# Patient Record
Sex: MALE | Race: WHITE | NOT HISPANIC OR LATINO | Employment: FULL TIME | URBAN - METROPOLITAN AREA
[De-identification: names, ages, dates, MRNs, and addresses within clinical notes are randomized per-mention and may not be internally consistent; named-entity substitution may affect disease eponyms.]

---

## 2017-04-07 ENCOUNTER — OFFICE VISIT (OUTPATIENT)
Dept: INTERNAL MEDICINE | Facility: CLINIC | Age: 37
End: 2017-04-07
Payer: COMMERCIAL

## 2017-04-07 VITALS
HEART RATE: 61 BPM | HEIGHT: 75 IN | TEMPERATURE: 98 F | OXYGEN SATURATION: 98 % | WEIGHT: 263.69 LBS | DIASTOLIC BLOOD PRESSURE: 80 MMHG | BODY MASS INDEX: 32.79 KG/M2 | RESPIRATION RATE: 16 BRPM | SYSTOLIC BLOOD PRESSURE: 112 MMHG

## 2017-04-07 DIAGNOSIS — Z98.84 BARIATRIC SURGERY STATUS: ICD-10-CM

## 2017-04-07 DIAGNOSIS — N28.9 ACUTE RENAL INSUFFICIENCY: ICD-10-CM

## 2017-04-07 DIAGNOSIS — M10.9 GOUT OF FOOT, UNSPECIFIED CAUSE, UNSPECIFIED CHRONICITY, UNSPECIFIED LATERALITY: Primary | ICD-10-CM

## 2017-04-07 DIAGNOSIS — K21.9 GASTRO-ESOPHAGEAL REFLUX DISEASE WITHOUT ESOPHAGITIS: ICD-10-CM

## 2017-04-07 DIAGNOSIS — Z79.899 ENCOUNTER FOR LONG-TERM (CURRENT) USE OF OTHER MEDICATIONS: ICD-10-CM

## 2017-04-07 DIAGNOSIS — Z86.79 HISTORY OF HYPERTENSION: ICD-10-CM

## 2017-04-07 DIAGNOSIS — E66.09 NON MORBID OBESITY DUE TO EXCESS CALORIES: ICD-10-CM

## 2017-04-07 DIAGNOSIS — Z86.39 HISTORY OF DIABETES MELLITUS, TYPE II: ICD-10-CM

## 2017-04-07 PROCEDURE — 99204 OFFICE O/P NEW MOD 45 MIN: CPT | Mod: S$GLB,,, | Performed by: FAMILY MEDICINE

## 2017-04-07 PROCEDURE — 1160F RVW MEDS BY RX/DR IN RCRD: CPT | Mod: S$GLB,,, | Performed by: FAMILY MEDICINE

## 2017-04-07 PROCEDURE — 99999 PR PBB SHADOW E&M-NEW PATIENT-LVL III: CPT | Mod: PBBFAC,,, | Performed by: FAMILY MEDICINE

## 2017-04-07 RX ORDER — ESOMEPRAZOLE MAGNESIUM 40 MG/1
40 CAPSULE, DELAYED RELEASE ORAL EVERY MORNING
Qty: 90 CAPSULE | Refills: 1 | Status: SHIPPED | OUTPATIENT
Start: 2017-04-07 | End: 2017-06-07

## 2017-04-07 RX ORDER — AMLODIPINE BESYLATE 10 MG/1
10 TABLET ORAL DAILY
COMMUNITY
End: 2017-04-07 | Stop reason: ALTCHOICE

## 2017-04-07 RX ORDER — FEBUXOSTAT 40 MG/1
40 TABLET, FILM COATED ORAL DAILY
COMMUNITY
End: 2017-04-07 | Stop reason: SDUPTHER

## 2017-04-07 RX ORDER — ESOMEPRAZOLE MAGNESIUM 40 MG/1
40 CAPSULE, DELAYED RELEASE ORAL EVERY MORNING
COMMUNITY
End: 2017-04-07 | Stop reason: SDUPTHER

## 2017-04-07 RX ORDER — CETIRIZINE HYDROCHLORIDE 10 MG/1
10 TABLET ORAL
COMMUNITY
End: 2017-04-26 | Stop reason: HOSPADM

## 2017-04-07 RX ORDER — DOXAZOSIN 1 MG/1
1 TABLET ORAL NIGHTLY
COMMUNITY
End: 2017-04-26 | Stop reason: SDUPTHER

## 2017-04-07 RX ORDER — FEBUXOSTAT 40 MG/1
40 TABLET, FILM COATED ORAL DAILY
Qty: 90 TABLET | Refills: 1 | Status: SHIPPED | OUTPATIENT
Start: 2017-04-07 | End: 2017-04-26 | Stop reason: SDUPTHER

## 2017-04-07 RX ORDER — ASPIRIN 81 MG/1
81 TABLET ORAL NIGHTLY
COMMUNITY
End: 2017-04-07 | Stop reason: ALTCHOICE

## 2017-04-07 RX ORDER — AMLODIPINE AND OLMESARTAN MEDOXOMIL 10; 40 MG/1; MG/1
1 TABLET ORAL DAILY
COMMUNITY
End: 2017-04-07 | Stop reason: ALTCHOICE

## 2017-04-07 RX ORDER — ATENOLOL 50 MG/1
50 TABLET ORAL DAILY
COMMUNITY
End: 2017-04-07 | Stop reason: ALTCHOICE

## 2017-04-07 RX ORDER — ATENOLOL AND CHLORTHALIDONE TABLET 50; 25 MG/1; MG/1
1 TABLET ORAL DAILY
COMMUNITY
End: 2017-04-07 | Stop reason: ALTCHOICE

## 2017-04-07 RX ORDER — FENOFIBRATE 145 MG/1
145 TABLET, FILM COATED ORAL DAILY
COMMUNITY
End: 2017-04-07

## 2017-04-07 RX ORDER — INSULIN LISPRO 100 [IU]/ML
INJECTION, SOLUTION INTRAVENOUS; SUBCUTANEOUS
COMMUNITY
End: 2017-04-07 | Stop reason: ALTCHOICE

## 2017-04-07 NOTE — MR AVS SNAPSHOT
[facility]  [address] [facility] [place] [address]  Phone: [phone]  Fax: [phone]                  Robb Iglesias   2017 2:00 PM   Office Visit    Description:  Male : 1980   Provider:  SABIHA Roberson MD   Department:  [facility] - Internal Medicine           Reason for Visit     Gout     Follow-up     Medication Refill           Diagnoses this Visit        Comments    Gout of foot, unspecified cause, unspecified chronicity, unspecified laterality    -  Primary     Gastro-esophageal reflux disease without esophagitis         Bariatric surgery status         History of diabetes mellitus, type II         History of hypertension         Non morbid obesity due to excess calories         Acute renal insufficiency         Encounter for long-term (current) use of other medications                To Do List           Goals (5 Years of Data)     None      Follow-Up and Disposition     Return in about 3 months (around 2017) for re-evaluate chronic conditions.       These Medications        Disp Refills Start End    febuxostat (ULORIC) 40 mg Tab 90 tablet 1 2017     Take 1 tablet (40 mg total) by mouth once daily. - Oral    Pharmacy: [facility] - [address] Ph #: [phone]       esomeprazole (NEXIUM) 40 MG capsule 90 capsule 1 2017     Take 1 capsule (40 mg total) by mouth every morning. - Oral    Pharmacy: [facility] - [address] Ph #: [phone]         [facility] On Call     [facility] On Call Nurse Care Line -  Assistance  Unless otherwise directed by your provider, please contact [facility], our nurse care line that is available for  assistance.     Registered nurses in the [facility] Center provide: appointment scheduling, clinical advisement, health education, and other advisory services.  Call: [phone] (toll free)               Medications            Message regarding Medications     Verify the changes and/or additions to your medication regime listed below are the same as discussed with your clinician today.  If any of these changes or additions are incorrect, please notify your healthcare provider.        CHANGE how you are taking these medications     Start Taking Instead of    febuxostat (ULORIC) 40 mg Tab febuxostat (ULORIC) 40 mg Tab    Dosage:  Take 1 tablet (40 mg total) by mouth once daily. Dosage:  Take 40 mg by mouth once daily.    Reason for Change:  Reorder     esomeprazole (NEXIUM) 40 MG capsule esomeprazole (NEXIUM) 40 MG capsule    Dosage:  Take 1 capsule (40 mg total) by mouth every morning. Dosage:  Take 40 mg by mouth every morning.    Reason for Change:  Reorder       STOP taking these medications     atenolol-chlorthalidone (TENORETIC) 50-25 mg Tab Take 1 tablet by mouth once daily.    amlodipine-olmesartan (STACI) 10-40 mg per tablet Take 1 tablet by mouth once daily.    fenofibrate (TRICOR) 145 MG tablet Take 145 mg by mouth once daily.    aspirin (ECOTRIN) 81 MG EC tablet Take 81 mg by mouth every evening.    amlodipine (NORVASC) 10 MG tablet Take 10 mg by mouth once daily.    atenolol (TENORMIN) 50 MG tablet Take 50 mg by mouth once daily.    exenatide (BYETTA) 10 mcg/dose(250 mcg/mL) 2.4 mL PnIj Inject 10 mcg into the skin 2 (two) times daily before meals.    insulin detemir (LEVEMIR FLEXPEN) 100 unit/mL (3 mL) SubQ InPn pen Inject 100 Units into the skin 2 (two) times daily.    insulin lispro (HUMALOG KWIKPEN) 100 unit/mL InPn pen Inject into the skin three times daily with food. AS DIRECTED (max 120 unites/day)    SITagliptan (JANUVIA) 100 MG Tab Take 100 mg by mouth once daily.           Verify that the below list of medications is an accurate representation of the medications you are currently taking.  If none reported, the list may be blank. If incorrect, please contact your healthcare provider. Carry this list with you in  "case of emergency.           Current Medications     esomeprazole (NEXIUM) 40 MG capsule Take 1 capsule (40 mg total) by mouth every morning.    febuxostat (ULORIC) 40 mg Tab Take 1 tablet (40 mg total) by mouth once daily.    cetirizine (ZYRTEC) 10 MG tablet Take 10 mg by mouth once daily.    doxazosin (CARDURA) 1 MG tablet Take 1 mg by mouth every evening.           Clinical Reference Information           Your Vitals Were     BP Pulse Temp Resp    112/80 (BP Location: Left arm, Patient Position: Sitting, BP Method: Manual) 61 98.3 °F (36.8 °C) (Tympanic) 16    Height Weight SpO2 BMI    6' 3" (1.905 m) 119.6 kg (263 lb 10.7 oz) 98% 32.96 kg/m2      Blood Pressure          Most Recent Value    BP  112/80      Allergies as of 4/7/2017     Lactose      Immunizations Administered on Date of Encounter - 4/7/2017     None      Orders Placed During Today's Visit     Future Labs/Procedures Expected by Expires    CBC auto differential  7/7/2017 6/6/2018    Comprehensive metabolic panel  7/7/2017 6/6/2018    Ferritin  7/7/2017 6/6/2018    Lipid panel  7/7/2017 6/6/2018    Vitamin B12  7/7/2017 6/6/2018    Vitamin D  7/7/2017 6/6/2018      [facility] Sign-Up     Activating your [facility] account is as easy as 1-2-3!     1) Visit [email or web address], select Sign Up Now, enter this activation code and your date of birth, then select Next.  [email or web address]  Expires: 5/22/2017  3:20 PM      2) Create a username and password to use when you visit [facility] in the future and select a security question in case you lose your password and select Next.    3) Enter your e-mail address and click Sign Up!    Additional Information  If you have questions, please e-mail [email or web address] or call [phone] to talk to our [facility] staff. Remember, [facility] is NOT to be used for urgent needs. For medical emergencies, dial 911.         Instructions    Check blood pressure once daily at bedtime. If systolic blood pressure is greater than " 150, then take one tablet of doxazosin 1mg (max 1 tablet daily).        Language Assistance Services     ATTENTION: Language assistance services are available, free of charge. Please call [phone].      ATENCIÓN: Si bao armas, tiene a parekh disposición servicios gratuitos de asistencia lingüística. Llame al [phone].     [facility] Ý: N?u b?n nói Ti?ng Vi?t, có các d?ch v? h? tr? ngôn ng? mi?n phí dành cho b?n. G?i s? [phone].         [facility] - Internal Medicine complies with applicable Federal civil rights laws and does not discriminate on the basis of race, color, national origin, age, disability, or sex.

## 2017-04-07 NOTE — PATIENT INSTRUCTIONS
Check blood pressure once daily at bedtime. If systolic blood pressure is greater than 150, then take one tablet of doxazosin 1mg (max 1 tablet daily).

## 2017-04-09 NOTE — PROGRESS NOTES
NOTE: Documentation entered by me for this encounter was done in part using speech-recognition technology. Although I have made an effort to ensure accuracy, malapropisms may exist and should be interpreted in context. -KEVIN Roberson MD    Patient ID: Robb Iglesias is a 36 y.o. male.    CHIEF COMPLAINT   Gout; Follow-up; and Medication Refill        HISTORY OF PRESENT ILLNESS:   Gout of foot  This appears FAIRLY CONTROLLED. This appears STABLE.     Gastro-esophageal reflux disease without esophagitis  This appears WELL CONTROLLED.     Bariatric surgery status  He appears to be doing very well postoperatively.    Non morbid obesity due to excess calories  This condition is much IMPROVED. He is losing significant weight appropriately after his bariatric surgery.    ADDITIONAL NARRATIVE HISTORY:  He is doing very well after his bariatric surgical procedure. His hypertension and type 2 diabetes mellitus have effectively been cured. It was decided to leave him off of his diabetic medications and antihypertensive medications and fenofibrate and reevaluate after checking labs in 2-3 month. He will continue to follow with his bariatric surgeon as directed.      REVIEW OF SYSTEMS:   CONSTITUTIONAL: No fever reported.  CARDIOVASCULAR: No chest pain reported.  PULMONARY: No trouble breathing reported.  GENITOURINARY: No painful urination reported.  ENT: No sore throat reported.    PHYSICAL EXAM:   CONSTITUTIONAL: Vital signs noted. No apparent distress. Does not appear acutely ill or septic. Appears adequately hydrated.  HEENT: Normocephalic. Atraumatic. External ears unremarkable. Oropharynx moist.  PULM: Lungs clear. Breathing unlabored.  CV: Heart regular.  GI: Abdomen is soft and nontender.  DERM: Warm and moist.  PSYCHIATRIC: Alert and oriented x 3. Mood is grossly neutral. Affect appropriate. Judgment and insight not grossly compromised.    ASSESSMENT:       1. Gout of foot, unspecified cause,  unspecified chronicity, unspecified laterality    2. Gastro-esophageal reflux disease without esophagitis    3. Bariatric surgery status    4. History of diabetes mellitus, type II    5. History of hypertension    6. Non morbid obesity due to excess calories    7. Acute renal insufficiency    8. Encounter for long-term (current) use of other medications             PLAN:   Gout of foot, unspecified cause, unspecified chronicity, unspecified laterality  -     febuxostat (ULORIC) 40 mg Tab; Take 1 tablet (40 mg total) by mouth once daily.  Dispense: 90 tablet; Refill: 1    Gastro-esophageal reflux disease without esophagitis  -     esomeprazole (NEXIUM) 40 MG capsule; Take 1 capsule (40 mg total) by mouth every morning.  Dispense: 90 capsule; Refill: 1    Bariatric surgery status  -     Lipid panel; Future; Expected date: 7/7/17  -     Comprehensive metabolic panel; Future; Expected date: 7/7/17  -     CBC auto differential; Future; Expected date: 7/7/17  -     Vitamin B12; Future; Expected date: 7/7/17  -     Vitamin D; Future; Expected date: 7/7/17  -     Ferritin; Future; Expected date: 7/7/17    History of diabetes mellitus, type II    History of hypertension  -     Comprehensive metabolic panel; Future; Expected date: 7/7/17  -     Vitamin B12; Future; Expected date: 7/7/17    Non morbid obesity due to excess calories  -     Vitamin B12; Future; Expected date: 7/7/17    Acute renal insufficiency  -     Comprehensive metabolic panel; Future; Expected date: 7/7/17  -     CBC auto differential; Future; Expected date: 7/7/17  -     Vitamin D; Future; Expected date: 7/7/17  -     Ferritin; Future; Expected date: 7/7/17    Encounter for long-term (current) use of other medications  -     Comprehensive metabolic panel; Future; Expected date: 7/7/17  -     CBC auto differential; Future; Expected date: 7/7/17  -     Ferritin; Future; Expected date: 7/7/17        Medications Discontinued During This Encounter   Medication  Reason    atenolol-chlorthalidone (TENORETIC) 50-25 mg Tab Discontinued by another clinician    amlodipine-olmesartan (STACI) 10-40 mg per tablet Discontinued by another clinician    fenofibrate (TRICOR) 145 MG tablet Surgery    aspirin (ECOTRIN) 81 MG EC tablet Therapy completed    amlodipine (NORVASC) 10 MG tablet Therapy completed    atenolol (TENORMIN) 50 MG tablet Therapy completed    exenatide (BYETTA) 10 mcg/dose(250 mcg/mL) 2.4 mL PnIj Therapy completed    insulin detemir (LEVEMIR FLEXPEN) 100 unit/mL (3 mL) SubQ InPn pen Therapy completed    insulin lispro (HUMALOG KWIKPEN) 100 unit/mL InPn pen Therapy completed    SITagliptan (JANUVIA) 100 MG Tab Therapy completed    febuxostat (ULORIC) 40 mg Tab Reorder    esomeprazole (NEXIUM) 40 MG capsule Reorder       He has a current medication list which includes the following prescription(s): esomeprazole, febuxostat, cetirizine, and doxazosin.

## 2017-04-09 NOTE — ASSESSMENT & PLAN NOTE
This condition is much IMPROVED. He is losing significant weight appropriately after his bariatric surgery.

## 2017-04-24 ENCOUNTER — PATIENT MESSAGE (OUTPATIENT)
Dept: INTERNAL MEDICINE | Facility: CLINIC | Age: 37
End: 2017-04-24

## 2017-04-26 ENCOUNTER — OFFICE VISIT (OUTPATIENT)
Dept: INTERNAL MEDICINE | Facility: CLINIC | Age: 37
End: 2017-04-26
Payer: COMMERCIAL

## 2017-04-26 VITALS
TEMPERATURE: 97 F | OXYGEN SATURATION: 97 % | HEIGHT: 75 IN | BODY MASS INDEX: 32.7 KG/M2 | DIASTOLIC BLOOD PRESSURE: 88 MMHG | SYSTOLIC BLOOD PRESSURE: 138 MMHG | WEIGHT: 263 LBS | RESPIRATION RATE: 16 BRPM | HEART RATE: 84 BPM

## 2017-04-26 DIAGNOSIS — M10.9 GOUT OF LEFT KNEE, UNSPECIFIED CAUSE, UNSPECIFIED CHRONICITY: ICD-10-CM

## 2017-04-26 DIAGNOSIS — I10 BENIGN ESSENTIAL HYPERTENSION: Primary | Chronic | ICD-10-CM

## 2017-04-26 DIAGNOSIS — M10.9 GOUT OF LEFT FOOT, UNSPECIFIED CAUSE, UNSPECIFIED CHRONICITY: ICD-10-CM

## 2017-04-26 DIAGNOSIS — Z98.84 BARIATRIC SURGERY STATUS: Chronic | ICD-10-CM

## 2017-04-26 PROCEDURE — 3079F DIAST BP 80-89 MM HG: CPT | Mod: S$GLB,,, | Performed by: FAMILY MEDICINE

## 2017-04-26 PROCEDURE — 1160F RVW MEDS BY RX/DR IN RCRD: CPT | Mod: S$GLB,,, | Performed by: FAMILY MEDICINE

## 2017-04-26 PROCEDURE — 99999 PR PBB SHADOW E&M-EST. PATIENT-LVL IV: CPT | Mod: PBBFAC,,, | Performed by: FAMILY MEDICINE

## 2017-04-26 PROCEDURE — 3075F SYST BP GE 130 - 139MM HG: CPT | Mod: S$GLB,,, | Performed by: FAMILY MEDICINE

## 2017-04-26 PROCEDURE — 96372 THER/PROPH/DIAG INJ SC/IM: CPT | Mod: S$GLB,,, | Performed by: FAMILY MEDICINE

## 2017-04-26 PROCEDURE — 99214 OFFICE O/P EST MOD 30 MIN: CPT | Mod: 25,S$GLB,, | Performed by: FAMILY MEDICINE

## 2017-04-26 RX ORDER — DOXAZOSIN 1 MG/1
1 TABLET ORAL NIGHTLY
Qty: 90 TABLET | Refills: 3 | Status: SHIPPED | OUTPATIENT
Start: 2017-04-26 | End: 2017-06-21

## 2017-04-26 RX ORDER — HYDROCODONE BITARTRATE AND ACETAMINOPHEN 10; 325 MG/1; MG/1
1 TABLET ORAL
COMMUNITY
End: 2017-06-07

## 2017-04-26 RX ORDER — METHYLPREDNISOLONE ACETATE 40 MG/ML
40 INJECTION, SUSPENSION INTRA-ARTICULAR; INTRALESIONAL; INTRAMUSCULAR; SOFT TISSUE
Status: COMPLETED | OUTPATIENT
Start: 2017-04-26 | End: 2017-04-26

## 2017-04-26 RX ORDER — FEBUXOSTAT 40 MG/1
40 TABLET, FILM COATED ORAL DAILY
Qty: 90 TABLET | Refills: 3 | Status: SHIPPED | OUTPATIENT
Start: 2017-04-26 | End: 2017-06-07 | Stop reason: DRUGHIGH

## 2017-04-26 RX ORDER — IBUPROFEN 200 MG
200 TABLET ORAL
COMMUNITY
End: 2017-06-21

## 2017-04-26 RX ADMIN — METHYLPREDNISOLONE ACETATE 40 MG: 40 INJECTION, SUSPENSION INTRA-ARTICULAR; INTRALESIONAL; INTRAMUSCULAR; SOFT TISSUE at 03:04

## 2017-04-26 NOTE — MR AVS SNAPSHOT
[facility]  [address] [facility] [place] [address]  Phone: [phone]  Fax: [phone]                  Robb Iglesias   2017 3:00 PM   Office Visit    Description:  Male : 1980   Provider:  Kwan Roberson MD   Department:  [facility] Internal Medicine           Reason for Visit     Gout           Diagnoses this Visit        Comments    Gout of left knee, unspecified cause, unspecified chronicity    -  Primary     Gout of left foot, unspecified cause, unspecified chronicity         Benign essential hypertension                To Do List           Future Appointments        Provider Department Dept Phone    2017 8:25 AM LABORATORY, [facility] [phone]    2017 8:00 AM Kwan Roberson MD [facility] Internal [facility] [phone]      Goals (5 Years of Data)     None      Follow-Up and Disposition     Return if symptoms worsen or fail to improve.       These Medications        Disp Refills Start End    doxazosin (CARDURA) 1 MG tablet 90 tablet 3 2017     Take 1 tablet (1 mg total) by mouth every evening. - Oral    Pharmacy: [facility] - [address] Ph #: [phone]       febuxostat (ULORIC) 40 mg Tab 90 tablet 3 2017     Take 1 tablet (40 mg total) by mouth once daily. - Oral    Pharmacy: [facility] - [address] Ph #: [phone]         [facility] On Call     [facility] On Call Nurse Care Line -  Assistance  Unless otherwise directed by your provider, please contact [facility], our nurse care line that is available for 24/ assistance.     Registered nurses in the [facility] On Call Center provide: appointment scheduling, clinical advisement, health education, and other advisory services.  Call: [phone] (toll free)               Medications           Message regarding Medications     Verify the changes and/or additions to  "your medication regime listed below are the same as discussed with your clinician today.  If any of these changes or additions are incorrect, please notify your healthcare provider.        These medications were administered today        Dose Freq    methylPREDNISolone acetate injection 40 mg 40 mg Clinic/HOD 1 time    Sig: Inject 1 mL (40 mg total) into the muscle one time.    Class: Normal    Route: Intramuscular      CHANGE how you are taking these medications     Start Taking Instead of    doxazosin (CARDURA) 1 MG tablet doxazosin (CARDURA) 1 MG tablet    Dosage:  Take 1 tablet (1 mg total) by mouth every evening. Dosage:  Take 1 mg by mouth every evening.    Reason for Change:  Reorder       STOP taking these medications     cetirizine (ZYRTEC) 10 MG tablet Take 10 mg by mouth as needed.            Verify that the below list of medications is an accurate representation of the medications you are currently taking.  If none reported, the list may be blank. If incorrect, please contact your healthcare provider. Carry this list with you in case of emergency.           Current Medications     doxazosin (CARDURA) 1 MG tablet Take 1 tablet (1 mg total) by mouth every evening.    esomeprazole (NEXIUM) 40 MG capsule Take 1 capsule (40 mg total) by mouth every morning.    febuxostat (ULORIC) 40 mg Tab Take 1 tablet (40 mg total) by mouth once daily.    hydrocodone-acetaminophen 10-325mg (NORCO)  mg Tab Take 1 tablet by mouth as needed for Pain.    ibuprofen (ADVIL,MOTRIN) 200 MG tablet Take 200 mg by mouth as needed for Pain.    MULTIVIT-MINERALS/FOLIC ACID (DAILY MULTIPLE FOR MEN ORAL) Take 1 tablet by mouth once daily.           Clinical Reference Information           Your Vitals Were     BP Pulse Temp Resp Height Weight    138/88 84 97.1 °F (36.2 °C) (Tympanic) 16 6' 3" (1.905 m) 119.3 kg (263 lb 0.1 oz)    SpO2 BMI             97% 32.87 kg/m2         Blood Pressure          Most Recent Value    BP  138/88    "   Allergies as of 4/26/2017     Lactose      Immunizations Administered on Date of Encounter - 4/26/2017     None      Language Assistance Services     ATTENTION: Language assistance services are available, free of charge. Please call [phone].      ATENCIÓN: Si bao armas, tiene a parekh disposición servicios gratuitos de asistencia lingüística. Llame al [phone].     CHÚ Ý: N?u b?n nói Ti?ng Vi?t, có các d?ch v? h? tr? ngôn ng? mi?n phí dành cho b?n. G?i s? [phone].         [facility] - [facility] complies with applicable Federal civil rights laws and does not discriminate on the basis of race, color, national origin, age, disability, or sex.

## 2017-05-08 PROBLEM — K21.9 GASTRO-ESOPHAGEAL REFLUX DISEASE WITHOUT ESOPHAGITIS: Chronic | Status: ACTIVE | Noted: 2017-04-07

## 2017-05-08 PROBLEM — M10.9 GOUT OF FOOT: Chronic | Status: ACTIVE | Noted: 2017-04-07

## 2017-05-08 PROBLEM — E66.09 NON MORBID OBESITY DUE TO EXCESS CALORIES: Chronic | Status: ACTIVE | Noted: 2017-04-07

## 2017-05-08 PROBLEM — M10.9 GOUT OF LEFT KNEE: Chronic | Status: ACTIVE | Noted: 2017-04-26

## 2017-05-08 PROBLEM — Z98.84 BARIATRIC SURGERY STATUS: Chronic | Status: ACTIVE | Noted: 2017-04-07

## 2017-05-08 NOTE — ASSESSMENT & PLAN NOTE
The weight loss resulting from his bariatric surgical procedure has resulted in dramatic improvement of his blood pressure, and the only antihypertensive medication he is needing/using now is the doxazosin 1 mg daily at bedtime. He is going to continue to work on therapeutic lifestyle improvements and monitor his blood pressure at home, and we will consider weaning/discontinuing the doxazosin as he continues to lose more weight. He reports no angina or angina equivalent symptoms.

## 2017-05-08 NOTE — ASSESSMENT & PLAN NOTE
Based on the report of the patient and information available to me at present, this condition appears to be SUB-OPTIMALLY (OR EQUIVOCALLY) CONTROLLED, and this condition appears to be WORSE. When he was on the febuoxstat , he was NOT having gout symptoms. He discontinued the febuoxstat after his bariatric surgery, but it he reports recurrence of his gout symptoms to a MODERATE degree. He is working on appropriate dietary modifications.

## 2017-05-08 NOTE — PROGRESS NOTES
NOTE: Documentation entered by me for this encounter was done in part using speech-recognition technology. Although I have made an effort to ensure accuracy, malapropisms may exist and should be interpreted in context. -KEVIN Roberson MD    Patient ID: Robb Iglesias is a 36 y.o. male.    CHIEF COMPLAINT   Gout (Left knee worst.)   also, reevaluate hypertension      HISTORY OF PRESENT ILLNESS:   Benign essential hypertension  The weight loss resulting from his bariatric surgical procedure has resulted in dramatic improvement of his blood pressure, and the only antihypertensive medication he is needing/using now is the doxazosin 1 mg daily at bedtime. He is going to continue to work on therapeutic lifestyle improvements and monitor his blood pressure at home, and we will consider weaning/discontinuing the doxazosin as he continues to lose more weight. He reports no angina or angina equivalent symptoms.    Gout of left knee  Based on the report of the patient and information available to me at present, this condition appears to be SUB-OPTIMALLY (OR EQUIVOCALLY) CONTROLLED, and this condition appears to be WORSE. When he was on the febuoxstat , he was NOT having gout symptoms. He discontinued the febuoxstat after his bariatric surgery, but it he reports recurrence of his gout symptoms to a MODERATE degree. He is working on appropriate dietary modifications.    Gout of foot  Based on the report of the patient and information available to me at present, this condition appears to be SUB-OPTIMALLY (OR EQUIVOCALLY) CONTROLLED, and this condition appears to be WORSE. When he was on the febuoxstat , he was NOT having gout symptoms. He discontinued the febuoxstat after his bariatric surgery, but it he reports recurrence of his gout symptoms to a MODERATE degree. He is working on appropriate dietary modifications.        REVIEW OF SYSTEMS:   CONSTITUTIONAL: No fever reported.  CARDIOVASCULAR: No chest pain  reported.  PULMONARY: No trouble breathing reported.    PHYSICAL EXAM:   CONSTITUTIONAL: Vital signs noted. No apparent distress. Does not appear acutely ill or septic. Appears adequately hydrated.  HEENT: Normocephalic. Atraumatic. External ears unremarkable. Oropharynx moist.  PULM: Lungs clear. Breathing unlabored.  CV: Heart regular.  DERM: Warm and moist.  MUSCULOSKELETAL: MILD swelling of his left great toe and left knee, compared to the contralateral side. Pain is reproduced on range of motion. In spite of this problem, gait and stance are essentially normal.  PSYCHIATRIC: Alert and oriented x 3. Mood is grossly neutral. Affect appropriate. Judgment and insight not grossly compromised.    ASSESSMENT:       1. Benign essential hypertension    2. Gout of left foot, unspecified cause, unspecified chronicity    3. Gout of left knee, unspecified cause, unspecified chronicity    4. Bariatric surgery status             PLAN:   Benign essential hypertension  -     doxazosin (CARDURA) 1 MG tablet; Take 1 tablet (1 mg total) by mouth every evening.  Dispense: 90 tablet; Refill: 3    Gout of left foot, unspecified cause, unspecified chronicity  -     febuxostat (ULORIC) 40 mg Tab; Take 1 tablet (40 mg total) by mouth once daily.  Dispense: 90 tablet; Refill: 3  -     methylPREDNISolone acetate injection 40 mg; Inject 1 mL (40 mg total) into the muscle one time.    Gout of left knee, unspecified cause, unspecified chronicity  -     methylPREDNISolone acetate injection 40 mg; Inject 1 mL (40 mg total) into the muscle one time.    Bariatric surgery status        Medications Discontinued During This Encounter   Medication Reason    cetirizine (ZYRTEC) 10 MG tablet Patient Discharge    doxazosin (CARDURA) 1 MG tablet Reorder    febuxostat (ULORIC) 40 mg Tab Reorder

## 2017-06-07 ENCOUNTER — OFFICE VISIT (OUTPATIENT)
Dept: INTERNAL MEDICINE | Facility: CLINIC | Age: 37
End: 2017-06-07
Payer: COMMERCIAL

## 2017-06-07 VITALS
WEIGHT: 244.94 LBS | SYSTOLIC BLOOD PRESSURE: 116 MMHG | HEIGHT: 75 IN | TEMPERATURE: 98 F | BODY MASS INDEX: 30.45 KG/M2 | DIASTOLIC BLOOD PRESSURE: 84 MMHG

## 2017-06-07 DIAGNOSIS — N18.30 CHRONIC KIDNEY DISEASE, STAGE 3: Chronic | ICD-10-CM

## 2017-06-07 DIAGNOSIS — I10 BENIGN ESSENTIAL HYPERTENSION: Chronic | ICD-10-CM

## 2017-06-07 DIAGNOSIS — M1A.0420 IDIOPATHIC CHRONIC GOUT OF LEFT HAND WITHOUT TOPHUS: Primary | ICD-10-CM

## 2017-06-07 DIAGNOSIS — M1A.0710 IDIOPATHIC CHRONIC GOUT, RIGHT ANKLE AND FOOT, WITHOUT TOPHUS (TOPHI): ICD-10-CM

## 2017-06-07 DIAGNOSIS — K91.2 POSTOPERATIVE MALABSORPTION: ICD-10-CM

## 2017-06-07 DIAGNOSIS — E66.09 NON MORBID OBESITY DUE TO EXCESS CALORIES: ICD-10-CM

## 2017-06-07 DIAGNOSIS — Z98.84 BARIATRIC SURGERY STATUS: Chronic | ICD-10-CM

## 2017-06-07 DIAGNOSIS — Z79.899 ENCOUNTER FOR LONG-TERM CURRENT USE OF HIGH RISK MEDICATION: ICD-10-CM

## 2017-06-07 DIAGNOSIS — M1A.0620 IDIOPATHIC CHRONIC GOUT OF LEFT KNEE WITHOUT TOPHUS: Chronic | ICD-10-CM

## 2017-06-07 DIAGNOSIS — M1A.0720 IDIOPATHIC CHRONIC GOUT, LEFT ANKLE AND FOOT, WITHOUT TOPHUS (TOPHI): Chronic | ICD-10-CM

## 2017-06-07 DIAGNOSIS — K21.9 GASTRO-ESOPHAGEAL REFLUX DISEASE WITHOUT ESOPHAGITIS: Chronic | ICD-10-CM

## 2017-06-07 PROBLEM — M10.042 IDIOPATHIC GOUT, LEFT HAND: Chronic | Status: ACTIVE | Noted: 2017-06-07

## 2017-06-07 PROCEDURE — 99214 OFFICE O/P EST MOD 30 MIN: CPT | Mod: S$GLB,,, | Performed by: FAMILY MEDICINE

## 2017-06-07 PROCEDURE — 99999 PR PBB SHADOW E&M-EST. PATIENT-LVL III: CPT | Mod: PBBFAC,,, | Performed by: FAMILY MEDICINE

## 2017-06-07 RX ORDER — FEBUXOSTAT 80 MG/1
80 TABLET, FILM COATED ORAL DAILY
Qty: 30 TABLET | Refills: 11 | Status: SHIPPED | OUTPATIENT
Start: 2017-06-07 | End: 2017-07-19

## 2017-06-07 RX ORDER — COLCHICINE 0.6 MG/1
TABLET ORAL
Qty: 15 TABLET | Refills: 1 | Status: SHIPPED | OUTPATIENT
Start: 2017-06-07 | End: 2017-06-21

## 2017-06-07 RX ORDER — DICLOFENAC SODIUM 10 MG/G
2 GEL TOPICAL 3 TIMES DAILY PRN
Qty: 100 G | Refills: 1 | Status: SHIPPED | OUTPATIENT
Start: 2017-06-07 | End: 2017-07-19

## 2017-06-07 NOTE — PROGRESS NOTES
"NOTE: Documentation entered by me for this encounter was done in part using speech-recognition technology. Although I have made an effort to ensure accuracy, malapropisms may exist and should be interpreted in context.  -KEVIN Roberson MD    Patient ID: Robb Iglesias is a 36 y.o. male.    CHIEF COMPLAINT   Gout      HISTORY OF PRESENT ILLNESS:   NOTE: Additional description of problem(s) or condition(s) may be included in a separate paragraph labeled "NARRATIVE HISTORY OF PRESENT ILLNESS".  -KEVIN Roberson MD    Benign essential hypertension   Based on the report of the patient and information available to me at present, this condition appears to be WELL CONTROLLED, and this condition appears to be IMPROVED.     Gout of left knee   Based on the report of the patient and information available to me at present, this condition appears to be SUB-OPTIMALLY CONTROLLED, and this condition appears to be IMPROVED.     Non morbid obesity due to excess calories   Based on the report of the patient and information available to me at present, this condition continues to IMPROVE.     Bariatric surgery status   Based on the report of the patient and information available to me at present, this condition appears to be STABLE/COMPENSATED.     Chronic kidney disease, stage 3   Based on the report of the patient and information available to me at present, this condition appears to be STABLE/COMPENSATED.     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     Idiopathic chronic gout, left ankle and foot, without tophus (tophi)   Based on the report of the patient and information available to me at present, this condition appears to be SUB-OPTIMALLY CONTROLLED, and this condition appears to be IMPROVED.     Idiopathic chronic gout, right ankle " and foot, without tophus (tophi)   Based on the report of the patient and information available to me at present, this condition appears to be SUB-OPTIMALLY CONTROLLED, and this condition appears to be IMPROVED.     LABS REVIEWED: From [facility] (05/22/2017)  Comprehensive metabolic panel   Sodium Level 142 136 - 145 mmol/L  Potassium Level 3.8 3.5 - 5.1 mmol/L  Chloride Level 107 100 - 109 mmol/L  CO2 Level 23 22 - 33 mmol/L  Glucose Level 171 (H) 70 - 100 mg/dL  Blood Urea Nitrogen Level 18 5 - 25 mg/dL  Creatinine Level 1.58 (H) 0.57 - 1.25 mg/dL  GFR-AA 61 mL/min/1.73mSquared   GFR-GABBY 50 mL/min/1.73mSquared   Calcium Level 8.9 8.8 - 10.6 mg/dL  Protein Total 6.7 6.0 - 8.3 g/dL  Albumin Level 3.6 3.5 - 5.0 g/dL  Bilirubin Total 0.8 0.2 - 1.2 mg/dL  Alkaline Phosphatase Level 95 40 - 150 U/L  SGOT (AST) 21 10 - 58 U/L  SGPT (ALT) 24 5 - 50 U/L  Anion Gap 12 8 - 16 mmol/L    CBC auto differential  WHITE BLOOD CELL COUNT 8.2 4.0 - 11.0 1000/ul  RED BLOOD CELL COUNT 4.90 4.50 - 5.60 mill/uL  HEMOGLOBIN 13.1 (L) 14.0 - 18.0 gm/dl  HEMATOCRIT 41.4 (L) 42.0 - 52.0 %  MEAN CORPUSCULAR VOLUME 85 80 - 100 fl  MEAN CORPUSCULAR HEMOGLOBIN CONC 31.6 31.0 - 37.0 gm/dl  RED CELL DISTRIBUTION WIDTH 15.2 (H) 12.1 - 14.9 %  PLATELET COUNT 194 150 - 375 1000/ul  MEAN PLATELET VOLUME 8.3 6.5 - 12.0 fl  Neutrophils Abs 5.6 1.5 - 10.0 1000/ul  Lymphocytes Abs 1.9 1.3 - 2.9 1000/ul  Monocytes Abs 0.5 0.1 - 1.0 1000/ul  Eosinophils Abs 0.2 0.0 - 0.7 1000/ul  Basophils Abs 0.0 0.0 - 0.2 1000/ul  Neutrophils % 68 44 - 81 %  Lymphocytes % 23 21 - 47 %  Monocytes % 6 2 - 11 %  Eosinophils % 2 0 - 7 %  Basophils % 1 0 - 2 %    REVIEW OF SYSTEMS:   CONSTITUTIONAL: No fever reported.  CARDIOVASCULAR: No chest pain reported.  PULMONARY: No trouble breathing reported.  PSYCHIATRIC: No mood instability reported.     PHYSICAL EXAM:   CONSTITUTIONAL: Vital signs (BP, P, T, RR, et al) noted. No apparent  "distress. Does not appear acutely ill or septic. Appears adequately hydrated.  HEENT: External ENT grossly unremarkable. Hearing grossly intact. Oropharynx moist.  PULM: Lungs clear. Breathing unlabored.  CV: Heart regular. No jugular venous distention. No carotid bruit.  DERM: Skin warm and moist with normal turgor.  NEURO: Strength is grossly normal and reasonably symmetric in major muscle groups bilaterally. There are no gross focal motor deficits or gross deficits of cranial nerves III-XII.  PSYCHIATRIC: Alert and oriented x 3. Mood is grossly neutral. Affect appropriate. Judgment and insight not grossly compromised.     ASSESSMENT:       1. 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 Benign essential hypertension    3. Gastro-esophageal reflux disease without esophagitis    4. Idiopathic chronic gout of left knee without tophus    5. Idiopathic chronic gout, right ankle and foot, without tophus (tophi)    6. Idiopathic chronic gout, left ankle and foot, without tophus (tophi)    7. Chronic kidney disease, stage 3    8. Encounter for long-term current use of high risk medication    9. Postoperative malabsorption    10. Bariatric surgery status    11. Non morbid obesity due to excess calories           PLAN:   NOTE: Unless specified otherwise, the PLAN for a listed ASSESSMENT is to continue present treatment as prescribed. The planned follow-up and additional "Patient Instructions" may also be found in the After Visit Summary printed and provided to the patient.    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  Dispense: 30 tablet; Refill: 11  -     Uric acid; Future; Expected date: 06/07/2017  -     colchicine 0.6 mg tablet; Take TWO tablets for acute gout attack; may take ONE more tablet 1 hour later if needed. DO NOT TAKE MORE THAN 3 TABLETS PER WEEK.  Dispense: 15 tablet; Refill: 1  -     diclofenac sodium " 1 % Gel; Apply 2 g topically 3 (three) times daily as needed.  Dispense: 100 g; Refill: 1    Benign essential hypertension    Gastro-esophageal reflux disease without esophagitis    Idiopathic chronic gout of left knee without tophus  -     febuxostat 80 mg Tab; Take 1 tablet (80 mg total) by mouth once daily.  Dispense: 30 tablet; Refill: 11  -     Uric acid; Future; Expected date: 06/07/2017  -     colchicine 0.6 mg tablet; Take TWO tablets for acute gout attack; may take ONE more tablet 1 hour later if needed. DO NOT TAKE MORE THAN 3 TABLETS PER WEEK.  Dispense: 15 tablet; Refill: 1  -     diclofenac sodium 1 % Gel; Apply 2 g topically 3 (three) times daily as needed.  Dispense: 100 g; Refill: 1    Idiopathic chronic gout, right ankle and foot, without tophus (tophi)  -     febuxostat 80 mg Tab; Take 1 tablet (80 mg total) by mouth once daily.  Dispense: 30 tablet; Refill: 11  -     Uric acid; Future; Expected date: 06/07/2017  -     colchicine 0.6 mg tablet; Take TWO tablets for acute gout attack; may take ONE more tablet 1 hour later if needed. DO NOT TAKE MORE THAN 3 TABLETS PER WEEK.  Dispense: 15 tablet; Refill: 1  -     diclofenac sodium 1 % Gel; Apply 2 g topically 3 (three) times daily as needed.  Dispense: 100 g; Refill: 1    Idiopathic chronic gout, left ankle and foot, without tophus (tophi)  -     febuxostat 80 mg Tab; Take 1 tablet (80 mg total) by mouth once daily.  Dispense: 30 tablet; Refill: 11  -     Uric acid; Future; Expected date: 06/07/2017  -     colchicine 0.6 mg tablet; Take TWO tablets for acute gout attack; may take ONE more tablet 1 hour later if needed. DO NOT TAKE MORE THAN 3 TABLETS PER WEEK.  Dispense: 15 tablet; Refill: 1  -     diclofenac sodium 1 % Gel; Apply 2 g topically 3 (three) times daily as needed.  Dispense: 100 g; Refill: 1    Chronic kidney disease, stage 3  -     Cancel: Ferritin; Future; Expected date: 06/07/2017  -     Cancel: Vitamin B12; Future; Expected date:  06/07/2017  -     CALCITRIOL; Future; Expected date: 06/07/2017    Encounter for long-term current use of high risk medication  -     Uric acid; Future; Expected date: 06/07/2017    Postoperative malabsorption  -     Cancel: Ferritin; Future; Expected date: 06/07/2017  -     Cancel: Vitamin B12; Future; Expected date: 06/07/2017  -     CALCITRIOL; Future; Expected date: 06/07/2017    Bariatric surgery status  -     Cancel: Ferritin; Future; Expected date: 06/07/2017  -     Cancel: Vitamin B12; Future; Expected date: 06/07/2017  -     CALCITRIOL; Future; Expected date: 06/07/2017    Non morbid obesity due to excess calories        Medications Discontinued During This Encounter   Medication Reason    hydrocodone-acetaminophen 10-325mg (NORCO)  mg Tab Patient no longer taking    esomeprazole (NEXIUM) 40 MG capsule Patient no longer taking    febuxostat (ULORIC) 40 mg Tab Dose adjustment

## 2017-06-09 NOTE — ASSESSMENT & PLAN NOTE
Based on the report of the patient and information available to me at present, this condition appears to be WELL CONTROLLED, and this condition appears to be IMPROVED.

## 2017-06-09 NOTE — ASSESSMENT & PLAN NOTE
Based on the report of the patient and information available to me at present, this condition appears to be STABLE/COMPENSATED.

## 2017-06-09 NOTE — ASSESSMENT & PLAN NOTE
Based on the report of the patient and information available to me at present, this condition continues to IMPROVE.

## 2017-06-09 NOTE — ASSESSMENT & PLAN NOTE
Based on the report of the patient and information available to me at present, this condition appears to be SUB-OPTIMALLY CONTROLLED, and this condition appears to be IMPROVED.

## 2017-06-12 ENCOUNTER — TELEPHONE (OUTPATIENT)
Dept: INTERNAL MEDICINE | Facility: CLINIC | Age: 37
End: 2017-06-12

## 2017-06-12 NOTE — TELEPHONE ENCOUNTER
Per fax request received from pt pharmacy, initiated prior auth request for Diclofenac Gel 1% with pt insurance, via online [employer], Request [id number].

## 2017-06-21 ENCOUNTER — OFFICE VISIT (OUTPATIENT)
Dept: NEPHROLOGY | Facility: CLINIC | Age: 37
End: 2017-06-21
Payer: COMMERCIAL

## 2017-06-21 ENCOUNTER — LAB VISIT (OUTPATIENT)
Dept: LAB | Facility: HOSPITAL | Age: 37
End: 2017-06-21
Attending: INTERNAL MEDICINE
Payer: COMMERCIAL

## 2017-06-21 VITALS
HEART RATE: 74 BPM | SYSTOLIC BLOOD PRESSURE: 140 MMHG | DIASTOLIC BLOOD PRESSURE: 90 MMHG | HEIGHT: 75 IN | BODY MASS INDEX: 30.59 KG/M2 | WEIGHT: 246.06 LBS

## 2017-06-21 DIAGNOSIS — N18.30 CHRONIC KIDNEY DISEASE, STAGE 3: Chronic | ICD-10-CM

## 2017-06-21 DIAGNOSIS — R80.1 PERSISTENT PROTEINURIA: ICD-10-CM

## 2017-06-21 DIAGNOSIS — I10 HTN (HYPERTENSION), BENIGN: Primary | ICD-10-CM

## 2017-06-21 DIAGNOSIS — N17.9 AKI (ACUTE KIDNEY INJURY): Primary | ICD-10-CM

## 2017-06-21 DIAGNOSIS — I10 HTN (HYPERTENSION), BENIGN: ICD-10-CM

## 2017-06-21 DIAGNOSIS — M1A.3620 CHRONIC GOUT OF LEFT KNEE DUE TO RENAL IMPAIRMENT WITHOUT TOPHUS: Chronic | ICD-10-CM

## 2017-06-21 DIAGNOSIS — E11.21 TYPE 2 DIABETES MELLITUS WITH DIABETIC NEPHROPATHY, WITHOUT LONG-TERM CURRENT USE OF INSULIN: ICD-10-CM

## 2017-06-21 DIAGNOSIS — I10 BENIGN ESSENTIAL HYPERTENSION: Chronic | ICD-10-CM

## 2017-06-21 PROBLEM — R80.9 PROTEINURIA: Status: ACTIVE | Noted: 2017-06-21

## 2017-06-21 LAB
ALBUMIN SERPL BCP-MCNC: 3.6 G/DL
ANION GAP SERPL CALC-SCNC: 13 MMOL/L
BASOPHILS # BLD AUTO: 0.02 K/UL
BASOPHILS NFR BLD: 0.2 %
BUN SERPL-MCNC: 23 MG/DL
CALCIUM SERPL-MCNC: 9.2 MG/DL
CHLORIDE SERPL-SCNC: 107 MMOL/L
CO2 SERPL-SCNC: 21 MMOL/L
CREAT SERPL-MCNC: 1.5 MG/DL
DIFFERENTIAL METHOD: ABNORMAL
EOSINOPHIL # BLD AUTO: 0 K/UL
EOSINOPHIL NFR BLD: 0.5 %
ERYTHROCYTE [DISTWIDTH] IN BLOOD BY AUTOMATED COUNT: 15.7 %
EST. GFR  (AFRICAN AMERICAN): >60 ML/MIN/1.73 M^2
EST. GFR  (NON AFRICAN AMERICAN): 59 ML/MIN/1.73 M^2
GLUCOSE SERPL-MCNC: 225 MG/DL
HCT VFR BLD AUTO: 41 %
HGB BLD-MCNC: 13.9 G/DL
LYMPHOCYTES # BLD AUTO: 1.3 K/UL
LYMPHOCYTES NFR BLD: 14.9 %
MCH RBC QN AUTO: 27.7 PG
MCHC RBC AUTO-ENTMCNC: 33.9 %
MCV RBC AUTO: 82 FL
MONOCYTES # BLD AUTO: 0.4 K/UL
MONOCYTES NFR BLD: 5.3 %
NEUTROPHILS # BLD AUTO: 6.6 K/UL
NEUTROPHILS NFR BLD: 79.1 %
PHOSPHATE SERPL-MCNC: 2.2 MG/DL
PLATELET # BLD AUTO: 191 K/UL
PMV BLD AUTO: 9.8 FL
POTASSIUM SERPL-SCNC: 3.8 MMOL/L
PTH-INTACT SERPL-MCNC: 48.4 PG/ML
RBC # BLD AUTO: 5.01 M/UL
SODIUM SERPL-SCNC: 141 MMOL/L
URATE SERPL-MCNC: 5.8 MG/DL
WBC # BLD AUTO: 8.38 K/UL

## 2017-06-21 PROCEDURE — 99999 PR PBB SHADOW E&M-EST. PATIENT-LVL III: CPT | Mod: PBBFAC,,, | Performed by: INTERNAL MEDICINE

## 2017-06-21 PROCEDURE — 84550 ASSAY OF BLOOD/URIC ACID: CPT

## 2017-06-21 PROCEDURE — 36415 COLL VENOUS BLD VENIPUNCTURE: CPT

## 2017-06-21 PROCEDURE — 3066F NEPHROPATHY DOC TX: CPT | Mod: S$GLB,,, | Performed by: INTERNAL MEDICINE

## 2017-06-21 PROCEDURE — 99204 OFFICE O/P NEW MOD 45 MIN: CPT | Mod: S$GLB,,, | Performed by: INTERNAL MEDICINE

## 2017-06-21 PROCEDURE — 85025 COMPLETE CBC W/AUTO DIFF WBC: CPT

## 2017-06-21 PROCEDURE — 83970 ASSAY OF PARATHORMONE: CPT

## 2017-06-21 PROCEDURE — 80069 RENAL FUNCTION PANEL: CPT

## 2017-06-21 RX ORDER — PREDNISONE 20 MG/1
20 TABLET ORAL DAILY
COMMUNITY
End: 2017-06-26 | Stop reason: SDUPTHER

## 2017-06-21 RX ORDER — LISINOPRIL 20 MG/1
20 TABLET ORAL DAILY
Qty: 90 TABLET | Refills: 3 | Status: SHIPPED | OUTPATIENT
Start: 2017-06-21 | End: 2018-01-17 | Stop reason: SDUPTHER

## 2017-06-21 NOTE — PATIENT INSTRUCTIONS
tylenol 500 mg 2-3 tablets every 6 hours for pain       1.  Acute kidney injury much improved after hydration.  Avoid nonsteroidals.  Take Tylenol 2 every 6 hours for pain.    2.  Chronic kidney disease stage III much improved after bariatric surgery.  Check Cystatin C.  Avoid nonsteroidals.  Check  vitamin D levels on follow-up. Add baking soda and follow up on acid level     3.  Hematuria and proteinuria:renal ultrasound was done in 3/2017.Most likely these findings are from diabetic nephropathy. Add lisinopril 20mg and stop cardura     4.  Hypertension well-controlled. Watch on ACEI off cardura     5.  Status post history of gout recheck uric acid on follow-up---keep on Uloric : target uric acid is 5.5     6. Turmeric 1000 mg BID

## 2017-06-21 NOTE — PROGRESS NOTES
Subjective:       Patient ID: Robb Iglesias is a 36 y.o. White male who presents for new evaluation of Acute Renal Failure; Chronic Kidney Disease; Proteinuria; Hematuria; and Gout    HPI     Patient is a 36-year-old male with history of type 2 diabetes with morbid obesity.  Patient has multiple family members with type 2 diabetes with multiple complications.  Patient has history of chronic kidney disease stage III from diabetic nephropathy.  In February 2016 his creatinine was 2.5 with approximate GFR 35%.  His hemoglobin A1c was 10.9 at that time.    2015 has history of hepatomegaly status post liver biopsy    March 2017 patient underwent vertical sleeve gastrectomy for bariatric surgery.  His postoperative course was complicated by acute kidney injury from dehydration.  His maximum creatinine was 7.7 with a BUN of 126 no RRT required     May 2017 creatinine down to 1.5    June 2017 patient presents for consultation for chronic kidney disease with proteinuria and hematuria.  His BMI down to 29 and has lost greater than 80 pounds in last 3 months. ( 320 ib pre-op) . 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  Negative for activity change, appetite change, chills, diaphoresis, fatigue and fever.   HENT: Negative.  Negative for congestion and trouble swallowing.    Eyes: Negative.    Respiratory: Negative.  Negative for cough, chest tightness, shortness of breath and wheezing.    Cardiovascular: Negative.  Negative for chest pain, palpitations and leg swelling.   Gastrointestinal: Negative.  Negative for abdominal distention, abdominal pain, nausea and vomiting.   Genitourinary: Negative.  Negative for decreased urine volume, difficulty urinating,  "dysuria, enuresis, flank pain, frequency, hematuria, penile swelling, scrotal swelling and urgency.   Musculoskeletal: Negative.  Negative for arthralgias, back pain, joint swelling and neck pain.   Skin: Negative for rash.   Neurological: Negative.  Negative for tremors, seizures and headaches.   Psychiatric/Behavioral: Negative.  Negative for confusion and sleep disturbance. The patient is not nervous/anxious.        Objective:   BP (!) 140/90   Pulse 74   Ht 6' 3" (1.905 m)   Wt 111.6 kg (246 lb 0.5 oz)   BMI 30.75 kg/m²      Physical Exam   Constitutional: He is oriented to person, place, and time. He appears well-developed and well-nourished. No distress.   HENT:   Head: Normocephalic.   Eyes: EOM are normal. Pupils are equal, round, and reactive to light.   Neck: Normal range of motion. Neck supple. No JVD present. No thyromegaly present.   Cardiovascular: Normal rate, regular rhythm, S1 normal, S2 normal, normal heart sounds and intact distal pulses.  PMI is not displaced.  Exam reveals no gallop and no friction rub.    No murmur heard.  Pulmonary/Chest: Effort normal and breath sounds normal. No respiratory distress. He has no wheezes. He has no rales. He exhibits no tenderness.   Abdominal: He exhibits no distension and no mass. There is no hepatosplenomegaly. There is no tenderness. There is no rebound and no CVA tenderness. No hernia.   Musculoskeletal: Normal range of motion. He exhibits no edema or tenderness.   Lymphadenopathy:     He has no cervical adenopathy.   Neurological: He is alert and oriented to person, place, and time. He has normal reflexes. He is not disoriented. He displays normal reflexes. No cranial nerve deficit. He exhibits normal muscle tone. Coordination normal.   Skin: Skin is warm and dry. No rash noted. No erythema.   Psychiatric: He has a normal mood and affect. His behavior is normal. Judgment and thought content normal.         Lab Results   Component Value Date    " CREATININE 1.5 (H) 06/21/2017    BUN 23 (H) 06/21/2017     06/21/2017    K 3.8 06/21/2017     06/21/2017    CO2 21 (L) 06/21/2017     Lab Results   Component Value Date    WBC 8.38 06/21/2017    HGB 13.9 (L) 06/21/2017    HCT 41.0 06/21/2017    MCV 82 06/21/2017     06/21/2017     Lab Results   Component Value Date    PTH 48.4 06/21/2017    CALCIUM 9.2 06/21/2017    PHOS 2.2 (L) 06/21/2017         No results found for: URICACID      Assessment:    )    1. EMMIE (acute kidney injury)    2. Chronic kidney disease, stage 3    3. Persistent proteinuria    4. Type 2 diabetes mellitus with diabetic nephropathy, without long-term current use of insulin    5. Chronic gout of left knee due to renal impairment without tophus    6. Benign essential hypertension        Plan:         1.  Acute kidney injury much improved after hydration.  Avoid nonsteroidals.  Take Tylenol 2 every 6 hours for pain.    2.  Chronic kidney disease stage III much improved after bariatric surgery.  Check Cystatin C.  Avoid nonsteroidals.  Check  vitamin D levels on follow-up. Add baking soda and follow up on acid level     3.  Hematuria and proteinuria:renal ultrasound was done in 3/2017.Most likely these findings are from diabetic nephropathy. Add lisinopril 20mg and stop cardura     4.  Hypertension well-controlled. Watch on ACEI off cardura     5.  Status post history of gout recheck uric acid on follow-up---keep on Uloric : target uric acid is 5.5     6. Turmeric 1000 mg BID

## 2017-06-26 ENCOUNTER — PATIENT MESSAGE (OUTPATIENT)
Dept: INTERNAL MEDICINE | Facility: CLINIC | Age: 37
End: 2017-06-26

## 2017-06-26 ENCOUNTER — PATIENT MESSAGE (OUTPATIENT)
Dept: NEPHROLOGY | Facility: CLINIC | Age: 37
End: 2017-06-26

## 2017-06-26 DIAGNOSIS — M10.00 ACUTE IDIOPATHIC GOUT, UNSPECIFIED SITE: Primary | ICD-10-CM

## 2017-06-26 RX ORDER — COLCHICINE 0.6 MG/1
0.6 TABLET ORAL 2 TIMES DAILY
Qty: 60 TABLET | Refills: 11 | Status: SHIPPED | OUTPATIENT
Start: 2017-06-26 | End: 2017-09-12 | Stop reason: SDUPTHER

## 2017-06-26 RX ORDER — COLCHICINE 0.6 MG/1
0.6 TABLET ORAL 2 TIMES DAILY
Qty: 60 TABLET | Refills: 11 | Status: SHIPPED | OUTPATIENT
Start: 2017-06-26 | End: 2017-06-26 | Stop reason: SDUPTHER

## 2017-06-26 RX ORDER — PREDNISONE 20 MG/1
20 TABLET ORAL DAILY
Qty: 20 TABLET | Refills: 0 | Status: SHIPPED | OUTPATIENT
Start: 2017-06-26 | End: 2017-07-03

## 2017-06-26 RX ORDER — PREDNISONE 20 MG/1
20 TABLET ORAL DAILY
Qty: 20 TABLET | Refills: 0 | Status: SHIPPED | OUTPATIENT
Start: 2017-06-26 | End: 2017-06-26 | Stop reason: SDUPTHER

## 2017-07-05 ENCOUNTER — LAB VISIT (OUTPATIENT)
Dept: LAB | Facility: HOSPITAL | Age: 37
End: 2017-07-05
Attending: FAMILY MEDICINE
Payer: COMMERCIAL

## 2017-07-05 DIAGNOSIS — Z79.899 ENCOUNTER FOR LONG-TERM (CURRENT) USE OF OTHER MEDICATIONS: ICD-10-CM

## 2017-07-05 DIAGNOSIS — K91.2 POSTOPERATIVE MALABSORPTION: ICD-10-CM

## 2017-07-05 DIAGNOSIS — M1A.0620 IDIOPATHIC CHRONIC GOUT OF LEFT KNEE WITHOUT TOPHUS: Chronic | ICD-10-CM

## 2017-07-05 DIAGNOSIS — E66.09 NON MORBID OBESITY DUE TO EXCESS CALORIES: ICD-10-CM

## 2017-07-05 DIAGNOSIS — N28.9 ACUTE RENAL INSUFFICIENCY: ICD-10-CM

## 2017-07-05 DIAGNOSIS — M1A.0420 IDIOPATHIC CHRONIC GOUT OF LEFT HAND WITHOUT TOPHUS: ICD-10-CM

## 2017-07-05 DIAGNOSIS — Z98.84 BARIATRIC SURGERY STATUS: ICD-10-CM

## 2017-07-05 DIAGNOSIS — M1A.0710 IDIOPATHIC CHRONIC GOUT, RIGHT ANKLE AND FOOT, WITHOUT TOPHUS (TOPHI): ICD-10-CM

## 2017-07-05 DIAGNOSIS — M1A.0720 IDIOPATHIC CHRONIC GOUT, LEFT ANKLE AND FOOT, WITHOUT TOPHUS (TOPHI): Chronic | ICD-10-CM

## 2017-07-05 DIAGNOSIS — N18.30 CHRONIC KIDNEY DISEASE, STAGE 3: Chronic | ICD-10-CM

## 2017-07-05 DIAGNOSIS — Z86.79 HISTORY OF HYPERTENSION: ICD-10-CM

## 2017-07-05 DIAGNOSIS — Z79.899 ENCOUNTER FOR LONG-TERM CURRENT USE OF HIGH RISK MEDICATION: ICD-10-CM

## 2017-07-05 LAB
25(OH)D3+25(OH)D2 SERPL-MCNC: 45 NG/ML
ALBUMIN SERPL BCP-MCNC: 3.3 G/DL
ALP SERPL-CCNC: 71 U/L
ALT SERPL W/O P-5'-P-CCNC: 33 U/L
ANION GAP SERPL CALC-SCNC: 7 MMOL/L
AST SERPL-CCNC: 20 U/L
BASOPHILS # BLD AUTO: 0.03 K/UL
BASOPHILS NFR BLD: 0.3 %
BILIRUB SERPL-MCNC: 0.9 MG/DL
BUN SERPL-MCNC: 16 MG/DL
CALCIUM SERPL-MCNC: 9.1 MG/DL
CHLORIDE SERPL-SCNC: 106 MMOL/L
CHOLEST/HDLC SERPL: 8.4 {RATIO}
CO2 SERPL-SCNC: 30 MMOL/L
CREAT SERPL-MCNC: 1.4 MG/DL
DIFFERENTIAL METHOD: ABNORMAL
EOSINOPHIL # BLD AUTO: 0.2 K/UL
EOSINOPHIL NFR BLD: 1.7 %
ERYTHROCYTE [DISTWIDTH] IN BLOOD BY AUTOMATED COUNT: 14.9 %
EST. GFR  (AFRICAN AMERICAN): >60 ML/MIN/1.73 M^2
EST. GFR  (NON AFRICAN AMERICAN): >60 ML/MIN/1.73 M^2
FERRITIN SERPL-MCNC: 114 NG/ML
GLUCOSE SERPL-MCNC: 110 MG/DL
HCT VFR BLD AUTO: 43.7 %
HDL/CHOLESTEROL RATIO: 11.8 %
HDLC SERPL-MCNC: 245 MG/DL
HDLC SERPL-MCNC: 29 MG/DL
HGB BLD-MCNC: 14.5 G/DL
LDLC SERPL CALC-MCNC: 167 MG/DL
LYMPHOCYTES # BLD AUTO: 3.4 K/UL
LYMPHOCYTES NFR BLD: 36.1 %
MCH RBC QN AUTO: 27.6 PG
MCHC RBC AUTO-ENTMCNC: 33.2 %
MCV RBC AUTO: 83 FL
MONOCYTES # BLD AUTO: 0.8 K/UL
MONOCYTES NFR BLD: 8.9 %
NEUTROPHILS # BLD AUTO: 5 K/UL
NEUTROPHILS NFR BLD: 52.7 %
NONHDLC SERPL-MCNC: 216 MG/DL
PLATELET # BLD AUTO: 194 K/UL
PMV BLD AUTO: 9.8 FL
POTASSIUM SERPL-SCNC: 3.7 MMOL/L
PROT SERPL-MCNC: 6.3 G/DL
RBC # BLD AUTO: 5.25 M/UL
SODIUM SERPL-SCNC: 143 MMOL/L
TRIGL SERPL-MCNC: 245 MG/DL
URATE SERPL-MCNC: 6.4 MG/DL
VIT B12 SERPL-MCNC: 363 PG/ML
WBC # BLD AUTO: 9.47 K/UL

## 2017-07-05 PROCEDURE — 82607 VITAMIN B-12: CPT

## 2017-07-05 PROCEDURE — 80053 COMPREHEN METABOLIC PANEL: CPT

## 2017-07-05 PROCEDURE — 84550 ASSAY OF BLOOD/URIC ACID: CPT

## 2017-07-05 PROCEDURE — 36415 COLL VENOUS BLD VENIPUNCTURE: CPT | Mod: PO

## 2017-07-05 PROCEDURE — 80061 LIPID PANEL: CPT

## 2017-07-05 PROCEDURE — 82306 VITAMIN D 25 HYDROXY: CPT

## 2017-07-05 PROCEDURE — 82728 ASSAY OF FERRITIN: CPT

## 2017-07-05 PROCEDURE — 82652 VIT D 1 25-DIHYDROXY: CPT

## 2017-07-05 PROCEDURE — 85025 COMPLETE CBC W/AUTO DIFF WBC: CPT

## 2017-07-12 ENCOUNTER — TELEPHONE (OUTPATIENT)
Dept: RHEUMATOLOGY | Facility: CLINIC | Age: 37
End: 2017-07-12

## 2017-07-12 NOTE — TELEPHONE ENCOUNTER
Called patient regarding referral from Dr. Hardy, no answer, left message for pt to call clinic back.

## 2017-07-13 ENCOUNTER — TELEPHONE (OUTPATIENT)
Dept: RHEUMATOLOGY | Facility: CLINIC | Age: 37
End: 2017-07-13

## 2017-07-13 ENCOUNTER — PATIENT MESSAGE (OUTPATIENT)
Dept: INTERNAL MEDICINE | Facility: CLINIC | Age: 37
End: 2017-07-13

## 2017-07-14 LAB — 1,25(OH)2D3 SERPL-MCNC: 44 PG/ML

## 2017-07-17 ENCOUNTER — TELEPHONE (OUTPATIENT)
Dept: RHEUMATOLOGY | Facility: CLINIC | Age: 37
End: 2017-07-17

## 2017-07-17 ENCOUNTER — TELEPHONE (OUTPATIENT)
Dept: NEPHROLOGY | Facility: CLINIC | Age: 37
End: 2017-07-17

## 2017-07-17 NOTE — TELEPHONE ENCOUNTER
Called patient regarding referral from Dr. Hardy, made apt with Dr. SHI for 9.12.17 at 11 am. Verbalized understanding. Will mail apt slip as well.

## 2017-07-17 NOTE — TELEPHONE ENCOUNTER
----- Message from Stacy Thapa sent at 7/17/2017  9:24 AM CDT -----  Returning nurse call. Please call back at [phone]. thanks

## 2017-07-19 ENCOUNTER — PATIENT MESSAGE (OUTPATIENT)
Dept: ADMINISTRATIVE | Facility: OTHER | Age: 37
End: 2017-07-19

## 2017-07-19 ENCOUNTER — OFFICE VISIT (OUTPATIENT)
Dept: INTERNAL MEDICINE | Facility: CLINIC | Age: 37
End: 2017-07-19
Payer: COMMERCIAL

## 2017-07-19 VITALS
TEMPERATURE: 97 F | DIASTOLIC BLOOD PRESSURE: 100 MMHG | WEIGHT: 242.5 LBS | HEART RATE: 75 BPM | BODY MASS INDEX: 30.15 KG/M2 | OXYGEN SATURATION: 98 % | SYSTOLIC BLOOD PRESSURE: 162 MMHG | HEIGHT: 75 IN

## 2017-07-19 DIAGNOSIS — M1A.0420 IDIOPATHIC CHRONIC GOUT OF LEFT HAND WITHOUT TOPHUS: ICD-10-CM

## 2017-07-19 DIAGNOSIS — M1A.3620 CHRONIC GOUT OF LEFT KNEE DUE TO RENAL IMPAIRMENT WITHOUT TOPHUS: Chronic | ICD-10-CM

## 2017-07-19 DIAGNOSIS — M1A.0710 IDIOPATHIC CHRONIC GOUT, RIGHT ANKLE AND FOOT, WITHOUT TOPHUS (TOPHI): ICD-10-CM

## 2017-07-19 DIAGNOSIS — J30.89 CHRONIC NONSEASONAL ALLERGIC RHINITIS DUE TO POLLEN: Chronic | ICD-10-CM

## 2017-07-19 DIAGNOSIS — E11.21 TYPE 2 DIABETES MELLITUS WITH DIABETIC NEPHROPATHY, WITHOUT LONG-TERM CURRENT USE OF INSULIN: ICD-10-CM

## 2017-07-19 DIAGNOSIS — Z98.84 BARIATRIC SURGERY STATUS: Chronic | ICD-10-CM

## 2017-07-19 DIAGNOSIS — M1A.0210 IDIOPATHIC CHRONIC GOUT OF RIGHT ELBOW WITHOUT TOPHUS: ICD-10-CM

## 2017-07-19 DIAGNOSIS — I10 BENIGN ESSENTIAL HYPERTENSION: Primary | Chronic | ICD-10-CM

## 2017-07-19 DIAGNOSIS — N18.30 CHRONIC KIDNEY DISEASE, STAGE 3: Chronic | ICD-10-CM

## 2017-07-19 DIAGNOSIS — M1A.0720 IDIOPATHIC CHRONIC GOUT, LEFT ANKLE AND FOOT, WITHOUT TOPHUS (TOPHI): Chronic | ICD-10-CM

## 2017-07-19 PROCEDURE — 3066F NEPHROPATHY DOC TX: CPT | Mod: S$GLB,,, | Performed by: FAMILY MEDICINE

## 2017-07-19 PROCEDURE — 3077F SYST BP >= 140 MM HG: CPT | Mod: S$GLB,,, | Performed by: FAMILY MEDICINE

## 2017-07-19 PROCEDURE — 99214 OFFICE O/P EST MOD 30 MIN: CPT | Mod: S$GLB,,, | Performed by: FAMILY MEDICINE

## 2017-07-19 PROCEDURE — 3080F DIAST BP >= 90 MM HG: CPT | Mod: S$GLB,,, | Performed by: FAMILY MEDICINE

## 2017-07-19 PROCEDURE — 99999 PR PBB SHADOW E&M-EST. PATIENT-LVL III: CPT | Mod: PBBFAC,,, | Performed by: FAMILY MEDICINE

## 2017-07-19 PROCEDURE — 3008F BODY MASS INDEX DOCD: CPT | Mod: S$GLB,,, | Performed by: FAMILY MEDICINE

## 2017-07-19 RX ORDER — GUAIFENESIN AND PHENYLEPHRINE HCL 400; 10 MG/1; MG/1
TABLET ORAL DAILY
COMMUNITY
End: 2018-03-23

## 2017-07-19 RX ORDER — FEBUXOSTAT 80 MG/1
80 TABLET, FILM COATED ORAL DAILY
COMMUNITY
End: 2017-09-13 | Stop reason: DRUGHIGH

## 2017-07-19 RX ORDER — CETIRIZINE HYDROCHLORIDE 10 MG/1
10 TABLET ORAL DAILY
COMMUNITY
End: 2021-06-07 | Stop reason: SDUPTHER

## 2017-07-19 NOTE — PROGRESS NOTES
CHIEF COMPLAINT  Hypertension (elevated today) and Follow-up (lab results)      HISTORY OF PRESENT ILLNESS     PROBLEM/CONDITION: Based on home blood pressure readings, his hypertension appears well controlled. Blood pressure inexplicably elevated today. He reports no angina or angina equivalent symptoms. He continues to work on therapeutic lifestyle changes.     PROBLEM/CONDITION: Type 2 diabetes mellitus appears very well-controlled.    PROBLEM/CONDITION: Gout appears significantly improved, fairly well controlled.\    PROBLEM/CONDITION: ALLERGIC rhinitis symptoms are fairly controlled, EXACERBATED by changing weather.    No other complaint or concerns reported except as noted herein.     Problem List Items Addressed This Visit     Idiopathic chronic gout, right ankle and foot, without tophus (tophi)    Idiopathic chronic gout of left hand without tophus    Type 2 diabetes mellitus with diabetic nephropathy    Relevant Orders    Hemoglobin A1c    Idiopathic chronic gout of right elbow without tophus    Benign essential hypertension - Primary (Chronic)    Relevant Orders    Hypertension Digital Medicine (HDMP) Enrollment Order (Completed)    Hypertension Digital Medicine (HDMP): Assign Onboarding Questionnaires (Completed)    Gout of left knee (Chronic)    Bariatric surgery status (Chronic)    Idiopathic chronic gout, left ankle and foot, without tophus (tophi) (Chronic)    Chronic kidney disease, stage 2 (mild) (Chronic)    Chronic nonseasonal allergic rhinitis due to pollen (Chronic)      Other Visit Diagnoses    None.       DATA REVIEWED    Lab Visit on 07/05/2017   Component Date Value Ref Range Status    Cholesterol 07/05/2017 245* 120 - 199 mg/dL Final    Comment: The National Cholesterol Education Program (NCEP) has set the  following guidelines (reference ranges) for Cholesterol:  Optimal.....................<200 mg/dL  Borderline High.............200-239 mg/dL  High........................> or = 240  mg/dL      Triglycerides 07/05/2017 245* 30 - 150 mg/dL Final    Comment: The National Cholesterol Education Program (NCEP) has set the  following guidelines (reference values) for triglycerides:  Normal......................<150 mg/dL  Borderline High.............150-199 mg/dL  High........................200-499 mg/dL      HDL 07/05/2017 29* 40 - 75 mg/dL Final    Comment: The National Cholesterol Education Program (NCEP) has set the  following guidelines (reference values) for HDL Cholesterol:  Low...............<40 mg/dL  Optimal...........>60 mg/dL      LDL Cholesterol 07/05/2017 167.0* 63.0 - 159.0 mg/dL Final    Comment: The National Cholesterol Education Program (NCEP) has set the  following guidelines (reference values) for LDL Cholesterol:  Optimal.......................<130 mg/dL  Borderline High...............130-159 mg/dL  High..........................160-189 mg/dL  Very High.....................>190 mg/dL      HDL/Chol Ratio 07/05/2017 11.8* 20.0 - 50.0 % Final    Total Cholesterol/HDL Ratio 07/05/2017 8.4* 2.0 - 5.0 Final    Non-HDL Cholesterol 07/05/2017 216  mg/dL Final    Comment: Risk category and Non-HDL cholesterol goals:  Coronary heart disease (CHD)or equivalent (10-year risk of CHD >20%):  Non-HDL cholesterol goal     <130 mg/dL  Two or more CHD risk factors and 10-year risk of CHD <= 20%:  Non-HDL cholesterol goal     <160 mg/dL  0 to 1 CHD risk factor:  Non-HDL cholesterol goal     <190 mg/dL      Sodium 07/05/2017 143  136 - 145 mmol/L Final    Potassium 07/05/2017 3.7  3.5 - 5.1 mmol/L Final    Chloride 07/05/2017 106  95 - 110 mmol/L Final    CO2 07/05/2017 30* 23 - 29 mmol/L Final    Glucose 07/05/2017 110  70 - 110 mg/dL Final    BUN, Bld 07/05/2017 16  6 - 20 mg/dL Final    Creatinine 07/05/2017 1.4  0.5 - 1.4 mg/dL Final    Calcium 07/05/2017 9.1  8.7 - 10.5 mg/dL Final    Total Protein 07/05/2017 6.3  6.0 - 8.4 g/dL Final    Albumin 07/05/2017 3.3* 3.5 - 5.2 g/dL  Final    Total Bilirubin 07/05/2017 0.9  0.1 - 1.0 mg/dL Final    Comment: For infants and newborns, interpretation of results should be based  on gestational age, weight and in agreement with clinical  observations.  Premature Infant recommended reference ranges:  Up to 24 hours.............<8.0 mg/dL  Up to 48 hours............<12.0 mg/dL  3-5 days..................<15.0 mg/dL  6-29 days.................<15.0 mg/dL      Alkaline Phosphatase 07/05/2017 71  55 - 135 U/L Final    AST 07/05/2017 20  10 - 40 U/L Final    ALT 07/05/2017 33  10 - 44 U/L Final    Anion Gap 07/05/2017 7* 8 - 16 mmol/L Final    eGFR if African American 07/05/2017 >60.0  >60 mL/min/1.73 m^2 Final    eGFR if non African American 07/05/2017 >60.0  >60 mL/min/1.73 m^2 Final    Comment: Calculation used to obtain the estimated glomerular filtration  rate (eGFR) is the CKD-EPI equation. Since race is unknown   in our information system, the eGFR values for   -American and Non--American patients are given   for each creatinine result.      WBC 07/05/2017 9.47  3.90 - 12.70 K/uL Final    RBC 07/05/2017 5.25  4.60 - 6.20 M/uL Final    Hemoglobin 07/05/2017 14.5  14.0 - 18.0 g/dL Final    Hematocrit 07/05/2017 43.7  40.0 - 54.0 % Final    MCV 07/05/2017 83  82 - 98 fL Final    MCH 07/05/2017 27.6  27.0 - 31.0 pg Final    MCHC 07/05/2017 33.2  32.0 - 36.0 % Final    RDW 07/05/2017 14.9* 11.5 - 14.5 % Final    Platelets 07/05/2017 194  150 - 350 K/uL Final    MPV 07/05/2017 9.8  9.2 - 12.9 fL Final    Gran # 07/05/2017 5.0  1.8 - 7.7 K/uL Final    Lymph # 07/05/2017 3.4  1.0 - 4.8 K/uL Final    Mono # 07/05/2017 0.8  0.3 - 1.0 K/uL Final    Eos # 07/05/2017 0.2  0.0 - 0.5 K/uL Final    Baso # 07/05/2017 0.03  0.00 - 0.20 K/uL Final    Gran% 07/05/2017 52.7  38.0 - 73.0 % Final    Lymph% 07/05/2017 36.1  18.0 - 48.0 % Final    Mono% 07/05/2017 8.9  4.0 - 15.0 % Final    Eosinophil% 07/05/2017 1.7  0.0 - 8.0 %  Final    Basophil% 07/05/2017 0.3  0.0 - 1.9 % Final    Differential Method 07/05/2017 Automated   Final    Vitamin B-12 07/05/2017 363  210 - 950 pg/mL Final    Vit D, 25-Hydroxy 07/05/2017 45  30 - 96 ng/mL Final    Comment: Vitamin D deficiency.........<10 ng/mL                              Vitamin D insufficiency......10-29 ng/mL       Vitamin D sufficiency........> or equal to 30 ng/mL  Vitamin D toxicity............>100 ng/mL      Ferritin 07/05/2017 114  20.0 - 300.0 ng/mL Final    Uric Acid 07/05/2017 6.4  3.4 - 7.0 mg/dL Final    Vit D, 1,25-Dihydroxy 07/14/2017 44  20 - 79 pg/mL Final    Comment: Vitamin D 1, 25 dihydroxy levels should be primarily used to  assess Vitamin D status in patients with renal disease and   hypercalcemia. Vitamin D 1,25-dihydroxy levels are generally  less than 5 pg/mL in end stage renal disease patients. The  preferred initial test for assessing Vitamin D status in the  general population is Vitamin D 25-hydroxy (VITD).  Test performed at [place],  [address], [place] [id number]     [phone]  Siva Kahn MD  - Medical Director     Lab Visit on 06/21/2017   Component Date Value Ref Range Status    Specimen UA 06/21/2017 Urine, Clean Catch   Final    Color, UA 06/21/2017 Yellow  Yellow, Straw, Yelena Final    Appearance, UA 06/21/2017 Clear  Clear Final    pH, UA 06/21/2017 6.0  5.0 - 8.0 Final    Specific Gravity, UA 06/21/2017 1.025  1.005 - 1.030 Final    Protein, UA 06/21/2017 3+* Negative Final    Comment: Recommend a 24 hour urine protein or a urine   protein/creatinine ratio if globulin induced proteinuria is  clinically suspected.      Glucose, UA 06/21/2017 Negative  Negative Final    Ketones, UA 06/21/2017 Negative  Negative Final    Bilirubin (UA) 06/21/2017 Negative  Negative Final    Occult Blood UA 06/21/2017 2+* Negative Final    Nitrite, UA 06/21/2017 Negative  Negative Final    Leukocytes, UA 06/21/2017 Negative   Negative Final    Protein, Urine Random 06/21/2017 594* 0 - 15 mg/dL Final    Comment: The random urine reference ranges provided were established   for 24 hour urine collections.  No reference ranges exist for  random urine specimens.  Correlate clinically.      Creatinine, Random Ur 06/21/2017 231.4  23.0 - 375.0 mg/dL Final    Comment: The random urine reference ranges provided were established   for 24 hour urine collections.  No reference ranges exist for  random urine specimens.  Correlate clinically.      Prot/Creat Ratio, Ur 06/21/2017 2.57* 0.00 - 0.20 Final    RBC, UA 06/21/2017 10* 0 - 4 /hpf Final    WBC, UA 06/21/2017 3  0 - 5 /hpf Final    Bacteria, UA 06/21/2017 Occasional  None-Occ /hpf Final    Squam Epithel, UA 06/21/2017 Rare  /hpf Final    Non-Squam Epith 06/21/2017 <1  <1/hpf /hpf Final    Hyaline Casts, UA 06/21/2017 3* 0-1/lpf /lpf Final    Microscopic Comment 06/21/2017 SEE COMMENT   Final    Comment: Other formed elements not mentioned in the report are not   present in the microscopic examination.      Lab Visit on 06/21/2017   Component Date Value Ref Range Status    Glucose 06/21/2017 225* 70 - 110 mg/dL Final    Sodium 06/21/2017 141  136 - 145 mmol/L Final    Potassium 06/21/2017 3.8  3.5 - 5.1 mmol/L Final    Chloride 06/21/2017 107  95 - 110 mmol/L Final    CO2 06/21/2017 21* 23 - 29 mmol/L Final    BUN, Bld 06/21/2017 23* 6 - 20 mg/dL Final    Calcium 06/21/2017 9.2  8.7 - 10.5 mg/dL Final    Creatinine 06/21/2017 1.5* 0.5 - 1.4 mg/dL Final    Albumin 06/21/2017 3.6  3.5 - 5.2 g/dL Final    Phosphorus 06/21/2017 2.2* 2.7 - 4.5 mg/dL Final    eGFR if African American 06/21/2017 >60  >60 mL/min/1.73 m^2 Final    eGFR if non African American 06/21/2017 59* >60 mL/min/1.73 m^2 Final    Comment: Calculation used to obtain the estimated glomerular filtration  rate (eGFR) is the CKD-EPI equation. Since race is unknown   in our information system, the eGFR values for  "  -American and Non--American patients are given   for each creatinine result.      Anion Gap 06/21/2017 13  8 - 16 mmol/L Final    WBC 06/21/2017 8.38  3.90 - 12.70 K/uL Final    RBC 06/21/2017 5.01  4.60 - 6.20 M/uL Final    Hemoglobin 06/21/2017 13.9* 14.0 - 18.0 g/dL Final    Hematocrit 06/21/2017 41.0  40.0 - 54.0 % Final    MCV 06/21/2017 82  82 - 98 fL Final    MCH 06/21/2017 27.7  27.0 - 31.0 pg Final    MCHC 06/21/2017 33.9  32.0 - 36.0 % Final    RDW 06/21/2017 15.7* 11.5 - 14.5 % Final    Platelets 06/21/2017 191  150 - 350 K/uL Final    MPV 06/21/2017 9.8  9.2 - 12.9 fL Final    Gran # 06/21/2017 6.6  1.8 - 7.7 K/uL Final    Lymph # 06/21/2017 1.3  1.0 - 4.8 K/uL Final    Mono # 06/21/2017 0.4  0.3 - 1.0 K/uL Final    Eos # 06/21/2017 0.0  0.0 - 0.5 K/uL Final    Baso # 06/21/2017 0.02  0.00 - 0.20 K/uL Final    Gran% 06/21/2017 79.1* 38.0 - 73.0 % Final    Lymph% 06/21/2017 14.9* 18.0 - 48.0 % Final    Mono% 06/21/2017 5.3  4.0 - 15.0 % Final    Eosinophil% 06/21/2017 0.5  0.0 - 8.0 % Final    Basophil% 06/21/2017 0.2  0.0 - 1.9 % Final    Differential Method 06/21/2017 Automated   Final    PTH, Intact 06/21/2017 48.4  9.0 - 77.0 pg/mL Final    Uric Acid 06/21/2017 5.8  3.4 - 7.0 mg/dL Final        REVIEW OF SYSTEMS  CONSTITUTIONAL: No fever reported.  CARDIOVASCULAR: No chest pain reported.  PULMONARY: No trouble breathing reported.     PHYSICAL EXAM  Vitals:    07/19/17 1057   BP: (!) 162/100   BP Location: Right arm   Patient Position: Sitting   Pulse: 75   Temp: 97 °F (36.1 °C)   TempSrc: Tympanic   SpO2: 98%   Weight: 110 kg (242 lb 8.1 oz)   Height: 6' 3" (1.905 m)     CONSTITUTIONAL: Vital signs (BP, P, T, RR, et al) noted. No apparent distress. Does not appear acutely ill or septic. Appears adequately hydrated.  HEENT: External ENT grossly unremarkable. Hearing grossly intact. Oropharynx moist.  PULM: Lungs clear. Breathing unlabored.  HEART: " Auscultation reveals regular rate and rhythm without murmur, gallop or rub.  DERM: Skin warm and moist with normal turgor.  NEURO: There are no gross focal motor deficits or gross deficits of cranial nerves III-XII.  PSYCHIATRIC: Alert and oriented x 3. Mood is grossly neutral. Affect appropriate. Judgment and insight not grossly compromised.  MUSCULOSKELETAL: Grossly normal stance and gait.     PAST MEDICAL HISTORY, FAMILY HISTORY, SOCIAL HISTORY, CURRENT MEDICATION LIST, and ALLERGY LIST reviewed by me (KEVIN Roberson MD) and are updated consistent with the patient's report.    ASSESSMENT and PLAN  Benign essential hypertension  -     Hypertension Digital Medicine (HDMP) Enrollment Order  -     Hypertension Digital Medicine ([facility]): Assign Onboarding Questionnaires    Chronic gout of left knee due to renal impairment without tophus    Bariatric surgery status    Type 2 diabetes mellitus with diabetic nephropathy, without long-term current use of insulin  -     Hemoglobin A1c; Future; Expected date: 07/19/2017    Idiopathic chronic gout, right ankle and foot, without tophus (tophi)    Idiopathic chronic gout, left ankle and foot, without tophus (tophi)    Idiopathic chronic gout of left hand without tophus    Idiopathic chronic gout of right elbow without tophus    Chronic kidney disease, stage 3    Chronic nonseasonal allergic rhinitis due to pollen        Medication List with Changes/Refills   New Medications    ATORVASTATIN (LIPITOR) 40 MG TABLET    Take 1 tablet (40 mg total) by mouth once daily.    METOPROLOL TARTRATE (LOPRESSOR) 25 MG TABLET    Take 1 tablet (25 mg total) by mouth 2 (two) times daily.    TICAGRELOR (BRILINTA) 90 MG TABLET    Take 1 tablet (90 mg total) by mouth 2 (two) times daily.   Current Medications    ASPIRIN (ECOTRIN) 81 MG EC TABLET    Take 81 mg by mouth once daily.    CETIRIZINE (ZYRTEC) 10 MG TABLET    Take 10 mg by mouth once daily.    COLCHICINE 0.6 MG TABLET    Take 1  tablet (0.6 mg total) by mouth 2 (two) times daily.    DOXAZOSIN (CARDURA) 1 MG TABLET    Take 1 mg by mouth every evening.    FEBUXOSTAT (ULORIC) 80 MG TAB    Take 80 mg by mouth once daily at 6am.    GLUCOSAM/CSA/COLLAGEN/HYALUR A (JOINT SUPPORT ORAL)    Take by mouth.    LISINOPRIL (PRINIVIL,ZESTRIL) 20 MG TABLET    Take 1 tablet (20 mg total) by mouth once daily.    MULTIVITAMIN/IRON/FOLIC ACID (CENTRUM COMPLETE ORAL)    Take by mouth once daily at 6am.    TURMERIC ROOT EXTRACT 500 MG CAP    Take by mouth once daily at 6am.   Discontinued Medications    DICLOFENAC SODIUM 1 % GEL    Apply 2 g topically 3 (three) times daily as needed.    FEBUXOSTAT 80 MG TAB    Take 1 tablet (80 mg total) by mouth once daily.    MULTIVIT-MINERALS/FOLIC ACID (DAILY MULTIPLE FOR MEN ORAL)    Take 1 tablet by mouth once daily.       Return in about 6 months (around 1/19/2018) for periodic management of chronic medical conditions.    ABOUT THIS DOCUMENTATION:  · The order of the conditions listed in the HPI is one of convenience and does not necessarily reflect the chronology of the appointment, nor the relative importance of a condition. It is possible that additional description or status details about condition(s) may be found elsewhere in the documentation for today's encounter.  · Documentation entered by me for this encounter was done in part using speech-recognition technology. Although I have made an effort to ensure accuracy, malapropisms may exist and should be interpreted in context.                        -KEVIN Roberson MD    There are no Patient Instructions on file for this visit.

## 2017-07-31 ENCOUNTER — HOSPITAL ENCOUNTER (INPATIENT)
Facility: HOSPITAL | Age: 37
LOS: 2 days | Discharge: HOME | DRG: 247 | End: 2017-08-02
Attending: EMERGENCY MEDICINE | Admitting: EMERGENCY MEDICINE
Payer: COMMERCIAL

## 2017-07-31 ENCOUNTER — PATIENT MESSAGE (OUTPATIENT)
Dept: INTERNAL MEDICINE | Facility: CLINIC | Age: 37
End: 2017-07-31

## 2017-07-31 DIAGNOSIS — Z86.79 HISTORY OF HYPERTENSION: ICD-10-CM

## 2017-07-31 DIAGNOSIS — E11.8 TYPE 2 DIABETES MELLITUS WITH COMPLICATION, WITHOUT LONG-TERM CURRENT USE OF INSULIN: ICD-10-CM

## 2017-07-31 DIAGNOSIS — I21.4 NSTEMI (NON-ST ELEVATED MYOCARDIAL INFARCTION): Primary | ICD-10-CM

## 2017-07-31 DIAGNOSIS — Z98.84 BARIATRIC SURGERY STATUS: ICD-10-CM

## 2017-07-31 DIAGNOSIS — R07.9 CHEST PAIN: ICD-10-CM

## 2017-07-31 PROBLEM — G47.33 OSA ON CPAP: Status: ACTIVE | Noted: 2017-07-31

## 2017-07-31 PROBLEM — E78.5 HYPERLIPIDEMIA: Status: ACTIVE | Noted: 2017-07-31

## 2017-07-31 LAB
ALBUMIN SERPL BCP-MCNC: 3.3 G/DL
ALP SERPL-CCNC: 75 U/L
ALT SERPL W/O P-5'-P-CCNC: 21 U/L
AMPHET+METHAMPHET UR QL: NEGATIVE
ANION GAP SERPL CALC-SCNC: 11 MMOL/L
APTT BLDCRRT: 29.2 SEC
AST SERPL-CCNC: 27 U/L
BARBITURATES UR QL SCN>200 NG/ML: NEGATIVE
BENZODIAZ UR QL SCN>200 NG/ML: NEGATIVE
BILIRUB SERPL-MCNC: 0.9 MG/DL
BNP SERPL-MCNC: 108 PG/ML
BUN SERPL-MCNC: 20 MG/DL
BZE UR QL SCN: NEGATIVE
CALCIUM SERPL-MCNC: 9.4 MG/DL
CANNABINOIDS UR QL SCN: NEGATIVE
CHLORIDE SERPL-SCNC: 104 MMOL/L
CK MB SERPL-MCNC: 3 NG/ML
CK MB SERPL-RTO: 4.9 %
CK SERPL-CCNC: 61 U/L
CO2 SERPL-SCNC: 25 MMOL/L
CREAT SERPL-MCNC: 1.4 MG/DL
CREAT UR-MCNC: 179.7 MG/DL
EST. GFR  (AFRICAN AMERICAN): >60 ML/MIN/1.73 M^2
EST. GFR  (NON AFRICAN AMERICAN): >60 ML/MIN/1.73 M^2
GLUCOSE SERPL-MCNC: 226 MG/DL
INR PPP: 1.1
METHADONE UR QL SCN>300 NG/ML: NEGATIVE
OPIATES UR QL SCN: NEGATIVE
PCP UR QL SCN>25 NG/ML: NEGATIVE
POCT GLUCOSE: 200 MG/DL (ref 70–110)
POTASSIUM SERPL-SCNC: 4 MMOL/L
PROT SERPL-MCNC: 6.9 G/DL
PROTHROMBIN TIME: 10.9 SEC
SODIUM SERPL-SCNC: 140 MMOL/L
TOXICOLOGY INFORMATION: NORMAL
TROPONIN I SERPL DL<=0.01 NG/ML-MCNC: 0.13 NG/ML
TROPONIN I SERPL DL<=0.01 NG/ML-MCNC: 0.15 NG/ML
TSH SERPL DL<=0.005 MIU/L-ACNC: 0.73 UIU/ML

## 2017-07-31 PROCEDURE — 93005 ELECTROCARDIOGRAM TRACING: CPT

## 2017-07-31 PROCEDURE — 25000003 PHARM REV CODE 250: Performed by: NURSE PRACTITIONER

## 2017-07-31 PROCEDURE — 83880 ASSAY OF NATRIURETIC PEPTIDE: CPT

## 2017-07-31 PROCEDURE — 25000003 PHARM REV CODE 250: Performed by: EMERGENCY MEDICINE

## 2017-07-31 PROCEDURE — 82553 CREATINE MB FRACTION: CPT

## 2017-07-31 PROCEDURE — 93306 TTE W/DOPPLER COMPLETE: CPT | Mod: 26,,, | Performed by: INTERNAL MEDICINE

## 2017-07-31 PROCEDURE — 96365 THER/PROPH/DIAG IV INF INIT: CPT

## 2017-07-31 PROCEDURE — 80053 COMPREHEN METABOLIC PANEL: CPT

## 2017-07-31 PROCEDURE — 36415 COLL VENOUS BLD VENIPUNCTURE: CPT

## 2017-07-31 PROCEDURE — 63600175 PHARM REV CODE 636 W HCPCS: Performed by: EMERGENCY MEDICINE

## 2017-07-31 PROCEDURE — 85610 PROTHROMBIN TIME: CPT

## 2017-07-31 PROCEDURE — 99285 EMERGENCY DEPT VISIT HI MDM: CPT | Mod: 25

## 2017-07-31 PROCEDURE — 99255 IP/OBS CONSLTJ NEW/EST HI 80: CPT | Mod: ,,, | Performed by: INTERNAL MEDICINE

## 2017-07-31 PROCEDURE — 84484 ASSAY OF TROPONIN QUANT: CPT

## 2017-07-31 PROCEDURE — 84443 ASSAY THYROID STIM HORMONE: CPT

## 2017-07-31 PROCEDURE — 96360 HYDRATION IV INFUSION INIT: CPT | Mod: XS

## 2017-07-31 PROCEDURE — 85730 THROMBOPLASTIN TIME PARTIAL: CPT

## 2017-07-31 PROCEDURE — 80307 DRUG TEST PRSMV CHEM ANLYZR: CPT

## 2017-07-31 PROCEDURE — 93010 ELECTROCARDIOGRAM REPORT: CPT | Mod: ,,, | Performed by: INTERNAL MEDICINE

## 2017-07-31 PROCEDURE — 21400001 HC TELEMETRY ROOM

## 2017-07-31 PROCEDURE — C8929 TTE W OR WO FOL WCON,DOPPLER: HCPCS

## 2017-07-31 PROCEDURE — 83036 HEMOGLOBIN GLYCOSYLATED A1C: CPT

## 2017-07-31 PROCEDURE — 82962 GLUCOSE BLOOD TEST: CPT

## 2017-07-31 RX ORDER — PANTOPRAZOLE SODIUM 40 MG/1
40 TABLET, DELAYED RELEASE ORAL DAILY
Status: DISCONTINUED | OUTPATIENT
Start: 2017-07-31 | End: 2017-08-02 | Stop reason: HOSPADM

## 2017-07-31 RX ORDER — DIPHENHYDRAMINE HCL 25 MG
25 CAPSULE ORAL EVERY 6 HOURS PRN
Status: DISCONTINUED | OUTPATIENT
Start: 2017-07-31 | End: 2017-08-02 | Stop reason: HOSPADM

## 2017-07-31 RX ORDER — FEBUXOSTAT 40 MG/1
80 TABLET, FILM COATED ORAL DAILY
Status: DISCONTINUED | OUTPATIENT
Start: 2017-08-01 | End: 2017-08-02 | Stop reason: HOSPADM

## 2017-07-31 RX ORDER — SODIUM CHLORIDE 9 MG/ML
INJECTION, SOLUTION INTRAVENOUS CONTINUOUS
Status: DISCONTINUED | OUTPATIENT
Start: 2017-07-31 | End: 2017-08-01

## 2017-07-31 RX ORDER — NAPROXEN SODIUM 220 MG/1
81 TABLET, FILM COATED ORAL DAILY
Status: DISCONTINUED | OUTPATIENT
Start: 2017-08-01 | End: 2017-08-02 | Stop reason: HOSPADM

## 2017-07-31 RX ORDER — NITROGLYCERIN 0.4 MG/1
0.4 TABLET SUBLINGUAL EVERY 5 MIN PRN
Status: DISCONTINUED | OUTPATIENT
Start: 2017-07-31 | End: 2017-08-02 | Stop reason: HOSPADM

## 2017-07-31 RX ORDER — ATORVASTATIN CALCIUM 40 MG/1
40 TABLET, FILM COATED ORAL DAILY
Status: DISCONTINUED | OUTPATIENT
Start: 2017-07-31 | End: 2017-08-02 | Stop reason: HOSPADM

## 2017-07-31 RX ORDER — METOPROLOL TARTRATE 25 MG/1
25 TABLET, FILM COATED ORAL 2 TIMES DAILY
Status: DISCONTINUED | OUTPATIENT
Start: 2017-07-31 | End: 2017-08-02 | Stop reason: HOSPADM

## 2017-07-31 RX ORDER — ASPIRIN 325 MG
325 TABLET ORAL
Status: COMPLETED | OUTPATIENT
Start: 2017-07-31 | End: 2017-07-31

## 2017-07-31 RX ORDER — SODIUM CHLORIDE 9 MG/ML
1000 INJECTION, SOLUTION INTRAVENOUS
Status: ACTIVE | OUTPATIENT
Start: 2017-07-31 | End: 2017-08-01

## 2017-07-31 RX ORDER — ACETAMINOPHEN 325 MG/1
650 TABLET ORAL EVERY 6 HOURS PRN
Status: DISCONTINUED | OUTPATIENT
Start: 2017-07-31 | End: 2017-08-02 | Stop reason: HOSPADM

## 2017-07-31 RX ORDER — ONDANSETRON 2 MG/ML
4 INJECTION INTRAMUSCULAR; INTRAVENOUS EVERY 8 HOURS PRN
Status: DISCONTINUED | OUTPATIENT
Start: 2017-07-31 | End: 2017-08-02 | Stop reason: HOSPADM

## 2017-07-31 RX ORDER — MAG HYDROX/ALUMINUM HYD/SIMETH 200-200-20
30 SUSPENSION, ORAL (FINAL DOSE FORM) ORAL EVERY 6 HOURS PRN
Status: DISCONTINUED | OUTPATIENT
Start: 2017-07-31 | End: 2017-08-02 | Stop reason: HOSPADM

## 2017-07-31 RX ORDER — HEPARIN SODIUM,PORCINE/D5W 25000/250
17 INTRAVENOUS SOLUTION INTRAVENOUS CONTINUOUS
Status: DISCONTINUED | OUTPATIENT
Start: 2017-07-31 | End: 2017-08-02

## 2017-07-31 RX ORDER — LISINOPRIL 20 MG/1
20 TABLET ORAL DAILY
Status: DISCONTINUED | OUTPATIENT
Start: 2017-08-01 | End: 2017-08-02 | Stop reason: HOSPADM

## 2017-07-31 RX ADMIN — SODIUM CHLORIDE: 0.9 INJECTION, SOLUTION INTRAVENOUS at 04:07

## 2017-07-31 RX ADMIN — ATORVASTATIN CALCIUM 40 MG: 40 TABLET, FILM COATED ORAL at 03:07

## 2017-07-31 RX ADMIN — ASPIRIN 325 MG ORAL TABLET 325 MG: 325 PILL ORAL at 01:07

## 2017-07-31 RX ADMIN — METOPROLOL TARTRATE 25 MG: 25 TABLET ORAL at 03:07

## 2017-07-31 RX ADMIN — HEPARIN SODIUM 17 UNITS/KG/HR: 10000 INJECTION, SOLUTION INTRAVENOUS at 04:07

## 2017-07-31 RX ADMIN — NITROGLYCERIN 0.5 INCH: 20 OINTMENT TOPICAL at 10:07

## 2017-07-31 NOTE — PROGRESS NOTES
Pt arrived to floor via stretcher. BS report. Heparin handoff. IV fluid infusing.  VS taken and assessment completed ,see flow sheets for detailed assessment.No acute distress noted. NP at bedside.Pt oriented to room service, call bell within reach, hourly rounding and fall prevention measures in placed.

## 2017-07-31 NOTE — H&P
[facility] Hospital Medicine  History & Physical    Patient Name: Robb Iglesias  MRN: [id number]  Admission Date: 7/31/2017  Attending Physician: Janelle Caceres MD   Primary Care Provider: SABIHA Roberson MD         Patient information was obtained from past medical records and ER records.     Subjective:     Principal Problem:NSTEMI (non-ST elevated myocardial infarction)    Chief Complaint:   Chief Complaint   Patient presents with    Chest Pain     pt states for the past 5-6 days he will randomly having SOB and pressure in his chest for about 5 mins        HPI: Robb Iglesias is a 37 yo male who is S/P Gastric Sleeve x 5 months with weight loss approx 60 #, DM II (off meds), HENRY on CPAP, HPL, HTN and Gout who presents with intermittent episodes of chest pressure with associated SOB. His B/P was elevated when he experienced the chest pain. Over the W/E the chest pain intensity was strong, however today, chest pressure was less intense. He denies nausea, palpitations, leg swelling, radiation of pain and diaphoresis. EKG was normal sinus rhythm with normal rate. On arrival, B/P 150/107 and initial troponin elevated at 0.128. Cardiology advised heparin infusion and pt admitted to evaluate for Acute Coronary Syndrome.     Past Medical History:   Diagnosis Date    Allergic rhinitis     GERD (gastroesophageal reflux disease)     Gout     Hyperlipidemia     Hypertension associated with chronic kidney disease due to type 2 diabetes mellitus     Long term (current) use of insulin     Obesity     HENRY on CPAP     Proteinuria     Steatohepatitis     Fatty Liver    Type 2 diabetes mellitus with diabetic nephropathy     Type 2 diabetes mellitus with hyperglycemia     Type 2 diabetes mellitus with renal manifestations        Past Surgical History:   Procedure Laterality Date    LASIK Bilateral     LIVER BIOPSY      NASAL ENDOSCOPY      NASAL SEPTUM SURGERY      SLEEVE GASTROPLASTY   03/06/2017       Review of patient's allergies indicates:  No Known Allergies    No current facility-administered medications on file prior to encounter.      Current Outpatient Prescriptions on File Prior to Encounter   Medication Sig    cetirizine (ZYRTEC) 10 MG tablet Take 10 mg by mouth once daily.    febuxostat (ULORIC) 80 mg Tab Take 80 mg by mouth once daily at 6am.    GLUCOSAM/CSA/COLLAGEN/HYALUR A (JOINT SUPPORT ORAL) Take by mouth.    lisinopril (PRINIVIL,ZESTRIL) 20 MG tablet Take 1 tablet (20 mg total) by mouth once daily.    MULTIVIT-MINERALS/FOLIC ACID (DAILY MULTIPLE FOR MEN ORAL) Take 1 tablet by mouth once daily.    MULTIVITAMIN/IRON/FOLIC ACID (CENTRUM COMPLETE ORAL) Take by mouth once daily at 6am.    turmeric root extract 500 mg Cap Take by mouth once daily at 6am.    colchicine 0.6 mg tablet Take 1 tablet (0.6 mg total) by mouth 2 (two) times daily.     Family History     Problem Relation (Age of Onset)    Diabetes type II Mother, Brother, Brother    Hyperlipidemia Mother    Hypertension Mother        Social History Main Topics    Smoking status: Never Smoker    Smokeless tobacco: Never Used    Alcohol use Yes      Comment: 1-2 times per month    Drug use: No    Sexual activity: Yes     Partners: Female     Birth control/ protection: OCP     Review of Systems   Constitutional: Negative for activity change, chills, fatigue and fever.   HENT: Negative.    Eyes: Negative for pain and redness.   Respiratory: Positive for shortness of breath. Negative for cough and wheezing.    Cardiovascular: Positive for chest pain. Negative for palpitations and leg swelling.   Gastrointestinal: Positive for constipation. Negative for abdominal pain, diarrhea, nausea and vomiting.   Genitourinary: Negative for decreased urine volume, dysuria, frequency and hematuria.   Musculoskeletal: Positive for arthralgias and joint swelling.   Skin: Negative for pallor, rash and wound.   Neurological: Positive for  numbness.   Psychiatric/Behavioral: Negative for confusion. The patient is not nervous/anxious.      Objective:     Vital Signs (Most Recent):  Temp: 98.4 °F (36.9 °C) (07/31/17 1321)  Pulse: 65 (07/31/17 1500)  Resp: 14 (07/31/17 1335)  BP: 119/85 (07/31/17 1500)  SpO2: 98 % (07/31/17 1500) Vital Signs (24h Range):  Temp:  [98.4 °F (36.9 °C)] 98.4 °F (36.9 °C)  Pulse:  [65-87] 65  Resp:  [14-16] 14  SpO2:  [98 %] 98 %  BP: (119-150)/() 119/85     Weight: 104.3 kg (230 lb)  Body mass index is 28.75 kg/m².    Physical Exam   Constitutional: He is oriented to person, place, and time. He appears well-developed and well-nourished. No distress.   HENT:   Head: Normocephalic and atraumatic.   Eyes: Conjunctivae are normal. No scleral icterus.   Neck: Normal range of motion. Neck supple. No JVD present.   Cardiovascular: Normal rate, regular rhythm and normal heart sounds.  Exam reveals no gallop and no friction rub.    No murmur heard.  Pulmonary/Chest: Effort normal and breath sounds normal. No respiratory distress. He has no wheezes.   Abdominal: Soft. Bowel sounds are normal. He exhibits no distension. There is no tenderness.   Musculoskeletal: Normal range of motion. He exhibits no edema or tenderness.   Neurological: He is alert and oriented to person, place, and time. He exhibits normal muscle tone.   Skin: Skin is warm and dry.   Psychiatric: He has a normal mood and affect. His behavior is normal.   Nursing note and vitals reviewed.       Significant Labs:   CBC: No results for input(s): WBC, HGB, HCT, PLT in the last 48 hours.  CMP:   Recent Labs  Lab 07/31/17  1355      K 4.0      CO2 25   *   BUN 20   CREATININE 1.4   CALCIUM 9.4   PROT 6.9   ALBUMIN 3.3*   BILITOT 0.9   ALKPHOS 75   AST 27   ALT 21   ANIONGAP 11   EGFRNONAA >60     Troponin:   Recent Labs  Lab 07/31/17  1355   TROPONINI 0.128*       Significant Imaging: I have reviewed all pertinent imaging results/findings within the  past 24 hours.    Assessment/Plan:     * NSTEMI (non-ST elevated myocardial infarction)    Cardiology following  Pt admitted with need for Telemetry monitoring  Serial cardiac enzymes, ASA, metoprolol  Heparin infusion, continue lisinopril  Nitroglycerin to chest wall, Oxygen therapy to maintain sats > 92 %  Recent lipid panel - 7/5/2017 (review results), resume atorvastatin,   2 D ECHO pending            Benign essential hypertension    Continue lisinopril          Type 2 diabetes mellitus with diabetic nephropathy    Pt states he is no longer taking insulin or oral hypoglycemics  Daily CMP            Hyperlipidemia    Last lipid panel 7/5/2017   Ref Range & Units 3wk ago   Cholesterol 120 - 199 mg/dL 245    Comments: The National Cholesterol Education Program (NCEP) has set the   following guidelines (reference ranges) for Cholesterol:   Optimal.....................<200 mg/dL   Borderline High.............200-239 mg/dL   High........................> or = 240 mg/dL    Triglycerides 30 - 150 mg/dL 245    Comments: The National Cholesterol Education Program (NCEP) has set the   following guidelines (reference values) for triglycerides:   Normal......................<150 mg/dL   Borderline High.............150-199 mg/dL   High........................200-499 mg/dL    HDL 40 - 75 mg/dL 29    Comments: The National Cholesterol Education Program (NCEP) has set the   following guidelines (reference values) for HDL Cholesterol:   Low...............<40 mg/dL   Optimal...........>60 mg/dL    LDL Cholesterol 63.0 - 159.0 mg/dL 167.0    Comments: The National Cholesterol Education Program (NCEP) has set the   following guidelines (reference values) for LDL Cholesterol:   Optimal.......................<130 mg/dL   Borderline High...............130-159 mg/dL   High..........................160-189 mg/dL   Very High.....................>190 mg/dL      Continue atorvastatin          HENRY on CPAP    Respiratory consult to resume  CPAP          Status following surgery for weight loss    S/P gastric sleeve in March  Takes daily MVI  Cardiac diet with small portions            VTE Risk Mitigation         Ordered     Low Risk of VTE  Once      07/31/17 1601        Jeny Ambriz NP  Department of Hospital Medicine   [facility]

## 2017-07-31 NOTE — PLAN OF CARE
SW met with patient in ED.  Patient lives with spouse (Clara [phone]) and their two children.  Patient presented to ED with SOB/Pressure/Tightness in chest.  No anticipated needs at present. Case mgt to follow up with d/c needs.     07/31/17 1845   Discharge Assessment   Assessment Type Discharge Planning Assessment   Confirmed/corrected address and phone number on facesheet? Yes   Assessment information obtained from? Patient   Current cognitive status: Alert/Oriented;No Deficits   Current Functional Status: Independent   Arrived From home or self-care   Lives With spouse;child(pankaj), dependent   Able to Return to Prior Arrangements yes   Is patient able to care for self after discharge? Yes   How many people do you have in your home that can help with your care after discharge? 1   Who are your caregiver(s) and their phone number(s)? clara colin (spouse) [phone]   Patient's perception of discharge disposition home or selfcare   Readmission Within The Last 30 Days no previous admission in last 30 days   Patient currently being followed by outpatient case management? No   Patient currently receives home health services? No   Does the patient currently use HME? No   Patient currently receives private duty nursing? No   Patient currently receives any other outside agency services? No   Equipment Currently Used at Home none   Do you have any problems affording any of your prescribed medications? No   Is the patient taking medications as prescribed? yes   Do you have any financial concerns preventing you from receiving the healthcare you need? No   Does the patient have transportation to healthcare appointments? Yes   Transportation Available car;family or friend will provide   On Dialysis? No   Does the patient receive services at the [facility]? No   Are there any open cases? No   Discharge Plan A Home with family        07/31/17 8125   Discharge Assessment   Assessment Type Discharge Planning  Assessment   Confirmed/corrected address and phone number on facesheet? Yes   Assessment information obtained from? Patient   Current cognitive status: Alert/Oriented;No Deficits   Current Functional Status: Independent   Arrived From home or self-care   Lives With spouse;child(pankaj), dependent   Able to Return to Prior Arrangements yes   Is patient able to care for self after discharge? Yes   How many people do you have in your home that can help with your care after discharge? 1   Who are your caregiver(s) and their phone number(s)? clara colin (spouse) [phone]   Patient's perception of discharge disposition home or selfcare   Readmission Within The Last 30 Days no previous admission in last 30 days   Patient currently being followed by outpatient case management? No   Patient currently receives home health services? No   Does the patient currently use HME? No   Patient currently receives private duty nursing? No   Patient currently receives any other outside agency services? No   Equipment Currently Used at Home none   Do you have any problems affording any of your prescribed medications? No   Is the patient taking medications as prescribed? yes   Do you have any financial concerns preventing you from receiving the healthcare you need? No   Does the patient have transportation to healthcare appointments? Yes   Transportation Available car;family or friend will provide   On Dialysis? No   Does the patient receive services at the Coumadin Clinic? No   Are there any open cases? No   Discharge Plan A Home with family

## 2017-07-31 NOTE — CONSULTS
[facility] -   Cardiology  Consult Note    Patient Name: Robb Iglesias  MRN: [id number]  Admission Date: 7/31/2017  Hospital Length of Stay: 0 days  Code Status: No Order   Attending Provider: Janelle Caceres MD   Consulting Provider: Channing Wynn MD  Primary Care Physician: SABIHA Roberson MD  Principal Problem:NSTEMI (non-ST elevated myocardial infarction)    Patient information was obtained from patient and ER records.     Consults NSTEMI/ACS  Subjective:     Chief Complaint:  Chest pain     HPI: 35 yo male, came in for chest pain and elevated troponin,  PMH Obesity, s/p gastric sleeve surgery in , DM off Insulin after gastric surgery, HTN, HLD and HENRY on CPAP.  He states that his PCP took off Doxozin one week ago and started him on Lisinopril 20 mg. He did no started lisinopril right after Doxozin off. He started chest tightness 5 days ago, with dyspnea and palpitation. No radiation pain. The episode lasted for few minutes. The pain became rogressively more frequent. His BP was 200/120 mmHg. And then he started Lisinopril. Of note, swimming in the pool did not trigger the chest pain. No sweating and N/V.  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 In ER, troponin was 0.128 and EKG NSR and no acute stt change.   Now pain free    Past Medical History:   Diagnosis Date    Allergic rhinitis     GERD (gastroesophageal reflux disease)     Gout     Hyperlipidemia     Hypertension associated with chronic kidney disease due to type 2 diabetes mellitus     Long term (current) use of insulin     Obesity     HENRY on CPAP     Proteinuria     Steatohepatitis     Fatty Liver    Type 2 diabetes mellitus with diabetic nephropathy     Type 2 diabetes mellitus with hyperglycemia     Type 2 diabetes mellitus with renal manifestations        Past Surgical History:   Procedure Laterality Date    LASIK Bilateral     LIVER BIOPSY      NASAL ENDOSCOPY      NASAL  SEPTUM SURGERY      SLEEVE GASTROPLASTY  03/06/2017       Review of patient's allergies indicates:  No Known Allergies    No current facility-administered medications on file prior to encounter.      Current Outpatient Prescriptions on File Prior to Encounter   Medication Sig    cetirizine (ZYRTEC) 10 MG tablet Take 10 mg by mouth once daily.    febuxostat (ULORIC) 80 mg Tab Take 80 mg by mouth once daily at 6am.    GLUCOSAM/CSA/COLLAGEN/HYALUR A (JOINT SUPPORT ORAL) Take by mouth.    lisinopril (PRINIVIL,ZESTRIL) 20 MG tablet Take 1 tablet (20 mg total) by mouth once daily.    MULTIVIT-MINERALS/FOLIC ACID (DAILY MULTIPLE FOR MEN ORAL) Take 1 tablet by mouth once daily.    MULTIVITAMIN/IRON/FOLIC ACID (CENTRUM COMPLETE ORAL) Take by mouth once daily at 6am.    turmeric root extract 500 mg Cap Take by mouth once daily at 6am.    colchicine 0.6 mg tablet Take 1 tablet (0.6 mg total) by mouth 2 (two) times daily.     Family History     Problem Relation (Age of Onset)    Diabetes type II Mother, Brother, Brother    Hyperlipidemia Mother    Hypertension Mother        Social History Main Topics    Smoking status: Never Smoker    Smokeless tobacco: Never Used    Alcohol use Yes      Comment: 1-2 times per month    Drug use: No    Sexual activity: Yes     Partners: Female     Birth control/ protection: OCP     Review of Systems   Constitution: Negative for decreased appetite, diaphoresis, fever, weakness, malaise/fatigue and night sweats.   HENT: Negative for headaches and nosebleeds.    Eyes: Negative for blurred vision and double vision.   Cardiovascular: Positive for chest pain, dyspnea on exertion and palpitations. Negative for claudication, irregular heartbeat, leg swelling, near-syncope, orthopnea, paroxysmal nocturnal dyspnea and syncope.   Respiratory: Negative for cough, shortness of breath, sleep disturbances due to breathing, snoring, sputum production and wheezing.    Endocrine: Negative for cold  intolerance and polyuria.   Hematologic/Lymphatic: Does not bruise/bleed easily.   Skin: Negative for rash.   Musculoskeletal: Negative for back pain, falls, joint pain, joint swelling and neck pain.   Gastrointestinal: Negative for abdominal pain, heartburn, nausea and vomiting.   Genitourinary: Negative for dysuria, frequency and hematuria.   Neurological: Negative for difficulty with concentration, dizziness, focal weakness, light-headedness, numbness and seizures.   Psychiatric/Behavioral: Negative for depression, memory loss and substance abuse. The patient does not have insomnia.    Allergic/Immunologic: Negative for HIV exposure and hives.     Objective:     Vital Signs (Most Recent):  Temp: 98.4 °F (36.9 °C) (07/31/17 1321)  Pulse: 65 (07/31/17 1500)  Resp: 14 (07/31/17 1335)  BP: 119/85 (07/31/17 1500)  SpO2: 98 % (07/31/17 1500) Vital Signs (24h Range):  Temp:  [98.4 °F (36.9 °C)] 98.4 °F (36.9 °C)  Pulse:  [65-87] 65  Resp:  [14-16] 14  SpO2:  [98 %] 98 %  BP: (119-150)/() 119/85     Weight: 104.3 kg (230 lb)  Body mass index is 28.75 kg/m².    SpO2: 98 %  O2 Device (Oxygen Therapy): room air    No intake or output data in the 24 hours ending 07/31/17 1546    Lines/Drains/Airways     Peripheral Intravenous Line                 Peripheral IV - Single Lumen 07/31/17 1355 Left Antecubital less than 1 day                Physical Exam   Constitutional: He is oriented to person, place, and time. He appears well-nourished.   HENT:   Head: Normocephalic.   Eyes: Pupils are equal, round, and reactive to light.   Neck: Normal carotid pulses and no JVD present. Carotid bruit is not present. No thyromegaly present.   Cardiovascular: Normal rate, regular rhythm, normal heart sounds and normal pulses.   No extrasystoles are present. PMI is not displaced.  Exam reveals no gallop and no S3.    No murmur heard.  Pulmonary/Chest: Breath sounds normal. No stridor. No respiratory distress.   Abdominal: Soft. Bowel  sounds are normal. There is no tenderness. There is no rebound.   Musculoskeletal: Normal range of motion.   Neurological: He is alert and oriented to person, place, and time.   Skin: Skin is intact. No rash noted.   Psychiatric: His behavior is normal.       Significant Labs:   ABG: No results for input(s): PH, PCO2, HCO3, POCSATURATED, BE in the last 48 hours., Blood Culture: No results for input(s): LABBLOO in the last 48 hours., BMP:   Recent Labs  Lab 07/31/17  1355   *      K 4.0      CO2 25   BUN 20   CREATININE 1.4   CALCIUM 9.4   , CMP   Recent Labs  Lab 07/31/17  1355      K 4.0      CO2 25   *   BUN 20   CREATININE 1.4   CALCIUM 9.4   PROT 6.9   ALBUMIN 3.3*   BILITOT 0.9   ALKPHOS 75   AST 27   ALT 21   ANIONGAP 11   ESTGFRAFRICA >60   EGFRNONAA >60   , CBC No results for input(s): WBC, HGB, HCT, PLT in the last 48 hours., INR   Recent Labs  Lab 07/31/17  1355   INR 1.1   , Lipid Panel No results for input(s): CHOL, HDL, LDLCALC, TRIG, CHOLHDL in the last 48 hours. and Troponin   Recent Labs  Lab 07/31/17  1355   TROPONINI 0.128*       Significant Imaging: X-Ray: CXR: X-Ray Chest 1 View (CXR): No results found for this visit on 07/31/17.  Assessment and Plan:         ACS/NSTEMI vs. Uncontrolled HTN related demand.  DM off insulin after gastric surgery  Obesity s/p gastric sleeve in   HTN  HLD  HENRY on CPAP    Plan:  Keep NPO after mn  Repeat troponin  ECHO   Started heparin gtt  ASA and lipitor, BB  NTG paste if recurrent chest pain.            Active Diagnoses:    Diagnosis Date Noted POA    PRINCIPAL PROBLEM:  NSTEMI (non-ST elevated myocardial infarction) [I21.4] 07/31/2017 Yes    HENRY on CPAP [G47.33, Z99.89] 07/31/2017 Not Applicable    Hyperlipidemia [E78.5] 07/31/2017 Yes    Status following surgery for weight loss [Z98.84] 07/31/2017 Not Applicable    Type 2 diabetes mellitus with diabetic nephropathy [E11.21] 06/21/2017 Yes    Benign essential  hypertension [I10] 04/26/2017 Yes     Chronic      Problems Resolved During this Admission:    Diagnosis Date Noted Date Resolved POA       VTE Risk Mitigation     None          Thank you for your consult. I will follow-up with patient. Please contact us if you have any additional questions.    Channing Wynn MD  Cardiology   [facility] [place]

## 2017-07-31 NOTE — ED PROVIDER NOTES
SCRIBE #1 NOTE: I, Dorita Lao, am scribing for, and in the presence of, Starla Clark MD. I have scribed the entire note.      History      Chief Complaint   Patient presents with    Chest Pain     pt states for the past 5-6 days he will randomly having SOB and pressure in his chest for about 5 mins       Review of patient's allergies indicates:  No Known Allergies     HPI   HPI    7/31/2017, 1:24 PM   History obtained from the patient      History of Present Illness: Robb Iglesias is a 36 y.o. male patient with DM, HTN, who presents to the Emergency Department for sudden onset of non-radiating, intermittent, R sided chest tightness for the past 5 days which mostly onsets while he is sitting around and onset once while swimming. He also c/o feeling SOB when the tightness onsets. Symptoms are moderate in severity, lasting 5-10 minutes. Pt states that he was taken off his HTN and DM medications for gastric sleeve surgery 1 month ago. No mitigating or exacerbating factors reported. He had a nl stress test approximately 6 months ago. Patient denies fever, chills, cough, abdominal pain, N/V, diaphoresis, extremity weakness/numbness, dizziness, and all other sxs at this time. His Type II DM is currently controlled. No further complaints or concerns at this time.     Arrival mode: Personal vehicle    PCP: SABIHA Roberson MD       Past Medical History:  Past Medical History:   Diagnosis Date    Allergic rhinitis     GERD (gastroesophageal reflux disease)     Gout     Hyperlipidemia     Hypertension associated with chronic kidney disease due to type 2 diabetes mellitus     Long term (current) use of insulin     Obesity     HENRY on CPAP     Proteinuria     Steatohepatitis     Fatty Liver    Type 2 diabetes mellitus with diabetic nephropathy     Type 2 diabetes mellitus with hyperglycemia     Type 2 diabetes mellitus with renal manifestations        Past Surgical History:  Past Surgical  History:   Procedure Laterality Date    LASIK Bilateral     LIVER BIOPSY      NASAL ENDOSCOPY      NASAL SEPTUM SURGERY      SLEEVE GASTROPLASTY  03/06/2017         Family History:  Family History   Problem Relation Age of Onset    Diabetes type II Mother     Hypertension Mother     Hyperlipidemia Mother     Diabetes type II Brother     Diabetes type II Brother        Social History:  Social History     Social History Main Topics    Smoking status: Never Smoker    Smokeless tobacco: Never Used    Alcohol use Yes      Comment: 1-2 times per month    Drug use: No    Sexual activity: Yes     Partners: Female     Birth control/ protection: OCP       ROS   Review of Systems   Constitutional: Negative for chills, diaphoresis and fever.   HENT: Negative for sore throat.    Respiratory: Positive for shortness of breath. Negative for cough.    Cardiovascular: Positive for chest pain (tightness). Negative for palpitations and leg swelling.   Gastrointestinal: Negative for abdominal pain, nausea and vomiting.   Genitourinary: Negative for dysuria.   Musculoskeletal: Negative for back pain.   Skin: Negative for rash.   Neurological: Negative for dizziness, weakness, numbness and headaches.   Hematological: Does not bruise/bleed easily.   All other systems reviewed and are negative.      Physical Exam      Initial Vitals [07/31/17 1321]   BP Pulse Resp Temp SpO2   (!) 150/107 87 16 98.4 °F (36.9 °C) 98 %      MAP       121.33          Physical Exam  Nursing Notes and Vital Signs Reviewed.  Constitutional: Patient is in no acute distress. Awake and alert. Well-developed and well-nourished.  Head: Atraumatic. Normocephalic.  Eyes: PERRL. EOM intact. Conjunctivae are not pale. No scleral icterus.  ENT: Mucous membranes are moist. Oropharynx is clear and symmetric.    Neck: Supple. Full ROM. No lymphadenopathy.  Cardiovascular: Regular rate. Regular rhythm. No murmurs, rubs, or gallops. Distal pulses are 2+ and  symmetric.  Pulmonary/Chest: No respiratory distress. Clear to auscultation bilaterally. No wheezing, rales, or rhonchi.  Abdominal: Soft and non-distended.  There is no tenderness.  No rebound, guarding, or rigidity.  Good bowel sounds.    Musculoskeletal: Moves all extremities. No obvious deformities. No edema. No calf tenderness.  Skin: Warm and dry.  Neurological:  Alert, awake, and appropriate.  Normal speech.  No acute focal neurological deficits are appreciated.  Psychiatric: Normal affect. Good eye contact. Appropriate in content.    ED Course    Critical Care  Date/Time: 7/31/2017 3:11 PM  Performed by: SAM CARPIO  Authorized by: SAM CARPIO   Direct patient critical care time: 12 minutes  Additional history critical care time: 10 minutes  Ordering / reviewing critical care time: 8 minutes  Documentation critical care time: 7 minutes  Consulting other physicians critical care time: 8 minutes  Total critical care time (exclusive of procedural time) : 45 minutes  Critical care time was exclusive of separately billable procedures and treating other patients and teaching time.  Critical care was necessary to treat or prevent imminent or life-threatening deterioration of the following conditions: NSTEMI.  Critical care was time spent personally by me on the following activities: blood draw for specimens, development of treatment plan with patient or surrogate, discussions with consultants, interpretation of cardiac output measurements, evaluation of patient's response to treatment, examination of patient, obtaining history from patient or surrogate, ordering and performing treatments and interventions, ordering and review of laboratory studies, ordering and review of radiographic studies, pulse oximetry, re-evaluation of patient's condition, review of old charts and vascular access procedures.        ED Vital Signs:  Vitals:    07/31/17 1321 07/31/17 1335 07/31/17 1406 07/31/17 1446   BP:  "(!) 150/107  129/83 120/83   Pulse: 87 87 76 67   Resp: 16 14     Temp: 98.4 °F (36.9 °C)      TempSrc: Oral      SpO2: 98% 98% 98% 98%   Weight: 104.3 kg (230 lb)      Height: 6' 3" (1.905 m)       07/31/17 1500 07/31/17 1701   BP: 119/85 (!) 144/87   Pulse: 65 (!) 58   Resp:  15   Temp:     TempSrc:     SpO2: 98% 98%   Weight:     Height:         Abnormal Lab Results:  Labs Reviewed   COMPREHENSIVE METABOLIC PANEL - Abnormal; Notable for the following:        Result Value    Glucose 226 (*)     Albumin 3.3 (*)     All other components within normal limits   TROPONIN I - Abnormal; Notable for the following:     Troponin I 0.128 (*)     All other components within normal limits   B-TYPE NATRIURETIC PEPTIDE - Abnormal; Notable for the following:      (*)     All other components within normal limits   TROPONIN I - Abnormal; Notable for the following:     Troponin I 0.153 (*)     All other components within normal limits   POCT GLUCOSE - Abnormal; Notable for the following:     POCT Glucose 200 (*)     All other components within normal limits   CK-MB   PROTIME-INR   APTT   DRUG SCREEN PANEL, URINE EMERGENCY   TSH   TSH   HEMOGLOBIN A1C        All Lab Results:  Results for orders placed or performed during the hospital encounter of 07/31/17   Comprehensive metabolic panel   Result Value Ref Range    Sodium 140 136 - 145 mmol/L    Potassium 4.0 3.5 - 5.1 mmol/L    Chloride 104 95 - 110 mmol/L    CO2 25 23 - 29 mmol/L    Glucose 226 (H) 70 - 110 mg/dL    BUN, Bld 20 6 - 20 mg/dL    Creatinine 1.4 0.5 - 1.4 mg/dL    Calcium 9.4 8.7 - 10.5 mg/dL    Total Protein 6.9 6.0 - 8.4 g/dL    Albumin 3.3 (L) 3.5 - 5.2 g/dL    Total Bilirubin 0.9 0.1 - 1.0 mg/dL    Alkaline Phosphatase 75 55 - 135 U/L    AST 27 10 - 40 U/L    ALT 21 10 - 44 U/L    Anion Gap 11 8 - 16 mmol/L    eGFR if African American >60 >60 mL/min/1.73 m^2    eGFR if non African American >60 >60 mL/min/1.73 m^2   Troponin I #1   Result Value Ref Range    " Troponin I 0.128 (H) 0.000 - 0.026 ng/mL   B-Type natriuretic peptide (BNP)   Result Value Ref Range     (H) 0 - 99 pg/mL   CK-MB   Result Value Ref Range    CPK 61 20 - 200 U/L    CPK MB 3.0 0.1 - 6.5 ng/mL    MB% 4.9 0.0 - 5.0 %   Protime-INR   Result Value Ref Range    Prothrombin Time 10.9 9.0 - 12.5 sec    INR 1.1 0.8 - 1.2   APTT   Result Value Ref Range    aPTT 29.2 21.0 - 32.0 sec   Drug screen panel, emergency   Result Value Ref Range    Benzodiazepines Negative     Methadone metabolites Negative     Cocaine (Metab.) Negative     Opiate Scrn, Ur Negative     Barbiturate Screen, Ur Negative     Amphetamine Screen, Ur Negative     THC Negative     Phencyclidine Negative     Creatinine, Random Ur 179.7 23.0 - 375.0 mg/dL    Toxicology Information SEE COMMENT    TSH   Result Value Ref Range    TSH 0.727 0.400 - 4.000 uIU/mL   Troponin I   Result Value Ref Range    Troponin I 0.153 (H) 0.000 - 0.026 ng/mL   POCT glucose   Result Value Ref Range    POCT Glucose 200 (H) 70 - 110 mg/dL       Imaging Results:  Imaging Results          X-Ray Chest PA And Lateral (Final result)  Result time 07/31/17 14:05:32    Final result by REGAN Flores Sr., MD (07/31/17 14:05:32)                 Impression:        1. The lungs are clear.   2. There are minimal degenerative changes in the thoracic spine.      Electronically signed by: REGAN FLORES MD  Date:     07/31/17  Time:    14:05              Narrative:    Two-view chest x-ray    Clinical History:  Chest pain    Finding: The size and contour of the heart are normal. The lungs are clear. There is no pneumothorax or pleural effusion. There are minimal degenerative changes in the thoracic spine.                             The EKG was ordered, reviewed, and independently interpreted by the ED provider.  Interpretation time: 13:31  Rate: 77 BPM  Rhythm: normal sinus rhythm  Interpretation: No acute ST changes. No STEMI.    The Emergency Provider reviewed the vital signs  and test results, which are outlined above.    ED Discussion     3:09 PM: Dr. Clark discussed the pt's case with Dr. Wynn (Cardiology) who recommends heparin drip and Nitro PRN.    3:14 PM: Discussed case with Jazmyne ([facility]). Dr. Caceres agrees with current care and management of pt and accepts admission.   Admitting Service: [facility] medicine   Admitting Physician: Dr. Caceres  Admit to: Tele    Re-evaluated pt. I have discussed test results, shared treatment plan, and the need for admission with patient and family at bedside. Pt and family express understanding at this time and agree with all information. All questions answered. Pt and family have no further questions or concerns at this time. Pt is ready for admit.  ED Medication(s):  Medications   heparin 25,000 units in dextrose 5% 250 mL (100 units/mL) bolus from bag; PRN BOLUS (not administered)   heparin 25,000 units in dextrose 5% 250 mL (100 units/mL) bolus from bag; PRN BOLUS (not administered)   heparin 25,000 units in dextrose 5% 250 mL (100 units/mL) infusion; MALE (17 Units/kg/hr × 104.3 kg Intravenous New Bag 7/31/17 1615)   atorvastatin tablet 40 mg (40 mg Oral Given 7/31/17 1553)   nitroGLYCERIN SL tablet 0.4 mg (not administered)   aspirin chewable tablet 81 mg (not administered)   metoprolol tartrate (LOPRESSOR) tablet 25 mg (25 mg Oral Given 7/31/17 1554)   0.9%  NaCl infusion (1,000 mLs Intravenous Not Given 7/31/17 1530)   lisinopril tablet 20 mg (not administered)   multivitamin tablet 1 tablet (not administered)   0.9%  NaCl infusion ( Intravenous New Bag 7/31/17 1616)   acetaminophen tablet 650 mg (not administered)   aluminum-magnesium hydroxide-simethicone 200-200-20 mg/5 mL suspension 30 mL (not administered)   diphenhydrAMINE capsule 25 mg (not administered)   ondansetron injection 4 mg (not administered)   pantoprazole EC tablet 40 mg (not administered)   febuxostat tablet 80 mg (not administered)   aspirin tablet 325 mg (325 mg  Oral Given 7/31/17 1347)   heparin 25,000 units in dextrose 5% 250 mL (100 units/mL) bolus from bag; INITIAL BOLUS DOSE (7,301 Units Intravenous Bolus from Bag 7/31/17 1614)       New Prescriptions    No medications on file            Medical Decision Making    Medical Decision Making:   Clinical Tests:   Lab Tests: Reviewed and Ordered  Radiological Study: Reviewed and Ordered  Medical Tests: Reviewed and Ordered           Scribe Attestation:   Scribe #1: I performed the above scribed service and the documentation accurately describes the services I performed. I attest to the accuracy of the note.    Attending:   Physician Attestation Statement for Scribe #1: I, Starla Clark MD, personally performed the services described in this documentation, as scribed by Doirta Lao, in my presence, and it is both accurate and complete.          Clinical Impression       ICD-10-CM ICD-9-CM   1. NSTEMI (non-ST elevated myocardial infarction) I21.4 410.70   2. Chest pain R07.9 786.50   3. History of hypertension Z86.79 V12.59   4. Type 2 diabetes mellitus with complication, without long-term current use of insulin E11.8 250.90       Disposition:   Disposition: Admitted (Tele)  Condition: Serious         Starla Clark MD  07/31/17 2255

## 2017-07-31 NOTE — ASSESSMENT & PLAN NOTE
Last lipid panel 7/5/2017   Ref Range & Units 3wk ago   Cholesterol 120 - 199 mg/dL 245    Comments: The National Cholesterol Education Program (NCEP) has set the   following guidelines (reference ranges) for Cholesterol:   Optimal.....................<200 mg/dL   Borderline High.............200-239 mg/dL   High........................> or = 240 mg/dL    Triglycerides 30 - 150 mg/dL 245    Comments: The National Cholesterol Education Program (NCEP) has set the   following guidelines (reference values) for triglycerides:   Normal......................<150 mg/dL   Borderline High.............150-199 mg/dL   High........................200-499 mg/dL    HDL 40 - 75 mg/dL 29    Comments: The National Cholesterol Education Program (NCEP) has set the   following guidelines (reference values) for HDL Cholesterol:   Low...............<40 mg/dL   Optimal...........>60 mg/dL    LDL Cholesterol 63.0 - 159.0 mg/dL 167.0    Comments: The National Cholesterol Education Program (NCEP) has set the   following guidelines (reference values) for LDL Cholesterol:   Optimal.......................<130 mg/dL   Borderline High...............130-159 mg/dL   High..........................160-189 mg/dL   Very High.....................>190 mg/dL      Continue atorvastatin

## 2017-07-31 NOTE — HPI
Robb Iglesias is a 35 yo male who is S/P Gastric Sleeve x 5 months with weight loss approx 60 #, DM II (off meds), HENRY on CPAP, HPL, HTN and Gout who presents with intermittent episodes of chest pressure with associated SOB. His B/P was elevated when he experienced the chest pain. Over the W/E the chest pain intensity was strong, however today, chest pressure was less intense. He denies nausea, palpitations, leg swelling, radiation of pain and diaphoresis. EKG was normal sinus rhythm with normal rate. On arrival, B/P 150/107 and initial troponin elevated at 0.128. Cardiology advised heparin infusion and pt admitted to evaluate for Acute Coronary Syndrome.

## 2017-07-31 NOTE — SUBJECTIVE & OBJECTIVE
Past Medical History:   Diagnosis Date    Allergic rhinitis     GERD (gastroesophageal reflux disease)     Gout     Hyperlipidemia     Hypertension associated with chronic kidney disease due to type 2 diabetes mellitus     Long term (current) use of insulin     Obesity     HENRY on CPAP     Proteinuria     Steatohepatitis     Fatty Liver    Type 2 diabetes mellitus with diabetic nephropathy     Type 2 diabetes mellitus with hyperglycemia     Type 2 diabetes mellitus with renal manifestations        Past Surgical History:   Procedure Laterality Date    LASIK Bilateral     LIVER BIOPSY      NASAL ENDOSCOPY      NASAL SEPTUM SURGERY      SLEEVE GASTROPLASTY  03/06/2017       Review of patient's allergies indicates:  No Known Allergies    No current facility-administered medications on file prior to encounter.      Current Outpatient Prescriptions on File Prior to Encounter   Medication Sig    cetirizine (ZYRTEC) 10 MG tablet Take 10 mg by mouth once daily.    febuxostat (ULORIC) 80 mg Tab Take 80 mg by mouth once daily at 6am.    GLUCOSAM/CSA/COLLAGEN/HYALUR A (JOINT SUPPORT ORAL) Take by mouth.    lisinopril (PRINIVIL,ZESTRIL) 20 MG tablet Take 1 tablet (20 mg total) by mouth once daily.    MULTIVIT-MINERALS/FOLIC ACID (DAILY MULTIPLE FOR MEN ORAL) Take 1 tablet by mouth once daily.    MULTIVITAMIN/IRON/FOLIC ACID (CENTRUM COMPLETE ORAL) Take by mouth once daily at 6am.    turmeric root extract 500 mg Cap Take by mouth once daily at 6am.    colchicine 0.6 mg tablet Take 1 tablet (0.6 mg total) by mouth 2 (two) times daily.     Family History     Problem Relation (Age of Onset)    Diabetes type II Mother, Brother, Brother    Hyperlipidemia Mother    Hypertension Mother        Social History Main Topics    Smoking status: Never Smoker    Smokeless tobacco: Never Used    Alcohol use Yes      Comment: 1-2 times per month    Drug use: No    Sexual activity: Yes     Partners: Female     Birth  control/ protection: OCP     Review of Systems   Constitutional: Negative for activity change, chills, fatigue and fever.   HENT: Negative.    Eyes: Negative for pain and redness.   Respiratory: Positive for shortness of breath. Negative for cough and wheezing.    Cardiovascular: Positive for chest pain. Negative for palpitations and leg swelling.   Gastrointestinal: Positive for constipation. Negative for abdominal pain, diarrhea, nausea and vomiting.   Genitourinary: Negative for decreased urine volume, dysuria, frequency and hematuria.   Musculoskeletal: Positive for arthralgias and joint swelling.   Skin: Negative for pallor, rash and wound.   Neurological: Positive for numbness.   Psychiatric/Behavioral: Negative for confusion. The patient is not nervous/anxious.      Objective:     Vital Signs (Most Recent):  Temp: 98.4 °F (36.9 °C) (07/31/17 1321)  Pulse: 65 (07/31/17 1500)  Resp: 14 (07/31/17 1335)  BP: 119/85 (07/31/17 1500)  SpO2: 98 % (07/31/17 1500) Vital Signs (24h Range):  Temp:  [98.4 °F (36.9 °C)] 98.4 °F (36.9 °C)  Pulse:  [65-87] 65  Resp:  [14-16] 14  SpO2:  [98 %] 98 %  BP: (119-150)/() 119/85     Weight: 104.3 kg (230 lb)  Body mass index is 28.75 kg/m².    Physical Exam   Constitutional: He is oriented to person, place, and time. He appears well-developed and well-nourished. No distress.   HENT:   Head: Normocephalic and atraumatic.   Eyes: Conjunctivae are normal. No scleral icterus.   Neck: Normal range of motion. Neck supple. No JVD present.   Cardiovascular: Normal rate, regular rhythm and normal heart sounds.  Exam reveals no gallop and no friction rub.    No murmur heard.  Pulmonary/Chest: Effort normal and breath sounds normal. No respiratory distress. He has no wheezes.   Abdominal: Soft. Bowel sounds are normal. He exhibits no distension. There is no tenderness.   Musculoskeletal: Normal range of motion. He exhibits no edema or tenderness.   Neurological: He is alert and oriented  to person, place, and time. He exhibits normal muscle tone.   Skin: Skin is warm and dry.   Psychiatric: He has a normal mood and affect. His behavior is normal.   Nursing note and vitals reviewed.       Significant Labs:   CBC: No results for input(s): WBC, HGB, HCT, PLT in the last 48 hours.  CMP:   Recent Labs  Lab 07/31/17  1355      K 4.0      CO2 25   *   BUN 20   CREATININE 1.4   CALCIUM 9.4   PROT 6.9   ALBUMIN 3.3*   BILITOT 0.9   ALKPHOS 75   AST 27   ALT 21   ANIONGAP 11   EGFRNONAA >60     Troponin:   Recent Labs  Lab 07/31/17  1355   TROPONINI 0.128*       Significant Imaging: I have reviewed all pertinent imaging results/findings within the past 24 hours.

## 2017-07-31 NOTE — ASSESSMENT & PLAN NOTE
Cardiology following  Pt admitted with need for Telemetry monitoring  Serial cardiac enzymes, ASA, metoprolol  Heparin infusion, continue lisinopril  Nitroglycerin to chest wall, Oxygen therapy to maintain sats > 92 %  Recent lipid panel - 7/5/2017 (review results), resume atorvastatin,   2 D ECHO pending

## 2017-08-01 LAB
ALBUMIN SERPL BCP-MCNC: 3 G/DL
ALP SERPL-CCNC: 69 U/L
ALT SERPL W/O P-5'-P-CCNC: 18 U/L
ANION GAP SERPL CALC-SCNC: 9 MMOL/L
APTT BLDCRRT: 64.8 SEC
APTT BLDCRRT: 66.7 SEC
AST SERPL-CCNC: 15 U/L
BASOPHILS # BLD AUTO: 0.03 K/UL
BASOPHILS NFR BLD: 0.4 %
BILIRUB SERPL-MCNC: 0.9 MG/DL
BUN SERPL-MCNC: 18 MG/DL
CALCIUM SERPL-MCNC: 9.1 MG/DL
CHLORIDE SERPL-SCNC: 105 MMOL/L
CO2 SERPL-SCNC: 29 MMOL/L
CORONARY STENOSIS: ABNORMAL
CREAT SERPL-MCNC: 1.2 MG/DL
DIASTOLIC DYSFUNCTION: NO
DIFFERENTIAL METHOD: ABNORMAL
EOSINOPHIL # BLD AUTO: 0.2 K/UL
EOSINOPHIL NFR BLD: 2.7 %
ERYTHROCYTE [DISTWIDTH] IN BLOOD BY AUTOMATED COUNT: 14.6 %
EST. GFR  (AFRICAN AMERICAN): >60 ML/MIN/1.73 M^2
EST. GFR  (NON AFRICAN AMERICAN): >60 ML/MIN/1.73 M^2
ESTIMATED AVG GLUCOSE: 126 MG/DL
ESTIMATED PA SYSTOLIC PRESSURE: 25.47
GLUCOSE SERPL-MCNC: 126 MG/DL
HBA1C MFR BLD HPLC: 6 %
HCT VFR BLD AUTO: 44.1 %
HGB BLD-MCNC: 14.9 G/DL
LYMPHOCYTES # BLD AUTO: 2.9 K/UL
LYMPHOCYTES NFR BLD: 37.1 %
MCH RBC QN AUTO: 27.9 PG
MCHC RBC AUTO-ENTMCNC: 33.8 G/DL
MCV RBC AUTO: 82 FL
MONOCYTES # BLD AUTO: 0.6 K/UL
MONOCYTES NFR BLD: 7.7 %
NEUTROPHILS # BLD AUTO: 4 K/UL
NEUTROPHILS NFR BLD: 52.1 %
PLATELET # BLD AUTO: 165 K/UL
PMV BLD AUTO: 9.8 FL
POC ACTIVATED CLOTTING TIME K: 252 SEC (ref 74–137)
POTASSIUM SERPL-SCNC: 3.5 MMOL/L
PROT SERPL-MCNC: 6.1 G/DL
RBC # BLD AUTO: 5.35 M/UL
RETIRED EF AND QEF - SEE NOTES: 55 (ref 55–65)
SAMPLE: ABNORMAL
SODIUM SERPL-SCNC: 143 MMOL/L
TROPONIN I SERPL DL<=0.01 NG/ML-MCNC: 0.19 NG/ML
WBC # BLD AUTO: 7.68 K/UL

## 2017-08-01 PROCEDURE — 93458 L HRT ARTERY/VENTRICLE ANGIO: CPT | Mod: 26,59,, | Performed by: INTERNAL MEDICINE

## 2017-08-01 PROCEDURE — B2111ZZ FLUOROSCOPY OF MULTIPLE CORONARY ARTERIES USING LOW OSMOLAR CONTRAST: ICD-10-PCS | Performed by: INTERNAL MEDICINE

## 2017-08-01 PROCEDURE — 84484 ASSAY OF TROPONIN QUANT: CPT

## 2017-08-01 PROCEDURE — 63600175 PHARM REV CODE 636 W HCPCS

## 2017-08-01 PROCEDURE — 85730 THROMBOPLASTIN TIME PARTIAL: CPT

## 2017-08-01 PROCEDURE — 85730 THROMBOPLASTIN TIME PARTIAL: CPT | Mod: 91

## 2017-08-01 PROCEDURE — 85025 COMPLETE CBC W/AUTO DIFF WBC: CPT

## 2017-08-01 PROCEDURE — 80053 COMPREHEN METABOLIC PANEL: CPT

## 2017-08-01 PROCEDURE — 25000003 PHARM REV CODE 250: Performed by: EMERGENCY MEDICINE

## 2017-08-01 PROCEDURE — 99152 MOD SED SAME PHYS/QHP 5/>YRS: CPT

## 2017-08-01 PROCEDURE — 36415 COLL VENOUS BLD VENIPUNCTURE: CPT

## 2017-08-01 PROCEDURE — 25000003 PHARM REV CODE 250: Performed by: NURSE PRACTITIONER

## 2017-08-01 PROCEDURE — 21400001 HC TELEMETRY ROOM

## 2017-08-01 PROCEDURE — 25000003 PHARM REV CODE 250

## 2017-08-01 PROCEDURE — 027035Z DILATION OF CORONARY ARTERY, ONE ARTERY WITH TWO DRUG-ELUTING INTRALUMINAL DEVICES, PERCUTANEOUS APPROACH: ICD-10-PCS | Performed by: INTERNAL MEDICINE

## 2017-08-01 PROCEDURE — C9606 PERC D-E COR REVASC W AMI S: HCPCS | Mod: LD

## 2017-08-01 PROCEDURE — 4A023N7 MEASUREMENT OF CARDIAC SAMPLING AND PRESSURE, LEFT HEART, PERCUTANEOUS APPROACH: ICD-10-PCS | Performed by: INTERNAL MEDICINE

## 2017-08-01 PROCEDURE — 25000003 PHARM REV CODE 250: Performed by: INTERNAL MEDICINE

## 2017-08-01 PROCEDURE — 99152 MOD SED SAME PHYS/QHP 5/>YRS: CPT | Mod: ,,, | Performed by: INTERNAL MEDICINE

## 2017-08-01 PROCEDURE — C1769 GUIDE WIRE: HCPCS

## 2017-08-01 PROCEDURE — 63600175 PHARM REV CODE 636 W HCPCS: Performed by: EMERGENCY MEDICINE

## 2017-08-01 PROCEDURE — 3E033PZ INTRODUCTION OF PLATELET INHIBITOR INTO PERIPHERAL VEIN, PERCUTANEOUS APPROACH: ICD-10-PCS | Performed by: INTERNAL MEDICINE

## 2017-08-01 PROCEDURE — B2151ZZ FLUOROSCOPY OF LEFT HEART USING LOW OSMOLAR CONTRAST: ICD-10-PCS | Performed by: INTERNAL MEDICINE

## 2017-08-01 PROCEDURE — 92941 PRQ TRLML REVSC TOT OCCL AMI: CPT | Mod: LD,,, | Performed by: INTERNAL MEDICINE

## 2017-08-01 RX ORDER — ONDANSETRON 8 MG/1
8 TABLET, ORALLY DISINTEGRATING ORAL EVERY 8 HOURS PRN
Status: DISCONTINUED | OUTPATIENT
Start: 2017-08-01 | End: 2017-08-02 | Stop reason: HOSPADM

## 2017-08-01 RX ORDER — SODIUM CHLORIDE 9 MG/ML
INJECTION, SOLUTION INTRAVENOUS CONTINUOUS
Status: ACTIVE | OUTPATIENT
Start: 2017-08-01 | End: 2017-08-02

## 2017-08-01 RX ADMIN — SODIUM CHLORIDE: 0.9 INJECTION, SOLUTION INTRAVENOUS at 07:08

## 2017-08-01 RX ADMIN — NITROGLYCERIN 0.5 INCH: 20 OINTMENT TOPICAL at 05:08

## 2017-08-01 RX ADMIN — ATORVASTATIN CALCIUM 40 MG: 40 TABLET, FILM COATED ORAL at 09:08

## 2017-08-01 RX ADMIN — FEBUXOSTAT 80 MG: 40 TABLET ORAL at 09:08

## 2017-08-01 RX ADMIN — METOPROLOL TARTRATE 25 MG: 25 TABLET ORAL at 09:08

## 2017-08-01 RX ADMIN — HEPARIN SODIUM 17 UNITS/KG/HR: 10000 INJECTION, SOLUTION INTRAVENOUS at 02:08

## 2017-08-01 RX ADMIN — SODIUM CHLORIDE: 0.9 INJECTION, SOLUTION INTRAVENOUS at 09:08

## 2017-08-01 RX ADMIN — THERA TABS 1 TABLET: TAB at 09:08

## 2017-08-01 RX ADMIN — PANTOPRAZOLE SODIUM 40 MG: 40 TABLET, DELAYED RELEASE ORAL at 09:08

## 2017-08-01 RX ADMIN — LISINOPRIL 20 MG: 20 TABLET ORAL at 09:08

## 2017-08-01 RX ADMIN — ASPIRIN 81 MG 81 MG: 81 TABLET ORAL at 09:08

## 2017-08-01 NOTE — NURSING
Patient assessed for diabetes educational needs following chart review  He reports was diagnosed over 6 years ago with a history of pre-diabetes  He had weight loss surgery and has lost 90 pounds  He is now off of all his diabetes meds and is diet control  He has a home glucose monitor and checks 2-3 times weekly with averages of 110-130  He does not identify any diabetes educational needs at this time--does not need literature

## 2017-08-01 NOTE — OP NOTE
INPATIENT Operative Note         SUMMARY     Surgery Date: 8/1/2017     Surgeon(s) and Role:     * Monica Rios MD - Primary    ASSISTANT:ALEHTEA GARCIA    Pre-op Diagnosis:  NSTEMI      Post-op Diagnosis: MSTEMI / CAD    Procedure(s) (LRB):  HEART CATH-LEFT (Left)  PTCA STENT LAD  COMPLICATION:NONE    Anesthesia: RN IV Sedation    Findings/Key Components:  PROX AND MID LAD SEQUENTIAL 99% STENOSIS TREATED WITH FAMILIA ALPINE 2.75 X 38.  NON OBS RCA AND LCX  OM1 SMALL SUBTOTAL 90%   PDA 40% PROX.  EF 55 % ANT HK.    Estimated Blood Loss: < 50 ML.         SPECIMEN: NONE    Devices/Prostetics: aLPINE STENT 2.75X38    PLAN:  ROUTINE POST STENT CARE.

## 2017-08-01 NOTE — PLAN OF CARE
Problem: Patient Care Overview  Goal: Plan of Care Review  Outcome: Ongoing (interventions implemented as appropriate)  Patient remains free from falls, fall precaution in place. Up ad simón.  VS stable. Denies chest pain. Currently in CVRU for heart cath.  No other C/O at this time.Call bell and belongings within reach, reminded to call for assistance.

## 2017-08-01 NOTE — ASSESSMENT & PLAN NOTE
-patient admits to premature death in family due to heart disease  -plans for LHC per Cardiology today  -continue BB, ASA, STATIN and heparin infusion

## 2017-08-01 NOTE — SUBJECTIVE & OBJECTIVE
Interval History: plans for [facility] today. No complaints at this time.     Review of Systems   Constitutional: Negative for chills, diaphoresis, fatigue and fever.   HENT: Negative for drooling, ear pain, rhinorrhea and sore throat.    Eyes: Negative.    Respiratory: Negative for cough, shortness of breath and wheezing.    Cardiovascular: Negative for palpitations and leg swelling.   Gastrointestinal: Negative for abdominal pain, constipation, diarrhea and nausea.   Endocrine: Negative.    Genitourinary: Negative for dysuria, hematuria and urgency.   Musculoskeletal: Negative.    Skin: Negative for color change and wound.   Allergic/Immunologic: Negative.    Neurological: Negative for dizziness, syncope and speech difficulty.   Hematological: Negative.    Psychiatric/Behavioral: Negative.       Objective:     Vital Signs (Most Recent):  Temp: 97.7 °F (36.5 °C) (08/01/17 1600)  Pulse: 75 (08/01/17 1600)  Resp: 20 (08/01/17 1600)  BP: (!) 142/96 (08/01/17 1600)  SpO2: 97 % (08/01/17 1600) Vital Signs (24h Range):  Temp:  [97.7 °F (36.5 °C)-98.7 °F (37.1 °C)] 97.7 °F (36.5 °C)  Pulse:  [57-75] 75  Resp:  [18-20] 20  SpO2:  [97 %-99 %] 97 %  BP: (133-142)/() 142/96     Weight: 106.1 kg (234 lb)  Body mass index is 29.25 kg/m².    Intake/Output Summary (Last 24 hours) at 08/01/17 1807  Last data filed at 08/01/17 0600   Gross per 24 hour   Intake           1616.7 ml   Output                0 ml   Net           1616.7 ml      Physical Exam   Constitutional: He is oriented to person, place, and time. He appears well-developed and well-nourished. No distress.   HENT:   Head: Normocephalic and atraumatic.   Eyes: Conjunctivae are normal. No scleral icterus.   Neck: Normal range of motion. Neck supple. No JVD present.   Cardiovascular: Normal rate, regular rhythm and normal heart sounds.  Exam reveals no gallop and no friction rub.    No murmur heard.  Pulmonary/Chest: Effort normal and breath sounds normal. No respiratory  distress. He has no wheezes.   Abdominal: Soft. Bowel sounds are normal. He exhibits no distension. There is no tenderness.   Musculoskeletal: Normal range of motion. He exhibits no edema or tenderness.   Neurological: He is alert and oriented to person, place, and time. He exhibits normal muscle tone.   Skin: Skin is warm and dry.   Psychiatric: He has a normal mood and affect. His behavior is normal.   Nursing note and vitals reviewed.    Significant Labs:   CBC:   Recent Labs  Lab 08/01/17  0437   WBC 7.68   HGB 14.9   HCT 44.1        CMP:   Recent Labs  Lab 07/31/17  1355 08/01/17  0437    143   K 4.0 3.5    105   CO2 25 29   * 126*   BUN 20 18   CREATININE 1.4 1.2   CALCIUM 9.4 9.1   PROT 6.9 6.1   ALBUMIN 3.3* 3.0*   BILITOT 0.9 0.9   ALKPHOS 75 69   AST 27 15   ALT 21 18   ANIONGAP 11 9   EGFRNONAA >60 >60       Significant Imaging:   Imaging Results          IR Heart Cath Images (In process)                X-Ray Chest PA And Lateral (Final result)  Result time 07/31/17 14:05:32    Final result by REGAN Flores Sr., MD (07/31/17 14:05:32)                 Impression:        1. The lungs are clear.   2. There are minimal degenerative changes in the thoracic spine.      Electronically signed by: REGAN FLORES MD  Date:     07/31/17  Time:    14:05              Narrative:    Two-view chest x-ray    Clinical History:  Chest pain    Finding: The size and contour of the heart are normal. The lungs are clear. There is no pneumothorax or pleural effusion. There are minimal degenerative changes in the thoracic spine.

## 2017-08-01 NOTE — INTERVAL H&P NOTE
The patient has been examined and the H&P has been reviewed:    I concur with the findings and no changes have occurred since H&P was written.    Anesthesia/Surgery risks, benefits and alternative options discussed and understood by patient/family.I have explained the risks, benefits , and alternatives of the procedure in detail.the patient voices understanding and all questions have been answered.the patient agrees to proceed as planned.          Active Hospital Problems    Diagnosis  POA    *NSTEMI (non-ST elevated myocardial infarction) [I21.4]  Yes    HENRY on CPAP [G47.33, Z99.89]  Not Applicable    Hyperlipidemia [E78.5]  Yes    Status following surgery for weight loss [Z98.84]  Not Applicable    Type 2 diabetes mellitus with diabetic nephropathy [E11.21]  Yes    Benign essential hypertension [I10]  Yes     Chronic      Resolved Hospital Problems    Diagnosis Date Resolved POA   No resolved problems to display.

## 2017-08-01 NOTE — PLAN OF CARE
Problem: Patient Care Overview  Goal: Plan of Care Review  Outcome: Ongoing (interventions implemented as appropriate)  The patient is sinus rhythm, sinus syl on the monitor. Pt has heparin infusing at 17 units/kg/hr and normal saline infusing at 100 mL/hr. Pt has no c/o chest pain at this time. Pt has had an uneventful night and is resting quietly, will continue to monitor.

## 2017-08-01 NOTE — H&P (VIEW-ONLY)
[facility] -   Cardiology  Consult Note    Patient Name: Robb Iglesias  MRN: [id number]  Admission Date: 7/31/2017  Hospital Length of Stay: 0 days  Code Status: No Order   Attending Provider: Janelle Caceres MD   Consulting Provider: Channing Wynn MD  Primary Care Physician: SABIHA Roberson MD  Principal Problem:NSTEMI (non-ST elevated myocardial infarction)    Patient information was obtained from patient and ER records.     Consults NSTEMI/ACS  Subjective:     Chief Complaint:  Chest pain     HPI: 35 yo male, came in for chest pain and elevated troponin,  PMH Obesity, s/p gastric sleeve surgery in , DM off Insulin after gastric surgery, HTN, HLD and HENRY on CPAP.  He states that his PCP took off Doxozin one week ago and started him on Lisinopril 20 mg. He did no started lisinopril right after Doxozin off. He started chest tightness 5 days ago, with dyspnea and palpitation. No radiation pain. The episode lasted for few minutes. The pain became rogressively more frequent. His BP was 200/120 mmHg. And then he started Lisinopril. Of note, swimming in the pool did not trigger the chest pain. No sweating and N/V.  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 In ER, troponin was 0.128 and EKG NSR and no acute stt change.   Now pain free    Past Medical History:   Diagnosis Date    Allergic rhinitis     GERD (gastroesophageal reflux disease)     Gout     Hyperlipidemia     Hypertension associated with chronic kidney disease due to type 2 diabetes mellitus     Long term (current) use of insulin     Obesity     HENRY on CPAP     Proteinuria     Steatohepatitis     Fatty Liver    Type 2 diabetes mellitus with diabetic nephropathy     Type 2 diabetes mellitus with hyperglycemia     Type 2 diabetes mellitus with renal manifestations        Past Surgical History:   Procedure Laterality Date    LASIK Bilateral     LIVER BIOPSY      NASAL ENDOSCOPY      NASAL  SEPTUM SURGERY      SLEEVE GASTROPLASTY  03/06/2017       Review of patient's allergies indicates:  No Known Allergies    No current facility-administered medications on file prior to encounter.      Current Outpatient Prescriptions on File Prior to Encounter   Medication Sig    cetirizine (ZYRTEC) 10 MG tablet Take 10 mg by mouth once daily.    febuxostat (ULORIC) 80 mg Tab Take 80 mg by mouth once daily at 6am.    GLUCOSAM/CSA/COLLAGEN/HYALUR A (JOINT SUPPORT ORAL) Take by mouth.    lisinopril (PRINIVIL,ZESTRIL) 20 MG tablet Take 1 tablet (20 mg total) by mouth once daily.    MULTIVIT-MINERALS/FOLIC ACID (DAILY MULTIPLE FOR MEN ORAL) Take 1 tablet by mouth once daily.    MULTIVITAMIN/IRON/FOLIC ACID (CENTRUM COMPLETE ORAL) Take by mouth once daily at 6am.    turmeric root extract 500 mg Cap Take by mouth once daily at 6am.    colchicine 0.6 mg tablet Take 1 tablet (0.6 mg total) by mouth 2 (two) times daily.     Family History     Problem Relation (Age of Onset)    Diabetes type II Mother, Brother, Brother    Hyperlipidemia Mother    Hypertension Mother        Social History Main Topics    Smoking status: Never Smoker    Smokeless tobacco: Never Used    Alcohol use Yes      Comment: 1-2 times per month    Drug use: No    Sexual activity: Yes     Partners: Female     Birth control/ protection: OCP     Review of Systems   Constitution: Negative for decreased appetite, diaphoresis, fever, weakness, malaise/fatigue and night sweats.   HENT: Negative for headaches and nosebleeds.    Eyes: Negative for blurred vision and double vision.   Cardiovascular: Positive for chest pain, dyspnea on exertion and palpitations. Negative for claudication, irregular heartbeat, leg swelling, near-syncope, orthopnea, paroxysmal nocturnal dyspnea and syncope.   Respiratory: Negative for cough, shortness of breath, sleep disturbances due to breathing, snoring, sputum production and wheezing.    Endocrine: Negative for cold  intolerance and polyuria.   Hematologic/Lymphatic: Does not bruise/bleed easily.   Skin: Negative for rash.   Musculoskeletal: Negative for back pain, falls, joint pain, joint swelling and neck pain.   Gastrointestinal: Negative for abdominal pain, heartburn, nausea and vomiting.   Genitourinary: Negative for dysuria, frequency and hematuria.   Neurological: Negative for difficulty with concentration, dizziness, focal weakness, light-headedness, numbness and seizures.   Psychiatric/Behavioral: Negative for depression, memory loss and substance abuse. The patient does not have insomnia.    Allergic/Immunologic: Negative for HIV exposure and hives.     Objective:     Vital Signs (Most Recent):  Temp: 98.4 °F (36.9 °C) (07/31/17 1321)  Pulse: 65 (07/31/17 1500)  Resp: 14 (07/31/17 1335)  BP: 119/85 (07/31/17 1500)  SpO2: 98 % (07/31/17 1500) Vital Signs (24h Range):  Temp:  [98.4 °F (36.9 °C)] 98.4 °F (36.9 °C)  Pulse:  [65-87] 65  Resp:  [14-16] 14  SpO2:  [98 %] 98 %  BP: (119-150)/() 119/85     Weight: 104.3 kg (230 lb)  Body mass index is 28.75 kg/m².    SpO2: 98 %  O2 Device (Oxygen Therapy): room air    No intake or output data in the 24 hours ending 07/31/17 1546    Lines/Drains/Airways     Peripheral Intravenous Line                 Peripheral IV - Single Lumen 07/31/17 1355 Left Antecubital less than 1 day                Physical Exam   Constitutional: He is oriented to person, place, and time. He appears well-nourished.   HENT:   Head: Normocephalic.   Eyes: Pupils are equal, round, and reactive to light.   Neck: Normal carotid pulses and no JVD present. Carotid bruit is not present. No thyromegaly present.   Cardiovascular: Normal rate, regular rhythm, normal heart sounds and normal pulses.   No extrasystoles are present. PMI is not displaced.  Exam reveals no gallop and no S3.    No murmur heard.  Pulmonary/Chest: Breath sounds normal. No stridor. No respiratory distress.   Abdominal: Soft. Bowel  sounds are normal. There is no tenderness. There is no rebound.   Musculoskeletal: Normal range of motion.   Neurological: He is alert and oriented to person, place, and time.   Skin: Skin is intact. No rash noted.   Psychiatric: His behavior is normal.       Significant Labs:   ABG: No results for input(s): PH, PCO2, HCO3, POCSATURATED, BE in the last 48 hours., Blood Culture: No results for input(s): LABBLOO in the last 48 hours., BMP:   Recent Labs  Lab 07/31/17  1355   *      K 4.0      CO2 25   BUN 20   CREATININE 1.4   CALCIUM 9.4   , CMP   Recent Labs  Lab 07/31/17  1355      K 4.0      CO2 25   *   BUN 20   CREATININE 1.4   CALCIUM 9.4   PROT 6.9   ALBUMIN 3.3*   BILITOT 0.9   ALKPHOS 75   AST 27   ALT 21   ANIONGAP 11   ESTGFRAFRICA >60   EGFRNONAA >60   , CBC No results for input(s): WBC, HGB, HCT, PLT in the last 48 hours., INR   Recent Labs  Lab 07/31/17  1355   INR 1.1   , Lipid Panel No results for input(s): CHOL, HDL, LDLCALC, TRIG, CHOLHDL in the last 48 hours. and Troponin   Recent Labs  Lab 07/31/17  1355   TROPONINI 0.128*       Significant Imaging: X-Ray: CXR: X-Ray Chest 1 View (CXR): No results found for this visit on 07/31/17.  Assessment and Plan:         ACS/NSTEMI vs. Uncontrolled HTN related demand.  DM off insulin after gastric surgery  Obesity s/p gastric sleeve in   HTN  HLD  HENRY on CPAP    Plan:  Keep NPO after mn  Repeat troponin  ECHO   Started heparin gtt  ASA and lipitor, BB  NTG paste if recurrent chest pain.            Active Diagnoses:    Diagnosis Date Noted POA    PRINCIPAL PROBLEM:  NSTEMI (non-ST elevated myocardial infarction) [I21.4] 07/31/2017 Yes    HENRY on CPAP [G47.33, Z99.89] 07/31/2017 Not Applicable    Hyperlipidemia [E78.5] 07/31/2017 Yes    Status following surgery for weight loss [Z98.84] 07/31/2017 Not Applicable    Type 2 diabetes mellitus with diabetic nephropathy [E11.21] 06/21/2017 Yes    Benign essential  hypertension [I10] 04/26/2017 Yes     Chronic      Problems Resolved During this Admission:    Diagnosis Date Noted Date Resolved POA       VTE Risk Mitigation     None          Thank you for your consult. I will follow-up with patient. Please contact us if you have any additional questions.    Channing Wynn MD  Cardiology   [facility]

## 2017-08-01 NOTE — PROGRESS NOTES
[facility] Hospital Medicine  Progress Note    Patient Name: Robb Iglesias  MRN: [id number]  Patient Class: IP- Inpatient   Admission Date: 7/31/2017  Length of Stay: 1 days  Attending Physician: Janelle Caceres MD  Primary Care Provider: SABIHA Roberson MD    Subjective:     Principal Problem:NSTEMI (non-ST elevated myocardial infarction)    HPI:  Robb Iglesias is a 35 yo male who is S/P Gastric Sleeve x 5 months with weight loss approx 60 #, DM II (off meds), HENRY on CPAP, HPL, HTN and Gout who presents with intermittent episodes of chest pressure with associated SOB. His B/P was elevated when he experienced the chest pain. Over the W/E the chest pain intensity was strong, however today, chest pressure was less intense. He denies nausea, palpitations, leg swelling, radiation of pain and diaphoresis. EKG was normal sinus rhythm with normal rate. On arrival, B/P 150/107 and initial troponin elevated at 0.128. Cardiology advised heparin infusion and pt admitted to evaluate for Acute Coronary Syndrome.     Hospital Course:  No notes on file    Interval History: plans for [facility] today. No complaints at this time.     Review of Systems   Constitutional: Negative for chills, diaphoresis, fatigue and fever.   HENT: Negative for drooling, ear pain, rhinorrhea and sore throat.    Eyes: Negative.    Respiratory: Negative for cough, shortness of breath and wheezing.    Cardiovascular: Negative for palpitations and leg swelling.   Gastrointestinal: Negative for abdominal pain, constipation, diarrhea and nausea.   Endocrine: Negative.    Genitourinary: Negative for dysuria, hematuria and urgency.   Musculoskeletal: Negative.    Skin: Negative for color change and wound.   Allergic/Immunologic: Negative.    Neurological: Negative for dizziness, syncope and speech difficulty.   Hematological: Negative.    Psychiatric/Behavioral: Negative.       Objective:     Vital Signs (Most Recent):  Temp: 97.7 °F (36.5  °C) (08/01/17 1600)  Pulse: 75 (08/01/17 1600)  Resp: 20 (08/01/17 1600)  BP: (!) 142/96 (08/01/17 1600)  SpO2: 97 % (08/01/17 1600) Vital Signs (24h Range):  Temp:  [97.7 °F (36.5 °C)-98.7 °F (37.1 °C)] 97.7 °F (36.5 °C)  Pulse:  [57-75] 75  Resp:  [18-20] 20  SpO2:  [97 %-99 %] 97 %  BP: (133-142)/() 142/96     Weight: 106.1 kg (234 lb)  Body mass index is 29.25 kg/m².    Intake/Output Summary (Last 24 hours) at 08/01/17 1807  Last data filed at 08/01/17 0600   Gross per 24 hour   Intake           1616.7 ml   Output                0 ml   Net           1616.7 ml      Physical Exam   Constitutional: He is oriented to person, place, and time. He appears well-developed and well-nourished. No distress.   HENT:   Head: Normocephalic and atraumatic.   Eyes: Conjunctivae are normal. No scleral icterus.   Neck: Normal range of motion. Neck supple. No JVD present.   Cardiovascular: Normal rate, regular rhythm and normal heart sounds.  Exam reveals no gallop and no friction rub.    No murmur heard.  Pulmonary/Chest: Effort normal and breath sounds normal. No respiratory distress. He has no wheezes.   Abdominal: Soft. Bowel sounds are normal. He exhibits no distension. There is no tenderness.   Musculoskeletal: Normal range of motion. He exhibits no edema or tenderness.   Neurological: He is alert and oriented to person, place, and time. He exhibits normal muscle tone.   Skin: Skin is warm and dry.   Psychiatric: He has a normal mood and affect. His behavior is normal.   Nursing note and vitals reviewed.    Significant Labs:   CBC:   Recent Labs  Lab 08/01/17  0437   WBC 7.68   HGB 14.9   HCT 44.1        CMP:   Recent Labs  Lab 07/31/17  1355 08/01/17  0437    143   K 4.0 3.5    105   CO2 25 29   * 126*   BUN 20 18   CREATININE 1.4 1.2   CALCIUM 9.4 9.1   PROT 6.9 6.1   ALBUMIN 3.3* 3.0*   BILITOT 0.9 0.9   ALKPHOS 75 69   AST 27 15   ALT 21 18   ANIONGAP 11 9   EGFRNONAA >60 >60        Significant Imaging:   Imaging Results          IR Heart Cath Images (In process)                X-Ray Chest PA And Lateral (Final result)  Result time 07/31/17 14:05:32    Final result by REGAN Flores Sr., MD (07/31/17 14:05:32)                 Impression:        1. The lungs are clear.   2. There are minimal degenerative changes in the thoracic spine.      Electronically signed by: REGAN FLORES MD  Date:     07/31/17  Time:    14:05              Narrative:    Two-view chest x-ray    Clinical History:  Chest pain    Finding: The size and contour of the heart are normal. The lungs are clear. There is no pneumothorax or pleural effusion. There are minimal degenerative changes in the thoracic spine.                                Assessment/Plan:      Status following surgery for weight loss    S/P gastric sleeve in March  Takes daily MVI  Cardiac diet with small portions          Hyperlipidemia    Last lipid panel 7/5/2017   Ref Range & Units 3wk ago   Cholesterol 120 - 199 mg/dL 245    Comments: The National Cholesterol Education Program (NCEP) has set the   following guidelines (reference ranges) for Cholesterol:   Optimal.....................<200 mg/dL   Borderline High.............200-239 mg/dL   High........................> or = 240 mg/dL    Triglycerides 30 - 150 mg/dL 245    Comments: The National Cholesterol Education Program (NCEP) has set the   following guidelines (reference values) for triglycerides:   Normal......................<150 mg/dL   Borderline High.............150-199 mg/dL   High........................200-499 mg/dL    HDL 40 - 75 mg/dL 29    Comments: The National Cholesterol Education Program (NCEP) has set the   following guidelines (reference values) for HDL Cholesterol:   Low...............<40 mg/dL   Optimal...........>60 mg/dL    LDL Cholesterol 63.0 - 159.0 mg/dL 167.0    Comments: The National Cholesterol Education Program (NCEP) has set the   following guidelines (reference  values) for LDL Cholesterol:   Optimal.......................<130 mg/dL   Borderline High...............130-159 mg/dL   High..........................160-189 mg/dL   Very High.....................>190 mg/dL      Continue atorvastatin          HENRY on CPAP    Respiratory consult to resume CPAP          Type 2 diabetes mellitus with diabetic nephropathy    Pt states he is no longer taking insulin or oral hypoglycemics  Daily CMP            Benign essential hypertension    Continue lisinopril and Metoprolol          * NSTEMI (non-ST elevated myocardial infarction)    -patient admits to premature death in family due to heart disease  -plans for [facility] per Cardiology today  -continue BB, ASA, STATIN and heparin infusion            VTE Risk Mitigation         Ordered     Low Risk of VTE  Once      07/31/17 1601          Nay Srinivasan NP  Department of Hospital Medicine   [facility]    Addendum: LHC today revealed 90% LAD blockage treated with PTCA. Brilinta has been added to regimen.

## 2017-08-02 ENCOUNTER — PATIENT MESSAGE (OUTPATIENT)
Dept: ADMINISTRATIVE | Facility: OTHER | Age: 37
End: 2017-08-02

## 2017-08-02 VITALS
DIASTOLIC BLOOD PRESSURE: 97 MMHG | WEIGHT: 234 LBS | HEART RATE: 60 BPM | BODY MASS INDEX: 29.09 KG/M2 | OXYGEN SATURATION: 98 % | HEIGHT: 75 IN | SYSTOLIC BLOOD PRESSURE: 152 MMHG | RESPIRATION RATE: 18 BRPM | TEMPERATURE: 98 F

## 2017-08-02 LAB
ALBUMIN SERPL BCP-MCNC: 3 G/DL
ALP SERPL-CCNC: 68 U/L
ALT SERPL W/O P-5'-P-CCNC: 16 U/L
ANION GAP SERPL CALC-SCNC: 9 MMOL/L
APTT BLDCRRT: 29.4 SEC
AST SERPL-CCNC: 17 U/L
BILIRUB SERPL-MCNC: 1.3 MG/DL
BUN SERPL-MCNC: 14 MG/DL
CALCIUM SERPL-MCNC: 9 MG/DL
CHLORIDE SERPL-SCNC: 104 MMOL/L
CO2 SERPL-SCNC: 27 MMOL/L
CREAT SERPL-MCNC: 1.2 MG/DL
EST. GFR  (AFRICAN AMERICAN): >60 ML/MIN/1.73 M^2
EST. GFR  (NON AFRICAN AMERICAN): >60 ML/MIN/1.73 M^2
GLUCOSE SERPL-MCNC: 97 MG/DL
POTASSIUM SERPL-SCNC: 3.5 MMOL/L
PROT SERPL-MCNC: 6.1 G/DL
SODIUM SERPL-SCNC: 140 MMOL/L

## 2017-08-02 PROCEDURE — 25000003 PHARM REV CODE 250: Performed by: NURSE PRACTITIONER

## 2017-08-02 PROCEDURE — 85730 THROMBOPLASTIN TIME PARTIAL: CPT

## 2017-08-02 PROCEDURE — 80053 COMPREHEN METABOLIC PANEL: CPT

## 2017-08-02 PROCEDURE — 25000003 PHARM REV CODE 250: Performed by: EMERGENCY MEDICINE

## 2017-08-02 PROCEDURE — 25000003 PHARM REV CODE 250: Performed by: INTERNAL MEDICINE

## 2017-08-02 PROCEDURE — 36415 COLL VENOUS BLD VENIPUNCTURE: CPT

## 2017-08-02 PROCEDURE — 99232 SBSQ HOSP IP/OBS MODERATE 35: CPT | Mod: ,,, | Performed by: INTERNAL MEDICINE

## 2017-08-02 RX ORDER — METOPROLOL TARTRATE 25 MG/1
25 TABLET, FILM COATED ORAL 2 TIMES DAILY
Qty: 60 TABLET | Refills: 1 | Status: SHIPPED | OUTPATIENT
Start: 2017-08-02 | End: 2017-11-07 | Stop reason: SDUPTHER

## 2017-08-02 RX ORDER — ATORVASTATIN CALCIUM 40 MG/1
40 TABLET, FILM COATED ORAL DAILY
Qty: 30 TABLET | Refills: 1 | Status: SHIPPED | OUTPATIENT
Start: 2017-08-02 | End: 2017-08-17 | Stop reason: SDUPTHER

## 2017-08-02 RX ORDER — NAPROXEN SODIUM 220 MG/1
81 TABLET, FILM COATED ORAL DAILY
Qty: 30 TABLET | Refills: 1 | Status: SHIPPED | OUTPATIENT
Start: 2017-08-02 | End: 2017-08-04 | Stop reason: CLARIF

## 2017-08-02 RX ADMIN — THERA TABS 1 TABLET: TAB at 08:08

## 2017-08-02 RX ADMIN — METOPROLOL TARTRATE 25 MG: 25 TABLET ORAL at 08:08

## 2017-08-02 RX ADMIN — ATORVASTATIN CALCIUM 40 MG: 40 TABLET, FILM COATED ORAL at 08:08

## 2017-08-02 RX ADMIN — FEBUXOSTAT 80 MG: 40 TABLET ORAL at 08:08

## 2017-08-02 RX ADMIN — TICAGRELOR 90 MG: 90 TABLET ORAL at 08:08

## 2017-08-02 RX ADMIN — PANTOPRAZOLE SODIUM 40 MG: 40 TABLET, DELAYED RELEASE ORAL at 08:08

## 2017-08-02 RX ADMIN — ASPIRIN 81 MG 81 MG: 81 TABLET ORAL at 08:08

## 2017-08-02 RX ADMIN — LISINOPRIL 20 MG: 20 TABLET ORAL at 08:08

## 2017-08-02 NOTE — PROGRESS NOTES
Progress Note  Cardiology    Admit Date: 7/31/2017   LOS: 2 days     Scheduled Meds:   aspirin  81 mg Oral Daily    atorvastatin  40 mg Oral Daily    febuxostat  80 mg Oral Daily    lisinopril  20 mg Oral Daily    metoprolol tartrate  25 mg Oral BID    multivitamin  1 tablet Oral Daily    nitroGLYCERIN 2% TD oint  0.5 inch Topical Q8H    pantoprazole  40 mg Oral Daily    ticagrelor  90 mg Oral BID     Continuous Infusions:   PRN Meds:acetaminophen, aluminum-magnesium hydroxide-simethicone, diphenhydrAMINE, nitroGLYCERIN, ondansetron, ondansetron    Review of patient's allergies indicates:  No Known Allergies    SUBJECTIVE:     Patient seen and examined. He is sitting up in bed, smiling. Denies chest pain or shortness of breath. Pt denies any pain, bleeding, tenderness, swelling or discharge from left wrist access site. He denied any numbness or tingling to the left wrist, hand, or arm. He denied any other complaints.    OBJECTIVE:     Vital Signs (Most Recent)  Temp: 98 °F (36.7 °C) (08/02/17 0732)  Pulse: 71 (08/02/17 0732)  Resp: 18 (08/02/17 0732)  BP: (!) 152/97 (08/02/17 0732)  SpO2: 98 % (08/02/17 0854)    Vital Signs Range (Last 24H):  Temp:  [97.7 °F (36.5 °C)-98.3 °F (36.8 °C)]   Pulse:  [60-78]   Resp:  [11-20]   BP: (116-154)/()   SpO2:  [97 %-100 %]     I & O (Last 24H):  Intake/Output Summary (Last 24 hours) at 08/02/17 0915  Last data filed at 08/02/17 0400   Gross per 24 hour   Intake              120 ml   Output                0 ml   Net              120 ml       Physical Exam:   General: Alert and oriented x3, calm and cooperative; NAD; skin w/d/pink  Neck: supple; No JVD  CV: S1S2, RRR, no MGR  Lungs: Respirations spontaneous, even, and nonlabored. Lungs CTA bilaterally  Abd: soft, NT/ND; bowel sounds present in all quadrants.  Ext: No c/c/e; radial and DP pulses 2+ bilaterally. Left wrist access site without redness, tenderness, swelling, or drainage. There is no active external  bleeding or hematoma. Skin warm/dry/pink.     LABS  CBC with Diff:     Recent Labs  Lab 08/01/17  0437   WBC 7.68   HGB 14.9   HCT 44.1      LYMPH 37.1  2.9   MONO 7.7  0.6   EOSINOPHIL 2.7       COAG:    Recent Labs  Lab 07/31/17  1355 08/01/17  0018 08/01/17  0437 08/02/17  0442   APTT 29.2 66.7* 64.8* 29.4   INR 1.1  --   --   --        CMP:   Recent Labs  Lab 07/31/17  1355 08/01/17  0437 08/02/17  0442   * 126* 97   CALCIUM 9.4 9.1 9.0   ALBUMIN 3.3* 3.0* 3.0*   PROT 6.9 6.1 6.1    143 140   K 4.0 3.5 3.5   CO2 25 29 27    105 104   BUN 20 18 14   CREATININE 1.4 1.2 1.2   ALKPHOS 75 69 68   ALT 21 18 16   AST 27 15 17   BILITOT 0.9 0.9 1.3*     Estimated Creatinine Clearance: 113.1 mL/min (based on Cr of 1.2).    .  Recent Labs  Lab 07/31/17  1355 07/31/17  1605 08/01/17  0018   CPK 61  --   --    TROPONINI 0.128* 0.153* 0.193*   MB 4.9  --   --    *  --   --      Diagnostic Results:  Labs: Reviewed  ECG: Reviewed  Telemetry: SR.  Patient Active Problem List   Diagnosis    Bariatric surgery status    Non morbid obesity due to excess calories    Benign essential hypertension    Gout of left knee    Idiopathic chronic gout, left ankle and foot, without tophus (tophi)    Idiopathic chronic gout, right ankle and foot, without tophus (tophi)    Idiopathic chronic gout of left hand without tophus    Chronic kidney disease, stage 3    Persistent proteinuria    Type 2 diabetes mellitus with diabetic nephropathy    Idiopathic chronic gout of right elbow without tophus    Chronic nonseasonal allergic rhinitis due to pollen    NSTEMI (non-ST elevated myocardial infarction)    HENRY on CPAP    Hyperlipidemia    Status following surgery for weight loss       PLAN:       Plan:   NSTEMI, CAD, S/P FAMILIA to OM1- tolerated angiogram without complications. Continue lisinopril, metoprolol, ASA, ticagrelor, atorvastatin. Will need Rx for Ntg sl for prn use. He is clinically stable for  discharge to home today. Bandage on left wrist can be removed after 24 hours. No heavy lifting with left arm for two days. Follow up with Dr. Wynn in 2-4 weeks at [facility]. Call for any bleeding, discharge, pain, erythema, or swelling to incision site.    HTN - continue lisinopril and metoprolol; adjust medications as needed.    HLP - on atorvastatin. Heart healthy diet discussed.    Heather Pinto NP  [facility]

## 2017-08-02 NOTE — NURSING TRANSFER
Nursing Transfer Note      8/1/2017     Transfer to  212 from [facility]     Transfer via bed    Transfer with chart and telemetry      Transported by RN     Medicines sent: no    Chart send with patient: yes     Notified: accepting nurse, Kathryn    Patient reassessed at: 1954    Upon arrival to floor: TRANSFERRED TO ROOM. TRANSFERRED TO BED.  TELEMETRY MONITER ON.  IV FLUIDS ON PUMP AT PRESCRIBED RATE.  PROCEDURAL ACCESS SITE WITHOUT BLEEDING OR HEMATOMA.  DRESSING DRY AND INTACT.  CARE HANDED OFF TO Kathryn

## 2017-08-02 NOTE — PROGRESS NOTES
copay for Brilinta is $35/monthly. Brilinta card has been provided to make cost down to $18/monthly x 3 months. Patient verbalized affordability.

## 2017-08-02 NOTE — PLAN OF CARE
Problem: Patient Care Overview  Goal: Plan of Care Review  Outcome: Ongoing (interventions implemented as appropriate)  Plan of care reviewed with patient. AAO x3. V/S stable. Patient complaining of 0/10 pain at this time. Patient on telemetry NS rhythm noted. NS infusing. Right radial site CD&I. Pt independent, fall precautions in place, patient free from fall/injury. Patient has no questions at this time. Will continue to monitor.

## 2017-08-02 NOTE — PROGRESS NOTES
Pt arrived to room via cath lab Kristina and izzy. Pt AAOx3. Radial site clean dry and intact. Immobilizer to wrist. Will continue to monitor.

## 2017-08-02 NOTE — HOSPITAL COURSE
oRbb Iglesais is a 36 year old male who was admitted with NSTEMI. He underwent LHC that showed 99% involving proximal and mid LAD which was treated with PCI. LVEF 55%. Cardiac medication include ASA, Brilinta, STATIN, Metoprolol, and Lisinopril. He was asked to follow up Cardiology in 2 weeks. Patient seen and examined on date of discharge and deemed suitable.

## 2017-08-02 NOTE — DISCHARGE SUMMARY
[facility] Hospital Medicine  Discharge Summary      Patient Name: Robb Iglesias  MRN: [id number]  Admission Date: 7/31/2017  Hospital Length of Stay: 2 days  Discharge Date and Time:  08/02/2017 3:53 PM  Attending Physician: No att. providers found   Discharging Provider: Nay Srinivasan NP  Primary Care Provider: SABIHA Roberson MD      HPI:   Robb Iglesias is a 35 yo male who is S/P Gastric Sleeve x 5 months with weight loss approx 60 #, DM II (off meds), HENRY on CPAP, HPL, HTN and Gout who presents with intermittent episodes of chest pressure with associated SOB. His B/P was elevated when he experienced the chest pain. Over the W/E the chest pain intensity was strong, however today, chest pressure was less intense. He denies nausea, palpitations, leg swelling, radiation of pain and diaphoresis. EKG was normal sinus rhythm with normal rate. On arrival, B/P 150/107 and initial troponin elevated at 0.128. Cardiology advised heparin infusion and pt admitted to evaluate for Acute Coronary Syndrome.     Procedure(s) (LRB):  HEART CATH-LEFT (Left)      Indwelling Lines/Drains at time of discharge:   Lines/Drains/Airways          No matching active lines, drains, or airways        Hospital Course:   Robb Iglesias is a 36 year old male who was admitted with NSTEMI. He underwent LHC that showed 99% involving proximal and mid LAD which was treated with PCI. LVEF 55%. Cardiac medication include ASA, Brilinta, STATIN, Metoprolol, and Lisinopril. He was asked to follow up Cardiology in 2 weeks. Patient seen and examined on date of discharge and deemed suitable.        Consults:   Consults         Status Ordering Provider     Inpatient consult to Cardiology  Once     Provider:  (Not yet assigned)    Completed SAM CARPIO          Significant Diagnostic Studies: Labs:   CMP   Recent Labs  Lab 08/01/17  0437 08/02/17  0442    140   K 3.5 3.5    104   CO2 29 27   * 97    BUN 18 14   CREATININE 1.2 1.2   CALCIUM 9.1 9.0   PROT 6.1 6.1   ALBUMIN 3.0* 3.0*   BILITOT 0.9 1.3*   ALKPHOS 69 68   AST 15 17   ALT 18 16   ANIONGAP 9 9   ESTGFRAFRICA >60 >60   EGFRNONAA >60 >60    and CBC   Recent Labs  Lab 08/01/17  0437   WBC 7.68   HGB 14.9   HCT 44.1          Pending Diagnostic Studies:     Procedure Component Value Units Date/Time    IR Heart Cath Images [[id number]] Resulted:  08/01/17 1600    Order Status:  Sent Lab Status:  In process Updated:  08/01/17 1751        Final Active Diagnoses:    Diagnosis Date Noted POA    PRINCIPAL PROBLEM:  NSTEMI (non-ST elevated myocardial infarction) [I21.4] 07/31/2017 Yes    HENRY on CPAP [G47.33, Z99.89] 07/31/2017 Not Applicable    Hyperlipidemia [E78.5] 07/31/2017 Yes    Status following surgery for weight loss [Z98.84] 07/31/2017 Not Applicable    Type 2 diabetes mellitus with diabetic nephropathy [E11.21] 06/21/2017 Yes    Benign essential hypertension [I10] 04/26/2017 Yes     Chronic      Problems Resolved During this Admission:    Diagnosis Date Noted Date Resolved POA      No new Assessment & Plan notes have been filed under this hospital service since the last note was generated.  Service: Hospital Medicine      Discharged Condition: stable    Disposition: Home or Self Care    Follow Up:  Follow-up Information     SABIHA Roberson MD In 3 days.    Specialty:  Family Medicine  Contact information:  [address] [facility] [address]  [phone]             Channing Wynn MD On 8/17/2017.    Specialties:  Cardiology, Cardiovascular Disease  Contact information:  [address] [facility] [address]  [phone]                 Patient Instructions:     Diet general     Activity as tolerated       Medications:  Reconciled Home Medications:   Discharge Medication List as of 8/2/2017 10:41 AM      START taking these medications    Details   aspirin 81 MG Chew Take 1 tablet (81 mg total) by mouth once daily., Starting Wed  8/2/2017, Until Thu 8/2/2018, Normal      atorvastatin (LIPITOR) 40 MG tablet Take 1 tablet (40 mg total) by mouth once daily., Starting Wed 8/2/2017, Until Thu 8/2/2018, Normal      metoprolol tartrate (LOPRESSOR) 25 MG tablet Take 1 tablet (25 mg total) by mouth 2 (two) times daily., Starting Wed 8/2/2017, Until Thu 8/2/2018, Normal      ticagrelor (BRILINTA) 90 mg tablet Take 1 tablet (90 mg total) by mouth 2 (two) times daily., Starting Wed 8/2/2017, Until Thu 8/2/2018, Normal         CONTINUE these medications which have NOT CHANGED    Details   cetirizine (ZYRTEC) 10 MG tablet Take 10 mg by mouth once daily., Historical Med      colchicine 0.6 mg tablet Take 1 tablet (0.6 mg total) by mouth 2 (two) times daily., Starting Mon 6/26/2017, Until Tue 6/26/2018, Normal      febuxostat (ULORIC) 80 mg Tab Take 80 mg by mouth once daily at 6am., Historical Med      GLUCOSAM/CSA/COLLAGEN/HYALUR A (JOINT SUPPORT ORAL) Take by mouth., Historical Med      lisinopril (PRINIVIL,ZESTRIL) 20 MG tablet Take 1 tablet (20 mg total) by mouth once daily., Starting Wed 6/21/2017, Until Thu 6/21/2018, Normal      MULTIVIT-MINERALS/FOLIC ACID (DAILY MULTIPLE FOR MEN ORAL) Take 1 tablet by mouth once daily., Until Discontinued, Historical Med      MULTIVITAMIN/IRON/FOLIC ACID (CENTRUM COMPLETE ORAL) Take by mouth once daily at 6am., Historical Med      turmeric root extract 500 mg Cap Take by mouth once daily at 6am., Historical Med           Time spent on the discharge of patient: >35 minutes    Nay Srinivasan NP  Department of Hospital Medicine  [facility]

## 2017-08-02 NOTE — NURSING
Pt discharged. Plan of care reviewed. Iv removed. Monitor removed. Educated patient on plan of care. Family at bedside.

## 2017-08-04 ENCOUNTER — OFFICE VISIT (OUTPATIENT)
Dept: INTERNAL MEDICINE | Facility: CLINIC | Age: 37
End: 2017-08-04
Payer: COMMERCIAL

## 2017-08-04 VITALS
TEMPERATURE: 96 F | SYSTOLIC BLOOD PRESSURE: 112 MMHG | HEIGHT: 75 IN | WEIGHT: 233.44 LBS | HEART RATE: 77 BPM | OXYGEN SATURATION: 97 % | BODY MASS INDEX: 29.03 KG/M2 | DIASTOLIC BLOOD PRESSURE: 84 MMHG

## 2017-08-04 DIAGNOSIS — Z95.820 STATUS POST ANGIOPLASTY WITH STENT: ICD-10-CM

## 2017-08-04 DIAGNOSIS — I21.4 NSTEMI (NON-ST ELEVATED MYOCARDIAL INFARCTION): Primary | ICD-10-CM

## 2017-08-04 DIAGNOSIS — E11.69 DYSLIPIDEMIA ASSOCIATED WITH TYPE 2 DIABETES MELLITUS: Chronic | ICD-10-CM

## 2017-08-04 DIAGNOSIS — M1A.0720 IDIOPATHIC CHRONIC GOUT, LEFT ANKLE AND FOOT, WITHOUT TOPHUS (TOPHI): Chronic | ICD-10-CM

## 2017-08-04 DIAGNOSIS — I10 BENIGN ESSENTIAL HYPERTENSION: Chronic | ICD-10-CM

## 2017-08-04 DIAGNOSIS — G47.33 OBSTRUCTIVE SLEEP APNEA: Chronic | ICD-10-CM

## 2017-08-04 DIAGNOSIS — E78.5 DYSLIPIDEMIA ASSOCIATED WITH TYPE 2 DIABETES MELLITUS: Chronic | ICD-10-CM

## 2017-08-04 PROCEDURE — 3066F NEPHROPATHY DOC TX: CPT | Mod: S$GLB,,, | Performed by: FAMILY MEDICINE

## 2017-08-04 PROCEDURE — 3044F HG A1C LEVEL LT 7.0%: CPT | Mod: S$GLB,,, | Performed by: FAMILY MEDICINE

## 2017-08-04 PROCEDURE — 3008F BODY MASS INDEX DOCD: CPT | Mod: S$GLB,,, | Performed by: FAMILY MEDICINE

## 2017-08-04 PROCEDURE — 99999 PR PBB SHADOW E&M-EST. PATIENT-LVL IV: CPT | Mod: PBBFAC,,, | Performed by: FAMILY MEDICINE

## 2017-08-04 PROCEDURE — 99215 OFFICE O/P EST HI 40 MIN: CPT | Mod: S$GLB,,, | Performed by: FAMILY MEDICINE

## 2017-08-04 RX ORDER — DOXAZOSIN 1 MG/1
1 TABLET ORAL NIGHTLY
COMMUNITY
End: 2018-08-29 | Stop reason: SDUPTHER

## 2017-08-04 RX ORDER — ASPIRIN 81 MG/1
81 TABLET ORAL DAILY
COMMUNITY
End: 2021-06-07 | Stop reason: SDUPTHER

## 2017-08-04 NOTE — PROGRESS NOTES
CHIEF COMPLAINT  Hospital Follow Up  after angioplasty for NSTEMI    HISTORY OF PRESENT ILLNESS     Problem List Items Addressed This Visit     NSTEMI (non-ST elevated myocardial infarction) - Primary    Current Assessment & Plan     This problem is NEW TO ME.  Hospital records reviewed. He is tolerating his cardiovascular medications without adverse effect. He reports no angina or angina equivalent symptoms. He reports no orthopnea or paroxysmal nocturnal dyspnea, or other congestive heart failure symptoms. It was agreed that his cardiologist would manage his cardiovascular medications for the short term. I volunteered to manage them after his cardiologist decided this would be appropriate.         Status post angioplasty with stent    Current Assessment & Plan     This problem is NEW TO ME.  Hospital records reviewed. He is tolerating his cardiovascular medications without adverse effect. He reports no angina or angina equivalent symptoms. He reports no orthopnea or paroxysmal nocturnal dyspnea, or other congestive heart failure symptoms. It was agreed that his cardiologist would manage his cardiovascular medications for the short term. I volunteered to manage them after his cardiologist decided this would be appropriate.         Benign essential hypertension (Chronic)    Current Assessment & Plan     BP Readings from Last 3 Encounters:   08/04/17 112/84   08/02/17 (!) 152/97   07/19/17 (!) 162/100     Lab Results   Component Value Date    ESTGFRAFRICA >60 08/02/2017    ESTGFRAFRICA >60 08/01/2017    ESTGFRAFRICA >60 07/31/2017    EGFRNONAA >60 08/02/2017    EGFRNONAA >60 08/01/2017    EGFRNONAA >60 07/31/2017    CREATININE 1.2 08/02/2017    CREATININE 1.2 08/01/2017    CREATININE 1.4 07/31/2017    BUN 14 08/02/2017    BUN 18 08/01/2017    BUN 20 07/31/2017     Based on the report of the patient and information available to me at present, this condition appears to be WELL CONTROLLED, and this condition appears to  be IMPROVED.          Idiopathic chronic gout, left ankle and foot, without tophus (tophi) (Chronic)    Current Assessment & Plan     Based on the report of the patient and information available to me at present, this condition appears to be WELL CONTROLLED.         Obstructive sleep apnea (Chronic)    Overview     SPLIT-NIGHT SLEEP STUDY 7/28/2015  · SLEEP ARCHITECTURE: Sleep onset was 2.9 minutes and sleep efficiency was 94.4%. Sleep Stage distribution showed 145 sleep stage changes, 6 awakenings and 119 arousals. Sleep distribution showed 53.2% stage NI, 41.8% stage N 11, 0.0% stage N Ill and REM sleep was at 5.0%. There were 2 REM periods.  · RESPIRATORY SUMMARY: 257 respiratory events were present including 201 obstructive apneas and  central apneas, 0 mixed apneas and 56 hypopneas for a total AHI of 109.4. The non-rem index was 108.8 /hr while the AHI during stage R was 120.0 /hr. Oxygen desaturations were associated with the respiratory events. There were 226 desaturations. The lowest oxygen saturation was 78.0% and the mean oxygen saturation was 92.4%. Oxygen saturations were below: 88% for 24.3 minutes of the time spent asleep.  · CARDIAC SUMMARY: Normal sinus rhythm with heart rate variation between 60.0 and 94.0 was noted.         Current Assessment & Plan     He has lost significant weight since his bariatric surgery, but he no longer has a CPAP device.         Relevant Orders    Polysomnogram (CPAP will be added if patient meets diagnostic criteria.)    Dyslipidemia associated with type 2 diabetes mellitus (Chronic)    Current Assessment & Plan     Lab Results   Component Value Date    CHOL 245 (H) 07/05/2017    TRIG 245 (H) 07/05/2017    HDL 29 (L) 07/05/2017    LDLCALC 167.0 (H) 07/05/2017    NONHDLCHOL 216 07/05/2017    AST 17 08/02/2017    ALT 16 08/02/2017   He appears to be tolerating statin for dyslipidemia without apparent symptoms of myositis or hepatic dysfunction.             Other Visit  Diagnoses    None.       NARRATIVE HISTORY OF PRESENT ILLNESS:   We discussed appropriate therapeutic lifestyle changes to reduce his risk for future cardiovascular or cerebrovascular events. He is very motivated to make these therapeutic lifestyle changes. His wife expressed significant anxiety over the degree of uncertainty regarding his risk for future adverse health events. I provided her educational resources on this topic. I recommended joint counseling, and I provided them with contact information necessary to schedule appointment with a qualified LCSW.    REVIEW OF SYSTEMS  CARDIOVASCULAR: No angina or orthopnea reported.  PULMONARY: No hemoptysis or trouble breathing reported.   HEMATOLOGIC: No recent blood clots or bleeding problems reported.     PHYSICAL EXAM  CONSTITUTIONAL: Vital signs (BP, P, T, RR, et al) noted. No apparent distress. Does not appear acutely ill or septic. Appears adequately hydrated.  HEENT: External ENT grossly unremarkable. Hearing grossly intact. Oropharynx moist.  PULM: Lungs clear. Breathing unlabored.  HEART: Auscultation reveals regular rate and rhythm without murmur, gallop or rub. No carotid bruit.  DERM: Skin warm and moist with normal turgor. Feet have no open wounds, ulcerations, significant calluses, or evidence of arterial insufficiency.  NEURO: There are no gross focal motor deficits or gross deficits of cranial nerves III-XII. , or evidence of arterial insufficiency. Feet have intact 10g monofilament sensation.  PSYCHIATRIC: Alert and oriented x 3. Mood is grossly neutral. Affect appropriate. Judgment and insight not grossly compromised.    PAST MEDICAL HISTORY, FAMILY HISTORY, SOCIAL HISTORY, CURRENT MEDICATION LIST, and ALLERGY LIST reviewed by me (KEVIN Roberson MD) and are updated consistent with the patient's report.    ASSESSMENT and PLAN  NSTEMI (non-ST elevated myocardial infarction)    Status post angioplasty with stent    Idiopathic chronic  gout, left ankle and foot, without tophus (tophi)    Benign essential hypertension    Obstructive sleep apnea  -     Polysomnogram (CPAP will be added if patient meets diagnostic criteria.); Future    Dyslipidemia associated with type 2 diabetes mellitus        Medication List with Changes/Refills   Current Medications    ASPIRIN (ECOTRIN) 81 MG EC TABLET    Take 81 mg by mouth once daily.    ATORVASTATIN (LIPITOR) 40 MG TABLET    Take 1 tablet (40 mg total) by mouth once daily.    CETIRIZINE (ZYRTEC) 10 MG TABLET    Take 10 mg by mouth once daily.    COLCHICINE 0.6 MG TABLET    Take 1 tablet (0.6 mg total) by mouth 2 (two) times daily.    DOXAZOSIN (CARDURA) 1 MG TABLET    Take 1 mg by mouth every evening.    FEBUXOSTAT (ULORIC) 80 MG TAB    Take 80 mg by mouth once daily at 6am.    GLUCOSAM/CSA/COLLAGEN/HYALUR A (JOINT SUPPORT ORAL)    Take by mouth.    LISINOPRIL (PRINIVIL,ZESTRIL) 20 MG TABLET    Take 1 tablet (20 mg total) by mouth once daily.    METOPROLOL TARTRATE (LOPRESSOR) 25 MG TABLET    Take 1 tablet (25 mg total) by mouth 2 (two) times daily.    MULTIVIT-MINERALS/FOLIC ACID (DAILY MULTIPLE FOR MEN ORAL)    Take 1 tablet by mouth once daily.    MULTIVITAMIN/IRON/FOLIC ACID (CENTRUM COMPLETE ORAL)    Take by mouth once daily at 6am.    TICAGRELOR (BRILINTA) 90 MG TABLET    Take 1 tablet (90 mg total) by mouth 2 (two) times daily.    TURMERIC ROOT EXTRACT 500 MG CAP    Take by mouth once daily at 6am.   Discontinued Medications    ASPIRIN 81 MG CHEW    Take 1 tablet (81 mg total) by mouth once daily.       Patient Instructions   Call to schedule appointment with Anisa Iglesias LCSW; Phone [phone].      Return in about 3 months (around 11/4/2017) for periodic management of chronic medical conditions.    TOTAL TIME evaluating and managing this patient for this encounter exceeded 40 minutes, the majority spent counseling and coordinating care for the listed diagnoses.     ABOUT THIS  DOCUMENTATION:  1. The order of the conditions listed in the HPI is one of convenience and does not necessarily reflect the chronology of the appointment, nor the relative importance of a condition. It is possible that additional description or status details about condition(s) may be found elsewhere in the documentation for today's encounter.  2. Documentation entered by me for this encounter was done in part using speech-recognition technology. Although I have made an effort to ensure accuracy, malapropisms may exist and should be interpreted in context.                        -KEVIN Roberson MD

## 2017-08-05 PROBLEM — N18.2 CHRONIC KIDNEY DISEASE, STAGE 2 (MILD): Chronic | Status: ACTIVE | Noted: 2017-06-07

## 2017-08-05 PROBLEM — E11.59 TYPE 2 DIABETES MELLITUS WITH VASCULAR DISEASE: Chronic | Status: ACTIVE | Noted: 2017-08-05

## 2017-08-05 PROBLEM — E11.69 DYSLIPIDEMIA ASSOCIATED WITH TYPE 2 DIABETES MELLITUS: Chronic | Status: ACTIVE | Noted: 2017-07-31

## 2017-08-05 PROBLEM — E78.5 DYSLIPIDEMIA ASSOCIATED WITH TYPE 2 DIABETES MELLITUS: Chronic | Status: ACTIVE | Noted: 2017-07-31

## 2017-08-05 NOTE — ASSESSMENT & PLAN NOTE
Lab Results   Component Value Date    CHOL 245 (H) 07/05/2017    TRIG 245 (H) 07/05/2017    HDL 29 (L) 07/05/2017    LDLCALC 167.0 (H) 07/05/2017    NONHDLCHOL 216 07/05/2017    AST 17 08/02/2017    ALT 16 08/02/2017   He appears to be tolerating statin for dyslipidemia without apparent symptoms of myositis or hepatic dysfunction.

## 2017-08-05 NOTE — ASSESSMENT & PLAN NOTE
This problem is NEW TO ME.  Hospital records reviewed. He is tolerating his cardiovascular medications without adverse effect. He reports no angina or angina equivalent symptoms. He reports no orthopnea or paroxysmal nocturnal dyspnea, or other congestive heart failure symptoms. It was agreed that his cardiologist would manage his cardiovascular medications for the short term. I volunteered to manage them after his cardiologist decided this would be appropriate.

## 2017-08-05 NOTE — ASSESSMENT & PLAN NOTE
Lab Results   Component Value Date    HGBA1C 6.0 (H) 07/31/2017    ESTGFRAFRICA >60 08/02/2017    EGFRNONAA >60 08/02/2017   Based on the report of the patient and information available to me at present, this condition appears to be WELL CONTROLLED on no medications.

## 2017-08-05 NOTE — ASSESSMENT & PLAN NOTE
Lab Results   Component Value Date    CHOL 245 (H) 07/05/2017    TRIG 245 (H) 07/05/2017    HDL 29 (L) 07/05/2017    LDLCALC 167.0 (H) 07/05/2017    NONHDLCHOL 216 07/05/2017    AST 17 08/02/2017    AST 15 08/01/2017    AST 27 07/31/2017    ALT 16 08/02/2017    ALT 18 08/01/2017    ALT 21 07/31/2017   He appears to be tolerating statin for dyslipidemia without apparent symptoms of myositis or hepatic dysfunction.

## 2017-08-05 NOTE — ASSESSMENT & PLAN NOTE
Lab Results   Component Value Date    URICACID 6.4 07/05/2017    URICACID 5.8 06/21/2017   Based on the report of the patient and information available to me at present, this condition appears to be WELL CONTROLLED.

## 2017-08-05 NOTE — ASSESSMENT & PLAN NOTE
BP Readings from Last 3 Encounters:   08/04/17 112/84   08/02/17 (!) 152/97   07/19/17 (!) 162/100     Lab Results   Component Value Date    ESTGFRAFRICA >60 08/02/2017    ESTGFRAFRICA >60 08/01/2017    ESTGFRAFRICA >60 07/31/2017    EGFRNONAA >60 08/02/2017    EGFRNONAA >60 08/01/2017    EGFRNONAA >60 07/31/2017    CREATININE 1.2 08/02/2017    CREATININE 1.2 08/01/2017    CREATININE 1.4 07/31/2017    BUN 14 08/02/2017    BUN 18 08/01/2017    BUN 20 07/31/2017     Based on the report of the patient and information available to me at present, this condition appears to be WELL CONTROLLED, and this condition appears to be IMPROVED.

## 2017-08-07 ENCOUNTER — PATIENT OUTREACH (OUTPATIENT)
Dept: OTHER | Facility: OTHER | Age: 37
End: 2017-08-07

## 2017-08-07 NOTE — TELEPHONE ENCOUNTER
[facility] Digital Medicine Program Medication Reconciliation Outreach    Called patient to introduce him/her into the HDMP. Reviewed program details including blood pressure goals, technique for taking readings (timing, cuff placement, body positioning, iHealth vinicio), and what to do in case of emergency. Introduced patient to members of care team (health , clinician, and responsible provider). Verified patient's understanding of [facility] MyChart vinicio use for contacting clinical team and to ensure that iHealth cuff readings continue to transmit by logging in once every 2 weeks. Confirmation text sent and patient received.  Pt has been using his own cuff with the iHealth machine because the cuff given at [place] was too small for his arm. Advised that he could return cuff for smaller size when he is able to do so to ensure accuracy of readings.  Pt takes all medications as prescribed. All BP meds in AM except Cardura 1mg in PM.    Screening Questionnaire Review:  1. Depression - not indicated  2. Sleep apnea - yes       HENRY screening results for this patient suggest a high likelihood of sleep apnea, which can contribute to hypertension. Patient has been previously diagnosed with sleep apnea and is not interested in referral at this time.  He has already been referred by his PCP for a sleep study    Patient reports the following symptoms of sleep apnea: awakening in the middle of the night. Pt reports this has gotten much better since weight loss surgery.      Verified the following information with the patient:  1. Medication list  Current Outpatient Prescriptions on File Prior to Visit   Medication Sig Dispense Refill    aspirin (ECOTRIN) 81 MG EC tablet Take 81 mg by mouth once daily.      atorvastatin (LIPITOR) 40 MG tablet Take 1 tablet (40 mg total) by mouth once daily. 30 tablet 1    cetirizine (ZYRTEC) 10 MG tablet Take 10 mg by mouth once daily.      colchicine 0.6 mg tablet Take 1 tablet (0.6 mg total) by  mouth 2 (two) times daily. 60 tablet 11    doxazosin (CARDURA) 1 MG tablet Take 1 mg by mouth every evening.      febuxostat (ULORIC) 80 mg Tab Take 80 mg by mouth once daily at 6am.      GLUCOSAM/CSA/COLLAGEN/HYALUR A (JOINT SUPPORT ORAL) Take by mouth.      lisinopril (PRINIVIL,ZESTRIL) 20 MG tablet Take 1 tablet (20 mg total) by mouth once daily. 90 tablet 3    metoprolol tartrate (LOPRESSOR) 25 MG tablet Take 1 tablet (25 mg total) by mouth 2 (two) times daily. 60 tablet 1    MULTIVITAMIN/IRON/FOLIC ACID (CENTRUM COMPLETE ORAL) Take by mouth once daily at 6am.      ticagrelor (BRILINTA) 90 mg tablet Take 1 tablet (90 mg total) by mouth 2 (two) times daily. 60 tablet 0    turmeric root extract 500 mg Cap Take by mouth once daily at 6am.      [DISCONTINUED] MULTIVIT-MINERALS/FOLIC ACID (DAILY MULTIPLE FOR MEN ORAL) Take 1 tablet by mouth once daily.       No current facility-administered medications on file prior to visit.        Previous ADRs  NKDA    2. Medication compliance: has been compliant with the medicaiton regimen    3. Medication Allergies: Review of patient's allergies indicates:  No Known Allergies    Explained that our goal is to get his BP consistently below 140/90mmHg.  Patient denies having further questions, concerns. BP is not at goal.       Last 5 Patient Entered Redings Current 30 Day Average: 133/93     Recent Readings 8/6/2017 8/5/2017 8/5/2017 8/4/2017 8/4/2017    Systolic BP (mmHg) 134 127 136 132 136    Diastolic BP (mmHg) 94 84 85 99 94    Pulse 80 77 78 80 77

## 2017-08-07 NOTE — LETTER
Odilia Fields, PharmD  [address] [place] [address]     Dear Robb Iglesias,    Welcome to the [facility]!         My name is Odilia Fields PharmD and I am your dedicated Digital Medicine clinician.  As an expert in medication management, I will help ensure that the medications you are taking continue to provide you with the intended benefits.      I am Thu Olsen and I will be your health  for the duration of the program.  My  job is to help you identify lifestyle changes to improve your blood pressure control.  We will talk about nutrition, exercise, and other ways that you may be able to adjust your current habits to better your health. Together, we will work to improve your overall health and encourage you to meet your goals for a healthier lifestyle.    What we expect from YOU:    You will need to take blood pressure readings multiple times a week and no less than one reading per week.   It is important that you take your measurements at different times during the day, when possible.     What you should expect from your Digital Medicine Care Team:   We will provide you with education about high blood pressure, including lifestyle changes that could help you to control your blood pressure.   We will review your weekly readings and provide you with monthly blood pressure progress reports after you have been in the program for more than 30 days.   We will send monthly progress reports on your blood pressure control to your physician so they can follow along with your progress as well.    You will be able to reach me by phone at [phone] or through your [facility] account by clicking my name under Care Team on the right side of the home screen.    I look forward to working with you to achieve your blood pressure goals!    Sincerely,    Odilia Fields PharmD  Your personal clinician    Please visit  [email or web address] to learn more about high blood pressure and what you can do lower your blood pressure.                                                                                           Robb Iglesias  [address] [place] [address]

## 2017-08-09 ENCOUNTER — PATIENT MESSAGE (OUTPATIENT)
Dept: CARDIOLOGY | Facility: CLINIC | Age: 37
End: 2017-08-09

## 2017-08-11 NOTE — TELEPHONE ENCOUNTER
Telephoned patient regarding message and stated he's noticed as of today bruising is less, no tingling, no change in temperature of arm or shooting pains, just noticed when he forgets and begin lifting things that it's still a little sore.  Advised could take Tylenol, use warm/cold compresses but soreness and bruising are normal.

## 2017-08-14 ENCOUNTER — PATIENT OUTREACH (OUTPATIENT)
Dept: OTHER | Facility: OTHER | Age: 37
End: 2017-08-14

## 2017-08-14 NOTE — LETTER
Thu Olsen  [address] [place] [address]     Dear Ace Robb,    Thank you for enrolling in the [facility]. To participate in our program, we ask that you submit a blood pressure reading at least once weekly through your [facility] Account and maintain regular contact with your Care Team. We have not been able to establish contact with you.     The Digital Medicine Care Team has attempted to reach you on multiple occasions to determine if you would like to participate in the program. While we encourage you to continue participating fully, we understand that circumstances may change.      To continue participating in the program, please contact me at [phone]. I am in the office Monday - Friday from 8am - 4:30pm. If we do not hear back, you will be un-enrolled and your physician will be notified of your decision.    We look forward to hearing from you soon.    Sincerely,     Thu Olsen, MPH, CHES, CPT  [facility]                                                                                                                         Robb Iglesias  [address] [place] [address]

## 2017-08-17 ENCOUNTER — OFFICE VISIT (OUTPATIENT)
Dept: CARDIOLOGY | Facility: CLINIC | Age: 37
End: 2017-08-17
Payer: COMMERCIAL

## 2017-08-17 VITALS
SYSTOLIC BLOOD PRESSURE: 116 MMHG | DIASTOLIC BLOOD PRESSURE: 80 MMHG | HEART RATE: 72 BPM | BODY MASS INDEX: 29.14 KG/M2 | HEIGHT: 75 IN | WEIGHT: 234.38 LBS

## 2017-08-17 DIAGNOSIS — Z98.84 BARIATRIC SURGERY STATUS: Chronic | ICD-10-CM

## 2017-08-17 DIAGNOSIS — I21.4 NSTEMI (NON-ST ELEVATED MYOCARDIAL INFARCTION): Primary | ICD-10-CM

## 2017-08-17 DIAGNOSIS — E78.5 DYSLIPIDEMIA ASSOCIATED WITH TYPE 2 DIABETES MELLITUS: Chronic | ICD-10-CM

## 2017-08-17 DIAGNOSIS — E11.69 DYSLIPIDEMIA ASSOCIATED WITH TYPE 2 DIABETES MELLITUS: Chronic | ICD-10-CM

## 2017-08-17 DIAGNOSIS — Z95.820 STATUS POST ANGIOPLASTY WITH STENT: ICD-10-CM

## 2017-08-17 DIAGNOSIS — I10 BENIGN ESSENTIAL HYPERTENSION: Chronic | ICD-10-CM

## 2017-08-17 DIAGNOSIS — N18.2 CHRONIC KIDNEY DISEASE, STAGE 2 (MILD): Chronic | ICD-10-CM

## 2017-08-17 PROCEDURE — 3008F BODY MASS INDEX DOCD: CPT | Mod: S$GLB,,, | Performed by: INTERNAL MEDICINE

## 2017-08-17 PROCEDURE — 3079F DIAST BP 80-89 MM HG: CPT | Mod: S$GLB,,, | Performed by: INTERNAL MEDICINE

## 2017-08-17 PROCEDURE — 99214 OFFICE O/P EST MOD 30 MIN: CPT | Mod: S$GLB,,, | Performed by: INTERNAL MEDICINE

## 2017-08-17 PROCEDURE — 3066F NEPHROPATHY DOC TX: CPT | Mod: S$GLB,,, | Performed by: INTERNAL MEDICINE

## 2017-08-17 PROCEDURE — 3044F HG A1C LEVEL LT 7.0%: CPT | Mod: S$GLB,,, | Performed by: INTERNAL MEDICINE

## 2017-08-17 PROCEDURE — 3074F SYST BP LT 130 MM HG: CPT | Mod: S$GLB,,, | Performed by: INTERNAL MEDICINE

## 2017-08-17 PROCEDURE — 99999 PR PBB SHADOW E&M-EST. PATIENT-LVL III: CPT | Mod: PBBFAC,,, | Performed by: INTERNAL MEDICINE

## 2017-08-17 RX ORDER — ATORVASTATIN CALCIUM 80 MG/1
80 TABLET, FILM COATED ORAL DAILY
Qty: 30 TABLET | Refills: 1 | Status: SHIPPED | OUTPATIENT
Start: 2017-08-17 | End: 2017-10-12 | Stop reason: SDUPTHER

## 2017-08-17 NOTE — PATIENT INSTRUCTIONS

## 2017-08-17 NOTE — PROGRESS NOTES
Subjective:   Patient ID:  Robb Iglesias is a 36 y.o. male who presents for follow up of Hospital Follow Up      37 yo male, came in for NSTEMIf/u.  PMH CAD, h/o NSTEMI in , s/p PCI, FAMILIA in prox and mid LAD, HLD, HTN and obesity, s/p gastric sleeve surgery in , DM off Insulin after gastric surgery, and HENRY on CPAP.  He was admitted to [facility] n 07/31 for NSTEMi/ACS, urgent LHC showed PROX AND MID LAD SEQUENTIAL 99% STENOSIS TREATED WITH FAMILIA ALPINE 2.75 X 38, NON OBS RCA AND LCX , OM1 SMALL SUBTOTAL 90% and PDA 40% PROX. EF 55 % and ANT HK. Post op was uncomplicated and d/c with Brilinta.  Today, no chest pain, fatigue, left radial pain.   Went back to work,          Past Medical History:   Diagnosis Date    Allergic rhinitis     GERD (gastroesophageal reflux disease)     Gout     Hyperlipidemia     Hypertension associated with chronic kidney disease due to type 2 diabetes mellitus     Long term (current) use of insulin     Obesity     HENRY on CPAP     Proteinuria     Steatohepatitis     Fatty Liver    Type 2 diabetes mellitus with diabetic nephropathy     Type 2 diabetes mellitus with hyperglycemia     Type 2 diabetes mellitus with renal manifestations        Past Surgical History:   Procedure Laterality Date    LASIK Bilateral     LIVER BIOPSY      NASAL ENDOSCOPY      NASAL SEPTUM SURGERY      SLEEVE GASTROPLASTY  03/06/2017       Social History   Substance Use Topics    Smoking status: Never Smoker    Smokeless tobacco: Never Used    Alcohol use Yes      Comment: 1-2 times per month       Family History   Problem Relation Age of Onset    Diabetes type II Mother     Hypertension Mother     Hyperlipidemia Mother     Diabetes type II Brother     Diabetes type II Brother          Review of Systems   Constitution: Negative for decreased appetite, diaphoresis, fever, weakness, malaise/fatigue and night sweats.   HENT: Negative for headaches and nosebleeds.    Eyes: Negative  for blurred vision and double vision.   Cardiovascular: Negative for chest pain, claudication, dyspnea on exertion, irregular heartbeat, leg swelling, near-syncope, orthopnea, palpitations, paroxysmal nocturnal dyspnea and syncope.   Respiratory: Negative for cough, shortness of breath, sleep disturbances due to breathing, snoring, sputum production and wheezing.    Endocrine: Negative for cold intolerance and polyuria.   Hematologic/Lymphatic: Does not bruise/bleed easily.   Skin: Negative for rash.   Musculoskeletal: Negative for back pain, falls, joint pain, joint swelling and neck pain.   Gastrointestinal: Negative for abdominal pain, heartburn, nausea and vomiting.   Genitourinary: Negative for dysuria, frequency and hematuria.   Neurological: Negative for difficulty with concentration, dizziness, focal weakness, light-headedness, numbness and seizures.   Psychiatric/Behavioral: Negative for depression, memory loss and substance abuse. The patient does not have insomnia.    Allergic/Immunologic: Negative for HIV exposure and hives.       Objective:   Physical Exam   Constitutional: He is oriented to person, place, and time. He appears well-nourished.   HENT:   Head: Normocephalic.   Eyes: Pupils are equal, round, and reactive to light.   Neck: Normal carotid pulses and no JVD present. Carotid bruit is not present. No thyromegaly present.   Cardiovascular: Normal rate, regular rhythm, normal heart sounds and normal pulses.   No extrasystoles are present. PMI is not displaced.  Exam reveals no gallop and no S3.    No murmur heard.  Pulses:       Radial pulses are 2+ on the right side, and 2+ on the left side.   Pulmonary/Chest: Breath sounds normal. No stridor. No respiratory distress.   Abdominal: Soft. Bowel sounds are normal. There is no tenderness. There is no rebound.   Musculoskeletal: Normal range of motion.   Neurological: He is alert and oriented to person, place, and time.   Skin: Skin is intact. No  rash noted.   Psychiatric: His behavior is normal.       Lab Results   Component Value Date    CHOL 245 (H) 07/05/2017     Lab Results   Component Value Date    HDL 29 (L) 07/05/2017     Lab Results   Component Value Date    LDLCALC 167.0 (H) 07/05/2017     Lab Results   Component Value Date    TRIG 245 (H) 07/05/2017     Lab Results   Component Value Date    CHOLHDL 11.8 (L) 07/05/2017       Chemistry        Component Value Date/Time     08/02/2017 0442    K 3.5 08/02/2017 0442     08/02/2017 0442    CO2 27 08/02/2017 0442    BUN 14 08/02/2017 0442    CREATININE 1.2 08/02/2017 0442    GLU 97 08/02/2017 0442        Component Value Date/Time    CALCIUM 9.0 08/02/2017 0442    ALKPHOS 68 08/02/2017 0442    AST 17 08/02/2017 0442    ALT 16 08/02/2017 0442    BILITOT 1.3 (H) 08/02/2017 0442    ESTGFRAFRICA >60 08/02/2017 0442    EGFRNONAA >60 08/02/2017 0442          Lab Results   Component Value Date    TSH 0.727 07/31/2017     Lab Results   Component Value Date    INR 1.1 07/31/2017     Lab Results   Component Value Date    WBC 7.68 08/01/2017    HGB 14.9 08/01/2017    HCT 44.1 08/01/2017    MCV 82 08/01/2017     08/01/2017     BMP  Sodium   Date Value Ref Range Status   08/02/2017 140 136 - 145 mmol/L Final     Potassium   Date Value Ref Range Status   08/02/2017 3.5 3.5 - 5.1 mmol/L Final     Chloride   Date Value Ref Range Status   08/02/2017 104 95 - 110 mmol/L Final     CO2   Date Value Ref Range Status   08/02/2017 27 23 - 29 mmol/L Final     BUN, Bld   Date Value Ref Range Status   08/02/2017 14 6 - 20 mg/dL Final     Creatinine   Date Value Ref Range Status   08/02/2017 1.2 0.5 - 1.4 mg/dL Final     Calcium   Date Value Ref Range Status   08/02/2017 9.0 8.7 - 10.5 mg/dL Final     Anion Gap   Date Value Ref Range Status   08/02/2017 9 8 - 16 mmol/L Final     eGFR if    Date Value Ref Range Status   08/02/2017 >60 >60 mL/min/1.73 m^2 Final     eGFR if non    Date  Value Ref Range Status   08/02/2017 >60 >60 mL/min/1.73 m^2 Final     Comment:     Calculation used to obtain the estimated glomerular filtration  rate (eGFR) is the CKD-EPI equation. Since race is unknown   in our information system, the eGFR values for   -American and Non--American patients are given   for each creatinine result.       CrCl cannot be calculated (Patient's most recent sCr result is older than the maximum 7 days allowed.).     Assessment:      1. NSTEMI (non-ST elevated myocardial infarction)    2. Benign essential hypertension    3. Dyslipidemia associated with type 2 diabetes mellitus    4. Status post angioplasty with stent    5. Chronic kidney disease, stage 2 (mild)    6. Bariatric surgery status        Plan:   Increase Lipitor to 80 mg daily,  Lipid profile and CMP in 3 months   contiue ASA, 81 mg, Brilinta for a year, BB, acei  Recommend heart-healthy diet, weight control and regular exercise.  Torrie. Risk modification.   RTC in 3months    I have reviewed all pertinent labs and cardiac studies. Plans and recommendations have been formulated under my direct supervision. All questions answered and patient voiced understanding. Patient to continue current medications.

## 2017-08-30 ENCOUNTER — PATIENT MESSAGE (OUTPATIENT)
Dept: CARDIOLOGY | Facility: CLINIC | Age: 37
End: 2017-08-30

## 2017-09-06 ENCOUNTER — PATIENT MESSAGE (OUTPATIENT)
Dept: INTERNAL MEDICINE | Facility: CLINIC | Age: 37
End: 2017-09-06

## 2017-09-11 ENCOUNTER — TELEPHONE (OUTPATIENT)
Dept: RHEUMATOLOGY | Facility: CLINIC | Age: 37
End: 2017-09-11

## 2017-09-12 ENCOUNTER — OFFICE VISIT (OUTPATIENT)
Dept: RHEUMATOLOGY | Facility: CLINIC | Age: 37
End: 2017-09-12
Payer: COMMERCIAL

## 2017-09-12 ENCOUNTER — HOSPITAL ENCOUNTER (OUTPATIENT)
Dept: RADIOLOGY | Facility: HOSPITAL | Age: 37
Discharge: HOME | End: 2017-09-12
Attending: INTERNAL MEDICINE
Payer: COMMERCIAL

## 2017-09-12 VITALS
BODY MASS INDEX: 30.26 KG/M2 | WEIGHT: 242.06 LBS | HEART RATE: 74 BPM | DIASTOLIC BLOOD PRESSURE: 93 MMHG | SYSTOLIC BLOOD PRESSURE: 141 MMHG

## 2017-09-12 DIAGNOSIS — M1A.0210 IDIOPATHIC CHRONIC GOUT OF RIGHT ELBOW WITHOUT TOPHUS: ICD-10-CM

## 2017-09-12 DIAGNOSIS — M1A.09X1 IDIOPATHIC CHRONIC GOUT, MULTIPLE SITES, WITH TOPHUS (TOPHI): ICD-10-CM

## 2017-09-12 DIAGNOSIS — M1A.09X1 IDIOPATHIC CHRONIC GOUT, MULTIPLE SITES, WITH TOPHUS (TOPHI): Primary | ICD-10-CM

## 2017-09-12 PROCEDURE — 3077F SYST BP >= 140 MM HG: CPT | Mod: S$GLB,,, | Performed by: INTERNAL MEDICINE

## 2017-09-12 PROCEDURE — 99999 PR PBB SHADOW E&M-EST. PATIENT-LVL IV: CPT | Mod: PBBFAC,,, | Performed by: INTERNAL MEDICINE

## 2017-09-12 PROCEDURE — 73070 X-RAY EXAM OF ELBOW: CPT | Mod: TC,50,PO

## 2017-09-12 PROCEDURE — 73630 X-RAY EXAM OF FOOT: CPT | Mod: 26,LT,, | Performed by: RADIOLOGY

## 2017-09-12 PROCEDURE — 99204 OFFICE O/P NEW MOD 45 MIN: CPT | Mod: S$GLB,,, | Performed by: INTERNAL MEDICINE

## 2017-09-12 PROCEDURE — 73630 X-RAY EXAM OF FOOT: CPT | Mod: 50,TC,PO

## 2017-09-12 PROCEDURE — 73630 X-RAY EXAM OF FOOT: CPT | Mod: 26,RT,, | Performed by: RADIOLOGY

## 2017-09-12 PROCEDURE — 3080F DIAST BP >= 90 MM HG: CPT | Mod: S$GLB,,, | Performed by: INTERNAL MEDICINE

## 2017-09-12 PROCEDURE — 3008F BODY MASS INDEX DOCD: CPT | Mod: S$GLB,,, | Performed by: INTERNAL MEDICINE

## 2017-09-12 PROCEDURE — 73070 X-RAY EXAM OF ELBOW: CPT | Mod: 26,RT,, | Performed by: RADIOLOGY

## 2017-09-12 PROCEDURE — 73070 X-RAY EXAM OF ELBOW: CPT | Mod: 26,LT,, | Performed by: RADIOLOGY

## 2017-09-12 RX ORDER — COLCHICINE 0.6 MG/1
0.6 TABLET ORAL 2 TIMES DAILY
Qty: 60 TABLET | Refills: 11 | Status: SHIPPED | OUTPATIENT
Start: 2017-09-12 | End: 2018-06-20 | Stop reason: SDUPTHER

## 2017-09-12 RX ORDER — METHYLPREDNISOLONE 4 MG/1
TABLET ORAL
Qty: 1 PACKAGE | Refills: 2 | Status: SHIPPED | OUTPATIENT
Start: 2017-09-12 | End: 2017-09-25

## 2017-09-12 NOTE — PROGRESS NOTES
Subjective:       Patient ID: Robb Iglesias is a 36 y.o. male.    Chief Complaint: Gout    HPI 36YM with PMH Gout dx @ 22, (1st attack at 17yrs but diagnosis was unclear till he was 22 and labwork showed elevated uric acid, no crystal proven gout), s/p NSTEMI  08/01 s/p PCI FAMILIA in prox and mid LAD, S/P Gastric Sleeve 03/17 with weight loss approx 90 #, DM II (off meds), HENRY on CPAP, HLD, HTN, CKD2 referred by Dr Hardy for management of Gout.    Pt reports that when he was diagnosed with gout, he was on allopurinol for several years and had 1-2 attacks per year which was eventually switched to Uloric 40mg due to poor response which he tolerated well. Pt usually gets attacks (elbow, ankle, thumb, 1st MTP b/l). Pt states that after he had his gastric sleeve in March after the weight loss, he developed worsening gout attacks and has required IM steroid injection and medrol dose packs  (x2) with last use about 3 months ago. Pt also reports that colchicine was added to his medication regimen (0.6mg daily) which he felt did not help with his symptoms.     Pt currently c/o pain in R elbow, and ankle as well as L thumb rated as 4/10 which was worse prior to visit with increased redness and swelling noted on these joints. Pt states that he has been taking tumeric supplements which provided some relief, however feels like he is constantly having gout flares. Pt reports     Pt denies fatigue, oral/nasal/genital ulcers, fever, chills, n/v, abd pain, CP, SOB,  hemoptysis, changes in bowel/bladder habits, vision changes, photosensitivity, skin rash, raynauds, alopecia.     FH: Grandmother with RA    Past Medical History:   Diagnosis Date    Allergic rhinitis     GERD (gastroesophageal reflux disease)     Gout     Hyperlipidemia     Hypertension associated with chronic kidney disease due to type 2 diabetes mellitus     Long term (current) use of insulin     Obesity     HENRY on CPAP     Proteinuria      Steatohepatitis     Fatty Liver    Type 2 diabetes mellitus with diabetic nephropathy     Type 2 diabetes mellitus with hyperglycemia     Type 2 diabetes mellitus with renal manifestations      Past Surgical History:   Procedure Laterality Date    LASIK Bilateral     LIVER BIOPSY      NASAL ENDOSCOPY      NASAL SEPTUM SURGERY      SLEEVE GASTROPLASTY  03/06/2017     Social History     Social History    Marital status:      Spouse name: N/A    Number of children: 2    Years of education: Some College     Occupational History    Not on file.     Social History Main Topics    Smoking status: Never Smoker    Smokeless tobacco: Never Used    Alcohol use Yes      Comment: 1-2 times per month    Drug use: No    Sexual activity: Yes     Partners: Female     Birth control/ protection: OCP     Other Topics Concern    Not on file     Social History Narrative    Full time employed,  with 2 children.       Family History   Problem Relation Age of Onset    Diabetes type II Mother     Hypertension Mother     Hyperlipidemia Mother     Diabetes type II Brother     Diabetes type II Brother      Current Outpatient Prescriptions on File Prior to Visit   Medication Sig Dispense Refill    aspirin (ECOTRIN) 81 MG EC tablet Take 81 mg by mouth once daily.      atorvastatin (LIPITOR) 80 MG tablet Take 1 tablet (80 mg total) by mouth once daily. 30 tablet 1    cetirizine (ZYRTEC) 10 MG tablet Take 10 mg by mouth once daily.      doxazosin (CARDURA) 1 MG tablet Take 1 mg by mouth every evening.      febuxostat (ULORIC) 80 mg Tab Take 80 mg by mouth once daily at 6am.      GLUCOSAM/CSA/COLLAGEN/HYALUR A (JOINT SUPPORT ORAL) Take by mouth.      lisinopril (PRINIVIL,ZESTRIL) 20 MG tablet Take 1 tablet (20 mg total) by mouth once daily. 90 tablet 3    metoprolol tartrate (LOPRESSOR) 25 MG tablet Take 1 tablet (25 mg total) by mouth 2 (two) times daily. 60 tablet 1    MULTIVITAMIN/IRON/FOLIC ACID  (CENTRUM COMPLETE ORAL) Take by mouth once daily at 6am.      ticagrelor (BRILINTA) 90 mg tablet Take 1 tablet (90 mg total) by mouth 2 (two) times daily. 60 tablet 11    turmeric root extract 500 mg Cap Take by mouth once daily at 6am.      [DISCONTINUED] colchicine 0.6 mg tablet Take 1 tablet (0.6 mg total) by mouth 2 (two) times daily. 60 tablet 11     No current facility-administered medications on file prior to visit.        Review of Systems   Constitutional: Negative for chills and fever.   HENT: Negative for dental problem, mouth sores and trouble swallowing.    Eyes: Negative for visual disturbance.   Respiratory: Negative for chest tightness and shortness of breath.    Cardiovascular: Negative for chest pain, palpitations and leg swelling.   Gastrointestinal: Negative for abdominal pain, blood in stool, constipation, diarrhea, nausea and vomiting.   Endocrine: Negative for polyuria.   Genitourinary: Negative for difficulty urinating, frequency, genital sores and urgency.   Musculoskeletal: Positive for arthralgias. Negative for back pain and gait problem.   Skin: Negative for color change and rash.   Neurological: Negative for dizziness, seizures, syncope, weakness and light-headedness.   Hematological: Bruises/bleeds easily.   Psychiatric/Behavioral: Negative for decreased concentration and sleep disturbance.         Objective:   BP (!) 141/93   Pulse 74   Wt 109.8 kg (242 lb 1 oz)   BMI 30.26 kg/m²      Physical Exam   Constitutional: He is oriented to person, place, and time and well-developed, well-nourished, and in no distress.   HENT:   Head: Normocephalic and atraumatic.   Eyes: EOM are normal. Pupils are equal, round, and reactive to light.   Neck: Normal range of motion. Neck supple.   Cardiovascular: Normal rate and regular rhythm.    Pulmonary/Chest: Effort normal and breath sounds normal.   Abdominal: Soft. Bowel sounds are normal. There is no guarding.       Right Side Rheumatological  Exam     Examination finds the shoulder, wrist, 1st PIP, 1st MCP, 2nd PIP, 2nd MCP, 3rd PIP, 3rd MCP, 4th PIP, 4th MCP, 5th PIP and 5th MCP normal.    The patient is tender to palpation of the elbow and knee    Knee Exam     Range of Motion   The patient has normal right knee ROM.  Patellofemoral Crepitus: negative    Elbow/Wrist Exam   Tenderness Location: lateral epicondyle    Range of Motion   Elbow   The patient has normal right elbow ROM.    Range of Motion   Wrist   The patient has normal right wrist ROM.    Elbow Warmth: negative  Elbow Swelling: negative  Elbow Crepitus: negative    Wrist Warmth: negative  Wrist Swelling: negative  Wrist Crepitus: negative    Foot Exam   Right foot exam exhibits signs of no tophus    Range of Motion   The patient has normal right ankle ROM.  First MTP Joint: normal    Muscle Strength (0-5 scale):  Neck Flexion:  5  Neck Extension: 5  Deltoid:  5  Biceps: 5/5   Triceps:  5  : 5/5   Iliopsoas: 5  Quadriceps:  5   Distal Lower Extremity: 5    Left Side Rheumatological Exam     Examination finds the shoulder, elbow, wrist, knee, 1st PIP, 1st MCP, 2nd PIP, 2nd MCP, 3rd PIP, 3rd MCP, 4th PIP, 4th MCP, 5th PIP and 5th MCP normal.    Knee Exam     Range of Motion   The patient has normal left knee ROM.    Patellofemoral Crepitus: negative    Elbow/Wrist Exam   Tenderness Location: no elbow tenderness    Range of Motion   Elbow   The patient has normal left elbow ROM.    Range of Motion   Wrist   The patient has normal left wrist ROM.    Elbow Warmth: negative  Elbow Swelling: negative  Elbow Crepitus: negative    Wrist Warmth: negative  Wrist Swelling: negative  Wrist Crepitus: negative    Foot Exam   Left foot exam exhibits signs of no tophus and no plantar fasciitis    Range of Motion   The patient has normal left ankle ROM.   First MTP Joint: normal    Ankle Warmth: negative    Muscle Strength (0-5 scale):  Neck Flexion:  5  Neck Extension: 5  Deltoid:  5  Biceps: 5/5    Triceps:  5  :  5/5   Iliopsoas: 5  Quadriceps:  5   Distal Lower Extremity: 5      Lymphadenopathy:     He has no cervical adenopathy.   Neurological: He is alert and oriented to person, place, and time.   Skin: Skin is warm and dry. No rash noted.     Psychiatric: Affect normal.   Musculoskeletal: Normal range of motion. He exhibits tenderness. He exhibits no edema.   Thickening of the R elbow  Prominence of b/l 1st MTP, non tender to palpation               Results for ROBIN REYNA (MRN [id number]) as of 9/12/2017 11:41   Ref. Range 8/1/2017 04:37   WBC Latest Ref Range: 3.90 - 12.70 K/uL 7.68   RBC Latest Ref Range: 4.60 - 6.20 M/uL 5.35   Hemoglobin Latest Ref Range: 14.0 - 18.0 g/dL 14.9   Hematocrit Latest Ref Range: 40.0 - 54.0 % 44.1   MCV Latest Ref Range: 82 - 98 fL 82   MCH Latest Ref Range: 27.0 - 31.0 pg 27.9   MCHC Latest Ref Range: 32.0 - 36.0 g/dL 33.8   RDW Latest Ref Range: 11.5 - 14.5 % 14.6 (H)   Platelets Latest Ref Range: 150 - 350 K/uL 165   MPV Latest Ref Range: 9.2 - 12.9 fL 9.8   Gran% Latest Ref Range: 38.0 - 73.0 % 52.1   Gran # Latest Ref Range: 1.8 - 7.7 K/uL 4.0   Lymph% Latest Ref Range: 18.0 - 48.0 % 37.1   Lymph # Latest Ref Range: 1.0 - 4.8 K/uL 2.9   Mono% Latest Ref Range: 4.0 - 15.0 % 7.7   Mono # Latest Ref Range: 0.3 - 1.0 K/uL 0.6   Eosinophil% Latest Ref Range: 0.0 - 8.0 % 2.7   Eos # Latest Ref Range: 0.0 - 0.5 K/uL 0.2   Basophil% Latest Ref Range: 0.0 - 1.9 % 0.4   Baso # Latest Ref Range: 0.00 - 0.20 K/uL 0.03       Results for ROBIN REYNA (MRN [id number]) as of 9/12/2017 11:41   Ref. Range 8/2/2017 04:42   Sodium Latest Ref Range: 136 - 145 mmol/L 140   Potassium Latest Ref Range: 3.5 - 5.1 mmol/L 3.5   Chloride Latest Ref Range: 95 - 110 mmol/L 104   CO2 Latest Ref Range: 23 - 29 mmol/L 27   Anion Gap Latest Ref Range: 8 - 16 mmol/L 9   BUN, Bld Latest Ref Range: 6 - 20 mg/dL 14   Creatinine Latest Ref Range: 0.5 - 1.4 mg/dL 1.2   eGFR if non   Latest Ref Range: >60 mL/min/1.73 m^2 >60   eGFR if  Latest Ref Range: >60 mL/min/1.73 m^2 >60   Glucose Latest Ref Range: 70 - 110 mg/dL 97   Calcium Latest Ref Range: 8.7 - 10.5 mg/dL 9.0   Alkaline Phosphatase Latest Ref Range: 55 - 135 U/L 68   Total Protein Latest Ref Range: 6.0 - 8.4 g/dL 6.1   Albumin Latest Ref Range: 3.5 - 5.2 g/dL 3.0 (L)   Total Bilirubin Latest Ref Range: 0.1 - 1.0 mg/dL 1.3 (H)   AST Latest Ref Range: 10 - 40 U/L 17   ALT Latest Ref Range: 10 - 44 U/L 16     Results for ROBB REYNA (MRN [id number]) as of 9/12/2017 11:41   Ref. Range 6/21/2017 10:23 7/5/2017 08:44   Uric Acid Latest Ref Range: 3.4 - 7.0 mg/dL 5.8 6.4       Assessment:       1. Idiopathic chronic gout, multiple sites, with tophus (tophi)    2. Idiopathic chronic gout of right elbow without tophus            Plan:         Robb was seen today for gout.    Diagnoses and all orders for this visit:    Idiopathic chronic gout, multiple sites, with tophus (tophi)  Pt with long history of gout (not crystal proven) which was previously controlled on urate lowering therapy Uloric. Due to rapid weight loss s/p gastric sleeve surgery increases catabolism of tissues which increases risk of development of gout. Physical exam and symptoms not suggestive of  RA or PsA, however due to family history of RA will obtain serologies to r/o.  Uric acid 6.4. With goal <5 in a pt with tophi. Will recheck Uric acid level and if not at goal, will increase Uloric from 80mg to 120mg daily.   Will restart colchine 0.6mg BID for prophylaxis and obtain imaging to assess for structural abnormalities  Should pt not improve, may consider use of pegloticase in future  -     Sedimentation rate, manual; Future  -     C-reactive protein; Future  -     Uric acid; Future  -     Rheumatoid factor; Future  -     CYCLIC CITRUL PEPTIDE ANTIBODY, IGG; Future  -     colchicine (COLCRYS) 0.6 mg tablet; Take 1 tablet (0.6 mg  total) by mouth 2 (two) times daily.  -     X-Ray Elbow 2 View Bilateral; Future  -     X-Ray Foot Complete Bilateral; Future  -     PAPO; Future  -     G6PD,Quantitative; Future  -     methylPREDNISolone (MEDROL DOSEPACK) 4 mg tablet; use as directed    Idiopathic chronic gout of right elbow without tophus  -     Sedimentation rate, manual; Future  -     C-reactive protein; Future  -     Uric acid; Future  -     Rheumatoid factor; Future  -     CYCLIC CITRUL PEPTIDE ANTIBODY, IGG; Future  -     colchicine (COLCRYS) 0.6 mg tablet; Take 1 tablet (0.6 mg total) by mouth 2 (two) times daily.    RTC in 3 months.     Increase Uloric based on uric acid level , take colchicine 0.6 mg bid and use medrol dose packs for flares.

## 2017-09-12 NOTE — LETTER
September 12, 2017      Bravo Hardy MD  [address] [place] [address]           [facility] - Rheumatology  [address]  [place] [address]  Phone: [phone]  Fax: [phone]          Patient: Robb Iglesias   MR Number: [id number]   YOB: 1980   Date of Visit: 9/12/2017       Dear Dr. Bravo Hardy:    Thank you for referring Robb Iglesias to me for evaluation. Attached you will find relevant portions of my assessment and plan of care.    If you have questions, please do not hesitate to call me. I look forward to following Robb Iglesias along with you.    Sincerely,    Juan M Womack MD    Enclosure  CC:  No Recipients    If you would like to receive this communication electronically, please contact [email or web address] or [phone] to request more information on [employer] access.    For providers and/or their staff who would like to refer a patient to [facility], please contact us through our one-stop-shop provider referral line, [facility], at [phone].    If you feel you have received this communication in error or would no longer like to receive these types of communications, please e-mail [email or web address]

## 2017-09-13 ENCOUNTER — LAB VISIT (OUTPATIENT)
Dept: LAB | Facility: HOSPITAL | Age: 37
End: 2017-09-13
Attending: INTERNAL MEDICINE
Payer: COMMERCIAL

## 2017-09-13 ENCOUNTER — PATIENT MESSAGE (OUTPATIENT)
Dept: RHEUMATOLOGY | Facility: CLINIC | Age: 37
End: 2017-09-13

## 2017-09-13 DIAGNOSIS — M1A.09X1 IDIOPATHIC CHRONIC GOUT OF MULTIPLE SITES WITH TOPHUS: Primary | ICD-10-CM

## 2017-09-13 DIAGNOSIS — M1A.3620 CHRONIC GOUT OF LEFT KNEE DUE TO RENAL IMPAIRMENT WITHOUT TOPHUS: Chronic | ICD-10-CM

## 2017-09-13 DIAGNOSIS — N18.30 CHRONIC KIDNEY DISEASE, STAGE 3: Chronic | ICD-10-CM

## 2017-09-13 PROBLEM — M10.09 IDIOPATHIC GOUT OF MULTIPLE SITES: Status: ACTIVE | Noted: 2017-07-19

## 2017-09-13 LAB
25(OH)D3+25(OH)D2 SERPL-MCNC: 42 NG/ML
ALBUMIN SERPL BCP-MCNC: 3.4 G/DL
ANION GAP SERPL CALC-SCNC: 10 MMOL/L
BASOPHILS # BLD AUTO: 0.02 K/UL
BASOPHILS NFR BLD: 0.4 %
BUN SERPL-MCNC: 19 MG/DL
CALCIUM SERPL-MCNC: 9.4 MG/DL
CHLORIDE SERPL-SCNC: 107 MMOL/L
CO2 SERPL-SCNC: 26 MMOL/L
CREAT SERPL-MCNC: 1.4 MG/DL
DIFFERENTIAL METHOD: ABNORMAL
EOSINOPHIL # BLD AUTO: 0.2 K/UL
EOSINOPHIL NFR BLD: 3.7 %
ERYTHROCYTE [DISTWIDTH] IN BLOOD BY AUTOMATED COUNT: 15 %
EST. GFR  (AFRICAN AMERICAN): >60 ML/MIN/1.73 M^2
EST. GFR  (NON AFRICAN AMERICAN): >60 ML/MIN/1.73 M^2
GLUCOSE SERPL-MCNC: 122 MG/DL
HCT VFR BLD AUTO: 40.5 %
HGB BLD-MCNC: 13.6 G/DL
LYMPHOCYTES # BLD AUTO: 1.3 K/UL
LYMPHOCYTES NFR BLD: 25.7 %
MCH RBC QN AUTO: 27.6 PG
MCHC RBC AUTO-ENTMCNC: 33.6 G/DL
MCV RBC AUTO: 82 FL
MONOCYTES # BLD AUTO: 0.5 K/UL
MONOCYTES NFR BLD: 10 %
NEUTROPHILS # BLD AUTO: 3.1 K/UL
NEUTROPHILS NFR BLD: 60 %
PHOSPHATE SERPL-MCNC: 2.9 MG/DL
PLATELET # BLD AUTO: 167 K/UL
PMV BLD AUTO: 9.5 FL
POTASSIUM SERPL-SCNC: 4.2 MMOL/L
PTH-INTACT SERPL-MCNC: 29 PG/ML
RBC # BLD AUTO: 4.92 M/UL
SODIUM SERPL-SCNC: 143 MMOL/L
URATE SERPL-MCNC: 7.1 MG/DL
WBC # BLD AUTO: 5.1 K/UL

## 2017-09-13 PROCEDURE — 82306 VITAMIN D 25 HYDROXY: CPT

## 2017-09-13 PROCEDURE — 85025 COMPLETE CBC W/AUTO DIFF WBC: CPT

## 2017-09-13 PROCEDURE — 80069 RENAL FUNCTION PANEL: CPT

## 2017-09-13 PROCEDURE — 36415 COLL VENOUS BLD VENIPUNCTURE: CPT

## 2017-09-13 PROCEDURE — 83970 ASSAY OF PARATHORMONE: CPT

## 2017-09-13 PROCEDURE — 84550 ASSAY OF BLOOD/URIC ACID: CPT

## 2017-09-13 PROCEDURE — 82610 CYSTATIN C: CPT

## 2017-09-13 RX ORDER — FEBUXOSTAT 40 MG/1
120 TABLET, FILM COATED ORAL DAILY
Qty: 90 TABLET | Refills: 3 | Status: SHIPPED | OUTPATIENT
Start: 2017-09-13 | End: 2017-11-08 | Stop reason: SDUPTHER

## 2017-09-14 LAB
CYSTATIN C SERPL-MCNC: 1.21 MG/L
GFR/BSA.PRED SERPLBLD CYS-BASED-ARV: 67 ML/MIN/BSA

## 2017-09-25 ENCOUNTER — HOSPITAL ENCOUNTER (OUTPATIENT)
Dept: SLEEP MEDICINE | Facility: HOSPITAL | Age: 37
Discharge: HOME | End: 2017-09-25
Attending: INTERNAL MEDICINE
Payer: COMMERCIAL

## 2017-09-25 ENCOUNTER — OFFICE VISIT (OUTPATIENT)
Dept: NEPHROLOGY | Facility: CLINIC | Age: 37
End: 2017-09-25
Payer: COMMERCIAL

## 2017-09-25 VITALS
SYSTOLIC BLOOD PRESSURE: 120 MMHG | BODY MASS INDEX: 29.06 KG/M2 | DIASTOLIC BLOOD PRESSURE: 70 MMHG | HEIGHT: 75 IN | HEART RATE: 68 BPM | WEIGHT: 233.69 LBS

## 2017-09-25 DIAGNOSIS — R80.1 PERSISTENT PROTEINURIA: ICD-10-CM

## 2017-09-25 DIAGNOSIS — M1A.3620 CHRONIC GOUT OF LEFT KNEE DUE TO RENAL IMPAIRMENT WITHOUT TOPHUS: ICD-10-CM

## 2017-09-25 DIAGNOSIS — G47.61 PLMD (PERIODIC LIMB MOVEMENT DISORDER): ICD-10-CM

## 2017-09-25 DIAGNOSIS — I10 BENIGN ESSENTIAL HYPERTENSION: ICD-10-CM

## 2017-09-25 DIAGNOSIS — N18.2 STAGE 2 CHRONIC KIDNEY DISEASE: Primary | ICD-10-CM

## 2017-09-25 DIAGNOSIS — G47.33 OBSTRUCTIVE SLEEP APNEA: Chronic | ICD-10-CM

## 2017-09-25 PROCEDURE — 99999 PR PBB SHADOW E&M-EST. PATIENT-LVL III: CPT | Mod: PBBFAC,,, | Performed by: INTERNAL MEDICINE

## 2017-09-25 PROCEDURE — 99214 OFFICE O/P EST MOD 30 MIN: CPT | Mod: S$GLB,,, | Performed by: INTERNAL MEDICINE

## 2017-09-25 PROCEDURE — 95811 POLYSOM 6/>YRS CPAP 4/> PARM: CPT | Mod: 26,,, | Performed by: INTERNAL MEDICINE

## 2017-09-25 PROCEDURE — 3078F DIAST BP <80 MM HG: CPT | Mod: S$GLB,,, | Performed by: INTERNAL MEDICINE

## 2017-09-25 PROCEDURE — 3074F SYST BP LT 130 MM HG: CPT | Mod: S$GLB,,, | Performed by: INTERNAL MEDICINE

## 2017-09-25 PROCEDURE — 95811 POLYSOM 6/>YRS CPAP 4/> PARM: CPT

## 2017-09-25 PROCEDURE — 3008F BODY MASS INDEX DOCD: CPT | Mod: S$GLB,,, | Performed by: INTERNAL MEDICINE

## 2017-09-25 NOTE — PROGRESS NOTES
Subjective:       Patient ID: Robb Iglesias is a 36 y.o. White male who presents for new evaluation of Chronic Kidney Disease; Hypertension; and Proteinuria    Hypertension   Pertinent negatives include no chest pain, headaches, neck pain, palpitations or shortness of breath.        Patient is a 36-year-old male with history of type 2 diabetes with morbid obesity.  Patient has multiple family members with type 2 diabetes with multiple complications.  Patient has history of chronic kidney disease stage III from diabetic nephropathy.  In February 2016 his creatinine was 2.5 with approximate GFR 35%.  His hemoglobin A1c was 10.9 at that time.    2015 has history of hepatomegaly status post liver biopsy    March 2017 patient underwent vertical sleeve gastrectomy for bariatric surgery.  His postoperative course was complicated by acute kidney injury from dehydration.  His maximum creatinine was 7.7 with a BUN of 126 no RRT required     May 2017 creatinine down to 1.5    June 2017 patient presents for consultation for chronic kidney disease with proteinuria and hematuria.  His BMI down to 29 and has lost greater than 80 pounds in last 3 months. ( 320 ib pre-op) . 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  He underwent LHC that showed 99% involving proximal and mid LAD which was treated with PCI. LVEF 55%. Cardiac medication include ASA, Brilinta, STATIN, Metoprolol, and Lisinopril.    September 2017 acute gout attack seen by rheumatology records reviewed. Cystatin C shows GFR 67%          Review of Systems   Constitutional: Negative.  Negative for activity change, appetite change, chills, diaphoresis, fatigue and fever.   HENT: Negative.  Negative for congestion  "and trouble swallowing.    Eyes: Negative.    Respiratory: Negative.  Negative for cough, chest tightness, shortness of breath and wheezing.    Cardiovascular: Negative.  Negative for chest pain, palpitations and leg swelling.   Gastrointestinal: Negative.  Negative for abdominal distention, abdominal pain, nausea and vomiting.   Genitourinary: Negative.  Negative for decreased urine volume, difficulty urinating, dysuria, enuresis, flank pain, frequency, hematuria, penile swelling, scrotal swelling and urgency.   Musculoskeletal: Negative.  Negative for arthralgias, back pain, joint swelling and neck pain.   Skin: Negative for rash.   Neurological: Negative.  Negative for tremors, seizures and headaches.   Psychiatric/Behavioral: Negative.  Negative for confusion and sleep disturbance. The patient is not nervous/anxious.        Objective:   /70   Pulse 68   Ht 6' 3" (1.905 m)   Wt 106 kg (233 lb 11 oz)   BMI 29.21 kg/m²         Weight loss of 13 kg in April 2017 down from 118 kg down to 106.8 mg  Physical Exam   Constitutional: He is oriented to person, place, and time. He appears well-developed and well-nourished. No distress.   HENT:   Head: Normocephalic.   Eyes: EOM are normal. Pupils are equal, round, and reactive to light.   Neck: Normal range of motion. Neck supple. No JVD present. No thyromegaly present.   Cardiovascular: Normal rate, regular rhythm, S1 normal, S2 normal, normal heart sounds and intact distal pulses.  PMI is not displaced.  Exam reveals no gallop and no friction rub.    No murmur heard.  Pulmonary/Chest: Effort normal and breath sounds normal. No respiratory distress. He has no wheezes. He has no rales. He exhibits no tenderness.   Abdominal: He exhibits no distension and no mass. There is no hepatosplenomegaly. There is no tenderness. There is no rebound and no CVA tenderness. No hernia.   Musculoskeletal: Normal range of motion. He exhibits no edema or tenderness. "   Lymphadenopathy:     He has no cervical adenopathy.   Neurological: He is alert and oriented to person, place, and time. He has normal reflexes. He is not disoriented. He displays normal reflexes. No cranial nerve deficit. He exhibits normal muscle tone. Coordination normal.   Skin: Skin is warm and dry. No rash noted. No erythema.   Psychiatric: He has a normal mood and affect. His behavior is normal. Judgment and thought content normal.         Lab Results   Component Value Date    CREATININE 1.4 09/13/2017    BUN 19 09/13/2017     09/13/2017    K 4.2 09/13/2017     09/13/2017    CO2 26 09/13/2017     Lab Results   Component Value Date    WBC 5.10 09/13/2017    HGB 13.6 (L) 09/13/2017    HCT 40.5 09/13/2017    MCV 82 09/13/2017     09/13/2017     Lab Results   Component Value Date    PTH 29.0 09/13/2017    CALCIUM 9.4 09/13/2017    PHOS 2.9 09/13/2017         Lab Results   Component Value Date    URICACID 7.1 (H) 09/13/2017     Urine protein is 1.92 g per 24 hours down from 2.57 g per 24 hours    Assessment:    )    1. Stage 2 chronic kidney disease    2. Persistent proteinuria    3. Benign essential hypertension    4. Chronic gout of left knee due to renal impairment without tophus        Plan:         1.  Status post acute gout: Uric acid is still high.  Continued on colchicine.  Uric acid has been fluctuating between 5.6 and 7.1.  Discussed with the patient about Uloric being now at 120 mg followed by Dr. Hills.     2.  Chronic kidney disease stage II: much improved after bariatric surgery.  Approximate GFR 67% based on Cystatin C with the corresponding creatinine ranging between 1.2 and 1.4..  Avoid nonsteroidals.  Normal vitamin D levels and PTH      3.  Hematuria and proteinuria:renal ultrasound was done in 3/2017.Most likely these findings are from diabetic nephropathy.  Since his blood pressures running 120 systolic I would continue with lisinopril at 20 mg and follow-up    4.   Hypertension well-controlled.   More recent BP check and re-check ( myself) SBP in range of 110-120. If it stays this low stop cardura/ doxazosin at some point. IN case of dehydration ( Nausea /diarrhea hold lisisnopril to avoid kidney shut down)      5.  Coronary artery disease status post left heart catheterization: Medications and cardiology report reviewed.  Continue current medications      fup 6 months

## 2017-09-25 NOTE — Clinical Note
"STUDY PARAMETERS: The study was performed with a sleep technologist in attendance for the entire test period.  Video monitoring was carried out throughout the study, and the following clinical parameters were recorded:  This study was performed in two stages.  The first part of the night involved analysis of the patient's sleep pattern while breathing unassisted.  SUMMARY STATEMENTS: 1. Findings related to sleep diagnoses: " Moderate snoring was recorded.  On diagnostic study AHI was 39.2 events per hour.  Severe obstructive sleep apnea.  Patient met split-night criteria. " 18 central apneas were seen during diagnostic study and 31 central apneas during treatment portion at lower pressures. " CPAP was titrated from 4 cm water pressure to 10 cm water pressure. CPAP was initiated at 4 cm with a medium Resmed Quattro full-face mask.  C-flex (set to 3) and heated humidification were used throughout. 8 cm water pressure was tested with REM lateral sleep.  AHI was reduced to 7.9 events per hour.  Saturation"

## 2017-09-25 NOTE — PATIENT INSTRUCTIONS
1.  Status post acute gout: Uric acid is still high.  Continued on colchicine.  Uric acid has been fluctuating between 5.6 and 7.1.  Discussed with the patient about Uloric being now at 120 mg followed by Dr. Hills.     2.  Chronic kidney disease stage II: much improved after bariatric surgery.  Approximate GFR 67% based on Cystatin C with the corresponding creatinine ranging between 1.2 and 1.4..  Avoid nonsteroidals.  Normal vitamin D levels and PTH      3.  Hematuria and proteinuria:renal ultrasound was done in 3/2017.Most likely these findings are from diabetic nephropathy.  Since his blood pressures running 120 systolic I would continue with lisinopril at 20 mg and follow-up    4.  Hypertension well-controlled.   More recent BP check and re-check ( myself) SBP in range of 110-120. If it stays this low stop cardura/ doxazosin at some point. IN case of dehydration ( Nausea /diarrhea hold lisisnopril to avoid kidney shut down)      5.  Coronary artery disease status post left heart catheterization: Medications and cardiology report reviewed.  Continue current medications      fup 6 months

## 2017-10-03 NOTE — PROCEDURES
STUDY PARAMETERS: The study was performed with a sleep technologist in attendance for the entire test period.  Video monitoring was carried out throughout the study, and the following clinical parameters were recorded:    This study was performed in two stages.  The first part of the night involved analysis of the patient's sleep pattern while breathing unassisted.    SUMMARY STATEMENTS:  1. Findings related to sleep diagnoses:   Moderate snoring was recorded.  On diagnostic study AHI was 39.2 events per hour.  Severe obstructive sleep apnea.  Patient met split-night criteria.   18 central apneas were seen during diagnostic study and 31 central apneas during treatment portion at lower pressures.   CPAP was titrated from 4 cm water pressure to 10 cm water pressure. CPAP was initiated at 4 cm with a medium Resmed Quattro full-face mask.  C-flex (set to 3) and heated humidification were used throughout. 8 cm water pressure was tested with REM lateral sleep.  AHI was reduced to 7.9 events per hour.  Saturation aram was 90%.   CPAP 10 cm water pressure was attempted without any REM sleep.  2. EEG abnormalities:   Sleep efficiency was high multivitamin diagnostic and therapeutic study.  Sleep latency was very short with a normal REM latency suggesting chronic sleep deprivation   Severe periodic limb movements seen during diagnostic study: 55.5 events per hour.  This caused minimal arousal.  This improved somewhat to 33.3 events per hour during titration.  At the optimal pressure periodic limb movements were eliminated overall.   REM rebound was seen during titration study.  Improved from 19.3% to 33.8%.     3. ECG abnormalities:   Heart rate variability noted with some average heart rate 53 bpm a maximal heart rate of 79 bpm    INTERPRETATION:     1. Severe obstructive sleep apnea-hypopnea syndrome.  2. Severe periodic limb movements during sleep however not resulting in significant arousal.  3. Short sleep  "latency onset and normal REM latency.  4. Few central apnea events were noted during diagnostic and treatment portion  5. CPAP 8 cm water pressure was tested with REM sleep    RECOMMENDATIONS:     1. Commenced treatment with CPAP 8 cm water pressure or Auto PAP 6-15 cm water pressure with suitable mask.          See imported Sleep Study result in "Chart Review" under the   "Media tab".      (This Sleep Study was interpreted by a Board Certified Sleep   Specialist who conducted an epoch-by-epoch review of the entire   raw data recording.)     (The indication for this sleep study was reviewed and deemed   appropriate by [facility] Practice Parameters or other reasons by a   Board Certified Sleep Specialist.)    Buck Ferreira MD      "

## 2017-10-04 ENCOUNTER — TELEPHONE (OUTPATIENT)
Dept: INTERNAL MEDICINE | Facility: CLINIC | Age: 37
End: 2017-10-04

## 2017-10-04 NOTE — TELEPHONE ENCOUNTER
----- Message from KEVIN Roberson MD sent at 9/30/2017  7:37 PM CDT -----  Regarding: FW: Hypertension Digital Medicine Program  Please call patient and follow-up with why he didn't complete enrollment this program, and also schedule him for follow-up due with me in 11/2017.    Thank you.  ----- Message -----  From: Thu Olsen  Sent: 9/25/2017   3:27 PM  To: KEVIN Roberson MD  Subject: Hypertension Digital Medicine Program            Dr. Roberson,     Mr. Robb Iglesias initially enrolled in the Hypertension Digital Medicine Program (HDMP) on _8_/_7_/_17_.     Unfortunately, he has failed to establish any contact with the care team despite our best efforts, and we wanted you to be aware. Given his unwillingness to participate, we do not feel he is a suitable candidate to continue in the HDMP.    Please contact me if you have any questions or concerns.     Thank you for your support.    Sincerely,  Thu Olsen, MPH, [facility]  Health   [phone]

## 2017-10-12 DIAGNOSIS — I21.4 NSTEMI (NON-ST ELEVATED MYOCARDIAL INFARCTION): ICD-10-CM

## 2017-10-12 RX ORDER — ATORVASTATIN CALCIUM 80 MG/1
TABLET, FILM COATED ORAL
Qty: 30 TABLET | Refills: 5 | Status: SHIPPED | OUTPATIENT
Start: 2017-10-12 | End: 2017-11-10 | Stop reason: SDUPTHER

## 2017-10-18 ENCOUNTER — OFFICE VISIT (OUTPATIENT)
Dept: SLEEP MEDICINE | Facility: CLINIC | Age: 37
End: 2017-10-18
Payer: COMMERCIAL

## 2017-10-18 VITALS
HEIGHT: 75 IN | DIASTOLIC BLOOD PRESSURE: 74 MMHG | HEART RATE: 69 BPM | BODY MASS INDEX: 29.47 KG/M2 | RESPIRATION RATE: 18 BRPM | WEIGHT: 237 LBS | OXYGEN SATURATION: 99 % | SYSTOLIC BLOOD PRESSURE: 122 MMHG

## 2017-10-18 DIAGNOSIS — I25.10 CORONARY ARTERY DISEASE, ANGINA PRESENCE UNSPECIFIED, UNSPECIFIED VESSEL OR LESION TYPE, UNSPECIFIED WHETHER NATIVE OR TRANSPLANTED HEART: ICD-10-CM

## 2017-10-18 DIAGNOSIS — Z98.84 HISTORY OF WEIGHT LOSS SURGERY: ICD-10-CM

## 2017-10-18 DIAGNOSIS — G47.33 OSA (OBSTRUCTIVE SLEEP APNEA): Primary | ICD-10-CM

## 2017-10-18 PROCEDURE — 99244 OFF/OP CNSLTJ NEW/EST MOD 40: CPT | Mod: S$GLB,,, | Performed by: NURSE PRACTITIONER

## 2017-10-18 PROCEDURE — 99999 PR PBB SHADOW E&M-EST. PATIENT-LVL III: CPT | Mod: PBBFAC,,, | Performed by: NURSE PRACTITIONER

## 2017-10-18 NOTE — PROGRESS NOTES
"Subjective:      Patient ID: Robb Iglesias is a 36 y.o. male.    Chief Complaint: Sleep Apnea    Patient presents to the office today for evaluation of sleep apnea.  Patient states he was diagnosed with very severe sleep apnea in 2015.  He did not tolerate CPAP at that time.  He had weight loss surgery and has lost 100 pounds.  His sleep has improved somewhat, but he continues to have snoring.  Recent MI at age 36. Sleep study reevaluation was performed.  Sleep apnea was still detected and severe.    Patient Active Problem List:     Bariatric surgery status     Non morbid obesity due to excess calories     Benign essential hypertension     Gout of left knee     Idiopathic chronic gout, left ankle and foot, without tophus (tophi)     Idiopathic chronic gout, right ankle and foot, without tophus (tophi)     Idiopathic chronic gout of left hand without tophus     Stage 2 chronic kidney disease     Persistent proteinuria     Type 2 diabetes mellitus with diabetic nephropathy     Idiopathic chronic gout of multiple sites with tophus     Chronic nonseasonal allergic rhinitis due to pollen     NSTEMI (non-ST elevated myocardial infarction)     Obstructive sleep apnea     Dyslipidemia associated with type 2 diabetes mellitus     Status following surgery for weight loss     Status post angioplasty with stent     Type 2 diabetes mellitus with vascular disease     PLMD (periodic limb movement disorder)            /74   Pulse 69   Resp 18   Ht 6' 3" (1.905 m)   Wt 107.5 kg (236 lb 15.9 oz)   SpO2 99%   BMI 29.62 kg/m²   Body mass index is 29.62 kg/m².    Review of Systems   Constitutional: Negative for fever, chills and activity change.   HENT: Negative for postnasal drip, rhinorrhea, trouble swallowing and congestion.    Respiratory: Positive for snoring. Negative for hemoptysis, shortness of breath and wheezing.    Cardiovascular: Negative for chest pain, palpitations and leg swelling.   Genitourinary: " Negative for difficulty urinating and hematuria.   Endocrine: Negative for cold intolerance and heat intolerance.    Musculoskeletal: Negative for gait problem, joint swelling and myalgias.   Skin: Negative for rash.   Gastrointestinal: Negative for nausea, vomiting, abdominal pain and abdominal distention.   Neurological: Negative for dizziness.   Hematological: Negative for adenopathy.   Psychiatric/Behavioral: Positive for sleep disturbance. Negative for confusion. The patient is not nervous/anxious.      Objective:      Physical Exam   Constitutional: He is oriented to person, place, and time. He appears well-developed and well-nourished.   HENT:   Head: Normocephalic and atraumatic.   Mouth/Throat: Oropharynx is clear and moist.   Eyes: EOM are normal. Pupils are equal, round, and reactive to light.   Neck: Normal range of motion. Neck supple.   Cardiovascular: Normal rate and regular rhythm.  Exam reveals no gallop and no friction rub.    No murmur heard.  Pulmonary/Chest: Effort normal and breath sounds normal.   Abdominal: Soft. Bowel sounds are normal. He exhibits no distension. There is no tenderness.   Musculoskeletal: Normal range of motion.   Neurological: He is alert and oriented to person, place, and time.   Skin: Skin is warm and dry.   Psychiatric: He has a normal mood and affect.     Personal Diagnostic Review    Polysomnogram show sleep apnea with AHI 39.2/hr. CPAP titrations reveals 8cm H2O pressure is  Adequate but not tested in REM.           Assessment:       1. HENRY (obstructive sleep apnea)    2. Coronary artery disease, angina presence unspecified, unspecified vessel or lesion type, unspecified whether native or transplanted heart    3. History of weight loss surgery        Outpatient Encounter Prescriptions as of 10/18/2017   Medication Sig Dispense Refill    aspirin (ECOTRIN) 81 MG EC tablet Take 81 mg by mouth once daily.      atorvastatin (LIPITOR) 80 MG tablet TAKE ONE TABLET BY MOUTH  ONCE DAILY 30 tablet 5    cetirizine (ZYRTEC) 10 MG tablet Take 10 mg by mouth once daily.      colchicine (COLCRYS) 0.6 mg tablet Take 1 tablet (0.6 mg total) by mouth 2 (two) times daily. 60 tablet 11    doxazosin (CARDURA) 1 MG tablet Take 1 mg by mouth every evening.      febuxostat (ULORIC) 40 mg Tab Take 3 tablets (120 mg total) by mouth once daily. 90 tablet 3    lisinopril (PRINIVIL,ZESTRIL) 20 MG tablet Take 1 tablet (20 mg total) by mouth once daily. 90 tablet 3    metoprolol tartrate (LOPRESSOR) 25 MG tablet Take 1 tablet (25 mg total) by mouth 2 (two) times daily. 60 tablet 1    MULTIVITAMIN/IRON/FOLIC ACID (CENTRUM COMPLETE ORAL) Take by mouth once daily at 6am.      ticagrelor (BRILINTA) 90 mg tablet Take 1 tablet (90 mg total) by mouth 2 (two) times daily. 60 tablet 11    turmeric root extract 500 mg Cap Take by mouth once daily at 6am.       No facility-administered encounter medications on file as of 10/18/2017.      Orders Placed This Encounter   Procedures    CPAP FOR HOME USE     Order Specific Question:   Type:     Answer:   Auto CPAP     Order Specific Question:   Auto CPAP pressure setting range (cmH20):     Answer:   6-15     Order Specific Question:   Length of need (1-99 months):     Answer:   99     Order Specific Question:   Humidification:     Answer:   Heated     Order Specific Question:   Type of mask:     Answer:   FFM     Order Specific Question:   Headgear?     Answer:   Yes     Order Specific Question:   Tubing?     Answer:   Yes     Order Specific Question:   Humidifier chamber?     Answer:   Yes     Order Specific Question:   Chin strap?     Answer:   Yes     Order Specific Question:   Filters?     Answer:   Yes     Plan:   Start on PAP therapy. AutoPAP 6-15 cm H2O and follow up in 8 weeks with download of data card and review of symptoms.    Thank you Dr. Roberson for this consultation.

## 2017-10-18 NOTE — LETTER
October 18, 2017      KEVIN Roberson MD  [address] [facility] [address]           [facility]  [address] [place] [id number]  Phone: [phone]          Patient: Robb Iglesias   MR Number: [id number]   YOB: 1980   Date of Visit: 10/18/2017       Dear Dr. KEVIN Roberson:    Thank you for referring Robb Iglesias to me for evaluation. Attached you will find relevant portions of my assessment and plan of care.    If you have questions, please do not hesitate to call me. I look forward to following Robb Iglesias along with you.    Sincerely,    Elizabeth Lejeune, NP    Enclosure  CC:  No Recipients    If you would like to receive this communication electronically, please contact [email or web address] or [phone] to request more information on [employer] access.    For providers and/or their staff who would like to refer a patient to [facility], please contact us through our one-stop-shop provider referral line, [facility], at [phone].    If you feel you have received this communication in error or would no longer like to receive these types of communications, please e-mail [email or web address]

## 2017-10-25 ENCOUNTER — PATIENT MESSAGE (OUTPATIENT)
Dept: SLEEP MEDICINE | Facility: CLINIC | Age: 37
End: 2017-10-25

## 2017-10-30 ENCOUNTER — PATIENT MESSAGE (OUTPATIENT)
Dept: INTERNAL MEDICINE | Facility: CLINIC | Age: 37
End: 2017-10-30

## 2017-10-31 NOTE — PROGRESS NOTES
"Hi, Robb.    I was going through my in-basket of test results, and I couldn't remember if I had shared these results with you. If not, I apologize for the delay.    I am glad you had the sleep study done. It showed that, even though you have lost a dramatic amount of weight, you still have severe sleep apnea, stopping breathing on average more than once every 2 minutes.    I assume Dr. Ferreira or someone else from the sleep clinic has contacted you about getting a positive airway pressure (CPAP/APAP) device. If not, please call [phone] and ask for the sleep clinic and tell them to schedule you an appointment to get set up with a CPAP.    To learn more about your test results and what they mean, click on the "About this test" link from your [facility] account ([email or web address]) or from your [facility] smartphone vinicio. Also, we can discuss your results further when you return for your next appointment.    Thank you for letting me care for you. I look forward to seeing you again. Let me know if you have any questions in the meantime.    Wishing you health and happiness,    KEVIN Roberson MD  ----------------------------------------------------------------  UPCOMING APPOINTMENTS  ----------------------------------------------------------------  Your future appointments include:  11/15/2017 8:30 AM    LIDIA YENUG    [facility] LAB       [place]  11/20/2017 8:00 AM    Channing Wynn MD              [facility] CARDIO    [facility]  12/15/2017 4:00 PM    Gatito Hills MD  [facility] RHEUM     [place]  12/19/2017 8:20 AM    Elizabeth Lejeune, NP      [facility] SLEEP     [place]    ----------------------------------------------------------------  TO SCHEDULE OR CHANGE AN APPOINTMENT  ----------------------------------------------------------------  *Login to your [facility] account at [email or web address]  *Use the [facility] vinicio on your mobile device   or  *Call our appointment desk at [phone].    ----------------------------------------------------------------  ADDITIONAL IMPORTANT INFORMATION  ----------------------------------------------------------------  *[employer] is NOT to be used for urgent needs.  *Although we try to respond to messages within 2 business days, a response can take longer, depending on circumstances.  *For medical emergencies, dial 911.    You can get help with [facility] and [facility] by email at  [email or web address] or by phone at [phone]. The [facility] Patient Support Team is available Monday through Friday from 9 a.m. until 5 p.m.  (After hours, you can leave a message requesting assistance.)

## 2017-11-01 ENCOUNTER — PATIENT MESSAGE (OUTPATIENT)
Dept: INTERNAL MEDICINE | Facility: CLINIC | Age: 37
End: 2017-11-01

## 2017-11-07 DIAGNOSIS — I10 BENIGN ESSENTIAL HYPERTENSION: Primary | ICD-10-CM

## 2017-11-07 RX ORDER — LISINOPRIL 20 MG/1
20 TABLET ORAL DAILY
Qty: 30 TABLET | Refills: 0 | Status: CANCELLED | OUTPATIENT
Start: 2017-11-07 | End: 2018-11-07

## 2017-11-07 RX ORDER — METOPROLOL TARTRATE 25 MG/1
25 TABLET, FILM COATED ORAL 2 TIMES DAILY
Qty: 60 TABLET | Refills: 0 | Status: CANCELLED | OUTPATIENT
Start: 2017-11-07 | End: 2018-11-07

## 2017-11-07 NOTE — TELEPHONE ENCOUNTER
----- Message from Jeimy Pierce sent at 11/6/2017  3:50 PM CST -----  Contact: Patient  Patient called and stated he has left numerous messages as well as messages through the patient portal and his pharmacy still hasn't received a prescription for the metoprolol. He would like a call back.    [id number] - [place] - [address]  [address] [place] [address]  Phone: [phone] Fax: [phone]    He can be contacted at [phone] cell.    Thanks,  Jeimy

## 2017-11-08 DIAGNOSIS — M1A.09X1 IDIOPATHIC CHRONIC GOUT OF MULTIPLE SITES WITH TOPHUS: ICD-10-CM

## 2017-11-08 RX ORDER — FEBUXOSTAT 40 MG/1
120 TABLET, FILM COATED ORAL DAILY
Qty: 270 TABLET | Refills: 1 | Status: SHIPPED | OUTPATIENT
Start: 2017-11-08 | End: 2018-03-20 | Stop reason: SDUPTHER

## 2017-11-10 DIAGNOSIS — I21.4 NSTEMI (NON-ST ELEVATED MYOCARDIAL INFARCTION): ICD-10-CM

## 2017-11-10 RX ORDER — ATORVASTATIN CALCIUM 80 MG/1
80 TABLET, FILM COATED ORAL DAILY
Qty: 90 TABLET | Refills: 3 | Status: SHIPPED | OUTPATIENT
Start: 2017-11-10 | End: 2017-11-14 | Stop reason: SDUPTHER

## 2017-11-10 RX ORDER — METOPROLOL TARTRATE 25 MG/1
25 TABLET, FILM COATED ORAL 2 TIMES DAILY
Qty: 180 TABLET | Refills: 3 | Status: SHIPPED | OUTPATIENT
Start: 2017-11-10 | End: 2018-01-17 | Stop reason: SDUPTHER

## 2017-11-10 NOTE — TELEPHONE ENCOUNTER
Robb Bender.    I have received and approved your refill request as follows:  Benign essential hypertension  -     metoprolol tartrate (LOPRESSOR) 25 MG tablet; Take 1 tablet (25 mg total) by mouth 2 (two) times daily.  Dispense: 180 tablet; Refill: 3        I sent the prescription electronically to the pharmacy you have listed as your preferred pharmacy:    [id number] - [place], [place] - [address]   [address]  [place] [address]  Phone: [phone] Fax: [phone]    NOTE: Let me know if you want it re-sent to your mail-order pharmacy.    Thanks for letting me care for you.    Kind regards,    KEVIN Roberson M.D.    TIP: It is a good idea to take your current prescription bottles with you to your doctor appointments so wecan check for any other refills you may need.    ----------------------------------------------------------------  UPCOMING APPOINTMENTS  ----------------------------------------------------------------  Future Appointments  Date Time Provider Department [place]   11/15/2017 8:30 AM KAELA YEUNG [facility] LAB [place]   11/20/2017 8:00 AM Channing Wynn MD ON CARDIO [facility]   12/15/2017 4:00 PM Gatito Hills MD [facility] RHEUM [place]   12/19/2017 8:20 AM Elizabeth Lejeune, NP [facility] SLEEP [place]        ----------------------------------------------------------------  TO SCHEDULE OR CHANGE AN APPOINTMENT  ----------------------------------------------------------------  *Login to your [facility] account at [email or web address]  *Use the [email or web address] vinicio on your mobile device   or  *Call our appointment desk at [phone].    ----------------------------------------------------------------  ADDITIONAL IMPORTANT INFORMATION  ----------------------------------------------------------------  *[email or web address] is NOT to be used for urgent needs.  *Although we try to respond to messages within 2 business days, a response can take longer, depending on  circumstances.  *For medical emergencies, dial 911.    You can get help with [facility] and [facility] by email at  [email or web address] or by phone at [phone]. The [facility] Patient Support Team is available Monday through Friday from 9 a.m. until 5 p.m.  (After hours, you can leave a message requesting assistance.)

## 2017-11-14 DIAGNOSIS — I21.4 NSTEMI (NON-ST ELEVATED MYOCARDIAL INFARCTION): ICD-10-CM

## 2017-11-14 RX ORDER — ATORVASTATIN CALCIUM 80 MG/1
80 TABLET, FILM COATED ORAL DAILY
Qty: 90 TABLET | Refills: 3 | Status: SHIPPED | OUTPATIENT
Start: 2017-11-14 | End: 2018-10-17 | Stop reason: SDUPTHER

## 2017-11-14 NOTE — TELEPHONE ENCOUNTER
----- Message from Laly Hatch sent at 11/14/2017 11:09 AM CST -----  Contact: [facility]  ..1. What is the name of the medication you are requesting? Brilenta  2. What is the dose? 90 mgs   3. How do you take the medication? Orally, topically, etc? Orally   4. How often do you take this medication?   5. Do you need a 30 day or 90 day supply? 90 day   6. How many refills are you requesting?   7. What is your preferred pharmacy and location of the pharmacy?     ..  [facility] - [place], [place] - [address] [place] [address]  Phone: [phone] Fax: [phone]      8. Who can we contact with further questions?     ..1. What is the name of the medication you are requesting? Atorvastatin   2. What is the dose? 80 mgs   3. How do you take the medication? Orally, topically, etc? Orally   4. How often do you take this medication?   5. Do you need a 30 day or 90 day supply? 90 day   6. How many refills are you requesting?   7. What is your preferred pharmacy and location of the pharmacy?     ..  [facility] - [place], [place] - [address] [place] [address]  Phone: [phone] Fax: [phone]      8. Who can we contact with further questions?

## 2017-11-15 ENCOUNTER — LAB VISIT (OUTPATIENT)
Dept: LAB | Facility: HOSPITAL | Age: 37
End: 2017-11-15
Attending: INTERNAL MEDICINE
Payer: COMMERCIAL

## 2017-11-15 DIAGNOSIS — I21.4 NSTEMI (NON-ST ELEVATED MYOCARDIAL INFARCTION): ICD-10-CM

## 2017-11-15 LAB
ALBUMIN SERPL BCP-MCNC: 3.8 G/DL
ALP SERPL-CCNC: 90 U/L
ALT SERPL W/O P-5'-P-CCNC: 39 U/L
ANION GAP SERPL CALC-SCNC: 10 MMOL/L
AST SERPL-CCNC: 29 U/L
BILIRUB SERPL-MCNC: 1.1 MG/DL
BUN SERPL-MCNC: 40 MG/DL
CALCIUM SERPL-MCNC: 9.6 MG/DL
CHLORIDE SERPL-SCNC: 109 MMOL/L
CHOLEST SERPL-MCNC: 119 MG/DL
CHOLEST/HDLC SERPL: 5 {RATIO}
CO2 SERPL-SCNC: 25 MMOL/L
CREAT SERPL-MCNC: 1.8 MG/DL
EST. GFR  (AFRICAN AMERICAN): 55.1 ML/MIN/1.73 M^2
EST. GFR  (NON AFRICAN AMERICAN): 47.7 ML/MIN/1.73 M^2
GLUCOSE SERPL-MCNC: 115 MG/DL
HDLC SERPL-MCNC: 24 MG/DL
HDLC SERPL: 20.2 %
LDLC SERPL CALC-MCNC: 54.2 MG/DL
NONHDLC SERPL-MCNC: 95 MG/DL
POTASSIUM SERPL-SCNC: 4 MMOL/L
PROT SERPL-MCNC: 7.1 G/DL
SODIUM SERPL-SCNC: 144 MMOL/L
TRIGL SERPL-MCNC: 204 MG/DL

## 2017-11-15 PROCEDURE — 80061 LIPID PANEL: CPT

## 2017-11-15 PROCEDURE — 36415 COLL VENOUS BLD VENIPUNCTURE: CPT

## 2017-11-15 PROCEDURE — 80053 COMPREHEN METABOLIC PANEL: CPT

## 2017-12-12 ENCOUNTER — PATIENT MESSAGE (OUTPATIENT)
Dept: RHEUMATOLOGY | Facility: CLINIC | Age: 37
End: 2017-12-12

## 2017-12-15 ENCOUNTER — PATIENT MESSAGE (OUTPATIENT)
Dept: CARDIOLOGY | Facility: CLINIC | Age: 37
End: 2017-12-15

## 2017-12-15 ENCOUNTER — OFFICE VISIT (OUTPATIENT)
Dept: RHEUMATOLOGY | Facility: CLINIC | Age: 37
End: 2017-12-15
Payer: COMMERCIAL

## 2017-12-15 ENCOUNTER — LAB VISIT (OUTPATIENT)
Dept: LAB | Facility: HOSPITAL | Age: 37
End: 2017-12-15
Attending: INTERNAL MEDICINE
Payer: COMMERCIAL

## 2017-12-15 VITALS
SYSTOLIC BLOOD PRESSURE: 139 MMHG | HEART RATE: 73 BPM | DIASTOLIC BLOOD PRESSURE: 88 MMHG | BODY MASS INDEX: 29.68 KG/M2 | WEIGHT: 237.44 LBS

## 2017-12-15 DIAGNOSIS — M1A.09X1 IDIOPATHIC CHRONIC GOUT OF MULTIPLE SITES WITH TOPHUS: Primary | ICD-10-CM

## 2017-12-15 DIAGNOSIS — M1A.09X1 IDIOPATHIC CHRONIC GOUT OF MULTIPLE SITES WITH TOPHUS: ICD-10-CM

## 2017-12-15 LAB
ALBUMIN SERPL BCP-MCNC: 3.8 G/DL
ALP SERPL-CCNC: 107 U/L
ALT SERPL W/O P-5'-P-CCNC: 37 U/L
ANION GAP SERPL CALC-SCNC: 7 MMOL/L
AST SERPL-CCNC: 27 U/L
BILIRUB SERPL-MCNC: 1.5 MG/DL
BUN SERPL-MCNC: 19 MG/DL
CALCIUM SERPL-MCNC: 9.3 MG/DL
CHLORIDE SERPL-SCNC: 107 MMOL/L
CO2 SERPL-SCNC: 28 MMOL/L
CREAT SERPL-MCNC: 1.3 MG/DL
EST. GFR  (AFRICAN AMERICAN): >60 ML/MIN/1.73 M^2
EST. GFR  (NON AFRICAN AMERICAN): >60 ML/MIN/1.73 M^2
GLUCOSE SERPL-MCNC: 111 MG/DL
POTASSIUM SERPL-SCNC: 4.2 MMOL/L
PROT SERPL-MCNC: 7 G/DL
SODIUM SERPL-SCNC: 142 MMOL/L
URATE SERPL-MCNC: 6.7 MG/DL

## 2017-12-15 PROCEDURE — 80053 COMPREHEN METABOLIC PANEL: CPT | Mod: PO

## 2017-12-15 PROCEDURE — 99214 OFFICE O/P EST MOD 30 MIN: CPT | Mod: S$GLB,,, | Performed by: INTERNAL MEDICINE

## 2017-12-15 PROCEDURE — 99999 PR PBB SHADOW E&M-EST. PATIENT-LVL III: CPT | Mod: PBBFAC,,, | Performed by: INTERNAL MEDICINE

## 2017-12-15 PROCEDURE — 36415 COLL VENOUS BLD VENIPUNCTURE: CPT | Mod: PO

## 2017-12-15 PROCEDURE — 84550 ASSAY OF BLOOD/URIC ACID: CPT | Mod: PO

## 2017-12-15 NOTE — PROGRESS NOTES
RHEUMATOLOGY CLINIC FOLLOW UP VISIT  Chief complaints:-  To follow up for gout.     HPI:-  Robbeloy Stark a 36 y.o. pleasant male comes in for a follow up visit with above chief complaints. 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 He reports doing well since last visit. No flare up of gout in the past 3 months. Compliant with diet restrictions. No adverse effects from medications.   Review of Systems   Constitutional: Negative for chills and fever.   HENT: Negative for congestion and sore throat.    Eyes: Negative for blurred vision and redness.   Respiratory: Negative for cough and shortness of breath.    Cardiovascular: Negative for chest pain and leg swelling.   Gastrointestinal: Negative for abdominal pain.   Genitourinary: Negative for dysuria.   Musculoskeletal: Negative for back pain, falls, joint pain, myalgias and neck pain.   Skin: Negative for rash.   Neurological: Negative for headaches.   Endo/Heme/Allergies: Does not bruise/bleed easily.   Psychiatric/Behavioral: Negative for memory loss. The patient does not have insomnia.        Past Medical History:   Diagnosis Date    Allergic rhinitis     GERD (gastroesophageal reflux disease)     Gout     Hyperlipidemia     Hypertension associated with chronic kidney disease due to type 2 diabetes mellitus     Long term (current) use of insulin     Obesity     HENRY on CPAP     Proteinuria     Steatohepatitis     Fatty Liver    Type 2 diabetes mellitus with diabetic nephropathy     Type 2 diabetes mellitus with hyperglycemia     Type 2 diabetes mellitus with renal manifestations        Past Surgical History:   Procedure Laterality Date    LASIK Bilateral     LIVER BIOPSY      NASAL ENDOSCOPY      NASAL SEPTUM SURGERY      SLEEVE GASTROPLASTY  03/06/2017        Social History   Substance Use Topics    Smoking status: Never Smoker    Smokeless tobacco: Never  Used    Alcohol use Yes      Comment: 1-2 times per month       Family History   Problem Relation Age of Onset    Diabetes type II Mother     Hypertension Mother     Hyperlipidemia Mother     Diabetes type II Brother     Diabetes type II Brother        Review of patient's allergies indicates:  No Known Allergies        Physical examination:-    Vitals:    12/15/17 0818   BP: 139/88   Pulse: 73   Weight: 107.7 kg (237 lb 7 oz)   PainSc: 0-No pain     Physical Exam   Constitutional: He is oriented to person, place, and time and well-developed, well-nourished, and in no distress. No distress.   HENT:   Head: Normocephalic and atraumatic.   Mouth/Throat: Oropharynx is clear and moist.   Eyes: Conjunctivae and EOM are normal. Pupils are equal, round, and reactive to light. Right eye exhibits no discharge. Left eye exhibits no discharge. No scleral icterus.   Neck: Normal range of motion. Neck supple.   Cardiovascular: Normal rate and intact distal pulses.    Pulmonary/Chest: Effort normal. No respiratory distress. He exhibits no tenderness.   Abdominal: Soft. There is no tenderness.   Musculoskeletal:   No synovitis in small joints of hands or feet.  Good range of motion in large joints. Non tender thick leathery olecranon bursae.    Lymphadenopathy:     He has no cervical adenopathy.   Neurological: He is alert and oriented to person, place, and time. Gait normal.   Skin: Skin is warm. No rash noted. He is not diaphoretic. No erythema. No pallor.   Psychiatric: Mood, memory, affect and judgment normal.       Labs:-  Results for ROBIN REYNA (MRN [id number]) as of 12/15/2017 08:08   Ref. Range 6/21/2017 10:23 7/5/2017 08:44 9/12/2017 12:03 9/13/2017 07:48   Uric Acid Latest Ref Range: 3.4 - 7.0 mg/dL 5.8 6.4 6.1 7.1 (H)       Radiographs:-  Independent visualization of images done. Moderate degenerative change present at both 1st MTP joints suggestive of gout .      Medication List with Changes/Refills    Current Medications    ASPIRIN (ECOTRIN) 81 MG EC TABLET    Take 81 mg by mouth once daily.    ATORVASTATIN (LIPITOR) 80 MG TABLET    Take 1 tablet (80 mg total) by mouth once daily.    CETIRIZINE (ZYRTEC) 10 MG TABLET    Take 10 mg by mouth once daily.    COLCHICINE (COLCRYS) 0.6 MG TABLET    Take 1 tablet (0.6 mg total) by mouth 2 (two) times daily.    DOXAZOSIN (CARDURA) 1 MG TABLET    Take 1 mg by mouth every evening.    FEBUXOSTAT (ULORIC) 40 MG TAB    Take 3 tablets (120 mg total) by mouth once daily.    LISINOPRIL (PRINIVIL,ZESTRIL) 20 MG TABLET    Take 1 tablet (20 mg total) by mouth once daily.    METOPROLOL TARTRATE (LOPRESSOR) 25 MG TABLET    Take 1 tablet (25 mg total) by mouth 2 (two) times daily.    MULTIVITAMIN/IRON/FOLIC ACID (CENTRUM COMPLETE ORAL)    Take by mouth once daily at 6am.    TICAGRELOR (BRILINTA) 90 MG TABLET    Take 1 tablet (90 mg total) by mouth 2 (two) times daily.    TURMERIC ROOT EXTRACT 500 MG CAP    Take by mouth once daily at 6am.       Assessment/Plans:-  # Idiopathic chronic gout of multiple sites with tophus  3 month follow up for tophaceous gout . His crystallizing concentration seems to be lower than average since he has flare up of gouty arthropathy  even at goal uric acid levels. RF/CCP/PAPO negative and xray images does not show any evidence of inflammatory arthritides like RA/Undifferentiated arthritis . Improving Uric acid levels on 80 mg bid uloric and no gout flare in the past 3 months on 0.6 mg bid colchicine. Reduce colchicine to once daily if any renal impairment on the labs from today and decide on Uloric dose based on the uric acid level from today.      # Return in about 6 months (around 6/15/2018).    Time spent:40 minutes in face to face discussion concerning diagnosis, prognosis, review of lab and test results, benefits of treatment as well as management of disease, counseling of patient and coordination of care between various health care providers .  Greater than 20 minutes used for coordination of care and counseling of patient.      Disclaimer: This note was prepared using voice recognition system and is likely to have sound alike errors and is not proof read.  Please call me with any questions.

## 2017-12-15 NOTE — ASSESSMENT & PLAN NOTE
3 month follow up for tophaceous gout . His crystallizing concentration seems to be lower than average since he has flare up of gouty arthropathy  even at goal uric acid levels. RF/CCP/PAPO negative and xray images does not show any evidence of inflammatory arthritides like RA/Undifferentiated arthritis . Improving Uric acid levels on 80 mg bid uloric and no gout flare in the past 3 months on 0.6 mg bid colchicine. Reduce colchicine to once daily if any renal impairment on the labs from today and decide on Uloric dose based on the uric acid level from today.

## 2017-12-18 ENCOUNTER — PATIENT MESSAGE (OUTPATIENT)
Dept: INTERNAL MEDICINE | Facility: CLINIC | Age: 37
End: 2017-12-18

## 2017-12-18 DIAGNOSIS — I10 BENIGN ESSENTIAL HYPERTENSION: ICD-10-CM

## 2017-12-18 RX ORDER — LISINOPRIL 20 MG/1
20 TABLET ORAL DAILY
Qty: 90 TABLET | Refills: 3 | Status: CANCELLED | OUTPATIENT
Start: 2017-12-18 | End: 2018-12-18

## 2017-12-18 RX ORDER — METOPROLOL TARTRATE 25 MG/1
25 TABLET, FILM COATED ORAL 2 TIMES DAILY
Qty: 180 TABLET | Refills: 3 | Status: CANCELLED | OUTPATIENT
Start: 2017-12-18 | End: 2018-12-18

## 2017-12-20 ENCOUNTER — OFFICE VISIT (OUTPATIENT)
Dept: SLEEP MEDICINE | Facility: CLINIC | Age: 37
End: 2017-12-20
Payer: COMMERCIAL

## 2017-12-20 VITALS
DIASTOLIC BLOOD PRESSURE: 92 MMHG | WEIGHT: 237.63 LBS | SYSTOLIC BLOOD PRESSURE: 140 MMHG | RESPIRATION RATE: 18 BRPM | HEIGHT: 75 IN | HEART RATE: 80 BPM | BODY MASS INDEX: 29.55 KG/M2 | OXYGEN SATURATION: 97 %

## 2017-12-20 DIAGNOSIS — G47.33 OSA (OBSTRUCTIVE SLEEP APNEA): Primary | ICD-10-CM

## 2017-12-20 PROCEDURE — 99999 PR PBB SHADOW E&M-EST. PATIENT-LVL III: CPT | Mod: PBBFAC,,, | Performed by: NURSE PRACTITIONER

## 2017-12-20 PROCEDURE — 99213 OFFICE O/P EST LOW 20 MIN: CPT | Mod: S$GLB,,, | Performed by: NURSE PRACTITIONER

## 2017-12-20 NOTE — PROGRESS NOTES
"Subjective:      Patient ID: Robb Iglesias is a 36 y.o. male.    Chief Complaint: Sleep Apnea (cpap download)    Presents to office for review of AutoPAP therapy.  He has been having difficulty habituating, but is motivated to continue.  He is taking his mask off unknowingly after a few hours.  He has trimmed his beard which has helped with this mask fit.          BP (!) 140/92 Comment: patient states normal b/p and denies any symptoms  Pulse 80   Resp 18   Ht 6' 3" (1.905 m)   Wt 107.8 kg (237 lb 10.5 oz)   SpO2 97%   BMI 29.70 kg/m²   Body mass index is 29.7 kg/m².    Review of Systems   HENT: Positive for postnasal drip.    Psychiatric/Behavioral: Positive for sleep disturbance.   All other systems reviewed and are negative.    Objective:      Physical Exam   Constitutional: He is oriented to person, place, and time. He appears well-developed and well-nourished.   HENT:   Head: Normocephalic and atraumatic.   Neck: Normal range of motion. Neck supple.   Neurological: He is alert and oriented to person, place, and time.   Skin: Skin is warm and dry.   Psychiatric: He has a normal mood and affect.     Personal Diagnostic Review  AutoPap download reviewed with 16% compliance.  AHI 7         Assessment:       1. HENRY (obstructive sleep apnea)        Outpatient Encounter Prescriptions as of 12/20/2017   Medication Sig Dispense Refill    aspirin (ECOTRIN) 81 MG EC tablet Take 81 mg by mouth once daily.      atorvastatin (LIPITOR) 80 MG tablet Take 1 tablet (80 mg total) by mouth once daily. 90 tablet 3    cetirizine (ZYRTEC) 10 MG tablet Take 10 mg by mouth once daily.      doxazosin (CARDURA) 1 MG tablet Take 1 mg by mouth every evening.      febuxostat (ULORIC) 40 mg Tab Take 3 tablets (120 mg total) by mouth once daily. 270 tablet 1    lisinopril (PRINIVIL,ZESTRIL) 20 MG tablet Take 1 tablet (20 mg total) by mouth once daily. 90 tablet 3    metoprolol tartrate (LOPRESSOR) 25 MG tablet Take 1 " tablet (25 mg total) by mouth 2 (two) times daily. 180 tablet 3    MULTIVITAMIN/IRON/FOLIC ACID (CENTRUM COMPLETE ORAL) Take by mouth once daily at 6am.      ticagrelor (BRILINTA) 90 mg tablet Take 1 tablet (90 mg total) by mouth 2 (two) times daily. 180 tablet 3    turmeric root extract 500 mg Cap Take by mouth once daily at 6am.      colchicine (COLCRYS) 0.6 mg tablet Take 1 tablet (0.6 mg total) by mouth 2 (two) times daily. 60 tablet 11     No facility-administered encounter medications on file as of 12/20/2017.      No orders of the defined types were placed in this encounter.    Plan:      Follow up in 1 months with data download or call earlier if any problems

## 2017-12-22 ENCOUNTER — TELEPHONE (OUTPATIENT)
Dept: INTERNAL MEDICINE | Facility: CLINIC | Age: 37
End: 2017-12-22

## 2017-12-22 NOTE — TELEPHONE ENCOUNTER
----- Message from Brittaney Cazares sent at 12/22/2017 10:57 AM CST -----  Contact: Vivi from [facility]  She is calling to request a 90 day supply for the pt medication with 3 refills.      [facility] - [address] [address]  Phone: [phone] Fax: [phone]

## 2018-01-17 ENCOUNTER — PATIENT MESSAGE (OUTPATIENT)
Dept: CARDIOLOGY | Facility: CLINIC | Age: 38
End: 2018-01-17

## 2018-01-17 DIAGNOSIS — I10 BENIGN ESSENTIAL HYPERTENSION: ICD-10-CM

## 2018-01-18 RX ORDER — LISINOPRIL 20 MG/1
20 TABLET ORAL DAILY
Qty: 90 TABLET | Refills: 3 | Status: SHIPPED | OUTPATIENT
Start: 2018-01-18 | End: 2018-04-16 | Stop reason: SDUPTHER

## 2018-01-18 RX ORDER — METOPROLOL TARTRATE 25 MG/1
25 TABLET, FILM COATED ORAL 2 TIMES DAILY
Qty: 180 TABLET | Refills: 3 | Status: SHIPPED | OUTPATIENT
Start: 2018-01-18 | End: 2018-12-06

## 2018-01-23 ENCOUNTER — OFFICE VISIT (OUTPATIENT)
Dept: SLEEP MEDICINE | Facility: CLINIC | Age: 38
End: 2018-01-23
Payer: COMMERCIAL

## 2018-01-23 VITALS
SYSTOLIC BLOOD PRESSURE: 124 MMHG | WEIGHT: 236.13 LBS | HEART RATE: 75 BPM | RESPIRATION RATE: 17 BRPM | BODY MASS INDEX: 29.36 KG/M2 | DIASTOLIC BLOOD PRESSURE: 82 MMHG | OXYGEN SATURATION: 98 % | HEIGHT: 75 IN

## 2018-01-23 DIAGNOSIS — G47.33 OSA (OBSTRUCTIVE SLEEP APNEA): Primary | ICD-10-CM

## 2018-01-23 DIAGNOSIS — Z98.84 HISTORY OF WEIGHT LOSS SURGERY: ICD-10-CM

## 2018-01-23 PROCEDURE — 99999 PR PBB SHADOW E&M-EST. PATIENT-LVL III: CPT | Mod: PBBFAC,,, | Performed by: NURSE PRACTITIONER

## 2018-01-23 PROCEDURE — 99213 OFFICE O/P EST LOW 20 MIN: CPT | Mod: S$GLB,,, | Performed by: NURSE PRACTITIONER

## 2018-01-23 NOTE — PROGRESS NOTES
"Subjective:      Patient ID: Robb Iglesias is a 37 y.o. male.    Chief Complaint: Sleep Apnea    Presents to office for review of AutoPAP therapy.  He has been having difficulty habituating, but is motivated to continue.  He continues to take his mask off unknowingly after a few hours.  He has now shaved his beard to help with mask leak.     Patient Active Problem List:     Bariatric surgery status     Non morbid obesity due to excess calories     Benign essential hypertension     Gout of left knee     Idiopathic chronic gout, left ankle and foot, without tophus (tophi)     Idiopathic chronic gout, right ankle and foot, without tophus (tophi)     Idiopathic chronic gout of left hand without tophus     Stage 2 chronic kidney disease     Persistent proteinuria     Type 2 diabetes mellitus with diabetic nephropathy     Idiopathic chronic gout of multiple sites with tophus     Chronic nonseasonal allergic rhinitis due to pollen     NSTEMI (non-ST elevated myocardial infarction)     Obstructive sleep apnea     Dyslipidemia associated with type 2 diabetes mellitus     Status following surgery for weight loss     Status post angioplasty with stent     Type 2 diabetes mellitus with vascular disease     PLMD (periodic limb movement disorder)              /82   Pulse 75   Resp 17   Ht 6' 3" (1.905 m)   Wt 107.1 kg (236 lb 1.8 oz)   SpO2 98%   BMI 29.51 kg/m²   Body mass index is 29.51 kg/m².    Review of Systems   Psychiatric/Behavioral: Positive for sleep disturbance.   All other systems reviewed and are negative.    Objective:      Physical Exam   Constitutional: He is oriented to person, place, and time. He appears well-developed and well-nourished.   HENT:   Head: Normocephalic and atraumatic.   Neck: Normal range of motion. Neck supple.   Neurological: He is alert and oriented to person, place, and time.   Skin: Skin is warm and dry.   Psychiatric: He has a normal mood and affect.     Personal " Diagnostic Review    10/30/2017 - 1/22/2018  YOB: 1980  Mask:  Compliance Summary  10/30/2017 - 1/22/2018 (85 days)  Days with Device Usage 72 days  Days without Device Usage 13 days  Percent Days with Device Usage 84.7%  Cumulative Usage 7 days 8 hrs. 23 mins. 30 secs.  Maximum Usage (1 Day) 7 hrs. 35 mins. 45 secs.  Average Usage (All Days) 2 hrs. 4 mins. 30 secs.  Average Usage (Days Used) 2 hrs. 26 mins. 59 secs.  Minimum Usage (1 Day) 13 mins. 13 secs.  Percent of Days with Usage >= 4 Hours 17.6%  Percent of Days with Usage < 4 Hours 82.4%  Date Range  Total Blower Time 14 days 21 hrs. 5 mins. 41 secs.  Average AHI 7.6  Auto CPAP Summary  Auto CPAP Mean Pressure 8.3 cmH2O  Auto CPAP Peak Average Pressure 11.0 cmH2O  Average Device Pressure <= 90% of Time 10.5 cmH2O  Average Time in Large Leak Per Day 12 mins. 58 sec    Assessment:       1. HENRY (obstructive sleep apnea)    2. History of weight loss surgery        Outpatient Encounter Prescriptions as of 1/23/2018   Medication Sig Dispense Refill    aspirin (ECOTRIN) 81 MG EC tablet Take 81 mg by mouth once daily.      atorvastatin (LIPITOR) 80 MG tablet Take 1 tablet (80 mg total) by mouth once daily. 90 tablet 3    cetirizine (ZYRTEC) 10 MG tablet Take 10 mg by mouth once daily.      colchicine (COLCRYS) 0.6 mg tablet Take 1 tablet (0.6 mg total) by mouth 2 (two) times daily. 60 tablet 11    doxazosin (CARDURA) 1 MG tablet Take 1 mg by mouth every evening.      febuxostat (ULORIC) 40 mg Tab Take 3 tablets (120 mg total) by mouth once daily. 270 tablet 1    lisinopril (PRINIVIL,ZESTRIL) 20 MG tablet Take 1 tablet (20 mg total) by mouth once daily. 90 tablet 3    metoprolol tartrate (LOPRESSOR) 25 MG tablet Take 1 tablet (25 mg total) by mouth 2 (two) times daily. 180 tablet 3    MULTIVITAMIN/IRON/FOLIC ACID (CENTRUM COMPLETE ORAL) Take by mouth once daily at 6am.      ticagrelor (BRILINTA) 90 mg tablet Take 1 tablet (90 mg total) by  mouth 2 (two) times daily. 180 tablet 3    turmeric root extract 500 mg Cap Take by mouth once daily at 6am.       No facility-administered encounter medications on file as of 1/23/2018.      No orders of the defined types were placed in this encounter.    Plan:      continue PAP and wear nightly. He is benefiting from use. Follow up in 6 months to review download

## 2018-02-06 ENCOUNTER — OFFICE VISIT (OUTPATIENT)
Dept: CARDIOLOGY | Facility: CLINIC | Age: 38
End: 2018-02-06
Payer: COMMERCIAL

## 2018-02-06 VITALS
DIASTOLIC BLOOD PRESSURE: 82 MMHG | HEART RATE: 60 BPM | HEIGHT: 75 IN | BODY MASS INDEX: 29.63 KG/M2 | SYSTOLIC BLOOD PRESSURE: 134 MMHG | WEIGHT: 238.31 LBS

## 2018-02-06 DIAGNOSIS — R07.89 CHEST PAIN, ATYPICAL: ICD-10-CM

## 2018-02-06 DIAGNOSIS — I10 BENIGN ESSENTIAL HYPERTENSION: Chronic | ICD-10-CM

## 2018-02-06 DIAGNOSIS — Z95.820 STATUS POST ANGIOPLASTY WITH STENT: ICD-10-CM

## 2018-02-06 DIAGNOSIS — E78.5 DYSLIPIDEMIA ASSOCIATED WITH TYPE 2 DIABETES MELLITUS: Chronic | ICD-10-CM

## 2018-02-06 DIAGNOSIS — E11.69 DYSLIPIDEMIA ASSOCIATED WITH TYPE 2 DIABETES MELLITUS: Chronic | ICD-10-CM

## 2018-02-06 DIAGNOSIS — E11.21 TYPE 2 DIABETES MELLITUS WITH DIABETIC NEPHROPATHY, WITHOUT LONG-TERM CURRENT USE OF INSULIN: ICD-10-CM

## 2018-02-06 DIAGNOSIS — I25.10 CORONARY ARTERY DISEASE INVOLVING NATIVE CORONARY ARTERY OF NATIVE HEART WITHOUT ANGINA PECTORIS: ICD-10-CM

## 2018-02-06 DIAGNOSIS — Z98.84 BARIATRIC SURGERY STATUS: Chronic | ICD-10-CM

## 2018-02-06 DIAGNOSIS — I21.4 NSTEMI (NON-ST ELEVATED MYOCARDIAL INFARCTION): Primary | ICD-10-CM

## 2018-02-06 PROCEDURE — 99214 OFFICE O/P EST MOD 30 MIN: CPT | Mod: S$GLB,,, | Performed by: INTERNAL MEDICINE

## 2018-02-06 PROCEDURE — 3008F BODY MASS INDEX DOCD: CPT | Mod: ,,, | Performed by: INTERNAL MEDICINE

## 2018-02-06 PROCEDURE — 99999 PR PBB SHADOW E&M-EST. PATIENT-LVL III: CPT | Mod: PBBFAC,,, | Performed by: INTERNAL MEDICINE

## 2018-02-06 PROCEDURE — 93000 ELECTROCARDIOGRAM COMPLETE: CPT | Mod: S$GLB,,, | Performed by: INTERNAL MEDICINE

## 2018-02-06 NOTE — PROGRESS NOTES
Subjective:   Patient ID:  Robb Iglesias is a 37 y.o. male who presents for follow up of Fatigue; Chest Pain (pressure in back and chest); Foot Pain; and Cough      37 yo male, came in for NSTEMIf/u.  PMH CAD, h/o NSTEMI in , s/p PCI, FAMILIA in prox and mid LAD, HLD, HTN and obesity, s/p gastric sleeve surgery in , DM off Insulin after gastric surgery, and HENRY on CPAP.  He was [facility] for NSTEMi/ACS, urgent LHC showed PROX AND MID LAD SEQUENTIAL 99% STENOSIS TREATED WITH FAMILIA ALPINE 2.75 X 38, NON OBS RCA AND LCX , OM1 SMALL SUBTOTAL 90% and PDA 40% PROX. EF 55 % and ANT HK. Post op was uncomplicated and d/c with Brilinta.  Today, no chest pain, fatigue, left radial pain.   Has cold and has been on Tamiflu for 10 days.. Diarrhea. Felt weakness and chest tightness.        Past Medical History:   Diagnosis Date    Allergic rhinitis     GERD (gastroesophageal reflux disease)     Gout     Hyperlipidemia     Hypertension associated with chronic kidney disease due to type 2 diabetes mellitus     Long term (current) use of insulin     MI (myocardial infarction) 07/2017    Obesity     HENRY on CPAP     Proteinuria     Steatohepatitis     Fatty Liver    Type 2 diabetes mellitus with diabetic nephropathy     Type 2 diabetes mellitus with hyperglycemia     Type 2 diabetes mellitus with renal manifestations        Past Surgical History:   Procedure Laterality Date    CARDIAC CATHETERIZATION      LASIK Bilateral     LIVER BIOPSY      NASAL ENDOSCOPY      NASAL SEPTUM SURGERY      SLEEVE GASTROPLASTY  03/06/2017       Social History   Substance Use Topics    Smoking status: Never Smoker    Smokeless tobacco: Never Used    Alcohol use Yes      Comment: 1-2 times per month       Family History   Problem Relation Age of Onset    Diabetes type II Mother     Hypertension Mother     Hyperlipidemia Mother     Diabetes type II Brother     Diabetes type II Brother           Review of Systems   Constitution: Negative for decreased appetite, diaphoresis, fever, weakness, malaise/fatigue and night sweats.   HENT: Negative for nosebleeds.    Eyes: Negative for blurred vision and double vision.   Cardiovascular: Negative for chest pain, claudication, dyspnea on exertion, irregular heartbeat, leg swelling, near-syncope, orthopnea, palpitations, paroxysmal nocturnal dyspnea and syncope.   Respiratory: Negative for cough, shortness of breath, sleep disturbances due to breathing, snoring, sputum production and wheezing.    Endocrine: Negative for cold intolerance and polyuria.   Hematologic/Lymphatic: Does not bruise/bleed easily.   Skin: Negative for rash.   Musculoskeletal: Negative for back pain, falls, joint pain, joint swelling and neck pain.   Gastrointestinal: Negative for abdominal pain, heartburn, nausea and vomiting.   Genitourinary: Negative for dysuria, frequency and hematuria.   Neurological: Negative for difficulty with concentration, dizziness, focal weakness, headaches, light-headedness, numbness and seizures.   Psychiatric/Behavioral: Negative for depression, memory loss and substance abuse. The patient does not have insomnia.    Allergic/Immunologic: Negative for HIV exposure and hives.       Objective:   Physical Exam   Constitutional: He is oriented to person, place, and time. He appears well-nourished.   HENT:   Head: Normocephalic.   Eyes: Pupils are equal, round, and reactive to light.   Neck: Normal carotid pulses and no JVD present. Carotid bruit is not present. No thyromegaly present.   Cardiovascular: Normal rate, regular rhythm, normal heart sounds and normal pulses.   No extrasystoles are present. PMI is not displaced.  Exam reveals no gallop and no S3.    No murmur heard.  Pulses:       Radial pulses are 2+ on the right side, and 2+ on the left side.   Pulmonary/Chest: Breath sounds normal. No stridor. No respiratory distress.   Abdominal: Soft. Bowel  sounds are normal. There is no tenderness. There is no rebound.   Musculoskeletal: Normal range of motion.   Neurological: He is alert and oriented to person, place, and time.   Skin: Skin is intact. No rash noted.   Psychiatric: His behavior is normal.       Lab Results   Component Value Date    CHOL 119 (L) 11/15/2017    CHOL 245 (H) 07/05/2017     Lab Results   Component Value Date    HDL 24 (L) 11/15/2017    HDL 29 (L) 07/05/2017     Lab Results   Component Value Date    LDLCALC 54.2 (L) 11/15/2017    LDLCALC 167.0 (H) 07/05/2017     Lab Results   Component Value Date    TRIG 204 (H) 11/15/2017    TRIG 245 (H) 07/05/2017     Lab Results   Component Value Date    CHOLHDL 20.2 11/15/2017    CHOLHDL 11.8 (L) 07/05/2017       Chemistry        Component Value Date/Time     12/15/2017 0910    K 4.2 12/15/2017 0910     12/15/2017 0910    CO2 28 12/15/2017 0910    BUN 19 12/15/2017 0910    CREATININE 1.3 12/15/2017 0910     (H) 12/15/2017 0910        Component Value Date/Time    CALCIUM 9.3 12/15/2017 0910    ALKPHOS 107 12/15/2017 0910    AST 27 12/15/2017 0910    ALT 37 12/15/2017 0910    BILITOT 1.5 (H) 12/15/2017 0910    ESTGFRAFRICA >60 12/15/2017 0910    EGFRNONAA >60 12/15/2017 0910          Lab Results   Component Value Date    TSH 0.727 07/31/2017     Lab Results   Component Value Date    INR 1.1 07/31/2017     Lab Results   Component Value Date    WBC 5.10 09/13/2017    HGB 13.6 (L) 09/13/2017    HCT 40.5 09/13/2017    MCV 82 09/13/2017     09/13/2017     BMP  Sodium   Date Value Ref Range Status   12/15/2017 142 136 - 145 mmol/L Final     Potassium   Date Value Ref Range Status   12/15/2017 4.2 3.5 - 5.1 mmol/L Final     Chloride   Date Value Ref Range Status   12/15/2017 107 95 - 110 mmol/L Final     CO2   Date Value Ref Range Status   12/15/2017 28 23 - 29 mmol/L Final     BUN, Bld   Date Value Ref Range Status   12/15/2017 19 6 - 20 mg/dL Final     Creatinine   Date Value Ref  Range Status   12/15/2017 1.3 0.5 - 1.4 mg/dL Final     Calcium   Date Value Ref Range Status   12/15/2017 9.3 8.7 - 10.5 mg/dL Final     Anion Gap   Date Value Ref Range Status   12/15/2017 7 (L) 8 - 16 mmol/L Final     eGFR if    Date Value Ref Range Status   12/15/2017 >60 >60 mL/min/1.73 m^2 Final     eGFR if non    Date Value Ref Range Status   12/15/2017 >60 >60 mL/min/1.73 m^2 Final     Comment:     Calculation used to obtain the estimated glomerular filtration  rate (eGFR) is the CKD-EPI equation.        CrCl cannot be calculated (Patient's most recent lab result is older than the maximum 7 days allowed.).     Assessment:      1. NSTEMI (non-ST elevated myocardial infarction)    2. Coronary artery disease involving native coronary artery of native heart without angina pectoris    3. Dyslipidemia associated with type 2 diabetes mellitus    4. Benign essential hypertension    5. Status post angioplasty with stent    6. Type 2 diabetes mellitus with diabetic nephropathy, without long-term current use of insulin    7. Bariatric surgery status    8. Chest pain, atypical      Repeat ekg today sinus syl.    Plan:   Continue ASA. Brilinta, BB, ACEI and Statin.

## 2018-03-06 ENCOUNTER — PATIENT MESSAGE (OUTPATIENT)
Dept: INTERNAL MEDICINE | Facility: CLINIC | Age: 38
End: 2018-03-06

## 2018-03-06 ENCOUNTER — TELEPHONE (OUTPATIENT)
Dept: INTERNAL MEDICINE | Facility: CLINIC | Age: 38
End: 2018-03-06

## 2018-03-06 NOTE — TELEPHONE ENCOUNTER
I am having labs done on March 20th and have an appt on March 23rd. Can you please make sure those labs are correct, that I am having everything drawn that I need?   Thank you.       CMP AND URIC ACID IS ORDERED NOW DO YOU WANT ADDITIONAL LABS OR IS THAT ALL YOU NEED

## 2018-03-07 NOTE — TELEPHONE ENCOUNTER
"Hi, Robb.    I hope you are doing well.    Your message was routed to me as:    "I am having labs done on March 20th and have an appt on March 23rd. Can you please make sure those labs are correct, that I am having everything drawn that I need? CMP AND URIC ACID IS ORDERED NOW DO YOU WANT ADDITIONAL LABS OR IS THAT ALL YOU NEED"    RESPONSE: That's all the labs that I see are needed from my standpoint.    I look forward to seeing you again.    Thanks for letting me care for you.    Kind regards,    KEVIN Roberson MD   "

## 2018-03-09 ENCOUNTER — PATIENT OUTREACH (OUTPATIENT)
Dept: ADMINISTRATIVE | Facility: HOSPITAL | Age: 38
End: 2018-03-09

## 2018-03-20 ENCOUNTER — LAB VISIT (OUTPATIENT)
Dept: LAB | Facility: HOSPITAL | Age: 38
End: 2018-03-20
Attending: FAMILY MEDICINE
Payer: COMMERCIAL

## 2018-03-20 ENCOUNTER — PATIENT MESSAGE (OUTPATIENT)
Dept: RHEUMATOLOGY | Facility: CLINIC | Age: 38
End: 2018-03-20

## 2018-03-20 DIAGNOSIS — E11.21 TYPE 2 DIABETES MELLITUS WITH DIABETIC NEPHROPATHY, WITHOUT LONG-TERM CURRENT USE OF INSULIN: ICD-10-CM

## 2018-03-20 DIAGNOSIS — M1A.09X1 IDIOPATHIC CHRONIC GOUT OF MULTIPLE SITES WITH TOPHUS: ICD-10-CM

## 2018-03-20 LAB
ALBUMIN SERPL BCP-MCNC: 3.6 G/DL
ALP SERPL-CCNC: 93 U/L
ALT SERPL W/O P-5'-P-CCNC: 37 U/L
ANION GAP SERPL CALC-SCNC: 7 MMOL/L
AST SERPL-CCNC: 26 U/L
BILIRUB SERPL-MCNC: 1.8 MG/DL
BUN SERPL-MCNC: 23 MG/DL
CALCIUM SERPL-MCNC: 9.4 MG/DL
CHLORIDE SERPL-SCNC: 104 MMOL/L
CO2 SERPL-SCNC: 30 MMOL/L
CREAT SERPL-MCNC: 1.4 MG/DL
EST. GFR  (AFRICAN AMERICAN): >60 ML/MIN/1.73 M^2
EST. GFR  (NON AFRICAN AMERICAN): >60 ML/MIN/1.73 M^2
ESTIMATED AVG GLUCOSE: 105 MG/DL
GLUCOSE SERPL-MCNC: 123 MG/DL
HBA1C MFR BLD HPLC: 5.3 %
POTASSIUM SERPL-SCNC: 4.1 MMOL/L
PROT SERPL-MCNC: 6.6 G/DL
SODIUM SERPL-SCNC: 141 MMOL/L
URATE SERPL-MCNC: 6.9 MG/DL

## 2018-03-20 PROCEDURE — 80053 COMPREHEN METABOLIC PANEL: CPT

## 2018-03-20 PROCEDURE — 83036 HEMOGLOBIN GLYCOSYLATED A1C: CPT

## 2018-03-20 PROCEDURE — 36415 COLL VENOUS BLD VENIPUNCTURE: CPT

## 2018-03-20 PROCEDURE — 84550 ASSAY OF BLOOD/URIC ACID: CPT

## 2018-03-20 RX ORDER — FEBUXOSTAT 40 MG/1
80 TABLET, FILM COATED ORAL DAILY
Qty: 180 TABLET | Refills: 1 | Status: SHIPPED | OUTPATIENT
Start: 2018-03-20 | End: 2018-06-20 | Stop reason: SDUPTHER

## 2018-03-21 PROBLEM — R17 ELEVATED BILIRUBIN: Status: ACTIVE | Noted: 2018-03-21

## 2018-03-21 NOTE — PROGRESS NOTES
"SUMMARY: This patient has a future appointment with me (listed at the bottom of this message. I will review these test results with him then.    TASKS:   (1) Set a "Remind Me" reminder in Epic for the day after the scheduled appointment to verify that he showed up for the appointment as instructed.  (2) If he did NOT show up, please contact him and make sure that he returns for an appointment to discuss these results within 2 months.  (3) If after 3 business days you have been unable to confirm with him that he will be returning within that time frame, then copy-and-past my patient portal message in the "FOLLOWUP NEEDED" letter, mail the letter to him, and set a Patient Recall for him in 1 month.    Be sure to document your efforts to reach him.    Thanks!       KEVIN Roberson MD   --------------------------------------------------------------------------------   PATIENT CONTACT INFORMATION  Home Phone      [phone]  Work Phone      Not on file.  Mobile          [phone]    --------------------------------------------------------------------------------   Robb's future appointments include:   3/23/2018  8:00 AM    KEVIN Roberson MD      [facility] IM        [place]  4/9/2018   9:30 AM    SPECIMEN, KAELA WEINBERG SPECLAB   [place]  4/9/2018   9:50 AM    LABORATORYKAELA    ON LAB       [place]  4/16/2018  9:30 AM    Tracee Munoz MD    [facility] NEPHRO    [place]  6/14/2018  8:00 AM    LABORATORYKAELA    ON LAB       [place]  6/20/2018  8:00 AM    G[place] MD  [facility] RHEUM     [place]  7/24/2018  8:00 AM    Elizabeth Lejeune, NP      [facility] SLEEP     [place]   --------------------------------------------------------------------------------    Robb Bender.    I'm happy to report that your hemoglobin A1c is 5.3%, indicating EXCELLENT control of your blood sugar. Keep up the great work!    Your other labs has some minor abnormalities, including elevated bilirubin (a component of " "bile, made by the liver). We will discuss all your test results further at your next appointment.    To learn more about your test results and what they mean, click on the "About this test" link from your [facility] account ([email or web address]) or from your [email or web address] smartphone vinicio. Also, we can discuss your results further when you return for your next appointment.    Thank you for letting me care for you. I look forward to seeing you again. Let me know if you have any questions in the meantime.    Wishing you health and happiness,    KEVIN Roberson MD  ----------------------------------------------------------------  UPCOMING APPOINTMENTS  ----------------------------------------------------------------  Your future appointments include:  3/23/2018  8:00 AM    KEVIN Roberson MD      [facility] IM        [place]  4/9/2018   9:30 AM    SPECIMEN, MARGARET'PATEL           ON SPECLAB   [place]  4/9/2018   9:50 AM    LABORATORY, KAELA CHRISTIAN    ON LAB       [place]  4/16/2018  9:30 AM    Tracee Munoz MD    [facility] NEPHRO    [place]  6/14/2018  8:00 AM    LABORATORY, KAELA CHRISTIAN    ON LAB       [place]  6/20/2018  8:00 AM    [place] MD  [facility] RHEUM     [place]  7/24/2018  8:00 AM    Elizabeth Lejeune, NP      [facility] SLEEP     [place]    ----------------------------------------------------------------  TO SCHEDULE OR CHANGE AN APPOINTMENT  ----------------------------------------------------------------  *Login to your [facility] account at [email or web address]  *Use the [email or web address] vinicio on your mobile device   or  *Call our appointment desk at [phone].    ----------------------------------------------------------------  ADDITIONAL IMPORTANT INFORMATION  ----------------------------------------------------------------  *[email or web address] is NOT to be used for urgent needs.  *Although we try to respond to messages within 2 business days, a response can take longer, depending on circumstances.  *For medical emergencies, dial " 414.    You can get help with MyChart and [facility] by email at  [email or web address] or by phone at [phone]. The [facility] Patient Support Team is available Monday through Friday from 9 a.m. until 5 p.m.  (After hours, you can leave a message requesting assistance.)

## 2018-03-23 ENCOUNTER — OFFICE VISIT (OUTPATIENT)
Dept: INTERNAL MEDICINE | Facility: CLINIC | Age: 38
End: 2018-03-23
Payer: COMMERCIAL

## 2018-03-23 ENCOUNTER — LAB VISIT (OUTPATIENT)
Dept: LAB | Facility: HOSPITAL | Age: 38
End: 2018-03-23
Attending: FAMILY MEDICINE
Payer: COMMERCIAL

## 2018-03-23 VITALS
SYSTOLIC BLOOD PRESSURE: 136 MMHG | HEIGHT: 75 IN | BODY MASS INDEX: 29.93 KG/M2 | OXYGEN SATURATION: 98 % | WEIGHT: 240.75 LBS | TEMPERATURE: 97 F | DIASTOLIC BLOOD PRESSURE: 86 MMHG | HEART RATE: 66 BPM

## 2018-03-23 DIAGNOSIS — R17 ELEVATED BILIRUBIN: ICD-10-CM

## 2018-03-23 DIAGNOSIS — Z00.00 PREVENTATIVE HEALTH CARE: Primary | ICD-10-CM

## 2018-03-23 DIAGNOSIS — E80.6 DIRECT HYPERBILIRUBINEMIA: Chronic | ICD-10-CM

## 2018-03-23 DIAGNOSIS — Z23 NEED FOR INFLUENZA VACCINATION: ICD-10-CM

## 2018-03-23 LAB
ALBUMIN SERPL BCP-MCNC: 3.4 G/DL
ALP SERPL-CCNC: 92 U/L
ALT SERPL W/O P-5'-P-CCNC: 37 U/L
AST SERPL-CCNC: 27 U/L
BILIRUB DIRECT SERPL-MCNC: 0.6 MG/DL
BILIRUB SERPL-MCNC: 1.6 MG/DL
PROT SERPL-MCNC: 6.4 G/DL

## 2018-03-23 PROCEDURE — 80076 HEPATIC FUNCTION PANEL: CPT

## 2018-03-23 PROCEDURE — 99395 PREV VISIT EST AGE 18-39: CPT | Mod: S$GLB,,, | Performed by: FAMILY MEDICINE

## 2018-03-23 PROCEDURE — 99999 PR PBB SHADOW E&M-EST. PATIENT-LVL IV: CPT | Mod: PBBFAC,,, | Performed by: FAMILY MEDICINE

## 2018-03-23 PROCEDURE — 36415 COLL VENOUS BLD VENIPUNCTURE: CPT | Mod: PO

## 2018-03-23 NOTE — Clinical Note
Please request copy of eye exam from [facility] (on [facility]) done in November, 2017. Thanks!

## 2018-03-23 NOTE — PROGRESS NOTES
CHIEF COMPLAINT  Annual Exam      HISTORY OF PRESENT ILLNESS (PREVENTIVE SERVICES)    HEALTH MAINTENANCE INTERVENTIONS - UP TO DATE  Health Maintenance Topics with due status: Not Due       Topic Last Completion Date    TETANUS VACCINE 01/01/2009    Foot Exam 08/04/2017    Eye Exam 11/01/2017    Lipid Panel 11/15/2017    Hemoglobin A1c 03/20/2018    High Dose Statin 03/23/2018       HEALTH MAINTENANCE INTERVENTIONS - DUE OR DUE SOON  Health Maintenance Due   Topic Date Due    Pneumococcal PPSV23 (Medium Risk) (1) 12/29/1999    Influenza Vaccine  08/01/2017       · HYPERTENSION SCREENING: treated - controlled  BP Readings from Last 3 Encounters:   03/23/18 136/86   02/06/18 134/82   01/23/18 124/82        · OBESITY SCREENING: POSITIVE, but exercising and eating healthy and appears to be improving percent body fat  Wt Readings from Last 3 Encounters:   03/23/18 109.2 kg (240 lb 11.9 oz)   02/06/18 108.1 kg (238 lb 5.1 oz)   01/23/18 107.1 kg (236 lb 1.8 oz)     BMI Readings from Last 3 Encounters:   03/23/18 30.09 kg/m²   02/06/18 29.79 kg/m²   01/23/18 29.51 kg/m²        · DYSLIPIDEMIA SCREENING: current - not needed at this time  Lab Results   Component Value Date    CHOL 119 (L) 11/15/2017    TRIG 204 (H) 11/15/2017    HDL 24 (L) 11/15/2017    LDLCALC 54.2 (L) 11/15/2017    NONHDLCHOL 95 11/15/2017       · DIABETES SCREENING: cured after bariatric surgery  Lab Results   Component Value Date    HGBA1C 5.3 03/20/2018    HGBA1C 6.0 (H) 07/31/2017     (H) 03/20/2018       · HIV SCREENING: current - not needed at this time   No results found for: [id number]     · SEXUALLY TRANSMITTED INFECTION SCREENING: not indicated    · TOBACCO USE SCREENING: negative   reports that he has never smoked. He has never used smokeless tobacco.    · ALCOHOL MISUSE SCREENING: negative   reports that he drinks alcohol.    · DEPRESSION SCREENING: negative    · DIET: good    · PHYSICAL ACTIVITY: good    · IMMUNIZATIONS: reported as up  "to date according to current CDC guidelines except for those ordered today.    · VISION SCREENING: current - not needed at this time    · ASPIRIN: taking    · ABDOMINAL AORTIC ANEURYSM SCREENING: not indicated    · COLORECTAL CANCER SCREENING: not indicated    · PROSTATE CANCER SCREENING: not indicated    EVALUATION AND MANAGEMENT UNRELATED TO PREVENTIVE SERVICES  · hyperbilirubinemia, asymptomatic     Problem List Items Addressed This Visit     Elevated bilirubin    Relevant Orders    US Abdomen Limited (Completed)    Hepatic function panel (Completed)    Direct hyperbilirubinemia (Chronic)    Relevant Orders    Ambulatory referral to Gastroenterology      Other Visit Diagnoses     Preventative health care    -  Primary    Need for influenza vaccination        Relevant Orders    Influenza - Quadrivalent (3 years & older) (PF)          REVIEW OF SYSTEMS  CONSTITUTIONAL: No fever reported.  CARDIOVASCULAR: No angina reported.  PULMONARY: No hemoptysis reported.  PSYCHIATRIC: No mood instability reported.  NEUROLOGIC: No seizures reported.  ENDOCRINE: No polydipsia reported.  GASTROINTESTINAL: No blood in stool reported.  GENITOURINARY: No hematuria reported.  ENT: No acute hearing changes reported.  OPHTHALMOLOGIC: No acute vision changes reported.  HEMATOLOGIC: No bleeding problems reported.  MUSCULOSKELETAL: No recent injuries reported.  DERMATOLOGIC: No rash reported.  REMAINDER OF COMPLETE REVIEW OF SYSTEMS is negative or noncontributory to present illness except as noted herein.     PHYSICAL EXAM  Vitals:    03/23/18 0811   BP: 136/86   BP Location: Right arm   Patient Position: Sitting   BP Method: Large (Manual)   Pulse: 66   Temp: 97.1 °F (36.2 °C)   TempSrc: Tympanic   SpO2: 98%   Weight: 109.2 kg (240 lb 11.9 oz)   Height: 6' 3" (1.905 m)     CONSTITUTIONAL: Vital signs noted. No apparent distress. Does not appear acutely ill or septic. Appears adequately hydrated.  EYE: Sclerae anicteric. Lids and " conjunctiva unremarkable.  ENT: External ENT unremarkable. Oropharynx moist. Lips and tongue unremarkable. Ear canals and visualized tympanic membranes are unremarkable. Hearing grossly intact.  NECK: Trachea midline. Thyroid nontender.  LYMPH: No cervical or supraclavicular lymphadenopathy.  PULM: Lungs clear. Breathing unlabored.  CV: Auscultation reveals regular rate and rhythm without murmur, gallop or rub. No carotid bruit.   GI: Soft and nontender. Bowel sounds normal. No appreciable organomegaly or abdominal mass on palpation. Abdominal aorta nonpalpable. No abdominal bruit.  DERM: Skin warm and moist with normal turgor.  NEURO: There are no gross focal motor deficits or gross deficits of cranial nerves III-XII.  PSYCH: Alert and oriented x 3. Mood is grossly neutral. Affect appropriate. Judgment and insight not grossly compromised.  MSK: Grossly normal stance and gait.    PAST MEDICAL HISTORY, FAMILY HISTORY, SOCIAL HISTORY, CURRENT MEDICATION LIST, and ALLERGY LIST reviewed by me (KEVIN Roberson MD) and are updated consistent with the patient's report.    ASSESSMENT and PLAN  Preventative health care    Need for influenza vaccination  -     Influenza - Quadrivalent (3 years & older) (PF)    Elevated bilirubin  -     US Abdomen Limited; Future; Expected date: 03/23/2018  -     Hepatic function panel; Future; Expected date: 04/23/2018    Direct hyperbilirubinemia  -     Ambulatory referral to Gastroenterology        Medication List with Changes/Refills   Current Medications    ASPIRIN (ECOTRIN) 81 MG EC TABLET    Take 81 mg by mouth once daily.    ATORVASTATIN (LIPITOR) 80 MG TABLET    Take 1 tablet (80 mg total) by mouth once daily.    CETIRIZINE (ZYRTEC) 10 MG TABLET    Take 10 mg by mouth once daily.    COLCHICINE (COLCRYS) 0.6 MG TABLET    Take 1 tablet (0.6 mg total) by mouth 2 (two) times daily.    DOXAZOSIN (CARDURA) 1 MG TABLET    Take 1 mg by mouth every evening.    FEBUXOSTAT (ULORIC) 40 MG TAB     Take 2 tablets (80 mg total) by mouth once daily.    LISINOPRIL (PRINIVIL,ZESTRIL) 20 MG TABLET    Take 1 tablet (20 mg total) by mouth once daily.    METOPROLOL TARTRATE (LOPRESSOR) 25 MG TABLET    Take 1 tablet (25 mg total) by mouth 2 (two) times daily.    MULTIVITAMIN/IRON/FOLIC ACID (CENTRUM COMPLETE ORAL)    Take by mouth once daily at 6am.    TICAGRELOR (BRILINTA) 90 MG TABLET    Take 1 tablet (90 mg total) by mouth 2 (two) times daily.   Discontinued Medications    TURMERIC ROOT EXTRACT 500 MG CAP    Take by mouth once daily at 6am.       Follow-up in about 1 year (around 3/23/2019) for wellness and preventive services.    ABOUT THIS DOCUMENTATION:  · The order of the conditions listed in the HPI is one of convenience and does not necessarily reflect the chronology of the appointment, nor the relative importance of a condition. It is possible that additional description or status details about condition(s) may be found elsewhere in the documentation for today's encounter.  · Documentation entered by me for this encounter was done in part using speech-recognition technology. Although I have made an effort to ensure accuracy, malapropisms may exist and should be interpreted in context.                        -KEVIN Roberson MD    There are no Patient Instructions on file for this visit.

## 2018-03-24 PROBLEM — E80.6 DIRECT HYPERBILIRUBINEMIA: Chronic | Status: ACTIVE | Noted: 2018-03-24

## 2018-03-24 NOTE — PROGRESS NOTES
"Estella BenderAce Newman is a very nice young man who had bariatric surgical procedure about a year ago and has lost a substantial amount of weight through therapeutic lifestyle changes. He has persistent mild direct hyperbilirubinemia. I'm awaiting his liver ultrasound results.    REQUEST: Would you please have someone from your staff contact him to schedule an appointment for him? (His appointment is NOT urgent.)    Thanks!  KEVIN Roberson MD  --------------------------------------------------------------------------------   PATIENT CONTACT INFORMATION  Home Phone      [phone]  Work Phone      Not on file.  Mobile          [phone]    --------------------------------------------------------------------------------   Robb's future appointments include:  3/26/2018  10:00 AM   [address] ULSOUND   [place]  4/9/2018   9:30 AM    SPECIMEN, KAELA           ON SPECLAB   [place]  4/9/2018   9:50 AM    LABORATORY, KAELA CHRISTIAN    ON LAB       [place]  4/16/2018  9:30 AM    Tracee Munoz MD    [facility] NEPHRO    [place]  6/14/2018  8:00 AM    LABORATORY, KAELA CHRISTIAN    ON LAB       [place]  6/20/2018  8:00 AM    Gatito Hills MD  [facility] RHEUM     [place]  7/24/2018  8:00 AM    Elizabeth Lejeune, NP      [facility] SLEEP     [place]  --------------------------------------------------------------------------------    Robb Bender.    Your bilirubin is slightly improved, but it still remains mildly elevated. I cannot say for sure at this point that I know the cause of your elevated bilirubin. I will know more when I have your liver ultrasound results. I strongly suspect that your elevated bilirubin is due to a completely benign condition, but I recommend that we get a second opinion from a gastroenterologist. Someone from [facility] should be contacting you soon to schedule that appointment.    To learn more about your test results and what they mean, click on the "About this test" link from your " [facility] account ([email or web address]) or from your [facility] smartphone vinicio. Also, we can discuss your results further when you return for your next appointment.    Thank you for letting me care for you. I look forward to seeing you again. Let me know if you have any questions in the meantime.    Wishing you health and happiness,    KEVIN Roberson MD  ----------------------------------------------------------------  UPCOMING APPOINTMENTS  ----------------------------------------------------------------  Your future appointments include:  3/26/2018  10:00 AM   [address] ULSOUND   [facility]  4/9/2018   9:30 AM    SPECIMEN, KAELA           ON SPECLAB   [place]  4/9/2018   9:50 AM    LABORATORY, KAELA CHRISTIAN    ON LAB       [place]  4/16/2018  9:30 AM    Tracee Munoz MD    [facility] NEPHRO    [place]  6/14/2018  8:00 AM    LABORATORY, KAELA CHRISTIAN    ON LAB       [place]  6/20/2018  8:00 AM    Gatito Hills MD  [facility] RHEUM     [place]  7/24/2018  8:00 AM    Elizabeth Lejeune, NP      [facility] SLEEP     [place]    ----------------------------------------------------------------  TO SCHEDULE OR CHANGE AN APPOINTMENT  ----------------------------------------------------------------  *Login to your [facility] account at [email or web address]  *Use the [employer] vinicio on your mobile device   or  *Call our appointment desk at [phone].    ----------------------------------------------------------------  ADDITIONAL IMPORTANT INFORMATION  ----------------------------------------------------------------  *[facility] is NOT to be used for urgent needs.  *Although we try to respond to messages within 2 business days, a response can take longer, depending on circumstances.  *For medical emergencies, dial 911.    You can get help with [facility] and [facility] by email at  [email or web address] or by phone at [phone]. The [facility] Patient Support Team is available Monday through Friday from 9  a.m. until 5 p.m.  (After hours, you can leave a message requesting assistance.)

## 2018-03-26 ENCOUNTER — HOSPITAL ENCOUNTER (OUTPATIENT)
Dept: RADIOLOGY | Facility: HOSPITAL | Age: 38
Discharge: HOME | End: 2018-03-26
Attending: FAMILY MEDICINE
Payer: COMMERCIAL

## 2018-03-26 DIAGNOSIS — R17 ELEVATED BILIRUBIN: ICD-10-CM

## 2018-03-26 PROCEDURE — 76705 ECHO EXAM OF ABDOMEN: CPT | Mod: TC,PO

## 2018-03-26 PROCEDURE — 76705 ECHO EXAM OF ABDOMEN: CPT | Mod: 26,,, | Performed by: RADIOLOGY

## 2018-03-27 ENCOUNTER — TELEPHONE (OUTPATIENT)
Dept: GASTROENTEROLOGY | Facility: CLINIC | Age: 38
End: 2018-03-27

## 2018-03-27 NOTE — TELEPHONE ENCOUNTER
----- Message from KEVIN Roberson MD sent at 3/24/2018  1:57 PM CDT -----  Estella BenderAce Newman is a very nice young man who had bariatric surgical procedure about a year ago and has lost a substantial amount of weight through therapeutic lifestyle changes. He has persistent mild direct hyperbilirubinemia. I'm awaiting his liver ultrasound results.    REQUEST: Would you please have someone from your staff contact him to schedule an appointment for him? (His appointment is NOT urgent.)    Thanks!  KEVIN Roberson MD  --------------------------------------------------------------------------------   PATIENT CONTACT INFORMATION  Home Phone      [phone]  Work Phone      Not on file.  Mobile          [phone]     --------------------------------------------------------------------------------   Robb's future appointments include:  3/26/2018  10:00 AM   [address] ULSOUND   [place]  4/9/2018   9:30 AM    SPECIMEN, KAELA           ON SPECLAB   [place]  4/9/2018   9:50 AM    LABORATORY, KAELA CHRISTIAN    ON LAB       [place]  4/16/2018  9:30 AM    Tracee Munoz MD    [facility] NEPHRO    [place]  6/14/2018  8:00 AM    LABORATORYKAELA    ON LAB       [place]  6/20/2018  8:00 AM    Gatito Hills MD  [facility] RHEUM     [place]  7/24/2018  8:00 AM    Elizabeth Lejeune, NP      [facility] SLEEP     [place]  --------------------------------------------------------------------------------    Robb Bender.    Your bilirubin is slightly improved, but it still remains mildly elevated. I cannot say for sure at this point that I know the cause of your elevated bilirubin. I will know more when I have your liver ultrasound results. I strongly suspect that your elevated bilirubin is due to a completely benign condition, but I recommend that we get a second opinion from a gastroenterologist. Someone from [facility] should be contacting you soon to schedule that appointment.    To learn more about your test  "results and what they mean, click on the "About this test" link from your [facility] account ([email or web address]) or from your [facility] smartphone vinicio. Also, we can discuss your results further when you return for your next appointment.    Thank you for letting me care for you. I look forward to seeing you again. Let me know if you have any questions in the meantime.    Wishing you health and happiness,    KEVIN Roberson MD  ----------------------------------------------------------------  UPCOMING APPOINTMENTS  ----------------------------------------------------------------  Your future appointments include:  3/26/2018  10:00 AM   [address] ULSOUND   [facility]  4/9/2018   9:30 AM    SPECIMEN, KAELA           ON SPECLAB   [place]  4/9/2018   9:50 AM    LABORATORY, KAELA CHRISTIAN    ON LAB       [place]  4/16/2018  9:30 AM    Tracee Munoz MD    [facility] NEPHRO    [place]  6/14/2018  8:00 AM    LABORATORY, KAELA CHRISTIAN    ON LAB       [place]  6/20/2018  8:00 AM    Gatito Hills MD  [facility] RHEUM     [place]  7/24/2018  8:00 AM    Elizabeth Lejeune, NP      [facility] SLEEP     [place]    ----------------------------------------------------------------  TO SCHEDULE OR CHANGE AN APPOINTMENT  ----------------------------------------------------------------  #Login to your [facility] account at [email or web address]  #Use the [facility] vinicio on your mobile device   or  #Call our appointment desk at [phone].    ----------------------------------------------------------------  ADDITIONAL IMPORTANT INFORMATION  ----------------------------------------------------------------  #[facility] is NOT to be used for urgent needs.  #Although we try to respond to messages within 2 business days, a response can take longer, depending on circumstances.  #For medical emergencies, dial 911.    You can get help with [facility] and [facility] by email at  [email or web address] or by phone at [phone]. The " [facility] Patient Support Team is available Monday through Friday from 9 a.m. until 5 p.m.  (After hours, you can leave a message requesting assistance.)

## 2018-03-27 NOTE — TELEPHONE ENCOUNTER
Have attempted to contact pt twice today to make appt per request of Dr. Pihlippe Roberson. Have left 2 messages and offered pt the opportunity to make his own appt through the patient portal as well.

## 2018-03-27 NOTE — PROGRESS NOTES
"TASKS:  (1) Set a "Remind Me" reminder in Epic to verify in 1 week that he has read the patient portal message I sent with my interpretation of his test results.  (2) If he has not read the message by then, call him to share my message with him by phone.  (3) If you can't reach him by phone (personally, not voice-mail) after trying for 2 business days, then copy-and-past my message in a letter (correspondence) to him using the "[email or web address]" Smart Phrase, and print and mail the letter to him.    Thanks!      KEVIN Roberson MD  --------------------------------------------------------------------------------   PATIENT CONTACT INFORMATION  Home Phone      [phone]  Work Phone      Not on file.  [place]          [phone]    --------------------------------------------------------------------------------   Robb's future appointments include:  4/9/2018   9:30 AM    SPECIMEN, KAELA WEINBERG SPECLAB   [place]  4/9/2018   9:50 AM    LABORATORY, KAELA CHRISTIAN    ON LAB       [place]  4/16/2018  9:30 AM    Tracee Munoz MD    [facility] NEPHRO    [place]  6/14/2018  8:00 AM    LABORATORYKAELA    ON LAB       [place]  6/20/2018  8:00 AM    Gatito Hills MD  [facility] RHEUM     [place]  7/24/2018  8:00 AM    Elizabeth Lejeune, NP      [facility] SLEEP     [place]  --------------------------------------------------------------------------------    Robb Bender.    Your liver ultrasound showed that you have gallstones, but it did not show evidence of a gallbladder infection or other problem with your liver, pancreas, or kidneys. You may need additional testing to determine if the gallstones are the cause of your problem. I've already referred you to our gastroenterology department for additional evaluation. Someone from [facility] should be contacting you soon to schedule that appointment. If you have not heard from them within the next few days, please send me a message to let me know.    To learn more about your test " "results and what they mean, click on the "About this test" link from your [facility] account ([email or web address]) or from your [employer] smartphone vinicio. Also, we can discuss your results further when you return for your next appointment.    Thank you for letting me care for you. I look forward to seeing you again. Let me know if you have any questions in the meantime.    Wishing you health and happiness,    KEVIN Roberson MD  ----------------------------------------------------------------  UPCOMING APPOINTMENTS  ----------------------------------------------------------------  Your future appointments include:  4/9/2018   9:30 AM    SPECIMEN, KAELA           ON SPECLAB   [place]  4/9/2018   9:50 AM    LABORATORY, KAELA CHRISTIAN    ON LAB       [place]  4/16/2018  9:30 AM    Tracee Munoz MD    [facility] NEPHRO    [place]  6/14/2018  8:00 AM    LABORATORY, KAELA CHRISTIAN    ON LAB       [place]  6/20/2018  8:00 AM    Gatito Hills MD  [facility] RHEUM     [place]  7/24/2018  8:00 AM    Elizabeth Lejeune, NP      [facility] SLEEP     [place]    ----------------------------------------------------------------  TO SCHEDULE OR CHANGE AN APPOINTMENT  ----------------------------------------------------------------  *Login to your [facility] account at [email or web address]  *Use the [employer] vinicio on your mobile device   or  *Call our appointment desk at [phone].    ----------------------------------------------------------------  ADDITIONAL IMPORTANT INFORMATION  ----------------------------------------------------------------  *[employer] is NOT to be used for urgent needs.  *Although we try to respond to messages within 2 business days, a response can take longer, depending on circumstances.  *For medical emergencies, dial 911.    You can get help with [facility] and [facility] by email at  [email or web address] or by phone at [phone]. The [facility] Patient Support Team is available Monday through Friday " from 9 a.m. until 5 p.m.  (After hours, you can leave a message requesting assistance.)

## 2018-04-03 ENCOUNTER — OFFICE VISIT (OUTPATIENT)
Dept: GASTROENTEROLOGY | Facility: CLINIC | Age: 38
End: 2018-04-03
Payer: COMMERCIAL

## 2018-04-03 VITALS
WEIGHT: 242.5 LBS | HEIGHT: 75 IN | DIASTOLIC BLOOD PRESSURE: 98 MMHG | BODY MASS INDEX: 30.15 KG/M2 | SYSTOLIC BLOOD PRESSURE: 142 MMHG | HEART RATE: 60 BPM

## 2018-04-03 DIAGNOSIS — E80.6 HYPERBILIRUBINEMIA: Primary | ICD-10-CM

## 2018-04-03 DIAGNOSIS — K80.20 CALCULUS OF GALLBLADDER WITHOUT CHOLECYSTITIS WITHOUT OBSTRUCTION: ICD-10-CM

## 2018-04-03 PROCEDURE — 99999 PR PBB SHADOW E&M-EST. PATIENT-LVL III: CPT | Mod: PBBFAC,,, | Performed by: NURSE PRACTITIONER

## 2018-04-03 PROCEDURE — 3080F DIAST BP >= 90 MM HG: CPT | Mod: CPTII,S$GLB,, | Performed by: NURSE PRACTITIONER

## 2018-04-03 PROCEDURE — 3077F SYST BP >= 140 MM HG: CPT | Mod: CPTII,S$GLB,, | Performed by: NURSE PRACTITIONER

## 2018-04-03 PROCEDURE — 99204 OFFICE O/P NEW MOD 45 MIN: CPT | Mod: S$GLB,,, | Performed by: NURSE PRACTITIONER

## 2018-04-04 NOTE — PROGRESS NOTES
Clinic Consult:  [facility] Consultation Note    Reason for Consult:  The primary encounter diagnosis was Hyperbilirubinemia. A diagnosis of Calculus of gallbladder without cholecystitis without obstruction was also pertinent to this visit.    PCP: SABIHA Roberson       HPI:  This is a 37 y.o. male here for evaluation of the above  Pt states that he was recently seen by his PCP for wellness exam.  At that time, labs were completed and his bilirubin was found to be slightly elevated.  Further workup included an abdominal US which showed Cholelithiasis with at least 2 mobile calculi noted.  Largest measures 6.4 mm..  No sonographic Suero's sign.  Gallbladder wall thickness 2.5 mm.  Nonmobile focal nodularity noted most consistent with a polyp measuring 3.8 mm.  Pt states that he has overall been feeling well without any abdominal pain, nausea, vomiting or diarrhea.   He denies any previous knowledge of elevated bilirubin.  All other LFTs are WNL.   PMH includes gastric sleeve with multiple surgical complications post surgery.       Review of Systems   Constitutional: Negative for chills, fever, malaise/fatigue and weight loss.   Respiratory: Negative for cough.    Cardiovascular: Negative for chest pain.   Gastrointestinal:        Per HPI   Musculoskeletal: Negative for myalgias.   Skin: Negative for itching and rash.   Neurological: Negative for headaches.   Psychiatric/Behavioral: The patient is not nervous/anxious.        Medical History:   Past Medical History:   Diagnosis Date    Allergic rhinitis     Direct hyperbilirubinemia 3/24/2018    Elevated bilirubin 3/21/2018    GERD (gastroesophageal reflux disease)     Gout     Hyperlipidemia     Hypertension associated with chronic kidney disease due to type 2 diabetes mellitus     Long term (current) use of insulin     MI (myocardial infarction) 07/2017    Obesity     HENRY on CPAP     Proteinuria     Steatohepatitis     Fatty Liver     Type 2 diabetes mellitus with diabetic nephropathy     Type 2 diabetes mellitus with hyperglycemia     Type 2 diabetes mellitus with renal manifestations        Surgical History:  Past Surgical History:   Procedure Laterality Date    CARDIAC CATHETERIZATION      LASIK Bilateral     LIVER BIOPSY      NASAL ENDOSCOPY      NASAL SEPTUM SURGERY      SLEEVE GASTROPLASTY  03/06/2017       Family History:   Family History   Problem Relation Age of Onset    Diabetes type II Mother     Hypertension Mother     Hyperlipidemia Mother     Diabetes type II Brother     Diabetes type II Brother        Social History:   Social History   Substance Use Topics    Smoking status: Never Smoker    Smokeless tobacco: Never Used    Alcohol use Yes      Comment: 1-2 times per month       Allergies: Reviewed    Home Medications:   Current Outpatient Prescriptions on File Prior to Visit   Medication Sig Dispense Refill    aspirin (ECOTRIN) 81 MG EC tablet Take 81 mg by mouth once daily.      atorvastatin (LIPITOR) 80 MG tablet Take 1 tablet (80 mg total) by mouth once daily. 90 tablet 3    cetirizine (ZYRTEC) 10 MG tablet Take 10 mg by mouth once daily.      colchicine (COLCRYS) 0.6 mg tablet Take 1 tablet (0.6 mg total) by mouth 2 (two) times daily. (Patient taking differently: Take 0.6 mg by mouth as needed. ) 60 tablet 11    doxazosin (CARDURA) 1 MG tablet Take 1 mg by mouth every evening.      febuxostat (ULORIC) 40 mg Tab Take 2 tablets (80 mg total) by mouth once daily. 180 tablet 1    lisinopril (PRINIVIL,ZESTRIL) 20 MG tablet Take 1 tablet (20 mg total) by mouth once daily. 90 tablet 3    metoprolol tartrate (LOPRESSOR) 25 MG tablet Take 1 tablet (25 mg total) by mouth 2 (two) times daily. 180 tablet 3    MULTIVITAMIN/IRON/FOLIC ACID (CENTRUM COMPLETE ORAL) Take by mouth once daily at 6am.      ticagrelor (BRILINTA) 90 mg tablet Take 1 tablet (90 mg total) by mouth 2 (two) times daily. 180 tablet 3     No  "current facility-administered medications on file prior to visit.        Physical Exam:  Vital Signs:  BP (!) 142/98   Pulse 60   Ht 6' 3" (1.905 m)   Wt 110 kg (242 lb 8.1 oz)   BMI 30.31 kg/m²   Body mass index is 30.31 kg/m².  Physical Exam   Constitutional: He is oriented to person, place, and time. He appears well-developed and well-nourished.   HENT:   Head: Normocephalic.   Eyes: No scleral icterus.   Neck: Normal range of motion.   Cardiovascular: Normal rate and regular rhythm.    Pulmonary/Chest: Effort normal and breath sounds normal.   Abdominal: Soft. Bowel sounds are normal. He exhibits no distension. There is no tenderness.   Musculoskeletal: Normal range of motion.   Neurological: He is alert and oriented to person, place, and time.   Skin: Skin is warm and dry.   Psychiatric: He has a normal mood and affect.   Vitals reviewed.      Labs: Pertinent labs reviewed.      Assessment:  1. Hyperbilirubinemia    2. Calculus of gallbladder without cholecystitis without obstruction         Recommendations:  - Possible Gilbert's  - GIven that he is having no symptoms suggesting issues with his gallbladder, will hold off on surgical evaluation at this time  - Will repeat labs in 6-8 weeks for trending.   - If he starts with abdominal symptoms, he will call for referral to surgery.  - Will have Dr. Ortega review images.  Further recs to follow if needed.   Hyperbilirubinemia    Calculus of gallbladder without cholecystitis without obstruction          Follow up to be determined by results of labs.        Thank you so much for allowing me to participate in the care of ASHWINI Yousif  "

## 2018-04-09 ENCOUNTER — LAB VISIT (OUTPATIENT)
Dept: LAB | Facility: HOSPITAL | Age: 38
End: 2018-04-09
Attending: INTERNAL MEDICINE
Payer: COMMERCIAL

## 2018-04-09 DIAGNOSIS — M1A.3620 CHRONIC GOUT OF LEFT KNEE DUE TO RENAL IMPAIRMENT WITHOUT TOPHUS: ICD-10-CM

## 2018-04-09 DIAGNOSIS — N18.2 STAGE 2 CHRONIC KIDNEY DISEASE: ICD-10-CM

## 2018-04-09 DIAGNOSIS — R80.1 PERSISTENT PROTEINURIA: ICD-10-CM

## 2018-04-09 DIAGNOSIS — I10 BENIGN ESSENTIAL HYPERTENSION: ICD-10-CM

## 2018-04-09 LAB
ALBUMIN SERPL BCP-MCNC: 3.7 G/DL
ANION GAP SERPL CALC-SCNC: 10 MMOL/L
BUN SERPL-MCNC: 21 MG/DL
CALCIUM SERPL-MCNC: 9.1 MG/DL
CHLORIDE SERPL-SCNC: 103 MMOL/L
CO2 SERPL-SCNC: 28 MMOL/L
CREAT SERPL-MCNC: 1.5 MG/DL
EST. GFR  (AFRICAN AMERICAN): >60 ML/MIN/1.73 M^2
EST. GFR  (NON AFRICAN AMERICAN): 59 ML/MIN/1.73 M^2
GLUCOSE SERPL-MCNC: 137 MG/DL
PHOSPHATE SERPL-MCNC: 3.2 MG/DL
POTASSIUM SERPL-SCNC: 3.9 MMOL/L
SODIUM SERPL-SCNC: 141 MMOL/L

## 2018-04-09 PROCEDURE — 80069 RENAL FUNCTION PANEL: CPT

## 2018-04-09 PROCEDURE — 82610 CYSTATIN C: CPT

## 2018-04-09 PROCEDURE — 36415 COLL VENOUS BLD VENIPUNCTURE: CPT

## 2018-04-11 LAB
CYSTATIN C SERPL-MCNC: 1.31 MG/L
GFR/BSA.PRED SERPLBLD CYS-BASED-ARV: 60 ML/MIN/BSA

## 2018-04-13 DIAGNOSIS — M1A.09X1 IDIOPATHIC CHRONIC GOUT OF MULTIPLE SITES WITH TOPHUS: ICD-10-CM

## 2018-04-16 ENCOUNTER — OFFICE VISIT (OUTPATIENT)
Dept: NEPHROLOGY | Facility: CLINIC | Age: 38
End: 2018-04-16
Payer: COMMERCIAL

## 2018-04-16 VITALS
HEIGHT: 75 IN | HEART RATE: 66 BPM | DIASTOLIC BLOOD PRESSURE: 102 MMHG | WEIGHT: 245.56 LBS | BODY MASS INDEX: 30.53 KG/M2 | SYSTOLIC BLOOD PRESSURE: 150 MMHG

## 2018-04-16 DIAGNOSIS — E11.21 TYPE 2 DIABETES MELLITUS WITH DIABETIC NEPHROPATHY, WITHOUT LONG-TERM CURRENT USE OF INSULIN: ICD-10-CM

## 2018-04-16 DIAGNOSIS — M1A.3620 CHRONIC GOUT OF LEFT KNEE DUE TO RENAL IMPAIRMENT WITHOUT TOPHUS: ICD-10-CM

## 2018-04-16 DIAGNOSIS — I10 BENIGN ESSENTIAL HYPERTENSION: ICD-10-CM

## 2018-04-16 DIAGNOSIS — I10 HTN (HYPERTENSION), BENIGN: ICD-10-CM

## 2018-04-16 DIAGNOSIS — N18.2 STAGE 2 CHRONIC KIDNEY DISEASE: Primary | ICD-10-CM

## 2018-04-16 DIAGNOSIS — R80.1 PERSISTENT PROTEINURIA: ICD-10-CM

## 2018-04-16 PROCEDURE — 3044F HG A1C LEVEL LT 7.0%: CPT | Mod: CPTII,S$GLB,, | Performed by: INTERNAL MEDICINE

## 2018-04-16 PROCEDURE — 99999 PR PBB SHADOW E&M-EST. PATIENT-LVL II: CPT | Mod: PBBFAC,,, | Performed by: INTERNAL MEDICINE

## 2018-04-16 PROCEDURE — 3080F DIAST BP >= 90 MM HG: CPT | Mod: CPTII,S$GLB,, | Performed by: INTERNAL MEDICINE

## 2018-04-16 PROCEDURE — 3077F SYST BP >= 140 MM HG: CPT | Mod: CPTII,S$GLB,, | Performed by: INTERNAL MEDICINE

## 2018-04-16 PROCEDURE — 99214 OFFICE O/P EST MOD 30 MIN: CPT | Mod: S$GLB,,, | Performed by: INTERNAL MEDICINE

## 2018-04-16 RX ORDER — LISINOPRIL 40 MG/1
40 TABLET ORAL 2 TIMES DAILY
Qty: 180 TABLET | Refills: 3 | Status: SHIPPED | OUTPATIENT
Start: 2018-04-16 | End: 2018-12-22 | Stop reason: SDUPTHER

## 2018-04-16 RX ORDER — FEBUXOSTAT 40 MG/1
TABLET ORAL
Qty: 270 TABLET | Refills: 1 | OUTPATIENT
Start: 2018-04-16

## 2018-04-16 NOTE — PROGRESS NOTES
Subjective:       Patient ID: Robb Iglesias is a 37 y.o. White male who presents for new evaluation of Chronic Kidney Disease and Proteinuria    Hypertension   Pertinent negatives include no chest pain, headaches, neck pain, palpitations or shortness of breath.        Patient is a 36-year-old male with history of type 2 diabetes with morbid obesity.  Patient has multiple family members with type 2 diabetes with multiple complications.  Patient has history of chronic kidney disease stage III from diabetic nephropathy.  In February 2016 his creatinine was 2.5 with approximate GFR 35%.  His hemoglobin A1c was 10.9 at that time.    2015 has history of hepatomegaly status post liver biopsy    March 2017 patient underwent vertical sleeve gastrectomy for bariatric surgery.  His postoperative course was complicated by acute kidney injury from dehydration.  His maximum creatinine was 7.7 with a BUN of 126 no RRT required     May 2017 creatinine down to 1.5    June 2017 patient presents for consultation for chronic kidney disease with proteinuria and hematuria.  His BMI down to 29 and has lost greater than 80 pounds in last 3 months. ( 320 ib pre-op) . 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  He underwent LHC that showed 99% involving proximal and mid LAD which was treated with PCI. LVEF 55%. Cardiac medication include ASA, Brilinta, STATIN, Metoprolol, and Lisinopril.    September 2017 acute gout attack seen by rheumatology records reviewed. Cystatin C shows GFR 67%          Review of Systems   Constitutional: Negative.  Negative for activity change, appetite change, chills, diaphoresis, fatigue and fever.   HENT: Negative.  Negative for congestion and trouble  "swallowing.    Eyes: Negative.    Respiratory: Negative.  Negative for cough, chest tightness, shortness of breath and wheezing.    Cardiovascular: Negative.  Negative for chest pain, palpitations and leg swelling.   Gastrointestinal: Negative.  Negative for abdominal distention, abdominal pain, nausea and vomiting.   Genitourinary: Negative.  Negative for decreased urine volume, difficulty urinating, dysuria, enuresis, flank pain, frequency, hematuria, penile swelling, scrotal swelling and urgency.   Musculoskeletal: Negative.  Negative for arthralgias, back pain, joint swelling and neck pain.   Skin: Negative for rash.   Neurological: Negative.  Negative for tremors, seizures and headaches.   Psychiatric/Behavioral: Negative.  Negative for confusion and sleep disturbance. The patient is not nervous/anxious.        Objective:   BP (!) 150/102 (BP Location: Right arm, Patient Position: Sitting)   Pulse 66   Ht 6' 3" (1.905 m)   Wt 111.4 kg (245 lb 9.5 oz)   BMI 30.70 kg/m²         Weight loss of 13 kg in April 2017 down from 118 kg down to 106.8 mg  Physical Exam   Constitutional: He is oriented to person, place, and time. He appears well-developed and well-nourished. No distress.   HENT:   Head: Normocephalic.   Eyes: EOM are normal. Pupils are equal, round, and reactive to light.   Neck: Normal range of motion. Neck supple. No JVD present. No thyromegaly present.   Cardiovascular: Normal rate, regular rhythm, S1 normal, S2 normal, normal heart sounds and intact distal pulses.  PMI is not displaced.  Exam reveals no gallop and no friction rub.    No murmur heard.  Pulmonary/Chest: Effort normal and breath sounds normal. No respiratory distress. He has no wheezes. He has no rales. He exhibits no tenderness.   Abdominal: He exhibits no distension and no mass. There is no hepatosplenomegaly. There is no tenderness. There is no rebound and no CVA tenderness. No hernia.   Musculoskeletal: Normal range of motion. He " exhibits no edema or tenderness.   Lymphadenopathy:     He has no cervical adenopathy.   Neurological: He is alert and oriented to person, place, and time. He has normal reflexes. He is not disoriented. He displays normal reflexes. No cranial nerve deficit. He exhibits normal muscle tone. Coordination normal.   Skin: Skin is warm and dry. No rash noted. No erythema.   Psychiatric: He has a normal mood and affect. His behavior is normal. Judgment and thought content normal.         Lab Results   Component Value Date    CREATININE 1.5 (H) 04/09/2018    BUN 21 (H) 04/09/2018     04/09/2018    K 3.9 04/09/2018     04/09/2018    CO2 28 04/09/2018     Lab Results   Component Value Date    WBC 5.10 09/13/2017    HGB 13.6 (L) 09/13/2017    HCT 40.5 09/13/2017    MCV 82 09/13/2017     09/13/2017     Lab Results   Component Value Date    PTH 29.0 09/13/2017    CALCIUM 9.1 04/09/2018    PHOS 3.2 04/09/2018         Lab Results   Component Value Date    URICACID 6.9 03/20/2018     Urine protein is 1.92 g per 24 hours down from 2.57 g per 24 hours    Assessment:    )    1. Stage 2 chronic kidney disease    2. Persistent proteinuria    3. Benign essential hypertension    4. 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 HTN (hypertension), benign    6. Type 2 diabetes mellitus with diabetic nephropathy, without long-term current use of insulin        Plan:         1.  Status post acute gout: Uric acid is still high.  Continued on colchicine.  Uric acid has been fluctuating between 5.6 and 7.1.  Discussed with the patient about Uloric being now at 120 mg followed by Dr. Hills.     2.  Chronic kidney disease stage II: much improved after bariatric surgery.  Approximate GFR >60% based on Cystatin C with the corresponding creatinine ranging between 1.2 and 1.5..  Avoid nonsteroidals.  Normal vitamin D levels and PTH      3.   proteinuria:renal ultrasound was done in 3/2017.Most likely  these findings are from diabetic nephropathy.  Since his blood pressures running 120 systolic I would increase  lisinopril at 40 mg and follow-up raise to 40 mg BID     Proteinuria is following closely with weight loss/gain ? 2FSGS d/w patient    4.  Hypertension well-controlled.   More recent BP check and re-check ( myself) SBP in range of 110-120. If it stays this low stop cardura/ doxazosin at some point. IN case of dehydration ( Nausea /diarrhea hold lisisnopril to avoid kidney shut down)      5.  Coronary artery disease status post left heart catheterization: Medications and cardiology report reviewed.  Continue current medications      fup 3 months

## 2018-04-22 ENCOUNTER — HOSPITAL ENCOUNTER (INPATIENT)
Facility: HOSPITAL | Age: 38
LOS: 1 days | Discharge: HOME | DRG: 250 | End: 2018-04-24
Attending: EMERGENCY MEDICINE | Admitting: INTERNAL MEDICINE
Payer: COMMERCIAL

## 2018-04-22 DIAGNOSIS — Z98.84 BARIATRIC SURGERY STATUS: ICD-10-CM

## 2018-04-22 DIAGNOSIS — I21.4 NSTEMI (NON-ST ELEVATED MYOCARDIAL INFARCTION): ICD-10-CM

## 2018-04-22 DIAGNOSIS — R07.9 CHEST PAIN: Primary | ICD-10-CM

## 2018-04-22 PROBLEM — I25.10 CORONARY ARTERY DISEASE INVOLVING NATIVE CORONARY ARTERY OF NATIVE HEART WITHOUT ANGINA PECTORIS: Chronic | Status: ACTIVE | Noted: 2018-02-06

## 2018-04-22 LAB
ALBUMIN SERPL BCP-MCNC: 3.7 G/DL
ALP SERPL-CCNC: 83 U/L
ALT SERPL W/O P-5'-P-CCNC: 43 U/L
ANION GAP SERPL CALC-SCNC: 8 MMOL/L
AST SERPL-CCNC: 28 U/L
BASOPHILS # BLD AUTO: 0.02 K/UL
BASOPHILS NFR BLD: 0.2 %
BILIRUB SERPL-MCNC: 1.4 MG/DL
BNP SERPL-MCNC: 103 PG/ML
BUN SERPL-MCNC: 30 MG/DL
CALCIUM SERPL-MCNC: 9.3 MG/DL
CHLORIDE SERPL-SCNC: 106 MMOL/L
CK MB SERPL-MCNC: 5.7 NG/ML
CK MB SERPL-RTO: 5.9 %
CK SERPL-CCNC: 97 U/L
CO2 SERPL-SCNC: 26 MMOL/L
CREAT SERPL-MCNC: 1.5 MG/DL
DIFFERENTIAL METHOD: ABNORMAL
EOSINOPHIL # BLD AUTO: 0.1 K/UL
EOSINOPHIL NFR BLD: 1 %
ERYTHROCYTE [DISTWIDTH] IN BLOOD BY AUTOMATED COUNT: 14.3 %
EST. GFR  (AFRICAN AMERICAN): >60 ML/MIN/1.73 M^2
EST. GFR  (NON AFRICAN AMERICAN): 59 ML/MIN/1.73 M^2
GLUCOSE SERPL-MCNC: 142 MG/DL
HCT VFR BLD AUTO: 43.8 %
HGB BLD-MCNC: 15.3 G/DL
LYMPHOCYTES # BLD AUTO: 1.8 K/UL
LYMPHOCYTES NFR BLD: 17.6 %
MAGNESIUM SERPL-MCNC: 2.1 MG/DL
MCH RBC QN AUTO: 28.8 PG
MCHC RBC AUTO-ENTMCNC: 34.9 G/DL
MCV RBC AUTO: 82 FL
MONOCYTES # BLD AUTO: 0.9 K/UL
MONOCYTES NFR BLD: 8.6 %
NEUTROPHILS # BLD AUTO: 7.6 K/UL
NEUTROPHILS NFR BLD: 72.6 %
PLATELET # BLD AUTO: 163 K/UL
PMV BLD AUTO: 9.6 FL
POTASSIUM SERPL-SCNC: 4.1 MMOL/L
PROT SERPL-MCNC: 6.6 G/DL
RBC # BLD AUTO: 5.32 M/UL
SODIUM SERPL-SCNC: 140 MMOL/L
TROPONIN I SERPL DL<=0.01 NG/ML-MCNC: 0.08 NG/ML
TROPONIN I SERPL DL<=0.01 NG/ML-MCNC: 0.26 NG/ML
WBC # BLD AUTO: 10.42 K/UL

## 2018-04-22 PROCEDURE — 85025 COMPLETE CBC W/AUTO DIFF WBC: CPT

## 2018-04-22 PROCEDURE — 84484 ASSAY OF TROPONIN QUANT: CPT

## 2018-04-22 PROCEDURE — 25000003 PHARM REV CODE 250: Performed by: NURSE PRACTITIONER

## 2018-04-22 PROCEDURE — 83880 ASSAY OF NATRIURETIC PEPTIDE: CPT

## 2018-04-22 PROCEDURE — 93010 ELECTROCARDIOGRAM REPORT: CPT | Mod: ,,, | Performed by: INTERNAL MEDICINE

## 2018-04-22 PROCEDURE — G0378 HOSPITAL OBSERVATION PER HR: HCPCS

## 2018-04-22 PROCEDURE — 82550 ASSAY OF CK (CPK): CPT

## 2018-04-22 PROCEDURE — 25000003 PHARM REV CODE 250: Performed by: EMERGENCY MEDICINE

## 2018-04-22 PROCEDURE — 82553 CREATINE MB FRACTION: CPT

## 2018-04-22 PROCEDURE — 96374 THER/PROPH/DIAG INJ IV PUSH: CPT

## 2018-04-22 PROCEDURE — 84484 ASSAY OF TROPONIN QUANT: CPT | Mod: 91

## 2018-04-22 PROCEDURE — 96372 THER/PROPH/DIAG INJ SC/IM: CPT

## 2018-04-22 PROCEDURE — 63600175 PHARM REV CODE 636 W HCPCS: Performed by: NURSE PRACTITIONER

## 2018-04-22 PROCEDURE — 83735 ASSAY OF MAGNESIUM: CPT

## 2018-04-22 PROCEDURE — 80053 COMPREHEN METABOLIC PANEL: CPT

## 2018-04-22 PROCEDURE — 99285 EMERGENCY DEPT VISIT HI MDM: CPT | Mod: 25

## 2018-04-22 RX ORDER — MORPHINE SULFATE 4 MG/ML
2 INJECTION, SOLUTION INTRAMUSCULAR; INTRAVENOUS EVERY 4 HOURS PRN
Status: DISCONTINUED | OUTPATIENT
Start: 2018-04-22 | End: 2018-04-22

## 2018-04-22 RX ORDER — GLUCAGON 1 MG
1 KIT INJECTION
Status: CANCELLED | OUTPATIENT
Start: 2018-04-22

## 2018-04-22 RX ORDER — INSULIN ASPART 100 [IU]/ML
0-5 INJECTION, SOLUTION INTRAVENOUS; SUBCUTANEOUS EVERY 6 HOURS PRN
Status: CANCELLED | OUTPATIENT
Start: 2018-04-22

## 2018-04-22 RX ORDER — METOPROLOL TARTRATE 1 MG/ML
5 INJECTION, SOLUTION INTRAVENOUS
Status: COMPLETED | OUTPATIENT
Start: 2018-04-22 | End: 2018-04-22

## 2018-04-22 RX ORDER — ENOXAPARIN SODIUM 100 MG/ML
1 INJECTION SUBCUTANEOUS
Status: DISCONTINUED | OUTPATIENT
Start: 2018-04-22 | End: 2018-04-23

## 2018-04-22 RX ORDER — ACETAMINOPHEN 325 MG/1
650 TABLET ORAL EVERY 8 HOURS PRN
Status: DISCONTINUED | OUTPATIENT
Start: 2018-04-22 | End: 2018-04-24 | Stop reason: HOSPADM

## 2018-04-22 RX ORDER — NITROGLYCERIN 0.4 MG/1
0.4 TABLET SUBLINGUAL EVERY 5 MIN PRN
Status: DISCONTINUED | OUTPATIENT
Start: 2018-04-22 | End: 2018-04-24 | Stop reason: HOSPADM

## 2018-04-22 RX ORDER — ASPIRIN 81 MG/1
81 TABLET ORAL DAILY
Status: DISCONTINUED | OUTPATIENT
Start: 2018-04-23 | End: 2018-04-24 | Stop reason: HOSPADM

## 2018-04-22 RX ORDER — ACETAMINOPHEN 325 MG/1
650 TABLET ORAL EVERY 8 HOURS PRN
Status: DISCONTINUED | OUTPATIENT
Start: 2018-04-22 | End: 2018-04-22

## 2018-04-22 RX ORDER — ENOXAPARIN SODIUM 100 MG/ML
40 INJECTION SUBCUTANEOUS EVERY 24 HOURS
Status: DISCONTINUED | OUTPATIENT
Start: 2018-04-22 | End: 2018-04-22

## 2018-04-22 RX ORDER — DOXAZOSIN 1 MG/1
1 TABLET ORAL NIGHTLY
Status: DISCONTINUED | OUTPATIENT
Start: 2018-04-22 | End: 2018-04-24 | Stop reason: HOSPADM

## 2018-04-22 RX ORDER — RAMELTEON 8 MG/1
8 TABLET ORAL NIGHTLY PRN
Status: DISCONTINUED | OUTPATIENT
Start: 2018-04-22 | End: 2018-04-24 | Stop reason: HOSPADM

## 2018-04-22 RX ORDER — METOPROLOL TARTRATE 25 MG/1
25 TABLET, FILM COATED ORAL 2 TIMES DAILY
Status: DISCONTINUED | OUTPATIENT
Start: 2018-04-22 | End: 2018-04-24 | Stop reason: HOSPADM

## 2018-04-22 RX ORDER — COLCHICINE 0.6 MG/1
0.6 TABLET, FILM COATED ORAL 2 TIMES DAILY
Status: DISCONTINUED | OUTPATIENT
Start: 2018-04-22 | End: 2018-04-24 | Stop reason: HOSPADM

## 2018-04-22 RX ORDER — SODIUM CHLORIDE 9 MG/ML
INJECTION, SOLUTION INTRAVENOUS CONTINUOUS
Status: ACTIVE | OUTPATIENT
Start: 2018-04-22 | End: 2018-04-23

## 2018-04-22 RX ORDER — LISINOPRIL 20 MG/1
40 TABLET ORAL 2 TIMES DAILY
Status: DISCONTINUED | OUTPATIENT
Start: 2018-04-22 | End: 2018-04-24 | Stop reason: HOSPADM

## 2018-04-22 RX ORDER — ONDANSETRON 2 MG/ML
4 INJECTION INTRAMUSCULAR; INTRAVENOUS EVERY 6 HOURS PRN
Status: DISCONTINUED | OUTPATIENT
Start: 2018-04-22 | End: 2018-04-24 | Stop reason: HOSPADM

## 2018-04-22 RX ORDER — MORPHINE SULFATE 4 MG/ML
2 INJECTION, SOLUTION INTRAMUSCULAR; INTRAVENOUS EVERY 4 HOURS PRN
Status: DISCONTINUED | OUTPATIENT
Start: 2018-04-22 | End: 2018-04-24 | Stop reason: HOSPADM

## 2018-04-22 RX ORDER — ASPIRIN 325 MG
325 TABLET ORAL
Status: COMPLETED | OUTPATIENT
Start: 2018-04-22 | End: 2018-04-22

## 2018-04-22 RX ORDER — ATORVASTATIN CALCIUM 40 MG/1
80 TABLET, FILM COATED ORAL NIGHTLY
Status: DISCONTINUED | OUTPATIENT
Start: 2018-04-22 | End: 2018-04-24 | Stop reason: HOSPADM

## 2018-04-22 RX ORDER — FEBUXOSTAT 40 MG/1
80 TABLET, FILM COATED ORAL DAILY
Status: DISCONTINUED | OUTPATIENT
Start: 2018-04-23 | End: 2018-04-24 | Stop reason: HOSPADM

## 2018-04-22 RX ADMIN — SODIUM CHLORIDE: 0.9 INJECTION, SOLUTION INTRAVENOUS at 09:04

## 2018-04-22 RX ADMIN — NITROGLYCERIN 1 INCH: 20 OINTMENT TOPICAL at 04:04

## 2018-04-22 RX ADMIN — METOPROLOL TARTRATE 25 MG: 25 TABLET ORAL at 08:04

## 2018-04-22 RX ADMIN — ATORVASTATIN CALCIUM 80 MG: 40 TABLET, FILM COATED ORAL at 11:04

## 2018-04-22 RX ADMIN — DOXAZOSIN MESYLATE 1 MG: 1 TABLET ORAL at 08:04

## 2018-04-22 RX ADMIN — TICAGRELOR 90 MG: 90 TABLET ORAL at 08:04

## 2018-04-22 RX ADMIN — COLCHICINE 0.6 MG: 0.6 TABLET, FILM COATED ORAL at 11:04

## 2018-04-22 RX ADMIN — NITROGLYCERIN 1 INCH: 20 OINTMENT TOPICAL at 09:04

## 2018-04-22 RX ADMIN — METOPROLOL TARTRATE 5 MG: 5 INJECTION, SOLUTION INTRAVENOUS at 05:04

## 2018-04-22 RX ADMIN — ASPIRIN 325 MG ORAL TABLET 325 MG: 325 PILL ORAL at 04:04

## 2018-04-22 RX ADMIN — ENOXAPARIN SODIUM 110 MG: 100 INJECTION SUBCUTANEOUS at 11:04

## 2018-04-22 RX ADMIN — LISINOPRIL 40 MG: 20 TABLET ORAL at 08:04

## 2018-04-22 NOTE — ED PROVIDER NOTES
"SCRIBE #1 NOTE: I, Kathy Dinero, am scribing for, and in the presence of, Chance Callaway MD. I have scribed the entire note.      History      Chief Complaint   Patient presents with    Chest Pain     Pt states, "I started having chest pain and I came because I had a heart attack 8 months ago."       Review of patient's allergies indicates:  No Known Allergies     HPI   HPI    2018, 4:29 PM   History obtained from the patient      History of Present Illness: Robb Iglesias is a 37 y.o. male patient with PMHx of DM, HTN, and PSHx of 2 cardiac stents and gastric sleeve who presents to the Emergency Department for midsternal CP which onset suddenly while pt was sitting down watching TV 45 minutes PTA. Pt rated his pain a 4/10 in severity at it's worst and describes the pain as a pressure sensation. Symptoms have resolved and were moderate in severity. No mitigating or exacerbating factors reported. No other associated sxs. Patient denies any fever, chills, diaphoresis, SOB, cough, palpitations, BLE edema/pain, dizziness, n/v, extremity weakness/numbness, recent travel/long car rides, exertional pain, radiating pain, and all other sxs at this time. Pt's grandfather  from an MI at the age of 32. Pt is currently taking a steroid pack for a gout flare up in his L knee. No further complaints or concerns at this time.       Arrival mode: Personal vehicle     PCP: SABIHA Roberson MD       Past Medical History:  Past Medical History:   Diagnosis Date    Allergic rhinitis     Direct hyperbilirubinemia 3/24/2018    Elevated bilirubin 3/21/2018    GERD (gastroesophageal reflux disease)     Gout     Hyperlipidemia     Hypertension associated with chronic kidney disease due to type 2 diabetes mellitus     Long term (current) use of insulin     MI (myocardial infarction) 2017    Obesity     HENRY on CPAP     Proteinuria     Steatohepatitis     Fatty Liver    Type 2 diabetes mellitus with " diabetic nephropathy     Type 2 diabetes mellitus with hyperglycemia     Type 2 diabetes mellitus with renal manifestations        Past Surgical History:  Past Surgical History:   Procedure Laterality Date    CARDIAC CATHETERIZATION      LASIK Bilateral     LIVER BIOPSY      NASAL ENDOSCOPY      NASAL SEPTUM SURGERY      SLEEVE GASTROPLASTY  03/06/2017         Family History:  Family History   Problem Relation Age of Onset    Diabetes type II Mother     Hypertension Mother     Hyperlipidemia Mother     Diabetes type II Brother     Diabetes type II Brother        Social History:  Social History     Social History Main Topics    Smoking status: Never Smoker    Smokeless tobacco: Never Used    Alcohol use Yes      Comment: 1-2 times per month    Drug use: No    Sexual activity: Yes     Partners: Female     Birth control/ protection: OCP       ROS   Review of Systems   Constitutional: Negative for chills, diaphoresis and fever.   HENT: Negative for congestion and sore throat.    Respiratory: Negative for cough and shortness of breath.    Cardiovascular: Positive for chest pain (midsternal). Negative for palpitations and leg swelling.   Gastrointestinal: Negative for nausea and vomiting.   Genitourinary: Negative for dysuria.   Musculoskeletal: Negative for back pain and myalgias.   Skin: Negative for rash.   Neurological: Negative for dizziness, weakness and numbness.   Hematological: Does not bruise/bleed easily.   All other systems reviewed and are negative.    Physical Exam      Initial Vitals [04/22/18 1621]   BP Pulse Resp Temp SpO2   (!) 183/107 77 20 98 °F (36.7 °C) 97 %      MAP       132.33          Physical Exam  Nursing Notes and Vital Signs Reviewed.  Constitutional: Patient is in no acute distress. Well-developed and well-nourished.  Head: Atraumatic. Normocephalic.  Eyes: PERRL. EOM intact. Conjunctivae are not pale. No scleral icterus.  ENT: Mucous membranes are moist. Oropharynx is  "clear and symmetric.    Neck: Supple. Full ROM. No lymphadenopathy.  Cardiovascular: Regular rate. Regular rhythm. No murmurs, rubs, or gallops. Distal pulses are 2+ and symmetric.  Pulmonary/Chest: No respiratory distress. Clear to auscultation bilaterally. No wheezing or rales.  Abdominal: Soft and non-distended.  There is no tenderness.  No rebound, guarding, or rigidity.   Musculoskeletal: Moves all extremities. No obvious deformities. No edema. No calf tenderness.  Skin: Warm and dry.  Neurological:  Alert, awake, and appropriate.  Normal speech.  No acute focal neurological deficits are appreciated.  Psychiatric: Normal affect. Good eye contact. Appropriate in content.    ED Course    Procedures  ED Vital Signs:  Vitals:    04/22/18 1621 04/22/18 1645 04/22/18 1700 04/22/18 1800   BP: (!) 183/107  (!) 170/94 (!) 149/89   Pulse: 77 72 63 (!) 58   Resp: 20  19 17   Temp: 98 °F (36.7 °C)   98 °F (36.7 °C)   TempSrc: Oral      SpO2: 97%  98% 97%   Weight: 108.3 kg (238 lb 13.9 oz)      Height: 6' 3" (1.905 m)       04/22/18 1902   BP: (!) 165/93   Pulse: 64   Resp: 16   Temp:    TempSrc:    SpO2: 98%   Weight:    Height:        Abnormal Lab Results:  Labs Reviewed   CBC W/ AUTO DIFFERENTIAL - Abnormal; Notable for the following:        Result Value    Lymph% 17.6 (*)     All other components within normal limits   COMPREHENSIVE METABOLIC PANEL - Abnormal; Notable for the following:     Glucose 142 (*)     BUN, Bld 30 (*)     Creatinine 1.5 (*)     Total Bilirubin 1.4 (*)     eGFR if non  59 (*)     All other components within normal limits   TROPONIN I - Abnormal; Notable for the following:     Troponin I 0.077 (*)     All other components within normal limits   B-TYPE NATRIURETIC PEPTIDE - Abnormal; Notable for the following:      (*)     All other components within normal limits   TROPONIN I        All Lab Results:  Results for orders placed or performed during the hospital encounter of " 04/22/18   CBC auto differential   Result Value Ref Range    WBC 10.42 3.90 - 12.70 K/uL    RBC 5.32 4.60 - 6.20 M/uL    Hemoglobin 15.3 14.0 - 18.0 g/dL    Hematocrit 43.8 40.0 - 54.0 %    MCV 82 82 - 98 fL    MCH 28.8 27.0 - 31.0 pg    MCHC 34.9 32.0 - 36.0 g/dL    RDW 14.3 11.5 - 14.5 %    Platelets 163 150 - 350 K/uL    MPV 9.6 9.2 - 12.9 fL    Gran # (ANC) 7.6 1.8 - 7.7 K/uL    Lymph # 1.8 1.0 - 4.8 K/uL    Mono # 0.9 0.3 - 1.0 K/uL    Eos # 0.1 0.0 - 0.5 K/uL    Baso # 0.02 0.00 - 0.20 K/uL    Gran% 72.6 38.0 - 73.0 %    Lymph% 17.6 (L) 18.0 - 48.0 %    Mono% 8.6 4.0 - 15.0 %    Eosinophil% 1.0 0.0 - 8.0 %    Basophil% 0.2 0.0 - 1.9 %    Differential Method Automated    Comprehensive metabolic panel   Result Value Ref Range    Sodium 140 136 - 145 mmol/L    Potassium 4.1 3.5 - 5.1 mmol/L    Chloride 106 95 - 110 mmol/L    CO2 26 23 - 29 mmol/L    Glucose 142 (H) 70 - 110 mg/dL    BUN, Bld 30 (H) 6 - 20 mg/dL    Creatinine 1.5 (H) 0.5 - 1.4 mg/dL    Calcium 9.3 8.7 - 10.5 mg/dL    Total Protein 6.6 6.0 - 8.4 g/dL    Albumin 3.7 3.5 - 5.2 g/dL    Total Bilirubin 1.4 (H) 0.1 - 1.0 mg/dL    Alkaline Phosphatase 83 55 - 135 U/L    AST 28 10 - 40 U/L    ALT 43 10 - 44 U/L    Anion Gap 8 8 - 16 mmol/L    eGFR if African American >60 >60 mL/min/1.73 m^2    eGFR if non African American 59 (A) >60 mL/min/1.73 m^2   Troponin I #1   Result Value Ref Range    Troponin I 0.077 (H) 0.000 - 0.026 ng/mL   B-Type natriuretic peptide (BNP)   Result Value Ref Range     (H) 0 - 99 pg/mL       Imaging Results:  Imaging Results          X-Ray Chest AP Portable (Final result)  Result time 04/22/18 16:53:07    Final result by Reji Richardson III, MD (04/22/18 16:53:07)                 Impression:     No acute cardiopulmonary abnormalities.      Electronically signed by: REJI RICHARDSON MD  Date:     04/22/18  Time:    16:53              Narrative:    Chest pain One view chest x-ray.    Clinical indication: Chest  pain    Heart size is normal. The lung fields are clear. No acute pulmonary infiltrate. Mild scoliosis, convexity to the right.                             The EKG was ordered, reviewed, and independently interpreted by the ED provider.  Interpretation time: 16:31  Rate: 59 BPM  Rhythm: sinus bradycardia  Interpretation: Inferior infarct. No STEMI.         The Emergency Provider reviewed the vital signs and test results, which are outlined above.    ED Discussion     7:30 PM: Discussed case with Gay Lugo NP ([facility]). Dr. Renteria agrees with current care and management of pt and accepts admission.   Admitting Service: Hospital medicine   Admitting Physician: Dexter  Admit to: Tele obs     7:33 PM: Re-evaluated pt. I have discussed test results, shared treatment plan, and the need for admission with patient and family at bedside. Pt and family express understanding at this time and agree with all information. All questions answered. Pt and family have no further questions or concerns at this time. Pt is ready for admit.      ED Medication(s):  Medications   nitroGLYCERIN 2% TD oint ointment 1 inch (1 inch Topical (Top) Given 4/22/18 1645)   aspirin tablet 325 mg (325 mg Oral Given 4/22/18 1645)   metoprolol injection 5 mg (5 mg Intravenous Given 4/22/18 1719)       New Prescriptions    No medications on file             Medical Decision Making    Medical Decision Making:   Clinical Tests:   Lab Tests: Ordered and Reviewed  Radiological Study: Reviewed and Ordered  Medical Tests: Reviewed and Ordered           Scribe Attestation:   Scribe #1: I performed the above scribed service and the documentation accurately describes the services I performed. I attest to the accuracy of the note.    Attending:   Physician Attestation Statement for Scribe #1: I, Chanec Callaway MD, personally performed the services described in this documentation, as scribed by Kathy Dinero, in my presence, and it is both  accurate and complete.          Clinical Impression       ICD-10-CM ICD-9-CM   1. Chest pain R07.9 786.50       Disposition:   Disposition: Placed in Observation  Condition: Fair         Chance Callaway MD  04/25/18 [id number]

## 2018-04-23 PROBLEM — R07.9 CHEST PAIN: Status: ACTIVE | Noted: 2018-04-23

## 2018-04-23 LAB
ANION GAP SERPL CALC-SCNC: 6 MMOL/L
AORTIC VALVE REGURGITATION: NORMAL
BASOPHILS # BLD AUTO: 0.02 K/UL
BASOPHILS NFR BLD: 0.2 %
BUN SERPL-MCNC: 23 MG/DL
CALCIUM SERPL-MCNC: 9 MG/DL
CHLORIDE SERPL-SCNC: 107 MMOL/L
CHOLEST SERPL-MCNC: 139 MG/DL
CHOLEST/HDLC SERPL: 4.5 {RATIO}
CO2 SERPL-SCNC: 28 MMOL/L
CORONARY STENOSIS: ABNORMAL
CREAT SERPL-MCNC: 1.3 MG/DL
DIFFERENTIAL METHOD: ABNORMAL
EOSINOPHIL # BLD AUTO: 0.2 K/UL
EOSINOPHIL NFR BLD: 2 %
ERYTHROCYTE [DISTWIDTH] IN BLOOD BY AUTOMATED COUNT: 14.3 %
EST. GFR  (AFRICAN AMERICAN): >60 ML/MIN/1.73 M^2
EST. GFR  (NON AFRICAN AMERICAN): >60 ML/MIN/1.73 M^2
ESTIMATED AVG GLUCOSE: 114 MG/DL
ESTIMATED PA SYSTOLIC PRESSURE: 19.48
GLUCOSE SERPL-MCNC: 98 MG/DL
HBA1C MFR BLD HPLC: 5.6 %
HCT VFR BLD AUTO: 43.1 %
HDLC SERPL-MCNC: 31 MG/DL
HDLC SERPL: 22.3 %
HGB BLD-MCNC: 14.6 G/DL
LDLC SERPL CALC-MCNC: 73 MG/DL
LYMPHOCYTES # BLD AUTO: 2.7 K/UL
LYMPHOCYTES NFR BLD: 33.6 %
MCH RBC QN AUTO: 28.2 PG
MCHC RBC AUTO-ENTMCNC: 33.9 G/DL
MCV RBC AUTO: 83 FL
MITRAL VALVE MOBILITY: NORMAL
MITRAL VALVE REGURGITATION: NORMAL
MONOCYTES # BLD AUTO: 0.6 K/UL
MONOCYTES NFR BLD: 7.4 %
NEUTROPHILS # BLD AUTO: 4.6 K/UL
NEUTROPHILS NFR BLD: 56.8 %
NONHDLC SERPL-MCNC: 108 MG/DL
PLATELET # BLD AUTO: 130 K/UL
PMV BLD AUTO: 9.2 FL
POC ACTIVATED CLOTTING TIME K: 274 SEC (ref 74–137)
POTASSIUM SERPL-SCNC: 3.7 MMOL/L
RBC # BLD AUTO: 5.17 M/UL
RETIRED EF AND QEF - SEE NOTES: 55 (ref 55–65)
SAMPLE: ABNORMAL
SODIUM SERPL-SCNC: 141 MMOL/L
TRIGL SERPL-MCNC: 175 MG/DL
TROPONIN I SERPL DL<=0.01 NG/ML-MCNC: 0.55 NG/ML
TROPONIN I SERPL DL<=0.01 NG/ML-MCNC: 0.64 NG/ML
TROPONIN I SERPL DL<=0.01 NG/ML-MCNC: 0.82 NG/ML
TROPONIN I SERPL DL<=0.01 NG/ML-MCNC: 0.93 NG/ML
WBC # BLD AUTO: 8.07 K/UL

## 2018-04-23 PROCEDURE — 36415 COLL VENOUS BLD VENIPUNCTURE: CPT

## 2018-04-23 PROCEDURE — 92978 ENDOLUMINL IVUS OCT C 1ST: CPT | Mod: 26,LD,, | Performed by: INTERNAL MEDICINE

## 2018-04-23 PROCEDURE — 99152 MOD SED SAME PHYS/QHP 5/>YRS: CPT | Mod: ,,, | Performed by: INTERNAL MEDICINE

## 2018-04-23 PROCEDURE — 25000003 PHARM REV CODE 250: Performed by: INTERNAL MEDICINE

## 2018-04-23 PROCEDURE — 92920 PRQ TRLUML C ANGIOP 1ART&/BR: CPT | Mod: LD,,, | Performed by: INTERNAL MEDICINE

## 2018-04-23 PROCEDURE — 84484 ASSAY OF TROPONIN QUANT: CPT | Mod: 91

## 2018-04-23 PROCEDURE — 21400001 HC TELEMETRY ROOM

## 2018-04-23 PROCEDURE — 84484 ASSAY OF TROPONIN QUANT: CPT

## 2018-04-23 PROCEDURE — 02703ZZ DILATION OF CORONARY ARTERY, ONE ARTERY, PERCUTANEOUS APPROACH: ICD-10-PCS | Performed by: INTERNAL MEDICINE

## 2018-04-23 PROCEDURE — 25000003 PHARM REV CODE 250: Performed by: NURSE PRACTITIONER

## 2018-04-23 PROCEDURE — 93306 TTE W/DOPPLER COMPLETE: CPT | Mod: 26,,, | Performed by: INTERNAL MEDICINE

## 2018-04-23 PROCEDURE — C8929 TTE W OR WO FOL WCON,DOPPLER: HCPCS

## 2018-04-23 PROCEDURE — 99255 IP/OBS CONSLTJ NEW/EST HI 80: CPT | Mod: ,,, | Performed by: INTERNAL MEDICINE

## 2018-04-23 PROCEDURE — B241ZZ3 ULTRASONOGRAPHY OF MULTIPLE CORONARY ARTERIES, INTRAVASCULAR: ICD-10-PCS | Performed by: INTERNAL MEDICINE

## 2018-04-23 PROCEDURE — C1887 CATHETER, GUIDING: HCPCS

## 2018-04-23 PROCEDURE — B2151ZZ FLUOROSCOPY OF LEFT HEART USING LOW OSMOLAR CONTRAST: ICD-10-PCS | Performed by: INTERNAL MEDICINE

## 2018-04-23 PROCEDURE — 27000190 HC CPAP FULL FACE MASK W/VALVE

## 2018-04-23 PROCEDURE — 80048 BASIC METABOLIC PNL TOTAL CA: CPT

## 2018-04-23 PROCEDURE — 85025 COMPLETE CBC W/AUTO DIFF WBC: CPT

## 2018-04-23 PROCEDURE — 93010 ELECTROCARDIOGRAM REPORT: CPT | Mod: 76,,, | Performed by: INTERNAL MEDICINE

## 2018-04-23 PROCEDURE — B2111ZZ FLUOROSCOPY OF MULTIPLE CORONARY ARTERIES USING LOW OSMOLAR CONTRAST: ICD-10-PCS | Performed by: INTERNAL MEDICINE

## 2018-04-23 PROCEDURE — 83036 HEMOGLOBIN GLYCOSYLATED A1C: CPT

## 2018-04-23 PROCEDURE — 93005 ELECTROCARDIOGRAM TRACING: CPT

## 2018-04-23 PROCEDURE — 94660 CPAP INITIATION&MGMT: CPT

## 2018-04-23 PROCEDURE — 93458 L HRT ARTERY/VENTRICLE ANGIO: CPT | Mod: 26,59,, | Performed by: INTERNAL MEDICINE

## 2018-04-23 PROCEDURE — 25000003 PHARM REV CODE 250

## 2018-04-23 PROCEDURE — 4A023N7 MEASUREMENT OF CARDIAC SAMPLING AND PRESSURE, LEFT HEART, PERCUTANEOUS APPROACH: ICD-10-PCS | Performed by: INTERNAL MEDICINE

## 2018-04-23 PROCEDURE — 92979 ENDOLUMINL IVUS OCT C EA: CPT | Mod: 26,LM,, | Performed by: INTERNAL MEDICINE

## 2018-04-23 PROCEDURE — 63600175 PHARM REV CODE 636 W HCPCS

## 2018-04-23 PROCEDURE — 25000003 PHARM REV CODE 250: Performed by: EMERGENCY MEDICINE

## 2018-04-23 PROCEDURE — 80061 LIPID PANEL: CPT

## 2018-04-23 PROCEDURE — 93010 ELECTROCARDIOGRAM REPORT: CPT | Mod: ,,, | Performed by: INTERNAL MEDICINE

## 2018-04-23 PROCEDURE — 63600175 PHARM REV CODE 636 W HCPCS: Performed by: NURSE PRACTITIONER

## 2018-04-23 RX ORDER — RANOLAZINE 500 MG/1
500 TABLET, EXTENDED RELEASE ORAL 2 TIMES DAILY
Status: DISCONTINUED | OUTPATIENT
Start: 2018-04-23 | End: 2018-04-24 | Stop reason: HOSPADM

## 2018-04-23 RX ORDER — HYDROMORPHONE HCL 2 MG/ML
1 VIAL (ML) INJECTION ONCE
Status: COMPLETED | OUTPATIENT
Start: 2018-04-23 | End: 2018-04-23

## 2018-04-23 RX ORDER — TIROFIBAN HYDROCHLORIDE 50 UG/ML
0.15 INJECTION INTRAVENOUS CONTINUOUS
Status: ACTIVE | OUTPATIENT
Start: 2018-04-23 | End: 2018-04-23

## 2018-04-23 RX ORDER — SODIUM CHLORIDE 9 MG/ML
INJECTION, SOLUTION INTRAVENOUS CONTINUOUS
Status: ACTIVE | OUTPATIENT
Start: 2018-04-23 | End: 2018-04-24

## 2018-04-23 RX ADMIN — TICAGRELOR 90 MG: 90 TABLET ORAL at 08:04

## 2018-04-23 RX ADMIN — METOPROLOL TARTRATE 25 MG: 25 TABLET ORAL at 08:04

## 2018-04-23 RX ADMIN — FEBUXOSTAT 80 MG: 40 TABLET ORAL at 09:04

## 2018-04-23 RX ADMIN — TICAGRELOR 90 MG: 90 TABLET ORAL at 09:04

## 2018-04-23 RX ADMIN — Medication 1 MG: at 02:04

## 2018-04-23 RX ADMIN — ASPIRIN 81 MG: 81 TABLET, COATED ORAL at 09:04

## 2018-04-23 RX ADMIN — RANOLAZINE 500 MG: 500 TABLET, FILM COATED, EXTENDED RELEASE ORAL at 08:04

## 2018-04-23 RX ADMIN — COLCHICINE 0.6 MG: 0.6 TABLET, FILM COATED ORAL at 09:04

## 2018-04-23 RX ADMIN — ATORVASTATIN CALCIUM 80 MG: 40 TABLET, FILM COATED ORAL at 08:04

## 2018-04-23 RX ADMIN — ENOXAPARIN SODIUM 110 MG: 100 INJECTION SUBCUTANEOUS at 09:04

## 2018-04-23 RX ADMIN — LISINOPRIL 40 MG: 20 TABLET ORAL at 09:04

## 2018-04-23 RX ADMIN — DOXAZOSIN MESYLATE 1 MG: 1 TABLET ORAL at 08:04

## 2018-04-23 RX ADMIN — NITROGLYCERIN 1 INCH: 20 OINTMENT TOPICAL at 06:04

## 2018-04-23 RX ADMIN — METOPROLOL TARTRATE 25 MG: 25 TABLET ORAL at 09:04

## 2018-04-23 RX ADMIN — NITROGLYCERIN 1 INCH: 20 OINTMENT TOPICAL at 08:04

## 2018-04-23 RX ADMIN — LISINOPRIL 40 MG: 20 TABLET ORAL at 08:04

## 2018-04-23 RX ADMIN — ACETAMINOPHEN 650 MG: 325 TABLET ORAL at 08:04

## 2018-04-23 RX ADMIN — SODIUM CHLORIDE: 0.9 INJECTION, SOLUTION INTRAVENOUS at 08:04

## 2018-04-23 RX ADMIN — COLCHICINE 0.6 MG: 0.6 TABLET, FILM COATED ORAL at 08:04

## 2018-04-23 NOTE — ASSESSMENT & PLAN NOTE
- Patient off insulin since bariatric surgery.  - Previous HbA1c 5.3 on 3/20/18.  - Monitor glucose.

## 2018-04-23 NOTE — ASSESSMENT & PLAN NOTE
- Cr. Stable at 1.5 (baseline 1.2-1.5).  - Will start gentle IVF's to prehydrate for possible LHC.  - Avoid nephrotoxic agents.  - Follow daily BMP.

## 2018-04-23 NOTE — H&P
"[facility] Hospital Medicine  History & Physical    Patient Name: Robb Iglesias  MRN: [id number]  Admission Date: 4/22/2018  Attending Physician: Corina Renteria MD  Primary Care Provider: SABIHA Roberson MD         Patient information was obtained from patient, spouse/SO, past medical records and ER records.     Subjective:     Principal Problem:NSTEMI (non-ST elevated myocardial infarction)    Chief Complaint:   Chief Complaint   Patient presents with    Chest Pain     Pt states, "I started having chest pain and I came because I had a heart attack 8 months ago."        HPI: Mr. Iglesias is a 36yo male with  a PMHx of CAD s/p PCI of LAD x 2 in 8/2017, HTN, HLD, HENRY, h/o gastric sleeve, and DM II, who presented to the ED with c/o substernal CP that started suddenly this evening while watching TV.  Pain was "pounding" in nature, rated 4/10, nonradiating, and nonexertional.  Denies any palpitations, SOB/BORREGO, orthopnea, edema, N/V, diaphoresis, lightheadedness/dizziness, syncope, or fever.  No aggravating or alleviating factors.  He reports having similar episode Friday night.  Both episodes lasted ~45 minutes before spontaneously resolving.  He states this CP is different from the angina he experienced with previous MI in 8/2017.  Reports being compliant with medications, including Brilinta and low dose ASA.  Work-up in ED resulted troponin 0.077.  EKG showed new T wave inversions in lead III.  Hospital Medicine was consulted for admission.  Currently, patient appears comfortable in NAD.  Denies any further CP episodes since arrival to ED.        Past Medical History:   Diagnosis Date    Allergic rhinitis     Direct hyperbilirubinemia 3/24/2018    Elevated bilirubin 3/21/2018    GERD (gastroesophageal reflux disease)     Gout     Hyperlipidemia     Hypertension associated with chronic kidney disease due to type 2 diabetes mellitus     Long term (current) use of insulin     MI " (myocardial infarction) 07/2017    Obesity     HENRY on CPAP     Proteinuria     Steatohepatitis     Fatty Liver    Type 2 diabetes mellitus with diabetic nephropathy     Type 2 diabetes mellitus with hyperglycemia     Type 2 diabetes mellitus with renal manifestations        Past Surgical History:   Procedure Laterality Date    CARDIAC CATHETERIZATION      LASIK Bilateral     LIVER BIOPSY      NASAL ENDOSCOPY      NASAL SEPTUM SURGERY      SLEEVE GASTROPLASTY  03/06/2017       Review of patient's allergies indicates:  No Known Allergies    No current facility-administered medications on file prior to encounter.      Current Outpatient Prescriptions on File Prior to Encounter   Medication Sig    aspirin (ECOTRIN) 81 MG EC tablet Take 81 mg by mouth once daily.    atorvastatin (LIPITOR) 80 MG tablet Take 1 tablet (80 mg total) by mouth once daily.    cetirizine (ZYRTEC) 10 MG tablet Take 10 mg by mouth once daily.    colchicine (COLCRYS) 0.6 mg tablet Take 1 tablet (0.6 mg total) by mouth 2 (two) times daily. (Patient taking differently: Take 0.6 mg by mouth as needed. )    doxazosin (CARDURA) 1 MG tablet Take 1 mg by mouth every evening.    febuxostat (ULORIC) 40 mg Tab Take 2 tablets (80 mg total) by mouth once daily.    lisinopril (PRINIVIL,ZESTRIL) 40 MG tablet Take 1 tablet (40 mg total) by mouth 2 (two) times daily.    metoprolol tartrate (LOPRESSOR) 25 MG tablet Take 1 tablet (25 mg total) by mouth 2 (two) times daily.    MULTIVITAMIN/IRON/FOLIC ACID (CENTRUM COMPLETE ORAL) Take by mouth once daily at 6am.    ticagrelor (BRILINTA) 90 mg tablet Take 1 tablet (90 mg total) by mouth 2 (two) times daily.     Family History     Problem Relation (Age of Onset)    Diabetes type II Mother, Brother, Brother    Hyperlipidemia Mother    Hypertension Mother        Social History Main Topics    Smoking status: Never Smoker    Smokeless tobacco: Never Used    Alcohol use Yes      Comment: 1-2 times  per month    Drug use: No    Sexual activity: Yes     Partners: Female     Birth control/ protection: OCP     Review of Systems   Constitutional: Negative for activity change, chills, diaphoresis, fatigue, fever and unexpected weight change.   HENT: Negative for congestion, postnasal drip, rhinorrhea, sore throat and trouble swallowing.    Eyes: Negative for photophobia and visual disturbance.   Respiratory: Negative for apnea, cough, chest tightness, shortness of breath and wheezing.    Cardiovascular: Positive for chest pain. Negative for palpitations and leg swelling.   Gastrointestinal: Negative for abdominal distention, abdominal pain, blood in stool, constipation, diarrhea, nausea and vomiting.   Endocrine: Negative for polydipsia, polyphagia and polyuria.   Genitourinary: Negative for decreased urine volume, difficulty urinating, dysuria, frequency, hematuria and urgency.   Musculoskeletal: Negative for arthralgias, back pain, gait problem, joint swelling and myalgias.   Skin: Negative for pallor, rash and wound.   Neurological: Negative for dizziness, seizures, syncope, facial asymmetry, speech difficulty, weakness, light-headedness, numbness and headaches.   Psychiatric/Behavioral: Negative for confusion. The patient is not nervous/anxious.    All other systems reviewed and are negative.    Objective:     Vital Signs (Most Recent):  Temp: 98 °F (36.7 °C) (04/22/18 1800)  Pulse: (!) 55 (04/22/18 2002)  Resp: 17 (04/22/18 2002)  BP: (!) 144/84 (04/22/18 2002)  SpO2: 96 % (04/22/18 2002) Vital Signs (24h Range):  Temp:  [98 °F (36.7 °C)] 98 °F (36.7 °C)  Pulse:  [55-77] 55  Resp:  [16-20] 17  SpO2:  [96 %-98 %] 96 %  BP: (144-183)/() 144/84     Weight: 108.3 kg (238 lb 13.9 oz)  Body mass index is 29.86 kg/m².    Physical Exam   Constitutional: He is oriented to person, place, and time. He appears well-developed and well-nourished. No distress.   HENT:   Head: Normocephalic and atraumatic.   Eyes:  Conjunctivae are normal.   PERRL; EOM intact.   Neck: Normal range of motion. Neck supple. No JVD present.   Cardiovascular: Normal rate, regular rhythm, S1 normal, S2 normal and intact distal pulses.   No extrasystoles are present. Exam reveals no gallop.    No murmur heard.  Pulses:       Radial pulses are 2+ on the right side, and 2+ on the left side.        Dorsalis pedis pulses are 2+ on the right side, and 2+ on the left side.        Posterior tibial pulses are 2+ on the right side, and 2+ on the left side.   Pulmonary/Chest: Effort normal and breath sounds normal. No accessory muscle usage. No tachypnea. No respiratory distress. He has no wheezes. He has no rhonchi. He has no rales.   Abdominal: Soft. Bowel sounds are normal. He exhibits no distension. There is no tenderness. There is no rebound, no guarding and no CVA tenderness.   Musculoskeletal: Normal range of motion. He exhibits no edema, tenderness or deformity.   Neurological: He is alert and oriented to person, place, and time. No cranial nerve deficit or sensory deficit.   Skin: Skin is warm, dry and intact. No rash noted. He is not diaphoretic. No cyanosis or erythema.   Psychiatric: He has a normal mood and affect. His speech is normal and behavior is normal. Cognition and memory are normal.   Nursing note and vitals reviewed.          Significant Labs:   Results for orders placed or performed during the hospital encounter of 04/22/18   CBC auto differential   Result Value Ref Range    WBC 10.42 3.90 - 12.70 K/uL    RBC 5.32 4.60 - 6.20 M/uL    Hemoglobin 15.3 14.0 - 18.0 g/dL    Hematocrit 43.8 40.0 - 54.0 %    MCV 82 82 - 98 fL    MCH 28.8 27.0 - 31.0 pg    MCHC 34.9 32.0 - 36.0 g/dL    RDW 14.3 11.5 - 14.5 %    Platelets 163 150 - 350 K/uL    MPV 9.6 9.2 - 12.9 fL    Gran # (ANC) 7.6 1.8 - 7.7 K/uL    Lymph # 1.8 1.0 - 4.8 K/uL    Mono # 0.9 0.3 - 1.0 K/uL    Eos # 0.1 0.0 - 0.5 K/uL    Baso # 0.02 0.00 - 0.20 K/uL    Gran% 72.6 38.0 - 73.0 %     Lymph% 17.6 (L) 18.0 - 48.0 %    Mono% 8.6 4.0 - 15.0 %    Eosinophil% 1.0 0.0 - 8.0 %    Basophil% 0.2 0.0 - 1.9 %    Differential Method Automated    Comprehensive metabolic panel   Result Value Ref Range    Sodium 140 136 - 145 mmol/L    Potassium 4.1 3.5 - 5.1 mmol/L    Chloride 106 95 - 110 mmol/L    CO2 26 23 - 29 mmol/L    Glucose 142 (H) 70 - 110 mg/dL    BUN, Bld 30 (H) 6 - 20 mg/dL    Creatinine 1.5 (H) 0.5 - 1.4 mg/dL    Calcium 9.3 8.7 - 10.5 mg/dL    Total Protein 6.6 6.0 - 8.4 g/dL    Albumin 3.7 3.5 - 5.2 g/dL    Total Bilirubin 1.4 (H) 0.1 - 1.0 mg/dL    Alkaline Phosphatase 83 55 - 135 U/L    AST 28 10 - 40 U/L    ALT 43 10 - 44 U/L    Anion Gap 8 8 - 16 mmol/L    eGFR if African American >60 >60 mL/min/1.73 m^2    eGFR if non African American 59 (A) >60 mL/min/1.73 m^2   Troponin I #1   Result Value Ref Range    Troponin I 0.077 (H) 0.000 - 0.026 ng/mL   B-Type natriuretic peptide (BNP)   Result Value Ref Range     (H) 0 - 99 pg/mL      All pertinent labs within the past 24 hours have been reviewed.    Significant Imaging:   Imaging Results          X-Ray Chest AP Portable (Final result)  Result time 04/22/18 16:53:07    Final result by Reji Richardson III, MD (04/22/18 16:53:07)                 Impression:     No acute cardiopulmonary abnormalities.      Electronically signed by: REJI RICHARDSON MD  Date:     04/22/18  Time:    16:53              Narrative:    Chest pain One view chest x-ray.    Clinical indication: Chest pain    Heart size is normal. The lung fields are clear. No acute pulmonary infiltrate. Mild scoliosis, convexity to the right.                             I have reviewed all pertinent imaging results/findings within the past 24 hours.     EKG: (personally reviewed)  Sinus bradycardia with new T wave inversion in lead III, no other acute changes from previous tracings.         Assessment/Plan:     * NSTEMI with CAD s/p PCI (FAMILIA) of LAD x 2 in 8/2017    -  Currently chest pain free.  BELLA score of 5.  - Initial troponin 0.077, EKG with new T wave inversions in lead III.  Follow serial trends.  - Will give Lovenox 1mg/kg.  - Continue low dose ASA, Brilinta, BB, and high-intensity statin.  - 2D echo in AM.  - NPO after MN.  - Cardiology consult in AM.        Type 2 diabetes mellitus     - Patient off insulin since bariatric surgery.  - Previous HbA1c 5.3 on 3/20/18.  - Monitor glucose.        Hyperlipidemia    - Continue statin therapy.  - FLP in AM.        Obstructive sleep apnea    - CPAP Q HS.        Stage 2 chronic kidney disease    - Cr. Stable at 1.5 (baseline 1.2-1.5).  - Will start gentle IVF's to prehydrate for possible LHC.  - Avoid nephrotoxic agents.  - Follow daily BMP.        Essential hypertension    - BP improved with nitro paste, and remains stable.  - Continue home Lopressor, lisinopril, and doxazosin.  - Continue nitro paste for now.           VTE Risk Mitigation         Ordered     enoxaparin injection 110 mg  Every 12 hours (non-standard times)      04/22/18 2114     IP VTE HIGH RISK PATIENT  Once      04/22/18 2132             MAGGIE Hallman  Department of Hospital Medicine   [facility]

## 2018-04-23 NOTE — ASSESSMENT & PLAN NOTE
- BP improved with nitro paste, and remains stable.  - Continue home Lopressor, lisinopril, and doxazosin.  - Continue nitro paste for now.

## 2018-04-23 NOTE — INTERVAL H&P NOTE
The patient has been examined and the H&P has been reviewed:    I concur with the findings and no changes have occurred since H&P was written.   Has nstemi will proceed with lhc /ptca via left radial approach.    Anesthesia/Surgery risks, benefits and alternative options discussed and understood by patient/family.  I have explained the risks, benefits , and alternatives of the procedure in detail.the patient voices understanding and all questions have been answered.the patient agrees to proceed as planned.          Active Hospital Problems    Diagnosis  POA    *NSTEMI with CAD s/p PCI (FAMILIA) of LAD x 2 in 8/2017 [I21.4]  Yes    Chest pain [R07.9]  Yes    Type 2 diabetes mellitus  [E11.59]  Yes     Chronic     coronary atherosclerosis      Hyperlipidemia [E78.5]  Yes     Chronic    Obstructive sleep apnea [G47.33]  Yes     Chronic     SPLIT-NIGHT SLEEP STUDY 7/28/2015  · SLEEP ARCHITECTURE: Sleep onset was 2.9 minutes and sleep efficiency was 94.4%. Sleep Stage distribution showed 145 sleep stage changes, 6 awakenings and 119 arousals. Sleep distribution showed 53.2% stage NI, 41.8% stage N 11, 0.0% stage N Ill and REM sleep was at 5.0%. There were 2 REM periods.  · RESPIRATORY SUMMARY: 257 respiratory events were present including 201 obstructive apneas and  central apneas, 0 mixed apneas and 56 hypopneas for a total AHI of 109.4. The non-rem index was 108.8 /hr while the AHI during stage R was 120.0 /hr. Oxygen desaturations were associated with the respiratory events. There were 226 desaturations. The lowest oxygen saturation was 78.0% and the mean oxygen saturation was 92.4%. Oxygen saturations were below: 88% for 24.3 minutes of the time spent asleep.  · CARDIAC SUMMARY: Normal sinus rhythm with heart rate variation between 60.0 and 94.0 was noted.      Stage 2 chronic kidney disease [N18.2]  Yes     Chronic    Essential hypertension [I10]  Yes     Chronic      Resolved Hospital Problems    Diagnosis Date  Resolved POA   No resolved problems to display.

## 2018-04-23 NOTE — ASSESSMENT & PLAN NOTE
- Currently chest pain free.  BELLA score of 5.  - Initial troponin 0.077, EKG with new T wave inversions in lead III.  Follow serial trends.  - Will give Lovenox 1mg/kg.  - Continue low dose ASA, Brilinta, BB, and high-intensity statin.  - 2D echo in AM.  - NPO after MN.  - Cardiology consult in AM.

## 2018-04-23 NOTE — CONSULTS
Consult Note  Cardiology    Consult Requested By: Kyleigh Lugo NP  Reason for Consult: NSTEMI     SUBJECTIVE:     History of Present Illness:  Patient is a 37 y.o. male with PMH of DM, HTN, LAD stenting x2 in Aug 2017 and previous gastric sleeve surgery . Patient presented to the ED with complaints of chest pressure, similar to what he experience when he required coronary stenting. Patient noted to have + troponin  ( 0.077, 0.262, 0.550, 0.643). ECG showed no acute changes. Patient made NPO for cath today. States that he has been compliant with meds. Noted to have normal LVF on echo in Aug 2017. Noted to have elevated BP in the ED.       Review of patient's allergies indicates:  No Known Allergies    Past Medical History:   Diagnosis Date    Allergic rhinitis     Direct hyperbilirubinemia 3/24/2018    Elevated bilirubin 3/21/2018    GERD (gastroesophageal reflux disease)     Gout     Hyperlipidemia     Hypertension associated with chronic kidney disease due to type 2 diabetes mellitus     Long term (current) use of insulin     MI (myocardial infarction) 07/2017    Obesity     HENRY on CPAP     Proteinuria     Steatohepatitis     Fatty Liver    Type 2 diabetes mellitus with diabetic nephropathy     Type 2 diabetes mellitus with hyperglycemia     Type 2 diabetes mellitus with renal manifestations      Past Surgical History:   Procedure Laterality Date    CARDIAC CATHETERIZATION      LASIK Bilateral     LIVER BIOPSY      NASAL ENDOSCOPY      NASAL SEPTUM SURGERY      SLEEVE GASTROPLASTY  03/06/2017     Family History   Problem Relation Age of Onset    Diabetes type II Mother     Hypertension Mother     Hyperlipidemia Mother     Diabetes type II Brother     Diabetes type II Brother      Social History   Substance Use Topics    Smoking status: Never Smoker    Smokeless tobacco: Never Used    Alcohol use Yes      Comment: 1-2 times per month        Review of Systems:  Constitutional:  negative  Eyes: negative  Ears, nose, mouth, throat, and face: negative  Respiratory: negative  Cardiovascular: +chest pain, SOB  Gastrointestinal: negative  Genitourinary:negative  Hematologic/lymphatic: negative  Musculoskeletal:negative,   Neurological: negative  Behavioral/Psych: negative  Endocrine: negative  Allergic/Immunologic: negative    OBJECTIVE:     Vital Signs (Most Recent)  Temp: 97.8 °F (36.6 °C) (04/23/18 1300)  Pulse: (!) 53 (04/23/18 1300)  Resp: 18 (04/23/18 1300)  BP: (!) 140/83 (04/23/18 1300)  SpO2: 99 % (04/23/18 1300)    Vital Signs Range (Last 24H):  Temp:  [97.6 °F (36.4 °C)-98.4 °F (36.9 °C)]   Pulse:  [48-77]   Resp:  [12-20]   BP: (127-183)/()   SpO2:  [96 %-99 %]     Current Facility-Administered Medications   Medication    acetaminophen tablet 650 mg    aspirin EC tablet 81 mg    atorvastatin tablet 80 mg    colchicine capsule/tablet 0.6 mg    doxazosin tablet 1 mg    enoxaparin injection 110 mg    febuxostat tablet 80 mg    lisinopril tablet 40 mg    metoprolol tartrate (LOPRESSOR) tablet 25 mg    morphine injection 2 mg    nitroGLYCERIN 2% TD oint ointment 1 inch    nitroGLYCERIN SL tablet 0.4 mg    ondansetron injection 4 mg    ramelteon tablet 8 mg    ticagrelor tablet 90 mg     No current facility-administered medications on file prior to encounter.      Current Outpatient Prescriptions on File Prior to Encounter   Medication Sig    aspirin (ECOTRIN) 81 MG EC tablet Take 81 mg by mouth once daily.    atorvastatin (LIPITOR) 80 MG tablet Take 1 tablet (80 mg total) by mouth once daily.    cetirizine (ZYRTEC) 10 MG tablet Take 10 mg by mouth once daily.    colchicine (COLCRYS) 0.6 mg tablet Take 1 tablet (0.6 mg total) by mouth 2 (two) times daily. (Patient taking differently: Take 0.6 mg by mouth as needed. )    doxazosin (CARDURA) 1 MG tablet Take 1 mg by mouth every evening.    febuxostat (ULORIC) 40 mg Tab Take 2 tablets (80 mg total) by mouth once  daily.    lisinopril (PRINIVIL,ZESTRIL) 40 MG tablet Take 1 tablet (40 mg total) by mouth 2 (two) times daily.    metoprolol tartrate (LOPRESSOR) 25 MG tablet Take 1 tablet (25 mg total) by mouth 2 (two) times daily.    MULTIVITAMIN/IRON/FOLIC ACID (CENTRUM COMPLETE ORAL) Take by mouth once daily at 6am.    ticagrelor (BRILINTA) 90 mg tablet Take 1 tablet (90 mg total) by mouth 2 (two) times daily.       Physical Exam:  General appearance: alert, appears stated age and cooperative  Head: Normocephalic, without obvious abnormality, atraumatic  Neck: no adenopathy, no carotid bruit, no JVD, supple  Lungs:  clear to auscultation bilaterally  Chest wall: no tenderness  Heart: regular rate and rhythm, S1, S2 normal, no murmur, click, rub or gallop  Abdomen: soft, non-tender; bowel sounds normal; no masses,  no organomegaly  Extremities: extremities normal, atraumatic, no cyanosis or edema  Pulses: 2+ and symmetric  Skin: Skin color, texture, turgor normal. No rashes or lesions  Neurologic: Grossly normal    Laboratory:  Chemistry:   Lab Results   Component Value Date     04/23/2018    K 3.7 04/23/2018     04/23/2018    CO2 28 04/23/2018    BUN 23 (H) 04/23/2018    CREATININE 1.3 04/23/2018    CALCIUM 9.0 04/23/2018     Cardiac Markers:   Lab Results   Component Value Date    TROPONINI 0.643 (H) 04/23/2018     Cardiac Markers (Last 3):   Lab Results   Component Value Date    TROPONINI 0.643 (H) 04/23/2018    TROPONINI 0.550 (H) 04/23/2018    TROPONINI 0.262 (H) 04/22/2018     CBC:   Lab Results   Component Value Date    WBC 8.07 04/23/2018    HGB 14.6 04/23/2018    HCT 43.1 04/23/2018    MCV 83 04/23/2018     (L) 04/23/2018     Lipids:   Lab Results   Component Value Date    CHOL 139 04/23/2018    TRIG 175 (H) 04/23/2018    HDL 31 (L) 04/23/2018     Coagulation:   Lab Results   Component Value Date    INR 1.1 07/31/2017    APTT 29.4 08/02/2017       Diagnostic Results:  ECG: Reviewed  X-Ray:  Reviewed  Echo: Reviewed      ASSESSMENT/PLAN:     Patient Active Problem List   Diagnosis    Bariatric surgery status    Non morbid obesity due to excess calories    Essential hypertension    Gout of left knee    Idiopathic chronic gout, left ankle and foot, without tophus (tophi)    Idiopathic chronic gout, right ankle and foot, without tophus (tophi)    Idiopathic chronic gout of left hand without tophus    Stage 2 chronic kidney disease    Persistent proteinuria    Type 2 diabetes mellitus with diabetic nephropathy    Idiopathic chronic gout of multiple sites with tophus    Chronic nonseasonal allergic rhinitis due to pollen    NSTEMI with CAD s/p PCI (FAMILIA) of LAD x 2 in 8/2017    Obstructive sleep apnea    Hyperlipidemia    Status following surgery for weight loss    Status post angioplasty with stent    Type 2 diabetes mellitus     PLMD (periodic limb movement disorder)    Coronary artery disease involving native coronary artery of native heart without angina pectoris    Chest pain, atypical    Elevated bilirubin    Direct hyperbilirubinemia    Chest pain        Plan:   Patient presents with chest pain and +troponin consistent with NSTEMI. Recommend angiogram to further evaluate patency of LAD stent. Dr. Reyes and Dr. Rios has both explained the risks, benefits and alternatives of the procedure in detail. The patient voices understanding and all questions have been answered. Patient agrees to proceed as planned. Keep NPO and proceed with cath this morning.     Chart reviewed. Patient examined by Dr. Reyes and agrees with plan that has been outlined.

## 2018-04-23 NOTE — PLAN OF CARE
CM went to patient's room to complete discharge planning assessment. Patient is not in his room. Assessment completed by medical record review.        04/23/18 1050   Discharge Assessment   Assessment Type Discharge Planning Assessment   Assessment information obtained from? Medical Record   Prior to hospitilization cognitive status: Alert/Oriented   Current cognitive status: Alert/Oriented   Lives With spouse   Able to Return to Prior Arrangements unable to determine at this time (comments)   Is patient able to care for self after discharge? Unable to determine at this time (comments)   Who are your caregiver(s) and their phone number(s)? Shannon Iglesias (spouse) [phone]   Patient currently being followed by outpatient case management? Unable to determine (comments)   Discharge Plan A (To be determined. )   Patient/Family In Agreement With Plan unable to assess

## 2018-04-23 NOTE — NURSING TRANSFER
Nursing Transfer Note      4/23/2018     Transfer 220    Transfer via bed    Transfer with cardiac monitoring    Transported by V lubna rn    Medicines sent: [email or web address]    Chart send with patient: yes    Notified: spouse    Patient reassessed at:4/23/2018 1455 )    Upon arrival to floor: cardiac monitor applied, patient oriented to room, call bell in reach and bed in lowest position    Report given to BETTINA Fuentes in 220.

## 2018-04-23 NOTE — SUBJECTIVE & OBJECTIVE
Past Medical History:   Diagnosis Date    Allergic rhinitis     Direct hyperbilirubinemia 3/24/2018    Elevated bilirubin 3/21/2018    GERD (gastroesophageal reflux disease)     Gout     Hyperlipidemia     Hypertension associated with chronic kidney disease due to type 2 diabetes mellitus     Long term (current) use of insulin     MI (myocardial infarction) 07/2017    Obesity     HENRY on CPAP     Proteinuria     Steatohepatitis     Fatty Liver    Type 2 diabetes mellitus with diabetic nephropathy     Type 2 diabetes mellitus with hyperglycemia     Type 2 diabetes mellitus with renal manifestations        Past Surgical History:   Procedure Laterality Date    CARDIAC CATHETERIZATION      LASIK Bilateral     LIVER BIOPSY      NASAL ENDOSCOPY      NASAL SEPTUM SURGERY      SLEEVE GASTROPLASTY  03/06/2017       Review of patient's allergies indicates:  No Known Allergies    No current facility-administered medications on file prior to encounter.      Current Outpatient Prescriptions on File Prior to Encounter   Medication Sig    aspirin (ECOTRIN) 81 MG EC tablet Take 81 mg by mouth once daily.    atorvastatin (LIPITOR) 80 MG tablet Take 1 tablet (80 mg total) by mouth once daily.    cetirizine (ZYRTEC) 10 MG tablet Take 10 mg by mouth once daily.    colchicine (COLCRYS) 0.6 mg tablet Take 1 tablet (0.6 mg total) by mouth 2 (two) times daily. (Patient taking differently: Take 0.6 mg by mouth as needed. )    doxazosin (CARDURA) 1 MG tablet Take 1 mg by mouth every evening.    febuxostat (ULORIC) 40 mg Tab Take 2 tablets (80 mg total) by mouth once daily.    lisinopril (PRINIVIL,ZESTRIL) 40 MG tablet Take 1 tablet (40 mg total) by mouth 2 (two) times daily.    metoprolol tartrate (LOPRESSOR) 25 MG tablet Take 1 tablet (25 mg total) by mouth 2 (two) times daily.    MULTIVITAMIN/IRON/FOLIC ACID (CENTRUM COMPLETE ORAL) Take by mouth once daily at 6am.    ticagrelor (BRILINTA) 90 mg tablet Take 1  tablet (90 mg total) by mouth 2 (two) times daily.     Family History     Problem Relation (Age of Onset)    Diabetes type II Mother, Brother, Brother    Hyperlipidemia Mother    Hypertension Mother        Social History Main Topics    Smoking status: Never Smoker    Smokeless tobacco: Never Used    Alcohol use Yes      Comment: 1-2 times per month    Drug use: No    Sexual activity: Yes     Partners: Female     Birth control/ protection: OCP     Review of Systems   Constitutional: Negative for activity change, chills, diaphoresis, fatigue, fever and unexpected weight change.   HENT: Negative for congestion, postnasal drip, rhinorrhea, sore throat and trouble swallowing.    Eyes: Negative for photophobia and visual disturbance.   Respiratory: Negative for apnea, cough, chest tightness, shortness of breath and wheezing.    Cardiovascular: Positive for chest pain. Negative for palpitations and leg swelling.   Gastrointestinal: Negative for abdominal distention, abdominal pain, blood in stool, constipation, diarrhea, nausea and vomiting.   Endocrine: Negative for polydipsia, polyphagia and polyuria.   Genitourinary: Negative for decreased urine volume, difficulty urinating, dysuria, frequency, hematuria and urgency.   Musculoskeletal: Negative for arthralgias, back pain, gait problem, joint swelling and myalgias.   Skin: Negative for pallor, rash and wound.   Neurological: Negative for dizziness, seizures, syncope, facial asymmetry, speech difficulty, weakness, light-headedness, numbness and headaches.   Psychiatric/Behavioral: Negative for confusion. The patient is not nervous/anxious.    All other systems reviewed and are negative.    Objective:     Vital Signs (Most Recent):  Temp: 98 °F (36.7 °C) (04/22/18 1800)  Pulse: (!) 55 (04/22/18 2002)  Resp: 17 (04/22/18 2002)  BP: (!) 144/84 (04/22/18 2002)  SpO2: 96 % (04/22/18 2002) Vital Signs (24h Range):  Temp:  [98 °F (36.7 °C)] 98 °F (36.7 °C)  Pulse:  [55-77]  55  Resp:  [16-20] 17  SpO2:  [96 %-98 %] 96 %  BP: (144-183)/() 144/84     Weight: 108.3 kg (238 lb 13.9 oz)  Body mass index is 29.86 kg/m².    Physical Exam   Constitutional: He is oriented to person, place, and time. He appears well-developed and well-nourished. No distress.   HENT:   Head: Normocephalic and atraumatic.   Eyes: Conjunctivae are normal.   PERRL; EOM intact.   Neck: Normal range of motion. Neck supple. No JVD present.   Cardiovascular: Normal rate, regular rhythm, S1 normal, S2 normal and intact distal pulses.   No extrasystoles are present. Exam reveals no gallop.    No murmur heard.  Pulses:       Radial pulses are 2+ on the right side, and 2+ on the left side.        Dorsalis pedis pulses are 2+ on the right side, and 2+ on the left side.        Posterior tibial pulses are 2+ on the right side, and 2+ on the left side.   Pulmonary/Chest: Effort normal and breath sounds normal. No accessory muscle usage. No tachypnea. No respiratory distress. He has no wheezes. He has no rhonchi. He has no rales.   Abdominal: Soft. Bowel sounds are normal. He exhibits no distension. There is no tenderness. There is no rebound, no guarding and no CVA tenderness.   Musculoskeletal: Normal range of motion. He exhibits no edema, tenderness or deformity.   Neurological: He is alert and oriented to person, place, and time. No cranial nerve deficit or sensory deficit.   Skin: Skin is warm, dry and intact. No rash noted. He is not diaphoretic. No cyanosis or erythema.   Psychiatric: He has a normal mood and affect. His speech is normal and behavior is normal. Cognition and memory are normal.   Nursing note and vitals reviewed.          Significant Labs:   Results for orders placed or performed during the hospital encounter of 04/22/18   CBC auto differential   Result Value Ref Range    WBC 10.42 3.90 - 12.70 K/uL    RBC 5.32 4.60 - 6.20 M/uL    Hemoglobin 15.3 14.0 - 18.0 g/dL    Hematocrit 43.8 40.0 - 54.0 %     MCV 82 82 - 98 fL    MCH 28.8 27.0 - 31.0 pg    MCHC 34.9 32.0 - 36.0 g/dL    RDW 14.3 11.5 - 14.5 %    Platelets 163 150 - 350 K/uL    MPV 9.6 9.2 - 12.9 fL    Gran # (ANC) 7.6 1.8 - 7.7 K/uL    Lymph # 1.8 1.0 - 4.8 K/uL    Mono # 0.9 0.3 - 1.0 K/uL    Eos # 0.1 0.0 - 0.5 K/uL    Baso # 0.02 0.00 - 0.20 K/uL    Gran% 72.6 38.0 - 73.0 %    Lymph% 17.6 (L) 18.0 - 48.0 %    Mono% 8.6 4.0 - 15.0 %    Eosinophil% 1.0 0.0 - 8.0 %    Basophil% 0.2 0.0 - 1.9 %    Differential Method Automated    Comprehensive metabolic panel   Result Value Ref Range    Sodium 140 136 - 145 mmol/L    Potassium 4.1 3.5 - 5.1 mmol/L    Chloride 106 95 - 110 mmol/L    CO2 26 23 - 29 mmol/L    Glucose 142 (H) 70 - 110 mg/dL    BUN, Bld 30 (H) 6 - 20 mg/dL    Creatinine 1.5 (H) 0.5 - 1.4 mg/dL    Calcium 9.3 8.7 - 10.5 mg/dL    Total Protein 6.6 6.0 - 8.4 g/dL    Albumin 3.7 3.5 - 5.2 g/dL    Total Bilirubin 1.4 (H) 0.1 - 1.0 mg/dL    Alkaline Phosphatase 83 55 - 135 U/L    AST 28 10 - 40 U/L    ALT 43 10 - 44 U/L    Anion Gap 8 8 - 16 mmol/L    eGFR if African American >60 >60 mL/min/1.73 m^2    eGFR if non African American 59 (A) >60 mL/min/1.73 m^2   Troponin I #1   Result Value Ref Range    Troponin I 0.077 (H) 0.000 - 0.026 ng/mL   B-Type natriuretic peptide (BNP)   Result Value Ref Range     (H) 0 - 99 pg/mL      All pertinent labs within the past 24 hours have been reviewed.    Significant Imaging:   Imaging Results          X-Ray Chest AP Portable (Final result)  Result time 04/22/18 16:53:07    Final result by Reji Richardson III, MD (04/22/18 16:53:07)                 Impression:     No acute cardiopulmonary abnormalities.      Electronically signed by: REJI RICHARDSON MD  Date:     04/22/18  Time:    16:53              Narrative:    Chest pain One view chest x-ray.    Clinical indication: Chest pain    Heart size is normal. The lung fields are clear. No acute pulmonary infiltrate. Mild scoliosis, convexity to the  right.                             I have reviewed all pertinent imaging results/findings within the past 24 hours.     EKG: (personally reviewed)  Sinus bradycardia with new T wave inversion in lead III, no other acute changes from previous tracings.

## 2018-04-23 NOTE — OP NOTE
INPATIENT Operative Note         SUMMARY     Surgery Date: 4/23/2018     Surgeon(s) and Role:     * Monica Rios MD - Primary    ASSISTANT:Sai tracy     Pre-op Diagnosis:  NSTEMI (non-ST elevated myocardial infarction) [I21.4]      Post-op Diagnosis: cad nstemi instent restenosis.    Procedure(s) (LRB):  HEART CATH-LEFT (Left)  ivus lad ptca stent   ivus lmca  COMPLICATION:none    Anesthesia: RN IV Sedation    Findings/Key Components:  Non obs lcx om1 small diffusely diseased.  rca normal pda luminal irregularities.  Lad distal segmnent of stent 90% stenosis tretaed with ivus guided ptca stent expanded with balloon 3.5 mm to 18  jayme.  Estimated Blood Loss: < 50 ML.         SPECIMEN: NONE    Devices/Prostetics: None    PLAN:  Routine post ptca care

## 2018-04-23 NOTE — ASSESSMENT & PLAN NOTE
- Currently chest pain free.  BELLA score of 5.  - Initial troponin 0.077, EKG with new T wave inversions in lead III.  Follow serial trends.  - Will give Lovenox 1mg/kg.  - Continue ASA, Brilinta, BB, and high-intensity statin.  - 2D echo in AM.  - NPO after MN.  - Cardiology consult in AM.

## 2018-04-23 NOTE — HPI
"Mr. Iglesias is a 38yo male with a PMHx of CAD s/p PCI of LAD x 2 in 8/2017, HTN, HLD, HENRY, h/o gastric sleeve, and DM II, who presented to the ED with c/o substernal CP that started suddenly this evening while watching TV.  Pain was "pounding" in nature, rated 4/10, nonradiating, and nonexertional.  Denies any palpitations, SOB/BORREGO, orthopnea, edema, N/V, diaphoresis, lightheadedness/dizziness, syncope, or fever.  No aggravating or alleviating factors.  He reports having similar episode Friday night.  Both episodes lasted ~45 minutes before spontaneously resolving.  He states this CP is different from the angina he experienced with previous MI in 8/2017.  Reports being compliant with medications, including Brilinta and low dose ASA.  Work-up in ED resulted troponin 0.077.  EKG showed new T wave inversions in lead III.  Hospital Medicine was consulted for admission.  Currently, patient appears comfortable in NAD.  Denies any further CP episodes since arrival to ED.    "

## 2018-04-24 ENCOUNTER — PATIENT MESSAGE (OUTPATIENT)
Dept: CARDIOLOGY | Facility: CLINIC | Age: 38
End: 2018-04-24

## 2018-04-24 VITALS
HEART RATE: 56 BPM | DIASTOLIC BLOOD PRESSURE: 80 MMHG | RESPIRATION RATE: 18 BRPM | OXYGEN SATURATION: 98 % | WEIGHT: 242.5 LBS | BODY MASS INDEX: 30.15 KG/M2 | TEMPERATURE: 98 F | SYSTOLIC BLOOD PRESSURE: 145 MMHG | HEIGHT: 75 IN

## 2018-04-24 LAB
ANION GAP SERPL CALC-SCNC: 5 MMOL/L
BASOPHILS # BLD AUTO: 0.02 K/UL
BASOPHILS NFR BLD: 0.3 %
BUN SERPL-MCNC: 16 MG/DL
CALCIUM SERPL-MCNC: 8.5 MG/DL
CHLORIDE SERPL-SCNC: 108 MMOL/L
CO2 SERPL-SCNC: 26 MMOL/L
CREAT SERPL-MCNC: 1.2 MG/DL
DIFFERENTIAL METHOD: ABNORMAL
EOSINOPHIL # BLD AUTO: 0.1 K/UL
EOSINOPHIL NFR BLD: 2.1 %
ERYTHROCYTE [DISTWIDTH] IN BLOOD BY AUTOMATED COUNT: 14.3 %
EST. GFR  (AFRICAN AMERICAN): >60 ML/MIN/1.73 M^2
EST. GFR  (NON AFRICAN AMERICAN): >60 ML/MIN/1.73 M^2
GLUCOSE SERPL-MCNC: 82 MG/DL
HCT VFR BLD AUTO: 43.8 %
HGB BLD-MCNC: 14.8 G/DL
LYMPHOCYTES # BLD AUTO: 1.5 K/UL
LYMPHOCYTES NFR BLD: 22 %
MCH RBC QN AUTO: 28.1 PG
MCHC RBC AUTO-ENTMCNC: 33.8 G/DL
MCV RBC AUTO: 83 FL
MONOCYTES # BLD AUTO: 0.7 K/UL
MONOCYTES NFR BLD: 10 %
NEUTROPHILS # BLD AUTO: 4.4 K/UL
NEUTROPHILS NFR BLD: 65.6 %
PLATELET # BLD AUTO: 130 K/UL
PMV BLD AUTO: 9.3 FL
POTASSIUM SERPL-SCNC: 3.7 MMOL/L
RBC # BLD AUTO: 5.26 M/UL
SODIUM SERPL-SCNC: 139 MMOL/L
TROPONIN I SERPL DL<=0.01 NG/ML-MCNC: 0.69 NG/ML
TROPONIN I SERPL DL<=0.01 NG/ML-MCNC: 0.81 NG/ML
TROPONIN I SERPL DL<=0.01 NG/ML-MCNC: 0.89 NG/ML
WBC # BLD AUTO: 6.63 K/UL

## 2018-04-24 PROCEDURE — 63600175 PHARM REV CODE 636 W HCPCS: Performed by: NURSE PRACTITIONER

## 2018-04-24 PROCEDURE — 25000003 PHARM REV CODE 250: Performed by: EMERGENCY MEDICINE

## 2018-04-24 PROCEDURE — 80048 BASIC METABOLIC PNL TOTAL CA: CPT

## 2018-04-24 PROCEDURE — 25000003 PHARM REV CODE 250: Performed by: NURSE PRACTITIONER

## 2018-04-24 PROCEDURE — 36415 COLL VENOUS BLD VENIPUNCTURE: CPT

## 2018-04-24 PROCEDURE — 84484 ASSAY OF TROPONIN QUANT: CPT

## 2018-04-24 PROCEDURE — 84484 ASSAY OF TROPONIN QUANT: CPT | Mod: 91

## 2018-04-24 PROCEDURE — 99233 SBSQ HOSP IP/OBS HIGH 50: CPT | Mod: ,,, | Performed by: INTERNAL MEDICINE

## 2018-04-24 PROCEDURE — 85025 COMPLETE CBC W/AUTO DIFF WBC: CPT

## 2018-04-24 PROCEDURE — 25000003 PHARM REV CODE 250: Performed by: INTERNAL MEDICINE

## 2018-04-24 PROCEDURE — 25000242 PHARM REV CODE 250 ALT 637 W/ HCPCS: Performed by: NURSE PRACTITIONER

## 2018-04-24 RX ORDER — RANOLAZINE 500 MG/1
500 TABLET, EXTENDED RELEASE ORAL 2 TIMES DAILY
Qty: 60 TABLET | Refills: 1 | Status: SHIPPED | OUTPATIENT
Start: 2018-04-24 | End: 2018-07-09 | Stop reason: SDUPTHER

## 2018-04-24 RX ORDER — ISOSORBIDE MONONITRATE 60 MG/1
60 TABLET, EXTENDED RELEASE ORAL DAILY
Qty: 30 TABLET | Refills: 1 | Status: SHIPPED | OUTPATIENT
Start: 2018-04-25 | End: 2018-07-09 | Stop reason: SDUPTHER

## 2018-04-24 RX ORDER — ISOSORBIDE MONONITRATE 60 MG/1
60 TABLET, EXTENDED RELEASE ORAL DAILY
Status: DISCONTINUED | OUTPATIENT
Start: 2018-04-24 | End: 2018-04-24 | Stop reason: HOSPADM

## 2018-04-24 RX ORDER — CETIRIZINE HYDROCHLORIDE 10 MG/1
10 TABLET ORAL DAILY
Status: DISCONTINUED | OUTPATIENT
Start: 2018-04-24 | End: 2018-04-24 | Stop reason: HOSPADM

## 2018-04-24 RX ORDER — FLUTICASONE PROPIONATE 50 MCG
2 SPRAY, SUSPENSION (ML) NASAL DAILY
Status: DISCONTINUED | OUTPATIENT
Start: 2018-04-24 | End: 2018-04-24 | Stop reason: HOSPADM

## 2018-04-24 RX ADMIN — LISINOPRIL 40 MG: 20 TABLET ORAL at 09:04

## 2018-04-24 RX ADMIN — FLUTICASONE PROPIONATE 100 MCG: 50 SPRAY, METERED NASAL at 11:04

## 2018-04-24 RX ADMIN — RANOLAZINE 500 MG: 500 TABLET, FILM COATED, EXTENDED RELEASE ORAL at 09:04

## 2018-04-24 RX ADMIN — TICAGRELOR 90 MG: 90 TABLET ORAL at 09:04

## 2018-04-24 RX ADMIN — CETIRIZINE HYDROCHLORIDE 10 MG: 10 TABLET, FILM COATED ORAL at 11:04

## 2018-04-24 RX ADMIN — ISOSORBIDE MONONITRATE 60 MG: 60 TABLET, EXTENDED RELEASE ORAL at 11:04

## 2018-04-24 RX ADMIN — ASPIRIN 81 MG: 81 TABLET, COATED ORAL at 09:04

## 2018-04-24 RX ADMIN — FEBUXOSTAT 80 MG: 40 TABLET ORAL at 09:04

## 2018-04-24 RX ADMIN — ONDANSETRON 4 MG: 2 INJECTION, SOLUTION INTRAMUSCULAR; INTRAVENOUS at 09:04

## 2018-04-24 RX ADMIN — METOPROLOL TARTRATE 25 MG: 25 TABLET ORAL at 09:04

## 2018-04-24 RX ADMIN — NITROGLYCERIN 1 INCH: 20 OINTMENT TOPICAL at 05:04

## 2018-04-24 NOTE — DISCHARGE SUMMARY
"[facility] Hospital Medicine  Discharge Summary      Patient Name: Robb Iglesias  MRN: [id number]  Admission Date: 4/22/2018  Hospital Length of Stay: 1 days  Discharge Date and Time:  04/24/2018 2:07 PM  Attending Physician: Janelle Caceres MD   Discharging Provider: Nay Srinivasan NP  Primary Care Provider: SABIHA Roberson MD      HPI:   Mr. Iglesias is a 38yo male with  a PMHx of CAD s/p PCI of LAD x 2 in 8/2017, HTN, HLD, HENRY, h/o gastric sleeve, and DM II, who presented to the ED with c/o substernal CP that started suddenly this evening while watching TV.  Pain was "pounding" in nature, rated 4/10, nonradiating, and nonexertional.  Denies any palpitations, SOB/BORREGO, orthopnea, edema, N/V, diaphoresis, lightheadedness/dizziness, syncope, or fever.  No aggravating or alleviating factors.  He reports having similar episode Friday night.  Both episodes lasted ~45 minutes before spontaneously resolving.  He states this CP is different from the angina he experienced with previous MI in 8/2017.  Reports being compliant with medications, including Brilinta and low dose ASA.  Work-up in ED resulted troponin 0.077.  EKG showed new T wave inversions in lead III.  Hospital Medicine was consulted for admission.  Currently, patient appears comfortable in NAD.  Denies any further CP episodes since arrival to ED.      Procedure(s) (LRB):  HEART CATH-LEFT (Left)      Hospital Course:   4/23/2018  Patient seen and examined. No complaint of pain or discomfort. Vital signs stable. Cardiology consulted.   Troponin mildly elevated, on Lovenox 1 mg/kg.     4/24/18 patient underwent LHC that noted 90% stenosis of LAD stent that was treated with PTCA stent expansion. Patient will continue Brilinta, ASA, STATIN, and STATIN. Imdur and Ranexa was added. Patient also be a candidate for brachytherapy, but this will be discussed at a later date with Cardiology. Patient was asked to follow up with PCP in 3 days, and " PCP in 3 weeks. Patient has been evaluated for cardiac rehab and is not appropriate at this time. She will be evaluated at a later time and referred if appropriate. Patient seen and examined on date of discharge and deemed suitable.      Consults:   Consults         Status Ordering Provider     Inpatient consult to Cardiology  Once     Provider:  Piyush Reyes MD    Completed MARCELLO ROMERO          No new Assessment & Plan notes have been filed under this hospital service since the last note was generated.  Service: Hospital Medicine    Final Active Diagnoses:    Diagnosis Date Noted POA    PRINCIPAL PROBLEM:  NSTEMI with CAD s/p PCI (FAMILIA) of LAD x 2 in 8/2017 [I21.4] 07/31/2017 Yes    Chest pain [R07.9] 04/23/2018 Yes    Type 2 diabetes mellitus  [E11.59] 08/05/2017 Yes     Chronic    Hyperlipidemia [E78.5] 07/31/2017 Yes     Chronic    Obstructive sleep apnea [G47.33] 07/31/2017 Yes     Chronic    Stage 2 chronic kidney disease [N18.2] 06/07/2017 Yes     Chronic    Essential hypertension [I10] 04/26/2017 Yes     Chronic      Problems Resolved During this Admission:    Diagnosis Date Noted Date Resolved POA       Discharged Condition: stable    Disposition: Home or Self Care    Follow Up:  Follow-up Information     L Philippe Roberson MD In 3 days.    Specialty:  Family Medicine  Contact information:  [facility] [facility] [address]  [phone]             Piyush Reyes Md, MD On 5/3/2018.    Specialties:  Cardiology, Internal Medicine  Contact information:  [address] [address]  [phone]                 Patient Instructions:     Activity as tolerated         Significant Diagnostic Studies: Labs:   CMP   Recent Labs  Lab 04/22/18  1701 04/23/18  0452 04/24/18  0548    141 139   K 4.1 3.7 3.7    107 108   CO2 26 28 26   * 98 82   BUN 30* 23* 16   CREATININE 1.5* 1.3 1.2   CALCIUM 9.3 9.0 8.5*   PROT 6.6  --   --    ALBUMIN 3.7  --   --    BILITOT 1.4*  --   --     ALKPHOS 83  --   --    AST 28  --   --    ALT 43  --   --    ANIONGAP 8 6* 5*   ESTGFRAFRICA >60 >60 >60   EGFRNONAA 59* >60 >60    and CBC   Recent Labs  Lab 04/22/18  1701 04/23/18  0452 04/24/18  0548   WBC 10.42 8.07 6.63   HGB 15.3 14.6 14.8   HCT 43.8 43.1 43.8    130* 130*       Pending Diagnostic Studies:     Procedure Component Value Units Date/Time    IR Heart Cath Images [[id number]] Resulted:  04/23/18 1021    Order Status:  Sent Lab Status:  In process Updated:  04/23/18 1229    Troponin I #2 [[id number]] Collected:  04/22/18 1701    Order Status:  Sent Lab Status:  In process Updated:  04/22/18 1702    Specimen:  Blood from Blood          Medications:  Reconciled Home Medications:      Medication List      START taking these medications    isosorbide mononitrate 60 MG 24 hr tablet  Commonly known as:  IMDUR  Take 1 tablet (60 mg total) by mouth once daily.  Start taking on:  4/25/2018     ranolazine 500 MG Tb12  Commonly known as:  RANEXA  Take 1 tablet (500 mg total) by mouth 2 (two) times daily.        CHANGE how you take these medications    colchicine 0.6 mg tablet  Commonly known as:  COLCRYS  Take 1 tablet (0.6 mg total) by mouth 2 (two) times daily.  What changed:  · when to take this  · reasons to take this        CONTINUE taking these medications    aspirin 81 MG EC tablet  Commonly known as:  ECOTRIN  Take 81 mg by mouth once daily.     atorvastatin 80 MG tablet  Commonly known as:  LIPITOR  Take 1 tablet (80 mg total) by mouth once daily.     CENTRUM COMPLETE ORAL  Take by mouth once daily at 6am.     cetirizine 10 MG tablet  Commonly known as:  ZYRTEC  Take 10 mg by mouth once daily.     doxazosin 1 MG tablet  Commonly known as:  CARDURA  Take 1 mg by mouth every evening.     febuxostat 40 mg Tab  Commonly known as:  ULORIC  Take 2 tablets (80 mg total) by mouth once daily.     lisinopril 40 MG tablet  Commonly known as:  PRINIVIL,ZESTRIL  Take 1 tablet (40 mg total) by mouth 2 (two)  times daily.     metoprolol tartrate 25 MG tablet  Commonly known as:  LOPRESSOR  Take 1 tablet (25 mg total) by mouth 2 (two) times daily.     ticagrelor 90 mg tablet  Commonly known as:  BRILINTA  Take 1 tablet (90 mg total) by mouth 2 (two) times daily.            Indwelling Lines/Drains at time of discharge:   Lines/Drains/Airways          No matching active lines, drains, or airways          Time spent on the discharge of patient: >35 minutes  Patient was seen and examined on the date of discharge and determined to be suitable for discharge.      Nay Srinivasan NP  Department of Hospital Medicine  [facility]

## 2018-04-24 NOTE — HOSPITAL COURSE
4/23/2018  Patient seen and examined. No complaint of pain or discomfort. Vital signs stable. Cardiology consulted.   Troponin mildly elevated, on Lovenox 1 mg/kg.     4/24/18 patient underwent LHC that noted 90% stenosis of LAD stent that was treated with PTCA stent expansion. Patient will continue Brilinta, ASA, STATIN, and STATIN. Imdur and Ranexa was added. Patient also be a candidate for brachytherapy, but this will be discussed at a later date with Cardiology. Patient was asked to follow up with PCP in 3 days, and PCP in 3 weeks. Patient has been evaluated for cardiac rehab and is not appropriate at this time. She will be evaluated at a later time and referred if appropriate. Patient seen and examined on date of discharge and deemed suitable.

## 2018-04-24 NOTE — ASSESSMENT & PLAN NOTE
-Patient is post successful PCI of LAD in stent stenosis  -Dr. Reyes explained to patient that if restenosis should reoccur, that it would be recommended that he be evaluated in [place] for brachytherapy. Spouse and patient verbalized understanding  -Has been counseled on post angiogram restrictions  -Instructions also provided to limit physical activity for at least 4 weeks as he recovers from NSTEMI  -Will need to follow up in clinic in 1-2 weeks with Dr. Rios. Clinic will call to arrange follow up  -Will need cardiac rehab, but not able to start for at least 4 weeks post NSTEMI  -Will be dc'd home with ASA, Brilinta, statin, BB, ACE and add Imdur 60mg daily

## 2018-04-24 NOTE — PROGRESS NOTES
[facility] -   Cardiology  Progress Note    Patient Name: Robb Iglesias  MRN: [id number]  Admission Date: 4/22/2018  Hospital Length of Stay: 1 days  Code Status: Full Code   Attending Physician: No att. providers found   Primary Care Physician: SABIHA Roberson MD  Expected Discharge Date: 4/24/2018  Principal Problem:NSTEMI (non-ST elevated myocardial infarction)    Subjective:   Brief HPI:  Patient is a 37 y.o. male with PMH of DM, HTN, LAD stenting x2 in Aug 2017 and previous gastric sleeve surgery . Patient presented to the ED with complaints of chest pressure, similar to what he experience when he required coronary stenting. Patient noted to have + troponin  ( 0.077, 0.262, 0.550, 0.643). ECG showed no acute changes. Patient made NPO for cath today. States that he has been compliant with meds. Noted to have normal LVF on echo in Aug 2017. Noted to have elevated BP in the ED.     Hospital Course:   4/24/18- Patient is post successful PCI of LAD in stent stenosis. Patient tolerated procedure well. Denies any chest pain or angina equivalent. Labs and vitals stable          Review of Systems   Constitution: Negative for diaphoresis, weakness, malaise/fatigue, weight gain and weight loss.   HENT: Negative for congestion and nosebleeds.    Cardiovascular: Negative for chest pain, claudication, cyanosis, dyspnea on exertion, irregular heartbeat, leg swelling, near-syncope, orthopnea, palpitations, paroxysmal nocturnal dyspnea and syncope.   Respiratory: Negative for cough, hemoptysis, shortness of breath, sleep disturbances due to breathing, snoring, sputum production and wheezing.    Hematologic/Lymphatic: Negative for bleeding problem. Does not bruise/bleed easily.   Skin: Negative for rash.   Musculoskeletal: Negative for arthritis, back pain, falls, joint pain, muscle cramps and muscle weakness.   Gastrointestinal: Negative for abdominal pain, constipation, diarrhea, heartburn, hematemesis,  hematochezia, melena and nausea.   Genitourinary: Negative for dysuria, hematuria and nocturia.   Neurological: Negative for excessive daytime sleepiness, dizziness, headaches, light-headedness, loss of balance, numbness and vertigo.     Objective:     Vital Signs (Most Recent):  Temp: 98.3 °F (36.8 °C) (04/24/18 1250)  Pulse: (!) 56 (04/24/18 1250)  Resp: 18 (04/24/18 1250)  BP: (!) 145/80 (04/24/18 1250)  SpO2: 98 % (04/24/18 1250) Vital Signs (24h Range):  Temp:  [97.5 °F (36.4 °C)-98.3 °F (36.8 °C)] 98.3 °F (36.8 °C)  Pulse:  [55-59] 56  Resp:  [18-20] 18  SpO2:  [96 %-98 %] 98 %  BP: (142-163)/(80-99) 145/80     Weight: 110 kg (242 lb 8.1 oz)  Body mass index is 30.31 kg/m².     SpO2: 98 %  O2 Device (Oxygen Therapy): room air      Intake/Output Summary (Last 24 hours) at 04/24/18 1617  Last data filed at 04/24/18 1230   Gross per 24 hour   Intake              480 ml   Output                0 ml   Net              480 ml       Lines/Drains/Airways     Peripheral Intravenous Line                 Peripheral IV - Single Lumen 04/22/18 1702 Right Antecubital 1 day                Physical Exam   Constitutional: He is oriented to person, place, and time. He appears well-developed and well-nourished.   Neck: Neck supple. No JVD present.   Cardiovascular: Normal rate, regular rhythm, normal heart sounds and normal pulses.  Exam reveals no friction rub.    No murmur heard.  Pulmonary/Chest: Effort normal and breath sounds normal. No respiratory distress. He has no wheezes. He has no rales.   Abdominal: Soft. Bowel sounds are normal. He exhibits no distension.   Musculoskeletal: He exhibits no edema or tenderness.   Neurological: He is alert and oriented to person, place, and time.   Skin: Skin is warm and dry. No rash noted.   Left radial access site without redness, tenderness, swelling, or drainage. There is no active external bleeding or hematoma.    Psychiatric: He has a normal mood and affect. His behavior is  normal.   Nursing note and vitals reviewed.      Significant Labs:   All pertinent lab results from the last 24 hours have been reviewed. and   Recent Lab Results       04/24/18  1135 04/24/18  0548 04/24/18  0059 04/23/18  2142      Anion Gap  5(L)       Baso #  0.02       Basophil%  0.3       BUN, Bld  16       Calcium  8.5(L)       Chloride  108       CO2  26       Creatinine  1.2       Differential Method  Automated       eGFR if   >60       eGFR if non   >60  Comment:  Calculation used to obtain the estimated glomerular filtration  rate (eGFR) is the CKD-EPI equation.          Eos #  0.1       Eosinophil%  2.1       Glucose  82       Gran # (ANC)  4.4       Gran%  65.6       Hematocrit  43.8       Hemoglobin  14.8       Lymph #  1.5       Lymph%  22.0       MCH  28.1       MCHC  33.8       MCV  83       Mono #  0.7       Mono%  10.0       MPV  9.3       Platelets  130(L)       Potassium  3.7       RBC  5.26       RDW  14.3       Sodium  139       Troponin I 0.691  Comment:  The reference interval for Troponin I represents the 99th percentile   cutoff   for our facility and is consistent with 3rd generation assay   performance.  (H) 0.808  Comment:  The reference interval for Troponin I represents the 99th percentile   cutoff   for our facility and is consistent with 3rd generation assay   performance.  (H) 0.891  Comment:  The reference interval for Troponin I represents the 99th percentile   cutoff   for our facility and is consistent with 3rd generation assay   performance.  (H) 0.822  Comment:  The reference interval for Troponin I represents the 99th percentile   cutoff   for our facility and is consistent with 3rd generation assay   performance.  (H)     WBC  6.63             Significant Imaging: Echocardiogram:   2D echo with color flow doppler:   Results for orders placed or performed during the hospital encounter of 04/22/18   2D echo with color flow doppler   Result  Value Ref Range    EF 55 55 - 65    Mitral Valve Regurgitation TRIVIAL     Aortic Valve Regurgitation TRIVIAL     Est. PA Systolic Pressure 19.48     Mitral Valve Mobility NORMAL     and X-Ray: CXR: X-Ray Chest 1 View (CXR): No results found for this visit on 04/22/18. and X-Ray Chest PA and Lateral (CXR): No results found for this visit on 04/22/18.    Assessment and Plan:       * NSTEMI with CAD s/p PCI (FAMILIA) of LAD x 2 in 8/2017    -Patient is post successful PCI of LAD in stent stenosis  -Dr. Reyes explained to patient that if restenosis should reoccur, that it would be recommended that he be evaluated in [place] for brachytherapy. Spouse and patient verbalized understanding  -Has been counseled on post angiogram restrictions  -Instructions also provided to limit physical activity for at least 4 weeks as he recovers from NSTEMI  -Will need to follow up in clinic in 1-2 weeks with Dr. Rios. Clinic will call to arrange follow up  -Will need cardiac rehab, but not able to start for at least 4 weeks post NSTEMI  -Will be dc'd home with ASA, Brilinta, statin, BB, ACE and add Imdur 60mg daily           Hyperlipidemia    Continue Statin             VTE Risk Mitigation         Ordered     IP VTE HIGH RISK PATIENT  Once      04/22/18 2132      Chart reviewed. Patient examined by Dr. Reyes and agrees with plan that has been outlined.       MAGGIE Ambriz  Cardiology  [facility]

## 2018-04-24 NOTE — SUBJECTIVE & OBJECTIVE
Review of Systems   Constitution: Negative for diaphoresis, weakness, malaise/fatigue, weight gain and weight loss.   HENT: Negative for congestion and nosebleeds.    Cardiovascular: Negative for chest pain, claudication, cyanosis, dyspnea on exertion, irregular heartbeat, leg swelling, near-syncope, orthopnea, palpitations, paroxysmal nocturnal dyspnea and syncope.   Respiratory: Negative for cough, hemoptysis, shortness of breath, sleep disturbances due to breathing, snoring, sputum production and wheezing.    Hematologic/Lymphatic: Negative for bleeding problem. Does not bruise/bleed easily.   Skin: Negative for rash.   Musculoskeletal: Negative for arthritis, back pain, falls, joint pain, muscle cramps and muscle weakness.   Gastrointestinal: Negative for abdominal pain, constipation, diarrhea, heartburn, hematemesis, hematochezia, melena and nausea.   Genitourinary: Negative for dysuria, hematuria and nocturia.   Neurological: Negative for excessive daytime sleepiness, dizziness, headaches, light-headedness, loss of balance, numbness and vertigo.     Objective:     Vital Signs (Most Recent):  Temp: 98.3 °F (36.8 °C) (04/24/18 1250)  Pulse: (!) 56 (04/24/18 1250)  Resp: 18 (04/24/18 1250)  BP: (!) 145/80 (04/24/18 1250)  SpO2: 98 % (04/24/18 1250) Vital Signs (24h Range):  Temp:  [97.5 °F (36.4 °C)-98.3 °F (36.8 °C)] 98.3 °F (36.8 °C)  Pulse:  [55-59] 56  Resp:  [18-20] 18  SpO2:  [96 %-98 %] 98 %  BP: (142-163)/(80-99) 145/80     Weight: 110 kg (242 lb 8.1 oz)  Body mass index is 30.31 kg/m².     SpO2: 98 %  O2 Device (Oxygen Therapy): room air      Intake/Output Summary (Last 24 hours) at 04/24/18 1617  Last data filed at 04/24/18 1230   Gross per 24 hour   Intake              480 ml   Output                0 ml   Net              480 ml       Lines/Drains/Airways     Peripheral Intravenous Line                 Peripheral IV - Single Lumen 04/22/18 1702 Right Antecubital 1 day                Physical Exam    Constitutional: He is oriented to person, place, and time. He appears well-developed and well-nourished.   Neck: Neck supple. No JVD present.   Cardiovascular: Normal rate, regular rhythm, normal heart sounds and normal pulses.  Exam reveals no friction rub.    No murmur heard.  Pulmonary/Chest: Effort normal and breath sounds normal. No respiratory distress. He has no wheezes. He has no rales.   Abdominal: Soft. Bowel sounds are normal. He exhibits no distension.   Musculoskeletal: He exhibits no edema or tenderness.   Neurological: He is alert and oriented to person, place, and time.   Skin: Skin is warm and dry. No rash noted.   Left radial access site without redness, tenderness, swelling, or drainage. There is no active external bleeding or hematoma.    Psychiatric: He has a normal mood and affect. His behavior is normal.   Nursing note and vitals reviewed.      Significant Labs:   All pertinent lab results from the last 24 hours have been reviewed. and   Recent Lab Results       04/24/18  1135 04/24/18  0548 04/24/18  0059 04/23/18  2142      Anion Gap  5(L)       Baso #  0.02       Basophil%  0.3       BUN, Bld  16       Calcium  8.5(L)       Chloride  108       CO2  26       Creatinine  1.2       Differential Method  Automated       eGFR if   >60       eGFR if non   >60  Comment:  Calculation used to obtain the estimated glomerular filtration  rate (eGFR) is the CKD-EPI equation.          Eos #  0.1       Eosinophil%  2.1       Glucose  82       Gran # (ANC)  4.4       Gran%  65.6       Hematocrit  43.8       Hemoglobin  14.8       Lymph #  1.5       Lymph%  22.0       MCH  28.1       MCHC  33.8       MCV  83       Mono #  0.7       Mono%  10.0       MPV  9.3       Platelets  130(L)       Potassium  3.7       RBC  5.26       RDW  14.3       Sodium  139       Troponin I 0.691  Comment:  The reference interval for Troponin I represents the 99th percentile   cutoff   for our  facility and is consistent with 3rd generation assay   performance.  (H) 0.808  Comment:  The reference interval for Troponin I represents the 99th percentile   cutoff   for our facility and is consistent with 3rd generation assay   performance.  (H) 0.891  Comment:  The reference interval for Troponin I represents the 99th percentile   cutoff   for our facility and is consistent with 3rd generation assay   performance.  (H) 0.822  Comment:  The reference interval for Troponin I represents the 99th percentile   cutoff   for our facility and is consistent with 3rd generation assay   performance.  (H)     WBC  6.63             Significant Imaging: Echocardiogram:   2D echo with color flow doppler:   Results for orders placed or performed during the hospital encounter of 04/22/18   2D echo with color flow doppler   Result Value Ref Range    EF 55 55 - 65    Mitral Valve Regurgitation TRIVIAL     Aortic Valve Regurgitation TRIVIAL     Est. PA Systolic Pressure 19.48     Mitral Valve Mobility NORMAL     and X-Ray: CXR: X-Ray Chest 1 View (CXR): No results found for this visit on 04/22/18. and X-Ray Chest PA and Lateral (CXR): No results found for this visit on 04/22/18.

## 2018-04-24 NOTE — PROGRESS NOTES
"[facility] Hospital Medicine  Progress Note    Patient Name: Robb Iglesias  MRN: [id number]  Patient Class: IP- Inpatient   Admission Date: 4/22/2018  Length of Stay: 1 days  Attending Physician: Janelle Caceres MD  Primary Care Provider: SABIHA Roberson MD        Subjective:     Principal Problem:NSTEMI (non-ST elevated myocardial infarction)    HPI:  Mr. Iglesias is a 38yo male with  a PMHx of CAD s/p PCI of LAD x 2 in 8/2017, HTN, HLD, HENRY, h/o gastric sleeve, and DM II, who presented to the ED with c/o substernal CP that started suddenly this evening while watching TV.  Pain was "pounding" in nature, rated 4/10, nonradiating, and nonexertional.  Denies any palpitations, SOB/BORREGO, orthopnea, edema, N/V, diaphoresis, lightheadedness/dizziness, syncope, or fever.  No aggravating or alleviating factors.  He reports having similar episode Friday night.  Both episodes lasted ~45 minutes before spontaneously resolving.  He states this CP is different from the angina he experienced with previous MI in 8/2017.  Reports being compliant with medications, including Brilinta and low dose ASA.  Work-up in ED resulted troponin 0.077.  EKG showed new T wave inversions in lead III.  Hospital Medicine was consulted for admission.  Currently, patient appears comfortable in NAD.  Denies any further CP episodes since arrival to ED.      Hospital Course:  4/23/2018  Patient seen and examined. No complaint of pain or discomfort. Vital signs stable. Cardiology consulted.   Troponin mildly elevated, on Lovenox 1 mg/kg.     Interval History: Patient seen and examined. No distress or complaints of pain.     Review of Systems   Constitutional: Negative for chills, diaphoresis, fatigue and fever.   HENT: Negative for congestion, ear discharge, rhinorrhea, sinus pressure, sore throat and trouble swallowing.    Eyes: Negative for discharge and visual disturbance.   Respiratory: Negative for apnea, cough, choking, chest " tightness, shortness of breath, wheezing and stridor.    Cardiovascular: Negative for chest pain, palpitations and leg swelling.   Gastrointestinal: Negative for abdominal distention, abdominal pain, diarrhea, nausea and vomiting.   Endocrine: Negative for cold intolerance and heat intolerance.   Genitourinary: Negative for dysuria, frequency and hematuria.   Musculoskeletal: Negative for arthralgias, back pain, myalgias and neck pain.   Skin: Negative for pallor and rash.   Neurological: Negative for dizziness, seizures, syncope, weakness and headaches.   Psychiatric/Behavioral: Negative for agitation, confusion and sleep disturbance.     Objective:     Vital Signs (Most Recent):  Temp: 98 °F (36.7 °C) (04/24/18 0341)  Pulse: (!) 55 (04/24/18 0341)  Resp: 20 (04/24/18 0341)  BP: (!) 142/89 (04/24/18 0341)  SpO2: 97 % (04/24/18 0341) Vital Signs (24h Range):  Temp:  [97.5 °F (36.4 °C)-98.5 °F (36.9 °C)] 98 °F (36.7 °C)  Pulse:  [53-59] 55  Resp:  [11-20] 20  SpO2:  [96 %-99 %] 97 %  BP: (121-159)/(83-99) 142/89     Weight: 110 kg (242 lb 8.1 oz)  Body mass index is 30.31 kg/m².    Intake/Output Summary (Last 24 hours) at 04/24/18 0802  Last data filed at 04/23/18 1045   Gross per 24 hour   Intake           656.25 ml   Output             1100 ml   Net          -443.75 ml      Physical Exam   Constitutional: He is oriented to person, place, and time. He appears well-developed and well-nourished.   HENT:   Head: Normocephalic and atraumatic.   Pulmonary/Chest: No respiratory distress. He has no wheezes. He has no rales. He exhibits no tenderness.   Abdominal: Soft. Bowel sounds are normal. He exhibits no distension and no mass. There is no tenderness. There is no rebound and no guarding. No hernia.   Musculoskeletal: He exhibits no edema or deformity.   Neurological: He is alert and oriented to person, place, and time.   Skin: Skin is warm and dry. Capillary refill takes less than 2 seconds.   Psychiatric: He has a  normal mood and affect. His behavior is normal. Judgment and thought content normal.   Nursing note and vitals reviewed.          Assessment/Plan:      * NSTEMI with CAD s/p PCI (FAMILIA) of LAD x 2 in 8/2017    - Currently chest pain free.  BELLA score of 5.  - Initial troponin 0.077, EKG with new T wave inversions in lead III.  Follow serial trends.  - Will give Lovenox 1mg/kg.  - Continue low dose ASA, Brilinta, BB, and high-intensity statin.  - 2D echo in AM.  - NPO after MN.  - Cardiology consult in AM.        Type 2 diabetes mellitus     - Patient off insulin since bariatric surgery.  - Previous HbA1c 5.3 on 3/20/18.  - Monitor glucose.        Hyperlipidemia    - Continue statin therapy.  - FLP in AM.        Obstructive sleep apnea    - CPAP Q HS.        Stage 2 chronic kidney disease    - Cr. Stable at 1.5 (baseline 1.2-1.5).  - Will start gentle IVF's to prehydrate for possible LHC.  - Avoid nephrotoxic agents.  - Follow daily BMP.        Essential hypertension    - BP improved with nitro paste, and remains stable.  - Continue home Lopressor, lisinopril, and doxazosin.  - Continue nitro paste for now.           VTE Risk Mitigation         Ordered     IP VTE HIGH RISK PATIENT  Once      04/22/18 2132              Jesus Benito NP  Department of Hospital Medicine   [facility]

## 2018-04-24 NOTE — SUBJECTIVE & OBJECTIVE
Interval History: Patient seen and examined. No distress or complaints of pain.     Review of Systems   Constitutional: Negative for chills, diaphoresis, fatigue and fever.   HENT: Negative for congestion, ear discharge, rhinorrhea, sinus pressure, sore throat and trouble swallowing.    Eyes: Negative for discharge and visual disturbance.   Respiratory: Negative for apnea, cough, choking, chest tightness, shortness of breath, wheezing and stridor.    Cardiovascular: Negative for chest pain, palpitations and leg swelling.   Gastrointestinal: Negative for abdominal distention, abdominal pain, diarrhea, nausea and vomiting.   Endocrine: Negative for cold intolerance and heat intolerance.   Genitourinary: Negative for dysuria, frequency and hematuria.   Musculoskeletal: Negative for arthralgias, back pain, myalgias and neck pain.   Skin: Negative for pallor and rash.   Neurological: Negative for dizziness, seizures, syncope, weakness and headaches.   Psychiatric/Behavioral: Negative for agitation, confusion and sleep disturbance.     Objective:     Vital Signs (Most Recent):  Temp: 98 °F (36.7 °C) (04/24/18 0341)  Pulse: (!) 55 (04/24/18 0341)  Resp: 20 (04/24/18 0341)  BP: (!) 142/89 (04/24/18 0341)  SpO2: 97 % (04/24/18 0341) Vital Signs (24h Range):  Temp:  [97.5 °F (36.4 °C)-98.5 °F (36.9 °C)] 98 °F (36.7 °C)  Pulse:  [53-59] 55  Resp:  [11-20] 20  SpO2:  [96 %-99 %] 97 %  BP: (121-159)/(83-99) 142/89     Weight: 110 kg (242 lb 8.1 oz)  Body mass index is 30.31 kg/m².    Intake/Output Summary (Last 24 hours) at 04/24/18 0802  Last data filed at 04/23/18 1045   Gross per 24 hour   Intake           656.25 ml   Output             1100 ml   Net          -443.75 ml      Physical Exam   Constitutional: He is oriented to person, place, and time. He appears well-developed and well-nourished.   HENT:   Head: Normocephalic and atraumatic.   Pulmonary/Chest: No respiratory distress. He has no wheezes. He has no rales. He  exhibits no tenderness.   Abdominal: Soft. Bowel sounds are normal. He exhibits no distension and no mass. There is no tenderness. There is no rebound and no guarding. No hernia.   Musculoskeletal: He exhibits no edema or deformity.   Neurological: He is alert and oriented to person, place, and time.   Skin: Skin is warm and dry. Capillary refill takes less than 2 seconds.   Psychiatric: He has a normal mood and affect. His behavior is normal. Judgment and thought content normal.   Nursing note and vitals reviewed.

## 2018-04-24 NOTE — HOSPITAL COURSE
4/24/18- Patient is post successful PCI of LAD in stent stenosis. Patient tolerated procedure well. Denies any chest pain or angina equivalent. Labs and vitals stable

## 2018-04-24 NOTE — PLAN OF CARE
Problem: Patient Care Overview  Goal: Plan of Care Review  Patient is resting comfortably in bed. Patient experienced minimal pain at cath site. Resolved with tylenol 650 mg. Plan of care reviewed with patient. All questions answered. piv intact infusing NS at 100 ml/hour. Will continue to monitor.

## 2018-04-25 ENCOUNTER — PATIENT OUTREACH (OUTPATIENT)
Dept: ADMINISTRATIVE | Facility: CLINIC | Age: 38
End: 2018-04-25

## 2018-04-25 NOTE — PLAN OF CARE
04/25/18 0826   Final Note   Assessment Type Final Discharge Note   Discharge Disposition Home

## 2018-04-26 ENCOUNTER — PATIENT MESSAGE (OUTPATIENT)
Dept: ADMINISTRATIVE | Facility: OTHER | Age: 38
End: 2018-04-26

## 2018-04-26 ENCOUNTER — OFFICE VISIT (OUTPATIENT)
Dept: INTERNAL MEDICINE | Facility: CLINIC | Age: 38
End: 2018-04-26
Payer: COMMERCIAL

## 2018-04-26 VITALS
SYSTOLIC BLOOD PRESSURE: 138 MMHG | HEIGHT: 75 IN | OXYGEN SATURATION: 99 % | WEIGHT: 236.56 LBS | TEMPERATURE: 96 F | HEART RATE: 64 BPM | BODY MASS INDEX: 29.41 KG/M2 | DIASTOLIC BLOOD PRESSURE: 88 MMHG

## 2018-04-26 DIAGNOSIS — Z95.820 STATUS POST ANGIOPLASTY WITH STENT: ICD-10-CM

## 2018-04-26 DIAGNOSIS — E11.21 TYPE 2 DIABETES MELLITUS WITH DIABETIC NEPHROPATHY, WITHOUT LONG-TERM CURRENT USE OF INSULIN: ICD-10-CM

## 2018-04-26 DIAGNOSIS — M1A.0620 IDIOPATHIC CHRONIC GOUT OF LEFT KNEE WITHOUT TOPHUS: Chronic | ICD-10-CM

## 2018-04-26 DIAGNOSIS — E78.2 MIXED HYPERLIPIDEMIA: Chronic | ICD-10-CM

## 2018-04-26 DIAGNOSIS — I25.10 CORONARY ARTERY DISEASE INVOLVING NATIVE CORONARY ARTERY OF NATIVE HEART WITHOUT ANGINA PECTORIS: Primary | Chronic | ICD-10-CM

## 2018-04-26 DIAGNOSIS — I10 ESSENTIAL HYPERTENSION: Chronic | ICD-10-CM

## 2018-04-26 DIAGNOSIS — R17 ELEVATED BILIRUBIN: ICD-10-CM

## 2018-04-26 PROBLEM — E66.09 NON MORBID OBESITY DUE TO EXCESS CALORIES: Status: RESOLVED | Noted: 2017-04-07 | Resolved: 2018-04-26

## 2018-04-26 PROCEDURE — 3044F HG A1C LEVEL LT 7.0%: CPT | Mod: CPTII,S$GLB,, | Performed by: FAMILY MEDICINE

## 2018-04-26 PROCEDURE — 3079F DIAST BP 80-89 MM HG: CPT | Mod: CPTII,S$GLB,, | Performed by: FAMILY MEDICINE

## 2018-04-26 PROCEDURE — 3075F SYST BP GE 130 - 139MM HG: CPT | Mod: CPTII,S$GLB,, | Performed by: FAMILY MEDICINE

## 2018-04-26 PROCEDURE — 99214 OFFICE O/P EST MOD 30 MIN: CPT | Mod: S$GLB,,, | Performed by: FAMILY MEDICINE

## 2018-04-26 PROCEDURE — 99999 PR PBB SHADOW E&M-EST. PATIENT-LVL IV: CPT | Mod: PBBFAC,,, | Performed by: FAMILY MEDICINE

## 2018-04-26 RX ORDER — NITROGLYCERIN 0.4 MG/1
TABLET SUBLINGUAL
Qty: 100 TABLET | Refills: 5 | Status: SHIPPED | OUTPATIENT
Start: 2018-04-26 | End: 2018-12-06 | Stop reason: SDUPTHER

## 2018-04-26 NOTE — ASSESSMENT & PLAN NOTE
--------------------------------------------------------------------------------  RELEVANT DATA REVIEWED:  BP Readings from Last 6 Encounters:   04/26/18 138/88   04/24/18 (!) 145/80   04/16/18 (!) 150/102   04/03/18 (!) 142/98   03/23/18 136/86   02/06/18 134/82     Wt Readings from Last 6 Encounters:   04/26/18 107.3 kg (236 lb 8.9 oz)   04/22/18 110 kg (242 lb 8.1 oz)   04/16/18 111.4 kg (245 lb 9.5 oz)   04/03/18 110 kg (242 lb 8.1 oz)   03/23/18 109.2 kg (240 lb 11.9 oz)   02/06/18 108.1 kg (238 lb 5.1 oz)     BMI Readings from Last 6 Encounters:   04/26/18 29.57 kg/m²   04/22/18 30.31 kg/m²   04/16/18 30.70 kg/m²   04/03/18 30.31 kg/m²   03/23/18 30.09 kg/m²   02/06/18 29.79 kg/m²     Lab Results   Component Value Date    ESTGFRAFRICA >60 04/24/2018    EGFRNONAA >60 04/24/2018    CREATININE 1.2 04/24/2018    BUN 16 04/24/2018       The ASCVD Risk score (Ayana HORN Jr., et al., 2013) failed to calculate for the following reasons:    The 2013 ASCVD risk score is only valid for ages 40 to 79    The patient has a prior MI or stroke diagnosis     This condition appears to be FAIRLY CONTROLLED. This condition appears to be IMPROVED.   --------------------------------------------------------------------------------

## 2018-04-26 NOTE — ASSESSMENT & PLAN NOTE
--------------------------------------------------------------------------------  RELEVANT DATA REVIEWED:  Lab Results   Component Value Date    AST 28 04/22/2018    AST 27 03/23/2018    AST 26 03/20/2018    ALT 43 04/22/2018    ALT 37 03/23/2018    ALT 37 03/20/2018    ALKPHOS 83 04/22/2018    ALKPHOS 92 03/23/2018    ALKPHOS 93 03/20/2018    BILITOT 1.4 (H) 04/22/2018    BILITOT 1.6 (H) 03/23/2018    BILITOT 1.8 (H) 03/20/2018    BILIDIR 0.6 (H) 03/23/2018    ALBUMIN 3.7 04/22/2018    ALBUMIN 3.7 04/09/2018    ALBUMIN 3.4 (L) 03/23/2018    LABPROT 10.9 07/31/2017    INR 1.1 07/31/2017    APTT 29.4 08/02/2017    APTT 64.8 (H) 08/01/2017    APTT 66.7 (H) 08/01/2017       This condition appears to be IMPROVED.   --------------------------------------------------------------------------------

## 2018-04-26 NOTE — ASSESSMENT & PLAN NOTE
--------------------------------------------------------------------------------  RELEVANT DATA REVIEWED:  Wt Readings from Last 6 Encounters:   04/26/18 107.3 kg (236 lb 8.9 oz)   04/22/18 110 kg (242 lb 8.1 oz)   04/16/18 111.4 kg (245 lb 9.5 oz)   04/03/18 110 kg (242 lb 8.1 oz)   03/23/18 109.2 kg (240 lb 11.9 oz)   02/06/18 108.1 kg (238 lb 5.1 oz)     BMI Readings from Last 6 Encounters:   04/26/18 29.57 kg/m²   04/22/18 30.31 kg/m²   04/16/18 30.70 kg/m²   04/03/18 30.31 kg/m²   03/23/18 30.09 kg/m²   02/06/18 29.79 kg/m²     Lab Results   Component Value Date    CHOL 139 04/23/2018    CHOL 119 (L) 11/15/2017    CHOL 245 (H) 07/05/2017    TRIG 175 (H) 04/23/2018    TRIG 204 (H) 11/15/2017    TRIG 245 (H) 07/05/2017    HDL 31 (L) 04/23/2018    HDL 24 (L) 11/15/2017    HDL 29 (L) 07/05/2017    LDLCALC 73.0 04/23/2018    LDLCALC 54.2 (L) 11/15/2017    LDLCALC 167.0 (H) 07/05/2017    NONHDLCHOL 108 04/23/2018    NONHDLCHOL 95 11/15/2017    NONHDLCHOL 216 07/05/2017    AST 28 04/22/2018    ALT 43 04/22/2018       The ASCVD Risk score (Ayana HORN Jr., et al., 2013) failed to calculate for the following reasons:    The 2013 ASCVD risk score is only valid for ages 40 to 79    The patient has a prior MI or stroke diagnosis     He appears to be tolerating statin for dyslipidemia without apparent symptoms of myositis or hepatic dysfunction.   --------------------------------------------------------------------------------

## 2018-04-26 NOTE — ASSESSMENT & PLAN NOTE
--------------------------------------------------------------------------------  RELEVANT DATA REVIEWED:  Lab Results   Component Value Date    HGBA1C 5.6 04/23/2018    HGBA1C 5.3 03/20/2018    HGBA1C 6.0 (H) 07/31/2017    ESTGFRAFRICA >60 04/24/2018    EGFRNONAA >60 04/24/2018     BP Readings from Last 6 Encounters:   04/26/18 (!) 136/94   04/24/18 (!) 145/80   04/16/18 (!) 150/102   04/03/18 (!) 142/98   03/23/18 136/86   02/06/18 134/82      Wt Readings from Last 6 Encounters:   04/26/18 107.3 kg (236 lb 8.9 oz)   04/22/18 110 kg (242 lb 8.1 oz)   04/16/18 111.4 kg (245 lb 9.5 oz)   04/03/18 110 kg (242 lb 8.1 oz)   03/23/18 109.2 kg (240 lb 11.9 oz)   02/06/18 108.1 kg (238 lb 5.1 oz)      BMI Readings from Last 6 Encounters:   04/26/18 29.57 kg/m²   04/22/18 30.31 kg/m²   04/16/18 30.70 kg/m²   04/03/18 30.31 kg/m²   03/23/18 30.09 kg/m²   02/06/18 29.79 kg/m²     HEALTH MAINTENANCE: Diabetic health maintenance interventions reviewed and are up to date except for:  There are no preventive care reminders to display for this patient.    The ASCVD Risk score (Ayana HORN Jr., et al., 2013) failed to calculate for the following reasons:    The 2013 ASCVD risk score is only valid for ages 40 to 79    The patient has a prior MI or stroke diagnosis     This condition appears to be WELL CONTROLLED. This condition appears to be STABLE.   --------------------------------------------------------------------------------

## 2018-04-26 NOTE — ASSESSMENT & PLAN NOTE
--------------------------------------------------------------------------------  RELEVANT DATA REVIEWED:  Wt Readings from Last 6 Encounters:   04/26/18 107.3 kg (236 lb 8.9 oz)   04/22/18 110 kg (242 lb 8.1 oz)   04/16/18 111.4 kg (245 lb 9.5 oz)   04/03/18 110 kg (242 lb 8.1 oz)   03/23/18 109.2 kg (240 lb 11.9 oz)   02/06/18 108.1 kg (238 lb 5.1 oz)     Lab Results   Component Value Date    URICACID 6.9 03/20/2018    URICACID 6.7 12/15/2017    URICACID 7.1 (H) 09/13/2017    ESTGFRAFRICA >60 04/24/2018    EGFRNONAA >60 04/24/2018    CREATININE 1.2 04/24/2018    BUN 16 04/24/2018     --------------------------------------------------------------------------------

## 2018-04-26 NOTE — ASSESSMENT & PLAN NOTE
INTERVAL UPDATE:  Cath lab procedure 04/23/2018 (Naveen Rios MD)  A. Indication/Pre-Operative Diagnosis: The patient is a 37 year old male that was referred for catheterization by Aaareferral Self for ACS (NSTEMI). The BELLA risk score is 5.    B. Summary/Post-Operative Diagnosis  1. Single vessel coronary artery disease.  2. Normal LVEF.  3. Diastolic dysfunction.  4. Successful PCI for acute myocardial infarction.  5. The patient did not undergo primary PCI.    NOTE:He reports no angina symptoms, but he says that he was told he would receive a prescription for nitroglycerin after hospital discharge to take as needed in case of angina. He says he did not receive this prescription. I provided him a prescription for nitroglycerin, and I told him to discuss this further with his cardiologist.

## 2018-04-30 ENCOUNTER — PATIENT MESSAGE (OUTPATIENT)
Dept: INTERNAL MEDICINE | Facility: CLINIC | Age: 38
End: 2018-04-30

## 2018-04-30 NOTE — LETTER
05/03/2018    Dear Robb,    Here is the excuse you asked for. Thanks for letting me care for you.    Kind regards,     KEVIN Roberson MD, [facility]  --------------------------------------------------------------------------------    PATIENT:   Robb Iglesias   YOB: 1980   DATE OF VISIT:  4/30/18    TO WHOM IT MAY CONCERN:     Based on the history and physical exam I performed at Robb's office visit April 30, 2018, he is able to return to work without restriction or delay.    Sincerely,     KEVIN Roberson MD  [facility]  [address] [place] [phone]

## 2018-05-01 ENCOUNTER — PATIENT MESSAGE (OUTPATIENT)
Dept: RHEUMATOLOGY | Facility: CLINIC | Age: 38
End: 2018-05-01

## 2018-05-01 DIAGNOSIS — M1A.0210 IDIOPATHIC CHRONIC GOUT OF RIGHT ELBOW WITHOUT TOPHUS: ICD-10-CM

## 2018-05-01 DIAGNOSIS — M1A.09X1 IDIOPATHIC CHRONIC GOUT, MULTIPLE SITES, WITH TOPHUS (TOPHI): ICD-10-CM

## 2018-05-03 NOTE — TELEPHONE ENCOUNTER
05/03/2018    Dear Robb,    Here is the letter you asked for. Thanks for letting me care for you.    Kind regards,     KEVIN Roberson MD, [facility]  --------------------------------------------------------------------------------    PATIENT:   Robb Iglesias   YOB: 1980   DATE OF VISIT:  4/30/18    TO WHOM IT MAY CONCERN:     Based on the history and physical exam I performed at Robb's office visit April 30, 2018, he is able to return to work without restriction or delay.    Sincerely,     KEVIN Roberson MD  [facility]  [address] [place] [id number] [phone]

## 2018-05-06 ENCOUNTER — PATIENT MESSAGE (OUTPATIENT)
Dept: CARDIOLOGY | Facility: CLINIC | Age: 38
End: 2018-05-06

## 2018-05-10 ENCOUNTER — OFFICE VISIT (OUTPATIENT)
Dept: CARDIOLOGY | Facility: CLINIC | Age: 38
End: 2018-05-10
Payer: COMMERCIAL

## 2018-05-10 VITALS
HEART RATE: 60 BPM | SYSTOLIC BLOOD PRESSURE: 132 MMHG | HEIGHT: 75 IN | DIASTOLIC BLOOD PRESSURE: 66 MMHG | WEIGHT: 235.31 LBS | BODY MASS INDEX: 29.26 KG/M2

## 2018-05-10 DIAGNOSIS — N18.2 STAGE 2 CHRONIC KIDNEY DISEASE: Chronic | ICD-10-CM

## 2018-05-10 DIAGNOSIS — G47.33 OBSTRUCTIVE SLEEP APNEA: Chronic | ICD-10-CM

## 2018-05-10 DIAGNOSIS — E11.59 TYPE 2 DIABETES MELLITUS WITH VASCULAR DISEASE: Chronic | ICD-10-CM

## 2018-05-10 DIAGNOSIS — E78.2 MIXED HYPERLIPIDEMIA: Chronic | ICD-10-CM

## 2018-05-10 DIAGNOSIS — I21.4 NSTEMI (NON-ST ELEVATED MYOCARDIAL INFARCTION): Primary | ICD-10-CM

## 2018-05-10 DIAGNOSIS — M1A.0620 IDIOPATHIC CHRONIC GOUT OF LEFT KNEE WITHOUT TOPHUS: Chronic | ICD-10-CM

## 2018-05-10 DIAGNOSIS — E11.21 TYPE 2 DIABETES MELLITUS WITH DIABETIC NEPHROPATHY, WITHOUT LONG-TERM CURRENT USE OF INSULIN: ICD-10-CM

## 2018-05-10 DIAGNOSIS — Z98.84 BARIATRIC SURGERY STATUS: Chronic | ICD-10-CM

## 2018-05-10 DIAGNOSIS — E80.6 DIRECT HYPERBILIRUBINEMIA: Chronic | ICD-10-CM

## 2018-05-10 DIAGNOSIS — M1A.0720 IDIOPATHIC CHRONIC GOUT, LEFT ANKLE AND FOOT, WITHOUT TOPHUS (TOPHI): Chronic | ICD-10-CM

## 2018-05-10 DIAGNOSIS — I25.10 CORONARY ARTERY DISEASE INVOLVING NATIVE CORONARY ARTERY OF NATIVE HEART WITHOUT ANGINA PECTORIS: Chronic | ICD-10-CM

## 2018-05-10 DIAGNOSIS — M1A.0710 IDIOPATHIC CHRONIC GOUT, RIGHT ANKLE AND FOOT, WITHOUT TOPHUS (TOPHI): ICD-10-CM

## 2018-05-10 DIAGNOSIS — I10 ESSENTIAL HYPERTENSION: Chronic | ICD-10-CM

## 2018-05-10 PROCEDURE — 3008F BODY MASS INDEX DOCD: CPT | Mod: CPTII,S$GLB,, | Performed by: INTERNAL MEDICINE

## 2018-05-10 PROCEDURE — 3078F DIAST BP <80 MM HG: CPT | Mod: CPTII,S$GLB,, | Performed by: INTERNAL MEDICINE

## 2018-05-10 PROCEDURE — 3075F SYST BP GE 130 - 139MM HG: CPT | Mod: CPTII,S$GLB,, | Performed by: INTERNAL MEDICINE

## 2018-05-10 PROCEDURE — 99214 OFFICE O/P EST MOD 30 MIN: CPT | Mod: S$GLB,,, | Performed by: INTERNAL MEDICINE

## 2018-05-10 PROCEDURE — 99999 PR PBB SHADOW E&M-EST. PATIENT-LVL III: CPT | Mod: PBBFAC,,, | Performed by: INTERNAL MEDICINE

## 2018-05-10 PROCEDURE — 3044F HG A1C LEVEL LT 7.0%: CPT | Mod: CPTII,S$GLB,, | Performed by: INTERNAL MEDICINE

## 2018-05-10 NOTE — PROGRESS NOTES
Subjective:   Patient ID:  Robb Iglesias is a 37 y.o. male who presents for follow up of Hospital Follow Up      HPI  A 36 yo male with skleep apnea diabetes gout htn hlp cad s/p lad stent and restenosis treated with ptca with upsizing balloon. He is doing well clinically. His lad is diffusely disease by u/s. His stent is better expanded now . Ha sno new issues clinically he started walking no angina. He is compliant with diet and cpap . He has been compliant. He changed out some eating habitys. He is fixing to do water exercises in his pool. He will benefit from cardiac rehab   Past Medical History:   Diagnosis Date    Allergic rhinitis     Direct hyperbilirubinemia 3/24/2018    Elevated bilirubin 3/21/2018    GERD (gastroesophageal reflux disease)     Gout     Hyperlipidemia     Hypertension associated with chronic kidney disease due to type 2 diabetes mellitus     Long term (current) use of insulin     MI (myocardial infarction) 07/2017    Obesity     HENRY on CPAP     Proteinuria     Steatohepatitis     Fatty Liver    Type 2 diabetes mellitus with diabetic nephropathy     Type 2 diabetes mellitus with hyperglycemia     Type 2 diabetes mellitus with renal manifestations        Past Surgical History:   Procedure Laterality Date    CARDIAC CATHETERIZATION      LASIK Bilateral     LIVER BIOPSY      NASAL ENDOSCOPY      NASAL SEPTUM SURGERY      SLEEVE GASTROPLASTY  03/06/2017       Social History   Substance Use Topics    Smoking status: Never Smoker    Smokeless tobacco: Never Used    Alcohol use Yes      Comment: 1-2 times per month       Family History   Problem Relation Age of Onset    Diabetes type II Mother     Hypertension Mother     Hyperlipidemia Mother     Diabetes type II Brother     Diabetes type II Brother        Current Outpatient Prescriptions   Medication Sig    aspirin (ECOTRIN) 81 MG EC tablet Take 81 mg by mouth once daily.    atorvastatin (LIPITOR) 80 MG  tablet Take 1 tablet (80 mg total) by mouth once daily.    cetirizine (ZYRTEC) 10 MG tablet Take 10 mg by mouth once daily.    colchicine (COLCRYS) 0.6 mg tablet Take 1 tablet (0.6 mg total) by mouth 2 (two) times daily. (Patient taking differently: Take 0.6 mg by mouth as needed. )    doxazosin (CARDURA) 1 MG tablet Take 1 mg by mouth every evening.    febuxostat (ULORIC) 40 mg Tab Take 2 tablets (80 mg total) by mouth once daily.    isosorbide mononitrate (IMDUR) 60 MG 24 hr tablet Take 1 tablet (60 mg total) by mouth once daily.    lisinopril (PRINIVIL,ZESTRIL) 40 MG tablet Take 1 tablet (40 mg total) by mouth 2 (two) times daily.    metoprolol tartrate (LOPRESSOR) 25 MG tablet Take 1 tablet (25 mg total) by mouth 2 (two) times daily.    MULTIVITAMIN/IRON/FOLIC ACID (CENTRUM COMPLETE ORAL) Take by mouth once daily at 6am.    nitroGLYCERIN (NITROSTAT) 0.4 MG SL tablet Dissolve one tablet underneath tongue at onset of angina; may repeat every 5 minutes if needed. Call 911 if angina persists after 2 doses.    ranolazine (RANEXA) 500 MG Tb12 Take 1 tablet (500 mg total) by mouth 2 (two) times daily.    ticagrelor (BRILINTA) 90 mg tablet Take 1 tablet (90 mg total) by mouth 2 (two) times daily.     No current facility-administered medications for this visit.      Current Outpatient Prescriptions on File Prior to Visit   Medication Sig    aspirin (ECOTRIN) 81 MG EC tablet Take 81 mg by mouth once daily.    atorvastatin (LIPITOR) 80 MG tablet Take 1 tablet (80 mg total) by mouth once daily.    cetirizine (ZYRTEC) 10 MG tablet Take 10 mg by mouth once daily.    colchicine (COLCRYS) 0.6 mg tablet Take 1 tablet (0.6 mg total) by mouth 2 (two) times daily. (Patient taking differently: Take 0.6 mg by mouth as needed. )    doxazosin (CARDURA) 1 MG tablet Take 1 mg by mouth every evening.    febuxostat (ULORIC) 40 mg Tab Take 2 tablets (80 mg total) by mouth once daily.    isosorbide mononitrate (IMDUR) 60 MG  24 hr tablet Take 1 tablet (60 mg total) by mouth once daily.    lisinopril (PRINIVIL,ZESTRIL) 40 MG tablet Take 1 tablet (40 mg total) by mouth 2 (two) times daily.    metoprolol tartrate (LOPRESSOR) 25 MG tablet Take 1 tablet (25 mg total) by mouth 2 (two) times daily.    MULTIVITAMIN/IRON/FOLIC ACID (CENTRUM COMPLETE ORAL) Take by mouth once daily at 6am.    nitroGLYCERIN (NITROSTAT) 0.4 MG SL tablet Dissolve one tablet underneath tongue at onset of angina; may repeat every 5 minutes if needed. Call 911 if angina persists after 2 doses.    ranolazine (RANEXA) 500 MG Tb12 Take 1 tablet (500 mg total) by mouth 2 (two) times daily.    ticagrelor (BRILINTA) 90 mg tablet Take 1 tablet (90 mg total) by mouth 2 (two) times daily.     No current facility-administered medications on file prior to visit.      Review of patient's allergies indicates:  No Known Allergies  Review of Systems   Constitution: Negative for weakness and malaise/fatigue.   Eyes: Negative for blurred vision.   Cardiovascular: Negative for chest pain, claudication, cyanosis, dyspnea on exertion, irregular heartbeat, leg swelling, near-syncope, orthopnea, palpitations and paroxysmal nocturnal dyspnea.   Respiratory: Negative for cough, hemoptysis and shortness of breath.    Hematologic/Lymphatic: Negative for bleeding problem. Does not bruise/bleed easily.   Skin: Negative for dry skin and itching.   Musculoskeletal: Positive for joint pain. Negative for falls, muscle weakness and myalgias.   Gastrointestinal: Negative for abdominal pain, diarrhea, heartburn, hematemesis, hematochezia and melena.   Genitourinary: Negative for flank pain and hematuria.   Neurological: Negative for dizziness, focal weakness, headaches, light-headedness, numbness, paresthesias and seizures.   Psychiatric/Behavioral: Negative for altered mental status and memory loss. The patient is not nervous/anxious.    Allergic/Immunologic: Negative for hives.       Objective:  "  Physical Exam   Constitutional: He is oriented to person, place, and time. He appears well-developed and well-nourished. No distress.   HENT:   Head: Normocephalic and atraumatic.   Eyes: EOM are normal. Pupils are equal, round, and reactive to light. Right eye exhibits no discharge. Left eye exhibits no discharge.   Neck: Neck supple. No JVD present. No thyromegaly present.   Cardiovascular: Normal rate, regular rhythm, normal heart sounds and intact distal pulses.  Exam reveals no gallop and no friction rub.    No murmur heard.  Pulmonary/Chest: Effort normal and breath sounds normal. No respiratory distress. He has no wheezes. He has no rales. He exhibits no tenderness.   Abdominal: Soft. Bowel sounds are normal. He exhibits no distension. There is no tenderness.   Musculoskeletal: Normal range of motion. He exhibits no edema.   Neurological: He is alert and oriented to person, place, and time. No cranial nerve deficit.   Skin: Skin is warm and dry. No rash noted. He is not diaphoretic. No erythema.   Psychiatric: He has a normal mood and affect. His behavior is normal.   Nursing note and vitals reviewed.    Vitals:    05/10/18 1319 05/10/18 1321   BP: 138/72 132/66   BP Location: Left arm Right arm   Pulse: 60    Weight: 106.8 kg (235 lb 5.5 oz)    Height: 6' 3" (1.905 m)      Lab Results   Component Value Date    CHOL 139 04/23/2018    CHOL 119 (L) 11/15/2017    CHOL 245 (H) 07/05/2017     Lab Results   Component Value Date    HDL 31 (L) 04/23/2018    HDL 24 (L) 11/15/2017    HDL 29 (L) 07/05/2017     Lab Results   Component Value Date    LDLCALC 73.0 04/23/2018    LDLCALC 54.2 (L) 11/15/2017    LDLCALC 167.0 (H) 07/05/2017     Lab Results   Component Value Date    TRIG 175 (H) 04/23/2018    TRIG 204 (H) 11/15/2017    TRIG 245 (H) 07/05/2017     Lab Results   Component Value Date    CHOLHDL 22.3 04/23/2018    CHOLHDL 20.2 11/15/2017    CHOLHDL 11.8 (L) 07/05/2017       Chemistry        Component Value " Date/Time     04/24/2018 0548    K 3.7 04/24/2018 0548     04/24/2018 0548    CO2 26 04/24/2018 0548    BUN 16 04/24/2018 0548    CREATININE 1.2 04/24/2018 0548    GLU 82 04/24/2018 0548        Component Value Date/Time    CALCIUM 8.5 (L) 04/24/2018 0548    ALKPHOS 83 04/22/2018 1701    AST 28 04/22/2018 1701    ALT 43 04/22/2018 1701    BILITOT 1.4 (H) 04/22/2018 1701    ESTGFRAFRICA >60 04/24/2018 0548    EGFRNONAA >60 04/24/2018 0548          Lab Results   Component Value Date    TSH 0.727 07/31/2017     Lab Results   Component Value Date    INR 1.1 07/31/2017     Lab Results   Component Value Date    WBC 6.63 04/24/2018    HGB 14.8 04/24/2018    HCT 43.8 04/24/2018    MCV 83 04/24/2018     (L) 04/24/2018     BMP  Sodium   Date Value Ref Range Status   04/24/2018 139 136 - 145 mmol/L Final     Potassium   Date Value Ref Range Status   04/24/2018 3.7 3.5 - 5.1 mmol/L Final     Chloride   Date Value Ref Range Status   04/24/2018 108 95 - 110 mmol/L Final     CO2   Date Value Ref Range Status   04/24/2018 26 23 - 29 mmol/L Final     BUN, Bld   Date Value Ref Range Status   04/24/2018 16 6 - 20 mg/dL Final     Creatinine   Date Value Ref Range Status   04/24/2018 1.2 0.5 - 1.4 mg/dL Final     Calcium   Date Value Ref Range Status   04/24/2018 8.5 (L) 8.7 - 10.5 mg/dL Final     Anion Gap   Date Value Ref Range Status   04/24/2018 5 (L) 8 - 16 mmol/L Final     eGFR if    Date Value Ref Range Status   04/24/2018 >60 >60 mL/min/1.73 m^2 Final     eGFR if non    Date Value Ref Range Status   04/24/2018 >60 >60 mL/min/1.73 m^2 Final     Comment:     Calculation used to obtain the estimated glomerular filtration  rate (eGFR) is the CKD-EPI equation.        CrCl cannot be calculated (Patient's most recent lab result is older than the maximum 7 days allowed.).    Assessment:     1. NSTEMI with CAD s/p PCI (FAMILIA) of LAD x 2 in 8/2017    2. Bariatric surgery status    3.  Essential hypertension    4. Idiopathic chronic gout of left knee without tophus    5. Idiopathic chronic gout, left ankle and foot, without tophus (tophi)    6. Stage 2 chronic kidney disease    7. Obstructive sleep apnea    8. Mixed hyperlipidemia    9. Type 2 diabetes mellitus     10. Coronary artery disease involving native coronary artery of native heart without angina pectoris    11. Direct hyperbilirubinemia    12. Idiopathic chronic gout, right ankle and foot, without tophus (tophi)    13. Type 2 diabetes mellitus with diabetic nephropathy, without long-term current use of insulin      Asymptomatic clinically needs aggressive risk factor modification weight loss and cardiac rehab.  Compliance with cpap emphasized/  Plan:   ett   Cardiac [facility] at the general   Continue current therapy  Cardiac low salt diet.  Risk factor modification and excercise program  Diabetes control  Weight loss.  F/u in 6 months with lipid cmp a1c

## 2018-05-31 ENCOUNTER — PATIENT MESSAGE (OUTPATIENT)
Dept: CARDIOLOGY | Facility: CLINIC | Age: 38
End: 2018-05-31

## 2018-05-31 ENCOUNTER — PATIENT MESSAGE (OUTPATIENT)
Dept: INTERNAL MEDICINE | Facility: CLINIC | Age: 38
End: 2018-05-31

## 2018-06-14 ENCOUNTER — LAB VISIT (OUTPATIENT)
Dept: LAB | Facility: HOSPITAL | Age: 38
End: 2018-06-14
Payer: COMMERCIAL

## 2018-06-14 DIAGNOSIS — M1A.09X1 IDIOPATHIC CHRONIC GOUT OF MULTIPLE SITES WITH TOPHUS: ICD-10-CM

## 2018-06-14 LAB
ALBUMIN SERPL BCP-MCNC: 3.7 G/DL
ALP SERPL-CCNC: 88 U/L
ALT SERPL W/O P-5'-P-CCNC: 51 U/L
ANION GAP SERPL CALC-SCNC: 8 MMOL/L
AST SERPL-CCNC: 40 U/L
BILIRUB SERPL-MCNC: 1.6 MG/DL
BUN SERPL-MCNC: 17 MG/DL
CALCIUM SERPL-MCNC: 9.4 MG/DL
CHLORIDE SERPL-SCNC: 104 MMOL/L
CO2 SERPL-SCNC: 28 MMOL/L
CREAT SERPL-MCNC: 1.7 MG/DL
EST. GFR  (AFRICAN AMERICAN): 59 ML/MIN/1.73 M^2
EST. GFR  (NON AFRICAN AMERICAN): 51 ML/MIN/1.73 M^2
GLUCOSE SERPL-MCNC: 134 MG/DL
POTASSIUM SERPL-SCNC: 3.9 MMOL/L
PROT SERPL-MCNC: 6.6 G/DL
SODIUM SERPL-SCNC: 140 MMOL/L
URATE SERPL-MCNC: 5.9 MG/DL

## 2018-06-14 PROCEDURE — 80053 COMPREHEN METABOLIC PANEL: CPT

## 2018-06-14 PROCEDURE — 36415 COLL VENOUS BLD VENIPUNCTURE: CPT

## 2018-06-14 PROCEDURE — 84550 ASSAY OF BLOOD/URIC ACID: CPT

## 2018-06-19 ENCOUNTER — CLINICAL SUPPORT (OUTPATIENT)
Dept: CARDIOLOGY | Facility: CLINIC | Age: 38
End: 2018-06-19
Attending: INTERNAL MEDICINE
Payer: COMMERCIAL

## 2018-06-19 DIAGNOSIS — I25.10 CORONARY ARTERY DISEASE INVOLVING NATIVE CORONARY ARTERY OF NATIVE HEART WITHOUT ANGINA PECTORIS: Chronic | ICD-10-CM

## 2018-06-19 DIAGNOSIS — I21.4 NSTEMI (NON-ST ELEVATED MYOCARDIAL INFARCTION): ICD-10-CM

## 2018-06-19 PROCEDURE — 93015 CV STRESS TEST SUPVJ I&R: CPT | Mod: S$GLB,,, | Performed by: INTERNAL MEDICINE

## 2018-06-20 ENCOUNTER — OFFICE VISIT (OUTPATIENT)
Dept: RHEUMATOLOGY | Facility: CLINIC | Age: 38
End: 2018-06-20
Payer: COMMERCIAL

## 2018-06-20 VITALS
HEART RATE: 60 BPM | SYSTOLIC BLOOD PRESSURE: 147 MMHG | BODY MASS INDEX: 29.74 KG/M2 | DIASTOLIC BLOOD PRESSURE: 99 MMHG | WEIGHT: 239.19 LBS | HEIGHT: 75 IN

## 2018-06-20 DIAGNOSIS — M1A.09X1 IDIOPATHIC CHRONIC GOUT, MULTIPLE SITES, WITH TOPHUS (TOPHI): Primary | ICD-10-CM

## 2018-06-20 PROCEDURE — 3077F SYST BP >= 140 MM HG: CPT | Mod: CPTII,S$GLB,, | Performed by: INTERNAL MEDICINE

## 2018-06-20 PROCEDURE — 3008F BODY MASS INDEX DOCD: CPT | Mod: CPTII,S$GLB,, | Performed by: INTERNAL MEDICINE

## 2018-06-20 PROCEDURE — 3080F DIAST BP >= 90 MM HG: CPT | Mod: CPTII,S$GLB,, | Performed by: INTERNAL MEDICINE

## 2018-06-20 PROCEDURE — 99999 PR PBB SHADOW E&M-EST. PATIENT-LVL III: CPT | Mod: PBBFAC,,, | Performed by: INTERNAL MEDICINE

## 2018-06-20 PROCEDURE — 99214 OFFICE O/P EST MOD 30 MIN: CPT | Mod: S$GLB,,, | Performed by: INTERNAL MEDICINE

## 2018-06-20 RX ORDER — FEBUXOSTAT 40 MG/1
80 TABLET, FILM COATED ORAL DAILY
Qty: 180 TABLET | Refills: 2 | Status: SHIPPED | OUTPATIENT
Start: 2018-06-20 | End: 2019-02-02 | Stop reason: ALTCHOICE

## 2018-06-20 RX ORDER — COLCHICINE 0.6 MG/1
0.6 TABLET ORAL EVERY OTHER DAY
Qty: 45 TABLET | Refills: 3 | Status: SHIPPED | OUTPATIENT
Start: 2018-06-20 | End: 2019-07-09 | Stop reason: SDUPTHER

## 2018-06-20 NOTE — PROGRESS NOTES
RHEUMATOLOGY CLINIC FOLLOW UP VISIT  Chief complaints:-  To follow up for gout.     HPI:-  Robb Stark a 37 y.o. pleasant male comes in for a follow up visit with above chief complaints. 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 He reports doing well since last visit. No flare up of gout in the past 6 months. Compliant with diet restrictions. No adverse effects from medications.     Review of Systems   Constitutional: Negative for chills and fever.   HENT: Negative for congestion and sore throat.    Eyes: Negative for blurred vision and redness.   Respiratory: Negative for cough and shortness of breath.    Cardiovascular: Negative for chest pain and leg swelling.   Gastrointestinal: Negative for abdominal pain.   Genitourinary: Negative for dysuria.   Musculoskeletal: Negative for back pain, falls, joint pain, myalgias and neck pain.   Skin: Negative for rash.   Neurological: Negative for headaches.   Endo/Heme/Allergies: Does not bruise/bleed easily.   Psychiatric/Behavioral: Negative for memory loss. The patient does not have insomnia.        Past Medical History:   Diagnosis Date    Allergic rhinitis     Direct hyperbilirubinemia 3/24/2018    Elevated bilirubin 3/21/2018    GERD (gastroesophageal reflux disease)     Gout     Hyperlipidemia     Hypertension associated with chronic kidney disease due to type 2 diabetes mellitus     Long term (current) use of insulin     MI (myocardial infarction) 07/2017    Obesity     HENRY on CPAP     Proteinuria     Steatohepatitis     Fatty Liver    Type 2 diabetes mellitus with diabetic nephropathy     Type 2 diabetes mellitus with hyperglycemia     Type 2 diabetes mellitus with renal manifestations        Past Surgical History:   Procedure Laterality Date    CARDIAC CATHETERIZATION      LASIK Bilateral     LIVER BIOPSY      NASAL ENDOSCOPY      NASAL SEPTUM SURGERY    "   SLEEVE GASTROPLASTY  03/06/2017        Social History   Substance Use Topics    Smoking status: Never Smoker    Smokeless tobacco: Never Used    Alcohol use Yes      Comment: 1-2 times per month       Family History   Problem Relation Age of Onset    Diabetes type II Mother     Hypertension Mother     Hyperlipidemia Mother     Diabetes type II Brother     Diabetes type II Brother        Review of patient's allergies indicates:  No Known Allergies        Physical examination:-    Vitals:    06/20/18 0806   BP: (!) 147/99   Pulse: 60   Weight: 108.5 kg (239 lb 3.2 oz)   Height: 6' 3" (1.905 m)   PainSc: 0-No pain     Physical Exam   Constitutional: He is oriented to person, place, and time and well-developed, well-nourished, and in no distress. No distress.   HENT:   Head: Normocephalic and atraumatic.   Mouth/Throat: Oropharynx is clear and moist.   Eyes: Conjunctivae and EOM are normal. Pupils are equal, round, and reactive to light. Right eye exhibits no discharge. Left eye exhibits no discharge. No scleral icterus.   Neck: Normal range of motion. Neck supple.   Cardiovascular: Normal rate and intact distal pulses.    Pulmonary/Chest: Effort normal. No respiratory distress. He exhibits no tenderness.   Abdominal: Soft. There is no tenderness.   Musculoskeletal:   No synovitis in small joints of hands or feet.  Good range of motion in large joints. Non tender thick leathery olecranon bursae.    Lymphadenopathy:     He has no cervical adenopathy.   Neurological: He is alert and oriented to person, place, and time. Gait normal.   Skin: Skin is warm. No rash noted. He is not diaphoretic. No erythema. No pallor.   Psychiatric: Mood, memory, affect and judgment normal.       Results for ROBIN REYNA (MRN [id number]) as of 6/20/2018 08:16   Ref. Range 6/21/2017 10:23 7/5/2017 08:44 9/12/2017 12:03 9/13/2017 07:48 12/15/2017 09:10 3/20/2018 08:12 6/14/2018 08:16   Uric Acid Latest Ref Range: 3.4 - 7.0 " mg/dL 5.8 6.4 6.1 7.1 (H) 6.7 6.9 5.9         Radiographs:-  Independent visualization of images done. Moderate degenerative change present at both 1st MTP joints suggestive of gout .      Medication List with Changes/Refills   Current Medications    ASPIRIN (ECOTRIN) 81 MG EC TABLET    Take 81 mg by mouth once daily.    ATORVASTATIN (LIPITOR) 80 MG TABLET    Take 1 tablet (80 mg total) by mouth once daily.    CETIRIZINE (ZYRTEC) 10 MG TABLET    Take 10 mg by mouth once daily.    DOXAZOSIN (CARDURA) 1 MG TABLET    Take 1 mg by mouth every evening.    ISOSORBIDE MONONITRATE (IMDUR) 60 MG 24 HR TABLET    Take 1 tablet (60 mg total) by mouth once daily.    LISINOPRIL (PRINIVIL,ZESTRIL) 40 MG TABLET    Take 1 tablet (40 mg total) by mouth 2 (two) times daily.    METOPROLOL TARTRATE (LOPRESSOR) 25 MG TABLET    Take 1 tablet (25 mg total) by mouth 2 (two) times daily.    MULTIVITAMIN/IRON/FOLIC ACID (CENTRUM COMPLETE ORAL)    Take by mouth once daily at 6am.    NITROGLYCERIN (NITROSTAT) 0.4 MG SL TABLET    Dissolve one tablet underneath tongue at onset of angina; may repeat every 5 minutes if needed. Call 911 if angina persists after 2 doses.    RANOLAZINE (RANEXA) 500 MG TB12    Take 1 tablet (500 mg total) by mouth 2 (two) times daily.    TICAGRELOR (BRILINTA) 90 MG TABLET    Take 1 tablet (90 mg total) by mouth 2 (two) times daily.   Changed and/or Refilled Medications    Modified Medication Previous Medication    COLCHICINE (COLCRYS) 0.6 MG TABLET colchicine (COLCRYS) 0.6 mg tablet       Take 1 tablet (0.6 mg total) by mouth every other day.    Take 1 tablet (0.6 mg total) by mouth 2 (two) times daily.    FEBUXOSTAT (ULORIC) 40 MG TAB febuxostat (ULORIC) 40 mg Tab       Take 2 tablets (80 mg total) by mouth once daily.    Take 2 tablets (80 mg total) by mouth once daily.       Assessment/Plans:-  # Idiopathic chronic gout of multiple sites with tophus  6 month follow up for tophaceous gout . His crystallizing  concentration seems to be lower than average since he had flare up of gouty arthropathy  even at goal uric acid levels. RF/CCP/PAPO negative and xray images does not show any evidence of inflammatory arthritides like RA/Undifferentiated arthritis . Improving Uric acid levels on 80 mg bid uloric and no gout flare in the past 3 months on 0.6 mg colchicine every other day . Continue same doses of medications , repeat labs every 3 months and follow up with me in a year.      # Follow-up in about 1 year (around 6/20/2019).    Disclaimer: This note was prepared using voice recognition system and is likely to have sound alike errors and is not proof read.  Please call me with any questions.

## 2018-06-25 ENCOUNTER — TELEPHONE (OUTPATIENT)
Dept: RHEUMATOLOGY | Facility: CLINIC | Age: 38
End: 2018-06-25

## 2018-07-08 ENCOUNTER — PATIENT MESSAGE (OUTPATIENT)
Dept: CARDIOLOGY | Facility: CLINIC | Age: 38
End: 2018-07-08

## 2018-07-09 RX ORDER — RANOLAZINE 500 MG/1
500 TABLET, EXTENDED RELEASE ORAL 2 TIMES DAILY
Qty: 180 TABLET | Refills: 3 | Status: SHIPPED | OUTPATIENT
Start: 2018-07-09 | End: 2018-12-06

## 2018-07-09 RX ORDER — ISOSORBIDE MONONITRATE 60 MG/1
60 TABLET, EXTENDED RELEASE ORAL DAILY
Qty: 90 TABLET | Refills: 3 | Status: SHIPPED | OUTPATIENT
Start: 2018-07-09 | End: 2018-12-06

## 2018-07-12 DIAGNOSIS — I10 BENIGN ESSENTIAL HYPERTENSION: Chronic | ICD-10-CM

## 2018-07-13 RX ORDER — DOXAZOSIN 1 MG/1
TABLET ORAL
Qty: 90 TABLET | Refills: 3 | OUTPATIENT
Start: 2018-07-13

## 2018-07-13 NOTE — TELEPHONE ENCOUNTER
Robb Bender.    I am not able to approve your refill request for the following reason:  It appears that Dr. Munoz previously ordered that this drug be discontinued. It doesn't appear that I have prescribed this drug for you before. Are you still taking this drug on a daily basis? If so, please send this refill request to the doctor who is prescribing it.    We can discuss this further at your next appointment.    Thanks for letting me care for you.    Kind regards,    KEVIN Roberson MD

## 2018-07-13 NOTE — TELEPHONE ENCOUNTER
MD Da Beasley Staff 3 hours ago (7:43 AM)      This refill request was NOT approved for the reason given.     Please notify him by copying-and-pasting my note note into a patient-portal message to the patient AND/OR by phone call.     ----------------------------------------   Home Phone      [phone]   Work Phone      Not on file.   Mobile          [phone]       ----------------------------------------   Robb's future appointments include:   7/24/2018  8:00 AM    Elizabeth Lejeune, NP      [facility] SLEEP     [facility]   9/20/2018  5:30 PM    LABORATORY, [facility]       [facility]   12/19/2018 5:30 PM    LABORATORY, [facility] LAB       [facility]   3/19/2019  5:30 PM    LABORATORY, [facility]          [employer] [facility]       [facility]   6/17/2019  5:30 PM    LABORATORY, [facility]          [employer] [facility]       [facility]   ----------------------------------------   (Routing comment)

## 2018-07-17 LAB — DIASTOLIC DYSFUNCTION: NO

## 2018-07-18 ENCOUNTER — TELEPHONE (OUTPATIENT)
Dept: CARDIOLOGY | Facility: CLINIC | Age: 38
End: 2018-07-18

## 2018-07-18 NOTE — TELEPHONE ENCOUNTER
Left voicemail message for patient  Contacted [facility] to determine if he'd started since 5/10 visit   ----- Message from Monica Rios MD sent at 7/18/2018  7:02 AM CDT -----  Stress test negative

## 2018-07-18 NOTE — TELEPHONE ENCOUNTER
Mr. Iglesias returned phone and results received; will fax copy to [facility]

## 2018-08-02 ENCOUNTER — OFFICE VISIT (OUTPATIENT)
Dept: OPHTHALMOLOGY | Facility: CLINIC | Age: 38
End: 2018-08-02
Payer: COMMERCIAL

## 2018-08-02 DIAGNOSIS — I10 ESSENTIAL HYPERTENSION: Chronic | ICD-10-CM

## 2018-08-02 DIAGNOSIS — E11.9 DIABETES MELLITUS TYPE 2 WITHOUT RETINOPATHY: Primary | ICD-10-CM

## 2018-08-02 DIAGNOSIS — H52.13 MYOPIA, BILATERAL: ICD-10-CM

## 2018-08-02 DIAGNOSIS — H35.411 LATTICE DEGENERATION OF RIGHT RETINA: ICD-10-CM

## 2018-08-02 DIAGNOSIS — Z98.890 STATUS POST BILATERAL LASIK SURGERY: ICD-10-CM

## 2018-08-02 PROCEDURE — 92004 COMPRE OPH EXAM NEW PT 1/>: CPT | Mod: S$GLB,,, | Performed by: OPTOMETRIST

## 2018-08-02 PROCEDURE — 99999 PR PBB SHADOW E&M-EST. PATIENT-LVL II: CPT | Mod: PBBFAC,,, | Performed by: OPTOMETRIST

## 2018-08-02 PROCEDURE — 92015 DETERMINE REFRACTIVE STATE: CPT | Mod: S$GLB,,, | Performed by: OPTOMETRIST

## 2018-08-02 NOTE — PROGRESS NOTES
HPI     NIDDM exam.  No visual complaints.  New patient last eye exam 1 1/2 year.  Update glasses RX.    Last edited by Shey Barney on 8/2/2018  3:52 PM. (History)            Assessment /Plan     For exam results, see Encounter Report.    Diabetes mellitus type 2 without retinopathy    Essential hypertension    Status post bilateral LASIK surgery    Lattice degeneration of right retina    Myopia, bilateral      No Background Diabetic Retinopathy    No HTN Retinopathy    Stable LASIK scars, minimal refractive error needed.    Lattice present, Discussed signs and symptoms of retinal detachment.  Informed patient to return to clinic if any worsening of symptoms or decreased vision.    Dispense Final Rx for glasses.  RTC 1 year  Discussed above and answered questions.

## 2018-08-02 NOTE — PATIENT INSTRUCTIONS
Diabetes mellitus type 2 without retinopathy     Essential hypertension     Status post bilateral LASIK surgery     Lattice degeneration of right retina     Myopia, bilateral        No Background Diabetic Retinopathy     No HTN Retinopathy     Stable LASIK scars, minimal refractive error needed.     Lattice present, Discussed signs and symptoms of retinal detachment.  Informed patient to return to clinic if any worsening of symptoms or decreased vision.     Dispense Final Rx for glasses.  RTC 1 year  Discussed above and answered questions.

## 2018-08-28 ENCOUNTER — PATIENT MESSAGE (OUTPATIENT)
Dept: INTERNAL MEDICINE | Facility: CLINIC | Age: 38
End: 2018-08-28

## 2018-08-29 RX ORDER — DOXAZOSIN 1 MG/1
1 TABLET ORAL NIGHTLY
Qty: 90 TABLET | Refills: 0 | Status: SHIPPED | OUTPATIENT
Start: 2018-08-29 | End: 2018-12-06

## 2018-08-30 ENCOUNTER — PATIENT MESSAGE (OUTPATIENT)
Dept: NEPHROLOGY | Facility: CLINIC | Age: 38
End: 2018-08-30

## 2018-09-19 ENCOUNTER — PATIENT MESSAGE (OUTPATIENT)
Dept: INTERNAL MEDICINE | Facility: CLINIC | Age: 38
End: 2018-09-19

## 2018-09-20 ENCOUNTER — LAB VISIT (OUTPATIENT)
Dept: LAB | Facility: HOSPITAL | Age: 38
End: 2018-09-20
Attending: INTERNAL MEDICINE
Payer: COMMERCIAL

## 2018-09-20 DIAGNOSIS — M1A.09X1 IDIOPATHIC CHRONIC GOUT OF MULTIPLE SITES WITH TOPHUS: ICD-10-CM

## 2018-09-20 LAB
ALBUMIN SERPL BCP-MCNC: 3.8 G/DL
ALP SERPL-CCNC: 84 U/L
ALT SERPL W/O P-5'-P-CCNC: 42 U/L
ANION GAP SERPL CALC-SCNC: 8 MMOL/L
AST SERPL-CCNC: 35 U/L
BILIRUB SERPL-MCNC: 1.6 MG/DL
BUN SERPL-MCNC: 21 MG/DL
CALCIUM SERPL-MCNC: 9.2 MG/DL
CHLORIDE SERPL-SCNC: 104 MMOL/L
CO2 SERPL-SCNC: 28 MMOL/L
CREAT SERPL-MCNC: 1.6 MG/DL
EST. GFR  (AFRICAN AMERICAN): >60 ML/MIN/1.73 M^2
EST. GFR  (NON AFRICAN AMERICAN): 55 ML/MIN/1.73 M^2
GLUCOSE SERPL-MCNC: 157 MG/DL
POTASSIUM SERPL-SCNC: 3.9 MMOL/L
PROT SERPL-MCNC: 6.8 G/DL
SODIUM SERPL-SCNC: 140 MMOL/L
URATE SERPL-MCNC: 5.6 MG/DL

## 2018-09-20 PROCEDURE — 84550 ASSAY OF BLOOD/URIC ACID: CPT | Mod: PO

## 2018-09-20 PROCEDURE — 36415 COLL VENOUS BLD VENIPUNCTURE: CPT | Mod: PO

## 2018-09-20 PROCEDURE — 80053 COMPREHEN METABOLIC PANEL: CPT | Mod: PO

## 2018-10-17 DIAGNOSIS — I21.4 NSTEMI (NON-ST ELEVATED MYOCARDIAL INFARCTION): ICD-10-CM

## 2018-10-17 RX ORDER — ATORVASTATIN CALCIUM 80 MG/1
TABLET, FILM COATED ORAL
Qty: 90 TABLET | Refills: 3 | Status: SHIPPED | OUTPATIENT
Start: 2018-10-17 | End: 2019-10-13 | Stop reason: SDUPTHER

## 2018-10-17 RX ORDER — TICAGRELOR 90 MG/1
TABLET ORAL
Qty: 180 TABLET | Refills: 3 | Status: SHIPPED | OUTPATIENT
Start: 2018-10-17 | End: 2019-10-13 | Stop reason: SDUPTHER

## 2018-11-29 ENCOUNTER — LAB VISIT (OUTPATIENT)
Dept: LAB | Facility: HOSPITAL | Age: 38
End: 2018-11-29
Attending: INTERNAL MEDICINE
Payer: COMMERCIAL

## 2018-11-29 DIAGNOSIS — E11.59 TYPE 2 DIABETES MELLITUS WITH VASCULAR DISEASE: Chronic | ICD-10-CM

## 2018-11-29 DIAGNOSIS — I21.4 NSTEMI (NON-ST ELEVATED MYOCARDIAL INFARCTION): ICD-10-CM

## 2018-11-29 LAB
ALBUMIN SERPL BCP-MCNC: 3.4 G/DL
ALP SERPL-CCNC: 97 U/L
ALT SERPL W/O P-5'-P-CCNC: 38 U/L
ANION GAP SERPL CALC-SCNC: 9 MMOL/L
AST SERPL-CCNC: 30 U/L
BILIRUB SERPL-MCNC: 1.6 MG/DL
BUN SERPL-MCNC: 15 MG/DL
CALCIUM SERPL-MCNC: 9.1 MG/DL
CHLORIDE SERPL-SCNC: 106 MMOL/L
CHOLEST SERPL-MCNC: 167 MG/DL
CHOLEST/HDLC SERPL: 5.2 {RATIO}
CO2 SERPL-SCNC: 26 MMOL/L
CREAT SERPL-MCNC: 1.6 MG/DL
EST. GFR  (AFRICAN AMERICAN): >60 ML/MIN/1.73 M^2
EST. GFR  (NON AFRICAN AMERICAN): 54.6 ML/MIN/1.73 M^2
ESTIMATED AVG GLUCOSE: 146 MG/DL
GLUCOSE SERPL-MCNC: 166 MG/DL
HBA1C MFR BLD HPLC: 6.7 %
HDLC SERPL-MCNC: 32 MG/DL
HDLC SERPL: 19.2 %
LDLC SERPL CALC-MCNC: 87.8 MG/DL
NONHDLC SERPL-MCNC: 135 MG/DL
POTASSIUM SERPL-SCNC: 3.6 MMOL/L
PROT SERPL-MCNC: 6.5 G/DL
SODIUM SERPL-SCNC: 141 MMOL/L
TRIGL SERPL-MCNC: 236 MG/DL

## 2018-11-29 PROCEDURE — 83036 HEMOGLOBIN GLYCOSYLATED A1C: CPT

## 2018-11-29 PROCEDURE — 36415 COLL VENOUS BLD VENIPUNCTURE: CPT | Mod: PO

## 2018-11-29 PROCEDURE — 80061 LIPID PANEL: CPT

## 2018-11-29 PROCEDURE — 80053 COMPREHEN METABOLIC PANEL: CPT

## 2018-12-06 ENCOUNTER — OFFICE VISIT (OUTPATIENT)
Dept: CARDIOLOGY | Facility: CLINIC | Age: 38
End: 2018-12-06
Payer: COMMERCIAL

## 2018-12-06 VITALS
RESPIRATION RATE: 20 BRPM | WEIGHT: 255.88 LBS | SYSTOLIC BLOOD PRESSURE: 160 MMHG | HEART RATE: 72 BPM | BODY MASS INDEX: 31.82 KG/M2 | DIASTOLIC BLOOD PRESSURE: 110 MMHG | HEIGHT: 75 IN

## 2018-12-06 DIAGNOSIS — E78.2 MIXED HYPERLIPIDEMIA: Chronic | ICD-10-CM

## 2018-12-06 DIAGNOSIS — E11.59 TYPE 2 DIABETES MELLITUS WITH VASCULAR DISEASE: Chronic | ICD-10-CM

## 2018-12-06 DIAGNOSIS — I10 ESSENTIAL HYPERTENSION: Chronic | ICD-10-CM

## 2018-12-06 DIAGNOSIS — I25.10 CORONARY ARTERY DISEASE INVOLVING NATIVE CORONARY ARTERY OF NATIVE HEART WITHOUT ANGINA PECTORIS: Primary | Chronic | ICD-10-CM

## 2018-12-06 DIAGNOSIS — G47.33 OBSTRUCTIVE SLEEP APNEA: Chronic | ICD-10-CM

## 2018-12-06 DIAGNOSIS — N18.2 STAGE 2 CHRONIC KIDNEY DISEASE: Chronic | ICD-10-CM

## 2018-12-06 DIAGNOSIS — I21.4 NSTEMI (NON-ST ELEVATED MYOCARDIAL INFARCTION): ICD-10-CM

## 2018-12-06 DIAGNOSIS — E11.21 TYPE 2 DIABETES MELLITUS WITH DIABETIC NEPHROPATHY, WITHOUT LONG-TERM CURRENT USE OF INSULIN: ICD-10-CM

## 2018-12-06 PROCEDURE — 3008F BODY MASS INDEX DOCD: CPT | Mod: CPTII,S$GLB,, | Performed by: INTERNAL MEDICINE

## 2018-12-06 PROCEDURE — 99214 OFFICE O/P EST MOD 30 MIN: CPT | Mod: S$GLB,,, | Performed by: INTERNAL MEDICINE

## 2018-12-06 PROCEDURE — 3077F SYST BP >= 140 MM HG: CPT | Mod: CPTII,S$GLB,, | Performed by: INTERNAL MEDICINE

## 2018-12-06 PROCEDURE — 3080F DIAST BP >= 90 MM HG: CPT | Mod: CPTII,S$GLB,, | Performed by: INTERNAL MEDICINE

## 2018-12-06 PROCEDURE — 3044F HG A1C LEVEL LT 7.0%: CPT | Mod: CPTII,S$GLB,, | Performed by: INTERNAL MEDICINE

## 2018-12-06 PROCEDURE — 99999 PR PBB SHADOW E&M-EST. PATIENT-LVL III: CPT | Mod: PBBFAC,,, | Performed by: INTERNAL MEDICINE

## 2018-12-06 RX ORDER — METOPROLOL TARTRATE 25 MG/1
25 TABLET, FILM COATED ORAL 2 TIMES DAILY
Qty: 60 TABLET | Refills: 6 | Status: SHIPPED | OUTPATIENT
Start: 2018-12-06 | End: 2019-02-02 | Stop reason: SDUPTHER

## 2018-12-06 RX ORDER — NITROGLYCERIN 0.4 MG/1
TABLET SUBLINGUAL
Qty: 30 TABLET | Refills: 5 | Status: SHIPPED | OUTPATIENT
Start: 2018-12-06 | End: 2019-08-02 | Stop reason: SDUPTHER

## 2018-12-06 RX ORDER — AMLODIPINE BESYLATE 5 MG/1
5 TABLET ORAL DAILY
Qty: 30 TABLET | Refills: 11 | Status: SHIPPED | OUTPATIENT
Start: 2018-12-06 | End: 2018-12-20

## 2018-12-06 NOTE — PROGRESS NOTES
Subjective:   Patient ID:  Robb Iglesias is a 37 y.o. male who presents for follow up of Follow-up      HPI  A 36 yo male with nstemi henry not compliant with cpap htn hlp who is not taking his meds accurately he is off b blockers imdur and ranexa. His blood pressure is elevated. He also eats out . Has no chest pain shortness of breath no swelling ion legs. Has no orthopnea pnd syncope near syncope  Ha seasy bruisability.  Past Medical History:   Diagnosis Date    Allergic rhinitis     Direct hyperbilirubinemia 3/24/2018    DM (diabetes mellitus) 2008    BS doesn't check any more 08/02/2018    Elevated bilirubin 3/21/2018    GERD (gastroesophageal reflux disease)     Gout     Hyperlipidemia     Hypertension associated with chronic kidney disease due to type 2 diabetes mellitus     Long term (current) use of insulin     MI (myocardial infarction) 07/2017    Obesity     HENRY on CPAP     Proteinuria     Steatohepatitis     Fatty Liver    Type 2 diabetes mellitus with diabetic nephropathy     Type 2 diabetes mellitus with hyperglycemia     Type 2 diabetes mellitus with renal manifestations        Past Surgical History:   Procedure Laterality Date    CARDIAC CATHETERIZATION      HEART CATH-LEFT Left 4/23/2018    [facility]    HEART CATH-LEFT Left 8/1/2017    [facility] CATH LAB    LASIK Bilateral     LIVER BIOPSY      NASAL ENDOSCOPY      NASAL SEPTUM SURGERY      SLEEVE GASTROPLASTY  03/06/2017       Social History     Tobacco Use    Smoking status: Never Smoker    Smokeless tobacco: Never Used   Substance Use Topics    Alcohol use: No     Comment: 1-2 times per month    Drug use: No       Family History   Problem Relation Age of Onset    Diabetes type II Mother     Hypertension Mother     Hyperlipidemia Mother     Diabetes Mother     Diabetes type II Brother     Diabetes type II Brother     Diabetes Maternal  Grandmother        Current Outpatient Medications   Medication Sig    aspirin (ECOTRIN) 81 MG EC tablet Take 81 mg by mouth once daily.    atorvastatin (LIPITOR) 80 MG tablet TAKE 1 TABLET DAILY    BRILINTA 90 mg tablet TAKE 1 TABLET TWICE A DAY    cetirizine (ZYRTEC) 10 MG tablet Take 10 mg by mouth once daily.    colchicine (COLCRYS) 0.6 mg tablet Take 1 tablet (0.6 mg total) by mouth every other day.    febuxostat (ULORIC) 40 mg Tab Take 2 tablets (80 mg total) by mouth once daily.    lisinopril (PRINIVIL,ZESTRIL) 40 MG tablet Take 1 tablet (40 mg total) by mouth 2 (two) times daily.    MULTIVITAMIN/IRON/FOLIC ACID (CENTRUM COMPLETE ORAL) Take by mouth once daily at 6am.    nitroGLYCERIN (NITROSTAT) 0.4 MG SL tablet Dissolve one tablet underneath tongue at onset of angina; may repeat every 5 minutes if needed. Call 911 if angina persists after 2 doses.     No current facility-administered medications for this visit.      Current Outpatient Medications on File Prior to Visit   Medication Sig    aspirin (ECOTRIN) 81 MG EC tablet Take 81 mg by mouth once daily.    atorvastatin (LIPITOR) 80 MG tablet TAKE 1 TABLET DAILY    BRILINTA 90 mg tablet TAKE 1 TABLET TWICE A DAY    cetirizine (ZYRTEC) 10 MG tablet Take 10 mg by mouth once daily.    colchicine (COLCRYS) 0.6 mg tablet Take 1 tablet (0.6 mg total) by mouth every other day.    febuxostat (ULORIC) 40 mg Tab Take 2 tablets (80 mg total) by mouth once daily.    lisinopril (PRINIVIL,ZESTRIL) 40 MG tablet Take 1 tablet (40 mg total) by mouth 2 (two) times daily.    MULTIVITAMIN/IRON/FOLIC ACID (CENTRUM COMPLETE ORAL) Take by mouth once daily at 6am.    nitroGLYCERIN (NITROSTAT) 0.4 MG SL tablet Dissolve one tablet underneath tongue at onset of angina; may repeat every 5 minutes if needed. Call 911 if angina persists after 2 doses.    [DISCONTINUED] doxazosin (CARDURA) 1 MG tablet Take 1 tablet (1 mg total) by mouth every evening.    [DISCONTINUED]  isosorbide mononitrate (IMDUR) 60 MG 24 hr tablet Take 1 tablet (60 mg total) by mouth once daily.    [DISCONTINUED] metoprolol tartrate (LOPRESSOR) 25 MG tablet Take 1 tablet (25 mg total) by mouth 2 (two) times daily.    [DISCONTINUED] ranolazine (RANEXA) 500 MG Tb12 Take 1 tablet (500 mg total) by mouth 2 (two) times daily.     No current facility-administered medications on file prior to visit.      Review of patient's allergies indicates:  No Known Allergies  Review of Systems   Constitution: Negative for weakness and malaise/fatigue.   Eyes: Negative for blurred vision.   Cardiovascular: Negative for chest pain, claudication, cyanosis, dyspnea on exertion, irregular heartbeat, leg swelling, near-syncope, orthopnea, palpitations and paroxysmal nocturnal dyspnea.   Respiratory: Negative for cough, hemoptysis and shortness of breath.    Hematologic/Lymphatic: Negative for bleeding problem. Does not bruise/bleed easily.   Skin: Negative for dry skin and itching.   Musculoskeletal: Negative for falls, muscle weakness and myalgias.   Gastrointestinal: Negative for abdominal pain, diarrhea, heartburn, hematemesis, hematochezia and melena.   Genitourinary: Negative for flank pain and hematuria.   Neurological: Negative for dizziness, focal weakness, headaches, light-headedness, numbness, paresthesias and seizures.   Psychiatric/Behavioral: Negative for altered mental status and memory loss. The patient is not nervous/anxious.    Allergic/Immunologic: Negative for hives.       Objective:   Physical Exam   Constitutional: He is oriented to person, place, and time. He appears well-developed and well-nourished. No distress.   HENT:   Head: Normocephalic and atraumatic.   Eyes: EOM are normal. Pupils are equal, round, and reactive to light. Right eye exhibits no discharge. Left eye exhibits no discharge.   Neck: Neck supple. No JVD present. No thyromegaly present.   Cardiovascular: Normal rate, regular rhythm, normal  "heart sounds and intact distal pulses. Exam reveals no gallop and no friction rub.   No murmur heard.  Pulmonary/Chest: Effort normal and breath sounds normal. No respiratory distress. He has no wheezes. He has no rales. He exhibits no tenderness.   Abdominal: Soft. Bowel sounds are normal. He exhibits no distension. There is no tenderness.   Musculoskeletal: Normal range of motion. He exhibits no edema.   Neurological: He is alert and oriented to person, place, and time. No cranial nerve deficit.   Skin: Skin is warm and dry. No rash noted. He is not diaphoretic. No erythema.   Psychiatric: He has a normal mood and affect. His behavior is normal.   Nursing note and vitals reviewed.    Vitals:    12/06/18 1635 12/06/18 1639   BP: (!) 170/116 (!) 160/110   BP Location: Right arm Left arm   Patient Position: Sitting Sitting   Pulse: 72    Resp: 20    Weight: 116 kg (255 lb 13.5 oz)    Height: 6' 3" (1.905 m)      Lab Results   Component Value Date    CHOL 167 11/29/2018    CHOL 139 04/23/2018    CHOL 119 (L) 11/15/2017     Lab Results   Component Value Date    HDL 32 (L) 11/29/2018    HDL 31 (L) 04/23/2018    HDL 24 (L) 11/15/2017     Lab Results   Component Value Date    LDLCALC 87.8 11/29/2018    LDLCALC 73.0 04/23/2018    LDLCALC 54.2 (L) 11/15/2017     Lab Results   Component Value Date    TRIG 236 (H) 11/29/2018    TRIG 175 (H) 04/23/2018    TRIG 204 (H) 11/15/2017     Lab Results   Component Value Date    CHOLHDL 19.2 (L) 11/29/2018    CHOLHDL 22.3 04/23/2018    CHOLHDL 20.2 11/15/2017       Chemistry        Component Value Date/Time     11/29/2018 0736    K 3.6 11/29/2018 0736     11/29/2018 0736    CO2 26 11/29/2018 0736    BUN 15 11/29/2018 0736    CREATININE 1.6 (H) 11/29/2018 0736     (H) 11/29/2018 0736        Component Value Date/Time    CALCIUM 9.1 11/29/2018 0736    ALKPHOS 97 11/29/2018 0736    AST 30 11/29/2018 0736    ALT 38 11/29/2018 0736    BILITOT 1.6 (H) 11/29/2018 0736    " ESTGFRAFRICA >60.0 11/29/2018 0736    EGFRNONAA 54.6 (A) 11/29/2018 0736        Lab Results   Component Value Date    HGBA1C 6.7 (H) 11/29/2018         Lab Results   Component Value Date    TSH 0.727 07/31/2017     Lab Results   Component Value Date    INR 1.1 07/31/2017     Lab Results   Component Value Date    WBC 6.63 04/24/2018    HGB 14.8 04/24/2018    HCT 43.8 04/24/2018    MCV 83 04/24/2018     (L) 04/24/2018     BMP  Sodium   Date Value Ref Range Status   11/29/2018 141 136 - 145 mmol/L Final     Potassium   Date Value Ref Range Status   11/29/2018 3.6 3.5 - 5.1 mmol/L Final     Chloride   Date Value Ref Range Status   11/29/2018 106 95 - 110 mmol/L Final     CO2   Date Value Ref Range Status   11/29/2018 26 23 - 29 mmol/L Final     BUN, Bld   Date Value Ref Range Status   11/29/2018 15 6 - 20 mg/dL Final     Creatinine   Date Value Ref Range Status   11/29/2018 1.6 (H) 0.5 - 1.4 mg/dL Final     Calcium   Date Value Ref Range Status   11/29/2018 9.1 8.7 - 10.5 mg/dL Final     Anion Gap   Date Value Ref Range Status   11/29/2018 9 8 - 16 mmol/L Final     eGFR if    Date Value Ref Range Status   11/29/2018 >60.0 >60 mL/min/1.73 m^2 Final     eGFR if non    Date Value Ref Range Status   11/29/2018 54.6 (A) >60 mL/min/1.73 m^2 Final     Comment:     Calculation used to obtain the estimated glomerular filtration  rate (eGFR) is the CKD-EPI equation.        CrCl cannot be calculated (Patient's most recent lab result is older than the maximum 7 days allowed.).    Assessment:     1. Coronary artery disease involving native coronary artery of native heart without angina pectoris    2. Essential hypertension    3. Stage 2 chronic kidney disease    4. Obstructive sleep apnea    5. Mixed hyperlipidemia    6. Type 2 diabetes mellitus     7. Type 2 diabetes mellitus with diabetic nephropathy, without long-term current use of insulin    8. NSTEMI with CAD s/p PCI (FAMILIA) of LAD x 2 in  8/2017      The issues is compliance with htn meds that were stopped for no reason. He also needs better eating habits more exercise and lose weight and be more compliant with sleep apnea.   He will benefit from resuming his b blokers and start amlodipine for htn control.   counseled about slat intake  Plan:   Metoprolol 25 bid   Amlodipine 5 mg   Nitro s/l   Low salt diet   Low fat diet   Diabetes control weight loss and exercise   F/u in 2 weeks with mid level.

## 2018-12-09 ENCOUNTER — PATIENT MESSAGE (OUTPATIENT)
Dept: INTERNAL MEDICINE | Facility: CLINIC | Age: 38
End: 2018-12-09

## 2018-12-11 NOTE — TELEPHONE ENCOUNTER
Robb Bender.    Blood in semen can be caused by a number of things, almost all of which are benign. If you are still having blood in your semen, I recommend that you schedule an appointment to in for an exam, which will include a prostate exam to check for infection.    If you have evidence of an infection, I will treat you with antibiotics, and this should resolve the problem. If for some reason, you persist in having problems after completing the antibiotics, I can schedule you a consultation with a urologist (a doctor that diagnoses and treats disorders of the male and female urinary tract and the male reproductive system).    Thanks for letting me care for you.    Sincerely,    KEVIN Roberson MD

## 2018-12-19 ENCOUNTER — LAB VISIT (OUTPATIENT)
Dept: LAB | Facility: HOSPITAL | Age: 38
End: 2018-12-19
Attending: FAMILY MEDICINE
Payer: COMMERCIAL

## 2018-12-19 DIAGNOSIS — M1A.09X1 IDIOPATHIC CHRONIC GOUT OF MULTIPLE SITES WITH TOPHUS: ICD-10-CM

## 2018-12-19 LAB
ALBUMIN SERPL BCP-MCNC: 3.8 G/DL
ALP SERPL-CCNC: 88 U/L
ALT SERPL W/O P-5'-P-CCNC: 38 U/L
ANION GAP SERPL CALC-SCNC: 8 MMOL/L
AST SERPL-CCNC: 26 U/L
BILIRUB SERPL-MCNC: 1.2 MG/DL
BUN SERPL-MCNC: 21 MG/DL
CALCIUM SERPL-MCNC: 9.5 MG/DL
CHLORIDE SERPL-SCNC: 102 MMOL/L
CO2 SERPL-SCNC: 27 MMOL/L
CREAT SERPL-MCNC: 1.9 MG/DL
EST. GFR  (AFRICAN AMERICAN): 51 ML/MIN/1.73 M^2
EST. GFR  (NON AFRICAN AMERICAN): 44 ML/MIN/1.73 M^2
GLUCOSE SERPL-MCNC: 195 MG/DL
POTASSIUM SERPL-SCNC: 3.9 MMOL/L
PROT SERPL-MCNC: 7.7 G/DL
SODIUM SERPL-SCNC: 137 MMOL/L
URATE SERPL-MCNC: 5.1 MG/DL

## 2018-12-19 PROCEDURE — 80053 COMPREHEN METABOLIC PANEL: CPT | Mod: PO

## 2018-12-19 PROCEDURE — 84550 ASSAY OF BLOOD/URIC ACID: CPT | Mod: PO

## 2018-12-19 PROCEDURE — 36415 COLL VENOUS BLD VENIPUNCTURE: CPT | Mod: PO

## 2018-12-20 ENCOUNTER — OFFICE VISIT (OUTPATIENT)
Dept: CARDIOLOGY | Facility: CLINIC | Age: 38
End: 2018-12-20
Payer: COMMERCIAL

## 2018-12-20 VITALS
WEIGHT: 252.19 LBS | SYSTOLIC BLOOD PRESSURE: 138 MMHG | BODY MASS INDEX: 31.36 KG/M2 | HEART RATE: 76 BPM | HEIGHT: 75 IN | DIASTOLIC BLOOD PRESSURE: 98 MMHG

## 2018-12-20 DIAGNOSIS — G47.33 OBSTRUCTIVE SLEEP APNEA: Chronic | ICD-10-CM

## 2018-12-20 DIAGNOSIS — I10 ESSENTIAL HYPERTENSION: Primary | Chronic | ICD-10-CM

## 2018-12-20 DIAGNOSIS — N18.2 STAGE 2 CHRONIC KIDNEY DISEASE: Chronic | ICD-10-CM

## 2018-12-20 DIAGNOSIS — I21.4 NSTEMI (NON-ST ELEVATED MYOCARDIAL INFARCTION): ICD-10-CM

## 2018-12-20 DIAGNOSIS — E11.59 TYPE 2 DIABETES MELLITUS WITH VASCULAR DISEASE: Chronic | ICD-10-CM

## 2018-12-20 DIAGNOSIS — I25.10 CORONARY ARTERY DISEASE INVOLVING NATIVE CORONARY ARTERY OF NATIVE HEART WITHOUT ANGINA PECTORIS: Chronic | ICD-10-CM

## 2018-12-20 DIAGNOSIS — E78.2 MIXED HYPERLIPIDEMIA: Chronic | ICD-10-CM

## 2018-12-20 PROCEDURE — 3008F BODY MASS INDEX DOCD: CPT | Mod: CPTII,S$GLB,, | Performed by: NURSE PRACTITIONER

## 2018-12-20 PROCEDURE — 3044F HG A1C LEVEL LT 7.0%: CPT | Mod: CPTII,S$GLB,, | Performed by: NURSE PRACTITIONER

## 2018-12-20 PROCEDURE — 3075F SYST BP GE 130 - 139MM HG: CPT | Mod: CPTII,S$GLB,, | Performed by: NURSE PRACTITIONER

## 2018-12-20 PROCEDURE — 99214 OFFICE O/P EST MOD 30 MIN: CPT | Mod: S$GLB,,, | Performed by: NURSE PRACTITIONER

## 2018-12-20 PROCEDURE — 99999 PR PBB SHADOW E&M-EST. PATIENT-LVL IV: CPT | Mod: PBBFAC,,, | Performed by: NURSE PRACTITIONER

## 2018-12-20 PROCEDURE — 3078F DIAST BP <80 MM HG: CPT | Mod: CPTII,S$GLB,, | Performed by: NURSE PRACTITIONER

## 2018-12-20 RX ORDER — AMLODIPINE BESYLATE 10 MG/1
10 TABLET ORAL DAILY
Qty: 30 TABLET | Refills: 11 | Status: SHIPPED | OUTPATIENT
Start: 2018-12-20 | End: 2018-12-22 | Stop reason: SDUPTHER

## 2018-12-20 NOTE — PROGRESS NOTES
Subjective:   Patient ID:  Robb Iglesias is a 37 y.o. male who presents for follow up of Hypertension and Hyperlipidemia      HPI   Patient presents to clinic for BP check. His current conditions include DM, HTN, HLP, HENRY,   History of MI in August 2017. Had PCI to proximal and mid LAD FAMILIA placement. Gastric sleeve done in March 2017   Seen by Dr. Rios approximately 2 weeks ago and reported that he had been off of his BB, Imdur and Ranexa. BP was elevated at visit, 160/110. 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 Metoprolol 25mg BID, amlodipine 5mg added.   Not compliant with CPAP use. Goes to sleep with mask and awakens with it off.  He presents today with no chest pain, SOB, BORREGO, orthopnea, PND, dizziness, near syncope, syncope, edema, angina or equivalent. Denies any CNS complaints to suggest TIA or CVA. Has been doing better with sodium intake.  Home 's-170's/'s. Home machine not correlating with manual reading.   .  Past Medical History:   Diagnosis Date    Allergic rhinitis     Direct hyperbilirubinemia 3/24/2018    DM (diabetes mellitus) 2008    BS doesn't check any more 08/02/2018    Elevated bilirubin 3/21/2018    GERD (gastroesophageal reflux disease)     Gout     Hyperlipidemia     Hypertension associated with chronic kidney disease due to type 2 diabetes mellitus     Long term (current) use of insulin     MI (myocardial infarction) 07/2017    Obesity     HENRY on CPAP     Proteinuria     Steatohepatitis     Fatty Liver    Type 2 diabetes mellitus with diabetic nephropathy     Type 2 diabetes mellitus with hyperglycemia     Type 2 diabetes mellitus with renal manifestations        Past Surgical History:   Procedure Laterality Date    CARDIAC CATHETERIZATION      HEART CATH-LEFT Left 4/23/2018    [facility]    HEART CATH-LEFT Left 8/1/2017     [facility] at [facility] CATH LAB    LASIK Bilateral     LIVER BIOPSY      NASAL ENDOSCOPY      NASAL SEPTUM SURGERY      SLEEVE GASTROPLASTY  03/06/2017       Social History     Tobacco Use    Smoking status: Never Smoker    Smokeless tobacco: Never Used   Substance Use Topics    Alcohol use: No     Comment: 1-2 times per month    Drug use: No       Family History   Problem Relation Age of Onset    Diabetes type II Mother     Hypertension Mother     Hyperlipidemia Mother     Diabetes Mother     Diabetes type II Brother     Diabetes type II Brother     Diabetes Maternal Grandmother        Current Outpatient Medications   Medication Sig    amLODIPine (NORVASC) 5 MG tablet Take 1 tablet (5 mg total) by mouth once daily.    aspirin (ECOTRIN) 81 MG EC tablet Take 81 mg by mouth once daily.    atorvastatin (LIPITOR) 80 MG tablet TAKE 1 TABLET DAILY    BRILINTA 90 mg tablet TAKE 1 TABLET TWICE A DAY    cetirizine (ZYRTEC) 10 MG tablet Take 10 mg by mouth once daily.    colchicine (COLCRYS) 0.6 mg tablet Take 1 tablet (0.6 mg total) by mouth every other day.    febuxostat (ULORIC) 40 mg Tab Take 2 tablets (80 mg total) by mouth once daily.    lisinopril (PRINIVIL,ZESTRIL) 40 MG tablet Take 1 tablet (40 mg total) by mouth 2 (two) times daily.    metoprolol tartrate (LOPRESSOR) 25 MG tablet Take 1 tablet (25 mg total) by mouth 2 (two) times daily.    MULTIVITAMIN/IRON/FOLIC ACID (CENTRUM COMPLETE ORAL) Take by mouth once daily at 6am.    nitroGLYCERIN (NITROSTAT) 0.4 MG SL tablet Dissolve one tablet underneath tongue at onset of angina; may repeat every 5 minutes if needed. Call 911 if angina persists after 2 doses.     No current facility-administered medications for this visit.      Current Outpatient Medications on File Prior to Visit   Medication Sig    amLODIPine (NORVASC) 5 MG tablet Take 1 tablet (5 mg total) by mouth once daily.    aspirin (ECOTRIN) 81 MG EC tablet Take 81  mg by mouth once daily.    atorvastatin (LIPITOR) 80 MG tablet TAKE 1 TABLET DAILY    BRILINTA 90 mg tablet TAKE 1 TABLET TWICE A DAY    cetirizine (ZYRTEC) 10 MG tablet Take 10 mg by mouth once daily.    colchicine (COLCRYS) 0.6 mg tablet Take 1 tablet (0.6 mg total) by mouth every other day.    febuxostat (ULORIC) 40 mg Tab Take 2 tablets (80 mg total) by mouth once daily.    lisinopril (PRINIVIL,ZESTRIL) 40 MG tablet Take 1 tablet (40 mg total) by mouth 2 (two) times daily.    metoprolol tartrate (LOPRESSOR) 25 MG tablet Take 1 tablet (25 mg total) by mouth 2 (two) times daily.    MULTIVITAMIN/IRON/FOLIC ACID (CENTRUM COMPLETE ORAL) Take by mouth once daily at 6am.    nitroGLYCERIN (NITROSTAT) 0.4 MG SL tablet Dissolve one tablet underneath tongue at onset of angina; may repeat every 5 minutes if needed. Call 911 if angina persists after 2 doses.     No current facility-administered medications on file prior to visit.        Review of Systems   Constitution: Negative for diaphoresis, weakness, malaise/fatigue, weight gain and weight loss.   HENT: Negative for congestion and nosebleeds.    Cardiovascular: Negative for chest pain, claudication, cyanosis, dyspnea on exertion, irregular heartbeat, leg swelling, near-syncope, orthopnea, palpitations, paroxysmal nocturnal dyspnea and syncope.   Respiratory: Negative for cough, hemoptysis, shortness of breath, sleep disturbances due to breathing, snoring, sputum production and wheezing.    Hematologic/Lymphatic: Negative for bleeding problem. Does not bruise/bleed easily.   Skin: Negative for rash.   Musculoskeletal: Negative for arthritis, back pain, falls, joint pain, muscle cramps and muscle weakness.   Gastrointestinal: Negative for abdominal pain, constipation, diarrhea, heartburn, hematemesis, hematochezia, melena and nausea.   Genitourinary: Negative for dysuria, hematuria and nocturia.   Neurological: Negative for excessive daytime sleepiness,  "dizziness, headaches, light-headedness, loss of balance, numbness and vertigo.       Objective:   Physical Exam   Constitutional: He is oriented to person, place, and time. He appears well-developed and well-nourished.   Neck: Neck supple. No JVD present.   Cardiovascular: Normal rate, regular rhythm, normal heart sounds and normal pulses. Exam reveals no friction rub.   No murmur heard.  Pulmonary/Chest: Effort normal and breath sounds normal. No respiratory distress. He has no wheezes. He has no rales.   Abdominal: Soft. Bowel sounds are normal. He exhibits no distension.   Musculoskeletal: He exhibits no edema or tenderness.   Neurological: He is alert and oriented to person, place, and time.   Skin: Skin is warm and dry. No rash noted.   Psychiatric: He has a normal mood and affect. His behavior is normal.   Nursing note and vitals reviewed.    Vitals:    12/20/18 1304 12/20/18 1328   BP: 132/84 (!) 138/98   BP Location: Left arm    Patient Position: Sitting    BP Method: Large (Manual)    Pulse: 76    Weight: 114.4 kg (252 lb 3.3 oz)    Height: 6' 3" (1.905 m)      Lab Results   Component Value Date    CHOL 167 11/29/2018    CHOL 139 04/23/2018    CHOL 119 (L) 11/15/2017     Lab Results   Component Value Date    HDL 32 (L) 11/29/2018    HDL 31 (L) 04/23/2018    HDL 24 (L) 11/15/2017     Lab Results   Component Value Date    LDLCALC 87.8 11/29/2018    LDLCALC 73.0 04/23/2018    LDLCALC 54.2 (L) 11/15/2017     Lab Results   Component Value Date    TRIG 236 (H) 11/29/2018    TRIG 175 (H) 04/23/2018    TRIG 204 (H) 11/15/2017     Lab Results   Component Value Date    CHOLHDL 19.2 (L) 11/29/2018    CHOLHDL 22.3 04/23/2018    CHOLHDL 20.2 11/15/2017       Chemistry        Component Value Date/Time     12/19/2018 1810    K 3.9 12/19/2018 1810     12/19/2018 1810    CO2 27 12/19/2018 1810    BUN 21 (H) 12/19/2018 1810    CREATININE 1.9 (H) 12/19/2018 1810     (H) 12/19/2018 1810        Component " Value Date/Time    CALCIUM 9.5 12/19/2018 1810    ALKPHOS 88 12/19/2018 1810    AST 26 12/19/2018 1810    ALT 38 12/19/2018 1810    BILITOT 1.2 (H) 12/19/2018 1810    ESTGFRAFRICA 51 (A) 12/19/2018 1810    EGFRNONAA 44 (A) 12/19/2018 1810          Lab Results   Component Value Date    TSH 0.727 07/31/2017     Lab Results   Component Value Date    INR 1.1 07/31/2017     Lab Results   Component Value Date    WBC 6.63 04/24/2018    HGB 14.8 04/24/2018    HCT 43.8 04/24/2018    MCV 83 04/24/2018     (L) 04/24/2018     BMP  Sodium   Date Value Ref Range Status   12/19/2018 137 136 - 145 mmol/L Final     Potassium   Date Value Ref Range Status   12/19/2018 3.9 3.5 - 5.1 mmol/L Final     Chloride   Date Value Ref Range Status   12/19/2018 102 95 - 110 mmol/L Final     CO2   Date Value Ref Range Status   12/19/2018 27 23 - 29 mmol/L Final     BUN, Bld   Date Value Ref Range Status   12/19/2018 21 (H) 6 - 20 mg/dL Final     Creatinine   Date Value Ref Range Status   12/19/2018 1.9 (H) 0.5 - 1.4 mg/dL Final     Calcium   Date Value Ref Range Status   12/19/2018 9.5 8.7 - 10.5 mg/dL Final     Anion Gap   Date Value Ref Range Status   12/19/2018 8 8 - 16 mmol/L Final     eGFR if    Date Value Ref Range Status   12/19/2018 51 (A) >60 mL/min/1.73 m^2 Final     eGFR if non    Date Value Ref Range Status   12/19/2018 44 (A) >60 mL/min/1.73 m^2 Final     Comment:     Calculation used to obtain the estimated glomerular filtration  rate (eGFR) is the CKD-EPI equation.        Estimated Creatinine Clearance: 73.4 mL/min (A) (based on SCr of 1.9 mg/dL (H)).    Assessment:     1. Essential hypertension    2. NSTEMI with CAD s/p PCI (FAMILIA) of LAD x 2 in 8/2017    3. Mixed hyperlipidemia    4. Coronary artery disease involving native coronary artery of native heart without angina pectoris    5. Stage 2 chronic kidney disease    6. Type 2 diabetes mellitus     7. Obstructive sleep apnea      Needs to see  Pulmonology for treatment of HENRY  BP not at goal, but has improved   Machine not correlating with manual reading   No CNS complaints to suggest TIA or CVA  No angina or equivalent. Has occasional atypical   No abnormal bleeding on ASA and Brilinta   Plan:   Will have him increase his amlodipine to 10mg daily   Continue other current medical management  CPAP compliance   Exercise routine   Weight loss  Diabetes control   RTC in 1 month or sooner if needed

## 2018-12-22 DIAGNOSIS — I10 ESSENTIAL HYPERTENSION: Chronic | ICD-10-CM

## 2018-12-22 DIAGNOSIS — E78.2 MIXED HYPERLIPIDEMIA: Chronic | ICD-10-CM

## 2018-12-22 DIAGNOSIS — E11.59 TYPE 2 DIABETES MELLITUS WITH VASCULAR DISEASE: Chronic | ICD-10-CM

## 2018-12-22 RX ORDER — LISINOPRIL 40 MG/1
40 TABLET ORAL 2 TIMES DAILY
Qty: 180 TABLET | Refills: 3 | Status: SHIPPED | OUTPATIENT
Start: 2018-12-22 | End: 2019-02-02 | Stop reason: ALTCHOICE

## 2018-12-22 RX ORDER — LISINOPRIL 40 MG/1
40 TABLET ORAL 2 TIMES DAILY
Qty: 180 TABLET | Refills: 3 | Status: SHIPPED | OUTPATIENT
Start: 2018-12-22 | End: 2018-12-22 | Stop reason: SDUPTHER

## 2018-12-22 RX ORDER — AMLODIPINE BESYLATE 10 MG/1
10 TABLET ORAL DAILY
Qty: 30 TABLET | Refills: 11 | Status: SHIPPED | OUTPATIENT
Start: 2018-12-22 | End: 2019-02-02 | Stop reason: SDUPTHER

## 2019-01-04 ENCOUNTER — OFFICE VISIT (OUTPATIENT)
Dept: PULMONOLOGY | Facility: CLINIC | Age: 39
End: 2019-01-04
Payer: COMMERCIAL

## 2019-01-04 VITALS
HEIGHT: 75 IN | DIASTOLIC BLOOD PRESSURE: 78 MMHG | RESPIRATION RATE: 16 BRPM | WEIGHT: 259.5 LBS | HEART RATE: 75 BPM | OXYGEN SATURATION: 99 % | BODY MASS INDEX: 32.27 KG/M2 | SYSTOLIC BLOOD PRESSURE: 126 MMHG

## 2019-01-04 DIAGNOSIS — E66.9 OBESITY (BMI 30.0-34.9): ICD-10-CM

## 2019-01-04 DIAGNOSIS — G47.61 PLMD (PERIODIC LIMB MOVEMENT DISORDER): ICD-10-CM

## 2019-01-04 DIAGNOSIS — Z98.84 BARIATRIC SURGERY STATUS: Chronic | ICD-10-CM

## 2019-01-04 DIAGNOSIS — G47.33 OSA ON CPAP: Primary | ICD-10-CM

## 2019-01-04 DIAGNOSIS — G47.33 OBSTRUCTIVE SLEEP APNEA: Chronic | ICD-10-CM

## 2019-01-04 PROBLEM — E66.811 OBESITY (BMI 30.0-34.9): Status: ACTIVE | Noted: 2019-01-04

## 2019-01-04 PROCEDURE — 99999 PR PBB SHADOW E&M-EST. PATIENT-LVL IV: ICD-10-PCS | Mod: PBBFAC,,, | Performed by: NURSE PRACTITIONER

## 2019-01-04 PROCEDURE — 99213 PR OFFICE/OUTPT VISIT, EST, LEVL III, 20-29 MIN: ICD-10-PCS | Mod: S$GLB,,, | Performed by: NURSE PRACTITIONER

## 2019-01-04 PROCEDURE — 3008F BODY MASS INDEX DOCD: CPT | Mod: CPTII,S$GLB,, | Performed by: NURSE PRACTITIONER

## 2019-01-04 PROCEDURE — 99999 PR PBB SHADOW E&M-EST. PATIENT-LVL IV: CPT | Mod: PBBFAC,,, | Performed by: NURSE PRACTITIONER

## 2019-01-04 PROCEDURE — 3008F PR BODY MASS INDEX (BMI) DOCUMENTED: ICD-10-PCS | Mod: CPTII,S$GLB,, | Performed by: NURSE PRACTITIONER

## 2019-01-04 PROCEDURE — 3074F PR MOST RECENT SYSTOLIC BLOOD PRESSURE < 130 MM HG: ICD-10-PCS | Mod: CPTII,S$GLB,, | Performed by: NURSE PRACTITIONER

## 2019-01-04 PROCEDURE — 3074F SYST BP LT 130 MM HG: CPT | Mod: CPTII,S$GLB,, | Performed by: NURSE PRACTITIONER

## 2019-01-04 PROCEDURE — 3078F DIAST BP <80 MM HG: CPT | Mod: CPTII,S$GLB,, | Performed by: NURSE PRACTITIONER

## 2019-01-04 PROCEDURE — 99213 OFFICE O/P EST LOW 20 MIN: CPT | Mod: S$GLB,,, | Performed by: NURSE PRACTITIONER

## 2019-01-04 PROCEDURE — 3078F PR MOST RECENT DIASTOLIC BLOOD PRESSURE < 80 MM HG: ICD-10-PCS | Mod: CPTII,S$GLB,, | Performed by: NURSE PRACTITIONER

## 2019-01-04 NOTE — ASSESSMENT & PLAN NOTE
Orginal diagnosis in 2008, not tolerant of CPAP mask.  Has sinus surgery, but still has sleep apnea.  2015 not compliant with CPAP and machine was taken back.   2018 resumed use of CPAP, still having issues with tolerating CPAP mask. Presently using Full face mask with complaints of dry mouth. (purchased a heated tube with some improvement)  Still has intolerance with gas in stomach.   On auto CPAP 6-14 cm, not compliant  Average AHI 6.6  Auto-CPAP Summary  Auto-CPAP Mean Pressure 9.1 cmH2O  Auto-CPAP Peak Average Pressure 12.3 cmH2O  Average Device Pressure <= 90% of Time 11.5 cmH2O    Change to CPAP 9 cm, new supply order, trial of nasal mask. Met with sushant today. Using heated hose.

## 2019-01-04 NOTE — ASSESSMENT & PLAN NOTE
March 6, 2017 gastric sleeve with Dr. Bryce Hanson with [facility].   Beginning weight 320 lbs.   Lost down to 230 lbs, had dehydration, gained back to present 259 lbs  Goal weight 240 lbs.  Presently focusing on Low salt diet and small portions with 2 ht attacks in past year.

## 2019-01-04 NOTE — PROGRESS NOTES
Subjective:      Patient ID: Robb Iglesias is a 38 y.o. male.    Chief Complaint: Sleep Apnea    HPI: Robb Iglesias is here for follow up for HENRY and yearly CPAP complaince assessment.  He is on Auto CPAP of 6-14 cmH2O pressure Apneic index (AHI) 6.6 events an hour.   He is not compliant with CPAP use. Complaince download today reveals 8.9% of days with greater than 4 hours of device use.   Patient reports benefit from CPAP use and denies snoring or excessive daytime sleepiness.  Patient reports complaint of intolerance to cpap with dryness of mouth and not liking to wear mask for sleep. Appointment today with Michelle, [facility], for mask fitting, possible change from full face mask to nasal mask. [place] [address]  Patient had 2 heart attacks in 2018, he is working hard to be compliant with his medical regimen, including treatment of obstructive sleep apnea.     Previous Report Reviewed: lab reports and office notes.     Past Medical History: The following portions of the patient's history were reviewed and updated as appropriate:   He  has a past surgical history that includes Nasal septum surgery; LASIK (Bilateral); Liver biopsy; Nasal endoscopy; Sleeve Gastroplasty (03/06/2017); and Cardiac catheterization.  His family history includes Diabetes in his maternal grandmother and mother; Diabetes type II in his brother, brother, and mother; Hyperlipidemia in his mother; Hypertension in his mother.  He  reports that  has never smoked. he has never used smokeless tobacco. He reports that he does not drink alcohol or use drugs.  He has a current medication list which includes the following prescription(s): amlodipine, aspirin, atorvastatin, brilinta, cetirizine, colchicine, febuxostat, lisinopril, metoprolol tartrate, multivitamin/iron/folic acid, and nitroglycerin.  He has No Known Allergies..    The following portions of the patient's history were reviewed and updated as appropriate: allergies, current  "medications, past family history, past medical history, past social history, past surgical history and problem list.    Review of Systems   Constitutional: Negative for fever, chills, weight loss, weight gain, activity change, appetite change, fatigue and night sweats.   HENT: Negative for postnasal drip, rhinorrhea, sinus pressure, voice change and congestion.    Eyes: Negative for redness and itching.   Respiratory: Negative for snoring, cough, sputum production, chest tightness, shortness of breath, wheezing, orthopnea, asthma nighttime symptoms, dyspnea on extertion, use of rescue inhaler and somnolence.    Cardiovascular: Negative.  Negative for chest pain, palpitations and leg swelling.   Genitourinary: Negative for difficulty urinating and hematuria.   Endocrine: Negative for cold intolerance and heat intolerance.    Musculoskeletal: Negative for arthralgias, gait problem, joint swelling and myalgias.   Skin: Negative.    Gastrointestinal: Negative for nausea, vomiting, abdominal pain and acid reflux.   Neurological: Negative for dizziness, weakness, light-headedness and headaches.   Hematological: Negative for adenopathy. No excessive bruising.   All other systems reviewed and are negative.     Objective:   /78   Pulse 75   Resp 16   Ht 6' 3" (1.905 m)   Wt 117.7 kg (259 lb 7.7 oz)   SpO2 99%   BMI 32.43 kg/m²   Physical Exam   Constitutional: He is oriented to person, place, and time. He appears well-developed and well-nourished. He is active and cooperative.  Non-toxic appearance. He does not appear ill. No distress.   HENT:   Head: Normocephalic and atraumatic.   Right Ear: External ear normal.   Left Ear: External ear normal.   Nose: Nose normal.   Mouth/Throat: Oropharynx is clear and moist. No oropharyngeal exudate.   Eyes: Conjunctivae are normal.   Neck: Normal range of motion. Neck supple.   Cardiovascular: Normal rate, regular rhythm, normal heart sounds and intact distal pulses. "   Pulmonary/Chest: Effort normal and breath sounds normal.   Abdominal: Soft.   Musculoskeletal: He exhibits no edema.   Neurological: He is alert and oriented to person, place, and time.   Skin: Skin is warm and dry.   Psychiatric: He has a normal mood and affect. His behavior is normal. Judgment and thought content normal.   Vitals reviewed.    Personal Diagnostic Review  CPAP download  APAP 6-15 cm   Compliance Summary  10/5/2018 - 1/2/2019 (90 days)  Days with Device Usage 66 days  Days without Device Usage 24 days  Percent Days with Device Usage 73.3%  Cumulative Usage 5 days 21 hrs. 47 mins. 46 secs.  Maximum Usage (1 Day) 5 hrs. 26 mins. 42 secs.  Average Usage (All Days) 1 hrs. 34 mins. 31 secs.  Average Usage (Days Used) 2 hrs. 8 mins. 54 secs.  Minimum Usage (1 Day) 9 mins. 49 secs.  Percent of Days with Usage >= 4 Hours 8.9%  Percent of Days with Usage < 4 Hours 91.1%  Date Range  Total Blower Time 14 days 15 hrs. 10 mins. 51 secs.  Average AHI 6.6  Auto-CPAP Summary  Auto-CPAP Mean Pressure 9.1 cmH2O  Auto-CPAP Peak Average Pressure 12.3 cmH2O  Average Device Pressure <= 90% of Time 11.5 cmH2O  Average Time in Large Leak Per Day 3 mins. 16 secs.    Assessment:     1. HENRY on CPAP    2. Bariatric surgery status    3. Obesity (BMI 30.0-34.9)    4. PLMD (periodic limb movement disorder)    5. Obstructive sleep apnea        Orders Placed This Encounter   Procedures    CPAP/BIPAP SUPPLIES     Yearly supply order.  Has Full face mask would like to nasal mask.   90 day supply. 4 refills.  H[facility]: [facility]     Order Specific Question:   Type of mask:     Answer:   Nasal     Order Specific Question:   Headgear?     Answer:   Yes     Order Specific Question:   Tubing?     Answer:   Yes     Order Specific Question:   Humidifier chamber?     Answer:   Yes     Order Specific Question:   Chin strap?     Answer:   Yes     Order Specific Question:   Filters?     Answer:   Yes     Order Specific Question:   Length of need  "(1-99 months):     Answer:   99    HME - OTHER     Change remotely to CPAP 9 cm   HME: [facility]   Resp: Vivi, patient met with Vivi today for new supplies and mask fitting.     Order Specific Question:   Type of Equipment:     Answer:   CPAP     Order Specific Question:   Height:     Answer:   6' 3" (1.905 m)     Order Specific Question:   Weight:     Answer:   117.7 kg (259 lb 7.7 oz)     Order Specific Question:   Does patient have medical equipment at home?     Answer:   CPAP     Plan:     Problem List Items Addressed This Visit     Bariatric surgery status (Chronic)     March 6, 2017 gastric sleeve with Dr. Bryce Hanson with [facility].   Beginning weight 320 lbs.   Lost down to 230 lbs, had dehydration, gained back to present 259 lbs  Goal weight 240 lbs.  Presently focusing on Low salt diet and small portions with 2 ht attacks in past year.          PLMD (periodic limb movement disorder)     Not bothersome, no treatment.          HENRY on CPAP - Primary (Chronic)     Orginal diagnosis in 2008, not tolerant of CPAP mask.  Has sinus surgery, but still has sleep apnea.  2015 not compliant with CPAP and machine was taken back.   2018 resumed use of CPAP, still having issues with tolerating CPAP mask. Presently using Full face mask with complaints of dry mouth. (purchased a heated tube with some improvement)  Still has intolerance with gas in stomach.   On auto CPAP 6-14 cm, not compliant  Average AHI 6.6  Auto-CPAP Summary  Auto-CPAP Mean Pressure 9.1 cmH2O  Auto-CPAP Peak Average Pressure 12.3 cmH2O  Average Device Pressure <= 90% of Time 11.5 cmH2O    Change to CPAP 9 cm, new supply order, trial of nasal mask. Met with vivi today. Using heated hose.           Relevant Orders    CPAP/BIPAP SUPPLIES    HME - OTHER    Obesity (BMI 30.0-34.9)     March 6, 2017 gastric sleeve with Dr. Bryce Hanson with [facility].   Beginning weight 320 lbs.   Lost down to 230 lbs, had dehydration, gained back to present 259 lbs  Goal " weight 240 lbs.  Presently focusing on Low salt diet and small portions with 2 ht attacks in past year.                 (DME) - [facility]  Reviewed therapeutic goals for positive airway pressure therapy CPAP  Ideal is usage 100% of nights for 6 - 8 hours per night. Minimum usage is 70% of night for at least 4 hours per night used.     Follow-up in about 8 weeks (around 3/1/2019) for CPAP compliance download not compliant at 1 yr fu, change of mask and settings to cpap 9 cm w/nasal .

## 2019-01-29 ENCOUNTER — PATIENT MESSAGE (OUTPATIENT)
Dept: INTERNAL MEDICINE | Facility: CLINIC | Age: 39
End: 2019-01-29

## 2019-01-29 NOTE — TELEPHONE ENCOUNTER
Spoke with patient on the phone and advised him its good to see pcp every 6 months-a year for annuals. Patient verbalized understanding, scheduled patient at time/date of patient convenience. Patient verbalized understanding of appointment date/time.

## 2019-01-30 ENCOUNTER — OFFICE VISIT (OUTPATIENT)
Dept: CARDIOLOGY | Facility: CLINIC | Age: 39
End: 2019-01-30
Payer: COMMERCIAL

## 2019-01-30 VITALS
WEIGHT: 257.25 LBS | BODY MASS INDEX: 31.99 KG/M2 | HEART RATE: 76 BPM | DIASTOLIC BLOOD PRESSURE: 85 MMHG | HEIGHT: 75 IN | SYSTOLIC BLOOD PRESSURE: 124 MMHG

## 2019-01-30 DIAGNOSIS — E11.59 TYPE 2 DIABETES MELLITUS WITH VASCULAR DISEASE: Chronic | ICD-10-CM

## 2019-01-30 DIAGNOSIS — I10 ESSENTIAL HYPERTENSION: Primary | Chronic | ICD-10-CM

## 2019-01-30 DIAGNOSIS — G47.33 OSA ON CPAP: Chronic | ICD-10-CM

## 2019-01-30 DIAGNOSIS — I21.4 NSTEMI (NON-ST ELEVATED MYOCARDIAL INFARCTION): ICD-10-CM

## 2019-01-30 DIAGNOSIS — N18.2 STAGE 2 CHRONIC KIDNEY DISEASE: Chronic | ICD-10-CM

## 2019-01-30 DIAGNOSIS — I25.10 CORONARY ARTERY DISEASE INVOLVING NATIVE CORONARY ARTERY OF NATIVE HEART WITHOUT ANGINA PECTORIS: Chronic | ICD-10-CM

## 2019-01-30 DIAGNOSIS — E78.2 MIXED HYPERLIPIDEMIA: Chronic | ICD-10-CM

## 2019-01-30 PROCEDURE — 99999 PR PBB SHADOW E&M-EST. PATIENT-LVL III: ICD-10-PCS | Mod: PBBFAC,,, | Performed by: NURSE PRACTITIONER

## 2019-01-30 PROCEDURE — 99214 OFFICE O/P EST MOD 30 MIN: CPT | Mod: S$GLB,,, | Performed by: NURSE PRACTITIONER

## 2019-01-30 PROCEDURE — 3008F BODY MASS INDEX DOCD: CPT | Mod: CPTII,S$GLB,, | Performed by: NURSE PRACTITIONER

## 2019-01-30 PROCEDURE — 99214 PR OFFICE/OUTPT VISIT, EST, LEVL IV, 30-39 MIN: ICD-10-PCS | Mod: S$GLB,,, | Performed by: NURSE PRACTITIONER

## 2019-01-30 PROCEDURE — 3074F PR MOST RECENT SYSTOLIC BLOOD PRESSURE < 130 MM HG: ICD-10-PCS | Mod: CPTII,S$GLB,, | Performed by: NURSE PRACTITIONER

## 2019-01-30 PROCEDURE — 3044F PR MOST RECENT HEMOGLOBIN A1C LEVEL <7.0%: ICD-10-PCS | Mod: CPTII,S$GLB,, | Performed by: NURSE PRACTITIONER

## 2019-01-30 PROCEDURE — 3008F PR BODY MASS INDEX (BMI) DOCUMENTED: ICD-10-PCS | Mod: CPTII,S$GLB,, | Performed by: NURSE PRACTITIONER

## 2019-01-30 PROCEDURE — 3074F SYST BP LT 130 MM HG: CPT | Mod: CPTII,S$GLB,, | Performed by: NURSE PRACTITIONER

## 2019-01-30 PROCEDURE — 99999 PR PBB SHADOW E&M-EST. PATIENT-LVL III: CPT | Mod: PBBFAC,,, | Performed by: NURSE PRACTITIONER

## 2019-01-30 PROCEDURE — 3044F HG A1C LEVEL LT 7.0%: CPT | Mod: CPTII,S$GLB,, | Performed by: NURSE PRACTITIONER

## 2019-01-30 PROCEDURE — 3079F DIAST BP 80-89 MM HG: CPT | Mod: CPTII,S$GLB,, | Performed by: NURSE PRACTITIONER

## 2019-01-30 PROCEDURE — 3079F PR MOST RECENT DIASTOLIC BLOOD PRESSURE 80-89 MM HG: ICD-10-PCS | Mod: CPTII,S$GLB,, | Performed by: NURSE PRACTITIONER

## 2019-01-30 NOTE — PROGRESS NOTES
Subjective:   Patient ID:  Robb Iglesias is a 38 y.o. male who presents for follow up of Hypertension      HPI   Mr. Iglesias presents to clinic today for BP check after increasing amlodipine to 10mg daily for HTN control. His current conditions include DM, HTN, HLP, HENRY,   History of MI in August 2017. Had PCI to proximal and mid LAD FAMILIA placement. Gastric sleeve done in March 2017.    Denies any chest pain, SOB, BORREGO, orthopnea, PND, dizziness, near syncope, syncope, edema, angina or equivalent. Denies any CNS complaints to suggest TIA or CVA.  Has been doing better with CPAP use  Today, BP is well controlled. 120's-130's with occasional 140's/ 80's. Feels great.       Past Medical History:   Diagnosis Date    Allergic rhinitis     Direct hyperbilirubinemia 3/24/2018    DM (diabetes mellitus) 2008    BS doesn't check any more 08/02/2018    Elevated bilirubin 3/21/2018    GERD (gastroesophageal reflux disease)     Gout     Hyperlipidemia     Hypertension associated with chronic kidney disease due to type 2 diabetes mellitus     Long term (current) use of insulin     MI (myocardial infarction) 07/2017    Obesity     HENRY on CPAP     Proteinuria     Steatohepatitis     Fatty Liver    Type 2 diabetes mellitus with diabetic nephropathy     Type 2 diabetes mellitus with hyperglycemia     Type 2 diabetes mellitus with renal manifestations        Past Surgical History:   Procedure Laterality Date    CARDIAC CATHETERIZATION      HEART CATH-LEFT Left 4/23/2018    Performed [facility]    HEART CATH-LEFT Left 8/1/2017    Performed by [facility] at [facility] CATH LAB    LASIK Bilateral     LIVER BIOPSY      NASAL ENDOSCOPY      NASAL SEPTUM SURGERY      SLEEVE GASTROPLASTY  03/06/2017       Social History     Tobacco Use    Smoking status: Never Smoker    Smokeless tobacco: Never Used   Substance Use Topics    Alcohol use: No     Comment: 1-2 times per month     Drug use: No       Family History   Problem Relation Age of Onset    Diabetes type II Mother     Hypertension Mother     Hyperlipidemia Mother     Diabetes Mother     Diabetes type II Brother     Diabetes type II Brother     Diabetes Maternal Grandmother        Current Outpatient Medications   Medication Sig    amLODIPine (NORVASC) 10 MG tablet Take 1 tablet (10 mg total) by mouth once daily.    aspirin (ECOTRIN) 81 MG EC tablet Take 81 mg by mouth once daily.    atorvastatin (LIPITOR) 80 MG tablet TAKE 1 TABLET DAILY    BRILINTA 90 mg tablet TAKE 1 TABLET TWICE A DAY    cetirizine (ZYRTEC) 10 MG tablet Take 10 mg by mouth once daily.    colchicine (COLCRYS) 0.6 mg tablet Take 1 tablet (0.6 mg total) by mouth every other day. (Patient taking differently: Take 0.6 mg by mouth every other day. )    febuxostat (ULORIC) 40 mg Tab Take 2 tablets (80 mg total) by mouth once daily.    lisinopril (PRINIVIL,ZESTRIL) 40 MG tablet Take 1 tablet (40 mg total) by mouth 2 (two) times daily.    metoprolol tartrate (LOPRESSOR) 25 MG tablet Take 1 tablet (25 mg total) by mouth 2 (two) times daily.    MULTIVITAMIN/IRON/FOLIC ACID (CENTRUM COMPLETE ORAL) Take by mouth once daily at 6am.    nitroGLYCERIN (NITROSTAT) 0.4 MG SL tablet Dissolve one tablet underneath tongue at onset of angina; may repeat every 5 minutes if needed. Call 911 if angina persists after 2 doses. (Patient taking differently: Dissolve one tablet underneath tongue at onset of angina; may repeat every 5 minutes if needed. Call 911 if angina persists after 2 doses.)     No current facility-administered medications for this visit.      Current Outpatient Medications on File Prior to Visit   Medication Sig    amLODIPine (NORVASC) 10 MG tablet Take 1 tablet (10 mg total) by mouth once daily.    aspirin (ECOTRIN) 81 MG EC tablet Take 81 mg by mouth once daily.    atorvastatin (LIPITOR) 80 MG tablet TAKE 1 TABLET DAILY    BRILINTA 90 mg tablet TAKE 1  TABLET TWICE A DAY    cetirizine (ZYRTEC) 10 MG tablet Take 10 mg by mouth once daily.    colchicine (COLCRYS) 0.6 mg tablet Take 1 tablet (0.6 mg total) by mouth every other day. (Patient taking differently: Take 0.6 mg by mouth every other day. )    febuxostat (ULORIC) 40 mg Tab Take 2 tablets (80 mg total) by mouth once daily.    lisinopril (PRINIVIL,ZESTRIL) 40 MG tablet Take 1 tablet (40 mg total) by mouth 2 (two) times daily.    metoprolol tartrate (LOPRESSOR) 25 MG tablet Take 1 tablet (25 mg total) by mouth 2 (two) times daily.    MULTIVITAMIN/IRON/FOLIC ACID (CENTRUM COMPLETE ORAL) Take by mouth once daily at 6am.    nitroGLYCERIN (NITROSTAT) 0.4 MG SL tablet Dissolve one tablet underneath tongue at onset of angina; may repeat every 5 minutes if needed. Call 911 if angina persists after 2 doses. (Patient taking differently: Dissolve one tablet underneath tongue at onset of angina; may repeat every 5 minutes if needed. Call 911 if angina persists after 2 doses.)     No current facility-administered medications on file prior to visit.        Review of Systems   Constitution: Negative for diaphoresis, weakness, malaise/fatigue, weight gain and weight loss.   HENT: Negative for congestion and nosebleeds.    Cardiovascular: Negative for chest pain, claudication, cyanosis, dyspnea on exertion, irregular heartbeat, leg swelling, near-syncope, orthopnea, palpitations, paroxysmal nocturnal dyspnea and syncope.   Respiratory: Negative for cough, hemoptysis, shortness of breath, sleep disturbances due to breathing, snoring, sputum production and wheezing.    Hematologic/Lymphatic: Negative for bleeding problem. Does not bruise/bleed easily.   Skin: Negative for rash.   Musculoskeletal: Negative for arthritis, back pain, falls, joint pain, muscle cramps and muscle weakness.   Gastrointestinal: Negative for abdominal pain, constipation, diarrhea, heartburn, hematemesis, hematochezia, melena and nausea.  "  Genitourinary: Negative for dysuria, hematuria and nocturia.   Neurological: Negative for excessive daytime sleepiness, dizziness, headaches, light-headedness, loss of balance, numbness and vertigo.       Objective:   Physical Exam   Constitutional: He is oriented to person, place, and time. He appears well-developed and well-nourished.   Neck: Neck supple. No JVD present.   Cardiovascular: Normal rate, regular rhythm, normal heart sounds and normal pulses. Exam reveals no friction rub.   No murmur heard.  Pulmonary/Chest: Effort normal and breath sounds normal. No respiratory distress. He has no wheezes. He has no rales.   Abdominal: Soft. Bowel sounds are normal. He exhibits no distension.   Musculoskeletal: He exhibits no edema or tenderness.   Neurological: He is alert and oriented to person, place, and time.   Skin: Skin is warm and dry. No rash noted.   Psychiatric: He has a normal mood and affect. His behavior is normal.   Nursing note and vitals reviewed.    Vitals:    01/30/19 1153   BP: 124/85   Pulse: 76   Weight: 116.7 kg (257 lb 4.4 oz)   Height: 6' 3" (1.905 m)     Lab Results   Component Value Date    CHOL 167 11/29/2018    CHOL 139 04/23/2018    CHOL 119 (L) 11/15/2017     Lab Results   Component Value Date    HDL 32 (L) 11/29/2018    HDL 31 (L) 04/23/2018    HDL 24 (L) 11/15/2017     Lab Results   Component Value Date    LDLCALC 87.8 11/29/2018    LDLCALC 73.0 04/23/2018    LDLCALC 54.2 (L) 11/15/2017     Lab Results   Component Value Date    TRIG 236 (H) 11/29/2018    TRIG 175 (H) 04/23/2018    TRIG 204 (H) 11/15/2017     Lab Results   Component Value Date    CHOLHDL 19.2 (L) 11/29/2018    CHOLHDL 22.3 04/23/2018    CHOLHDL 20.2 11/15/2017       Chemistry        Component Value Date/Time     12/19/2018 1810    K 3.9 12/19/2018 1810     12/19/2018 1810    CO2 27 12/19/2018 1810    BUN 21 (H) 12/19/2018 1810    CREATININE 1.9 (H) 12/19/2018 1810     (H) 12/19/2018 1810      "   Component Value Date/Time    CALCIUM 9.5 12/19/2018 1810    ALKPHOS 88 12/19/2018 1810    AST 26 12/19/2018 1810    ALT 38 12/19/2018 1810    BILITOT 1.2 (H) 12/19/2018 1810    ESTGFRAFRICA 51 (A) 12/19/2018 1810    EGFRNONAA 44 (A) 12/19/2018 1810          Lab Results   Component Value Date    TSH 0.727 07/31/2017     Lab Results   Component Value Date    INR 1.1 07/31/2017     Lab Results   Component Value Date    WBC 6.63 04/24/2018    HGB 14.8 04/24/2018    HCT 43.8 04/24/2018    MCV 83 04/24/2018     (L) 04/24/2018     BMP  Sodium   Date Value Ref Range Status   12/19/2018 137 136 - 145 mmol/L Final     Potassium   Date Value Ref Range Status   12/19/2018 3.9 3.5 - 5.1 mmol/L Final     Chloride   Date Value Ref Range Status   12/19/2018 102 95 - 110 mmol/L Final     CO2   Date Value Ref Range Status   12/19/2018 27 23 - 29 mmol/L Final     BUN, Bld   Date Value Ref Range Status   12/19/2018 21 (H) 6 - 20 mg/dL Final     Creatinine   Date Value Ref Range Status   12/19/2018 1.9 (H) 0.5 - 1.4 mg/dL Final     Calcium   Date Value Ref Range Status   12/19/2018 9.5 8.7 - 10.5 mg/dL Final     Anion Gap   Date Value Ref Range Status   12/19/2018 8 8 - 16 mmol/L Final     eGFR if    Date Value Ref Range Status   12/19/2018 51 (A) >60 mL/min/1.73 m^2 Final     eGFR if non    Date Value Ref Range Status   12/19/2018 44 (A) >60 mL/min/1.73 m^2 Final     Comment:     Calculation used to obtain the estimated glomerular filtration  rate (eGFR) is the CKD-EPI equation.        CrCl cannot be calculated (Patient's most recent lab result is older than the maximum 7 days allowed.).    Assessment:     1. Essential hypertension    2. Coronary artery disease involving native coronary artery of native heart without angina pectoris    3. Mixed hyperlipidemia    4. NSTEMI with CAD s/p PCI (FAMILIA) of LAD x 2 in 8/2017    5. Stage 2 chronic kidney disease    6. Type 2 diabetes mellitus     7. HENRY on  CPAP    BP has responded well to increase in amlodipine   No CNS complaints to suggest TIA or CVA  No angina or equivalent.  No abnormal bleeding on ASA and Brilinta   Doing better with CPAP use    Plan:     Continue current medical management   Heart healthy  Exercise program   RTC in 6 months or sooner if needed

## 2019-02-02 ENCOUNTER — LAB VISIT (OUTPATIENT)
Dept: LAB | Facility: HOSPITAL | Age: 39
End: 2019-02-02
Attending: FAMILY MEDICINE
Payer: COMMERCIAL

## 2019-02-02 ENCOUNTER — OFFICE VISIT (OUTPATIENT)
Dept: INTERNAL MEDICINE | Facility: CLINIC | Age: 39
End: 2019-02-02
Payer: COMMERCIAL

## 2019-02-02 VITALS
BODY MASS INDEX: 31.58 KG/M2 | SYSTOLIC BLOOD PRESSURE: 130 MMHG | HEIGHT: 75 IN | TEMPERATURE: 97 F | DIASTOLIC BLOOD PRESSURE: 88 MMHG | WEIGHT: 254 LBS | HEART RATE: 80 BPM

## 2019-02-02 DIAGNOSIS — I21.4 NSTEMI (NON-ST ELEVATED MYOCARDIAL INFARCTION): Primary | ICD-10-CM

## 2019-02-02 DIAGNOSIS — I25.10 CORONARY ARTERY DISEASE INVOLVING NATIVE CORONARY ARTERY OF NATIVE HEART WITHOUT ANGINA PECTORIS: Chronic | ICD-10-CM

## 2019-02-02 DIAGNOSIS — E11.22 TYPE 2 DIABETES MELLITUS WITH STAGE 3 CHRONIC KIDNEY DISEASE, WITHOUT LONG-TERM CURRENT USE OF INSULIN: Chronic | ICD-10-CM

## 2019-02-02 DIAGNOSIS — I10 ESSENTIAL HYPERTENSION: Chronic | ICD-10-CM

## 2019-02-02 DIAGNOSIS — N18.30 STAGE 3 CHRONIC KIDNEY DISEASE: ICD-10-CM

## 2019-02-02 DIAGNOSIS — M1A.0620 IDIOPATHIC CHRONIC GOUT OF LEFT KNEE WITHOUT TOPHUS: Chronic | ICD-10-CM

## 2019-02-02 DIAGNOSIS — R17 ELEVATED BILIRUBIN: ICD-10-CM

## 2019-02-02 DIAGNOSIS — E78.2 MIXED HYPERLIPIDEMIA: Chronic | ICD-10-CM

## 2019-02-02 DIAGNOSIS — E66.09 CLASS 1 OBESITY DUE TO EXCESS CALORIES WITH SERIOUS COMORBIDITY AND BODY MASS INDEX (BMI) OF 31.0 TO 31.9 IN ADULT: Chronic | ICD-10-CM

## 2019-02-02 DIAGNOSIS — N18.30 TYPE 2 DIABETES MELLITUS WITH STAGE 3 CHRONIC KIDNEY DISEASE, WITHOUT LONG-TERM CURRENT USE OF INSULIN: Chronic | ICD-10-CM

## 2019-02-02 PROBLEM — E66.811 CLASS 1 OBESITY DUE TO EXCESS CALORIES WITH SERIOUS COMORBIDITY AND BODY MASS INDEX (BMI) OF 31.0 TO 31.9 IN ADULT: Chronic | Status: ACTIVE | Noted: 2017-04-07

## 2019-02-02 LAB
ANION GAP SERPL CALC-SCNC: 6 MMOL/L
BUN SERPL-MCNC: 26 MG/DL
CALCIUM SERPL-MCNC: 10 MG/DL
CHLORIDE SERPL-SCNC: 104 MMOL/L
CO2 SERPL-SCNC: 28 MMOL/L
CREAT SERPL-MCNC: 1.9 MG/DL
EST. GFR  (AFRICAN AMERICAN): 50.9 ML/MIN/1.73 M^2
EST. GFR  (NON AFRICAN AMERICAN): 44.1 ML/MIN/1.73 M^2
ESTIMATED AVG GLUCOSE: 174 MG/DL
GLUCOSE SERPL-MCNC: 180 MG/DL
HBA1C MFR BLD HPLC: 7.7 %
POTASSIUM SERPL-SCNC: 4.8 MMOL/L
SODIUM SERPL-SCNC: 138 MMOL/L
URATE SERPL-MCNC: 7.3 MG/DL

## 2019-02-02 PROCEDURE — 3044F HG A1C LEVEL LT 7.0%: CPT | Mod: CPTII,S$GLB,, | Performed by: FAMILY MEDICINE

## 2019-02-02 PROCEDURE — 3079F DIAST BP 80-89 MM HG: CPT | Mod: CPTII,S$GLB,, | Performed by: FAMILY MEDICINE

## 2019-02-02 PROCEDURE — 3044F PR MOST RECENT HEMOGLOBIN A1C LEVEL <7.0%: ICD-10-PCS | Mod: CPTII,S$GLB,, | Performed by: FAMILY MEDICINE

## 2019-02-02 PROCEDURE — 84550 ASSAY OF BLOOD/URIC ACID: CPT

## 2019-02-02 PROCEDURE — 3008F PR BODY MASS INDEX (BMI) DOCUMENTED: ICD-10-PCS | Mod: CPTII,S$GLB,, | Performed by: FAMILY MEDICINE

## 2019-02-02 PROCEDURE — 80048 BASIC METABOLIC PNL TOTAL CA: CPT

## 2019-02-02 PROCEDURE — 3079F PR MOST RECENT DIASTOLIC BLOOD PRESSURE 80-89 MM HG: ICD-10-PCS | Mod: CPTII,S$GLB,, | Performed by: FAMILY MEDICINE

## 2019-02-02 PROCEDURE — 3008F BODY MASS INDEX DOCD: CPT | Mod: CPTII,S$GLB,, | Performed by: FAMILY MEDICINE

## 2019-02-02 PROCEDURE — 3075F PR MOST RECENT SYSTOLIC BLOOD PRESS GE 130-139MM HG: ICD-10-PCS | Mod: CPTII,S$GLB,, | Performed by: FAMILY MEDICINE

## 2019-02-02 PROCEDURE — 99999 PR PBB SHADOW E&M-EST. PATIENT-LVL III: CPT | Mod: PBBFAC,,, | Performed by: FAMILY MEDICINE

## 2019-02-02 PROCEDURE — 99214 PR OFFICE/OUTPT VISIT, EST, LEVL IV, 30-39 MIN: ICD-10-PCS | Mod: S$GLB,,, | Performed by: FAMILY MEDICINE

## 2019-02-02 PROCEDURE — 99999 PR PBB SHADOW E&M-EST. PATIENT-LVL III: ICD-10-PCS | Mod: PBBFAC,,, | Performed by: FAMILY MEDICINE

## 2019-02-02 PROCEDURE — 99214 OFFICE O/P EST MOD 30 MIN: CPT | Mod: S$GLB,,, | Performed by: FAMILY MEDICINE

## 2019-02-02 PROCEDURE — 83036 HEMOGLOBIN GLYCOSYLATED A1C: CPT

## 2019-02-02 PROCEDURE — 36415 COLL VENOUS BLD VENIPUNCTURE: CPT

## 2019-02-02 PROCEDURE — 3075F SYST BP GE 130 - 139MM HG: CPT | Mod: CPTII,S$GLB,, | Performed by: FAMILY MEDICINE

## 2019-02-02 RX ORDER — ALLOPURINOL 300 MG/1
300 TABLET ORAL DAILY
Qty: 90 TABLET | Refills: 3 | Status: SHIPPED | OUTPATIENT
Start: 2019-02-02 | End: 2019-02-03 | Stop reason: SDUPTHER

## 2019-02-02 RX ORDER — AMLODIPINE BESYLATE 10 MG/1
10 TABLET ORAL DAILY
Qty: 90 TABLET | Refills: 3 | Status: SHIPPED | OUTPATIENT
Start: 2019-02-02 | End: 2020-03-15 | Stop reason: SDUPTHER

## 2019-02-02 RX ORDER — METOPROLOL TARTRATE 25 MG/1
25 TABLET, FILM COATED ORAL 2 TIMES DAILY
Qty: 180 TABLET | Refills: 3 | Status: SHIPPED | OUTPATIENT
Start: 2019-02-02 | End: 2020-03-24 | Stop reason: SDUPTHER

## 2019-02-02 RX ORDER — LOSARTAN POTASSIUM 100 MG/1
100 TABLET ORAL DAILY
Qty: 90 TABLET | Refills: 3 | Status: SHIPPED | OUTPATIENT
Start: 2019-02-02 | End: 2020-03-15 | Stop reason: SDUPTHER

## 2019-02-02 NOTE — PROGRESS NOTES
CHIEF COMPLAINT  Follow-up      HISTORY OF PRESENT ILLNESS    Problem List Items Addressed This Visit        Cardiac/Vascular    NSTEMI with CAD s/p PCI (FAMILIA) of LAD x 2 in 8/2017 - Primary    Current Assessment & Plan     Compensated, stable, asymptomatic.  Follows with his cardiologist Dr. Rios.         Essential hypertension (Chronic)    Current Assessment & Plan     BP Readings from Last 5 Encounters:   02/02/19 130/88   01/30/19 124/85   01/04/19 126/78   12/20/18 (!) 138/98   12/06/18 (!) 160/110   Equivocally controlled.  He agreed to troubleshoot problem with his digital hypertension account.  We discussed treatment options and rationale for switching from ACE-inhibitor to angiotensin receptor blocker.         Relevant Medications    metoprolol tartrate (LOPRESSOR) 25 MG tablet    amLODIPine (NORVASC) 10 MG tablet    losartan (COZAAR) 100 MG tablet    Other Relevant Orders    Basic metabolic panel    Hyperlipidemia (Chronic)    Current Assessment & Plan     Lab Results   Component Value Date    CHOL 167 11/29/2018    CHOL 139 04/23/2018    TRIG 236 (H) 11/29/2018    TRIG 175 (H) 04/23/2018    HDL 32 (L) 11/29/2018    HDL 31 (L) 04/23/2018    LDLCALC 87.8 11/29/2018    LDLCALC 73.0 04/23/2018    NONHDLCHOL 135 11/29/2018    NONHDLCHOL 108 04/23/2018    AST 26 12/19/2018    ALT 38 12/19/2018   He appears to be tolerating statin for dyslipidemia without apparent symptoms of myositis or hepatic dysfunction.          Coronary artery disease involving native coronary artery of native heart without angina pectoris (Chronic)    Overview     Cath lab procedure 04/23/2018 (Naveen Rios MD)  A. Indication/Pre-Operative Diagnosis: The patient is a 37 year old male that was [facility] (NSTEMI). The BELLA risk score is 5.    B. Summary/Post-Operative Diagnosis  1. Single vessel coronary artery disease.  2. Normal LVEF.  3. Diastolic dysfunction.  4. Successful PCI for acute  myocardial infarction.  5. The patient did not undergo primary PCI.         Current Assessment & Plan     Stable/compensated.  He reports no angina or angina equivalent symptoms.            Renal/    Stage 3 chronic kidney disease    Current Assessment & Plan     Lab Results   Component Value Date    ESTGFRAFRICA 51 (A) 12/19/2018    ESTGFRAFRICA >60.0 11/29/2018    ESTGFRAFRICA >60 09/20/2018    EGFRNONAA 44 (A) 12/19/2018    EGFRNONAA 54.6 (A) 11/29/2018    EGFRNONAA 55 (A) 09/20/2018    CREATININE 1.9 (H) 12/19/2018    CREATININE 1.6 (H) 11/29/2018    BUN 21 (H) 12/19/2018    BUN 15 11/29/2018    ALBUMIN 3.8 12/19/2018    PROT 7.7 12/19/2018    CREATRANDUR 53.0 04/09/2018     12/19/2018    K 3.9 12/19/2018     12/19/2018    CO2 27 12/19/2018     (H) 12/19/2018    CALCIUM 9.5 12/19/2018    PHOS 3.2 04/09/2018    MG 2.1 04/22/2018    HGB 14.8 04/24/2018    HCT 43.8 04/24/2018   This condition is ASYMPTOMATIC. This condition appears to be MODESTLY WORSE.  He is working on increasing his water intake.         Relevant Orders    Basic metabolic panel       Endocrine    Class 1 obesity due to excess calories with serious comorbidity and body mass index (BMI) of 31.0 to 31.9 in adult (Chronic)    Current Assessment & Plan     Wt Readings from Last 10 Encounters:   02/02/19 115.2 kg (253 lb 15.5 oz)   01/30/19 116.7 kg (257 lb 4.4 oz)   01/04/19 117.7 kg (259 lb 7.7 oz)   12/20/18 114.4 kg (252 lb 3.3 oz)   12/06/18 116 kg (255 lb 13.5 oz)   06/20/18 108.5 kg (239 lb 3.2 oz)   05/10/18 106.8 kg (235 lb 5.5 oz)   04/26/18 107.3 kg (236 lb 8.9 oz)   04/22/18 110 kg (242 lb 8.1 oz)   04/16/18 111.4 kg (245 lb 9.5 oz)    Moderately improved.  He is making sense your efforts at therapeutic lifestyle.         Type 2 diabetes mellitus with stage 3 chronic kidney disease, without long-term current use of insulin (Chronic)    Current Assessment & Plan     Lab Results   Component Value Date    HGBA1C 6.7 (H)  11/29/2018    HGBA1C 5.6 04/23/2018    HGBA1C 5.3 03/20/2018    ESTGFRAFRICA 51 (A) 12/19/2018    EGFRNONAA 44 (A) 12/19/2018     BP Readings from Last 1 Encounters:   02/02/19 130/88     Wt Readings from Last 3 Encounters:   02/02/19 115.2 kg (253 lb 15.5 oz)   01/30/19 116.7 kg (257 lb 4.4 oz)   01/04/19 117.7 kg (259 lb 7.7 oz)     Body mass index is 31.74 kg/m².    Current treatment is diet alone.  We are rechecking A1c.  We discussed pharmacotherapy treatment options.         Relevant Orders    Hemoglobin A1c    Basic metabolic panel    Hemoglobin A1c       GI    Elevated bilirubin    Current Assessment & Plan     Lab Results   Component Value Date    AST 26 12/19/2018    AST 30 11/29/2018    AST 35 09/20/2018    ALT 38 12/19/2018    ALT 38 11/29/2018    ALT 42 09/20/2018    ALKPHOS 88 12/19/2018    ALKPHOS 97 11/29/2018    ALKPHOS 84 09/20/2018    BILITOT 1.2 (H) 12/19/2018    BILITOT 1.6 (H) 11/29/2018    BILITOT 1.6 (H) 09/20/2018    BILIDIR 0.6 (H) 03/23/2018    ALBUMIN 3.8 12/19/2018    LABPROT 10.9 07/31/2017    INR 1.1 07/31/2017    APTT 29.4 08/02/2017   This condition is ASYMPTOMATIC. This condition appears to be IMPROVED.            Orthopedic    Gout of left knee (Chronic)    Current Assessment & Plan     Lab Results   Component Value Date    URICACID 5.1 12/19/2018    URICACID 5.6 09/20/2018    URICACID 5.9 06/14/2018    ESTGFRAFRICA 51 (A) 12/19/2018    EGFRNONAA 44 (A) 12/19/2018    CREATININE 1.9 (H) 12/19/2018    BUN 21 (H) 12/19/2018   This condition appears to be WELL CONTROLLED.  He says that he has not had a gout attack in a long time.  We discussed treatment options and rationale for switching from febuxostat to allopurinol.         Relevant Medications    allopurinol (ZYLOPRIM) 300 MG tablet    Other Relevant Orders    Uric acid            REVIEW OF SYSTEMS  CONSTITUTIONAL: No fever or chills reported.   CARDIOVASCULAR: No angina or orthopnea reported.   ENDOCRINE: No polyuria or  "polydipsia reported.     PHYSICAL EXAM  Vitals:    02/02/19 0809   BP: 130/88   BP Location: Left arm   Patient Position: Sitting   BP Method: Medium (Manual)   Pulse: 80   Temp: 96.9 °F (36.1 °C)   Weight: 115.2 kg (253 lb 15.5 oz)   Height: 6' 3" (1.905 m)     CONSTITUTIONAL: Vital signs noted. No apparent distress. Does not appear acutely ill or septic. Appears adequately hydrated.  HEENT: External ENT grossly unremarkable. Hearing grossly intact.  PULM: Lungs clear. Breathing unlabored.  HEART: Auscultation reveals regular rate and rhythm without murmur, gallop or rub.  DERM: Skin warm and moist with normal turgor.  NEURO: There are no gross focal motor deficits or gross deficits of cranial nerves III-XII.  PSYCHIATRIC: Alert and conversant. Mood grossly neutral. Judgment and insight not grossly compromised.     Follow-up in about 3 months (around 5/2/2019) for review test results and discuss treatment plan, re-evaluate problem(s) discussed today.    Documentation entered by me for this encounter may have been done in part using speech-recognition technology. Although I have made an effort to ensure accuracy, "sound like" errors may exist and should be interpreted in context. -KEVIN Roberson MD      Patient Instructions   FIX YOUR DIGITAL MEDICINE HYPERTENSION ACCOUNT  · [email or web address]  · If you require additional assistance, please call [facility] Technical Support at [phone].       "

## 2019-02-02 NOTE — ASSESSMENT & PLAN NOTE
Lab Results   Component Value Date    HGBA1C 6.7 (H) 11/29/2018    HGBA1C 5.6 04/23/2018    HGBA1C 5.3 03/20/2018    ESTGFRAFRICA 51 (A) 12/19/2018    EGFRNONAA 44 (A) 12/19/2018     BP Readings from Last 1 Encounters:   02/02/19 130/88     Wt Readings from Last 3 Encounters:   02/02/19 115.2 kg (253 lb 15.5 oz)   01/30/19 116.7 kg (257 lb 4.4 oz)   01/04/19 117.7 kg (259 lb 7.7 oz)     Body mass index is 31.74 kg/m².    Current treatment is diet alone.  We are rechecking A1c.  We discussed pharmacotherapy treatment options.

## 2019-02-02 NOTE — PATIENT INSTRUCTIONS
FIX YOUR DIGITAL MEDICINE HYPERTENSION ACCOUNT  · [email or web address]  · If you require additional assistance, please call [facility] at [phone].

## 2019-02-02 NOTE — ASSESSMENT & PLAN NOTE
Wt Readings from Last 10 Encounters:   02/02/19 115.2 kg (253 lb 15.5 oz)   01/30/19 116.7 kg (257 lb 4.4 oz)   01/04/19 117.7 kg (259 lb 7.7 oz)   12/20/18 114.4 kg (252 lb 3.3 oz)   12/06/18 116 kg (255 lb 13.5 oz)   06/20/18 108.5 kg (239 lb 3.2 oz)   05/10/18 106.8 kg (235 lb 5.5 oz)   04/26/18 107.3 kg (236 lb 8.9 oz)   04/22/18 110 kg (242 lb 8.1 oz)   04/16/18 111.4 kg (245 lb 9.5 oz)    Moderately improved.  He is making sense your efforts at therapeutic lifestyle.

## 2019-02-02 NOTE — ASSESSMENT & PLAN NOTE
Wt Readings from Last 30 Encounters:   02/02/19 115.2 kg (253 lb 15.5 oz)   01/30/19 116.7 kg (257 lb 4.4 oz)   01/04/19 117.7 kg (259 lb 7.7 oz)   12/20/18 114.4 kg (252 lb 3.3 oz)   12/06/18 116 kg (255 lb 13.5 oz)   06/20/18 108.5 kg (239 lb 3.2 oz)   05/10/18 106.8 kg (235 lb 5.5 oz)   04/26/18 107.3 kg (236 lb 8.9 oz)   04/22/18 110 kg (242 lb 8.1 oz)   04/16/18 111.4 kg (245 lb 9.5 oz)   04/03/18 110 kg (242 lb 8.1 oz)   03/23/18 109.2 kg (240 lb 11.9 oz)   02/06/18 108.1 kg (238 lb 5.1 oz)   01/23/18 107.1 kg (236 lb 1.8 oz)   12/20/17 107.8 kg (237 lb 10.5 oz)   12/15/17 107.7 kg (237 lb 7 oz)   10/18/17 107.5 kg (236 lb 15.9 oz)   09/25/17 106 kg (233 lb 11 oz)   09/12/17 109.8 kg (242 lb 1 oz)   08/17/17 106.3 kg (234 lb 5.6 oz)   08/04/17 105.9 kg (233 lb 7.5 oz)   08/01/17 106.1 kg (234 lb)   07/19/17 110 kg (242 lb 8.1 oz)   06/21/17 111.6 kg (246 lb 0.5 oz)   06/07/17 111.1 kg (244 lb 14.9 oz)   04/26/17 119.3 kg (263 lb 0.1 oz)   04/07/17 119.6 kg (263 lb 10.7 oz)   This condition appears to be IMPROVED.

## 2019-02-02 NOTE — Clinical Note
Dr. Blanca Bender.I saw Robb today. He seemed to be doing well.I switched him from lisinopril to losartan RE: 10/24/2018 BMJ article. Robb agreed to re-enroll in the digital medicine hypertension program.Let me know if this does not meet your approval.Thanks!

## 2019-02-02 NOTE — ASSESSMENT & PLAN NOTE
BP Readings from Last 5 Encounters:   02/02/19 130/88   01/30/19 124/85   01/04/19 126/78   12/20/18 (!) 138/98   12/06/18 (!) 160/110   Equivocally controlled.  He agreed to troubleshoot problem with his digital hypertension account.  We discussed treatment options and rationale for switching from ACE-inhibitor to angiotensin receptor blocker.

## 2019-02-02 NOTE — ASSESSMENT & PLAN NOTE
Lab Results   Component Value Date    URICACID 5.1 12/19/2018    URICACID 5.6 09/20/2018    URICACID 5.9 06/14/2018    ESTGFRAFRICA 51 (A) 12/19/2018    EGFRNONAA 44 (A) 12/19/2018    CREATININE 1.9 (H) 12/19/2018    BUN 21 (H) 12/19/2018   This condition appears to be WELL CONTROLLED.  He says that he has not had a gout attack in a long time.  We discussed treatment options and rationale for switching from febuxostat to allopurinol.

## 2019-02-02 NOTE — ASSESSMENT & PLAN NOTE
Lab Results   Component Value Date    CHOL 167 11/29/2018    CHOL 139 04/23/2018    TRIG 236 (H) 11/29/2018    TRIG 175 (H) 04/23/2018    HDL 32 (L) 11/29/2018    HDL 31 (L) 04/23/2018    LDLCALC 87.8 11/29/2018    LDLCALC 73.0 04/23/2018    NONHDLCHOL 135 11/29/2018    NONHDLCHOL 108 04/23/2018    AST 26 12/19/2018    ALT 38 12/19/2018   He appears to be tolerating statin for dyslipidemia without apparent symptoms of myositis or hepatic dysfunction.

## 2019-02-02 NOTE — ASSESSMENT & PLAN NOTE
Lab Results   Component Value Date    ESTGFRAFRICA 51 (A) 12/19/2018    ESTGFRAFRICA >60.0 11/29/2018    ESTGFRAFRICA >60 09/20/2018    EGFRNONAA 44 (A) 12/19/2018    EGFRNONAA 54.6 (A) 11/29/2018    EGFRNONAA 55 (A) 09/20/2018    CREATININE 1.9 (H) 12/19/2018    CREATININE 1.6 (H) 11/29/2018    BUN 21 (H) 12/19/2018    BUN 15 11/29/2018    ALBUMIN 3.8 12/19/2018    PROT 7.7 12/19/2018    CREATRANDUR 53.0 04/09/2018     12/19/2018    K 3.9 12/19/2018     12/19/2018    CO2 27 12/19/2018     (H) 12/19/2018    CALCIUM 9.5 12/19/2018    PHOS 3.2 04/09/2018    MG 2.1 04/22/2018    HGB 14.8 04/24/2018    HCT 43.8 04/24/2018   This condition is ASYMPTOMATIC. This condition appears to be MODESTLY WORSE.  He is working on increasing his water intake.

## 2019-02-02 NOTE — ASSESSMENT & PLAN NOTE
Lab Results   Component Value Date    AST 26 12/19/2018    AST 30 11/29/2018    AST 35 09/20/2018    ALT 38 12/19/2018    ALT 38 11/29/2018    ALT 42 09/20/2018    ALKPHOS 88 12/19/2018    ALKPHOS 97 11/29/2018    ALKPHOS 84 09/20/2018    BILITOT 1.2 (H) 12/19/2018    BILITOT 1.6 (H) 11/29/2018    BILITOT 1.6 (H) 09/20/2018    BILIDIR 0.6 (H) 03/23/2018    ALBUMIN 3.8 12/19/2018    LABPROT 10.9 07/31/2017    INR 1.1 07/31/2017    APTT 29.4 08/02/2017   This condition is ASYMPTOMATIC. This condition appears to be IMPROVED.

## 2019-02-03 ENCOUNTER — PATIENT MESSAGE (OUTPATIENT)
Dept: INTERNAL MEDICINE | Facility: CLINIC | Age: 39
End: 2019-02-03

## 2019-02-03 DIAGNOSIS — M1A.0620 IDIOPATHIC CHRONIC GOUT OF LEFT KNEE WITHOUT TOPHUS: Chronic | ICD-10-CM

## 2019-02-03 RX ORDER — ALLOPURINOL 300 MG/1
300 TABLET ORAL 2 TIMES DAILY
Qty: 180 TABLET | Refills: 3 | Status: SHIPPED | OUTPATIENT
Start: 2019-02-03 | End: 2020-04-13

## 2019-02-03 NOTE — TELEPHONE ENCOUNTER
"Hi, Robb.    Your A1c is 7.7% (too high), which means we need to start you on a treatment to control your blood sugar.  The good news is that we have excellent medications to treat diabetes, some of which are associated with weight loss.  PLEASE SCHEDULE AN APPOINTMENT WITH ME SOON so we can discuss your treatment options.    Your uric acid level is up. Because of this, I'm recommending that we change your allopurinol dose from 300 mg once daily to 300 mg twice daily.  I sent you a new prescription to [facility].  Hopefully this will prevent gout attacks.  Please let me know if you experience a gout attack.  If so, we may need to switch you back to the [place].    Your kidney function is stable.  Your other lab results are normal or at least unremarkable.    If you have any questions about your test results, write them down and bring them with you to your next appointment. You can call or message me in the meantime if you have urgent questions.    Thank you for letting me care for you. I look forward to seeing you at your next appointment.    Sincerely,    KEVIN Roberson MD    P.S. - Want to learn more about your test results and what they mean? It's as simple as 1, 2, 3.     (1) Log in to your [facility] account at [email or web address]     (2) From the "View test results" tab, click on the test you want to know more about.     (3) Click on the "About This Test" link.  "

## 2019-02-18 ENCOUNTER — PATIENT MESSAGE (OUTPATIENT)
Dept: PULMONOLOGY | Facility: CLINIC | Age: 39
End: 2019-02-18

## 2019-02-18 DIAGNOSIS — G47.33 OSA ON CPAP: Primary | Chronic | ICD-10-CM

## 2019-02-19 NOTE — TELEPHONE ENCOUNTER
"Change to auto CPAP 6-9 cm  Orders Placed This Encounter   Procedures    HME - OTHER     Please change remotely to Auto CPAP 6 - 9 cm     Order Specific Question:   Type of Equipment:     Answer:   CPAP     Order Specific Question:   Height:     Answer:   6' 3"     Order Specific Question:   Weight:     Answer:   253 lbs     Order Specific Question:   Does patient have medical equipment at home?     Answer:   CPAP     1. HENRY on CPAP  HME - OTHER       Compliance Summary  1/18/2019 - 2/16/2019 (30 days)  Days with Device Usage 26 days  Days without Device Usage 4 days  Percent Days with Device Usage 86.7%  Cumulative Usage 1 day 20 hrs. 24 mins. 48 secs.  Maximum Usage (1 Day) 3 hrs. 47 mins. 50 secs.  Average Usage (All Days) 1 hrs. 28 mins. 49 secs.  Average Usage (Days Used) 1 hrs. 42 mins. 29 secs.  Minimum Usage (1 Day) 22 mins. 39 secs.  Percent of Days with Usage >= 4 Hours 0.0%  Percent of Days with Usage < 4 Hours 100.0%  Date Range  Total Blower Time 3 days 11 hrs. 14 mins. 53 secs.  CPAP Summary  Average Time in Large Leak Per Day 21 secs.  Average AHI 4.5  CPAP 9.0 cmH2O        "

## 2019-02-26 ENCOUNTER — OFFICE VISIT (OUTPATIENT)
Dept: INTERNAL MEDICINE | Facility: CLINIC | Age: 39
End: 2019-02-26
Payer: COMMERCIAL

## 2019-02-26 VITALS
DIASTOLIC BLOOD PRESSURE: 82 MMHG | BODY MASS INDEX: 32.32 KG/M2 | HEIGHT: 75 IN | TEMPERATURE: 98 F | WEIGHT: 259.94 LBS | HEART RATE: 62 BPM | OXYGEN SATURATION: 98 % | SYSTOLIC BLOOD PRESSURE: 118 MMHG

## 2019-02-26 DIAGNOSIS — N18.30 TYPE 2 DIABETES MELLITUS WITH STAGE 3 CHRONIC KIDNEY DISEASE, WITHOUT LONG-TERM CURRENT USE OF INSULIN: Primary | Chronic | ICD-10-CM

## 2019-02-26 DIAGNOSIS — I10 ESSENTIAL HYPERTENSION: Chronic | ICD-10-CM

## 2019-02-26 DIAGNOSIS — I25.10 CORONARY ARTERY DISEASE INVOLVING NATIVE CORONARY ARTERY OF NATIVE HEART WITHOUT ANGINA PECTORIS: Chronic | ICD-10-CM

## 2019-02-26 DIAGNOSIS — I15.2 HYPERTENSION COMPLICATING DIABETES: ICD-10-CM

## 2019-02-26 DIAGNOSIS — E11.59 HYPERTENSION COMPLICATING DIABETES: ICD-10-CM

## 2019-02-26 DIAGNOSIS — I21.4 NSTEMI (NON-ST ELEVATED MYOCARDIAL INFARCTION): ICD-10-CM

## 2019-02-26 DIAGNOSIS — Z91.89 AT RISK FOR CARDIOVASCULAR EVENT: Chronic | ICD-10-CM

## 2019-02-26 DIAGNOSIS — N18.30 STAGE 3 CHRONIC KIDNEY DISEASE: ICD-10-CM

## 2019-02-26 DIAGNOSIS — E11.22 TYPE 2 DIABETES MELLITUS WITH STAGE 3 CHRONIC KIDNEY DISEASE, WITHOUT LONG-TERM CURRENT USE OF INSULIN: Primary | Chronic | ICD-10-CM

## 2019-02-26 PROCEDURE — 3045F PR MOST RECENT HEMOGLOBIN A1C LEVEL 7.0-9.0%: ICD-10-PCS | Mod: CPTII,S$GLB,, | Performed by: FAMILY MEDICINE

## 2019-02-26 PROCEDURE — 3079F PR MOST RECENT DIASTOLIC BLOOD PRESSURE 80-89 MM HG: ICD-10-PCS | Mod: CPTII,S$GLB,, | Performed by: FAMILY MEDICINE

## 2019-02-26 PROCEDURE — 3008F BODY MASS INDEX DOCD: CPT | Mod: CPTII,S$GLB,, | Performed by: FAMILY MEDICINE

## 2019-02-26 PROCEDURE — 3074F PR MOST RECENT SYSTOLIC BLOOD PRESSURE < 130 MM HG: ICD-10-PCS | Mod: CPTII,S$GLB,, | Performed by: FAMILY MEDICINE

## 2019-02-26 PROCEDURE — 3074F SYST BP LT 130 MM HG: CPT | Mod: CPTII,S$GLB,, | Performed by: FAMILY MEDICINE

## 2019-02-26 PROCEDURE — 99214 PR OFFICE/OUTPT VISIT, EST, LEVL IV, 30-39 MIN: ICD-10-PCS | Mod: S$GLB,,, | Performed by: FAMILY MEDICINE

## 2019-02-26 PROCEDURE — 3079F DIAST BP 80-89 MM HG: CPT | Mod: CPTII,S$GLB,, | Performed by: FAMILY MEDICINE

## 2019-02-26 PROCEDURE — 99999 PR PBB SHADOW E&M-EST. PATIENT-LVL IV: ICD-10-PCS | Mod: PBBFAC,,, | Performed by: FAMILY MEDICINE

## 2019-02-26 PROCEDURE — 3045F PR MOST RECENT HEMOGLOBIN A1C LEVEL 7.0-9.0%: CPT | Mod: CPTII,S$GLB,, | Performed by: FAMILY MEDICINE

## 2019-02-26 PROCEDURE — 99214 OFFICE O/P EST MOD 30 MIN: CPT | Mod: S$GLB,,, | Performed by: FAMILY MEDICINE

## 2019-02-26 PROCEDURE — 3008F PR BODY MASS INDEX (BMI) DOCUMENTED: ICD-10-PCS | Mod: CPTII,S$GLB,, | Performed by: FAMILY MEDICINE

## 2019-02-26 PROCEDURE — 99999 PR PBB SHADOW E&M-EST. PATIENT-LVL IV: CPT | Mod: PBBFAC,,, | Performed by: FAMILY MEDICINE

## 2019-02-28 ENCOUNTER — TELEPHONE (OUTPATIENT)
Dept: PULMONOLOGY | Facility: CLINIC | Age: 39
End: 2019-02-28

## 2019-02-28 ENCOUNTER — PATIENT MESSAGE (OUTPATIENT)
Dept: PULMONOLOGY | Facility: CLINIC | Age: 39
End: 2019-02-28

## 2019-02-28 NOTE — TELEPHONE ENCOUNTER
Telephoned, no answer, left message.   Called to update with patient after review of CPAP compliance download. He was scheduled for follow up in clinic 2/29/2019. He was not compliant at yearly CPAP compliance visit in Jan 2019.   He remains not compliant.   Obstructive sleep apnea is optimally controlled on current Auto CPAP 6-9 cm with AHI 3.8.   Recommend continued efforts to improve adherence to CPAP use nightly >=4 hrs. He is presently 3.3% compliant.   Recommend follow up in 3 months to allow more time for adherence.     Compliance Summary  1/28/2019 - 2/26/2019 (30 days)  Days with Device Usage 26 days  Days without Device Usage 4 days  Percent Days with Device Usage 86.7%  Cumulative Usage 1 day 16 hrs. 3 mins. 26 secs.  Maximum Usage (1 Day) 4 hrs. 9 mins. 56 secs.  Average Usage (All Days) 1 hrs. 20 mins. 6 secs.  Average Usage (Days Used) 1 hrs. 32 mins. 26 secs.  Minimum Usage (1 Day) 22 mins. 39 secs.  Percent of Days with Usage >= 4 Hours 3.3%  Percent of Days with Usage < 4 Hours 96.7%  Date Range  Total Blower Time 3 days 11 hrs. 16 mins. 15 secs.  Average AHI 3.8  Auto-CPAP Summary  Auto-CPAP Mean Pressure 7.0 cmH2O  Auto-CPAP Peak Average Pressure 8.2 cmH2O  Average Device Pressure <= 90% of Time 7.8 cmH2O  Average Time in Large Leak Per Day 0 secs.  CPAP Summary  Average Time in Large Leak Per Day 23 secs.  Average AHI 6.0  CPAP 9.0 cmH2O

## 2019-03-06 ENCOUNTER — TELEPHONE (OUTPATIENT)
Dept: INTERNAL MEDICINE | Facility: CLINIC | Age: 39
End: 2019-03-06

## 2019-03-06 NOTE — TELEPHONE ENCOUNTER
SANTY NICHOLAS APPROVED  Approvedtoday   CaseId:[id number];Status:Approved;Review Type:Prior Auth;Coverage Start Date:02/04/2019;Coverage End Date:03/05/2022;    Called pharmacy to notify and they verbalized understanding.     Spoke with patient and notified of PA approval. Patient verbalized understanding.

## 2019-03-12 ENCOUNTER — PATIENT MESSAGE (OUTPATIENT)
Dept: INTERNAL MEDICINE | Facility: CLINIC | Age: 39
End: 2019-03-12

## 2019-03-12 ENCOUNTER — TELEPHONE (OUTPATIENT)
Dept: INTERNAL MEDICINE | Facility: CLINIC | Age: 39
End: 2019-03-12

## 2019-03-12 DIAGNOSIS — N18.30 TYPE 2 DIABETES MELLITUS WITH STAGE 3 CHRONIC KIDNEY DISEASE, WITHOUT LONG-TERM CURRENT USE OF INSULIN: Chronic | ICD-10-CM

## 2019-03-12 DIAGNOSIS — I25.10 CORONARY ARTERY DISEASE INVOLVING NATIVE CORONARY ARTERY OF NATIVE HEART WITHOUT ANGINA PECTORIS: Chronic | ICD-10-CM

## 2019-03-12 DIAGNOSIS — E11.22 TYPE 2 DIABETES MELLITUS WITH STAGE 3 CHRONIC KIDNEY DISEASE, WITHOUT LONG-TERM CURRENT USE OF INSULIN: Chronic | ICD-10-CM

## 2019-03-12 DIAGNOSIS — I21.4 NSTEMI (NON-ST ELEVATED MYOCARDIAL INFARCTION): ICD-10-CM

## 2019-03-12 DIAGNOSIS — Z91.89 AT RISK FOR CARDIOVASCULAR EVENT: Chronic | ICD-10-CM

## 2019-03-12 NOTE — TELEPHONE ENCOUNTER
Called [facility] and spoke with Sandy. She stated that they do not carry the Bydureon that Dr. Roberson prescribed and that they can order the non extended Bydureon if he wants to change the prescription.

## 2019-03-12 NOTE — TELEPHONE ENCOUNTER
----- Message from Anisa Elam sent at 3/12/2019  8:16 AM CDT -----  Contact: [facility] [place]  .Type:  Pharmacy Calling to Clarify an RX    Name of Caller: nubia  Pharmacy Name: [facility]  Prescription Name: bydureon   What do they need to clarify?:bydureon unavailable ...   Best Call Back Number:[phone]  Additional Information: change Rx

## 2019-03-14 ENCOUNTER — TELEPHONE (OUTPATIENT)
Dept: INTERNAL MEDICINE | Facility: CLINIC | Age: 39
End: 2019-03-14

## 2019-03-14 NOTE — TELEPHONE ENCOUNTER
----- Message from Sukhdeep West sent at 3/13/2019  8:25 AM CDT -----  Contact: pt pharmacy   Type:  Pharmacy Calling to Clarify an RX    Name of Caller: susie  Pharmacy Name:  [id number] - [place] - [address]  [address] [place] [address]  Phone: [phone] Fax: [phone]    Prescription Name:BYDUREON  What do they need to clarify?:  Best Call Back Number:  Additional Information: caller is requesting a call back from the nurse I regards to the pt med BYDUREON being unavailable and they want something else called in for the pt

## 2019-03-14 NOTE — TELEPHONE ENCOUNTER
Spoke with patient and advised that Bydureon has been sent to our pharmacy since his pharmacy does not have it. He verbalized understanding and advised he was just called to notify him that it is ready to be picked up.    Spoke with [facility] and canceled prescription that was sent to them. They verbalized understanding.

## 2019-03-14 NOTE — TELEPHONE ENCOUNTER
Robb Bender.    I sent your prescription to our pharmacy.  I have asked our pharmacist to verify that this is your insurance formulary preferred product.  I suggest you give them a call later today to check on the status of your prescription before you make the trip to come in to pick it up.    Thanks for letting me care for you.    Sincerely,    KEVIN Roberson MD     Medications Ordered This Encounter   Medications    exenatide microspheres (BYDUREON BCISE) 2 mg/0.85 mL AtIn     Sig: Inject 2 mg into the skin every 7 days.     Dispense:  4 Syringe     Refill:  5     The computer indicated that this is his formulary preferred product.  If not, please message me with recommendation for therapeutic substitution.  Thanks.

## 2019-03-16 PROBLEM — E11.69 DYSLIPIDEMIA ASSOCIATED WITH TYPE 2 DIABETES MELLITUS: Status: ACTIVE | Noted: 2017-07-31

## 2019-03-16 PROBLEM — I15.2 HYPERTENSION COMPLICATING DIABETES: Status: ACTIVE | Noted: 2019-03-16

## 2019-03-16 PROBLEM — I10 HYPERTENSION COMPLICATING DIABETES: Status: ACTIVE | Noted: 2019-03-16

## 2019-03-16 PROBLEM — E11.59 HYPERTENSION COMPLICATING DIABETES: Status: ACTIVE | Noted: 2019-03-16

## 2019-03-16 PROBLEM — E11.9 HYPERTENSION COMPLICATING DIABETES: Status: ACTIVE | Noted: 2019-03-16

## 2019-03-16 NOTE — PROGRESS NOTES
CHIEF COMPLAINT  Follow-up and Results      ENCOUNTER DIAGNOSES  1. Type 2 diabetes mellitus with stage 3 chronic kidney disease, without long-term current use of insulin    2. At risk for cardiovascular event    3. NSTEMI with CAD s/p PCI (FAMILIA) of LAD x 2 in 8/2017    4. Coronary artery disease involving native coronary artery of native heart without angina pectoris    5. Essential hypertension    6. Hypertension complicating diabetes    7. Stage 3 chronic kidney disease        HPI, ASSESSMENT, and ASSOCIATED ORDERS  Problem List Items Addressed This Visit        Cardiac/Vascular    Essential hypertension (Chronic)    Current Assessment & Plan     Well controlled.         NSTEMI with CAD s/p PCI (FAMILIA) of LAD x 2 in 8/2017    Current Assessment & Plan     Stable.  Asymptomatic.  No angina or angina equivalent symptoms.         Coronary artery disease involving native coronary artery of native heart without angina pectoris (Chronic)    Overview     Cath lab procedure 04/23/2018 (Naveen Rios MD)   A. Indication/Pre-Operative Diagnosis: The patient is a 37 year old male that was referred for catheterization by Aaareferral Self for ACS (NSTEMI). The BELLA risk score is 5.      B. Summary/Post-Operative Diagnosis   1. Single vessel coronary artery disease.   2. Normal LVEF.   3. Diastolic dysfunction.   4. Successful PCI for acute myocardial infarction.   5. The patient did not undergo primary PCI.         Current Assessment & Plan     Stable.  Asymptomatic.  No angina or angina equivalent symptoms.         At risk for cardiovascular event (Chronic)    Current Assessment & Plan     He appears to be tolerating statin for dyslipidemia without apparent symptoms of myositis or hepatic dysfunction.          Hypertension complicating diabetes    Current Assessment & Plan     Well controlled.            Renal/    Stage 3 chronic kidney disease    Current Assessment & Plan     Lab Results   Component Value Date     ESTGFRAFRICA 50.9 (A) 02/02/2019    ESTGFRAFRICA 51 (A) 12/19/2018    ESTGFRAFRICA >60.0 11/29/2018    EGFRNONAA 44.1 (A) 02/02/2019    EGFRNONAA 44 (A) 12/19/2018    EGFRNONAA 54.6 (A) 11/29/2018    CREATININE 1.9 (H) 02/02/2019    CREATININE 1.9 (H) 12/19/2018    BUN 26 (H) 02/02/2019    BUN 21 (H) 12/19/2018    ALBUMIN 3.8 12/19/2018    PROT 7.7 12/19/2018    CREATRANDUR 53.0 04/09/2018     02/02/2019    K 4.8 02/02/2019     02/02/2019    CO2 28 02/02/2019     (H) 02/02/2019    CALCIUM 10.0 02/02/2019    PHOS 3.2 04/09/2018    MG 2.1 04/22/2018    HGB 14.8 04/24/2018    HCT 43.8 04/24/2018    Stable            Endocrine    Type 2 diabetes mellitus with stage 3 chronic kidney disease, without long-term current use of insulin - Primary (Chronic)    Current Assessment & Plan     Diabetes Management Status    Statin: Taking  ACE/ARB: Taking    Screening or Prevention Patient's value Goal Complete/Controlled?   HgA1C Testing and Control   Lab Results   Component Value Date    HGBA1C 7.7 (H) 02/02/2019      Annually/Less than 8% Yes   Lipid profile : 11/29/2018 Annually Yes   LDL control Lab Results   Component Value Date    LDLCALC 87.8 11/29/2018    Annually/Less than 100 mg/dl  Yes   Nephropathy screening No results found for: LABMICR  Lab Results   Component Value Date    PROTEINUA 2+ (A) 04/09/2018    Annually Yes   Blood pressure BP Readings from Last 1 Encounters:   02/26/19 118/82    Less than 140/90 Yes   Dilated retinal exam : 08/02/2018 Annually Yes   Foot exam   : 02/26/2019 Annually Yes                  COMMENT: If no new orders identified, then plan is to continue current treatment, including any related prescription medications.    REVIEW OF SYSTEMS  CARDIOVASCULAR: No angina or orthopnea reported.   ENDOCRINE: No polyuria or polydipsia reported.     PHYSICAL EXAM  Vitals:    02/26/19 1628   BP: 118/82   BP Location: Right arm   Patient Position: Sitting   BP Method: Medium (Manual)  "  Pulse: 62   Temp: 97.9 °F (36.6 °C)   TempSrc: Tympanic   SpO2: 98%   Weight: 117.9 kg (259 lb 14.8 oz)   Height: 6' 3" (1.905 m)     CONSTITUTIONAL: Vital signs noted. No apparent distress. Does not appear acutely ill or septic. Appears adequately hydrated.  HEENT: External ENT grossly unremarkable. Hearing grossly intact. Oropharynx moist.  PULM: Lungs clear. Breathing unlabored.  HEART: Auscultation reveals regular rate and rhythm without murmur, gallop or rub.  DERM: Skin warm and moist with normal turgor.  NEURO: There are no gross focal motor deficits or gross deficits of cranial nerves III-XII.  PSYCHIATRIC: Alert and conversant. Mood grossly neutral. Judgment and insight not grossly compromised.   DIABETIC FOOT EXAM: Inspection of feet reveals no open wounds, ulcerations, significant calluses, or evidence of arterial insufficiency. 10g monofilament sensation is intact in all 5 locations tested.     Follow-up in about 3 months (around 5/26/2019).    Documentation entered by me for this encounter may have been done in part using speech-recognition technology. Although I have made an effort to ensure accuracy, "sound like" errors may exist and should be interpreted in context. -KEVIN Roberson MD      There are no Patient Instructions on file for this visit.   "

## 2019-03-17 NOTE — ASSESSMENT & PLAN NOTE
Lab Results   Component Value Date    ESTGFRAFRICA 50.9 (A) 02/02/2019    ESTGFRAFRICA 51 (A) 12/19/2018    ESTGFRAFRICA >60.0 11/29/2018    EGFRNONAA 44.1 (A) 02/02/2019    EGFRNONAA 44 (A) 12/19/2018    EGFRNONAA 54.6 (A) 11/29/2018    CREATININE 1.9 (H) 02/02/2019    CREATININE 1.9 (H) 12/19/2018    BUN 26 (H) 02/02/2019    BUN 21 (H) 12/19/2018    ALBUMIN 3.8 12/19/2018    PROT 7.7 12/19/2018    CREATRANDUR 53.0 04/09/2018     02/02/2019    K 4.8 02/02/2019     02/02/2019    CO2 28 02/02/2019     (H) 02/02/2019    CALCIUM 10.0 02/02/2019    PHOS 3.2 04/09/2018    MG 2.1 04/22/2018    HGB 14.8 04/24/2018    HCT 43.8 04/24/2018    Stable

## 2019-03-17 NOTE — ASSESSMENT & PLAN NOTE
Diabetes Management Status    Statin: Taking  ACE/ARB: Taking    Screening or Prevention Patient's value Goal Complete/Controlled?   HgA1C Testing and Control   Lab Results   Component Value Date    HGBA1C 7.7 (H) 02/02/2019      Annually/Less than 8% Yes   Lipid profile : 11/29/2018 Annually Yes   LDL control Lab Results   Component Value Date    LDLCALC 87.8 11/29/2018    Annually/Less than 100 mg/dl  Yes   Nephropathy screening No results found for: LABMICR  Lab Results   Component Value Date    PROTEINUA 2+ (A) 04/09/2018    Annually Yes   Blood pressure BP Readings from Last 1 Encounters:   02/26/19 118/82    Less than 140/90 Yes   Dilated retinal exam : 08/02/2018 Annually Yes   Foot exam   : 02/26/2019 Annually Yes

## 2019-03-17 NOTE — ASSESSMENT & PLAN NOTE
He appears to be tolerating statin for dyslipidemia without apparent symptoms of myositis or hepatic dysfunction.

## 2019-03-19 ENCOUNTER — LAB VISIT (OUTPATIENT)
Dept: LAB | Facility: HOSPITAL | Age: 39
End: 2019-03-19
Attending: INTERNAL MEDICINE
Payer: COMMERCIAL

## 2019-03-19 DIAGNOSIS — M1A.09X1 IDIOPATHIC CHRONIC GOUT OF MULTIPLE SITES WITH TOPHUS: ICD-10-CM

## 2019-03-19 LAB
ALBUMIN SERPL BCP-MCNC: 3.9 G/DL
ALP SERPL-CCNC: 76 U/L
ALT SERPL W/O P-5'-P-CCNC: 28 U/L
ANION GAP SERPL CALC-SCNC: 10 MMOL/L
AST SERPL-CCNC: 23 U/L
BILIRUB SERPL-MCNC: 1.2 MG/DL
BUN SERPL-MCNC: 27 MG/DL
CALCIUM SERPL-MCNC: 9.6 MG/DL
CHLORIDE SERPL-SCNC: 104 MMOL/L
CO2 SERPL-SCNC: 24 MMOL/L
CREAT SERPL-MCNC: 2 MG/DL
EST. GFR  (AFRICAN AMERICAN): 48 ML/MIN/1.73 M^2
EST. GFR  (NON AFRICAN AMERICAN): 41 ML/MIN/1.73 M^2
GLUCOSE SERPL-MCNC: 182 MG/DL
POTASSIUM SERPL-SCNC: 3.9 MMOL/L
PROT SERPL-MCNC: 7.3 G/DL
SODIUM SERPL-SCNC: 138 MMOL/L

## 2019-03-19 PROCEDURE — 36415 COLL VENOUS BLD VENIPUNCTURE: CPT

## 2019-03-19 PROCEDURE — 80053 COMPREHEN METABOLIC PANEL: CPT

## 2019-04-10 DIAGNOSIS — I21.4 NSTEMI (NON-ST ELEVATED MYOCARDIAL INFARCTION): ICD-10-CM

## 2019-04-10 DIAGNOSIS — E11.22 TYPE 2 DIABETES MELLITUS WITH STAGE 3 CHRONIC KIDNEY DISEASE, WITHOUT LONG-TERM CURRENT USE OF INSULIN: Chronic | ICD-10-CM

## 2019-04-10 DIAGNOSIS — Z91.89 AT RISK FOR CARDIOVASCULAR EVENT: Chronic | ICD-10-CM

## 2019-04-10 DIAGNOSIS — I25.10 CORONARY ARTERY DISEASE INVOLVING NATIVE CORONARY ARTERY OF NATIVE HEART WITHOUT ANGINA PECTORIS: Chronic | ICD-10-CM

## 2019-04-10 DIAGNOSIS — N18.30 TYPE 2 DIABETES MELLITUS WITH STAGE 3 CHRONIC KIDNEY DISEASE, WITHOUT LONG-TERM CURRENT USE OF INSULIN: Chronic | ICD-10-CM

## 2019-06-17 ENCOUNTER — LAB VISIT (OUTPATIENT)
Dept: LAB | Facility: HOSPITAL | Age: 39
End: 2019-06-17
Attending: INTERNAL MEDICINE
Payer: COMMERCIAL

## 2019-06-17 DIAGNOSIS — M1A.09X1 IDIOPATHIC CHRONIC GOUT OF MULTIPLE SITES WITH TOPHUS: ICD-10-CM

## 2019-06-17 LAB
ALBUMIN SERPL BCP-MCNC: 4 G/DL (ref 3.5–5.2)
ALP SERPL-CCNC: 84 U/L (ref 55–135)
ALT SERPL W/O P-5'-P-CCNC: 33 U/L (ref 10–44)
ANION GAP SERPL CALC-SCNC: 9 MMOL/L (ref 8–16)
AST SERPL-CCNC: 27 U/L (ref 10–40)
BILIRUB SERPL-MCNC: 1.5 MG/DL (ref 0.1–1)
BUN SERPL-MCNC: 33 MG/DL (ref 6–20)
CALCIUM SERPL-MCNC: 9.4 MG/DL (ref 8.7–10.5)
CHLORIDE SERPL-SCNC: 105 MMOL/L (ref 95–110)
CO2 SERPL-SCNC: 25 MMOL/L (ref 23–29)
CREAT SERPL-MCNC: 2.3 MG/DL (ref 0.5–1.4)
EST. GFR  (AFRICAN AMERICAN): 40 ML/MIN/1.73 M^2
EST. GFR  (NON AFRICAN AMERICAN): 35 ML/MIN/1.73 M^2
GLUCOSE SERPL-MCNC: 138 MG/DL (ref 70–110)
POTASSIUM SERPL-SCNC: 4.1 MMOL/L (ref 3.5–5.1)
PROT SERPL-MCNC: 7.5 G/DL (ref 6–8.4)
SODIUM SERPL-SCNC: 139 MMOL/L (ref 136–145)

## 2019-06-17 PROCEDURE — 80053 COMPREHEN METABOLIC PANEL: CPT

## 2019-06-17 PROCEDURE — 36415 COLL VENOUS BLD VENIPUNCTURE: CPT

## 2019-07-02 ENCOUNTER — PATIENT MESSAGE (OUTPATIENT)
Dept: INTERNAL MEDICINE | Facility: CLINIC | Age: 39
End: 2019-07-02

## 2019-07-02 DIAGNOSIS — R09.81 CHRONIC NASAL CONGESTION: Primary | ICD-10-CM

## 2019-07-03 NOTE — TELEPHONE ENCOUNTER
ACTION NEEDED:  Please read Patient Portal Message (below), and then contact him to verify his receipt and understanding and coordinate any orders. Thanks.    Orders Placed This Encounter    Ambulatory Referral to ENT          Robb Bender.    I am sorry to hear that your having such trouble with your CPAP.  Seeing an ENT for further evaluation is a good idea.  I have entered a referral order for you to see one of our excellent ENT specialists. Someone from [facility] will be contacting you soon to help you schedule this appointment.    Thanks for letting me care for you, thanks for trusting us with your healthcare needs, and thanks for using [facility].    Sincerely,    KEVIN Roberson MD

## 2019-07-04 NOTE — TELEPHONE ENCOUNTER
Called and left a voicemail for patient to check the message from Dr. Roberson on My Chart and call the appointment desk on Friday to schedule appointment.

## 2019-07-09 DIAGNOSIS — M1A.09X1 IDIOPATHIC CHRONIC GOUT, MULTIPLE SITES, WITH TOPHUS (TOPHI): ICD-10-CM

## 2019-07-09 RX ORDER — COLCHICINE 0.6 MG/1
0.6 TABLET ORAL EVERY OTHER DAY
Qty: 45 TABLET | Refills: 3 | Status: SHIPPED | OUTPATIENT
Start: 2019-07-09 | End: 2020-03-24

## 2019-07-11 ENCOUNTER — OFFICE VISIT (OUTPATIENT)
Dept: OTOLARYNGOLOGY | Facility: CLINIC | Age: 39
End: 2019-07-11
Payer: COMMERCIAL

## 2019-07-11 ENCOUNTER — TELEPHONE (OUTPATIENT)
Dept: INTERNAL MEDICINE | Facility: CLINIC | Age: 39
End: 2019-07-11

## 2019-07-11 VITALS
TEMPERATURE: 99 F | SYSTOLIC BLOOD PRESSURE: 138 MMHG | WEIGHT: 262.81 LBS | HEART RATE: 78 BPM | BODY MASS INDEX: 32.85 KG/M2 | DIASTOLIC BLOOD PRESSURE: 87 MMHG

## 2019-07-11 DIAGNOSIS — J34.3 HYPERTROPHY OF BOTH INFERIOR NASAL TURBINATES: ICD-10-CM

## 2019-07-11 DIAGNOSIS — J34.89 NASAL OBSTRUCTION: Primary | ICD-10-CM

## 2019-07-11 DIAGNOSIS — Z79.01 ANTICOAGULATED: ICD-10-CM

## 2019-07-11 DIAGNOSIS — N18.30 TYPE 2 DIABETES MELLITUS WITH STAGE 3 CHRONIC KIDNEY DISEASE, WITHOUT LONG-TERM CURRENT USE OF INSULIN: Primary | Chronic | ICD-10-CM

## 2019-07-11 DIAGNOSIS — E11.22 TYPE 2 DIABETES MELLITUS WITH STAGE 3 CHRONIC KIDNEY DISEASE, WITHOUT LONG-TERM CURRENT USE OF INSULIN: Primary | Chronic | ICD-10-CM

## 2019-07-11 DIAGNOSIS — G47.33 OSA ON CPAP: Chronic | ICD-10-CM

## 2019-07-11 PROCEDURE — 99999 PR PBB SHADOW E&M-EST. PATIENT-LVL III: CPT | Mod: PBBFAC,,, | Performed by: PHYSICIAN ASSISTANT

## 2019-07-11 PROCEDURE — 99243 OFF/OP CNSLTJ NEW/EST LOW 30: CPT | Mod: S$GLB,,, | Performed by: PHYSICIAN ASSISTANT

## 2019-07-11 PROCEDURE — 99999 PR PBB SHADOW E&M-EST. PATIENT-LVL III: ICD-10-PCS | Mod: PBBFAC,,, | Performed by: PHYSICIAN ASSISTANT

## 2019-07-11 PROCEDURE — 99243 PR OFFICE CONSULTATION,LEVEL III: ICD-10-PCS | Mod: S$GLB,,, | Performed by: PHYSICIAN ASSISTANT

## 2019-07-11 NOTE — PROGRESS NOTES
Medications Ordered This Encounter   Medications    semaglutide (OZEMPIC) 0.25 mg or 0.5 mg(2 mg/1.5 mL) PnIj     Sig: Inject 0.25 mg into the skin every 7 days.     Dispense:  6 mL     Refill:  3     IMPORTANT!  This REPLACES Bydureon. DISCONTINUE any existing prescription for Bydureon.      Medications Discontinued During This Encounter   Medication Reason    exenatide microspheres (BYDUREON BCISE) 2 mg/0.85 mL AtIn Alternate therapy

## 2019-07-11 NOTE — TELEPHONE ENCOUNTER
Spoke with patient and informed him per Dr. Roberson that, unfortunately, the class of medications that Bydureon is in are a very expensive class and that if he wants to talk about a different solution because of the price he can schedule an appointment either by virtual visit or in person. He was also informed to check out the web site [facility] [facility]. Patient verbalized understanding.

## 2019-07-11 NOTE — LETTER
July 12, 2019      KEVIN Roberson MD  [address]  [place] [address]           [facility]  [address] [place], [place] [address] [place] [address]  Phone: [phone]  Fax: [phone]          Patient: Robb Iglesias   MR Number: [id number]   YOB: 1980   Date of Visit: 7/11/2019       Dear Dr. KEVIN Roberson:    Thank you for referring Robb Iglesias to me for evaluation. Attached you will find relevant portions of my assessment and plan of care.    If you have questions, please do not hesitate to call me. I look forward to following Robb Iglesias along with you.    Sincerely,    Marilee Barney PA-C    Enclosure  CC:  No Recipients    If you would like to receive this communication electronically, please contact [email or web address] or [phone] to request more information on [employer] Link access.    For providers and/or their staff who would like to refer a patient to [facility], please contact us through our one-stop-shop provider referral line, [facility], at [phone].    If you feel you have received this communication in error or would no longer like to receive these types of communications, please e-mail [email or web address]

## 2019-07-12 NOTE — PROGRESS NOTES
Subjective:       Patient ID: Robb Iglesias is a 38 y.o. male.    Chief Complaint: Other (Can't breathe the nose, Sleep Apnea)    Patient is a very pleasant 38 year old gentleman here to see me today for the first time in consultation at the request of Dr. Roberson for evaluation of chronic nasal obstruction and inability to tolerate CPAP mask.  He reports having septoplasty (for septal deviation) by Dr. Anatoliy Verdin over 10 years ago for nasal obstruction but did not see improvement in his symptoms after surgery.  He says he CANNOT breathe thru his nose if his mouth is closed and he starts to panic within minutes.  He has had two heart attacks with stents placed.  He is on Brilinta.  He says his cardiologist really wants him to use his CPAP but he can't tolerate it most nights due to his nasal obstruction.     Sinonasal / Allergy: He has bilateral (right equals left) severe nasal obstruction. He reports the the following sinonasal symptoms: reduced sense of smell, nasal obstruction and significant history of prior nasal surgery, including septoplasty as above.  Denies nasal trauma.  He denies the the following sinonasal symptoms: significant allergies, facial pain, facial pressure, post-nasal drip, rhinorrhea (runny nose) and significant history of dental procedure just prior to the onset of sinus problems. He is not currently on medications for these symptoms but has tried NASAL SALINE, Flonase daily for > 3 months, and ORAL ANTIHISTAMINE(S) which has been ineffective.  He gets temporary relief with NASAL DECONGESTANT SPRAY(S) (ie. Afrin) but has not used more than 3 days due to warning.  He also says Breathe-Right nasal strips help as well but not after using for more than 4 days in a row.  He also had oral appliance made by dentist for snoring but says he could not tolerate it either.    He has had maybe 1 sinus infection in the past 12 months.  There have been no recent imaging study of the  sinuses.    He has mild allergic rhinitis with symptoms including occasional brief nasal congestion and sneezing.  Often has sneezing fits after eating.  He denies the following allergy symptoms: coughing, eye itchiness, eye watering, nasal rhinorrhea and wheezing.    Current smoker:  No        Review of Systems   Constitutional: Negative for activity change, appetite change and fever.   HENT: Positive for congestion, rhinorrhea (occasional) and sneezing. Negative for ear discharge, ear pain, hearing loss, nosebleeds, postnasal drip, sinus pressure, sinus pain, sore throat and trouble swallowing.    Eyes: Negative for discharge.   Respiratory: Negative for cough and shortness of breath.         HENRY on CPAP   Cardiovascular: Negative for chest pain and palpitations.   Gastrointestinal: Negative for diarrhea, nausea and vomiting.        Hx of gastric bypass; reflux   Musculoskeletal: Negative for gait problem.   Allergic/Immunologic: Negative for food allergies.   Neurological: Negative for dizziness, light-headedness and headaches.   Hematological: Negative for adenopathy.   Psychiatric/Behavioral: Negative for confusion.       Objective:      Physical Exam   Constitutional: He is oriented to person, place, and time. Vital signs are normal. He appears well-developed and well-nourished. He is cooperative. No distress.   HENT:   Head: Normocephalic and atraumatic.   Right Ear: Hearing, tympanic membrane, external ear and ear canal normal. No middle ear effusion.   Left Ear: Hearing, tympanic membrane, external ear and ear canal normal.  No middle ear effusion.   Nose: Mucosal edema present. No rhinorrhea, nasal deformity or septal deviation (bows right). Right sinus exhibits no maxillary sinus tenderness and no frontal sinus tenderness. Left sinus exhibits no maxillary sinus tenderness and no frontal sinus tenderness.   Mouth/Throat: Uvula is midline, oropharynx is clear and moist and mucous membranes are normal. No  trismus in the jaw. Normal dentition. No uvula swelling. No oropharyngeal exudate or posterior oropharyngeal edema.   Overall narrow nasal passages with hypertrophy of bilateral inferior turbinates with collapse of lateral wall bilaterally on inspiration with positive [place] maneuver; large base of tongue   Eyes: Pupils are equal, round, and reactive to light. Conjunctivae and EOM are normal. Right eye exhibits no chemosis. Left eye exhibits no chemosis. Right conjunctiva is not injected. Left conjunctiva is not injected. No scleral icterus.   Neck: Trachea normal and phonation normal. No tracheal tenderness present. No tracheal deviation present. No thyroid mass and no thyromegaly present.   Cardiovascular: Intact distal pulses.   Pulmonary/Chest: Effort normal. No accessory muscle usage or stridor. No respiratory distress.   Lymphadenopathy:        Head (right side): No submental, no submandibular, no preauricular and no posterior auricular adenopathy present.        Head (left side): No submental, no submandibular, no preauricular and no posterior auricular adenopathy present.     He has no cervical adenopathy.        Right cervical: No superficial cervical and no deep cervical adenopathy present.       Left cervical: No superficial cervical and no deep cervical adenopathy present.   Neurological: He is alert and oriented to person, place, and time. No cranial nerve deficit.   Skin: Skin is warm and dry. No rash noted. No erythema.   Psychiatric: He has a normal mood and affect. His behavior is normal. Thought content normal.       Assessment:       1. Nasal obstruction    2. Hypertrophy of both inferior nasal turbinates    3. HENRY on CPAP    4. Anticoagulated        Plan:         Patient is very frustrated with his chronic nasal obstruction and says he wants to be able to tolerate his CPAP mask.  He's tried consistent use of intranasal steroids without relief.  He does get relief with Afrin and Breathe Right  strips.  We discussed using anatomic models the different causes of nasal obstruction and he has more than one structural issue (septal bowing, turbinate hypertrophy and lateral wall collapse) which likely need surgery to improve as he's already failed conservative measures. We discussed that turbinate reduction can usually be done in office but septoplasty or lateral wall grafts likely in OR with anesthesia.  We discussed that he is on blood thinners which make surgery more risky.  He says April 2019 was the one year nae for his stents and he would need to check with cardiology prior to stopping Brilinta.  He wishes to consider this and I have provided him with Dr. Sanchez's contact information.  He will call when ready to discuss surgery further.     Thanks for the referral Dr. Roberson.  Report returned via Epic.

## 2019-07-22 ENCOUNTER — TELEPHONE (OUTPATIENT)
Dept: INTERNAL MEDICINE | Facility: CLINIC | Age: 39
End: 2019-07-22

## 2019-07-22 DIAGNOSIS — E11.22 TYPE 2 DIABETES MELLITUS WITH STAGE 3 CHRONIC KIDNEY DISEASE, WITHOUT LONG-TERM CURRENT USE OF INSULIN: Chronic | ICD-10-CM

## 2019-07-22 DIAGNOSIS — N18.30 TYPE 2 DIABETES MELLITUS WITH STAGE 3 CHRONIC KIDNEY DISEASE, WITHOUT LONG-TERM CURRENT USE OF INSULIN: Chronic | ICD-10-CM

## 2019-07-22 NOTE — TELEPHONE ENCOUNTER
After speaking with [employer] about the need for a prior authorization for Ozempic, I called patient and left a voicemail for him to see if he wants Dr. Roberson to send the medication through [facility] to start the process.

## 2019-07-22 NOTE — TELEPHONE ENCOUNTER
----- Message from Martha Cisneros sent at 7/19/2019 10:39 AM CDT -----  Contact: Camilo/[facility]  Type:  Pharmacy Calling to Clarify an RX    Name of Caller:Camilo  Pharmacy Name:[facility]  Prescription Name:ozempiz 0.25 or 0.5 (pen injectors)   What do they need to clarify?:authorization  Best Call Back Number:[phone]  Additional Information: ref# [id number]    Thank you

## 2019-07-23 NOTE — TELEPHONE ENCOUNTER
Medications Ordered This Encounter   Medications    semaglutide (OZEMPIC) 0.25 mg or 0.5 mg(2 mg/1.5 mL) PnIj     Sig: Inject 0.25 mg into the skin every 7 days.     Dispense:  6 mL     Refill:  3     IMPORTANT!  This REPLACES Bydureon. DISCONTINUE any existing prescription for Bydureon.

## 2019-08-02 ENCOUNTER — OFFICE VISIT (OUTPATIENT)
Dept: CARDIOLOGY | Facility: CLINIC | Age: 39
End: 2019-08-02
Payer: COMMERCIAL

## 2019-08-02 VITALS
WEIGHT: 260.56 LBS | HEART RATE: 76 BPM | SYSTOLIC BLOOD PRESSURE: 128 MMHG | BODY MASS INDEX: 32.4 KG/M2 | HEIGHT: 75 IN | DIASTOLIC BLOOD PRESSURE: 82 MMHG

## 2019-08-02 DIAGNOSIS — G47.33 OSA ON CPAP: Chronic | ICD-10-CM

## 2019-08-02 DIAGNOSIS — I25.10 CORONARY ARTERY DISEASE INVOLVING NATIVE CORONARY ARTERY OF NATIVE HEART WITHOUT ANGINA PECTORIS: Primary | Chronic | ICD-10-CM

## 2019-08-02 DIAGNOSIS — I10 ESSENTIAL HYPERTENSION: Chronic | ICD-10-CM

## 2019-08-02 PROCEDURE — 3074F SYST BP LT 130 MM HG: CPT | Mod: CPTII,S$GLB,, | Performed by: NURSE PRACTITIONER

## 2019-08-02 PROCEDURE — 3079F PR MOST RECENT DIASTOLIC BLOOD PRESSURE 80-89 MM HG: ICD-10-PCS | Mod: CPTII,S$GLB,, | Performed by: NURSE PRACTITIONER

## 2019-08-02 PROCEDURE — 3074F PR MOST RECENT SYSTOLIC BLOOD PRESSURE < 130 MM HG: ICD-10-PCS | Mod: CPTII,S$GLB,, | Performed by: NURSE PRACTITIONER

## 2019-08-02 PROCEDURE — 3079F DIAST BP 80-89 MM HG: CPT | Mod: CPTII,S$GLB,, | Performed by: NURSE PRACTITIONER

## 2019-08-02 PROCEDURE — 99214 PR OFFICE/OUTPT VISIT, EST, LEVL IV, 30-39 MIN: ICD-10-PCS | Mod: S$GLB,,, | Performed by: NURSE PRACTITIONER

## 2019-08-02 PROCEDURE — 3008F PR BODY MASS INDEX (BMI) DOCUMENTED: ICD-10-PCS | Mod: CPTII,S$GLB,, | Performed by: NURSE PRACTITIONER

## 2019-08-02 PROCEDURE — 99999 PR PBB SHADOW E&M-EST. PATIENT-LVL III: CPT | Mod: PBBFAC,,, | Performed by: NURSE PRACTITIONER

## 2019-08-02 PROCEDURE — 99999 PR PBB SHADOW E&M-EST. PATIENT-LVL III: ICD-10-PCS | Mod: PBBFAC,,, | Performed by: NURSE PRACTITIONER

## 2019-08-02 PROCEDURE — 99214 OFFICE O/P EST MOD 30 MIN: CPT | Mod: S$GLB,,, | Performed by: NURSE PRACTITIONER

## 2019-08-02 PROCEDURE — 3008F BODY MASS INDEX DOCD: CPT | Mod: CPTII,S$GLB,, | Performed by: NURSE PRACTITIONER

## 2019-08-02 RX ORDER — NITROGLYCERIN 0.4 MG/1
TABLET SUBLINGUAL
Qty: 30 TABLET | Refills: 5 | Status: SHIPPED | OUTPATIENT
Start: 2019-08-02 | End: 2020-06-26 | Stop reason: SDUPTHER

## 2019-08-02 NOTE — PROGRESS NOTES
Subjective:   Patient ID:  Robb Iglesias is a 38 y.o. male who presents for follow up of Hypertension and Coronary Artery Disease      HPI  Mr. Iglesias's current conditions include DM, HTN, HLP, HENRY,   History of NSTEMI in August 2017. Had PCI to proximal and mid LAD FAMILIA placement.  Had repeat NSTEMI in April 2018 and had successful PCI of mid LAD.   Gastric sleeve done in March 2017.  Negative treadmill stress test in June 2018.   Denies any chest pain, SOB, BORREGO,  orthopnea, PND, dizziness, palpitations,  near syncope, syncope or edema . Has no symptoms concerning for angina or equivalent. No CNS Complaints to suggest TIA or CVA. Does well with limiting sodium intake.  Creatine 2.3 on most recent labs. Baseline 1.6-2.0.   A1C in Feb 2019 was 7.7.   Goes to the gym a few times a week and has no angina symptoms.     Past Medical History:   Diagnosis Date    Allergic rhinitis     Class 1 obesity due to excess calories with serious comorbidity and body mass index (BMI) of 31.0 to 31.9 in adult 4/7/2017    Coronary artery disease     Direct hyperbilirubinemia 3/24/2018    DM (diabetes mellitus) 2008    BS doesn't check any more 08/02/2018    Elevated bilirubin 3/21/2018    GERD (gastroesophageal reflux disease)     Gout     Hyperlipidemia     Hypertension associated with chronic kidney disease due to type 2 diabetes mellitus     Long term (current) use of insulin     MI (myocardial infarction) 07/2017    Obesity     HENRY on CPAP     Proteinuria     Steatohepatitis     Fatty Liver    Type 2 diabetes mellitus with diabetic nephropathy     Type 2 diabetes mellitus with hyperglycemia     Type 2 diabetes mellitus with renal manifestations     Type 2 diabetes mellitus with stage 3 chronic kidney disease, without long-term current use of insulin 6/21/2017       Past Surgical History:   Procedure Laterality Date    CORONARY ANGIOPLASTY WITH STENT PLACEMENT      HEART CATH-LEFT Left 4/23/2018     [facility]    HEART CATH-LEFT Left 8/1/2017    Per[facility] at [facility] CATH LAB    LASIK Bilateral     LIVER BIOPSY      NASAL ENDOSCOPY      NASAL SEPTUM SURGERY      SLEEVE GASTROPLASTY  03/06/2017       Social History     Tobacco Use    Smoking status: Never Smoker    Smokeless tobacco: Never Used   Substance Use Topics    Alcohol use: No     Comment: 1-2 times per month    Drug use: No       Family History   Problem Relation Age of Onset    Diabetes type II Mother     Hypertension Mother     Hyperlipidemia Mother     Diabetes Mother     Diabetes type II Brother     Diabetes type II Brother     Diabetes Maternal Grandmother        Current Outpatient Medications   Medication Sig    allopurinol (ZYLOPRIM) 300 MG tablet Take 1 tablet (300 mg total) by mouth 2 (two) times daily.    amLODIPine (NORVASC) 10 MG tablet Take 1 tablet (10 mg total) by mouth once daily.    aspirin (ECOTRIN) 81 MG EC tablet Take 81 mg by mouth once daily.    atorvastatin (LIPITOR) 80 MG tablet TAKE 1 TABLET DAILY    BRILINTA 90 mg tablet TAKE 1 TABLET TWICE A DAY    cetirizine (ZYRTEC) 10 MG tablet Take 10 mg by mouth once daily.    colchicine (COLCRYS) 0.6 mg tablet Take 1 tablet (0.6 mg total) by mouth every other day.    losartan (COZAAR) 100 MG tablet Take 1 tablet (100 mg total) by mouth once daily.    metoprolol tartrate (LOPRESSOR) 25 MG tablet Take 1 tablet (25 mg total) by mouth 2 (two) times daily.    MULTIVITAMIN/IRON/FOLIC ACID (CENTRUM COMPLETE ORAL) Take by mouth once daily at 6am.    nitroGLYCERIN (NITROSTAT) 0.4 MG SL tablet Dissolve one tablet underneath tongue at onset of angina; may repeat every 5 minutes if needed. Call 911 if angina persists after 2 doses. (Patient taking differently: Dissolve one tablet underneath tongue at onset of angina; may repeat every 5 minutes if needed. Call 911 if angina persists after 2 doses.)    semaglutide  (OZEMPIC) 0.25 mg or 0.5 mg(2 mg/1.5 mL) PnIj Inject 0.25 mg into the skin every 7 days.     No current facility-administered medications for this visit.      Current Outpatient Medications on File Prior to Visit   Medication Sig    allopurinol (ZYLOPRIM) 300 MG tablet Take 1 tablet (300 mg total) by mouth 2 (two) times daily.    amLODIPine (NORVASC) 10 MG tablet Take 1 tablet (10 mg total) by mouth once daily.    aspirin (ECOTRIN) 81 MG EC tablet Take 81 mg by mouth once daily.    atorvastatin (LIPITOR) 80 MG tablet TAKE 1 TABLET DAILY    BRILINTA 90 mg tablet TAKE 1 TABLET TWICE A DAY    cetirizine (ZYRTEC) 10 MG tablet Take 10 mg by mouth once daily.    colchicine (COLCRYS) 0.6 mg tablet Take 1 tablet (0.6 mg total) by mouth every other day.    losartan (COZAAR) 100 MG tablet Take 1 tablet (100 mg total) by mouth once daily.    metoprolol tartrate (LOPRESSOR) 25 MG tablet Take 1 tablet (25 mg total) by mouth 2 (two) times daily.    MULTIVITAMIN/IRON/FOLIC ACID (CENTRUM COMPLETE ORAL) Take by mouth once daily at 6am.    nitroGLYCERIN (NITROSTAT) 0.4 MG SL tablet Dissolve one tablet underneath tongue at onset of angina; may repeat every 5 minutes if needed. Call 911 if angina persists after 2 doses. (Patient taking differently: Dissolve one tablet underneath tongue at onset of angina; may repeat every 5 minutes if needed. Call 911 if angina persists after 2 doses.)    semaglutide (OZEMPIC) 0.25 mg or 0.5 mg(2 mg/1.5 mL) PnIj Inject 0.25 mg into the skin every 7 days.     No current facility-administered medications on file prior to visit.        Review of Systems   Constitution: Negative for diaphoresis, malaise/fatigue, weight gain and weight loss.   HENT: Negative for congestion and nosebleeds.    Cardiovascular: Negative for chest pain, claudication, cyanosis, dyspnea on exertion, irregular heartbeat, leg swelling, near-syncope, orthopnea, palpitations, paroxysmal nocturnal dyspnea and syncope.  "  Respiratory: Negative for cough, hemoptysis, shortness of breath, sleep disturbances due to breathing, snoring, sputum production and wheezing.         Wears CPAP nightly    Hematologic/Lymphatic: Negative for bleeding problem. Does not bruise/bleed easily.   Skin: Negative for rash.   Musculoskeletal: Negative for arthritis, back pain, falls, joint pain, muscle cramps and muscle weakness.   Gastrointestinal: Negative for abdominal pain, constipation, diarrhea, heartburn, hematemesis, hematochezia, melena, nausea and vomiting.   Genitourinary: Negative for dysuria, hematuria and nocturia.   Neurological: Negative for excessive daytime sleepiness, dizziness, headaches, light-headedness, loss of balance, numbness, vertigo and weakness.       Objective:   Physical Exam   Constitutional: He is oriented to person, place, and time. He appears well-developed and well-nourished.   Neck: Neck supple. No JVD present.   Cardiovascular: Normal rate, regular rhythm, normal heart sounds and normal pulses. Exam reveals no friction rub.   No murmur heard.  Pulmonary/Chest: Effort normal and breath sounds normal. No respiratory distress. He has no wheezes. He has no rales.   Abdominal: Soft. Bowel sounds are normal. He exhibits no distension.   Musculoskeletal: He exhibits no edema or tenderness.   Neurological: He is alert and oriented to person, place, and time.   Skin: Skin is warm and dry. No rash noted.   Psychiatric: He has a normal mood and affect. His behavior is normal.   Nursing note and vitals reviewed.    Vitals:    08/02/19 1327 08/02/19 1330   BP: 132/84 128/82   BP Location: Left arm Right arm   Pulse: 76    Weight: 118.2 kg (260 lb 9.3 oz)    Height: 6' 3" (1.905 m)      Lab Results   Component Value Date    CHOL 167 11/29/2018    CHOL 139 04/23/2018    CHOL 119 (L) 11/15/2017     Lab Results   Component Value Date    HDL 32 (L) 11/29/2018    HDL 31 (L) 04/23/2018    HDL 24 (L) 11/15/2017     Lab Results "   Component Value Date    LDLCALC 87.8 11/29/2018    LDLCALC 73.0 04/23/2018    LDLCALC 54.2 (L) 11/15/2017     Lab Results   Component Value Date    TRIG 236 (H) 11/29/2018    TRIG 175 (H) 04/23/2018    TRIG 204 (H) 11/15/2017     Lab Results   Component Value Date    CHOLHDL 19.2 (L) 11/29/2018    CHOLHDL 22.3 04/23/2018    CHOLHDL 20.2 11/15/2017       Chemistry        Component Value Date/Time     06/17/2019 1744    K 4.1 06/17/2019 1744     06/17/2019 1744    CO2 25 06/17/2019 1744    BUN 33 (H) 06/17/2019 1744    CREATININE 2.3 (H) 06/17/2019 1744     (H) 06/17/2019 1744        Component Value Date/Time    CALCIUM 9.4 06/17/2019 1744    ALKPHOS 84 06/17/2019 1744    AST 27 06/17/2019 1744    ALT 33 06/17/2019 1744    BILITOT 1.5 (H) 06/17/2019 1744    ESTGFRAFRICA 40 (A) 06/17/2019 1744    EGFRNONAA 35 (A) 06/17/2019 1744          Lab Results   Component Value Date    TSH 0.727 07/31/2017     Lab Results   Component Value Date    INR 1.1 07/31/2017     Lab Results   Component Value Date    WBC 6.63 04/24/2018    HGB 14.8 04/24/2018    HCT 43.8 04/24/2018    MCV 83 04/24/2018     (L) 04/24/2018     BMP  Sodium   Date Value Ref Range Status   06/17/2019 139 136 - 145 mmol/L Final     Potassium   Date Value Ref Range Status   06/17/2019 4.1 3.5 - 5.1 mmol/L Final     Chloride   Date Value Ref Range Status   06/17/2019 105 95 - 110 mmol/L Final     CO2   Date Value Ref Range Status   06/17/2019 25 23 - 29 mmol/L Final     BUN, Bld   Date Value Ref Range Status   06/17/2019 33 (H) 6 - 20 mg/dL Final     Creatinine   Date Value Ref Range Status   06/17/2019 2.3 (H) 0.5 - 1.4 mg/dL Final     Calcium   Date Value Ref Range Status   06/17/2019 9.4 8.7 - 10.5 mg/dL Final     Anion Gap   Date Value Ref Range Status   06/17/2019 9 8 - 16 mmol/L Final     eGFR if    Date Value Ref Range Status   06/17/2019 40 (A) >60 mL/min/1.73 m^2 Final     eGFR if non    Date  Value Ref Range Status   06/17/2019 35 (A) >60 mL/min/1.73 m^2 Final     Comment:     Calculation used to obtain the estimated glomerular filtration  rate (eGFR) is the CKD-EPI equation.        CrCl cannot be calculated (Patient's most recent lab result is older than the maximum 7 days allowed.).    Assessment:     1. Coronary artery disease involving native coronary artery of native heart without angina pectoris    2. Essential hypertension    3. HENRY on CPAP        Plan:   Coronary artery disease involving native coronary artery of native heart without angina pectoris  Continue ASA, Brilinta, Statin, BB and amlodipine   Diabetes control  Heart healthy diet  Exercise routine     Essential hypertension  Continue current medical management    HENRY on CPAP  Continue nightly CPAP use     Follow up with Nephrology as scheduled   RTC in 6 mths with fasting labs as scheduled

## 2019-09-03 ENCOUNTER — TELEPHONE (OUTPATIENT)
Dept: PHARMACY | Facility: CLINIC | Age: 39
End: 2019-09-03

## 2019-09-03 NOTE — TELEPHONE ENCOUNTER
Spoke w/ PT notifying him appeal has been filed in attempt to get Ozempic approved.    Pt verbalized understanding and was thankful for the call.    Thank You!   Jeny Rain CPhT, B.A  Patient Care Advocate   [facility]  [email or web address]  Phone: [phone] Ext [phone]  Fax: [phone]

## 2019-09-10 ENCOUNTER — TELEPHONE (OUTPATIENT)
Dept: PHARMACY | Facility: CLINIC | Age: 39
End: 2019-09-10

## 2019-09-10 NOTE — TELEPHONE ENCOUNTER
Reason for call:  Notify patient Appeal for Ozempic approved resulting in copayment of $24.99.    Will continue to reach out to patient.    PA Information:  [facility]  [phone]  PA Case ID # [id number]  PA Approval Dates: 9/1/19-9/1/2020  PA Approved for Ozempic 2mg/1.5mL 1.5mL per 30 days.    Thank You!   Jeny Rain CPhT, B.A  Patient Care Advocate   [facility]  [email or web address]  Phone: [phone] Ext [phone]  Fax: [phone]

## 2019-10-13 DIAGNOSIS — I21.4 NSTEMI (NON-ST ELEVATED MYOCARDIAL INFARCTION): ICD-10-CM

## 2019-10-15 RX ORDER — ATORVASTATIN CALCIUM 80 MG/1
TABLET, FILM COATED ORAL
Qty: 90 TABLET | Refills: 3 | Status: SHIPPED | OUTPATIENT
Start: 2019-10-15 | End: 2020-06-26 | Stop reason: SDUPTHER

## 2019-10-15 RX ORDER — TICAGRELOR 90 MG/1
TABLET ORAL
Qty: 180 TABLET | Refills: 3 | Status: SHIPPED | OUTPATIENT
Start: 2019-10-15 | End: 2020-06-11 | Stop reason: ALTCHOICE

## 2019-12-20 ENCOUNTER — PATIENT OUTREACH (OUTPATIENT)
Dept: ADMINISTRATIVE | Facility: HOSPITAL | Age: 39
End: 2019-12-20

## 2019-12-20 DIAGNOSIS — E78.5 DYSLIPIDEMIA ASSOCIATED WITH TYPE 2 DIABETES MELLITUS: Primary | ICD-10-CM

## 2019-12-20 DIAGNOSIS — I10 ESSENTIAL HYPERTENSION: Chronic | ICD-10-CM

## 2019-12-20 DIAGNOSIS — E11.69 DYSLIPIDEMIA ASSOCIATED WITH TYPE 2 DIABETES MELLITUS: Primary | ICD-10-CM

## 2019-12-20 NOTE — PROGRESS NOTES
LIPID PANEL OUTREACH: I spoke to pt's wife that scheduled pt's labs for 12/21/19. Pt's wife verbalized understanding of needed labs. Pt's wife verbalized understanding of needed appt.

## 2019-12-21 ENCOUNTER — LAB VISIT (OUTPATIENT)
Dept: LAB | Facility: HOSPITAL | Age: 39
End: 2019-12-21
Attending: FAMILY MEDICINE
Payer: COMMERCIAL

## 2019-12-21 DIAGNOSIS — E78.5 DYSLIPIDEMIA ASSOCIATED WITH TYPE 2 DIABETES MELLITUS: ICD-10-CM

## 2019-12-21 DIAGNOSIS — N18.30 TYPE 2 DIABETES MELLITUS WITH STAGE 3 CHRONIC KIDNEY DISEASE, WITHOUT LONG-TERM CURRENT USE OF INSULIN: Chronic | ICD-10-CM

## 2019-12-21 DIAGNOSIS — I10 ESSENTIAL HYPERTENSION: Chronic | ICD-10-CM

## 2019-12-21 DIAGNOSIS — E11.69 DYSLIPIDEMIA ASSOCIATED WITH TYPE 2 DIABETES MELLITUS: ICD-10-CM

## 2019-12-21 DIAGNOSIS — E11.22 TYPE 2 DIABETES MELLITUS WITH STAGE 3 CHRONIC KIDNEY DISEASE, WITHOUT LONG-TERM CURRENT USE OF INSULIN: Chronic | ICD-10-CM

## 2019-12-21 LAB
CHOLEST SERPL-MCNC: 156 MG/DL (ref 120–199)
CHOLEST/HDLC SERPL: 5.6 {RATIO} (ref 2–5)
ESTIMATED AVG GLUCOSE: 263 MG/DL (ref 68–131)
HBA1C MFR BLD HPLC: 10.8 % (ref 4–5.6)
HDLC SERPL-MCNC: 28 MG/DL (ref 40–75)
HDLC SERPL: 17.9 % (ref 20–50)
LDLC SERPL CALC-MCNC: ABNORMAL MG/DL (ref 63–159)
NONHDLC SERPL-MCNC: 128 MG/DL
TRIGL SERPL-MCNC: 415 MG/DL (ref 30–150)

## 2019-12-21 PROCEDURE — 83036 HEMOGLOBIN GLYCOSYLATED A1C: CPT

## 2019-12-21 PROCEDURE — 80061 LIPID PANEL: CPT

## 2019-12-21 PROCEDURE — 36415 COLL VENOUS BLD VENIPUNCTURE: CPT

## 2019-12-26 ENCOUNTER — PATIENT MESSAGE (OUTPATIENT)
Dept: INTERNAL MEDICINE | Facility: CLINIC | Age: 39
End: 2019-12-26

## 2019-12-26 DIAGNOSIS — E11.65 TYPE 2 DIABETES MELLITUS WITH HYPERGLYCEMIA, WITHOUT LONG-TERM CURRENT USE OF INSULIN: Primary | ICD-10-CM

## 2019-12-26 RX ORDER — LANCETS
EACH MISCELLANEOUS
Qty: 200 EACH | Refills: 5 | Status: SHIPPED | OUTPATIENT
Start: 2019-12-26 | End: 2022-11-17 | Stop reason: SDUPTHER

## 2019-12-26 RX ORDER — DEXTROSE 4 G
TABLET,CHEWABLE ORAL
Qty: 1 EACH | Refills: 0 | Status: SHIPPED | OUTPATIENT
Start: 2019-12-26 | End: 2022-11-18 | Stop reason: SDUPTHER

## 2019-12-26 NOTE — TELEPHONE ENCOUNTER
Medications Ordered This Encounter   Medications    blood sugar diagnostic Strp     Sig: Check blood glucose 2 times daily as directed and as needed (dispense insurance preferred brand or patient choice)     Dispense:  200 each     Refill:  5    blood-glucose meter (ONETOUCH VERIO IQ METER) Misc     Sig: Use as directed     Dispense:  1 each     Refill:  0    lancets Misc     Sig: Check blood glucose 2 times daily as directed and as needed (dispense insurance preferred brand or patient choice)     Dispense:  200 each     Refill:  5

## 2020-01-01 ENCOUNTER — NURSE TRIAGE (OUTPATIENT)
Dept: ADMINISTRATIVE | Facility: CLINIC | Age: 40
End: 2020-01-01

## 2020-01-01 NOTE — TELEPHONE ENCOUNTER
Pt was moving refrigerator and hurt his arm and is on blood thinners and needs to know what he should do also has a big lump. Bruise is less than 2 inches and minimally raised. Occurred 2hrs ago, denies any other symptom at this time. Advised to continue ice packs, follow up with PCP on tmw. Call back if symptoms worsen.    Reason for Disposition   Minor injury or bruising from direct blow    Additional Information   Negative: Serious injury with multiple fractures   Negative: [1] Major bleeding (e.g., actively dripping or spurting) AND [2] can't be stopped   Negative: Sounds like a life-threatening emergency to the triager   Negative: Wound looks infected   Negative: Arm pain from overuse (e.g., sports, lifting, physical work)   Negative: Arm pain not from an injury   Negative: Shoulder injury is main concern   Negative: Elbow injury is main concern   Negative: Wrist or hand injury is main concern   Negative: Finger injury is main concern   Negative: Bullet wound, stabbed by knife, or other serious penetrating wound   Negative: Looks like a broken bone (crooked or deformed)   Negative: Looks like a dislocated joint   Negative: Can't move injured arm at all   Negative: [1] Bleeding AND [2] won't stop after 10 minutes of direct pressure (using correct technique)   Negative: Skin is split open or gaping  (or length > 1/2 inch or 12 mm)   Negative: [1] Dirt in the wound AND [2] not removed with 15 minutes of scrubbing   Negative: Sounds like a serious injury to the triager   Negative: [1] SEVERE pain AND [2] not improved 2 hours after pain medicine/ice packs   Negative: [1] Large swelling or bruise around joint (wrist, elbow, shoulder) AND [2] can't move injured arm normally (bend or straighten completely)   Negative: [1] After day 2 AND [2] pain at site of injury becomes worse AND [3] unexplained fever occurs   Negative: Suspicious history for the injury   Negative: Can't move injured arm  normally (bend or straighten completely)   Negative: Large swelling or bruise (> 2 inches or 5 cm)   Negative: [1] High-risk adult (e.g., age > 60, osteoporosis, chronic steroid use) AND [2] still hurts   Negative: Biceps muscle tear suspected (new bulge in upper arm area of biceps muscle, felt a pop)   Negative: [1] After 3 days AND [2] pain not improving   Negative: [1] After 2 weeks AND [2] still painful   Negative: [1] Last tetanus shot > 5 years ago AND [2] DIRTY cut or scrape   Negative: Shock suspected (e.g., cold/pale/clammy skin, too weak to stand, low BP, rapid pulse)   Negative: Sounds like a life-threatening emergency to the triager   Negative: Bruises with fever   Negative: Tiny bruises (spots or dots) of unknown cause   Negative: Bruise(s) of forehead or head   Negative: Bruise(s) of face or jaw   Negative: Followed an injury, and triager doesn't know which injury guideline to use first   Negative: Post-operative bruising   Negative: Dizziness or lightheadedness   Negative: [1] Bruise on head/face, chest, or abdomen AND [2]  taking Coumadin (warfarin) or other strong blood thinner, or known bleeding disorder (e.g., thrombocytopenia)   Negative: Unexplained bleeding from another site (e.g., gums, nose, urine) as well   Negative: Patient sounds very sick or weak to the triager   Negative: [1] Not caused by an injury AND [2] 5 or more bruises now   Negative: [1] Raised bruise AND [2] size > 2 inches (5 cm) AND [3] expanding   Negative: [1] SEVERE pain AND [2] not improved 2 hours after pain medicine/ice packs   Negative: Suspicious history for the injury    Protocols used: ARM INJURY-A-, ST BRUISES-A-

## 2020-01-02 NOTE — PROGRESS NOTES
TO MY STAFF: Please contact him and let him know that his tests have some significant abnormal results. The test results do NOT indicate an emergency. Reassure him that we will discuss his test results, what they mean, and what needs to be done at his upcoming appointment with me (listed below).    If he asks for details or an interpretation of his test results, don't speculate about his results, but reassure him that his test results do NOT indicate an emergency, and I do not anticipate any problems so long as he has returns for his next currently scheduled appointment with me (listed below).    If he has a computer at home, remind him that he can learn more about his test results and what they mean by logging in to his [facility] account.    Thanks!  --------------------------------------------------------------------------------    Future Appointments  1/6/2020   11:40 AM   KEVIN Roberson MD      [facility]

## 2020-01-06 ENCOUNTER — OFFICE VISIT (OUTPATIENT)
Dept: INTERNAL MEDICINE | Facility: CLINIC | Age: 40
End: 2020-01-06
Payer: COMMERCIAL

## 2020-01-06 VITALS
DIASTOLIC BLOOD PRESSURE: 92 MMHG | BODY MASS INDEX: 32.57 KG/M2 | OXYGEN SATURATION: 98 % | TEMPERATURE: 98 F | WEIGHT: 261.94 LBS | HEIGHT: 75 IN | HEART RATE: 68 BPM | SYSTOLIC BLOOD PRESSURE: 138 MMHG

## 2020-01-06 DIAGNOSIS — I10 ESSENTIAL HYPERTENSION: Chronic | ICD-10-CM

## 2020-01-06 DIAGNOSIS — N18.30 TYPE 2 DIABETES MELLITUS WITH STAGE 3 CHRONIC KIDNEY DISEASE, WITHOUT LONG-TERM CURRENT USE OF INSULIN: Chronic | ICD-10-CM

## 2020-01-06 DIAGNOSIS — I15.2 HYPERTENSION COMPLICATING DIABETES: ICD-10-CM

## 2020-01-06 DIAGNOSIS — E11.59 HYPERTENSION COMPLICATING DIABETES: ICD-10-CM

## 2020-01-06 DIAGNOSIS — I25.10 CORONARY ARTERY DISEASE INVOLVING NATIVE CORONARY ARTERY OF NATIVE HEART WITHOUT ANGINA PECTORIS: Chronic | ICD-10-CM

## 2020-01-06 DIAGNOSIS — M1A.09X0 IDIOPATHIC CHRONIC GOUT, MULTIPLE SITES, WITHOUT TOPHUS (TOPHI): Chronic | ICD-10-CM

## 2020-01-06 DIAGNOSIS — G47.33 OBSTRUCTIVE SLEEP APNEA: Chronic | ICD-10-CM

## 2020-01-06 DIAGNOSIS — E11.69 DYSLIPIDEMIA ASSOCIATED WITH TYPE 2 DIABETES MELLITUS: ICD-10-CM

## 2020-01-06 DIAGNOSIS — E11.65 TYPE 2 DIABETES MELLITUS WITH HYPERGLYCEMIA, WITHOUT LONG-TERM CURRENT USE OF INSULIN: Primary | Chronic | ICD-10-CM

## 2020-01-06 DIAGNOSIS — E78.5 DYSLIPIDEMIA ASSOCIATED WITH TYPE 2 DIABETES MELLITUS: ICD-10-CM

## 2020-01-06 DIAGNOSIS — E11.22 TYPE 2 DIABETES MELLITUS WITH STAGE 3 CHRONIC KIDNEY DISEASE, WITHOUT LONG-TERM CURRENT USE OF INSULIN: Chronic | ICD-10-CM

## 2020-01-06 DIAGNOSIS — N18.30 STAGE 3 CHRONIC KIDNEY DISEASE: ICD-10-CM

## 2020-01-06 DIAGNOSIS — R17 ELEVATED BILIRUBIN: ICD-10-CM

## 2020-01-06 PROCEDURE — 99999 PR PBB SHADOW E&M-EST. PATIENT-LVL III: ICD-10-PCS | Mod: PBBFAC,,, | Performed by: FAMILY MEDICINE

## 2020-01-06 PROCEDURE — 3075F SYST BP GE 130 - 139MM HG: CPT | Mod: CPTII,S$GLB,, | Performed by: FAMILY MEDICINE

## 2020-01-06 PROCEDURE — 99214 OFFICE O/P EST MOD 30 MIN: CPT | Mod: S$GLB,,, | Performed by: FAMILY MEDICINE

## 2020-01-06 PROCEDURE — 3080F PR MOST RECENT DIASTOLIC BLOOD PRESSURE >= 90 MM HG: ICD-10-PCS | Mod: CPTII,S$GLB,, | Performed by: FAMILY MEDICINE

## 2020-01-06 PROCEDURE — 3046F HEMOGLOBIN A1C LEVEL >9.0%: CPT | Mod: CPTII,S$GLB,, | Performed by: FAMILY MEDICINE

## 2020-01-06 PROCEDURE — 3075F PR MOST RECENT SYSTOLIC BLOOD PRESS GE 130-139MM HG: ICD-10-PCS | Mod: CPTII,S$GLB,, | Performed by: FAMILY MEDICINE

## 2020-01-06 PROCEDURE — 3008F BODY MASS INDEX DOCD: CPT | Mod: CPTII,S$GLB,, | Performed by: FAMILY MEDICINE

## 2020-01-06 PROCEDURE — 99999 PR PBB SHADOW E&M-EST. PATIENT-LVL III: CPT | Mod: PBBFAC,,, | Performed by: FAMILY MEDICINE

## 2020-01-06 PROCEDURE — 3080F DIAST BP >= 90 MM HG: CPT | Mod: CPTII,S$GLB,, | Performed by: FAMILY MEDICINE

## 2020-01-06 PROCEDURE — 3046F PR MOST RECENT HEMOGLOBIN A1C LEVEL > 9.0%: ICD-10-PCS | Mod: CPTII,S$GLB,, | Performed by: FAMILY MEDICINE

## 2020-01-06 PROCEDURE — 3008F PR BODY MASS INDEX (BMI) DOCUMENTED: ICD-10-PCS | Mod: CPTII,S$GLB,, | Performed by: FAMILY MEDICINE

## 2020-01-06 PROCEDURE — 99214 PR OFFICE/OUTPT VISIT, EST, LEVL IV, 30-39 MIN: ICD-10-PCS | Mod: S$GLB,,, | Performed by: FAMILY MEDICINE

## 2020-01-06 RX ORDER — METFORMIN HYDROCHLORIDE 500 MG/1
500 TABLET, EXTENDED RELEASE ORAL 2 TIMES DAILY WITH MEALS
Qty: 180 TABLET | Refills: 3 | Status: SHIPPED | OUTPATIENT
Start: 2020-01-06 | End: 2020-02-25 | Stop reason: SDUPTHER

## 2020-01-06 NOTE — ASSESSMENT & PLAN NOTE
Lab Results   Component Value Date    ESTGFRAFRICA 40 (A) 06/17/2019    ESTGFRAFRICA 48 (A) 03/19/2019    ESTGFRAFRICA 50.9 (A) 02/02/2019    EGFRNONAA 35 (A) 06/17/2019    EGFRNONAA 41 (A) 03/19/2019    EGFRNONAA 44.1 (A) 02/02/2019    CREATININE 2.3 (H) 06/17/2019    CREATININE 2.0 (H) 03/19/2019    BUN 33 (H) 06/17/2019    BUN 27 (H) 03/19/2019    ALBUMIN 4.0 06/17/2019    PROT 7.5 06/17/2019    CREATRANDUR 53.0 04/09/2018     06/17/2019    K 4.1 06/17/2019     06/17/2019    CO2 25 06/17/2019     (H) 06/17/2019    CALCIUM 9.4 06/17/2019    PHOS 3.2 04/09/2018    MG 2.1 04/22/2018    HGB 14.8 04/24/2018    HCT 43.8 04/24/2018

## 2020-01-06 NOTE — ASSESSMENT & PLAN NOTE
Diabetes Management Status    Statin: Taking  ACE/ARB: Taking    Screening or Prevention Patient's value Goal Complete/Controlled?   HgA1C Testing and Control   Lab Results   Component Value Date    HGBA1C 10.8 (H) 12/21/2019      Annually/Less than 8% No   Lipid profile : 12/21/2019 Annually Yes   LDL control Lab Results   Component Value Date    LDLCALC Invalid, Trig>400.0 12/21/2019    Annually/Less than 100 mg/dl  Yes   Nephropathy screening No results found for: LABMICR  Lab Results   Component Value Date    PROTEINUA 2+ (A) 04/09/2018    Annually No   Blood pressure BP Readings from Last 1 Encounters:   01/06/20 (!) 124/92    Less than 140/90 No   Dilated retinal exam : 08/02/2018 Annually No   Foot exam   : 02/26/2019 Annually Yes     Lab Results   Component Value Date    HGBA1C 10.8 (H) 12/21/2019    HGBA1C 7.7 (H) 02/02/2019    HGBA1C 6.7 (H) 11/29/2018    ESTGFRAFRICA 40 (A) 06/17/2019    EGFRNONAA 35 (A) 06/17/2019    LDLCALC Invalid, Trig>400.0 12/21/2019       HEALTH MAINTENANCE: Diabetic health maintenance interventions reviewed and are up to date except for:      Topic    Eye Exam

## 2020-01-06 NOTE — ASSESSMENT & PLAN NOTE
Lab Results   Component Value Date    CHOL 156 12/21/2019    CHOL 167 11/29/2018    TRIG 415 (H) 12/21/2019    TRIG 236 (H) 11/29/2018    HDL 28 (L) 12/21/2019    HDL 32 (L) 11/29/2018    LDLCALC Invalid, Trig>400.0 12/21/2019    LDLCALC 87.8 11/29/2018    NONHDLCHOL 128 12/21/2019    NONHDLCHOL 135 11/29/2018    AST 27 06/17/2019    ALT 33 06/17/2019

## 2020-01-06 NOTE — ASSESSMENT & PLAN NOTE
Lab Results   Component Value Date    URICACID 7.3 (H) 02/02/2019    URICACID 5.1 12/19/2018    URICACID 5.6 09/20/2018    ESTGFRAFRICA 40 (A) 06/17/2019    EGFRNONAA 35 (A) 06/17/2019    CREATININE 2.3 (H) 06/17/2019    BUN 33 (H) 06/17/2019

## 2020-01-06 NOTE — PATIENT INSTRUCTIONS
Stop by pharmacy to  Ozempic and take as directed.  PA Information:  · [employer]  · [phone]  · PA Case ID # [id number]  · [place] Approval Dates: 9/1/19-9/1/2020  · [place] Approved for Ozempic 2mg/1.5mL 1.5mL per 30 days.      You can call the [facility] Help Desk at [phone] if you need technical support for your digital medicine device. You can also get help at the [facility] in the 1st floor lobby of [facility].

## 2020-01-09 NOTE — PROGRESS NOTES
CHIEF COMPLAINT  Follow-up      HISTORY, ASSESSMENT, and PLAN    1. Type 2 diabetes mellitus with hyperglycemia, without long-term current use of insulin        ASSESSMENT: Uncontrolled. Much improved.     2. Type 2 diabetes mellitus with stage 3 chronic kidney disease, without long-term current use of insulin   3. Stage 3 chronic kidney disease       ASSESSMENT:  Renal function is worse.    4. Essential hypertension   5. Hypertension complicating diabetes       ASSESSMENT:  Asymptomatic.  Worse.  Mildly uncontrolled.    6. Dyslipidemia associated with type 2 diabetes mellitus        ASSESSMENT: He appears to be tolerating statin for dyslipidemia without apparent symptoms of myositis or hepatic dysfunction.     7. Coronary artery disease involving native coronary artery of native heart without angina pectoris        ASSESSMENT: Compensated/controlled and stable.     8. Idiopathic chronic gout, multiple sites, without tophus (tophi)        ASSESSMENT: Compensated/controlled and stable.     9. Obstructive sleep apnea        ASSESSMENT:  He is intolerant of CPAP after having apparently given it a sincere good subha effort.    10. Elevated bilirubin        ASSESSMENT:  Asymptomatic.         Orders Placed This Encounter    Renal function panel    Hepatic function panel    Uric acid    Hemoglobin A1c    Diabetes Digital Medicine (DDMP) Enrollment Order    Hypertension Digital Medicine (HDMP) Enrollment Order    metFORMIN (GLUCOPHAGE-XR) 500 MG 24 hr tablet    Diabetes Digital Medicine (DDMP): Assign Onboarding Questionnaires    Hypertension Digital Medicine (HDMP): Assign Onboarding Questionnaires        Medication List with Changes/Refills   New Medications    METFORMIN (GLUCOPHAGE-XR) 500 MG 24 HR TABLET    Take 1 tablet (500 mg total) by mouth 2 (two) times daily with meals.   Current Medications    ALLOPURINOL (ZYLOPRIM) 300 MG TABLET    Take 1 tablet (300 mg total) by mouth 2 (two) times daily.    AMLODIPINE  (NORVASC) 10 MG TABLET    Take 1 tablet (10 mg total) by mouth once daily.    ASPIRIN (ECOTRIN) 81 MG EC TABLET    Take 81 mg by mouth once daily.    ATORVASTATIN (LIPITOR) 80 MG TABLET    TAKE 1 TABLET DAILY    BLOOD SUGAR DIAGNOSTIC STRP    Check blood glucose 2 times daily as directed and as needed (dispense insurance preferred brand or patient choice)    BLOOD-GLUCOSE METER (ONETOUCH VERIO IQ METER) MISC    Use as directed    BRILINTA 90 MG TABLET    TAKE 1 TABLET TWICE A DAY    CETIRIZINE (ZYRTEC) 10 MG TABLET    Take 10 mg by mouth once daily.    COLCHICINE (COLCRYS) 0.6 MG TABLET    Take 1 tablet (0.6 mg total) by mouth every other day.    LANCETS MISC    Check blood glucose 2 times daily as directed and as needed (dispense insurance preferred brand or patient choice)    LOSARTAN (COZAAR) 100 MG TABLET    Take 1 tablet (100 mg total) by mouth once daily.    METOPROLOL TARTRATE (LOPRESSOR) 25 MG TABLET    Take 1 tablet (25 mg total) by mouth 2 (two) times daily.    MULTIVITAMIN/IRON/FOLIC ACID (CENTRUM COMPLETE ORAL)    Take by mouth once daily at 6am.    NITROGLYCERIN (NITROSTAT) 0.4 MG SL TABLET    Dissolve one tablet underneath tongue at onset of angina; may repeat every 5 minutes if needed. Call 911 if angina persists after 2 doses.    SEMAGLUTIDE (OZEMPIC) 0.25 MG OR 0.5 MG(2 MG/1.5 ML) PNIJ    Inject 0.25 mg into the skin every 7 days.       Follow up in about 9 weeks (around 3/9/2020) for review test results and discuss treatment plan, re-evaluate problem(s) discussed today.    Problem List Items Addressed This Visit        Cardiac/Vascular    Essential hypertension (Chronic)    Relevant Orders    [facility] Enroll[facility] Order (Completed)    Hypertension Digital Medicine ([facility]): Assign Onboarding Questionnaires (Completed)    Coronary artery disease involving native coronary artery of native heart without angina pectoris (Chronic)    Overview     Cath lab procedure 04/23/2018  (Naveen Rios MD)   A. Indication/Pre-Operative Diagnosis: The patient is a 37 year old male that was referred for catheterization by Aaareferral Self for ACS (NSTEMI). The BELLA risk score is 5.      B. Summary/Post-Operative Diagnosis   1. Single vessel coronary artery disease.   2. Normal LVEF.   3. Diastolic dysfunction.   4. Successful PCI for acute myocardial infarction.   5. The patient did not undergo primary PCI.         Dyslipidemia associated with type 2 diabetes mellitus    Current Assessment & Plan     Lab Results   Component Value Date    CHOL 156 12/21/2019    CHOL 167 11/29/2018    TRIG 415 (H) 12/21/2019    TRIG 236 (H) 11/29/2018    HDL 28 (L) 12/21/2019    HDL 32 (L) 11/29/2018    LDLCALC Invalid, Trig>400.0 12/21/2019    LDLCALC 87.8 11/29/2018    NONHDLCHOL 128 12/21/2019    NONHDLCHOL 135 11/29/2018    AST 27 06/17/2019    ALT 33 06/17/2019            Relevant Medications    metFORMIN (GLUCOPHAGE-XR) 500 MG 24 hr tablet    Hypertension complicating diabetes    Relevant Medications    metFORMIN (GLUCOPHAGE-XR) 500 MG 24 hr tablet       Renal/    Stage 3 chronic kidney disease    Current Assessment & Plan     Lab Results   Component Value Date    ESTGFRAFRICA 40 (A) 06/17/2019    ESTGFRAFRICA 48 (A) 03/19/2019    ESTGFRAFRICA 50.9 (A) 02/02/2019    EGFRNONAA 35 (A) 06/17/2019    EGFRNONAA 41 (A) 03/19/2019    EGFRNONAA 44.1 (A) 02/02/2019    CREATININE 2.3 (H) 06/17/2019    CREATININE 2.0 (H) 03/19/2019    BUN 33 (H) 06/17/2019    BUN 27 (H) 03/19/2019    ALBUMIN 4.0 06/17/2019    PROT 7.5 06/17/2019    CREATRANDUR 53.0 04/09/2018     06/17/2019    K 4.1 06/17/2019     06/17/2019    CO2 25 06/17/2019     (H) 06/17/2019    CALCIUM 9.4 06/17/2019    PHOS 3.2 04/09/2018    MG 2.1 04/22/2018    HGB 14.8 04/24/2018    HCT 43.8 04/24/2018                Endocrine    Type 2 diabetes mellitus with stage 3 chronic kidney disease, without long-term current use of insulin (Chronic)     Current Assessment & Plan     Diabetes Management Status    Statin: Taking  ACE/ARB: Taking    Screening or Prevention Patient's value Goal Complete/Controlled?   HgA1C Testing and Control   Lab Results   Component Value Date    HGBA1C 10.8 (H) 12/21/2019      Annually/Less than 8% No   Lipid profile : 12/21/2019 Annually Yes   LDL control Lab Results   Component Value Date    LDLCALC Invalid, Trig>400.0 12/21/2019    Annually/Less than 100 mg/dl  Yes   Nephropathy screening No results found for: LABMICR  Lab Results   Component Value Date    PROTEINUA 2+ (A) 04/09/2018    Annually No   Blood pressure BP Readings from Last 1 Encounters:   01/06/20 (!) 124/92    Less than 140/90 No   Dilated retinal exam : 08/02/2018 Annually No   Foot exam   : 02/26/2019 Annually Yes     Lab Results   Component Value Date    HGBA1C 10.8 (H) 12/21/2019    HGBA1C 7.7 (H) 02/02/2019    HGBA1C 6.7 (H) 11/29/2018    ESTGFRAFRICA 40 (A) 06/17/2019    EGFRNONAA 35 (A) 06/17/2019    LDLCALC Invalid, Trig>400.0 12/21/2019       HEALTH MAINTENANCE: Diabetic health maintenance interventions reviewed and are up to date except for:      Topic    Eye Exam              Relevant Medications    metFORMIN (GLUCOPHAGE-XR) 500 MG 24 hr tablet    Other Relevant Orders    Renal function panel    Diabetes Digital Medicine (DDMP) Enrollment Order (Completed)    Diabetes Digital Medicine (DDMP): Assign Onboarding Questionnaires (Completed)    Type 2 diabetes mellitus with hyperglycemia, without long-term current use of insulin - Primary (Chronic)    Relevant Medications    metFORMIN (GLUCOPHAGE-XR) 500 MG 24 hr tablet    Other Relevant Orders    Hemoglobin A1c       GI    Elevated bilirubin    Relevant Orders    Hepatic function panel       Orthopedic    Idiopathic chronic gout, multiple sites, without tophus (tophi) (Chronic)    Current Assessment & Plan     Lab Results   Component Value Date    URICACID 7.3 (H) 02/02/2019    URICACID 5.1 12/19/2018     "URICACID 5.6 09/20/2018    ESTGFRAFRICA 40 (A) 06/17/2019    EGFRNONAA 35 (A) 06/17/2019    CREATININE 2.3 (H) 06/17/2019    BUN 33 (H) 06/17/2019             Relevant Orders    Uric acid       Other    Severe obstructive sleep apnea - Intolerant of CPAP (Chronic)    Overview     Intolerant of CPAP after weeks of trying multiple interfaces.  SPLIT-NIGHT SLEEP STUDY 7/28/2015  · SLEEP ARCHITECTURE: Sleep onset was 2.9 minutes and sleep efficiency was 94.4%. Sleep Stage distribution showed 145 sleep stage changes, 6 awakenings and 119 arousals. Sleep distribution showed 53.2% stage NI, 41.8% stage N 11, 0.0% stage N Ill and REM sleep was at 5.0%. There were 2 REM periods.  · RESPIRATORY SUMMARY: 257 respiratory events were present including 201 obstructive apneas and  central apneas, 0 mixed apneas and 56 hypopneas for a total AHI of 109.4. The non-rem index was 108.8 /hr while the AHI during stage R was 120.0 /hr. Oxygen desaturations were associated with the respiratory events. There were 226 desaturations. The lowest oxygen saturation was 78.0% and the mean oxygen saturation was 92.4%. Oxygen saturations were below: 88% for 24.3 minutes of the time spent asleep.  · CARDIAC SUMMARY: Normal sinus rhythm with heart rate variation between 60.0 and 94.0 was noted.                REVIEW OF SYSTEMS  CARDIOVASCULAR: No angina or orthopnea reported.   ENDOCRINE: No polyuria or polydipsia reported.     PHYSICAL EXAM  Vitals:    01/06/20 1153 01/06/20 1233   BP: (!) 124/92 (!) 138/92   BP Location: Left arm Left arm   Patient Position: Sitting Sitting   BP Method: Large (Manual) Large (Manual)   Pulse: 68    Temp: 97.5 °F (36.4 °C)    TempSrc: Tympanic    SpO2: 98%    Weight: 118.8 kg (261 lb 14.5 oz)    Height: 6' 3" (1.905 m)      CONSTITUTIONAL: Vital signs noted. No apparent distress. Does not appear acutely ill or septic. Appears adequately hydrated.  HEENT: External ENT grossly unremarkable. Hearing grossly intact. " "Oropharynx moist.  PULM: Lungs clear. Breathing unlabored.  HEART: Auscultation reveals regular rate and rhythm without murmur, gallop or rub.  DERM: Skin warm and moist with normal turgor.  NEURO: There are no gross focal motor deficits or gross deficits of cranial nerves III-XII.  PSYCHIATRIC: Alert and conversant. Mood grossly neutral. Judgment and insight not grossly compromised.     Documentation entered by me for this encounter may have been done in part using speech-recognition technology. Although I have made an effort to ensure accuracy, "sound like" errors may exist and should be interpreted in context. -KEVIN Roberson MD.   "

## 2020-01-15 ENCOUNTER — PATIENT MESSAGE (OUTPATIENT)
Dept: ADMINISTRATIVE | Facility: OTHER | Age: 40
End: 2020-01-15

## 2020-01-15 DIAGNOSIS — E11.9 TYPE 2 DIABETES MELLITUS: ICD-10-CM

## 2020-01-20 ENCOUNTER — TELEPHONE (OUTPATIENT)
Dept: INTERNAL MEDICINE | Facility: CLINIC | Age: 40
End: 2020-01-20

## 2020-01-20 ENCOUNTER — CLINICAL SUPPORT (OUTPATIENT)
Dept: INTERNAL MEDICINE | Facility: CLINIC | Age: 40
End: 2020-01-20
Payer: COMMERCIAL

## 2020-01-20 VITALS — DIASTOLIC BLOOD PRESSURE: 88 MMHG | SYSTOLIC BLOOD PRESSURE: 118 MMHG

## 2020-01-20 PROCEDURE — 99999 PR PBB SHADOW E&M-EST. PATIENT-LVL I: ICD-10-PCS | Mod: PBBFAC,,,

## 2020-01-20 PROCEDURE — 99999 PR PBB SHADOW E&M-EST. PATIENT-LVL I: CPT | Mod: PBBFAC,,,

## 2020-01-20 NOTE — PROGRESS NOTES
Patient here today for a blood pressure check per Dr. Roberson. Blood pressure was 130 90 at 9:27 am, checked again at 9:40 am and it had come down to 118/88. Reported to Dr. Roberson.

## 2020-01-20 NOTE — TELEPHONE ENCOUNTER
Patient came in for a blood pressure check today per Dr. Roberson. Blood pressure at 9:27 am was 130/90, sitting, left arm. Blood pressure at 9:40 am was 118/88, sitting, left arm.

## 2020-02-04 ENCOUNTER — PATIENT OUTREACH (OUTPATIENT)
Dept: OTHER | Facility: OTHER | Age: 40
End: 2020-02-04

## 2020-02-04 NOTE — PROGRESS NOTES
Digital Medicine: Health  Introduction    Introduced Robb Iglesias to Digital Medicine. Discussed health  role and recommended lifestyle modifications.    Patient has weight loss surgery 3 years ago. Was enrolled in DM hypertension program at time. He was not able to keep up with doctors appointments and phone calls from DM so he was discharged. He stated he is interested in enrolling in the hypertension program again. Tests once a day, but would like to more often.    The history is provided by the patient.     DIABETES    Our goal is to decrease A1c within patient-specific target levels and make the process convenient so patient can avoid extra trips to the office. Reducing A1C by merely 1% results in a decreased risk of complications of at least 10%. For example, an A1C reduced from 8.5% to 7.5% results in almost 40% lower risk of kidney, eye, and nerve disease      Reviewed that the Digital Medicine care team - consisting of a clinician and a health  - will follow the most current evidence-based national guidelines for treating your condition.  The health  will focus on lifestyle modifications and motivation while the clinician will focus on medication therapy.  The care team will review all data on a regular basis and reach out as needed.      Explained that one of the key parts of the program is communication with the care team.  Asked patient to respond to outreach attempts and complete questionnaires.  Stressed importance of medication adherence.      Instructed patient not to allow anyone else to use phone and monitoring device.  Explained that we expect patient to test their blood sugar as prescribed by their physician or Digital Medicine Clinician.      Reviewed general Self-Monitoring of Blood Glucose (SMBG) goals:  · FPG: <  mg/dL  · 1h PPG: < 180 mg/dL  · 2h PPG: < 160 mg/dL  · Bedtime: < 150 mg/dL    Explained to patient that the digital medicine team is not available for  emergencies.  Patient will call [facility] on-call ([phone] or [phone]) or [phone] if needed.      Expected SMBG schedule: Daily  Reviewed signs and symptoms of hypoglycemia (weakness, dizziness, hunger, shakiness, nausea, headache, heart palpitations, sweating, fatigue, anxiety, etc.).  Reviewed treatment of hypoglycemia (15/15 rule).      Patient's A1C goal is less than or equal to 8.  Patient's most recent A1C result is above goal.  Lab Results     Component                Value               Date                     HGBA1C                   10.8 (H)            12/21/2019                      Last 6 Patient Entered Readings                                          Most Recent A1c:      Recent Readings 2/4/2020 1/30/2020 1/23/2020 1/22/2020    Blood Glucose (mg/dL) 125 185 119 155          INTERVENTION(S)  reviewed monitoring technique, encouragement/support and denied questions    PLAN  patient verbalizes understanding          Topic    Urine Protein Check     Eye Exam        Reviewed the importance of self-monitoring, medication adherence, and that the health  can be used as a resource for lifestyle modifications to help reduce or maintain a healthy lifestyle.    Sent link to [facility] webpages and my contact information via [employer] for future questions. Follow up scheduled.         Screenings    SDOH

## 2020-02-04 NOTE — LETTER
February 4, 2020     Robb Iglesias  [address] [place] [address]       Dear Robb,    Welcome to [facility] Our goal is to make care effective, proactive and convenient by using data you send us from home to better treat your chronic conditions.              My name is Zulay Brown, and I am your dedicated Digital Medicine clinician. As an expert in medication management, I will help ensure that the medications you are taking continue to provide the intended benefits and help you reach your goals. You can reach me directly at [phone] or by sending me a message directly through your [facility] account.      I am Evelyn Rueda and I will be your health . My job is to help you identify lifestyle changes to improve your disease control. We will talk about nutrition, exercise, and other ways you may be able to adjust your current habits to better your health. Additionally, we will help ensure you are completing the tests and screenings that are necessary to help manage your conditions. You can reach me directly at [phone] or by sending me a message directly through your [facility] account.    Most importantly, YOU are at the center of this team. Together, we will work to improve your overall health and encourage you to meet your goals for a healthier lifestyle.     What we expect from YOU:  · Please take frequent home blood sugar measurements according to the frequency your physician and Digital Medicine care team specify. It is important that your team see both fasting and after meal readings.      Be available to receive phone calls or [employer] messages, when appropriate, from your care team. Please let us know if there are any specific days or times that work best for us to reach you via phone.     Complete routine tests and screenings. Dont worry, we will help keep you on track!           What you should expect from your Digital Medicine Care Team:   We  will work with you to create a personalized plan of care and provide you with encouragement and education, including regarding lifestyle changes, that could help you manage your disease states.     We will adjust your current medications, if needed, and continue to monitor your long-term progress.     We will provide you and your physician with monthly progress reports after you have been in the program for more than 30 days.     We will send you reminders through [employer] and text messages to help ensure you do not miss any testing deadlines to help manage your disease states.    You will be able to reach us by phone or through your [facility] account by clicking our names under Care Team on the right side of the home screen.    I look forward to working with you to achieve your blood pressure goals!    We look forward to working with you to help manage your health,    Sincerely,    Your Digital Medicine Team    Please visit our websites to learn more:   · Diabetes: [email or web address]      Remember, we are not available for emergencies. If you have an emergency, please contact your doctors office directly or call [facility] on-call ([phone] or [phone]) or 911.    Diabetes: We want help you get important tests and screenings done regularly to assure that your health needs are met. We have put a new system in place, called CareTouch that will help us improve how we monitor and reach out to you about the following lab tests that you will need to help manage your diabetes.  · Hemoglobin A1c testing (Frequency: Every 3 to 6 months, dependent on A1c goal)  · Nephropathy Assessment, generally urine micro albumin testing (Frequency: Yearly)  · Eye exam through a quick 30-minute Eye Photo Exam (Frequency: 1-2 Years, depending on result)    When necessary you can come in to one of the labs on the attached page any weekday between 10:30 am and 4:00 pm to have your tests done prior to their due  date. Tell the  you received a CareTouch letter, or just look for the CareTouch sign.

## 2020-02-05 ENCOUNTER — PATIENT OUTREACH (OUTPATIENT)
Dept: OTHER | Facility: OTHER | Age: 40
End: 2020-02-05

## 2020-02-05 DIAGNOSIS — E11.9 TYPE 2 DIABETES MELLITUS: ICD-10-CM

## 2020-02-05 DIAGNOSIS — E11.22 TYPE 2 DIABETES MELLITUS WITH STAGE 3 CHRONIC KIDNEY DISEASE, WITHOUT LONG-TERM CURRENT USE OF INSULIN: Primary | Chronic | ICD-10-CM

## 2020-02-05 DIAGNOSIS — N18.30 TYPE 2 DIABETES MELLITUS WITH STAGE 3 CHRONIC KIDNEY DISEASE, WITHOUT LONG-TERM CURRENT USE OF INSULIN: Primary | Chronic | ICD-10-CM

## 2020-02-05 NOTE — PROGRESS NOTES
Called patient for digital medicine clinical pharmacist introduction. No answer. Left message for call back    Patient enrolled in diabetes digital medicine program. Health  placed a task and requested that I discuss DM program with patient as well. Will do so at successful outreach.

## 2020-02-07 NOTE — PROGRESS NOTES
Digital Medicine: Clinician Introduction    Robb Iglesias is a 39 y.o. male who is newly enrolled in the Digital Medicine Clinic.    The following information was reviewed and updated:  Preferred pharmacy   [id number] - [place] - [address]  [address] [place] [address]  Phone: [phone] Fax: [phone]    [facility] - [place], [place] - [address] [place] [address]  [address] [address]  Phone: [phone] Fax: [phone]    [facility]  [address] [place] [address]  [place] [address]  Phone: [phone] Fax: [phone]      Patient prefers a 90 days supply.     Review of patient's allergies indicates:  No Known Allergies    Patient returned my call for clinical pharmacist introduction. He is doing well today with no complaints. He reports starting metformin and ozempic about six weeks ago. He denies issues with either medication. Patient states that prior to these medications his BG was averaging around 250 mg/dL and now BG average is 140- 150 mg/dL. He tries to test BG once daily. He is interested in joining HTN digital medicine program        The history is provided by the patient.     DIABETES  Instructed patient not to allow anyone else to use phone and monitoring device.  Explained that we expect patient to test their blood sugar as prescribed by their physician or Digital Medicine Clinician.      Reviewed general Self-Monitoring of Blood Glucose (SMBG) goals:  · FPG: <  mg/dL  · 1h PPG: < 180 mg/dL  · 2h PPG: < 160 mg/dL  · Bedtime: < 150 mg/dL    Explained to patient that the digital medicine team is not available for emergencies.  Patient will call [facility] on-call ([phone] or [phone]) or [phone] if needed.      Expected SMBG schedule: Daily  Patient's A1C goals is less than or equal to 8. Patient's most recent A1C result is above goal. Lab Results     Component                Value                Date                     HGBA1C                   10.8 (H)            12/21/2019             Allergies reviewed.          Last 6 Patient Entered Readings                                          Most Recent A1c: 10.8% on 12/21/2019  (Goal: 8%)     Recent Readings 2/5/2020 2/4/2020 1/30/2020 1/23/2020 1/22/2020    Blood Glucose (mg/dL) 149 125 185 119 155          INTERVENTION(S)  reviewed appropriate dose schedule, reviewed monitoring technique and encouragement/support    PLAN  patient verbalizes understanding, patient amenable to changes and continue monitoring    DM  A1c was 10.7% 12/2019. Goal <7%. Continue metformin at max dose of 500 mg BID due to renal function. Continue Ozempic 0.25 mg once weekly    Requested daily BG readings. Will likely increase OZempic to 0.5 mg once weekly at next outreach    Reviewed s/sx/management of hypoglycemia    Placed order for HTN program enrollment. Will address at next encounter.           Topic    Urine Protein Check     Eye Exam        Current Medication Regimen:    Diabetes Medications     metFORMIN (GLUCOPHAGE-XR) 500 MG 24 hr tablet Take 1 tablet (500 mg total) by mouth 2 (two) times daily with meals.    semaglutide (OZEMPIC) 0.25 mg or 0.5 mg(2 mg/1.5 mL) PnIj Inject 0.25 mg into the skin every 7 days.          Reviewed the importance of self-monitoring, medication adherence, and that the health  can be used as a resource for lifestyle modifications to help reduce or maintain a healthy lifestyle.    Sent link to [facility] webpages and my contact information via [employer] for future questions. Follow up scheduled.         Screenings

## 2020-02-20 ENCOUNTER — PATIENT OUTREACH (OUTPATIENT)
Dept: ADMINISTRATIVE | Facility: HOSPITAL | Age: 40
End: 2020-02-20

## 2020-02-20 NOTE — PROGRESS NOTES
DIABETES HA1c OUTREACH: Attempting to contact pt to schedule over due HM:DM EYE. HA1c was scheduled I have added the micro-albumin order to labs. Unable to reach patient at this time. Left voicemail.

## 2020-02-25 DIAGNOSIS — E11.22 TYPE 2 DIABETES MELLITUS WITH STAGE 3 CHRONIC KIDNEY DISEASE, WITHOUT LONG-TERM CURRENT USE OF INSULIN: Chronic | ICD-10-CM

## 2020-02-25 DIAGNOSIS — E11.65 TYPE 2 DIABETES MELLITUS WITH HYPERGLYCEMIA, WITHOUT LONG-TERM CURRENT USE OF INSULIN: Chronic | ICD-10-CM

## 2020-02-25 DIAGNOSIS — N18.30 TYPE 2 DIABETES MELLITUS WITH STAGE 3 CHRONIC KIDNEY DISEASE, WITHOUT LONG-TERM CURRENT USE OF INSULIN: Chronic | ICD-10-CM

## 2020-02-25 NOTE — TELEPHONE ENCOUNTER
----- Message from Sukhdeep West sent at 2/25/2020 11:24 AM CST -----  Contact[facility]   Type:  RX Refill Request    Who Called: jr   Refill or New Rx: refill   RX Name and Strength: metFORMIN 500 MG   How is the patient currently taking it? (ex. 1XDay): new for the pharmacy   Is this a 30 day or 90 day RX: 90 day   Preferred Pharmacy with phone number     [facility] - [place] [address]  Phone: [phone] Fax: [phone]    Local or Mail Order: mail   Ordering Provider: elvira   Would the patient rather a call back or a response via [facility]? Call   Best Call Back Number:  Additional Information:                Type:  RX Refill Request    Who Called: jr   Refill or New Rx: refill   RX Name and Strength: OZEMPIC 0.25 mg or 0.5 mg(2 mg/1.5 mL) PnIj   How is the patient currently taking it? (ex. 1XDay): new for the pharmacy   Is this a 30 day or 90 day RX: 90 day   Preferred Pharmacy with phone number     [facility] - [place] [id number]  Phone: [phone] Fax: [phone]    Local or Mail Order: mail   Ordering Provider: elvira   Would the patient rather a call back or a response via [facility]? Call   Best Call Back Number:  Additional Information:

## 2020-02-26 ENCOUNTER — PATIENT OUTREACH (OUTPATIENT)
Dept: OTHER | Facility: OTHER | Age: 40
End: 2020-02-26

## 2020-02-26 RX ORDER — METFORMIN HYDROCHLORIDE 500 MG/1
500 TABLET, EXTENDED RELEASE ORAL 2 TIMES DAILY WITH MEALS
Qty: 180 TABLET | Refills: 0 | Status: SHIPPED | OUTPATIENT
Start: 2020-02-26 | End: 2020-03-24 | Stop reason: DRUGHIGH

## 2020-02-27 ENCOUNTER — PATIENT OUTREACH (OUTPATIENT)
Dept: OTHER | Facility: OTHER | Age: 40
End: 2020-02-27

## 2020-02-27 DIAGNOSIS — N18.30 TYPE 2 DIABETES MELLITUS WITH STAGE 3 CHRONIC KIDNEY DISEASE, WITHOUT LONG-TERM CURRENT USE OF INSULIN: Chronic | ICD-10-CM

## 2020-02-27 DIAGNOSIS — E11.22 TYPE 2 DIABETES MELLITUS WITH STAGE 3 CHRONIC KIDNEY DISEASE, WITHOUT LONG-TERM CURRENT USE OF INSULIN: Chronic | ICD-10-CM

## 2020-02-27 NOTE — TELEPHONE ENCOUNTER
Robb Bender.    I received a request for refill for your medicine listed below. I approved a limited refill of your medicine.    IMPORTANT: Before I can give you any additional refills, I'm going to need to re-evaluate you with an office visit.    Your next appointment with me is scheduled for Wednesday, March 11, 2020 at 8:00 AM. I look forward to seeing you then.    Take care, and be well.     -LM  Medications Ordered This Encounter   Medications    metFORMIN (GLUCOPHAGE-XR) 500 MG XR 24hr tablet     Sig: Take 1 tablet (500 mg total) by mouth 2 (two) times daily with meals.     Dispense:  180 tablet     Refill:  0     APPOINTMENT REQUIRED FOR MORE REFILLS    semaglutide (OZEMPIC) 0.25 mg or 0.5 mg(2 mg/1.5 mL) PnIj     Sig: Inject 0.25 mg into the skin every 7 days.     Dispense:  6 mL     Refill:  0     APPOINTMENT REQUIRED FOR MORE REFILLS

## 2020-02-28 NOTE — TELEPHONE ENCOUNTER
REFILL APPROVED      Requested Prescriptions     Signed Prescriptions Disp Refills    semaglutide (OZEMPIC) 0.25 mg or 0.5 mg(2 mg/1.5 mL) PnIj 6 mL 0     Sig: Inject 0.25 mg into the skin every 7 days.     Authorizing Provider: KEVIN HANDLEY

## 2020-02-28 NOTE — PROGRESS NOTES
Called patient for DM follow up. No answer. Left message for call back    Will likely increase Ozempic to 0.5 mg once weekly at next encounter.

## 2020-03-04 NOTE — PROGRESS NOTES
Called patient for DM follow up. Call could not be completed.     Patient had lab appointment today. Will assess results and call patient to discuss. Will likely increase Ozempic to 0.5 mg once weekly

## 2020-03-05 ENCOUNTER — PATIENT MESSAGE (OUTPATIENT)
Dept: INTERNAL MEDICINE | Facility: CLINIC | Age: 40
End: 2020-03-05

## 2020-03-05 NOTE — PROGRESS NOTES
Digital Medicine: Health  Follow-Up    Patient reports that BG reading of 56 on 2/29/20 was a false reading. Believes he did not have the strip fully inserted in the meter.    The history is provided by the patient.     Follow Up  Follow-up reason(s): reading review and routine education      Alert received.   Care Team received low BG alert.  Patient is not experiencing symptoms.  Reading was invalid because Strip not fully inserted into meter  Routine Education Topics: physical activity        INTERVENTION(S)  reviewed monitoring technique, encouragement/support and denied questions    PLAN  patient verbalizes understanding          Topic    Urine Protein Check     Eye Exam          Last 6 Patient Entered Readings                                          Most Recent A1c: 10.8% on 12/21/2019  (Goal: 8%)     Recent Readings 3/5/2020 3/4/2020 3/3/2020 3/2/2020 3/1/2020    Blood Glucose (mg/dL) 158 136 163 150 252                    Diet Screening   No change to diet.      Physical Activity Screening   When asked if exercising, patient responded: yes    He exercises for 20 minutes per day 7 day(s) a week.      Patient participates in the following activities: walking and body weight exercises    Started a new exercise routine about 3 weeks ago. Has been exercising with his 9 yr old son for 30 min every day. They switch back and forth helping the other one doing sit-ups, pull-ups, etc. Also walks 1 mile 2 x a week.    Medication Adherence Screening       Patient says he routinely has issues with [facility]. He says he has to use them because of his insurance, but says they may not ship on schedule, may not have meds in stock, may say meds not covered by insurance, or may try sending him an old medication that he no longer uses. He said in the past, he has gone without medication for a few days, but he has a 3 week supply currently. Wonders if there is a solution going forward as he feels he is constantly  frustrated with [facility].      SDOH

## 2020-03-09 NOTE — PROGRESS NOTES
Called patient for DM follow up. No answer. Left message for call back. Sent [employer] message    No show for DM labs on 3/4.     Will likely increase Ozempic to 0.5 mg once weekly at next encounter

## 2020-03-10 DIAGNOSIS — I10 ESSENTIAL HYPERTENSION: Chronic | ICD-10-CM

## 2020-03-10 RX ORDER — LOSARTAN POTASSIUM 100 MG/1
TABLET ORAL
Qty: 90 TABLET | Refills: 3 | OUTPATIENT
Start: 2020-03-10

## 2020-03-10 RX ORDER — AMLODIPINE BESYLATE 10 MG/1
TABLET ORAL
Qty: 90 TABLET | Refills: 3 | OUTPATIENT
Start: 2020-03-10

## 2020-03-10 NOTE — TELEPHONE ENCOUNTER
REFILL REFUSED. REASON: Will address at his next appointment with me, currently scheduled for Wednesday, March 11, 2020 at 8:00 AM.    Requested Prescriptions     Refused Prescriptions Disp Refills    amLODIPine (NORVASC) 10 MG tablet [Pharmacy Med Name: AMLODIPINE BESYLATE TABS 10MG] 90 tablet 3     Sig: TAKE 1 TABLET DAILY     Refused By: SABIHA HANDLEY     Reason for Refusal: Patient needs an appointment    losartan (COZAAR) 100 MG tablet [Pharmacy Med Name: LOSARTAN TABS 100MG] 90 tablet 3     Sig: TAKE 1 TABLET DAILY (THIS REPLACES LISINOPRIL)     Refused By: SABIHA HANDLEY     Reason for Refusal: Patient needs an appointment

## 2020-03-11 ENCOUNTER — APPOINTMENT (OUTPATIENT)
Dept: LAB | Facility: HOSPITAL | Age: 40
End: 2020-03-11
Payer: COMMERCIAL

## 2020-03-11 ENCOUNTER — LAB VISIT (OUTPATIENT)
Dept: LAB | Facility: HOSPITAL | Age: 40
End: 2020-03-11
Attending: FAMILY MEDICINE
Payer: COMMERCIAL

## 2020-03-11 DIAGNOSIS — N18.30 TYPE 2 DIABETES MELLITUS WITH STAGE 3 CHRONIC KIDNEY DISEASE, WITHOUT LONG-TERM CURRENT USE OF INSULIN: Chronic | ICD-10-CM

## 2020-03-11 DIAGNOSIS — M1A.09X0 IDIOPATHIC CHRONIC GOUT, MULTIPLE SITES, WITHOUT TOPHUS (TOPHI): Chronic | ICD-10-CM

## 2020-03-11 DIAGNOSIS — E11.22 TYPE 2 DIABETES MELLITUS WITH STAGE 3 CHRONIC KIDNEY DISEASE, WITHOUT LONG-TERM CURRENT USE OF INSULIN: Chronic | ICD-10-CM

## 2020-03-11 DIAGNOSIS — E11.65 TYPE 2 DIABETES MELLITUS WITH HYPERGLYCEMIA, WITHOUT LONG-TERM CURRENT USE OF INSULIN: Chronic | ICD-10-CM

## 2020-03-11 DIAGNOSIS — R17 ELEVATED BILIRUBIN: ICD-10-CM

## 2020-03-11 LAB
ALBUMIN SERPL BCP-MCNC: 3.9 G/DL (ref 3.5–5.2)
ALBUMIN SERPL BCP-MCNC: 3.9 G/DL (ref 3.5–5.2)
ALP SERPL-CCNC: 91 U/L (ref 55–135)
ALT SERPL W/O P-5'-P-CCNC: 24 U/L (ref 10–44)
ANION GAP SERPL CALC-SCNC: 9 MMOL/L (ref 8–16)
AST SERPL-CCNC: 22 U/L (ref 10–40)
BILIRUB DIRECT SERPL-MCNC: 0.5 MG/DL (ref 0.1–0.3)
BILIRUB SERPL-MCNC: 1.5 MG/DL (ref 0.1–1)
BUN SERPL-MCNC: 32 MG/DL (ref 6–20)
CALCIUM SERPL-MCNC: 9.6 MG/DL (ref 8.7–10.5)
CHLORIDE SERPL-SCNC: 106 MMOL/L (ref 95–110)
CO2 SERPL-SCNC: 27 MMOL/L (ref 23–29)
CREAT SERPL-MCNC: 2.5 MG/DL (ref 0.5–1.4)
EST. GFR  (AFRICAN AMERICAN): 36.3 ML/MIN/1.73 M^2
EST. GFR  (NON AFRICAN AMERICAN): 31.4 ML/MIN/1.73 M^2
ESTIMATED AVG GLUCOSE: 171 MG/DL (ref 68–131)
GLUCOSE SERPL-MCNC: 141 MG/DL (ref 70–110)
HBA1C MFR BLD HPLC: 7.6 % (ref 4–5.6)
PHOSPHATE SERPL-MCNC: 3.1 MG/DL (ref 2.7–4.5)
POTASSIUM SERPL-SCNC: 4.7 MMOL/L (ref 3.5–5.1)
PROT SERPL-MCNC: 7.2 G/DL (ref 6–8.4)
SODIUM SERPL-SCNC: 142 MMOL/L (ref 136–145)
URATE SERPL-MCNC: 5.8 MG/DL (ref 3.4–7)

## 2020-03-11 PROCEDURE — 36415 COLL VENOUS BLD VENIPUNCTURE: CPT

## 2020-03-11 PROCEDURE — 82247 BILIRUBIN TOTAL: CPT

## 2020-03-11 PROCEDURE — 84550 ASSAY OF BLOOD/URIC ACID: CPT

## 2020-03-11 PROCEDURE — 80069 RENAL FUNCTION PANEL: CPT

## 2020-03-11 PROCEDURE — 83036 HEMOGLOBIN GLYCOSYLATED A1C: CPT

## 2020-03-11 PROCEDURE — 84075 ASSAY ALKALINE PHOSPHATASE: CPT

## 2020-03-13 NOTE — PROGRESS NOTES
Patient returned my call for follow up and left voicemail. I called back. No answer. Left message for call back

## 2020-03-15 ENCOUNTER — TELEPHONE (OUTPATIENT)
Dept: INTERNAL MEDICINE | Facility: CLINIC | Age: 40
End: 2020-03-15

## 2020-03-15 DIAGNOSIS — I10 ESSENTIAL HYPERTENSION: Chronic | ICD-10-CM

## 2020-03-16 RX ORDER — LOSARTAN POTASSIUM 100 MG/1
100 TABLET ORAL DAILY
Qty: 90 TABLET | Refills: 0 | Status: SHIPPED | OUTPATIENT
Start: 2020-03-16 | End: 2020-03-24 | Stop reason: SDUPTHER

## 2020-03-16 RX ORDER — AMLODIPINE BESYLATE 10 MG/1
10 TABLET ORAL DAILY
Qty: 90 TABLET | Refills: 0 | Status: SHIPPED | OUTPATIENT
Start: 2020-03-16 | End: 2020-03-24 | Stop reason: SDUPTHER

## 2020-03-16 NOTE — TELEPHONE ENCOUNTER
ACTION NEEDED:  Please contact him to verify his receipt and understanding of my message (below) and convert his appointment to a virtual visit. Thanks.  Orders Placed This Encounter    amLODIPine (NORVASC) 10 MG tablet    losartan (COZAAR) 100 MG tablet   --------------------------------------------------------------------------------  --------------------------------------------------------------------------------   Robb Bender.    I received a request for refill for your medicine listed below. You are overdue to come in for re-evaluation. However, because of the COVID-19 pandemic, I approved a limited refill of your medicine.    IMPORTANT: Before I can give you any additional refills, I'm going to need to re-evaluate you. Your next appointment with me, currently scheduled for Tuesday, March 17, 2020 at 3:40 PM. Because of COVID-19, I recommend that this be a telemedicine virtual visit. I'm asking [facility] to contact you to set this up.    I look forward to seeing you at your next appointment.    Sincerely,     KEVIN Roberson MD    Medications Ordered This Encounter   Medications    amLODIPine (NORVASC) 10 MG tablet     Sig: Take 1 tablet (10 mg total) by mouth once daily.     Dispense:  90 tablet     Refill:  0     APPOINTMENT REQUIRED FOR MORE REFILLS    losartan (COZAAR) 100 MG tablet     Sig: Take 1 tablet (100 mg total) by mouth once daily.     Dispense:  90 tablet     Refill:  0     APPOINTMENT REQUIRED FOR MORE REFILLS

## 2020-03-24 ENCOUNTER — OFFICE VISIT (OUTPATIENT)
Dept: INTERNAL MEDICINE | Facility: CLINIC | Age: 40
End: 2020-03-24
Payer: COMMERCIAL

## 2020-03-24 DIAGNOSIS — E11.65 TYPE 2 DIABETES MELLITUS WITH HYPERGLYCEMIA, WITHOUT LONG-TERM CURRENT USE OF INSULIN: Primary | Chronic | ICD-10-CM

## 2020-03-24 DIAGNOSIS — E11.22 TYPE 2 DIABETES MELLITUS WITH STAGE 3 CHRONIC KIDNEY DISEASE, WITHOUT LONG-TERM CURRENT USE OF INSULIN: Chronic | ICD-10-CM

## 2020-03-24 DIAGNOSIS — I10 ESSENTIAL HYPERTENSION: Chronic | ICD-10-CM

## 2020-03-24 DIAGNOSIS — N18.30 TYPE 2 DIABETES MELLITUS WITH STAGE 3 CHRONIC KIDNEY DISEASE, WITHOUT LONG-TERM CURRENT USE OF INSULIN: Chronic | ICD-10-CM

## 2020-03-24 DIAGNOSIS — M1A.09X0 IDIOPATHIC CHRONIC GOUT, MULTIPLE SITES, WITHOUT TOPHUS (TOPHI): Chronic | ICD-10-CM

## 2020-03-24 DIAGNOSIS — E11.69 DYSLIPIDEMIA ASSOCIATED WITH TYPE 2 DIABETES MELLITUS: ICD-10-CM

## 2020-03-24 DIAGNOSIS — N18.30 STAGE 3 CHRONIC KIDNEY DISEASE: ICD-10-CM

## 2020-03-24 DIAGNOSIS — E78.5 DYSLIPIDEMIA ASSOCIATED WITH TYPE 2 DIABETES MELLITUS: ICD-10-CM

## 2020-03-24 PROCEDURE — 3051F PR MOST RECENT HEMOGLOBIN A1C LEVEL 7.0 - < 8.0%: ICD-10-PCS | Mod: CPTII,95,, | Performed by: FAMILY MEDICINE

## 2020-03-24 PROCEDURE — 3051F HG A1C>EQUAL 7.0%<8.0%: CPT | Mod: CPTII,95,, | Performed by: FAMILY MEDICINE

## 2020-03-24 PROCEDURE — 99214 OFFICE O/P EST MOD 30 MIN: CPT | Mod: 95,,, | Performed by: FAMILY MEDICINE

## 2020-03-24 PROCEDURE — 99214 PR OFFICE/OUTPT VISIT, EST, LEVL IV, 30-39 MIN: ICD-10-PCS | Mod: 95,,, | Performed by: FAMILY MEDICINE

## 2020-03-24 RX ORDER — AMLODIPINE BESYLATE 10 MG/1
10 TABLET ORAL DAILY
Qty: 90 TABLET | Refills: 4 | Status: SHIPPED | OUTPATIENT
Start: 2020-03-24 | End: 2021-05-21

## 2020-03-24 RX ORDER — LOSARTAN POTASSIUM 100 MG/1
100 TABLET ORAL DAILY
Qty: 90 TABLET | Refills: 4 | Status: SHIPPED | OUTPATIENT
Start: 2020-03-24 | End: 2021-05-21

## 2020-03-24 RX ORDER — METFORMIN HYDROCHLORIDE 500 MG/1
500 TABLET, EXTENDED RELEASE ORAL 2 TIMES DAILY WITH MEALS
Qty: 180 TABLET | Refills: 1 | Status: SHIPPED | OUTPATIENT
Start: 2020-03-24 | End: 2020-06-26

## 2020-03-24 RX ORDER — METOPROLOL TARTRATE 25 MG/1
25 TABLET, FILM COATED ORAL 2 TIMES DAILY
Qty: 180 TABLET | Refills: 4 | Status: SHIPPED | OUTPATIENT
Start: 2020-03-24 | End: 2021-06-03 | Stop reason: SDUPTHER

## 2020-03-24 RX ORDER — LOSARTAN POTASSIUM 100 MG/1
100 TABLET ORAL DAILY
Qty: 90 TABLET | Refills: 4 | Status: SHIPPED | OUTPATIENT
Start: 2020-03-24 | End: 2020-03-24 | Stop reason: SDUPTHER

## 2020-03-24 RX ORDER — COLCHICINE 0.6 MG/1
0.6 TABLET ORAL EVERY OTHER DAY
Qty: 45 TABLET | Refills: 3 | Status: SHIPPED | OUTPATIENT
Start: 2020-03-24 | End: 2021-06-07 | Stop reason: SDUPTHER

## 2020-03-24 RX ORDER — METOPROLOL TARTRATE 25 MG/1
25 TABLET, FILM COATED ORAL 2 TIMES DAILY
Qty: 180 TABLET | Refills: 4 | Status: SHIPPED | OUTPATIENT
Start: 2020-03-24 | End: 2020-03-24 | Stop reason: SDUPTHER

## 2020-03-24 RX ORDER — AMLODIPINE BESYLATE 10 MG/1
10 TABLET ORAL DAILY
Qty: 90 TABLET | Refills: 4 | Status: SHIPPED | OUTPATIENT
Start: 2020-03-24 | End: 2020-03-24 | Stop reason: SDUPTHER

## 2020-03-24 NOTE — ASSESSMENT & PLAN NOTE
BP Readings from Last 6 Encounters:   01/20/20 118/88   01/06/20 (!) 138/92   08/02/19 128/82   07/11/19 138/87   02/26/19 118/82   02/02/19 130/88     Well controlled, stable.

## 2020-03-24 NOTE — Clinical Note
Schedule his labs in early-June, 2020 with follow-up appointment with me, nephrology, and optometry a few days thereafter to review results.

## 2020-03-24 NOTE — ASSESSMENT & PLAN NOTE
Lab Results   Component Value Date    CHOL 156 12/21/2019    CHOL 167 11/29/2018    TRIG 415 (H) 12/21/2019    TRIG 236 (H) 11/29/2018    HDL 28 (L) 12/21/2019    HDL 32 (L) 11/29/2018    LDLCALC Invalid, Trig>400.0 12/21/2019    LDLCALC 87.8 11/29/2018    NONHDLCHOL 128 12/21/2019    NONHDLCHOL 135 11/29/2018    AST 22 03/11/2020    ALT 24 03/11/2020   He appears to be tolerating statin for dyslipidemia without apparent symptoms of myositis or hepatic dysfunction.

## 2020-03-24 NOTE — ASSESSMENT & PLAN NOTE
Lab Results   Component Value Date    ESTGFRAFRICA 36.3 (A) 03/11/2020    ESTGFRAFRICA 40 (A) 06/17/2019    ESTGFRAFRICA 48 (A) 03/19/2019    EGFRNONAA 31.4 (A) 03/11/2020    EGFRNONAA 35 (A) 06/17/2019    EGFRNONAA 41 (A) 03/19/2019    CREATININE 2.5 (H) 03/11/2020    CREATININE 2.3 (H) 06/17/2019    BUN 32 (H) 03/11/2020    BUN 33 (H) 06/17/2019    ALBUMIN 3.9 03/11/2020    ALBUMIN 3.9 03/11/2020    PROT 7.2 03/11/2020    MICALBCREAT Unable to calculate 03/11/2020    LABMICR >5000.0 03/11/2020    CREATRANDUR 219.0 03/11/2020     03/11/2020    K 4.7 03/11/2020     03/11/2020    CO2 27 03/11/2020     (H) 03/11/2020    CALCIUM 9.6 03/11/2020    PHOS 3.1 03/11/2020    MG 2.1 04/22/2018    HGB 14.8 04/24/2018    HCT 43.8 04/24/2018     Asymptomatic. Worse. We discussed risks of metformin. PLAN: Hydration, nephrology follow-up.

## 2020-03-24 NOTE — PROGRESS NOTES
TELEMEDICINE VIRTUAL VISIT  CHIEF COMPLAINT  Follow-up      DIAGNOSES, HISTORY, ASSESSMENT, AND PLAN    1. Dyslipidemia associated with type 2 diabetes mellitus    2. Idiopathic chronic gout, multiple sites, with tophus (tophi)    3. Type 2 diabetes mellitus with stage 3 chronic kidney disease, without long-term current use of insulin    4. Type 2 diabetes mellitus with hyperglycemia, without long-term current use of insulin    5. Essential hypertension    6. Stage 3 chronic kidney disease        Problem List Items Addressed This Visit        Cardiac/Vascular    Essential hypertension (Chronic)    Current Assessment & Plan     BP Readings from Last 6 Encounters:   01/20/20 118/88   01/06/20 (!) 138/92   08/02/19 128/82   07/11/19 138/87   02/26/19 118/82   02/02/19 130/88     Well controlled, stable.          Relevant Medications    losartan (COZAAR) 100 MG tablet    amLODIPine (NORVASC) 10 MG tablet    metoprolol tartrate (LOPRESSOR) 25 MG tablet    Dyslipidemia associated with type 2 diabetes mellitus - Primary    Current Assessment & Plan     Lab Results   Component Value Date    CHOL 156 12/21/2019    CHOL 167 11/29/2018    TRIG 415 (H) 12/21/2019    TRIG 236 (H) 11/29/2018    HDL 28 (L) 12/21/2019    HDL 32 (L) 11/29/2018    LDLCALC Invalid, Trig>400.0 12/21/2019    LDLCALC 87.8 11/29/2018    NONHDLCHOL 128 12/21/2019    NONHDLCHOL 135 11/29/2018    AST 22 03/11/2020    ALT 24 03/11/2020   He appears to be tolerating statin for dyslipidemia without apparent symptoms of myositis or hepatic dysfunction.           Relevant Medications    metFORMIN (GLUCOPHAGE-XR) 500 MG XR 24hr tablet    semaglutide (OZEMPIC) 0.25 mg or 0.5 mg(2 mg/1.5 mL) PnIj    semaglutide (OZEMPIC) 1 mg/dose (2 mg/1.5 mL) PnIj (Start on 4/21/2020)    Other Relevant Orders    Lipid panel       Renal/    Stage 3 chronic kidney disease    Current Assessment & Plan     Lab Results   Component Value Date    ESTGFRAFRICA 36.3 (A) 03/11/2020     ESTGFRAFRICA 40 (A) 06/17/2019    ESTGFRAFRICA 48 (A) 03/19/2019    EGFRNONAA 31.4 (A) 03/11/2020    EGFRNONAA 35 (A) 06/17/2019    EGFRNONAA 41 (A) 03/19/2019    CREATININE 2.5 (H) 03/11/2020    CREATININE 2.3 (H) 06/17/2019    BUN 32 (H) 03/11/2020    BUN 33 (H) 06/17/2019    ALBUMIN 3.9 03/11/2020    ALBUMIN 3.9 03/11/2020    PROT 7.2 03/11/2020    MICALBCREAT Unable to calculate 03/11/2020    LABMICR >5000.0 03/11/2020    CREATRANDUR 219.0 03/11/2020     03/11/2020    K 4.7 03/11/2020     03/11/2020    CO2 27 03/11/2020     (H) 03/11/2020    CALCIUM 9.6 03/11/2020    PHOS 3.1 03/11/2020    MG 2.1 04/22/2018    HGB 14.8 04/24/2018    HCT 43.8 04/24/2018     Asymptomatic. Worse. We discussed risks of metformin. PLAN: Hydration, nephrology follow-up.           Relevant Orders    Ambulatory referral/consult to Nephrology    Renal function panel       Endocrine    Type 2 diabetes mellitus with stage 3 chronic kidney disease, without long-term current use of insulin (Chronic)    Relevant Medications    metFORMIN (GLUCOPHAGE-XR) 500 MG XR 24hr tablet    semaglutide (OZEMPIC) 0.25 mg or 0.5 mg(2 mg/1.5 mL) PnIj    semaglutide (OZEMPIC) 1 mg/dose (2 mg/1.5 mL) PnIj (Start on 4/21/2020)    Other Relevant Orders    Ambulatory referral/consult to Nephrology    Hemoglobin A1c    Renal function panel    Type 2 diabetes mellitus with hyperglycemia, without long-term current use of insulin (Chronic)    Current Assessment & Plan     Diabetes Management Status    Statin: Taking  ACE/ARB: Taking    Screening or Prevention Patient's value Goal Complete/Controlled?   HgA1C Testing and Control   Lab Results   Component Value Date    HGBA1C 7.6 (H) 03/11/2020      Annually/Less than 8% Yes   Lipid profile : 12/21/2019 Annually Yes   LDL control Lab Results   Component Value Date    LDLCALC Invalid, Trig>400.0 12/21/2019    Annually/Less than 100 mg/dl  Yes   Nephropathy screening Lab Results   Component Value Date     LABMICR >5000.0 03/11/2020     Lab Results   Component Value Date    PROTEINUA 2+ (A) 04/09/2018    Annually Yes   Blood pressure BP Readings from Last 1 Encounters:   01/20/20 118/88    Less than 140/90 Yes   Dilated retinal exam : 08/02/2018 Annually Yes   Foot exam   : 02/26/2019 Annually No     Lab Results   Component Value Date    HGBA1C 7.6 (H) 03/11/2020    HGBA1C 10.8 (H) 12/21/2019    HGBA1C 7.7 (H) 02/02/2019    ESTGFRAFRICA 36.3 (A) 03/11/2020    EGFRNONAA 31.4 (A) 03/11/2020    MICALBCREAT Unable to calculate 03/11/2020    LDLCALC Invalid, Trig>400.0 12/21/2019       HEALTH MAINTENANCE: Diabetic health maintenance interventions reviewed and are up to date except for:      Topic    Eye Exam    Sub-optimally controlled. Much improved. We discussed risks and benefits of treatment options. Therapeutic lifestyle changes encouraged.            Relevant Medications    metFORMIN (GLUCOPHAGE-XR) 500 MG XR 24hr tablet    semaglutide (OZEMPIC) 0.25 mg or 0.5 mg(2 mg/1.5 mL) PnIj    semaglutide (OZEMPIC) 1 mg/dose (2 mg/1.5 mL) PnIj (Start on 4/21/2020)    Other Relevant Orders    Ambulatory referral/consult to Optometry    Hemoglobin A1c      Other Visit Diagnoses     Idiopathic chronic gout, multiple sites, with tophus (tophi)        Relevant Medications    colchicine (COLCRYS) 0.6 mg tablet           MEDICATION MANAGMENT    Outpatient Medications Prior to Visit   Medication Sig Dispense Refill    aspirin (ECOTRIN) 81 MG EC tablet Take 81 mg by mouth once daily.      atorvastatin (LIPITOR) 80 MG tablet TAKE 1 TABLET DAILY 90 tablet 3    blood sugar diagnostic Strp Check blood glucose 2 times daily as directed and as needed (dispense insurance preferred brand or patient choice) 200 each 5    blood-glucose meter (ONETOUCH VERIO IQ METER) Misc Use as directed 1 each 0    BRILINTA 90 mg tablet TAKE 1 TABLET TWICE A  tablet 3    cetirizine (ZYRTEC) 10 MG tablet Take 10 mg by mouth once daily.       lancets Misc Check blood glucose 2 times daily as directed and as needed (dispense insurance preferred brand or patient choice) 200 each 5    nitroGLYCERIN (NITROSTAT) 0.4 MG SL tablet Dissolve one tablet underneath tongue at onset of angina; may repeat every 5 minutes if needed. Call 911 if angina persists after 2 doses. 30 tablet 5    amLODIPine (NORVASC) 10 MG tablet Take 1 tablet (10 mg total) by mouth once daily. 90 tablet 0    colchicine (COLCRYS) 0.6 mg tablet Take 1 tablet (0.6 mg total) by mouth every other day. 45 tablet 3    losartan (COZAAR) 100 MG tablet Take 1 tablet (100 mg total) by mouth once daily. 90 tablet 0    metFORMIN (GLUCOPHAGE-XR) 500 MG XR 24hr tablet Take 1 tablet (500 mg total) by mouth 2 (two) times daily with meals. 180 tablet 0    metoprolol tartrate (LOPRESSOR) 25 MG tablet Take 1 tablet (25 mg total) by mouth 2 (two) times daily. 180 tablet 3    semaglutide (OZEMPIC) 0.25 mg or 0.5 mg(2 mg/1.5 mL) PnIj Inject 0.25 mg into the skin every 7 days. 6 mL 0    MULTIVITAMIN/IRON/FOLIC ACID (CENTRUM COMPLETE ORAL) Take by mouth once daily at 6am.       No facility-administered medications prior to visit.         Medications Discontinued During This Encounter   Medication Reason    metFORMIN (GLUCOPHAGE-XR) 500 MG XR 24hr tablet Dose adjustment    semaglutide (OZEMPIC) 0.25 mg or 0.5 mg(2 mg/1.5 mL) PnIj Dose adjustment    colchicine (COLCRYS) 0.6 mg tablet     metoprolol tartrate (LOPRESSOR) 25 MG tablet Reorder    amLODIPine (NORVASC) 10 MG tablet Reorder    losartan (COZAAR) 100 MG tablet Reorder    losartan (COZAAR) 100 MG tablet Reorder    amLODIPine (NORVASC) 10 MG tablet Reorder    metoprolol tartrate (LOPRESSOR) 25 MG tablet Reorder        Medications Ordered This Encounter   Medications    amLODIPine (NORVASC) 10 MG tablet     Sig: Take 1 tablet (10 mg total) by mouth once daily. (FOR BLOOD PRESSURE AND HEART)     Dispense:  90 tablet     Refill:  4    colchicine  "(COLCRYS) 0.6 mg tablet     Sig: Take 1 tablet (0.6 mg total) by mouth every other day.     Dispense:  45 tablet     Refill:  3    losartan (COZAAR) 100 MG tablet     Sig: Take 1 tablet (100 mg total) by mouth once daily. (FOR BLOOD PRESSURE AND HEART)     Dispense:  90 tablet     Refill:  4    metFORMIN (GLUCOPHAGE-XR) 500 MG XR 24hr tablet     Sig: Take 1 tablet (500 mg total) by mouth 2 (two) times daily with meals.     Dispense:  180 tablet     Refill:  1    metoprolol tartrate (LOPRESSOR) 25 MG tablet     Sig: Take 1 tablet (25 mg total) by mouth 2 (two) times daily. (FOR BLOOD PRESSURE AND HEART)     Dispense:  180 tablet     Refill:  4    semaglutide (OZEMPIC) 0.25 mg or 0.5 mg(2 mg/1.5 mL) PnIj     Sig: Inject 0.5 mg into the skin every 7 days. for 4 doses     Dispense:  1.5 mL     Refill:  0     NOTE DOSE CHANGE. START 1 MG WEEKLY DOSE AFTER COMPLETION OF THIS PRESCRIPTION.    semaglutide (OZEMPIC) 1 mg/dose (2 mg/1.5 mL) PnIj     Sig: Inject 0.75 mLs into the skin every 7 days.     Dispense:  9 mL     Refill:  3     Begin this dose after completion of the 0.5 mg per week dose.        FOLLOW-UP  Follow up in about 3 months (around 6/11/2020) for review test results, discuss treatment plan, re-evaluate problem(s) discussed today.     REVIEW OF SYSTEMS  CARDIOVASCULAR: No angina or orthopnea reported.   ENDOCRINE: No polyuria or polydipsia reported.    PHYSICAL EXAM  CONSTITUTIONAL: No apparent distress. Does not appear acutely ill or septic. Appears adequately hydrated.  PULM: Breathing unlabored.  PSYCHIATRIC: Alert and conversant and grossly oriented. Mood is grossly neutral. Affect appropriate. Judgment and insight grossly intact.    Documentation entered by me for this encounter may have been done in part using speech-recognition technology. Although I have made an effort to ensure accuracy, "sound like" errors may exist and should be interpreted in context. -KEVIN Roberson MD.     Visit " Details: This visit was a telemedicine virtual visit with synchronous audio and video. Robb reported that his location at the time of this visit was in the [facility]. Robb had the choice to come into office to receive these medical services. Robb chose and consented to receive these medical services by telemedicine.

## 2020-04-02 ENCOUNTER — PATIENT MESSAGE (OUTPATIENT)
Dept: NEPHROLOGY | Facility: CLINIC | Age: 40
End: 2020-04-02

## 2020-04-02 ENCOUNTER — PATIENT OUTREACH (OUTPATIENT)
Dept: OTHER | Facility: OTHER | Age: 40
End: 2020-04-02

## 2020-04-02 NOTE — PROGRESS NOTES
Digital Medicine: Health  Follow-Up    Brief follow-up call. Patient stated his Ozempic was increased and he is doing great. He is able to work remotely most days at this time and is practicing social distancing when he needs to go into the office. Since his medication increase he has had some readings in the 80's so I reminded him how to treat hypoglycemia.    The history is provided by the patient.     Follow Up  Follow-up reason(s): reading review and routine education      Readings are trending down due to medication adherence.    Routine Education Topics: eating patterns, macronutrient distribution and hypoglycemia        INTERVENTION(S)  encouragement/support and denied questions    PLAN  patient verbalizes understanding          Topic    Eye Exam          Last 6 Patient Entered Readings                                          Most Recent A1c: 7.6% on 3/11/2020  (Goal: 8%)     Recent Readings 4/2/2020 4/1/2020 3/31/2020 3/31/2020 3/30/2020    Blood Glucose (mg/dL) 112 120 85 98 114                Screenings    SDOH

## 2020-04-03 ENCOUNTER — OFFICE VISIT (OUTPATIENT)
Dept: NEPHROLOGY | Facility: CLINIC | Age: 40
End: 2020-04-03
Payer: COMMERCIAL

## 2020-04-03 ENCOUNTER — PATIENT MESSAGE (OUTPATIENT)
Dept: NEPHROLOGY | Facility: CLINIC | Age: 40
End: 2020-04-03

## 2020-04-03 ENCOUNTER — PATIENT OUTREACH (OUTPATIENT)
Dept: ADMINISTRATIVE | Facility: OTHER | Age: 40
End: 2020-04-03

## 2020-04-03 DIAGNOSIS — N18.30 CKD (CHRONIC KIDNEY DISEASE) STAGE 3, GFR 30-59 ML/MIN: Primary | ICD-10-CM

## 2020-04-03 DIAGNOSIS — M1A.3620 CHRONIC GOUT OF LEFT KNEE DUE TO RENAL IMPAIRMENT WITHOUT TOPHUS: ICD-10-CM

## 2020-04-03 DIAGNOSIS — I25.10 CORONARY ARTERY DISEASE INVOLVING NATIVE CORONARY ARTERY OF NATIVE HEART WITHOUT ANGINA PECTORIS: ICD-10-CM

## 2020-04-03 DIAGNOSIS — R80.9 NEPHROTIC RANGE PROTEINURIA: ICD-10-CM

## 2020-04-03 DIAGNOSIS — G47.33 OBSTRUCTIVE SLEEP APNEA: ICD-10-CM

## 2020-04-03 DIAGNOSIS — M1A.09X0 IDIOPATHIC CHRONIC GOUT, MULTIPLE SITES, WITHOUT TOPHUS (TOPHI): ICD-10-CM

## 2020-04-03 DIAGNOSIS — R80.1 PERSISTENT PROTEINURIA: ICD-10-CM

## 2020-04-03 DIAGNOSIS — I10 BENIGN ESSENTIAL HYPERTENSION: ICD-10-CM

## 2020-04-03 PROCEDURE — 99215 PR OFFICE/OUTPT VISIT, EST, LEVL V, 40-54 MIN: ICD-10-PCS | Mod: 95,,, | Performed by: INTERNAL MEDICINE

## 2020-04-03 PROCEDURE — 99215 OFFICE O/P EST HI 40 MIN: CPT | Mod: 95,,, | Performed by: INTERNAL MEDICINE

## 2020-04-03 RX ORDER — DILTIAZEM HYDROCHLORIDE 120 MG/1
120 TABLET, FILM COATED ORAL DAILY
Qty: 30 TABLET | Refills: 11 | Status: SHIPPED | OUTPATIENT
Start: 2020-04-03 | End: 2021-04-19

## 2020-04-03 NOTE — PROGRESS NOTES
Digital Medicine: Clinician Follow-Up    Called patient for follow up. He is doing well with no complaints. Patient had a visit with PCP on 3/24/2020 and PCP increased OZempic to 0.5 mg once weekly. Patient says he was directed to continue Ozempic 0.5 mg once weekly for one month and then increase to 1 mg once weekly. Patient says he has tolerated the dose increase. He says that initially BG was in the 80s a few times and he felt a little off. He says his body had to get used of the lower BG and now he feels fine. He confirms that he has been eating regular meals and not skipping meals    The history is provided by the patient.     Follow Up  Follow-up reason(s): reading review, medication change follow-up and routine education      Readings are trending down due to medication adherence.    Medication Change: dose increase    Is patient tolerating med change?:  YesA1c 7.6% on 3/11/2020, which is a decrease from 10.8% on 12/21/2019.    Worsening renal function on CMP on 3/11/2020.       INTERVENTION(S)  reviewed appropriate dose schedule    PLAN  patient verbalizes understanding and continue monitoring    Continue Ozempic 0.5 mg once weekly and metformin 500 mg BID    Contact me before increasing OZempic to 1 mg once weekly. If BG in the 80s, likely won't require higher dose.     Will follow future renal labs. If GFR < 30, will stop metformin          Topic    Eye Exam          Last 6 Patient Entered Readings                                          Most Recent A1c: 7.6% on 3/11/2020  (Goal: 8%)     Recent Readings 4/2/2020 4/1/2020 3/31/2020 3/31/2020 3/30/2020    Blood Glucose (mg/dL) 112 120 85 98 114        Diabetes Medications     metFORMIN (GLUCOPHAGE-XR) 500 MG XR 24hr tablet Take 1 tablet (500 mg total) by mouth 2 (two) times daily with meals.    semaglutide (OZEMPIC) 0.25 mg or 0.5 mg(2 mg/1.5 mL) PnIj Inject 0.5 mg into the skin every 7 days. for 4 doses    semaglutide (OZEMPIC) 1 mg/dose (2 mg/1.5 mL)  PnIj Starting on 4/21/2020. Inject 0.75 mLs into the skin every 7 days.               Screenings

## 2020-04-03 NOTE — PATIENT INSTRUCTIONS
1.Chronic kidney disease stage III:  Patient's creatinine is progressively getting worse with nephrotic range proteinuria.  May schedule an elective kidney biopsy in the coming months         2.  History of gout with hyperuricemia:  Allopurinol dose of 600 mg daily is very high dose but is controlling the uric acid.  Will get expert opinion by Dr. Hills.  Continue to follow up with Dr. Roberson--PCP         3.  Nephrotic range proteinuria:renal ultrasound was done in 3/2017.Most likely these findings are from diabetic nephropathy.  Continue with losartan 100 mg daily.  Add Cardizem  20 mg daily.  Patient may consider Invokana but patient is already on Semaglutide.  Will wait for now.Proteinuria is following closely with weight loss/gain ? 2FSGS     4.  Hypertension well-controlled.   Adding small dose of Cardizem for proteinuria control.  Will watch very closely the heart rate and blood pressure period and will send the patient detailed plans.  If the blood pressure drops too low we may have to cut back on amlodipine.    5.  Coronary artery disease status post left heart catheterization: Medications and cardiology report reviewed.  Continue current medications    6. Morbid obesity with multiple complications status post bariatric surgery.  Patient also have severe sleep apnea could not tolerate CPAP.  May consider Provigil on follow-up as a treatment option      fup 2 months

## 2020-04-03 NOTE — PROGRESS NOTES
Subjective:       Patient ID: Robb Iglesias is a 39 y.o. White male who presents for follow-up and progression of the disease evaluation of Nephrotic Syndrome; Gout; Hypertension; Proteinuria; and Chronic Kidney Disease    Hypertension   Pertinent negatives include no chest pain, headaches, neck pain, palpitations or shortness of breath.        Patient is a 39-year-old male with history of type 2 diabetes with morbid obesity.  Patient has multiple family members with type 2 diabetes with multiple complications.  Patient has history of chronic kidney disease stage III from diabetic nephropathy.  In February 2016 his creatinine was 2.5 with approximate GFR 35%.  His hemoglobin A1c was 10.9 at that time.    2015 has history of hepatomegaly status post liver biopsy    March 2017 patient underwent vertical sleeve gastrectomy for bariatric surgery.  His postoperative course was complicated by acute kidney injury from dehydration.  His maximum creatinine was 7.7 with a BUN of 126 no RRT required     May 2017 creatinine down to 1.5    June 2017 patient presents for consultation for chronic kidney disease with proteinuria and hematuria.  His BMI down to 29 and has lost greater than 80 pounds in last 3 months. ( 320 ib pre-op) . 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  He underwent LHC that showed 99% involving proximal and mid LAD which was treated with PCI. LVEF 55%. Cardiac medication include ASA, Brilinta, STATIN, Metoprolol, and Lisinopril.    September 2017 acute gout attack seen by rheumatology records reviewed. Cystatin C shows GFR 67%    04/03/2020 patient seen after a gap of 2 years.  Creatinine is up to 2.5 with nephrotic range proteinuria.  Uric acid is  5.8 but on allopurinol 600 mg daily.  Patient is on Ozempic ( semaglutide) and metformin for diabetic management.  Patient is on losartan 100 mg daily for proteinuria and hypertension    The patient location is: Home  The chief complaint leading to consultation is:  Chronic kidney disease with nephrotic range proteinuria  Visit type: Virtual visit with synchronous audio and video  Total time spent with patient:  30 min  Each patient to whom he or she provides medical services by telemedicine is:  (1) informed of the relationship between the physician and patient and the respective role of any other health care provider with respect to management of the patient; and (2) notified that he or she may decline to receive medical services by telemedicine and may withdraw from such care at any time.    Notes:  See below    Review of Systems   Constitutional: Negative.  Negative for activity change, appetite change, chills, diaphoresis, fatigue and fever.   HENT: Negative.  Negative for congestion and trouble swallowing.    Eyes: Negative.    Respiratory: Negative.  Negative for cough, chest tightness, shortness of breath and wheezing.    Cardiovascular: Negative.  Negative for chest pain, palpitations and leg swelling.   Gastrointestinal: Negative.  Negative for abdominal distention, abdominal pain, nausea and vomiting.   Genitourinary: Negative.  Negative for decreased urine volume, difficulty urinating, dysuria, enuresis, flank pain, frequency, hematuria, penile swelling, scrotal swelling and urgency.   Musculoskeletal: Negative.  Negative for arthralgias, back pain, joint swelling and neck pain.   Skin: Negative for rash.   Neurological: Negative.  Negative for tremors, seizures and headaches.   Psychiatric/Behavioral: Negative.  Negative for confusion and sleep disturbance. The patient is not nervous/anxious.        Objective:           Weight loss of 13 kg in April 2017 down from 118 kg down to 106.8 mg    4/3/2020 250  ib /90 weight stable in last one year       Physical Exam   Constitutional: He is oriented to person, place, and time. He appears well-developed and well-nourished. No distress.   HENT:   Head: Normocephalic.   Eyes: Pupils are equal, round, and reactive to light. EOM are normal.   Neck: Normal range of motion. Neck supple. No JVD present. No thyromegaly present.   Cardiovascular: Normal rate, regular rhythm, S1 normal, S2 normal, normal heart sounds and intact distal pulses. PMI is not displaced. Exam reveals no gallop and no friction rub.   No murmur heard.  Pulmonary/Chest: Effort normal and breath sounds normal. No respiratory distress. He has no wheezes. He has no rales. He exhibits no tenderness.   Abdominal: He exhibits no distension and no mass. There is no hepatosplenomegaly. There is no tenderness. There is no rebound and no CVA tenderness. No hernia.   Musculoskeletal: Normal range of motion. He exhibits no edema or tenderness.   Lymphadenopathy:     He has no cervical adenopathy.   Neurological: He is alert and oriented to person, place, and time. He has normal reflexes. He is not disoriented. He displays normal reflexes. No cranial nerve deficit. He exhibits normal muscle tone. Coordination normal.   Skin: Skin is warm and dry. No rash noted. No erythema.   Psychiatric: He has a normal mood and affect. His behavior is normal. Judgment and thought content normal.         Lab Results   Component Value Date    CREATININE 2.5 (H) 03/11/2020    BUN 32 (H) 03/11/2020     03/11/2020    K 4.7 03/11/2020     03/11/2020    CO2 27 03/11/2020     Lab Results   Component Value Date    WBC 6.63 04/24/2018    HGB 14.8 04/24/2018    HCT 43.8 04/24/2018    MCV 83 04/24/2018     (L) 04/24/2018     Lab Results   Component Value Date    PTH 29.0 09/13/2017    CALCIUM 9.6 03/11/2020    PHOS 3.1 03/11/2020         Lab Results   Component Value Date    URICACID 5.8 03/11/2020     Urine protein is >3 g  per 24 hours     Assessment:    )    1. CKD (chronic kidney disease) stage 3, GFR 30-59 ml/min    2. Nephrotic range proteinuria    3. Benign essential hypertension    4. Persistent proteinuria    5. Chronic gout of left knee due to renal impairment without tophus    6. Coronary artery disease involving native coronary artery of native heart without angina pectoris    7. Idiopathic chronic gout, multiple sites, without tophus (tophi)    8. Severe obstructive sleep apnea - Intolerant of CPAP        Plan:         1.Chronic kidney disease stage III:  Patient's creatinine is progressively getting worse with nephrotic range proteinuria.  May schedule an elective kidney biopsy in the coming months         2.  History of gout with hyperuricemia:  Allopurinol dose of 600 mg daily is very high dose but is controlling the uric acid.  Will get expert opinion by Dr. Hills.  Continue to follow up with Dr. Roberson--PCP         3.  Nephrotic range proteinuria:renal ultrasound was done in 3/2017.Most likely these findings are from diabetic nephropathy.  Continue with losartan 100 mg daily.  Add Cardizem  20 mg daily.  Patient may consider Invokana but patient is already on Semaglutide.  Will wait for now.Proteinuria is following closely with weight loss/gain ? 2FSGS     4.  Hypertension well-controlled.   Adding small dose of Cardizem for proteinuria control.  Will watch very closely the heart rate and blood pressure period and will send the patient detailed plans.  If the blood pressure drops too low we may have to cut back on amlodipine.    5.  Coronary artery disease status post left heart catheterization: Medications and cardiology report reviewed.  Continue current medications    6. Morbid obesity with multiple complications status post bariatric surgery.  Patient also have severe sleep apnea could not tolerate CPAP.  May consider Provigil on follow-up as a treatment option      fup 2 months

## 2020-04-10 DIAGNOSIS — M1A.0620 IDIOPATHIC CHRONIC GOUT OF LEFT KNEE WITHOUT TOPHUS: Chronic | ICD-10-CM

## 2020-04-10 NOTE — TELEPHONE ENCOUNTER
Please route this to a provider who is in the clinic. Let them know that they are welcome to call or send me a secure chat message if they have any questions.

## 2020-04-13 ENCOUNTER — PATIENT MESSAGE (OUTPATIENT)
Dept: INTERNAL MEDICINE | Facility: CLINIC | Age: 40
End: 2020-04-13

## 2020-04-13 DIAGNOSIS — M1A.09X0 IDIOPATHIC CHRONIC GOUT, MULTIPLE SITES, WITHOUT TOPHUS (TOPHI): Primary | ICD-10-CM

## 2020-04-13 RX ORDER — FEBUXOSTAT 80 MG/1
80 TABLET, FILM COATED ORAL DAILY
Qty: 90 TABLET | Refills: 3 | Status: SHIPPED | OUTPATIENT
Start: 2020-04-13 | End: 2021-04-13

## 2020-04-13 RX ORDER — ALLOPURINOL 300 MG/1
TABLET ORAL
Qty: 180 TABLET | Refills: 0 | Status: SHIPPED | OUTPATIENT
Start: 2020-04-13 | End: 2020-04-13

## 2020-05-02 ENCOUNTER — PATIENT MESSAGE (OUTPATIENT)
Dept: INTERNAL MEDICINE | Facility: CLINIC | Age: 40
End: 2020-05-02

## 2020-05-04 ENCOUNTER — PATIENT MESSAGE (OUTPATIENT)
Dept: INTERNAL MEDICINE | Facility: CLINIC | Age: 40
End: 2020-05-04

## 2020-05-08 ENCOUNTER — PATIENT OUTREACH (OUTPATIENT)
Dept: OTHER | Facility: OTHER | Age: 40
End: 2020-05-08

## 2020-05-08 DIAGNOSIS — E11.65 TYPE 2 DIABETES MELLITUS WITH HYPERGLYCEMIA, WITHOUT LONG-TERM CURRENT USE OF INSULIN: ICD-10-CM

## 2020-05-12 ENCOUNTER — TELEPHONE (OUTPATIENT)
Dept: INTERNAL MEDICINE | Facility: CLINIC | Age: 40
End: 2020-05-12

## 2020-05-12 NOTE — TELEPHONE ENCOUNTER
Spoke with patient and scheduled his labs and follow up with Dr. Roberson. He has already scheduled his eye appointment and nephrology appointment.

## 2020-05-12 NOTE — TELEPHONE ENCOUNTER
----- Message from KEVIN Roberson MD sent at 3/24/2020 10:59 AM CDT -----  Schedule his labs in early-June, 2020 with follow-up appointment with me, nephrology, and optometry a few days thereafter to review results.

## 2020-05-14 ENCOUNTER — PATIENT OUTREACH (OUTPATIENT)
Dept: OTHER | Facility: OTHER | Age: 40
End: 2020-05-14

## 2020-05-14 NOTE — PROGRESS NOTES
[facility]: [facility]  Follow-Up    Brief follow-up. Patient is back at work. Recently increased Ozempic. Patient stated he is really pleased with his BG readings and feels great. He is not sure, but he thinks he is also losing weight because his clothes are fitting more comfortably.    The history is provided by the patient.     Follow Up  Follow-up reason(s): reading review          INTERVENTION(S)  encouragement/support and denied questions    PLAN  patient verbalizes understanding          Topic    Eye Exam          Last 6 Patient Entered Readings                                          Most Recent A1c: 7.6% on 3/11/2020  (Goal: 8%)     Recent Readings 5/11/2020 5/10/2020 5/6/2020 5/4/2020 4/30/2020    Blood Glucose (mg/dL) 96 141 106 132 92                    Diet Screening   No change to diet.      Physical Activity Screening   No change to exercise routine.    When asked if exercising, patient responded: yes    Patient participates in the following activities: walking    Goes for walks 2 or 3 days a week. Has not been able to exercise as much as he was while he was working remotely.      SDOH

## 2020-05-25 NOTE — PROGRESS NOTES
Called patient for DM follow up and to assess tolerance of Ozempic.     Per chart review, patient messaged PCP's offce regarding BP program. Patient is not active in HTN program. I placed new enrollment order. Will discuss with patient at successful outreach

## 2020-06-09 ENCOUNTER — PATIENT OUTREACH (OUTPATIENT)
Dept: ADMINISTRATIVE | Facility: OTHER | Age: 40
End: 2020-06-09

## 2020-06-09 DIAGNOSIS — I25.10 CORONARY ARTERY DISEASE INVOLVING NATIVE CORONARY ARTERY OF NATIVE HEART WITHOUT ANGINA PECTORIS: Primary | Chronic | ICD-10-CM

## 2020-06-10 ENCOUNTER — TELEPHONE (OUTPATIENT)
Dept: INTERNAL MEDICINE | Facility: CLINIC | Age: 40
End: 2020-06-10

## 2020-06-10 NOTE — TELEPHONE ENCOUNTER
Spoke with pt regarding his lab needing to be reschedule for 6/12/2020.Pt appointment was schedule.//LB

## 2020-06-11 ENCOUNTER — OFFICE VISIT (OUTPATIENT)
Dept: CARDIOLOGY | Facility: CLINIC | Age: 40
End: 2020-06-11
Payer: COMMERCIAL

## 2020-06-11 ENCOUNTER — HOSPITAL ENCOUNTER (OUTPATIENT)
Dept: CARDIOLOGY | Facility: HOSPITAL | Age: 40
Discharge: HOME | End: 2020-06-11
Attending: NUCLEAR MEDICINE
Payer: COMMERCIAL

## 2020-06-11 VITALS
DIASTOLIC BLOOD PRESSURE: 90 MMHG | OXYGEN SATURATION: 98 % | WEIGHT: 244.94 LBS | HEIGHT: 75 IN | BODY MASS INDEX: 30.45 KG/M2 | HEART RATE: 73 BPM | SYSTOLIC BLOOD PRESSURE: 120 MMHG

## 2020-06-11 DIAGNOSIS — J30.89 CHRONIC NONSEASONAL ALLERGIC RHINITIS DUE TO POLLEN: Chronic | ICD-10-CM

## 2020-06-11 DIAGNOSIS — E66.09 CLASS 1 OBESITY DUE TO EXCESS CALORIES WITH SERIOUS COMORBIDITY AND BODY MASS INDEX (BMI) OF 31.0 TO 31.9 IN ADULT: Chronic | ICD-10-CM

## 2020-06-11 DIAGNOSIS — E80.6 DIRECT HYPERBILIRUBINEMIA: Chronic | ICD-10-CM

## 2020-06-11 DIAGNOSIS — E11.65 TYPE 2 DIABETES MELLITUS WITH HYPERGLYCEMIA, WITHOUT LONG-TERM CURRENT USE OF INSULIN: Chronic | ICD-10-CM

## 2020-06-11 DIAGNOSIS — G47.33 OBSTRUCTIVE SLEEP APNEA: Chronic | ICD-10-CM

## 2020-06-11 DIAGNOSIS — I25.10 CORONARY ARTERY DISEASE INVOLVING NATIVE CORONARY ARTERY OF NATIVE HEART WITHOUT ANGINA PECTORIS: Primary | Chronic | ICD-10-CM

## 2020-06-11 DIAGNOSIS — E78.5 DYSLIPIDEMIA ASSOCIATED WITH TYPE 2 DIABETES MELLITUS: ICD-10-CM

## 2020-06-11 DIAGNOSIS — Z98.84 BARIATRIC SURGERY STATUS: Chronic | ICD-10-CM

## 2020-06-11 DIAGNOSIS — N18.30 CKD (CHRONIC KIDNEY DISEASE) STAGE 3, GFR 30-59 ML/MIN: ICD-10-CM

## 2020-06-11 DIAGNOSIS — M1A.09X0 IDIOPATHIC CHRONIC GOUT, MULTIPLE SITES, WITHOUT TOPHUS (TOPHI): Chronic | ICD-10-CM

## 2020-06-11 DIAGNOSIS — I25.10 CORONARY ARTERY DISEASE INVOLVING NATIVE CORONARY ARTERY OF NATIVE HEART WITHOUT ANGINA PECTORIS: Chronic | ICD-10-CM

## 2020-06-11 DIAGNOSIS — E11.69 DYSLIPIDEMIA ASSOCIATED WITH TYPE 2 DIABETES MELLITUS: ICD-10-CM

## 2020-06-11 DIAGNOSIS — Z95.820 STATUS POST ANGIOPLASTY WITH STENT: ICD-10-CM

## 2020-06-11 DIAGNOSIS — I10 ESSENTIAL HYPERTENSION: Chronic | ICD-10-CM

## 2020-06-11 DIAGNOSIS — I21.4 NSTEMI (NON-ST ELEVATED MYOCARDIAL INFARCTION): ICD-10-CM

## 2020-06-11 DIAGNOSIS — R80.1 PERSISTENT PROTEINURIA: ICD-10-CM

## 2020-06-11 DIAGNOSIS — Z91.89 AT RISK FOR CARDIOVASCULAR EVENT: Chronic | ICD-10-CM

## 2020-06-11 DIAGNOSIS — M1A.0720 IDIOPATHIC CHRONIC GOUT, LEFT ANKLE AND FOOT, WITHOUT TOPHUS (TOPHI): Chronic | ICD-10-CM

## 2020-06-11 DIAGNOSIS — M1A.0710 IDIOPATHIC CHRONIC GOUT, RIGHT ANKLE AND FOOT, WITHOUT TOPHUS (TOPHI): ICD-10-CM

## 2020-06-11 DIAGNOSIS — E11.22 TYPE 2 DIABETES MELLITUS WITH STAGE 3 CHRONIC KIDNEY DISEASE, WITHOUT LONG-TERM CURRENT USE OF INSULIN: Chronic | ICD-10-CM

## 2020-06-11 DIAGNOSIS — N18.30 TYPE 2 DIABETES MELLITUS WITH STAGE 3 CHRONIC KIDNEY DISEASE, WITHOUT LONG-TERM CURRENT USE OF INSULIN: Chronic | ICD-10-CM

## 2020-06-11 DIAGNOSIS — Z98.84 STATUS FOLLOWING SURGERY FOR WEIGHT LOSS: ICD-10-CM

## 2020-06-11 PROCEDURE — 99214 OFFICE O/P EST MOD 30 MIN: CPT | Mod: S$GLB,,, | Performed by: INTERNAL MEDICINE

## 2020-06-11 PROCEDURE — 3008F PR BODY MASS INDEX (BMI) DOCUMENTED: ICD-10-PCS | Mod: CPTII,S$GLB,, | Performed by: INTERNAL MEDICINE

## 2020-06-11 PROCEDURE — 3080F DIAST BP >= 90 MM HG: CPT | Mod: CPTII,S$GLB,, | Performed by: INTERNAL MEDICINE

## 2020-06-11 PROCEDURE — 3074F SYST BP LT 130 MM HG: CPT | Mod: CPTII,S$GLB,, | Performed by: INTERNAL MEDICINE

## 2020-06-11 PROCEDURE — 99999 PR PBB SHADOW E&M-EST. PATIENT-LVL III: ICD-10-PCS | Mod: PBBFAC,,, | Performed by: INTERNAL MEDICINE

## 2020-06-11 PROCEDURE — 3051F HG A1C>EQUAL 7.0%<8.0%: CPT | Mod: CPTII,S$GLB,, | Performed by: INTERNAL MEDICINE

## 2020-06-11 PROCEDURE — 3051F PR MOST RECENT HEMOGLOBIN A1C LEVEL 7.0 - < 8.0%: ICD-10-PCS | Mod: CPTII,S$GLB,, | Performed by: INTERNAL MEDICINE

## 2020-06-11 PROCEDURE — 99999 PR PBB SHADOW E&M-EST. PATIENT-LVL III: CPT | Mod: PBBFAC,,, | Performed by: INTERNAL MEDICINE

## 2020-06-11 PROCEDURE — 93010 ELECTROCARDIOGRAM REPORT: CPT | Mod: ,,, | Performed by: INTERNAL MEDICINE

## 2020-06-11 PROCEDURE — 93010 EKG 12-LEAD: ICD-10-PCS | Mod: ,,, | Performed by: INTERNAL MEDICINE

## 2020-06-11 PROCEDURE — 3080F PR MOST RECENT DIASTOLIC BLOOD PRESSURE >= 90 MM HG: ICD-10-PCS | Mod: CPTII,S$GLB,, | Performed by: INTERNAL MEDICINE

## 2020-06-11 PROCEDURE — 3074F PR MOST RECENT SYSTOLIC BLOOD PRESSURE < 130 MM HG: ICD-10-PCS | Mod: CPTII,S$GLB,, | Performed by: INTERNAL MEDICINE

## 2020-06-11 PROCEDURE — 3008F BODY MASS INDEX DOCD: CPT | Mod: CPTII,S$GLB,, | Performed by: INTERNAL MEDICINE

## 2020-06-11 PROCEDURE — 99214 PR OFFICE/OUTPT VISIT, EST, LEVL IV, 30-39 MIN: ICD-10-PCS | Mod: S$GLB,,, | Performed by: INTERNAL MEDICINE

## 2020-06-11 PROCEDURE — 93005 ELECTROCARDIOGRAM TRACING: CPT

## 2020-06-11 NOTE — PROGRESS NOTES
Subjective:   Patient ID:  Robb Iglesias is a 39 y.o. male who presents for follow up of Follow-up (CAD HTN)      HPI  A 40 yo male with cad s/p lad stent old mi htn hlp diabetes is here for f /u he is still exercising regulaqrily he is compliant with diet exercise lost some 10 lbs he is on digital htn however having issues with logistics he is going to o bar. Has no new chest pain or shortness of breath. He is not using his cpap regularily has issues clinically with compliance.  He has isusses with cpap compliance . I think this is a an anaxiety related to mask or claustrophobia. He is able to exercise w/o any issues. Lipids a1c are not on target.he has labs pending for am.  Past Medical History:   Diagnosis Date    Allergic rhinitis     Class 1 obesity due to excess calories with serious comorbidity and body mass index (BMI) of 31.0 to 31.9 in adult 4/7/2017    Coronary artery disease     Direct hyperbilirubinemia 3/24/2018    DM (diabetes mellitus) 2008    BS doesn't check any more 08/02/2018    Elevated bilirubin 3/21/2018    GERD (gastroesophageal reflux disease)     Gout     Hyperlipidemia     Hypertension associated with chronic kidney disease due to type 2 diabetes mellitus     Idiopathic chronic gout, multiple sites, without tophus (tophi) 7/19/2017    Long term (current) use of insulin     MI (myocardial infarction) 07/2017    Obesity     HENRY on CPAP     Proteinuria     Steatohepatitis     Fatty Liver    Type 2 diabetes mellitus with diabetic nephropathy     Type 2 diabetes mellitus with hyperglycemia     Type 2 diabetes mellitus with renal manifestations     Type 2 diabetes mellitus with stage 3 chronic kidney disease, without long-term current use of insulin 6/21/2017       Past Surgical History:   Procedure Laterality Date    CORONARY ANGIOPLASTY WITH STENT PLACEMENT      LASIK Bilateral     LIVER BIOPSY      NASAL ENDOSCOPY      NASAL SEPTUM SURGERY      SLEEVE  GASTROPLASTY  03/06/2017       Social History     Tobacco Use    Smoking status: Never Smoker    Smokeless tobacco: Never Used   Substance Use Topics    Alcohol use: No     Frequency: Monthly or less     Drinks per session: 1 or 2     Binge frequency: Never     Comment: 1-2 times per month    Drug use: No       Family History   Problem Relation Age of Onset    Diabetes type II Mother     Hypertension Mother     Hyperlipidemia Mother     Diabetes Mother     Diabetes type II Brother     Diabetes type II Brother     Diabetes Maternal Grandmother        Current Outpatient Medications   Medication Sig    amLODIPine (NORVASC) 10 MG tablet Take 1 tablet (10 mg total) by mouth once daily. (FOR BLOOD PRESSURE AND HEART)    aspirin (ECOTRIN) 81 MG EC tablet Take 81 mg by mouth once daily.    atorvastatin (LIPITOR) 80 MG tablet TAKE 1 TABLET DAILY    blood sugar diagnostic Strp Check blood glucose 2 times daily as directed and as needed (dispense insurance preferred brand or patient choice)    blood-glucose meter (ONETOUCH VERIO IQ METER) Misc Use as directed    BRILINTA 90 mg tablet TAKE 1 TABLET TWICE A DAY    cetirizine (ZYRTEC) 10 MG tablet Take 10 mg by mouth once daily.    colchicine (COLCRYS) 0.6 mg tablet Take 1 tablet (0.6 mg total) by mouth every other day.    diltiaZEM (CARDIZEM) 120 MG tablet Take 1 tablet (120 mg total) by mouth once daily.    febuxostat (ULORIC) 80 mg Tab Take 1 tablet (80 mg total) by mouth once daily.    lancets Misc Check blood glucose 2 times daily as directed and as needed (dispense insurance preferred brand or patient choice)    losartan (COZAAR) 100 MG tablet Take 1 tablet (100 mg total) by mouth once daily. (FOR BLOOD PRESSURE AND HEART)    metFORMIN (GLUCOPHAGE-XR) 500 MG XR 24hr tablet Take 1 tablet (500 mg total) by mouth 2 (two) times daily with meals.    metoprolol tartrate (LOPRESSOR) 25 MG tablet Take 1 tablet (25 mg total) by mouth 2 (two) times daily.  (FOR BLOOD PRESSURE AND HEART)    MULTIVITAMIN/IRON/FOLIC ACID (CENTRUM COMPLETE ORAL) Take by mouth once daily at 6am.    nitroGLYCERIN (NITROSTAT) 0.4 MG SL tablet Dissolve one tablet underneath tongue at onset of angina; may repeat every 5 minutes if needed. Call 911 if angina persists after 2 doses.    semaglutide (OZEMPIC) 1 mg/dose (2 mg/1.5 mL) PnIj Inject 0.75 mLs into the skin every 7 days.     No current facility-administered medications for this visit.      Current Outpatient Medications on File Prior to Visit   Medication Sig    amLODIPine (NORVASC) 10 MG tablet Take 1 tablet (10 mg total) by mouth once daily. (FOR BLOOD PRESSURE AND HEART)    aspirin (ECOTRIN) 81 MG EC tablet Take 81 mg by mouth once daily.    atorvastatin (LIPITOR) 80 MG tablet TAKE 1 TABLET DAILY    blood sugar diagnostic Strp Check blood glucose 2 times daily as directed and as needed (dispense insurance preferred brand or patient choice)    blood-glucose meter (ONETOUCH VERIO IQ METER) Misc Use as directed    BRILINTA 90 mg tablet TAKE 1 TABLET TWICE A DAY    cetirizine (ZYRTEC) 10 MG tablet Take 10 mg by mouth once daily.    colchicine (COLCRYS) 0.6 mg tablet Take 1 tablet (0.6 mg total) by mouth every other day.    diltiaZEM (CARDIZEM) 120 MG tablet Take 1 tablet (120 mg total) by mouth once daily.    febuxostat (ULORIC) 80 mg Tab Take 1 tablet (80 mg total) by mouth once daily.    lancets Misc Check blood glucose 2 times daily as directed and as needed (dispense insurance preferred brand or patient choice)    losartan (COZAAR) 100 MG tablet Take 1 tablet (100 mg total) by mouth once daily. (FOR BLOOD PRESSURE AND HEART)    metFORMIN (GLUCOPHAGE-XR) 500 MG XR 24hr tablet Take 1 tablet (500 mg total) by mouth 2 (two) times daily with meals.    metoprolol tartrate (LOPRESSOR) 25 MG tablet Take 1 tablet (25 mg total) by mouth 2 (two) times daily. (FOR BLOOD PRESSURE AND HEART)    MULTIVITAMIN/IRON/FOLIC ACID  (CENTRUM COMPLETE ORAL) Take by mouth once daily at 6am.    nitroGLYCERIN (NITROSTAT) 0.4 MG SL tablet Dissolve one tablet underneath tongue at onset of angina; may repeat every 5 minutes if needed. Call 911 if angina persists after 2 doses.    semaglutide (OZEMPIC) 1 mg/dose (2 mg/1.5 mL) PnIj Inject 0.75 mLs into the skin every 7 days.     No current facility-administered medications on file prior to visit.      Review of patient's allergies indicates:  No Known Allergies  Review of Systems   Constitution: Negative for malaise/fatigue.   Eyes: Negative for blurred vision.   Cardiovascular: Negative for chest pain, claudication, cyanosis, dyspnea on exertion, irregular heartbeat, leg swelling, near-syncope, orthopnea, palpitations and paroxysmal nocturnal dyspnea.   Respiratory: Positive for snoring. Negative for cough, hemoptysis and shortness of breath.    Hematologic/Lymphatic: Negative for bleeding problem. Does not bruise/bleed easily.   Skin: Negative for dry skin and itching.   Musculoskeletal: Negative for falls, muscle weakness and myalgias.   Gastrointestinal: Negative for abdominal pain, diarrhea, heartburn, hematemesis, hematochezia and melena.   Genitourinary: Negative for flank pain and hematuria.   Neurological: Negative for dizziness, focal weakness, headaches, light-headedness, numbness, paresthesias, seizures and weakness.   Psychiatric/Behavioral: Negative for altered mental status and memory loss. The patient is not nervous/anxious.    Allergic/Immunologic: Negative for hives.       Objective:   Physical Exam   Constitutional: He is oriented to person, place, and time. He appears well-developed and well-nourished. No distress.   HENT:   Head: Normocephalic and atraumatic.   Eyes: Pupils are equal, round, and reactive to light. EOM are normal. Right eye exhibits no discharge. Left eye exhibits no discharge.   Neck: Neck supple. No JVD present. No thyromegaly present.   Cardiovascular: Normal  "rate, regular rhythm, normal heart sounds and intact distal pulses. Exam reveals no gallop and no friction rub.   No murmur heard.  Pulmonary/Chest: Effort normal and breath sounds normal. No respiratory distress. He has no wheezes. He has no rales. He exhibits no tenderness.   Abdominal: Soft. Bowel sounds are normal. He exhibits no distension. There is no tenderness.   Obese abdomen.    Musculoskeletal: Normal range of motion. He exhibits no edema.   Neurological: He is alert and oriented to person, place, and time. No cranial nerve deficit.   Skin: Skin is warm and dry. No rash noted. He is not diaphoretic. No erythema.   Psychiatric: He has a normal mood and affect. His behavior is normal.   Nursing note and vitals reviewed.    Vitals:    06/11/20 0823 06/11/20 0826   BP: (!) 118/90 (!) 120/90   BP Location: Right arm Left arm   Patient Position: Sitting Sitting   BP Method: Large (Manual) Large (Manual)   Pulse: 73    SpO2: 98%    Weight: 111.1 kg (244 lb 14.9 oz)    Height: 6' 3" (1.905 m)      Lab Results   Component Value Date    CHOL 156 12/21/2019    CHOL 167 11/29/2018    CHOL 139 04/23/2018     Lab Results   Component Value Date    HDL 28 (L) 12/21/2019    HDL 32 (L) 11/29/2018    HDL 31 (L) 04/23/2018     Lab Results   Component Value Date    LDLCALC Invalid, Trig>400.0 12/21/2019    LDLCALC 87.8 11/29/2018    LDLCALC 73.0 04/23/2018     Lab Results   Component Value Date    TRIG 415 (H) 12/21/2019    TRIG 236 (H) 11/29/2018    TRIG 175 (H) 04/23/2018     Lab Results   Component Value Date    CHOLHDL 17.9 (L) 12/21/2019    CHOLHDL 19.2 (L) 11/29/2018    CHOLHDL 22.3 04/23/2018       Chemistry        Component Value Date/Time     03/11/2020 0955    K 4.7 03/11/2020 0955     03/11/2020 0955    CO2 27 03/11/2020 0955    BUN 32 (H) 03/11/2020 0955    CREATININE 2.5 (H) 03/11/2020 0955     (H) 03/11/2020 0955        Component Value Date/Time    CALCIUM 9.6 03/11/2020 0955    ALKPHOS 91 " 03/11/2020 0955    AST 22 03/11/2020 0955    ALT 24 03/11/2020 0955    BILITOT 1.5 (H) 03/11/2020 0955    ESTGFRAFRICA 36.3 (A) 03/11/2020 0955    EGFRNONAA 31.4 (A) 03/11/2020 0955        Lab Results   Component Value Date    HGBA1C 7.6 (H) 03/11/2020       Lab Results   Component Value Date    TSH 0.727 07/31/2017     Lab Results   Component Value Date    INR 1.1 07/31/2017     Lab Results   Component Value Date    WBC 6.63 04/24/2018    HGB 14.8 04/24/2018    HCT 43.8 04/24/2018    MCV 83 04/24/2018     (L) 04/24/2018     BMP  Sodium   Date Value Ref Range Status   03/11/2020 142 136 - 145 mmol/L Final     Potassium   Date Value Ref Range Status   03/11/2020 4.7 3.5 - 5.1 mmol/L Final     Chloride   Date Value Ref Range Status   03/11/2020 106 95 - 110 mmol/L Final     CO2   Date Value Ref Range Status   03/11/2020 27 23 - 29 mmol/L Final     BUN, Bld   Date Value Ref Range Status   03/11/2020 32 (H) 6 - 20 mg/dL Final     Creatinine   Date Value Ref Range Status   03/11/2020 2.5 (H) 0.5 - 1.4 mg/dL Final     Calcium   Date Value Ref Range Status   03/11/2020 9.6 8.7 - 10.5 mg/dL Final     Anion Gap   Date Value Ref Range Status   03/11/2020 9 8 - 16 mmol/L Final     eGFR if    Date Value Ref Range Status   03/11/2020 36.3 (A) >60 mL/min/1.73 m^2 Final     eGFR if non    Date Value Ref Range Status   03/11/2020 31.4 (A) >60 mL/min/1.73 m^2 Final     Comment:     Calculation used to obtain the estimated glomerular filtration  rate (eGFR) is the CKD-EPI equation.        CrCl cannot be calculated (Patient's most recent lab result is older than the maximum 7 days allowed.).    Assessment:     1. Coronary artery disease involving native coronary artery of native heart without angina pectoris    2. Bariatric surgery status    3. Class 1 obesity due to excess calories with serious comorbidity and body mass index (BMI) of 31.0 to 31.9 in adult    4. Essential hypertension    5.  Idiopathic chronic gout, left ankle and foot, without tophus (tophi)    6. Type 2 diabetes mellitus with stage 3 chronic kidney disease, without long-term current use of insulin    7. Idiopathic chronic gout, multiple sites, without tophus (tophi)    8. Chronic nonseasonal allergic rhinitis due to pollen    9. Severe obstructive sleep apnea - Intolerant of CPAP    10. Direct hyperbilirubinemia    11. At risk for cardiovascular event    12. Type 2 diabetes mellitus with hyperglycemia, without long-term current use of insulin    13. Idiopathic chronic gout, right ankle and foot, without tophus (tophi)    14. CKD (chronic kidney disease) stage 3, GFR 30-59 ml/min    15. Persistent proteinuria    16. NSTEMI with CAD s/p PCI (FAMILIA) of LAD x 2 in 8/2017    17. Dyslipidemia associated with type 2 diabetes mellitus    18. Status following surgery for weight loss    19. Status post angioplasty with stent      Long discussion about risk factor modification diet htn control and different strategies for sleep apnea that may be affecting his htn. He  Also was counsled about appropraite diet weight loss and continued exercise.   His a1c is elevated as well as lipids will recheck in am and make further recommendation specially if he needs vascepa.  As far as dual antiplatelets he can come off brilinta. He will taper off.  Emphasized appropriate therapy fro sleep apnea with its cardiovascular implications including htn stroke arrythmias sudden death.  Plan:   As per above   Continue current therapy  Cardiac low salt diet.  Risk factor modification and excercise program./weight loss  F/u in 6 months with lipid cmp a1c

## 2020-06-12 ENCOUNTER — OFFICE VISIT (OUTPATIENT)
Dept: OPHTHALMOLOGY | Facility: CLINIC | Age: 40
End: 2020-06-12
Payer: COMMERCIAL

## 2020-06-12 ENCOUNTER — LAB VISIT (OUTPATIENT)
Dept: LAB | Facility: HOSPITAL | Age: 40
End: 2020-06-12
Attending: INTERNAL MEDICINE
Payer: COMMERCIAL

## 2020-06-12 ENCOUNTER — PATIENT OUTREACH (OUTPATIENT)
Dept: ADMINISTRATIVE | Facility: HOSPITAL | Age: 40
End: 2020-06-12

## 2020-06-12 DIAGNOSIS — I10 BENIGN ESSENTIAL HYPERTENSION: ICD-10-CM

## 2020-06-12 DIAGNOSIS — E11.69 DYSLIPIDEMIA ASSOCIATED WITH TYPE 2 DIABETES MELLITUS: ICD-10-CM

## 2020-06-12 DIAGNOSIS — M1A.3620 CHRONIC GOUT OF LEFT KNEE DUE TO RENAL IMPAIRMENT WITHOUT TOPHUS: ICD-10-CM

## 2020-06-12 DIAGNOSIS — E11.65 TYPE 2 DIABETES MELLITUS WITH HYPERGLYCEMIA, WITHOUT LONG-TERM CURRENT USE OF INSULIN: Chronic | ICD-10-CM

## 2020-06-12 DIAGNOSIS — I10 ESSENTIAL HYPERTENSION: ICD-10-CM

## 2020-06-12 DIAGNOSIS — E78.5 DYSLIPIDEMIA ASSOCIATED WITH TYPE 2 DIABETES MELLITUS: ICD-10-CM

## 2020-06-12 DIAGNOSIS — Z98.890 STATUS POST BILATERAL LASIK SURGERY: ICD-10-CM

## 2020-06-12 DIAGNOSIS — H52.13 MYOPIA, BILATERAL: ICD-10-CM

## 2020-06-12 DIAGNOSIS — M1A.09X0 IDIOPATHIC CHRONIC GOUT, MULTIPLE SITES, WITHOUT TOPHUS (TOPHI): ICD-10-CM

## 2020-06-12 DIAGNOSIS — N18.30 CKD (CHRONIC KIDNEY DISEASE) STAGE 3, GFR 30-59 ML/MIN: ICD-10-CM

## 2020-06-12 DIAGNOSIS — H35.411 LATTICE DEGENERATION OF RIGHT RETINA: ICD-10-CM

## 2020-06-12 DIAGNOSIS — N18.30 TYPE 2 DIABETES MELLITUS WITH STAGE 3 CHRONIC KIDNEY DISEASE, WITHOUT LONG-TERM CURRENT USE OF INSULIN: Chronic | ICD-10-CM

## 2020-06-12 DIAGNOSIS — R80.1 PERSISTENT PROTEINURIA: ICD-10-CM

## 2020-06-12 DIAGNOSIS — G47.33 OBSTRUCTIVE SLEEP APNEA: ICD-10-CM

## 2020-06-12 DIAGNOSIS — Z46.0 ENCOUNTER FOR FITTING OR ADJUSTMENT OF SPECTACLES OR CONTACT LENSES: ICD-10-CM

## 2020-06-12 DIAGNOSIS — E11.22 TYPE 2 DIABETES MELLITUS WITH STAGE 3 CHRONIC KIDNEY DISEASE, WITHOUT LONG-TERM CURRENT USE OF INSULIN: Chronic | ICD-10-CM

## 2020-06-12 DIAGNOSIS — N18.30 STAGE 3 CHRONIC KIDNEY DISEASE: ICD-10-CM

## 2020-06-12 DIAGNOSIS — R80.9 NEPHROTIC RANGE PROTEINURIA: ICD-10-CM

## 2020-06-12 DIAGNOSIS — I25.10 CORONARY ARTERY DISEASE INVOLVING NATIVE CORONARY ARTERY OF NATIVE HEART WITHOUT ANGINA PECTORIS: ICD-10-CM

## 2020-06-12 DIAGNOSIS — E11.9 DIABETES MELLITUS TYPE 2 WITHOUT RETINOPATHY: Primary | ICD-10-CM

## 2020-06-12 LAB
ALBUMIN SERPL BCP-MCNC: 4.2 G/DL (ref 3.5–5.2)
ALBUMIN SERPL BCP-MCNC: 4.2 G/DL (ref 3.5–5.2)
ANION GAP SERPL CALC-SCNC: 13 MMOL/L (ref 8–16)
ANION GAP SERPL CALC-SCNC: 13 MMOL/L (ref 8–16)
BASOPHILS # BLD AUTO: 0.05 K/UL (ref 0–0.2)
BASOPHILS NFR BLD: 0.7 % (ref 0–1.9)
BUN SERPL-MCNC: 48 MG/DL (ref 6–20)
BUN SERPL-MCNC: 48 MG/DL (ref 6–20)
CALCIUM SERPL-MCNC: 9.9 MG/DL (ref 8.7–10.5)
CALCIUM SERPL-MCNC: 9.9 MG/DL (ref 8.7–10.5)
CHLORIDE SERPL-SCNC: 104 MMOL/L (ref 95–110)
CHLORIDE SERPL-SCNC: 104 MMOL/L (ref 95–110)
CHOLEST SERPL-MCNC: 158 MG/DL (ref 120–199)
CHOLEST/HDLC SERPL: 5.6 {RATIO} (ref 2–5)
CO2 SERPL-SCNC: 24 MMOL/L (ref 23–29)
CO2 SERPL-SCNC: 24 MMOL/L (ref 23–29)
CREAT SERPL-MCNC: 2.8 MG/DL (ref 0.5–1.4)
CREAT SERPL-MCNC: 2.8 MG/DL (ref 0.5–1.4)
DIFFERENTIAL METHOD: ABNORMAL
EOSINOPHIL # BLD AUTO: 0.2 K/UL (ref 0–0.5)
EOSINOPHIL NFR BLD: 3.1 % (ref 0–8)
ERYTHROCYTE [DISTWIDTH] IN BLOOD BY AUTOMATED COUNT: 12.8 % (ref 11.5–14.5)
EST. GFR  (AFRICAN AMERICAN): 32 ML/MIN/1.73 M^2
EST. GFR  (AFRICAN AMERICAN): 32 ML/MIN/1.73 M^2
EST. GFR  (NON AFRICAN AMERICAN): 27 ML/MIN/1.73 M^2
EST. GFR  (NON AFRICAN AMERICAN): 27 ML/MIN/1.73 M^2
GLUCOSE SERPL-MCNC: 92 MG/DL (ref 70–110)
GLUCOSE SERPL-MCNC: 92 MG/DL (ref 70–110)
HCT VFR BLD AUTO: 43.9 % (ref 40–54)
HDLC SERPL-MCNC: 28 MG/DL (ref 40–75)
HDLC SERPL: 17.7 % (ref 20–50)
HGB BLD-MCNC: 14.8 G/DL (ref 14–18)
IMM GRANULOCYTES # BLD AUTO: 0.02 K/UL (ref 0–0.04)
IMM GRANULOCYTES NFR BLD AUTO: 0.3 % (ref 0–0.5)
LDLC SERPL CALC-MCNC: 77.4 MG/DL (ref 63–159)
LYMPHOCYTES # BLD AUTO: 1.7 K/UL (ref 1–4.8)
LYMPHOCYTES NFR BLD: 23.6 % (ref 18–48)
MCH RBC QN AUTO: 28.6 PG (ref 27–31)
MCHC RBC AUTO-ENTMCNC: 33.7 G/DL (ref 32–36)
MCV RBC AUTO: 85 FL (ref 82–98)
MONOCYTES # BLD AUTO: 0.7 K/UL (ref 0.3–1)
MONOCYTES NFR BLD: 9 % (ref 4–15)
NEUTROPHILS # BLD AUTO: 4.6 K/UL (ref 1.8–7.7)
NEUTROPHILS NFR BLD: 63.3 % (ref 38–73)
NONHDLC SERPL-MCNC: 130 MG/DL
NRBC BLD-RTO: 0 /100 WBC
PHOSPHATE SERPL-MCNC: 3.9 MG/DL (ref 2.7–4.5)
PHOSPHATE SERPL-MCNC: 3.9 MG/DL (ref 2.7–4.5)
PLATELET # BLD AUTO: 206 K/UL (ref 150–350)
PMV BLD AUTO: 9.1 FL (ref 9.2–12.9)
POTASSIUM SERPL-SCNC: 4.7 MMOL/L (ref 3.5–5.1)
POTASSIUM SERPL-SCNC: 4.7 MMOL/L (ref 3.5–5.1)
PTH-INTACT SERPL-MCNC: 67.8 PG/ML (ref 9–77)
RBC # BLD AUTO: 5.18 M/UL (ref 4.6–6.2)
SODIUM SERPL-SCNC: 141 MMOL/L (ref 136–145)
SODIUM SERPL-SCNC: 141 MMOL/L (ref 136–145)
TRIGL SERPL-MCNC: 263 MG/DL (ref 30–150)
URATE SERPL-MCNC: 8.6 MG/DL (ref 3.4–7)
WBC # BLD AUTO: 7.19 K/UL (ref 3.9–12.7)

## 2020-06-12 PROCEDURE — 92014 PR EYE EXAM, EST PATIENT,COMPREHESV: ICD-10-PCS | Mod: S$GLB,,, | Performed by: OPTOMETRIST

## 2020-06-12 PROCEDURE — 85025 COMPLETE CBC W/AUTO DIFF WBC: CPT

## 2020-06-12 PROCEDURE — 83970 ASSAY OF PARATHORMONE: CPT

## 2020-06-12 PROCEDURE — 36415 COLL VENOUS BLD VENIPUNCTURE: CPT

## 2020-06-12 PROCEDURE — 83036 HEMOGLOBIN GLYCOSYLATED A1C: CPT

## 2020-06-12 PROCEDURE — 80061 LIPID PANEL: CPT

## 2020-06-12 PROCEDURE — 99999 PR PBB SHADOW E&M-EST. PATIENT-LVL II: ICD-10-PCS | Mod: PBBFAC,,, | Performed by: OPTOMETRIST

## 2020-06-12 PROCEDURE — 92310 CONTACT LENS FITTING OU: CPT | Mod: CSM,S$GLB,, | Performed by: OPTOMETRIST

## 2020-06-12 PROCEDURE — 80069 RENAL FUNCTION PANEL: CPT

## 2020-06-12 PROCEDURE — 92015 DETERMINE REFRACTIVE STATE: CPT | Mod: S$GLB,,, | Performed by: OPTOMETRIST

## 2020-06-12 PROCEDURE — 84550 ASSAY OF BLOOD/URIC ACID: CPT

## 2020-06-12 PROCEDURE — 92014 COMPRE OPH EXAM EST PT 1/>: CPT | Mod: S$GLB,,, | Performed by: OPTOMETRIST

## 2020-06-12 PROCEDURE — 99999 PR PBB SHADOW E&M-EST. PATIENT-LVL II: CPT | Mod: PBBFAC,,, | Performed by: OPTOMETRIST

## 2020-06-12 PROCEDURE — 82610 CYSTATIN C: CPT

## 2020-06-12 PROCEDURE — 92015 PR REFRACTION: ICD-10-PCS | Mod: S$GLB,,, | Performed by: OPTOMETRIST

## 2020-06-12 PROCEDURE — 92310 PR CONTACT LENS FITTING (NO CHANGE): ICD-10-PCS | Mod: CSM,S$GLB,, | Performed by: OPTOMETRIST

## 2020-06-12 NOTE — PROGRESS NOTES
HPI     Diabetic Eye Exam      Additional comments: yearly              Comments     IDDM  Last Exam w/ TRF 8/2/18  Wants Contacts last worn 20 years ago  No Visual Complaint  No Pain  Fhx of glaucoma (mother)          Last edited by Maeve Poon on 6/12/2020  3:45 PM. (History)            Assessment /Plan     For exam results, see Encounter Report.    Diabetes mellitus type 2 without retinopathy    Type 2 diabetes mellitus with hyperglycemia, without long-term current use of insulin  -     Ambulatory referral/consult to Optometry    Essential hypertension    Status post bilateral LASIK surgery    Lattice degeneration of right retina    Myopia, bilateral    Encounter for fitting or adjustment of spectacles or contact lenses      No Background Diabetic Retinopathy    No HTN Retinopathy    Stable LASIK    Stable lattice    Discussed CL charges.  Dispense Final Rx for glasses  Note changes CL Rx  RTC 1 year  Discussed above and answered questions.

## 2020-06-12 NOTE — PROGRESS NOTES
Pre Visit Chart Audit Complete:     Health Maintenance: Updated  Immunizations: Abstracted  Care Everywhere: Abstracted   [facility] Search: No New results  Health Maintenance Due   Topic    Pneumococcal Vaccine (Highest Risk) (1 of 3 - PCV13)    TETANUS VACCINE     Foot Exam        Care Team Updated

## 2020-06-12 NOTE — LETTER
June 12, 2020      KEVIN Roberson MD  [address]  [place] [address]           [facility]  [address] [place] [id number]  Phone: [phone]  Fax: [phone]          Patient: Robb Iglesias   MR Number: [id number]   YOB: 1980   Date of Visit: 6/12/2020       Dear Dr. KEVIN Roberson:    Thank you for referring Robb Iglesias to me for evaluation. Attached you will find relevant portions of my assessment and plan of care.    If you have questions, please do not hesitate to call me. I look forward to following Robb Iglesias along with you.    Sincerely,    Shilo Acosta, OD    Enclosure  CC:  No Recipients    If you would like to receive this communication electronically, please contact [email or web address] or [phone] to request more information on [employer] Link access.    For providers and/or their staff who would like to refer a patient to [facility], please contact us through our one-stop-shop provider referral line, [facility], at [phone].    If you feel you have received this communication in error or would no longer like to receive these types of communications, please e-mail [email or web address]

## 2020-06-13 LAB
ESTIMATED AVG GLUCOSE: 103 MG/DL (ref 68–131)
HBA1C MFR BLD HPLC: 5.2 % (ref 4–5.6)

## 2020-06-15 LAB
CYSTATIN C SERPL-MCNC: 2.06 MG/L (ref 0.64–1.12)
GFR/BSA.PRED SERPLBLD CYS-BASED-ARV: 33 ML/MIN/BSA

## 2020-06-25 ENCOUNTER — PATIENT OUTREACH (OUTPATIENT)
Dept: ADMINISTRATIVE | Facility: OTHER | Age: 40
End: 2020-06-25

## 2020-06-26 ENCOUNTER — OFFICE VISIT (OUTPATIENT)
Dept: INTERNAL MEDICINE | Facility: CLINIC | Age: 40
End: 2020-06-26
Payer: COMMERCIAL

## 2020-06-26 ENCOUNTER — OFFICE VISIT (OUTPATIENT)
Dept: OPHTHALMOLOGY | Facility: CLINIC | Age: 40
End: 2020-06-26
Payer: COMMERCIAL

## 2020-06-26 VITALS
BODY MASS INDEX: 30.48 KG/M2 | DIASTOLIC BLOOD PRESSURE: 88 MMHG | TEMPERATURE: 97 F | SYSTOLIC BLOOD PRESSURE: 130 MMHG | WEIGHT: 245.13 LBS | HEIGHT: 75 IN | OXYGEN SATURATION: 98 % | HEART RATE: 69 BPM

## 2020-06-26 DIAGNOSIS — Z11.4 SCREENING FOR HIV WITHOUT PRESENCE OF RISK FACTORS: ICD-10-CM

## 2020-06-26 DIAGNOSIS — Z23 NEED FOR DIPHTHERIA-TETANUS-PERTUSSIS (TDAP) VACCINE: ICD-10-CM

## 2020-06-26 DIAGNOSIS — E66.09 CLASS 1 OBESITY DUE TO EXCESS CALORIES WITH SERIOUS COMORBIDITY AND BODY MASS INDEX (BMI) OF 30.0 TO 30.9 IN ADULT: Chronic | ICD-10-CM

## 2020-06-26 DIAGNOSIS — Z46.0 ENCOUNTER FOR FITTING OR ADJUSTMENT OF SPECTACLES OR CONTACT LENSES: Primary | ICD-10-CM

## 2020-06-26 DIAGNOSIS — I25.10 CORONARY ARTERY DISEASE INVOLVING NATIVE CORONARY ARTERY OF NATIVE HEART WITHOUT ANGINA PECTORIS: Chronic | ICD-10-CM

## 2020-06-26 DIAGNOSIS — I21.4 NSTEMI (NON-ST ELEVATED MYOCARDIAL INFARCTION): ICD-10-CM

## 2020-06-26 DIAGNOSIS — I15.2 HYPERTENSION COMPLICATING DIABETES: ICD-10-CM

## 2020-06-26 DIAGNOSIS — M1A.09X0 IDIOPATHIC CHRONIC GOUT, MULTIPLE SITES, WITHOUT TOPHUS (TOPHI): Chronic | ICD-10-CM

## 2020-06-26 DIAGNOSIS — E11.21 TYPE 2 DIABETES MELLITUS WITH DIABETIC NEPHROPATHY, WITHOUT LONG-TERM CURRENT USE OF INSULIN: Primary | Chronic | ICD-10-CM

## 2020-06-26 DIAGNOSIS — E11.59 HYPERTENSION COMPLICATING DIABETES: ICD-10-CM

## 2020-06-26 DIAGNOSIS — N18.30 CKD (CHRONIC KIDNEY DISEASE) STAGE 3, GFR 30-59 ML/MIN: ICD-10-CM

## 2020-06-26 DIAGNOSIS — E80.6 DIRECT HYPERBILIRUBINEMIA: Chronic | ICD-10-CM

## 2020-06-26 DIAGNOSIS — G47.33 OBSTRUCTIVE SLEEP APNEA: Chronic | ICD-10-CM

## 2020-06-26 DIAGNOSIS — Z23 NEED FOR 23-POLYVALENT PNEUMOCOCCAL POLYSACCHARIDE VACCINE: ICD-10-CM

## 2020-06-26 PROBLEM — N18.4 TYPE 2 DIABETES MELLITUS WITH STAGE 4 CHRONIC KIDNEY DISEASE, WITHOUT LONG-TERM CURRENT USE OF INSULIN: Chronic | Status: ACTIVE | Noted: 2020-01-06

## 2020-06-26 PROBLEM — E11.22 TYPE 2 DIABETES MELLITUS WITH STAGE 4 CHRONIC KIDNEY DISEASE, WITHOUT LONG-TERM CURRENT USE OF INSULIN: Chronic | Status: ACTIVE | Noted: 2020-01-06

## 2020-06-26 PROCEDURE — 3079F PR MOST RECENT DIASTOLIC BLOOD PRESSURE 80-89 MM HG: ICD-10-PCS | Mod: CPTII,S$GLB,, | Performed by: FAMILY MEDICINE

## 2020-06-26 PROCEDURE — 99499 UNLISTED E&M SERVICE: CPT | Mod: S$GLB,,, | Performed by: OPTOMETRIST

## 2020-06-26 PROCEDURE — 99499 NO LOS: ICD-10-PCS | Mod: S$GLB,,, | Performed by: OPTOMETRIST

## 2020-06-26 PROCEDURE — 90715 TDAP VACCINE 7 YRS/> IM: CPT | Mod: S$GLB,,, | Performed by: FAMILY MEDICINE

## 2020-06-26 PROCEDURE — 3075F PR MOST RECENT SYSTOLIC BLOOD PRESS GE 130-139MM HG: ICD-10-PCS | Mod: CPTII,S$GLB,, | Performed by: FAMILY MEDICINE

## 2020-06-26 PROCEDURE — 90732 PPSV23 VACC 2 YRS+ SUBQ/IM: CPT | Mod: S$GLB,,, | Performed by: FAMILY MEDICINE

## 2020-06-26 PROCEDURE — 99214 OFFICE O/P EST MOD 30 MIN: CPT | Mod: 25,S$GLB,, | Performed by: FAMILY MEDICINE

## 2020-06-26 PROCEDURE — 99999 PR PBB SHADOW E&M-EST. PATIENT-LVL V: ICD-10-PCS | Mod: PBBFAC,,, | Performed by: FAMILY MEDICINE

## 2020-06-26 PROCEDURE — 90471 IMMUNIZATION ADMIN: CPT | Mod: S$GLB,,, | Performed by: FAMILY MEDICINE

## 2020-06-26 PROCEDURE — 90471 PNEUMOCOCCAL POLYSACCHARIDE VACCINE 23-VALENT =>2YO SQ IM: ICD-10-PCS | Mod: S$GLB,,, | Performed by: FAMILY MEDICINE

## 2020-06-26 PROCEDURE — 3008F PR BODY MASS INDEX (BMI) DOCUMENTED: ICD-10-PCS | Mod: CPTII,S$GLB,, | Performed by: FAMILY MEDICINE

## 2020-06-26 PROCEDURE — 3008F BODY MASS INDEX DOCD: CPT | Mod: CPTII,S$GLB,, | Performed by: FAMILY MEDICINE

## 2020-06-26 PROCEDURE — 3044F PR MOST RECENT HEMOGLOBIN A1C LEVEL <7.0%: ICD-10-PCS | Mod: CPTII,S$GLB,, | Performed by: FAMILY MEDICINE

## 2020-06-26 PROCEDURE — 90472 IMMUNIZATION ADMIN EACH ADD: CPT | Mod: S$GLB,,, | Performed by: FAMILY MEDICINE

## 2020-06-26 PROCEDURE — 90472 TDAP VACCINE GREATER THAN OR EQUAL TO 7YO IM: ICD-10-PCS | Mod: S$GLB,,, | Performed by: FAMILY MEDICINE

## 2020-06-26 PROCEDURE — 3075F SYST BP GE 130 - 139MM HG: CPT | Mod: CPTII,S$GLB,, | Performed by: FAMILY MEDICINE

## 2020-06-26 PROCEDURE — 3044F HG A1C LEVEL LT 7.0%: CPT | Mod: CPTII,S$GLB,, | Performed by: FAMILY MEDICINE

## 2020-06-26 PROCEDURE — 3079F DIAST BP 80-89 MM HG: CPT | Mod: CPTII,S$GLB,, | Performed by: FAMILY MEDICINE

## 2020-06-26 PROCEDURE — 99214 PR OFFICE/OUTPT VISIT, EST, LEVL IV, 30-39 MIN: ICD-10-PCS | Mod: 25,S$GLB,, | Performed by: FAMILY MEDICINE

## 2020-06-26 PROCEDURE — 90715 TDAP VACCINE GREATER THAN OR EQUAL TO 7YO IM: ICD-10-PCS | Mod: S$GLB,,, | Performed by: FAMILY MEDICINE

## 2020-06-26 PROCEDURE — 90732 PNEUMOCOCCAL POLYSACCHARIDE VACCINE 23-VALENT =>2YO SQ IM: ICD-10-PCS | Mod: S$GLB,,, | Performed by: FAMILY MEDICINE

## 2020-06-26 PROCEDURE — 99999 PR PBB SHADOW E&M-EST. PATIENT-LVL V: CPT | Mod: PBBFAC,,, | Performed by: FAMILY MEDICINE

## 2020-06-26 RX ORDER — NITROGLYCERIN 0.4 MG/1
TABLET SUBLINGUAL
Qty: 25 TABLET | Refills: 5 | Status: SHIPPED | OUTPATIENT
Start: 2020-06-26 | End: 2021-06-07 | Stop reason: SDUPTHER

## 2020-06-26 RX ORDER — ATORVASTATIN CALCIUM 80 MG/1
80 TABLET, FILM COATED ORAL NIGHTLY
Qty: 90 TABLET | Refills: 4 | Status: SHIPPED | OUTPATIENT
Start: 2020-06-26 | End: 2021-02-02

## 2020-06-26 RX ORDER — TRIAZOLAM 0.25 MG/1
0.25 TABLET ORAL NIGHTLY
Qty: 30 TABLET | Refills: 5 | Status: SHIPPED | OUTPATIENT
Start: 2020-06-26 | End: 2020-10-20

## 2020-06-26 NOTE — PROGRESS NOTES
Chart reviewed.   Immunizations: Triggered Imm Registry     Orders placed: n/a  Upcoming appts to satisfy SAEED topics: n/a

## 2020-06-26 NOTE — ASSESSMENT & PLAN NOTE
Lab Results   Component Value Date    URICACID 8.6 (H) 06/12/2020    URICACID 5.8 03/11/2020    URICACID 7.3 (H) 02/02/2019    ESTGFRAFRICA 32 (A) 06/12/2020    ESTGFRAFRICA 32 (A) 06/12/2020    EGFRNONAA 27 (A) 06/12/2020    EGFRNONAA 27 (A) 06/12/2020    CREATININE 2.8 (H) 06/12/2020    CREATININE 2.8 (H) 06/12/2020    BUN 48 (H) 06/12/2020    BUN 48 (H) 06/12/2020   Clinically stable.

## 2020-06-26 NOTE — ASSESSMENT & PLAN NOTE
BP Readings from Last 5 Encounters:   06/26/20 130/88   06/11/20 (!) 120/90   01/20/20 118/88   01/06/20 (!) 138/92   08/02/19 128/82   Borderline controlled.

## 2020-06-26 NOTE — ASSESSMENT & PLAN NOTE
Lab Results   Component Value Date    ESTGFRAFRICA 32 (A) 06/12/2020    ESTGFRAFRICA 32 (A) 06/12/2020    ESTGFRAFRICA 36.3 (A) 03/11/2020    EGFRNONAA 27 (A) 06/12/2020    EGFRNONAA 27 (A) 06/12/2020    EGFRNONAA 31.4 (A) 03/11/2020    CREATININE 2.8 (H) 06/12/2020    CREATININE 2.8 (H) 06/12/2020    BUN 48 (H) 06/12/2020    BUN 48 (H) 06/12/2020    ALBUMIN 4.2 06/12/2020    ALBUMIN 4.2 06/12/2020    PROT 7.2 03/11/2020    MICALBCREAT Unable to calculate 03/11/2020    LABMICR >5000.0 03/11/2020    CREATRANDUR 171.0 06/12/2020     06/12/2020     06/12/2020    K 4.7 06/12/2020    K 4.7 06/12/2020     06/12/2020     06/12/2020    CO2 24 06/12/2020    CO2 24 06/12/2020    GLU 92 06/12/2020    GLU 92 06/12/2020    CALCIUM 9.9 06/12/2020    CALCIUM 9.9 06/12/2020    PHOS 3.9 06/12/2020    PHOS 3.9 06/12/2020    MG 2.1 04/22/2018    HGB 14.8 06/12/2020    HCT 43.9 06/12/2020   Slow progression. Follows with nephrology. PLAN: D/C Metformin.

## 2020-06-26 NOTE — ASSESSMENT & PLAN NOTE
Wt Readings from Last 6 Encounters:   06/26/20 111.2 kg (245 lb 2.4 oz)   06/11/20 111.1 kg (244 lb 14.9 oz)   01/06/20 118.8 kg (261 lb 14.5 oz)   08/02/19 118.2 kg (260 lb 9.3 oz)   07/11/19 119.2 kg (262 lb 12.6 oz)   02/26/19 117.9 kg (259 lb 14.8 oz)     BMI Readings from Last 2 Encounters:   06/26/20 30.64 kg/m²   06/11/20 30.61 kg/m²   Interval improvement. Therapeutic lifestyle changes encouraged.

## 2020-06-26 NOTE — ASSESSMENT & PLAN NOTE
Lab Results   Component Value Date    AST 22 03/11/2020    AST 27 06/17/2019    AST 23 03/19/2019    ALT 24 03/11/2020    ALT 33 06/17/2019    ALT 28 03/19/2019    ALKPHOS 91 03/11/2020    ALKPHOS 84 06/17/2019    ALKPHOS 76 03/19/2019    BILITOT 1.5 (H) 03/11/2020    BILITOT 1.5 (H) 06/17/2019    BILITOT 1.2 (H) 03/19/2019    BILIDIR 0.5 (H) 03/11/2020    BILIDIR 0.6 (H) 03/23/2018    ALBUMIN 4.2 06/12/2020    ALBUMIN 4.2 06/12/2020    LABPROT 10.9 07/31/2017    INR 1.1 07/31/2017    APTT 29.4 08/02/2017   Asymptomatic. Clinically stable.

## 2020-06-26 NOTE — ASSESSMENT & PLAN NOTE
Diabetes Management Status    Statin: Taking  ACE/ARB: Taking    Screening or Prevention Patient's value Goal Complete/Controlled?   HgA1C Testing and Control   Lab Results   Component Value Date    HGBA1C 5.2 06/12/2020      Annually/Less than 8% Yes   Lipid profile : 06/12/2020 Annually Yes   LDL control Lab Results   Component Value Date    LDLCALC 77.4 06/12/2020    Annually/Less than 100 mg/dl  Yes   Nephropathy screening Lab Results   Component Value Date    LABMICR >5000.0 03/11/2020     Lab Results   Component Value Date    PROTEINUA 3+ (A) 06/12/2020    Annually Yes   Blood pressure BP Readings from Last 1 Encounters:   06/26/20 130/88    Less than 140/90 Yes   Dilated retinal exam : 06/12/2020 Annually Yes   Foot exam   : 02/26/2019 Annually No     Lab Results   Component Value Date    HGBA1C 5.2 06/12/2020    HGBA1C 7.6 (H) 03/11/2020    HGBA1C 10.8 (H) 12/21/2019    ESTGFRAFRICA 32 (A) 06/12/2020    ESTGFRAFRICA 32 (A) 06/12/2020    EGFRNONAA 27 (A) 06/12/2020    EGFRNONAA 27 (A) 06/12/2020    MICALBCREAT Unable to calculate 03/11/2020    LDLCALC 77.4 06/12/2020     HEALTH MAINTENANCE: Diabetic health maintenance interventions reviewed and are up to date except for:  There are no preventive care reminders to display for this patient.  Glycemic control improved.    DIABETIC FOOT EXAM: Inspection of feet reveals no open wounds, ulcerations, significant calluses, or evidence of arterial insufficiency. 10g monofilament sensation is intact in all 5 locations tested.

## 2020-06-26 NOTE — PROGRESS NOTES
HPI     2 weeks contact lenses follow up.  Vision not as sharp at distance with contact as with glasses.  Last eye visit 06/12/2020 TRF.      Last edited by Shey Barney on 6/26/2020 12:57 PM. (History)            Assessment /Plan     For exam results, see Encounter Report.    Encounter for fitting or adjustment of spectacles or contact lenses      Great fit and Va    Note changes CL Rx  RTC 1 year  Discussed above and answered questions.

## 2020-07-02 DIAGNOSIS — Z13.79 GENETIC SCREENING: ICD-10-CM

## 2020-07-05 NOTE — PROGRESS NOTES
CHIEF COMPLAINT  Follow-up      DIAGNOSES, HISTORY, ASSESSMENT, AND PLAN  Assessment   1. Type 2 diabetes mellitus with diabetic nephropathy, without long-term current use of insulin    2. Idiopathic chronic gout, multiple sites, without tophus (tophi)    3. CKD (chronic kidney disease) stage 3, GFR 30-59 ml/min    4. Hypertension complicating diabetes    5. Direct hyperbilirubinemia    6. Coronary artery disease involving native coronary artery of native heart without angina pectoris    7. Class 1 obesity due to excess calories with serious comorbidity and body mass index (BMI) of 30.0 to 30.9 in adult    8. Severe obstructive sleep apnea - Intolerant of CPAP    9. Screening for HIV without presence of risk factors    10. Need for 23-polyvalent pneumococcal polysaccharide vaccine    11. Need for diphtheria-tetanus-pertussis (Tdap) vaccine    12. NSTEMI (non-ST elevated myocardial infarction)         Plan   Problem List Items Addressed This Visit     Class 1 obesity due to excess calories with serious comorbidity and body mass index (BMI) of 30.0 to 30.9 in adult (Chronic)    Current Assessment & Plan     Wt Readings from Last 6 Encounters:   06/26/20 111.2 kg (245 lb 2.4 oz)   06/11/20 111.1 kg (244 lb 14.9 oz)   01/06/20 118.8 kg (261 lb 14.5 oz)   08/02/19 118.2 kg (260 lb 9.3 oz)   07/11/19 119.2 kg (262 lb 12.6 oz)   02/26/19 117.9 kg (259 lb 14.8 oz)     BMI Readings from Last 2 Encounters:   06/26/20 30.64 kg/m²   06/11/20 30.61 kg/m²   Interval improvement. Therapeutic lifestyle changes encouraged.          Idiopathic chronic gout, multiple sites, without tophus (tophi) (Chronic)    Current Assessment & Plan     Lab Results   Component Value Date    URICACID 8.6 (H) 06/12/2020    URICACID 5.8 03/11/2020    URICACID 7.3 (H) 02/02/2019    ESTGFRAFRICA 32 (A) 06/12/2020    ESTGFRAFRICA 32 (A) 06/12/2020    EGFRNONAA 27 (A) 06/12/2020    EGFRNONAA 27 (A) 06/12/2020    CREATININE 2.8 (H) 06/12/2020    CREATININE  2.8 (H) 06/12/2020    BUN 48 (H) 06/12/2020    BUN 48 (H) 06/12/2020   Clinically stable.         Severe obstructive sleep apnea - Intolerant of CPAP (Chronic)    Overview     Intolerant of CPAP after weeks of trying multiple interfaces.  SPLIT-NIGHT SLEEP STUDY 7/28/2015  · SLEEP ARCHITECTURE: Sleep onset was 2.9 minutes and sleep efficiency was 94.4%. Sleep Stage distribution showed 145 sleep stage changes, 6 awakenings and 119 arousals. Sleep distribution showed 53.2% stage NI, 41.8% stage N 11, 0.0% stage N Ill and REM sleep was at 5.0%. There were 2 REM periods.  · RESPIRATORY SUMMARY: 257 respiratory events were present including 201 obstructive apneas and  central apneas, 0 mixed apneas and 56 hypopneas for a total AHI of 109.4. The non-rem index was 108.8 /hr while the AHI during stage R was 120.0 /hr. Oxygen desaturations were associated with the respiratory events. There were 226 desaturations. The lowest oxygen saturation was 78.0% and the mean oxygen saturation was 92.4%. Oxygen saturations were below: 88% for 24.3 minutes of the time spent asleep.  · CARDIAC SUMMARY: Normal sinus rhythm with heart rate variation between 60.0 and 94.0 was noted.         Relevant Medications    triazolam (HALCION) 0.25 MG Tab    Coronary artery disease involving native coronary artery of native heart without angina pectoris (Chronic)    Overview     Cath lab procedure 04/23/2018 (Naveen Rios MD)  A. Indication/Pre-Operative Diagnosis: The patient is a 37 year old male that was referred for catheterization by [facility] Self for ACS (NSTEMI). The BELLA risk score is 5.    B. Summary/Post-Operative Diagnosis  1. Single vessel coronary artery disease.  2. Normal LVEF.  3. Diastolic dysfunction.  4. Successful PCI for acute myocardial infarction.  5. The patient did not undergo primary PCI.         Relevant Medications    nitroGLYCERIN (NITROSTAT) 0.4 MG SL tablet    Direct hyperbilirubinemia (Chronic)    Current  Assessment & Plan     Lab Results   Component Value Date    AST 22 03/11/2020    AST 27 06/17/2019    AST 23 03/19/2019    ALT 24 03/11/2020    ALT 33 06/17/2019    ALT 28 03/19/2019    ALKPHOS 91 03/11/2020    ALKPHOS 84 06/17/2019    ALKPHOS 76 03/19/2019    BILITOT 1.5 (H) 03/11/2020    BILITOT 1.5 (H) 06/17/2019    BILITOT 1.2 (H) 03/19/2019    BILIDIR 0.5 (H) 03/11/2020    BILIDIR 0.6 (H) 03/23/2018    ALBUMIN 4.2 06/12/2020    ALBUMIN 4.2 06/12/2020    LABPROT 10.9 07/31/2017    INR 1.1 07/31/2017    APTT 29.4 08/02/2017   Asymptomatic. Clinically stable.         Type 2 diabetes mellitus with diabetic nephropathy, without long-term current use of insulin - Primary (Chronic)    Current Assessment & Plan     Diabetes Management Status    Statin: Taking  ACE/ARB: Taking    Screening or Prevention Patient's value Goal Complete/Controlled?   HgA1C Testing and Control   Lab Results   Component Value Date    HGBA1C 5.2 06/12/2020      Annually/Less than 8% Yes   Lipid profile : 06/12/2020 Annually Yes   LDL control Lab Results   Component Value Date    LDLCALC 77.4 06/12/2020    Annually/Less than 100 mg/dl  Yes   Nephropathy screening Lab Results   Component Value Date    LABMICR >5000.0 03/11/2020     Lab Results   Component Value Date    PROTEINUA 3+ (A) 06/12/2020    Annually Yes   Blood pressure BP Readings from Last 1 Encounters:   06/26/20 130/88    Less than 140/90 Yes   Dilated retinal exam : 06/12/2020 Annually Yes   Foot exam   : 02/26/2019 Annually No     Lab Results   Component Value Date    HGBA1C 5.2 06/12/2020    HGBA1C 7.6 (H) 03/11/2020    HGBA1C 10.8 (H) 12/21/2019    ESTGFRAFRICA 32 (A) 06/12/2020    ESTGFRAFRICA 32 (A) 06/12/2020    EGFRNONAA 27 (A) 06/12/2020    EGFRNONAA 27 (A) 06/12/2020    MICALBCREAT Unable to calculate 03/11/2020    LDLCALC 77.4 06/12/2020     HEALTH MAINTENANCE: Diabetic health maintenance interventions reviewed and are up to date except for:  There are no preventive care  reminders to display for this patient.  Glycemic control improved.    DIABETIC FOOT EXAM: Inspection of feet reveals no open wounds, ulcerations, significant calluses, or evidence of arterial insufficiency. 10g monofilament sensation is intact in all 5 locations tested.          CKD (chronic kidney disease) stage 3, GFR 30-59 ml/min    Current Assessment & Plan     Lab Results   Component Value Date    ESTGFRAFRICA 32 (A) 06/12/2020    ESTGFRAFRICA 32 (A) 06/12/2020    ESTGFRAFRICA 36.3 (A) 03/11/2020    EGFRNONAA 27 (A) 06/12/2020    EGFRNONAA 27 (A) 06/12/2020    EGFRNONAA 31.4 (A) 03/11/2020    CREATININE 2.8 (H) 06/12/2020    CREATININE 2.8 (H) 06/12/2020    BUN 48 (H) 06/12/2020    BUN 48 (H) 06/12/2020    ALBUMIN 4.2 06/12/2020    ALBUMIN 4.2 06/12/2020    PROT 7.2 03/11/2020    MICALBCREAT Unable to calculate 03/11/2020    LABMICR >5000.0 03/11/2020    CREATRANDUR 171.0 06/12/2020     06/12/2020     06/12/2020    K 4.7 06/12/2020    K 4.7 06/12/2020     06/12/2020     06/12/2020    CO2 24 06/12/2020    CO2 24 06/12/2020    GLU 92 06/12/2020    GLU 92 06/12/2020    CALCIUM 9.9 06/12/2020    CALCIUM 9.9 06/12/2020    PHOS 3.9 06/12/2020    PHOS 3.9 06/12/2020    MG 2.1 04/22/2018    HGB 14.8 06/12/2020    HCT 43.9 06/12/2020   Slow progression. Follows with nephrology. PLAN: D/C Metformin.           NSTEMI with CAD s/p PCI (FAMILIA) of LAD x 2 in 8/2017    Current Assessment & Plan     Asymptomatic. Clinically stable.         Relevant Medications    atorvastatin (LIPITOR) 80 MG tablet    Hypertension complicating diabetes    Current Assessment & Plan     BP Readings from Last 5 Encounters:   06/26/20 130/88   06/11/20 (!) 120/90   01/20/20 118/88   01/06/20 (!) 138/92   08/02/19 128/82   Borderline controlled.         Relevant Orders    Hypertension Digital Medicine (HDMP) Enrollment Order (Completed)      Other Visit Diagnoses     Screening for HIV without presence of risk factors         Relevant Orders    HIV 1/2 Ag/Ab (4th Gen)    Need for 23-polyvalent pneumococcal polysaccharide vaccine        Relevant Orders    Pneumococcal Polysaccharide Vaccine (23 Valent) (SQ/IM) (Completed)    Need for diphtheria-tetanus-pertussis (Tdap) vaccine        Relevant Orders    Tdap Vaccine (Completed)          Orders Placed This Encounter    Pneumococcal Polysaccharide Vaccine (23 Valent) (SQ/IM)    Tdap Vaccine    HIV 1/2 Ag/Ab (4th Gen)    Hypertension Digital Medicine (HDMP) Enrollment Order    triazolam (HALCION) 0.25 MG Tab    atorvastatin (LIPITOR) 80 MG tablet    nitroGLYCERIN (NITROSTAT) 0.4 MG SL tablet       Outpatient Medications Prior to Visit   Medication Sig Dispense Refill    amLODIPine (NORVASC) 10 MG tablet Take 1 tablet (10 mg total) by mouth once daily. (FOR BLOOD PRESSURE AND HEART) 90 tablet 4    aspirin (ECOTRIN) 81 MG EC tablet Take 81 mg by mouth once daily.      blood sugar diagnostic Strp Check blood glucose 2 times daily as directed and as needed (dispense insurance preferred brand or patient choice) 200 each 5    blood-glucose meter (ONETOUCH VERIO IQ METER) Misc Use as directed 1 each 0    cetirizine (ZYRTEC) 10 MG tablet Take 10 mg by mouth once daily.      colchicine (COLCRYS) 0.6 mg tablet Take 1 tablet (0.6 mg total) by mouth every other day. 45 tablet 3    diltiaZEM (CARDIZEM) 120 MG tablet Take 1 tablet (120 mg total) by mouth once daily. 30 tablet 11    febuxostat (ULORIC) 80 mg Tab Take 1 tablet (80 mg total) by mouth once daily. 90 tablet 3    lancets Misc Check blood glucose 2 times daily as directed and as needed (dispense insurance preferred brand or patient choice) 200 each 5    losartan (COZAAR) 100 MG tablet Take 1 tablet (100 mg total) by mouth once daily. (FOR BLOOD PRESSURE AND HEART) 90 tablet 4    metoprolol tartrate (LOPRESSOR) 25 MG tablet Take 1 tablet (25 mg total) by mouth 2 (two) times daily. (FOR BLOOD PRESSURE AND HEART) 180 tablet 4     MULTIVITAMIN/IRON/FOLIC ACID (CENTRUM COMPLETE ORAL) Take by mouth once daily at 6am.      semaglutide (OZEMPIC) 1 mg/dose (2 mg/1.5 mL) PnIj Inject 0.75 mLs into the skin every 7 days. 9 mL 3    atorvastatin (LIPITOR) 80 MG tablet TAKE 1 TABLET DAILY 90 tablet 3    metFORMIN (GLUCOPHAGE-XR) 500 MG XR 24hr tablet Take 1 tablet (500 mg total) by mouth 2 (two) times daily with meals. 180 tablet 1    nitroGLYCERIN (NITROSTAT) 0.4 MG SL tablet Dissolve one tablet underneath tongue at onset of angina; may repeat every 5 minutes if needed. Call 911 if angina persists after 2 doses. 30 tablet 5     No facility-administered medications prior to visit.         Medications Discontinued During This Encounter   Medication Reason    metFORMIN (GLUCOPHAGE-XR) 500 MG XR 24hr tablet Other - Commment Required    nitroGLYCERIN (NITROSTAT) 0.4 MG SL tablet Reorder    atorvastatin (LIPITOR) 80 MG tablet Reorder        Medications Ordered This Encounter   Medications    atorvastatin (LIPITOR) 80 MG tablet     Sig: Take 1 tablet (80 mg total) by mouth every evening.     Dispense:  90 tablet     Refill:  4    nitroGLYCERIN (NITROSTAT) 0.4 MG SL tablet     Sig: Dissolve one tablet underneath tongue at onset of angina; may repeat every 5 minutes if needed. Call 911 if angina persists after 2 doses.     Dispense:  25 tablet     Refill:  5    triazolam (HALCION) 0.25 MG Tab     Sig: Take 1 tablet (0.25 mg total) by mouth every evening. (DO NOT TAKE IF NOT USING CPAP)     Dispense:  30 tablet     Refill:  5       Follow up in about 6 months (around 12/17/2020) for review test results, discuss treatment plan.     Subjective   Review of Systems   Constitutional: Negative for chills and fever.   Respiratory: Negative for chest tightness and shortness of breath.    Cardiovascular: Negative for chest pain and claudication.   Endocrine: Negative for polydipsia and polyuria.        Objective   Vitals:    06/26/20 1403   BP: 130/88   BP  "Location: Left arm   Patient Position: Sitting   BP Method: Medium (Manual)   Pulse: 69   Temp: 96.6 °F (35.9 °C)   TempSrc: Tympanic   SpO2: 98%   Weight: 111.2 kg (245 lb 2.4 oz)   Height: 6' 3" (1.905 m)     Physical Exam  Vitals signs reviewed.   Constitutional:       General: He is not in acute distress.     Appearance: Normal appearance. He is not ill-appearing.   Cardiovascular:      Rate and Rhythm: Normal rate and regular rhythm.      Heart sounds: Normal heart sounds.   Pulmonary:      Effort: Pulmonary effort is normal.      Breath sounds: Normal breath sounds.   Neurological:      Mental Status: He is alert.   Psychiatric:         Behavior: Behavior normal.           Documentation entered by me for this encounter may have been done in part using speech-recognition technology. Although I have made an effort to ensure accuracy, "sound like" errors may exist and should be interpreted in context. -KEVIN Roberson MD.  "

## 2020-07-08 NOTE — ASSESSMENT & PLAN NOTE
Diabetes Management Status    Statin: Taking  ACE/ARB: Taking    Screening or Prevention Patient's value Goal Complete/Controlled?   HgA1C Testing and Control   Lab Results   Component Value Date    HGBA1C 7.6 (H) 03/11/2020      Annually/Less than 8% Yes   Lipid profile : 12/21/2019 Annually Yes   LDL control Lab Results   Component Value Date    LDLCALC Invalid, Trig>400.0 12/21/2019    Annually/Less than 100 mg/dl  Yes   Nephropathy screening Lab Results   Component Value Date    LABMICR >5000.0 03/11/2020     Lab Results   Component Value Date    PROTEINUA 2+ (A) 04/09/2018    Annually Yes   Blood pressure BP Readings from Last 1 Encounters:   01/20/20 118/88    Less than 140/90 Yes   Dilated retinal exam : 08/02/2018 Annually Yes   Foot exam   : 02/26/2019 Annually No     Lab Results   Component Value Date    HGBA1C 7.6 (H) 03/11/2020    HGBA1C 10.8 (H) 12/21/2019    HGBA1C 7.7 (H) 02/02/2019    ESTGFRAFRICA 36.3 (A) 03/11/2020    EGFRNONAA 31.4 (A) 03/11/2020    MICALBCREAT Unable to calculate 03/11/2020    LDLCALC Invalid, Trig>400.0 12/21/2019       HEALTH MAINTENANCE: Diabetic health maintenance interventions reviewed and are up to date except for:      Topic    Eye Exam    Sub-optimally controlled. Much improved. We discussed risks and benefits of treatment options. Therapeutic lifestyle changes encouraged.      Report received from Rn. Assumed pt care. Pt is on 5 L O2 NC. Pt A&O x 4. Fall precautions in place, call light and belongings within reach, bed in lowest position. No signs of distress.

## 2020-07-10 NOTE — PROGRESS NOTES
Digital Medicine: Clinician Introduction    Robb Iglesias is a 39 y.o. male who is newly enrolled in the Digital Medicine Clinic.    The following information was reviewed and updated:  Preferred pharmacy   [id number] - [place] - [address]   [address] [address]  Phone: [phone] Fax: [phone]    [facility] - [address]  [address] [facility] [address]  Phone: [phone] Fax: [phone]    [facility]  [address]  [place] [address]  Phone: [phone] Fax: [phone]      Patient prefers a 90 days supply.     Review of patient's allergies indicates:  No Known Allergies    Called patient for DM follow up and to welcome him to the [facility] digital medicine program.     Patient says that he started monitoring sodium intake about two years ago after he had heart attack.     Confident that he consumes <1500 mg per day of sodium    Has HENRY but trouble with CPAP. Tolerating CPAP better since taking triazolam. 2-3 hours per night in the past week    Rarely CHANTELL when he eats too much salt. On a low salt diet.     Has been > year since gout flare up. Has had gout > 20 years.     The history is provided by the patient.   Follow Up  Follow-up reason(s): reading review and routine education                Intervention/Plan    There are no preventive care reminders to display for this patient.    Current Medication Regimen:  Hypertension Medications     amLODIPine (NORVASC) 10 MG tablet Take 1 tablet (10 mg total) by mouth once daily. (FOR BLOOD PRESSURE AND HEART)    diltiaZEM (CARDIZEM) 120 MG tablet Take 1 tablet (120 mg total) by mouth once daily.    losartan (COZAAR) 100 MG tablet Take 1 tablet (100 mg total) by mouth once daily. (FOR BLOOD PRESSURE AND HEART)    metoprolol tartrate (LOPRESSOR) 25 MG tablet Take 1 tablet (25 mg total) by mouth 2 (two) times daily. (FOR  BLOOD PRESSURE AND HEART)    nitroGLYCERIN (NITROSTAT) 0.4 MG SL tablet Dissolve one tablet underneath tongue at onset of angina; may repeat every 5 minutes if needed. Call 911 if angina persists after 2 doses.        Diabetes Medications     semaglutide (OZEMPIC) 1 mg/dose (2 mg/1.5 mL) PnIj Inject 0.75 mLs into the skin every 7 days.        Reviewed the importance of self-monitoring, medication adherence, and that the health  can be used as a resource for lifestyle modifications to help reduce or maintain a healthy lifestyle.    Sent link to [facility] webpages and my contact information via [employer] for future questions. Follow up scheduled.

## 2020-07-11 NOTE — PROGRESS NOTES
Digital Medicine: Clinician Introduction    Robb Iglesias is a 39 y.o. male who is newly enrolled in the Digital Medicine Clinic.    The following information was reviewed and updated:  Preferred pharmacy   [id number] - [place] - [address]  [address] [place] [address]  Phone: [phone] Fax: [phone]    [facility] - [address]  [address] [facility] [address]  Phone: [phone] Fax: [phone]    [facility]  [address]  [place] [address]  Phone: [phone] Fax: [phone]      Patient prefers a 90 days supply.     Review of patient's allergies indicates:  No Known Allergies    Called patient for DM follow up and to welcome him to the [facility] digital medicine program. Patient is doing well today with no complaints. He confirms that he stopped taking metformin secondary to renal function and is taking ozempic 1 mg once weekly. He confirms current BP medications. He denies medication related issues. He says he notices occasional CHANTELL, but relates this to excess sodium occasionally. Patient says that he started monitoring sodium intake about two years ago after he had a heart attack. He is confident that he consumes <1500 mg per day of sodium. He exercises regularly, but says he could do better. Patient says that he has HENRY but does not tolerate the CPAP machine. PCP recently prescribed a BZD for patient to take night to help him relax and wear CPAP machine. Patient says this has helped some and he has been able to wear the machine for 2-3 hours at night now. He says he has had gout for > 20 years, but it has been > a year since he had a flare. Patient reports that he is mindful of his diet and avoids foods that trigger gout.       The history is provided by the patient.   Follow Up  Follow-up reason(s): reading review and routine education      Routine Education  Topics: eating patterns and physical activity   DM  A1c 5.2% on 6/12/2020, which is a decrease from 7.6% on 3/11/2020. Goal <7%      HYPERTENSION  Our goal is to get BP to consistently below 130/80mmHg and make the process convenient so patient can avoid extra trips to the office. Getting your blood pressure below 130/80mmHg (definition of control) will reduce your risk for heart attack, kidney failure, stroke and death (as well as kidney failure, eye disease, & dementia)      Reviewed non-pharmacologic therapies and impact on BP      Explained that we expect patient to obtain several blood pressures per week at random times of day.  Instructed patient not to allow anyone else to use phone and monitoring device.  Confirmed appropriate BP monitoring technique.      Explained to patient that the digital medicine team is not available for emergencies.  Patient will call [facility] on-call ([phone] or [phone]) or 911 if needed.    Patient's BP goal is 130/80. Patients BP average is 128/91 mmHg, which is above goal, per 2017 ACC/AHA Hypertension Guidelines.    Still onboarding in [facility] digital medicine program      Med Review complete.    Allergies reviewed.      Last 5 Patient Entered Readings                                      Current 30 Day Average: 128/91     Recent Readings 7/10/2020 7/10/2020 7/7/2020 6/30/2020 9/29/2017    SBP (mmHg) 124 131 130 130 139    DBP (mmHg) 89 99 88 88 96    Pulse 73 77 80 80 61            INTERVENTION(S)  reviewed appropriate dose schedule, recommended diet modifications, recommended physical activity, reviewed monitoring technique, encouragement/support and goal setting    PLAN  patient verbalizes understanding, additional monitoring needed and continue monitoring    HTN  Complete enrollment. Return call from .     DM  Continue current medications    Discussed importance of strict BG and BP control to prevent progression of CKD      There are no  preventive care reminders to display for this patient.    Current Medication Regimen:  Hypertension Medications     amLODIPine (NORVASC) 10 MG tablet Take 1 tablet (10 mg total) by mouth once daily. (FOR BLOOD PRESSURE AND HEART)    diltiaZEM (CARDIZEM) 120 MG tablet Take 1 tablet (120 mg total) by mouth once daily.    losartan (COZAAR) 100 MG tablet Take 1 tablet (100 mg total) by mouth once daily. (FOR BLOOD PRESSURE AND HEART)    metoprolol tartrate (LOPRESSOR) 25 MG tablet Take 1 tablet (25 mg total) by mouth 2 (two) times daily. (FOR BLOOD PRESSURE AND HEART)    nitroGLYCERIN (NITROSTAT) 0.4 MG SL tablet Dissolve one tablet underneath tongue at onset of angina; may repeat every 5 minutes if needed. Call 911 if angina persists after 2 doses.        Diabetes Medications     semaglutide (OZEMPIC) 1 mg/dose (2 mg/1.5 mL) PnIj Inject 0.75 mLs into the skin every 7 days.          Reviewed the importance of self-monitoring, medication adherence, and that the health  can be used as a resource for lifestyle modifications to help reduce or maintain a healthy lifestyle.    Sent link to [facility] webpages and my contact information via [employer] for future questions. Follow up scheduled.

## 2020-07-20 ENCOUNTER — LAB VISIT (OUTPATIENT)
Dept: LAB | Facility: HOSPITAL | Age: 40
End: 2020-07-20
Attending: INTERNAL MEDICINE
Payer: COMMERCIAL

## 2020-07-20 ENCOUNTER — OFFICE VISIT (OUTPATIENT)
Dept: NEPHROLOGY | Facility: CLINIC | Age: 40
End: 2020-07-20
Payer: COMMERCIAL

## 2020-07-20 VITALS
HEIGHT: 75 IN | HEART RATE: 72 BPM | WEIGHT: 246.5 LBS | DIASTOLIC BLOOD PRESSURE: 96 MMHG | SYSTOLIC BLOOD PRESSURE: 140 MMHG | BODY MASS INDEX: 30.65 KG/M2 | RESPIRATION RATE: 20 BRPM

## 2020-07-20 DIAGNOSIS — N18.30 CKD (CHRONIC KIDNEY DISEASE) STAGE 3, GFR 30-59 ML/MIN: Primary | ICD-10-CM

## 2020-07-20 DIAGNOSIS — E11.29 PROTEINURIA DUE TO TYPE 2 DIABETES MELLITUS: ICD-10-CM

## 2020-07-20 DIAGNOSIS — N18.4 CKD (CHRONIC KIDNEY DISEASE) STAGE 4, GFR 15-29 ML/MIN: Primary | ICD-10-CM

## 2020-07-20 DIAGNOSIS — N18.30 CKD (CHRONIC KIDNEY DISEASE) STAGE 3, GFR 30-59 ML/MIN: ICD-10-CM

## 2020-07-20 DIAGNOSIS — R80.9 PROTEINURIA DUE TO TYPE 2 DIABETES MELLITUS: ICD-10-CM

## 2020-07-20 DIAGNOSIS — E79.0 HYPERURICEMIA: ICD-10-CM

## 2020-07-20 LAB
ALBUMIN SERPL BCP-MCNC: 3.8 G/DL (ref 3.5–5.2)
ANION GAP SERPL CALC-SCNC: 11 MMOL/L (ref 8–16)
BUN SERPL-MCNC: 34 MG/DL (ref 6–20)
CALCIUM SERPL-MCNC: 9.2 MG/DL (ref 8.7–10.5)
CHLORIDE SERPL-SCNC: 107 MMOL/L (ref 95–110)
CO2 SERPL-SCNC: 25 MMOL/L (ref 23–29)
CREAT SERPL-MCNC: 2.7 MG/DL (ref 0.5–1.4)
CREAT UR-MCNC: 139 MG/DL (ref 23–375)
EST. GFR  (AFRICAN AMERICAN): 33 ML/MIN/1.73 M^2
EST. GFR  (NON AFRICAN AMERICAN): 29 ML/MIN/1.73 M^2
GLUCOSE SERPL-MCNC: 98 MG/DL (ref 70–110)
PHOSPHATE SERPL-MCNC: 3.6 MG/DL (ref 2.7–4.5)
POTASSIUM SERPL-SCNC: 4.3 MMOL/L (ref 3.5–5.1)
PROT UR-MCNC: 276 MG/DL (ref 0–15)
PROT/CREAT UR: 1.99 MG/G{CREAT} (ref 0–0.2)
PTH-INTACT SERPL-MCNC: 91 PG/ML (ref 9–77)
SODIUM SERPL-SCNC: 143 MMOL/L (ref 136–145)

## 2020-07-20 PROCEDURE — 99214 PR OFFICE/OUTPT VISIT, EST, LEVL IV, 30-39 MIN: ICD-10-PCS | Mod: S$GLB,,, | Performed by: INTERNAL MEDICINE

## 2020-07-20 PROCEDURE — 83970 ASSAY OF PARATHORMONE: CPT

## 2020-07-20 PROCEDURE — 3077F PR MOST RECENT SYSTOLIC BLOOD PRESSURE >= 140 MM HG: ICD-10-PCS | Mod: CPTII,S$GLB,, | Performed by: INTERNAL MEDICINE

## 2020-07-20 PROCEDURE — 3080F PR MOST RECENT DIASTOLIC BLOOD PRESSURE >= 90 MM HG: ICD-10-PCS | Mod: CPTII,S$GLB,, | Performed by: INTERNAL MEDICINE

## 2020-07-20 PROCEDURE — 36415 COLL VENOUS BLD VENIPUNCTURE: CPT

## 2020-07-20 PROCEDURE — 3077F SYST BP >= 140 MM HG: CPT | Mod: CPTII,S$GLB,, | Performed by: INTERNAL MEDICINE

## 2020-07-20 PROCEDURE — 80069 RENAL FUNCTION PANEL: CPT

## 2020-07-20 PROCEDURE — 3044F HG A1C LEVEL LT 7.0%: CPT | Mod: CPTII,S$GLB,, | Performed by: INTERNAL MEDICINE

## 2020-07-20 PROCEDURE — 99214 OFFICE O/P EST MOD 30 MIN: CPT | Mod: S$GLB,,, | Performed by: INTERNAL MEDICINE

## 2020-07-20 PROCEDURE — 84156 ASSAY OF PROTEIN URINE: CPT

## 2020-07-20 PROCEDURE — 3008F PR BODY MASS INDEX (BMI) DOCUMENTED: ICD-10-PCS | Mod: CPTII,S$GLB,, | Performed by: INTERNAL MEDICINE

## 2020-07-20 PROCEDURE — 3008F BODY MASS INDEX DOCD: CPT | Mod: CPTII,S$GLB,, | Performed by: INTERNAL MEDICINE

## 2020-07-20 PROCEDURE — 3044F PR MOST RECENT HEMOGLOBIN A1C LEVEL <7.0%: ICD-10-PCS | Mod: CPTII,S$GLB,, | Performed by: INTERNAL MEDICINE

## 2020-07-20 PROCEDURE — 99999 PR PBB SHADOW E&M-EST. PATIENT-LVL V: CPT | Mod: PBBFAC,,, | Performed by: INTERNAL MEDICINE

## 2020-07-20 PROCEDURE — 99999 PR PBB SHADOW E&M-EST. PATIENT-LVL V: ICD-10-PCS | Mod: PBBFAC,,, | Performed by: INTERNAL MEDICINE

## 2020-07-20 PROCEDURE — 3080F DIAST BP >= 90 MM HG: CPT | Mod: CPTII,S$GLB,, | Performed by: INTERNAL MEDICINE

## 2020-07-20 NOTE — PROGRESS NOTES
Subjective:       Patient ID: Robb Iglesias is a 39 y.o. White male who presents for follow-up and progression of the disease evaluation of No chief complaint on file.    Hypertension  Pertinent negatives include no chest pain, headaches, neck pain, palpitations or shortness of breath.        Patient is a 39-year-old male with history of type 2 diabetes with morbid obesity.  Patient has multiple family members with type 2 diabetes with multiple complications.  Patient has history of chronic kidney disease stage III from diabetic nephropathy.  In February 2016 his creatinine was 2.5 with approximate GFR 35%.  His hemoglobin A1c was 10.9 at that time.    2015 has history of hepatomegaly status post liver biopsy    March 2017 patient underwent vertical sleeve gastrectomy for bariatric surgery.  His postoperative course was complicated by acute kidney injury from dehydration.  His maximum creatinine was 7.7 with a BUN of 126 no RRT required     May 2017 creatinine down to 1.5    June 2017 patient presents for consultation for chronic kidney disease with proteinuria and hematuria.  His BMI down to 29 and has lost greater than 80 pounds in last 3 months. ( 320 ib pre-op) . 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  He underwent LHC that showed 99% involving proximal and mid LAD which was treated with PCI. LVEF 55%. Cardiac medication include ASA, Brilinta, STATIN, Metoprolol, and Lisinopril.    September 2017 acute gout attack seen by rheumatology records reviewed. Cystatin C shows GFR 67%    04/03/2020 patient seen after a gap of 2 years.  Creatinine is up to 2.5 with nephrotic range proteinuria.  Uric acid is 5.8 but on allopurinol 600 mg daily.  Patient is on  Ozempic ( semaglutide) and metformin for diabetic management.  Patient is on losartan 100 mg daily for proteinuria and hypertension    July 20, 2020:  Patient has no complaints. Last creatinine from 6/12/20 was 2.8.        Review of Systems   Constitutional: Negative.  Negative for activity change, appetite change, chills, diaphoresis, fatigue and fever.   HENT: Negative.  Negative for congestion and trouble swallowing.    Eyes: Negative.    Respiratory: Negative.  Negative for cough, chest tightness, shortness of breath and wheezing.    Cardiovascular: Negative.  Negative for chest pain, palpitations and leg swelling.   Gastrointestinal: Negative.  Negative for abdominal distention, abdominal pain, nausea and vomiting.   Genitourinary: Negative.  Negative for decreased urine volume, difficulty urinating, dysuria, enuresis, flank pain, frequency, hematuria, penile swelling, scrotal swelling and urgency.   Musculoskeletal: Negative.  Negative for arthralgias, back pain, joint swelling and neck pain.   Skin: Negative for rash.   Neurological: Negative.  Negative for tremors, seizures and headaches.   Psychiatric/Behavioral: Negative.  Negative for confusion and sleep disturbance. The patient is not nervous/anxious.        Objective:               Physical Exam  Constitutional:       General: He is not in acute distress.     Appearance: He is well-developed.   HENT:      Head: Normocephalic.   Eyes:      Pupils: Pupils are equal, round, and reactive to light.   Neck:      Musculoskeletal: Normal range of motion and neck supple.      Thyroid: No thyromegaly.      Vascular: No JVD.   Cardiovascular:      Rate and Rhythm: Normal rate and regular rhythm.      Chest Wall: PMI is not displaced.      Heart sounds: Normal heart sounds, S1 normal and S2 normal. No murmur. No friction rub. No gallop.    Pulmonary:      Effort: Pulmonary effort is normal. No respiratory distress.      Breath sounds: Normal breath sounds. No  wheezing or rales.   Chest:      Chest wall: No tenderness.   Abdominal:      General: There is no distension.      Palpations: There is no mass.      Tenderness: There is no abdominal tenderness. There is no rebound.      Hernia: No hernia is present.   Musculoskeletal: Normal range of motion.         General: No tenderness.   Lymphadenopathy:      Cervical: No cervical adenopathy.   Skin:     General: Skin is warm and dry.      Findings: No erythema or rash.   Neurological:      Mental Status: He is alert and oriented to person, place, and time. He is not disoriented.      Cranial Nerves: No cranial nerve deficit.      Motor: No abnormal muscle tone.      Coordination: Coordination normal.      Deep Tendon Reflexes: Reflexes are normal and symmetric. Reflexes normal.   Psychiatric:         Behavior: Behavior normal.         Thought Content: Thought content normal.         Judgment: Judgment normal.           Lab Results   Component Value Date    CREATININE 2.8 (H) 06/12/2020    CREATININE 2.8 (H) 06/12/2020    BUN 48 (H) 06/12/2020    BUN 48 (H) 06/12/2020     06/12/2020     06/12/2020    K 4.7 06/12/2020    K 4.7 06/12/2020     06/12/2020     06/12/2020    CO2 24 06/12/2020    CO2 24 06/12/2020     Lab Results   Component Value Date    WBC 7.19 06/12/2020    HGB 14.8 06/12/2020    HCT 43.9 06/12/2020    MCV 85 06/12/2020     06/12/2020     Lab Results   Component Value Date    PTH 67.8 06/12/2020    CALCIUM 9.9 06/12/2020    CALCIUM 9.9 06/12/2020    PHOS 3.9 06/12/2020    PHOS 3.9 06/12/2020         Lab Results   Component Value Date    URICACID 8.6 (H) 06/12/2020       Protein Creatinine Ratios:    Creatinine, Random Ur   Date Value Ref Range Status   06/12/2020 171.0 23.0 - 375.0 mg/dL Final     Comment:     The random urine reference ranges provided were established   for 24 hour urine collections.  No reference ranges exist for  random urine specimens.  Correlate clinically.      03/11/2020 219.0 23.0 - 375.0 mg/dL Final     Comment:     The random urine reference ranges provided were established   for 24 hour urine collections.  No reference ranges exist for  random urine specimens.  Correlate clinically.     04/09/2018 53.0 23.0 - 375.0 mg/dL Final     Comment:     The random urine reference ranges provided were established   for 24 hour urine collections.  No reference ranges exist for  random urine specimens.  Correlate clinically.       Protein, Urine Random   Date Value Ref Range Status   06/12/2020 384 (H) 0 - 15 mg/dL Final     Comment:     The random urine reference ranges provided were established   for 24 hour urine collections.  No reference ranges exist for  random urine specimens.  Correlate clinically.     04/09/2018 230 (H) 0 - 15 mg/dL Final     Comment:     The random urine reference ranges provided were established   for 24 hour urine collections.  No reference ranges exist for  random urine specimens.  Correlate clinically.     09/13/2017 384 (H) 0 - 15 mg/dL Final     Comment:     The random urine reference ranges provided were established   for 24 hour urine collections.  No reference ranges exist for  random urine specimens.  Correlate clinically.       Prot/Creat Ratio, Ur   Date Value Ref Range Status   06/12/2020 2.25 (H) 0.00 - 0.20 Final   04/09/2018 4.34 (H) 0.00 - 0.20 Final   09/13/2017 1.92 (H) 0.00 - 0.20 Final         Assessment:    )    No diagnosis found.    Plan:         1.Chronic kidney disease stage III:  Patient's creatinine has been increasing since last visit to 2.8 (from 2.5).  Proteinuria has improved. Will continue diltiazem and Losartan. He will return in 3 months for follow-up.        2.  History of gout with hyperuricemia:  Allopurinol dose of 600 mg daily is very high dose but is controlling the uric acid.  Will get expert opinion by Dr. Hills.      3.  Nephrotic range proteinuria: protein creatinine ratio has declined to 2.2.  Most  likely these findings are from diabetic nephropathy.  Continue with losartan and diltiazem.     4.  Hypertension well-controlled.       5.  Coronary artery disease status post left heart catheterization: Medications and cardiology report reviewed.  Continue current medications.     6. Morbid obesity with multiple complications status post bariatric surgery.  Patient also have severe sleep apnea could not tolerate CPAP.     7. Electrolytes at goal.

## 2020-07-30 ENCOUNTER — PATIENT OUTREACH (OUTPATIENT)
Dept: OTHER | Facility: OTHER | Age: 40
End: 2020-07-30

## 2020-08-11 ENCOUNTER — LAB VISIT (OUTPATIENT)
Dept: LAB | Facility: HOSPITAL | Age: 40
End: 2020-08-11
Attending: INTERNAL MEDICINE
Payer: COMMERCIAL

## 2020-08-11 ENCOUNTER — OFFICE VISIT (OUTPATIENT)
Dept: RHEUMATOLOGY | Facility: CLINIC | Age: 40
End: 2020-08-11
Payer: COMMERCIAL

## 2020-08-11 VITALS
HEART RATE: 71 BPM | DIASTOLIC BLOOD PRESSURE: 84 MMHG | SYSTOLIC BLOOD PRESSURE: 131 MMHG | HEIGHT: 75 IN | WEIGHT: 246.5 LBS | BODY MASS INDEX: 30.65 KG/M2

## 2020-08-11 DIAGNOSIS — E79.0 HYPERURICEMIA: ICD-10-CM

## 2020-08-11 DIAGNOSIS — M1A.09X0 IDIOPATHIC CHRONIC GOUT, MULTIPLE SITES, WITHOUT TOPHUS (TOPHI): Primary | ICD-10-CM

## 2020-08-11 DIAGNOSIS — M1A.09X0 IDIOPATHIC CHRONIC GOUT OF MULTIPLE SITES WITHOUT TOPHUS: ICD-10-CM

## 2020-08-11 PROCEDURE — 99999 PR PBB SHADOW E&M-EST. PATIENT-LVL V: ICD-10-PCS | Mod: PBBFAC,,, | Performed by: INTERNAL MEDICINE

## 2020-08-11 PROCEDURE — 36415 COLL VENOUS BLD VENIPUNCTURE: CPT

## 2020-08-11 PROCEDURE — 3075F SYST BP GE 130 - 139MM HG: CPT | Mod: CPTII,S$GLB,, | Performed by: INTERNAL MEDICINE

## 2020-08-11 PROCEDURE — 99214 PR OFFICE/OUTPT VISIT, EST, LEVL IV, 30-39 MIN: ICD-10-PCS | Mod: S$GLB,,, | Performed by: INTERNAL MEDICINE

## 2020-08-11 PROCEDURE — 3075F PR MOST RECENT SYSTOLIC BLOOD PRESS GE 130-139MM HG: ICD-10-PCS | Mod: CPTII,S$GLB,, | Performed by: INTERNAL MEDICINE

## 2020-08-11 PROCEDURE — 3008F PR BODY MASS INDEX (BMI) DOCUMENTED: ICD-10-PCS | Mod: CPTII,S$GLB,, | Performed by: INTERNAL MEDICINE

## 2020-08-11 PROCEDURE — 3079F DIAST BP 80-89 MM HG: CPT | Mod: CPTII,S$GLB,, | Performed by: INTERNAL MEDICINE

## 2020-08-11 PROCEDURE — 84550 ASSAY OF BLOOD/URIC ACID: CPT

## 2020-08-11 PROCEDURE — 3079F PR MOST RECENT DIASTOLIC BLOOD PRESSURE 80-89 MM HG: ICD-10-PCS | Mod: CPTII,S$GLB,, | Performed by: INTERNAL MEDICINE

## 2020-08-11 PROCEDURE — 99999 PR PBB SHADOW E&M-EST. PATIENT-LVL V: CPT | Mod: PBBFAC,,, | Performed by: INTERNAL MEDICINE

## 2020-08-11 PROCEDURE — 99214 OFFICE O/P EST MOD 30 MIN: CPT | Mod: S$GLB,,, | Performed by: INTERNAL MEDICINE

## 2020-08-11 PROCEDURE — 3008F BODY MASS INDEX DOCD: CPT | Mod: CPTII,S$GLB,, | Performed by: INTERNAL MEDICINE

## 2020-08-11 NOTE — LETTER
August 11, 2020      Jimbo Haas MD  [address]  [place] [address]           [facility]  [address]  [place] [address]  Phone: [phone]  Fax: [phone]          Patient: Robb Iglesias   MR Number: [id number]   YOB: 1980   Date of Visit: 8/11/2020       Dear Dr. Jimbo Haas:    Thank you for referring Robb Iglesias to me for evaluation. Attached you will find relevant portions of my assessment and plan of care.    If you have questions, please do not hesitate to call me. I look forward to following Robb Iglesias along with you.    Sincerely,    Gatito Hills MD    Enclosure  CC:  No Recipients    If you would like to receive this communication electronically, please contact [email or web address] or [phone] to request more information on [facility] access.    For providers and/or their staff who would like to refer a patient to [facility], please contact us through our one-stop-shop provider referral line, [facility], at [phone].    If you feel you have received this communication in error or would no longer like to receive these types of communications, please e-mail [email or web address]

## 2020-08-12 LAB — URATE SERPL-MCNC: 5.7 MG/DL (ref 3.4–7)

## 2020-08-13 NOTE — PROGRESS NOTES
RHEUMATOLOGY CLINIC FOLLOW UP VISIT  Chief complaints:-  To follow up for gout.  Elevated uric acid level.    HPI:-  Robb Stark a 39 y.o. pleasant male comes in for a follow up visit.  He follows up in Rheumatology Clinic for gout.  Lost for follow-up, here to reestablish care.  He was found to have 1 instance of high uric acid level.  He is on 80 mg Uloric.  No flare-up since last visit.  No joint pain today.  No chest pain or shortness of breath.    Review of Systems   Constitutional: Negative for chills and fever.   HENT: Negative for congestion and sore throat.    Eyes: Negative for blurred vision and redness.   Respiratory: Negative for cough and shortness of breath.    Cardiovascular: Negative for chest pain and leg swelling.   Gastrointestinal: Negative for abdominal pain.   Genitourinary: Negative for dysuria.   Musculoskeletal: Negative for back pain, falls, joint pain, myalgias and neck pain.   Skin: Negative for rash.   Neurological: Negative for headaches.   Endo/Heme/Allergies: Does not bruise/bleed easily.   Psychiatric/Behavioral: Negative for memory loss. The patient does not have insomnia.        Past Medical History:   Diagnosis Date    Allergic rhinitis     Class 1 obesity due to excess calories with serious comorbidity and body mass index (BMI) of 31.0 to 31.9 in adult 4/7/2017    Coronary artery disease     Direct hyperbilirubinemia 3/24/2018    DM (diabetes mellitus) 2008    BS doesn't check any more 08/02/2018    DM (diabetes mellitus) 2012    BS 99 am 06/26/2020    Elevated bilirubin 3/21/2018    GERD (gastroesophageal reflux disease)     Gout     Hyperlipidemia     Hypertension associated with chronic kidney disease due to type 2 diabetes mellitus     Idiopathic chronic gout, multiple sites, without tophus (tophi) 7/19/2017    Long term (current) use of insulin     MI (myocardial infarction) 07/2017    Obesity   "   HENRY on CPAP     Proteinuria     Steatohepatitis     Fatty Liver    Type 2 diabetes mellitus with diabetic nephropathy     Type 2 diabetes mellitus with diabetic nephropathy, without long-term current use of insulin 1/6/2020    Type 2 diabetes mellitus with hyperglycemia     Type 2 diabetes mellitus with renal manifestations     Type 2 diabetes mellitus with stage 3 chronic kidney disease, without long-term current use of insulin 6/21/2017       Past Surgical History:   Procedure Laterality Date    CORONARY ANGIOPLASTY WITH STENT PLACEMENT      LASIK Bilateral     LIVER BIOPSY      NASAL ENDOSCOPY      NASAL SEPTUM SURGERY      SLEEVE GASTROPLASTY  03/06/2017        Social History     Tobacco Use    Smoking status: Never Smoker    Smokeless tobacco: Never Used   Substance Use Topics    Alcohol use: No     Frequency: Monthly or less     Drinks per session: 1 or 2     Binge frequency: Never     Comment: 1-2 times per month    Drug use: No       Family History   Problem Relation Age of Onset    Diabetes type II Mother     Hypertension Mother     Hyperlipidemia Mother     Diabetes Mother     Hyperlipidemia Father     Diabetes type II Brother     Diabetes type II Brother     Diabetes Maternal Grandmother        Review of patient's allergies indicates:  No Known Allergies    Vitals:    08/11/20 1511   BP: 131/84   Pulse: 71   Weight: 111.8 kg (246 lb 7.6 oz)   Height: 6' 3" (1.905 m)   PainSc: 0-No pain       Physical Exam   Constitutional: He is oriented to person, place, and time and well-developed, well-nourished, and in no distress. No distress.   HENT:   Head: Normocephalic.   Mouth/Throat: Oropharynx is clear and moist.   Eyes: Pupils are equal, round, and reactive to light. Conjunctivae are normal.   Neck: Normal range of motion. Neck supple.   Cardiovascular: Normal rate and intact distal pulses.   Pulmonary/Chest: Effort normal. No respiratory distress.   Abdominal: Soft. There is no " abdominal tenderness.   Musculoskeletal:      Comments: No synovitis in small joints of hands or feet.  Good range of motion in large joints.  No tophi.   Neurological: He is alert and oriented to person, place, and time. Gait normal.   Skin: Skin is warm. No rash noted. No erythema.   Psychiatric: Mood and affect normal.       Medication List with Changes/Refills   Current Medications    AMLODIPINE (NORVASC) 10 MG TABLET    Take 1 tablet (10 mg total) by mouth once daily. (FOR BLOOD PRESSURE AND HEART)    ASPIRIN (ECOTRIN) 81 MG EC TABLET    Take 81 mg by mouth once daily.    ATORVASTATIN (LIPITOR) 80 MG TABLET    Take 1 tablet (80 mg total) by mouth every evening.    BLOOD SUGAR DIAGNOSTIC STRP    Check blood glucose 2 times daily as directed and as needed (dispense insurance preferred brand or patient choice)    BLOOD-GLUCOSE METER (ONETOUCH VERIO IQ METER) MISC    Use as directed    CETIRIZINE (ZYRTEC) 10 MG TABLET    Take 10 mg by mouth once daily.    COLCHICINE (COLCRYS) 0.6 MG TABLET    Take 1 tablet (0.6 mg total) by mouth every other day.    DILTIAZEM (CARDIZEM) 120 MG TABLET    Take 1 tablet (120 mg total) by mouth once daily.    FEBUXOSTAT (ULORIC) 80 MG TAB    Take 1 tablet (80 mg total) by mouth once daily.    LANCETS MISC    Check blood glucose 2 times daily as directed and as needed (dispense insurance preferred brand or patient choice)    LOSARTAN (COZAAR) 100 MG TABLET    Take 1 tablet (100 mg total) by mouth once daily. (FOR BLOOD PRESSURE AND HEART)    METOPROLOL TARTRATE (LOPRESSOR) 25 MG TABLET    Take 1 tablet (25 mg total) by mouth 2 (two) times daily. (FOR BLOOD PRESSURE AND HEART)    MULTIVITAMIN/IRON/FOLIC ACID (CENTRUM COMPLETE ORAL)    Take by mouth once daily at 6am.    NITROGLYCERIN (NITROSTAT) 0.4 MG SL TABLET    Dissolve one tablet underneath tongue at onset of angina; may repeat every 5 minutes if needed. Call 911 if angina persists after 2 doses.    SEMAGLUTIDE (OZEMPIC) 1 MG/DOSE  (2 MG/1.5 ML) MARY    Inject 0.75 mLs into the skin every 7 days.    TRIAZOLAM (HALCION) 0.25 MG TAB    Take 1 tablet (0.25 mg total) by mouth every evening. (DO NOT TAKE IF NOT USING CPAP)       Assessment/Plans:-  1. Idiopathic chronic gout, multiple sites, without tophus (tophi)    2. Hyperuricemia    ·  Well controlled gout without any flares since last visit.  · His uric acid has been well controlled for the past couple of years on 80 mg Uloric except 1 recent value.  · Possible lab error.  Repeat uric acid level today.  · If really elevated, consider increasing dose of Uloric to 120 mg.  · Discussed cardiac adverse effects of Uloric .  Problem List Items Addressed This Visit     Idiopathic chronic gout, multiple sites, without tophus (tophi) - Primary (Chronic)    Relevant Orders    Uric acid      Other Visit Diagnoses     Hyperuricemia            # Follow up in about 6 months (around 2/11/2021).      Disclaimer: This note was prepared using voice recognition system and is likely to have sound alike errors and is not proof read.  Please call me with any questions.

## 2020-09-18 ENCOUNTER — TELEPHONE (OUTPATIENT)
Dept: INTERNAL MEDICINE | Facility: CLINIC | Age: 40
End: 2020-09-18

## 2020-09-18 ENCOUNTER — PATIENT OUTREACH (OUTPATIENT)
Dept: ADMINISTRATIVE | Facility: HOSPITAL | Age: 40
End: 2020-09-18

## 2020-09-18 NOTE — PROGRESS NOTES
Working HTN Report     Pt Remote /84      Fern BROWN LPN Care Coordinator  Care Coordination Department  [facility]  [phone]

## 2020-09-22 NOTE — PROGRESS NOTES
Called patient to follow up. Patient says that he has been forgetting to take BP and BG readings. He plans to resume regular monitoring.     Will complete HTN clinician enrollment call after health  enrollment

## 2020-09-24 NOTE — PROGRESS NOTES
Digital Medicine: Health  Introduction    Introduced Robb Iglesias to Digital Medicine. Discussed health  role and recommended lifestyle modifications.    The history is provided by the patient.           Additional Enrollment Details: HTN enrollment call. Patient using a wrist cuff and entering in the readings manually. Stated he started out using the [employer] cuff but it was not working well and did not fit him properly. He consulted with a friend of his who is a pharmacist and that person recommended a particular wrist cuff.    HYPERTENSION  Explained hypertension digital medicine goals including BP goal less than or equal to 130/80mmHg, improved convenience of BP management and reduced risk of heart attack, kidney failure, stroke, eye disease, dementia, and death.      Explained non-pharmacologic therapies like low salt diet and physical activity can reduce blood pressure. .      Explained that we expect patient to submit several blood pressure readings per week at random times of the day, but at least 30 minutes after taking blood pressure medications. Instructed patient not to allow anyone else to use their blood pressure monitor and phone as data submitted is directly entered into medical record. Reviewed and confirmed appropriate blood pressure monitoring technique.                 Diet-Not assessed          Physical Activity-Not assessed    Medication Adherence-Medication Adherence not addressed.      Substance, Sleep, Stress-Not assessed      Provided patient education.  Reviewed Device Techniques.     Addressed patient questions and patient has my contact information if needed prior to next outreach. Patient verbalizes understanding.      Explained the importance of self-monitoring and medication adherence. Encouraged the patient to communicate with their health  for lifestyle modifications to help improve or maintain a healthy lifestyle.            There are no preventive care reminders  to display for this patient.    Last 5 Patient Entered Readings                                      Current 30 Day Average: 140/78     Recent Readings 9/22/2020 9/18/2020 8/11/2020 8/7/2020 8/5/2020    SBP (mmHg) 132 147 131 127 138    DBP (mmHg) 72 84 84 84 97    Pulse 77 67 77 77 77

## 2020-10-02 ENCOUNTER — PATIENT MESSAGE (OUTPATIENT)
Dept: OTHER | Facility: OTHER | Age: 40
End: 2020-10-02

## 2020-10-09 ENCOUNTER — TELEPHONE (OUTPATIENT)
Dept: INTERNAL MEDICINE | Facility: CLINIC | Age: 40
End: 2020-10-09

## 2020-10-16 ENCOUNTER — LAB VISIT (OUTPATIENT)
Dept: LAB | Facility: HOSPITAL | Age: 40
End: 2020-10-16
Attending: INTERNAL MEDICINE
Payer: COMMERCIAL

## 2020-10-16 DIAGNOSIS — E79.0 HYPERURICEMIA: ICD-10-CM

## 2020-10-16 DIAGNOSIS — N18.4 CKD (CHRONIC KIDNEY DISEASE) STAGE 4, GFR 15-29 ML/MIN: ICD-10-CM

## 2020-10-16 LAB
ALBUMIN SERPL BCP-MCNC: 3.9 G/DL (ref 3.5–5.2)
ANION GAP SERPL CALC-SCNC: 9 MMOL/L (ref 8–16)
BASOPHILS # BLD AUTO: 0.04 K/UL (ref 0–0.2)
BASOPHILS NFR BLD: 0.5 % (ref 0–1.9)
BUN SERPL-MCNC: 31 MG/DL (ref 6–20)
CALCIUM SERPL-MCNC: 9 MG/DL (ref 8.7–10.5)
CHLORIDE SERPL-SCNC: 106 MMOL/L (ref 95–110)
CO2 SERPL-SCNC: 25 MMOL/L (ref 23–29)
CREAT SERPL-MCNC: 2.8 MG/DL (ref 0.5–1.4)
DIFFERENTIAL METHOD: ABNORMAL
EOSINOPHIL # BLD AUTO: 0.3 K/UL (ref 0–0.5)
EOSINOPHIL NFR BLD: 3.8 % (ref 0–8)
ERYTHROCYTE [DISTWIDTH] IN BLOOD BY AUTOMATED COUNT: 13.2 % (ref 11.5–14.5)
EST. GFR  (AFRICAN AMERICAN): 32 ML/MIN/1.73 M^2
EST. GFR  (NON AFRICAN AMERICAN): 27 ML/MIN/1.73 M^2
GLUCOSE SERPL-MCNC: 132 MG/DL (ref 70–110)
HCT VFR BLD AUTO: 39 % (ref 40–54)
HGB BLD-MCNC: 13.7 G/DL (ref 14–18)
IMM GRANULOCYTES # BLD AUTO: 0.02 K/UL (ref 0–0.04)
IMM GRANULOCYTES NFR BLD AUTO: 0.3 % (ref 0–0.5)
LYMPHOCYTES # BLD AUTO: 2.1 K/UL (ref 1–4.8)
LYMPHOCYTES NFR BLD: 27.5 % (ref 18–48)
MCH RBC QN AUTO: 28.7 PG (ref 27–31)
MCHC RBC AUTO-ENTMCNC: 35.1 G/DL (ref 32–36)
MCV RBC AUTO: 82 FL (ref 82–98)
MONOCYTES # BLD AUTO: 0.7 K/UL (ref 0.3–1)
MONOCYTES NFR BLD: 9.3 % (ref 4–15)
NEUTROPHILS # BLD AUTO: 4.5 K/UL (ref 1.8–7.7)
NEUTROPHILS NFR BLD: 58.6 % (ref 38–73)
NRBC BLD-RTO: 0 /100 WBC
PHOSPHATE SERPL-MCNC: 3.7 MG/DL (ref 2.7–4.5)
PLATELET # BLD AUTO: 216 K/UL (ref 150–350)
PMV BLD AUTO: 9.6 FL (ref 9.2–12.9)
POTASSIUM SERPL-SCNC: 4.3 MMOL/L (ref 3.5–5.1)
RBC # BLD AUTO: 4.78 M/UL (ref 4.6–6.2)
SODIUM SERPL-SCNC: 140 MMOL/L (ref 136–145)
URATE SERPL-MCNC: 5.8 MG/DL (ref 3.4–7)
WBC # BLD AUTO: 7.64 K/UL (ref 3.9–12.7)

## 2020-10-16 PROCEDURE — 84550 ASSAY OF BLOOD/URIC ACID: CPT

## 2020-10-16 PROCEDURE — 80069 RENAL FUNCTION PANEL: CPT

## 2020-10-16 PROCEDURE — 36415 COLL VENOUS BLD VENIPUNCTURE: CPT

## 2020-10-16 PROCEDURE — 85025 COMPLETE CBC W/AUTO DIFF WBC: CPT

## 2020-10-20 ENCOUNTER — PATIENT OUTREACH (OUTPATIENT)
Dept: OTHER | Facility: OTHER | Age: 40
End: 2020-10-20

## 2020-10-20 DIAGNOSIS — E11.22 TYPE 2 DIABETES MELLITUS WITH STAGE 3 CHRONIC KIDNEY DISEASE, WITHOUT LONG-TERM CURRENT USE OF INSULIN: Chronic | ICD-10-CM

## 2020-10-20 DIAGNOSIS — N18.30 TYPE 2 DIABETES MELLITUS WITH STAGE 3 CHRONIC KIDNEY DISEASE, WITHOUT LONG-TERM CURRENT USE OF INSULIN: Chronic | ICD-10-CM

## 2020-10-20 DIAGNOSIS — E11.65 TYPE 2 DIABETES MELLITUS WITH HYPERGLYCEMIA, WITHOUT LONG-TERM CURRENT USE OF INSULIN: Chronic | ICD-10-CM

## 2020-10-20 NOTE — PROGRESS NOTES
Digital Medicine: Clinician Introduction    Robb Iglesias is a 39 y.o. male who is newly enrolled in the Digital Medicine Clinic.    Called patient to complete enrollment in HTN digital medicine program. He was previously enrolled in the DM digital medicine program. He is doing well with no complaints. He confirms current BP medications and states that he takes the medications around 10 pm except for once dose of metoprolol. He usually takes BP in the morning about 30 min after metoprolol or in the evening after work. He notes occasional CHANTELL after standing for a long time, but says it is not bothersome.     The history is provided by the patient.      Review of patient's allergies indicates:  No Known Allergies  Completed Medication Reconciliation      Hypertension    Patient's blood pressure is stable.   Diabetes    Patient's blood glucose is stable.     HYPERTENSION  Explained hypertension digital medicine goals including BP goal less than or equal to 130/80mmHg, improved convenience of BP management and reduced risk of heart attack, kidney failure, stroke, eye disease, dementia, and death.     Explained non-pharmacologic therapies like low salt diet and physical activity can reduce blood pressure.       Explained that we expect patient to submit several blood pressure readings per week at random times of the day, but at least 30 minutes after taking blood pressure medications. Instructed patient not to allow anyone else to use their blood pressure monitor and phone as data submitted is directly entered into medical record. Reviewed and confirmed appropriate blood pressure monitoring technique.         Reviewed signs/symptoms of hypertension (headache, changes in vision, chest pain, shortness of breath)     Patient's BP goal is less than or equal to 130/80. Patients BP average is 130/79 mmHg, which is above goal, per 2017 ACC/AHA Hypertension Guidelines.         Last 5 Patient Entered Readings                                       Current 30 Day Average: 130/79     Recent Readings 10/16/2020 10/14/2020 10/11/2020 10/9/2020 10/7/2020    SBP (mmHg) 126 155 135 134 108    DBP (mmHg) 71 76 86 90 81    Pulse 85 76 65 75 75                Depression Screening  Robb Iglesias screened negative on the depression screening.     Sleep Apnea Screening  Patient previously diagnosed with HENRY He reports he is not currently using CPAP. He is not using CPAP because: unable to tolerate machine.   Patient is working with PCP to address HENRY .     Medication Affordability Screening  Patient screened negative on the medication affordability questionnaires. Patient is currently not having problems affording medications    Medication Adherence-Medication adherence was assessed.  Patient continue taking medication as prescribed.            ASSESSMENT(S)  Patient's A1C goal is less than or equal to 8. Patient's most recent A1C result is at goal. Lab Results    Component                Value               Date                     HGBA1C                   5.2                 06/12/2020          .     Patients BP average is 130/79 mmHg, which is above goal. Patient's BP goal is less than or equal to 130/80.     Hypertension Plan  Continue current therapy.  Provided patient education.  Low sodium deit and regular physical activity  Diabetes Plan  Continue current therapy.       Addressed patient questions and patient has my contact information if needed prior to next outreach. Patient verbalizes understanding.             There are no preventive care reminders to display for this patient.       Current Medication Regimen:  Hypertension Medications     amLODIPine (NORVASC) 10 MG tablet Take 1 tablet (10 mg total) by mouth once daily. (FOR BLOOD PRESSURE AND HEART)    diltiaZEM (CARDIZEM) 120 MG tablet Take 1 tablet (120 mg total) by mouth once daily.    losartan (COZAAR) 100 MG tablet Take 1 tablet (100 mg total) by mouth once daily. (FOR  BLOOD PRESSURE AND HEART)    metoprolol tartrate (LOPRESSOR) 25 MG tablet Take 1 tablet (25 mg total) by mouth 2 (two) times daily. (FOR BLOOD PRESSURE AND HEART)    nitroGLYCERIN (NITROSTAT) 0.4 MG SL tablet Dissolve one tablet underneath tongue at onset of angina; may repeat every 5 minutes if needed. Call 911 if angina persists after 2 doses.        Diabetes Medications     semaglutide (OZEMPIC) 1 mg/dose (2 mg/1.5 mL) PnIj Inject 0.75 mLs into the skin every 7 days.

## 2020-10-22 ENCOUNTER — OFFICE VISIT (OUTPATIENT)
Dept: NEPHROLOGY | Facility: CLINIC | Age: 40
End: 2020-10-22
Payer: COMMERCIAL

## 2020-10-22 VITALS
WEIGHT: 249.56 LBS | BODY MASS INDEX: 31.03 KG/M2 | HEART RATE: 70 BPM | SYSTOLIC BLOOD PRESSURE: 136 MMHG | HEIGHT: 75 IN | DIASTOLIC BLOOD PRESSURE: 90 MMHG

## 2020-10-22 DIAGNOSIS — R80.9 PROTEINURIA DUE TO TYPE 2 DIABETES MELLITUS: ICD-10-CM

## 2020-10-22 DIAGNOSIS — N18.4 CKD (CHRONIC KIDNEY DISEASE) STAGE 4, GFR 15-29 ML/MIN: Primary | ICD-10-CM

## 2020-10-22 DIAGNOSIS — E11.29 PROTEINURIA DUE TO TYPE 2 DIABETES MELLITUS: ICD-10-CM

## 2020-10-22 DIAGNOSIS — N25.81 HYPERPARATHYROIDISM, SECONDARY RENAL: ICD-10-CM

## 2020-10-22 PROCEDURE — 3008F BODY MASS INDEX DOCD: CPT | Mod: CPTII,S$GLB,, | Performed by: INTERNAL MEDICINE

## 2020-10-22 PROCEDURE — 3080F DIAST BP >= 90 MM HG: CPT | Mod: CPTII,S$GLB,, | Performed by: INTERNAL MEDICINE

## 2020-10-22 PROCEDURE — 99214 PR OFFICE/OUTPT VISIT, EST, LEVL IV, 30-39 MIN: ICD-10-PCS | Mod: S$GLB,,, | Performed by: INTERNAL MEDICINE

## 2020-10-22 PROCEDURE — 99999 PR PBB SHADOW E&M-EST. PATIENT-LVL III: CPT | Mod: PBBFAC,,, | Performed by: INTERNAL MEDICINE

## 2020-10-22 PROCEDURE — 3008F PR BODY MASS INDEX (BMI) DOCUMENTED: ICD-10-PCS | Mod: CPTII,S$GLB,, | Performed by: INTERNAL MEDICINE

## 2020-10-22 PROCEDURE — 99214 OFFICE O/P EST MOD 30 MIN: CPT | Mod: S$GLB,,, | Performed by: INTERNAL MEDICINE

## 2020-10-22 PROCEDURE — 3044F PR MOST RECENT HEMOGLOBIN A1C LEVEL <7.0%: ICD-10-PCS | Mod: CPTII,S$GLB,, | Performed by: INTERNAL MEDICINE

## 2020-10-22 PROCEDURE — 3075F PR MOST RECENT SYSTOLIC BLOOD PRESS GE 130-139MM HG: ICD-10-PCS | Mod: CPTII,S$GLB,, | Performed by: INTERNAL MEDICINE

## 2020-10-22 PROCEDURE — 3080F PR MOST RECENT DIASTOLIC BLOOD PRESSURE >= 90 MM HG: ICD-10-PCS | Mod: CPTII,S$GLB,, | Performed by: INTERNAL MEDICINE

## 2020-10-22 PROCEDURE — 99999 PR PBB SHADOW E&M-EST. PATIENT-LVL III: ICD-10-PCS | Mod: PBBFAC,,, | Performed by: INTERNAL MEDICINE

## 2020-10-22 PROCEDURE — 3075F SYST BP GE 130 - 139MM HG: CPT | Mod: CPTII,S$GLB,, | Performed by: INTERNAL MEDICINE

## 2020-10-22 PROCEDURE — 3044F HG A1C LEVEL LT 7.0%: CPT | Mod: CPTII,S$GLB,, | Performed by: INTERNAL MEDICINE

## 2020-10-22 NOTE — PROGRESS NOTES
Subjective:       Patient ID: Robb Iglesias is a 39 y.o. White male who presents for follow-up and progression of the disease evaluation of No chief complaint on file.    Hypertension  Pertinent negatives include no chest pain, headaches, neck pain, palpitations or shortness of breath.        Patient is a 39-year-old male with history of type 2 diabetes with morbid obesity.  Patient has multiple family members with type 2 diabetes with multiple complications.  Patient has history of chronic kidney disease stage III from diabetic nephropathy.  In February 2016 his creatinine was 2.5 with approximate GFR 35%.  His hemoglobin A1c was 10.9 at that time.    2015 has history of hepatomegaly status post liver biopsy    March 2017 patient underwent vertical sleeve gastrectomy for bariatric surgery.  His postoperative course was complicated by acute kidney injury from dehydration.  His maximum creatinine was 7.7 with a BUN of 126 no RRT required     May 2017 creatinine down to 1.5    June 2017 patient presents for consultation for chronic kidney disease with proteinuria and hematuria.  His BMI down to 29 and has lost greater than 80 pounds in last 3 months. ( 320 ib pre-op) . 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  He underwent LHC that showed 99% involving proximal and mid LAD which was treated with PCI. LVEF 55%. Cardiac medication include ASA, Brilinta, STATIN, Metoprolol, and Lisinopril.    September 2017 acute gout attack seen by rheumatology records reviewed. Cystatin C shows GFR 67%    04/03/2020 patient seen after a gap of 2 years.  Creatinine is up to 2.5 with nephrotic range proteinuria.  Uric acid is 5.8 but on allopurinol 600 mg daily.  Patient is on  "Ozempic ( semaglutide) and metformin for diabetic management.  Patient is on losartan 100 mg daily for proteinuria and hypertension    July 20, 2020:  Patient has no complaints. Last creatinine from 6/12/20 was 2.8.    October 22, 2020:  Creatinine stable at 2.8. Patient denies any complaints today.         Review of Systems   Constitutional: Negative.  Negative for activity change, appetite change, chills, diaphoresis, fatigue and fever.   HENT: Negative.  Negative for congestion and trouble swallowing.    Eyes: Negative.    Respiratory: Negative.  Negative for cough, chest tightness, shortness of breath and wheezing.    Cardiovascular: Negative.  Negative for chest pain, palpitations and leg swelling.   Gastrointestinal: Negative.  Negative for abdominal distention, abdominal pain, nausea and vomiting.   Genitourinary: Negative.  Negative for decreased urine volume, difficulty urinating, dysuria, enuresis, flank pain, frequency, hematuria, penile swelling, scrotal swelling and urgency.   Musculoskeletal: Negative.  Negative for arthralgias, back pain, joint swelling and neck pain.   Skin: Negative for rash.   Neurological: Negative.  Negative for tremors, seizures and headaches.   Psychiatric/Behavioral: Negative.  Negative for confusion and sleep disturbance. The patient is not nervous/anxious.        Objective:           BP (!) 136/90   Pulse 70   Ht 6' 3" (1.905 m)   Wt 113.2 kg (249 lb 9 oz)   BMI 31.19 kg/m²       Physical Exam  Constitutional:       General: He is not in acute distress.     Appearance: He is well-developed.   HENT:      Head: Normocephalic.   Eyes:      Pupils: Pupils are equal, round, and reactive to light.   Neck:      Musculoskeletal: Normal range of motion and neck supple.      Thyroid: No thyromegaly.      Vascular: No JVD.   Cardiovascular:      Rate and Rhythm: Normal rate and regular rhythm.      Chest Wall: PMI is not displaced.      Heart sounds: Normal heart sounds, S1 normal " and S2 normal. No murmur. No friction rub. No gallop.    Pulmonary:      Effort: Pulmonary effort is normal. No respiratory distress.      Breath sounds: Normal breath sounds. No wheezing or rales.   Chest:      Chest wall: No tenderness.   Abdominal:      General: There is no distension.      Palpations: There is no mass.      Tenderness: There is no abdominal tenderness. There is no rebound.      Hernia: No hernia is present.   Musculoskeletal: Normal range of motion.         General: No tenderness.   Lymphadenopathy:      Cervical: No cervical adenopathy.   Skin:     General: Skin is warm and dry.      Findings: No erythema or rash.   Neurological:      Mental Status: He is alert and oriented to person, place, and time. He is not disoriented.      Cranial Nerves: No cranial nerve deficit.      Motor: No abnormal muscle tone.      Coordination: Coordination normal.      Deep Tendon Reflexes: Reflexes are normal and symmetric. Reflexes normal.   Psychiatric:         Behavior: Behavior normal.         Thought Content: Thought content normal.         Judgment: Judgment normal.           Lab Results   Component Value Date    CREATININE 2.8 (H) 10/16/2020    BUN 31 (H) 10/16/2020     10/16/2020    K 4.3 10/16/2020     10/16/2020    CO2 25 10/16/2020     Lab Results   Component Value Date    WBC 7.64 10/16/2020    HGB 13.7 (L) 10/16/2020    HCT 39.0 (L) 10/16/2020    MCV 82 10/16/2020     10/16/2020     Lab Results   Component Value Date    PTH 91.0 (H) 07/20/2020    CALCIUM 9.0 10/16/2020    PHOS 3.7 10/16/2020         Lab Results   Component Value Date    URICACID 5.8 10/16/2020       Protein Creatinine Ratios:    Creatinine, Random Ur   Date Value Ref Range Status   10/16/2020 242.0 23.0 - 375.0 mg/dL Final     Comment:     The random urine reference ranges provided were established   for 24 hour urine collections.  No reference ranges exist for  random urine specimens.  Correlate clinically.      07/20/2020 139.0 23.0 - 375.0 mg/dL Final     Comment:     The random urine reference ranges provided were established   for 24 hour urine collections.  No reference ranges exist for  random urine specimens.  Correlate clinically.     06/12/2020 171.0 23.0 - 375.0 mg/dL Final     Comment:     The random urine reference ranges provided were established   for 24 hour urine collections.  No reference ranges exist for  random urine specimens.  Correlate clinically.       Protein, Urine Random   Date Value Ref Range Status   10/16/2020 666 (H) 0 - 15 mg/dL Final     Comment:     The random urine reference ranges provided were established   for 24 hour urine collections.  No reference ranges exist for  random urine specimens.  Correlate clinically.     07/20/2020 276 (H) 0 - 15 mg/dL Final     Comment:     The random urine reference ranges provided were established   for 24 hour urine collections.  No reference ranges exist for  random urine specimens.  Correlate clinically.     06/12/2020 384 (H) 0 - 15 mg/dL Final     Comment:     The random urine reference ranges provided were established   for 24 hour urine collections.  No reference ranges exist for  random urine specimens.  Correlate clinically.       Prot/Creat Ratio, Ur   Date Value Ref Range Status   10/16/2020 2.75 (H) 0.00 - 0.20 Final   07/20/2020 1.99 (H) 0.00 - 0.20 Final   06/12/2020 2.25 (H) 0.00 - 0.20 Final         Assessment:    )    No diagnosis found.    Plan:         1.Chronic kidney disease stage III:  Patient's creatinine has stabilized with creatinine at 2.8 (from 2.5).  Proteinuria was 2.7 g/day.Will continue diltiazem and Losartan. He will return in 3 months for follow-up.     2.  History of gout with hyperuricemia:  On Uloric and Colchicine. No recent gout attacks. Last uric acid level was 5.8.     3.  Nephrotic range proteinuria: protein creatinine ratio has declined to 2.7.  Most likely these findings are from diabetic nephropathy.   Continue with losartan and diltiazem.     4.  Hypertension well-controlled.       5.  Coronary artery disease status post left heart catheterization: Medications and cardiology report reviewed.  Continue current medications.     6. Morbid obesity with multiple complications status post bariatric surgery.  Patient also have severe sleep apnea could not tolerating CPAP.     7. Electrolytes at goal.     8. Meds reviewed.     9. Hyperparathyroidism: borderline, monitor.

## 2020-11-25 ENCOUNTER — PATIENT MESSAGE (OUTPATIENT)
Dept: INTERNAL MEDICINE | Facility: CLINIC | Age: 40
End: 2020-11-25

## 2020-12-01 RX ORDER — SEMAGLUTIDE 1.34 MG/ML
0.75 INJECTION, SOLUTION SUBCUTANEOUS
Qty: 9 ML | Refills: 1 | Status: SHIPPED | OUTPATIENT
Start: 2020-12-01 | End: 2021-05-21

## 2020-12-10 ENCOUNTER — PATIENT OUTREACH (OUTPATIENT)
Dept: OTHER | Facility: OTHER | Age: 40
End: 2020-12-10

## 2020-12-16 ENCOUNTER — PATIENT MESSAGE (OUTPATIENT)
Dept: CARDIOLOGY | Facility: CLINIC | Age: 40
End: 2020-12-16

## 2020-12-21 ENCOUNTER — PATIENT MESSAGE (OUTPATIENT)
Dept: INTERNAL MEDICINE | Facility: CLINIC | Age: 40
End: 2020-12-21

## 2020-12-29 ENCOUNTER — PATIENT MESSAGE (OUTPATIENT)
Dept: CARDIOLOGY | Facility: CLINIC | Age: 40
End: 2020-12-29

## 2021-01-04 ENCOUNTER — PATIENT MESSAGE (OUTPATIENT)
Dept: INTERNAL MEDICINE | Facility: CLINIC | Age: 41
End: 2021-01-04

## 2021-01-21 ENCOUNTER — PATIENT MESSAGE (OUTPATIENT)
Dept: INTERNAL MEDICINE | Facility: CLINIC | Age: 41
End: 2021-01-21

## 2021-01-21 DIAGNOSIS — Z11.4 SCREENING FOR HIV (HUMAN IMMUNODEFICIENCY VIRUS): Primary | ICD-10-CM

## 2021-01-21 DIAGNOSIS — Z11.59 NEED FOR HEPATITIS C SCREENING TEST: ICD-10-CM

## 2021-02-02 ENCOUNTER — PATIENT MESSAGE (OUTPATIENT)
Dept: CARDIOLOGY | Facility: CLINIC | Age: 41
End: 2021-02-02

## 2021-02-17 ENCOUNTER — LAB VISIT (OUTPATIENT)
Dept: LAB | Facility: HOSPITAL | Age: 41
End: 2021-02-17
Attending: INTERNAL MEDICINE
Payer: COMMERCIAL

## 2021-02-17 ENCOUNTER — PATIENT MESSAGE (OUTPATIENT)
Dept: INTERNAL MEDICINE | Facility: CLINIC | Age: 41
End: 2021-02-17

## 2021-02-17 DIAGNOSIS — N25.81 HYPERPARATHYROIDISM, SECONDARY RENAL: ICD-10-CM

## 2021-02-17 DIAGNOSIS — Z11.59 NEED FOR HEPATITIS C SCREENING TEST: ICD-10-CM

## 2021-02-17 DIAGNOSIS — E11.65 TYPE 2 DIABETES MELLITUS WITH HYPERGLYCEMIA, WITHOUT LONG-TERM CURRENT USE OF INSULIN: Chronic | ICD-10-CM

## 2021-02-17 DIAGNOSIS — Z11.4 SCREENING FOR HIV (HUMAN IMMUNODEFICIENCY VIRUS): ICD-10-CM

## 2021-02-17 DIAGNOSIS — N18.4 CKD (CHRONIC KIDNEY DISEASE) STAGE 4, GFR 15-29 ML/MIN: ICD-10-CM

## 2021-02-17 LAB
ALBUMIN SERPL BCP-MCNC: 3.9 G/DL (ref 3.5–5.2)
ANION GAP SERPL CALC-SCNC: 10 MMOL/L (ref 8–16)
BASOPHILS # BLD AUTO: 0.04 K/UL (ref 0–0.2)
BASOPHILS NFR BLD: 0.6 % (ref 0–1.9)
BUN SERPL-MCNC: 38 MG/DL (ref 6–20)
CALCIUM SERPL-MCNC: 9.1 MG/DL (ref 8.7–10.5)
CHLORIDE SERPL-SCNC: 107 MMOL/L (ref 95–110)
CO2 SERPL-SCNC: 23 MMOL/L (ref 23–29)
CREAT SERPL-MCNC: 3.4 MG/DL (ref 0.5–1.4)
DIFFERENTIAL METHOD: ABNORMAL
EOSINOPHIL # BLD AUTO: 0.3 K/UL (ref 0–0.5)
EOSINOPHIL NFR BLD: 4.6 % (ref 0–8)
ERYTHROCYTE [DISTWIDTH] IN BLOOD BY AUTOMATED COUNT: 13.7 % (ref 11.5–14.5)
EST. GFR  (AFRICAN AMERICAN): 25 ML/MIN/1.73 M^2
EST. GFR  (NON AFRICAN AMERICAN): 21 ML/MIN/1.73 M^2
ESTIMATED AVG GLUCOSE: 117 MG/DL (ref 68–131)
GLUCOSE SERPL-MCNC: 108 MG/DL (ref 70–110)
HBA1C MFR BLD: 5.7 % (ref 4–5.6)
HCT VFR BLD AUTO: 40.3 % (ref 40–54)
HGB BLD-MCNC: 14.2 G/DL (ref 14–18)
IMM GRANULOCYTES # BLD AUTO: 0.02 K/UL (ref 0–0.04)
IMM GRANULOCYTES NFR BLD AUTO: 0.3 % (ref 0–0.5)
LYMPHOCYTES # BLD AUTO: 2 K/UL (ref 1–4.8)
LYMPHOCYTES NFR BLD: 27.6 % (ref 18–48)
MCH RBC QN AUTO: 28.6 PG (ref 27–31)
MCHC RBC AUTO-ENTMCNC: 35.2 G/DL (ref 32–36)
MCV RBC AUTO: 81 FL (ref 82–98)
MONOCYTES # BLD AUTO: 0.7 K/UL (ref 0.3–1)
MONOCYTES NFR BLD: 9.3 % (ref 4–15)
NEUTROPHILS # BLD AUTO: 4.1 K/UL (ref 1.8–7.7)
NEUTROPHILS NFR BLD: 57.6 % (ref 38–73)
NRBC BLD-RTO: 0 /100 WBC
PHOSPHATE SERPL-MCNC: 3.4 MG/DL (ref 2.7–4.5)
PLATELET # BLD AUTO: 214 K/UL (ref 150–350)
PMV BLD AUTO: 9.2 FL (ref 9.2–12.9)
POTASSIUM SERPL-SCNC: 3.9 MMOL/L (ref 3.5–5.1)
PTH-INTACT SERPL-MCNC: 140.5 PG/ML (ref 9–77)
RBC # BLD AUTO: 4.97 M/UL (ref 4.6–6.2)
SODIUM SERPL-SCNC: 140 MMOL/L (ref 136–145)
WBC # BLD AUTO: 7.13 K/UL (ref 3.9–12.7)

## 2021-02-17 PROCEDURE — 80069 RENAL FUNCTION PANEL: CPT

## 2021-02-17 PROCEDURE — 86703 HIV-1/HIV-2 1 RESULT ANTBDY: CPT

## 2021-02-17 PROCEDURE — 83970 ASSAY OF PARATHORMONE: CPT

## 2021-02-17 PROCEDURE — 86803 HEPATITIS C AB TEST: CPT

## 2021-02-17 PROCEDURE — 85025 COMPLETE CBC W/AUTO DIFF WBC: CPT

## 2021-02-17 PROCEDURE — 36415 COLL VENOUS BLD VENIPUNCTURE: CPT

## 2021-02-17 PROCEDURE — 83036 HEMOGLOBIN GLYCOSYLATED A1C: CPT

## 2021-02-18 LAB
HCV AB SERPL QL IA: NEGATIVE
HIV 1+2 AB+HIV1 P24 AG SERPL QL IA: NEGATIVE

## 2021-02-22 ENCOUNTER — OFFICE VISIT (OUTPATIENT)
Dept: NEPHROLOGY | Facility: CLINIC | Age: 41
End: 2021-02-22
Payer: COMMERCIAL

## 2021-02-22 VITALS
RESPIRATION RATE: 20 BRPM | SYSTOLIC BLOOD PRESSURE: 120 MMHG | DIASTOLIC BLOOD PRESSURE: 82 MMHG | HEART RATE: 64 BPM | WEIGHT: 254.63 LBS | HEIGHT: 75 IN | BODY MASS INDEX: 31.66 KG/M2

## 2021-02-22 DIAGNOSIS — R31.9 HEMATURIA SYNDROME: Primary | ICD-10-CM

## 2021-02-22 PROCEDURE — 99215 PR OFFICE/OUTPT VISIT, EST, LEVL V, 40-54 MIN: ICD-10-PCS | Mod: S$GLB,,, | Performed by: INTERNAL MEDICINE

## 2021-02-22 PROCEDURE — 3008F BODY MASS INDEX DOCD: CPT | Mod: CPTII,S$GLB,, | Performed by: INTERNAL MEDICINE

## 2021-02-22 PROCEDURE — 3079F DIAST BP 80-89 MM HG: CPT | Mod: CPTII,S$GLB,, | Performed by: INTERNAL MEDICINE

## 2021-02-22 PROCEDURE — 99999 PR PBB SHADOW E&M-EST. PATIENT-LVL IV: ICD-10-PCS | Mod: PBBFAC,,, | Performed by: INTERNAL MEDICINE

## 2021-02-22 PROCEDURE — 99999 PR PBB SHADOW E&M-EST. PATIENT-LVL IV: CPT | Mod: PBBFAC,,, | Performed by: INTERNAL MEDICINE

## 2021-02-22 PROCEDURE — 3074F PR MOST RECENT SYSTOLIC BLOOD PRESSURE < 130 MM HG: ICD-10-PCS | Mod: CPTII,S$GLB,, | Performed by: INTERNAL MEDICINE

## 2021-02-22 PROCEDURE — 99215 OFFICE O/P EST HI 40 MIN: CPT | Mod: S$GLB,,, | Performed by: INTERNAL MEDICINE

## 2021-02-22 PROCEDURE — 1125F AMNT PAIN NOTED PAIN PRSNT: CPT | Mod: S$GLB,,, | Performed by: INTERNAL MEDICINE

## 2021-02-22 PROCEDURE — 1125F PR PAIN SEVERITY QUANTIFIED, PAIN PRESENT: ICD-10-PCS | Mod: S$GLB,,, | Performed by: INTERNAL MEDICINE

## 2021-02-22 PROCEDURE — 3074F SYST BP LT 130 MM HG: CPT | Mod: CPTII,S$GLB,, | Performed by: INTERNAL MEDICINE

## 2021-02-22 PROCEDURE — 3079F PR MOST RECENT DIASTOLIC BLOOD PRESSURE 80-89 MM HG: ICD-10-PCS | Mod: CPTII,S$GLB,, | Performed by: INTERNAL MEDICINE

## 2021-02-22 PROCEDURE — 3008F PR BODY MASS INDEX (BMI) DOCUMENTED: ICD-10-PCS | Mod: CPTII,S$GLB,, | Performed by: INTERNAL MEDICINE

## 2021-03-01 ENCOUNTER — OFFICE VISIT (OUTPATIENT)
Dept: INTERNAL MEDICINE | Facility: CLINIC | Age: 41
End: 2021-03-01
Payer: COMMERCIAL

## 2021-03-01 DIAGNOSIS — N18.4 STAGE 4 CHRONIC KIDNEY DISEASE: ICD-10-CM

## 2021-03-01 DIAGNOSIS — M1A.0620 IDIOPATHIC CHRONIC GOUT OF LEFT KNEE WITHOUT TOPHUS: ICD-10-CM

## 2021-03-01 DIAGNOSIS — E11.21 TYPE 2 DIABETES MELLITUS WITH DIABETIC NEPHROPATHY, WITHOUT LONG-TERM CURRENT USE OF INSULIN: Primary | ICD-10-CM

## 2021-03-01 PROCEDURE — 3044F PR MOST RECENT HEMOGLOBIN A1C LEVEL <7.0%: ICD-10-PCS | Mod: CPTII,,, | Performed by: FAMILY MEDICINE

## 2021-03-01 PROCEDURE — 99214 OFFICE O/P EST MOD 30 MIN: CPT | Mod: 95,,, | Performed by: FAMILY MEDICINE

## 2021-03-01 PROCEDURE — 99214 PR OFFICE/OUTPT VISIT, EST, LEVL IV, 30-39 MIN: ICD-10-PCS | Mod: 95,,, | Performed by: FAMILY MEDICINE

## 2021-03-01 PROCEDURE — 3044F HG A1C LEVEL LT 7.0%: CPT | Mod: CPTII,,, | Performed by: FAMILY MEDICINE

## 2021-03-10 ENCOUNTER — PATIENT MESSAGE (OUTPATIENT)
Dept: INTERNAL MEDICINE | Facility: CLINIC | Age: 41
End: 2021-03-10

## 2021-03-18 ENCOUNTER — TELEPHONE (OUTPATIENT)
Dept: RADIOLOGY | Facility: HOSPITAL | Age: 41
End: 2021-03-18

## 2021-03-19 ENCOUNTER — LAB VISIT (OUTPATIENT)
Dept: LAB | Facility: HOSPITAL | Age: 41
End: 2021-03-19
Attending: INTERNAL MEDICINE
Payer: COMMERCIAL

## 2021-03-19 ENCOUNTER — HOSPITAL ENCOUNTER (OUTPATIENT)
Dept: RADIOLOGY | Facility: HOSPITAL | Age: 41
Discharge: HOME | End: 2021-03-19
Attending: INTERNAL MEDICINE
Payer: COMMERCIAL

## 2021-03-19 DIAGNOSIS — R31.9 HEMATURIA SYNDROME: ICD-10-CM

## 2021-03-19 PROCEDURE — 86704 HEP B CORE ANTIBODY TOTAL: CPT | Performed by: INTERNAL MEDICINE

## 2021-03-19 PROCEDURE — 86038 ANTINUCLEAR ANTIBODIES: CPT | Performed by: INTERNAL MEDICINE

## 2021-03-19 PROCEDURE — 86225 DNA ANTIBODY NATIVE: CPT | Performed by: INTERNAL MEDICINE

## 2021-03-19 PROCEDURE — 80069 RENAL FUNCTION PANEL: CPT | Performed by: INTERNAL MEDICINE

## 2021-03-19 PROCEDURE — 76770 US EXAM ABDO BACK WALL COMP: CPT | Mod: 26,,, | Performed by: RADIOLOGY

## 2021-03-19 PROCEDURE — 86255 FLUORESCENT ANTIBODY SCREEN: CPT | Performed by: INTERNAL MEDICINE

## 2021-03-19 PROCEDURE — 85025 COMPLETE CBC W/AUTO DIFF WBC: CPT | Performed by: INTERNAL MEDICINE

## 2021-03-19 PROCEDURE — 83520 IMMUNOASSAY QUANT NOS NONAB: CPT | Performed by: INTERNAL MEDICINE

## 2021-03-19 PROCEDURE — 36415 COLL VENOUS BLD VENIPUNCTURE: CPT | Performed by: INTERNAL MEDICINE

## 2021-03-19 PROCEDURE — 86803 HEPATITIS C AB TEST: CPT | Performed by: INTERNAL MEDICINE

## 2021-03-19 PROCEDURE — 86706 HEP B SURFACE ANTIBODY: CPT | Performed by: INTERNAL MEDICINE

## 2021-03-19 PROCEDURE — 83970 ASSAY OF PARATHORMONE: CPT | Performed by: INTERNAL MEDICINE

## 2021-03-19 PROCEDURE — 76770 US RETROPERITONEAL COMPLETE: ICD-10-PCS | Mod: 26,,, | Performed by: RADIOLOGY

## 2021-03-19 PROCEDURE — 86160 COMPLEMENT ANTIGEN: CPT | Mod: 59 | Performed by: INTERNAL MEDICINE

## 2021-03-19 PROCEDURE — 83516 IMMUNOASSAY NONANTIBODY: CPT | Mod: 59 | Performed by: INTERNAL MEDICINE

## 2021-03-19 PROCEDURE — 83516 IMMUNOASSAY NONANTIBODY: CPT | Performed by: INTERNAL MEDICINE

## 2021-03-19 PROCEDURE — 86160 COMPLEMENT ANTIGEN: CPT | Performed by: INTERNAL MEDICINE

## 2021-03-19 PROCEDURE — 76770 US EXAM ABDO BACK WALL COMP: CPT | Mod: TC

## 2021-03-19 PROCEDURE — 87340 HEPATITIS B SURFACE AG IA: CPT | Performed by: INTERNAL MEDICINE

## 2021-03-20 LAB
ALBUMIN SERPL BCP-MCNC: 4.3 G/DL (ref 3.5–5.2)
ANION GAP SERPL CALC-SCNC: 11 MMOL/L (ref 8–16)
BASOPHILS # BLD AUTO: 0.05 K/UL (ref 0–0.2)
BASOPHILS NFR BLD: 0.7 % (ref 0–1.9)
BUN SERPL-MCNC: 28 MG/DL (ref 6–20)
C3 SERPL-MCNC: 134 MG/DL (ref 50–180)
C4 SERPL-MCNC: 22 MG/DL (ref 11–44)
CALCIUM SERPL-MCNC: 9.7 MG/DL (ref 8.7–10.5)
CHLORIDE SERPL-SCNC: 105 MMOL/L (ref 95–110)
CO2 SERPL-SCNC: 25 MMOL/L (ref 23–29)
CREAT SERPL-MCNC: 3.2 MG/DL (ref 0.5–1.4)
DIFFERENTIAL METHOD: NORMAL
EOSINOPHIL # BLD AUTO: 0.2 K/UL (ref 0–0.5)
EOSINOPHIL NFR BLD: 3.5 % (ref 0–8)
ERYTHROCYTE [DISTWIDTH] IN BLOOD BY AUTOMATED COUNT: 14 % (ref 11.5–14.5)
EST. GFR  (AFRICAN AMERICAN): 26.7 ML/MIN/1.73 M^2
EST. GFR  (NON AFRICAN AMERICAN): 23.1 ML/MIN/1.73 M^2
GLUCOSE SERPL-MCNC: 77 MG/DL (ref 70–110)
HCT VFR BLD AUTO: 45.5 % (ref 40–54)
HGB BLD-MCNC: 15 G/DL (ref 14–18)
IMM GRANULOCYTES # BLD AUTO: 0.01 K/UL (ref 0–0.04)
IMM GRANULOCYTES NFR BLD AUTO: 0.1 % (ref 0–0.5)
LYMPHOCYTES # BLD AUTO: 2 K/UL (ref 1–4.8)
LYMPHOCYTES NFR BLD: 29.1 % (ref 18–48)
MCH RBC QN AUTO: 27.8 PG (ref 27–31)
MCHC RBC AUTO-ENTMCNC: 33 G/DL (ref 32–36)
MCV RBC AUTO: 84 FL (ref 82–98)
MONOCYTES # BLD AUTO: 0.6 K/UL (ref 0.3–1)
MONOCYTES NFR BLD: 9.2 % (ref 4–15)
NEUTROPHILS # BLD AUTO: 3.9 K/UL (ref 1.8–7.7)
NEUTROPHILS NFR BLD: 57.4 % (ref 38–73)
NRBC BLD-RTO: 0 /100 WBC
PHOSPHATE SERPL-MCNC: 3.1 MG/DL (ref 2.7–4.5)
PLATELET # BLD AUTO: 254 K/UL (ref 150–350)
PMV BLD AUTO: 10.2 FL (ref 9.2–12.9)
POTASSIUM SERPL-SCNC: 3.7 MMOL/L (ref 3.5–5.1)
PTH-INTACT SERPL-MCNC: 144 PG/ML (ref 9–77)
RBC # BLD AUTO: 5.39 M/UL (ref 4.6–6.2)
SODIUM SERPL-SCNC: 141 MMOL/L (ref 136–145)
WBC # BLD AUTO: 6.87 K/UL (ref 3.9–12.7)

## 2021-03-22 LAB
ANA SER QL IF: NORMAL
ANCA AB TITR SER IF: NORMAL TITER
BM IGG SER-ACNC: <0.2 U
DSDNA AB SER-ACNC: NORMAL [IU]/ML
HBV CORE AB SERPL QL IA: NEGATIVE
HBV SURFACE AB SER-ACNC: NEGATIVE M[IU]/ML
HBV SURFACE AG SERPL QL IA: NEGATIVE
HCV AB SERPL QL IA: NEGATIVE
P-ANCA TITR SER IF: NORMAL TITER
PROTEINASE3 IGG SER-ACNC: <0.2 U

## 2021-03-23 ENCOUNTER — OFFICE VISIT (OUTPATIENT)
Dept: NEPHROLOGY | Facility: CLINIC | Age: 41
End: 2021-03-23
Payer: COMMERCIAL

## 2021-03-23 VITALS
DIASTOLIC BLOOD PRESSURE: 96 MMHG | WEIGHT: 254.63 LBS | SYSTOLIC BLOOD PRESSURE: 146 MMHG | RESPIRATION RATE: 20 BRPM | BODY MASS INDEX: 31.66 KG/M2 | HEART RATE: 74 BPM | HEIGHT: 75 IN

## 2021-03-23 DIAGNOSIS — E11.22 STAGE 4 CHRONIC KIDNEY DISEASE DUE TO DIABETES MELLITUS: Primary | ICD-10-CM

## 2021-03-23 DIAGNOSIS — N28.1 RENAL CYST: ICD-10-CM

## 2021-03-23 DIAGNOSIS — N18.4 STAGE 4 CHRONIC KIDNEY DISEASE DUE TO DIABETES MELLITUS: Primary | ICD-10-CM

## 2021-03-23 LAB — MYELOPEROXIDASE AB SER-ACNC: 3 UNITS

## 2021-03-23 PROCEDURE — 99215 OFFICE O/P EST HI 40 MIN: CPT | Mod: S$GLB,,, | Performed by: INTERNAL MEDICINE

## 2021-03-23 PROCEDURE — 99999 PR PBB SHADOW E&M-EST. PATIENT-LVL IV: CPT | Mod: PBBFAC,,, | Performed by: INTERNAL MEDICINE

## 2021-03-23 PROCEDURE — 1126F PR PAIN SEVERITY QUANTIFIED, NO PAIN PRESENT: ICD-10-PCS | Mod: S$GLB,,, | Performed by: INTERNAL MEDICINE

## 2021-03-23 PROCEDURE — 99215 PR OFFICE/OUTPT VISIT, EST, LEVL V, 40-54 MIN: ICD-10-PCS | Mod: S$GLB,,, | Performed by: INTERNAL MEDICINE

## 2021-03-23 PROCEDURE — 3077F SYST BP >= 140 MM HG: CPT | Mod: CPTII,S$GLB,, | Performed by: INTERNAL MEDICINE

## 2021-03-23 PROCEDURE — 1126F AMNT PAIN NOTED NONE PRSNT: CPT | Mod: S$GLB,,, | Performed by: INTERNAL MEDICINE

## 2021-03-23 PROCEDURE — 3008F PR BODY MASS INDEX (BMI) DOCUMENTED: ICD-10-PCS | Mod: CPTII,S$GLB,, | Performed by: INTERNAL MEDICINE

## 2021-03-23 PROCEDURE — 3044F HG A1C LEVEL LT 7.0%: CPT | Mod: CPTII,S$GLB,, | Performed by: INTERNAL MEDICINE

## 2021-03-23 PROCEDURE — 3080F PR MOST RECENT DIASTOLIC BLOOD PRESSURE >= 90 MM HG: ICD-10-PCS | Mod: CPTII,S$GLB,, | Performed by: INTERNAL MEDICINE

## 2021-03-23 PROCEDURE — 3077F PR MOST RECENT SYSTOLIC BLOOD PRESSURE >= 140 MM HG: ICD-10-PCS | Mod: CPTII,S$GLB,, | Performed by: INTERNAL MEDICINE

## 2021-03-23 PROCEDURE — 99999 PR PBB SHADOW E&M-EST. PATIENT-LVL IV: ICD-10-PCS | Mod: PBBFAC,,, | Performed by: INTERNAL MEDICINE

## 2021-03-23 PROCEDURE — 3080F DIAST BP >= 90 MM HG: CPT | Mod: CPTII,S$GLB,, | Performed by: INTERNAL MEDICINE

## 2021-03-23 PROCEDURE — 3044F PR MOST RECENT HEMOGLOBIN A1C LEVEL <7.0%: ICD-10-PCS | Mod: CPTII,S$GLB,, | Performed by: INTERNAL MEDICINE

## 2021-03-23 PROCEDURE — 3008F BODY MASS INDEX DOCD: CPT | Mod: CPTII,S$GLB,, | Performed by: INTERNAL MEDICINE

## 2021-03-23 RX ORDER — FUROSEMIDE 20 MG/1
20 TABLET ORAL EVERY OTHER DAY
Qty: 15 TABLET | Refills: 11 | Status: SHIPPED | OUTPATIENT
Start: 2021-03-23 | End: 2022-06-22

## 2021-03-25 ENCOUNTER — PATIENT MESSAGE (OUTPATIENT)
Dept: NEPHROLOGY | Facility: CLINIC | Age: 41
End: 2021-03-25

## 2021-03-25 ENCOUNTER — TELEPHONE (OUTPATIENT)
Dept: NEPHROLOGY | Facility: CLINIC | Age: 41
End: 2021-03-25

## 2021-03-25 ENCOUNTER — TELEPHONE (OUTPATIENT)
Dept: UROLOGY | Facility: CLINIC | Age: 41
End: 2021-03-25

## 2021-04-03 PROBLEM — N18.4 STAGE 4 CHRONIC KIDNEY DISEASE: Status: ACTIVE | Noted: 2017-06-07

## 2021-05-10 ENCOUNTER — PATIENT MESSAGE (OUTPATIENT)
Dept: INTERNAL MEDICINE | Facility: CLINIC | Age: 41
End: 2021-05-10

## 2021-05-10 DIAGNOSIS — M1A.09X0 IDIOPATHIC CHRONIC GOUT, MULTIPLE SITES, WITHOUT TOPHUS (TOPHI): Primary | ICD-10-CM

## 2021-05-10 RX ORDER — FEBUXOSTAT 80 MG/1
TABLET, FILM COATED ORAL
Qty: 30 TABLET | Refills: 1 | Status: SHIPPED | OUTPATIENT
Start: 2021-05-10 | End: 2021-06-07 | Stop reason: SDUPTHER

## 2021-05-17 DIAGNOSIS — E11.65 TYPE 2 DIABETES MELLITUS WITH HYPERGLYCEMIA, WITHOUT LONG-TERM CURRENT USE OF INSULIN: Chronic | ICD-10-CM

## 2021-05-17 DIAGNOSIS — N18.30 TYPE 2 DIABETES MELLITUS WITH STAGE 3 CHRONIC KIDNEY DISEASE, WITHOUT LONG-TERM CURRENT USE OF INSULIN: Chronic | ICD-10-CM

## 2021-05-17 DIAGNOSIS — E11.22 TYPE 2 DIABETES MELLITUS WITH STAGE 3 CHRONIC KIDNEY DISEASE, WITHOUT LONG-TERM CURRENT USE OF INSULIN: Chronic | ICD-10-CM

## 2021-05-19 DIAGNOSIS — I10 ESSENTIAL HYPERTENSION: Chronic | ICD-10-CM

## 2021-05-21 ENCOUNTER — PATIENT MESSAGE (OUTPATIENT)
Dept: INTERNAL MEDICINE | Facility: CLINIC | Age: 41
End: 2021-05-21

## 2021-05-21 RX ORDER — AMLODIPINE BESYLATE 10 MG/1
TABLET ORAL
Qty: 30 TABLET | Refills: 0 | Status: SHIPPED | OUTPATIENT
Start: 2021-05-21 | End: 2021-06-07 | Stop reason: SDUPTHER

## 2021-05-21 RX ORDER — SEMAGLUTIDE 1.34 MG/ML
1 INJECTION, SOLUTION SUBCUTANEOUS
Qty: 6 PEN | Refills: 0 | Status: SHIPPED | OUTPATIENT
Start: 2021-05-21 | End: 2021-06-07 | Stop reason: ALTCHOICE

## 2021-05-21 RX ORDER — LOSARTAN POTASSIUM 100 MG/1
TABLET ORAL
Qty: 30 TABLET | Refills: 0 | Status: SHIPPED | OUTPATIENT
Start: 2021-05-21 | End: 2021-06-07 | Stop reason: SDUPTHER

## 2021-05-31 ENCOUNTER — PATIENT MESSAGE (OUTPATIENT)
Dept: INTERNAL MEDICINE | Facility: CLINIC | Age: 41
End: 2021-05-31

## 2021-06-01 DIAGNOSIS — I10 ESSENTIAL HYPERTENSION: Chronic | ICD-10-CM

## 2021-06-02 ENCOUNTER — LAB VISIT (OUTPATIENT)
Dept: LAB | Facility: HOSPITAL | Age: 41
End: 2021-06-02
Attending: INTERNAL MEDICINE
Payer: COMMERCIAL

## 2021-06-02 DIAGNOSIS — N18.4 STAGE 4 CHRONIC KIDNEY DISEASE DUE TO DIABETES MELLITUS: ICD-10-CM

## 2021-06-02 DIAGNOSIS — I25.10 CORONARY ARTERY DISEASE INVOLVING NATIVE CORONARY ARTERY OF NATIVE HEART WITHOUT ANGINA PECTORIS: Chronic | ICD-10-CM

## 2021-06-02 DIAGNOSIS — E11.22 TYPE 2 DIABETES MELLITUS WITH STAGE 3 CHRONIC KIDNEY DISEASE, WITHOUT LONG-TERM CURRENT USE OF INSULIN: Chronic | ICD-10-CM

## 2021-06-02 DIAGNOSIS — M1A.09X0 IDIOPATHIC CHRONIC GOUT OF MULTIPLE SITES WITHOUT TOPHUS: ICD-10-CM

## 2021-06-02 DIAGNOSIS — N18.30 TYPE 2 DIABETES MELLITUS WITH STAGE 3 CHRONIC KIDNEY DISEASE, WITHOUT LONG-TERM CURRENT USE OF INSULIN: Chronic | ICD-10-CM

## 2021-06-02 DIAGNOSIS — E11.22 STAGE 4 CHRONIC KIDNEY DISEASE DUE TO DIABETES MELLITUS: ICD-10-CM

## 2021-06-02 LAB
ALBUMIN SERPL BCP-MCNC: 3.9 G/DL (ref 3.5–5.2)
ALBUMIN SERPL BCP-MCNC: 3.9 G/DL (ref 3.5–5.2)
ALP SERPL-CCNC: 86 U/L (ref 55–135)
ALT SERPL W/O P-5'-P-CCNC: 39 U/L (ref 10–44)
ANION GAP SERPL CALC-SCNC: 10 MMOL/L (ref 8–16)
ANION GAP SERPL CALC-SCNC: 10 MMOL/L (ref 8–16)
AST SERPL-CCNC: 28 U/L (ref 10–40)
BASOPHILS # BLD AUTO: 0.06 K/UL (ref 0–0.2)
BASOPHILS NFR BLD: 0.7 % (ref 0–1.9)
BILIRUB SERPL-MCNC: 1.4 MG/DL (ref 0.1–1)
BUN SERPL-MCNC: 39 MG/DL (ref 6–20)
BUN SERPL-MCNC: 39 MG/DL (ref 6–20)
CALCIUM SERPL-MCNC: 9.7 MG/DL (ref 8.7–10.5)
CALCIUM SERPL-MCNC: 9.7 MG/DL (ref 8.7–10.5)
CHLORIDE SERPL-SCNC: 107 MMOL/L (ref 95–110)
CHLORIDE SERPL-SCNC: 107 MMOL/L (ref 95–110)
CHOLEST SERPL-MCNC: 94 MG/DL (ref 120–199)
CHOLEST/HDLC SERPL: 3.8 {RATIO} (ref 2–5)
CO2 SERPL-SCNC: 25 MMOL/L (ref 23–29)
CO2 SERPL-SCNC: 25 MMOL/L (ref 23–29)
CREAT SERPL-MCNC: 3.5 MG/DL (ref 0.5–1.4)
CREAT SERPL-MCNC: 3.5 MG/DL (ref 0.5–1.4)
DIFFERENTIAL METHOD: ABNORMAL
EOSINOPHIL # BLD AUTO: 0.3 K/UL (ref 0–0.5)
EOSINOPHIL NFR BLD: 3.2 % (ref 0–8)
ERYTHROCYTE [DISTWIDTH] IN BLOOD BY AUTOMATED COUNT: 13.5 % (ref 11.5–14.5)
EST. GFR  (AFRICAN AMERICAN): 24 ML/MIN/1.73 M^2
EST. GFR  (AFRICAN AMERICAN): 24 ML/MIN/1.73 M^2
EST. GFR  (NON AFRICAN AMERICAN): 20.8 ML/MIN/1.73 M^2
EST. GFR  (NON AFRICAN AMERICAN): 20.8 ML/MIN/1.73 M^2
ESTIMATED AVG GLUCOSE: 103 MG/DL (ref 68–131)
GLUCOSE SERPL-MCNC: 97 MG/DL (ref 70–110)
GLUCOSE SERPL-MCNC: 97 MG/DL (ref 70–110)
HBA1C MFR BLD: 5.2 % (ref 4–5.6)
HCT VFR BLD AUTO: 39.2 % (ref 40–54)
HDLC SERPL-MCNC: 25 MG/DL (ref 40–75)
HDLC SERPL: 26.6 % (ref 20–50)
HGB BLD-MCNC: 13.2 G/DL (ref 14–18)
IMM GRANULOCYTES # BLD AUTO: 0.04 K/UL (ref 0–0.04)
IMM GRANULOCYTES NFR BLD AUTO: 0.5 % (ref 0–0.5)
LDLC SERPL CALC-MCNC: 27.8 MG/DL (ref 63–159)
LYMPHOCYTES # BLD AUTO: 1.8 K/UL (ref 1–4.8)
LYMPHOCYTES NFR BLD: 22.1 % (ref 18–48)
MCH RBC QN AUTO: 27.8 PG (ref 27–31)
MCHC RBC AUTO-ENTMCNC: 33.7 G/DL (ref 32–36)
MCV RBC AUTO: 83 FL (ref 82–98)
MONOCYTES # BLD AUTO: 0.9 K/UL (ref 0.3–1)
MONOCYTES NFR BLD: 10.7 % (ref 4–15)
NEUTROPHILS # BLD AUTO: 5 K/UL (ref 1.8–7.7)
NEUTROPHILS NFR BLD: 62.8 % (ref 38–73)
NONHDLC SERPL-MCNC: 69 MG/DL
NRBC BLD-RTO: 0 /100 WBC
PHOSPHATE SERPL-MCNC: 3.3 MG/DL (ref 2.7–4.5)
PLATELET # BLD AUTO: 221 K/UL (ref 150–450)
PMV BLD AUTO: 10.8 FL (ref 9.2–12.9)
POTASSIUM SERPL-SCNC: 4.3 MMOL/L (ref 3.5–5.1)
POTASSIUM SERPL-SCNC: 4.3 MMOL/L (ref 3.5–5.1)
PROT SERPL-MCNC: 7.1 G/DL (ref 6–8.4)
RBC # BLD AUTO: 4.75 M/UL (ref 4.6–6.2)
SODIUM SERPL-SCNC: 142 MMOL/L (ref 136–145)
SODIUM SERPL-SCNC: 142 MMOL/L (ref 136–145)
TRIGL SERPL-MCNC: 206 MG/DL (ref 30–150)
URATE SERPL-MCNC: 6 MG/DL (ref 3.4–7)
WBC # BLD AUTO: 8.04 K/UL (ref 3.9–12.7)

## 2021-06-02 PROCEDURE — 80053 COMPREHEN METABOLIC PANEL: CPT | Performed by: INTERNAL MEDICINE

## 2021-06-02 PROCEDURE — 85025 COMPLETE CBC W/AUTO DIFF WBC: CPT | Performed by: INTERNAL MEDICINE

## 2021-06-02 PROCEDURE — 84550 ASSAY OF BLOOD/URIC ACID: CPT | Performed by: INTERNAL MEDICINE

## 2021-06-02 PROCEDURE — 36415 COLL VENOUS BLD VENIPUNCTURE: CPT | Performed by: INTERNAL MEDICINE

## 2021-06-02 PROCEDURE — 83036 HEMOGLOBIN GLYCOSYLATED A1C: CPT | Performed by: INTERNAL MEDICINE

## 2021-06-02 PROCEDURE — 80069 RENAL FUNCTION PANEL: CPT | Performed by: INTERNAL MEDICINE

## 2021-06-02 PROCEDURE — 80061 LIPID PANEL: CPT | Performed by: INTERNAL MEDICINE

## 2021-06-03 ENCOUNTER — TELEPHONE (OUTPATIENT)
Dept: CARDIOLOGY | Facility: CLINIC | Age: 41
End: 2021-06-03

## 2021-06-03 DIAGNOSIS — I10 ESSENTIAL HYPERTENSION: Chronic | ICD-10-CM

## 2021-06-04 ENCOUNTER — OFFICE VISIT (OUTPATIENT)
Dept: OPHTHALMOLOGY | Facility: CLINIC | Age: 41
End: 2021-06-04
Payer: COMMERCIAL

## 2021-06-04 ENCOUNTER — PATIENT MESSAGE (OUTPATIENT)
Dept: INTERNAL MEDICINE | Facility: CLINIC | Age: 41
End: 2021-06-04

## 2021-06-04 DIAGNOSIS — H52.13 MYOPIA, BILATERAL: ICD-10-CM

## 2021-06-04 DIAGNOSIS — Z98.890 STATUS POST BILATERAL LASIK SURGERY: ICD-10-CM

## 2021-06-04 DIAGNOSIS — I10 ESSENTIAL HYPERTENSION: ICD-10-CM

## 2021-06-04 DIAGNOSIS — Z46.0 ENCOUNTER FOR FITTING OR ADJUSTMENT OF SPECTACLES OR CONTACT LENSES: ICD-10-CM

## 2021-06-04 DIAGNOSIS — H35.411 LATTICE DEGENERATION OF RIGHT RETINA: ICD-10-CM

## 2021-06-04 DIAGNOSIS — E11.9 DIABETES MELLITUS TYPE 2 WITHOUT RETINOPATHY: Primary | ICD-10-CM

## 2021-06-04 PROCEDURE — 92014 COMPRE OPH EXAM EST PT 1/>: CPT | Mod: S$GLB,,, | Performed by: OPTOMETRIST

## 2021-06-04 PROCEDURE — 2023F DILAT RTA XM W/O RTNOPTHY: CPT | Mod: S$GLB,,, | Performed by: OPTOMETRIST

## 2021-06-04 PROCEDURE — 2023F PR DILATED RETINAL EXAM W/O EVID OF RETINOPATHY: ICD-10-PCS | Mod: S$GLB,,, | Performed by: OPTOMETRIST

## 2021-06-04 PROCEDURE — 99999 PR PBB SHADOW E&M-EST. PATIENT-LVL III: ICD-10-PCS | Mod: PBBFAC,,, | Performed by: OPTOMETRIST

## 2021-06-04 PROCEDURE — 92015 PR REFRACTION: ICD-10-PCS | Mod: S$GLB,,, | Performed by: OPTOMETRIST

## 2021-06-04 PROCEDURE — 3044F PR MOST RECENT HEMOGLOBIN A1C LEVEL <7.0%: ICD-10-PCS | Mod: CPTII,S$GLB,, | Performed by: OPTOMETRIST

## 2021-06-04 PROCEDURE — 99999 PR PBB SHADOW E&M-EST. PATIENT-LVL III: CPT | Mod: PBBFAC,,, | Performed by: OPTOMETRIST

## 2021-06-04 PROCEDURE — 92310 CONTACT LENS FITTING OU: CPT | Mod: CSM,,, | Performed by: OPTOMETRIST

## 2021-06-04 PROCEDURE — 92015 DETERMINE REFRACTIVE STATE: CPT | Mod: S$GLB,,, | Performed by: OPTOMETRIST

## 2021-06-04 PROCEDURE — 92014 PR EYE EXAM, EST PATIENT,COMPREHESV: ICD-10-PCS | Mod: S$GLB,,, | Performed by: OPTOMETRIST

## 2021-06-04 PROCEDURE — 92310 PR CONTACT LENS FITTING (NO CHANGE): ICD-10-PCS | Mod: CSM,,, | Performed by: OPTOMETRIST

## 2021-06-04 PROCEDURE — 3044F HG A1C LEVEL LT 7.0%: CPT | Mod: CPTII,S$GLB,, | Performed by: OPTOMETRIST

## 2021-06-04 RX ORDER — METOPROLOL TARTRATE 25 MG/1
TABLET, FILM COATED ORAL
Qty: 60 TABLET | Refills: 0 | OUTPATIENT
Start: 2021-06-04

## 2021-06-04 RX ORDER — METOPROLOL TARTRATE 25 MG/1
25 TABLET, FILM COATED ORAL 2 TIMES DAILY
Qty: 60 TABLET | Refills: 0 | Status: SHIPPED | OUTPATIENT
Start: 2021-06-04 | End: 2021-06-16 | Stop reason: ALTCHOICE

## 2021-06-07 ENCOUNTER — OFFICE VISIT (OUTPATIENT)
Dept: INTERNAL MEDICINE | Facility: CLINIC | Age: 41
End: 2021-06-07
Payer: COMMERCIAL

## 2021-06-07 ENCOUNTER — TELEPHONE (OUTPATIENT)
Dept: INTERNAL MEDICINE | Facility: CLINIC | Age: 41
End: 2021-06-07

## 2021-06-07 ENCOUNTER — PATIENT MESSAGE (OUTPATIENT)
Dept: OPHTHALMOLOGY | Facility: CLINIC | Age: 41
End: 2021-06-07

## 2021-06-07 VITALS
BODY MASS INDEX: 30.73 KG/M2 | WEIGHT: 247.13 LBS | OXYGEN SATURATION: 99 % | HEART RATE: 70 BPM | TEMPERATURE: 97 F | DIASTOLIC BLOOD PRESSURE: 88 MMHG | SYSTOLIC BLOOD PRESSURE: 138 MMHG | HEIGHT: 75 IN

## 2021-06-07 DIAGNOSIS — E78.5 DYSLIPIDEMIA ASSOCIATED WITH TYPE 2 DIABETES MELLITUS: ICD-10-CM

## 2021-06-07 DIAGNOSIS — M1A.09X0 IDIOPATHIC CHRONIC GOUT, MULTIPLE SITES, WITHOUT TOPHUS (TOPHI): Chronic | ICD-10-CM

## 2021-06-07 DIAGNOSIS — I10 ESSENTIAL HYPERTENSION: ICD-10-CM

## 2021-06-07 DIAGNOSIS — N18.4 TYPE 2 DIABETES MELLITUS WITH STAGE 4 CHRONIC KIDNEY DISEASE, WITHOUT LONG-TERM CURRENT USE OF INSULIN: Chronic | ICD-10-CM

## 2021-06-07 DIAGNOSIS — N18.4 STAGE 4 CHRONIC KIDNEY DISEASE: Primary | Chronic | ICD-10-CM

## 2021-06-07 DIAGNOSIS — Z98.84 BARIATRIC SURGERY STATUS: Chronic | ICD-10-CM

## 2021-06-07 DIAGNOSIS — I21.4 NSTEMI (NON-ST ELEVATED MYOCARDIAL INFARCTION): ICD-10-CM

## 2021-06-07 DIAGNOSIS — M1A.09X0 IDIOPATHIC CHRONIC GOUT, MULTIPLE SITES, WITHOUT TOPHUS (TOPHI): ICD-10-CM

## 2021-06-07 DIAGNOSIS — I15.2 HYPERTENSION COMPLICATING DIABETES: ICD-10-CM

## 2021-06-07 DIAGNOSIS — E11.69 DYSLIPIDEMIA ASSOCIATED WITH TYPE 2 DIABETES MELLITUS: ICD-10-CM

## 2021-06-07 DIAGNOSIS — I10 ESSENTIAL HYPERTENSION: Chronic | ICD-10-CM

## 2021-06-07 DIAGNOSIS — N18.4 STAGE 4 CHRONIC KIDNEY DISEASE: ICD-10-CM

## 2021-06-07 DIAGNOSIS — E11.22 TYPE 2 DIABETES MELLITUS WITH STAGE 4 CHRONIC KIDNEY DISEASE, WITHOUT LONG-TERM CURRENT USE OF INSULIN: ICD-10-CM

## 2021-06-07 DIAGNOSIS — N25.81 HYPERPARATHYROIDISM, SECONDARY RENAL: ICD-10-CM

## 2021-06-07 DIAGNOSIS — I25.10 CORONARY ARTERY DISEASE INVOLVING NATIVE CORONARY ARTERY OF NATIVE HEART WITHOUT ANGINA PECTORIS: Chronic | ICD-10-CM

## 2021-06-07 DIAGNOSIS — J30.89 CHRONIC NONSEASONAL ALLERGIC RHINITIS DUE TO POLLEN: Chronic | ICD-10-CM

## 2021-06-07 DIAGNOSIS — E11.22 TYPE 2 DIABETES MELLITUS WITH STAGE 4 CHRONIC KIDNEY DISEASE, WITHOUT LONG-TERM CURRENT USE OF INSULIN: Chronic | ICD-10-CM

## 2021-06-07 DIAGNOSIS — E11.42 TYPE 2 DIABETES MELLITUS WITH DIABETIC POLYNEUROPATHY, WITHOUT LONG-TERM CURRENT USE OF INSULIN: ICD-10-CM

## 2021-06-07 DIAGNOSIS — E11.59 HYPERTENSION COMPLICATING DIABETES: ICD-10-CM

## 2021-06-07 DIAGNOSIS — N18.4 TYPE 2 DIABETES MELLITUS WITH STAGE 4 CHRONIC KIDNEY DISEASE, WITHOUT LONG-TERM CURRENT USE OF INSULIN: ICD-10-CM

## 2021-06-07 DIAGNOSIS — E80.6 DIRECT HYPERBILIRUBINEMIA: ICD-10-CM

## 2021-06-07 DIAGNOSIS — E66.09 CLASS 1 OBESITY DUE TO EXCESS CALORIES WITH SERIOUS COMORBIDITY AND BODY MASS INDEX (BMI) OF 30.0 TO 30.9 IN ADULT: Chronic | ICD-10-CM

## 2021-06-07 DIAGNOSIS — E11.21 TYPE 2 DIABETES MELLITUS WITH DIABETIC NEPHROPATHY, WITHOUT LONG-TERM CURRENT USE OF INSULIN: Chronic | ICD-10-CM

## 2021-06-07 DIAGNOSIS — Z00.01 ENCOUNTER FOR WELL ADULT EXAM WITH ABNORMAL FINDINGS: Primary | ICD-10-CM

## 2021-06-07 DIAGNOSIS — E80.6 DIRECT HYPERBILIRUBINEMIA: Chronic | ICD-10-CM

## 2021-06-07 PROCEDURE — 99999 PR PBB SHADOW E&M-EST. PATIENT-LVL III: ICD-10-PCS | Mod: PBBFAC,,, | Performed by: FAMILY MEDICINE

## 2021-06-07 PROCEDURE — 3008F BODY MASS INDEX DOCD: CPT | Mod: CPTII,S$GLB,, | Performed by: FAMILY MEDICINE

## 2021-06-07 PROCEDURE — 3008F PR BODY MASS INDEX (BMI) DOCUMENTED: ICD-10-PCS | Mod: CPTII,S$GLB,, | Performed by: FAMILY MEDICINE

## 2021-06-07 PROCEDURE — 99395 PR PREVENTIVE VISIT,EST,18-39: ICD-10-PCS | Mod: S$GLB,,, | Performed by: FAMILY MEDICINE

## 2021-06-07 PROCEDURE — 99395 PREV VISIT EST AGE 18-39: CPT | Mod: S$GLB,,, | Performed by: FAMILY MEDICINE

## 2021-06-07 PROCEDURE — 1126F PR PAIN SEVERITY QUANTIFIED, NO PAIN PRESENT: ICD-10-PCS | Mod: S$GLB,,, | Performed by: FAMILY MEDICINE

## 2021-06-07 PROCEDURE — 99999 PR PBB SHADOW E&M-EST. PATIENT-LVL III: CPT | Mod: PBBFAC,,, | Performed by: FAMILY MEDICINE

## 2021-06-07 PROCEDURE — 1126F AMNT PAIN NOTED NONE PRSNT: CPT | Mod: S$GLB,,, | Performed by: FAMILY MEDICINE

## 2021-06-07 RX ORDER — NITROGLYCERIN 0.4 MG/1
TABLET SUBLINGUAL
Qty: 25 TABLET | Refills: 5 | Status: SHIPPED | OUTPATIENT
Start: 2021-06-07 | End: 2022-06-07 | Stop reason: SDUPTHER

## 2021-06-07 RX ORDER — FEBUXOSTAT 80 MG/1
1 TABLET, FILM COATED ORAL DAILY
Qty: 90 TABLET | Refills: 3 | Status: SHIPPED | OUTPATIENT
Start: 2021-06-07 | End: 2021-11-12

## 2021-06-07 RX ORDER — LOSARTAN POTASSIUM 100 MG/1
100 TABLET ORAL DAILY
Qty: 90 TABLET | Refills: 3 | Status: SHIPPED | OUTPATIENT
Start: 2021-06-07 | End: 2021-06-08

## 2021-06-07 RX ORDER — ASPIRIN 81 MG/1
81 TABLET ORAL DAILY
Qty: 90 TABLET | Refills: 3 | Status: SHIPPED | OUTPATIENT
Start: 2021-06-07 | End: 2022-06-07 | Stop reason: SDUPTHER

## 2021-06-07 RX ORDER — EZETIMIBE 10 MG/1
10 TABLET ORAL DAILY
Qty: 90 TABLET | Refills: 3 | Status: SHIPPED | OUTPATIENT
Start: 2021-06-07 | End: 2022-06-07 | Stop reason: SDUPTHER

## 2021-06-07 RX ORDER — COLCHICINE 0.6 MG/1
TABLET ORAL
Qty: 45 TABLET | Refills: 3 | Status: SHIPPED | OUTPATIENT
Start: 2021-06-07 | End: 2022-06-07 | Stop reason: SDUPTHER

## 2021-06-07 RX ORDER — AMLODIPINE BESYLATE 10 MG/1
10 TABLET ORAL NIGHTLY
Qty: 30 TABLET | Refills: 0 | Status: SHIPPED | OUTPATIENT
Start: 2021-06-07 | End: 2021-08-31

## 2021-06-07 RX ORDER — ATORVASTATIN CALCIUM 80 MG/1
80 TABLET, FILM COATED ORAL NIGHTLY
Qty: 90 TABLET | Refills: 3 | Status: SHIPPED | OUTPATIENT
Start: 2021-06-07 | End: 2022-06-07 | Stop reason: SDUPTHER

## 2021-06-07 RX ORDER — CETIRIZINE HYDROCHLORIDE 10 MG/1
10 TABLET ORAL DAILY
Qty: 90 TABLET | Refills: 3 | Status: SHIPPED | OUTPATIENT
Start: 2021-06-07 | End: 2022-06-07 | Stop reason: SDUPTHER

## 2021-06-07 RX ORDER — DILTIAZEM HYDROCHLORIDE 120 MG/1
120 TABLET, FILM COATED ORAL DAILY
Qty: 90 TABLET | Refills: 3 | Status: SHIPPED | OUTPATIENT
Start: 2021-06-07 | End: 2021-06-16 | Stop reason: ALTCHOICE

## 2021-06-08 ENCOUNTER — OFFICE VISIT (OUTPATIENT)
Dept: NEPHROLOGY | Facility: CLINIC | Age: 41
End: 2021-06-08
Payer: COMMERCIAL

## 2021-06-08 VITALS
RESPIRATION RATE: 14 BRPM | WEIGHT: 251.13 LBS | HEIGHT: 75 IN | DIASTOLIC BLOOD PRESSURE: 90 MMHG | BODY MASS INDEX: 31.22 KG/M2 | SYSTOLIC BLOOD PRESSURE: 140 MMHG | HEART RATE: 98 BPM

## 2021-06-08 DIAGNOSIS — N28.1 RENAL CYST: ICD-10-CM

## 2021-06-08 DIAGNOSIS — E11.29 PROTEINURIA DUE TO TYPE 2 DIABETES MELLITUS: ICD-10-CM

## 2021-06-08 DIAGNOSIS — E11.22 STAGE 4 CHRONIC KIDNEY DISEASE DUE TO DIABETES MELLITUS: Primary | ICD-10-CM

## 2021-06-08 DIAGNOSIS — E79.0 HYPERURICEMIA: ICD-10-CM

## 2021-06-08 DIAGNOSIS — I12.9 PARENCHYMAL RENAL HYPERTENSION, STAGE 1 THROUGH STAGE 4 OR UNSPECIFIED CHRONIC KIDNEY DISEASE: ICD-10-CM

## 2021-06-08 DIAGNOSIS — N18.4 STAGE 4 CHRONIC KIDNEY DISEASE DUE TO DIABETES MELLITUS: Primary | ICD-10-CM

## 2021-06-08 DIAGNOSIS — N18.4 STAGE 4 CHRONIC KIDNEY DISEASE: Primary | ICD-10-CM

## 2021-06-08 DIAGNOSIS — R80.9 PROTEINURIA DUE TO TYPE 2 DIABETES MELLITUS: ICD-10-CM

## 2021-06-08 PROCEDURE — 99999 PR PBB SHADOW E&M-EST. PATIENT-LVL IV: ICD-10-PCS | Mod: PBBFAC,,, | Performed by: INTERNAL MEDICINE

## 2021-06-08 PROCEDURE — 99214 OFFICE O/P EST MOD 30 MIN: CPT | Mod: S$GLB,,, | Performed by: INTERNAL MEDICINE

## 2021-06-08 PROCEDURE — 3044F PR MOST RECENT HEMOGLOBIN A1C LEVEL <7.0%: ICD-10-PCS | Mod: CPTII,S$GLB,, | Performed by: INTERNAL MEDICINE

## 2021-06-08 PROCEDURE — 99214 PR OFFICE/OUTPT VISIT, EST, LEVL IV, 30-39 MIN: ICD-10-PCS | Mod: S$GLB,,, | Performed by: INTERNAL MEDICINE

## 2021-06-08 PROCEDURE — 3080F DIAST BP >= 90 MM HG: CPT | Mod: CPTII,S$GLB,, | Performed by: INTERNAL MEDICINE

## 2021-06-08 PROCEDURE — 1126F PR PAIN SEVERITY QUANTIFIED, NO PAIN PRESENT: ICD-10-PCS | Mod: S$GLB,,, | Performed by: INTERNAL MEDICINE

## 2021-06-08 PROCEDURE — 3044F HG A1C LEVEL LT 7.0%: CPT | Mod: CPTII,S$GLB,, | Performed by: INTERNAL MEDICINE

## 2021-06-08 PROCEDURE — 99999 PR PBB SHADOW E&M-EST. PATIENT-LVL IV: CPT | Mod: PBBFAC,,, | Performed by: INTERNAL MEDICINE

## 2021-06-08 PROCEDURE — 1126F AMNT PAIN NOTED NONE PRSNT: CPT | Mod: S$GLB,,, | Performed by: INTERNAL MEDICINE

## 2021-06-08 PROCEDURE — 3077F SYST BP >= 140 MM HG: CPT | Mod: CPTII,S$GLB,, | Performed by: INTERNAL MEDICINE

## 2021-06-08 PROCEDURE — 3077F PR MOST RECENT SYSTOLIC BLOOD PRESSURE >= 140 MM HG: ICD-10-PCS | Mod: CPTII,S$GLB,, | Performed by: INTERNAL MEDICINE

## 2021-06-08 PROCEDURE — 3080F PR MOST RECENT DIASTOLIC BLOOD PRESSURE >= 90 MM HG: ICD-10-PCS | Mod: CPTII,S$GLB,, | Performed by: INTERNAL MEDICINE

## 2021-06-08 PROCEDURE — 3008F PR BODY MASS INDEX (BMI) DOCUMENTED: ICD-10-PCS | Mod: CPTII,S$GLB,, | Performed by: INTERNAL MEDICINE

## 2021-06-08 PROCEDURE — 3008F BODY MASS INDEX DOCD: CPT | Mod: CPTII,S$GLB,, | Performed by: INTERNAL MEDICINE

## 2021-06-08 RX ORDER — TELMISARTAN 80 MG/1
80 TABLET ORAL DAILY
Qty: 90 TABLET | Refills: 1 | Status: SHIPPED | OUTPATIENT
Start: 2021-06-08 | End: 2021-12-15

## 2021-06-15 DIAGNOSIS — I25.10 CORONARY ARTERY DISEASE INVOLVING NATIVE CORONARY ARTERY OF NATIVE HEART WITHOUT ANGINA PECTORIS: Primary | ICD-10-CM

## 2021-06-16 ENCOUNTER — HOSPITAL ENCOUNTER (OUTPATIENT)
Dept: CARDIOLOGY | Facility: HOSPITAL | Age: 41
Discharge: HOME | End: 2021-06-16
Attending: INTERNAL MEDICINE
Payer: COMMERCIAL

## 2021-06-16 ENCOUNTER — OFFICE VISIT (OUTPATIENT)
Dept: CARDIOLOGY | Facility: CLINIC | Age: 41
End: 2021-06-16
Payer: COMMERCIAL

## 2021-06-16 VITALS
SYSTOLIC BLOOD PRESSURE: 138 MMHG | DIASTOLIC BLOOD PRESSURE: 98 MMHG | HEIGHT: 75 IN | OXYGEN SATURATION: 98 % | HEART RATE: 77 BPM | WEIGHT: 250.44 LBS | BODY MASS INDEX: 31.14 KG/M2

## 2021-06-16 DIAGNOSIS — E11.59 HYPERTENSION COMPLICATING DIABETES: ICD-10-CM

## 2021-06-16 DIAGNOSIS — I15.2 HYPERTENSION COMPLICATING DIABETES: ICD-10-CM

## 2021-06-16 DIAGNOSIS — I25.10 CORONARY ARTERY DISEASE INVOLVING NATIVE CORONARY ARTERY OF NATIVE HEART WITHOUT ANGINA PECTORIS: Primary | Chronic | ICD-10-CM

## 2021-06-16 DIAGNOSIS — E66.09 CLASS 1 OBESITY DUE TO EXCESS CALORIES WITH SERIOUS COMORBIDITY AND BODY MASS INDEX (BMI) OF 30.0 TO 30.9 IN ADULT: Chronic | ICD-10-CM

## 2021-06-16 DIAGNOSIS — Z95.820 STATUS POST ANGIOPLASTY WITH STENT: ICD-10-CM

## 2021-06-16 DIAGNOSIS — M1A.09X0 IDIOPATHIC CHRONIC GOUT, MULTIPLE SITES, WITHOUT TOPHUS (TOPHI): Chronic | ICD-10-CM

## 2021-06-16 DIAGNOSIS — Z98.84 BARIATRIC SURGERY STATUS: Chronic | ICD-10-CM

## 2021-06-16 DIAGNOSIS — E78.5 DYSLIPIDEMIA ASSOCIATED WITH TYPE 2 DIABETES MELLITUS: ICD-10-CM

## 2021-06-16 DIAGNOSIS — M1A.0720 IDIOPATHIC CHRONIC GOUT, LEFT ANKLE AND FOOT, WITHOUT TOPHUS (TOPHI): Chronic | ICD-10-CM

## 2021-06-16 DIAGNOSIS — N18.4 TYPE 2 DIABETES MELLITUS WITH STAGE 4 CHRONIC KIDNEY DISEASE, WITHOUT LONG-TERM CURRENT USE OF INSULIN: Chronic | ICD-10-CM

## 2021-06-16 DIAGNOSIS — E11.22 TYPE 2 DIABETES MELLITUS WITH STAGE 4 CHRONIC KIDNEY DISEASE, WITHOUT LONG-TERM CURRENT USE OF INSULIN: Chronic | ICD-10-CM

## 2021-06-16 DIAGNOSIS — I25.10 CORONARY ARTERY DISEASE INVOLVING NATIVE CORONARY ARTERY OF NATIVE HEART WITHOUT ANGINA PECTORIS: ICD-10-CM

## 2021-06-16 DIAGNOSIS — E11.42 TYPE 2 DIABETES MELLITUS WITH DIABETIC POLYNEUROPATHY, WITHOUT LONG-TERM CURRENT USE OF INSULIN: ICD-10-CM

## 2021-06-16 DIAGNOSIS — Z91.89 AT RISK FOR CARDIOVASCULAR EVENT: Chronic | ICD-10-CM

## 2021-06-16 DIAGNOSIS — E11.69 DYSLIPIDEMIA ASSOCIATED WITH TYPE 2 DIABETES MELLITUS: ICD-10-CM

## 2021-06-16 DIAGNOSIS — I21.4 NSTEMI (NON-ST ELEVATED MYOCARDIAL INFARCTION): ICD-10-CM

## 2021-06-16 DIAGNOSIS — E80.6 DIRECT HYPERBILIRUBINEMIA: Chronic | ICD-10-CM

## 2021-06-16 DIAGNOSIS — I10 ESSENTIAL HYPERTENSION: Chronic | ICD-10-CM

## 2021-06-16 DIAGNOSIS — E11.21 TYPE 2 DIABETES MELLITUS WITH DIABETIC NEPHROPATHY, WITHOUT LONG-TERM CURRENT USE OF INSULIN: Chronic | ICD-10-CM

## 2021-06-16 DIAGNOSIS — G47.33 OBSTRUCTIVE SLEEP APNEA: Chronic | ICD-10-CM

## 2021-06-16 DIAGNOSIS — Z98.84 STATUS FOLLOWING SURGERY FOR WEIGHT LOSS: ICD-10-CM

## 2021-06-16 DIAGNOSIS — N18.4 STAGE 4 CHRONIC KIDNEY DISEASE: Chronic | ICD-10-CM

## 2021-06-16 PROCEDURE — 3008F PR BODY MASS INDEX (BMI) DOCUMENTED: ICD-10-PCS | Mod: CPTII,S$GLB,, | Performed by: INTERNAL MEDICINE

## 2021-06-16 PROCEDURE — 1126F PR PAIN SEVERITY QUANTIFIED, NO PAIN PRESENT: ICD-10-PCS | Mod: S$GLB,,, | Performed by: INTERNAL MEDICINE

## 2021-06-16 PROCEDURE — 99999 PR PBB SHADOW E&M-EST. PATIENT-LVL III: ICD-10-PCS | Mod: PBBFAC,,, | Performed by: INTERNAL MEDICINE

## 2021-06-16 PROCEDURE — 1126F AMNT PAIN NOTED NONE PRSNT: CPT | Mod: S$GLB,,, | Performed by: INTERNAL MEDICINE

## 2021-06-16 PROCEDURE — 93005 ELECTROCARDIOGRAM TRACING: CPT

## 2021-06-16 PROCEDURE — 99999 PR PBB SHADOW E&M-EST. PATIENT-LVL III: CPT | Mod: PBBFAC,,, | Performed by: INTERNAL MEDICINE

## 2021-06-16 PROCEDURE — 93010 EKG 12-LEAD: ICD-10-PCS | Mod: ,,, | Performed by: INTERNAL MEDICINE

## 2021-06-16 PROCEDURE — 93010 ELECTROCARDIOGRAM REPORT: CPT | Mod: ,,, | Performed by: INTERNAL MEDICINE

## 2021-06-16 PROCEDURE — 99214 OFFICE O/P EST MOD 30 MIN: CPT | Mod: 25,S$GLB,, | Performed by: INTERNAL MEDICINE

## 2021-06-16 PROCEDURE — 3008F BODY MASS INDEX DOCD: CPT | Mod: CPTII,S$GLB,, | Performed by: INTERNAL MEDICINE

## 2021-06-16 PROCEDURE — 99214 PR OFFICE/OUTPT VISIT, EST, LEVL IV, 30-39 MIN: ICD-10-PCS | Mod: 25,S$GLB,, | Performed by: INTERNAL MEDICINE

## 2021-06-16 RX ORDER — CARVEDILOL 6.25 MG/1
6.25 TABLET ORAL 2 TIMES DAILY WITH MEALS
Qty: 60 TABLET | Refills: 11 | Status: SHIPPED | OUTPATIENT
Start: 2021-06-16 | End: 2022-06-22

## 2021-06-16 RX ORDER — CARVEDILOL 6.25 MG/1
6.25 TABLET ORAL 2 TIMES DAILY WITH MEALS
Qty: 60 TABLET | Refills: 11 | Status: SHIPPED | OUTPATIENT
Start: 2021-06-16 | End: 2021-06-16 | Stop reason: SDUPTHER

## 2021-06-18 ENCOUNTER — OFFICE VISIT (OUTPATIENT)
Dept: PODIATRY | Facility: CLINIC | Age: 41
End: 2021-06-18
Payer: COMMERCIAL

## 2021-06-18 VITALS — BODY MASS INDEX: 31.14 KG/M2 | HEIGHT: 75 IN | WEIGHT: 250.44 LBS

## 2021-06-18 DIAGNOSIS — E11.42 TYPE 2 DIABETES MELLITUS WITH DIABETIC POLYNEUROPATHY, WITHOUT LONG-TERM CURRENT USE OF INSULIN: ICD-10-CM

## 2021-06-18 PROCEDURE — 99999 PR PBB SHADOW E&M-EST. PATIENT-LVL III: CPT | Mod: PBBFAC,,, | Performed by: PODIATRIST

## 2021-06-18 PROCEDURE — 99203 PR OFFICE/OUTPT VISIT, NEW, LEVL III, 30-44 MIN: ICD-10-PCS | Mod: S$GLB,,, | Performed by: PODIATRIST

## 2021-06-18 PROCEDURE — 3008F PR BODY MASS INDEX (BMI) DOCUMENTED: ICD-10-PCS | Mod: CPTII,S$GLB,, | Performed by: PODIATRIST

## 2021-06-18 PROCEDURE — 99203 OFFICE O/P NEW LOW 30 MIN: CPT | Mod: S$GLB,,, | Performed by: PODIATRIST

## 2021-06-18 PROCEDURE — 99999 PR PBB SHADOW E&M-EST. PATIENT-LVL III: ICD-10-PCS | Mod: PBBFAC,,, | Performed by: PODIATRIST

## 2021-06-18 PROCEDURE — 3008F BODY MASS INDEX DOCD: CPT | Mod: CPTII,S$GLB,, | Performed by: PODIATRIST

## 2021-06-18 PROCEDURE — 3044F HG A1C LEVEL LT 7.0%: CPT | Mod: CPTII,S$GLB,, | Performed by: PODIATRIST

## 2021-06-18 PROCEDURE — 3044F PR MOST RECENT HEMOGLOBIN A1C LEVEL <7.0%: ICD-10-PCS | Mod: CPTII,S$GLB,, | Performed by: PODIATRIST

## 2021-06-24 ENCOUNTER — PATIENT MESSAGE (OUTPATIENT)
Dept: CARDIOLOGY | Facility: CLINIC | Age: 41
End: 2021-06-24

## 2021-07-06 ENCOUNTER — TELEPHONE (OUTPATIENT)
Dept: CARDIOLOGY | Facility: CLINIC | Age: 41
End: 2021-07-06

## 2021-07-06 ENCOUNTER — CLINICAL SUPPORT (OUTPATIENT)
Dept: CARDIOLOGY | Facility: CLINIC | Age: 41
End: 2021-07-06
Payer: COMMERCIAL

## 2021-07-06 VITALS
HEART RATE: 63 BPM | SYSTOLIC BLOOD PRESSURE: 132 MMHG | BODY MASS INDEX: 31.08 KG/M2 | DIASTOLIC BLOOD PRESSURE: 90 MMHG | WEIGHT: 248.69 LBS | OXYGEN SATURATION: 99 %

## 2021-07-06 PROCEDURE — 99999 PR PBB SHADOW E&M-EST. PATIENT-LVL III: CPT | Mod: PBBFAC,,,

## 2021-07-06 PROCEDURE — 99999 PR PBB SHADOW E&M-EST. PATIENT-LVL III: ICD-10-PCS | Mod: PBBFAC,,,

## 2021-07-15 ENCOUNTER — TELEPHONE (OUTPATIENT)
Dept: INTERNAL MEDICINE | Facility: CLINIC | Age: 41
End: 2021-07-15

## 2021-07-22 ENCOUNTER — PATIENT MESSAGE (OUTPATIENT)
Dept: OTHER | Facility: OTHER | Age: 41
End: 2021-07-22

## 2021-07-23 ENCOUNTER — PATIENT MESSAGE (OUTPATIENT)
Dept: OTHER | Facility: OTHER | Age: 41
End: 2021-07-23

## 2021-07-26 ENCOUNTER — PATIENT MESSAGE (OUTPATIENT)
Dept: OTHER | Facility: OTHER | Age: 41
End: 2021-07-26

## 2021-09-09 ENCOUNTER — LAB VISIT (OUTPATIENT)
Dept: LAB | Facility: HOSPITAL | Age: 41
End: 2021-09-09
Attending: INTERNAL MEDICINE
Payer: COMMERCIAL

## 2021-09-09 ENCOUNTER — OFFICE VISIT (OUTPATIENT)
Dept: CARDIOLOGY | Facility: CLINIC | Age: 41
End: 2021-09-09
Payer: COMMERCIAL

## 2021-09-09 VITALS
RESPIRATION RATE: 16 BRPM | OXYGEN SATURATION: 98 % | HEART RATE: 72 BPM | SYSTOLIC BLOOD PRESSURE: 150 MMHG | BODY MASS INDEX: 31.06 KG/M2 | WEIGHT: 249.81 LBS | DIASTOLIC BLOOD PRESSURE: 96 MMHG | HEIGHT: 75 IN

## 2021-09-09 DIAGNOSIS — N25.81 HYPERPARATHYROIDISM, SECONDARY RENAL: ICD-10-CM

## 2021-09-09 DIAGNOSIS — E11.21 TYPE 2 DIABETES MELLITUS WITH DIABETIC NEPHROPATHY, WITHOUT LONG-TERM CURRENT USE OF INSULIN: Chronic | ICD-10-CM

## 2021-09-09 DIAGNOSIS — E11.59 HYPERTENSION COMPLICATING DIABETES: ICD-10-CM

## 2021-09-09 DIAGNOSIS — E66.09 CLASS 1 OBESITY DUE TO EXCESS CALORIES WITH SERIOUS COMORBIDITY AND BODY MASS INDEX (BMI) OF 30.0 TO 30.9 IN ADULT: Chronic | ICD-10-CM

## 2021-09-09 DIAGNOSIS — Z91.89 AT RISK FOR CARDIOVASCULAR EVENT: Chronic | ICD-10-CM

## 2021-09-09 DIAGNOSIS — E11.69 DYSLIPIDEMIA ASSOCIATED WITH TYPE 2 DIABETES MELLITUS: ICD-10-CM

## 2021-09-09 DIAGNOSIS — I21.4 NSTEMI (NON-ST ELEVATED MYOCARDIAL INFARCTION): ICD-10-CM

## 2021-09-09 DIAGNOSIS — M1A.0720 IDIOPATHIC CHRONIC GOUT, LEFT ANKLE AND FOOT, WITHOUT TOPHUS (TOPHI): Chronic | ICD-10-CM

## 2021-09-09 DIAGNOSIS — G47.33 OBSTRUCTIVE SLEEP APNEA: Chronic | ICD-10-CM

## 2021-09-09 DIAGNOSIS — Z98.84 BARIATRIC SURGERY STATUS: Chronic | ICD-10-CM

## 2021-09-09 DIAGNOSIS — E78.5 DYSLIPIDEMIA ASSOCIATED WITH TYPE 2 DIABETES MELLITUS: ICD-10-CM

## 2021-09-09 DIAGNOSIS — E11.22 STAGE 4 CHRONIC KIDNEY DISEASE DUE TO DIABETES MELLITUS: ICD-10-CM

## 2021-09-09 DIAGNOSIS — E11.42 TYPE 2 DIABETES MELLITUS WITH DIABETIC POLYNEUROPATHY, WITHOUT LONG-TERM CURRENT USE OF INSULIN: ICD-10-CM

## 2021-09-09 DIAGNOSIS — I25.10 CORONARY ARTERY DISEASE INVOLVING NATIVE CORONARY ARTERY OF NATIVE HEART WITHOUT ANGINA PECTORIS: Primary | Chronic | ICD-10-CM

## 2021-09-09 DIAGNOSIS — N18.4 STAGE 4 CHRONIC KIDNEY DISEASE: Chronic | ICD-10-CM

## 2021-09-09 DIAGNOSIS — I10 ESSENTIAL HYPERTENSION: Chronic | ICD-10-CM

## 2021-09-09 DIAGNOSIS — E11.22 TYPE 2 DIABETES MELLITUS WITH STAGE 4 CHRONIC KIDNEY DISEASE, WITHOUT LONG-TERM CURRENT USE OF INSULIN: Chronic | ICD-10-CM

## 2021-09-09 DIAGNOSIS — N18.4 TYPE 2 DIABETES MELLITUS WITH STAGE 4 CHRONIC KIDNEY DISEASE, WITHOUT LONG-TERM CURRENT USE OF INSULIN: Chronic | ICD-10-CM

## 2021-09-09 DIAGNOSIS — N18.4 STAGE 4 CHRONIC KIDNEY DISEASE DUE TO DIABETES MELLITUS: ICD-10-CM

## 2021-09-09 DIAGNOSIS — I12.9 PARENCHYMAL RENAL HYPERTENSION, STAGE 1 THROUGH STAGE 4 OR UNSPECIFIED CHRONIC KIDNEY DISEASE: ICD-10-CM

## 2021-09-09 DIAGNOSIS — I15.2 HYPERTENSION COMPLICATING DIABETES: ICD-10-CM

## 2021-09-09 DIAGNOSIS — Z98.84 STATUS FOLLOWING SURGERY FOR WEIGHT LOSS: ICD-10-CM

## 2021-09-09 LAB
ALBUMIN SERPL BCP-MCNC: 4 G/DL (ref 3.5–5.2)
ALP SERPL-CCNC: 93 U/L (ref 55–135)
ALT SERPL W/O P-5'-P-CCNC: 30 U/L (ref 10–44)
ANION GAP SERPL CALC-SCNC: 7 MMOL/L (ref 8–16)
AST SERPL-CCNC: 26 U/L (ref 10–40)
BILIRUB SERPL-MCNC: 1.5 MG/DL (ref 0.1–1)
BUN SERPL-MCNC: 35 MG/DL (ref 6–20)
CALCIUM SERPL-MCNC: 9.4 MG/DL (ref 8.7–10.5)
CHLORIDE SERPL-SCNC: 107 MMOL/L (ref 95–110)
CO2 SERPL-SCNC: 27 MMOL/L (ref 23–29)
CREAT SERPL-MCNC: 3 MG/DL (ref 0.5–1.4)
EST. GFR  (AFRICAN AMERICAN): 28.9 ML/MIN/1.73 M^2
EST. GFR  (NON AFRICAN AMERICAN): 25 ML/MIN/1.73 M^2
GLUCOSE SERPL-MCNC: 84 MG/DL (ref 70–110)
HCYS SERPL-SCNC: 18.6 UMOL/L (ref 4–16.5)
POTASSIUM SERPL-SCNC: 4.2 MMOL/L (ref 3.5–5.1)
PROT SERPL-MCNC: 6.9 G/DL (ref 6–8.4)
SODIUM SERPL-SCNC: 141 MMOL/L (ref 136–145)

## 2021-09-09 PROCEDURE — 1159F MED LIST DOCD IN RCRD: CPT | Mod: CPTII,S$GLB,, | Performed by: INTERNAL MEDICINE

## 2021-09-09 PROCEDURE — 1159F PR MEDICATION LIST DOCUMENTED IN MEDICAL RECORD: ICD-10-PCS | Mod: CPTII,S$GLB,, | Performed by: INTERNAL MEDICINE

## 2021-09-09 PROCEDURE — 3008F PR BODY MASS INDEX (BMI) DOCUMENTED: ICD-10-PCS | Mod: CPTII,S$GLB,, | Performed by: INTERNAL MEDICINE

## 2021-09-09 PROCEDURE — 3080F DIAST BP >= 90 MM HG: CPT | Mod: CPTII,S$GLB,, | Performed by: INTERNAL MEDICINE

## 2021-09-09 PROCEDURE — 4010F PR ACE/ARB THEARPY RXD/TAKEN: ICD-10-PCS | Mod: CPTII,S$GLB,, | Performed by: INTERNAL MEDICINE

## 2021-09-09 PROCEDURE — 1160F PR REVIEW ALL MEDS BY PRESCRIBER/CLIN PHARMACIST DOCUMENTED: ICD-10-PCS | Mod: CPTII,S$GLB,, | Performed by: INTERNAL MEDICINE

## 2021-09-09 PROCEDURE — 36415 COLL VENOUS BLD VENIPUNCTURE: CPT | Performed by: INTERNAL MEDICINE

## 2021-09-09 PROCEDURE — 99999 PR PBB SHADOW E&M-EST. PATIENT-LVL IV: CPT | Mod: PBBFAC,,, | Performed by: INTERNAL MEDICINE

## 2021-09-09 PROCEDURE — 3044F PR MOST RECENT HEMOGLOBIN A1C LEVEL <7.0%: ICD-10-PCS | Mod: CPTII,S$GLB,, | Performed by: INTERNAL MEDICINE

## 2021-09-09 PROCEDURE — 3080F PR MOST RECENT DIASTOLIC BLOOD PRESSURE >= 90 MM HG: ICD-10-PCS | Mod: CPTII,S$GLB,, | Performed by: INTERNAL MEDICINE

## 2021-09-09 PROCEDURE — 4010F ACE/ARB THERAPY RXD/TAKEN: CPT | Mod: CPTII,S$GLB,, | Performed by: INTERNAL MEDICINE

## 2021-09-09 PROCEDURE — 99214 PR OFFICE/OUTPT VISIT, EST, LEVL IV, 30-39 MIN: ICD-10-PCS | Mod: S$GLB,,, | Performed by: INTERNAL MEDICINE

## 2021-09-09 PROCEDURE — 3077F PR MOST RECENT SYSTOLIC BLOOD PRESSURE >= 140 MM HG: ICD-10-PCS | Mod: CPTII,S$GLB,, | Performed by: INTERNAL MEDICINE

## 2021-09-09 PROCEDURE — 80053 COMPREHEN METABOLIC PANEL: CPT | Performed by: INTERNAL MEDICINE

## 2021-09-09 PROCEDURE — 3077F SYST BP >= 140 MM HG: CPT | Mod: CPTII,S$GLB,, | Performed by: INTERNAL MEDICINE

## 2021-09-09 PROCEDURE — 1160F RVW MEDS BY RX/DR IN RCRD: CPT | Mod: CPTII,S$GLB,, | Performed by: INTERNAL MEDICINE

## 2021-09-09 PROCEDURE — 99999 PR PBB SHADOW E&M-EST. PATIENT-LVL IV: ICD-10-PCS | Mod: PBBFAC,,, | Performed by: INTERNAL MEDICINE

## 2021-09-09 PROCEDURE — 3044F HG A1C LEVEL LT 7.0%: CPT | Mod: CPTII,S$GLB,, | Performed by: INTERNAL MEDICINE

## 2021-09-09 PROCEDURE — 3008F BODY MASS INDEX DOCD: CPT | Mod: CPTII,S$GLB,, | Performed by: INTERNAL MEDICINE

## 2021-09-09 PROCEDURE — 83090 ASSAY OF HOMOCYSTEINE: CPT | Performed by: INTERNAL MEDICINE

## 2021-09-09 PROCEDURE — 99214 OFFICE O/P EST MOD 30 MIN: CPT | Mod: S$GLB,,, | Performed by: INTERNAL MEDICINE

## 2021-09-09 PROCEDURE — 3066F NEPHROPATHY DOC TX: CPT | Mod: CPTII,S$GLB,, | Performed by: INTERNAL MEDICINE

## 2021-09-09 PROCEDURE — 3066F PR DOCUMENTATION OF TREATMENT FOR NEPHROPATHY: ICD-10-PCS | Mod: CPTII,S$GLB,, | Performed by: INTERNAL MEDICINE

## 2021-09-14 ENCOUNTER — OFFICE VISIT (OUTPATIENT)
Dept: NEPHROLOGY | Facility: CLINIC | Age: 41
End: 2021-09-14
Payer: COMMERCIAL

## 2021-09-14 VITALS
WEIGHT: 246.25 LBS | RESPIRATION RATE: 20 BRPM | SYSTOLIC BLOOD PRESSURE: 140 MMHG | BODY MASS INDEX: 30.62 KG/M2 | OXYGEN SATURATION: 98 % | DIASTOLIC BLOOD PRESSURE: 90 MMHG | HEIGHT: 75 IN | HEART RATE: 90 BPM

## 2021-09-14 DIAGNOSIS — N18.4 STAGE 4 CHRONIC KIDNEY DISEASE: Primary | ICD-10-CM

## 2021-09-14 DIAGNOSIS — E11.29 PROTEINURIA DUE TO TYPE 2 DIABETES MELLITUS: ICD-10-CM

## 2021-09-14 DIAGNOSIS — E79.0 HYPERURICEMIA: ICD-10-CM

## 2021-09-14 DIAGNOSIS — R80.9 PROTEINURIA DUE TO TYPE 2 DIABETES MELLITUS: ICD-10-CM

## 2021-09-14 DIAGNOSIS — I12.9 PARENCHYMAL RENAL HYPERTENSION, STAGE 1 THROUGH STAGE 4 OR UNSPECIFIED CHRONIC KIDNEY DISEASE: ICD-10-CM

## 2021-09-14 PROCEDURE — 3077F PR MOST RECENT SYSTOLIC BLOOD PRESSURE >= 140 MM HG: ICD-10-PCS | Mod: CPTII,S$GLB,, | Performed by: INTERNAL MEDICINE

## 2021-09-14 PROCEDURE — 1159F MED LIST DOCD IN RCRD: CPT | Mod: CPTII,S$GLB,, | Performed by: INTERNAL MEDICINE

## 2021-09-14 PROCEDURE — 3008F PR BODY MASS INDEX (BMI) DOCUMENTED: ICD-10-PCS | Mod: CPTII,S$GLB,, | Performed by: INTERNAL MEDICINE

## 2021-09-14 PROCEDURE — 3044F PR MOST RECENT HEMOGLOBIN A1C LEVEL <7.0%: ICD-10-PCS | Mod: CPTII,S$GLB,, | Performed by: INTERNAL MEDICINE

## 2021-09-14 PROCEDURE — 1160F PR REVIEW ALL MEDS BY PRESCRIBER/CLIN PHARMACIST DOCUMENTED: ICD-10-PCS | Mod: CPTII,S$GLB,, | Performed by: INTERNAL MEDICINE

## 2021-09-14 PROCEDURE — 3066F NEPHROPATHY DOC TX: CPT | Mod: CPTII,S$GLB,, | Performed by: INTERNAL MEDICINE

## 2021-09-14 PROCEDURE — 3077F SYST BP >= 140 MM HG: CPT | Mod: CPTII,S$GLB,, | Performed by: INTERNAL MEDICINE

## 2021-09-14 PROCEDURE — 99999 PR PBB SHADOW E&M-EST. PATIENT-LVL IV: CPT | Mod: PBBFAC,,, | Performed by: INTERNAL MEDICINE

## 2021-09-14 PROCEDURE — 3044F HG A1C LEVEL LT 7.0%: CPT | Mod: CPTII,S$GLB,, | Performed by: INTERNAL MEDICINE

## 2021-09-14 PROCEDURE — 4010F PR ACE/ARB THEARPY RXD/TAKEN: ICD-10-PCS | Mod: CPTII,S$GLB,, | Performed by: INTERNAL MEDICINE

## 2021-09-14 PROCEDURE — 3080F PR MOST RECENT DIASTOLIC BLOOD PRESSURE >= 90 MM HG: ICD-10-PCS | Mod: CPTII,S$GLB,, | Performed by: INTERNAL MEDICINE

## 2021-09-14 PROCEDURE — 3066F PR DOCUMENTATION OF TREATMENT FOR NEPHROPATHY: ICD-10-PCS | Mod: CPTII,S$GLB,, | Performed by: INTERNAL MEDICINE

## 2021-09-14 PROCEDURE — 1160F RVW MEDS BY RX/DR IN RCRD: CPT | Mod: CPTII,S$GLB,, | Performed by: INTERNAL MEDICINE

## 2021-09-14 PROCEDURE — 99214 PR OFFICE/OUTPT VISIT, EST, LEVL IV, 30-39 MIN: ICD-10-PCS | Mod: S$GLB,,, | Performed by: INTERNAL MEDICINE

## 2021-09-14 PROCEDURE — 1159F PR MEDICATION LIST DOCUMENTED IN MEDICAL RECORD: ICD-10-PCS | Mod: CPTII,S$GLB,, | Performed by: INTERNAL MEDICINE

## 2021-09-14 PROCEDURE — 3080F DIAST BP >= 90 MM HG: CPT | Mod: CPTII,S$GLB,, | Performed by: INTERNAL MEDICINE

## 2021-09-14 PROCEDURE — 99214 OFFICE O/P EST MOD 30 MIN: CPT | Mod: S$GLB,,, | Performed by: INTERNAL MEDICINE

## 2021-09-14 PROCEDURE — 3008F BODY MASS INDEX DOCD: CPT | Mod: CPTII,S$GLB,, | Performed by: INTERNAL MEDICINE

## 2021-09-14 PROCEDURE — 4010F ACE/ARB THERAPY RXD/TAKEN: CPT | Mod: CPTII,S$GLB,, | Performed by: INTERNAL MEDICINE

## 2021-09-14 PROCEDURE — 99999 PR PBB SHADOW E&M-EST. PATIENT-LVL IV: ICD-10-PCS | Mod: PBBFAC,,, | Performed by: INTERNAL MEDICINE

## 2021-09-27 ENCOUNTER — PATIENT MESSAGE (OUTPATIENT)
Dept: INTERNAL MEDICINE | Facility: CLINIC | Age: 41
End: 2021-09-27

## 2021-09-27 DIAGNOSIS — E11.22 TYPE 2 DIABETES MELLITUS WITH STAGE 4 CHRONIC KIDNEY DISEASE, WITHOUT LONG-TERM CURRENT USE OF INSULIN: Chronic | ICD-10-CM

## 2021-09-27 DIAGNOSIS — N18.4 TYPE 2 DIABETES MELLITUS WITH STAGE 4 CHRONIC KIDNEY DISEASE, WITHOUT LONG-TERM CURRENT USE OF INSULIN: Chronic | ICD-10-CM

## 2021-09-30 ENCOUNTER — PATIENT MESSAGE (OUTPATIENT)
Dept: INTERNAL MEDICINE | Facility: CLINIC | Age: 41
End: 2021-09-30

## 2021-10-01 ENCOUNTER — PATIENT MESSAGE (OUTPATIENT)
Dept: OPHTHALMOLOGY | Facility: CLINIC | Age: 41
End: 2021-10-01

## 2021-11-08 DIAGNOSIS — M1A.09X0 IDIOPATHIC CHRONIC GOUT, MULTIPLE SITES, WITHOUT TOPHUS (TOPHI): Chronic | ICD-10-CM

## 2021-11-12 RX ORDER — FEBUXOSTAT 80 MG/1
1 TABLET, FILM COATED ORAL DAILY
Qty: 90 TABLET | Refills: 2 | Status: SHIPPED | OUTPATIENT
Start: 2021-11-12 | End: 2022-06-07 | Stop reason: SDUPTHER

## 2021-12-06 ENCOUNTER — LAB VISIT (OUTPATIENT)
Dept: LAB | Facility: HOSPITAL | Age: 41
End: 2021-12-06
Attending: FAMILY MEDICINE
Payer: COMMERCIAL

## 2021-12-06 DIAGNOSIS — N18.4 TYPE 2 DIABETES MELLITUS WITH STAGE 4 CHRONIC KIDNEY DISEASE, WITHOUT LONG-TERM CURRENT USE OF INSULIN: ICD-10-CM

## 2021-12-06 DIAGNOSIS — E11.22 TYPE 2 DIABETES MELLITUS WITH STAGE 4 CHRONIC KIDNEY DISEASE, WITHOUT LONG-TERM CURRENT USE OF INSULIN: ICD-10-CM

## 2021-12-06 LAB
ESTIMATED AVG GLUCOSE: 114 MG/DL (ref 68–131)
HBA1C MFR BLD: 5.6 % (ref 4–5.6)

## 2021-12-06 PROCEDURE — 83036 HEMOGLOBIN GLYCOSYLATED A1C: CPT | Performed by: FAMILY MEDICINE

## 2021-12-06 PROCEDURE — 36415 COLL VENOUS BLD VENIPUNCTURE: CPT | Performed by: FAMILY MEDICINE

## 2021-12-27 ENCOUNTER — PATIENT MESSAGE (OUTPATIENT)
Dept: NEPHROLOGY | Facility: CLINIC | Age: 41
End: 2021-12-27
Payer: COMMERCIAL

## 2021-12-27 ENCOUNTER — PATIENT MESSAGE (OUTPATIENT)
Dept: OTHER | Facility: OTHER | Age: 41
End: 2021-12-27
Payer: COMMERCIAL

## 2021-12-27 ENCOUNTER — PATIENT MESSAGE (OUTPATIENT)
Dept: INTERNAL MEDICINE | Facility: CLINIC | Age: 41
End: 2021-12-27
Payer: COMMERCIAL

## 2022-01-10 ENCOUNTER — PATIENT MESSAGE (OUTPATIENT)
Dept: OTHER | Facility: OTHER | Age: 42
End: 2022-01-10
Payer: COMMERCIAL

## 2022-01-11 ENCOUNTER — PATIENT MESSAGE (OUTPATIENT)
Dept: OTHER | Facility: OTHER | Age: 42
End: 2022-01-11
Payer: COMMERCIAL

## 2022-01-17 ENCOUNTER — PATIENT MESSAGE (OUTPATIENT)
Dept: OTHER | Facility: OTHER | Age: 42
End: 2022-01-17
Payer: COMMERCIAL

## 2022-01-17 ENCOUNTER — PATIENT MESSAGE (OUTPATIENT)
Dept: ADMINISTRATIVE | Facility: OTHER | Age: 42
End: 2022-01-17
Payer: COMMERCIAL

## 2022-01-20 ENCOUNTER — OFFICE VISIT (OUTPATIENT)
Dept: INTERNAL MEDICINE | Facility: CLINIC | Age: 42
End: 2022-01-20
Payer: COMMERCIAL

## 2022-01-20 VITALS
OXYGEN SATURATION: 98 % | RESPIRATION RATE: 18 BRPM | DIASTOLIC BLOOD PRESSURE: 98 MMHG | HEART RATE: 75 BPM | SYSTOLIC BLOOD PRESSURE: 152 MMHG | HEIGHT: 75 IN | WEIGHT: 246.94 LBS | TEMPERATURE: 98 F | BODY MASS INDEX: 30.7 KG/M2

## 2022-01-20 DIAGNOSIS — J01.00 ACUTE MAXILLARY SINUSITIS, RECURRENCE NOT SPECIFIED: ICD-10-CM

## 2022-01-20 DIAGNOSIS — R53.83 FATIGUE, UNSPECIFIED TYPE: ICD-10-CM

## 2022-01-20 DIAGNOSIS — J02.9 SORE THROAT: ICD-10-CM

## 2022-01-20 DIAGNOSIS — R09.81 NASAL CONGESTION: Primary | ICD-10-CM

## 2022-01-20 LAB
CTP QC/QA: YES
SARS-COV-2 RDRP RESP QL NAA+PROBE: NEGATIVE

## 2022-01-20 PROCEDURE — 3008F BODY MASS INDEX DOCD: CPT | Mod: CPTII,S$GLB,, | Performed by: NURSE PRACTITIONER

## 2022-01-20 PROCEDURE — 99214 OFFICE O/P EST MOD 30 MIN: CPT | Mod: S$GLB,,, | Performed by: NURSE PRACTITIONER

## 2022-01-20 PROCEDURE — 99999 PR PBB SHADOW E&M-EST. PATIENT-LVL V: ICD-10-PCS | Mod: PBBFAC,,, | Performed by: NURSE PRACTITIONER

## 2022-01-20 PROCEDURE — 1159F MED LIST DOCD IN RCRD: CPT | Mod: CPTII,S$GLB,, | Performed by: NURSE PRACTITIONER

## 2022-01-20 PROCEDURE — 99999 PR PBB SHADOW E&M-EST. PATIENT-LVL V: CPT | Mod: PBBFAC,,, | Performed by: NURSE PRACTITIONER

## 2022-01-20 PROCEDURE — 3080F DIAST BP >= 90 MM HG: CPT | Mod: CPTII,S$GLB,, | Performed by: NURSE PRACTITIONER

## 2022-01-20 PROCEDURE — 1159F PR MEDICATION LIST DOCUMENTED IN MEDICAL RECORD: ICD-10-PCS | Mod: CPTII,S$GLB,, | Performed by: NURSE PRACTITIONER

## 2022-01-20 PROCEDURE — 4010F PR ACE/ARB THEARPY RXD/TAKEN: ICD-10-PCS | Mod: CPTII,S$GLB,, | Performed by: NURSE PRACTITIONER

## 2022-01-20 PROCEDURE — 3077F PR MOST RECENT SYSTOLIC BLOOD PRESSURE >= 140 MM HG: ICD-10-PCS | Mod: CPTII,S$GLB,, | Performed by: NURSE PRACTITIONER

## 2022-01-20 PROCEDURE — 99214 PR OFFICE/OUTPT VISIT, EST, LEVL IV, 30-39 MIN: ICD-10-PCS | Mod: S$GLB,,, | Performed by: NURSE PRACTITIONER

## 2022-01-20 PROCEDURE — U0002 COVID-19 LAB TEST NON-CDC: HCPCS | Mod: QW,S$GLB,, | Performed by: NURSE PRACTITIONER

## 2022-01-20 PROCEDURE — 3072F PR LOW RISK FOR RETINOPATHY: ICD-10-PCS | Mod: CPTII,S$GLB,, | Performed by: NURSE PRACTITIONER

## 2022-01-20 PROCEDURE — 3077F SYST BP >= 140 MM HG: CPT | Mod: CPTII,S$GLB,, | Performed by: NURSE PRACTITIONER

## 2022-01-20 PROCEDURE — 3080F PR MOST RECENT DIASTOLIC BLOOD PRESSURE >= 90 MM HG: ICD-10-PCS | Mod: CPTII,S$GLB,, | Performed by: NURSE PRACTITIONER

## 2022-01-20 PROCEDURE — 3072F LOW RISK FOR RETINOPATHY: CPT | Mod: CPTII,S$GLB,, | Performed by: NURSE PRACTITIONER

## 2022-01-20 PROCEDURE — 3008F PR BODY MASS INDEX (BMI) DOCUMENTED: ICD-10-PCS | Mod: CPTII,S$GLB,, | Performed by: NURSE PRACTITIONER

## 2022-01-20 PROCEDURE — 4010F ACE/ARB THERAPY RXD/TAKEN: CPT | Mod: CPTII,S$GLB,, | Performed by: NURSE PRACTITIONER

## 2022-01-20 PROCEDURE — U0002: ICD-10-PCS | Mod: QW,S$GLB,, | Performed by: NURSE PRACTITIONER

## 2022-01-20 RX ORDER — BACLOFEN 10 MG/1
10 TABLET ORAL 3 TIMES DAILY
COMMUNITY
Start: 2021-09-30 | End: 2022-02-17

## 2022-01-20 RX ORDER — FLUTICASONE PROPIONATE 50 MCG
2 SPRAY, SUSPENSION (ML) NASAL DAILY
Qty: 16 G | Refills: 0 | Status: SHIPPED | OUTPATIENT
Start: 2022-01-20 | End: 2022-10-13 | Stop reason: ALTCHOICE

## 2022-01-20 RX ORDER — PREDNISONE 20 MG/1
20 TABLET ORAL 2 TIMES DAILY
COMMUNITY
Start: 2021-09-30 | End: 2022-02-17

## 2022-01-20 RX ORDER — AMOXICILLIN AND CLAVULANATE POTASSIUM 875; 125 MG/1; MG/1
1 TABLET, FILM COATED ORAL EVERY 12 HOURS
Qty: 14 TABLET | Refills: 0 | Status: SHIPPED | OUTPATIENT
Start: 2022-01-20 | End: 2022-02-17

## 2022-01-20 RX ORDER — NAPROXEN 500 MG/1
500 TABLET ORAL 2 TIMES DAILY
COMMUNITY
Start: 2021-09-30 | End: 2022-02-17

## 2022-01-20 NOTE — PATIENT INSTRUCTIONS
Patient Education       Sinusitis in Adults   The Basics   Written by the doctors and editors at [facility]   What is sinusitis? -- Sinusitis is a condition that can cause a stuffy nose, pain in the face, and discharge (mucus) from the nose. The sinuses are hollow areas in the bones of the face (figure 1). They have a thin lining that normally makes a small amount of mucus. When this lining gets irritated or infected, it swells and makes extra mucus. This causes symptoms.  Sinusitis can occur when a person gets sick with a cold. The germs causing the cold can also infect the sinuses. Many times, a person feels like their cold is getting better. But then they get sinusitis and begin to feel sick again.  What are the symptoms of sinusitis? -- Common symptoms of sinusitis include:  · Stuffy or blocked nose  · Thick white, yellow, or green discharge from the nose  · Pain in the teeth  · Pain or pressure in the face - This often feels worse when a person bends forward.  People with sinusitis can also have other symptoms that include:  · Fever  · Cough  · Trouble smelling  · Ear pressure or fullness  · Headache  · Bad breath  · Feeling tired  Most of the time, symptoms start to improve in 7 to 10 days.  Should I see a doctor or nurse? -- See your doctor or nurse if your symptoms last more than 10 days, or if your symptoms first get better but then get worse.  Rarely, sinusitis can lead to serious problems. See your doctor or nurse right away (do not wait 10 days) if you have:  · Fever higher than 102°F (38.9°C)  · Sudden and severe pain in the face and head  · Trouble seeing or seeing double  · Trouble thinking clearly  · Swelling or redness around one or both eyes  · A stiff neck  Is there anything I can do on my own to feel better? -- Yes. To reduce your symptoms, you can:  · Take an over-the-counter pain reliever to reduce the pain  · Rinse your nose and sinuses with salt water a few times a day - Ask your doctor or  "nurse about the best way to do this.  Your doctor might also prescribe a steroid nose spray to reduce the swelling in your nose. (These kinds of steroid nose sprays are safe to take, and do not contain the same steroids that some athletes take illegally.)  How is sinusitis treated? -- Most of the time, sinusitis does not need to be treated with antibiotic medicines. This is because most sinusitis is caused by viruses, not bacteria, and antibiotics do not kill viruses. In fact, even sinusitis caused by bacteria will usually get better on its own without antibiotics.   Some people with sinusitis do need treatment with antibiotics. If your symptoms have not improved after 10 days, ask your doctor if you should take antibiotics. Your doctor might recommend that you wait 1 more week to see if your symptoms improve. But if you have symptoms such as a fever or a lot of pain, they might prescribe antibiotics. It is important to follow your doctor's instructions about taking your antibiotics.  What if my symptoms do not get better? -- If your symptoms do not get better, talk with your doctor or nurse. They might order tests to figure out why you still have symptoms. These can include:  · CT scan or other imaging tests - Imaging tests create pictures of the inside of the body.  · A test to look inside the sinuses - For this test, a doctor puts a thin tube with a camera on the end into the nose and up into the sinuses.  Some people get a lot of sinus infections or have symptoms that last at least 3 months. These people can have a different type of sinusitis called "chronic sinusitis." Chronic sinusitis can be caused by different things. For example, some people have growths inside their nose or sinuses that are called "polyps." Other people have allergies that cause their symptoms.  Chronic sinusitis can be treated in different ways. If you have chronic sinusitis, talk with your doctor about which treatments are right for " you.  All topics are updated as new evidence becomes available and our peer review process is complete.  This topic retrieved from [employer] on: Sep 21, 2021.  Topic 79126 Version 17.0  Release: 29.4.2 - C29.263  © 2021 UpToDate, Inc. and/or its affiliates. All rights reserved.  figure 1: Sinuses of the face     This drawing shows the sinuses of the face, from the side and front views.  Graphic 109409 Version 1.0    Consumer Information Use and Disclaimer   This information is not specific medical advice and does not replace information you receive from your health care provider. This is only a brief summary of general information. It does NOT include all information about conditions, illnesses, injuries, tests, procedures, treatments, therapies, discharge instructions or life-style choices that may apply to you. You must talk with your health care provider for complete information about your health and treatment options. This information should not be used to decide whether or not to accept your health care provider's advice, instructions or recommendations. Only your health care provider has the knowledge and training to provide advice that is right for you. The use of this information is governed by the [employer] End User License Agreement, available at [email or web address].The use of [employer] content is governed by the [employer] Terms of Use. ©2021 UpToDate, Inc. All rights reserved.  Copyright   © 2021 UpToDate, Inc. and/or its affiliates. All rights reserved.

## 2022-01-24 NOTE — PROGRESS NOTES
Subjective:       Patient ID: Robb Iglesias is a 41 y.o. male.    Chief Complaint: Cough, Fatigue, Sinus Problem, Nasal Congestion, and Sore Throat    HPI      Pt here for above x 1 week +  covid today neg    Past Medical History:   Diagnosis Date    Allergic rhinitis     Class 1 obesity due to excess calories with serious comorbidity and body mass index (BMI) of 31.0 to 31.9 in adult 4/7/2017    Coronary artery disease     Direct hyperbilirubinemia 3/24/2018    DM (diabetes mellitus) 2008    BS doesn't check any more 08/02/2018    DM (diabetes mellitus) 2012    BS 99 am 06/26/2020    DM (diabetes mellitus)     BS didn't check 06/04/2021    Elevated bilirubin 3/21/2018    GERD (gastroesophageal reflux disease)     Gout     Hyperlipidemia     Hypertension associated with chronic kidney disease due to type 2 diabetes mellitus     Idiopathic chronic gout, multiple sites, without tophus (tophi) 7/19/2017    Long term (current) use of insulin     MI (myocardial infarction) 07/2017    Obesity     HENRY on CPAP     Proteinuria     Steatohepatitis     Fatty Liver    Type 2 diabetes mellitus with diabetic nephropathy     Type 2 diabetes mellitus with diabetic nephropathy, without long-term current use of insulin 1/6/2020    Type 2 diabetes mellitus with hyperglycemia     Type 2 diabetes mellitus with renal manifestations     Type 2 diabetes mellitus with stage 3 chronic kidney disease, without long-term current use of insulin 6/21/2017       Past Surgical History:   Procedure Laterality Date    CORONARY ANGIOPLASTY WITH STENT PLACEMENT      LASIK Bilateral     LIVER BIOPSY      NASAL ENDOSCOPY      NASAL SEPTUM SURGERY      SLEEVE GASTROPLASTY  03/06/2017     Social History     Socioeconomic History    Marital status:     Number of children: 2    Years of education: Some College   Tobacco Use    Smoking status: Never Smoker    Smokeless tobacco: Never Used   Substance and  Sexual Activity    Alcohol use: No     Comment: 1-2 times per month    Drug use: No    Sexual activity: Yes     Partners: Female   Social History Narrative    Full time employed,  with 2 children.     Review of patient's allergies indicates:  No Known Allergies  Current Outpatient Medications   Medication Sig    amLODIPine (NORVASC) 10 MG tablet TAKE 1 TABLET (10 MG TOTAL) BY MOUTH EVERY EVENING.    aspirin (ECOTRIN) 81 MG EC tablet Take 1 tablet (81 mg total) by mouth once daily.    atorvastatin (LIPITOR) 80 MG tablet Take 1 tablet (80 mg total) by mouth every evening.    baclofen (LIORESAL) 10 MG tablet Take 10 mg by mouth 3 (three) times daily.    blood sugar diagnostic Strp Check blood glucose 2 times daily as directed and as needed (dispense insurance preferred brand or patient choice)    blood-glucose meter (ONETOUCH VERIO IQ METER) Misc Use as directed    carvediloL (COREG) 6.25 MG tablet Take 1 tablet (6.25 mg total) by mouth 2 (two) times daily with meals.    cetirizine (ZYRTEC) 10 MG tablet Take 1 tablet (10 mg total) by mouth once daily.    colchicine (COLCRYS) 0.6 mg tablet Take 2 tablets at onset of acute gout attack. May take 1 more tablet 2 hours later if needed. MAX 3 TABLETS PER DAY. MAX 6 TABLETS PER WEEK.    dulaglutide (TRULICITY) 3 mg/0.5 mL pen injector Inject 3 mg into the skin every 7 days.    ezetimibe (ZETIA) 10 mg tablet Take 1 tablet (10 mg total) by mouth once daily.    febuxostat (ULORIC) 80 mg Tab Take 1 tablet (80 mg total) by mouth once daily.    furosemide (LASIX) 20 MG tablet Take 1 tablet (20 mg total) by mouth every other day.    lancets Misc Check blood glucose 2 times daily as directed and as needed (dispense insurance preferred brand or patient choice)    MULTIVITAMIN/IRON/FOLIC ACID (CENTRUM COMPLETE ORAL) Take by mouth once daily at 6am.    naproxen (NAPROSYN) 500 MG tablet Take 500 mg by mouth 2 (two) times daily.    nitroGLYCERIN (NITROSTAT) 0.4  MG SL tablet Dissolve one tablet underneath tongue at onset of angina; may repeat every 5 minutes if needed. Call 911 if angina persists after 2 doses.    telmisartan (MICARDIS) 80 MG Tab TAKE 1 TABLET (80 MG TOTAL) BY MOUTH ONCE DAILY.    amoxicillin-clavulanate 875-125mg (AUGMENTIN) 875-125 mg per tablet Take 1 tablet by mouth every 12 (twelve) hours.    fluticasone propionate (FLONASE) 50 mcg/actuation nasal spray 2 sprays (100 mcg total) by Each Nostril route once daily.    predniSONE (DELTASONE) 20 MG tablet Take 20 mg by mouth 2 (two) times daily.     No current facility-administered medications for this visit.           Review of Systems   Constitutional: Positive for fatigue. Negative for activity change, appetite change, chills, diaphoresis, fever and unexpected weight change.   HENT: Positive for congestion and sore throat. Negative for ear pain, postnasal drip, rhinorrhea, sinus pressure, sinus pain, sneezing, tinnitus, trouble swallowing and voice change.    Eyes: Negative for photophobia, pain and visual disturbance.   Respiratory: Positive for cough. Negative for chest tightness, shortness of breath and wheezing.    Cardiovascular: Negative for chest pain, palpitations and leg swelling.   Gastrointestinal: Negative for abdominal distention, abdominal pain, constipation, diarrhea, nausea and vomiting.   Genitourinary: Negative for decreased urine volume, difficulty urinating, dysuria, flank pain, frequency, hematuria and urgency.   Musculoskeletal: Negative for arthralgias, back pain, joint swelling, neck pain and neck stiffness.   Allergic/Immunologic: Negative for immunocompromised state.   Neurological: Negative for dizziness, tremors, seizures, syncope, facial asymmetry, speech difficulty, weakness, light-headedness, numbness and headaches.   Hematological: Negative for adenopathy. Does not bruise/bleed easily.   Psychiatric/Behavioral: Negative for confusion and sleep disturbance.        Objective:      Physical Exam  Vitals reviewed.   HENT:      Right Ear: Tympanic membrane normal.      Left Ear: Tympanic membrane normal.      Nose: Mucosal edema, congestion and rhinorrhea present.      Mouth/Throat:      Pharynx: Posterior oropharyngeal erythema present.   Cardiovascular:      Rate and Rhythm: Normal rate and regular rhythm.      Heart sounds: Normal heart sounds.   Pulmonary:      Effort: Pulmonary effort is normal.      Breath sounds: Normal breath sounds.   Skin:     General: Skin is warm and dry.   Neurological:      Mental Status: He is alert and oriented to person, place, and time.   Psychiatric:         Mood and Affect: Mood normal.         Assessment:     Vitals:    01/20/22 1022   BP: (!) 152/98   Pulse: 75   Resp: 18   Temp: 97.5 °F (36.4 °C)         1. Nasal congestion    2. Fatigue, unspecified type    3. Sore throat    4. Acute maxillary sinusitis, recurrence not specified        Plan:   Nasal congestion  -     POCT COVID-19 Rapid Screening    Fatigue, unspecified type  -     POCT COVID-19 Rapid Screening    Sore throat  -     POCT COVID-19 Rapid Screening    Acute maxillary sinusitis, recurrence not specified  -     amoxicillin-clavulanate 875-125mg (AUGMENTIN) 875-125 mg per tablet; Take 1 tablet by mouth every 12 (twelve) hours.  Dispense: 14 tablet; Refill: 0  -     fluticasone propionate (FLONASE) 50 mcg/actuation nasal spray; 2 sprays (100 mcg total) by Each Nostril route once daily.  Dispense: 16 g; Refill: 0

## 2022-02-07 ENCOUNTER — TELEPHONE (OUTPATIENT)
Dept: INTERNAL MEDICINE | Facility: CLINIC | Age: 42
End: 2022-02-07
Payer: COMMERCIAL

## 2022-02-07 ENCOUNTER — PATIENT OUTREACH (OUTPATIENT)
Dept: ADMINISTRATIVE | Facility: HOSPITAL | Age: 42
End: 2022-02-07
Payer: COMMERCIAL

## 2022-02-07 NOTE — PROGRESS NOTES
HTN: Patient is active on the [facility] HTN program. Patients home BP on 2/7/22 was 136/87. Remote BP will be entered.

## 2022-02-14 ENCOUNTER — LAB VISIT (OUTPATIENT)
Dept: LAB | Facility: HOSPITAL | Age: 42
End: 2022-02-14
Attending: INTERNAL MEDICINE
Payer: COMMERCIAL

## 2022-02-14 DIAGNOSIS — I12.9 PARENCHYMAL RENAL HYPERTENSION, STAGE 1 THROUGH STAGE 4 OR UNSPECIFIED CHRONIC KIDNEY DISEASE: ICD-10-CM

## 2022-02-14 DIAGNOSIS — N18.4 STAGE 4 CHRONIC KIDNEY DISEASE: ICD-10-CM

## 2022-02-14 DIAGNOSIS — E79.0 HYPERURICEMIA: ICD-10-CM

## 2022-02-14 LAB
ALBUMIN SERPL BCP-MCNC: 3.8 G/DL (ref 3.5–5.2)
ALP SERPL-CCNC: 90 U/L (ref 55–135)
ALT SERPL W/O P-5'-P-CCNC: 37 U/L (ref 10–44)
ANION GAP SERPL CALC-SCNC: 10 MMOL/L (ref 8–16)
AST SERPL-CCNC: 28 U/L (ref 10–40)
BILIRUB SERPL-MCNC: 1.9 MG/DL (ref 0.1–1)
BUN SERPL-MCNC: 45 MG/DL (ref 6–20)
CALCIUM SERPL-MCNC: 8.8 MG/DL (ref 8.7–10.5)
CHLORIDE SERPL-SCNC: 104 MMOL/L (ref 95–110)
CO2 SERPL-SCNC: 28 MMOL/L (ref 23–29)
CREAT SERPL-MCNC: 4.4 MG/DL (ref 0.5–1.4)
EST. GFR  (AFRICAN AMERICAN): 18.1 ML/MIN/1.73 M^2
EST. GFR  (NON AFRICAN AMERICAN): 15.6 ML/MIN/1.73 M^2
GLUCOSE SERPL-MCNC: 141 MG/DL (ref 70–110)
POTASSIUM SERPL-SCNC: 4.1 MMOL/L (ref 3.5–5.1)
PROT SERPL-MCNC: 6.4 G/DL (ref 6–8.4)
SODIUM SERPL-SCNC: 142 MMOL/L (ref 136–145)

## 2022-02-14 PROCEDURE — 36415 COLL VENOUS BLD VENIPUNCTURE: CPT | Performed by: INTERNAL MEDICINE

## 2022-02-14 PROCEDURE — 80053 COMPREHEN METABOLIC PANEL: CPT | Performed by: INTERNAL MEDICINE

## 2022-02-16 ENCOUNTER — PATIENT OUTREACH (OUTPATIENT)
Dept: ADMINISTRATIVE | Facility: OTHER | Age: 42
End: 2022-02-16
Payer: COMMERCIAL

## 2022-02-17 ENCOUNTER — OFFICE VISIT (OUTPATIENT)
Dept: NEPHROLOGY | Facility: CLINIC | Age: 42
End: 2022-02-17
Payer: COMMERCIAL

## 2022-02-17 VITALS
HEIGHT: 75 IN | DIASTOLIC BLOOD PRESSURE: 92 MMHG | SYSTOLIC BLOOD PRESSURE: 138 MMHG | HEART RATE: 74 BPM | WEIGHT: 246.69 LBS | BODY MASS INDEX: 30.67 KG/M2

## 2022-02-17 DIAGNOSIS — R80.9 PROTEINURIA, UNSPECIFIED TYPE: ICD-10-CM

## 2022-02-17 DIAGNOSIS — N17.9 AKI (ACUTE KIDNEY INJURY): Primary | ICD-10-CM

## 2022-02-17 DIAGNOSIS — I12.9 PARENCHYMAL RENAL HYPERTENSION, STAGE 1 THROUGH STAGE 4 OR UNSPECIFIED CHRONIC KIDNEY DISEASE: ICD-10-CM

## 2022-02-17 DIAGNOSIS — N18.4 CKD (CHRONIC KIDNEY DISEASE) STAGE 4, GFR 15-29 ML/MIN: ICD-10-CM

## 2022-02-17 PROCEDURE — 1159F PR MEDICATION LIST DOCUMENTED IN MEDICAL RECORD: ICD-10-PCS | Mod: CPTII,S$GLB,, | Performed by: INTERNAL MEDICINE

## 2022-02-17 PROCEDURE — 3066F NEPHROPATHY DOC TX: CPT | Mod: CPTII,S$GLB,, | Performed by: INTERNAL MEDICINE

## 2022-02-17 PROCEDURE — 3066F PR DOCUMENTATION OF TREATMENT FOR NEPHROPATHY: ICD-10-PCS | Mod: CPTII,S$GLB,, | Performed by: INTERNAL MEDICINE

## 2022-02-17 PROCEDURE — 99215 OFFICE O/P EST HI 40 MIN: CPT | Mod: S$GLB,,, | Performed by: INTERNAL MEDICINE

## 2022-02-17 PROCEDURE — 4010F ACE/ARB THERAPY RXD/TAKEN: CPT | Mod: CPTII,S$GLB,, | Performed by: INTERNAL MEDICINE

## 2022-02-17 PROCEDURE — 1160F RVW MEDS BY RX/DR IN RCRD: CPT | Mod: CPTII,S$GLB,, | Performed by: INTERNAL MEDICINE

## 2022-02-17 PROCEDURE — 3080F DIAST BP >= 90 MM HG: CPT | Mod: CPTII,S$GLB,, | Performed by: INTERNAL MEDICINE

## 2022-02-17 PROCEDURE — 3075F PR MOST RECENT SYSTOLIC BLOOD PRESS GE 130-139MM HG: ICD-10-PCS | Mod: CPTII,S$GLB,, | Performed by: INTERNAL MEDICINE

## 2022-02-17 PROCEDURE — 99999 PR PBB SHADOW E&M-EST. PATIENT-LVL IV: CPT | Mod: PBBFAC,,, | Performed by: INTERNAL MEDICINE

## 2022-02-17 PROCEDURE — 3008F PR BODY MASS INDEX (BMI) DOCUMENTED: ICD-10-PCS | Mod: CPTII,S$GLB,, | Performed by: INTERNAL MEDICINE

## 2022-02-17 PROCEDURE — 99215 PR OFFICE/OUTPT VISIT, EST, LEVL V, 40-54 MIN: ICD-10-PCS | Mod: S$GLB,,, | Performed by: INTERNAL MEDICINE

## 2022-02-17 PROCEDURE — 3072F PR LOW RISK FOR RETINOPATHY: ICD-10-PCS | Mod: CPTII,S$GLB,, | Performed by: INTERNAL MEDICINE

## 2022-02-17 PROCEDURE — 3008F BODY MASS INDEX DOCD: CPT | Mod: CPTII,S$GLB,, | Performed by: INTERNAL MEDICINE

## 2022-02-17 PROCEDURE — 1159F MED LIST DOCD IN RCRD: CPT | Mod: CPTII,S$GLB,, | Performed by: INTERNAL MEDICINE

## 2022-02-17 PROCEDURE — 3072F LOW RISK FOR RETINOPATHY: CPT | Mod: CPTII,S$GLB,, | Performed by: INTERNAL MEDICINE

## 2022-02-17 PROCEDURE — 99999 PR PBB SHADOW E&M-EST. PATIENT-LVL IV: ICD-10-PCS | Mod: PBBFAC,,, | Performed by: INTERNAL MEDICINE

## 2022-02-17 PROCEDURE — 4010F PR ACE/ARB THEARPY RXD/TAKEN: ICD-10-PCS | Mod: CPTII,S$GLB,, | Performed by: INTERNAL MEDICINE

## 2022-02-17 PROCEDURE — 3080F PR MOST RECENT DIASTOLIC BLOOD PRESSURE >= 90 MM HG: ICD-10-PCS | Mod: CPTII,S$GLB,, | Performed by: INTERNAL MEDICINE

## 2022-02-17 PROCEDURE — 1160F PR REVIEW ALL MEDS BY PRESCRIBER/CLIN PHARMACIST DOCUMENTED: ICD-10-PCS | Mod: CPTII,S$GLB,, | Performed by: INTERNAL MEDICINE

## 2022-02-17 PROCEDURE — 3075F SYST BP GE 130 - 139MM HG: CPT | Mod: CPTII,S$GLB,, | Performed by: INTERNAL MEDICINE

## 2022-02-17 NOTE — PROGRESS NOTES
"Subjective:       Patient ID: Robb Iglesias is a 41 y.o. male.    Chief Complaint:  Kidney disease, acute kidney injury, hypertension, proteinuria    HPI    Since his last office visit he had a 2nd infection of COVID-19 in December of 2021. Following his COVID-19 infection he developed bronchitis and was treated with Augmentin.  He relates developing GI symptoms from Augmentin.  Additionally since his weight loss surgery he has had intermittent difficulties with swallowing and relates that last week he had trouble maintaining his fluid intake.  His laboratory studies and medications were reviewed.  All Nephrology related questions were answered to his satisfaction.    Review of Systems   Constitutional: Negative.    HENT: Negative.    Eyes: Negative.    Respiratory: Negative.    Cardiovascular: Negative.    Gastrointestinal: Negative.    Genitourinary: Negative.    Musculoskeletal: Negative.    Skin: Negative.    Neurological: Negative.          BP (!) 138/92   Pulse 74   Ht 6' 3" (1.905 m)   Wt 111.9 kg (246 lb 11.1 oz)   BMI 30.83 kg/m²     Lab Results   Component Value Date    WBC 8.04 06/02/2021    HGB 13.2 (L) 06/02/2021    HCT 39.2 (L) 06/02/2021    MCV 83 06/02/2021     06/02/2021      BMP  Lab Results   Component Value Date     02/14/2022    K 4.1 02/14/2022     02/14/2022    CO2 28 02/14/2022    BUN 45 (H) 02/14/2022    CREATININE 4.4 (H) 02/14/2022    CALCIUM 8.8 02/14/2022    ANIONGAP 10 02/14/2022    ESTGFRAFRICA 18.1 (A) 02/14/2022    EGFRNONAA 15.6 (A) 02/14/2022     CMP  Sodium   Date Value Ref Range Status   02/14/2022 142 136 - 145 mmol/L Final     Potassium   Date Value Ref Range Status   02/14/2022 4.1 3.5 - 5.1 mmol/L Final     Chloride   Date Value Ref Range Status   02/14/2022 104 95 - 110 mmol/L Final     CO2   Date Value Ref Range Status   02/14/2022 28 23 - 29 mmol/L Final     Glucose   Date Value Ref Range Status   02/14/2022 141 (H) 70 - 110 mg/dL Final "     BUN   Date Value Ref Range Status   02/14/2022 45 (H) 6 - 20 mg/dL Final     Creatinine   Date Value Ref Range Status   02/14/2022 4.4 (H) 0.5 - 1.4 mg/dL Final     Calcium   Date Value Ref Range Status   02/14/2022 8.8 8.7 - 10.5 mg/dL Final     Total Protein   Date Value Ref Range Status   02/14/2022 6.4 6.0 - 8.4 g/dL Final     Albumin   Date Value Ref Range Status   02/14/2022 3.8 3.5 - 5.2 g/dL Final     Total Bilirubin   Date Value Ref Range Status   02/14/2022 1.9 (H) 0.1 - 1.0 mg/dL Final     Comment:     For infants and newborns, interpretation of results should be based  on gestational age, weight and in agreement with clinical  observations.    Premature Infant recommended reference ranges:  Up to 24 hours.............<8.0 mg/dL  Up to 48 hours............<12.0 mg/dL  3-5 days..................<15.0 mg/dL  6-29 days.................<15.0 mg/dL       Alkaline Phosphatase   Date Value Ref Range Status   02/14/2022 90 55 - 135 U/L Final     AST   Date Value Ref Range Status   02/14/2022 28 10 - 40 U/L Final     ALT   Date Value Ref Range Status   02/14/2022 37 10 - 44 U/L Final     Anion Gap   Date Value Ref Range Status   02/14/2022 10 8 - 16 mmol/L Final     eGFR if    Date Value Ref Range Status   02/14/2022 18.1 (A) >60 mL/min/1.73 m^2 Final     eGFR if non    Date Value Ref Range Status   02/14/2022 15.6 (A) >60 mL/min/1.73 m^2 Final     Comment:     Calculation used to obtain the estimated glomerular filtration  rate (eGFR) is the CKD-EPI equation.             Current Outpatient Medications on File Prior to Visit   Medication Sig Dispense Refill    amLODIPine (NORVASC) 10 MG tablet TAKE 1 TABLET (10 MG TOTAL) BY MOUTH EVERY EVENING. 90 tablet 0    aspirin (ECOTRIN) 81 MG EC tablet Take 1 tablet (81 mg total) by mouth once daily. 90 tablet 3    atorvastatin (LIPITOR) 80 MG tablet Take 1 tablet (80 mg total) by mouth every evening. 90 tablet 3    blood sugar  diagnostic Strp Check blood glucose 2 times daily as directed and as needed (dispense insurance preferred brand or patient choice) 200 each 5    blood-glucose meter (ONETOUCH VERIO IQ METER) Misc Use as directed 1 each 0    carvediloL (COREG) 6.25 MG tablet Take 1 tablet (6.25 mg total) by mouth 2 (two) times daily with meals. 60 tablet 11    cetirizine (ZYRTEC) 10 MG tablet Take 1 tablet (10 mg total) by mouth once daily. 90 tablet 3    colchicine (COLCRYS) 0.6 mg tablet Take 2 tablets at onset of acute gout attack. May take 1 more tablet 2 hours later if needed. MAX 3 TABLETS PER DAY. MAX 6 TABLETS PER WEEK. 45 tablet 3    dulaglutide (TRULICITY) 3 mg/0.5 mL pen injector Inject 3 mg into the skin every 7 days. 12 pen 3    ezetimibe (ZETIA) 10 mg tablet Take 1 tablet (10 mg total) by mouth once daily. 90 tablet 3    febuxostat (ULORIC) 80 mg Tab Take 1 tablet (80 mg total) by mouth once daily. 90 tablet 2    fluticasone propionate (FLONASE) 50 mcg/actuation nasal spray 2 sprays (100 mcg total) by Each Nostril route once daily. 16 g 0    furosemide (LASIX) 20 MG tablet Take 1 tablet (20 mg total) by mouth every other day. 15 tablet 11    MULTIVITAMIN/IRON/FOLIC ACID (CENTRUM COMPLETE ORAL) Take by mouth once daily at 6am.      nitroGLYCERIN (NITROSTAT) 0.4 MG SL tablet Dissolve one tablet underneath tongue at onset of angina; may repeat every 5 minutes if needed. Call 911 if angina persists after 2 doses. 25 tablet 5    telmisartan (MICARDIS) 80 MG Tab TAKE 1 TABLET (80 MG TOTAL) BY MOUTH ONCE DAILY. 30 tablet 1    lancets Misc Check blood glucose 2 times daily as directed and as needed (dispense insurance preferred brand or patient choice) 200 each 5    [DISCONTINUED] amoxicillin-clavulanate 875-125mg (AUGMENTIN) 875-125 mg per tablet Take 1 tablet by mouth every 12 (twelve) hours. (Patient not taking: Reported on 2/17/2022) 14 tablet 0    [DISCONTINUED] baclofen (LIORESAL) 10 MG tablet Take 10 mg by  mouth 3 (three) times daily.      [DISCONTINUED] naproxen (NAPROSYN) 500 MG tablet Take 500 mg by mouth 2 (two) times daily.      [DISCONTINUED] predniSONE (DELTASONE) 20 MG tablet Take 20 mg by mouth 2 (two) times daily.       No current facility-administered medications on file prior to visit.         Objective:            Physical Exam  Constitutional:       Appearance: Normal appearance.   HENT:      Head: Normocephalic and atraumatic.   Eyes:      General: No scleral icterus.     Extraocular Movements: Extraocular movements intact.      Pupils: Pupils are equal, round, and reactive to light.   Pulmonary:      Effort: Pulmonary effort is normal.      Breath sounds: No stridor.   Musculoskeletal:      Right lower leg: No edema.      Left lower leg: No edema.   Skin:     General: Skin is warm and dry.   Neurological:      General: No focal deficit present.      Mental Status: He is alert and oriented to person, place, and time.   Psychiatric:         Mood and Affect: Mood normal.         Behavior: Behavior normal.         Assessment:       1. EMMIE (acute kidney injury)    2. CKD (chronic kidney disease) stage 4, GFR 15-29 ml/min    3. Proteinuria, unspecified type    4. Parenchymal renal hypertension, stage 1 through stage 4 or unspecified chronic kidney disease        Plan:       1. Creatinine is noted to increase to 4.4 on most recent laboratory studies.  BUN is also higher than his baseline.  I suspicion that this is related to prerenal azotemia due to his difficulty maintaining hydration last week.  Additionally he has had a recent COVID-19 infection which can all so affect renal function.  Will plan to recheck chemistries in 2 weeks.  He will concentrate on increasing his fluid intake.    I have asked him to stop taking Lasix on a regular basis and use it only on a p.r.n. basis.  We discussed that if he gains more than 3-5 lb over several days or develops lower extremity edema he should take Lasix.    2.  Baseline creatinine usually runs between 3.0 and 3.5.  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    3. 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  He continues on a RAAS inhibitor.    4. Blood pressure is adequately controlled on current regimen.      Approximately 45 minutes was spent in face-to-face conversation and chart review.      Siva Figueroa MD

## 2022-03-03 ENCOUNTER — LAB VISIT (OUTPATIENT)
Dept: LAB | Facility: HOSPITAL | Age: 42
End: 2022-03-03
Attending: INTERNAL MEDICINE
Payer: COMMERCIAL

## 2022-03-03 DIAGNOSIS — N17.9 AKI (ACUTE KIDNEY INJURY): ICD-10-CM

## 2022-03-03 DIAGNOSIS — N18.4 CKD (CHRONIC KIDNEY DISEASE) STAGE 4, GFR 15-29 ML/MIN: ICD-10-CM

## 2022-03-03 DIAGNOSIS — I12.9 PARENCHYMAL RENAL HYPERTENSION, STAGE 1 THROUGH STAGE 4 OR UNSPECIFIED CHRONIC KIDNEY DISEASE: ICD-10-CM

## 2022-03-03 LAB
ALBUMIN SERPL BCP-MCNC: 3.5 G/DL (ref 3.5–5.2)
ANION GAP SERPL CALC-SCNC: 10 MMOL/L (ref 8–16)
ANION GAP SERPL CALC-SCNC: 10 MMOL/L (ref 8–16)
BUN SERPL-MCNC: 35 MG/DL (ref 6–20)
BUN SERPL-MCNC: 35 MG/DL (ref 6–20)
CALCIUM SERPL-MCNC: 9 MG/DL (ref 8.7–10.5)
CALCIUM SERPL-MCNC: 9 MG/DL (ref 8.7–10.5)
CHLORIDE SERPL-SCNC: 105 MMOL/L (ref 95–110)
CHLORIDE SERPL-SCNC: 105 MMOL/L (ref 95–110)
CO2 SERPL-SCNC: 23 MMOL/L (ref 23–29)
CO2 SERPL-SCNC: 23 MMOL/L (ref 23–29)
CREAT SERPL-MCNC: 4 MG/DL (ref 0.5–1.4)
CREAT SERPL-MCNC: 4 MG/DL (ref 0.5–1.4)
EST. GFR  (AFRICAN AMERICAN): 20.3 ML/MIN/1.73 M^2
EST. GFR  (AFRICAN AMERICAN): 20.3 ML/MIN/1.73 M^2
EST. GFR  (NON AFRICAN AMERICAN): 17.5 ML/MIN/1.73 M^2
EST. GFR  (NON AFRICAN AMERICAN): 17.5 ML/MIN/1.73 M^2
GLUCOSE SERPL-MCNC: 153 MG/DL (ref 70–110)
GLUCOSE SERPL-MCNC: 153 MG/DL (ref 70–110)
PHOSPHATE SERPL-MCNC: 3.3 MG/DL (ref 2.7–4.5)
POTASSIUM SERPL-SCNC: 3.8 MMOL/L (ref 3.5–5.1)
POTASSIUM SERPL-SCNC: 3.8 MMOL/L (ref 3.5–5.1)
PTH-INTACT SERPL-MCNC: 184.8 PG/ML (ref 9–77)
SODIUM SERPL-SCNC: 138 MMOL/L (ref 136–145)
SODIUM SERPL-SCNC: 138 MMOL/L (ref 136–145)

## 2022-03-03 PROCEDURE — 36415 COLL VENOUS BLD VENIPUNCTURE: CPT | Performed by: INTERNAL MEDICINE

## 2022-03-03 PROCEDURE — 80069 RENAL FUNCTION PANEL: CPT | Performed by: INTERNAL MEDICINE

## 2022-03-03 PROCEDURE — 83970 ASSAY OF PARATHORMONE: CPT | Performed by: INTERNAL MEDICINE

## 2022-03-10 ENCOUNTER — HOSPITAL ENCOUNTER (OUTPATIENT)
Dept: CARDIOLOGY | Facility: HOSPITAL | Age: 42
Discharge: HOME | End: 2022-03-10
Attending: INTERNAL MEDICINE
Payer: COMMERCIAL

## 2022-03-10 ENCOUNTER — OFFICE VISIT (OUTPATIENT)
Dept: CARDIOLOGY | Facility: CLINIC | Age: 42
End: 2022-03-10
Payer: COMMERCIAL

## 2022-03-10 VITALS
HEART RATE: 71 BPM | BODY MASS INDEX: 30.78 KG/M2 | WEIGHT: 247.56 LBS | DIASTOLIC BLOOD PRESSURE: 86 MMHG | SYSTOLIC BLOOD PRESSURE: 130 MMHG | OXYGEN SATURATION: 99 % | HEIGHT: 75 IN

## 2022-03-10 DIAGNOSIS — I10 ESSENTIAL HYPERTENSION: Chronic | ICD-10-CM

## 2022-03-10 DIAGNOSIS — E11.69 DYSLIPIDEMIA ASSOCIATED WITH TYPE 2 DIABETES MELLITUS: ICD-10-CM

## 2022-03-10 DIAGNOSIS — I25.10 CORONARY ARTERY DISEASE INVOLVING NATIVE CORONARY ARTERY OF NATIVE HEART WITHOUT ANGINA PECTORIS: Primary | Chronic | ICD-10-CM

## 2022-03-10 DIAGNOSIS — E11.42 TYPE 2 DIABETES MELLITUS WITH DIABETIC POLYNEUROPATHY, WITHOUT LONG-TERM CURRENT USE OF INSULIN: ICD-10-CM

## 2022-03-10 DIAGNOSIS — N18.4 TYPE 2 DIABETES MELLITUS WITH STAGE 4 CHRONIC KIDNEY DISEASE, WITHOUT LONG-TERM CURRENT USE OF INSULIN: Chronic | ICD-10-CM

## 2022-03-10 DIAGNOSIS — R80.1 PERSISTENT PROTEINURIA: ICD-10-CM

## 2022-03-10 DIAGNOSIS — G47.33 OBSTRUCTIVE SLEEP APNEA: Chronic | ICD-10-CM

## 2022-03-10 DIAGNOSIS — N18.4 STAGE 4 CHRONIC KIDNEY DISEASE: Chronic | ICD-10-CM

## 2022-03-10 DIAGNOSIS — I25.10 CORONARY ARTERY DISEASE INVOLVING NATIVE CORONARY ARTERY OF NATIVE HEART WITHOUT ANGINA PECTORIS: ICD-10-CM

## 2022-03-10 DIAGNOSIS — I25.10 CORONARY ARTERY DISEASE INVOLVING NATIVE CORONARY ARTERY OF NATIVE HEART WITHOUT ANGINA PECTORIS: Primary | ICD-10-CM

## 2022-03-10 DIAGNOSIS — E11.22 TYPE 2 DIABETES MELLITUS WITH STAGE 4 CHRONIC KIDNEY DISEASE, WITHOUT LONG-TERM CURRENT USE OF INSULIN: Chronic | ICD-10-CM

## 2022-03-10 DIAGNOSIS — I21.4 NSTEMI (NON-ST ELEVATED MYOCARDIAL INFARCTION): ICD-10-CM

## 2022-03-10 DIAGNOSIS — E78.5 DYSLIPIDEMIA ASSOCIATED WITH TYPE 2 DIABETES MELLITUS: ICD-10-CM

## 2022-03-10 DIAGNOSIS — Z91.89 AT RISK FOR CARDIOVASCULAR EVENT: Chronic | ICD-10-CM

## 2022-03-10 DIAGNOSIS — Z95.820 STATUS POST ANGIOPLASTY WITH STENT: ICD-10-CM

## 2022-03-10 DIAGNOSIS — E11.21 TYPE 2 DIABETES MELLITUS WITH DIABETIC NEPHROPATHY, WITHOUT LONG-TERM CURRENT USE OF INSULIN: Chronic | ICD-10-CM

## 2022-03-10 DIAGNOSIS — E11.59 HYPERTENSION COMPLICATING DIABETES: ICD-10-CM

## 2022-03-10 DIAGNOSIS — I15.2 HYPERTENSION COMPLICATING DIABETES: ICD-10-CM

## 2022-03-10 DIAGNOSIS — E66.09 CLASS 1 OBESITY DUE TO EXCESS CALORIES WITH SERIOUS COMORBIDITY AND BODY MASS INDEX (BMI) OF 30.0 TO 30.9 IN ADULT: Chronic | ICD-10-CM

## 2022-03-10 PROCEDURE — 99999 PR PBB SHADOW E&M-EST. PATIENT-LVL IV: CPT | Mod: PBBFAC,,, | Performed by: INTERNAL MEDICINE

## 2022-03-10 PROCEDURE — 3072F LOW RISK FOR RETINOPATHY: CPT | Mod: CPTII,S$GLB,, | Performed by: INTERNAL MEDICINE

## 2022-03-10 PROCEDURE — 93010 ELECTROCARDIOGRAM REPORT: CPT | Mod: ,,, | Performed by: INTERNAL MEDICINE

## 2022-03-10 PROCEDURE — 3072F PR LOW RISK FOR RETINOPATHY: ICD-10-PCS | Mod: CPTII,S$GLB,, | Performed by: INTERNAL MEDICINE

## 2022-03-10 PROCEDURE — 1159F MED LIST DOCD IN RCRD: CPT | Mod: CPTII,S$GLB,, | Performed by: INTERNAL MEDICINE

## 2022-03-10 PROCEDURE — 3066F PR DOCUMENTATION OF TREATMENT FOR NEPHROPATHY: ICD-10-PCS | Mod: CPTII,S$GLB,, | Performed by: INTERNAL MEDICINE

## 2022-03-10 PROCEDURE — 3066F NEPHROPATHY DOC TX: CPT | Mod: CPTII,S$GLB,, | Performed by: INTERNAL MEDICINE

## 2022-03-10 PROCEDURE — 4010F PR ACE/ARB THEARPY RXD/TAKEN: ICD-10-PCS | Mod: CPTII,S$GLB,, | Performed by: INTERNAL MEDICINE

## 2022-03-10 PROCEDURE — 3008F PR BODY MASS INDEX (BMI) DOCUMENTED: ICD-10-PCS | Mod: CPTII,S$GLB,, | Performed by: INTERNAL MEDICINE

## 2022-03-10 PROCEDURE — 99214 OFFICE O/P EST MOD 30 MIN: CPT | Mod: S$GLB,,, | Performed by: INTERNAL MEDICINE

## 2022-03-10 PROCEDURE — 99214 PR OFFICE/OUTPT VISIT, EST, LEVL IV, 30-39 MIN: ICD-10-PCS | Mod: S$GLB,,, | Performed by: INTERNAL MEDICINE

## 2022-03-10 PROCEDURE — 93005 ELECTROCARDIOGRAM TRACING: CPT

## 2022-03-10 PROCEDURE — 99999 PR PBB SHADOW E&M-EST. PATIENT-LVL IV: ICD-10-PCS | Mod: PBBFAC,,, | Performed by: INTERNAL MEDICINE

## 2022-03-10 PROCEDURE — 3079F PR MOST RECENT DIASTOLIC BLOOD PRESSURE 80-89 MM HG: ICD-10-PCS | Mod: CPTII,S$GLB,, | Performed by: INTERNAL MEDICINE

## 2022-03-10 PROCEDURE — 1159F PR MEDICATION LIST DOCUMENTED IN MEDICAL RECORD: ICD-10-PCS | Mod: CPTII,S$GLB,, | Performed by: INTERNAL MEDICINE

## 2022-03-10 PROCEDURE — 93010 EKG 12-LEAD: ICD-10-PCS | Mod: ,,, | Performed by: INTERNAL MEDICINE

## 2022-03-10 PROCEDURE — 4010F ACE/ARB THERAPY RXD/TAKEN: CPT | Mod: CPTII,S$GLB,, | Performed by: INTERNAL MEDICINE

## 2022-03-10 PROCEDURE — 3075F SYST BP GE 130 - 139MM HG: CPT | Mod: CPTII,S$GLB,, | Performed by: INTERNAL MEDICINE

## 2022-03-10 PROCEDURE — 3008F BODY MASS INDEX DOCD: CPT | Mod: CPTII,S$GLB,, | Performed by: INTERNAL MEDICINE

## 2022-03-10 PROCEDURE — 3079F DIAST BP 80-89 MM HG: CPT | Mod: CPTII,S$GLB,, | Performed by: INTERNAL MEDICINE

## 2022-03-10 PROCEDURE — 3075F PR MOST RECENT SYSTOLIC BLOOD PRESS GE 130-139MM HG: ICD-10-PCS | Mod: CPTII,S$GLB,, | Performed by: INTERNAL MEDICINE

## 2022-03-10 NOTE — PROGRESS NOTES
Subjective:   Patient ID:  Robb Iglesias is a 41 y.o. male who presents for follow up of Coronary Artery Disease      HPI  6/11/2020  A 38 yo male with cad s/p lad stent old mi htn hlp diabetes is here for f /u he is still exercising regulaqrily he is compliant with diet exercise lost some 10 lbs he is on digital htn however having issues with logistics he is going to o [facility]. Has no new chest pain or shortness of breath. He is not using his cpap regularily has issues clinically with compliance.  He has isusses with cpap compliance . I think this is a an anaxiety related to mask or claustrophobia. He is able to exercise w/o any issues. Lipids a1c are not on target.he has labs pending for am.     6/16/2021  HE IS HERE FOR F/U HIS HTN HAS BEEN AN ISSUE CLINICALLY HE IS COMPLIANT WITH MEDS AND DIET HE HAS BP MEDS SWITCHED TO MICARDIS FROM [place] . HE IS INTOLERANT TO CPAP. HE IS COMPLIANT WITH DITE HE TAKES MEDS REGULARTIY STARTED EXERCISING HIS BP HAS BEEN ELEVATED.HE IS ON AMLODIPINE AND CARDIZEM. NO LEG SWELLING      9/9/2021   HERE FOR F /U HAS BEEN IN DIGITAL PROGRAM FOR DIABETES And htn numbers are better. Today bp is more elevated than usual he thinks it is always higher in office. Has no new complaints of chest pain or shortness of breath no leg swelling he tries to exercise has house gym tries to be compliant with diet they cook their food stays away from eating out.has no chf symptoms. Has sleep apnea previously not able to use cpap previously he got retested has mild sleep apnea after weight loss no therapy he sleeps sitting up a little.     3/10/2022  Her efrof /u has been compliant with diet had recurrent covid his renal function deteriorated but improving had been under a lot of stress. Has no angina or chf symptoms claudication tia. Had palpitation after drinking coffee in the afternoon self terminated.   Past Medical History:   Diagnosis Date    Allergic rhinitis     Class 1 obesity due to  excess calories with serious comorbidity and body mass index (BMI) of 31.0 to 31.9 in adult 4/7/2017    Coronary artery disease     Direct hyperbilirubinemia 3/24/2018    DM (diabetes mellitus) 2008    BS doesn't check any more 08/02/2018    DM (diabetes mellitus) 2012    BS 99 am 06/26/2020    DM (diabetes mellitus)     BS didn't check 06/04/2021    Elevated bilirubin 3/21/2018    GERD (gastroesophageal reflux disease)     Gout     Hyperlipidemia     Hypertension associated with chronic kidney disease due to type 2 diabetes mellitus     Idiopathic chronic gout, multiple sites, without tophus (tophi) 7/19/2017    Long term (current) use of insulin     MI (myocardial infarction) 07/2017    Obesity     HENRY on CPAP     Proteinuria     Steatohepatitis     Fatty Liver    Type 2 diabetes mellitus with diabetic nephropathy     Type 2 diabetes mellitus with diabetic nephropathy, without long-term current use of insulin 1/6/2020    Type 2 diabetes mellitus with hyperglycemia     Type 2 diabetes mellitus with renal manifestations     Type 2 diabetes mellitus with stage 3 chronic kidney disease, without long-term current use of insulin 6/21/2017       Past Surgical History:   Procedure Laterality Date    CORONARY ANGIOPLASTY WITH STENT PLACEMENT      LASIK Bilateral     LIVER BIOPSY      NASAL ENDOSCOPY      NASAL SEPTUM SURGERY      SLEEVE GASTROPLASTY  03/06/2017       Social History     Tobacco Use    Smoking status: Never Smoker    Smokeless tobacco: Never Used   Substance Use Topics    Alcohol use: No     Comment: 1-2 times per month    Drug use: No       Family History   Problem Relation Age of Onset    Diabetes type II Mother     Hypertension Mother     Hyperlipidemia Mother     Diabetes Mother     Hyperlipidemia Father     Diabetes type II Brother     Diabetes type II Brother     Diabetes Maternal Grandmother        Current Outpatient Medications   Medication Sig    amLODIPine  (NORVASC) 10 MG tablet TAKE 1 TABLET (10 MG TOTAL) BY MOUTH EVERY EVENING.    aspirin (ECOTRIN) 81 MG EC tablet Take 1 tablet (81 mg total) by mouth once daily.    atorvastatin (LIPITOR) 80 MG tablet Take 1 tablet (80 mg total) by mouth every evening.    blood sugar diagnostic Strp Check blood glucose 2 times daily as directed and as needed (dispense insurance preferred brand or patient choice)    blood-glucose meter (ONETOUCH VERIO IQ METER) Misc Use as directed    carvediloL (COREG) 6.25 MG tablet Take 1 tablet (6.25 mg total) by mouth 2 (two) times daily with meals.    cetirizine (ZYRTEC) 10 MG tablet Take 1 tablet (10 mg total) by mouth once daily.    colchicine (COLCRYS) 0.6 mg tablet Take 2 tablets at onset of acute gout attack. May take 1 more tablet 2 hours later if needed. MAX 3 TABLETS PER DAY. MAX 6 TABLETS PER WEEK.    dulaglutide (TRULICITY) 3 mg/0.5 mL pen injector Inject 3 mg into the skin every 7 days.    ezetimibe (ZETIA) 10 mg tablet Take 1 tablet (10 mg total) by mouth once daily.    febuxostat (ULORIC) 80 mg Tab Take 1 tablet (80 mg total) by mouth once daily.    fluticasone propionate (FLONASE) 50 mcg/actuation nasal spray 2 sprays (100 mcg total) by Each Nostril route once daily.    furosemide (LASIX) 20 MG tablet Take 1 tablet (20 mg total) by mouth every other day.    lancets Misc Check blood glucose 2 times daily as directed and as needed (dispense insurance preferred brand or patient choice)    MULTIVITAMIN/IRON/FOLIC ACID (CENTRUM COMPLETE ORAL) Take by mouth once daily at 6am.    nitroGLYCERIN (NITROSTAT) 0.4 MG SL tablet Dissolve one tablet underneath tongue at onset of angina; may repeat every 5 minutes if needed. Call 911 if angina persists after 2 doses.    telmisartan (MICARDIS) 80 MG Tab TAKE 1 TABLET (80 MG TOTAL) BY MOUTH ONCE DAILY.     No current facility-administered medications for this visit.     Current Outpatient Medications on File Prior to Visit    Medication Sig    amLODIPine (NORVASC) 10 MG tablet TAKE 1 TABLET (10 MG TOTAL) BY MOUTH EVERY EVENING.    aspirin (ECOTRIN) 81 MG EC tablet Take 1 tablet (81 mg total) by mouth once daily.    atorvastatin (LIPITOR) 80 MG tablet Take 1 tablet (80 mg total) by mouth every evening.    blood sugar diagnostic Strp Check blood glucose 2 times daily as directed and as needed (dispense insurance preferred brand or patient choice)    blood-glucose meter (ONETOUCH VERIO IQ METER) Misc Use as directed    carvediloL (COREG) 6.25 MG tablet Take 1 tablet (6.25 mg total) by mouth 2 (two) times daily with meals.    cetirizine (ZYRTEC) 10 MG tablet Take 1 tablet (10 mg total) by mouth once daily.    colchicine (COLCRYS) 0.6 mg tablet Take 2 tablets at onset of acute gout attack. May take 1 more tablet 2 hours later if needed. MAX 3 TABLETS PER DAY. MAX 6 TABLETS PER WEEK.    dulaglutide (TRULICITY) 3 mg/0.5 mL pen injector Inject 3 mg into the skin every 7 days.    ezetimibe (ZETIA) 10 mg tablet Take 1 tablet (10 mg total) by mouth once daily.    febuxostat (ULORIC) 80 mg Tab Take 1 tablet (80 mg total) by mouth once daily.    fluticasone propionate (FLONASE) 50 mcg/actuation nasal spray 2 sprays (100 mcg total) by Each Nostril route once daily.    furosemide (LASIX) 20 MG tablet Take 1 tablet (20 mg total) by mouth every other day.    lancets Misc Check blood glucose 2 times daily as directed and as needed (dispense insurance preferred brand or patient choice)    MULTIVITAMIN/IRON/FOLIC ACID (CENTRUM COMPLETE ORAL) Take by mouth once daily at 6am.    nitroGLYCERIN (NITROSTAT) 0.4 MG SL tablet Dissolve one tablet underneath tongue at onset of angina; may repeat every 5 minutes if needed. Call 911 if angina persists after 2 doses.    telmisartan (MICARDIS) 80 MG Tab TAKE 1 TABLET (80 MG TOTAL) BY MOUTH ONCE DAILY.     No current facility-administered medications on file prior to visit.     Review of patient's  allergies indicates:  No Known Allergies  Review of Systems   Constitutional: Negative for malaise/fatigue.   Eyes: Negative for blurred vision.   Cardiovascular: Positive for palpitations. Negative for chest pain, claudication, cyanosis, dyspnea on exertion, irregular heartbeat, leg swelling, near-syncope, orthopnea and paroxysmal nocturnal dyspnea.   Respiratory: Negative for cough, hemoptysis and shortness of breath.    Hematologic/Lymphatic: Negative for bleeding problem. Does not bruise/bleed easily.   Skin: Negative for dry skin and itching.   Musculoskeletal: Negative for falls, muscle weakness and myalgias.   Gastrointestinal: Negative for abdominal pain, diarrhea, heartburn, hematemesis, hematochezia and melena.   Genitourinary: Negative for flank pain and hematuria.   Neurological: Negative for dizziness, focal weakness, headaches, light-headedness, numbness, paresthesias, seizures and weakness.   Psychiatric/Behavioral: Negative for altered mental status and memory loss. The patient is not nervous/anxious.    Allergic/Immunologic: Negative for hives.       Objective:   Physical Exam  Vitals and nursing note reviewed.   Constitutional:       General: He is not in acute distress.     Appearance: He is well-developed. He is not diaphoretic.   HENT:      Head: Normocephalic and atraumatic.   Eyes:      General:         Right eye: No discharge.         Left eye: No discharge.      Pupils: Pupils are equal, round, and reactive to light.   Neck:      Thyroid: No thyromegaly.      Vascular: No carotid bruit or JVD.   Cardiovascular:      Rate and Rhythm: Normal rate and regular rhythm.      Pulses: Normal pulses and intact distal pulses.      Heart sounds: Normal heart sounds. No murmur heard.    No friction rub. No gallop.   Pulmonary:      Effort: Pulmonary effort is normal. No respiratory distress.      Breath sounds: Normal breath sounds. No wheezing or rales.   Chest:      Chest wall: No tenderness.  "  Abdominal:      General: Bowel sounds are normal. There is no distension.      Palpations: Abdomen is soft.      Tenderness: There is no abdominal tenderness.   Musculoskeletal:         General: Normal range of motion.      Cervical back: Neck supple.      Right lower leg: No edema.      Left lower leg: No edema.   Skin:     General: Skin is warm and dry.      Findings: No erythema or rash.   Neurological:      Mental Status: He is alert and oriented to person, place, and time.      Cranial Nerves: No cranial nerve deficit.   Psychiatric:         Behavior: Behavior normal.         Judgment: Judgment normal.       Vitals:    03/10/22 0932 03/10/22 0935   BP: 120/82 130/86   BP Location: Right arm Left arm   Patient Position: Sitting Sitting   BP Method: Large (Manual) Large (Manual)   Pulse: 71    SpO2: 99%    Weight: 112.3 kg (247 lb 9.2 oz)    Height: 6' 3" (1.905 m)      Lab Results   Component Value Date    CHOL 94 (L) 06/02/2021    CHOL 158 06/12/2020    CHOL 156 12/21/2019     Lab Results   Component Value Date    HDL 25 (L) 06/02/2021    HDL 28 (L) 06/12/2020    HDL 28 (L) 12/21/2019     Lab Results   Component Value Date    LDLCALC 27.8 (L) 06/02/2021    LDLCALC 77.4 06/12/2020    LDLCALC Invalid, Trig>400.0 12/21/2019     Lab Results   Component Value Date    TRIG 206 (H) 06/02/2021    TRIG 263 (H) 06/12/2020    TRIG 415 (H) 12/21/2019     Lab Results   Component Value Date    CHOLHDL 26.6 06/02/2021    CHOLHDL 17.7 (L) 06/12/2020    CHOLHDL 17.9 (L) 12/21/2019       Chemistry        Component Value Date/Time     03/03/2022 1201     03/03/2022 1201    K 3.8 03/03/2022 1201    K 3.8 03/03/2022 1201     03/03/2022 1201     03/03/2022 1201    CO2 23 03/03/2022 1201    CO2 23 03/03/2022 1201    BUN 35 (H) 03/03/2022 1201    BUN 35 (H) 03/03/2022 1201    CREATININE 4.0 (H) 03/03/2022 1201    CREATININE 4.0 (H) 03/03/2022 1201     (H) 03/03/2022 1201     (H) 03/03/2022 1201 "        Component Value Date/Time    CALCIUM 9.0 03/03/2022 1201    CALCIUM 9.0 03/03/2022 1201    ALKPHOS 90 02/14/2022 1223    AST 28 02/14/2022 1223    ALT 37 02/14/2022 1223    BILITOT 1.9 (H) 02/14/2022 1223    ESTGFRAFRICA 20.3 (A) 03/03/2022 1201    ESTGFRAFRICA 20.3 (A) 03/03/2022 1201    EGFRNONAA 17.5 (A) 03/03/2022 1201    EGFRNONAA 17.5 (A) 03/03/2022 1201        Lab Results   Component Value Date    HGBA1C 5.6 12/06/2021       Lab Results   Component Value Date    TSH 0.727 07/31/2017     Lab Results   Component Value Date    INR 1.1 07/31/2017     Lab Results   Component Value Date    WBC 8.04 06/02/2021    HGB 13.2 (L) 06/02/2021    HCT 39.2 (L) 06/02/2021    MCV 83 06/02/2021     06/02/2021     BMP  Sodium   Date Value Ref Range Status   03/03/2022 138 136 - 145 mmol/L Final   03/03/2022 138 136 - 145 mmol/L Final     Potassium   Date Value Ref Range Status   03/03/2022 3.8 3.5 - 5.1 mmol/L Final   03/03/2022 3.8 3.5 - 5.1 mmol/L Final     Chloride   Date Value Ref Range Status   03/03/2022 105 95 - 110 mmol/L Final   03/03/2022 105 95 - 110 mmol/L Final     CO2   Date Value Ref Range Status   03/03/2022 23 23 - 29 mmol/L Final   03/03/2022 23 23 - 29 mmol/L Final     BUN   Date Value Ref Range Status   03/03/2022 35 (H) 6 - 20 mg/dL Final   03/03/2022 35 (H) 6 - 20 mg/dL Final     Creatinine   Date Value Ref Range Status   03/03/2022 4.0 (H) 0.5 - 1.4 mg/dL Final   03/03/2022 4.0 (H) 0.5 - 1.4 mg/dL Final     Calcium   Date Value Ref Range Status   03/03/2022 9.0 8.7 - 10.5 mg/dL Final   03/03/2022 9.0 8.7 - 10.5 mg/dL Final     Anion Gap   Date Value Ref Range Status   03/03/2022 10 8 - 16 mmol/L Final   03/03/2022 10 8 - 16 mmol/L Final     eGFR if    Date Value Ref Range Status   03/03/2022 20.3 (A) >60 mL/min/1.73 m^2 Final   03/03/2022 20.3 (A) >60 mL/min/1.73 m^2 Final     eGFR if non    Date Value Ref Range Status   03/03/2022 17.5 (A) >60 mL/min/1.73 m^2  Final     Comment:     Calculation used to obtain the estimated glomerular filtration  rate (eGFR) is the CKD-EPI equation.      03/03/2022 17.5 (A) >60 mL/min/1.73 m^2 Final     Comment:     Calculation used to obtain the estimated glomerular filtration  rate (eGFR) is the CKD-EPI equation.        Estimated Creatinine Clearance: 33.2 mL/min (A) (based on SCr of 4 mg/dL (H)).    Assessment:     1. Coronary artery disease involving native coronary artery of native heart without angina pectoris    2. At risk for cardiovascular event    3. Dyslipidemia associated with type 2 diabetes mellitus    4. Essential hypertension    5. Hypertension complicating diabetes    6. NSTEMI with CAD s/p PCI (FAMILIA) of LAD x 2 in 8/2017    7. Status post angioplasty with stent    8. Persistent proteinuria    9. Stage 4 chronic kidney disease    10. Class 1 obesity due to excess calories with serious comorbidity and body mass index (BMI) of 30.0 to 30.9 in adult    11. Type 2 diabetes mellitus with diabetic nephropathy, without long-term current use of insulin    12. Type 2 diabetes mellitus with diabetic polyneuropathy, without long-term current use of insulin    13. Type 2 diabetes mellitus with stage 4 chronic kidney disease, without long-term current use of insulin    14. Severe obstructive sleep apnea - Intolerant of CPAP      Cad wise asymptomatic no apparent ill effects from covid ekg unchanged continue same   ckd stage 4 back to near baseline post covid he follows with nephrology   Diabetes on target continue same    hlp on target tolerating meds will repeat labs.   htn reasonable control will continue same.       Plan:   Continue current therapy  Cardiac low salt diet.  Risk factor modification and excercise program./weight loss  F/u in 6 months with lipid cmp a1c   Needs lipids now

## 2022-03-15 ENCOUNTER — LAB VISIT (OUTPATIENT)
Dept: LAB | Facility: HOSPITAL | Age: 42
End: 2022-03-15
Attending: INTERNAL MEDICINE
Payer: COMMERCIAL

## 2022-03-15 DIAGNOSIS — E11.21 TYPE 2 DIABETES MELLITUS WITH DIABETIC NEPHROPATHY, WITHOUT LONG-TERM CURRENT USE OF INSULIN: Chronic | ICD-10-CM

## 2022-03-15 DIAGNOSIS — I25.10 CORONARY ARTERY DISEASE INVOLVING NATIVE CORONARY ARTERY OF NATIVE HEART WITHOUT ANGINA PECTORIS: Chronic | ICD-10-CM

## 2022-03-15 LAB
CHOLEST SERPL-MCNC: 100 MG/DL (ref 120–199)
CHOLEST/HDLC SERPL: 3.6 {RATIO} (ref 2–5)
HDLC SERPL-MCNC: 28 MG/DL (ref 40–75)
HDLC SERPL: 28 % (ref 20–50)
LDLC SERPL CALC-MCNC: 44.4 MG/DL (ref 63–159)
NONHDLC SERPL-MCNC: 72 MG/DL
TRIGL SERPL-MCNC: 138 MG/DL (ref 30–150)

## 2022-03-15 PROCEDURE — 36415 COLL VENOUS BLD VENIPUNCTURE: CPT | Performed by: INTERNAL MEDICINE

## 2022-03-15 PROCEDURE — 80061 LIPID PANEL: CPT | Performed by: INTERNAL MEDICINE

## 2022-03-16 ENCOUNTER — TELEPHONE (OUTPATIENT)
Dept: CARDIOLOGY | Facility: CLINIC | Age: 42
End: 2022-03-16
Payer: COMMERCIAL

## 2022-03-16 NOTE — TELEPHONE ENCOUNTER
Patient was notified of results. All questions were answered. Pt verbalized understanding. Pt will call back with any other questions or concerns.    ----- Message from Monica Rios MD sent at 3/15/2022 11:10 PM CDT -----  Lipids on target

## 2022-03-25 DIAGNOSIS — I15.2 HYPERTENSION COMPLICATING DIABETES: ICD-10-CM

## 2022-03-25 DIAGNOSIS — E11.59 HYPERTENSION COMPLICATING DIABETES: ICD-10-CM

## 2022-03-25 DIAGNOSIS — I10 ESSENTIAL HYPERTENSION: Chronic | ICD-10-CM

## 2022-04-05 RX ORDER — AMLODIPINE BESYLATE 10 MG/1
10 TABLET ORAL NIGHTLY
Qty: 90 TABLET | Refills: 0 | Status: SHIPPED | OUTPATIENT
Start: 2022-04-05 | End: 2022-08-13

## 2022-04-05 NOTE — TELEPHONE ENCOUNTER
REFILL APPROVED. Will address further refills at upcoming appointment with me listed below.  Requested Prescriptions   Pending Prescriptions Disp Refills    amLODIPine (NORVASC) 10 MG tablet [Pharmacy Med Name: AMLODIPINE BESYLATE 10 MG T 10 Tablet] 90 tablet 0     Sig: TAKE 1 TABLET (10 MG TOTAL) BY MOUTH EVERY EVENING.    #LMRX   --------------------------------  Future Appointments   Date Time Provider Department [place]   4/7/2022  9:50 AM LABORATORY, [facility] [place]   4/14/2022 10:00 AM Siva Figueroa MD ON NEPHRO  [facility]   5/27/2022  7:00 AM SPECIMEN, [facility] SPECLAB [facility]   5/27/2022  7:30 AM LABORATORY, [facility]   6/7/2022  7:30 AM KEVIN Roberson MD [facility] IM [facility]   9/7/2022  7:40 AM LABORATORY, [facility]   9/14/2022  8:20 AM Monica Rios MD [facility] CARDIO [facility]

## 2022-04-07 ENCOUNTER — LAB VISIT (OUTPATIENT)
Dept: LAB | Facility: HOSPITAL | Age: 42
End: 2022-04-07
Attending: FAMILY MEDICINE
Payer: COMMERCIAL

## 2022-04-07 DIAGNOSIS — N17.9 AKI (ACUTE KIDNEY INJURY): ICD-10-CM

## 2022-04-07 LAB
ANION GAP SERPL CALC-SCNC: 10 MMOL/L (ref 8–16)
BUN SERPL-MCNC: 41 MG/DL (ref 6–20)
CALCIUM SERPL-MCNC: 9 MG/DL (ref 8.7–10.5)
CHLORIDE SERPL-SCNC: 106 MMOL/L (ref 95–110)
CO2 SERPL-SCNC: 26 MMOL/L (ref 23–29)
CREAT SERPL-MCNC: 4.7 MG/DL (ref 0.5–1.4)
EST. GFR  (AFRICAN AMERICAN): 16.7 ML/MIN/1.73 M^2
EST. GFR  (NON AFRICAN AMERICAN): 14.4 ML/MIN/1.73 M^2
GLUCOSE SERPL-MCNC: 95 MG/DL (ref 70–110)
POTASSIUM SERPL-SCNC: 4 MMOL/L (ref 3.5–5.1)
SODIUM SERPL-SCNC: 142 MMOL/L (ref 136–145)

## 2022-04-07 PROCEDURE — 80048 BASIC METABOLIC PNL TOTAL CA: CPT | Performed by: INTERNAL MEDICINE

## 2022-04-07 PROCEDURE — 36415 COLL VENOUS BLD VENIPUNCTURE: CPT | Performed by: INTERNAL MEDICINE

## 2022-04-14 ENCOUNTER — OFFICE VISIT (OUTPATIENT)
Dept: NEPHROLOGY | Facility: CLINIC | Age: 42
End: 2022-04-14
Payer: COMMERCIAL

## 2022-04-14 VITALS
WEIGHT: 245.13 LBS | HEART RATE: 80 BPM | SYSTOLIC BLOOD PRESSURE: 132 MMHG | HEIGHT: 75 IN | DIASTOLIC BLOOD PRESSURE: 88 MMHG | BODY MASS INDEX: 30.48 KG/M2

## 2022-04-14 DIAGNOSIS — I12.9 PARENCHYMAL RENAL HYPERTENSION, STAGE 1 THROUGH STAGE 4 OR UNSPECIFIED CHRONIC KIDNEY DISEASE: ICD-10-CM

## 2022-04-14 DIAGNOSIS — N25.81 SECONDARY HYPERPARATHYROIDISM OF RENAL ORIGIN: ICD-10-CM

## 2022-04-14 DIAGNOSIS — R80.9 PROTEINURIA, UNSPECIFIED TYPE: ICD-10-CM

## 2022-04-14 DIAGNOSIS — N18.4 CKD (CHRONIC KIDNEY DISEASE) STAGE 4, GFR 15-29 ML/MIN: Primary | ICD-10-CM

## 2022-04-14 PROCEDURE — 3066F NEPHROPATHY DOC TX: CPT | Mod: CPTII,S$GLB,, | Performed by: INTERNAL MEDICINE

## 2022-04-14 PROCEDURE — 99999 PR PBB SHADOW E&M-EST. PATIENT-LVL III: ICD-10-PCS | Mod: PBBFAC,,, | Performed by: INTERNAL MEDICINE

## 2022-04-14 PROCEDURE — 99999 PR PBB SHADOW E&M-EST. PATIENT-LVL III: CPT | Mod: PBBFAC,,, | Performed by: INTERNAL MEDICINE

## 2022-04-14 PROCEDURE — 3008F PR BODY MASS INDEX (BMI) DOCUMENTED: ICD-10-PCS | Mod: CPTII,S$GLB,, | Performed by: INTERNAL MEDICINE

## 2022-04-14 PROCEDURE — 3072F PR LOW RISK FOR RETINOPATHY: ICD-10-PCS | Mod: CPTII,S$GLB,, | Performed by: INTERNAL MEDICINE

## 2022-04-14 PROCEDURE — 3072F LOW RISK FOR RETINOPATHY: CPT | Mod: CPTII,S$GLB,, | Performed by: INTERNAL MEDICINE

## 2022-04-14 PROCEDURE — 3075F PR MOST RECENT SYSTOLIC BLOOD PRESS GE 130-139MM HG: ICD-10-PCS | Mod: CPTII,S$GLB,, | Performed by: INTERNAL MEDICINE

## 2022-04-14 PROCEDURE — 1159F PR MEDICATION LIST DOCUMENTED IN MEDICAL RECORD: ICD-10-PCS | Mod: CPTII,S$GLB,, | Performed by: INTERNAL MEDICINE

## 2022-04-14 PROCEDURE — 3075F SYST BP GE 130 - 139MM HG: CPT | Mod: CPTII,S$GLB,, | Performed by: INTERNAL MEDICINE

## 2022-04-14 PROCEDURE — 1159F MED LIST DOCD IN RCRD: CPT | Mod: CPTII,S$GLB,, | Performed by: INTERNAL MEDICINE

## 2022-04-14 PROCEDURE — 4010F ACE/ARB THERAPY RXD/TAKEN: CPT | Mod: CPTII,S$GLB,, | Performed by: INTERNAL MEDICINE

## 2022-04-14 PROCEDURE — 3079F PR MOST RECENT DIASTOLIC BLOOD PRESSURE 80-89 MM HG: ICD-10-PCS | Mod: CPTII,S$GLB,, | Performed by: INTERNAL MEDICINE

## 2022-04-14 PROCEDURE — 4010F PR ACE/ARB THEARPY RXD/TAKEN: ICD-10-PCS | Mod: CPTII,S$GLB,, | Performed by: INTERNAL MEDICINE

## 2022-04-14 PROCEDURE — 3008F BODY MASS INDEX DOCD: CPT | Mod: CPTII,S$GLB,, | Performed by: INTERNAL MEDICINE

## 2022-04-14 PROCEDURE — 99214 OFFICE O/P EST MOD 30 MIN: CPT | Mod: S$GLB,,, | Performed by: INTERNAL MEDICINE

## 2022-04-14 PROCEDURE — 99214 PR OFFICE/OUTPT VISIT, EST, LEVL IV, 30-39 MIN: ICD-10-PCS | Mod: S$GLB,,, | Performed by: INTERNAL MEDICINE

## 2022-04-14 PROCEDURE — 3079F DIAST BP 80-89 MM HG: CPT | Mod: CPTII,S$GLB,, | Performed by: INTERNAL MEDICINE

## 2022-04-14 PROCEDURE — 1160F RVW MEDS BY RX/DR IN RCRD: CPT | Mod: CPTII,S$GLB,, | Performed by: INTERNAL MEDICINE

## 2022-04-14 PROCEDURE — 3066F PR DOCUMENTATION OF TREATMENT FOR NEPHROPATHY: ICD-10-PCS | Mod: CPTII,S$GLB,, | Performed by: INTERNAL MEDICINE

## 2022-04-14 PROCEDURE — 1160F PR REVIEW ALL MEDS BY PRESCRIBER/CLIN PHARMACIST DOCUMENTED: ICD-10-PCS | Mod: CPTII,S$GLB,, | Performed by: INTERNAL MEDICINE

## 2022-04-14 NOTE — PROGRESS NOTES
"Subjective:       Patient ID: Robb Iglesias is a 41 y.o. male.    Chief Complaint:  CKD, hypertension    HPI    He presents to clinic today for routine follow-up.  Since his last office visit he has been doing well and has no specific or new complaints.  His laboratory studies and medications were reviewed.  All Nephrology related questions were answered to his satisfaction.  He has no symptoms of uremia at this time.  Symptoms were reviewed in detail.      Review of Systems   Constitutional: Negative.    HENT: Negative.    Eyes: Negative.    Respiratory: Negative.    Cardiovascular: Negative.    Gastrointestinal: Negative.    Genitourinary: Negative.    Musculoskeletal: Negative.    Skin: Negative.    Neurological: Negative.          /88   Pulse 80   Ht 6' 3" (1.905 m)   Wt 111.2 kg (245 lb 2.4 oz)   BMI 30.64 kg/m²     Lab Results   Component Value Date    WBC 8.04 06/02/2021    HGB 13.2 (L) 06/02/2021    HCT 39.2 (L) 06/02/2021    MCV 83 06/02/2021     06/02/2021      BMP  Lab Results   Component Value Date     04/07/2022    K 4.0 04/07/2022     04/07/2022    CO2 26 04/07/2022    BUN 41 (H) 04/07/2022    CREATININE 4.7 (H) 04/07/2022    CALCIUM 9.0 04/07/2022    ANIONGAP 10 04/07/2022    ESTGFRAFRICA 16.7 (A) 04/07/2022    EGFRNONAA 14.4 (A) 04/07/2022     CMP  Sodium   Date Value Ref Range Status   04/07/2022 142 136 - 145 mmol/L Final     Potassium   Date Value Ref Range Status   04/07/2022 4.0 3.5 - 5.1 mmol/L Final     Chloride   Date Value Ref Range Status   04/07/2022 106 95 - 110 mmol/L Final     CO2   Date Value Ref Range Status   04/07/2022 26 23 - 29 mmol/L Final     Glucose   Date Value Ref Range Status   04/07/2022 95 70 - 110 mg/dL Final     BUN   Date Value Ref Range Status   04/07/2022 41 (H) 6 - 20 mg/dL Final     Creatinine   Date Value Ref Range Status   04/07/2022 4.7 (H) 0.5 - 1.4 mg/dL Final     Calcium   Date Value Ref Range Status   04/07/2022 9.0 8.7 - " 10.5 mg/dL Final     Total Protein   Date Value Ref Range Status   02/14/2022 6.4 6.0 - 8.4 g/dL Final     Albumin   Date Value Ref Range Status   03/03/2022 3.5 3.5 - 5.2 g/dL Final     Total Bilirubin   Date Value Ref Range Status   02/14/2022 1.9 (H) 0.1 - 1.0 mg/dL Final     Comment:     For infants and newborns, interpretation of results should be based  on gestational age, weight and in agreement with clinical  observations.    Premature Infant recommended reference ranges:  Up to 24 hours.............<8.0 mg/dL  Up to 48 hours............<12.0 mg/dL  3-5 days..................<15.0 mg/dL  6-29 days.................<15.0 mg/dL       Alkaline Phosphatase   Date Value Ref Range Status   02/14/2022 90 55 - 135 U/L Final     AST   Date Value Ref Range Status   02/14/2022 28 10 - 40 U/L Final     ALT   Date Value Ref Range Status   02/14/2022 37 10 - 44 U/L Final     Anion Gap   Date Value Ref Range Status   04/07/2022 10 8 - 16 mmol/L Final     eGFR if    Date Value Ref Range Status   04/07/2022 16.7 (A) >60 mL/min/1.73 m^2 Final     eGFR if non    Date Value Ref Range Status   04/07/2022 14.4 (A) >60 mL/min/1.73 m^2 Final     Comment:     Calculation used to obtain the estimated glomerular filtration  rate (eGFR) is the CKD-EPI equation.        Current Outpatient Medications on File Prior to Visit   Medication Sig Dispense Refill    amLODIPine (NORVASC) 10 MG tablet TAKE 1 TABLET (10 MG TOTAL) BY MOUTH EVERY EVENING. 90 tablet 0    aspirin (ECOTRIN) 81 MG EC tablet Take 1 tablet (81 mg total) by mouth once daily. 90 tablet 3    atorvastatin (LIPITOR) 80 MG tablet Take 1 tablet (80 mg total) by mouth every evening. 90 tablet 3    blood sugar diagnostic Strp Check blood glucose 2 times daily as directed and as needed (dispense insurance preferred brand or patient choice) 200 each 5    blood-glucose meter (ONETOUCH VERIO IQ METER) Misc Use as directed 1 each 0    carvediloL  (COREG) 6.25 MG tablet Take 1 tablet (6.25 mg total) by mouth 2 (two) times daily with meals. 60 tablet 11    cetirizine (ZYRTEC) 10 MG tablet Take 1 tablet (10 mg total) by mouth once daily. 90 tablet 3    colchicine (COLCRYS) 0.6 mg tablet Take 2 tablets at onset of acute gout attack. May take 1 more tablet 2 hours later if needed. MAX 3 TABLETS PER DAY. MAX 6 TABLETS PER WEEK. 45 tablet 3    dulaglutide (TRULICITY) 3 mg/0.5 mL pen injector Inject 3 mg into the skin every 7 days. 12 pen 3    ezetimibe (ZETIA) 10 mg tablet Take 1 tablet (10 mg total) by mouth once daily. 90 tablet 3    febuxostat (ULORIC) 80 mg Tab Take 1 tablet (80 mg total) by mouth once daily. 90 tablet 2    fluticasone propionate (FLONASE) 50 mcg/actuation nasal spray 2 sprays (100 mcg total) by Each Nostril route once daily. 16 g 0    lancets Misc Check blood glucose 2 times daily as directed and as needed (dispense insurance preferred brand or patient choice) 200 each 5    MULTIVITAMIN/IRON/FOLIC ACID (CENTRUM COMPLETE ORAL) Take by mouth once daily at 6am.      nitroGLYCERIN (NITROSTAT) 0.4 MG SL tablet Dissolve one tablet underneath tongue at onset of angina; may repeat every 5 minutes if needed. Call 911 if angina persists after 2 doses. 25 tablet 5    telmisartan (MICARDIS) 80 MG Tab TAKE 1 TABLET (80 MG TOTAL) BY MOUTH ONCE DAILY. 30 tablet 1    furosemide (LASIX) 20 MG tablet Take 1 tablet (20 mg total) by mouth every other day. 15 tablet 11     No current facility-administered medications on file prior to visit.            Objective:            Physical Exam  Constitutional:       Appearance: Normal appearance.   HENT:      Head: Normocephalic and atraumatic.   Eyes:      General: No scleral icterus.     Extraocular Movements: Extraocular movements intact.      Pupils: Pupils are equal, round, and reactive to light.   Pulmonary:      Effort: Pulmonary effort is normal.      Breath sounds: No stridor.   Musculoskeletal:       Right lower leg: No edema.      Left lower leg: No edema.   Skin:     General: Skin is warm and dry.   Neurological:      General: No focal deficit present.      Mental Status: He is alert and oriented to person, place, and time.   Psychiatric:         Mood and Affect: Mood normal.         Behavior: Behavior normal.         Assessment:       1. CKD (chronic kidney disease) stage 4, GFR 15-29 ml/min    2. Parenchymal renal hypertension, stage 1 through stage 4 or unspecified chronic kidney disease    3. Proteinuria, unspecified type    4. Secondary hyperparathyroidism of renal origin        Plan:       1. Baseline creatinine had run around 3.0 up until Mr. Iglesias had COVID-19.  Is now run between 4 and 4.7.  I am concerned that COVID-19 cost tubular injury.  This can sometimes resolved with time.  We will repeat his chemistries in 2 weeks.  We discussed that if his creatinine remains elevated or increases that he might benefit from a kidney biopsy or from a prognostic standpoint.  He is agreeable.    He has no symptoms of uremia.  He has baseline diabetic glomerulopathy.    2. Blood pressure is well controlled on current regimen.  He is on a RAAS inhibitor.    3. He continues to have heavy proteinuria about 4 g by PC ratio.  Secondary to diabetic glomerulopathy.    3. Intact PTH is appropriately elevated 184.  Will plan to check a vitamin-D level of possible prior to his next office visit.        Siva Figueroa MD

## 2022-04-15 ENCOUNTER — PATIENT MESSAGE (OUTPATIENT)
Dept: INTERNAL MEDICINE | Facility: CLINIC | Age: 42
End: 2022-04-15
Payer: COMMERCIAL

## 2022-04-21 ENCOUNTER — PATIENT MESSAGE (OUTPATIENT)
Dept: NEPHROLOGY | Facility: CLINIC | Age: 42
End: 2022-04-21
Payer: COMMERCIAL

## 2022-05-16 ENCOUNTER — PATIENT MESSAGE (OUTPATIENT)
Dept: OTHER | Facility: OTHER | Age: 42
End: 2022-05-16
Payer: COMMERCIAL

## 2022-05-27 ENCOUNTER — PATIENT MESSAGE (OUTPATIENT)
Dept: INTERNAL MEDICINE | Facility: CLINIC | Age: 42
End: 2022-05-27
Payer: COMMERCIAL

## 2022-05-28 ENCOUNTER — LAB VISIT (OUTPATIENT)
Dept: LAB | Facility: HOSPITAL | Age: 42
End: 2022-05-28
Attending: FAMILY MEDICINE
Payer: COMMERCIAL

## 2022-05-28 DIAGNOSIS — E11.22 TYPE 2 DIABETES MELLITUS WITH STAGE 4 CHRONIC KIDNEY DISEASE, WITHOUT LONG-TERM CURRENT USE OF INSULIN: ICD-10-CM

## 2022-05-28 DIAGNOSIS — E11.69 DYSLIPIDEMIA ASSOCIATED WITH TYPE 2 DIABETES MELLITUS: ICD-10-CM

## 2022-05-28 DIAGNOSIS — E80.6 DIRECT HYPERBILIRUBINEMIA: ICD-10-CM

## 2022-05-28 DIAGNOSIS — N18.4 TYPE 2 DIABETES MELLITUS WITH STAGE 4 CHRONIC KIDNEY DISEASE, WITHOUT LONG-TERM CURRENT USE OF INSULIN: ICD-10-CM

## 2022-05-28 DIAGNOSIS — M1A.09X0 IDIOPATHIC CHRONIC GOUT, MULTIPLE SITES, WITHOUT TOPHUS (TOPHI): ICD-10-CM

## 2022-05-28 DIAGNOSIS — N25.81 HYPERPARATHYROIDISM, SECONDARY RENAL: ICD-10-CM

## 2022-05-28 DIAGNOSIS — E78.5 DYSLIPIDEMIA ASSOCIATED WITH TYPE 2 DIABETES MELLITUS: ICD-10-CM

## 2022-05-28 DIAGNOSIS — N18.4 STAGE 4 CHRONIC KIDNEY DISEASE: Chronic | ICD-10-CM

## 2022-05-28 LAB
25(OH)D3+25(OH)D2 SERPL-MCNC: 39 NG/ML (ref 30–96)
ALBUMIN SERPL BCP-MCNC: 4.2 G/DL (ref 3.5–5.2)
ALBUMIN SERPL BCP-MCNC: 4.2 G/DL (ref 3.5–5.2)
ALBUMIN/CREAT UR: 2690.1 UG/MG (ref 0–30)
ALP SERPL-CCNC: 95 U/L (ref 55–135)
ALT SERPL W/O P-5'-P-CCNC: 37 U/L (ref 10–44)
ANION GAP SERPL CALC-SCNC: 12 MMOL/L (ref 8–16)
AST SERPL-CCNC: 27 U/L (ref 10–40)
BILIRUB DIRECT SERPL-MCNC: 0.7 MG/DL (ref 0.1–0.3)
BILIRUB SERPL-MCNC: 1.9 MG/DL (ref 0.1–1)
BUN SERPL-MCNC: 44 MG/DL (ref 6–20)
CALCIUM SERPL-MCNC: 9.1 MG/DL (ref 8.7–10.5)
CHLORIDE SERPL-SCNC: 107 MMOL/L (ref 95–110)
CHOLEST SERPL-MCNC: 91 MG/DL (ref 120–199)
CHOLEST/HDLC SERPL: 3.5 {RATIO} (ref 2–5)
CO2 SERPL-SCNC: 24 MMOL/L (ref 23–29)
CREAT SERPL-MCNC: 5 MG/DL (ref 0.5–1.4)
CREAT UR-MCNC: 162 MG/DL (ref 23–375)
ERYTHROCYTE [DISTWIDTH] IN BLOOD BY AUTOMATED COUNT: 13.5 % (ref 11.5–14.5)
EST. GFR  (AFRICAN AMERICAN): 15.5 ML/MIN/1.73 M^2
EST. GFR  (NON AFRICAN AMERICAN): 13.4 ML/MIN/1.73 M^2
ESTIMATED AVG GLUCOSE: 97 MG/DL (ref 68–131)
GLUCOSE SERPL-MCNC: 97 MG/DL (ref 70–110)
HBA1C MFR BLD: 5 % (ref 4–5.6)
HCT VFR BLD AUTO: 37 % (ref 40–54)
HDLC SERPL-MCNC: 26 MG/DL (ref 40–75)
HDLC SERPL: 28.6 % (ref 20–50)
HGB BLD-MCNC: 12.5 G/DL (ref 14–18)
LDLC SERPL CALC-MCNC: 41 MG/DL (ref 63–159)
MCH RBC QN AUTO: 28.2 PG (ref 27–31)
MCHC RBC AUTO-ENTMCNC: 33.8 G/DL (ref 32–36)
MCV RBC AUTO: 84 FL (ref 82–98)
MICROALBUMIN UR DL<=1MG/L-MCNC: 4358 UG/ML
NONHDLC SERPL-MCNC: 65 MG/DL
PHOSPHATE SERPL-MCNC: 4.9 MG/DL (ref 2.7–4.5)
PLATELET # BLD AUTO: 211 K/UL (ref 150–450)
PMV BLD AUTO: 9.9 FL (ref 9.2–12.9)
POTASSIUM SERPL-SCNC: 4.1 MMOL/L (ref 3.5–5.1)
PROT SERPL-MCNC: 6.6 G/DL (ref 6–8.4)
PTH-INTACT SERPL-MCNC: 269.3 PG/ML (ref 9–77)
RBC # BLD AUTO: 4.43 M/UL (ref 4.6–6.2)
SODIUM SERPL-SCNC: 143 MMOL/L (ref 136–145)
TRIGL SERPL-MCNC: 120 MG/DL (ref 30–150)
URATE SERPL-MCNC: 7.1 MG/DL (ref 3.4–7)
WBC # BLD AUTO: 7.03 K/UL (ref 3.9–12.7)

## 2022-05-28 PROCEDURE — 36415 COLL VENOUS BLD VENIPUNCTURE: CPT | Performed by: FAMILY MEDICINE

## 2022-05-28 PROCEDURE — 85027 COMPLETE CBC AUTOMATED: CPT | Performed by: FAMILY MEDICINE

## 2022-05-28 PROCEDURE — 83970 ASSAY OF PARATHORMONE: CPT | Performed by: FAMILY MEDICINE

## 2022-05-28 PROCEDURE — 82570 ASSAY OF URINE CREATININE: CPT | Performed by: FAMILY MEDICINE

## 2022-05-28 PROCEDURE — 82306 VITAMIN D 25 HYDROXY: CPT | Performed by: FAMILY MEDICINE

## 2022-05-28 PROCEDURE — 80069 RENAL FUNCTION PANEL: CPT | Performed by: FAMILY MEDICINE

## 2022-05-28 PROCEDURE — 84550 ASSAY OF BLOOD/URIC ACID: CPT | Performed by: FAMILY MEDICINE

## 2022-05-28 PROCEDURE — 83036 HEMOGLOBIN GLYCOSYLATED A1C: CPT | Performed by: FAMILY MEDICINE

## 2022-05-28 PROCEDURE — 82043 UR ALBUMIN QUANTITATIVE: CPT | Performed by: FAMILY MEDICINE

## 2022-05-28 PROCEDURE — 84075 ASSAY ALKALINE PHOSPHATASE: CPT | Performed by: FAMILY MEDICINE

## 2022-05-28 PROCEDURE — 80061 LIPID PANEL: CPT | Performed by: FAMILY MEDICINE

## 2022-06-03 ENCOUNTER — PATIENT MESSAGE (OUTPATIENT)
Dept: INTERNAL MEDICINE | Facility: CLINIC | Age: 42
End: 2022-06-03
Payer: COMMERCIAL

## 2022-06-06 ENCOUNTER — PATIENT MESSAGE (OUTPATIENT)
Dept: NEPHROLOGY | Facility: CLINIC | Age: 42
End: 2022-06-06
Payer: COMMERCIAL

## 2022-06-07 ENCOUNTER — OFFICE VISIT (OUTPATIENT)
Dept: INTERNAL MEDICINE | Facility: CLINIC | Age: 42
End: 2022-06-07
Payer: COMMERCIAL

## 2022-06-07 ENCOUNTER — LAB VISIT (OUTPATIENT)
Dept: LAB | Facility: HOSPITAL | Age: 42
End: 2022-06-07
Attending: INTERNAL MEDICINE
Payer: COMMERCIAL

## 2022-06-07 VITALS
OXYGEN SATURATION: 96 % | TEMPERATURE: 98 F | HEART RATE: 82 BPM | BODY MASS INDEX: 30.1 KG/M2 | WEIGHT: 242.06 LBS | HEIGHT: 75 IN | DIASTOLIC BLOOD PRESSURE: 88 MMHG | SYSTOLIC BLOOD PRESSURE: 130 MMHG

## 2022-06-07 DIAGNOSIS — N18.4 TYPE 2 DIABETES MELLITUS WITH STAGE 4 CHRONIC KIDNEY DISEASE, WITHOUT LONG-TERM CURRENT USE OF INSULIN: Chronic | ICD-10-CM

## 2022-06-07 DIAGNOSIS — I10 ESSENTIAL HYPERTENSION: Chronic | ICD-10-CM

## 2022-06-07 DIAGNOSIS — N18.4 CKD (CHRONIC KIDNEY DISEASE) STAGE 4, GFR 15-29 ML/MIN: ICD-10-CM

## 2022-06-07 DIAGNOSIS — E78.5 DYSLIPIDEMIA ASSOCIATED WITH TYPE 2 DIABETES MELLITUS: Chronic | ICD-10-CM

## 2022-06-07 DIAGNOSIS — E11.22 TYPE 2 DIABETES MELLITUS WITH STAGE 4 CHRONIC KIDNEY DISEASE, WITHOUT LONG-TERM CURRENT USE OF INSULIN: Chronic | ICD-10-CM

## 2022-06-07 DIAGNOSIS — E11.69 DYSLIPIDEMIA ASSOCIATED WITH TYPE 2 DIABETES MELLITUS: Chronic | ICD-10-CM

## 2022-06-07 DIAGNOSIS — I21.4 NSTEMI (NON-ST ELEVATED MYOCARDIAL INFARCTION): ICD-10-CM

## 2022-06-07 DIAGNOSIS — J30.89 CHRONIC NONSEASONAL ALLERGIC RHINITIS DUE TO POLLEN: Chronic | ICD-10-CM

## 2022-06-07 DIAGNOSIS — E80.6 DIRECT HYPERBILIRUBINEMIA: Chronic | ICD-10-CM

## 2022-06-07 DIAGNOSIS — Z01.00 DIABETIC EYE EXAM: ICD-10-CM

## 2022-06-07 DIAGNOSIS — R80.9 PROTEINURIA, UNSPECIFIED TYPE: ICD-10-CM

## 2022-06-07 DIAGNOSIS — M1A.09X0 IDIOPATHIC CHRONIC GOUT, MULTIPLE SITES, WITHOUT TOPHUS (TOPHI): Chronic | ICD-10-CM

## 2022-06-07 DIAGNOSIS — Z00.00 PREVENTATIVE HEALTH CARE: Primary | ICD-10-CM

## 2022-06-07 DIAGNOSIS — N18.4 STAGE 4 CHRONIC KIDNEY DISEASE: Chronic | ICD-10-CM

## 2022-06-07 DIAGNOSIS — E11.9 DIABETIC EYE EXAM: ICD-10-CM

## 2022-06-07 DIAGNOSIS — E11.42 TYPE 2 DIABETES MELLITUS WITH DIABETIC POLYNEUROPATHY, WITHOUT LONG-TERM CURRENT USE OF INSULIN: ICD-10-CM

## 2022-06-07 DIAGNOSIS — N25.81 SECONDARY HYPERPARATHYROIDISM OF RENAL ORIGIN: ICD-10-CM

## 2022-06-07 DIAGNOSIS — I25.10 CORONARY ARTERY DISEASE INVOLVING NATIVE CORONARY ARTERY OF NATIVE HEART WITHOUT ANGINA PECTORIS: Chronic | ICD-10-CM

## 2022-06-07 LAB
25(OH)D3+25(OH)D2 SERPL-MCNC: 44 NG/ML (ref 30–96)
ALBUMIN SERPL BCP-MCNC: 4.2 G/DL (ref 3.5–5.2)
ANION GAP SERPL CALC-SCNC: 9 MMOL/L (ref 8–16)
BACTERIA #/AREA URNS HPF: NORMAL /HPF
BILIRUB UR QL STRIP: NEGATIVE
BUN SERPL-MCNC: 43 MG/DL (ref 6–20)
CALCIUM SERPL-MCNC: 9.4 MG/DL (ref 8.7–10.5)
CHLORIDE SERPL-SCNC: 105 MMOL/L (ref 95–110)
CLARITY UR: CLEAR
CO2 SERPL-SCNC: 25 MMOL/L (ref 23–29)
COLOR UR: YELLOW
CREAT SERPL-MCNC: 5.6 MG/DL (ref 0.5–1.4)
CREAT UR-MCNC: 157 MG/DL (ref 23–375)
EST. GFR  (AFRICAN AMERICAN): 13.5 ML/MIN/1.73 M^2
EST. GFR  (NON AFRICAN AMERICAN): 11.7 ML/MIN/1.73 M^2
GLUCOSE SERPL-MCNC: 92 MG/DL (ref 70–110)
GLUCOSE UR QL STRIP: NEGATIVE
HGB UR QL STRIP: ABNORMAL
HYALINE CASTS #/AREA URNS LPF: 0 /LPF
KETONES UR QL STRIP: NEGATIVE
LEUKOCYTE ESTERASE UR QL STRIP: NEGATIVE
MICROSCOPIC COMMENT: NORMAL
NITRITE UR QL STRIP: NEGATIVE
PH UR STRIP: 6 [PH] (ref 5–8)
PHOSPHATE SERPL-MCNC: 4.3 MG/DL (ref 2.7–4.5)
POTASSIUM SERPL-SCNC: 4.6 MMOL/L (ref 3.5–5.1)
PROT UR QL STRIP: ABNORMAL
PROT UR-MCNC: 531 MG/DL (ref 0–15)
PROT/CREAT UR: 3.38 MG/G{CREAT} (ref 0–0.2)
PTH-INTACT SERPL-MCNC: 284.8 PG/ML (ref 9–77)
RBC #/AREA URNS HPF: 1 /HPF (ref 0–4)
SODIUM SERPL-SCNC: 139 MMOL/L (ref 136–145)
SP GR UR STRIP: 1.02 (ref 1–1.03)
SQUAMOUS #/AREA URNS HPF: 1 /HPF
URN SPEC COLLECT METH UR: ABNORMAL
WBC #/AREA URNS HPF: 3 /HPF (ref 0–5)

## 2022-06-07 PROCEDURE — 1159F PR MEDICATION LIST DOCUMENTED IN MEDICAL RECORD: ICD-10-PCS | Mod: CPTII,S$GLB,, | Performed by: FAMILY MEDICINE

## 2022-06-07 PROCEDURE — 3062F PR POS MACROALBUMINURIA RESULT DOCUMENTED/REVIEW: ICD-10-PCS | Mod: CPTII,S$GLB,, | Performed by: FAMILY MEDICINE

## 2022-06-07 PROCEDURE — 3062F POS MACROALBUMINURIA REV: CPT | Mod: CPTII,S$GLB,, | Performed by: FAMILY MEDICINE

## 2022-06-07 PROCEDURE — 3079F DIAST BP 80-89 MM HG: CPT | Mod: CPTII,S$GLB,, | Performed by: FAMILY MEDICINE

## 2022-06-07 PROCEDURE — 3072F PR LOW RISK FOR RETINOPATHY: ICD-10-PCS | Mod: CPTII,S$GLB,, | Performed by: FAMILY MEDICINE

## 2022-06-07 PROCEDURE — 3008F PR BODY MASS INDEX (BMI) DOCUMENTED: ICD-10-PCS | Mod: CPTII,S$GLB,, | Performed by: FAMILY MEDICINE

## 2022-06-07 PROCEDURE — 82570 ASSAY OF URINE CREATININE: CPT | Performed by: INTERNAL MEDICINE

## 2022-06-07 PROCEDURE — 99999 PR PBB SHADOW E&M-EST. PATIENT-LVL V: ICD-10-PCS | Mod: PBBFAC,,, | Performed by: FAMILY MEDICINE

## 2022-06-07 PROCEDURE — 3066F NEPHROPATHY DOC TX: CPT | Mod: CPTII,S$GLB,, | Performed by: FAMILY MEDICINE

## 2022-06-07 PROCEDURE — 1160F PR REVIEW ALL MEDS BY PRESCRIBER/CLIN PHARMACIST DOCUMENTED: ICD-10-PCS | Mod: CPTII,S$GLB,, | Performed by: FAMILY MEDICINE

## 2022-06-07 PROCEDURE — 3044F HG A1C LEVEL LT 7.0%: CPT | Mod: CPTII,S$GLB,, | Performed by: FAMILY MEDICINE

## 2022-06-07 PROCEDURE — 3066F PR DOCUMENTATION OF TREATMENT FOR NEPHROPATHY: ICD-10-PCS | Mod: CPTII,S$GLB,, | Performed by: FAMILY MEDICINE

## 2022-06-07 PROCEDURE — 83970 ASSAY OF PARATHORMONE: CPT | Performed by: INTERNAL MEDICINE

## 2022-06-07 PROCEDURE — 3044F PR MOST RECENT HEMOGLOBIN A1C LEVEL <7.0%: ICD-10-PCS | Mod: CPTII,S$GLB,, | Performed by: FAMILY MEDICINE

## 2022-06-07 PROCEDURE — 82306 VITAMIN D 25 HYDROXY: CPT | Performed by: INTERNAL MEDICINE

## 2022-06-07 PROCEDURE — 3008F BODY MASS INDEX DOCD: CPT | Mod: CPTII,S$GLB,, | Performed by: FAMILY MEDICINE

## 2022-06-07 PROCEDURE — 80069 RENAL FUNCTION PANEL: CPT | Performed by: INTERNAL MEDICINE

## 2022-06-07 PROCEDURE — 36415 COLL VENOUS BLD VENIPUNCTURE: CPT | Performed by: INTERNAL MEDICINE

## 2022-06-07 PROCEDURE — 4010F PR ACE/ARB THEARPY RXD/TAKEN: ICD-10-PCS | Mod: CPTII,S$GLB,, | Performed by: FAMILY MEDICINE

## 2022-06-07 PROCEDURE — 81000 URINALYSIS NONAUTO W/SCOPE: CPT | Performed by: INTERNAL MEDICINE

## 2022-06-07 PROCEDURE — 4010F ACE/ARB THERAPY RXD/TAKEN: CPT | Mod: CPTII,S$GLB,, | Performed by: FAMILY MEDICINE

## 2022-06-07 PROCEDURE — 1160F RVW MEDS BY RX/DR IN RCRD: CPT | Mod: CPTII,S$GLB,, | Performed by: FAMILY MEDICINE

## 2022-06-07 PROCEDURE — 3079F PR MOST RECENT DIASTOLIC BLOOD PRESSURE 80-89 MM HG: ICD-10-PCS | Mod: CPTII,S$GLB,, | Performed by: FAMILY MEDICINE

## 2022-06-07 PROCEDURE — 99396 PR PREVENTIVE VISIT,EST,40-64: ICD-10-PCS | Mod: S$GLB,,, | Performed by: FAMILY MEDICINE

## 2022-06-07 PROCEDURE — 99999 PR PBB SHADOW E&M-EST. PATIENT-LVL V: CPT | Mod: PBBFAC,,, | Performed by: FAMILY MEDICINE

## 2022-06-07 PROCEDURE — 1159F MED LIST DOCD IN RCRD: CPT | Mod: CPTII,S$GLB,, | Performed by: FAMILY MEDICINE

## 2022-06-07 PROCEDURE — 3075F SYST BP GE 130 - 139MM HG: CPT | Mod: CPTII,S$GLB,, | Performed by: FAMILY MEDICINE

## 2022-06-07 PROCEDURE — 3072F LOW RISK FOR RETINOPATHY: CPT | Mod: CPTII,S$GLB,, | Performed by: FAMILY MEDICINE

## 2022-06-07 PROCEDURE — 99396 PREV VISIT EST AGE 40-64: CPT | Mod: S$GLB,,, | Performed by: FAMILY MEDICINE

## 2022-06-07 PROCEDURE — 3075F PR MOST RECENT SYSTOLIC BLOOD PRESS GE 130-139MM HG: ICD-10-PCS | Mod: CPTII,S$GLB,, | Performed by: FAMILY MEDICINE

## 2022-06-07 RX ORDER — COLCHICINE 0.6 MG/1
TABLET ORAL
Qty: 45 TABLET | Refills: 3 | Status: SHIPPED | OUTPATIENT
Start: 2022-06-07 | End: 2023-01-26 | Stop reason: SDUPTHER

## 2022-06-07 RX ORDER — ATORVASTATIN CALCIUM 80 MG/1
80 TABLET, FILM COATED ORAL NIGHTLY
Qty: 90 TABLET | Refills: 3 | Status: SHIPPED | OUTPATIENT
Start: 2022-06-07 | End: 2023-01-26 | Stop reason: SDUPTHER

## 2022-06-07 RX ORDER — EZETIMIBE 10 MG/1
10 TABLET ORAL DAILY
Qty: 90 TABLET | Refills: 3 | Status: SHIPPED | OUTPATIENT
Start: 2022-06-07 | End: 2023-01-26 | Stop reason: SDUPTHER

## 2022-06-07 RX ORDER — FEBUXOSTAT 80 MG/1
1 TABLET, FILM COATED ORAL DAILY
Qty: 90 TABLET | Refills: 2 | Status: SHIPPED | OUTPATIENT
Start: 2022-06-07 | End: 2023-01-26 | Stop reason: SDUPTHER

## 2022-06-07 RX ORDER — ASPIRIN 81 MG/1
81 TABLET ORAL DAILY
Qty: 90 TABLET | Refills: 3 | Status: SHIPPED | OUTPATIENT
Start: 2022-06-07 | End: 2023-01-26 | Stop reason: SDUPTHER

## 2022-06-07 RX ORDER — CETIRIZINE HYDROCHLORIDE 10 MG/1
10 TABLET ORAL DAILY
Qty: 90 TABLET | Refills: 3 | Status: SHIPPED | OUTPATIENT
Start: 2022-06-07 | End: 2022-10-13 | Stop reason: ALTCHOICE

## 2022-06-07 RX ORDER — NITROGLYCERIN 0.4 MG/1
TABLET SUBLINGUAL
Qty: 25 TABLET | Refills: 5 | Status: SHIPPED | OUTPATIENT
Start: 2022-06-07 | End: 2023-01-26 | Stop reason: SDUPTHER

## 2022-06-07 NOTE — ASSESSMENT & PLAN NOTE
Lab Results   Component Value Date    CHOL 91 (L) 05/28/2022    CHOL 100 (L) 03/15/2022    TRIG 120 05/28/2022    TRIG 138 03/15/2022    HDL 26 (L) 05/28/2022    HDL 28 (L) 03/15/2022    LDLCALC 41.0 (L) 05/28/2022    LDLCALC 44.4 (L) 03/15/2022    NONHDLCHOL 65 05/28/2022    NONHDLCHOL 72 03/15/2022    AST 27 05/28/2022    ALT 37 05/28/2022     The ASCVD Risk score (Ayana HORN Jr., et al., 2013) failed to calculate for the following reasons:    The patient has a prior MI or stroke diagnosis

## 2022-06-07 NOTE — ASSESSMENT & PLAN NOTE
Lab Results   Component Value Date    ALKPHOS 95 05/28/2022    ALKPHOS 90 02/14/2022    ALKPHOS 93 09/09/2021    BILITOT 1.9 (H) 05/28/2022    BILITOT 1.9 (H) 02/14/2022    BILITOT 1.5 (H) 09/09/2021    BILIDIR 0.7 (H) 05/28/2022    BILIDIR 0.5 (H) 03/11/2020    BILIDIR 0.6 (H) 03/23/2018

## 2022-06-07 NOTE — ASSESSMENT & PLAN NOTE
Well controlled on Uloric. Infrequent attacks only with dietary indiscretion. (Alopurinol did NOT control gout.)  Lab Results   Component Value Date    URICACID 7.1 (H) 05/28/2022    URICACID 6.0 06/02/2021    URICACID 5.8 10/16/2020    ESTGFRAFRICA 15.5 (A) 05/28/2022    EGFRNONAA 13.4 (A) 05/28/2022    CREATININE 5.0 (H) 05/28/2022    BUN 44 (H) 05/28/2022

## 2022-06-07 NOTE — ASSESSMENT & PLAN NOTE
Lab Results   Component Value Date    CYSTATINC 2.06 (H) 06/12/2020    CYSTATINC 1.31 (H) 04/09/2018    CYSTATINC 1.21 (H) 09/13/2017    EGFRCYSTC 33 06/12/2020    EGFRCYSTC 60 04/09/2018    EGFRCYSTC 67 09/13/2017    ESTGFRAFRICA 15.5 (A) 05/28/2022    ESTGFRAFRICA 16.7 (A) 04/07/2022    ESTGFRAFRICA 20.3 (A) 03/03/2022    ESTGFRAFRICA 20.3 (A) 03/03/2022    EGFRNONAA 13.4 (A) 05/28/2022    EGFRNONAA 14.4 (A) 04/07/2022    EGFRNONAA 17.5 (A) 03/03/2022    EGFRNONAA 17.5 (A) 03/03/2022    CREATININE 5.0 (H) 05/28/2022    CREATININE 4.7 (H) 04/07/2022    CREATININE 4.0 (H) 03/03/2022    CREATININE 4.0 (H) 03/03/2022    BUN 44 (H) 05/28/2022    BUN 41 (H) 04/07/2022    BUN 35 (H) 03/03/2022    BUN 35 (H) 03/03/2022     Future Appointments   Date Time Provider Department Center   6/7/2022 11:25 AM LABORATORY, [facility] HG LAB [facility]   6/7/2022 11:30 AM SPECIMEN, [facility] SPECLAB [facility]   6/15/2022  8:00 AM Siva Figueroa MD [facility] NEPHRO  Medical [facility]     We discussed risks of current meds in CKD.

## 2022-06-07 NOTE — ASSESSMENT & PLAN NOTE
BP Readings from Last 6 Encounters:   06/07/22 130/88   04/14/22 132/88   03/10/22 130/86   02/17/22 (!) 138/92   01/20/22 (!) 152/98   09/14/21 (!) 140/90      Last 5 Patient Entered Readings                Current 30 Day Average: 133/85  Recent Readings 6/7/2022 5/26/2022 5/19/2022 5/18/2022 5/17/2022   SBP (mmHg) 130 129 130 133 130   DBP (mmHg) 88 86 80 90 80   Pulse 86 86 67 92 75                 Lab Results   Component Value Date    ESTGFRAFRICA 15.5 (A) 05/28/2022    EGFRNONAA 13.4 (A) 05/28/2022    CREATININE 5.0 (H) 05/28/2022    BUN 44 (H) 05/28/2022    K 4.1 05/28/2022     05/28/2022     05/28/2022     Results for orders placed or performed during the hospital encounter of 03/10/22   SCHEDULED EKG 12-LEAD (to Muse)    Collection Time: 03/10/22  9:44 AM    Narrative    Test Reason : I25.10,    Vent. Rate : 062 BPM     Atrial Rate : 227 BPM     P-R Int : 174 ms          QRS Dur : 090 ms      QT Int : 398 ms       P-R-T Axes : 102 027 040 degrees     QTc Int : 403 ms    Technically poor ECG tracing  Undetermined rhythm  When compared with ECG of 16-JUN-2021 12:28,  Current undetermined rhythm precludes rhythm comparison, needs review  Confirmed by URBANO THAPA MD ([id number]) on 3/10/2022 5:51:13 PM    Referred By: SHEREEN COON           Confirmed By:URBANO THAPA MD

## 2022-06-07 NOTE — ASSESSMENT & PLAN NOTE
DIABETIC FOOT EXAM: Inspection of feet reveals no open wounds, ulcerations, significant calluses, or evidence of arterial insufficiency. 10g monofilament sensation is intact in all 3 of 5 locations tested on the left.  10g monofilament sensation is intact in 4 of 5 locations tested on the right.

## 2022-06-07 NOTE — Clinical Note
Dr. Carolina Bender.  Robb has appointment with you on [address] 15.  Let me know if you have any recommendations for increasing his hypertension treatment.  Thank you for your help caring for our patients!  -GLEN

## 2022-06-07 NOTE — PROGRESS NOTES
WELLNESS VISIT (PREVENTIVE SERVICES)  6/7/22  7:30 AM CDT    HEALTH MAINTENANCE INTERVENTIONS - UP TO DATE  Health Maintenance Topics with due status: Not Due       Topic Last Completion Date    TETANUS VACCINE 06/26/2020    Diabetes Urine Screening 05/28/2022    Lipid Panel 05/28/2022    Hemoglobin A1c 05/28/2022    High Dose Statin 06/07/2022    Aspirin/Antiplatelet Therapy 06/07/2022    Foot Exam 06/07/2022       HEALTH MAINTENANCE INTERVENTIONS - DUE OR DUE SOON  Health Maintenance Due   Topic Date Due    Eye Exam  06/04/2022       CHIEF COMPLAINT: Annual Wellness Visit (Preventive Services)    DIAGNOSES SPECIFICALLY EVALUATED AND TREATED THIS ENCOUNTER  1. Preventative health care    2. Type 2 diabetes mellitus with diabetic polyneuropathy, without long-term current use of insulin  - Ambulatory referral/consult to Podiatry; Future  - Ambulatory referral/consult to Ophthalmology; Future    3. Type 2 diabetes mellitus with stage 4 chronic kidney disease, without long-term current use of insulin  - Hemoglobin A1C; Future    4. Dyslipidemia associated with type 2 diabetes mellitus  - atorvastatin (LIPITOR) 80 MG tablet; Take 1 tablet (80 mg total) by mouth every evening.  Dispense: 90 tablet; Refill: 3  - ezetimibe (ZETIA) 10 mg tablet; Take 1 tablet (10 mg total) by mouth once daily.  Dispense: 90 tablet; Refill: 3    5. Essential hypertension    6. Idiopathic chronic gout, multiple sites, without tophus (tophi)  - colchicine (COLCRYS) 0.6 mg tablet; Take 2 tablets at onset of acute gout attack. May take 1 more tablet 2 hours later if needed. MAX 3 TABLETS PER DAY. MAX 6 TABLETS PER WEEK.  Dispense: 45 tablet; Refill: 3  - febuxostat (ULORIC) 80 mg Tab; Take 1 tablet (80 mg total) by mouth once daily.  Dispense: 90 tablet; Refill: 2    7. Direct hyperbilirubinemia    8. Diabetic eye exam    9. NSTEMI (non-ST elevated myocardial infarction)  - atorvastatin (LIPITOR) 80 MG tablet; Take 1 tablet (80 mg total) by  mouth every evening.  Dispense: 90 tablet; Refill: 3  - aspirin (ECOTRIN) 81 MG EC tablet; Take 1 tablet (81 mg total) by mouth once daily.  Dispense: 90 tablet; Refill: 3  - ezetimibe (ZETIA) 10 mg tablet; Take 1 tablet (10 mg total) by mouth once daily.  Dispense: 90 tablet; Refill: 3    10. Chronic nonseasonal allergic rhinitis due to pollen  - cetirizine (ZYRTEC) 10 MG tablet; Take 1 tablet (10 mg total) by mouth once daily.  Dispense: 90 tablet; Refill: 3    11. Coronary artery disease involving native coronary artery of native heart without angina pectoris  - nitroGLYCERIN (NITROSTAT) 0.4 MG SL tablet; Dissolve one tablet underneath tongue at onset of angina; may repeat every 5 minutes if needed. Call 911 if angina persists after 2 doses.  Dispense: 25 tablet; Refill: 5    12. NSTEMI with CAD s/p PCI (FAMILIA) of LAD x 2 in 8/2017  - atorvastatin (LIPITOR) 80 MG tablet; Take 1 tablet (80 mg total) by mouth every evening.  Dispense: 90 tablet; Refill: 3  - aspirin (ECOTRIN) 81 MG EC tablet; Take 1 tablet (81 mg total) by mouth once daily.  Dispense: 90 tablet; Refill: 3  - ezetimibe (ZETIA) 10 mg tablet; Take 1 tablet (10 mg total) by mouth once daily.  Dispense: 90 tablet; Refill: 3    13. Stage 4 chronic kidney disease     Follow up in about 6 months (around 12/7/2022) for review test results, discuss treatment plan, re-evaluate problem(s) discussed today.   Future Appointments   Date Time Provider Department Center   6/7/2022 11:25 AM LABORATORY, [facility] LAB [facility]   6/7/2022 11:30 AM SPECIMEN, [facility] SPECLAB [facility]   6/15/2022  8:00 AM Siva Figueroa MD [facility] NEPHRO  Medical C   9/7/2022  7:40 AM LABORATORY, [facility] HG LAB [facility]   9/14/2022  8:20 AM Monica Rios MD HG CARDIO [place]     Problem List Items Addressed This Visit     Type 2 diabetes mellitus with diabetic polyneuropathy, without long-term current use of insulin    Current Assessment & Plan     DIABETIC FOOT EXAM:  Inspection of feet reveals no open wounds, ulcerations, significant calluses, or evidence of arterial insufficiency. 10g monofilament sensation is intact in all 3 of 5 locations tested on the left.  10g monofilament sensation is intact in 4 of 5 locations tested on the right.           Relevant Orders    Ambulatory referral/consult to Podiatry    Ambulatory referral/consult to Ophthalmology    Direct hyperbilirubinemia (Chronic)    Current Assessment & Plan       Lab Results   Component Value Date    ALKPHOS 95 05/28/2022    ALKPHOS 90 02/14/2022    ALKPHOS 93 09/09/2021    BILITOT 1.9 (H) 05/28/2022    BILITOT 1.9 (H) 02/14/2022    BILITOT 1.5 (H) 09/09/2021    BILIDIR 0.7 (H) 05/28/2022    BILIDIR 0.5 (H) 03/11/2020    BILIDIR 0.6 (H) 03/23/2018              Coronary artery disease involving native coronary artery of native heart without angina pectoris (Chronic)    Overview     Cath lab procedure 04/23/2018 (Naveen Rios MD)  A. Indication/Pre-Operative Diagnosis: The patient is a 37 year old male that was [facility] (NSTEMI). The BELLA risk score is 5.    B. Summary/Post-Operative Diagnosis  1. Single vessel coronary artery disease.  2. Normal LVEF.  3. Diastolic dysfunction.  4. Successful PCI for acute myocardial infarction.  5. The patient did not undergo primary PCI.           Relevant Medications    nitroGLYCERIN (NITROSTAT) 0.4 MG SL tablet    NSTEMI with CAD s/p PCI (FAMILIA) of LAD x 2 in 8/2017    Relevant Medications    atorvastatin (LIPITOR) 80 MG tablet    aspirin (ECOTRIN) 81 MG EC tablet    ezetimibe (ZETIA) 10 mg tablet    Dyslipidemia associated with type 2 diabetes mellitus (Chronic)    Current Assessment & Plan     Lab Results   Component Value Date    CHOL 91 (L) 05/28/2022    CHOL 100 (L) 03/15/2022    TRIG 120 05/28/2022    TRIG 138 03/15/2022    HDL 26 (L) 05/28/2022    HDL 28 (L) 03/15/2022    LDLCALC 41.0 (L) 05/28/2022    LDLCALC 44.4 (L)  03/15/2022    NONHDLCHOL 65 05/28/2022    NONHDLCHOL 72 03/15/2022    AST 27 05/28/2022    ALT 37 05/28/2022     The ASCVD Risk score (Ayana HORN Jr., et al., 2013) failed to calculate for the following reasons:    The patient has a prior MI or stroke diagnosis            Relevant Medications    atorvastatin (LIPITOR) 80 MG tablet    ezetimibe (ZETIA) 10 mg tablet    Chronic nonseasonal allergic rhinitis due to pollen (Chronic)    Relevant Medications    cetirizine (ZYRTEC) 10 MG tablet    Idiopathic chronic gout, multiple sites, without tophus (tophi) (Chronic)    Current Assessment & Plan     Well controlled on Uloric. Infrequent attacks only with dietary indiscretion. (Alopurinol did NOT control gout.)  Lab Results   Component Value Date    URICACID 7.1 (H) 05/28/2022    URICACID 6.0 06/02/2021    URICACID 5.8 10/16/2020    ESTGFRAFRICA 15.5 (A) 05/28/2022    EGFRNONAA 13.4 (A) 05/28/2022    CREATININE 5.0 (H) 05/28/2022    BUN 44 (H) 05/28/2022               Relevant Medications    colchicine (COLCRYS) 0.6 mg tablet    febuxostat (ULORIC) 80 mg Tab    Type 2 diabetes mellitus with stage 4 chronic kidney disease, without long-term current use of insulin (Chronic)    Current Assessment & Plan     Diabetes Management Status    Statin: Taking  ACE/ARB: Taking    Screening or Prevention Patient's value Goal Complete/Controlled?   HgA1C Testing and Control   Lab Results   Component Value Date    HGBA1C 5.0 05/28/2022      Annually/Less than 8% Yes   Lipid profile : 05/28/2022 Annually Yes   LDL control Lab Results   Component Value Date    LDLCALC 41.0 (L) 05/28/2022    Annually/Less than 100 mg/dl  Yes   Nephropathy screening Lab Results   Component Value Date    LABMICR 4358.0 05/28/2022     Lab Results   Component Value Date    PROTEINUA 3+ (A) 02/14/2022    Annually Yes   Blood pressure BP Readings from Last 1 Encounters:   06/07/22 130/88    Less than 140/90 Yes   Dilated retinal exam : 06/04/2021 Annually Yes    Foot exam   : 06/07/2022 Annually Yes     Lab Results   Component Value Date    HGBA1C 5.0 05/28/2022    HGBA1C 5.6 12/06/2021    HGBA1C 5.2 06/02/2021    CYSTATINC 2.06 (H) 06/12/2020    EGFRCYSTC 33 06/12/2020    ESTGFRAFRICA 15.5 (A) 05/28/2022    EGFRNONAA 13.4 (A) 05/28/2022    MICALBCREAT 2690.1 (H) 05/28/2022    LDLCALC 41.0 (L) 05/28/2022     No results found for: GLUTAMICACID, CPEPTIDE   Last 6 Patient Entered Readings                                          Most Recent A1c: 5% on 5/28/2022  (Goal: 7%)     Recent Readings 5/19/2022 5/16/2022 2/16/2022 2/6/2022 1/24/2022    Blood Glucose (mg/dL) 115 85 88 109 117         HEALTH MAINTENANCE: Diabetic health maintenance interventions reviewed and are up to date except for:      Topic    Eye Exam       DIABETIC FOOT EXAM: Inspection of feet reveals no open wounds, ulcerations, significant calluses, or evidence of arterial insufficiency. 10g monofilament sensation is intact in all 5 locations tested.            Relevant Orders    Hemoglobin A1C    Stage 4 chronic kidney disease (Chronic)    Current Assessment & Plan     Lab Results   Component Value Date    CYSTATINC 2.06 (H) 06/12/2020    CYSTATINC 1.31 (H) 04/09/2018    CYSTATINC 1.21 (H) 09/13/2017    EGFRCYSTC 33 06/12/2020    EGFRCYSTC 60 04/09/2018    EGFRCYSTC 67 09/13/2017    ESTGFRAFRICA 15.5 (A) 05/28/2022    ESTGFRAFRICA 16.7 (A) 04/07/2022    ESTGFRAFRICA 20.3 (A) 03/03/2022    ESTGFRAFRICA 20.3 (A) 03/03/2022    EGFRNONAA 13.4 (A) 05/28/2022    EGFRNONAA 14.4 (A) 04/07/2022    EGFRNONAA 17.5 (A) 03/03/2022    EGFRNONAA 17.5 (A) 03/03/2022    CREATININE 5.0 (H) 05/28/2022    CREATININE 4.7 (H) 04/07/2022    CREATININE 4.0 (H) 03/03/2022    CREATININE 4.0 (H) 03/03/2022    BUN 44 (H) 05/28/2022    BUN 41 (H) 04/07/2022    BUN 35 (H) 03/03/2022    BUN 35 (H) 03/03/2022     Future Appointments   Date Time Provider Department Center   6/7/2022 11:25 AM LABORATORY, [facility] LAB [place]   6/7/2022  11:30 AM SPECIMEN, [facility] SPECLAB [place]   6/15/2022  8:00 AM Siva Figueroa MD [facility]     We discussed risks of current meds in CKD.           Essential hypertension (Chronic)    Current Assessment & Plan     BP Readings from Last 6 Encounters:   06/07/22 130/88   04/14/22 132/88   03/10/22 130/86   02/17/22 (!) 138/92   01/20/22 (!) 152/98   09/14/21 (!) 140/90      Last 5 Patient Entered Readings                Current 30 Day Average: 133/85  Recent Readings 6/7/2022 5/26/2022 5/19/2022 5/18/2022 5/17/2022   SBP (mmHg) 130 129 130 133 130   DBP (mmHg) 88 86 80 90 80   Pulse 86 86 67 92 75                 Lab Results   Component Value Date    ESTGFRAFRICA 15.5 (A) 05/28/2022    EGFRNONAA 13.4 (A) 05/28/2022    CREATININE 5.0 (H) 05/28/2022    BUN 44 (H) 05/28/2022    K 4.1 05/28/2022     05/28/2022     05/28/2022     Results for orders placed or performed during the hospital encounter of 03/10/22   SCHEDULED EKG 12-LEAD (to Muse)    Collection Time: 03/10/22  9:44 AM    Narrative    Test Reason : I25.10,    Vent. Rate : 062 BPM     Atrial Rate : 227 BPM     P-R Int : 174 ms          QRS Dur : 090 ms      QT Int : 398 ms       P-R-T Axes : 102 027 040 degrees     QTc Int : 403 ms    Technically poor ECG tracing  Undetermined rhythm  When compared with ECG of 16-JUN-2021 12:28,  Current undetermined rhythm precludes rhythm comparison, needs review  Confirmed by URBANO THAPA MD ([id number]) on 3/10/2022 5:51:13 PM    Referred By: SHEREEN COON           Confirmed By:URBANO THAPA MD                  Other Visit Diagnoses     Preventative health care    -  Primary    Diabetic eye exam          Unless noted herein, any chronic conditions are represented as and appear compensated/controlled and stable, and no other significant complaints or concerns were reported.    PRESCRIPTION DRUG MANAGEMENT  Outpatient Medications Prior to Visit   Medication Sig Dispense Refill    amLODIPine  (NORVASC) 10 MG tablet TAKE 1 TABLET (10 MG TOTAL) BY MOUTH EVERY EVENING. 90 tablet 0    blood sugar diagnostic Strp Check blood glucose 2 times daily as directed and as needed (dispense insurance preferred brand or patient choice) 200 each 5    blood-glucose meter (ONETOUCH VERIO IQ METER) Misc Use as directed 1 each 0    carvediloL (COREG) 6.25 MG tablet Take 1 tablet (6.25 mg total) by mouth 2 (two) times daily with meals. 60 tablet 11    dulaglutide (TRULICITY) 3 mg/0.5 mL pen injector Inject 3 mg into the skin every 7 days. 12 pen 3    fluticasone propionate (FLONASE) 50 mcg/actuation nasal spray 2 sprays (100 mcg total) by Each Nostril route once daily. 16 g 0    lancets Misc Check blood glucose 2 times daily as directed and as needed (dispense insurance preferred brand or patient choice) 200 each 5    MULTIVITAMIN/IRON/FOLIC ACID (CENTRUM COMPLETE ORAL) Take by mouth once daily at 6am.      telmisartan (MICARDIS) 80 MG Tab TAKE 1 TABLET (80 MG TOTAL) BY MOUTH ONCE DAILY. 30 tablet 1    aspirin (ECOTRIN) 81 MG EC tablet Take 1 tablet (81 mg total) by mouth once daily. 90 tablet 3    atorvastatin (LIPITOR) 80 MG tablet Take 1 tablet (80 mg total) by mouth every evening. 90 tablet 3    cetirizine (ZYRTEC) 10 MG tablet Take 1 tablet (10 mg total) by mouth once daily. 90 tablet 3    colchicine (COLCRYS) 0.6 mg tablet Take 2 tablets at onset of acute gout attack. May take 1 more tablet 2 hours later if needed. MAX 3 TABLETS PER DAY. MAX 6 TABLETS PER WEEK. 45 tablet 3    ezetimibe (ZETIA) 10 mg tablet Take 1 tablet (10 mg total) by mouth once daily. 90 tablet 3    febuxostat (ULORIC) 80 mg Tab Take 1 tablet (80 mg total) by mouth once daily. 90 tablet 2    nitroGLYCERIN (NITROSTAT) 0.4 MG SL tablet Dissolve one tablet underneath tongue at onset of angina; may repeat every 5 minutes if needed. Call 911 if angina persists after 2 doses. 25 tablet 5    furosemide (LASIX) 20 MG tablet Take 1 tablet (20  mg total) by mouth every other day. 15 tablet 11     No facility-administered medications prior to visit.     Medications Discontinued During This Encounter   Medication Reason    colchicine (COLCRYS) 0.6 mg tablet Reorder    ezetimibe (ZETIA) 10 mg tablet Reorder    nitroGLYCERIN (NITROSTAT) 0.4 MG SL tablet Reorder    aspirin (ECOTRIN) 81 MG EC tablet Reorder    cetirizine (ZYRTEC) 10 MG tablet Reorder    atorvastatin (LIPITOR) 80 MG tablet Reorder    febuxostat (ULORIC) 80 mg Tab Reorder     Medications Ordered This Encounter   Medications    aspirin (ECOTRIN) 81 MG EC tablet     Sig: Take 1 tablet (81 mg total) by mouth once daily.     Dispense:  90 tablet     Refill:  3     If insurance does not cover, please offer OTC equivalent. OK to vary dispense quantity to correspond with in-stock OTC unit quantities.    atorvastatin (LIPITOR) 80 MG tablet     Sig: Take 1 tablet (80 mg total) by mouth every evening.     Dispense:  90 tablet     Refill:  3    cetirizine (ZYRTEC) 10 MG tablet     Sig: Take 1 tablet (10 mg total) by mouth once daily.     Dispense:  90 tablet     Refill:  3     If insurance does not cover, please offer OTC equivalent. OK to vary dispense quantity to correspond with in-stock OTC unit quantities.    colchicine (COLCRYS) 0.6 mg tablet     Sig: Take 2 tablets at onset of acute gout attack. May take 1 more tablet 2 hours later if needed. MAX 3 TABLETS PER DAY. MAX 6 TABLETS PER WEEK.     Dispense:  45 tablet     Refill:  3    ezetimibe (ZETIA) 10 mg tablet     Sig: Take 1 tablet (10 mg total) by mouth once daily.     Dispense:  90 tablet     Refill:  3    febuxostat (ULORIC) 80 mg Tab     Sig: Take 1 tablet (80 mg total) by mouth once daily.     Dispense:  90 tablet     Refill:  2    nitroGLYCERIN (NITROSTAT) 0.4 MG SL tablet     Sig: Dissolve one tablet underneath tongue at onset of angina; may repeat every 5 minutes if needed. Call 911 if angina persists after 2 doses.      "Dispense:  25 tablet     Refill:  5   Review of Systems   Respiratory: Negative for chest tightness and shortness of breath.    Cardiovascular: Negative for chest pain.   Endocrine: Negative for polydipsia and polyuria.      PHYSICAL EXAM  Vitals:    06/07/22 0723   BP: 130/88   BP Location: Left arm   Patient Position: Sitting   BP Method: Large (Manual)   Pulse: 82   Temp: 97.8 °F (36.6 °C)   TempSrc: Tympanic   SpO2: 96%   Weight: 109.8 kg (242 lb 1 oz)   Height: 6' 3" (1.905 m)   Physical Exam  Vitals reviewed.   Constitutional:       General: He is not in acute distress.     Appearance: Normal appearance. He is not ill-appearing or diaphoretic.   Cardiovascular:      Rate and Rhythm: Normal rate and regular rhythm.   Pulmonary:      Effort: Pulmonary effort is normal.      Breath sounds: Normal breath sounds.   Skin:     General: Skin is warm and dry.   Neurological:      General: No focal deficit present.      Mental Status: He is alert and oriented to person, place, and time. Mental status is at baseline.   Psychiatric:         Mood and Affect: Mood normal.         Behavior: Behavior normal.         Judgment: Judgment normal.          PAST MEDICAL HISTORY  Robb has a past medical history of Allergic rhinitis, Class 1 obesity due to excess calories with serious comorbidity and body mass index (BMI) of 31.0 to 31.9 in adult, Coronary artery disease, Direct hyperbilirubinemia, DM (diabetes mellitus), DM (diabetes mellitus), DM (diabetes mellitus), Elevated bilirubin, GERD (gastroesophageal reflux disease), Gout, Hyperlipidemia, Hypertension associated with chronic kidney disease due to type 2 diabetes mellitus, Idiopathic chronic gout, multiple sites, without tophus (tophi), Long term (current) use of insulin, MI (myocardial infarction), Obesity, HENRY on CPAP, Proteinuria, Steatohepatitis, Type 2 diabetes mellitus with diabetic nephropathy, Type 2 diabetes mellitus with diabetic nephropathy, without long-term " "current use of insulin, Type 2 diabetes mellitus with hyperglycemia, Type 2 diabetes mellitus with renal manifestations, and Type 2 diabetes mellitus with stage 3 chronic kidney disease, without long-term current use of insulin.    SURGICAL HISTORY  Robb has a past surgical history that includes Nasal septum surgery; LASIK (Bilateral); Liver biopsy; Nasal endoscopy; Sleeve Gastroplasty (03/06/2017); and Coronary angioplasty with stent.    FAMILY HISTORY  Robb family history includes Diabetes in his maternal grandmother and mother; Diabetes type II in his brother, brother, and mother; Hyperlipidemia in his father and mother; Hypertension in his mother.     ALLERGIES  Review of patient's allergies indicates:  No Known Allergies    SOCIAL HISTORY  Robb  reports that he has never smoked. He has never used smokeless tobacco. He reports that he does not drink alcohol and does not use drugs.     Documentation entered by me for this encounter may have been done in part using speech-recognition technology. Although I have made an effort to ensure accuracy, "sound like" errors may exist and should be interpreted in context.   "

## 2022-06-07 NOTE — ASSESSMENT & PLAN NOTE
Diabetes Management Status    Statin: Taking  ACE/ARB: Taking    Screening or Prevention Patient's value Goal Complete/Controlled?   HgA1C Testing and Control   Lab Results   Component Value Date    HGBA1C 5.0 05/28/2022      Annually/Less than 8% Yes   Lipid profile : 05/28/2022 Annually Yes   LDL control Lab Results   Component Value Date    LDLCALC 41.0 (L) 05/28/2022    Annually/Less than 100 mg/dl  Yes   Nephropathy screening Lab Results   Component Value Date    LABMICR 4358.0 05/28/2022     Lab Results   Component Value Date    PROTEINUA 3+ (A) 02/14/2022    Annually Yes   Blood pressure BP Readings from Last 1 Encounters:   06/07/22 130/88    Less than 140/90 Yes   Dilated retinal exam : 06/04/2021 Annually Yes   Foot exam   : 06/07/2022 Annually Yes     Lab Results   Component Value Date    HGBA1C 5.0 05/28/2022    HGBA1C 5.6 12/06/2021    HGBA1C 5.2 06/02/2021    CYSTATINC 2.06 (H) 06/12/2020    EGFRCYSTC 33 06/12/2020    ESTGFRAFRICA 15.5 (A) 05/28/2022    EGFRNONAA 13.4 (A) 05/28/2022    MICALBCREAT 2690.1 (H) 05/28/2022    LDLCALC 41.0 (L) 05/28/2022     No results found for: GLUTAMICACID, CPEPTIDE   Last 6 Patient Entered Readings                                          Most Recent A1c: 5% on 5/28/2022  (Goal: 7%)     Recent Readings 5/19/2022 5/16/2022 2/16/2022 2/6/2022 1/24/2022    Blood Glucose (mg/dL) 115 85 88 109 117         HEALTH MAINTENANCE: Diabetic health maintenance interventions reviewed and are up to date except for:      Topic    Eye Exam       DIABETIC FOOT EXAM: Inspection of feet reveals no open wounds, ulcerations, significant calluses, or evidence of arterial insufficiency. 10g monofilament sensation is intact in all 5 locations tested.

## 2022-06-15 ENCOUNTER — OFFICE VISIT (OUTPATIENT)
Dept: NEPHROLOGY | Facility: CLINIC | Age: 42
End: 2022-06-15
Payer: COMMERCIAL

## 2022-06-15 DIAGNOSIS — N25.81 SECONDARY HYPERPARATHYROIDISM OF RENAL ORIGIN: ICD-10-CM

## 2022-06-15 DIAGNOSIS — N17.9 AKI (ACUTE KIDNEY INJURY): ICD-10-CM

## 2022-06-15 DIAGNOSIS — N18.4 CKD (CHRONIC KIDNEY DISEASE) STAGE 4, GFR 15-29 ML/MIN: ICD-10-CM

## 2022-06-15 DIAGNOSIS — I12.9 PARENCHYMAL RENAL HYPERTENSION, STAGE 1 THROUGH STAGE 4 OR UNSPECIFIED CHRONIC KIDNEY DISEASE: ICD-10-CM

## 2022-06-15 DIAGNOSIS — R80.9 PROTEINURIA, UNSPECIFIED TYPE: Primary | ICD-10-CM

## 2022-06-15 PROCEDURE — 3044F PR MOST RECENT HEMOGLOBIN A1C LEVEL <7.0%: ICD-10-PCS | Mod: CPTII,95,, | Performed by: INTERNAL MEDICINE

## 2022-06-15 PROCEDURE — 3066F PR DOCUMENTATION OF TREATMENT FOR NEPHROPATHY: ICD-10-PCS | Mod: CPTII,95,, | Performed by: INTERNAL MEDICINE

## 2022-06-15 PROCEDURE — 3044F HG A1C LEVEL LT 7.0%: CPT | Mod: CPTII,95,, | Performed by: INTERNAL MEDICINE

## 2022-06-15 PROCEDURE — 1159F PR MEDICATION LIST DOCUMENTED IN MEDICAL RECORD: ICD-10-PCS | Mod: CPTII,95,, | Performed by: INTERNAL MEDICINE

## 2022-06-15 PROCEDURE — 4010F PR ACE/ARB THEARPY RXD/TAKEN: ICD-10-PCS | Mod: CPTII,95,, | Performed by: INTERNAL MEDICINE

## 2022-06-15 PROCEDURE — 1159F MED LIST DOCD IN RCRD: CPT | Mod: CPTII,95,, | Performed by: INTERNAL MEDICINE

## 2022-06-15 PROCEDURE — 3062F POS MACROALBUMINURIA REV: CPT | Mod: CPTII,95,, | Performed by: INTERNAL MEDICINE

## 2022-06-15 PROCEDURE — 1160F PR REVIEW ALL MEDS BY PRESCRIBER/CLIN PHARMACIST DOCUMENTED: ICD-10-PCS | Mod: CPTII,95,, | Performed by: INTERNAL MEDICINE

## 2022-06-15 PROCEDURE — 99214 PR OFFICE/OUTPT VISIT, EST, LEVL IV, 30-39 MIN: ICD-10-PCS | Mod: 95,,, | Performed by: INTERNAL MEDICINE

## 2022-06-15 PROCEDURE — 3072F PR LOW RISK FOR RETINOPATHY: ICD-10-PCS | Mod: CPTII,95,, | Performed by: INTERNAL MEDICINE

## 2022-06-15 PROCEDURE — 3066F NEPHROPATHY DOC TX: CPT | Mod: CPTII,95,, | Performed by: INTERNAL MEDICINE

## 2022-06-15 PROCEDURE — 1160F RVW MEDS BY RX/DR IN RCRD: CPT | Mod: CPTII,95,, | Performed by: INTERNAL MEDICINE

## 2022-06-15 PROCEDURE — 4010F ACE/ARB THERAPY RXD/TAKEN: CPT | Mod: CPTII,95,, | Performed by: INTERNAL MEDICINE

## 2022-06-15 PROCEDURE — 3062F PR POS MACROALBUMINURIA RESULT DOCUMENTED/REVIEW: ICD-10-PCS | Mod: CPTII,95,, | Performed by: INTERNAL MEDICINE

## 2022-06-15 PROCEDURE — 3072F LOW RISK FOR RETINOPATHY: CPT | Mod: CPTII,95,, | Performed by: INTERNAL MEDICINE

## 2022-06-15 PROCEDURE — 99214 OFFICE O/P EST MOD 30 MIN: CPT | Mod: 95,,, | Performed by: INTERNAL MEDICINE

## 2022-06-15 NOTE — PROGRESS NOTES
The patient location is:  work  The chief complaint leading to consultation is:  CKD, EMMIE    Visit type: audiovisual    Face to Face time with patient:  15  Thirty minutes of total time spent on the encounter, which includes face to face time and non-face to face time preparing to see the patient (eg, review of tests), Obtaining and/or reviewing separately obtained history, Documenting clinical information in the electronic or other health record, Independently interpreting results (not separately reported) and communicating results to the patient/family/caregiver, or Care coordination (not separately reported).         Each patient to whom he or she provides medical services by telemedicine is:  (1) informed of the relationship between the physician and patient and the respective role of any other health care provider with respect to management of the patient; and (2) notified that he or she may decline to receive medical services by telemedicine and may withdraw from such care at any time.    Notes:             Subjective:       Patient ID: Robb Iglesias is a 41 y.o. male.    Chief Complaint:  CKD, EMMIE    HPI    He presents to clinic today for routine follow-up.  Since his last office visit he has been doing well and has no specific or new complaints.  His laboratory studies and medications were reviewed.  All Nephrology related questions were answered to his satisfaction.  His creatinine has continued to increase slowly over the past 6 months.  It is up to 5.6 on most recent laboratory studies.  He has no symptoms of uremia at this time.  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      Review of Systems   Constitutional: Negative.    HENT: Negative.    Eyes: Negative.    Respiratory: Negative.    Cardiovascular: Negative.    Gastrointestinal: Negative.    Genitourinary: Negative.    Musculoskeletal: Negative.    Skin: Negative.    Neurological: Negative.           There were no vitals taken for this visit.    Lab Results   Component Value Date    WBC 7.03 05/28/2022    HGB 12.5 (L) 05/28/2022    HCT 37.0 (L) 05/28/2022    MCV 84 05/28/2022     05/28/2022      BMP  Lab Results   Component Value Date     06/07/2022    K 4.6 06/07/2022     06/07/2022    CO2 25 06/07/2022    BUN 43 (H) 06/07/2022    CREATININE 5.6 (H) 06/07/2022    CALCIUM 9.4 06/07/2022    ANIONGAP 9 06/07/2022    ESTGFRAFRICA 13.5 (A) 06/07/2022    EGFRNONAA 11.7 (A) 06/07/2022     CMP  Sodium   Date Value Ref Range Status   06/07/2022 139 136 - 145 mmol/L Final     Potassium   Date Value Ref Range Status   06/07/2022 4.6 3.5 - 5.1 mmol/L Final     Chloride   Date Value Ref Range Status   06/07/2022 105 95 - 110 mmol/L Final     CO2   Date Value Ref Range Status   06/07/2022 25 23 - 29 mmol/L Final     Glucose   Date Value Ref Range Status   06/07/2022 92 70 - 110 mg/dL Final     BUN   Date Value Ref Range Status   06/07/2022 43 (H) 6 - 20 mg/dL Final     Creatinine   Date Value Ref Range Status   06/07/2022 5.6 (H) 0.5 - 1.4 mg/dL Final     Calcium   Date Value Ref Range Status   06/07/2022 9.4 8.7 - 10.5 mg/dL Final     Total Protein   Date Value Ref Range Status   05/28/2022 6.6 6.0 - 8.4 g/dL Final     Albumin   Date Value Ref Range Status   06/07/2022 4.2 3.5 - 5.2 g/dL Final     Total Bilirubin   Date Value Ref Range Status   05/28/2022 1.9 (H) 0.1 - 1.0 mg/dL Final     Comment:     For infants and newborns, interpretation of results should be based  on gestational age, weight and in agreement with clinical  observations.    Premature Infant recommended reference ranges:  Up to 24 hours.............<8.0 mg/dL  Up to 48 hours............<12.0 mg/dL  3-5 days..................<15.0 mg/dL  6-29 days.................<15.0 mg/dL       Alkaline Phosphatase   Date Value Ref Range Status   05/28/2022 95 55 - 135 U/L Final     AST   Date Value Ref Range Status   05/28/2022 27 10 - 40 U/L  Final     ALT   Date Value Ref Range Status   05/28/2022 37 10 - 44 U/L Final     Anion Gap   Date Value Ref Range Status   06/07/2022 9 8 - 16 mmol/L Final     eGFR if    Date Value Ref Range Status   06/07/2022 13.5 (A) >60 mL/min/1.73 m^2 Final     eGFR if non    Date Value Ref Range Status   06/07/2022 11.7 (A) >60 mL/min/1.73 m^2 Final     Comment:     Calculation used to obtain the estimated glomerular filtration  rate (eGFR) is the CKD-EPI equation.               Objective:            Physical Exam  Vitals reviewed: Physical exam was not performed, virtual visit.         Assessment:       1. EMMIE (acute kidney injury)    2. CKD (chronic kidney disease) stage 4, GFR 15-29 ml/min    3. Parenchymal renal hypertension, stage 1 through stage 4 or unspecified chronic kidney disease    4. Proteinuria, unspecified type    5. Secondary hyperparathyroidism of renal origin        Plan:       1. Creatinine has steadily increased from his usual baseline of around 3 to 5.6 on most recent laboratory studies.  His creatinine started to increase after having COVID-19.  On review of the medical literature there are numerous discussions of accelerated chronic kidney disease post COVID infection.  We discussed a renal biopsy today to determine if there are any underlying reversible causes.  He is agreeable.  We will schedule 1 as an outpatient in the next week or 2.    2. Baseline creatinine typically runs around 3. It is increased steadily since 7 COVID-19.  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    3. He reports his blood pressures have been stable.  He is on a RAAS inhibitor.    4. Proteinuria has decreased to around 3 g by PC ratio.  It had increased to over 6 g about 6-8 months ago.  He is on a RAAS inhibitor at this time.    5. Intact PTH is elevated 284. Vitamin-D is within normal range of 44. Phosphorus is normal 4.3.  Calcium is stable at 9.4 with an albumin  of 4.2.  Intact PTH is appropriate for his stage of renal function.        Siva Figueroa MD

## 2022-06-28 ENCOUNTER — LAB VISIT (OUTPATIENT)
Dept: LAB | Facility: HOSPITAL | Age: 42
End: 2022-06-28
Attending: INTERNAL MEDICINE
Payer: COMMERCIAL

## 2022-06-28 ENCOUNTER — OFFICE VISIT (OUTPATIENT)
Dept: PODIATRY | Facility: CLINIC | Age: 42
End: 2022-06-28
Payer: COMMERCIAL

## 2022-06-28 VITALS
HEART RATE: 73 BPM | BODY MASS INDEX: 30.1 KG/M2 | DIASTOLIC BLOOD PRESSURE: 95 MMHG | WEIGHT: 242.06 LBS | HEIGHT: 75 IN | SYSTOLIC BLOOD PRESSURE: 149 MMHG

## 2022-06-28 DIAGNOSIS — N18.4 CKD (CHRONIC KIDNEY DISEASE) STAGE 4, GFR 15-29 ML/MIN: ICD-10-CM

## 2022-06-28 DIAGNOSIS — E11.42 TYPE 2 DIABETES MELLITUS WITH DIABETIC POLYNEUROPATHY, WITHOUT LONG-TERM CURRENT USE OF INSULIN: Primary | ICD-10-CM

## 2022-06-28 DIAGNOSIS — G57.92 PERIPHERAL NEURITIS OF LEFT FOOT: ICD-10-CM

## 2022-06-28 DIAGNOSIS — N17.9 AKI (ACUTE KIDNEY INJURY): ICD-10-CM

## 2022-06-28 LAB
ALBUMIN SERPL BCP-MCNC: 4.2 G/DL (ref 3.5–5.2)
ANION GAP SERPL CALC-SCNC: 13 MMOL/L (ref 8–16)
BUN SERPL-MCNC: 47 MG/DL (ref 6–20)
CALCIUM SERPL-MCNC: 9.3 MG/DL (ref 8.7–10.5)
CHLORIDE SERPL-SCNC: 105 MMOL/L (ref 95–110)
CO2 SERPL-SCNC: 24 MMOL/L (ref 23–29)
CREAT SERPL-MCNC: 5.1 MG/DL (ref 0.5–1.4)
EST. GFR  (AFRICAN AMERICAN): 15.1 ML/MIN/1.73 M^2
EST. GFR  (NON AFRICAN AMERICAN): 13.1 ML/MIN/1.73 M^2
GLUCOSE SERPL-MCNC: 130 MG/DL (ref 70–110)
PHOSPHATE SERPL-MCNC: 3.7 MG/DL (ref 2.7–4.5)
POTASSIUM SERPL-SCNC: 4.2 MMOL/L (ref 3.5–5.1)
SODIUM SERPL-SCNC: 142 MMOL/L (ref 136–145)

## 2022-06-28 PROCEDURE — 3077F SYST BP >= 140 MM HG: CPT | Mod: CPTII,S$GLB,, | Performed by: PODIATRIST

## 2022-06-28 PROCEDURE — 3066F PR DOCUMENTATION OF TREATMENT FOR NEPHROPATHY: ICD-10-PCS | Mod: CPTII,S$GLB,, | Performed by: PODIATRIST

## 2022-06-28 PROCEDURE — 4010F PR ACE/ARB THEARPY RXD/TAKEN: ICD-10-PCS | Mod: CPTII,S$GLB,, | Performed by: PODIATRIST

## 2022-06-28 PROCEDURE — 36415 COLL VENOUS BLD VENIPUNCTURE: CPT | Performed by: INTERNAL MEDICINE

## 2022-06-28 PROCEDURE — 3008F BODY MASS INDEX DOCD: CPT | Mod: CPTII,S$GLB,, | Performed by: PODIATRIST

## 2022-06-28 PROCEDURE — 1160F RVW MEDS BY RX/DR IN RCRD: CPT | Mod: CPTII,S$GLB,, | Performed by: PODIATRIST

## 2022-06-28 PROCEDURE — 99999 PR PBB SHADOW E&M-EST. PATIENT-LVL IV: ICD-10-PCS | Mod: PBBFAC,,, | Performed by: PODIATRIST

## 2022-06-28 PROCEDURE — 3044F PR MOST RECENT HEMOGLOBIN A1C LEVEL <7.0%: ICD-10-PCS | Mod: CPTII,S$GLB,, | Performed by: PODIATRIST

## 2022-06-28 PROCEDURE — 99213 OFFICE O/P EST LOW 20 MIN: CPT | Mod: S$GLB,,, | Performed by: PODIATRIST

## 2022-06-28 PROCEDURE — 3077F PR MOST RECENT SYSTOLIC BLOOD PRESSURE >= 140 MM HG: ICD-10-PCS | Mod: CPTII,S$GLB,, | Performed by: PODIATRIST

## 2022-06-28 PROCEDURE — 3072F LOW RISK FOR RETINOPATHY: CPT | Mod: CPTII,S$GLB,, | Performed by: PODIATRIST

## 2022-06-28 PROCEDURE — 3062F PR POS MACROALBUMINURIA RESULT DOCUMENTED/REVIEW: ICD-10-PCS | Mod: CPTII,S$GLB,, | Performed by: PODIATRIST

## 2022-06-28 PROCEDURE — 99999 PR PBB SHADOW E&M-EST. PATIENT-LVL IV: CPT | Mod: PBBFAC,,, | Performed by: PODIATRIST

## 2022-06-28 PROCEDURE — 3062F POS MACROALBUMINURIA REV: CPT | Mod: CPTII,S$GLB,, | Performed by: PODIATRIST

## 2022-06-28 PROCEDURE — 3044F HG A1C LEVEL LT 7.0%: CPT | Mod: CPTII,S$GLB,, | Performed by: PODIATRIST

## 2022-06-28 PROCEDURE — 4010F ACE/ARB THERAPY RXD/TAKEN: CPT | Mod: CPTII,S$GLB,, | Performed by: PODIATRIST

## 2022-06-28 PROCEDURE — 99213 PR OFFICE/OUTPT VISIT, EST, LEVL III, 20-29 MIN: ICD-10-PCS | Mod: S$GLB,,, | Performed by: PODIATRIST

## 2022-06-28 PROCEDURE — 80069 RENAL FUNCTION PANEL: CPT | Performed by: INTERNAL MEDICINE

## 2022-06-28 PROCEDURE — 3080F PR MOST RECENT DIASTOLIC BLOOD PRESSURE >= 90 MM HG: ICD-10-PCS | Mod: CPTII,S$GLB,, | Performed by: PODIATRIST

## 2022-06-28 PROCEDURE — 1159F PR MEDICATION LIST DOCUMENTED IN MEDICAL RECORD: ICD-10-PCS | Mod: CPTII,S$GLB,, | Performed by: PODIATRIST

## 2022-06-28 PROCEDURE — 3066F NEPHROPATHY DOC TX: CPT | Mod: CPTII,S$GLB,, | Performed by: PODIATRIST

## 2022-06-28 PROCEDURE — 1159F MED LIST DOCD IN RCRD: CPT | Mod: CPTII,S$GLB,, | Performed by: PODIATRIST

## 2022-06-28 PROCEDURE — 3072F PR LOW RISK FOR RETINOPATHY: ICD-10-PCS | Mod: CPTII,S$GLB,, | Performed by: PODIATRIST

## 2022-06-28 PROCEDURE — 3080F DIAST BP >= 90 MM HG: CPT | Mod: CPTII,S$GLB,, | Performed by: PODIATRIST

## 2022-06-28 PROCEDURE — 1160F PR REVIEW ALL MEDS BY PRESCRIBER/CLIN PHARMACIST DOCUMENTED: ICD-10-PCS | Mod: CPTII,S$GLB,, | Performed by: PODIATRIST

## 2022-06-28 PROCEDURE — 3008F PR BODY MASS INDEX (BMI) DOCUMENTED: ICD-10-PCS | Mod: CPTII,S$GLB,, | Performed by: PODIATRIST

## 2022-06-28 NOTE — PROGRESS NOTES
Subjective:     Patient ID: Robb Iglesias is a 41 y.o. male.    Chief Complaint: Diabetic Foot Exam (Pt c/o left hallux toe itching, Diabetic pt wears tennis shoes w/ socks, PCP Dr. Roberson last seen 6-7-22)    Robb is a 41 y.o. male who presents to the clinic for evaluation and treatment of high risk feet. Robb has a past medical history of Allergic rhinitis, Class 1 obesity due to excess calories with serious comorbidity and body mass index (BMI) of 31.0 to 31.9 in adult (4/7/2017), Coronary artery disease, Direct hyperbilirubinemia (3/24/2018), DM (diabetes mellitus) (2008), DM (diabetes mellitus) (2012), DM (diabetes mellitus), Elevated bilirubin (3/21/2018), GERD (gastroesophageal reflux disease), Gout, Hyperlipidemia, Hypertension associated with chronic kidney disease due to type 2 diabetes mellitus, Idiopathic chronic gout, multiple sites, without tophus (tophi) (7/19/2017), Long term (current) use of insulin, MI (myocardial infarction) (07/2017), Obesity, HENRY on CPAP, Proteinuria, Steatohepatitis, Type 2 diabetes mellitus with diabetic nephropathy, Type 2 diabetes mellitus with diabetic nephropathy, without long-term current use of insulin (1/6/2020), Type 2 diabetes mellitus with hyperglycemia, Type 2 diabetes mellitus with renal manifestations, and Type 2 diabetes mellitus with stage 3 chronic kidney disease, without long-term current use of insulin (6/21/2017). The patient's chief complaint is itching of left big toe mainly at night time. This patient has documented high risk feet requiring routine maintenance secondary to peripheral neuropathy.    PCP: SABIHA Roberson MD    Date Last Seen by PCP: 06/07/2022    Current shoe gear:  Affected Foot: Tennis shoes     Unaffected Foot: Tennis shoes    Hemoglobin A1C   Date Value Ref Range Status   05/28/2022 5.0 4.0 - 5.6 % Final     Comment:     ADA Screening Guidelines:  5.7-6.4%  Consistent with prediabetes  >or=6.5%  Consistent with  diabetes    High levels of fetal hemoglobin interfere with the HbA1C  assay. Heterozygous hemoglobin variants (HbS, HgC, etc)do  not significantly interfere with this assay.   However, presence of multiple variants may affect accuracy.     12/06/2021 5.6 4.0 - 5.6 % Final     Comment:     ADA Screening Guidelines:  5.7-6.4%  Consistent with prediabetes  >or=6.5%  Consistent with diabetes    High levels of fetal hemoglobin interfere with the HbA1C  assay. Heterozygous hemoglobin variants (HbS, HgC, etc)do  not significantly interfere with this assay.   However, presence of multiple variants may affect accuracy.     06/02/2021 5.2 4.0 - 5.6 % Final     Comment:     ADA Screening Guidelines:  5.7-6.4%  Consistent with prediabetes  >or=6.5%  Consistent with diabetes    High levels of fetal hemoglobin interfere with the HbA1C  assay. Heterozygous hemoglobin variants (HbS, HgC, etc)do  not significantly interfere with this assay.   However, presence of multiple variants may affect accuracy.         Patient Active Problem List   Diagnosis    Bariatric surgery status    Class 1 obesity due to excess calories with serious comorbidity and body mass index (BMI) of 30.0 to 30.9 in adult    Essential hypertension    Gout of left knee    Idiopathic chronic gout, left ankle and foot, without tophus (tophi)    Idiopathic chronic gout, right ankle and foot, without tophus (tophi)    Idiopathic chronic gout of left hand without tophus    Stage 4 chronic kidney disease    Persistent proteinuria    Type 2 diabetes mellitus with stage 4 chronic kidney disease, without long-term current use of insulin    Idiopathic chronic gout, multiple sites, without tophus (tophi)    Chronic nonseasonal allergic rhinitis due to pollen    NSTEMI with CAD s/p PCI (FAMILIA) of LAD x 2 in 8/2017    Severe obstructive sleep apnea - Intolerant of CPAP    Dyslipidemia associated with type 2 diabetes mellitus    Status following surgery for weight  loss    Status post angioplasty with stent    PLMD (periodic limb movement disorder)    Coronary artery disease involving native coronary artery of native heart without angina pectoris    Direct hyperbilirubinemia    At risk for cardiovascular event    Hypertension complicating diabetes    Type 2 diabetes mellitus with diabetic nephropathy, without long-term current use of insulin    Type 2 diabetes mellitus with diabetic polyneuropathy, without long-term current use of insulin    Hyperparathyroidism, secondary to chronic kidney disease       Medication List with Changes/Refills   Current Medications    AMLODIPINE (NORVASC) 10 MG TABLET    TAKE 1 TABLET (10 MG TOTAL) BY MOUTH EVERY EVENING.    ASPIRIN (ECOTRIN) 81 MG EC TABLET    Take 1 tablet (81 mg total) by mouth once daily.    ATORVASTATIN (LIPITOR) 80 MG TABLET    Take 1 tablet (80 mg total) by mouth every evening.    BLOOD SUGAR DIAGNOSTIC STRP    Check blood glucose 2 times daily as directed and as needed (dispense insurance preferred brand or patient choice)    BLOOD-GLUCOSE METER (ONETOUCH VERIO IQ METER) MISC    Use as directed    CARVEDILOL (COREG) 6.25 MG TABLET    TAKE ONE TABLET BY MOUTH TWO TIMES A DAY WITH MEALS    CETIRIZINE (ZYRTEC) 10 MG TABLET    Take 1 tablet (10 mg total) by mouth once daily.    COLCHICINE (COLCRYS) 0.6 MG TABLET    Take 2 tablets at onset of acute gout attack. May take 1 more tablet 2 hours later if needed. MAX 3 TABLETS PER DAY. MAX 6 TABLETS PER WEEK.    DULAGLUTIDE (TRULICITY) 3 MG/0.5 ML PEN INJECTOR    Inject 3 mg into the skin every 7 days.    EZETIMIBE (ZETIA) 10 MG TABLET    Take 1 tablet (10 mg total) by mouth once daily.    FEBUXOSTAT (ULORIC) 80 MG TAB    Take 1 tablet (80 mg total) by mouth once daily.    FLUTICASONE PROPIONATE (FLONASE) 50 MCG/ACTUATION NASAL SPRAY    2 sprays (100 mcg total) by Each Nostril route once daily.    FUROSEMIDE (LASIX) 20 MG TABLET    TAKE ONE TABLET BY MOUTH EVERY OTHER DAY  "   LANCETS MISC    Check blood glucose 2 times daily as directed and as needed (dispense insurance preferred brand or patient choice)    MULTIVITAMIN/IRON/FOLIC ACID (CENTRUM COMPLETE ORAL)    Take by mouth once daily at 6am.    NITROGLYCERIN (NITROSTAT) 0.4 MG SL TABLET    Dissolve one tablet underneath tongue at onset of angina; may repeat every 5 minutes if needed. Call 911 if angina persists after 2 doses.    TELMISARTAN (MICARDIS) 80 MG TAB    TAKE 1 TABLET (80 MG TOTAL) BY MOUTH ONCE DAILY.       Review of patient's allergies indicates:  No Known Allergies    Past Surgical History:   Procedure Laterality Date    CORONARY ANGIOPLASTY WITH STENT PLACEMENT      LASIK Bilateral     LIVER BIOPSY      NASAL ENDOSCOPY      NASAL SEPTUM SURGERY      SLEEVE GASTROPLASTY  03/06/2017       Family History   Problem Relation Age of Onset    Diabetes type II Mother     Hypertension Mother     Hyperlipidemia Mother     Diabetes Mother     Hyperlipidemia Father     Diabetes type II Brother     Diabetes type II Brother     Diabetes Maternal Grandmother        Social History     Socioeconomic History    Marital status:     Number of children: 2    Years of education: Some College   Tobacco Use    Smoking status: Never Smoker    Smokeless tobacco: Never Used   Substance and Sexual Activity    Alcohol use: No     Comment: 1-2 times per month    Drug use: No    Sexual activity: Yes     Partners: Female   Social History Narrative    Full time employed,  with 2 children.       Vitals:    06/28/22 0821   BP: (!) 149/95   Pulse: 73   Weight: 109.8 kg (242 lb 1 oz)   Height: 6' 3" (1.905 m)       Hemoglobin A1C   Date Value Ref Range Status   05/28/2022 5.0 4.0 - 5.6 % Final     Comment:     ADA Screening Guidelines:  5.7-6.4%  Consistent with prediabetes  >or=6.5%  Consistent with diabetes    High levels of fetal hemoglobin interfere with the HbA1C  assay. Heterozygous hemoglobin variants (HbS, HgC, " etc)do  not significantly interfere with this assay.   However, presence of multiple variants may affect accuracy.     12/06/2021 5.6 4.0 - 5.6 % Final     Comment:     ADA Screening Guidelines:  5.7-6.4%  Consistent with prediabetes  >or=6.5%  Consistent with diabetes    High levels of fetal hemoglobin interfere with the HbA1C  assay. Heterozygous hemoglobin variants (HbS, HgC, etc)do  not significantly interfere with this assay.   However, presence of multiple variants may affect accuracy.     06/02/2021 5.2 4.0 - 5.6 % Final     Comment:     ADA Screening Guidelines:  5.7-6.4%  Consistent with prediabetes  >or=6.5%  Consistent with diabetes    High levels of fetal hemoglobin interfere with the HbA1C  assay. Heterozygous hemoglobin variants (HbS, HgC, etc)do  not significantly interfere with this assay.   However, presence of multiple variants may affect accuracy.         Review of Systems   Constitutional: Negative for chills and fever.   Respiratory: Negative for shortness of breath.    Cardiovascular: Negative for chest pain, palpitations, orthopnea, claudication and leg swelling.   Gastrointestinal: Negative for diarrhea, nausea and vomiting.   Musculoskeletal: Negative for joint pain.   Skin: Negative for rash.   Neurological: Positive for sensory change.   Psychiatric/Behavioral: Negative.          Objective:      PHYSICAL EXAM: Apperance: Alert and orient in no distress,well developed, and with good attention to grooming and body habits  Patient presents ambulating in tennis shoes  LOWER EXTREMITY EXAM:  VASCULAR: Dorsalis pedis pulses 2/4 bilateral and Posterior Tibial pulses 2/4 bilateral. Capillary fill time <4 seconds bilateral. No edema observed bilateral. Varicosities absent bilateral. Skin temperature of the lower extremities is warm to warm, proximal to distal. Hair growth WNL bilateral.  DERMATOLOGICAL: No skin rashes, subcutaneous nodules, lesions, or ulcers observed bilateral. Nails 1,2,3,4,5  bilateral normal length and thickness. Webspaces 1,2,3,4 bilateral clean, dry and without evidence of break in skin integrity.   NEUROLOGICAL: Light touch, sharp-dull, proprioception all present and equal bilaterally.  Vibratory sensation diminished at bilateral hallux. Protective sensation absent at toe sites only as tested with a [place] 5.07 monofilament.   MUSCULOSKELETAL: Muscle strength is 5/5 for foot inverters, everters, plantarflexors, and dorsiflexors. Muscle tone is normal.           Assessment:       ICD-10-CM ICD-9-CM   1. Type 2 diabetes mellitus with diabetic polyneuropathy, without long-term current use of insulin  E11.42 250.60     357.2   2. Peripheral neuritis of left foot  G57.92 355.8       Plan:   Type 2 diabetes mellitus with diabetic polyneuropathy, without long-term current use of insulin  -     Ambulatory referral/consult to Podiatry    Peripheral neuritis of left foot      I counseled the patient on his conditions, regarding findings of my examination, my impressions, and usual treatment plan.   Greater than 50% of this visit spent on counseling and coordination of care.  Greater than 15 minutes of a 20 minute appointment spent on education about the diabetic foot, neuropathy, and prevention of limb loss.  Shoe inspection. Diabetic Foot Education. Patient reminded of the importance of good nutrition and blood sugar control to help prevent podiatric complications of diabetes. Patient instructed on proper foot hygeine. We discussed wearing proper shoe gear, daily foot inspections, never walking without protective shoe gear, never putting sharp instruments to feet.    Discussed with patient treatments for neuropathy consisting of topical or oral medication.  Recommendations given for over-the-counter medicine such as [employer] [place] [employer].  Patient  will continue to monitor the areas daily, inspect feet, wear protective shoe gear when ambulatory, moisturizer to maintain skin integrity.  Patient reminded of the importance of good nutrition and blood sugar control to help prevent podiatric complications of diabetes.  Patient to return 12 months or sooner if needed.              Corine Nieto DPM  [facility]

## 2022-07-12 ENCOUNTER — PATIENT MESSAGE (OUTPATIENT)
Dept: OTHER | Facility: OTHER | Age: 42
End: 2022-07-12
Payer: COMMERCIAL

## 2022-07-13 ENCOUNTER — TELEPHONE (OUTPATIENT)
Dept: RADIOLOGY | Facility: HOSPITAL | Age: 42
End: 2022-07-13
Payer: COMMERCIAL

## 2022-07-13 ENCOUNTER — PATIENT MESSAGE (OUTPATIENT)
Dept: OTHER | Facility: OTHER | Age: 42
End: 2022-07-13
Payer: COMMERCIAL

## 2022-07-13 DIAGNOSIS — D68.9 COAGULATION DEFECT: ICD-10-CM

## 2022-07-13 DIAGNOSIS — R31.9 HEMATURIA SYNDROME: Primary | ICD-10-CM

## 2022-07-13 DIAGNOSIS — N18.4 STAGE 4 CHRONIC KIDNEY DISEASE: ICD-10-CM

## 2022-07-13 NOTE — TELEPHONE ENCOUNTER
Interventional Radiology:    Spoke with pt to confirm his appt for tomorrow.  Pt verbalized that he has still been taking his aspirin 81mg every night, with his last dose being last night, 7/12/22.  His bx has been rescheduled for 7/26/22 @ 8:30am and pt verbalized understanding of needing to hold aspirin starting 7/21/22.

## 2022-07-20 ENCOUNTER — LAB VISIT (OUTPATIENT)
Dept: LAB | Facility: HOSPITAL | Age: 42
End: 2022-07-20
Attending: INTERNAL MEDICINE
Payer: COMMERCIAL

## 2022-07-20 DIAGNOSIS — N18.4 CKD (CHRONIC KIDNEY DISEASE) STAGE 4, GFR 15-29 ML/MIN: ICD-10-CM

## 2022-07-20 DIAGNOSIS — N17.9 AKI (ACUTE KIDNEY INJURY): ICD-10-CM

## 2022-07-20 DIAGNOSIS — I12.9 PARENCHYMAL RENAL HYPERTENSION, STAGE 1 THROUGH STAGE 4 OR UNSPECIFIED CHRONIC KIDNEY DISEASE: ICD-10-CM

## 2022-07-20 DIAGNOSIS — R80.9 PROTEINURIA, UNSPECIFIED TYPE: ICD-10-CM

## 2022-07-20 LAB
BACTERIA #/AREA URNS HPF: ABNORMAL /HPF
BILIRUB UR QL STRIP: NEGATIVE
CLARITY UR: CLEAR
COLOR UR: YELLOW
CREAT UR-MCNC: 160 MG/DL (ref 23–375)
GLUCOSE UR QL STRIP: NEGATIVE
HGB UR QL STRIP: ABNORMAL
HYALINE CASTS #/AREA URNS LPF: 0 /LPF
KETONES UR QL STRIP: NEGATIVE
LEUKOCYTE ESTERASE UR QL STRIP: NEGATIVE
MICROSCOPIC COMMENT: ABNORMAL
NITRITE UR QL STRIP: NEGATIVE
PH UR STRIP: 6 [PH] (ref 5–8)
PROT UR QL STRIP: ABNORMAL
PROT UR-MCNC: 395 MG/DL (ref 0–15)
PROT/CREAT UR: 2.47 MG/G{CREAT} (ref 0–0.2)
RBC #/AREA URNS HPF: 0 /HPF (ref 0–4)
SP GR UR STRIP: 1.02 (ref 1–1.03)
URN SPEC COLLECT METH UR: ABNORMAL
WBC #/AREA URNS HPF: 8 /HPF (ref 0–5)

## 2022-07-20 PROCEDURE — 81000 URINALYSIS NONAUTO W/SCOPE: CPT | Performed by: INTERNAL MEDICINE

## 2022-07-20 PROCEDURE — 84156 ASSAY OF PROTEIN URINE: CPT | Performed by: INTERNAL MEDICINE

## 2022-07-20 PROCEDURE — 80069 RENAL FUNCTION PANEL: CPT | Performed by: INTERNAL MEDICINE

## 2022-07-20 PROCEDURE — 36415 COLL VENOUS BLD VENIPUNCTURE: CPT | Performed by: INTERNAL MEDICINE

## 2022-07-21 ENCOUNTER — TELEPHONE (OUTPATIENT)
Dept: NEPHROLOGY | Facility: CLINIC | Age: 42
End: 2022-07-21
Payer: COMMERCIAL

## 2022-07-21 LAB
ALBUMIN SERPL BCP-MCNC: 4.3 G/DL (ref 3.5–5.2)
ANION GAP SERPL CALC-SCNC: 11 MMOL/L (ref 8–16)
BUN SERPL-MCNC: 49 MG/DL (ref 6–20)
CALCIUM SERPL-MCNC: 9.6 MG/DL (ref 8.7–10.5)
CHLORIDE SERPL-SCNC: 104 MMOL/L (ref 95–110)
CO2 SERPL-SCNC: 26 MMOL/L (ref 23–29)
CREAT SERPL-MCNC: 6.1 MG/DL (ref 0.5–1.4)
EST. GFR  (AFRICAN AMERICAN): 12.2 ML/MIN/1.73 M^2
EST. GFR  (NON AFRICAN AMERICAN): 10.5 ML/MIN/1.73 M^2
GLUCOSE SERPL-MCNC: 68 MG/DL (ref 70–110)
PHOSPHATE SERPL-MCNC: 4.8 MG/DL (ref 2.7–4.5)
POTASSIUM SERPL-SCNC: 4.6 MMOL/L (ref 3.5–5.1)
SODIUM SERPL-SCNC: 141 MMOL/L (ref 136–145)

## 2022-07-21 NOTE — TELEPHONE ENCOUNTER
----- Message from Siva Figueroa MD sent at 7/21/2022 11:00 AM CDT -----  Regarding: Biopsy  Did we ever get Mr. Iglesias set up for a kidney biopsy?  His creatinine is rapidly worsening.

## 2022-07-21 NOTE — TELEPHONE ENCOUNTER
Yes he was initially scheduled for 7/12 but he continued taking his Asprin and had to be moved to 7/26.

## 2022-07-22 ENCOUNTER — PATIENT MESSAGE (OUTPATIENT)
Dept: NEPHROLOGY | Facility: CLINIC | Age: 42
End: 2022-07-22
Payer: COMMERCIAL

## 2022-07-26 ENCOUNTER — HOSPITAL ENCOUNTER (OUTPATIENT)
Dept: RADIOLOGY | Facility: HOSPITAL | Age: 42
Discharge: HOME | End: 2022-07-26
Attending: INTERNAL MEDICINE
Payer: COMMERCIAL

## 2022-07-26 VITALS
OXYGEN SATURATION: 99 % | DIASTOLIC BLOOD PRESSURE: 91 MMHG | WEIGHT: 240 LBS | BODY MASS INDEX: 29.84 KG/M2 | HEIGHT: 75 IN | HEART RATE: 83 BPM | SYSTOLIC BLOOD PRESSURE: 157 MMHG | RESPIRATION RATE: 20 BRPM

## 2022-07-26 DIAGNOSIS — R80.9 PROTEINURIA, UNSPECIFIED TYPE: ICD-10-CM

## 2022-07-26 DIAGNOSIS — N17.9 AKI (ACUTE KIDNEY INJURY): ICD-10-CM

## 2022-07-26 PROCEDURE — 63600175 PHARM REV CODE 636 W HCPCS: Performed by: STUDENT IN AN ORGANIZED HEALTH CARE EDUCATION/TRAINING PROGRAM

## 2022-07-26 PROCEDURE — 50200 RENAL BIOPSY PERQ: CPT

## 2022-07-26 PROCEDURE — 77012 CT SCAN FOR NEEDLE BIOPSY: CPT | Mod: TC

## 2022-07-26 RX ORDER — HYDRALAZINE HYDROCHLORIDE 20 MG/ML
5 INJECTION INTRAMUSCULAR; INTRAVENOUS ONCE AS NEEDED
Status: DISCONTINUED | OUTPATIENT
Start: 2022-07-26 | End: 2022-07-27 | Stop reason: HOSPADM

## 2022-07-26 RX ORDER — FENTANYL CITRATE 50 UG/ML
INJECTION, SOLUTION INTRAMUSCULAR; INTRAVENOUS CODE/TRAUMA/SEDATION MEDICATION
Status: COMPLETED | OUTPATIENT
Start: 2022-07-26 | End: 2022-07-26

## 2022-07-26 RX ORDER — MIDAZOLAM HYDROCHLORIDE 1 MG/ML
INJECTION INTRAMUSCULAR; INTRAVENOUS CODE/TRAUMA/SEDATION MEDICATION
Status: COMPLETED | OUTPATIENT
Start: 2022-07-26 | End: 2022-07-26

## 2022-07-26 RX ADMIN — FENTANYL CITRATE 50 MCG: 50 INJECTION, SOLUTION INTRAMUSCULAR; INTRAVENOUS at 09:07

## 2022-07-26 RX ADMIN — MIDAZOLAM HYDROCHLORIDE 1 MG: 1 INJECTION INTRAMUSCULAR; INTRAVENOUS at 09:07

## 2022-07-26 NOTE — PLAN OF CARE
Pressure wedge removed, patient states he is much more comfortable now and has rolled to his right side.  Urinal offered for patient, he is unable to urinate lying down but will continue to try.  WD

## 2022-07-26 NOTE — DISCHARGE SUMMARY
Pre Op Diagnosis: EMMIE     Post Op Diagnosis: same     Procedure:  CT guided kidney biopsy     Procedure performed by: Uli MASTERS, Kevan PANTOJA     Written Informed Consent Obtained: Yes     Specimen Removed:  Yes (type of tissue to be determined by lab analysis)     Estimated Blood Loss:  minimal    Moderate Sedation: Yes     The patient tolerated the procedure well and there were no complications.      Sterile technique was performed in the left flank, lidocaine was used as a local anesthetic.  Multiple samples taken percutaneously from left renal cortex.  Pt tolerated the procedure well without immediate complications.  Please see radiologist report for details. F/u with PCP and/or ordering physician.

## 2022-07-26 NOTE — PLAN OF CARE
RECEIVED PT TO RECOVERY VIA STRETCHER.  PT ON LEFT SIDE WITH WEDGE IN PLACE TO LEFT FLANK.  BAND-AIDE TO LEFT FLANK CDI.  NADN.  VSS A CHARTED.  DENIES PAIN OR NEEDS.  WIFE (PARISA) UPDATED TO COMPLETION OF PROCEDURE AND TIME FOR RECOVERY.

## 2022-07-26 NOTE — PLAN OF CARE
PT AMBULATED TO PREOP.  WIFE WITH PT FOR DISCHARGE AND UPDATED TO PROCEDURE PROCESS.  PT PLACED IN GOWN AND IN LOCKED LOW BED IN SUPINE POSITION.  NADN.  DENIES NEEDS. VSS.

## 2022-07-26 NOTE — SEDATION DOCUMENTATION
[facility] and remained on left side with pressure roll at site for additional pressure. Per Dr. Mcnally, pt to remain on left side x2 hours and MD to be notified if SBP<135 or >/=160 or if pt becomes tachycardic.  Pt transported to recovery and bedside report given to Thu DUNBAR. Pt is alert and denies pain. Pt to update family member, Shannon. Pt stable at time of transfer

## 2022-07-26 NOTE — H&P
[facility] (Hospital)  History & Physical    Subjective:      Chief Complaint/Reason for Admission: EMMIE Iglesias is a 41 y.o. male.    Past Medical History:   Diagnosis Date    Allergic rhinitis     Class 1 obesity due to excess calories with serious comorbidity and body mass index (BMI) of 31.0 to 31.9 in adult 4/7/2017    Coronary artery disease     Direct hyperbilirubinemia 3/24/2018    DM (diabetes mellitus) 2008    BS doesn't check any more 08/02/2018    DM (diabetes mellitus) 2012    BS 99 am 06/26/2020    DM (diabetes mellitus)     BS didn't check 06/04/2021    Elevated bilirubin 3/21/2018    GERD (gastroesophageal reflux disease)     Gout     Hyperlipidemia     Hypertension associated with chronic kidney disease due to type 2 diabetes mellitus     Idiopathic chronic gout, multiple sites, without tophus (tophi) 7/19/2017    Long term (current) use of insulin     MI (myocardial infarction) 07/2017    Obesity     HENRY on CPAP     Proteinuria     Steatohepatitis     Fatty Liver    Type 2 diabetes mellitus with diabetic nephropathy     Type 2 diabetes mellitus with diabetic nephropathy, without long-term current use of insulin 1/6/2020    Type 2 diabetes mellitus with hyperglycemia     Type 2 diabetes mellitus with renal manifestations     Type 2 diabetes mellitus with stage 3 chronic kidney disease, without long-term current use of insulin 6/21/2017     Past Surgical History:   Procedure Laterality Date    CORONARY ANGIOPLASTY WITH STENT PLACEMENT      LASIK Bilateral     LIVER BIOPSY      NASAL ENDOSCOPY      NASAL SEPTUM SURGERY      SLEEVE GASTROPLASTY  03/06/2017     Family History   Problem Relation Age of Onset    Diabetes type II Mother     Hypertension Mother     Hyperlipidemia Mother     Diabetes Mother     Hyperlipidemia Father     Diabetes type II Brother     Diabetes type II Brother     Diabetes Maternal Grandmother      Social History      Tobacco Use    Smoking status: Never Smoker    Smokeless tobacco: Never Used   Substance Use Topics    Alcohol use: No     Comment: 1-2 times per month    Drug use: No       (Not in a hospital admission)    Review of patient's allergies indicates:  No Known Allergies     Review of Systems   Constitutional: Negative.    Respiratory: Negative for cough.    Cardiovascular: Negative for chest pain.       Objective:      Vital Signs (Most Recent)  Pulse: 74 (07/26/22 0838)  BP: (!) 141/82 (07/26/22 0838)  SpO2: 99 % (07/26/22 0838)    Vital Signs Range (Last 24H):  Pulse:  [74]   BP: (141)/(82)   SpO2:  [99 %]     Physical Exam  Eyes:      Extraocular Movements: Extraocular movements intact.   Pulmonary:      Effort: Pulmonary effort is normal.   Abdominal:      General: Abdomen is flat.   Neurological:      Mental Status: He is alert.         Data Review:    CBC:   Lab Results   Component Value Date    WBC 7.03 07/26/2022    RBC 4.76 07/26/2022    HGB 13.4 (L) 07/26/2022    HCT 39.6 (L) 07/26/2022     07/26/2022     BMP:   Lab Results   Component Value Date    GLU 98 07/26/2022     07/26/2022    K 4.2 07/26/2022     07/26/2022    CO2 25 07/26/2022    BUN 49 (H) 07/26/2022    CREATININE 5.7 (H) 07/26/2022    CALCIUM 9.6 07/26/2022      ECG: no prior ECG.     Assessment:      There are no hospital problems to display for this patient.      Plan:    CT guided kidney biopsy

## 2022-07-26 NOTE — PLAN OF CARE
PT DISCHARGED VIA WHEELCHAIR TO PERSONAL VEHICLE WITH WIFE.  DISCHARGE INSTRUCTIONS REVIEWED WITH PT AND WIFE.  VERBALIZED UNDERSTANDING.  DRESSING TO LEFT FLANK CDI.  NADN.  VSS.  DR. GONZALES AT BEDSIDE TO REVIEW DISCHARGE.

## 2022-07-27 DIAGNOSIS — E11.59 HYPERTENSION COMPLICATING DIABETES: ICD-10-CM

## 2022-07-27 DIAGNOSIS — I15.2 HYPERTENSION COMPLICATING DIABETES: ICD-10-CM

## 2022-07-27 DIAGNOSIS — E11.22 STAGE 4 CHRONIC KIDNEY DISEASE DUE TO DIABETES MELLITUS: ICD-10-CM

## 2022-07-27 DIAGNOSIS — N18.4 STAGE 4 CHRONIC KIDNEY DISEASE DUE TO DIABETES MELLITUS: ICD-10-CM

## 2022-07-27 DIAGNOSIS — I10 ESSENTIAL HYPERTENSION: Chronic | ICD-10-CM

## 2022-07-27 NOTE — TELEPHONE ENCOUNTER
Refill Routing Note   Medication(s) are not appropriate for processing by [facility] for the following reason(s):      - Required vitals are abnormal    [facility] action(s):  Defer          Medication reconciliation completed: No     Appointments  past 12m or future 3m with PCP    Date Provider   Last Visit   6/7/2022 KEVIN Roberson MD   Next Visit   12/13/2022 KEVIN Roberson MD   ED visits in past 90 days: 0        Note composed:9:57 AM 07/27/2022

## 2022-07-27 NOTE — TELEPHONE ENCOUNTER
No new care gaps identified.  [facility] Embedded Care Gaps. Reference number: [id number]. 7/27/2022   9:38:52 AM MIHAIT

## 2022-07-29 ENCOUNTER — OFFICE VISIT (OUTPATIENT)
Dept: OPHTHALMOLOGY | Facility: CLINIC | Age: 42
End: 2022-07-29
Payer: COMMERCIAL

## 2022-07-29 ENCOUNTER — PATIENT MESSAGE (OUTPATIENT)
Dept: OPHTHALMOLOGY | Facility: CLINIC | Age: 42
End: 2022-07-29

## 2022-07-29 DIAGNOSIS — Z98.890 STATUS POST BILATERAL LASIK SURGERY: ICD-10-CM

## 2022-07-29 DIAGNOSIS — H35.411 LATTICE DEGENERATION OF RIGHT RETINA: ICD-10-CM

## 2022-07-29 DIAGNOSIS — Z46.0 ENCOUNTER FOR FITTING OR ADJUSTMENT OF SPECTACLES OR CONTACT LENSES: ICD-10-CM

## 2022-07-29 DIAGNOSIS — I10 ESSENTIAL HYPERTENSION: ICD-10-CM

## 2022-07-29 DIAGNOSIS — H52.13 MYOPIA, BILATERAL: ICD-10-CM

## 2022-07-29 DIAGNOSIS — E11.9 DIABETES MELLITUS TYPE 2 WITHOUT RETINOPATHY: Primary | ICD-10-CM

## 2022-07-29 PROCEDURE — 2023F DILAT RTA XM W/O RTNOPTHY: CPT | Mod: CPTII,S$GLB,, | Performed by: OPTOMETRIST

## 2022-07-29 PROCEDURE — 1159F PR MEDICATION LIST DOCUMENTED IN MEDICAL RECORD: ICD-10-PCS | Mod: CPTII,S$GLB,, | Performed by: OPTOMETRIST

## 2022-07-29 PROCEDURE — 99999 PR PBB SHADOW E&M-EST. PATIENT-LVL III: ICD-10-PCS | Mod: PBBFAC,,, | Performed by: OPTOMETRIST

## 2022-07-29 PROCEDURE — 4010F PR ACE/ARB THEARPY RXD/TAKEN: ICD-10-PCS | Mod: CPTII,S$GLB,, | Performed by: OPTOMETRIST

## 2022-07-29 PROCEDURE — 3044F PR MOST RECENT HEMOGLOBIN A1C LEVEL <7.0%: ICD-10-PCS | Mod: CPTII,S$GLB,, | Performed by: OPTOMETRIST

## 2022-07-29 PROCEDURE — 99999 PR PBB SHADOW E&M-EST. PATIENT-LVL III: CPT | Mod: PBBFAC,,, | Performed by: OPTOMETRIST

## 2022-07-29 PROCEDURE — 1159F MED LIST DOCD IN RCRD: CPT | Mod: CPTII,S$GLB,, | Performed by: OPTOMETRIST

## 2022-07-29 PROCEDURE — 3062F PR POS MACROALBUMINURIA RESULT DOCUMENTED/REVIEW: ICD-10-PCS | Mod: CPTII,S$GLB,, | Performed by: OPTOMETRIST

## 2022-07-29 PROCEDURE — 3062F POS MACROALBUMINURIA REV: CPT | Mod: CPTII,S$GLB,, | Performed by: OPTOMETRIST

## 2022-07-29 PROCEDURE — 92015 PR REFRACTION: ICD-10-PCS | Mod: S$GLB,,, | Performed by: OPTOMETRIST

## 2022-07-29 PROCEDURE — 92014 COMPRE OPH EXAM EST PT 1/>: CPT | Mod: S$GLB,,, | Performed by: OPTOMETRIST

## 2022-07-29 PROCEDURE — 3066F NEPHROPATHY DOC TX: CPT | Mod: CPTII,S$GLB,, | Performed by: OPTOMETRIST

## 2022-07-29 PROCEDURE — 3066F PR DOCUMENTATION OF TREATMENT FOR NEPHROPATHY: ICD-10-PCS | Mod: CPTII,S$GLB,, | Performed by: OPTOMETRIST

## 2022-07-29 PROCEDURE — 3044F HG A1C LEVEL LT 7.0%: CPT | Mod: CPTII,S$GLB,, | Performed by: OPTOMETRIST

## 2022-07-29 PROCEDURE — 2023F PR DILATED RETINAL EXAM W/O EVID OF RETINOPATHY: ICD-10-PCS | Mod: CPTII,S$GLB,, | Performed by: OPTOMETRIST

## 2022-07-29 PROCEDURE — 4010F ACE/ARB THERAPY RXD/TAKEN: CPT | Mod: CPTII,S$GLB,, | Performed by: OPTOMETRIST

## 2022-07-29 PROCEDURE — 92015 DETERMINE REFRACTIVE STATE: CPT | Mod: S$GLB,,, | Performed by: OPTOMETRIST

## 2022-07-29 PROCEDURE — 92014 PR EYE EXAM, EST PATIENT,COMPREHESV: ICD-10-PCS | Mod: S$GLB,,, | Performed by: OPTOMETRIST

## 2022-07-29 NOTE — PROGRESS NOTES
HPI     NIDDM exam.  No visual complaints.  Last eye exam 06/04/2021 [facility].  Update glasses and contact lenses RX.  Discuss fee to update contact lenses.  Lab Results       Component                Value               Date                       HGBA1C                   5.0                 05/28/2022                  Last edited by Shye Barney MA on 7/29/2022  1:14 PM. (History)            Assessment /Plan     For exam results, see Encounter Report.    Diabetes mellitus type 2 without retinopathy    Essential hypertension    Encounter for fitting or adjustment of spectacles or contact lenses    Status post bilateral LASIK surgery    Lattice degeneration of right retina    Myopia, bilateral      No Background Diabetic Retinopathy    No HTN Retinopathy    Mild LASIK regression, since 2006    Stable lattice    Dispense Final Rx for glasses.  RTC 1 year  Discussed above and answered questions.

## 2022-08-02 ENCOUNTER — PATIENT MESSAGE (OUTPATIENT)
Dept: NEPHROLOGY | Facility: CLINIC | Age: 42
End: 2022-08-02
Payer: COMMERCIAL

## 2022-08-03 ENCOUNTER — TELEPHONE (OUTPATIENT)
Dept: NEPHROLOGY | Facility: CLINIC | Age: 42
End: 2022-08-03
Payer: COMMERCIAL

## 2022-08-03 LAB
FINAL PATHOLOGIC DIAGNOSIS: NORMAL
Lab: NORMAL

## 2022-08-03 NOTE — TELEPHONE ENCOUNTER
I phoned Mr. Iglesias at 8:05 a.m. on 08/03/2022.  We discussed the preliminary findings of his kidney biopsy.  All nephrology related questions were answered to his satisfaction.  He has a follow-up with me on August 25th for an in-person visit.  We will also make a referral to [facility].

## 2022-08-11 DIAGNOSIS — N18.4 TYPE 2 DIABETES MELLITUS WITH STAGE 4 CHRONIC KIDNEY DISEASE, WITHOUT LONG-TERM CURRENT USE OF INSULIN: Chronic | ICD-10-CM

## 2022-08-11 DIAGNOSIS — E11.22 TYPE 2 DIABETES MELLITUS WITH STAGE 4 CHRONIC KIDNEY DISEASE, WITHOUT LONG-TERM CURRENT USE OF INSULIN: Chronic | ICD-10-CM

## 2022-08-11 RX ORDER — DULAGLUTIDE 3 MG/.5ML
INJECTION, SOLUTION SUBCUTANEOUS
Qty: 12 PEN | Refills: 1 | Status: SHIPPED | OUTPATIENT
Start: 2022-08-11 | End: 2023-01-26 | Stop reason: SDUPTHER

## 2022-08-11 NOTE — TELEPHONE ENCOUNTER
Refill Decision Note   Robb Iglesias  is requesting a refill authorization.  Brief Assessment and Rationale for Refill:  Approve     Medication Therapy Plan:       Medication Reconciliation Completed: No   Comments:     No Care Gaps recommended.     Note composed:10:20 AM 08/11/2022

## 2022-08-11 NOTE — TELEPHONE ENCOUNTER
No new care gaps identified.  [facility] Embedded Care Gaps. Reference number: [id number]. 8/11/2022   12:02:41 AM MIHAIT

## 2022-08-13 RX ORDER — TELMISARTAN 80 MG/1
80 TABLET ORAL DAILY
Qty: 30 TABLET | Refills: 0 | Status: SHIPPED | OUTPATIENT
Start: 2022-08-13 | End: 2022-10-26

## 2022-08-13 RX ORDER — AMLODIPINE BESYLATE 10 MG/1
10 TABLET ORAL NIGHTLY
Qty: 30 TABLET | Refills: 0 | Status: SHIPPED | OUTPATIENT
Start: 2022-08-13 | End: 2022-09-19

## 2022-08-13 NOTE — TELEPHONE ENCOUNTER
REFILL APPROVED  Medications Ordered This Encounter   Medications    amLODIPine (NORVASC) 10 MG tablet     Sig: Take 1 tablet (10 mg total) by mouth every evening.     Dispense:  30 tablet     Refill:  0    telmisartan (MICARDIS) 80 MG Tab     Sig: Take 1 tablet (80 mg total) by mouth once daily.     Dispense:  30 tablet     Refill:  0

## 2022-08-18 ENCOUNTER — LAB VISIT (OUTPATIENT)
Dept: LAB | Facility: HOSPITAL | Age: 42
End: 2022-08-18
Attending: INTERNAL MEDICINE
Payer: COMMERCIAL

## 2022-08-18 DIAGNOSIS — I12.9 PARENCHYMAL RENAL HYPERTENSION, STAGE 1 THROUGH STAGE 4 OR UNSPECIFIED CHRONIC KIDNEY DISEASE: ICD-10-CM

## 2022-08-18 DIAGNOSIS — N18.4 CKD (CHRONIC KIDNEY DISEASE) STAGE 4, GFR 15-29 ML/MIN: ICD-10-CM

## 2022-08-18 LAB
ALBUMIN SERPL BCP-MCNC: 4.5 G/DL (ref 3.5–5.2)
ANION GAP SERPL CALC-SCNC: 12 MMOL/L (ref 8–16)
BUN SERPL-MCNC: 45 MG/DL (ref 6–20)
CALCIUM SERPL-MCNC: 9.8 MG/DL (ref 8.7–10.5)
CHLORIDE SERPL-SCNC: 106 MMOL/L (ref 95–110)
CO2 SERPL-SCNC: 24 MMOL/L (ref 23–29)
CREAT SERPL-MCNC: 5.7 MG/DL (ref 0.5–1.4)
EST. GFR  (NO RACE VARIABLE): 12.1 ML/MIN/1.73 M^2
GLUCOSE SERPL-MCNC: 78 MG/DL (ref 70–110)
PHOSPHATE SERPL-MCNC: 4.4 MG/DL (ref 2.7–4.5)
POTASSIUM SERPL-SCNC: 4.8 MMOL/L (ref 3.5–5.1)
SODIUM SERPL-SCNC: 142 MMOL/L (ref 136–145)

## 2022-08-18 PROCEDURE — 36415 COLL VENOUS BLD VENIPUNCTURE: CPT | Performed by: INTERNAL MEDICINE

## 2022-08-18 PROCEDURE — 80069 RENAL FUNCTION PANEL: CPT | Performed by: INTERNAL MEDICINE

## 2022-08-25 ENCOUNTER — OFFICE VISIT (OUTPATIENT)
Dept: NEPHROLOGY | Facility: CLINIC | Age: 42
End: 2022-08-25
Payer: COMMERCIAL

## 2022-08-25 VITALS
WEIGHT: 231.5 LBS | BODY MASS INDEX: 28.78 KG/M2 | HEIGHT: 75 IN | DIASTOLIC BLOOD PRESSURE: 80 MMHG | SYSTOLIC BLOOD PRESSURE: 124 MMHG | HEART RATE: 80 BPM

## 2022-08-25 DIAGNOSIS — I15.1 HYPERTENSION SECONDARY TO OTHER RENAL DISORDERS: ICD-10-CM

## 2022-08-25 DIAGNOSIS — N18.5 CKD (CHRONIC KIDNEY DISEASE), SYMPTOM MANAGEMENT ONLY, STAGE 5: Primary | ICD-10-CM

## 2022-08-25 DIAGNOSIS — R80.1 PERSISTENT PROTEINURIA: ICD-10-CM

## 2022-08-25 PROCEDURE — 1159F PR MEDICATION LIST DOCUMENTED IN MEDICAL RECORD: ICD-10-PCS | Mod: CPTII,S$GLB,, | Performed by: INTERNAL MEDICINE

## 2022-08-25 PROCEDURE — 99215 OFFICE O/P EST HI 40 MIN: CPT | Mod: S$GLB,,, | Performed by: INTERNAL MEDICINE

## 2022-08-25 PROCEDURE — 4010F ACE/ARB THERAPY RXD/TAKEN: CPT | Mod: CPTII,S$GLB,, | Performed by: INTERNAL MEDICINE

## 2022-08-25 PROCEDURE — 3066F PR DOCUMENTATION OF TREATMENT FOR NEPHROPATHY: ICD-10-PCS | Mod: CPTII,S$GLB,, | Performed by: INTERNAL MEDICINE

## 2022-08-25 PROCEDURE — 99215 PR OFFICE/OUTPT VISIT, EST, LEVL V, 40-54 MIN: ICD-10-PCS | Mod: S$GLB,,, | Performed by: INTERNAL MEDICINE

## 2022-08-25 PROCEDURE — 3044F PR MOST RECENT HEMOGLOBIN A1C LEVEL <7.0%: ICD-10-PCS | Mod: CPTII,S$GLB,, | Performed by: INTERNAL MEDICINE

## 2022-08-25 PROCEDURE — 3066F NEPHROPATHY DOC TX: CPT | Mod: CPTII,S$GLB,, | Performed by: INTERNAL MEDICINE

## 2022-08-25 PROCEDURE — 99999 PR PBB SHADOW E&M-EST. PATIENT-LVL IV: CPT | Mod: PBBFAC,,, | Performed by: INTERNAL MEDICINE

## 2022-08-25 PROCEDURE — 1160F PR REVIEW ALL MEDS BY PRESCRIBER/CLIN PHARMACIST DOCUMENTED: ICD-10-PCS | Mod: CPTII,S$GLB,, | Performed by: INTERNAL MEDICINE

## 2022-08-25 PROCEDURE — 3072F PR LOW RISK FOR RETINOPATHY: ICD-10-PCS | Mod: CPTII,S$GLB,, | Performed by: INTERNAL MEDICINE

## 2022-08-25 PROCEDURE — 3074F SYST BP LT 130 MM HG: CPT | Mod: CPTII,S$GLB,, | Performed by: INTERNAL MEDICINE

## 2022-08-25 PROCEDURE — 99999 PR PBB SHADOW E&M-EST. PATIENT-LVL IV: ICD-10-PCS | Mod: PBBFAC,,, | Performed by: INTERNAL MEDICINE

## 2022-08-25 PROCEDURE — 4010F PR ACE/ARB THEARPY RXD/TAKEN: ICD-10-PCS | Mod: CPTII,S$GLB,, | Performed by: INTERNAL MEDICINE

## 2022-08-25 PROCEDURE — 3072F LOW RISK FOR RETINOPATHY: CPT | Mod: CPTII,S$GLB,, | Performed by: INTERNAL MEDICINE

## 2022-08-25 PROCEDURE — 3008F PR BODY MASS INDEX (BMI) DOCUMENTED: ICD-10-PCS | Mod: CPTII,S$GLB,, | Performed by: INTERNAL MEDICINE

## 2022-08-25 PROCEDURE — 3062F PR POS MACROALBUMINURIA RESULT DOCUMENTED/REVIEW: ICD-10-PCS | Mod: CPTII,S$GLB,, | Performed by: INTERNAL MEDICINE

## 2022-08-25 PROCEDURE — 3074F PR MOST RECENT SYSTOLIC BLOOD PRESSURE < 130 MM HG: ICD-10-PCS | Mod: CPTII,S$GLB,, | Performed by: INTERNAL MEDICINE

## 2022-08-25 PROCEDURE — 1159F MED LIST DOCD IN RCRD: CPT | Mod: CPTII,S$GLB,, | Performed by: INTERNAL MEDICINE

## 2022-08-25 PROCEDURE — 3079F PR MOST RECENT DIASTOLIC BLOOD PRESSURE 80-89 MM HG: ICD-10-PCS | Mod: CPTII,S$GLB,, | Performed by: INTERNAL MEDICINE

## 2022-08-25 PROCEDURE — 3008F BODY MASS INDEX DOCD: CPT | Mod: CPTII,S$GLB,, | Performed by: INTERNAL MEDICINE

## 2022-08-25 PROCEDURE — 3079F DIAST BP 80-89 MM HG: CPT | Mod: CPTII,S$GLB,, | Performed by: INTERNAL MEDICINE

## 2022-08-25 PROCEDURE — 3044F HG A1C LEVEL LT 7.0%: CPT | Mod: CPTII,S$GLB,, | Performed by: INTERNAL MEDICINE

## 2022-08-25 PROCEDURE — 3062F POS MACROALBUMINURIA REV: CPT | Mod: CPTII,S$GLB,, | Performed by: INTERNAL MEDICINE

## 2022-08-25 PROCEDURE — 1160F RVW MEDS BY RX/DR IN RCRD: CPT | Mod: CPTII,S$GLB,, | Performed by: INTERNAL MEDICINE

## 2022-08-25 NOTE — PROGRESS NOTES
"Subjective:       Patient ID: Robb Iglesias is a 41 y.o. male.    Chief Complaint: Chronic Kidney Disease    HPI    He presents to clinic today with his wife for routine follow-up.  Since his last office visit he has met with [facility].  He is interested in peritoneal dialysis.  Additionally there were multiple questions about kidney transplant.  All were answered to their satisfaction.  His laboratory studies and medications were reviewed.  Symptoms of uremia were reviewed in detail.  Fortunately at this time he has no symptoms.    Review of Systems   Constitutional: Negative.    HENT: Negative.    Eyes: Negative.    Respiratory: Negative.    Cardiovascular: Negative.    Gastrointestinal: Negative.    Genitourinary: Negative.    Musculoskeletal: Negative.    Skin: Negative.    Neurological: Negative.          /80   Pulse 80   Ht 6' 3" (1.905 m)   Wt 105 kg (231 lb 7.7 oz)   BMI 28.93 kg/m²     Lab Results   Component Value Date    WBC 7.03 07/26/2022    HGB 13.4 (L) 07/26/2022    HCT 39.6 (L) 07/26/2022    MCV 83 07/26/2022     07/26/2022      BMP  Lab Results   Component Value Date     08/18/2022    K 4.8 08/18/2022     08/18/2022    CO2 24 08/18/2022    BUN 45 (H) 08/18/2022    CREATININE 5.7 (H) 08/18/2022    CALCIUM 9.8 08/18/2022    ANIONGAP 12 08/18/2022    ESTGFRAFRICA 13 (A) 07/26/2022    EGFRNONAA 11 (A) 07/26/2022     CMP  Sodium   Date Value Ref Range Status   08/18/2022 142 136 - 145 mmol/L Final     Potassium   Date Value Ref Range Status   08/18/2022 4.8 3.5 - 5.1 mmol/L Final     Chloride   Date Value Ref Range Status   08/18/2022 106 95 - 110 mmol/L Final     CO2   Date Value Ref Range Status   08/18/2022 24 23 - 29 mmol/L Final     Glucose   Date Value Ref Range Status   08/18/2022 78 70 - 110 mg/dL Final     BUN   Date Value Ref Range Status   08/18/2022 45 (H) 6 - 20 mg/dL Final     Creatinine   Date Value Ref Range Status   08/18/2022 5.7 (H) 0.5 - 1.4 " mg/dL Final     Calcium   Date Value Ref Range Status   08/18/2022 9.8 8.7 - 10.5 mg/dL Final     Total Protein   Date Value Ref Range Status   07/26/2022 7.4 6.0 - 8.4 g/dL Final     Albumin   Date Value Ref Range Status   08/18/2022 4.5 3.5 - 5.2 g/dL Final     Total Bilirubin   Date Value Ref Range Status   07/26/2022 1.9 (H) 0.1 - 1.0 mg/dL Final     Comment:     For infants and newborns, interpretation of results should be based  on gestational age, weight and in agreement with clinical  observations.    Premature Infant recommended reference ranges:  Up to 24 hours.............<8.0 mg/dL  Up to 48 hours............<12.0 mg/dL  3-5 days..................<15.0 mg/dL  6-29 days.................<15.0 mg/dL       Alkaline Phosphatase   Date Value Ref Range Status   07/26/2022 104 55 - 135 U/L Final     AST   Date Value Ref Range Status   07/26/2022 25 10 - 40 U/L Final     ALT   Date Value Ref Range Status   07/26/2022 35 10 - 44 U/L Final     Anion Gap   Date Value Ref Range Status   08/18/2022 12 8 - 16 mmol/L Final     eGFR if    Date Value Ref Range Status   07/26/2022 13 (A) >60 mL/min/1.73 m^2 Final     eGFR if non    Date Value Ref Range Status   07/26/2022 11 (A) >60 mL/min/1.73 m^2 Final     Comment:     Calculation used to obtain the estimated glomerular filtration  rate (eGFR) is the CKD-EPI equation.        Current Outpatient Medications on File Prior to Visit   Medication Sig Dispense Refill    amLODIPine (NORVASC) 10 MG tablet Take 1 tablet (10 mg total) by mouth every evening. 30 tablet 0    aspirin (ECOTRIN) 81 MG EC tablet Take 1 tablet (81 mg total) by mouth once daily. 90 tablet 3    atorvastatin (LIPITOR) 80 MG tablet Take 1 tablet (80 mg total) by mouth every evening. 90 tablet 3    blood sugar diagnostic Strp Check blood glucose 2 times daily as directed and as needed (dispense insurance preferred brand or patient choice) 200 each 5    blood-glucose meter  (ONETOUCH VERIO IQ METER) Misc Use as directed 1 each 0    carvediloL (COREG) 6.25 MG tablet TAKE ONE TABLET BY MOUTH TWO TIMES A DAY WITH MEALS 60 tablet 11    cetirizine (ZYRTEC) 10 MG tablet Take 1 tablet (10 mg total) by mouth once daily. 90 tablet 3    colchicine (COLCRYS) 0.6 mg tablet Take 2 tablets at onset of acute gout attack. May take 1 more tablet 2 hours later if needed. MAX 3 TABLETS PER DAY. MAX 6 TABLETS PER WEEK. 45 tablet 3    ezetimibe (ZETIA) 10 mg tablet Take 1 tablet (10 mg total) by mouth once daily. 90 tablet 3    febuxostat (ULORIC) 80 mg Tab Take 1 tablet (80 mg total) by mouth once daily. 90 tablet 2    fluticasone propionate (FLONASE) 50 mcg/actuation nasal spray 2 sprays (100 mcg total) by Each Nostril route once daily. 16 g 0    furosemide (LASIX) 20 MG tablet TAKE ONE TABLET BY MOUTH EVERY OTHER DAY 15 tablet 11    lancets Misc Check blood glucose 2 times daily as directed and as needed (dispense insurance preferred brand or patient choice) 200 each 5    MULTIVITAMIN/IRON/FOLIC ACID (CENTRUM COMPLETE ORAL) Take by mouth once daily at 6am.      nitroGLYCERIN (NITROSTAT) 0.4 MG SL tablet Dissolve one tablet underneath tongue at onset of angina; may repeat every 5 minutes if needed. Call 911 if angina persists after 2 doses. 25 tablet 5    telmisartan (MICARDIS) 80 MG Tab Take 1 tablet (80 mg total) by mouth once daily. 30 tablet 0    TRULICITY 3 mg/0.5 mL pen injector INJECT 3 MG INTO THE SKIN EVERY 7 DAYS 12 pen 1     No current facility-administered medications on file prior to visit.            Objective:            Physical Exam  Constitutional:       Appearance: Normal appearance.   HENT:      Head: Normocephalic and atraumatic.   Eyes:      General: No scleral icterus.     Extraocular Movements: Extraocular movements intact.      Pupils: Pupils are equal, round, and reactive to light.   Pulmonary:      Effort: Pulmonary effort is normal.      Breath sounds: No stridor.    Musculoskeletal:      Right lower leg: No edema.      Left lower leg: No edema.   Skin:     General: Skin is warm.   Neurological:      General: No focal deficit present.      Mental Status: He is alert and oriented to person, place, and time.   Psychiatric:         Mood and Affect: Mood normal.         Behavior: Behavior normal.         Assessment:       1. CKD (chronic kidney disease), symptom management only, stage 5    2. Hypertension secondary to other renal disorders    3. Persistent proteinuria        Plan:       1. Creatinine has ranged between 5 and 6 for the past 3 months.  He has no symptoms of uremia at this time.  As per HPI he has met with [facility].  He is interested in peritoneal dialysis when renal replacement therapy as necessary.  We discussed the importance of going ahead and being evaluated for catheter placement.  Will continue to monitor closely for symptoms of uremia.  These were reviewed with he and his wife in detail.    He is also interested in referral to transplant for evaluation.    Potassium is stable 4.8.  Bicarbonate is stable at 24.  He has no evidence of edema or volume overload at this time.    2. Blood pressure is well controlled on current regimen.  He remains on RAAS inhibition.    3. He continues to have proteinuria about 3 g by PC ratio.  Due to diabetic glomerulopathy.  He continues on RAAS inhibition.  If his potassium remains unstable we may need to discontinue.    Approximately 50 minutes was spent in face-to-face conversation and chart review.        Siva Figueroa MD

## 2022-09-06 ENCOUNTER — TELEPHONE (OUTPATIENT)
Dept: TRANSPLANT | Facility: CLINIC | Age: 42
End: 2022-09-06
Payer: COMMERCIAL

## 2022-09-06 NOTE — TELEPHONE ENCOUNTER
Contacted patient to review initial intake information. Patient reports the followin. Can you walk up a flight of stairs without getting short of breath or stopping? Yes   2. Can you walk one block without getting short of breath or having to stop? Yes   3. Do you use oxygen? No   4. Do you use a cane, walker, or wheel chair to assist in mobility? No   5. Have you been hospitalized or had recent surgery in the last 6 months? No    A. Stroke   B. Heart surgery or heart catheterization   C. Broken bone  6. Do you have any cuts, open sores (ulcers), or wounds anywhere on your body? No   7. Do you go to dialysis? No  8. Preferred appointment day? Anyday  9. Caregiver? Wife (Shannon)    Patient is aware the next steps will include completing records and compliance verification. Patient is aware once provider review and insurance authorization is received we will contact patient to schedule initial visit. Patient is aware that initial visit will begin prior to / at 7 am and will conclude at approximately 3 pm on date of appointment. All questions answered at this time.

## 2022-09-07 ENCOUNTER — LAB VISIT (OUTPATIENT)
Dept: LAB | Facility: HOSPITAL | Age: 42
End: 2022-09-07
Attending: INTERNAL MEDICINE
Payer: COMMERCIAL

## 2022-09-07 DIAGNOSIS — E11.21 TYPE 2 DIABETES MELLITUS WITH DIABETIC NEPHROPATHY, WITHOUT LONG-TERM CURRENT USE OF INSULIN: Chronic | ICD-10-CM

## 2022-09-07 DIAGNOSIS — I25.10 CORONARY ARTERY DISEASE INVOLVING NATIVE CORONARY ARTERY OF NATIVE HEART WITHOUT ANGINA PECTORIS: Chronic | ICD-10-CM

## 2022-09-07 LAB
ALBUMIN SERPL BCP-MCNC: 4.2 G/DL (ref 3.5–5.2)
ALP SERPL-CCNC: 81 U/L (ref 55–135)
ALT SERPL W/O P-5'-P-CCNC: 30 U/L (ref 10–44)
ANION GAP SERPL CALC-SCNC: 9 MMOL/L (ref 8–16)
AST SERPL-CCNC: 23 U/L (ref 10–40)
BILIRUB SERPL-MCNC: 1.4 MG/DL (ref 0.1–1)
BUN SERPL-MCNC: 55 MG/DL (ref 6–20)
CALCIUM SERPL-MCNC: 9.8 MG/DL (ref 8.7–10.5)
CHLORIDE SERPL-SCNC: 109 MMOL/L (ref 95–110)
CHOLEST SERPL-MCNC: 88 MG/DL (ref 120–199)
CHOLEST/HDLC SERPL: 3.3 {RATIO} (ref 2–5)
CO2 SERPL-SCNC: 23 MMOL/L (ref 23–29)
CREAT SERPL-MCNC: 5.3 MG/DL (ref 0.5–1.4)
EST. GFR  (NO RACE VARIABLE): 13.2 ML/MIN/1.73 M^2
ESTIMATED AVG GLUCOSE: 94 MG/DL (ref 68–131)
GLUCOSE SERPL-MCNC: 90 MG/DL (ref 70–110)
HBA1C MFR BLD: 4.9 % (ref 4–5.6)
HDLC SERPL-MCNC: 27 MG/DL (ref 40–75)
HDLC SERPL: 30.7 % (ref 20–50)
LDLC SERPL CALC-MCNC: 37.2 MG/DL (ref 63–159)
NONHDLC SERPL-MCNC: 61 MG/DL
POTASSIUM SERPL-SCNC: 4 MMOL/L (ref 3.5–5.1)
PROT SERPL-MCNC: 7.2 G/DL (ref 6–8.4)
SODIUM SERPL-SCNC: 141 MMOL/L (ref 136–145)
TRIGL SERPL-MCNC: 119 MG/DL (ref 30–150)

## 2022-09-07 PROCEDURE — 80053 COMPREHEN METABOLIC PANEL: CPT | Mod: NTX | Performed by: INTERNAL MEDICINE

## 2022-09-07 PROCEDURE — 83036 HEMOGLOBIN GLYCOSYLATED A1C: CPT | Mod: NTX | Performed by: INTERNAL MEDICINE

## 2022-09-07 PROCEDURE — 36415 COLL VENOUS BLD VENIPUNCTURE: CPT | Mod: NTX | Performed by: INTERNAL MEDICINE

## 2022-09-07 PROCEDURE — 80061 LIPID PANEL: CPT | Mod: NTX | Performed by: INTERNAL MEDICINE

## 2022-09-08 ENCOUNTER — TELEPHONE (OUTPATIENT)
Dept: TRANSPLANT | Facility: CLINIC | Age: 42
End: 2022-09-08
Payer: COMMERCIAL

## 2022-09-21 ENCOUNTER — PATIENT MESSAGE (OUTPATIENT)
Dept: NEPHROLOGY | Facility: CLINIC | Age: 42
End: 2022-09-21
Payer: COMMERCIAL

## 2022-09-21 ENCOUNTER — INITIAL CONSULT (OUTPATIENT)
Dept: VASCULAR SURGERY | Facility: CLINIC | Age: 42
End: 2022-09-21
Payer: COMMERCIAL

## 2022-09-21 VITALS
SYSTOLIC BLOOD PRESSURE: 141 MMHG | WEIGHT: 234.81 LBS | DIASTOLIC BLOOD PRESSURE: 95 MMHG | TEMPERATURE: 98 F | HEART RATE: 76 BPM | BODY MASS INDEX: 29.35 KG/M2

## 2022-09-21 DIAGNOSIS — N18.5 CKD (CHRONIC KIDNEY DISEASE), SYMPTOM MANAGEMENT ONLY, STAGE 5: Primary | ICD-10-CM

## 2022-09-21 PROCEDURE — 99999 PR PBB SHADOW E&M-EST. PATIENT-LVL III: ICD-10-PCS | Mod: PBBFAC,TXP,, | Performed by: SURGERY

## 2022-09-21 PROCEDURE — 99204 OFFICE O/P NEW MOD 45 MIN: CPT | Mod: S$GLB,,, | Performed by: SURGERY

## 2022-09-21 PROCEDURE — 99204 PR OFFICE/OUTPT VISIT, NEW, LEVL IV, 45-59 MIN: ICD-10-PCS | Mod: S$GLB,,, | Performed by: SURGERY

## 2022-09-21 PROCEDURE — 99999 PR PBB SHADOW E&M-EST. PATIENT-LVL III: CPT | Mod: PBBFAC,TXP,, | Performed by: SURGERY

## 2022-09-21 NOTE — PROGRESS NOTES
[facility]  Congenital Cardiovascular Surgery  Consult Note    Patient Name: Robb Iglesias  MRN: [id number]  Admission Date: (Not on file)  Hospital Length of Stay: 0 days   Attending Physician: No att. providers found  Primary Care Provider: SABIHA Roberson MD    Patient information was obtained from patient.     Consults  Subjective:     Chief Complaint kidney failure    History of Present Illness: presents to clinic today for evaluation for a peritoneal dialysis catheter placement.    He is interested in peritoneal dialysis.  Additionally there were multiple questions about kidney transplant.  All were answered to their satisfaction.  His laboratory studies and medications were reviewed.  Symptoms of uremia were reviewed in detail.  Fortunately at this time he has no symptoms.       Current Outpatient Medications   Medication    amLODIPine (NORVASC) 10 MG tablet    aspirin (ECOTRIN) 81 MG EC tablet    atorvastatin (LIPITOR) 80 MG tablet    blood sugar diagnostic Strp    blood-glucose meter (ONETOUCH VERIO IQ METER) Misc    carvediloL (COREG) 6.25 MG tablet    cetirizine (ZYRTEC) 10 MG tablet    colchicine (COLCRYS) 0.6 mg tablet    ezetimibe (ZETIA) 10 mg tablet    febuxostat (ULORIC) 80 mg Tab    fluticasone propionate (FLONASE) 50 mcg/actuation nasal spray    furosemide (LASIX) 20 MG tablet    lancets Misc    MULTIVITAMIN/IRON/FOLIC ACID (CENTRUM COMPLETE ORAL)    nitroGLYCERIN (NITROSTAT) 0.4 MG SL tablet    telmisartan (MICARDIS) 80 MG Tab    TRULICITY 3 mg/0.5 mL pen injector     No current facility-administered medications for this visit.       Review of patient's allergies indicates:  No Known Allergies    Past Medical History:   Diagnosis Date    Allergic rhinitis     Class 1 obesity due to excess calories with serious comorbidity and body mass index (BMI) of 31.0 to 31.9 in adult 4/7/2017    Coronary artery disease     Direct hyperbilirubinemia 3/24/2018    DM (diabetes mellitus)  2008    BS doesn't check any more 08/02/2018    DM (diabetes mellitus) 2012    BS 99 am 06/26/2020    DM (diabetes mellitus)     BS didn't check 06/04/2021    DM (diabetes mellitus) 2008    BS didn't check 07/29/2022     Elevated bilirubin 3/21/2018    GERD (gastroesophageal reflux disease)     Gout     Hyperlipidemia     Hypertension associated with chronic kidney disease due to type 2 diabetes mellitus     Idiopathic chronic gout, multiple sites, without tophus (tophi) 7/19/2017    Long term (current) use of insulin     MI (myocardial infarction) 07/2017    Obesity     HENRY on CPAP     Proteinuria     Steatohepatitis     Fatty Liver    Type 2 diabetes mellitus with diabetic nephropathy     Type 2 diabetes mellitus with diabetic nephropathy, without long-term current use of insulin 1/6/2020    Type 2 diabetes mellitus with hyperglycemia     Type 2 diabetes mellitus with renal manifestations     Type 2 diabetes mellitus with stage 3 chronic kidney disease, without long-term current use of insulin 6/21/2017     Past Surgical History:   Procedure Laterality Date    CORONARY ANGIOPLASTY WITH STENT PLACEMENT      LASIK Bilateral     LIVER BIOPSY      NASAL ENDOSCOPY      NASAL SEPTUM SURGERY      SLEEVE GASTROPLASTY  03/06/2017     Family History       Problem Relation (Age of Onset)    Diabetes Mother, Maternal Grandmother    Diabetes type II Mother, Brother, Brother    Hyperlipidemia Mother, Father    Hypertension Mother          Tobacco Use    Smoking status: Never    Smokeless tobacco: Never   Substance and Sexual Activity    Alcohol use: No     Comment: 1-2 times per month    Drug use: No    Sexual activity: Yes     Partners: Female     Review of Systems   Constitutional:  Negative for activity change, appetite change, fatigue and fever.   HENT:  Negative for congestion.    Eyes:  Negative for photophobia, redness and visual disturbance.   Respiratory:  Negative for apnea, cough, chest tightness and shortness of  breath.    Cardiovascular:  Negative for chest pain and leg swelling.   Gastrointestinal:  Negative for abdominal pain, nausea and vomiting.   Genitourinary:  Negative for difficulty urinating.   Musculoskeletal:  Negative for gait problem and myalgias.   Skin:  Negative for color change, rash and wound.   Neurological:  Negative for syncope, facial asymmetry, speech difficulty, weakness and numbness.   Objective:     Vital Signs (Most Recent):  Temp: 98.2 °F (36.8 °C) (09/21/22 0810)  Pulse: 76 (09/21/22 0810)  BP: (!) 141/95 (09/21/22 0810)   Vital Signs (24h Range):  [unfilled]     Weight: 106.5 kg (234 lb 12.6 oz)  Body mass index is 29.35 kg/m².            [unfilled]    Physical Exam  Vitals and nursing note reviewed.   Constitutional:       Appearance: Normal appearance. He is normal weight.   HENT:      Head: Normocephalic and atraumatic.      Mouth/Throat:      Mouth: Mucous membranes are moist.   Eyes:      Extraocular Movements: Extraocular movements intact.      Conjunctiva/sclera: Conjunctivae normal.      Pupils: Pupils are equal, round, and reactive to light.   Neck:      Vascular: No carotid bruit.   Cardiovascular:      Rate and Rhythm: Normal rate and regular rhythm.      Pulses: Normal pulses.   Pulmonary:      Effort: Pulmonary effort is normal.      Breath sounds: Normal breath sounds.   Abdominal:      General: Abdomen is flat. Bowel sounds are normal.      Palpations: Abdomen is soft.   Musculoskeletal:      Cervical back: Neck supple.   Skin:     General: Skin is warm.      Capillary Refill: Capillary refill takes 2 to 3 seconds.   Neurological:      General: No focal deficit present.      Mental Status: He is alert and oriented to person, place, and time. Mental status is at baseline.      Cranial Nerves: No cranial nerve deficit.      Sensory: No sensory deficit.      Motor: No weakness.   Psychiatric:         Mood and Affect: Mood normal.         Behavior: Behavior normal.       Significant  Labs:  I have reviewed all pertinent lab results within the past 24 hours.  CBC: [unfilled]  BMP: [unfilled]    Significant Diagnostics:      Assessment/Plan:   I have reviewed all pertinent imaging results/findings within the past 24 hours.  U/S: I have reviewed all pertinent results/findings within the past 24 hours and my personal findings are:        Chronic kidney disease stage 5.  Patient is being evaluated for kidney transplantation.  Patient's last visit with nephrology he expressed desire to proceed with peritoneal dialysis.  Unfortunately patient was sent to vascular Clinic for this.  We will refer patient to general surgery for peritoneal dialysis catheter placement        Thank you for your consult. I will sign off. Please contact us if you have any additional questions.    Rohan Maloney IV, MD  Vascular Surgery  [facility]

## 2022-09-23 ENCOUNTER — PATIENT MESSAGE (OUTPATIENT)
Dept: TRANSPLANT | Facility: CLINIC | Age: 42
End: 2022-09-23
Payer: COMMERCIAL

## 2022-09-23 DIAGNOSIS — Z76.82 ORGAN TRANSPLANT CANDIDATE: Primary | ICD-10-CM

## 2022-09-27 ENCOUNTER — PATIENT MESSAGE (OUTPATIENT)
Dept: NEPHROLOGY | Facility: CLINIC | Age: 42
End: 2022-09-27
Payer: COMMERCIAL

## 2022-09-29 ENCOUNTER — TELEPHONE (OUTPATIENT)
Dept: TRANSPLANT | Facility: CLINIC | Age: 42
End: 2022-09-29
Payer: COMMERCIAL

## 2022-09-29 NOTE — LETTER
Date: 9/29/2022          Pre-Dialysis      To: Dialysis Unit  and Charge RN From: [facility] and      [facility]ney [facility] Nurse Coordinators    RE: Robb Iglesias, 1980, [id number]     At [facility], we conduct adherence checks as an important part of transplant care. Initial and listed patient assessments are not complete without adherence information.        Please complete the following information:                 Data from the last 3 months:  (data from last 3 months preferred):    Number of AMAs with dates, time, and reasons: ____________________________________________________    ______________________________________________________________________________________________    ______________________________________________________________________________________________    Number of No-Shows with dates and reasons: ______________________________________________________      ______________________________________________________________________________________________      Any concerns with Caregivers:  YES / NO    If yes, please explain:  ___________________________________________________________________________    ______________________________________________________________________________________________     Any concerns with Transportation:  YES / NO    If yes, please explain:  ___________________________________________________________________________    ______________________________________________________________________________________________    Any Psychiatric and/or Psychosocial concerns:  YES / NO     If yes, please explain: ___________________________________________________________________________    ______________________________________________________________________________________________      PLEASE RETURN TO: FAX: [phone]     Thank you for collaborating with us in the care of this patient.            [address]  ?  [place] [address]  ?  phone [phone]  ?  fax [phone]  ?  [email or web address]  Confidentiality notice: The accompanying facsimile is intended solely for the use of the recipient designated above. Document(s) transmitted herewith may contain information that is confidential and privileged. Delivery, distribution or dissemination of this communication other than to the intended recipient is strictly prohibited. If you have received this facsimile in error, please notify us immediately by telephone.

## 2022-09-29 NOTE — LETTER
Date: 9/29/2022          Referral Process      To: Dialysis Unit  and Charge RN From: [facility] and      K[facility]ney Transplant Nurse Coordinators    RE: Robb Iglesias, 1980, [id number]     At [facility], we conduct adherence checks as an important part of transplant care. Initial and listed patient assessments are not complete without adherence information.        Please complete the following information:                 Data from the last 3 months:  (data from last 3 months preferred):    Number of AMAs with dates, time, and reasons: ____________________________________________________    ______________________________________________________________________________________________    ______________________________________________________________________________________________    Number of No-Shows with dates and reasons: ______________________________________________________      ______________________________________________________________________________________________      Any concerns with Caregivers:  YES / NO    If yes, please explain:  ___________________________________________________________________________    ______________________________________________________________________________________________     Any concerns with Transportation:  YES / NO    If yes, please explain:  ___________________________________________________________________________    ______________________________________________________________________________________________    Any Psychiatric and/or Psychosocial concerns:  YES / NO     If yes, please explain: ___________________________________________________________________________    ______________________________________________________________________________________________      PLEASE RETURN TO: FAX: [phone]     Thank you for collaborating with us in the care of this patient.            [address]  ?  [place] [address]  ?  phone [phone]  ?  fax [phone]  ?  [email or web address]  Confidentiality notice: The accompanying facsimile is intended solely for the use of the recipient designated above. Document(s) transmitted herewith may contain information that is confidential and privileged. Delivery, distribution or dissemination of this communication other than to the intended recipient is strictly prohibited. If you have received this facsimile in error, please notify us immediately by telephone.

## 2022-09-30 ENCOUNTER — OFFICE VISIT (OUTPATIENT)
Dept: CARDIOLOGY | Facility: CLINIC | Age: 42
End: 2022-09-30
Payer: COMMERCIAL

## 2022-09-30 ENCOUNTER — HOSPITAL ENCOUNTER (OUTPATIENT)
Dept: CARDIOLOGY | Facility: HOSPITAL | Age: 42
Discharge: HOME | End: 2022-09-30
Attending: INTERNAL MEDICINE
Payer: COMMERCIAL

## 2022-09-30 VITALS
HEART RATE: 68 BPM | DIASTOLIC BLOOD PRESSURE: 84 MMHG | OXYGEN SATURATION: 98 % | SYSTOLIC BLOOD PRESSURE: 136 MMHG | WEIGHT: 236.31 LBS | BODY MASS INDEX: 29.54 KG/M2

## 2022-09-30 DIAGNOSIS — E11.69 DYSLIPIDEMIA ASSOCIATED WITH TYPE 2 DIABETES MELLITUS: Chronic | ICD-10-CM

## 2022-09-30 DIAGNOSIS — Z95.820 STATUS POST ANGIOPLASTY WITH STENT: ICD-10-CM

## 2022-09-30 DIAGNOSIS — E11.22 TYPE 2 DIABETES MELLITUS WITH STAGE 4 CHRONIC KIDNEY DISEASE, WITHOUT LONG-TERM CURRENT USE OF INSULIN: Chronic | ICD-10-CM

## 2022-09-30 DIAGNOSIS — E11.59 HYPERTENSION COMPLICATING DIABETES: ICD-10-CM

## 2022-09-30 DIAGNOSIS — Z01.818 PRE-OP EXAM: Primary | ICD-10-CM

## 2022-09-30 DIAGNOSIS — E11.21 TYPE 2 DIABETES MELLITUS WITH DIABETIC NEPHROPATHY, WITHOUT LONG-TERM CURRENT USE OF INSULIN: Chronic | ICD-10-CM

## 2022-09-30 DIAGNOSIS — N18.4 TYPE 2 DIABETES MELLITUS WITH STAGE 4 CHRONIC KIDNEY DISEASE, WITHOUT LONG-TERM CURRENT USE OF INSULIN: Chronic | ICD-10-CM

## 2022-09-30 DIAGNOSIS — E66.09 CLASS 1 OBESITY DUE TO EXCESS CALORIES WITH SERIOUS COMORBIDITY AND BODY MASS INDEX (BMI) OF 30.0 TO 30.9 IN ADULT: Chronic | ICD-10-CM

## 2022-09-30 DIAGNOSIS — E11.42 TYPE 2 DIABETES MELLITUS WITH DIABETIC POLYNEUROPATHY, WITHOUT LONG-TERM CURRENT USE OF INSULIN: ICD-10-CM

## 2022-09-30 DIAGNOSIS — I15.2 HYPERTENSION COMPLICATING DIABETES: ICD-10-CM

## 2022-09-30 DIAGNOSIS — I10 ESSENTIAL HYPERTENSION: Chronic | ICD-10-CM

## 2022-09-30 DIAGNOSIS — N25.81 HYPERPARATHYROIDISM, SECONDARY RENAL: ICD-10-CM

## 2022-09-30 DIAGNOSIS — E80.6 DIRECT HYPERBILIRUBINEMIA: Chronic | ICD-10-CM

## 2022-09-30 DIAGNOSIS — Z98.84 BARIATRIC SURGERY STATUS: Chronic | ICD-10-CM

## 2022-09-30 DIAGNOSIS — N18.4 STAGE 4 CHRONIC KIDNEY DISEASE: Chronic | ICD-10-CM

## 2022-09-30 DIAGNOSIS — I21.4 NSTEMI (NON-ST ELEVATED MYOCARDIAL INFARCTION): ICD-10-CM

## 2022-09-30 DIAGNOSIS — I25.10 CORONARY ARTERY DISEASE INVOLVING NATIVE CORONARY ARTERY OF NATIVE HEART WITHOUT ANGINA PECTORIS: Primary | Chronic | ICD-10-CM

## 2022-09-30 DIAGNOSIS — G47.33 OBSTRUCTIVE SLEEP APNEA: Chronic | ICD-10-CM

## 2022-09-30 DIAGNOSIS — E78.5 DYSLIPIDEMIA ASSOCIATED WITH TYPE 2 DIABETES MELLITUS: Chronic | ICD-10-CM

## 2022-09-30 DIAGNOSIS — Z98.84 STATUS FOLLOWING SURGERY FOR WEIGHT LOSS: ICD-10-CM

## 2022-09-30 DIAGNOSIS — Z01.818 PRE-OP EXAM: ICD-10-CM

## 2022-09-30 PROCEDURE — 3062F POS MACROALBUMINURIA REV: CPT | Mod: CPTII,NTX,S$GLB, | Performed by: INTERNAL MEDICINE

## 2022-09-30 PROCEDURE — 1160F PR REVIEW ALL MEDS BY PRESCRIBER/CLIN PHARMACIST DOCUMENTED: ICD-10-PCS | Mod: CPTII,NTX,S$GLB, | Performed by: INTERNAL MEDICINE

## 2022-09-30 PROCEDURE — 3008F PR BODY MASS INDEX (BMI) DOCUMENTED: ICD-10-PCS | Mod: CPTII,NTX,S$GLB, | Performed by: INTERNAL MEDICINE

## 2022-09-30 PROCEDURE — 3072F PR LOW RISK FOR RETINOPATHY: ICD-10-PCS | Mod: CPTII,NTX,S$GLB, | Performed by: INTERNAL MEDICINE

## 2022-09-30 PROCEDURE — 3044F HG A1C LEVEL LT 7.0%: CPT | Mod: CPTII,NTX,S$GLB, | Performed by: INTERNAL MEDICINE

## 2022-09-30 PROCEDURE — 1159F PR MEDICATION LIST DOCUMENTED IN MEDICAL RECORD: ICD-10-PCS | Mod: CPTII,NTX,S$GLB, | Performed by: INTERNAL MEDICINE

## 2022-09-30 PROCEDURE — 99999 PR PBB SHADOW E&M-EST. PATIENT-LVL III: ICD-10-PCS | Mod: PBBFAC,TXP,, | Performed by: INTERNAL MEDICINE

## 2022-09-30 PROCEDURE — 3075F PR MOST RECENT SYSTOLIC BLOOD PRESS GE 130-139MM HG: ICD-10-PCS | Mod: CPTII,NTX,S$GLB, | Performed by: INTERNAL MEDICINE

## 2022-09-30 PROCEDURE — 3079F PR MOST RECENT DIASTOLIC BLOOD PRESSURE 80-89 MM HG: ICD-10-PCS | Mod: CPTII,NTX,S$GLB, | Performed by: INTERNAL MEDICINE

## 2022-09-30 PROCEDURE — 3062F PR POS MACROALBUMINURIA RESULT DOCUMENTED/REVIEW: ICD-10-PCS | Mod: CPTII,NTX,S$GLB, | Performed by: INTERNAL MEDICINE

## 2022-09-30 PROCEDURE — 99214 OFFICE O/P EST MOD 30 MIN: CPT | Mod: NTX,S$GLB,, | Performed by: INTERNAL MEDICINE

## 2022-09-30 PROCEDURE — 93010 EKG 12-LEAD: ICD-10-PCS | Mod: NTX,,, | Performed by: STUDENT IN AN ORGANIZED HEALTH CARE EDUCATION/TRAINING PROGRAM

## 2022-09-30 PROCEDURE — 4010F PR ACE/ARB THEARPY RXD/TAKEN: ICD-10-PCS | Mod: CPTII,NTX,S$GLB, | Performed by: INTERNAL MEDICINE

## 2022-09-30 PROCEDURE — 3075F SYST BP GE 130 - 139MM HG: CPT | Mod: CPTII,NTX,S$GLB, | Performed by: INTERNAL MEDICINE

## 2022-09-30 PROCEDURE — 3066F PR DOCUMENTATION OF TREATMENT FOR NEPHROPATHY: ICD-10-PCS | Mod: CPTII,NTX,S$GLB, | Performed by: INTERNAL MEDICINE

## 2022-09-30 PROCEDURE — 3066F NEPHROPATHY DOC TX: CPT | Mod: CPTII,NTX,S$GLB, | Performed by: INTERNAL MEDICINE

## 2022-09-30 PROCEDURE — 1160F RVW MEDS BY RX/DR IN RCRD: CPT | Mod: CPTII,NTX,S$GLB, | Performed by: INTERNAL MEDICINE

## 2022-09-30 PROCEDURE — 93010 ELECTROCARDIOGRAM REPORT: CPT | Mod: NTX,,, | Performed by: STUDENT IN AN ORGANIZED HEALTH CARE EDUCATION/TRAINING PROGRAM

## 2022-09-30 PROCEDURE — 3072F LOW RISK FOR RETINOPATHY: CPT | Mod: CPTII,NTX,S$GLB, | Performed by: INTERNAL MEDICINE

## 2022-09-30 PROCEDURE — 3079F DIAST BP 80-89 MM HG: CPT | Mod: CPTII,NTX,S$GLB, | Performed by: INTERNAL MEDICINE

## 2022-09-30 PROCEDURE — 1159F MED LIST DOCD IN RCRD: CPT | Mod: CPTII,NTX,S$GLB, | Performed by: INTERNAL MEDICINE

## 2022-09-30 PROCEDURE — 99214 PR OFFICE/OUTPT VISIT, EST, LEVL IV, 30-39 MIN: ICD-10-PCS | Mod: NTX,S$GLB,, | Performed by: INTERNAL MEDICINE

## 2022-09-30 PROCEDURE — 93005 ELECTROCARDIOGRAM TRACING: CPT | Mod: TXP

## 2022-09-30 PROCEDURE — 99999 PR PBB SHADOW E&M-EST. PATIENT-LVL III: CPT | Mod: PBBFAC,TXP,, | Performed by: INTERNAL MEDICINE

## 2022-09-30 PROCEDURE — 4010F ACE/ARB THERAPY RXD/TAKEN: CPT | Mod: CPTII,NTX,S$GLB, | Performed by: INTERNAL MEDICINE

## 2022-09-30 PROCEDURE — 3044F PR MOST RECENT HEMOGLOBIN A1C LEVEL <7.0%: ICD-10-PCS | Mod: CPTII,NTX,S$GLB, | Performed by: INTERNAL MEDICINE

## 2022-09-30 PROCEDURE — 3008F BODY MASS INDEX DOCD: CPT | Mod: CPTII,NTX,S$GLB, | Performed by: INTERNAL MEDICINE

## 2022-09-30 NOTE — PROGRESS NOTES
Subjective:   Patient ID:  Robb Iglesias is a 41 y.o. male who presents for follow up of No chief complaint on file.      HPI  6/11/2020  A 38 yo male with cad s/p lad stent old mi htn hlp diabetes is here for f /u he is still exercising regulaqrily he is compliant with diet exercise lost some 10 lbs he is on digital htn however having issues with logistics he is going to [facility]. Has no new chest pain or shortness of breath. He is not using his cpap regularily has issues clinically with compliance.  He has isusses with cpap compliance . I think this is a an anaxiety related to mask or claustrophobia. He is able to exercise w/o any issues. Lipids a1c are not on target.he has labs pending for am.     6/16/2021  HE IS HERE FOR F/U HIS HTN HAS BEEN AN ISSUE CLINICALLY HE IS COMPLIANT WITH MEDS AND DIET HE HAS BP MEDS SWITCHED TO MICARDIS FROM [place] . HE IS INTOLERANT TO CPAP. HE IS COMPLIANT WITH DITE HE TAKES MEDS REGULARTIY STARTED EXERCISING HIS BP HAS BEEN ELEVATED.HE IS ON AMLODIPINE AND CARDIZEM. NO LEG SWELLING      9/9/2021   HERE FOR F /U HAS BEEN IN DIGITAL PROGRAM FOR DIABETES And htn numbers are better. Today bp is more elevated than usual he thinks it is always higher in office. Has no new complaints of chest pain or shortness of breath no leg swelling he tries to exercise has house gym tries to be compliant with diet they cook their food stays away from eating out.has no chf symptoms. Has sleep apnea previously not able to use cpap previously he got retested has mild sleep apnea after weight loss no therapy he sleeps sitting up a little.      3/10/2022  Her efrof /u has been compliant with diet had recurrent covid his renal function deteriorated but improving had been under a lot of stress. Has no angina or chf symptoms claudication tia. Had palpitation after drinking coffee in the afternoon self terminated.     9/30/2022   He has progression of renal disease he has uremic symptoms he has fatigue  nausea he is in need of pd catheter placement to initiate dialysis. He is asymptomatic cardiac wise. He is only on asa.he ahs some swelling in feet at the ehena of the day. He claims compliance with diet.   Past Medical History:   Diagnosis Date    Allergic rhinitis     Class 1 obesity due to excess calories with serious comorbidity and body mass index (BMI) of 31.0 to 31.9 in adult 4/7/2017    Coronary artery disease     Direct hyperbilirubinemia 3/24/2018    DM (diabetes mellitus) 2008    BS doesn't check any more 08/02/2018    DM (diabetes mellitus) 2012    BS 99 am 06/26/2020    DM (diabetes mellitus)     BS didn't check 06/04/2021    DM (diabetes mellitus) 2008    BS didn't check 07/29/2022     Elevated bilirubin 3/21/2018    GERD (gastroesophageal reflux disease)     Gout     Hyperlipidemia     Hypertension associated with chronic kidney disease due to type 2 diabetes mellitus     Idiopathic chronic gout, multiple sites, without tophus (tophi) 7/19/2017    Long term (current) use of insulin     MI (myocardial infarction) 07/2017    Obesity     HENRY on CPAP     Proteinuria     Steatohepatitis     Fatty Liver    Type 2 diabetes mellitus with diabetic nephropathy     Type 2 diabetes mellitus with diabetic nephropathy, without long-term current use of insulin 1/6/2020    Type 2 diabetes mellitus with hyperglycemia     Type 2 diabetes mellitus with renal manifestations     Type 2 diabetes mellitus with stage 3 chronic kidney disease, without long-term current use of insulin 6/21/2017       Past Surgical History:   Procedure Laterality Date    CORONARY ANGIOPLASTY WITH STENT PLACEMENT      LASIK Bilateral     LIVER BIOPSY      NASAL ENDOSCOPY      NASAL SEPTUM SURGERY      SLEEVE GASTROPLASTY  03/06/2017       Social History     Tobacco Use    Smoking status: Never    Smokeless tobacco: Never   Substance Use Topics    Alcohol use: No     Comment: 1-2 times per month    Drug use: No       Family History   Problem  Relation Age of Onset    Diabetes type II Mother     Hypertension Mother     Hyperlipidemia Mother     Diabetes Mother     Hyperlipidemia Father     Diabetes type II Brother     Diabetes type II Brother     Diabetes Maternal Grandmother        Current Outpatient Medications   Medication Sig    amLODIPine (NORVASC) 10 MG tablet TAKE 1 TABLET (10 MG TOTAL) BY MOUTH EVERY EVENING.    aspirin (ECOTRIN) 81 MG EC tablet Take 1 tablet (81 mg total) by mouth once daily.    atorvastatin (LIPITOR) 80 MG tablet Take 1 tablet (80 mg total) by mouth every evening.    blood sugar diagnostic Strp Check blood glucose 2 times daily as directed and as needed (dispense insurance preferred brand or patient choice)    blood-glucose meter (ONETOUCH VERIO IQ METER) Misc Use as directed    carvediloL (COREG) 6.25 MG tablet TAKE ONE TABLET BY MOUTH TWO TIMES A DAY WITH MEALS    cetirizine (ZYRTEC) 10 MG tablet Take 1 tablet (10 mg total) by mouth once daily.    colchicine (COLCRYS) 0.6 mg tablet Take 2 tablets at onset of acute gout attack. May take 1 more tablet 2 hours later if needed. MAX 3 TABLETS PER DAY. MAX 6 TABLETS PER WEEK.    ezetimibe (ZETIA) 10 mg tablet Take 1 tablet (10 mg total) by mouth once daily.    febuxostat (ULORIC) 80 mg Tab Take 1 tablet (80 mg total) by mouth once daily.    fluticasone propionate (FLONASE) 50 mcg/actuation nasal spray 2 sprays (100 mcg total) by Each Nostril route once daily.    furosemide (LASIX) 20 MG tablet TAKE ONE TABLET BY MOUTH EVERY OTHER DAY    lancets Misc Check blood glucose 2 times daily as directed and as needed (dispense insurance preferred brand or patient choice)    MULTIVITAMIN/IRON/FOLIC ACID (CENTRUM COMPLETE ORAL) Take by mouth once daily at 6am.    nitroGLYCERIN (NITROSTAT) 0.4 MG SL tablet Dissolve one tablet underneath tongue at onset of angina; may repeat every 5 minutes if needed. Call 911 if angina persists after 2 doses.    telmisartan (MICARDIS) 80 MG Tab Take 1 tablet  (80 mg total) by mouth once daily.    TRULICITY 3 mg/0.5 mL pen injector INJECT 3 MG INTO THE SKIN EVERY 7 DAYS     No current facility-administered medications for this visit.     Current Outpatient Medications on File Prior to Visit   Medication Sig    amLODIPine (NORVASC) 10 MG tablet TAKE 1 TABLET (10 MG TOTAL) BY MOUTH EVERY EVENING.    aspirin (ECOTRIN) 81 MG EC tablet Take 1 tablet (81 mg total) by mouth once daily.    atorvastatin (LIPITOR) 80 MG tablet Take 1 tablet (80 mg total) by mouth every evening.    blood sugar diagnostic Strp Check blood glucose 2 times daily as directed and as needed (dispense insurance preferred brand or patient choice)    blood-glucose meter (ONETOUCH VERIO IQ METER) Misc Use as directed    carvediloL (COREG) 6.25 MG tablet TAKE ONE TABLET BY MOUTH TWO TIMES A DAY WITH MEALS    cetirizine (ZYRTEC) 10 MG tablet Take 1 tablet (10 mg total) by mouth once daily.    colchicine (COLCRYS) 0.6 mg tablet Take 2 tablets at onset of acute gout attack. May take 1 more tablet 2 hours later if needed. MAX 3 TABLETS PER DAY. MAX 6 TABLETS PER WEEK.    ezetimibe (ZETIA) 10 mg tablet Take 1 tablet (10 mg total) by mouth once daily.    febuxostat (ULORIC) 80 mg Tab Take 1 tablet (80 mg total) by mouth once daily.    fluticasone propionate (FLONASE) 50 mcg/actuation nasal spray 2 sprays (100 mcg total) by Each Nostril route once daily.    furosemide (LASIX) 20 MG tablet TAKE ONE TABLET BY MOUTH EVERY OTHER DAY    lancets Misc Check blood glucose 2 times daily as directed and as needed (dispense insurance preferred brand or patient choice)    MULTIVITAMIN/IRON/FOLIC ACID (CENTRUM COMPLETE ORAL) Take by mouth once daily at 6am.    nitroGLYCERIN (NITROSTAT) 0.4 MG SL tablet Dissolve one tablet underneath tongue at onset of angina; may repeat every 5 minutes if needed. Call 911 if angina persists after 2 doses.    telmisartan (MICARDIS) 80 MG Tab Take 1 tablet (80 mg total) by mouth once daily.     TRULICITY 3 mg/0.5 mL pen injector INJECT 3 MG INTO THE SKIN EVERY 7 DAYS     No current facility-administered medications on file prior to visit.     Review of patient's allergies indicates:  No Known Allergies   Review of Systems   Constitutional: Negative for diaphoresis, malaise/fatigue and weight gain.   HENT:  Negative for hoarse voice.    Eyes:  Negative for double vision and visual disturbance.   Cardiovascular:  Positive for leg swelling. Negative for chest pain, claudication, cyanosis, dyspnea on exertion, irregular heartbeat, near-syncope, orthopnea, palpitations, paroxysmal nocturnal dyspnea and syncope.   Respiratory:  Negative for cough, hemoptysis, shortness of breath and snoring.    Hematologic/Lymphatic: Negative for bleeding problem. Does not bruise/bleed easily.   Skin:  Negative for color change and poor wound healing.   Musculoskeletal:  Negative for muscle cramps, muscle weakness and myalgias.   Gastrointestinal:  Negative for bloating, abdominal pain, change in bowel habit, diarrhea, heartburn, hematemesis, hematochezia, melena and nausea.   Neurological:  Negative for excessive daytime sleepiness, dizziness, headaches, light-headedness, loss of balance, numbness and weakness.   Psychiatric/Behavioral:  Negative for memory loss. The patient does not have insomnia.    Allergic/Immunologic: Negative for hives.     Objective:   Physical Exam  Vitals and nursing note reviewed.   Constitutional:       General: He is not in acute distress.     Appearance: He is well-developed. He is not diaphoretic.   HENT:      Head: Normocephalic and atraumatic.   Eyes:      General:         Right eye: No discharge.         Left eye: No discharge.      Pupils: Pupils are equal, round, and reactive to light.   Neck:      Thyroid: No thyromegaly.      Vascular: No JVD.   Cardiovascular:      Rate and Rhythm: Normal rate and regular rhythm.      Pulses: Intact distal pulses.      Heart sounds: Normal heart sounds. No  murmur heard.    No friction rub. No gallop.   Pulmonary:      Effort: Pulmonary effort is normal. No respiratory distress.      Breath sounds: Normal breath sounds. No wheezing or rales.   Chest:      Chest wall: No tenderness.   Abdominal:      General: Bowel sounds are normal. There is no distension.      Palpations: Abdomen is soft.      Tenderness: There is no abdominal tenderness.   Musculoskeletal:         General: Normal range of motion.      Cervical back: Neck supple.      Right lower leg: Edema (trace) present.      Left lower leg: Edema (trace) present.   Skin:     General: Skin is warm and dry.      Findings: No erythema or rash.   Neurological:      Mental Status: He is alert and oriented to person, place, and time.      Cranial Nerves: No cranial nerve deficit.   Psychiatric:         Mood and Affect: Mood normal.         Behavior: Behavior normal.     Vitals:    09/30/22 1406 09/30/22 1407   BP: (!) 146/88 136/84   BP Location: Right arm Left arm   Patient Position: Sitting Sitting   BP Method: Medium (Manual) Medium (Manual)   Pulse: 82 68   SpO2: 98%    Weight: 107.2 kg (236 lb 5.3 oz)      Lab Results   Component Value Date    CHOL 88 (L) 09/07/2022    CHOL 91 (L) 05/28/2022    CHOL 100 (L) 03/15/2022     Lab Results   Component Value Date    HDL 27 (L) 09/07/2022    HDL 26 (L) 05/28/2022    HDL 28 (L) 03/15/2022     Lab Results   Component Value Date    LDLCALC 37.2 (L) 09/07/2022    LDLCALC 41.0 (L) 05/28/2022    LDLCALC 44.4 (L) 03/15/2022     Lab Results   Component Value Date    TRIG 119 09/07/2022    TRIG 120 05/28/2022    TRIG 138 03/15/2022     Lab Results   Component Value Date    CHOLHDL 30.7 09/07/2022    CHOLHDL 28.6 05/28/2022    CHOLHDL 28.0 03/15/2022       Chemistry        Component Value Date/Time     09/07/2022 0829    K 4.0 09/07/2022 0829     09/07/2022 0829    CO2 23 09/07/2022 0829    BUN 55 (H) 09/07/2022 0829    CREATININE 5.3 (H) 09/07/2022 0829    GLU 90  09/07/2022 0829        Component Value Date/Time    CALCIUM 9.8 09/07/2022 0829    ALKPHOS 81 09/07/2022 0829    AST 23 09/07/2022 0829    ALT 30 09/07/2022 0829    BILITOT 1.4 (H) 09/07/2022 0829    ESTGFRAFRICA 13 (A) 07/26/2022 0818    EGFRNONAA 11 (A) 07/26/2022 0818        Lab Results   Component Value Date    HGBA1C 4.9 09/07/2022       Lab Results   Component Value Date    TSH 0.727 07/31/2017     Lab Results   Component Value Date    INR 1.0 07/26/2022    INR 1.1 07/31/2017     Lab Results   Component Value Date    WBC 7.03 07/26/2022    HGB 13.4 (L) 07/26/2022    HCT 39.6 (L) 07/26/2022    MCV 83 07/26/2022     07/26/2022     BMP  Sodium   Date Value Ref Range Status   09/07/2022 141 136 - 145 mmol/L Final     Potassium   Date Value Ref Range Status   09/07/2022 4.0 3.5 - 5.1 mmol/L Final     Chloride   Date Value Ref Range Status   09/07/2022 109 95 - 110 mmol/L Final     CO2   Date Value Ref Range Status   09/07/2022 23 23 - 29 mmol/L Final     BUN   Date Value Ref Range Status   09/07/2022 55 (H) 6 - 20 mg/dL Final     Creatinine   Date Value Ref Range Status   09/07/2022 5.3 (H) 0.5 - 1.4 mg/dL Final     Calcium   Date Value Ref Range Status   09/07/2022 9.8 8.7 - 10.5 mg/dL Final     Anion Gap   Date Value Ref Range Status   09/07/2022 9 8 - 16 mmol/L Final     eGFR if    Date Value Ref Range Status   07/26/2022 13 (A) >60 mL/min/1.73 m^2 Final     eGFR if non    Date Value Ref Range Status   07/26/2022 11 (A) >60 mL/min/1.73 m^2 Final     Comment:     Calculation used to obtain the estimated glomerular filtration  rate (eGFR) is the CKD-EPI equation.        CrCl cannot be calculated (Patient's most recent lab result is older than the maximum 7 days allowed.).    Assessment:     1. Coronary artery disease involving native coronary artery of native heart without angina pectoris    2. Dyslipidemia associated with type 2 diabetes mellitus    3. Essential hypertension     4. Hypertension complicating diabetes    5. NSTEMI with CAD s/p PCI (FAMILIA) of LAD x 2 in 8/2017    6. Status post angioplasty with stent    7. Stage 4 chronic kidney disease    8. Bariatric surgery status    9. Class 1 obesity due to excess calories with serious comorbidity and body mass index (BMI) of 30.0 to 30.9 in adult    10. Hyperparathyroidism, secondary to chronic kidney disease    11. Type 2 diabetes mellitus with diabetic nephropathy, without long-term current use of insulin    12. Status following surgery for weight loss    13. Type 2 diabetes mellitus with diabetic polyneuropathy, without long-term current use of insulin    14. Type 2 diabetes mellitus with stage 4 chronic kidney disease, without long-term current use of insulin    15. Direct hyperbilirubinemia    16. Severe obstructive sleep apnea - Intolerant of CPAP    Asymptomatic cad wise no contraindication to proceed with pd catheter placement.   Stop asa 5-7 prior as needed.   Htn not optimized hopefully after pd will help control better low salt diet emphasized.     Diabetes on target encouraged weight loss exercise      Michael compliant  continue same. Continue weight loss.    Hlp on target continue same tolerated meds well.      Plan:   Continue current therapy  Cardiac low salt diet.  Risk factor modification and excercise program./weight loss  F/u in 6 months with lipid cmp ea1c

## 2022-10-06 ENCOUNTER — PATIENT MESSAGE (OUTPATIENT)
Dept: NEPHROLOGY | Facility: CLINIC | Age: 42
End: 2022-10-06
Payer: COMMERCIAL

## 2022-10-07 ENCOUNTER — TELEPHONE (OUTPATIENT)
Dept: NEPHROLOGY | Facility: CLINIC | Age: 42
End: 2022-10-07
Payer: COMMERCIAL

## 2022-10-07 NOTE — TELEPHONE ENCOUNTER
----- Message from More Poon sent at 10/7/2022  2:20 PM CDT -----  Contact: Anuradha/ Deion Ling from [facility] is calling requesting some orders that the patient must have before they can accept patient for dialysis. Please give her a call back at     Thanks  LJ

## 2022-10-10 ENCOUNTER — PATIENT MESSAGE (OUTPATIENT)
Dept: NEPHROLOGY | Facility: CLINIC | Age: 42
End: 2022-10-10
Payer: COMMERCIAL

## 2022-10-10 ENCOUNTER — TELEPHONE (OUTPATIENT)
Dept: NEPHROLOGY | Facility: CLINIC | Age: 42
End: 2022-10-10
Payer: COMMERCIAL

## 2022-10-10 DIAGNOSIS — N18.5 CKD (CHRONIC KIDNEY DISEASE), SYMPTOM MANAGEMENT ONLY, STAGE 5: Primary | ICD-10-CM

## 2022-10-10 NOTE — PROGRESS NOTES
Spoke to patient to complete his history for his upcoming RR appointment his wife will come with him to his appointment he is aware that he have to bring a small breakfast/snack to eat after blood is drawn he will not need a

## 2022-10-10 NOTE — TELEPHONE ENCOUNTER
----- Message from Halima Sheehan sent at 10/10/2022  8:19 AM CDT -----  Contact: Amanda Patel is calling to speak with the nurse regarding some information that need to be discuss concerning patient. Please give a call back at [phone] as requested.  Thanks  LR

## 2022-10-10 NOTE — TELEPHONE ENCOUNTER
Returned call to Anuradha. She need orders put in for patient to start dialysis. I see that [facility] has ordered the same labs so to prevent duplicates we will use those.

## 2022-10-13 ENCOUNTER — HOSPITAL ENCOUNTER (OUTPATIENT)
Dept: RADIOLOGY | Facility: HOSPITAL | Age: 42
Discharge: HOME | End: 2022-10-13
Attending: NURSE PRACTITIONER
Payer: COMMERCIAL

## 2022-10-13 ENCOUNTER — OFFICE VISIT (OUTPATIENT)
Dept: TRANSPLANT | Facility: CLINIC | Age: 42
End: 2022-10-13
Payer: COMMERCIAL

## 2022-10-13 VITALS
BODY MASS INDEX: 29.23 KG/M2 | SYSTOLIC BLOOD PRESSURE: 152 MMHG | DIASTOLIC BLOOD PRESSURE: 92 MMHG | RESPIRATION RATE: 16 BRPM | TEMPERATURE: 97 F | WEIGHT: 227.75 LBS | OXYGEN SATURATION: 99 % | HEART RATE: 71 BPM | HEIGHT: 74 IN

## 2022-10-13 DIAGNOSIS — Z76.82 ORGAN TRANSPLANT CANDIDATE: ICD-10-CM

## 2022-10-13 DIAGNOSIS — N18.5 CKD (CHRONIC KIDNEY DISEASE), STAGE V: ICD-10-CM

## 2022-10-13 DIAGNOSIS — I10 ESSENTIAL HYPERTENSION: Chronic | ICD-10-CM

## 2022-10-13 DIAGNOSIS — E11.21 DIABETIC NEPHROPATHY ASSOCIATED WITH TYPE 2 DIABETES MELLITUS: ICD-10-CM

## 2022-10-13 DIAGNOSIS — I21.4 NSTEMI (NON-ST ELEVATED MYOCARDIAL INFARCTION): ICD-10-CM

## 2022-10-13 DIAGNOSIS — Z76.82 ORGAN TRANSPLANT CANDIDATE: Primary | ICD-10-CM

## 2022-10-13 DIAGNOSIS — Z01.818 PRE-TRANSPLANT EVALUATION FOR KIDNEY TRANSPLANT: Primary | ICD-10-CM

## 2022-10-13 PROCEDURE — 97802 PR MED NUTR THER, 1ST, INDIV, EA 15 MIN: ICD-10-PCS | Mod: S$GLB,TXP,,

## 2022-10-13 PROCEDURE — 3077F PR MOST RECENT SYSTOLIC BLOOD PRESSURE >= 140 MM HG: ICD-10-PCS | Mod: CPTII,S$GLB,TXP, | Performed by: NURSE PRACTITIONER

## 2022-10-13 PROCEDURE — 3072F PR LOW RISK FOR RETINOPATHY: ICD-10-PCS | Mod: CPTII,S$GLB,TXP, | Performed by: NURSE PRACTITIONER

## 2022-10-13 PROCEDURE — 72170 X-RAY EXAM OF PELVIS: CPT | Mod: TC,TXP

## 2022-10-13 PROCEDURE — 76770 US EXAM ABDO BACK WALL COMP: CPT | Mod: TC,TXP

## 2022-10-13 PROCEDURE — 93978 VASCULAR STUDY: CPT | Mod: TC,TXP

## 2022-10-13 PROCEDURE — 76770 US RETROPERITONEAL COMPLETE: ICD-10-PCS | Mod: 26,TXP,, | Performed by: STUDENT IN AN ORGANIZED HEALTH CARE EDUCATION/TRAINING PROGRAM

## 2022-10-13 PROCEDURE — 71046 X-RAY EXAM CHEST 2 VIEWS: CPT | Mod: 26,TXP,, | Performed by: RADIOLOGY

## 2022-10-13 PROCEDURE — 4010F PR ACE/ARB THEARPY RXD/TAKEN: ICD-10-PCS | Mod: CPTII,S$GLB,TXP, | Performed by: NURSE PRACTITIONER

## 2022-10-13 PROCEDURE — 3066F NEPHROPATHY DOC TX: CPT | Mod: CPTII,S$GLB,TXP, | Performed by: NURSE PRACTITIONER

## 2022-10-13 PROCEDURE — 99205 PR OFFICE/OUTPT VISIT, NEW, LEVL V, 60-74 MIN: ICD-10-PCS | Mod: S$GLB,TXP,, | Performed by: NURSE PRACTITIONER

## 2022-10-13 PROCEDURE — 3066F PR DOCUMENTATION OF TREATMENT FOR NEPHROPATHY: ICD-10-PCS | Mod: CPTII,S$GLB,TXP, | Performed by: NURSE PRACTITIONER

## 2022-10-13 PROCEDURE — 99205 OFFICE O/P NEW HI 60 MIN: CPT | Mod: S$GLB,TXP,, | Performed by: NURSE PRACTITIONER

## 2022-10-13 PROCEDURE — 93978 US DOPP ILIACS BILATERAL: ICD-10-PCS | Mod: 26,TXP,, | Performed by: STUDENT IN AN ORGANIZED HEALTH CARE EDUCATION/TRAINING PROGRAM

## 2022-10-13 PROCEDURE — 99999 PR PBB SHADOW E&M-EST. PATIENT-LVL V: CPT | Mod: PBBFAC,TXP,, | Performed by: NURSE PRACTITIONER

## 2022-10-13 PROCEDURE — 3062F POS MACROALBUMINURIA REV: CPT | Mod: CPTII,S$GLB,TXP, | Performed by: NURSE PRACTITIONER

## 2022-10-13 PROCEDURE — 72170 XR PELVIS ROUTINE AP: ICD-10-PCS | Mod: 26,TXP,, | Performed by: RADIOLOGY

## 2022-10-13 PROCEDURE — 1159F PR MEDICATION LIST DOCUMENTED IN MEDICAL RECORD: ICD-10-PCS | Mod: CPTII,S$GLB,TXP, | Performed by: NURSE PRACTITIONER

## 2022-10-13 PROCEDURE — 3080F DIAST BP >= 90 MM HG: CPT | Mod: CPTII,S$GLB,TXP, | Performed by: NURSE PRACTITIONER

## 2022-10-13 PROCEDURE — 99214 OFFICE O/P EST MOD 30 MIN: CPT | Mod: S$GLB,TXP,, | Performed by: TRANSPLANT SURGERY

## 2022-10-13 PROCEDURE — 99214 PR OFFICE/OUTPT VISIT, EST, LEVL IV, 30-39 MIN: ICD-10-PCS | Mod: S$GLB,TXP,, | Performed by: TRANSPLANT SURGERY

## 2022-10-13 PROCEDURE — 4010F ACE/ARB THERAPY RXD/TAKEN: CPT | Mod: CPTII,S$GLB,TXP, | Performed by: NURSE PRACTITIONER

## 2022-10-13 PROCEDURE — 1160F PR REVIEW ALL MEDS BY PRESCRIBER/CLIN PHARMACIST DOCUMENTED: ICD-10-PCS | Mod: CPTII,S$GLB,TXP, | Performed by: NURSE PRACTITIONER

## 2022-10-13 PROCEDURE — 1160F RVW MEDS BY RX/DR IN RCRD: CPT | Mod: CPTII,S$GLB,TXP, | Performed by: NURSE PRACTITIONER

## 2022-10-13 PROCEDURE — 76770 US EXAM ABDO BACK WALL COMP: CPT | Mod: 26,TXP,, | Performed by: STUDENT IN AN ORGANIZED HEALTH CARE EDUCATION/TRAINING PROGRAM

## 2022-10-13 PROCEDURE — 99999 PR PBB SHADOW E&M-EST. PATIENT-LVL V: ICD-10-PCS | Mod: PBBFAC,TXP,, | Performed by: NURSE PRACTITIONER

## 2022-10-13 PROCEDURE — 3077F SYST BP >= 140 MM HG: CPT | Mod: CPTII,S$GLB,TXP, | Performed by: NURSE PRACTITIONER

## 2022-10-13 PROCEDURE — 3080F PR MOST RECENT DIASTOLIC BLOOD PRESSURE >= 90 MM HG: ICD-10-PCS | Mod: CPTII,S$GLB,TXP, | Performed by: NURSE PRACTITIONER

## 2022-10-13 PROCEDURE — 71046 XR CHEST PA AND LATERAL: ICD-10-PCS | Mod: 26,TXP,, | Performed by: RADIOLOGY

## 2022-10-13 PROCEDURE — 93978 VASCULAR STUDY: CPT | Mod: 26,TXP,, | Performed by: STUDENT IN AN ORGANIZED HEALTH CARE EDUCATION/TRAINING PROGRAM

## 2022-10-13 PROCEDURE — 72170 X-RAY EXAM OF PELVIS: CPT | Mod: 26,TXP,, | Performed by: RADIOLOGY

## 2022-10-13 PROCEDURE — 3072F LOW RISK FOR RETINOPATHY: CPT | Mod: CPTII,S$GLB,TXP, | Performed by: NURSE PRACTITIONER

## 2022-10-13 PROCEDURE — 1159F MED LIST DOCD IN RCRD: CPT | Mod: CPTII,S$GLB,TXP, | Performed by: NURSE PRACTITIONER

## 2022-10-13 PROCEDURE — 97802 MEDICAL NUTRITION INDIV IN: CPT | Mod: S$GLB,TXP,,

## 2022-10-13 PROCEDURE — 3044F HG A1C LEVEL LT 7.0%: CPT | Mod: CPTII,S$GLB,TXP, | Performed by: NURSE PRACTITIONER

## 2022-10-13 PROCEDURE — 71046 X-RAY EXAM CHEST 2 VIEWS: CPT | Mod: TC,TXP

## 2022-10-13 PROCEDURE — 3044F PR MOST RECENT HEMOGLOBIN A1C LEVEL <7.0%: ICD-10-PCS | Mod: CPTII,S$GLB,TXP, | Performed by: NURSE PRACTITIONER

## 2022-10-13 PROCEDURE — 3062F PR POS MACROALBUMINURIA RESULT DOCUMENTED/REVIEW: ICD-10-PCS | Mod: CPTII,S$GLB,TXP, | Performed by: NURSE PRACTITIONER

## 2022-10-13 NOTE — PROGRESS NOTES
PHARM.D. PRE-TRANSPLANT NOTE:    This patient's medication therapy was evaluated as part of his pre-transplant evaluation.      The following general pharmacologic concerns were noted: aspirin - increased risk of bleed periop    The following concerns for post-operative pain management were noted: none    The following pharmacologic concerns related to HCV therapy were noted: none      This patient's medication profile was reviewed for considerations for DAA Hepatitis C therapy:    [x]  No current inducers of CYP 3A4 or PGP  [x]  No amiodarone on this patient's EMR profile in the last 24 months  [x]  No past or current atrial fibrillation on this patient's EMR profile       Current Outpatient Medications   Medication Sig Dispense Refill    amLODIPine (NORVASC) 10 MG tablet TAKE 1 TABLET (10 MG TOTAL) BY MOUTH EVERY EVENING. 90 tablet 3    aspirin (ECOTRIN) 81 MG EC tablet Take 1 tablet (81 mg total) by mouth once daily. 90 tablet 3    atorvastatin (LIPITOR) 80 MG tablet Take 1 tablet (80 mg total) by mouth every evening. 90 tablet 3    blood sugar diagnostic Strp Check blood glucose 2 times daily as directed and as needed (dispense insurance preferred brand or patient choice) 200 each 5    blood-glucose meter (ONETOUCH VERIO IQ METER) Misc Use as directed 1 each 0    carvediloL (COREG) 6.25 MG tablet TAKE ONE TABLET BY MOUTH TWO TIMES A DAY WITH MEALS 60 tablet 11    colchicine (COLCRYS) 0.6 mg tablet Take 2 tablets at onset of acute gout attack. May take 1 more tablet 2 hours later if needed. MAX 3 TABLETS PER DAY. MAX 6 TABLETS PER WEEK. 45 tablet 3    ezetimibe (ZETIA) 10 mg tablet Take 1 tablet (10 mg total) by mouth once daily. 90 tablet 3    febuxostat (ULORIC) 80 mg Tab Take 1 tablet (80 mg total) by mouth once daily. 90 tablet 2    furosemide (LASIX) 20 MG tablet TAKE ONE TABLET BY MOUTH EVERY OTHER DAY 15 tablet 11    lancets Misc Check blood glucose 2 times daily as directed and as needed (dispense  insurance preferred brand or patient choice) 200 each 5    nitroGLYCERIN (NITROSTAT) 0.4 MG SL tablet Dissolve one tablet underneath tongue at onset of angina; may repeat every 5 minutes if needed. Call 911 if angina persists after 2 doses. 25 tablet 5    telmisartan (MICARDIS) 80 MG Tab Take 1 tablet (80 mg total) by mouth once daily. 30 tablet 0    TRULICITY 3 mg/0.5 mL pen injector INJECT 3 MG INTO THE SKIN EVERY 7 DAYS (Patient taking differently: 1.5 mg every 7 days.) 12 pen 1     No current facility-administered medications for this visit.           I am available for consultation and can be contacted, as needed by the other members of the Transplant team.

## 2022-10-13 NOTE — PROGRESS NOTES
Transplant Recipient Adult Psychosocial Assessment    Robb Iglesias  [address] [place] [address]  Telephone Information:   Mobile [phone]   Home  [phone] (home)  Work  There is no work phone number on file.  E-mail  [email or web address]    Sex: male  YOB: 1980  Age: 41 y.o.    Encounter Date: 10/13/2022  [place] Citizen: yes  Primary Language: English   Needed: no    Emergency Contact:  Name: Shannon Iglesias  Relationship: wife  Address: same as patient  Phone Numbers:  [phone] (mobile)    Family/Social Support:   Number of dependents/: two children:  ages 9 and 12  Marital history:  once in 2008, two children  Other family dynamics: Both parents are living and supportive ([place]); pt reports good relationships with siblings (3 sisters, 5 brothers)    Household Composition:  Name: Tanisha Igelsias  Age: Robb is 41; Shannon 42  Relationship: patient and wife  Does person drive? yes, both drive    Name: Jaswant Iglesias [phone]  Age: 27  Relationship: brother  Does person drive? yes    Name: Claudia Iglesias  Age: 12 and 9  Relationship: sons  Does person drive? no    Do you and your caregivers have access to reliable transportation? yes  PRIMARY CAREGIVER: Shannon Iglesias will be primary caregiver, phone number [phone].      provided in-depth information to patient and caregiver regarding pre- and post-transplant caregiver role.   strongly encourages patient and caregiver to have concrete plan regarding post-transplant care giving, including back-up caregiver(s) to ensure care giving needs are met as needed.    Patient and Caregiver states understanding all aspects of caregiver role/commitment and is able/willing/committed to being caregiver to the fullest extent necessary.    Patient and Caregiver verbalizes understanding of the education provided today and caregiver responsibilities.          remains available. Patient and Caregiver agree to contact  in a timely manner if concerns arise.      Able to take time off work without financial concerns: yes.     Additional Significant Others who will Assist with Transplant:  Name: Jaswant Reyna  Age: 27  City:  State: [place]  Relationship: brother  Does person drive? yes    Name: Sugar Reyna [phone]  Age: 65  City: [place] State: [place]  Relationship: mother  Does person drive? yes    Wife also reports a neighbor/friend, Jazmyne who is willing to assist.     Living Will: no  Healthcare Power of : no  Advance Directives on file: <<no information> per medical record.  Verbally reviewed LW/HCPA information.   provided patient with copy of LW/HCPA documents and provided education on completion of forms.    Living Donors: No. Education and resource information given to patient.    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    Spouse/Significant Other Employment: Teacher at [employer]    Disabled: no    Monthly Income:  Salary/Wages: $combined 10,000  Able to afford all costs now and if transplanted, including medications: yes  Patient and Caregiver verbalizes understanding of personal responsibilities related to transplant costs and the importance of having a financial plan to ensure that patients transplant costs are fully covered.       provided fundraising information/education. Patient and Caregiververbalizes understanding.   remains available.    Insurance:   Payer/Plan Subscr  Sex Relation Sub. Ins. ID Effective Group Num   1. BLUE CROSS BL* PARISA REYNA 1978 Female Spouse [id number]  "8/1/15 [id number]                                    [address] [id number]     Primary Insurance (for UNOS reporting): Private Insurance  Secondary Insurance (for UNOS reporting): None  Patient and Caregiver verbalizes clear understanding that patient may experience difficulty obtaining and/or be denied insurance coverage post-surgery. This includes and is not limited to disability insurance, life insurance, health insurance, burial insurance, long term care insurance, and other insurances.      Patient and Caregiver also reports understanding that future health concerns related to or unrelated to transplantation may not be covered by patient's insurance.  Resources and information provided and reviewed.     Patient and Caregiver provides verbal permission to release any necessary information to outside resources for patient care and discharge planning.  Resources and information provided are reviewed.      Dialysis Adherence: Patient reports he is predialysis and will begin PD next week ([facility]).      Infusion Service: patient utilizing? no  Home Health: patient utilizing? no  DME:  diabetic supplies; will have PD supplies once PD has begun  Pulmonary/Cardiac Rehab: Cardiac rehab after MI in 2018   ADLS:  Pt states ability to independently accomplish all adls.     Adherence:   Pt admits he "was not taking care of myself for many years".  States he was likely in denial or just trying to ignore his kidney disease.    Pt stated five years ago he recognized that he had to change.  He got bariatric surgery and began to eat better, losing weight and watching his blood sugar.  Pt stated he plans to be very adherent to dialysis and even though he is not happy about having to do dialysis, he is willing to do whatever it takes to get to transplant.  Adherence education and counseling provided.     Per History Section:  Past Medical History:   Diagnosis Date    Allergic rhinitis     Class 1 obesity due to excess calories " with serious comorbidity and body mass index (BMI) of 31.0 to 31.9 in adult 4/7/2017    Coronary artery disease     Direct hyperbilirubinemia 3/24/2018    DM (diabetes mellitus) 2008    BS doesn't check any more 08/02/2018    DM (diabetes mellitus) 2012    BS 99 am 06/26/2020    DM (diabetes mellitus)     BS didn't check 06/04/2021    DM (diabetes mellitus) 2008    BS didn't check 07/29/2022     Elevated bilirubin 3/21/2018    GERD (gastroesophageal reflux disease)     Gout     Hyperlipidemia     Hypertension associated with chronic kidney disease due to type 2 diabetes mellitus     Idiopathic chronic gout, multiple sites, without tophus (tophi) 7/19/2017    Long term (current) use of insulin     MI (myocardial infarction) 07/2017    Obesity     HENRY on CPAP     Proteinuria     Steatohepatitis     Fatty Liver    Type 2 diabetes mellitus with diabetic nephropathy     Type 2 diabetes mellitus with diabetic nephropathy, without long-term current use of insulin 1/6/2020    Type 2 diabetes mellitus with hyperglycemia     Type 2 diabetes mellitus with renal manifestations     Type 2 diabetes mellitus with stage 3 chronic kidney disease, without long-term current use of insulin 6/21/2017     Social History     Tobacco Use    Smoking status: Never    Smokeless tobacco: Never   Substance Use Topics    Alcohol use: No     Comment: 1-2 times per month     Social History     Substance and Sexual Activity   Drug Use No     Social History     Substance and Sexual Activity   Sexual Activity Yes    Partners: Female       Per Today's Psychosocial:  Tobacco: none, patient denies any use.  Alcohol:  rarely .  Illicit Drugs/Non-prescribed Medications: none, patient denies any use.    Patient and Caregiver states clear understanding of the potential impact of substance use as it relates to transplant candidacy and is aware of possible random substance screening.  Substance abstinence/cessation counseling, education and resources provided  and reviewed.     Arrests/DWI/Treatment/Rehab: patient denies    Psychiatric History:    Mental Health: Pt states he had a lot of difficulty accepting his health status, but when he got past it, he feels he is doing very well.  Pt attributes his coping ability to the amount of support he has from family.  Psychiatrist/Counselor: Wife reports they have good mental health coverage and see a family therapist, Carlito Cifuentes, PhD, as needed. (Each family member is entitled to 10 visits per year.)    Medications:  pt denies  Suicide/Homicide Issues: Pt denies current or past suicidal/homicidal ideation.    Safety at home: Pt denies any home safety concerns.      Knowledge: Patient and Caregiver states having clear understanding and realistic expectations regarding the potential risks and potential benefits of organ transplantation and organ donation and agrees to discuss with health care team members and support system members, as well as to utilize available resources and express questions and/or concerns in order to further facilitate the pt informed decision-making.  Resources and information provided and reviewed.    Patient and Caregiver is aware of [facility]'s affiliation and/or partnership with agencies in home health care, LTAC, SNF, [facility], and other hospitals and clinics.    Understanding: Patient and Caregiver reports having a clear understanding of the many lifetime commitments involved with being a transplant recipient, including costs, compliance, medications, lab work, procedures, appointments, concrete and financial planning, preparedness, timely and appropriate communication of concerns, abstinence (ETOH, tobacco, illicit non-prescribed drugs), adherence to all health care team recommendations, support system and caregiver involvement, appropriate and timely resource utilization and follow-through, mental health counseling as needed/recommended, and patient and caregiver responsibilities.  Social Service  Handbook, resources and detailed educational information provided and reviewed.  Educational information provided.    Patient and Caregiver also reports current and expected compliance with health care regime and states having a clear understanding of the importance of compliance.      Patient and Caregiver reports a clear understanding that risks and benefits may be involved with organ transplantation and with organ donation.       Patient and Caregiver also reports clear understanding that psychosocial risk factors may affect patient, and include but are not limited to feelings of depression, generalized anxiety, anxiety regarding dependence on others, post traumatic stress disorder, feelings of guilt and other emotional and/or mental concerns, and/or exacerbation of existing mental health concerns.  Detailed resources provided and discussed.      Patient and Caregiver agrees to access appropriate resources in a timely manner as needed and/or as recommended, and to communicate concerns appropriately.  Patient and Caregiver also reports a clear understanding of treatment options available.     Patient and Caregiver received education in a group setting.   reviewed education, provided additional information, and answered questions.    Feelings or Concerns: Pt stated his main concern at this time is how being on dialysis is going to impact his life and family.    Coping: Identify Patient & Caregiver Strategies to [place]:   1. Currently & Pre-transplant - family and friends support, activities with his sons (boyscouts, sports), football, woodworking, board games   2. At the time of surgery - support from family and friends   3. During post-Transplant & Recovery Period - support from family and friends     Goals: get off dialysis, return to more normal lifestyle, get off dialysis, continue to work and support family.  Patient referred to Vocational Rehabilitation.    Interview Behavior: Patient and Caregiver  presents as alert and oriented x 4, pleasant, good eye contact, well groomed, recall good, concentration/judgement good, average intelligence, calm, communicative, cooperative, and asking and answering questions appropriately. Pt was accompanied by his wife who presents as supportive of pt's pursuit of transplant.         Transplant Social Work - Candidacy  Assessment/Plan:     Psychosocial Suitability: Based on psychosocial risk factors, patient presents as low risk, due to has family support, transportation and financial plans, motivated to transplant .      Recommendations/Additional Comments: Recommend fundraising to offset costs associated with transplant.  Pt and caregiver informed that they are responsible for transplant related lodging arrangements and expense.   Pt and caregiver informed that lodging assistance will be unavailable beginning 2023. - information reviewed in depth.      Gaviota Wallace LCSW

## 2022-10-13 NOTE — PROGRESS NOTES
INITIAL PATIENT EDUCATION NOTE    Mr. Robb Iglesias was seen in pre-kidney transplant clinic for evaluation for kidney, kidney/pancreas or pancreas only transplant.  The patient attended a an individual video education session that discussed/reviewed the following aspects of transplantation: evaluation and selection committee process, UNOS waitlist management/multiple listings, types of organs offered (KDPI < 85%, KDPI > 85%, PHS risk, DCD, HCV+, HIV+ for HIV+ recipients and enbloc/dual), financial aspects, surgical procedures, dietary instruction pre- and post-transplant, health maintenance pre- and post-transplant, post-transplant hospitalization and outpatient follow-up, potential to participate in a research protocol, and medication management and side effects.  A question and answer session was provided after the presentation.    The patient was seen by all members of the multi-disciplinary team to include: Nephrologist/PA, Surgeon, , Transplant Coordinator, , Pharmacist and Dietician (if applicable).    The patient reviewed and signed all consents for evaluation which were witnessed and sent to scanning into the EPIC chart.    The patient was given an education book and plan for further evaluation based on his individual assessment.      Reviewed program requirement for complete COVID vaccination with documentation prior to listing.  COVID education information reviewed with patient. Patient encouraged to be up to date on all vaccinations.       The patient was informed that the transplant team would manage immediate post op pain. If the patient requires long term pain management, they will need to have that pain management addressed by their PCP or previous provider who wrote for long term pain medicines.    The patient was encouraged to call with any questions or concerns.

## 2022-10-13 NOTE — PROGRESS NOTES
Transplant Nephrology  Kidney Transplant Recipient Evaluation    Referring Physician: Siva Figueroa  Current Nephrologist: Siva Figueroa    Subjective:   CC:  Initial evaluation of kidney transplant candidacy.    HPI:  Mr. Iglesias is a 41 y.o. year old White male who has presented to be evaluated as a potential kidney transplant recipient.  He has advanced kidney disease secondary to diabetic nephropathy.  Patient is currently pre-dialysis. He does not have a dialysis access. Planning for PD catheter placement and dialysis start next week.    CKD V suspected from DM2. History of heavy NSAID use in the past as well.    DM2. Denies retinopathy, neuropathy, or gastroparesis. On insulin. A1c today 4.8.    NSTEMI s/p 2 stents in 2017.    History of bariatric surgery, lost about 100 pounds. No issues tolerating pills.    Remains very active. Works as an . Able to climb flight of stairs without CP or SOB. Looks great, not frail.    Has several potential donors.    Current Outpatient Medications   Medication Sig Dispense Refill    amLODIPine (NORVASC) 10 MG tablet TAKE 1 TABLET (10 MG TOTAL) BY MOUTH EVERY EVENING. 90 tablet 3    aspirin (ECOTRIN) 81 MG EC tablet Take 1 tablet (81 mg total) by mouth once daily. 90 tablet 3    atorvastatin (LIPITOR) 80 MG tablet Take 1 tablet (80 mg total) by mouth every evening. 90 tablet 3    blood sugar diagnostic Strp Check blood glucose 2 times daily as directed and as needed (dispense insurance preferred brand or patient choice) 200 each 5    blood-glucose meter (ONETOUCH VERIO IQ METER) Misc Use as directed 1 each 0    carvediloL (COREG) 6.25 MG tablet TAKE ONE TABLET BY MOUTH TWO TIMES A DAY WITH MEALS 60 tablet 11    colchicine (COLCRYS) 0.6 mg tablet Take 2 tablets at onset of acute gout attack. May take 1 more tablet 2 hours later if needed. MAX 3 TABLETS PER DAY. MAX 6 TABLETS PER WEEK. 45 tablet 3    ezetimibe (ZETIA) 10 mg tablet Take 1 tablet  (10 mg total) by mouth once daily. 90 tablet 3    febuxostat (ULORIC) 80 mg Tab Take 1 tablet (80 mg total) by mouth once daily. 90 tablet 2    furosemide (LASIX) 20 MG tablet TAKE ONE TABLET BY MOUTH EVERY OTHER DAY 15 tablet 11    lancets Misc Check blood glucose 2 times daily as directed and as needed (dispense insurance preferred brand or patient choice) 200 each 5    nitroGLYCERIN (NITROSTAT) 0.4 MG SL tablet Dissolve one tablet underneath tongue at onset of angina; may repeat every 5 minutes if needed. Call 911 if angina persists after 2 doses. 25 tablet 5    telmisartan (MICARDIS) 80 MG Tab Take 1 tablet (80 mg total) by mouth once daily. 30 tablet 0    TRULICITY 3 mg/0.5 mL pen injector INJECT 3 MG INTO THE SKIN EVERY 7 DAYS (Patient taking differently: 1.5 mg every 7 days.) 12 pen 1     No current facility-administered medications for this visit.       Past Medical History:   Diagnosis Date    Allergic rhinitis     Class 1 obesity due to excess calories with serious comorbidity and body mass index (BMI) of 31.0 to 31.9 in adult 4/7/2017    Coronary artery disease     Direct hyperbilirubinemia 3/24/2018    DM (diabetes mellitus) 2008    BS doesn't check any more 08/02/2018    DM (diabetes mellitus) 2012    BS 99 am 06/26/2020    DM (diabetes mellitus)     BS didn't check 06/04/2021    DM (diabetes mellitus) 2008    BS didn't check 07/29/2022     Elevated bilirubin 3/21/2018    GERD (gastroesophageal reflux disease)     Gout     Hyperlipidemia     Hypertension associated with chronic kidney disease due to type 2 diabetes mellitus     Idiopathic chronic gout, multiple sites, without tophus (tophi) 7/19/2017    Long term (current) use of insulin     MI (myocardial infarction) 07/2017    Obesity     HENRY on CPAP     Proteinuria     Steatohepatitis     Fatty Liver    Type 2 diabetes mellitus with diabetic nephropathy     Type 2 diabetes mellitus with diabetic nephropathy, without long-term current use of insulin  1/6/2020    Type 2 diabetes mellitus with hyperglycemia     Type 2 diabetes mellitus with renal manifestations     Type 2 diabetes mellitus with stage 3 chronic kidney disease, without long-term current use of insulin 6/21/2017       Past Medical and Surgical History: Mr. Iglesias  has a past medical history of Allergic rhinitis, Class 1 obesity due to excess calories with serious comorbidity and body mass index (BMI) of 31.0 to 31.9 in adult, Coronary artery disease, Direct hyperbilirubinemia, DM (diabetes mellitus), DM (diabetes mellitus), DM (diabetes mellitus), DM (diabetes mellitus), Elevated bilirubin, GERD (gastroesophageal reflux disease), Gout, Hyperlipidemia, Hypertension associated with chronic kidney disease due to type 2 diabetes mellitus, Idiopathic chronic gout, multiple sites, without tophus (tophi), Long term (current) use of insulin, MI (myocardial infarction), Obesity, HENRY on CPAP, Proteinuria, Steatohepatitis, Type 2 diabetes mellitus with diabetic nephropathy, Type 2 diabetes mellitus with diabetic nephropathy, without long-term current use of insulin, Type 2 diabetes mellitus with hyperglycemia, Type 2 diabetes mellitus with renal manifestations, and Type 2 diabetes mellitus with stage 3 chronic kidney disease, without long-term current use of insulin.  He has a past surgical history that includes Nasal septum surgery; LASIK (Bilateral); Liver biopsy; Nasal endoscopy; Sleeve Gastroplasty (03/06/2017); and Coronary angioplasty with stent.    Past Social and Family History: Mr. Iglesias reports that he has never smoked. He has never used smokeless tobacco. He reports that he does not drink alcohol and does not use drugs. His family history includes Diabetes in his maternal grandmother and mother; Diabetes type II in his brother, brother, and mother; Hyperlipidemia in his father and mother; Hypertension in his mother.    Review of Systems   Constitutional:  Positive for fatigue. Negative for  "activity change, appetite change and fever.   HENT:  Negative for congestion, mouth sores and sore throat.    Eyes:  Negative for visual disturbance.   Respiratory:  Negative for cough, chest tightness and shortness of breath.    Cardiovascular:  Negative for chest pain, palpitations and leg swelling.   Gastrointestinal:  Negative for abdominal distention, abdominal pain, constipation, diarrhea and nausea.   Genitourinary:  Negative for difficulty urinating, frequency and hematuria.   Musculoskeletal:  Negative for arthralgias and gait problem.   Skin:  Negative for wound.   Allergic/Immunologic: Negative for environmental allergies, food allergies and immunocompromised state.   Neurological:  Negative for dizziness, weakness and numbness.   Psychiatric/Behavioral:  Negative for sleep disturbance. The patient is not nervous/anxious.      Objective:   Blood pressure (!) 152/92, pulse 71, temperature 97.3 °F (36.3 °C), temperature source Tympanic, resp. rate 16, height 6' 2.41" (1.89 m), weight 103.3 kg (227 lb 11.8 oz), SpO2 99 %.body mass index is 28.92 kg/m².    Physical Exam  Vitals and nursing note reviewed.   Constitutional:       Appearance: Normal appearance.   HENT:      Head: Normocephalic.   Cardiovascular:      Rate and Rhythm: Normal rate and regular rhythm.      Heart sounds: Normal heart sounds.   Pulmonary:      Effort: Pulmonary effort is normal.      Breath sounds: Normal breath sounds.   Abdominal:      General: Bowel sounds are normal. There is no distension.      Palpations: Abdomen is soft.      Tenderness: There is no abdominal tenderness.   Musculoskeletal:         General: Normal range of motion.   Skin:     General: Skin is warm and dry.   Neurological:      General: No focal deficit present.      Mental Status: He is alert.   Psychiatric:         Behavior: Behavior normal.       Labs:  Lab Results   Component Value Date    WBC 6.69 10/13/2022    HGB 12.4 (L) 10/13/2022    HCT 37.3 (L) " 10/13/2022     10/13/2022     10/13/2022    K 3.8 10/13/2022    K 3.8 10/13/2022     10/13/2022     10/13/2022    CO2 23 10/13/2022    CO2 23 10/13/2022    BUN 49 (H) 10/13/2022    BUN 49 (H) 10/13/2022    CREATININE 5.4 (H) 10/13/2022    CREATININE 5.4 (H) 10/13/2022    EGFRNONAA 11 (A) 07/26/2022    CALCIUM 9.9 10/13/2022    CALCIUM 9.9 10/13/2022    PHOS 4.4 10/13/2022    PHOS 4.4 10/13/2022    MG 2.1 04/22/2018    ALBUMIN 4.5 10/13/2022    ALBUMIN 4.5 10/13/2022    AST 24 10/13/2022    ALT 29 10/13/2022    UTPCR 3.13 (H) 08/18/2022    .1 (H) 10/13/2022    .1 (H) 10/13/2022       Lab Results   Component Value Date    BILIRUBINUA Negative 08/18/2022    PROTEINUA 3+ (A) 08/18/2022    NITRITE Negative 08/18/2022    RBCUA 1 08/18/2022    WBCUA 1 08/18/2022       Labs were reviewed with the patient.    Assessment:     1. Pre-transplant evaluation for kidney transplant    2. CKD (chronic kidney disease), stage V    3. NSTEMI with CAD s/p PCI (FAMILIA) of LAD x 2 in 8/2017    4. Essential hypertension    5. Diabetic nephropathy associated with type 2 diabetes mellitus    6. BMI 28.0-28.9,adult        Plan:   41 y.o. year old White male with  advanced kidney disease secondary to diabetic nephropathy.  Planning for PD catheter placement and dialysis start next week. CKD V suspected from DM2. History of heavy NSAID use in the past as well. DM2 well controlled with A1c 4.8. History NSTEMI with 2 stents in 2017. Remains active, not frail.    Prior to Listing, will need the following items to be completed:  1. Standard serologies, cardiac and imaging studies   2. Cardiac clearance      Transplant Candidacy:   Based on available information, Mr. Iglesias is a suitable kidney transplant candidate.   Meets center eligibility for accepting HCV+ donor offer - yes.  Patient educated on HCV+ donors. Robb is willing to accept HCV+ donor offer - yes   Patient is a candidate for KDPI > 85 kidney  donor offer - no (weight >88kg)  Final determination of transplant candidacy will be made once workup is complete and reviewed by the selection committee.    Patient advised that it is recommended that all transplant candidates, and their close contacts and household members receive Covid vaccination.    Cinda Mccray, NP       OS Patient Status  Functional Status: 90% - Able to carry on normal activity: minor symptoms of disease  Physical Capacity: No Limitations

## 2022-10-13 NOTE — LETTER
October 17, 2022        Siva Figueroa  [address] [address] NEPHROLOGY  [facility] [address]  Phone: [phone]  Fax: [phone]             [facility]- Transplant 1st Fl  [address]  [facility] [address]  Phone: [phone]   Patient: Robb Iglesias   MR Number: [id number]   YOB: 1980   Date of Visit: 10/13/2022       Dear Dr. Siva Figueroa    Thank you for referring Robb Iglesias to me for evaluation. Attached you will find relevant portions of my assessment and plan of care.    If you have questions, please do not hesitate to call me. I look forward to following Robb Iglesias along with you.    Sincerely,    Cinda Mccray NP    Enclosure    If you would like to receive this communication electronically, please contact [email or web address] or [phone] to request [facility] access.    [employer] [facility] is a tool which provides read-only access to select patient information with whom you have a relationship. Its easy to use and provides real time access to review your patients record including encounter summaries, notes, results, and demographic information.    If you feel you have received this communication in error or would no longer like to receive these types of communications, please e-mail [email or web address]

## 2022-10-13 NOTE — PROGRESS NOTES
Transplant Surgery  Kidney Transplant Recipient Evaluation    Referring Physician: Siva Figueroa  Current Nephrologist: Siva Figueroa    Subjective:     Reason for Visit: evaluate transplant candidacy    History of Present Illness: Robb Iglesias is a 41 y.o. year old male undergoing transplant evaluation.    Dialysis History: Robb is pre-dialysis but with plans to start in the upcoming weeks.     Transplant History: N/A    Etiology of Renal Disease: Diabetes Mellitus - Type II (based on medical records from referral).    External provider notes reviewed: Yes    Review of Systems   Constitutional:  Positive for fatigue.   Respiratory: Negative.     Cardiovascular: Negative.    Gastrointestinal: Negative.    Genitourinary: Negative.    Objective:     Physical Exam:  Constitutional:   Vitals reviewed: yes   Well-nourished and well-groomed: yes  Eyes:   Sclerae icteric: no   Extraocular movements intact: yes  GI:    Bowel sounds normal: yes   Tenderness: no    If yes, quadrant/location: not applicable   Palpable masses: no    If yes, quadrant/location: not applicable   Hepatosplenomegaly: no   Ascites: no   Hernia: no    If yes, type/location: not applicable   Surgical scars: yes    If yes, type/location: laparoscopic port sites  Resp:   Effort normal: yes   Breath sounds normal: yes    CV:   Regular rate and rhythm: yes   Heart sounds normal: yes   Femoral pulses normal: yes   Extremities edematous: no  Skin:   Rashes or lesions present: no    If yes, describe:not applicable   Jaundice:: no    Musculoskeletal:   Gait normal: yes   Strength normal: yes  Psych:   Oriented to person, place, and time: yes   Affect and mood normal: yes    Additional comments: not applicable    Diagnostics:  The following labs have been reviewed: CBC  CMP  The following radiology images have been independently reviewed and interpreted: Pending    Counseling: We provided Robb Iglesias with a group education session  today.  We discussed kidney transplantation at length with him, including risks, potential complications, and alternatives in the management of his renal failure.  The discussion included complications related to anesthesia, bleeding, infection, primary nonfunction, and ATN.  I discussed the typical postoperative course, length of hospitalization, the need for long-term immunosuppression, and the need for long-term routine follow-up.  I discussed living-donor and -donor transplantation and the relative advantages and disadvantages of each.  I also discussed average waiting times for both living donation and  donation.  I discussed national and center-specific survival rates.  I also mentioned the potential benefit of multicenter listing to candidates listed with centers within more than one organ procurement organization.  All questions were answered.    Patient advised that it is recommended that all transplant candidates, and their close contacts and household members receive Covid vaccination.    Final determination of transplant candidacy will be made once evaluation is complete and reviewed by the Kidney & Kidney/Pancreas Selection Committee.    Coronavirus disease (COVID-19) caused by severe acute respiratory virus coronavirus 2 (SARS-C0V 2) is associated with increased mortality in solid organ transplant recipients (SOT) compared to non-transplant patients. Vaccine responses to vaccination are depressed against SARS-CoV2 compared to normal individuals but improve with third vaccination doses. Vaccination prior to SOT provides both the best opportunity for transplant candidates to develop protective immunity and to reduce the risk of serious COVID19 infections post transplantation. Organ transplant candidates at [facility] will be required to receive SARS-CoV-2 vaccination prior to being listed with a an active status, whenever possible. Exceptions will be  made for disability related reasons or for sincerely held [employer] beliefs.          Transplant Surgery - Candidacy   Assessment/Plan:   Robb Iglesias is pre-dialysis with CKD stage 4 (GFR 15-29 mL/min). I see no surgical contraindication to placing a kidney transplant. Based on available information, Robb Iglesias is a suitable kidney transplant candidate.     Abdominal Habitus: Not prohibitive.     Vascular/Technical Concerns: None based on exam, imaging pending.  No history of vascular disease, no femoral catheters, no lower extremity grafts, no iliofemoral venous thromboembolism. The patient is not on anticoagulation.     Urologic Concerns: None based on available information. Patient reports normal volume of urine per day.  No history of recurrent UTI, no apparent obstructive symptoms or voiding dysfunction, no history of self catheterization    Other surgical considerations/concerns:  None     Additional testing to be completed according to the Written Order Guidelines for Adult Pre-kidney and Pancreas Transplant Evaluation (KI-02).  Interpretation of tests and discussion of patient management involves all members of the multidisciplinary transplant team.    Trung Isaacs MD

## 2022-10-13 NOTE — PROGRESS NOTES
TRANSPLANT NUTRITIONAL ASSESSMENT    Referring Provider: Siva Figueroa MD     Reason for Visit: Pre-kidney transplant work-up (pre-dialysis)    Age: 41 y.o.  Sex: male    Patient Active Problem List   Diagnosis    Bariatric surgery status    Class 1 obesity due to excess calories with serious comorbidity and body mass index (BMI) of 30.0 to 30.9 in adult    Essential hypertension    Gout of left knee    Idiopathic chronic gout, left ankle and foot, without tophus (tophi)    Idiopathic chronic gout, right ankle and foot, without tophus (tophi)    Idiopathic chronic gout of left hand without tophus    Stage 4 chronic kidney disease    Persistent proteinuria    Type 2 diabetes mellitus with stage 4 chronic kidney disease, without long-term current use of insulin    Idiopathic chronic gout, multiple sites, without tophus (tophi)    Chronic nonseasonal allergic rhinitis due to pollen    NSTEMI with CAD s/p PCI (FAMILIA) of LAD x 2 in 8/2017    Severe obstructive sleep apnea - Intolerant of CPAP    Dyslipidemia associated with type 2 diabetes mellitus    Status following surgery for weight loss    Status post angioplasty with stent    PLMD (periodic limb movement disorder)    Coronary artery disease involving native coronary artery of native heart without angina pectoris    Direct hyperbilirubinemia    At risk for cardiovascular event    Hypertension complicating diabetes    Diabetic nephropathy associated with type 2 diabetes mellitus    Type 2 diabetes mellitus with diabetic polyneuropathy, without long-term current use of insulin    Hyperparathyroidism, secondary to chronic kidney disease     Past Medical History:   Diagnosis Date    Allergic rhinitis     Class 1 obesity due to excess calories with serious comorbidity and body mass index (BMI) of 31.0 to 31.9 in adult 4/7/2017    Coronary artery disease     Direct hyperbilirubinemia 3/24/2018    DM (diabetes mellitus) 2008    BS doesn't check any more 08/02/2018    DM  (diabetes mellitus) 2012    BS 99 am 06/26/2020    DM (diabetes mellitus)     BS didn't check 06/04/2021    DM (diabetes mellitus) 2008    BS didn't check 07/29/2022     Elevated bilirubin 3/21/2018    GERD (gastroesophageal reflux disease)     Gout     Hyperlipidemia     Hypertension associated with chronic kidney disease due to type 2 diabetes mellitus     Idiopathic chronic gout, multiple sites, without tophus (tophi) 7/19/2017    Long term (current) use of insulin     MI (myocardial infarction) 07/2017    Obesity     HENRY on CPAP     Proteinuria     Steatohepatitis     Fatty Liver    Type 2 diabetes mellitus with diabetic nephropathy     Type 2 diabetes mellitus with diabetic nephropathy, without long-term current use of insulin 1/6/2020    Type 2 diabetes mellitus with hyperglycemia     Type 2 diabetes mellitus with renal manifestations     Type 2 diabetes mellitus with stage 3 chronic kidney disease, without long-term current use of insulin 6/21/2017     Lab Results   Component Value Date    GLU 89 10/13/2022    GLU 89 10/13/2022    K 3.8 10/13/2022    K 3.8 10/13/2022    PHOS 4.4 10/13/2022    PHOS 4.4 10/13/2022    MG 2.1 04/22/2018    CHOL 88 (L) 09/07/2022    HDL 27 (L) 09/07/2022    TRIG 119 09/07/2022    ALBUMIN 4.5 10/13/2022    ALBUMIN 4.5 10/13/2022    HGBA1C 4.8 10/13/2022    CALCIUM 9.9 10/13/2022    CALCIUM 9.9 10/13/2022     Other Pertinent Labs: N/A    Current Outpatient Medications   Medication Sig    amLODIPine (NORVASC) 10 MG tablet TAKE 1 TABLET (10 MG TOTAL) BY MOUTH EVERY EVENING.    aspirin (ECOTRIN) 81 MG EC tablet Take 1 tablet (81 mg total) by mouth once daily.    atorvastatin (LIPITOR) 80 MG tablet Take 1 tablet (80 mg total) by mouth every evening.    blood sugar diagnostic Strp Check blood glucose 2 times daily as directed and as needed (dispense insurance preferred brand or patient choice)    blood-glucose meter (ONETOUCH VERIO IQ METER) Misc Use as directed    carvediloL (COREG)  "6.25 MG tablet TAKE ONE TABLET BY MOUTH TWO TIMES A DAY WITH MEALS    colchicine (COLCRYS) 0.6 mg tablet Take 2 tablets at onset of acute gout attack. May take 1 more tablet 2 hours later if needed. MAX 3 TABLETS PER DAY. MAX 6 TABLETS PER WEEK.    ezetimibe (ZETIA) 10 mg tablet Take 1 tablet (10 mg total) by mouth once daily.    febuxostat (ULORIC) 80 mg Tab Take 1 tablet (80 mg total) by mouth once daily.    furosemide (LASIX) 20 MG tablet TAKE ONE TABLET BY MOUTH EVERY OTHER DAY    lancets Misc Check blood glucose 2 times daily as directed and as needed (dispense insurance preferred brand or patient choice)    nitroGLYCERIN (NITROSTAT) 0.4 MG SL tablet Dissolve one tablet underneath tongue at onset of angina; may repeat every 5 minutes if needed. Call 911 if angina persists after 2 doses.    telmisartan (MICARDIS) 80 MG Tab Take 1 tablet (80 mg total) by mouth once daily.    TRULICITY 3 mg/0.5 mL pen injector INJECT 3 MG INTO THE SKIN EVERY 7 DAYS (Patient taking differently: 1.5 mg every 7 days.)     No current facility-administered medications for this visit.     Allergies: Patient has no known allergies.    Ht Readings from Last 1 Encounters:   10/13/22 6' 2.41" (1.89 m)     Wt Readings from Last 1 Encounters:   10/13/22 103.3 kg (227 lb 11.8 oz)      BMI: Body mass index is 28.92 kg/m².    Usual Weight: 230 lbs  Weight Change/Time: stable   Current Diet: low sodium   Appetite/Current Intake: fair   Exercise/Physical Activity: functional in ADLs   Nutritional/Herbal Supplements: none  Chewing/Swallowing Problems: none  Symptoms: some nausea x 2 wk     Estimated Kcal Need: 2066 kcal/day (20 kcal/kg)   Estimated Protein Need:  gm/day (0.8-1.0 gm/kg)     Nutritional History:   Pt and wife present. Bariatric surgery in 2017 per pt. Pt reports previously walking, swimming, and working out in his home gym before catching COVID last December. 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    Diet " Recall    Morning: larabar    Midday: salad bar (lettuce, bell peppers, carrots, cabbage, tomato)    Evening: Likes fish, shrimp, chicken, saute vegetables (squash, zucchini), rice, occasionally potatoes, pineapple, melon, strawberries                  Homemade pizza topped with vegetables     Snacks: larabar, fig bar, trail mix     Desserts: chocolate frosted flakes with whole milk     Beverages: 100 oz water/day, coffee (creamer, sweat n low), occasionally caprisun, unsweet ice tea         Seasonings: ranch powder, garlic powder, onion powder, low sodium slap ya mama, traeger chicken seasoning     Restaurants/Fast Food: 1x/wk     Nutritional Diagnoses  Problem: food- and nutrition-related knowledge deficit  Etiology: lack of previous education on pre-kidney transplant nutrition recommendations  Symptoms: diet recall and questions from pt    Educational Need? yes  Barriers: none identified  Discussed with: patient and wife  Interventions: Patient taught nutrition information regarding Pre-kidney transplant work-up (pre-dialysis). Renal Nutrition Therapy packet reviewed (high/low food sources of K, Phos and protein, low sodium and fluid intake, emphasis on moderate protein intake). Encouraged physical activity daily, regular exercise as tolerated, stay mobile.  Goals/Recommendations: diet adherence  Actions Taken: instruct/provide written information  Patient and/or family comprehend instructions: yes  Outcome: Verbalizes understanding  Monitoring: Contact information provided. Will follow-up in clinic and communicate with the care team as needed.    Counseling Time: 20 minutes

## 2022-10-28 DIAGNOSIS — Z76.82 ORGAN TRANSPLANT CANDIDATE: Primary | ICD-10-CM

## 2022-10-30 ENCOUNTER — PATIENT MESSAGE (OUTPATIENT)
Dept: PODIATRY | Facility: CLINIC | Age: 42
End: 2022-10-30
Payer: COMMERCIAL

## 2022-10-31 ENCOUNTER — PATIENT MESSAGE (OUTPATIENT)
Dept: NEPHROLOGY | Facility: CLINIC | Age: 42
End: 2022-10-31
Payer: COMMERCIAL

## 2022-11-02 ENCOUNTER — LAB VISIT (OUTPATIENT)
Dept: LAB | Facility: HOSPITAL | Age: 42
End: 2022-11-02
Payer: COMMERCIAL

## 2022-11-02 DIAGNOSIS — Z76.82 ORGAN TRANSPLANT CANDIDATE: ICD-10-CM

## 2022-11-02 PROCEDURE — 86825 HLA X-MATH NON-CYTOTOXIC: CPT | Mod: 91,PO,TXP | Performed by: NURSE PRACTITIONER

## 2022-11-02 PROCEDURE — 86825 HLA X-MATH NON-CYTOTOXIC: CPT | Mod: PO,TXP | Performed by: NURSE PRACTITIONER

## 2022-11-03 LAB
B CELL RESULTS - XM ALLO: NEGATIVE
B MCS AVERAGE - XM ALLO: -0.6
DONOR MRN: NORMAL
FXMAL TESTING DATE: NORMAL
HLA FLOW CROSSMATCH (ALLO) INTERPRETATION: NORMAL
SERUM COLLECTION DT - XM ALLO: NORMAL
T CELL RESULTS - XM ALLO: NEGATIVE
T MCS AVERAGE - XM ALLO: 3.9

## 2022-11-08 ENCOUNTER — PATIENT MESSAGE (OUTPATIENT)
Dept: NEPHROLOGY | Facility: CLINIC | Age: 42
End: 2022-11-08
Payer: COMMERCIAL

## 2022-11-08 ENCOUNTER — PATIENT MESSAGE (OUTPATIENT)
Dept: TRANSPLANT | Facility: CLINIC | Age: 42
End: 2022-11-08
Payer: COMMERCIAL

## 2022-11-09 ENCOUNTER — TELEPHONE (OUTPATIENT)
Dept: TRANSPLANT | Facility: CLINIC | Age: 42
End: 2022-11-09
Payer: COMMERCIAL

## 2022-11-09 ENCOUNTER — PATIENT MESSAGE (OUTPATIENT)
Dept: CARDIOLOGY | Facility: CLINIC | Age: 42
End: 2022-11-09
Payer: COMMERCIAL

## 2022-11-09 DIAGNOSIS — Z76.82 ORGAN TRANSPLANT CANDIDATE: Primary | ICD-10-CM

## 2022-11-09 NOTE — TELEPHONE ENCOUNTER
Patient updated on transplant evaluation. Instructed he is outstanding a stress test and echocardiogram. Patient reports understanding. States he will call his cardiologist to get both test scheduled. Denies other questions or concerns at this time.

## 2022-11-10 ENCOUNTER — PATIENT MESSAGE (OUTPATIENT)
Dept: OTHER | Facility: OTHER | Age: 42
End: 2022-11-10
Payer: COMMERCIAL

## 2022-11-11 ENCOUNTER — PATIENT MESSAGE (OUTPATIENT)
Dept: OTHER | Facility: OTHER | Age: 42
End: 2022-11-11
Payer: COMMERCIAL

## 2022-11-17 ENCOUNTER — PATIENT MESSAGE (OUTPATIENT)
Dept: INTERNAL MEDICINE | Facility: CLINIC | Age: 42
End: 2022-11-17
Payer: COMMERCIAL

## 2022-11-17 ENCOUNTER — PATIENT MESSAGE (OUTPATIENT)
Dept: CARDIOLOGY | Facility: CLINIC | Age: 42
End: 2022-11-17
Payer: COMMERCIAL

## 2022-11-17 DIAGNOSIS — E11.65 TYPE 2 DIABETES MELLITUS WITH HYPERGLYCEMIA, WITHOUT LONG-TERM CURRENT USE OF INSULIN: ICD-10-CM

## 2022-11-17 RX ORDER — DEXTROSE 4 G
TABLET,CHEWABLE ORAL
Qty: 1 EACH | Refills: 0 | Status: CANCELLED | OUTPATIENT
Start: 2022-11-17

## 2022-11-17 NOTE — TELEPHONE ENCOUNTER
No new care gaps identified.  [facility] Embedded Care Gaps. Reference number: [id number]. 11/17/2022   9:54:21 AM CST

## 2022-11-18 RX ORDER — INSULIN PUMP SYRINGE, 3 ML
EACH MISCELLANEOUS
Qty: 1 EACH | Refills: 0 | Status: SHIPPED | OUTPATIENT
Start: 2022-11-18 | End: 2023-11-18

## 2022-11-18 RX ORDER — LANCETS
EACH MISCELLANEOUS
Qty: 200 EACH | Refills: 5 | Status: SHIPPED | OUTPATIENT
Start: 2022-11-18 | End: 2023-06-30 | Stop reason: SDUPTHER

## 2022-11-18 NOTE — TELEPHONE ENCOUNTER
Robb Bender.    ERx sent to [facility].    I wish you happiness and health for the holidays and the new year to come.    All the best,    KEVIN Roberson MD    Future Appointments   Date Time Provider Department Center   11/30/2022  9:50 AM LABORATORY, [facility] HG LAB [facility]   12/1/2022  2:30 PM CARDIOVASCULAR, [facility] SPECCPR [facility]   12/20/2022  7:30 AM KEVIN Roberson MD [facility] IM [facility]   4/5/2023  2:00 PM Monica Rios MD HG CARDIO [facility]        Medications Ordered This Encounter   Medications    blood sugar diagnostic Strp     Sig: Check blood glucose 2 times daily as directed and as needed     Dispense:  200 each     Refill:  5     Dispense supplies corresponding with patient's glucometer.    blood-glucose meter (ONETOUCH ULTRAMINI) kit     Sig: Use as instructed     Dispense:  1 each     Refill:  0     Generic/brand therapeutic substitution OK to comply with insurance formulary or patient-preference.    lancets Misc     Sig: Check blood glucose 2 times daily as directed and as needed (dispense insurance preferred brand or patient choice)     Dispense:  200 each     Refill:  5

## 2022-11-30 ENCOUNTER — LAB VISIT (OUTPATIENT)
Dept: LAB | Facility: HOSPITAL | Age: 42
End: 2022-11-30
Attending: FAMILY MEDICINE
Payer: COMMERCIAL

## 2022-11-30 DIAGNOSIS — E11.22 TYPE 2 DIABETES MELLITUS WITH STAGE 4 CHRONIC KIDNEY DISEASE, WITHOUT LONG-TERM CURRENT USE OF INSULIN: Chronic | ICD-10-CM

## 2022-11-30 DIAGNOSIS — N18.4 TYPE 2 DIABETES MELLITUS WITH STAGE 4 CHRONIC KIDNEY DISEASE, WITHOUT LONG-TERM CURRENT USE OF INSULIN: Chronic | ICD-10-CM

## 2022-11-30 LAB
ESTIMATED AVG GLUCOSE: 97 MG/DL (ref 68–131)
HBA1C MFR BLD: 5 % (ref 4–5.6)

## 2022-11-30 PROCEDURE — 83036 HEMOGLOBIN GLYCOSYLATED A1C: CPT | Mod: TXP | Performed by: FAMILY MEDICINE

## 2022-11-30 PROCEDURE — 36415 COLL VENOUS BLD VENIPUNCTURE: CPT | Mod: NTX | Performed by: FAMILY MEDICINE

## 2022-12-01 ENCOUNTER — HOSPITAL ENCOUNTER (OUTPATIENT)
Dept: CARDIOLOGY | Facility: HOSPITAL | Age: 42
Discharge: HOME | End: 2022-12-01
Attending: NURSE PRACTITIONER
Payer: COMMERCIAL

## 2022-12-01 VITALS
WEIGHT: 227 LBS | DIASTOLIC BLOOD PRESSURE: 87 MMHG | BODY MASS INDEX: 29.13 KG/M2 | SYSTOLIC BLOOD PRESSURE: 154 MMHG | HEIGHT: 74 IN

## 2022-12-01 DIAGNOSIS — Z76.82 ORGAN TRANSPLANT CANDIDATE: ICD-10-CM

## 2022-12-01 LAB
AV INDEX (PROSTH): 1.02
AV MEAN GRADIENT: 4 MMHG
AV PEAK GRADIENT: 7 MMHG
AV VALVE AREA: 3.73 CM2
AV VELOCITY RATIO: 1
BSA FOR ECHO PROCEDURE: 2.32 M2
CV ECHO LV RWT: 0.58 CM
CV STRESS BASE HR: 71 BPM
DIASTOLIC BLOOD PRESSURE: 87 MMHG
DOP CALC AO PEAK VEL: 1.28 M/S
DOP CALC AO VTI: 28.1 CM
DOP CALC LVOT AREA: 3.7 CM2
DOP CALC LVOT DIAMETER: 2.16 CM
DOP CALC LVOT PEAK VEL: 1.28 M/S
DOP CALC LVOT STROKE VOLUME: 104.75 CM3
DOP CALC RVOT PEAK VEL: 0.64 M/S
DOP CALC RVOT VTI: 15.1 CM
DOP CALCLVOT PEAK VEL VTI: 28.6 CM
E WAVE DECELERATION TIME: 244.12 MSEC
E/A RATIO: 1.2
E/E' RATIO: 5.57 M/S
ECHO LV POSTERIOR WALL: 1.17 CM (ref 0.6–1.1)
EJECTION FRACTION: 55 %
FRACTIONAL SHORTENING: 30 % (ref 28–44)
INTERVENTRICULAR SEPTUM: 1.11 CM (ref 0.6–1.1)
IVRT: 65.65 MSEC
LA MAJOR: 4.29 CM
LA MINOR: 4.96 CM
LA WIDTH: 3.5 CM
LEFT ATRIUM SIZE: 3.28 CM
LEFT ATRIUM VOLUME INDEX MOD: 17.7 ML/M2
LEFT ATRIUM VOLUME INDEX: 19.5 ML/M2
LEFT ATRIUM VOLUME MOD: 40.78 CM3
LEFT ATRIUM VOLUME: 44.89 CM3
LEFT INTERNAL DIMENSION IN SYSTOLE: 2.82 CM (ref 2.1–4)
LEFT VENTRICLE DIASTOLIC VOLUME INDEX: 31.21 ML/M2
LEFT VENTRICLE DIASTOLIC VOLUME: 71.79 ML
LEFT VENTRICLE MASS INDEX: 68 G/M2
LEFT VENTRICLE SYSTOLIC VOLUME INDEX: 12.3 ML/M2
LEFT VENTRICLE SYSTOLIC VOLUME: 28.22 ML
LEFT VENTRICULAR INTERNAL DIMENSION IN DIASTOLE: 4.04 CM (ref 3.5–6)
LEFT VENTRICULAR MASS: 155.77 G
LV LATERAL E/E' RATIO: 4.59 M/S
LV SEPTAL E/E' RATIO: 7.09 M/S
LVOT MG: 4.13 MMHG
LVOT MV: 0.97 CM/S
MV PEAK A VEL: 0.65 M/S
MV PEAK E VEL: 0.78 M/S
OHS CV CPX 1 MINUTE RECOVERY HEART RATE: 126 BPM
OHS CV CPX 85 PERCENT MAX PREDICTED HEART RATE MALE: 153
OHS CV CPX ESTIMATED METS: 11
OHS CV CPX MAX PREDICTED HEART RATE: 180
OHS CV CPX PATIENT IS FEMALE: 0
OHS CV CPX PATIENT IS MALE: 1
OHS CV CPX PEAK DIASTOLIC BLOOD PRESSURE: 86 MMHG
OHS CV CPX PEAK HEAR RATE: 153 BPM
OHS CV CPX PEAK RATE PRESSURE PRODUCT: ABNORMAL
OHS CV CPX PEAK SYSTOLIC BLOOD PRESSURE: 188 MMHG
OHS CV CPX PERCENT MAX PREDICTED HEART RATE ACHIEVED: 85
OHS CV CPX RATE PRESSURE PRODUCT PRESENTING: ABNORMAL
PISA TR MAX VEL: 2.42 M/S
PV MEAN GRADIENT: 0.95 MMHG
PV PEAK VELOCITY: 0.92 CM/S
RA MAJOR: 4.3 CM
RA WIDTH: 2.72 CM
RIGHT VENTRICULAR END-DIASTOLIC DIMENSION: 2.96 CM
SINUS: 3.5 CM
STJ: 2.97 CM
STRESS ANGINA INDEX: 0
STRESS ECHO POST EXERCISE DUR MIN: 9 MINUTES
STRESS ECHO POST EXERCISE DUR SEC: 22 SECONDS
SYSTOLIC BLOOD PRESSURE: 154 MMHG
TDI LATERAL: 0.17 M/S
TDI SEPTAL: 0.11 M/S
TDI: 0.14 M/S
TR MAX PG: 23 MMHG
TRICUSPID ANNULAR PLANE SYSTOLIC EXCURSION: 1.95 CM

## 2022-12-01 PROCEDURE — 93351 STRESS TTE COMPLETE: CPT | Mod: 26,TXP,, | Performed by: INTERNAL MEDICINE

## 2022-12-01 PROCEDURE — 93351 STRESS ECHO (CUPID ONLY): ICD-10-PCS | Mod: 26,TXP,, | Performed by: INTERNAL MEDICINE

## 2022-12-01 PROCEDURE — 93325 DOPPLER ECHO COLOR FLOW MAPG: CPT | Mod: TXP

## 2022-12-01 PROCEDURE — 93325 DOPPLER ECHO COLOR FLOW MAPG: CPT | Mod: 26,TXP,, | Performed by: INTERNAL MEDICINE

## 2022-12-01 PROCEDURE — 93320 DOPPLER ECHO COMPLETE: CPT | Mod: 26,TXP,, | Performed by: INTERNAL MEDICINE

## 2022-12-01 PROCEDURE — 93320 STRESS ECHO (CUPID ONLY): ICD-10-PCS | Mod: 26,TXP,, | Performed by: INTERNAL MEDICINE

## 2022-12-01 PROCEDURE — 93325 STRESS ECHO (CUPID ONLY): ICD-10-PCS | Mod: 26,TXP,, | Performed by: INTERNAL MEDICINE

## 2022-12-07 ENCOUNTER — TELEPHONE (OUTPATIENT)
Dept: TRANSPLANT | Facility: CLINIC | Age: 42
End: 2022-12-07
Payer: COMMERCIAL

## 2022-12-07 ENCOUNTER — EPISODE CHANGES (OUTPATIENT)
Dept: TRANSPLANT | Facility: CLINIC | Age: 42
End: 2022-12-07

## 2022-12-07 NOTE — TELEPHONE ENCOUNTER
Returned phone call to patient. Instructed all appts have been completed, and he is now ready to be presented to committee. Patient reports understanding. Denies other questions or concerns at this time.     ----- Message from Matilde Krishnan sent at 12/6/2022 12:47 PM CST -----  Regarding: Questions  Pt is requesting to speak with coordinator Konstantin. They want to know if they completed all of their appts for the Transplant evaluation.      Robb @ [phone]

## 2022-12-14 ENCOUNTER — TELEPHONE (OUTPATIENT)
Dept: TRANSPLANT | Facility: CLINIC | Age: 42
End: 2022-12-14
Payer: COMMERCIAL

## 2022-12-14 NOTE — TELEPHONE ENCOUNTER
"Dialysis Compliance Check:    SW called pt's dialysis unit [facility] ([phone]) for a dialysis compliance check.  Nurse Rodriges reports pt "100% compliant" and has had 0 no-shows, missed appointments, or AMAs since starting treatment.    Per nurse Rodriges- no concerns with caregiver support, transportation, or mental health.    Pt remains low risk for transplant.    Radha Araya LMSW  "

## 2022-12-16 ENCOUNTER — COMMITTEE REVIEW (OUTPATIENT)
Dept: TRANSPLANT | Facility: CLINIC | Age: 42
End: 2022-12-16
Payer: COMMERCIAL

## 2022-12-16 NOTE — COMMITTEE REVIEW
Native Organ Dx: Diabetes Mellitus - Type II      SELECTION COMMITTEE NOTE    Robb Iglesias was presented at selection committee on 12/16/2022.  Patient met selection criteria for kidney transplant related to CKD, Stage 5 due to    Diabetes Mellitus - Type II.  No absolute contraindications to transplant at this time.  Patient will be placed on the cadaveric wait list pending final financial approval from insurance company.  Patient will return to clinic for routine appointment in 1 year(s). Patient does not meet criteria for High KDPI kidney offer. Patient meets HCV+ kidney offer. Patient does not meet criteria for dual/enbloc, due to weight.    Spoken to patient in regards committee. All questions were answered and pt voiced understanding.   Note written by Flavia Lombardi RN    ===============================================  I was present at selection committee and agree with the decision stated above    Samantha Munoz DO  Transplant Nephrology

## 2022-12-16 NOTE — LETTER
December 16, 2022    Dr. Siva Figueroa  [address] [address] NEPHROLOGY  [facility] [address]     Dear Dr. Siva Figueroa:    Patient: Robb Iglesias   MR Number: [id number]   YOB: 1980     Your patient, Robb Iglesias, was recently discussed at the [facility] Selection Committee meeting on 12/16/2022. I am happy to inform you that Robb has been approved for transplantation.  He has met selection criteria for a kidney transplant related to CKD stage 5 secondary to primary diagnosis of Diabetes Mellitus - Type II. Your patient will be placed on the cadaveric wait list pending final financial approval from insurance company.     We appreciate your confidence in allowing us to participate in your patients care.  If you have any questions or concerns, please do not hesitate to contact me.    Sincerely,      Angélica Tran MD  Medical Director, Kidney & Kidney/Pancreas Transplantation  Jr: Enclosure  Cc: Robb Iglesias (Patient)

## 2022-12-20 ENCOUNTER — PATIENT MESSAGE (OUTPATIENT)
Dept: INTERNAL MEDICINE | Facility: CLINIC | Age: 42
End: 2022-12-20

## 2023-01-09 ENCOUNTER — PATIENT MESSAGE (OUTPATIENT)
Dept: PODIATRY | Facility: CLINIC | Age: 43
End: 2023-01-09
Payer: COMMERCIAL

## 2023-01-09 ENCOUNTER — PATIENT MESSAGE (OUTPATIENT)
Dept: INTERNAL MEDICINE | Facility: CLINIC | Age: 43
End: 2023-01-09
Payer: COMMERCIAL

## 2023-01-09 ENCOUNTER — TELEPHONE (OUTPATIENT)
Dept: INTERNAL MEDICINE | Facility: CLINIC | Age: 43
End: 2023-01-09
Payer: COMMERCIAL

## 2023-01-09 LAB — HBA1C MFR BLD: 5.3 % (ref 4.2–5.6)

## 2023-01-10 ENCOUNTER — TELEPHONE (OUTPATIENT)
Dept: TRANSPLANT | Facility: CLINIC | Age: 43
End: 2023-01-10
Payer: COMMERCIAL

## 2023-01-10 DIAGNOSIS — Z76.82 ORGAN TRANSPLANT CANDIDATE: Primary | ICD-10-CM

## 2023-01-10 NOTE — TELEPHONE ENCOUNTER
Received clearance to list patient, no 2nd ABO noted. Attempted call to patient to informed him, no answer, left message. Will give chart to  to book ABO and HLA.

## 2023-01-11 ENCOUNTER — LAB VISIT (OUTPATIENT)
Dept: LAB | Facility: HOSPITAL | Age: 43
End: 2023-01-11
Attending: NURSE PRACTITIONER
Payer: COMMERCIAL

## 2023-01-11 DIAGNOSIS — E11.42 TYPE 2 DIABETES MELLITUS WITH DIABETIC POLYNEUROPATHY, WITHOUT LONG-TERM CURRENT USE OF INSULIN: Primary | ICD-10-CM

## 2023-01-11 DIAGNOSIS — G57.92 PERIPHERAL NEURITIS OF LEFT FOOT: ICD-10-CM

## 2023-01-11 DIAGNOSIS — Z76.82 ORGAN TRANSPLANT CANDIDATE: ICD-10-CM

## 2023-01-11 LAB — ABO + RH BLD: NORMAL

## 2023-01-11 PROCEDURE — 36415 COLL VENOUS BLD VENIPUNCTURE: CPT | Mod: TXP | Performed by: NURSE PRACTITIONER

## 2023-01-11 PROCEDURE — 86833 HLA CLASS II HIGH DEFIN QUAL: CPT | Mod: TXP | Performed by: NURSE PRACTITIONER

## 2023-01-11 PROCEDURE — 86832 HLA CLASS I HIGH DEFIN QUAL: CPT | Mod: TXP | Performed by: NURSE PRACTITIONER

## 2023-01-11 PROCEDURE — 86900 BLOOD TYPING SEROLOGIC ABO: CPT | Mod: TXP | Performed by: NURSE PRACTITIONER

## 2023-01-11 RX ORDER — GABAPENTIN 100 MG/1
100 CAPSULE ORAL DAILY
Qty: 90 CAPSULE | Refills: 2 | Status: SHIPPED | OUTPATIENT
Start: 2023-01-11 | End: 2023-05-02 | Stop reason: ALTCHOICE

## 2023-01-13 ENCOUNTER — PATIENT MESSAGE (OUTPATIENT)
Dept: INTERNAL MEDICINE | Facility: CLINIC | Age: 43
End: 2023-01-13
Payer: COMMERCIAL

## 2023-01-18 ENCOUNTER — TELEPHONE (OUTPATIENT)
Dept: TRANSPLANT | Facility: CLINIC | Age: 43
End: 2023-01-18
Payer: COMMERCIAL

## 2023-01-18 DIAGNOSIS — Z76.82 ORGAN TRANSPLANT CANDIDATE: Primary | ICD-10-CM

## 2023-01-18 NOTE — LETTER
2023    Robb Iglesias  [address] [place] [address]    Dear Robb Iglesias:  MRN: [id number]    Congratulations! On 2023, you were placed on  the waiting list for a  donor transplant.    Your candidacy for kidney transplant is based on the following criteria: CKD stage 5 due to Diabetes Mellitus - Type II.    Your transplant coordinator while on the waiting list is Chaitanya Ivey RN. They can be reached at [phone] or [phone] with any questions.      What to do now?    Ask your living donors to call and begin testing  Give your donors our phone number, [phone]  Make sure your donors have your name and date of birth when they call  You will get transplanted much faster if you have a living donor    Have your blood sent to our Transplant Lab every month  If you are on dialysis - our Transplant Lab will work with your dialysis unit to send your blood every month  If you are not on dialysis   If you live near an [facility] lab, we will schedule you to have blood drawn every month  If you do not live near an [facility] lab, you will be sent blood kits in the mail. You will need to take a kit to your local lab or doctor to have your blood drawn every month and mail to the Transplant Lab.     Call us with ANY CHANGES  Phone numbers - we must be able to reach you anytime of the day or night when a kidney is available  Address  Insurance coverage  Dialysis unit or kidney doctor  Christian: if you have surgery, stay in the hospital, have to get blood, or have an infection    Review your Kidney/Kidney-Pancreas Transplant Guide   This will give you detailed information about what happens when  you are on the waiting list   you are called when a kidney is available    The [facility] has a transplant surgeon and physician available 365 days a year, 24 hours a day to coordinate organ acceptance, procurement, surgical placement and to address urgent  patient care issues.  You will be notified in writing of any changes to our Transplant Centers staffing plan that would impact your ability to receive a transplant.    Attached is a letter from the [employer] for Organ Sharing (UNOS). It describes the services and information offered to patients by UNOS and the Organ Procurement and Transplant Network. We look forward to working with you while on the waiting list.     Congratulations,    Your Transplant Partner  [facility]   [address] [place] [id number]  [phone]  Jr: Melanie.  Cc: Dr. Siva Figueroa                                                                                                                   The Organ Procurement and Transplantation Network   Toll-free patient services line: Your resource for organ transplant information     If you have a question regarding your own medical care, you always should call your transplant hospital first. However, for general organ transplant-related information, you can call the Organ Procurement and Transplantation Network (OPTN) toll-free patient services line at [phone].     Anyone, including potential transplant candidates, candidates, recipients, family members, friends, living donors, and donor family members, can call this number to:     Talk about organ donation, living donation, the transplant process, the donation process, and transplant policies.   Get a free patient information kit with helpful booklets, waiting list and transplant information, and a list of all transplant hospitals.   Ask questions about the OPTN website (https://optn.transplant.hrsa.gov/), the [employer] for Organ Sharings (UNOS) website (https://unos.org/), or the UNOS website for living donors and transplant recipients. (https://www.transplantliving.org/).   Learn how the OPTN can help you.   Talk about any concerns that you may have with a transplant hospital.      The nations transplant system, the OPTN, is managed under federal contract by the [employer] for Organ Sharing (UNOS), which is a non-profit charitable organization. The OPTN helps create and define organ sharing policies that make the best use of donated organs. This process continuously evaluating new advances and discoveries so policies can be adapted to best serve patients waiting for transplants. To do so, the OPTN works closely with transplant professionals, transplant patients, transplant candidates, donor families, living donors, and the public. All transplant programs and organ procurement organizations throughout the country are OPTN members and are obligated to follow the policies the OPTN creates for allocating organs.     The OPTN also is responsible for:   Providing educational material for patients, the public, and professionals.   Raising awareness of the need for donated organs and tissue.   Coordinating organ procurement, matching, and placement.   Collecting information about every organ transplant and donation that occurs in the [place].     Remember, you should contact your transplant hospital directly if you have questions or concerns about your own medical care including medical records, work-up progress, and test results.     We are not your transplant hospital, and our staff will not be able to answer questions about your case, so please keep your transplant hospitals phone number handy.   However, while you research your transplant needs and learn as much as you can about transplantation and donation, we welcome your call to our toll-free patient services line at [phone].

## 2023-01-18 NOTE — TELEPHONE ENCOUNTER
"  KIDNEY WAIT LISTING NOTE    Date of Financial clearance to list: 23    [facility]:     Organ: Kidney    Last Name: Harris  First Name: Robb    : 1980       Gender: male        MRN#: [id number]                                   State of Permanent Residence: [address] [place] [address] [address] [place] [address]    Ethnicity: Not  or /a   Race:      White    CLINICAL INFORMATION   Candidate Medical Urgency Status: Active (1)  Number of Previous Kidney Transplants: 0  Number of Previous Solid Organ Transplants: 0  Did you enter number of previous kidney or other solid organ transplants? yes  Is this Candidate a Prior Living Donor: no  (If yes, please generate letter to [employer] with patient's date of donation, recipient SSN, signed by Surgical Director after patient is listed in order to receive priority points).      ABO  ABO Blood Group:   A NEG     ABO Confirmation: (THESE DATES MUST BE PRIOR TO THE LIST DATE AND SUPPORTED BY SEPARATE LAB REPORTS)    Internal Results    Lab Results   Component Value Date    GROUPTRH A NEG 2023    GROUPTRH A NEG 10/13/2022     No results found for: ABO    External Results    ABO Date 1:    ABO Date 2  Are either of these ABO results based on External Labs? no  (If Yes, STOP and go to source document in Media Tab for verification).    VITALS  Height:  6' 2"  Weight:227 lbs    (Use height from Transplant clinic visits only).  Did you enter height/weight? Yes    HLA    Class I:  Lab Results   Component Value Date    BWEK9OI 3 10/13/2022    CQFT0UG 30 10/13/2022    CUYZ8UK 18 10/13/2022    NXEX2PJ 35 10/13/2022    SGHOQ2SO 6 10/13/2022    VEPSW0SE XX 10/13/2022    PRAKY0SP 4 10/13/2022    OCJXT6UJ 5 10/13/2022       Class II:  Lab Results   Component Value Date    DTOBKA00RG 17 10/13/2022    BMOFIP59BM 14 10/13/2022    QQOHJD171NB 52 10/13/2022    YKRXWP8172 XX 10/13/2022    MOUNT0BM 2 10/13/2022    CGBDW9TJ 5 10/13/2022       Tested for HLA Antibodies: Yes, " "antibodies detected     If result is "Positive" antibodies are detected     If result is "Negative or questionable" no antibodies detected    Lab Results   Component Value Date    CIPRAS Inconclusive 10/13/2022    CIIPRAS Negative 10/13/2022       DIALYSIS INFORMATION  Is patient Pre-Dialysis: Yes     GFR Information  Report GFR being used as the criteria for placement on the kidney list. If not, leave blank  GFR < or = 20 ml/min? Yes  If Yes, Specify value  _12.9__   ml/min     Initial date GFR became 20 or less: 10/13/22  Is GFR obtained from an Outside lab Result? No  (If YES verify with source document scanned into media)    If patient on Dialysis:    Is candidate currently on dialysis for ESRD? No  If Yes,  Date Chronic Dialysis Started:   (Not currently on dialysis)  (verify with source document in Media Tab)   Dialysis Unit Name: (Not currently on dialysis)                        Physician Name:  Dr. Angélica Mota  NPI#: [id number]    DIABETES INFORMATION  Primary Native Kidney Diagnosis: Diabetes Mellitus - Type II  C-Peptide Value - No results found for: CPEPTIDE  Current Diabetes Status: Type II    FOR NON-KIDNEY DEPARTMENT USE ONLY:  Additional Organs Registered? none    Maximum Acceptable Number of HLA Mismatches  ABDR:     6      (0-6)               AB:               (0-4)  ADR:   _____  (0-4)              BDR: _____ (0-4)  A:        _____  (0-2)              B:      _____ (0-2)          DR: ______ (0-2)    Will Recipient Accept?   Accept HBcAB Positive Organ:            Yes  Accept HBV ROX Positive Organ:        no  Accept HCV Antibody Positive Organ: yes   Accept HCV ROX Positive Organ: yes    Dual Kidney and En Bloc Opt In : No  Dual  Local:   No  Dual Import:   No  En Bloc Local:   No  En Bloc Import: No     Accept KDPI > 85: Single: No     Local: No     Import: No  Accept KDPI > 85: Dual: No     Local: No     Import: No    ### NURSE TO VERIFY CONSENT AND MAKE ANY NECESSARY CHANGES NEEDED IN " UNET AT THE TIME OF VERIFICATION ###    Unacceptible Antigens  If yes, list     Lab Results   Component Value Date    HV4UFWU Negative 10/13/2022    CIIAB DR53,DQ7 10/13/2022       ### DO NOT LIST IF ANTIGEN VALUE WEAK ###    Hep B Vaccine series completed: no    Blood Type x2 was verified by myself and Gagandeep Araya RN  Blood type determination and reporting was completed according to the programs protocols and OPTN requirements.

## 2023-01-26 ENCOUNTER — OFFICE VISIT (OUTPATIENT)
Dept: INTERNAL MEDICINE | Facility: CLINIC | Age: 43
End: 2023-01-26
Payer: COMMERCIAL

## 2023-01-26 ENCOUNTER — PATIENT OUTREACH (OUTPATIENT)
Dept: ADMINISTRATIVE | Facility: HOSPITAL | Age: 43
End: 2023-01-26
Payer: COMMERCIAL

## 2023-01-26 DIAGNOSIS — E11.69 DYSLIPIDEMIA ASSOCIATED WITH TYPE 2 DIABETES MELLITUS: Chronic | ICD-10-CM

## 2023-01-26 DIAGNOSIS — Z99.2 STAGE 5 CHRONIC KIDNEY DISEASE ON CHRONIC DIALYSIS: Chronic | ICD-10-CM

## 2023-01-26 DIAGNOSIS — N18.4 TYPE 2 DIABETES MELLITUS WITH STAGE 4 CHRONIC KIDNEY DISEASE, WITHOUT LONG-TERM CURRENT USE OF INSULIN: Chronic | ICD-10-CM

## 2023-01-26 DIAGNOSIS — E11.22 TYPE 2 DIABETES MELLITUS WITH STAGE 4 CHRONIC KIDNEY DISEASE, WITHOUT LONG-TERM CURRENT USE OF INSULIN: Chronic | ICD-10-CM

## 2023-01-26 DIAGNOSIS — E78.5 DYSLIPIDEMIA ASSOCIATED WITH TYPE 2 DIABETES MELLITUS: Chronic | ICD-10-CM

## 2023-01-26 DIAGNOSIS — E11.59 HYPERTENSION COMPLICATING DIABETES: Chronic | ICD-10-CM

## 2023-01-26 DIAGNOSIS — N18.6 TYPE 2 DIABETES MELLITUS WITH CHRONIC KIDNEY DISEASE ON CHRONIC DIALYSIS, WITHOUT LONG-TERM CURRENT USE OF INSULIN: Primary | ICD-10-CM

## 2023-01-26 DIAGNOSIS — I15.2 HYPERTENSION COMPLICATING DIABETES: Chronic | ICD-10-CM

## 2023-01-26 DIAGNOSIS — Z99.2 TYPE 2 DIABETES MELLITUS WITH CHRONIC KIDNEY DISEASE ON CHRONIC DIALYSIS, WITHOUT LONG-TERM CURRENT USE OF INSULIN: Primary | ICD-10-CM

## 2023-01-26 DIAGNOSIS — N18.6 STAGE 5 CHRONIC KIDNEY DISEASE ON CHRONIC DIALYSIS: Chronic | ICD-10-CM

## 2023-01-26 DIAGNOSIS — N25.81 HYPERPARATHYROIDISM, SECONDARY RENAL: ICD-10-CM

## 2023-01-26 DIAGNOSIS — E66.3 OVERWEIGHT (BMI 25.0-29.9): Chronic | ICD-10-CM

## 2023-01-26 DIAGNOSIS — E11.22 TYPE 2 DIABETES MELLITUS WITH CHRONIC KIDNEY DISEASE ON CHRONIC DIALYSIS, WITHOUT LONG-TERM CURRENT USE OF INSULIN: Primary | ICD-10-CM

## 2023-01-26 DIAGNOSIS — M1A.09X0 IDIOPATHIC CHRONIC GOUT, MULTIPLE SITES, WITHOUT TOPHUS (TOPHI): Chronic | ICD-10-CM

## 2023-01-26 DIAGNOSIS — I21.4 NSTEMI (NON-ST ELEVATED MYOCARDIAL INFARCTION): ICD-10-CM

## 2023-01-26 DIAGNOSIS — Z99.2 PERITONEAL DIALYSIS CATHETER IN PLACE: Chronic | ICD-10-CM

## 2023-01-26 DIAGNOSIS — Z76.82 KIDNEY TRANSPLANT CANDIDATE: Chronic | ICD-10-CM

## 2023-01-26 DIAGNOSIS — I25.10 CORONARY ARTERY DISEASE INVOLVING NATIVE CORONARY ARTERY OF NATIVE HEART WITHOUT ANGINA PECTORIS: Chronic | ICD-10-CM

## 2023-01-26 PROBLEM — Z95.820 STATUS POST ANGIOPLASTY WITH STENT: Chronic | Status: ACTIVE | Noted: 2017-08-04

## 2023-01-26 PROBLEM — Z98.84 STATUS FOLLOWING SURGERY FOR WEIGHT LOSS: Status: RESOLVED | Noted: 2017-07-31 | Resolved: 2023-01-26

## 2023-01-26 PROBLEM — M1A.0710 IDIOPATHIC CHRONIC GOUT, RIGHT ANKLE AND FOOT, WITHOUT TOPHUS (TOPHI): Status: RESOLVED | Noted: 2017-06-07 | Resolved: 2023-01-26

## 2023-01-26 PROBLEM — E11.42 TYPE 2 DIABETES MELLITUS WITH DIABETIC POLYNEUROPATHY, WITHOUT LONG-TERM CURRENT USE OF INSULIN: Chronic | Status: ACTIVE | Noted: 2021-06-07

## 2023-01-26 PROBLEM — R80.1 PERSISTENT PROTEINURIA: Status: RESOLVED | Noted: 2017-06-21 | Resolved: 2023-01-26

## 2023-01-26 PROBLEM — Z91.89 AT RISK FOR CARDIOVASCULAR EVENT: Chronic | Status: RESOLVED | Noted: 2019-02-26 | Resolved: 2023-01-26

## 2023-01-26 PROBLEM — E11.9 HYPERTENSION COMPLICATING DIABETES: Chronic | Status: ACTIVE | Noted: 2019-03-16

## 2023-01-26 PROBLEM — M1A.0720 IDIOPATHIC CHRONIC GOUT, LEFT ANKLE AND FOOT, WITHOUT TOPHUS (TOPHI): Chronic | Status: RESOLVED | Noted: 2017-06-07 | Resolved: 2023-01-26

## 2023-01-26 PROBLEM — I10 HYPERTENSION COMPLICATING DIABETES: Chronic | Status: ACTIVE | Noted: 2019-03-16

## 2023-01-26 PROBLEM — M10.9 GOUT OF LEFT KNEE: Chronic | Status: RESOLVED | Noted: 2017-04-26 | Resolved: 2023-01-26

## 2023-01-26 PROBLEM — M1A.0420 IDIOPATHIC CHRONIC GOUT OF LEFT HAND WITHOUT TOPHUS: Status: RESOLVED | Noted: 2017-06-07 | Resolved: 2023-01-26

## 2023-01-26 PROCEDURE — 1159F PR MEDICATION LIST DOCUMENTED IN MEDICAL RECORD: ICD-10-PCS | Mod: CPTII,95,, | Performed by: FAMILY MEDICINE

## 2023-01-26 PROCEDURE — 3072F PR LOW RISK FOR RETINOPATHY: ICD-10-PCS | Mod: CPTII,95,, | Performed by: FAMILY MEDICINE

## 2023-01-26 PROCEDURE — 1160F RVW MEDS BY RX/DR IN RCRD: CPT | Mod: CPTII,95,, | Performed by: FAMILY MEDICINE

## 2023-01-26 PROCEDURE — 99215 OFFICE O/P EST HI 40 MIN: CPT | Mod: 95,,, | Performed by: FAMILY MEDICINE

## 2023-01-26 PROCEDURE — 1159F MED LIST DOCD IN RCRD: CPT | Mod: CPTII,95,, | Performed by: FAMILY MEDICINE

## 2023-01-26 PROCEDURE — 4010F ACE/ARB THERAPY RXD/TAKEN: CPT | Mod: CPTII,95,, | Performed by: FAMILY MEDICINE

## 2023-01-26 PROCEDURE — 4010F PR ACE/ARB THEARPY RXD/TAKEN: ICD-10-PCS | Mod: CPTII,95,, | Performed by: FAMILY MEDICINE

## 2023-01-26 PROCEDURE — 1160F PR REVIEW ALL MEDS BY PRESCRIBER/CLIN PHARMACIST DOCUMENTED: ICD-10-PCS | Mod: CPTII,95,, | Performed by: FAMILY MEDICINE

## 2023-01-26 PROCEDURE — 99215 PR OFFICE/OUTPT VISIT, EST, LEVL V, 40-54 MIN: ICD-10-PCS | Mod: 95,,, | Performed by: FAMILY MEDICINE

## 2023-01-26 PROCEDURE — 3072F LOW RISK FOR RETINOPATHY: CPT | Mod: CPTII,95,, | Performed by: FAMILY MEDICINE

## 2023-01-26 RX ORDER — NITROGLYCERIN 0.4 MG/1
TABLET SUBLINGUAL
Qty: 25 TABLET | Refills: 5 | Status: SHIPPED | OUTPATIENT
Start: 2023-01-26 | End: 2023-06-30 | Stop reason: SDUPTHER

## 2023-01-26 RX ORDER — ASPIRIN 81 MG/1
81 TABLET ORAL DAILY
Qty: 90 TABLET | Refills: 3 | Status: SHIPPED | OUTPATIENT
Start: 2023-01-26 | End: 2023-06-30 | Stop reason: SDUPTHER

## 2023-01-26 RX ORDER — ATORVASTATIN CALCIUM 80 MG/1
80 TABLET, FILM COATED ORAL NIGHTLY
Qty: 90 TABLET | Refills: 3 | Status: SHIPPED | OUTPATIENT
Start: 2023-01-26 | End: 2023-06-30 | Stop reason: SDUPTHER

## 2023-01-26 RX ORDER — DULAGLUTIDE 3 MG/.5ML
INJECTION, SOLUTION SUBCUTANEOUS
Refills: 3 | OUTPATIENT
Start: 2023-01-26

## 2023-01-26 RX ORDER — DULAGLUTIDE 3 MG/.5ML
3 INJECTION, SOLUTION SUBCUTANEOUS
Qty: 12 PEN | Refills: 1 | Status: SHIPPED | OUTPATIENT
Start: 2023-01-26 | End: 2023-02-27 | Stop reason: SDUPTHER

## 2023-01-26 RX ORDER — FEBUXOSTAT 80 MG/1
1 TABLET, FILM COATED ORAL DAILY
Qty: 90 TABLET | Refills: 2 | Status: ON HOLD | OUTPATIENT
Start: 2023-01-26 | End: 2023-05-17 | Stop reason: HOSPADM

## 2023-01-26 RX ORDER — EZETIMIBE 10 MG/1
10 TABLET ORAL DAILY
Qty: 90 TABLET | Refills: 3 | Status: SHIPPED | OUTPATIENT
Start: 2023-01-26 | End: 2023-06-30 | Stop reason: SDUPTHER

## 2023-01-26 RX ORDER — COLCHICINE 0.6 MG/1
TABLET ORAL
Qty: 45 TABLET | Refills: 3 | Status: ON HOLD | OUTPATIENT
Start: 2023-01-26 | End: 2023-05-17 | Stop reason: HOSPADM

## 2023-01-26 NOTE — ASSESSMENT & PLAN NOTE
Lab Results   Component Value Date    .1 (H) 10/13/2022    .1 (H) 10/13/2022    .8 (H) 06/07/2022    CALCIUM 9.9 10/13/2022    CALCIUM 9.9 10/13/2022    CALCIUM 9.8 09/07/2022    ALBUMIN 4.5 10/13/2022    ALBUMIN 4.5 10/13/2022    PHOS 4.4 10/13/2022    PHOS 4.4 10/13/2022    PHOS 4.4 08/18/2022    HAUHLUZI75VX 44 06/07/2022    TQWJXBCB36YU 39 05/28/2022    JZOMSNJP24ZU 42 09/13/2017     Lab Results   Component Value Date    EGFRNORACEVR 12.9 (A) 10/13/2022    EGFRNORACEVR 12.9 (A) 10/13/2022    EGFRNORACEVR 13.2 (A) 09/07/2022    CREATININE 5.4 (H) 10/13/2022    CREATININE 5.4 (H) 10/13/2022    CREATININE 5.3 (H) 09/07/2022   More recent labs uploaded by patient.

## 2023-01-26 NOTE — TELEPHONE ENCOUNTER
No new care gaps identified.  [facility] Embedded Care Gaps. Reference number: [id number]. 1/26/2023   12:02:38 AM CST

## 2023-01-26 NOTE — PROGRESS NOTES
TELEMEDICINE VIRTUAL VISIT  1/26/23  7:00 AM CST    Visit Details: This visit was a telemedicine virtual visit with synchronous audio and video. Robb represented that his location at the time of this visit was in the [facility]. Robb chose and consented to receive these medical services by telemedicine.    CHIEF COMPLAINT: Follow-up, Results, and Medication Refill    ASSESSMENT & PLAN  1. Type 2 diabetes mellitus with chronic kidney disease on chronic dialysis, without long-term current use of insulin  Assessment & Plan:  Robb reports he is doing well on his Trulicity. Robb reports preceiving no adverse side-effects and wants to continue current treatment. No history or family history of thyroid cancer or multiple endocrine neoplasia syndrome.     Statin: Taking  ACE/ARB: Taking    Screening or Prevention Patient's value Goal Complete/Controlled?   HgA1C Testing and Control   Lab Results   Component Value Date    HGBA1C 5.0 11/30/2022      Annually/Less than 8% Yes   Lipid profile : 09/07/2022 Annually Yes   LDL control Lab Results   Component Value Date    LDLCALC 37.2 (L) 09/07/2022    Annually/Less than 100 mg/dl  Yes   Nephropathy screening Lab Results   Component Value Date    LABMICR 4358.0 05/28/2022     Lab Results   Component Value Date    PROTEINUA 3+ (A) 08/18/2022    Annually Yes   Blood pressure BP Readings from Last 1 Encounters:   12/01/22 (!) 154/87    Less than 140/90 No   Dilated retinal exam : 07/29/2022 Annually Yes   Foot exam   : 06/28/2022 Annually Yes     Lab Results   Component Value Date    HGBA1C 5.0 11/30/2022    HGBA1C 4.8 10/13/2022    HGBA1C 4.9 09/07/2022    CYSTATINC 3.31 (H) 10/13/2022    EGFRCYSTC 17 (L) 10/13/2022    EGFRNORACEVR 12.9 (A) 10/13/2022    EGFRNORACEVR 12.9 (A) 10/13/2022    MICALBCREAT 2690.1 (H) 05/28/2022    LDLCALC 37.2 (L) 09/07/2022     No results found for: GLUTAMICACID, CPEPTIDE   Last 5 Patient Entered Readings                                           Most Recent A1c:     There is no flowsheet data to display.        HEALTH MAINTENANCE: Diabetic health maintenance interventions reviewed and are up to date except for:  There are no preventive care reminders to display for this patient.     Orders:  -     dulaglutide (TRULICITY) 3 mg/0.5 mL pen injector; Inject 3 mg into the skin every 7 days.  Dispense: 12 pen; Refill: 1    2. Dyslipidemia associated with type 2 diabetes mellitus  Assessment & Plan:  Lab Results   Component Value Date    CHOL 88 (L) 09/07/2022    CHOL 91 (L) 05/28/2022    TRIG 119 09/07/2022    TRIG 120 05/28/2022    HDL 27 (L) 09/07/2022    HDL 26 (L) 05/28/2022    LDLCALC 37.2 (L) 09/07/2022    LDLCALC 41.0 (L) 05/28/2022    NONHDLCHOL 61 09/07/2022    NONHDLCHOL 65 05/28/2022    AST 24 10/13/2022    ALT 29 10/13/2022     The ASCVD Risk score (Greg GAFFNEY, et al., 2019) failed to calculate for the following reasons:    The patient has a prior MI or stroke diagnosis     Orders:  -     ezetimibe (ZETIA) 10 mg tablet; Take 1 tablet (10 mg total) by mouth once daily.  Dispense: 90 tablet; Refill: 3  -     atorvastatin (LIPITOR) 80 MG tablet; Take 1 tablet (80 mg total) by mouth every evening.  Dispense: 90 tablet; Refill: 3    3. Hypertension complicating diabetes  Overview:  Not candidate for Hypertension Digital Medicine program due to dialysis status.  Didn't tolerate amlodipine due to SE of hypotension.    Assessment & Plan:  BP not well controlled. Dr. Figueroa addressing by increasing dialysis to QD from QOD.      4. Overweight (BMI 25.0-29.9)  Assessment & Plan:  Much improved with Trulicity and therapeutic lifestyle changes.  Wt Readings from Last 6 Encounters:   12/01/22 103 kg (227 lb)   10/13/22 103.3 kg (227 lb 11.8 oz)   09/30/22 107.2 kg (236 lb 5.3 oz)   09/21/22 106.5 kg (234 lb 12.6 oz)   08/25/22 105 kg (231 lb 7.7 oz)   07/26/22 108.9 kg (240 lb)     Estimated body mass index is 28.82 kg/m² as calculated from the following:    " Height as of 12/1/22: 6' 2.41" (1.89 m).    Weight as of 12/1/22: 103 kg (227 lb).        5. Stage 5 chronic kidney disease on chronic dialysis  Assessment & Plan:  On peritoneal dialysis QD/QOD, managed by Dr. Figueroa.      6. Idiopathic chronic gout, multiple sites, without tophus (tophi)  Assessment & Plan:  Well controlled after starting dialysis.    Orders:  -     febuxostat (ULORIC) 80 mg Tab; Take 1 tablet (80 mg total) by mouth once daily.  Dispense: 90 tablet; Refill: 2  -     colchicine (COLCRYS) 0.6 mg tablet; Take 2 tablets at onset of acute gout attack. May take 1 more tablet 2 hours later if needed. MAX 3 TABLETS PER DAY. MAX 6 TABLETS PER WEEK.  Dispense: 45 tablet; Refill: 3    7. Coronary artery disease involving native coronary artery of native heart without angina pectoris  Overview:  Cath lab procedure 04/23/2018 (Naveen Rios MD)  A. Indication/Pre-Operative Diagnosis: The patient is a 37 year old male that was referred for catheterization by [facility] for ACS (NSTEMI). The BELLA risk score is 5.    B. Summary/Post-Operative Diagnosis  1. Single vessel coronary artery disease.  2. Normal LVEF.  3. Diastolic dysfunction.  4. Successful PCI for acute myocardial infarction.  5. The patient did not undergo primary PCI.    Assessment & Plan:  Asymptomatic. Clinically stable.     Orders:  -     nitroGLYCERIN (NITROSTAT) 0.4 MG SL tablet; Dissolve one tablet underneath tongue at onset of angina; may repeat every 5 minutes if needed. Call 911 if angina persists after 2 doses.  Dispense: 25 tablet; Refill: 5  -     ezetimibe (ZETIA) 10 mg tablet; Take 1 tablet (10 mg total) by mouth once daily.  Dispense: 90 tablet; Refill: 3  -     atorvastatin (LIPITOR) 80 MG tablet; Take 1 tablet (80 mg total) by mouth every evening.  Dispense: 90 tablet; Refill: 3  -     aspirin (ECOTRIN) 81 MG EC tablet; Take 1 tablet (81 mg total) by mouth once daily.  Dispense: 90 tablet; Refill: 3    8. " Hyperparathyroidism, secondary renal  Assessment & Plan:  Lab Results   Component Value Date    .1 (H) 10/13/2022    .1 (H) 10/13/2022    .8 (H) 06/07/2022    CALCIUM 9.9 10/13/2022    CALCIUM 9.9 10/13/2022    CALCIUM 9.8 09/07/2022    ALBUMIN 4.5 10/13/2022    ALBUMIN 4.5 10/13/2022    PHOS 4.4 10/13/2022    PHOS 4.4 10/13/2022    PHOS 4.4 08/18/2022    WJPUPTIR37GX 44 06/07/2022    [id number] 39 05/28/2022    AVESBUUC42IW 42 09/13/2017     Lab Results   Component Value Date    EGFRNORACEVR 12.9 (A) 10/13/2022    EGFRNORACEVR 12.9 (A) 10/13/2022    EGFRNORACEVR 13.2 (A) 09/07/2022    CREATININE 5.4 (H) 10/13/2022    CREATININE 5.4 (H) 10/13/2022    CREATININE 5.3 (H) 09/07/2022   More recent labs uploaded by patient.      9. NSTEMI with CAD s/p PCI (FAMILIA) of LAD x 2 in 8/2017  -     ezetimibe (ZETIA) 10 mg tablet; Take 1 tablet (10 mg total) by mouth once daily.  Dispense: 90 tablet; Refill: 3  -     atorvastatin (LIPITOR) 80 MG tablet; Take 1 tablet (80 mg total) by mouth every evening.  Dispense: 90 tablet; Refill: 3  -     aspirin (ECOTRIN) 81 MG EC tablet; Take 1 tablet (81 mg total) by mouth once daily.  Dispense: 90 tablet; Refill: 3    10. Kidney transplant candidate  Assessment & Plan:  May have live donor match.      11. Peritoneal dialysis catheter in place  Assessment & Plan:  Reportedly functioning well without complications.      Unless noted herein, any chronic conditions are represented as and appear compensated/controlled and stable, and no other significant complaints or concerns were reported.    Follow up in about 5 months (around 6/26/2023) for annual wellness exam.   Future Appointments   Date Time Provider Department Center   4/5/2023  2:00 PM Monica Rios MD [facility]     PRESCRIPTION DRUG MANAGEMENT  Outpatient Medications Prior to Visit   Medication Sig Dispense Refill    carvediloL (COREG) 6.25 MG tablet TAKE ONE TABLET BY MOUTH TWO TIMES A DAY WITH  MEALS 60 tablet 11    furosemide (LASIX) 20 MG tablet TAKE ONE TABLET BY MOUTH EVERY OTHER DAY 15 tablet 11    gabapentin (NEURONTIN) 100 MG capsule Take 1 capsule (100 mg total) by mouth once daily. Slowly increase dose every two weeks by 100mg if needed. Max dose to be 400mg daily. 90 capsule 2    telmisartan (MICARDIS) 80 MG Tab TAKE 1 TABLET (80 MG TOTAL) BY MOUTH ONCE DAILY. 30 tablet 11    aspirin (ECOTRIN) 81 MG EC tablet Take 1 tablet (81 mg total) by mouth once daily. 90 tablet 3    atorvastatin (LIPITOR) 80 MG tablet Take 1 tablet (80 mg total) by mouth every evening. 90 tablet 3    colchicine (COLCRYS) 0.6 mg tablet Take 2 tablets at onset of acute gout attack. May take 1 more tablet 2 hours later if needed. MAX 3 TABLETS PER DAY. MAX 6 TABLETS PER WEEK. 45 tablet 3    ezetimibe (ZETIA) 10 mg tablet Take 1 tablet (10 mg total) by mouth once daily. 90 tablet 3    febuxostat (ULORIC) 80 mg Tab Take 1 tablet (80 mg total) by mouth once daily. 90 tablet 2    TRULICITY 3 mg/0.5 mL pen injector INJECT 3 MG INTO THE SKIN EVERY 7 DAYS (Patient taking differently: 1.5 mg every 7 days.) 12 pen 1    blood sugar diagnostic Strp Check blood glucose 2 times daily as directed and as needed 200 each 5    blood-glucose meter (ONETOUCH ULTRAMINI) kit Use as instructed 1 each 0    lancets Misc Check blood glucose 2 times daily as directed and as needed (dispense insurance preferred brand or patient choice) 200 each 5    amLODIPine (NORVASC) 10 MG tablet TAKE 1 TABLET (10 MG TOTAL) BY MOUTH EVERY EVENING. 90 tablet 3    nitroGLYCERIN (NITROSTAT) 0.4 MG SL tablet Dissolve one tablet underneath tongue at onset of angina; may repeat every 5 minutes if needed. Call 911 if angina persists after 2 doses. 25 tablet 5     No facility-administered medications prior to visit.     Medications Discontinued During This Encounter   Medication Reason    amLODIPine (NORVASC) 10 MG tablet Patient no longer taking    atorvastatin (LIPITOR) 80  MG tablet Reorder    aspirin (ECOTRIN) 81 MG EC tablet Reorder    nitroGLYCERIN (NITROSTAT) 0.4 MG SL tablet Reorder    ezetimibe (ZETIA) 10 mg tablet Reorder    colchicine (COLCRYS) 0.6 mg tablet Reorder    febuxostat (ULORIC) 80 mg Tab Reorder    TRULICITY 3 mg/0.5 mL pen injector Reorder     Medications Ordered This Encounter   Medications    aspirin (ECOTRIN) 81 MG EC tablet     Sig: Take 1 tablet (81 mg total) by mouth once daily.     Dispense:  90 tablet     Refill:  3     -    atorvastatin (LIPITOR) 80 MG tablet     Sig: Take 1 tablet (80 mg total) by mouth every evening.     Dispense:  90 tablet     Refill:  3    colchicine (COLCRYS) 0.6 mg tablet     Sig: Take 2 tablets at onset of acute gout attack. May take 1 more tablet 2 hours later if needed. MAX 3 TABLETS PER DAY. MAX 6 TABLETS PER WEEK.     Dispense:  45 tablet     Refill:  3    dulaglutide (TRULICITY) 3 mg/0.5 mL pen injector     Sig: Inject 3 mg into the skin every 7 days.     Dispense:  12 pen     Refill:  1    ezetimibe (ZETIA) 10 mg tablet     Sig: Take 1 tablet (10 mg total) by mouth once daily.     Dispense:  90 tablet     Refill:  3    febuxostat (ULORIC) 80 mg Tab     Sig: Take 1 tablet (80 mg total) by mouth once daily.     Dispense:  90 tablet     Refill:  2    nitroGLYCERIN (NITROSTAT) 0.4 MG SL tablet     Sig: Dissolve one tablet underneath tongue at onset of angina; may repeat every 5 minutes if needed. Call 911 if angina persists after 2 doses.     Dispense:  25 tablet     Refill:  5     Health Maintenance Topics with due status: Not Due       Topic Last Completion Date    TETANUS VACCINE 06/26/2020    Foot Exam 06/28/2022    Eye Exam 07/29/2022    Lipid Panel 09/07/2022    Aspirin/Antiplatelet Therapy 10/13/2022    Hemoglobin A1c 11/30/2022     RECOMMENDED:  Health Maintenance Due   Topic Date Due    Pneumococcal Vaccines (Age 0-64) (2 - PCV) 06/26/2021    COVID-19 Vaccine (4 - Booster for Moderna series) 12/31/2021     Review of  Systems   Constitutional:  Negative for activity change and unexpected weight change.   HENT:  Negative for hearing loss, rhinorrhea and trouble swallowing.    Eyes:  Negative for discharge and visual disturbance.   Respiratory:  Negative for chest tightness and wheezing.    Cardiovascular:  Negative for chest pain and palpitations.   Gastrointestinal:  Negative for blood in stool, constipation, diarrhea and vomiting.   Endocrine: Negative for polydipsia and polyuria.   Genitourinary:  Negative for difficulty urinating, hematuria and urgency.   Musculoskeletal:  Negative for arthralgias, joint swelling and neck pain.   Neurological:  Negative for weakness and headaches.   Psychiatric/Behavioral:  Negative for confusion and dysphoric mood.    PHYSICAL EXAM  CONSTITUTIONAL: No apparent distress. Appears comfortable. Does not appear acutely ill or septic. Appears adequately hydrated.  PULMONARY: Breathing unlabored. No audible wheezes. Easily speaks in full sentences.  PSYCHIATRIC: Alert and conversant and grossly oriented. Mood is grossly neutral. Affect appropriate. Judgment and insight grossly intact.    PAST MEDICAL HISTORY  Robb has a past medical history of Allergic rhinitis, Class 1 obesity due to excess calories with serious comorbidity and body mass index (BMI) of 31.0 to 31.9 in adult, Coronary artery disease, Coronary artery disease involving native coronary artery of native heart without angina pectoris, Diabetic nephropathy associated with type 2 diabetes mellitus, Direct hyperbilirubinemia, DM (diabetes mellitus), DM (diabetes mellitus), DM (diabetes mellitus), DM (diabetes mellitus), Dyslipidemia associated with type 2 diabetes mellitus, Elevated bilirubin, GERD (gastroesophageal reflux disease), Gout, Hyperlipidemia, Hyperparathyroidism, secondary to chronic kidney disease, Hypertension associated with chronic kidney disease due to type 2 diabetes mellitus, Hypertension complicating diabetes,  Idiopathic chronic gout, multiple sites, without tophus (tophi), Long term (current) use of insulin, MI (myocardial infarction), NSTEMI with CAD s/p PCI (FAMILIA) of LAD x 2 in 8/2017, Obesity, HENRY on CPAP, Peritoneal dialysis catheter in place, Proteinuria, Severe obstructive sleep apnea - Intolerant of CPAP, Stage 5 chronic kidney disease on chronic peritoneal dialysis, Status post angioplasty with stent, Steatohepatitis, Type 2 diabetes mellitus with chronic kidney disease on chronic dialysis, without long-term current use of insulin, Type 2 diabetes mellitus with diabetic nephropathy, Type 2 diabetes mellitus with diabetic nephropathy, without long-term current use of insulin, Type 2 diabetes mellitus with diabetic polyneuropathy, without long-term current use of insulin, Type 2 diabetes mellitus with hyperglycemia, Type 2 diabetes mellitus with renal manifestations, and Type 2 diabetes mellitus with stage 3 chronic kidney disease, without long-term current use of insulin.    SURGICAL HISTORY  Robb has a past surgical history that includes Nasal septum surgery; LASIK (Bilateral); Liver biopsy; Nasal endoscopy; Sleeve Gastroplasty (03/06/2017); and Coronary angioplasty with stent.    FAMILY HISTORY  Robb family history includes Diabetes in his maternal grandmother and mother; Diabetes type II in his brother, brother, and mother; Hyperlipidemia in his father and mother; Hypertension in his mother.     ALLERGIES  Review of patient's allergies indicates:  No Known Allergies    SOCIAL HISTORY  Robb  reports that he has never smoked. He has never used smokeless tobacco. He reports that he does not drink alcohol and does not use drugs.     Orders Placed This Encounter    dulaglutide (TRULICITY) 3 mg/0.5 mL pen injector    nitroGLYCERIN (NITROSTAT) 0.4 MG SL tablet    febuxostat (ULORIC) 80 mg Tab    ezetimibe (ZETIA) 10 mg tablet    colchicine (COLCRYS) 0.6 mg tablet    atorvastatin (LIPITOR) 80 MG tablet    aspirin  "(ECOTRIN) 81 MG EC tablet     TOTAL TIME evaluating and managing this patient for this encounter was greater than or equal to 40 minutes. This time was spent personally by me on the following activities: pre-charting, review of patient's past medical history, assessing age-appropriate health maintenance needs, review of any interval history, medication reconciliation, reconciling/updating problem list, reconciling/updating PMFSH, obtaining history from the patient, examination of the patient, review and interpretation of lab results, reviewing outside records, evaluation of the patient's response to treatment, managing and/or ordering prescription medications, educating patient and answering their questions about treatment plan, goals of treatment, and follow-up, and final documentation of the visit. This time was exclusive of any separately billable procedures for this patient and exclusive of time spent treating any other patients.    Documentation entered by me for this encounter may have been done in part using speech-recognition technology. Although I have made an effort to ensure accuracy, "sound like" errors may exist and should be interpreted in context.    Each patient to whom medical services are provided by telemedicine is: (1) informed of the relationship between the physician and patient and the respective role of any other health care provider with respect to management of the patient; and (2) notified that he or she may decline to receive medical services by telemedicine and may withdraw from such care at any time.   "

## 2023-01-26 NOTE — Clinical Note
Jeny Bender.  Would you please update his HM A1c based on his his A1c done 1/9/23 and shown on page 3 of Chart Media 01/13/2023 13:34 Patient Entered Attachment?  Thank you for your help caring for our patients!  -LM

## 2023-01-26 NOTE — ASSESSMENT & PLAN NOTE
"Much improved with Trulicity and therapeutic lifestyle changes.  Wt Readings from Last 6 Encounters:   12/01/22 103 kg (227 lb)   10/13/22 103.3 kg (227 lb 11.8 oz)   09/30/22 107.2 kg (236 lb 5.3 oz)   09/21/22 106.5 kg (234 lb 12.6 oz)   08/25/22 105 kg (231 lb 7.7 oz)   07/26/22 108.9 kg (240 lb)     Estimated body mass index is 28.82 kg/m² as calculated from the following:    Height as of 12/1/22: 6' 2.41" (1.89 m).    Weight as of 12/1/22: 103 kg (227 lb).    "

## 2023-01-26 NOTE — ASSESSMENT & PLAN NOTE
Lab Results   Component Value Date    CHOL 88 (L) 09/07/2022    CHOL 91 (L) 05/28/2022    TRIG 119 09/07/2022    TRIG 120 05/28/2022    HDL 27 (L) 09/07/2022    HDL 26 (L) 05/28/2022    LDLCALC 37.2 (L) 09/07/2022    LDLCALC 41.0 (L) 05/28/2022    NONHDLCHOL 61 09/07/2022    NONHDLCHOL 65 05/28/2022    AST 24 10/13/2022    ALT 29 10/13/2022     The ASCVD Risk score (Greg GAFFNEY, et al., 2019) failed to calculate for the following reasons:    The patient has a prior MI or stroke diagnosis

## 2023-01-26 NOTE — TELEPHONE ENCOUNTER
Refill Decision Note  Quick DC. Request already responded to by other means (e.g. phone or fax)     Robb Iglesias  is requesting a refill authorization.  Brief Assessment and Rationale for Refill:  Quick Discontinue     Medication Therapy Plan:       Medication Reconciliation Completed: No   Comments:     No Care Gaps recommended.     Note composed:10:11 AM 01/26/2023

## 2023-01-26 NOTE — ASSESSMENT & PLAN NOTE
Robb reports he is doing well on his Trulicity. Robb reports preceiving no adverse side-effects and wants to continue current treatment. No history or family history of thyroid cancer or multiple endocrine neoplasia syndrome.     Statin: Taking  ACE/ARB: Taking    Screening or Prevention Patient's value Goal Complete/Controlled?   HgA1C Testing and Control   Lab Results   Component Value Date    HGBA1C 5.0 11/30/2022      Annually/Less than 8% Yes   Lipid profile : 09/07/2022 Annually Yes   LDL control Lab Results   Component Value Date    LDLCALC 37.2 (L) 09/07/2022    Annually/Less than 100 mg/dl  Yes   Nephropathy screening Lab Results   Component Value Date    LABMICR 4358.0 05/28/2022     Lab Results   Component Value Date    PROTEINUA 3+ (A) 08/18/2022    Annually Yes   Blood pressure BP Readings from Last 1 Encounters:   12/01/22 (!) 154/87    Less than 140/90 No   Dilated retinal exam : 07/29/2022 Annually Yes   Foot exam   : 06/28/2022 Annually Yes     Lab Results   Component Value Date    HGBA1C 5.0 11/30/2022    HGBA1C 4.8 10/13/2022    HGBA1C 4.9 09/07/2022    CYSTATINC 3.31 (H) 10/13/2022    EGFRCYSTC 17 (L) 10/13/2022    EGFRNORACEVR 12.9 (A) 10/13/2022    EGFRNORACEVR 12.9 (A) 10/13/2022    MICALBCREAT 2690.1 (H) 05/28/2022    LDLCALC 37.2 (L) 09/07/2022     No results found for: GLUTAMICACID, CPEPTIDE   Last 5 Patient Entered Readings                                          Most Recent A1c:     There is no flowsheet data to display.        HEALTH MAINTENANCE: Diabetic health maintenance interventions reviewed and are up to date except for:  There are no preventive care reminders to display for this patient.

## 2023-01-27 ENCOUNTER — PATIENT MESSAGE (OUTPATIENT)
Dept: TRANSPLANT | Facility: CLINIC | Age: 43
End: 2023-01-27
Payer: COMMERCIAL

## 2023-01-28 LAB — HPRA INTERPRETATION: NORMAL

## 2023-01-30 ENCOUNTER — TELEPHONE (OUTPATIENT)
Dept: TRANSPLANT | Facility: CLINIC | Age: 43
End: 2023-01-30
Payer: COMMERCIAL

## 2023-01-30 NOTE — TELEPHONE ENCOUNTER
Spoke with patient regarding surgery dates. Confirmed surgery date of 5/15/23 with preop testing 5/2/23. Discussed amount of time to anticipate being in [place] and out of work. All questions were answered.

## 2023-02-02 DIAGNOSIS — Z76.82 ORGAN TRANSPLANT CANDIDATE: Primary | ICD-10-CM

## 2023-02-03 PROCEDURE — 99001 SPECIMEN HANDLING PT-LAB: CPT | Mod: PO,TXP | Performed by: NURSE PRACTITIONER

## 2023-02-13 ENCOUNTER — LAB VISIT (OUTPATIENT)
Dept: LAB | Facility: HOSPITAL | Age: 43
End: 2023-02-13
Payer: COMMERCIAL

## 2023-02-13 DIAGNOSIS — Z76.82 ORGAN TRANSPLANT CANDIDATE: ICD-10-CM

## 2023-02-15 LAB
CLASS I ANTIBODIES - LUMINEX: NEGATIVE
CLASS II ANTIBODIES - LUMINEX: NORMAL
CLASS II ANTIBODY COMMENTS - LUMINEX: NORMAL
CPRA %: 50
SERUM COLLECTION DT - LUMINEX CLASS I: NORMAL
SERUM COLLECTION DT - LUMINEX CLASS II: NORMAL
SPCL1 TESTING DATE: NORMAL
SPCL2 TESTING DATE: NORMAL
SPCLU TESTING DATE: NORMAL

## 2023-02-17 LAB
HLA FREEZE AND HOLD INTERPRETATION: NORMAL
HLAFH COLLECTION DATE: NORMAL
HPRA INTERPRETATION: NORMAL

## 2023-02-27 ENCOUNTER — PATIENT MESSAGE (OUTPATIENT)
Dept: INTERNAL MEDICINE | Facility: CLINIC | Age: 43
End: 2023-02-27
Payer: COMMERCIAL

## 2023-02-27 DIAGNOSIS — Z99.2 TYPE 2 DIABETES MELLITUS WITH CHRONIC KIDNEY DISEASE ON CHRONIC DIALYSIS, WITHOUT LONG-TERM CURRENT USE OF INSULIN: ICD-10-CM

## 2023-02-27 DIAGNOSIS — E11.22 TYPE 2 DIABETES MELLITUS WITH CHRONIC KIDNEY DISEASE ON CHRONIC DIALYSIS, WITHOUT LONG-TERM CURRENT USE OF INSULIN: ICD-10-CM

## 2023-02-27 DIAGNOSIS — N18.6 TYPE 2 DIABETES MELLITUS WITH CHRONIC KIDNEY DISEASE ON CHRONIC DIALYSIS, WITHOUT LONG-TERM CURRENT USE OF INSULIN: ICD-10-CM

## 2023-02-27 RX ORDER — DULAGLUTIDE 3 MG/.5ML
3 INJECTION, SOLUTION SUBCUTANEOUS
Qty: 12 PEN | Refills: 1 | Status: SHIPPED | OUTPATIENT
Start: 2023-02-27 | End: 2023-05-29 | Stop reason: SDUPTHER

## 2023-02-27 NOTE — TELEPHONE ENCOUNTER
Care Due:                  Date            Visit Type   Department     Provider  --------------------------------------------------------------------------------                                ESTABLISHED                              PATIENT -    [facility] INTER[facility]  Last Visit: 01-      VIRTUAL      MEDICINE       Kwan Roberson                               -                              PRIMARY      [facility]  Next Visit: 06-      CARE ([facility])   MEDICINE       Kwan Roberson                                                            Last  Test          Frequency    Reason                     Performed    Due Date  --------------------------------------------------------------------------------    Uric Acid...  12 months..  colchicine, febuxostat...  05- 05-    [facility] Embedded Care Gaps. Reference number: [id number]. 2/27/2023   2:51:04 PM CST

## 2023-02-28 NOTE — TELEPHONE ENCOUNTER
Refill Decision Note   Robb Iglesias  is requesting a refill authorization.  Brief Assessment and Rationale for Refill:  Approve     Medication Therapy Plan:  [facility]    Medication Reconciliation Completed: No   Comments:     Provider Staff:     Action is required for this patient.   Please see care gap opportunities below in Care Due Message.     Thanks!  [facility]     Appointments      Date Provider   Last Visit   1/26/2023 SABIHA Roberson MD   Next Visit   6/30/2023 SABIHA Roberson MD     Note composed:10:30 PM 02/27/2023           Note composed:10:30 PM 02/27/2023

## 2023-03-02 DIAGNOSIS — E11.22 TYPE 2 DIABETES MELLITUS WITH CHRONIC KIDNEY DISEASE ON CHRONIC DIALYSIS, WITHOUT LONG-TERM CURRENT USE OF INSULIN: ICD-10-CM

## 2023-03-02 DIAGNOSIS — N18.6 TYPE 2 DIABETES MELLITUS WITH CHRONIC KIDNEY DISEASE ON CHRONIC DIALYSIS, WITHOUT LONG-TERM CURRENT USE OF INSULIN: ICD-10-CM

## 2023-03-02 DIAGNOSIS — Z99.2 TYPE 2 DIABETES MELLITUS WITH CHRONIC KIDNEY DISEASE ON CHRONIC DIALYSIS, WITHOUT LONG-TERM CURRENT USE OF INSULIN: ICD-10-CM

## 2023-03-02 NOTE — TELEPHONE ENCOUNTER
----- Message from Brittaney Baca sent at 3/2/2023  8:16 AM CST -----  Contact: Robb  Type:  RX Refill Request    Who Called:  Robb   Refill or New Rx: refill   RX Name and Strength: Dulaglutide (TRULICITY) 3 mg/0.5 mL pen injector  How is the patient currently taking it? (ex. 1XDay):   Is this a 30 day or 90 day RX:   Preferred Pharmacy with phone number:   [facility]  [id number] [place] [address]  Phone: [phone] Fax: [phone]  Local or Mail Order: Local  Ordering Provider: Dr. Roberson  Would the patient rather a call back or a response via [facility]? Call back   Best Call Back Number: Please call her at [phone]  Additional Information:

## 2023-03-02 NOTE — TELEPHONE ENCOUNTER
No new care gaps identified.  [facility] Embedded Care Gaps. Reference number: [id number]. 3/02/2023   4:01:30 PM CST

## 2023-03-07 ENCOUNTER — TELEPHONE (OUTPATIENT)
Dept: TRANSPLANT | Facility: CLINIC | Age: 43
End: 2023-03-07
Payer: COMMERCIAL

## 2023-03-07 DIAGNOSIS — Z76.82 ORGAN TRANSPLANT CANDIDATE: Primary | ICD-10-CM

## 2023-03-07 RX ORDER — DULAGLUTIDE 3 MG/.5ML
3 INJECTION, SOLUTION SUBCUTANEOUS
Qty: 12 PEN | Refills: 1 | OUTPATIENT
Start: 2023-03-07

## 2023-03-07 NOTE — TELEPHONE ENCOUNTER
Response to request for refill of this medicine handled separately.  Requested Prescriptions   Pending Prescriptions Disp Refills    dulaglutide (TRULICITY) 3 mg/0.5 mL pen injector 12 pen 1     Sig: Inject 3 mg into the skin every 7 days.

## 2023-03-07 NOTE — TELEPHONE ENCOUNTER
Confirmed surgery date of 5/15/23 and preop appointments 5/2/23 with patient. Information to have FMLA/disability forms sent. Also reviewed need for PD fluid cultures prior to transplant, patient to arrange with his dialysis unit.   All questions were answered and understanding verbalized.

## 2023-03-10 PROCEDURE — 86833 HLA CLASS II HIGH DEFIN QUAL: CPT | Mod: PO,TXP | Performed by: NURSE PRACTITIONER

## 2023-03-10 PROCEDURE — 86832 HLA CLASS I HIGH DEFIN QUAL: CPT | Mod: PO,TXP | Performed by: NURSE PRACTITIONER

## 2023-03-24 ENCOUNTER — LAB VISIT (OUTPATIENT)
Dept: LAB | Facility: HOSPITAL | Age: 43
End: 2023-03-24
Payer: COMMERCIAL

## 2023-03-24 DIAGNOSIS — Z76.82 ORGAN TRANSPLANT CANDIDATE: ICD-10-CM

## 2023-03-28 DIAGNOSIS — I25.10 CORONARY ARTERY DISEASE INVOLVING NATIVE CORONARY ARTERY OF NATIVE HEART WITHOUT ANGINA PECTORIS: Primary | ICD-10-CM

## 2023-03-28 LAB — HPRA INTERPRETATION: NORMAL

## 2023-04-05 ENCOUNTER — HOSPITAL ENCOUNTER (OUTPATIENT)
Dept: CARDIOLOGY | Facility: HOSPITAL | Age: 43
Discharge: HOME | End: 2023-04-05
Attending: INTERNAL MEDICINE
Payer: COMMERCIAL

## 2023-04-05 ENCOUNTER — OFFICE VISIT (OUTPATIENT)
Dept: CARDIOLOGY | Facility: CLINIC | Age: 43
End: 2023-04-05
Payer: COMMERCIAL

## 2023-04-05 VITALS
BODY MASS INDEX: 30.01 KG/M2 | BODY MASS INDEX: 30.01 KG/M2 | HEART RATE: 80 BPM | WEIGHT: 236.31 LBS | SYSTOLIC BLOOD PRESSURE: 147 MMHG | OXYGEN SATURATION: 97 % | DIASTOLIC BLOOD PRESSURE: 88 MMHG | WEIGHT: 236.31 LBS

## 2023-04-05 DIAGNOSIS — E11.21 DIABETIC NEPHROPATHY ASSOCIATED WITH TYPE 2 DIABETES MELLITUS: Chronic | ICD-10-CM

## 2023-04-05 DIAGNOSIS — N18.6 STAGE 5 CHRONIC KIDNEY DISEASE ON CHRONIC DIALYSIS: Chronic | ICD-10-CM

## 2023-04-05 DIAGNOSIS — Z99.2 TYPE 2 DIABETES MELLITUS WITH CHRONIC KIDNEY DISEASE ON CHRONIC DIALYSIS, WITHOUT LONG-TERM CURRENT USE OF INSULIN: Chronic | ICD-10-CM

## 2023-04-05 DIAGNOSIS — Z99.2 STAGE 5 CHRONIC KIDNEY DISEASE ON CHRONIC DIALYSIS: Chronic | ICD-10-CM

## 2023-04-05 DIAGNOSIS — N25.81 HYPERPARATHYROIDISM, SECONDARY RENAL: Chronic | ICD-10-CM

## 2023-04-05 DIAGNOSIS — N18.6 TYPE 2 DIABETES MELLITUS WITH CHRONIC KIDNEY DISEASE ON CHRONIC DIALYSIS, WITHOUT LONG-TERM CURRENT USE OF INSULIN: Chronic | ICD-10-CM

## 2023-04-05 DIAGNOSIS — I15.2 HYPERTENSION COMPLICATING DIABETES: Chronic | ICD-10-CM

## 2023-04-05 DIAGNOSIS — I25.10 CORONARY ARTERY DISEASE INVOLVING NATIVE CORONARY ARTERY OF NATIVE HEART WITHOUT ANGINA PECTORIS: Primary | Chronic | ICD-10-CM

## 2023-04-05 DIAGNOSIS — I10 ESSENTIAL HYPERTENSION: Chronic | ICD-10-CM

## 2023-04-05 DIAGNOSIS — E11.22 TYPE 2 DIABETES MELLITUS WITH CHRONIC KIDNEY DISEASE ON CHRONIC DIALYSIS, WITHOUT LONG-TERM CURRENT USE OF INSULIN: Chronic | ICD-10-CM

## 2023-04-05 DIAGNOSIS — Z99.2 PERITONEAL DIALYSIS CATHETER IN PLACE: Chronic | ICD-10-CM

## 2023-04-05 DIAGNOSIS — E11.59 HYPERTENSION COMPLICATING DIABETES: Chronic | ICD-10-CM

## 2023-04-05 DIAGNOSIS — E66.3 OVERWEIGHT (BMI 25.0-29.9): Chronic | ICD-10-CM

## 2023-04-05 DIAGNOSIS — E11.42 TYPE 2 DIABETES MELLITUS WITH DIABETIC POLYNEUROPATHY, WITHOUT LONG-TERM CURRENT USE OF INSULIN: Chronic | ICD-10-CM

## 2023-04-05 DIAGNOSIS — E78.5 DYSLIPIDEMIA ASSOCIATED WITH TYPE 2 DIABETES MELLITUS: Chronic | ICD-10-CM

## 2023-04-05 DIAGNOSIS — I21.4 NSTEMI (NON-ST ELEVATED MYOCARDIAL INFARCTION): Chronic | ICD-10-CM

## 2023-04-05 DIAGNOSIS — E11.69 DYSLIPIDEMIA ASSOCIATED WITH TYPE 2 DIABETES MELLITUS: Chronic | ICD-10-CM

## 2023-04-05 DIAGNOSIS — Z98.84 BARIATRIC SURGERY STATUS: Chronic | ICD-10-CM

## 2023-04-05 DIAGNOSIS — G47.33 OBSTRUCTIVE SLEEP APNEA: Chronic | ICD-10-CM

## 2023-04-05 DIAGNOSIS — I25.10 CORONARY ARTERY DISEASE INVOLVING NATIVE CORONARY ARTERY OF NATIVE HEART WITHOUT ANGINA PECTORIS: ICD-10-CM

## 2023-04-05 DIAGNOSIS — Z95.820 STATUS POST ANGIOPLASTY WITH STENT: Chronic | ICD-10-CM

## 2023-04-05 PROCEDURE — 99212 PR OFFICE/OUTPT VISIT, EST, LEVL II, 10-19 MIN: ICD-10-PCS | Mod: NTX,S$GLB,, | Performed by: INTERNAL MEDICINE

## 2023-04-05 PROCEDURE — 1159F PR MEDICATION LIST DOCUMENTED IN MEDICAL RECORD: ICD-10-PCS | Mod: CPTII,NTX,S$GLB, | Performed by: INTERNAL MEDICINE

## 2023-04-05 PROCEDURE — 3072F PR LOW RISK FOR RETINOPATHY: ICD-10-PCS | Mod: CPTII,NTX,S$GLB, | Performed by: INTERNAL MEDICINE

## 2023-04-05 PROCEDURE — 3077F PR MOST RECENT SYSTOLIC BLOOD PRESSURE >= 140 MM HG: ICD-10-PCS | Mod: CPTII,NTX,S$GLB, | Performed by: INTERNAL MEDICINE

## 2023-04-05 PROCEDURE — 3044F HG A1C LEVEL LT 7.0%: CPT | Mod: CPTII,NTX,S$GLB, | Performed by: INTERNAL MEDICINE

## 2023-04-05 PROCEDURE — 93010 ELECTROCARDIOGRAM REPORT: CPT | Mod: NTX,,, | Performed by: INTERNAL MEDICINE

## 2023-04-05 PROCEDURE — 99212 OFFICE O/P EST SF 10 MIN: CPT | Mod: NTX,S$GLB,, | Performed by: INTERNAL MEDICINE

## 2023-04-05 PROCEDURE — 93005 ELECTROCARDIOGRAM TRACING: CPT | Mod: NTX

## 2023-04-05 PROCEDURE — 3008F PR BODY MASS INDEX (BMI) DOCUMENTED: ICD-10-PCS | Mod: CPTII,NTX,S$GLB, | Performed by: INTERNAL MEDICINE

## 2023-04-05 PROCEDURE — 1159F MED LIST DOCD IN RCRD: CPT | Mod: CPTII,NTX,S$GLB, | Performed by: INTERNAL MEDICINE

## 2023-04-05 PROCEDURE — 3072F LOW RISK FOR RETINOPATHY: CPT | Mod: CPTII,NTX,S$GLB, | Performed by: INTERNAL MEDICINE

## 2023-04-05 PROCEDURE — 93010 EKG 12-LEAD: ICD-10-PCS | Mod: NTX,,, | Performed by: INTERNAL MEDICINE

## 2023-04-05 PROCEDURE — 3079F PR MOST RECENT DIASTOLIC BLOOD PRESSURE 80-89 MM HG: ICD-10-PCS | Mod: CPTII,NTX,S$GLB, | Performed by: INTERNAL MEDICINE

## 2023-04-05 PROCEDURE — 4010F ACE/ARB THERAPY RXD/TAKEN: CPT | Mod: CPTII,NTX,S$GLB, | Performed by: INTERNAL MEDICINE

## 2023-04-05 PROCEDURE — 3077F SYST BP >= 140 MM HG: CPT | Mod: CPTII,NTX,S$GLB, | Performed by: INTERNAL MEDICINE

## 2023-04-05 PROCEDURE — 3044F PR MOST RECENT HEMOGLOBIN A1C LEVEL <7.0%: ICD-10-PCS | Mod: CPTII,NTX,S$GLB, | Performed by: INTERNAL MEDICINE

## 2023-04-05 PROCEDURE — 99999 PR PBB SHADOW E&M-EST. PATIENT-LVL IV: ICD-10-PCS | Mod: PBBFAC,TXP,, | Performed by: INTERNAL MEDICINE

## 2023-04-05 PROCEDURE — 4010F PR ACE/ARB THEARPY RXD/TAKEN: ICD-10-PCS | Mod: CPTII,NTX,S$GLB, | Performed by: INTERNAL MEDICINE

## 2023-04-05 PROCEDURE — 99999 PR PBB SHADOW E&M-EST. PATIENT-LVL IV: CPT | Mod: PBBFAC,TXP,, | Performed by: INTERNAL MEDICINE

## 2023-04-05 PROCEDURE — 3079F DIAST BP 80-89 MM HG: CPT | Mod: CPTII,NTX,S$GLB, | Performed by: INTERNAL MEDICINE

## 2023-04-05 PROCEDURE — 3008F BODY MASS INDEX DOCD: CPT | Mod: CPTII,NTX,S$GLB, | Performed by: INTERNAL MEDICINE

## 2023-04-05 NOTE — PROGRESS NOTES
Subjective:   Patient ID:  Robb Iglesias is a 42 y.o. male who presents for follow up of No chief complaint on file.      HPI  6/11/2020  A 38 yo male with cad s/p lad stent old mi htn hlp diabetes is here for f /u he is still exercising regulaqrily he is compliant with diet exercise lost some 10 lbs he is on digital htn however having issues with logistics he is going to o bar. Has no new chest pain or shortness of breath. He is not using his cpap regularily has issues clinically with compliance.  He has isusses with cpap compliance . I think this is a an anaxiety related to mask or claustrophobia. He is able to exercise w/o any issues. Lipids a1c are not on target.he has labs pending for am.     6/16/2021  HE IS HERE FOR F/U HIS HTN HAS BEEN AN ISSUE CLINICALLY HE IS COMPLIANT WITH MEDS AND DIET HE HAS BP MEDS SWITCHED TO MICARDIS FROM [facility] . HE IS INTOLERANT TO CPAP. HE IS COMPLIANT WITH DITE HE TAKES MEDS REGULARTIY STARTED EXERCISING HIS BP HAS BEEN ELEVATED.HE IS ON AMLODIPINE AND CARDIZEM. NO LEG SWELLING      9/9/2021   HERE FOR F /U HAS BEEN IN DIGITAL PROGRAM FOR DIABETES And htn numbers are better. Today bp is more elevated than usual he thinks it is always higher in office. Has no new complaints of chest pain or shortness of breath no leg swelling he tries to exercise has house gym tries to be compliant with diet they cook their food stays away from eating out.has no chf symptoms. Has sleep apnea previously not able to use cpap previously he got retested has mild sleep apnea after weight loss no therapy he sleeps sitting up a little.      3/10/2022  Her efrof /u has been compliant with diet had recurrent covid his renal function deteriorated but improving had been under a lot of stress. Has no angina or chf symptoms claudication tia. Had palpitation after drinking coffee in the afternoon self terminated.      9/30/2022   He has progression of renal disease he has uremic symptoms he has  fatigue nausea he is in need of pd catheter placement to initiate dialysis. He is asymptomatic cardiac wise. He is only on asa.he ahs some swelling in feet at the heena of the day. He claims compliance with diet.     4/5/2023  He is volume dependent affecting his bp he has no other issues clinically. Has no chest pain he feels fatigue. Has not used cpap. Has no other issues clinically.   Past Medical History:   Diagnosis Date    Allergic rhinitis     Class 1 obesity due to excess calories with serious comorbidity and body mass index (BMI) of 31.0 to 31.9 in adult 4/7/2017    Coronary artery disease     Coronary artery disease involving native coronary artery of native heart without angina pectoris 2/6/2018    Cath lab procedure 04/23/2018 (Naveen Rios MD) A. Indication/Pre-Operative Diagnosis: The patient is a 37 year old male that was referred for catheterization by [facility] Self for ACS (NSTEMI). The BELLA risk score is 5.  B. Summary/Post-Operative Diagnosis 1. Single vessel coronary artery disease. 2. Normal LVEF. 3. Diastolic dysfunction. 4. Successful PCI for acute myocardial infarction. 5.     Diabetic nephropathy associated with type 2 diabetes mellitus 1/6/2020    Direct hyperbilirubinemia 3/24/2018    DM (diabetes mellitus) 2008    BS doesn't check any more 08/02/2018    DM (diabetes mellitus) 2012    BS 99 am 06/26/2020    DM (diabetes mellitus)     BS didn't check 06/04/2021    DM (diabetes mellitus) 2008    BS didn't check 07/29/2022     Dyslipidemia associated with type 2 diabetes mellitus 7/31/2017    Elevated bilirubin 3/21/2018    GERD (gastroesophageal reflux disease)     Gout     Hyperlipidemia     Hyperparathyroidism, secondary to chronic kidney disease 6/7/2021    Hypertension associated with chronic kidney disease due to type 2 diabetes mellitus     Hypertension complicating diabetes 3/16/2019    Not candidate for Hypertension Digital Medicine program due to dialysis status. Didn't tolerate  amlodipine due to SE of hypotension.    Idiopathic chronic gout, multiple sites, without tophus (tophi) 7/19/2017    Long term (current) use of insulin     MI (myocardial infarction) 07/2017    NSTEMI with CAD s/p PCI (FAMILIA) of LAD x 2 in 8/2017 7/31/2017    Obesity     HENRY on CPAP     Peritoneal dialysis catheter in place 1/26/2023    Proteinuria     Severe obstructive sleep apnea - Intolerant of CPAP 7/31/2017    Intolerant of CPAP after weeks of trying multiple interfaces. SPLIT-NIGHT SLEEP STUDY 7/28/2015 · SLEEP ARCHITECTURE: Sleep onset was 2.9 minutes and sleep efficiency was 94.4%. Sleep Stage distribution showed 145 sleep stage changes, 6 awakenings and 119 arousals. Sleep distribution showed 53.2% stage NI, 41.8% stage N 11, 0.0% stage N Ill and REM sleep was at 5.0%. There were 2 REM periods. · RE    Stage 5 chronic kidney disease on chronic peritoneal dialysis 6/7/2017    Status post angioplasty with stent 8/4/2017    Steatohepatitis     Fatty Liver    Type 2 diabetes mellitus with chronic kidney disease on chronic dialysis, without long-term current use of insulin 6/21/2017    Type 2 diabetes mellitus with diabetic nephropathy     Type 2 diabetes mellitus with diabetic nephropathy, without long-term current use of insulin 1/6/2020    Type 2 diabetes mellitus with diabetic polyneuropathy, without long-term current use of insulin 6/7/2021    Type 2 diabetes mellitus with hyperglycemia     Type 2 diabetes mellitus with renal manifestations     Type 2 diabetes mellitus with stage 3 chronic kidney disease, without long-term current use of insulin 6/21/2017       Past Surgical History:   Procedure Laterality Date    CORONARY ANGIOPLASTY WITH STENT PLACEMENT      LASIK Bilateral     LIVER BIOPSY      NASAL ENDOSCOPY      NASAL SEPTUM SURGERY      SLEEVE GASTROPLASTY  03/06/2017       Social History     Tobacco Use    Smoking status: Never    Smokeless tobacco: Never   Substance Use Topics    Alcohol use: No      Comment: 1-2 times per month    Drug use: No       Family History   Problem Relation Age of Onset    Diabetes type II Mother     Hypertension Mother     Hyperlipidemia Mother     Diabetes Mother     Hyperlipidemia Father     Diabetes type II Brother     Diabetes type II Brother     Diabetes Maternal Grandmother        Current Outpatient Medications   Medication Sig    aspirin (ECOTRIN) 81 MG EC tablet Take 1 tablet (81 mg total) by mouth once daily.    atorvastatin (LIPITOR) 80 MG tablet Take 1 tablet (80 mg total) by mouth every evening.    blood sugar diagnostic Strp Check blood glucose 2 times daily as directed and as needed    blood-glucose meter (ONETOUCH ULTRAMINI) kit Use as instructed    carvediloL (COREG) 6.25 MG tablet TAKE ONE TABLET BY MOUTH TWO TIMES A DAY WITH MEALS    colchicine (COLCRYS) 0.6 mg tablet Take 2 tablets at onset of acute gout attack. May take 1 more tablet 2 hours later if needed. MAX 3 TABLETS PER DAY. MAX 6 TABLETS PER WEEK.    dulaglutide (TRULICITY) 3 mg/0.5 mL pen injector Inject 3 mg into the skin every 7 days.    ezetimibe (ZETIA) 10 mg tablet Take 1 tablet (10 mg total) by mouth once daily.    febuxostat (ULORIC) 80 mg Tab Take 1 tablet (80 mg total) by mouth once daily.    furosemide (LASIX) 20 MG tablet TAKE ONE TABLET BY MOUTH EVERY OTHER DAY    lancets Misc Check blood glucose 2 times daily as directed and as needed (dispense insurance preferred brand or patient choice)    nitroGLYCERIN (NITROSTAT) 0.4 MG SL tablet Dissolve one tablet underneath tongue at onset of angina; may repeat every 5 minutes if needed. Call 911 if angina persists after 2 doses.    telmisartan (MICARDIS) 80 MG Tab TAKE 1 TABLET (80 MG TOTAL) BY MOUTH ONCE DAILY.    gabapentin (NEURONTIN) 100 MG capsule Take 1 capsule (100 mg total) by mouth once daily. Slowly increase dose every two weeks by 100mg if needed. Max dose to be 400mg daily. (Patient not taking: Reported on 4/5/2023)     No current  facility-administered medications for this visit.     Current Outpatient Medications on File Prior to Visit   Medication Sig    aspirin (ECOTRIN) 81 MG EC tablet Take 1 tablet (81 mg total) by mouth once daily.    atorvastatin (LIPITOR) 80 MG tablet Take 1 tablet (80 mg total) by mouth every evening.    blood sugar diagnostic Strp Check blood glucose 2 times daily as directed and as needed    blood-glucose meter (ONETOUCH ULTRAMINI) kit Use as instructed    carvediloL (COREG) 6.25 MG tablet TAKE ONE TABLET BY MOUTH TWO TIMES A DAY WITH MEALS    colchicine (COLCRYS) 0.6 mg tablet Take 2 tablets at onset of acute gout attack. May take 1 more tablet 2 hours later if needed. MAX 3 TABLETS PER DAY. MAX 6 TABLETS PER WEEK.    dulaglutide (TRULICITY) 3 mg/0.5 mL pen injector Inject 3 mg into the skin every 7 days.    ezetimibe (ZETIA) 10 mg tablet Take 1 tablet (10 mg total) by mouth once daily.    febuxostat (ULORIC) 80 mg Tab Take 1 tablet (80 mg total) by mouth once daily.    furosemide (LASIX) 20 MG tablet TAKE ONE TABLET BY MOUTH EVERY OTHER DAY    lancets Misc Check blood glucose 2 times daily as directed and as needed (dispense insurance preferred brand or patient choice)    nitroGLYCERIN (NITROSTAT) 0.4 MG SL tablet Dissolve one tablet underneath tongue at onset of angina; may repeat every 5 minutes if needed. Call 911 if angina persists after 2 doses.    telmisartan (MICARDIS) 80 MG Tab TAKE 1 TABLET (80 MG TOTAL) BY MOUTH ONCE DAILY.    gabapentin (NEURONTIN) 100 MG capsule Take 1 capsule (100 mg total) by mouth once daily. Slowly increase dose every two weeks by 100mg if needed. Max dose to be 400mg daily. (Patient not taking: Reported on 4/5/2023)     No current facility-administered medications on file prior to visit.     Review of patient's allergies indicates:  No Known Allergies   Review of Systems   Constitutional: Negative for malaise/fatigue.   Eyes:  Negative for blurred vision.   Cardiovascular:   Negative for chest pain, claudication, cyanosis, dyspnea on exertion, irregular heartbeat, leg swelling, near-syncope, orthopnea, palpitations and paroxysmal nocturnal dyspnea.   Respiratory:  Negative for cough, hemoptysis and shortness of breath.    Hematologic/Lymphatic: Negative for bleeding problem. Does not bruise/bleed easily.   Skin:  Negative for dry skin and itching.   Musculoskeletal:  Negative for falls, muscle weakness and myalgias.   Gastrointestinal:  Negative for abdominal pain, diarrhea, heartburn, hematemesis, hematochezia and melena.   Genitourinary:  Negative for flank pain and hematuria.   Neurological:  Negative for dizziness, focal weakness, headaches, light-headedness, numbness, paresthesias, seizures and weakness.   Psychiatric/Behavioral:  Negative for altered mental status and memory loss. The patient is not nervous/anxious.    Allergic/Immunologic: Negative for hives.     Objective:   Physical Exam  Vitals and nursing note reviewed.   Constitutional:       General: He is not in acute distress.     Appearance: He is well-developed. He is obese. He is not diaphoretic.   HENT:      Head: Normocephalic and atraumatic.   Eyes:      General:         Right eye: No discharge.         Left eye: No discharge.      Pupils: Pupils are equal, round, and reactive to light.   Neck:      Thyroid: No thyromegaly.      Vascular: No JVD.   Cardiovascular:      Rate and Rhythm: Normal rate and regular rhythm.      Pulses: Normal pulses and intact distal pulses.      Heart sounds: Normal heart sounds. No murmur heard.    No friction rub. No gallop.   Pulmonary:      Effort: Pulmonary effort is normal. No respiratory distress.      Breath sounds: Normal breath sounds. No wheezing or rales.   Chest:      Chest wall: No tenderness.   Abdominal:      General: Bowel sounds are normal. There is no distension.      Palpations: Abdomen is soft.      Tenderness: There is no abdominal tenderness.   Musculoskeletal:          General: Normal range of motion.      Cervical back: Neck supple.   Skin:     General: Skin is warm and dry.      Findings: No erythema or rash.   Neurological:      Mental Status: He is alert and oriented to person, place, and time.      Cranial Nerves: No cranial nerve deficit.   Psychiatric:         Behavior: Behavior normal.     Vitals:    04/05/23 1349   BP: (!) 147/88   BP Location: Left arm   Patient Position: Sitting   Pulse: 80   SpO2: 97%   Weight: 107.2 kg (236 lb 5.3 oz)     Lab Results   Component Value Date    CHOL 88 (L) 09/07/2022    CHOL 91 (L) 05/28/2022    CHOL 100 (L) 03/15/2022      Body mass index is 30.01 kg/m².   Lab Results   Component Value Date    HGBA1C 5.3 01/09/2023      BMP  Lab Results   Component Value Date     10/13/2022     10/13/2022    K 3.8 10/13/2022    K 3.8 10/13/2022     10/13/2022     10/13/2022    CO2 23 10/13/2022    CO2 23 10/13/2022    BUN 49 (H) 10/13/2022    BUN 49 (H) 10/13/2022    CREATININE 5.4 (H) 10/13/2022    CREATININE 5.4 (H) 10/13/2022    CALCIUM 9.9 10/13/2022    CALCIUM 9.9 10/13/2022    ANIONGAP 10 10/13/2022    ANIONGAP 10 10/13/2022    EGFRNORACEVR 12.9 (A) 10/13/2022    EGFRNORACEVR 12.9 (A) 10/13/2022      Lab Results   Component Value Date    HDL 27 (L) 09/07/2022    HDL 26 (L) 05/28/2022    HDL 28 (L) 03/15/2022     Lab Results   Component Value Date    LDLCALC 37.2 (L) 09/07/2022    LDLCALC 41.0 (L) 05/28/2022    LDLCALC 44.4 (L) 03/15/2022     Lab Results   Component Value Date    TRIG 119 09/07/2022    TRIG 120 05/28/2022    TRIG 138 03/15/2022     Lab Results   Component Value Date    CHOLHDL 30.7 09/07/2022    CHOLHDL 28.6 05/28/2022    CHOLHDL 28.0 03/15/2022       Chemistry        Component Value Date/Time     10/13/2022 0753     10/13/2022 0753    K 3.8 10/13/2022 0753    K 3.8 10/13/2022 0753     10/13/2022 0753     10/13/2022 0753    CO2 23 10/13/2022 0753    CO2 23 10/13/2022 0753    BUN  49 (H) 10/13/2022 0753    BUN 49 (H) 10/13/2022 0753    CREATININE 5.4 (H) 10/13/2022 0753    CREATININE 5.4 (H) 10/13/2022 0753    GLU 89 10/13/2022 0753    GLU 89 10/13/2022 0753        Component Value Date/Time    CALCIUM 9.9 10/13/2022 0753    CALCIUM 9.9 10/13/2022 0753    ALKPHOS 111 10/13/2022 0753    AST 24 10/13/2022 0753    ALT 29 10/13/2022 0753    BILITOT 1.9 (H) 10/13/2022 0753    ESTGFRAFRICA 13 (A) 07/26/2022 0818    EGFRNONAA 11 (A) 07/26/2022 0818        Lab Results   Component Value Date    HGBA1C 5.3 01/09/2023       Lab Results   Component Value Date    TSH 0.727 07/31/2017     Lab Results   Component Value Date    INR 1.0 10/13/2022    INR 1.0 07/26/2022    INR 1.1 07/31/2017     Lab Results   Component Value Date    WBC 6.69 10/13/2022    HGB 12.4 (L) 10/13/2022    HCT 37.3 (L) 10/13/2022    MCV 86 10/13/2022     10/13/2022     BMP  Sodium   Date Value Ref Range Status   10/13/2022 143 136 - 145 mmol/L Final   10/13/2022 143 136 - 145 mmol/L Final     Potassium   Date Value Ref Range Status   10/13/2022 3.8 3.5 - 5.1 mmol/L Final   10/13/2022 3.8 3.5 - 5.1 mmol/L Final     Chloride   Date Value Ref Range Status   10/13/2022 110 95 - 110 mmol/L Final   10/13/2022 110 95 - 110 mmol/L Final     CO2   Date Value Ref Range Status   10/13/2022 23 23 - 29 mmol/L Final   10/13/2022 23 23 - 29 mmol/L Final     BUN   Date Value Ref Range Status   10/13/2022 49 (H) 6 - 20 mg/dL Final   10/13/2022 49 (H) 6 - 20 mg/dL Final     Creatinine   Date Value Ref Range Status   10/13/2022 5.4 (H) 0.5 - 1.4 mg/dL Final   10/13/2022 5.4 (H) 0.5 - 1.4 mg/dL Final     Calcium   Date Value Ref Range Status   10/13/2022 9.9 8.7 - 10.5 mg/dL Final   10/13/2022 9.9 8.7 - 10.5 mg/dL Final     Anion Gap   Date Value Ref Range Status   10/13/2022 10 8 - 16 mmol/L Final   10/13/2022 10 8 - 16 mmol/L Final     eGFR if    Date Value Ref Range Status   07/26/2022 13 (A) >60 mL/min/1.73 m^2 Final     eGFR if  non    Date Value Ref Range Status   07/26/2022 11 (A) >60 mL/min/1.73 m^2 Final     Comment:     Calculation used to obtain the estimated glomerular filtration  rate (eGFR) is the CKD-EPI equation.        CrCl cannot be calculated (Patient's most recent lab result is older than the maximum 7 days allowed.).    Assessment:     1. Coronary artery disease involving native coronary artery of native heart without angina pectoris    2. Dyslipidemia associated with type 2 diabetes mellitus    3. Essential hypertension    4. Hypertension complicating diabetes    5. NSTEMI with CAD s/p PCI (FAMILIA) of LAD x 2 in 8/2017    6. Status post angioplasty with stent    7. Diabetic nephropathy associated with type 2 diabetes mellitus    8. Stage 5 chronic kidney disease on chronic peritoneal dialysis    9. Peritoneal dialysis catheter in place    10. Bariatric surgery status    11. Hyperparathyroidism, secondary to chronic kidney disease    12. Overweight (BMI 25.0-29.9)    13. Type 2 diabetes mellitus with chronic kidney disease on chronic dialysis, without long-term current use of insulin    14. Type 2 diabetes mellitus with diabetic polyneuropathy, without long-term current use of insulin    15. Severe obstructive sleep apnea - Intolerant of CPAP    His issue is really his htn that is volume salt dependent fluctuates with dialysis counseled about salt intake and volume as well as aggressive rf modification. His lipids are on target as well as a1c. Continue same meds.     Plan:   Continue current therapy  Cardiac low salt diet.  Risk factor modification and excercise program./weight loss  F/u in 6 months with lipid cmp a1c

## 2023-04-18 LAB
CLASS I ANTIBODY COMMENTS - LUMINEX: NORMAL
CLASS II ANTIBODIES - LUMINEX: NORMAL
CLASS II ANTIBODY COMMENTS - LUMINEX: NORMAL
CPRA %: 69
SERUM COLLECTION DT - LUMINEX CLASS I: NORMAL
SERUM COLLECTION DT - LUMINEX CLASS II: NORMAL
SPCL1 TESTING DATE: NORMAL
SPCL2 TESTING DATE: NORMAL
SPCLU TESTING DATE: NORMAL

## 2023-04-25 ENCOUNTER — TELEPHONE (OUTPATIENT)
Dept: TRANSPLANT | Facility: CLINIC | Age: 43
End: 2023-04-25
Payer: COMMERCIAL

## 2023-04-25 NOTE — TELEPHONE ENCOUNTER
Caregiver confirmation    MA called pt at 10:12 am on 4/25/23 to confirm pre-op appt. Pt reports plan to bring Shannon Iglesias (wife) caregiver to pre-op appt.     MA answered all questions and remains available.     Darlyn Mcfarlane  Abdominal Transplant MA

## 2023-05-01 PROBLEM — I21.4 NSTEMI (NON-ST ELEVATED MYOCARDIAL INFARCTION): Chronic | Status: RESOLVED | Noted: 2017-07-31 | Resolved: 2023-05-01

## 2023-05-01 NOTE — PROGRESS NOTES
Kidney Transplant Recipient Preoperative Evaluation    Subjective:     CC:  Preoperative evaluation of kidney transplant candidacy.    HPI:  Mr. Iglesias is a 42 y.o. year old White male who has been approved to receive a living unrelated kidney transplant.  He has ESRD secondary to presumed diabetic nephropathy.  PMH CAD with stents x 2 in 2017, bariatric surgery, fatty liver, gout, HENRY, HTN, HLD. Patient is on peritoneal dialysis. Denies peritonitis or exit site infections.  He has presented for his final preoperative medical evaluation.  He denies any recent hospitalizations or ED visits.      Works as an full time . Stays active with two young children. Looks good, not frail.    CXR 10/13/2022: unremarkable   PXR 10/13/2022: favorable for transplant   Renal US 10/13/2022: bilateral chronic medical renal disease. Bilateral simple renal cysts. Mild prostatomegaly.  Iliacs 10/13/2022: favorable for transplant   Stress Echo 12/1/2022: negative for myocardial ischemia. EF 55%, estimated PA systolic >23.    Current Outpatient Medications   Medication Sig Dispense Refill    aspirin (ECOTRIN) 81 MG EC tablet Take 1 tablet (81 mg total) by mouth once daily. 90 tablet 3    atorvastatin (LIPITOR) 80 MG tablet Take 1 tablet (80 mg total) by mouth every evening. 90 tablet 3    blood sugar diagnostic Strp Check blood glucose 2 times daily as directed and as needed 200 each 5    blood-glucose meter (ONETOUCH ULTRAMINI) kit Use as instructed 1 each 0    carvediloL (COREG) 6.25 MG tablet TAKE ONE TABLET BY MOUTH TWO TIMES A DAY WITH MEALS 60 tablet 11    colchicine (COLCRYS) 0.6 mg tablet Take 2 tablets at onset of acute gout attack. May take 1 more tablet 2 hours later if needed. MAX 3 TABLETS PER DAY. MAX 6 TABLETS PER WEEK. 45 tablet 3    dulaglutide (TRULICITY) 3 mg/0.5 mL pen injector Inject 3 mg into the skin every 7 days. 12 pen 1    ezetimibe (ZETIA) 10 mg tablet Take 1 tablet (10 mg total) by mouth  once daily. 90 tablet 3    febuxostat (ULORIC) 80 mg Tab Take 1 tablet (80 mg total) by mouth once daily. 90 tablet 2    furosemide (LASIX) 20 MG tablet TAKE ONE TABLET BY MOUTH EVERY OTHER DAY (Patient taking differently: Take 20 mg by mouth once daily.) 15 tablet 11    lancets Misc Check blood glucose 2 times daily as directed and as needed (dispense insurance preferred brand or patient choice) 200 each 5    nitroGLYCERIN (NITROSTAT) 0.4 MG SL tablet Dissolve one tablet underneath tongue at onset of angina; may repeat every 5 minutes if needed. Call 911 if angina persists after 2 doses. 25 tablet 5    telmisartan (MICARDIS) 80 MG Tab TAKE 1 TABLET (80 MG TOTAL) BY MOUTH ONCE DAILY. 30 tablet 11     No current facility-administered medications for this visit.       Past Medical History:   Diagnosis Date    Allergic rhinitis     Class 1 obesity due to excess calories with serious comorbidity and body mass index (BMI) of 31.0 to 31.9 in adult 4/7/2017    Coronary artery disease     Coronary artery disease involving native coronary artery of native heart without angina pectoris 2/6/2018    Cath lab procedure 04/23/2018 (Naveen Rios MD) A. Indication/Pre-Operative Diagnosis: The patient is a 37 year old male that was referred for catheterization by [facility] Self for ACS (NSTEMI). The BELLA risk score is 5.  B. Summary/Post-Operative Diagnosis 1. Single vessel coronary artery disease. 2. Normal LVEF. 3. Diastolic dysfunction. 4. Successful PCI for acute myocardial infarction. 5.     Diabetic nephropathy associated with type 2 diabetes mellitus 1/6/2020    Direct hyperbilirubinemia 3/24/2018    DM (diabetes mellitus) 2008    BS doesn't check any more 08/02/2018    DM (diabetes mellitus) 2012    BS 99 am 06/26/2020    DM (diabetes mellitus)     BS didn't check 06/04/2021    DM (diabetes mellitus) 2008    BS didn't check 07/29/2022     Dyslipidemia associated with type 2 diabetes mellitus 7/31/2017    Elevated  bilirubin 3/21/2018    GERD (gastroesophageal reflux disease)     Gout     Hyperlipidemia     Hyperparathyroidism, secondary to chronic kidney disease 6/7/2021    Hypertension associated with chronic kidney disease due to type 2 diabetes mellitus     Hypertension complicating diabetes 3/16/2019    Not candidate for Hypertension Digital Medicine program due to dialysis status. Didn't tolerate amlodipine due to SE of hypotension.    Idiopathic chronic gout, multiple sites, without tophus (tophi) 7/19/2017    Long term (current) use of insulin     MI (myocardial infarction) 07/2017    NSTEMI with CAD s/p PCI (FAMILIA) of LAD x 2 in 8/2017 7/31/2017    Obesity     HENRY on CPAP     Peritoneal dialysis catheter in place 1/26/2023    Proteinuria     Severe obstructive sleep apnea - Intolerant of CPAP 7/31/2017    Intolerant of CPAP after weeks of trying multiple interfaces. SPLIT-NIGHT SLEEP STUDY 7/28/2015 · SLEEP ARCHITECTURE: Sleep onset was 2.9 minutes and sleep efficiency was 94.4%. Sleep Stage distribution showed 145 sleep stage changes, 6 awakenings and 119 arousals. Sleep distribution showed 53.2% stage NI, 41.8% stage N 11, 0.0% stage N Ill and REM sleep was at 5.0%. There were 2 REM periods. · RE    Stage 5 chronic kidney disease on chronic peritoneal dialysis 6/7/2017    Status post angioplasty with stent 8/4/2017    Steatohepatitis     Fatty Liver    Type 2 diabetes mellitus with chronic kidney disease on chronic dialysis, without long-term current use of insulin 6/21/2017    Type 2 diabetes mellitus with diabetic nephropathy     Type 2 diabetes mellitus with diabetic nephropathy, without long-term current use of insulin 1/6/2020    Type 2 diabetes mellitus with diabetic polyneuropathy, without long-term current use of insulin 6/7/2021    Type 2 diabetes mellitus with hyperglycemia     Type 2 diabetes mellitus with renal manifestations     Type 2 diabetes mellitus with stage 3 chronic kidney disease, without  "long-term current use of insulin 6/21/2017       Review of Systems   Constitutional:  Positive for fatigue. Negative for activity change, appetite change and fever.   HENT:  Negative for congestion, mouth sores and sore throat.    Eyes:  Negative for visual disturbance.   Respiratory:  Negative for cough, chest tightness and shortness of breath.    Cardiovascular:  Negative for chest pain, palpitations and leg swelling.   Gastrointestinal:  Negative for abdominal distention, abdominal pain, constipation, diarrhea and nausea.   Genitourinary:  Positive for decreased urine volume. Negative for difficulty urinating, frequency and hematuria.        Still making about 1.5 L urine daily   Musculoskeletal:  Negative for arthralgias and gait problem.   Skin:  Negative for wound.   Allergic/Immunologic: Negative for environmental allergies, food allergies and immunocompromised state.   Neurological:  Negative for dizziness, weakness and numbness.   Psychiatric/Behavioral:  Negative for sleep disturbance. The patient is not nervous/anxious.      Objective:     Blood pressure 139/86, pulse 73, temperature 97.7 °F (36.5 °C), temperature source Temporal, resp. rate 16, height 6' 2.41" (1.89 m), weight 108.1 kg (238 lb 5.1 oz), SpO2 98 %.  Physical Exam  Vitals and nursing note reviewed.   Constitutional:       Appearance: Normal appearance.   HENT:      Head: Normocephalic.   Cardiovascular:      Rate and Rhythm: Normal rate and regular rhythm.      Heart sounds: Normal heart sounds.   Pulmonary:      Effort: Pulmonary effort is normal.      Breath sounds: Normal breath sounds.   Abdominal:      General: Bowel sounds are normal. There is no distension.      Palpations: Abdomen is soft.      Tenderness: There is no abdominal tenderness.      Comments: PD catheter , no erythema, edema, tenderness or drainage.    Musculoskeletal:         General: Normal range of motion.   Skin:     General: Skin is warm and dry.   Neurological:     "  General: No focal deficit present.      Mental Status: He is alert.   Psychiatric:         Behavior: Behavior normal.       Labs:  No results found for: PSA, PSATOTAL, PSAFREE, PSAFREEPCT    5/2/2023: Prothrombin Time 10.4 sec (Ref range: 9.0 - 12.5 sec)  Labs were reviewed with the patient.    ABO type: A NEG    Assessment:     1. Organ transplant candidate    2. ESRD on peritoneal dialysis    3. Diabetic nephropathy associated with type 2 diabetes mellitus    4. Essential hypertension    5. NSTEMI with CAD s/p PCI (FAMILIA) of LAD x 2 in 8/2017        Plan:      Transplant Candidacy:   Based on available information, Mr. Iglesias remains a suitable kidney transplant candidate.  He may proceed with transplant surgery as planned.    Cinda Mccray NP       Follow-up:  After the transplant, the patient will follow-up with the kidney transplant team and get blood work per standard protocol as described below.    Clinic: return to transplant clinic weekly for the first month after transplant; every 2 weeks during months 2-3; then at 6-, 9-, 12-, 18-, 24-, and 36- months post-transplant to reassess for complications from immunosuppression and monitor for rejection.  Annually thereafter.    Labs: continue twice weekly CBC, renal panel, and drug level for first month; then same labs once weekly through 3rd month post-transplant.  Urine for UA, protein/creatinine ratio monthly.  Add urine BK - PCR at 1-, 3-, 6-, 9-, 12-, 18-, 24-, and 36- months post-transplant.  Hepatic panel at 1-, 2-, 3-, 6-, 9-, 12-, 18-, 24-, and 36- months post-transplant.    Counseling:     Patient advised that it is recommended that all transplant candidates, and their close contacts and household members receive Covid vaccination.

## 2023-05-02 ENCOUNTER — CLINICAL SUPPORT (OUTPATIENT)
Dept: TRANSPLANT | Facility: CLINIC | Age: 43
End: 2023-05-02
Payer: COMMERCIAL

## 2023-05-02 ENCOUNTER — HOSPITAL ENCOUNTER (OUTPATIENT)
Dept: PREADMISSION TESTING | Facility: HOSPITAL | Age: 43
Discharge: HOME | End: 2023-05-02
Attending: TRANSPLANT SURGERY
Payer: COMMERCIAL

## 2023-05-02 ENCOUNTER — HOSPITAL ENCOUNTER (OUTPATIENT)
Dept: RADIOLOGY | Facility: HOSPITAL | Age: 43
Discharge: HOME | End: 2023-05-02
Attending: NURSE PRACTITIONER
Payer: COMMERCIAL

## 2023-05-02 ENCOUNTER — SOCIAL WORK (OUTPATIENT)
Dept: TRANSPLANT | Facility: CLINIC | Age: 43
End: 2023-05-02
Payer: COMMERCIAL

## 2023-05-02 ENCOUNTER — OFFICE VISIT (OUTPATIENT)
Dept: TRANSPLANT | Facility: CLINIC | Age: 43
End: 2023-05-02
Payer: COMMERCIAL

## 2023-05-02 ENCOUNTER — ANESTHESIA EVENT (OUTPATIENT)
Dept: SURGERY | Facility: HOSPITAL | Age: 43
DRG: 652 | End: 2023-05-02
Payer: COMMERCIAL

## 2023-05-02 ENCOUNTER — HOSPITAL ENCOUNTER (OUTPATIENT)
Dept: CARDIOLOGY | Facility: CLINIC | Age: 43
Discharge: HOME | End: 2023-05-02
Payer: COMMERCIAL

## 2023-05-02 VITALS
DIASTOLIC BLOOD PRESSURE: 82 MMHG | OXYGEN SATURATION: 100 % | HEIGHT: 75 IN | BODY MASS INDEX: 29.76 KG/M2 | WEIGHT: 239.31 LBS | RESPIRATION RATE: 16 BRPM | TEMPERATURE: 98 F | SYSTOLIC BLOOD PRESSURE: 135 MMHG | HEART RATE: 73 BPM

## 2023-05-02 VITALS
TEMPERATURE: 98 F | OXYGEN SATURATION: 98 % | RESPIRATION RATE: 16 BRPM | RESPIRATION RATE: 16 BRPM | SYSTOLIC BLOOD PRESSURE: 139 MMHG | BODY MASS INDEX: 30.59 KG/M2 | TEMPERATURE: 98 F | BODY MASS INDEX: 30.59 KG/M2 | DIASTOLIC BLOOD PRESSURE: 86 MMHG | DIASTOLIC BLOOD PRESSURE: 86 MMHG | HEART RATE: 73 BPM | HEIGHT: 74 IN | OXYGEN SATURATION: 98 % | RESPIRATION RATE: 16 BRPM | WEIGHT: 238.31 LBS | TEMPERATURE: 98 F | HEIGHT: 74 IN | BODY MASS INDEX: 30.59 KG/M2 | TEMPERATURE: 98 F | HEART RATE: 73 BPM | DIASTOLIC BLOOD PRESSURE: 86 MMHG | OXYGEN SATURATION: 98 % | HEART RATE: 73 BPM | BODY MASS INDEX: 30.59 KG/M2 | HEIGHT: 74 IN | HEART RATE: 73 BPM | WEIGHT: 238.31 LBS | DIASTOLIC BLOOD PRESSURE: 86 MMHG | OXYGEN SATURATION: 98 % | SYSTOLIC BLOOD PRESSURE: 139 MMHG | RESPIRATION RATE: 16 BRPM | WEIGHT: 238.31 LBS | SYSTOLIC BLOOD PRESSURE: 139 MMHG | WEIGHT: 238.31 LBS | HEIGHT: 74 IN | SYSTOLIC BLOOD PRESSURE: 139 MMHG

## 2023-05-02 DIAGNOSIS — N18.6 STAGE 5 CHRONIC KIDNEY DISEASE ON CHRONIC DIALYSIS: Chronic | ICD-10-CM

## 2023-05-02 DIAGNOSIS — N18.6 ESRD ON PERITONEAL DIALYSIS: ICD-10-CM

## 2023-05-02 DIAGNOSIS — Z99.2 ESRD ON PERITONEAL DIALYSIS: ICD-10-CM

## 2023-05-02 DIAGNOSIS — N18.6 ESRD (END STAGE RENAL DISEASE): ICD-10-CM

## 2023-05-02 DIAGNOSIS — Z76.82 ORGAN TRANSPLANT CANDIDATE: Primary | ICD-10-CM

## 2023-05-02 DIAGNOSIS — Z76.82 ORGAN TRANSPLANT CANDIDATE: ICD-10-CM

## 2023-05-02 DIAGNOSIS — E11.21 DIABETIC NEPHROPATHY ASSOCIATED WITH TYPE 2 DIABETES MELLITUS: Chronic | ICD-10-CM

## 2023-05-02 DIAGNOSIS — Z99.2 STAGE 5 CHRONIC KIDNEY DISEASE ON CHRONIC DIALYSIS: Chronic | ICD-10-CM

## 2023-05-02 DIAGNOSIS — E66.9 CLASS 1 OBESITY WITH SERIOUS COMORBIDITY AND BODY MASS INDEX (BMI) OF 30.0 TO 30.9 IN ADULT, UNSPECIFIED OBESITY TYPE: ICD-10-CM

## 2023-05-02 DIAGNOSIS — Z76.82 KIDNEY TRANSPLANT CANDIDATE: Primary | Chronic | ICD-10-CM

## 2023-05-02 DIAGNOSIS — I10 ESSENTIAL HYPERTENSION: Chronic | ICD-10-CM

## 2023-05-02 DIAGNOSIS — I21.4 NSTEMI (NON-ST ELEVATED MYOCARDIAL INFARCTION): ICD-10-CM

## 2023-05-02 PROBLEM — E66.811 CLASS 1 OBESITY WITH SERIOUS COMORBIDITY AND BODY MASS INDEX (BMI) OF 30.0 TO 30.9 IN ADULT: Status: ACTIVE | Noted: 2017-04-07

## 2023-05-02 PROCEDURE — 3079F DIAST BP 80-89 MM HG: CPT | Mod: CPTII,S$GLB,TXP, | Performed by: TRANSPLANT SURGERY

## 2023-05-02 PROCEDURE — 99999 PR PBB SHADOW E&M-EST. PATIENT-LVL III: ICD-10-PCS | Mod: PBBFAC,TXP,, | Performed by: TRANSPLANT SURGERY

## 2023-05-02 PROCEDURE — 3008F BODY MASS INDEX DOCD: CPT | Mod: CPTII,S$GLB,TXP, | Performed by: TRANSPLANT SURGERY

## 2023-05-02 PROCEDURE — 99999 PR PBB SHADOW E&M-EST. PATIENT-LVL I: CPT | Mod: PBBFAC,TXP,,

## 2023-05-02 PROCEDURE — 3044F PR MOST RECENT HEMOGLOBIN A1C LEVEL <7.0%: ICD-10-PCS | Mod: CPTII,S$GLB,TXP, | Performed by: NURSE PRACTITIONER

## 2023-05-02 PROCEDURE — 99999 PR PBB SHADOW E&M-EST. PATIENT-LVL III: CPT | Mod: PBBFAC,TXP,,

## 2023-05-02 PROCEDURE — 93010 EKG 12-LEAD: ICD-10-PCS | Mod: S$GLB,TXP,, | Performed by: INTERNAL MEDICINE

## 2023-05-02 PROCEDURE — 71046 XR CHEST PA AND LATERAL: ICD-10-PCS | Mod: 26,TXP,, | Performed by: RADIOLOGY

## 2023-05-02 PROCEDURE — 93010 ELECTROCARDIOGRAM REPORT: CPT | Mod: S$GLB,TXP,, | Performed by: INTERNAL MEDICINE

## 2023-05-02 PROCEDURE — 99499 NO LOS: ICD-10-PCS | Mod: S$GLB,TXP,, | Performed by: TRANSPLANT SURGERY

## 2023-05-02 PROCEDURE — 71046 X-RAY EXAM CHEST 2 VIEWS: CPT | Mod: TC,FY,TXP

## 2023-05-02 PROCEDURE — 99999 PR PBB SHADOW E&M-EST. PATIENT-LVL IV: ICD-10-PCS | Mod: PBBFAC,TXP,, | Performed by: NURSE PRACTITIONER

## 2023-05-02 PROCEDURE — 3075F SYST BP GE 130 - 139MM HG: CPT | Mod: CPTII,S$GLB,TXP, | Performed by: NURSE PRACTITIONER

## 2023-05-02 PROCEDURE — 4010F ACE/ARB THERAPY RXD/TAKEN: CPT | Mod: CPTII,S$GLB,TXP, | Performed by: TRANSPLANT SURGERY

## 2023-05-02 PROCEDURE — 99499 UNLISTED E&M SERVICE: CPT | Mod: S$GLB,TXP,, | Performed by: TRANSPLANT SURGERY

## 2023-05-02 PROCEDURE — 71046 X-RAY EXAM CHEST 2 VIEWS: CPT | Mod: 26,TXP,, | Performed by: RADIOLOGY

## 2023-05-02 PROCEDURE — 99214 OFFICE O/P EST MOD 30 MIN: CPT | Mod: S$GLB,TXP,, | Performed by: NURSE PRACTITIONER

## 2023-05-02 PROCEDURE — 93005 ELECTROCARDIOGRAM TRACING: CPT | Mod: S$GLB,TXP,, | Performed by: NURSE PRACTITIONER

## 2023-05-02 PROCEDURE — 3072F PR LOW RISK FOR RETINOPATHY: ICD-10-PCS | Mod: CPTII,S$GLB,TXP, | Performed by: TRANSPLANT SURGERY

## 2023-05-02 PROCEDURE — 3008F PR BODY MASS INDEX (BMI) DOCUMENTED: ICD-10-PCS | Mod: CPTII,S$GLB,TXP, | Performed by: NURSE PRACTITIONER

## 2023-05-02 PROCEDURE — 99999 PR PBB SHADOW E&M-EST. PATIENT-LVL I: ICD-10-PCS | Mod: PBBFAC,TXP,,

## 2023-05-02 PROCEDURE — 1159F PR MEDICATION LIST DOCUMENTED IN MEDICAL RECORD: ICD-10-PCS | Mod: CPTII,S$GLB,TXP, | Performed by: TRANSPLANT SURGERY

## 2023-05-02 PROCEDURE — 3079F DIAST BP 80-89 MM HG: CPT | Mod: CPTII,S$GLB,TXP, | Performed by: NURSE PRACTITIONER

## 2023-05-02 PROCEDURE — 99999 PR PBB SHADOW E&M-EST. PATIENT-LVL IV: CPT | Mod: PBBFAC,TXP,,

## 2023-05-02 PROCEDURE — 1160F RVW MEDS BY RX/DR IN RCRD: CPT | Mod: CPTII,S$GLB,TXP, | Performed by: TRANSPLANT SURGERY

## 2023-05-02 PROCEDURE — 3044F PR MOST RECENT HEMOGLOBIN A1C LEVEL <7.0%: ICD-10-PCS | Mod: CPTII,S$GLB,TXP, | Performed by: TRANSPLANT SURGERY

## 2023-05-02 PROCEDURE — 3072F LOW RISK FOR RETINOPATHY: CPT | Mod: CPTII,S$GLB,TXP, | Performed by: TRANSPLANT SURGERY

## 2023-05-02 PROCEDURE — 99999 PR PBB SHADOW E&M-EST. PATIENT-LVL IV: ICD-10-PCS | Mod: PBBFAC,TXP,,

## 2023-05-02 PROCEDURE — 3075F PR MOST RECENT SYSTOLIC BLOOD PRESS GE 130-139MM HG: ICD-10-PCS | Mod: CPTII,S$GLB,TXP, | Performed by: TRANSPLANT SURGERY

## 2023-05-02 PROCEDURE — 3008F BODY MASS INDEX DOCD: CPT | Mod: CPTII,S$GLB,TXP, | Performed by: NURSE PRACTITIONER

## 2023-05-02 PROCEDURE — 93005 EKG 12-LEAD: ICD-10-PCS | Mod: S$GLB,TXP,, | Performed by: NURSE PRACTITIONER

## 2023-05-02 PROCEDURE — 99999 PR PBB SHADOW E&M-EST. PATIENT-LVL III: ICD-10-PCS | Mod: PBBFAC,TXP,,

## 2023-05-02 PROCEDURE — 3079F PR MOST RECENT DIASTOLIC BLOOD PRESSURE 80-89 MM HG: ICD-10-PCS | Mod: CPTII,S$GLB,TXP, | Performed by: TRANSPLANT SURGERY

## 2023-05-02 PROCEDURE — 4010F PR ACE/ARB THEARPY RXD/TAKEN: ICD-10-PCS | Mod: CPTII,S$GLB,TXP, | Performed by: NURSE PRACTITIONER

## 2023-05-02 PROCEDURE — 3075F SYST BP GE 130 - 139MM HG: CPT | Mod: CPTII,S$GLB,TXP, | Performed by: TRANSPLANT SURGERY

## 2023-05-02 PROCEDURE — 1160F PR REVIEW ALL MEDS BY PRESCRIBER/CLIN PHARMACIST DOCUMENTED: ICD-10-PCS | Mod: CPTII,S$GLB,TXP, | Performed by: TRANSPLANT SURGERY

## 2023-05-02 PROCEDURE — 3008F PR BODY MASS INDEX (BMI) DOCUMENTED: ICD-10-PCS | Mod: CPTII,S$GLB,TXP, | Performed by: TRANSPLANT SURGERY

## 2023-05-02 PROCEDURE — 3072F LOW RISK FOR RETINOPATHY: CPT | Mod: CPTII,S$GLB,TXP, | Performed by: NURSE PRACTITIONER

## 2023-05-02 PROCEDURE — 3072F PR LOW RISK FOR RETINOPATHY: ICD-10-PCS | Mod: CPTII,S$GLB,TXP, | Performed by: NURSE PRACTITIONER

## 2023-05-02 PROCEDURE — 99214 PR OFFICE/OUTPT VISIT, EST, LEVL IV, 30-39 MIN: ICD-10-PCS | Mod: S$GLB,TXP,, | Performed by: NURSE PRACTITIONER

## 2023-05-02 PROCEDURE — 99999 PR PBB SHADOW E&M-EST. PATIENT-LVL IV: CPT | Mod: PBBFAC,TXP,, | Performed by: NURSE PRACTITIONER

## 2023-05-02 PROCEDURE — 4010F ACE/ARB THERAPY RXD/TAKEN: CPT | Mod: CPTII,S$GLB,TXP, | Performed by: NURSE PRACTITIONER

## 2023-05-02 PROCEDURE — 1159F MED LIST DOCD IN RCRD: CPT | Mod: CPTII,S$GLB,TXP, | Performed by: TRANSPLANT SURGERY

## 2023-05-02 PROCEDURE — 3079F PR MOST RECENT DIASTOLIC BLOOD PRESSURE 80-89 MM HG: ICD-10-PCS | Mod: CPTII,S$GLB,TXP, | Performed by: NURSE PRACTITIONER

## 2023-05-02 PROCEDURE — 4010F PR ACE/ARB THEARPY RXD/TAKEN: ICD-10-PCS | Mod: CPTII,S$GLB,TXP, | Performed by: TRANSPLANT SURGERY

## 2023-05-02 PROCEDURE — 3075F PR MOST RECENT SYSTOLIC BLOOD PRESS GE 130-139MM HG: ICD-10-PCS | Mod: CPTII,S$GLB,TXP, | Performed by: NURSE PRACTITIONER

## 2023-05-02 PROCEDURE — 3044F HG A1C LEVEL LT 7.0%: CPT | Mod: CPTII,S$GLB,TXP, | Performed by: TRANSPLANT SURGERY

## 2023-05-02 PROCEDURE — 3044F HG A1C LEVEL LT 7.0%: CPT | Mod: CPTII,S$GLB,TXP, | Performed by: NURSE PRACTITIONER

## 2023-05-02 PROCEDURE — 99999 PR PBB SHADOW E&M-EST. PATIENT-LVL III: CPT | Mod: PBBFAC,TXP,, | Performed by: TRANSPLANT SURGERY

## 2023-05-02 RX ORDER — DIPHENHYDRAMINE HYDROCHLORIDE 50 MG/ML
50 INJECTION INTRAMUSCULAR; INTRAVENOUS ONCE
Status: CANCELLED | OUTPATIENT
Start: 2023-05-02 | End: 2023-05-02

## 2023-05-02 RX ORDER — ACETAMINOPHEN 650 MG/20.3ML
650 LIQUID ORAL ONCE
Status: CANCELLED | OUTPATIENT
Start: 2023-05-02 | End: 2023-05-02

## 2023-05-02 RX ORDER — PREGABALIN 75 MG/1
75 CAPSULE ORAL
Status: CANCELLED | OUTPATIENT
Start: 2023-05-02

## 2023-05-02 RX ORDER — MUPIROCIN 20 MG/G
OINTMENT TOPICAL
Status: CANCELLED | OUTPATIENT
Start: 2023-05-02

## 2023-05-02 RX ORDER — CEFAZOLIN SODIUM 2 G/50ML
2 SOLUTION INTRAVENOUS
Status: CANCELLED | OUTPATIENT
Start: 2023-05-02

## 2023-05-02 RX ORDER — SODIUM CHLORIDE 0.9 % (FLUSH) 0.9 %
10 SYRINGE (ML) INJECTION
Status: CANCELLED | OUTPATIENT
Start: 2023-05-02

## 2023-05-02 RX ORDER — HEPARIN SODIUM 5000 [USP'U]/ML
5000 INJECTION, SOLUTION INTRAVENOUS; SUBCUTANEOUS ONCE
Status: CANCELLED | OUTPATIENT
Start: 2023-05-02 | End: 2023-05-02

## 2023-05-02 NOTE — PROGRESS NOTES
"RECIPIENT PRE-OP NOTE    SW completed pre-op assessment with patient and pt's wife Shannon Iglesias in clinic.    Potential Recipient Name: Robb Iglesias, [id number]  Encounter Date: 5/2/2023    Sex: male  YOB: 1980  Age: 42 y.o.    Housing/Contact Info:  [address] [address]  Telephone Information:   Mobile [phone]    Home: [phone] (home)  Work: There is no work phone number on file.  E-mail: [email or web address]    Potential Surgery Date: 5/15/2023  Potential Donor Name: Elmira Edwards" [facility] [address] Number: [id number]  Potential Donor Relationship:  sister-in-law's best friend    Patient presents as alert and oriented x 4, pleasant, good eye contact, well groomed, recall good, concentration/judgement good, average intelligence, calm, communicative, cooperative, and asking and answering questions appropriately. Patient presents as a 42 y.o. year old male to recipient pre-op appointment for scheduled kidney living donor surgery. Patient's wife accompanies patient.  Patient states that he is independent with ADLs at this time.  Patient states motivation to pursue organ transplant at this time.    Does patient drive? yes  Patient is aware will not be able to drive until medically cleared by the transplant team. Patient verbalizes understanding that patient will need assistance for all transportation needs until medically cleared to drive.    Caregivers/Transportation:  Name: Shannon Iglesias  Age: 43  Phone: [phone]  Relationship: wife  Does person drive? yes  Does person have own/reliable transportation? yes    Name: Charlie Iglesias  Age: 29  Phone: [phone]  Relationship: sister  Does person drive? yes  Does person have own/reliable transportation? yes    Dependents/Others who rely on Patient/Caregiver for care: Patient has 2 minor sons (ages 9 & 12) who will be cared for by their aunts and uncles (pt's siblings) at time of transplant surgery and " recovery.    Cognitive:  Education: college degree  Reading Level: college  Reports difficulty with: N/A  Denies difficulty with: reading, writing, seeing, hearing, comprehension, learning, and memory    Infusion Service: patient utilizing? no  Home Health: patient utilizing? no  DME:  patient utilizing? yes - PD equipment; BP cuff; glucometer & diabetic supplies    Living Will: no  Healthcare Power of : no  Written LW/HCPA and verbal information presented to patient today.    Insurance: Payor: [employer] CR[employer] BLUE [employer]IELD / Plan: BCBS ALL OUT OF STATE / Product Type: PPO /   Possible concerns regarding insurance post-donation reviewed. Patient verbalizes clear understanding.    Financial:  Employment: Patient is currently employed: occupation: support, company: [employer], time at present employer: unknown.  Able to take time off work without financial concerns: yes.. Patient does plan to return to work once medically cleared. Patient referred to vocational rehabilitation.  Patient reports plans of utilizing STD benefits and has already completed [facility] paperwork.  Spouse/Significant Other Employment: occupation: teacher, company: [employer], time at employer: unknown.  Patient states does not expect to have any financial problems following transplant surgery.  Patient states has not conducted fundraising to assist with post-transplant costs.    Tobacco/Alcohol/Illicit Drug Abuse: Patient reports does not use tobacco products, alcohol, illicit drugs, and non-prescription medications and that patient does plan to remain abstinent.    Psychiatric History: patient denies - Pt and wife report continued availability of family therapist, Carlito Cifuentes, PhD as needed for any mental health support.    Coping: Patient states that he is coping well with having kidney living donor surgery. Patient states will call as needed and does understand how to access resources including the  as needed, both  inpatient and outpatient.    Resources, information and support provided. Psychosocial aspects regarding organ donation and transplantation were discussed. Patient reports having a clear understanding of resources, information, support and psychosocial aspects.    Discharge Plan: Patient to discharge to the [facility] for 1-2 weeks post-hospital discharge under the care of patient's wife post-organ transplant. Patient states that patient's wife will be present as caregiver in the hospital. Patient's wife will transport patient home. Patient states agreement with not driving and not returning to work until medically cleared to do so.    Patient states having clear understanding and realistic expectations regarding the potential risks and potential benefits of organ transplantation and organ donation. Patient agrees to further discuss with health care team members and support system members, as well as to utilize available resources and express questions and/or concerns. Resources and information provided and reviewed.    Patient reports motivation to proceed with living organ donor transplantation as scheduled.    Suitability for Transplant: Patient presents as  a low risk  candidate for organ transplant at this time.  Patient states does have a caregiver plan, transportation plan, and lodging plan in place. Patient states that patient does have medical and prescription medicine insurance in place and does have a plan in place to afford post-transplant costs.    Patient provided verbal permission to release any necessary information to outside resources for patient care and discharge planning.  Resources and information provided and reviewed.  Patient is choosing has no specific agency preferences.    Pharmacy Name: not specified  Pharmacy Contact Information: N/A    Patient states that he is aware of what patient's normal copays and deductibles are for prescription medicines.       provided  psychosocial support, counseling, resources, education, assistance, and discharge planning.  remains available.    Recommendations/Additional Comments: SW recommends that pt conduct fundraising to assist pt with pay for cost of medications, food, gas, and other transplant related needs.  SW recommends that pt remain aware of potential mental health concerns and contact the team if any concerns arise.  SW recommends that pt remain abstinent from tobacco, ETOH, and drug use.  SW supports pt's continued adherence. SW remains available to answer any questions or concerns that arise as the pt moves through the transplant process.     Patient states is aware of [facility]'s affiliation and/or partnership with agencies in home health care, LTAC, SNF, [facility], and other hospitals and clinics.

## 2023-05-02 NOTE — PROGRESS NOTES
PRE-OP TEACHING NOTE    Robb Iglesias is here today for pre-op appointments.  Pre-op instructions reviewed and written information was provided.  Post kidney transplant education book was provided. All questions were answered.  Discussed possibility that surgery may be rescheduled if the program is busy with  donor transplants.  Patient agreed and verbalized understanding of all instructions.

## 2023-05-02 NOTE — PROGRESS NOTES
Met with Robb Iglesias in the clinic as part of her pre-transplant clearance appointment.    1) Performed a complete medication reconciliation.    2) Obtained copies of insurance cards, patient understood that the Pharm.D. will order medications, and that medications must be available prior to discharge   3) Discussed medication education that will occur post-transplant   4) Patient will (will/will not) use [facility] for first fill.  5)  was checked: n/a    Current Outpatient Medications   Medication Sig Dispense Refill    aspirin (ECOTRIN) 81 MG EC tablet Take 1 tablet (81 mg total) by mouth once daily. 90 tablet 3    atorvastatin (LIPITOR) 80 MG tablet Take 1 tablet (80 mg total) by mouth every evening. 90 tablet 3    blood sugar diagnostic Strp Check blood glucose 2 times daily as directed and as needed 200 each 5    blood-glucose meter (ONETOUCH ULTRAMINI) kit Use as instructed 1 each 0    carvediloL (COREG) 6.25 MG tablet TAKE ONE TABLET BY MOUTH TWO TIMES A DAY WITH MEALS 60 tablet 11    colchicine (COLCRYS) 0.6 mg tablet Take 2 tablets at onset of acute gout attack. May take 1 more tablet 2 hours later if needed. MAX 3 TABLETS PER DAY. MAX 6 TABLETS PER WEEK. 45 tablet 3    dulaglutide (TRULICITY) 3 mg/0.5 mL pen injector Inject 3 mg into the skin every 7 days. 12 pen 1    ezetimibe (ZETIA) 10 mg tablet Take 1 tablet (10 mg total) by mouth once daily. 90 tablet 3    febuxostat (ULORIC) 80 mg Tab Take 1 tablet (80 mg total) by mouth once daily. 90 tablet 2    furosemide (LASIX) 20 MG tablet TAKE ONE TABLET BY MOUTH EVERY OTHER DAY (Patient taking differently: Take 20 mg by mouth once daily.) 15 tablet 11    lancets Misc Check blood glucose 2 times daily as directed and as needed (dispense insurance preferred brand or patient choice) 200 each 5    nitroGLYCERIN (NITROSTAT) 0.4 MG SL tablet Dissolve one tablet underneath tongue at onset of angina; may repeat every 5 minutes if needed. Call 911 if angina  persists after 2 doses. 25 tablet 5    telmisartan (MICARDIS) 80 MG Tab TAKE 1 TABLET (80 MG TOTAL) BY MOUTH ONCE DAILY. 30 tablet 11     No current facility-administered medications for this visit.       Patient verbalized understanding and had the opportunity to ask questions

## 2023-05-02 NOTE — PROGRESS NOTES
Kidney Transplant Recipient Preoperative Evaluation    Subjective:     CC:  Preoperative evaluation of kidney transplant candidacy.    HPI:  Mr. Iglesias is a 42 y.o. year old White male who has been approved to receive a living donor kidney transplant.  He has ESRD secondary to diabetic nephropathy.  Patient is on peritoneal dialysis.  He has presented for his final preoperative medical evaluation.  He denies any recent hospitalizations or ED visits.    External provider notes reviewed: no    Past Medical History:   Diagnosis Date    Allergic rhinitis     Class 1 obesity due to excess calories with serious comorbidity and body mass index (BMI) of 31.0 to 31.9 in adult 4/7/2017    Coronary artery disease     Coronary artery disease involving native coronary artery of native heart without angina pectoris 2/6/2018    Cath lab procedure 04/23/2018 (Naveen Rios MD) A. Indication/Pre-Operative Diagnosis: The patient is a 37 year old male that [facility] (NSTEMI). The BELLA risk score is 5.  B. Summary/Post-Operative Diagnosis 1. Single vessel coronary artery disease. 2. Normal LVEF. 3. Diastolic dysfunction. 4. Successful PCI for acute myocardial infarction. 5.     Diabetic nephropathy associated with type 2 diabetes mellitus 1/6/2020    Direct hyperbilirubinemia 3/24/2018    DM (diabetes mellitus) 2008    BS doesn't check any more 08/02/2018    DM (diabetes mellitus) 2012    BS 99 am 06/26/2020    DM (diabetes mellitus)     BS didn't check 06/04/2021    DM (diabetes mellitus) 2008    BS didn't check 07/29/2022     Dyslipidemia associated with type 2 diabetes mellitus 7/31/2017    Elevated bilirubin 3/21/2018    GERD (gastroesophageal reflux disease)     Gout     Hyperlipidemia     Hyperparathyroidism, secondary to chronic kidney disease 6/7/2021    Hypertension associated with chronic kidney disease due to type 2 diabetes mellitus     Hypertension complicating  diabetes 3/16/2019    Not candidate for Hypertension Digital Medicine program due to dialysis status. Didn't tolerate amlodipine due to SE of hypotension.    Idiopathic chronic gout, multiple sites, without tophus (tophi) 7/19/2017    Long term (current) use of insulin     MI (myocardial infarction) 07/2017    NSTEMI with CAD s/p PCI (FAMILIA) of LAD x 2 in 8/2017 7/31/2017    Obesity     HENRY on CPAP     Peritoneal dialysis catheter in place 1/26/2023    Proteinuria     Severe obstructive sleep apnea - Intolerant of CPAP 7/31/2017    Intolerant of CPAP after weeks of trying multiple interfaces. SPLIT-NIGHT SLEEP STUDY 7/28/2015 · SLEEP ARCHITECTURE: Sleep onset was 2.9 minutes and sleep efficiency was 94.4%. Sleep Stage distribution showed 145 sleep stage changes, 6 awakenings and 119 arousals. Sleep distribution showed 53.2% stage NI, 41.8% stage N 11, 0.0% stage N Ill and REM sleep was at 5.0%. There were 2 REM periods. · RE    Stage 5 chronic kidney disease on chronic peritoneal dialysis 6/7/2017    Status post angioplasty with stent 8/4/2017    Steatohepatitis     Fatty Liver    Type 2 diabetes mellitus with chronic kidney disease on chronic dialysis, without long-term current use of insulin 6/21/2017    Type 2 diabetes mellitus with diabetic nephropathy     Type 2 diabetes mellitus with diabetic nephropathy, without long-term current use of insulin 1/6/2020    Type 2 diabetes mellitus with diabetic polyneuropathy, without long-term current use of insulin 6/7/2021    Type 2 diabetes mellitus with hyperglycemia     Type 2 diabetes mellitus with renal manifestations     Type 2 diabetes mellitus with stage 3 chronic kidney disease, without long-term current use of insulin 6/21/2017     Past Surgical History:   Procedure Laterality Date    CORONARY ANGIOPLASTY WITH STENT PLACEMENT      LASIK Bilateral     LIVER BIOPSY      NASAL ENDOSCOPY      NASAL SEPTUM SURGERY      SLEEVE GASTROPLASTY  03/06/2017     Review of  "patient's allergies indicates:  No Known Allergies  Review of Systems   Constitutional: Negative.  Negative for fatigue and fever.   HENT:  Negative for hearing loss, nosebleeds and sinus pressure.    Eyes:  Negative for photophobia and visual disturbance.   Respiratory:  Negative for cough, shortness of breath, wheezing and stridor.    Cardiovascular:  Negative for chest pain, palpitations and leg swelling.   Gastrointestinal:  Negative for abdominal distention, blood in stool, constipation, diarrhea and nausea.   Endocrine: Negative for polydipsia and polyphagia.   Genitourinary:  Negative for dysuria, flank pain and hematuria.   Musculoskeletal:  Negative for arthralgias, joint swelling and myalgias.   Skin:  Negative for color change and rash.   Neurological:  Negative for dizziness, tremors, seizures, syncope, weakness and numbness.   Hematological:  Negative for adenopathy. Does not bruise/bleed easily.   Psychiatric/Behavioral:  Negative for behavioral problems, confusion, decreased concentration, dysphoric mood and hallucinations.    Objective:     Blood pressure 139/86, pulse 73, temperature 97.7 °F (36.5 °C), temperature source Temporal, resp. rate 16, height 6' 2.41" (1.89 m), weight 108.1 kg (238 lb 5.1 oz), SpO2 98 %.  Physical Exam  Vitals and nursing note reviewed.   Constitutional:       Appearance: He is well-developed.   HENT:      Head: Normocephalic and atraumatic.      Mouth/Throat:      Pharynx: No oropharyngeal exudate.   Eyes:      General: No scleral icterus.     Pupils: Pupils are equal, round, and reactive to light.   Neck:      Thyroid: No thyromegaly.      Vascular: No JVD.      Trachea: No tracheal deviation.   Cardiovascular:      Rate and Rhythm: Normal rate and regular rhythm.      Heart sounds: Normal heart sounds.   Pulmonary:      Effort: Pulmonary effort is normal. No respiratory distress.      Breath sounds: Normal breath sounds. No stridor. No wheezing or rales.   Chest:      " Chest wall: No tenderness.   Abdominal:      General: Bowel sounds are normal. There is no distension.      Palpations: Abdomen is soft. There is no mass.      Tenderness: There is no abdominal tenderness. There is no rebound.          Comments: PD catheter. Moderately obese abdomen, femoral pulse readily palpable   Musculoskeletal:         General: No tenderness. Normal range of motion.      Cervical back: Normal range of motion.   Lymphadenopathy:      Cervical: No cervical adenopathy.   Skin:     General: Skin is warm and dry.      Findings: No erythema or rash.   Neurological:      Mental Status: He is alert and oriented to person, place, and time.   Psychiatric:         Behavior: Behavior normal.       Labs:  5/2/2023: Prothrombin Time 10.4 sec (Ref range: 9.0 - 12.5 sec)  Labs were reviewed with the patient.    Diagnostics:  The following radiology images have been independently reviewed and interpreted:     ABO type: A NEG    Assessment:     1. Kidney transplant candidate    2. Class 1 obesity with serious comorbidity and body mass index (BMI) of 30.0 to 30.9 in adult, unspecified obesity type    3. Stage 5 chronic kidney disease on chronic peritoneal dialysis    4. ESRD (end stage renal disease)        Plan:      Transplant Candidacy:   Based on available information, Mr. Iglesias remains a suitable kidney transplant candidate.  He may proceed with transplant surgery as planned.    Counseling: I discussed kidney transplantation at length with him, including risks, potential complications, and alternatives in the management of his renal failure.  The discussion included complications related to anesthesia, bleeding, infection, primary nonfunction, and ATN. I discussed the typical postoperative course, length of hospitalization, the need for long-term immunosuppression, and the need for long-term routine follow-up.    Patient advised that it is recommended that all transplant candidates, and their close  contacts and household members receive Covid vaccination.    Interpretation of tests and discussion of patient management involves all members of the multidisciplinary transplant team.    Reji Hinojosa MD

## 2023-05-02 NOTE — LETTER
May 2, 2023        Siva Figueroa  [address] [address] NEPHROLOGY  [facility] [address]  Phone: [phone]  Fax: [phone]             Ariel Murillo- Transplant 1st Fl  [address]  [facility] [address]  Phone: [phone]   Patient: Robb Iglesias   MR Number: [id number]   YOB: 1980   Date of Visit: 5/2/2023       Dear Dr. Siva Figueroa    Thank you for referring Robb Iglesias to me for evaluation. Attached you will find relevant portions of my assessment and plan of care.    If you have questions, please do not hesitate to call me. I look forward to following Robb Iglesias along with you.    Sincerely,    Cinda Mccray NP    Enclosure    If you would like to receive this communication electronically, please contact [email or web address] or [phone] to request [employer] access.    [employer] is a tool which provides read-only access to select patient information with whom you have a relationship. Its easy to use and provides real time access to review your patients record including encounter summaries, notes, results, and demographic information.    If you feel you have received this communication in error or would no longer like to receive these types of communications, please e-mail [email or web address]

## 2023-05-02 NOTE — ANESTHESIA PREPROCEDURE EVALUATION
[facility]  Anesthesia Pre-Operative Evaluation         Patient Name: Robb Iglesias  YOB: 1980  MRN: [id number]    SUBJECTIVE:     05/15/2023    Procedure(s) (LRB):  TRANSPLANT, KIDNEY (N/A)  REMOVAL, CATHETER, DIALYSIS, PERITONEAL (N/A)    Robb Iglesias is a 42 y.o. male here for Procedure(s) (LRB):  TRANSPLANT, KIDNEY (N/A)  REMOVAL, CATHETER, DIALYSIS, PERITONEAL (N/A)    Drips:     Patient Active Problem List   Diagnosis    Bariatric surgery status    Class 1 obesity with serious comorbidity and body mass index (BMI) of 30.0 to 30.9 in adult    Essential hypertension    Stage 5 chronic kidney disease on chronic peritoneal dialysis    Type 2 diabetes mellitus with chronic kidney disease on chronic dialysis, without long-term current use of insulin    Idiopathic chronic gout, multiple sites, without tophus (tophi)    Chronic nonseasonal allergic rhinitis due to pollen    Severe obstructive sleep apnea - Intolerant of CPAP    Dyslipidemia associated with type 2 diabetes mellitus    Status post angioplasty with stent    PLMD (periodic limb movement disorder)    Coronary artery disease involving native coronary artery of native heart without angina pectoris    Direct hyperbilirubinemia    Hypertension complicating diabetes    Diabetic nephropathy associated with type 2 diabetes mellitus    Type 2 diabetes mellitus with diabetic polyneuropathy, without long-term current use of insulin    Hyperparathyroidism, secondary to chronic kidney disease    Kidney transplant candidate    Peritoneal dialysis catheter in place       Review of patient's allergies indicates:  No Known Allergies    No current facility-administered medications on file prior to encounter.     Current Outpatient Medications on File Prior to Encounter   Medication Sig Dispense Refill     amLODIPine (NORVASC) 10 MG tablet Take 10 mg by mouth nightly.      atorvastatin (LIPITOR) 80 MG tablet Take 1 tablet (80 mg total) by mouth every evening. 90 tablet 3    carvediloL (COREG) 6.25 MG tablet TAKE ONE TABLET BY MOUTH TWO TIMES A DAY WITH MEALS 60 tablet 11    docusate sodium (COLACE) 100 MG capsule Take 100 mg by mouth nightly.      ezetimibe (ZETIA) 10 mg tablet Take 1 tablet (10 mg total) by mouth once daily. 90 tablet 3    febuxostat (ULORIC) 80 mg Tab Take 1 tablet (80 mg total) by mouth once daily. 90 tablet 2    furosemide (LASIX) 20 MG tablet TAKE ONE TABLET BY MOUTH EVERY OTHER DAY (Patient taking differently: Take 20 mg by mouth once daily.) 15 tablet 11    potassium chloride (KLOR-CON) 10 MEQ TbSR Take 10 mEq by mouth once daily.      telmisartan (MICARDIS) 80 MG Tab TAKE 1 TABLET (80 MG TOTAL) BY MOUTH ONCE DAILY. 30 tablet 11    aspirin (ECOTRIN) 81 MG EC tablet Take 1 tablet (81 mg total) by mouth once daily. 90 tablet 3    blood sugar diagnostic Strp Check blood glucose 2 times daily as directed and as needed 200 each 5    blood-glucose meter (ONETOUCH ULTRAMINI) kit Use as instructed 1 each 0    colchicine (COLCRYS) 0.6 mg tablet Take 2 tablets at onset of acute gout attack. May take 1 more tablet 2 hours later if needed. MAX 3 TABLETS PER DAY. MAX 6 TABLETS PER WEEK. 45 tablet 3    dulaglutide (TRULICITY) 3 mg/0.5 mL pen injector Inject 3 mg into the skin every 7 days. 12 pen 1    lancets Misc Check blood glucose 2 times daily as directed and as needed (dispense insurance preferred brand or patient choice) 200 each 5    nitroGLYCERIN (NITROSTAT) 0.4 MG SL tablet Dissolve one tablet underneath tongue at onset of angina; may repeat every 5 minutes if needed. Call 911 if angina persists after 2 doses. 25 tablet 5       Past Surgical History:   Procedure Laterality Date    CORONARY ANGIOPLASTY WITH STENT PLACEMENT      LASIK Bilateral     LIVER BIOPSY      NASAL ENDOSCOPY   "    NASAL SEPTUM SURGERY      SLEEVE GASTROPLASTY  03/06/2017       Social History     Socioeconomic History    Marital status:      Spouse name: Shannon Iglesias    Number of children: 2    Years of education: Some College   Tobacco Use    Smoking status: Never    Smokeless tobacco: Never   Substance and Sexual Activity    Alcohol use: No     Comment: 1-2 times per month    Drug use: No    Sexual activity: Yes     Partners: Female   Social History Narrative    Full time employed,  with 2 children.    Caregiver Shannon          OBJECTIVE:     Vital Signs Range (Last 24H):  Temp:  [36.5 °C (97.7 °F)] 36.5 °C (97.7 °F)  Pulse:  [73] 73  Resp:  [16] 16  SpO2:  [100 %] 100 %  BP: (140)/(84) 140/84    Significant Labs:  Lab Results   Component Value Date    WBC 6.89 05/02/2023    HGB 13.2 (L) 05/02/2023    HCT 36.0 (L) 05/15/2023     05/02/2023    CHOL 105 (L) 05/02/2023    TRIG 145 05/02/2023    HDL 28 (L) 05/02/2023    ALT 41 05/02/2023    AST 28 05/02/2023     05/15/2023    K 3.6 05/15/2023     05/15/2023    CREATININE 6.5 (H) 05/15/2023    BUN 54 (H) 05/15/2023    CO2 25 05/15/2023    TSH 0.727 07/31/2017    INR 1.0 05/15/2023    HGBA1C 5.0 05/15/2023       Diagnostic Studies:    EKG:   Results for orders placed or performed during the hospital encounter of 05/02/23   EKG 12-lead    Collection Time: 05/02/23 11:25 AM    Narrative    Test Reason : Z76.82,    Vent. Rate : 065 BPM     Atrial Rate : 065 BPM     P-R Int : 158 ms          QRS Dur : 092 ms      QT Int : 414 ms       P-R-T Axes : 024 039 050 degrees     QTc Int : 430 ms    Normal sinus rhythm  Normal ECG  When compared with ECG of 05-APR-2023 13:44,  No significant change was found  Confirmed by ISAAC FORD MD ([id number]) on 5/2/2023 12:30:33 PM    Referred By: FIONA BENNETT           Confirmed By:ISAAC FORD MD        Exercise stress test 12/1/2022: "The test was stopped because the patient experienced fatigue and " "shortness of breath. The patient requested the test to be stopped.   The patient's exercise capacity was normal.   During stress, the following significant arrhythmias were observed: occasional PVCs.   The ECG portion of this study is negative for myocardial ischemia.   The stress echo portion of this study is negative for myocardial ischemia.   The left ventricle is normal in size with concentric remodeling and normal systolic function.   The estimated ejection fraction is 55%.   Normal left ventricular diastolic function.   Normal right ventricular size with normal right ventricular systolic function."    11 METS physical activity         Pre-op Assessment    I have reviewed the Patient Summary Reports.     I have reviewed the Nursing Notes. I have reviewed the NPO Status.   I have reviewed the Medications.     Review of Systems  Anesthesia Hx:  Reports waking up in a panic History of prior surgery of interest to airway management or planning: Personal Hx of Anesthesia complications   Cardiovascular:   Hypertension Past MI CAD  CABG/stent (Stents 2017 and 2018)   Denies Angina.    Pulmonary:   Sleep Apnea (Not using CPAP 2/2 weight loss)    Renal/:   Chronic Renal Disease, ESRD, Dialysis On peritoneal dialysis since Oct 2022  Last treatment 5/13 - 5/14 morning    Hepatic/GI:   GERD Hepatitis S/p gastric sleeve. Reports less GERD since surgery.    Neurological:   Denies TIA. Denies CVA. Neuromuscular Disease,  Denies Seizures.    Endocrine:   Diabetes Denies Hypothyroidism. Denies Hyperthyroidism.        Physical Exam  General: Well nourished, Cooperative, Alert and Oriented    Airway:  Mallampati: III / II  Mouth Opening: Normal  TM Distance: Normal  Tongue: Normal    Dental:  Intact    Chest/Lungs:  Clear to auscultation, Normal Respiratory Rate    Heart:  Rate: Normal  Rhythm: Regular Rhythm        Anesthesia Plan  Type of Anesthesia, risks & benefits discussed:    Anesthesia Type: Gen ETT  Intra-op " Monitoring Plan: Standard ASA Monitors and Art Line  Post Op Pain Control Plan: multimodal analgesia and IV/PO Opioids PRN  Induction:  IV  Airway Plan: Video, Post-Induction  Informed Consent: Informed consent signed with the Patient and all parties understand the risks and agree with anesthesia plan.  All questions answered.   ASA Score: 3  Day of Surgery Review of History & Physical: H&P Update referred to the surgeon/provider.    Ready For Surgery From Anesthesia Perspective.     .

## 2023-05-02 NOTE — LETTER
May 2, 2023        Siva Figueroa  [address] [address] NEPHROLOGY  [facility] [address]  Phone: [phone]  Fax: [phone]             Ariel Murillo- Transplant 1st Fl  [address]  [facility] [address]  Phone: [phone]   Patient: Robb Iglesias   MR Number: [id number]   YOB: 1980   Date of Visit: 5/2/2023       Dear Dr. Siva Figueroa    Thank you for referring Robb Iglesias to me for evaluation. Attached you will find relevant portions of my assessment and plan of care.    If you have questions, please do not hesitate to call me. I look forward to following Robb Iglesias along with you.    Sincerely,    Reji Hinojosa MD    Enclosure    If you would like to receive this communication electronically, please contact [email or web address] or [phone] to request [employer] access.    [employer] is a tool which provides read-only access to select patient information with whom you have a relationship. Its easy to use and provides real time access to review your patients record including encounter summaries, notes, results, and demographic information.    If you feel you have received this communication in error or would no longer like to receive these types of communications, please e-mail [email or web address]

## 2023-05-09 ENCOUNTER — TELEPHONE (OUTPATIENT)
Dept: TRANSPLANT | Facility: CLINIC | Age: 43
End: 2023-05-09
Payer: COMMERCIAL

## 2023-05-09 NOTE — TELEPHONE ENCOUNTER
Recipient Information  Name: Robb Iglesias  MRN: [id number]  Age: 42 y.o. BMI: 30      Primary Surgeon:Ashish     Dialysis status: peritoneal dialysis        PD cultures negative 5/1/23 - in media    K+ at pre-op:   Lab Results   Component Value Date    K 4.1 05/02/2023         Plan for K+: n/a    Re-Transplant: No  Currently on Blood thinners? Yes Reason: aspirin 81 mg - preventative. Held x 5 days    Induction: Thymoglobulin    Other Considerations:    Donor Information  Name: Elmira Trevizo   MRN: [id number]  Age: 32     GFR:  104 ml/min  BMI: 24       Surgeon: Radha CRAIG ID: [id number]  Kidney to be donated: the left side          Final CXM Results:  HLA Flow Crossmatch (Allo) Interpretation (no units)   Date Value   05/02/2023     Final Crossmatch  No DSA detected  Results reported to Yessenia Garcia @ 11:20am on 5/3/23. VRB obtained,        T MCS Average - XM Allo (no units)   Date Value   05/02/2023 -5.60   05/02/2023 -5.90   05/02/2023 2.00     T Cell Results - XM Allo (no units)   Date Value   05/02/2023 Negative   05/02/2023 Negative   05/02/2023 Negative     B MCS Average - XM Allo (no units)   Date Value   05/02/2023 1.30   05/02/2023 -6.80   05/02/2023 1.50     B Cell Results - XM Allo (no units)   Date Value   05/02/2023 Negative   05/02/2023 Negative   05/02/2023 Negative        T MCS Average - XM Auto (no units)   Date Value   05/02/2023 1.40     T Cell Results - XM Auto (no units)   Date Value   05/02/2023 Negative     B MCS Average - XM Auto (no units)   Date Value   05/02/2023 -23.40     B Cell Results - XM Auto (no units)   Date Value   05/02/2023 Negative       Comments:

## 2023-05-09 NOTE — TELEPHONE ENCOUNTER
Donor/Recipient Compatibility    Test/Item Donor Recipient Acceptable/Not Acceptable   ABO O POS A NEG Acceptable   HBsAb (Hepatitis B Surface Antibody) >1000.00, Reactive Hep. B Surf Ab, Qual (no units)   Date Value   10/13/2022 Negative     Hep. B Surf Ab, Quant. (mIU/mL)   Date Value   10/13/2022 <3    Acceptable   HBsAg (Hepatitis B Surface Antigen) Non-reactive Hepatitis B Surface Ag (no units)   Date Value   05/02/2023 Non-reactive    Acceptable   HBcAb (Hepatitis Core Antibody) Non-reactive Hep B Core Total Ab (no units)   Date Value   05/02/2023 Non-reactive    Acceptable   HCVab (Hepatitis C antibody) Non-reactive Hepatitis C Ab (no units)   Date Value   10/13/2022 Non-reactive    Acceptable   HIV  Non-reactive HIV 1/2 Ag/Ab (no units)   Date Value   05/02/2023 Non-reactive    Acceptable   CMV Reactive CMV IgG Interpretation (no units)   Date Value   10/13/2022 Reactive (A)     If no results found, check Results Review for Sendouts Acceptable   RPR Non-reactive RPR (no units)   Date Value   10/13/2022 Non-reactive    Acceptable     Potassium 3.7 Potassium (mmol/L)   Date Value   05/02/2023 4.1    Acceptable   Creatinine 0.7 Creatinine (mg/dL)   Date Value   05/02/2023 6.0 (H)    Acceptable   eGFR  >60.0 eGFR (mL/min/1.73 m^2)   Date Value   05/02/2023 11.3 (A)    Acceptable   Creatinine Clearance 104 No results found for: CRCLEARANCE Acceptable   BUN 10 BUN (mg/dL)   Date Value   05/02/2023 54 (H)    Acceptable   WBC 6.86 WBC (K/uL)   Date Value   05/02/2023 6.89    Acceptable   Hemoglobin 13.9 Hemoglobin (g/dL)   Date Value   05/02/2023 13.2 (L)    Acceptable   Hematocrit 43.1 Hematocrit (%)   Date Value   05/02/2023 40.1    Acceptable   Platelet count 415 Platelets (K/uL)   Date Value   05/02/2023 210    Acceptable           Recipient Only    X Match reviewed     Donor Only    Imaging n/a   ROX Testing Acceptable  Look in the Labs tab of Chart Review to find the results or Results Review activity.      Elin Kenny MD

## 2023-05-09 NOTE — TELEPHONE ENCOUNTER
Recipient Information  Name: Robb Iglesias  MRN: [id number]  Age: 42 y.o. BMI: 30      Primary Surgeon:Ashish     Dialysis status: peritoneal dialysis        PD cultures negative 5/1/23 - in media    K+ at pre-op:   Lab Results   Component Value Date    K 4.1 05/02/2023         Plan for K+: n/a    Re-Transplant: No  Currently on Blood thinners? Yes Reason: aspirin 81 mg - preventative. Held x 5 days    Induction: Thymoglobulin    Other Considerations:    Donor Information  Name: Elmira Trevizo   MRN: [id number]  Age: 32     GFR:  104 ml/min  BMI: 24       Surgeon: Radha CRAIG ID: [id number]  Kidney to be donated: the left side          Final CXM Results:  HLA Flow Crossmatch (Allo) Interpretation (no units)   Date Value   05/02/2023     Final Crossmatch  No DSA detected  Results reported to Yessenia Garcia @ 11:20am on 5/3/23. VRB obtained,        T MCS Average - XM Allo (no units)   Date Value   05/02/2023 -5.60   05/02/2023 -5.90   05/02/2023 2.00     T Cell Results - XM Allo (no units)   Date Value   05/02/2023 Negative   05/02/2023 Negative   05/02/2023 Negative     B MCS Average - XM Allo (no units)   Date Value   05/02/2023 1.30   05/02/2023 -6.80   05/02/2023 1.50     B Cell Results - XM Allo (no units)   Date Value   05/02/2023 Negative   05/02/2023 Negative   05/02/2023 Negative        T MCS Average - XM Auto (no units)   Date Value   05/02/2023 1.40     T Cell Results - XM Auto (no units)   Date Value   05/02/2023 Negative     B MCS Average - XM Auto (no units)   Date Value   05/02/2023 -23.40     B Cell Results - XM Auto (no units)   Date Value   05/02/2023 Negative       Comments:

## 2023-05-09 NOTE — TELEPHONE ENCOUNTER
Donor/Recipient Compatibility    Test/Item Donor Recipient Acceptable/Not Acceptable   ABO O POS A NEG Acceptable   HBsAb (Hepatitis B Surface Antibody) >1000.00, Reactive Hep. B Surf Ab, Qual (no units)   Date Value   10/13/2022 Negative     Hep. B Surf Ab, Quant. (mIU/mL)   Date Value   10/13/2022 <3    Acceptable   HBsAg (Hepatitis B Surface Antigen) Non-reactive Hepatitis B Surface Ag (no units)   Date Value   05/02/2023 Non-reactive    Acceptable   HBcAb (Hepatitis Core Antibody) Non-reactive Hep B Core Total Ab (no units)   Date Value   05/02/2023 Non-reactive    Acceptable   HCVab (Hepatitis C antibody) Non-reactive Hepatitis C Ab (no units)   Date Value   10/13/2022 Non-reactive    Acceptable   HIV  Non-reactive HIV 1/2 Ag/Ab (no units)   Date Value   05/02/2023 Non-reactive    Acceptable   CMV Reactive CMV IgG Interpretation (no units)   Date Value   10/13/2022 Reactive (A)     If no results found, check Results Review for Sendouts Acceptable   RPR Non-reactive RPR (no units)   Date Value   10/13/2022 Non-reactive    Acceptable     Potassium 3.7 Potassium (mmol/L)   Date Value   05/02/2023 4.1    Acceptable   Creatinine 0.7 Creatinine (mg/dL)   Date Value   05/02/2023 6.0 (H)    Acceptable   eGFR  >60.0 eGFR (mL/min/1.73 m^2)   Date Value   05/02/2023 11.3 (A)    Acceptable   Creatinine Clearance 104 No results found for: CRCLEARANCE Acceptable   BUN 10 BUN (mg/dL)   Date Value   05/02/2023 54 (H)    Acceptable   WBC 6.86 WBC (K/uL)   Date Value   05/02/2023 6.89    Acceptable   Hemoglobin 13.9 Hemoglobin (g/dL)   Date Value   05/02/2023 13.2 (L)    Acceptable   Hematocrit 43.1 Hematocrit (%)   Date Value   05/02/2023 40.1    Acceptable   Platelet count 415 Platelets (K/uL)   Date Value   05/02/2023 210    Acceptable   COVID Vax status Vaccinated Vaccinated      Recipient Only    X Match reviewed     Donor Only    Imaging reviewed imaging and agree with selection coordinator for left kidney   ROX Testing  Acceptable  Look in the Labs tab of Chart Review to find the results or Results Review activity.     Fatmata Juan MD

## 2023-05-13 ENCOUNTER — NURSE TRIAGE (OUTPATIENT)
Dept: ADMINISTRATIVE | Facility: CLINIC | Age: 43
End: 2023-05-13
Payer: COMMERCIAL

## 2023-05-13 NOTE — TELEPHONE ENCOUNTER
"Robb states he is a pre kidney transplant scheduled for kidney transplant on Monday, 5/15/23. Robb states he does not have symptoms to triage but states his wife woke up this morning with vomiting and running fever, covid test was negative today. Pt states he is supposed to arrive at [facility] tomorrow. States he has quarantined himself from his wife but is asking if he needs to monitor for any symptoms and/or if there is anything he needs to do. Advised per triage protocol that on call provider will be paged now and informed caller of brief hold. V/u.    Notified Dr. Dwayne Graves, on call kidney transplant surgeon via secure chat regarding pt call. Per Dr. Dwayne Graves's written advice, "Just let us know if he has a fever or feels unwell. We will plan to proceed Monday. Hope his wife feels better."    Updated Robb per Dr. Graves's advice. V/u. Instructed pt to call back if further questions/concerns. V/u.     Reason for Disposition   [1] Follow-up call from patient regarding patient's clinical status AND [2] information urgent    Protocols used: PCP Call - No Triage-A-AH    "

## 2023-05-15 ENCOUNTER — ANESTHESIA (OUTPATIENT)
Dept: SURGERY | Facility: HOSPITAL | Age: 43
DRG: 652 | End: 2023-05-15
Payer: COMMERCIAL

## 2023-05-15 ENCOUNTER — HOSPITAL ENCOUNTER (INPATIENT)
Facility: HOSPITAL | Age: 43
LOS: 2 days | Discharge: HOME | DRG: 652 | End: 2023-05-17
Attending: TRANSPLANT SURGERY | Admitting: TRANSPLANT SURGERY
Payer: COMMERCIAL

## 2023-05-15 DIAGNOSIS — N18.6 ESRD (END STAGE RENAL DISEASE): ICD-10-CM

## 2023-05-15 DIAGNOSIS — Z29.89 PROPHYLACTIC IMMUNOTHERAPY: ICD-10-CM

## 2023-05-15 DIAGNOSIS — Z91.89 AT RISK FOR OPPORTUNISTIC INFECTIONS: ICD-10-CM

## 2023-05-15 DIAGNOSIS — I10 ESSENTIAL HYPERTENSION: Chronic | ICD-10-CM

## 2023-05-15 DIAGNOSIS — E87.8 ELECTROLYTE ABNORMALITY: ICD-10-CM

## 2023-05-15 DIAGNOSIS — Z79.60 LONG-TERM USE OF IMMUNOSUPPRESSANT MEDICATION: ICD-10-CM

## 2023-05-15 DIAGNOSIS — N18.6 STAGE 5 CHRONIC KIDNEY DISEASE ON CHRONIC DIALYSIS: Chronic | ICD-10-CM

## 2023-05-15 DIAGNOSIS — Z94.0 S/P KIDNEY TRANSPLANT: Primary | ICD-10-CM

## 2023-05-15 DIAGNOSIS — N18.6 TYPE 2 DIABETES MELLITUS WITH CHRONIC KIDNEY DISEASE ON CHRONIC DIALYSIS, WITHOUT LONG-TERM CURRENT USE OF INSULIN: Chronic | ICD-10-CM

## 2023-05-15 DIAGNOSIS — Z99.2 STAGE 5 CHRONIC KIDNEY DISEASE ON CHRONIC DIALYSIS: Chronic | ICD-10-CM

## 2023-05-15 DIAGNOSIS — E66.9 CLASS 1 OBESITY WITH SERIOUS COMORBIDITY AND BODY MASS INDEX (BMI) OF 30.0 TO 30.9 IN ADULT, UNSPECIFIED OBESITY TYPE: ICD-10-CM

## 2023-05-15 DIAGNOSIS — E11.22 TYPE 2 DIABETES MELLITUS WITH CHRONIC KIDNEY DISEASE ON CHRONIC DIALYSIS, WITHOUT LONG-TERM CURRENT USE OF INSULIN: Chronic | ICD-10-CM

## 2023-05-15 DIAGNOSIS — Z76.82 KIDNEY TRANSPLANT CANDIDATE: Chronic | ICD-10-CM

## 2023-05-15 DIAGNOSIS — Z99.2 TYPE 2 DIABETES MELLITUS WITH CHRONIC KIDNEY DISEASE ON CHRONIC DIALYSIS, WITHOUT LONG-TERM CURRENT USE OF INSULIN: Chronic | ICD-10-CM

## 2023-05-15 DIAGNOSIS — Z95.820 STATUS POST ANGIOPLASTY WITH STENT: Chronic | ICD-10-CM

## 2023-05-15 LAB
25(OH)D3+25(OH)D2 SERPL-MCNC: 26 NG/ML (ref 30–96)
ABO + RH BLD: NORMAL
ALBUMIN SERPL BCP-MCNC: 3.5 G/DL (ref 3.5–5.2)
ALBUMIN SERPL BCP-MCNC: 4.2 G/DL (ref 3.5–5.2)
ANION GAP SERPL CALC-SCNC: 10 MMOL/L (ref 8–16)
ANION GAP SERPL CALC-SCNC: 10 MMOL/L (ref 8–16)
APTT PPP: 25.7 SEC (ref 21–32)
BLD GP AB SCN CELLS X3 SERPL QL: NORMAL
BUN SERPL-MCNC: 50 MG/DL (ref 6–20)
BUN SERPL-MCNC: 54 MG/DL (ref 6–20)
CALCIUM SERPL-MCNC: 8.4 MG/DL (ref 8.7–10.5)
CALCIUM SERPL-MCNC: 9.7 MG/DL (ref 8.7–10.5)
CHLORIDE SERPL-SCNC: 107 MMOL/L (ref 95–110)
CHLORIDE SERPL-SCNC: 109 MMOL/L (ref 95–110)
CO2 SERPL-SCNC: 22 MMOL/L (ref 23–29)
CO2 SERPL-SCNC: 25 MMOL/L (ref 23–29)
CREAT SERPL-MCNC: 6 MG/DL (ref 0.5–1.4)
CREAT SERPL-MCNC: 6.5 MG/DL (ref 0.5–1.4)
CREAT UR-MCNC: 165 MG/DL (ref 23–375)
CTP QC/QA: YES
EST. GFR  (NO RACE VARIABLE): 10.3 ML/MIN/1.73 M^2
EST. GFR  (NO RACE VARIABLE): 11.3 ML/MIN/1.73 M^2
ESTIMATED AVG GLUCOSE: 97 MG/DL (ref 68–131)
GLUCOSE SERPL-MCNC: 118 MG/DL (ref 70–110)
GLUCOSE SERPL-MCNC: 91 MG/DL (ref 70–110)
HBA1C MFR BLD: 5 % (ref 4–5.6)
HBV CORE AB SERPL QL IA: NORMAL
HBV SURFACE AB SER-ACNC: 667.45 MIU/ML
HBV SURFACE AB SER-ACNC: REACTIVE M[IU]/ML
HBV SURFACE AG SERPL QL IA: NORMAL
HCT VFR BLD AUTO: 33.1 % (ref 40–54)
HCT VFR BLD AUTO: 34 % (ref 40–54)
HCT VFR BLD AUTO: 36 % (ref 40–54)
HCV AB SERPL QL IA: NORMAL
HCV RNA SERPL QL NAA+PROBE: NOT DETECTED
HCV RNA SPEC NAA+PROBE-ACNC: NOT DETECTED IU/ML
HIV 1+2 AB+HIV1 P24 AG SERPL QL IA: NORMAL
INR PPP: 1 (ref 0.8–1.2)
PHOSPHATE SERPL-MCNC: 3.1 MG/DL (ref 2.7–4.5)
PHOSPHATE SERPL-MCNC: 4.8 MG/DL (ref 2.7–4.5)
POCT GLUCOSE: 128 MG/DL (ref 70–110)
POCT GLUCOSE: 92 MG/DL (ref 70–110)
POTASSIUM SERPL-SCNC: 3.6 MMOL/L (ref 3.5–5.1)
POTASSIUM SERPL-SCNC: 3.8 MMOL/L (ref 3.5–5.1)
PROT UR-MCNC: 176 MG/DL (ref 0–15)
PROT/CREAT UR: 1.07 MG/G{CREAT} (ref 0–0.2)
PROTHROMBIN TIME: 10.5 SEC (ref 9–12.5)
PTH-INTACT SERPL-MCNC: 378.9 PG/ML (ref 9–77)
SARS-COV-2 AG RESP QL IA.RAPID: NEGATIVE
SODIUM SERPL-SCNC: 141 MMOL/L (ref 136–145)
SODIUM SERPL-SCNC: 142 MMOL/L (ref 136–145)
SPECIMEN OUTDATE: NORMAL

## 2023-05-15 PROCEDURE — 82962 GLUCOSE BLOOD TEST: CPT | Performed by: TRANSPLANT SURGERY

## 2023-05-15 PROCEDURE — 85730 THROMBOPLASTIN TIME PARTIAL: CPT | Performed by: TRANSPLANT SURGERY

## 2023-05-15 PROCEDURE — 36620 INSERTION CATHETER ARTERY: CPT | Mod: 59,,, | Performed by: STUDENT IN AN ORGANIZED HEALTH CARE EDUCATION/TRAINING PROGRAM

## 2023-05-15 PROCEDURE — 25000003 PHARM REV CODE 250: Mod: NTX | Performed by: TRANSPLANT SURGERY

## 2023-05-15 PROCEDURE — C2617 STENT, NON-COR, TEM W/O DEL: HCPCS | Performed by: TRANSPLANT SURGERY

## 2023-05-15 PROCEDURE — 80069 RENAL FUNCTION PANEL: CPT | Mod: 91 | Performed by: STUDENT IN AN ORGANIZED HEALTH CARE EDUCATION/TRAINING PROGRAM

## 2023-05-15 PROCEDURE — 36000931 HC OR TIME LEV VII EA ADD 15 MIN: Performed by: TRANSPLANT SURGERY

## 2023-05-15 PROCEDURE — 50605 INSERT URETERAL SUPPORT: CPT | Mod: 51,LT,, | Performed by: TRANSPLANT SURGERY

## 2023-05-15 PROCEDURE — 85610 PROTHROMBIN TIME: CPT | Performed by: TRANSPLANT SURGERY

## 2023-05-15 PROCEDURE — 71000033 HC RECOVERY, INTIAL HOUR: Performed by: TRANSPLANT SURGERY

## 2023-05-15 PROCEDURE — 71000016 HC POSTOP RECOV ADDL HR: Performed by: TRANSPLANT SURGERY

## 2023-05-15 PROCEDURE — 85014 HEMATOCRIT: CPT | Mod: 91 | Performed by: STUDENT IN AN ORGANIZED HEALTH CARE EDUCATION/TRAINING PROGRAM

## 2023-05-15 PROCEDURE — 37000009 HC ANESTHESIA EA ADD 15 MINS: Performed by: TRANSPLANT SURGERY

## 2023-05-15 PROCEDURE — 36000930 HC OR TIME LEV VII 1ST 15 MIN: Performed by: TRANSPLANT SURGERY

## 2023-05-15 PROCEDURE — 85014 HEMATOCRIT: CPT | Mod: 91 | Performed by: PHYSICIAN ASSISTANT

## 2023-05-15 PROCEDURE — 63600175 PHARM REV CODE 636 W HCPCS: Performed by: TRANSPLANT SURGERY

## 2023-05-15 PROCEDURE — 25000003 PHARM REV CODE 250: Performed by: STUDENT IN AN ORGANIZED HEALTH CARE EDUCATION/TRAINING PROGRAM

## 2023-05-15 PROCEDURE — 63600175 PHARM REV CODE 636 W HCPCS: Mod: NTX | Performed by: TRANSPLANT SURGERY

## 2023-05-15 PROCEDURE — 83970 ASSAY OF PARATHORMONE: CPT | Performed by: TRANSPLANT SURGERY

## 2023-05-15 PROCEDURE — D9220A PRA ANESTHESIA: ICD-10-PCS | Mod: CRNA,,, | Performed by: NURSE ANESTHETIST, CERTIFIED REGISTERED

## 2023-05-15 PROCEDURE — S5010 5% DEXTROSE AND 0.45% SALINE: HCPCS | Performed by: STUDENT IN AN ORGANIZED HEALTH CARE EDUCATION/TRAINING PROGRAM

## 2023-05-15 PROCEDURE — 63600175 PHARM REV CODE 636 W HCPCS: Performed by: NURSE ANESTHETIST, CERTIFIED REGISTERED

## 2023-05-15 PROCEDURE — 50360 PR TRANSPLANTATION OF KIDNEY: ICD-10-PCS | Mod: LT,,, | Performed by: TRANSPLANT SURGERY

## 2023-05-15 PROCEDURE — D9220A PRA ANESTHESIA: Mod: ANES,,, | Performed by: STUDENT IN AN ORGANIZED HEALTH CARE EDUCATION/TRAINING PROGRAM

## 2023-05-15 PROCEDURE — 87522 HEPATITIS C REVRS TRNSCRPJ: CPT | Performed by: TRANSPLANT SURGERY

## 2023-05-15 PROCEDURE — 49422 REMOVE TUNNELED IP CATH: CPT | Mod: 51,,, | Performed by: TRANSPLANT SURGERY

## 2023-05-15 PROCEDURE — 94761 N-INVAS EAR/PLS OXIMETRY MLT: CPT

## 2023-05-15 PROCEDURE — D9220A PRA ANESTHESIA: Mod: CRNA,,, | Performed by: NURSE ANESTHETIST, CERTIFIED REGISTERED

## 2023-05-15 PROCEDURE — 86706 HEP B SURFACE ANTIBODY: CPT | Mod: 91 | Performed by: TRANSPLANT SURGERY

## 2023-05-15 PROCEDURE — 50605 PR URETEROTOMY TO INSERT STENT: ICD-10-PCS | Mod: 51,LT,, | Performed by: TRANSPLANT SURGERY

## 2023-05-15 PROCEDURE — 86704 HEP B CORE ANTIBODY TOTAL: CPT | Performed by: TRANSPLANT SURGERY

## 2023-05-15 PROCEDURE — D9220A PRA ANESTHESIA: ICD-10-PCS | Mod: ANES,,, | Performed by: STUDENT IN AN ORGANIZED HEALTH CARE EDUCATION/TRAINING PROGRAM

## 2023-05-15 PROCEDURE — 25000003 PHARM REV CODE 250: Performed by: NURSE ANESTHETIST, CERTIFIED REGISTERED

## 2023-05-15 PROCEDURE — 50325 PR TRANSPLANT,PREP LIVING  RENAL GRAFT: ICD-10-PCS | Mod: 51,LT,, | Performed by: TRANSPLANT SURGERY

## 2023-05-15 PROCEDURE — 86803 HEPATITIS C AB TEST: CPT | Performed by: TRANSPLANT SURGERY

## 2023-05-15 PROCEDURE — 63600175 PHARM REV CODE 636 W HCPCS

## 2023-05-15 PROCEDURE — 36415 COLL VENOUS BLD VENIPUNCTURE: CPT | Performed by: PHYSICIAN ASSISTANT

## 2023-05-15 PROCEDURE — 20600001 HC STEP DOWN PRIVATE ROOM

## 2023-05-15 PROCEDURE — 86900 BLOOD TYPING SEROLOGIC ABO: CPT | Performed by: TRANSPLANT SURGERY

## 2023-05-15 PROCEDURE — 71000015 HC POSTOP RECOV 1ST HR: Performed by: TRANSPLANT SURGERY

## 2023-05-15 PROCEDURE — 36620 RIGHT RADIAL ARTERIAL LINE: ICD-10-PCS | Mod: 59,,, | Performed by: STUDENT IN AN ORGANIZED HEALTH CARE EDUCATION/TRAINING PROGRAM

## 2023-05-15 PROCEDURE — 63600175 PHARM REV CODE 636 W HCPCS: Performed by: STUDENT IN AN ORGANIZED HEALTH CARE EDUCATION/TRAINING PROGRAM

## 2023-05-15 PROCEDURE — 25000003 PHARM REV CODE 250: Performed by: PHYSICIAN ASSISTANT

## 2023-05-15 PROCEDURE — 36415 COLL VENOUS BLD VENIPUNCTURE: CPT | Performed by: TRANSPLANT SURGERY

## 2023-05-15 PROCEDURE — C1729 CATH, DRAINAGE: HCPCS | Performed by: TRANSPLANT SURGERY

## 2023-05-15 PROCEDURE — 80069 RENAL FUNCTION PANEL: CPT | Performed by: TRANSPLANT SURGERY

## 2023-05-15 PROCEDURE — 87389 HIV-1 AG W/HIV-1&-2 AB AG IA: CPT | Performed by: TRANSPLANT SURGERY

## 2023-05-15 PROCEDURE — 37000008 HC ANESTHESIA 1ST 15 MINUTES: Performed by: TRANSPLANT SURGERY

## 2023-05-15 PROCEDURE — 84156 ASSAY OF PROTEIN URINE: CPT | Performed by: TRANSPLANT SURGERY

## 2023-05-15 PROCEDURE — 50325 PREP DONOR RENAL GRAFT: CPT | Mod: 51,LT,, | Performed by: TRANSPLANT SURGERY

## 2023-05-15 PROCEDURE — 49422 PR REMOVAL TUNNELED INTRAPERITONEAL CATHETER: ICD-10-PCS | Mod: 51,,, | Performed by: TRANSPLANT SURGERY

## 2023-05-15 PROCEDURE — 50360 RNL ALTRNSPLJ W/O RCP NFRCT: CPT | Mod: LT,,, | Performed by: TRANSPLANT SURGERY

## 2023-05-15 PROCEDURE — 85014 HEMATOCRIT: CPT | Performed by: TRANSPLANT SURGERY

## 2023-05-15 PROCEDURE — 87340 HEPATITIS B SURFACE AG IA: CPT | Performed by: TRANSPLANT SURGERY

## 2023-05-15 PROCEDURE — 82306 VITAMIN D 25 HYDROXY: CPT | Performed by: TRANSPLANT SURGERY

## 2023-05-15 PROCEDURE — 83036 HEMOGLOBIN GLYCOSYLATED A1C: CPT | Performed by: TRANSPLANT SURGERY

## 2023-05-15 PROCEDURE — 27201423 OPTIME MED/SURG SUP & DEVICES STERILE SUPPLY: Performed by: TRANSPLANT SURGERY

## 2023-05-15 DEVICE — STENT DOUBLE J 6FRMX12CM: Type: IMPLANTABLE DEVICE | Site: ABDOMEN | Status: FUNCTIONAL

## 2023-05-15 RX ORDER — PREGABALIN 75 MG/1
75 CAPSULE ORAL
Status: COMPLETED | OUTPATIENT
Start: 2023-05-15 | End: 2023-05-15

## 2023-05-15 RX ORDER — EZETIMIBE 10 MG/1
10 TABLET ORAL DAILY
Status: DISCONTINUED | OUTPATIENT
Start: 2023-05-15 | End: 2023-05-17 | Stop reason: HOSPADM

## 2023-05-15 RX ORDER — HEPARIN SODIUM 5000 [USP'U]/ML
5000 INJECTION, SOLUTION INTRAVENOUS; SUBCUTANEOUS EVERY 8 HOURS
Status: DISCONTINUED | OUTPATIENT
Start: 2023-05-15 | End: 2023-05-17 | Stop reason: HOSPADM

## 2023-05-15 RX ORDER — SODIUM CHLORIDE 0.9 % (FLUSH) 0.9 %
10 SYRINGE (ML) INJECTION
Status: DISCONTINUED | OUTPATIENT
Start: 2023-05-15 | End: 2023-05-17 | Stop reason: HOSPADM

## 2023-05-15 RX ORDER — ATORVASTATIN CALCIUM 20 MG/1
80 TABLET, FILM COATED ORAL NIGHTLY
Status: DISCONTINUED | OUTPATIENT
Start: 2023-05-15 | End: 2023-05-17 | Stop reason: HOSPADM

## 2023-05-15 RX ORDER — VALGANCICLOVIR 450 MG/1
450 TABLET, FILM COATED ORAL EVERY MORNING
Status: DISCONTINUED | OUTPATIENT
Start: 2023-05-25 | End: 2023-05-17 | Stop reason: HOSPADM

## 2023-05-15 RX ORDER — PROCHLORPERAZINE EDISYLATE 5 MG/ML
5 INJECTION INTRAMUSCULAR; INTRAVENOUS EVERY 30 MIN PRN
Status: DISCONTINUED | OUTPATIENT
Start: 2023-05-15 | End: 2023-05-15 | Stop reason: HOSPADM

## 2023-05-15 RX ORDER — LIDOCAINE HYDROCHLORIDE 20 MG/ML
INJECTION, SOLUTION EPIDURAL; INFILTRATION; INTRACAUDAL; PERINEURAL
Status: DISCONTINUED | OUTPATIENT
Start: 2023-05-15 | End: 2023-05-15

## 2023-05-15 RX ORDER — IBUPROFEN 200 MG
24 TABLET ORAL
Status: DISCONTINUED | OUTPATIENT
Start: 2023-05-15 | End: 2023-05-17 | Stop reason: HOSPADM

## 2023-05-15 RX ORDER — BUPIVACAINE HYDROCHLORIDE 2.5 MG/ML
INJECTION, SOLUTION EPIDURAL; INFILTRATION; INTRACAUDAL
Status: DISCONTINUED | OUTPATIENT
Start: 2023-05-15 | End: 2023-05-15 | Stop reason: HOSPADM

## 2023-05-15 RX ORDER — ROCURONIUM BROMIDE 10 MG/ML
INJECTION, SOLUTION INTRAVENOUS
Status: DISCONTINUED | OUTPATIENT
Start: 2023-05-15 | End: 2023-05-15

## 2023-05-15 RX ORDER — TACROLIMUS 1 MG/1
3 CAPSULE ORAL 2 TIMES DAILY
Status: DISCONTINUED | OUTPATIENT
Start: 2023-05-15 | End: 2023-05-16

## 2023-05-15 RX ORDER — DIPHENHYDRAMINE HCL 25 MG
50 CAPSULE ORAL ONCE AS NEEDED
Status: DISCONTINUED | OUTPATIENT
Start: 2023-05-15 | End: 2023-05-17 | Stop reason: HOSPADM

## 2023-05-15 RX ORDER — ACETAMINOPHEN 325 MG/1
650 TABLET ORAL
Status: COMPLETED | OUTPATIENT
Start: 2023-05-16 | End: 2023-05-17

## 2023-05-15 RX ORDER — PREDNISONE 20 MG/1
20 TABLET ORAL DAILY
Status: DISCONTINUED | OUTPATIENT
Start: 2023-05-18 | End: 2023-05-17 | Stop reason: HOSPADM

## 2023-05-15 RX ORDER — EPINEPHRINE 1 MG/ML
1 INJECTION, SOLUTION, CONCENTRATE INTRAVENOUS ONCE AS NEEDED
Status: DISCONTINUED | OUTPATIENT
Start: 2023-05-15 | End: 2023-05-17 | Stop reason: HOSPADM

## 2023-05-15 RX ORDER — HYDROMORPHONE HYDROCHLORIDE 1 MG/ML
0.3 INJECTION, SOLUTION INTRAMUSCULAR; INTRAVENOUS; SUBCUTANEOUS EVERY 10 MIN PRN
Status: DISCONTINUED | OUTPATIENT
Start: 2023-05-15 | End: 2023-05-15 | Stop reason: HOSPADM

## 2023-05-15 RX ORDER — MUPIROCIN 20 MG/G
OINTMENT TOPICAL
Status: DISCONTINUED | OUTPATIENT
Start: 2023-05-15 | End: 2023-05-15

## 2023-05-15 RX ORDER — ACETAMINOPHEN 325 MG/1
650 TABLET ORAL EVERY 8 HOURS
Status: DISPENSED | OUTPATIENT
Start: 2023-05-15 | End: 2023-05-17

## 2023-05-15 RX ORDER — FENTANYL CITRATE 50 UG/ML
25 INJECTION, SOLUTION INTRAMUSCULAR; INTRAVENOUS EVERY 5 MIN PRN
Status: COMPLETED | OUTPATIENT
Start: 2023-05-15 | End: 2023-05-15

## 2023-05-15 RX ORDER — FAMOTIDINE 20 MG/1
20 TABLET, FILM COATED ORAL NIGHTLY
Status: DISCONTINUED | OUTPATIENT
Start: 2023-05-15 | End: 2023-05-17 | Stop reason: HOSPADM

## 2023-05-15 RX ORDER — CEFAZOLIN SODIUM 1 G/3ML
INJECTION, POWDER, FOR SOLUTION INTRAMUSCULAR; INTRAVENOUS
Status: DISCONTINUED | OUTPATIENT
Start: 2023-05-15 | End: 2023-05-15

## 2023-05-15 RX ORDER — METHYLPREDNISOLONE SOD SUCC 125 MG
125 VIAL (EA) INJECTION ONCE
Status: COMPLETED | OUTPATIENT
Start: 2023-05-17 | End: 2023-05-17

## 2023-05-15 RX ORDER — METHYLPREDNISOLONE SODIUM SUCCINATE 1 G/16ML
INJECTION INTRAMUSCULAR; INTRAVENOUS
Status: DISCONTINUED | OUTPATIENT
Start: 2023-05-15 | End: 2023-05-15

## 2023-05-15 RX ORDER — TRAMADOL HYDROCHLORIDE 50 MG/1
50 TABLET ORAL EVERY 6 HOURS PRN
Status: DISCONTINUED | OUTPATIENT
Start: 2023-05-15 | End: 2023-05-17 | Stop reason: HOSPADM

## 2023-05-15 RX ORDER — CEFAZOLIN SODIUM 1 G/3ML
INJECTION, POWDER, FOR SOLUTION INTRAMUSCULAR; INTRAVENOUS
Status: DISCONTINUED | OUTPATIENT
Start: 2023-05-15 | End: 2023-05-15 | Stop reason: HOSPADM

## 2023-05-15 RX ORDER — FENTANYL CITRATE 50 UG/ML
INJECTION, SOLUTION INTRAMUSCULAR; INTRAVENOUS
Status: COMPLETED
Start: 2023-05-15 | End: 2023-05-15

## 2023-05-15 RX ORDER — ONDANSETRON 2 MG/ML
4 INJECTION INTRAMUSCULAR; INTRAVENOUS EVERY 6 HOURS PRN
Status: DISCONTINUED | OUTPATIENT
Start: 2023-05-15 | End: 2023-05-17 | Stop reason: HOSPADM

## 2023-05-15 RX ORDER — POTASSIUM CHLORIDE 750 MG/1
10 TABLET, EXTENDED RELEASE ORAL DAILY
Status: ON HOLD | COMMUNITY
End: 2023-05-17 | Stop reason: HOSPADM

## 2023-05-15 RX ORDER — SODIUM CHLORIDE 9 MG/ML
INJECTION, SOLUTION INTRAVENOUS CONTINUOUS
Status: DISCONTINUED | OUTPATIENT
Start: 2023-05-15 | End: 2023-05-16

## 2023-05-15 RX ORDER — FUROSEMIDE 10 MG/ML
INJECTION INTRAMUSCULAR; INTRAVENOUS
Status: DISCONTINUED | OUTPATIENT
Start: 2023-05-15 | End: 2023-05-15

## 2023-05-15 RX ORDER — IBUPROFEN 200 MG
16 TABLET ORAL
Status: DISCONTINUED | OUTPATIENT
Start: 2023-05-15 | End: 2023-05-17 | Stop reason: HOSPADM

## 2023-05-15 RX ORDER — ONDANSETRON 2 MG/ML
INJECTION INTRAMUSCULAR; INTRAVENOUS
Status: DISCONTINUED | OUTPATIENT
Start: 2023-05-15 | End: 2023-05-15

## 2023-05-15 RX ORDER — METHYLPREDNISOLONE SOD SUCC 125 MG
250 VIAL (EA) INJECTION ONCE
Status: COMPLETED | OUTPATIENT
Start: 2023-05-16 | End: 2023-05-16

## 2023-05-15 RX ORDER — ACETAMINOPHEN 650 MG/20.3ML
650 LIQUID ORAL ONCE
Status: DISCONTINUED | OUTPATIENT
Start: 2023-05-15 | End: 2023-05-15

## 2023-05-15 RX ORDER — MUPIROCIN 20 MG/G
1 OINTMENT TOPICAL 2 TIMES DAILY
Status: DISCONTINUED | OUTPATIENT
Start: 2023-05-15 | End: 2023-05-17 | Stop reason: HOSPADM

## 2023-05-15 RX ORDER — GLUCAGON 1 MG
1 KIT INJECTION CONTINUOUS PRN
Status: DISCONTINUED | OUTPATIENT
Start: 2023-05-15 | End: 2023-05-17 | Stop reason: HOSPADM

## 2023-05-15 RX ORDER — SULFAMETHOXAZOLE AND TRIMETHOPRIM 400; 80 MG/1; MG/1
1 TABLET ORAL EVERY MORNING
Status: DISCONTINUED | OUTPATIENT
Start: 2023-05-25 | End: 2023-05-17 | Stop reason: HOSPADM

## 2023-05-15 RX ORDER — SODIUM CHLORIDE 0.9 % (FLUSH) 0.9 %
10 SYRINGE (ML) INJECTION
Status: DISCONTINUED | OUTPATIENT
Start: 2023-05-15 | End: 2023-05-15

## 2023-05-15 RX ORDER — DEXTROSE MONOHYDRATE AND SODIUM CHLORIDE 5; .45 G/100ML; G/100ML
INJECTION, SOLUTION INTRAVENOUS CONTINUOUS
Status: DISCONTINUED | OUTPATIENT
Start: 2023-05-15 | End: 2023-05-16

## 2023-05-15 RX ORDER — DIPHENHYDRAMINE HYDROCHLORIDE 50 MG/ML
INJECTION INTRAMUSCULAR; INTRAVENOUS
Status: DISCONTINUED | OUTPATIENT
Start: 2023-05-15 | End: 2023-05-15

## 2023-05-15 RX ORDER — DIPHENHYDRAMINE HCL 25 MG
25 CAPSULE ORAL
Status: COMPLETED | OUTPATIENT
Start: 2023-05-16 | End: 2023-05-17

## 2023-05-15 RX ORDER — MIDAZOLAM HYDROCHLORIDE 1 MG/ML
INJECTION, SOLUTION INTRAMUSCULAR; INTRAVENOUS
Status: DISCONTINUED | OUTPATIENT
Start: 2023-05-15 | End: 2023-05-15

## 2023-05-15 RX ORDER — DOCUSATE SODIUM 100 MG/1
100 CAPSULE, LIQUID FILLED ORAL NIGHTLY
Status: ON HOLD | COMMUNITY
End: 2023-05-17 | Stop reason: HOSPADM

## 2023-05-15 RX ORDER — TALC
6 POWDER (GRAM) TOPICAL NIGHTLY PRN
Status: DISCONTINUED | OUTPATIENT
Start: 2023-05-15 | End: 2023-05-17 | Stop reason: HOSPADM

## 2023-05-15 RX ORDER — DIPHENHYDRAMINE HYDROCHLORIDE 50 MG/ML
50 INJECTION INTRAMUSCULAR; INTRAVENOUS ONCE
Status: DISCONTINUED | OUTPATIENT
Start: 2023-05-15 | End: 2023-05-15

## 2023-05-15 RX ORDER — BISACODYL 5 MG
10 TABLET, DELAYED RELEASE (ENTERIC COATED) ORAL NIGHTLY
Status: DISCONTINUED | OUTPATIENT
Start: 2023-05-15 | End: 2023-05-17 | Stop reason: HOSPADM

## 2023-05-15 RX ORDER — CARVEDILOL 6.25 MG/1
6.25 TABLET ORAL 2 TIMES DAILY
Status: DISCONTINUED | OUTPATIENT
Start: 2023-05-15 | End: 2023-05-17 | Stop reason: HOSPADM

## 2023-05-15 RX ORDER — FENTANYL CITRATE 50 UG/ML
INJECTION, SOLUTION INTRAMUSCULAR; INTRAVENOUS
Status: DISCONTINUED | OUTPATIENT
Start: 2023-05-15 | End: 2023-05-15

## 2023-05-15 RX ORDER — LIDOCAINE HYDROCHLORIDE 10 MG/ML
1 INJECTION, SOLUTION EPIDURAL; INFILTRATION; INTRACAUDAL; PERINEURAL ONCE AS NEEDED
Status: COMPLETED | OUTPATIENT
Start: 2023-05-15 | End: 2023-05-15

## 2023-05-15 RX ORDER — MYCOPHENOLATE MOFETIL 250 MG/1
1000 CAPSULE ORAL 2 TIMES DAILY
Status: DISCONTINUED | OUTPATIENT
Start: 2023-05-15 | End: 2023-05-17 | Stop reason: HOSPADM

## 2023-05-15 RX ORDER — PROPOFOL 10 MG/ML
VIAL (ML) INTRAVENOUS
Status: DISCONTINUED | OUTPATIENT
Start: 2023-05-15 | End: 2023-05-15

## 2023-05-15 RX ORDER — INSULIN ASPART 100 [IU]/ML
0-5 INJECTION, SOLUTION INTRAVENOUS; SUBCUTANEOUS
Status: DISCONTINUED | OUTPATIENT
Start: 2023-05-15 | End: 2023-05-16

## 2023-05-15 RX ORDER — PHENYLEPHRINE HYDROCHLORIDE 10 MG/ML
INJECTION INTRAVENOUS CONTINUOUS PRN
Status: DISCONTINUED | OUTPATIENT
Start: 2023-05-15 | End: 2023-05-15

## 2023-05-15 RX ORDER — MANNITOL 20 G/100ML
INJECTION, SOLUTION INTRAVENOUS
Status: DISCONTINUED | OUTPATIENT
Start: 2023-05-15 | End: 2023-05-15

## 2023-05-15 RX ORDER — DOCUSATE SODIUM 100 MG/1
100 CAPSULE, LIQUID FILLED ORAL 3 TIMES DAILY
Status: DISCONTINUED | OUTPATIENT
Start: 2023-05-15 | End: 2023-05-17 | Stop reason: HOSPADM

## 2023-05-15 RX ORDER — AMLODIPINE BESYLATE 10 MG/1
10 TABLET ORAL NIGHTLY
Status: ON HOLD | COMMUNITY
End: 2023-05-17 | Stop reason: HOSPADM

## 2023-05-15 RX ORDER — OXYCODONE HYDROCHLORIDE 5 MG/1
5 TABLET ORAL EVERY 6 HOURS PRN
Status: DISCONTINUED | OUTPATIENT
Start: 2023-05-15 | End: 2023-05-17 | Stop reason: HOSPADM

## 2023-05-15 RX ORDER — HEPARIN SODIUM 5000 [USP'U]/ML
5000 INJECTION, SOLUTION INTRAVENOUS; SUBCUTANEOUS ONCE
Status: COMPLETED | OUTPATIENT
Start: 2023-05-15 | End: 2023-05-15

## 2023-05-15 RX ORDER — HEPARIN SODIUM 1000 [USP'U]/ML
INJECTION, SOLUTION INTRAVENOUS; SUBCUTANEOUS
Status: DISCONTINUED | OUTPATIENT
Start: 2023-05-15 | End: 2023-05-15 | Stop reason: HOSPADM

## 2023-05-15 RX ADMIN — SUGAMMADEX 200 MG: 100 INJECTION, SOLUTION INTRAVENOUS at 12:05

## 2023-05-15 RX ADMIN — SODIUM CHLORIDE 550 ML: 9 INJECTION, SOLUTION INTRAVENOUS at 03:05

## 2023-05-15 RX ADMIN — SODIUM CHLORIDE, SODIUM GLUCONATE, SODIUM ACETATE, POTASSIUM CHLORIDE, MAGNESIUM CHLORIDE, SODIUM PHOSPHATE, DIBASIC, AND POTASSIUM PHOSPHATE: .53; .5; .37; .037; .03; .012; .00082 INJECTION, SOLUTION INTRAVENOUS at 11:05

## 2023-05-15 RX ADMIN — LIDOCAINE HYDROCHLORIDE 100 MG: 20 INJECTION, SOLUTION EPIDURAL; INFILTRATION; INTRACAUDAL; PERINEURAL at 09:05

## 2023-05-15 RX ADMIN — SODIUM CHLORIDE: 9 INJECTION, SOLUTION INTRAVENOUS at 08:05

## 2023-05-15 RX ADMIN — FENTANYL CITRATE 50 MCG: 50 INJECTION, SOLUTION INTRAMUSCULAR; INTRAVENOUS at 09:05

## 2023-05-15 RX ADMIN — EZETIMIBE 10 MG: 10 TABLET ORAL at 05:05

## 2023-05-15 RX ADMIN — DIPHENHYDRAMINE HYDROCHLORIDE 50 MG: 50 INJECTION INTRAMUSCULAR; INTRAVENOUS at 09:05

## 2023-05-15 RX ADMIN — SODIUM CHLORIDE, SODIUM GLUCONATE, SODIUM ACETATE, POTASSIUM CHLORIDE, MAGNESIUM CHLORIDE, SODIUM PHOSPHATE, DIBASIC, AND POTASSIUM PHOSPHATE: .53; .5; .37; .037; .03; .012; .00082 INJECTION, SOLUTION INTRAVENOUS at 09:05

## 2023-05-15 RX ADMIN — ACETAMINOPHEN 650 MG: 325 TABLET ORAL at 09:05

## 2023-05-15 RX ADMIN — THERA TABS 1 TABLET: TAB at 02:05

## 2023-05-15 RX ADMIN — SODIUM CHLORIDE 500 ML: 9 INJECTION, SOLUTION INTRAVENOUS at 01:05

## 2023-05-15 RX ADMIN — MYCOPHENOLATE MOFETIL 1000 MG: 250 CAPSULE ORAL at 02:05

## 2023-05-15 RX ADMIN — FENTANYL CITRATE 25 MCG: 50 INJECTION, SOLUTION INTRAMUSCULAR; INTRAVENOUS at 01:05

## 2023-05-15 RX ADMIN — FENTANYL CITRATE 25 MCG: 50 INJECTION, SOLUTION INTRAMUSCULAR; INTRAVENOUS at 02:05

## 2023-05-15 RX ADMIN — TRAMADOL HYDROCHLORIDE 50 MG: 50 TABLET, COATED ORAL at 04:05

## 2023-05-15 RX ADMIN — SODIUM CHLORIDE: 9 INJECTION, SOLUTION INTRAVENOUS at 11:05

## 2023-05-15 RX ADMIN — INSULIN ASPART 3 UNITS: 100 INJECTION, SOLUTION INTRAVENOUS; SUBCUTANEOUS at 10:05

## 2023-05-15 RX ADMIN — FUROSEMIDE 100 MG: 10 INJECTION, SOLUTION INTRAMUSCULAR; INTRAVENOUS at 12:05

## 2023-05-15 RX ADMIN — PHENYLEPHRINE HYDROCHLORIDE 0.3 MCG/KG/MIN: 10 INJECTION INTRAVENOUS at 10:05

## 2023-05-15 RX ADMIN — PROPOFOL 50 MG: 10 INJECTION, EMULSION INTRAVENOUS at 09:05

## 2023-05-15 RX ADMIN — HYDROMORPHONE HYDROCHLORIDE 0.3 MG: 1 INJECTION, SOLUTION INTRAMUSCULAR; INTRAVENOUS; SUBCUTANEOUS at 02:05

## 2023-05-15 RX ADMIN — HEPARIN SODIUM 5000 UNITS: 5000 INJECTION INTRAVENOUS; SUBCUTANEOUS at 06:05

## 2023-05-15 RX ADMIN — MYCOPHENOLATE MOFETIL 1000 MG: 250 CAPSULE ORAL at 09:05

## 2023-05-15 RX ADMIN — DOCUSATE SODIUM 100 MG: 100 CAPSULE, LIQUID FILLED ORAL at 09:05

## 2023-05-15 RX ADMIN — PROPOFOL 150 MG: 10 INJECTION, EMULSION INTRAVENOUS at 09:05

## 2023-05-15 RX ADMIN — Medication 6 MG: at 09:05

## 2023-05-15 RX ADMIN — ACETAMINOPHEN 650 MG: 325 TABLET ORAL at 02:05

## 2023-05-15 RX ADMIN — ANTI-THYMOCYTE GLOBULIN (RABBIT) 150 MG: 5 INJECTION, POWDER, LYOPHILIZED, FOR SOLUTION INTRAVENOUS at 10:05

## 2023-05-15 RX ADMIN — FENTANYL CITRATE 75 MCG: 50 INJECTION, SOLUTION INTRAMUSCULAR; INTRAVENOUS at 09:05

## 2023-05-15 RX ADMIN — CEFAZOLIN 2 G: 2 INJECTION, POWDER, FOR SOLUTION INTRAMUSCULAR; INTRAVENOUS at 04:05

## 2023-05-15 RX ADMIN — CEFAZOLIN 2 G: 330 INJECTION, POWDER, FOR SOLUTION INTRAMUSCULAR; INTRAVENOUS at 09:05

## 2023-05-15 RX ADMIN — OXYCODONE HYDROCHLORIDE 5 MG: 5 TABLET ORAL at 07:05

## 2023-05-15 RX ADMIN — PREGABALIN 75 MG: 75 CAPSULE ORAL at 06:05

## 2023-05-15 RX ADMIN — ONDANSETRON 4 MG: 2 INJECTION INTRAMUSCULAR; INTRAVENOUS at 04:05

## 2023-05-15 RX ADMIN — ROCURONIUM BROMIDE 50 MG: 10 INJECTION, SOLUTION INTRAVENOUS at 09:05

## 2023-05-15 RX ADMIN — METHYLPREDNISOLONE SODIUM SUCCINATE 500 MG: 1 INJECTION, POWDER, LYOPHILIZED, FOR SOLUTION INTRAMUSCULAR; INTRAVENOUS at 09:05

## 2023-05-15 RX ADMIN — HEPARIN SODIUM 5000 UNITS: 5000 INJECTION INTRAVENOUS; SUBCUTANEOUS at 09:05

## 2023-05-15 RX ADMIN — OXYCODONE HYDROCHLORIDE 5 MG: 5 TABLET ORAL at 02:05

## 2023-05-15 RX ADMIN — SODIUM CHLORIDE: 9 INJECTION, SOLUTION INTRAVENOUS at 09:05

## 2023-05-15 RX ADMIN — CARVEDILOL 6.25 MG: 6.25 TABLET, FILM COATED ORAL at 09:05

## 2023-05-15 RX ADMIN — DEXTROSE AND SODIUM CHLORIDE: 5; 450 INJECTION, SOLUTION INTRAVENOUS at 01:05

## 2023-05-15 RX ADMIN — ROCURONIUM BROMIDE 10 MG: 10 INJECTION, SOLUTION INTRAVENOUS at 11:05

## 2023-05-15 RX ADMIN — ROCURONIUM BROMIDE 10 MG: 10 INJECTION, SOLUTION INTRAVENOUS at 10:05

## 2023-05-15 RX ADMIN — HEPARIN SODIUM 5000 UNITS: 5000 INJECTION INTRAVENOUS; SUBCUTANEOUS at 02:05

## 2023-05-15 RX ADMIN — FAMOTIDINE 20 MG: 20 TABLET ORAL at 09:05

## 2023-05-15 RX ADMIN — MUPIROCIN: 20 OINTMENT TOPICAL at 06:05

## 2023-05-15 RX ADMIN — LIDOCAINE HYDROCHLORIDE 10 MG: 10 INJECTION, SOLUTION EPIDURAL; INFILTRATION; INTRACAUDAL; PERINEURAL at 06:05

## 2023-05-15 RX ADMIN — SODIUM CHLORIDE: 9 INJECTION, SOLUTION INTRAVENOUS at 07:05

## 2023-05-15 RX ADMIN — BISACODYL 10 MG: 5 TABLET, COATED ORAL at 09:05

## 2023-05-15 RX ADMIN — ROCURONIUM BROMIDE 10 MG: 10 INJECTION, SOLUTION INTRAVENOUS at 09:05

## 2023-05-15 RX ADMIN — MANNITOL 25 G: 20 INJECTION, SOLUTION INTRAVENOUS at 12:05

## 2023-05-15 RX ADMIN — ONDANSETRON 4 MG: 2 INJECTION INTRAMUSCULAR; INTRAVENOUS at 12:05

## 2023-05-15 RX ADMIN — MUPIROCIN 1 G: 20 OINTMENT TOPICAL at 09:05

## 2023-05-15 RX ADMIN — FENTANYL CITRATE 25 MCG: 50 INJECTION, SOLUTION INTRAMUSCULAR; INTRAVENOUS at 09:05

## 2023-05-15 RX ADMIN — MIDAZOLAM 2 MG: 1 INJECTION INTRAMUSCULAR; INTRAVENOUS at 08:05

## 2023-05-15 RX ADMIN — ATORVASTATIN CALCIUM 80 MG: 20 TABLET, FILM COATED ORAL at 09:05

## 2023-05-15 RX ADMIN — TACROLIMUS 3 MG: 1 CAPSULE ORAL at 05:05

## 2023-05-15 RX ADMIN — DOCUSATE SODIUM 100 MG: 100 CAPSULE, LIQUID FILLED ORAL at 02:05

## 2023-05-15 NOTE — TRANSFER OF CARE
"Anesthesia Transfer of Care Note    Patient: Robb Iglesias    Procedure(s) Performed: Procedure(s) (LRB):  TRANSPLANT, KIDNEY (N/A)  REMOVAL, CATHETER, DIALYSIS, PERITONEAL (N/A)    Patient location: PACU    Anesthesia Type: general    Transport from OR: Transported from OR on 6-10 L/min O2 by face mask with adequate spontaneous ventilation    Post pain: adequate analgesia    Post assessment: no apparent anesthetic complications    Post vital signs: stable    Level of consciousness: awake    Nausea/Vomiting: no nausea/vomiting    Complications: none    Transfer of care protocol was followed      Last vitals:   Visit Vitals  /79 (BP Location: Right arm, Patient Position: Lying)   Pulse 75   Temp 36.6 °C (97.9 °F) (Temporal)   Resp 20   Ht 6' 3" (1.905 m)   Wt 104.2 kg (229 lb 11.5 oz)   SpO2 100%   BMI 28.71 kg/m²     "

## 2023-05-15 NOTE — NURSING TRANSFER
Nursing Transfer Note      5/15/2023     Reason patient is being transferred: Post Op    Transfer To: [id number], report given to BETTINA Hendrickson    Transfer via bed    Transfer with : IV pump    Transported by Patient transporter x2    Telemetry: No    Medicines sent: none    Any special needs or follow-up needed: n/a    Chart send with patient: Yes    Notified: spouse, visited at bedside, room number given    Patient reassessed at: 5-15-23

## 2023-05-15 NOTE — ANESTHESIA PROCEDURE NOTES
Right Radial Arterial LIne    Diagnosis: ESRD    Patient location during procedure: done in OR  Procedure start time: 5/15/2023 9:15 AM  Timeout: 5/15/2023 9:14 AM  Procedure end time: 5/15/2023 9:18 AM    Staffing  Authorizing Provider: Ganga Heath MD  Performing Provider: Ganga Heath MD    Anesthesiologist was present at the time of the procedure.    Preanesthetic Checklist  Completed: patient identified, IV checked, risks and benefits discussed, surgical consent, monitors and equipment checked, pre-op evaluation, timeout performed and anesthesia consent givenRight Radial Arterial LIne  Skin Prep: chlorhexidine gluconate and isopropyl alcohol  Local Infiltration: none  Orientation: right  Location: radial    Catheter Size: 20 G  Catheter placement by Ultrasound guidance. Heme positive aspiration all ports.   Vessel Caliber: medium, patent  Needle advanced into vessel with real time Ultrasound guidance.  Guidewire confirmed in vessel.  Sterile sheath used.Insertion Attempts: 1  Assessment  Dressing: secured with tape and tegaderm  Patient: Tolerated well

## 2023-05-15 NOTE — OP NOTE
Operative Report    Date of Procedure: 5/15/2023  Date of Transplant (UNOS): 5/15/23    Surgeons:  Surgeon(s) and Role:     * Reji Hinojosa MD - Primary     * Danny Sahu MD - Resident - Assisting     * Josie Oscar MD - Fellow    First Assistant Attestation:  The indicated resident served as first assistant for this procedure.    Pre-operative Diagnosis: ESRD, requiring chronic dialysis secondary to Diabetes Mellitus - Type II  Post-operative Diagnosis: Same    Procedure(s) Performed:   Back Table Preparation of Left Kidney    Living Kidney transplant  Removal of peritoneal dialysis catheter    Anesthesia: General endotracheal    Preamble  Indications and Patient Counseling: The patient is a 42 y.o. year-old male with end-stage kidney disease secondary to Diabetes Mellitus - Type II who has been evaluated for a kidney transplant.  The procedure was thoroughly discussed with the patient, including potential risks, complications, and alternatives.  Specific complications mentioned included death, graft non-function, bleeding, infection, and rejection, as well as the possibility of other complications not specifically mentioned.    Donor Risk Factors: Prior to the operation, the patient was advised of any donor-specific risk factors requiring specific disclosure.  Factors in this case included nothing that required specific disclosure.      Specific [facility] donor risk criteria for the organ donor include:  None    All questions were answered, the patient voiced appropriate understanding, and he agreed to proceed with the planned procedure.    ABO Confirmation: Immediately following arrival of the donor organ and prior to implantation, a formal ABO confirmation was done according to hospital and UNOS policies.  I confirmed the UNOS ID number ([id number]) of the donor organ and the donor and recipient ABO types, directly verifying these data by comparison with the UNOS Match Run report ().  This confirmation was  personally done by an attending surgeon and circulating nurse, and is officially documented elsewhere.    Time-Out: A complete time out was carried out prior to incision, with confirmation of patient identity, correct procedure, correct operative site, appropriate antibiotic prophylaxis, review of any known allergies, and presence of all needed equipment.    Procedure in Detail  Prior to starting the operation, the left kidney  was prepared on the back table. Arterial anatomy was single. Venous anatomy was single. Ureteral anatomy was single. Back table vascular reconstruction was not required .  Unneeded fat was removed from the kidney, the vessels were cleaned of adherent tissue and tested for leaks, and the kidney was maintained at ice temperature in organ preservation solution until it was brought to the operative field.     The patient was brought into the operating room and placed in a supine position on the OR table.  After the induction of general endotracheal anesthesia, lines were placed by the anesthesiologist.  The urinary bladder was catheterized and irrigated with antibiotic solution.  There was no tension on the axillae and all pressure points were padded.  Sequential compression boots were used as were Norma Huggers.  The abdomen was prepped and draped in the usual sterile fashion.  Skin was incised over the right with a knife and deepened with electrocautery.  The peritoneum and its contents were swept medially, exposing the right external iliac artery and the right external iliac vein.  The Bookwalter retractor was used to provide exposure.  Overlying lymphatics were ligated or cauterized and the vessels were dissected free for a length compatible with anastomosis.  The kidney was brought to the OR table at 5/15/2023 10:37 AM.  Venous control was obtained with a vascular clamp.  A venotomy was made, the vein irrigated, and an end renal to right external iliac vein anastomosis was created with 5-0  polypropylene.  Arterial control was obtained with a vascular clamp.  Arteriotomy was made, the artery irrigated, and an end renal to right external iliac artery anastomosis was created with 6-0 polypropylene.  The kidney was unclamped and reperfused at 5/15/2023 10:53 AM.  Reperfusion quality was good. Intraoperative urine production was observed.  After hemostasis was obtained, a Lich uretero-neocystostomy was created.  The bladder was filled and identified, opened, and the anastomosis created using 6-0 PDS.  The bladder muscle was closed over the distal ureter to create an antireflux tunnel.  A ureteral stent was used.  With the kidney well perfused and sitting appropriately without tension on the anastomoses, viscera were replaced in their usual position.  The wound was closed after a final check for hemostasis.      The peritoneal dialysis catheter was removed, and the corresponding skin incisions closed with subcuticular sutures and glue.    Overall, the graft quality was assessed to be good. At the end of the case the needle, sponge and instrument counts were all correct.  Sterile dressings were applied and the patient was brought to the recovery room/ICU in good condition.    Estimated Blood Loss: 100 mL  Fluids Administered:    Drains: none   Specimens: none    Findings: Normal vessels, excellent perfusion, brisk intraoperative diuresis    Organ Transplanted: Left Kidney    Arterial Anatomy: single  Number of Arteries: 1  Configuration of Multiple Arteries: not applicable  Venous Anatomy: single  Number of Veins: 1  Ureteral Anatomy: single  Number of Ureters: 1  Reperfusion Quality: good  Overall Graft Quality: good  Intraoperative Urine Production: yes  Ernst: not to be removed before 2 days.  Ureteral Stent: Yes    Ischemic Times:   Anastomosis (warm ischemia) time: 16 minutes   Cold ischemia time: 32 minutes  Total ischemia time: 48 minutes    Donor Data:  UNOS ID: [id number]   UNOS Match Run:    Donor  Type: Living   Donor CMV Status:    Donor HBcAB:    Donor HCV Status:

## 2023-05-15 NOTE — INTERVAL H&P NOTE
The patient has been examined and the H&P has been reviewed:    I concur with the findings and no changes have occurred since H&P was written.    Surgery risks, benefits and alternative options discussed and understood by patient/family.      The peritoneal dialysis catheter will be removed at the time of the transplant.

## 2023-05-15 NOTE — ANESTHESIA PROCEDURE NOTES
Intubation    Date/Time: 5/15/2023 9:11 AM  Performed by: Jannie Amlanza CRNA  Authorized by: Ganga Heath MD     Intubation:     Induction:  Intravenous    Intubated:  Postinduction    Mask Ventilation:  Easy mask    Attempts:  1    Attempted By:  CRNA    Method of Intubation:  Video laryngoscopy    Blade:  Grajeda 3    Laryngeal View Grade: Grade I - full view of cords      Difficult Airway Encountered?: No      Complications:  None    Airway Device:  Oral endotracheal tube    Airway Device Size:  7.5    Style/Cuff Inflation:  Cuffed (inflated to minimal occlusive pressure)    Inflation Amount (mL):  8    Tube secured:  23    Secured at:  The lips    Placement Verified By:  Capnometry    Complicating Factors:  None    Findings Post-Intubation:  BS equal bilateral and atraumatic/condition of teeth unchanged

## 2023-05-15 NOTE — ANESTHESIA POSTPROCEDURE EVALUATION
Anesthesia Post Evaluation    Patient: Robb Iglesias    Procedure(s) Performed: Procedure(s) (LRB):  TRANSPLANT, KIDNEY (N/A)  REMOVAL, CATHETER, DIALYSIS, PERITONEAL (N/A)    Final Anesthesia Type: general      Patient location during evaluation: PACU  Patient participation: Yes- Able to Participate  Level of consciousness: awake  Post-procedure vital signs: reviewed and stable  Pain management: adequate  Airway patency: patent    PONV status at discharge: No PONV  Anesthetic complications: no      Cardiovascular status: blood pressure returned to baseline  Respiratory status: unassisted  Hydration status: euvolemic  Follow-up not needed.          Vitals Value Taken Time   /78 05/15/23 1731   Temp 36.9 °C (98.4 °F) 05/15/23 1645   Pulse 95 05/15/23 1731   Resp 16 05/15/23 1731   SpO2 96 % 05/15/23 1731   Vitals shown include unvalidated device data.      Event Time   Out of Recovery 13:30:00         Pain/Yaron Score: Pain Rating Prior to Med Admin: 8 (5/15/2023  4:57 PM)  Pain Rating Post Med Admin: 6 (5/15/2023  4:00 PM)  Yaron Score: 9 (5/15/2023  1:30 PM)

## 2023-05-16 DIAGNOSIS — Z94.0 S/P KIDNEY TRANSPLANT: Primary | ICD-10-CM

## 2023-05-16 PROBLEM — E87.8 ELECTROLYTE ABNORMALITY: Status: ACTIVE | Noted: 2023-05-16

## 2023-05-16 PROBLEM — Z79.60 LONG-TERM USE OF IMMUNOSUPPRESSANT MEDICATION: Status: ACTIVE | Noted: 2023-05-16

## 2023-05-16 LAB
ALBUMIN SERPL BCP-MCNC: 3.2 G/DL (ref 3.5–5.2)
ANION GAP SERPL CALC-SCNC: 10 MMOL/L (ref 8–16)
ANION GAP SERPL CALC-SCNC: 9 MMOL/L (ref 8–16)
BASOPHILS # BLD AUTO: 0.01 K/UL (ref 0–0.2)
BASOPHILS NFR BLD: 0.1 % (ref 0–1.9)
BUN SERPL-MCNC: 32 MG/DL (ref 6–20)
BUN SERPL-MCNC: 35 MG/DL (ref 6–20)
CALCIUM SERPL-MCNC: 8 MG/DL (ref 8.7–10.5)
CALCIUM SERPL-MCNC: 8.4 MG/DL (ref 8.7–10.5)
CHLORIDE SERPL-SCNC: 108 MMOL/L (ref 95–110)
CHLORIDE SERPL-SCNC: 110 MMOL/L (ref 95–110)
CO2 SERPL-SCNC: 19 MMOL/L (ref 23–29)
CO2 SERPL-SCNC: 21 MMOL/L (ref 23–29)
CREAT SERPL-MCNC: 2.6 MG/DL (ref 0.5–1.4)
CREAT SERPL-MCNC: 3.3 MG/DL (ref 0.5–1.4)
DIFFERENTIAL METHOD: ABNORMAL
EOSINOPHIL # BLD AUTO: 0 K/UL (ref 0–0.5)
EOSINOPHIL NFR BLD: 0 % (ref 0–8)
ERYTHROCYTE [DISTWIDTH] IN BLOOD BY AUTOMATED COUNT: 13.3 % (ref 11.5–14.5)
EST. GFR  (NO RACE VARIABLE): 23.1 ML/MIN/1.73 M^2
EST. GFR  (NO RACE VARIABLE): 30.8 ML/MIN/1.73 M^2
GLUCOSE SERPL-MCNC: 208 MG/DL (ref 70–110)
GLUCOSE SERPL-MCNC: 296 MG/DL (ref 70–110)
GLUCOSE SERPL-MCNC: 94 MG/DL (ref 70–110)
HCO3 UR-SCNC: 24.9 MMOL/L (ref 24–28)
HCT VFR BLD AUTO: 33.1 % (ref 40–54)
HCT VFR BLD CALC: 38 %PCV (ref 36–54)
HGB BLD-MCNC: 10.9 G/DL (ref 14–18)
IMM GRANULOCYTES # BLD AUTO: 0.04 K/UL (ref 0–0.04)
IMM GRANULOCYTES NFR BLD AUTO: 0.3 % (ref 0–0.5)
LYMPHOCYTES # BLD AUTO: 0.1 K/UL (ref 1–4.8)
LYMPHOCYTES NFR BLD: 0.8 % (ref 18–48)
MAGNESIUM SERPL-MCNC: 1.7 MG/DL (ref 1.6–2.6)
MCH RBC QN AUTO: 28.4 PG (ref 27–31)
MCHC RBC AUTO-ENTMCNC: 32.9 G/DL (ref 32–36)
MCV RBC AUTO: 86 FL (ref 82–98)
MONOCYTES # BLD AUTO: 0.4 K/UL (ref 0.3–1)
MONOCYTES NFR BLD: 3.2 % (ref 4–15)
NEUTROPHILS # BLD AUTO: 12.4 K/UL (ref 1.8–7.7)
NEUTROPHILS NFR BLD: 95.6 % (ref 38–73)
NRBC BLD-RTO: 0 /100 WBC
PCO2 BLDA: 46 MMHG (ref 35–45)
PH SMN: 7.34 [PH] (ref 7.35–7.45)
PHOSPHATE SERPL-MCNC: 3.3 MG/DL (ref 2.7–4.5)
PLATELET # BLD AUTO: 145 K/UL (ref 150–450)
PMV BLD AUTO: 10 FL (ref 9.2–12.9)
PO2 BLDA: 48 MMHG (ref 40–60)
POC BE: -1 MMOL/L
POC IONIZED CALCIUM: 1.22 MMOL/L (ref 1.06–1.42)
POC SATURATED O2: 81 % (ref 95–100)
POC TCO2: 26 MMOL/L (ref 24–29)
POCT GLUCOSE: 239 MG/DL (ref 70–110)
POCT GLUCOSE: 257 MG/DL (ref 70–110)
POCT GLUCOSE: 279 MG/DL (ref 70–110)
POCT GLUCOSE: 376 MG/DL (ref 70–110)
POTASSIUM BLD-SCNC: 3.6 MMOL/L (ref 3.5–5.1)
POTASSIUM SERPL-SCNC: 4.1 MMOL/L (ref 3.5–5.1)
POTASSIUM SERPL-SCNC: 4.1 MMOL/L (ref 3.5–5.1)
RBC # BLD AUTO: 3.84 M/UL (ref 4.6–6.2)
SAMPLE: ABNORMAL
SODIUM BLD-SCNC: 143 MMOL/L (ref 136–145)
SODIUM SERPL-SCNC: 137 MMOL/L (ref 136–145)
SODIUM SERPL-SCNC: 140 MMOL/L (ref 136–145)
TACROLIMUS BLD-MCNC: 3.9 NG/ML (ref 5–15)
WBC # BLD AUTO: 12.99 K/UL (ref 3.9–12.7)

## 2023-05-16 PROCEDURE — 80048 BASIC METABOLIC PNL TOTAL CA: CPT

## 2023-05-16 PROCEDURE — 20600001 HC STEP DOWN PRIVATE ROOM

## 2023-05-16 PROCEDURE — 99222 1ST HOSP IP/OBS MODERATE 55: CPT | Mod: ,,, | Performed by: INTERNAL MEDICINE

## 2023-05-16 PROCEDURE — 25000003 PHARM REV CODE 250: Performed by: STUDENT IN AN ORGANIZED HEALTH CARE EDUCATION/TRAINING PROGRAM

## 2023-05-16 PROCEDURE — 25000003 PHARM REV CODE 250

## 2023-05-16 PROCEDURE — 99233 PR SUBSEQUENT HOSPITAL CARE,LEVL III: ICD-10-PCS | Mod: 24,,,

## 2023-05-16 PROCEDURE — 83735 ASSAY OF MAGNESIUM: CPT | Performed by: STUDENT IN AN ORGANIZED HEALTH CARE EDUCATION/TRAINING PROGRAM

## 2023-05-16 PROCEDURE — 99222 PR INITIAL HOSPITAL CARE,LEVL II: ICD-10-PCS | Mod: ,,, | Performed by: INTERNAL MEDICINE

## 2023-05-16 PROCEDURE — 99233 SBSQ HOSP IP/OBS HIGH 50: CPT | Mod: 24,,,

## 2023-05-16 PROCEDURE — 80069 RENAL FUNCTION PANEL: CPT | Performed by: STUDENT IN AN ORGANIZED HEALTH CARE EDUCATION/TRAINING PROGRAM

## 2023-05-16 PROCEDURE — 63600175 PHARM REV CODE 636 W HCPCS: Performed by: STUDENT IN AN ORGANIZED HEALTH CARE EDUCATION/TRAINING PROGRAM

## 2023-05-16 PROCEDURE — 25000003 PHARM REV CODE 250: Performed by: PHYSICIAN ASSISTANT

## 2023-05-16 PROCEDURE — 81100000 HC KIDNEY ACQUISITION-LIVE DONOR

## 2023-05-16 PROCEDURE — 36415 COLL VENOUS BLD VENIPUNCTURE: CPT | Performed by: STUDENT IN AN ORGANIZED HEALTH CARE EDUCATION/TRAINING PROGRAM

## 2023-05-16 PROCEDURE — 85025 COMPLETE CBC W/AUTO DIFF WBC: CPT | Performed by: STUDENT IN AN ORGANIZED HEALTH CARE EDUCATION/TRAINING PROGRAM

## 2023-05-16 PROCEDURE — 80197 ASSAY OF TACROLIMUS: CPT | Performed by: STUDENT IN AN ORGANIZED HEALTH CARE EDUCATION/TRAINING PROGRAM

## 2023-05-16 PROCEDURE — 36415 COLL VENOUS BLD VENIPUNCTURE: CPT

## 2023-05-16 RX ORDER — INSULIN ASPART 100 [IU]/ML
5 INJECTION, SOLUTION INTRAVENOUS; SUBCUTANEOUS
Status: DISCONTINUED | OUTPATIENT
Start: 2023-05-16 | End: 2023-05-17 | Stop reason: HOSPADM

## 2023-05-16 RX ORDER — SULFAMETHOXAZOLE AND TRIMETHOPRIM 400; 80 MG/1; MG/1
1 TABLET ORAL EVERY MORNING
Qty: 30 TABLET | Refills: 5 | Status: SHIPPED | OUTPATIENT
Start: 2023-05-16 | End: 2023-11-12

## 2023-05-16 RX ORDER — TACROLIMUS 1 MG/1
6 CAPSULE ORAL EVERY 12 HOURS
Qty: 360 CAPSULE | Refills: 11 | Status: SHIPPED | OUTPATIENT
Start: 2023-05-16 | End: 2023-05-17 | Stop reason: SDUPTHER

## 2023-05-16 RX ORDER — SODIUM CHLORIDE 9 MG/ML
INJECTION, SOLUTION INTRAVENOUS CONTINUOUS
Status: DISCONTINUED | OUTPATIENT
Start: 2023-05-16 | End: 2023-05-17

## 2023-05-16 RX ORDER — TACROLIMUS 1 MG/1
4 CAPSULE ORAL 2 TIMES DAILY
Status: DISCONTINUED | OUTPATIENT
Start: 2023-05-16 | End: 2023-05-17 | Stop reason: HOSPADM

## 2023-05-16 RX ORDER — SODIUM BICARBONATE 650 MG/1
1300 TABLET ORAL 2 TIMES DAILY
Status: DISCONTINUED | OUTPATIENT
Start: 2023-05-16 | End: 2023-05-17 | Stop reason: HOSPADM

## 2023-05-16 RX ORDER — VALGANCICLOVIR 450 MG/1
900 TABLET, FILM COATED ORAL EVERY MORNING
Qty: 60 TABLET | Refills: 2 | Status: SHIPPED | OUTPATIENT
Start: 2023-05-16 | End: 2023-10-25

## 2023-05-16 RX ORDER — MYCOPHENOLATE MOFETIL 250 MG/1
1000 CAPSULE ORAL 2 TIMES DAILY
Qty: 240 CAPSULE | Refills: 11 | Status: SHIPPED | OUTPATIENT
Start: 2023-05-16 | End: 2023-07-14 | Stop reason: SDUPTHER

## 2023-05-16 RX ORDER — INSULIN ASPART 100 [IU]/ML
1-10 INJECTION, SOLUTION INTRAVENOUS; SUBCUTANEOUS
Status: DISCONTINUED | OUTPATIENT
Start: 2023-05-16 | End: 2023-05-17 | Stop reason: HOSPADM

## 2023-05-16 RX ADMIN — HEPARIN SODIUM 5000 UNITS: 5000 INJECTION INTRAVENOUS; SUBCUTANEOUS at 08:05

## 2023-05-16 RX ADMIN — TACROLIMUS 3 MG: 1 CAPSULE ORAL at 08:05

## 2023-05-16 RX ADMIN — BISACODYL 10 MG: 5 TABLET, COATED ORAL at 08:05

## 2023-05-16 RX ADMIN — MUPIROCIN 1 G: 20 OINTMENT TOPICAL at 08:05

## 2023-05-16 RX ADMIN — DOCUSATE SODIUM 100 MG: 100 CAPSULE, LIQUID FILLED ORAL at 08:05

## 2023-05-16 RX ADMIN — FAMOTIDINE 20 MG: 20 TABLET ORAL at 08:05

## 2023-05-16 RX ADMIN — CARVEDILOL 6.25 MG: 6.25 TABLET, FILM COATED ORAL at 08:05

## 2023-05-16 RX ADMIN — SODIUM CHLORIDE: 9 INJECTION, SOLUTION INTRAVENOUS at 10:05

## 2023-05-16 RX ADMIN — SODIUM CHLORIDE: 9 INJECTION, SOLUTION INTRAVENOUS at 01:05

## 2023-05-16 RX ADMIN — SODIUM BICARBONATE 1300 MG: 650 TABLET ORAL at 08:05

## 2023-05-16 RX ADMIN — INSULIN ASPART 3 UNITS: 100 INJECTION, SOLUTION INTRAVENOUS; SUBCUTANEOUS at 12:05

## 2023-05-16 RX ADMIN — DIPHENHYDRAMINE HYDROCHLORIDE 25 MG: 25 CAPSULE ORAL at 08:05

## 2023-05-16 RX ADMIN — INSULIN DETEMIR 8 UNITS: 100 INJECTION, SOLUTION SUBCUTANEOUS at 10:05

## 2023-05-16 RX ADMIN — INSULIN ASPART 5 UNITS: 100 INJECTION, SOLUTION INTRAVENOUS; SUBCUTANEOUS at 04:05

## 2023-05-16 RX ADMIN — ACETAMINOPHEN 650 MG: 325 TABLET ORAL at 04:05

## 2023-05-16 RX ADMIN — TRAMADOL HYDROCHLORIDE 50 MG: 50 TABLET, COATED ORAL at 08:05

## 2023-05-16 RX ADMIN — DOCUSATE SODIUM 100 MG: 100 CAPSULE, LIQUID FILLED ORAL at 03:05

## 2023-05-16 RX ADMIN — THERA TABS 1 TABLET: TAB at 08:05

## 2023-05-16 RX ADMIN — OXYCODONE HYDROCHLORIDE 5 MG: 5 TABLET ORAL at 11:05

## 2023-05-16 RX ADMIN — ACETAMINOPHEN 650 MG: 325 TABLET ORAL at 08:05

## 2023-05-16 RX ADMIN — MYCOPHENOLATE MOFETIL 1000 MG: 250 CAPSULE ORAL at 08:05

## 2023-05-16 RX ADMIN — INSULIN ASPART 2 UNITS: 100 INJECTION, SOLUTION INTRAVENOUS; SUBCUTANEOUS at 08:05

## 2023-05-16 RX ADMIN — OXYCODONE HYDROCHLORIDE 5 MG: 5 TABLET ORAL at 01:05

## 2023-05-16 RX ADMIN — INSULIN ASPART 3 UNITS: 100 INJECTION, SOLUTION INTRAVENOUS; SUBCUTANEOUS at 10:05

## 2023-05-16 RX ADMIN — SODIUM CHLORIDE: 9 INJECTION, SOLUTION INTRAVENOUS at 04:05

## 2023-05-16 RX ADMIN — Medication 6 MG: at 08:05

## 2023-05-16 RX ADMIN — METHYLPREDNISOLONE SODIUM SUCCINATE 250 MG: 125 INJECTION, POWDER, FOR SOLUTION INTRAMUSCULAR; INTRAVENOUS at 08:05

## 2023-05-16 RX ADMIN — ATORVASTATIN CALCIUM 80 MG: 20 TABLET, FILM COATED ORAL at 08:05

## 2023-05-16 RX ADMIN — CEFAZOLIN 2 G: 2 INJECTION, POWDER, FOR SOLUTION INTRAMUSCULAR; INTRAVENOUS at 01:05

## 2023-05-16 RX ADMIN — ANTI-THYMOCYTE GLOBULIN (RABBIT) 150 MG: 5 INJECTION, POWDER, LYOPHILIZED, FOR SOLUTION INTRAVENOUS at 10:05

## 2023-05-16 RX ADMIN — OXYCODONE HYDROCHLORIDE 5 MG: 5 TABLET ORAL at 05:05

## 2023-05-16 RX ADMIN — TACROLIMUS 4 MG: 1 CAPSULE ORAL at 05:05

## 2023-05-16 RX ADMIN — HEPARIN SODIUM 5000 UNITS: 5000 INJECTION INTRAVENOUS; SUBCUTANEOUS at 04:05

## 2023-05-16 RX ADMIN — EZETIMIBE 10 MG: 10 TABLET ORAL at 08:05

## 2023-05-16 NOTE — CONSULTS
"  [facility] - Transplant Stepdown  Adult Nutrition  Consult Note    SUMMARY     Recommendations    1. Continue Diabetic diet      2. If PO intake <50%, ADD Boost glucose control TID      3. RD to monitor and follow    Goals: Meet % EEN, EPN by RD f/u  Nutrition Goal Status: new  Communication of RD Recs:  (POC)    Assessment and Plan    Nutrition Problem  Increased nutrient needs (protein, energy)    Related to (etiology):   Increased physiological demands    Signs and Symptoms (as evidenced by):   S/p Living kidney transplant (5/15)    Interventions/Recommendations (treatment strategy):  Collaboration with other providers  Nutrition education    Nutrition Diagnosis Status:   New      Reason for Assessment    Reason For Assessment: consult  Diagnosis:  (s/p kidney transplant)  Relevant Medical History: HTN, T2DM, s/p angioplasty with stent  Interdisciplinary Rounds: did not attend    General Information Comments:   S/p Living kidney transplant (5/15). Pt tolerating diet. Reports good PO intake PTA, was following renal diet. Reports hx of bariatric surgery 6 years ago. Stated  lb, denies any significant wt changes recently. Denies N/V, chewing/swallowing difficulties. Educated family on food safety and food-drug interaction. Family verbalized understanding.    Nutrition Discharge Planning: Post transplant nutrition education provided on 5/16. Food safety/drug interactions emphasized. General healthy/low salt diet recommended. Education material left at bedside. No other needs identified.    Nutrition Risk Screen    Nutrition Risk Screen: no indicators present    Nutrition/Diet History    Spiritual, Cultural Beliefs, Denominational Practices, Values that Affect Care: no    Anthropometrics    Temp: 97.9 °F (36.6 °C)  Height Method: Stated  Height: 6' 3" (190.5 cm)  Height (inches): 75 in  Weight Method: Standard Scale  Weight: 104.2 kg (229 lb 11.5 oz)  Weight (lb): 229.72 lb  Ideal Body Weight (IBW), Male: 196 " lb  % Ideal Body Weight, Male (lb): 117.2 %  BMI (Calculated): 28.7     Lab/Procedures/Meds    Pertinent Labs Reviewed: reviewed  Pertinent Labs Comments: glucose 296, BUN 32, Cr 2.6, eGFR 30.8  Pertinent Medications Reviewed: reviewed  Pertinent Medications Comments: prednisone, methylprednisolone, tacrolimus, atorvastatin, famotidine, MV    Estimated/Assessed Needs    Weight Used For Calorie Calculations: 103.9 kg (229 lb)  Energy Calorie Requirements (kcal): 2536 kcal  Energy Need Method: Ransom-St Jeor (PAL 1.25)  Protein Requirements: 106-133g (1.2-1.5g/kg IBW)  Weight Used For Protein Calculations: 88.9 kg (196 lb) (IBW)  Fluid Requirements (mL): 1ml/kcal or per MD  Estimated Fluid Requirement Method: RDA Method  RDA Method (mL): 2536     Nutrition Prescription Ordered    Current Diet Order: Diabetic    Evaluation of Received Nutrient/Fluid Intake    I/O: + 0.7 L since admit  Energy Calories Required: not meeting needs  Protein Required: not meeting needs  Comments: LBM 5/14  Tolerance: tolerating    Nutrition Risk    Level of Risk/Frequency of Follow-up:  (1x/week)     Monitor and Evaluation    Food and Nutrient Intake: energy intake, food and beverage intake  Food and Nutrient Adminstration: diet order  Knowledge/Beliefs/Attitudes: food and nutrition knowledge/skill  Physical Activity and Function: nutrition-related ADLs and IADLs  Anthropometric Measurements: height/length, weight, weight change, body mass index  Biochemical Data, Medical Tests and Procedures: electrolyte and renal panel, gastrointestinal profile, glucose/endocrine profile, inflammatory profile, lipid profile  Nutrition-Focused Physical Findings: overall appearance     Nutrition Follow-Up    RD Follow-up?: Yes

## 2023-05-16 NOTE — NURSING
Pt reporting incisional pain 8/10, requesting pain medication. PRN oxycodone q6 last administered at 1403. PRN tramadol q6 last administered at 1657. M. SANTY Escalante notified. Orders received to administer PRN oxycodone early at this time.    Enbrel Counseling:  I discussed with the patient the risks of etanercept including but not limited to myelosuppression, immunosuppression, autoimmune hepatitis, demyelinating diseases, lymphoma, and infections.  The patient understands that monitoring is required including a PPD at baseline and must alert us or the primary physician if symptoms of infection or other concerning signs are noted.

## 2023-05-16 NOTE — PLAN OF CARE
AAOX4  VVS  NS@100 ML/HR  I&O Q 4  WALDRON IN PLACE , GOOD URINE OUT   BLUE CARD UPDATED AND REVIEWED WITH PT AND WIFE, TO PULL PM MEDS  PT UP IN CHAIR TODAY   SAFETY IN PLACE NO PROBLEMS NOTED OR VOICED

## 2023-05-16 NOTE — SUBJECTIVE & OBJECTIVE
Interval HPI:   Overnight events:  No acute events reported overnight  Eatin%  Nausea: No  Hypoglycemia and intervention: No  Fever: No  TPN and/or TF: No  If yes, type of TF/TPN and rate: NA    PMH, PSH, FH, SH updated and reviewed     ROS:  Review of Systems   Constitutional:  Positive for fatigue.   Endocrine: Negative for polyuria.     Current Medications and/or Treatments Impacting Glycemic Control  Immunotherapy:    Immunosuppressants           Stop Route Frequency     tacrolimus capsule 4 mg         -- Oral 2 times daily     antithymocyte globulin (rabbit) 150 mg, hydrocortisone sodium succinate (SOLU-CORTEF) 20 mg in sodium chloride 0.9% 500 mL (FOR PERIPHERAL LINE ADMINISTRATION ONLY)          0859 IV Daily     mycophenolate capsule 1,000 mg         -- Oral 2 times daily          Steroids:   Hormones (From admission, onward)      Start     Stop Route Frequency Ordered    23 0900  predniSONE tablet 20 mg  (IP TXP KIDNEY POST-OP THYMO WITH PERIPHERAL PRECHECKED)         -- Oral Daily 05/15/23 1333    23 0800  methylPREDNISolone sodium succinate injection 125 mg  (IP TXP KIDNEY POST-OP THYMO WITH PERIPHERAL PRECHECKED)         -- IV Once 05/15/23 1333    23 0900  antithymocyte globulin (rabbit) 150 mg, hydrocortisone sodium succinate (SOLU-CORTEF) 20 mg in sodium chloride 0.9% 500 mL (FOR PERIPHERAL LINE ADMINISTRATION ONLY)  (IP TXP KIDNEY POST-OP THYMO WITH PERIPHERAL PRECHECKED)          0859 IV Daily 05/15/23 1333    05/15/23 2021  melatonin tablet 6 mg         -- Oral Nightly PRN 05/15/23 1921    05/15/23 1410  hydrocortisone sodium succinate injection 100 mg  (IP TXP KIDNEY POST-OP THYMO WITH PERIPHERAL PRECHECKED)         10/11 0510 IV Once as needed 05/15/23 1333          Pressors:    Autonomic Drugs (From admission, onward)      Start     Stop Route Frequency Ordered    05/15/23 1410  EPINEPHrine (PF) injection 1 mg  (IP TXP KIDNEY POST-OP THYMO WITH PERIPHERAL  PRECHECKED)         10/11 0510 SubQ Once as needed 05/15/23 1333          Hyperglycemia/Diabetes Medications:   Antihyperglycemics (From admission, onward)      Start     Stop Route Frequency Ordered    05/16/23 2100  insulin detemir U-100 (Levemir) pen 8 Units         -- SubQ Nightly 05/16/23 1355    05/16/23 1645  insulin aspart U-100 pen 5 Units         -- SubQ 3 times daily with meals 05/16/23 1355    05/16/23 1455  insulin aspart U-100 pen 1-10 Units         -- SubQ Before meals & nightly PRN 05/16/23 1355    05/15/23 1410  insulin aspart U-100 pen 0-5 Units         -- SubQ Before meals & nightly PRN 05/15/23 1333             PHYSICAL EXAMINATION:  Vitals:    05/16/23 1112   BP:    Pulse:    Resp: 16   Temp:      Body mass index is 28.71 kg/m².     Physical Exam  Pulmonary:      Effort: Pulmonary effort is normal.   Neurological:      General: No focal deficit present.      Mental Status: He is alert.   Psychiatric:         Mood and Affect: Mood normal.         Behavior: Behavior normal.

## 2023-05-16 NOTE — ASSESSMENT & PLAN NOTE
- Patient on GLP-1 agonist with Trulicity at home, recommend resuming once patient discharged home for cardio protection

## 2023-05-16 NOTE — CONSULTS
[facility] - [facility]plant Step[facility]  Endocrinology  Diabetes Consult Note    Consult Requested by: Reji Hinojosa MD   Reason for admit: S/P kidney transplant    HISTORY OF PRESENT ILLNESS:  42-year-old  male with type 2 diabetes mellitus, ESRD, CAD, hypertension, hyperlipidemia, post bariatric surgery, and gout presents for planned renal transplant that took place 05/15/2023.    - Endocrinology consulted for glucose management in the context of known type 2 diabetes mellitus and high-dose steroid taper after renal transplant    Regarding Type 2 Diabetes Mellitus:    - Initially diagnosed with Type 2 diabetes mellitus:   - Current diabetic medications include: Trulicity 3 mg weekly and diet controlled, does not have endocrinologist  - Other medications tried: Was on insulin therapy until 2017 at which time he had bariatric surgery after which was diet controlled with Trulicity  - Family History: Mother, grandmother, and siblings with type 2 diabetes mellitus  - Weight based dosin kg x 0.5 = 52 TDD x 0.5 = 26 basal / 26 prandial  - 1700/TDD = 33 (estimated insulin sensitivity factor)  - 450/TDD = 8.6 (estimated starting carb ratio for prandial dosing)    Lab Results   Component Value Date    HGBA1C 5.0 05/15/2023    HGBA1C 5.3 2023    HGBA1C 5.0 2022     Lab Results   Component Value Date    EGFRNORACEVR 30.8 (A) 2023    EGFRNORACEVR 23.1 (A) 2023    EGFRNORACEVR 11.3 (A) 05/15/2023       Interval HPI:   Overnight events:  No acute events reported overnight  Eatin%  Nausea: No  Hypoglycemia and intervention: No  Fever: No  TPN and/or TF: No  If yes, type of TF/TPN and rate: NA    PMH, PSH, FH, SH updated and reviewed     ROS:  Review of Systems   Constitutional:  Positive for fatigue.   Endocrine: Negative for polyuria.     Current Medications and/or Treatments Impacting Glycemic Control  Immunotherapy:    Immunosuppressants           Stop Route Frequency     tacrolimus  capsule 4 mg         -- Oral 2 times daily     antithymocyte globulin (rabbit) 150 mg, hydrocortisone sodium succinate (SOLU-CORTEF) 20 mg in sodium chloride 0.9% 500 mL (FOR PERIPHERAL LINE ADMINISTRATION ONLY)         05/18 0859 IV Daily     mycophenolate capsule 1,000 mg         -- Oral 2 times daily          Steroids:   Hormones (From admission, onward)      Start     Stop Route Frequency Ordered    05/18/23 0900  predniSONE tablet 20 mg  (IP TXP KIDNEY POST-OP THYMO WITH PERIPHERAL PRECHECKED)         -- Oral Daily 05/15/23 1333    05/17/23 0800  methylPREDNISolone sodium succinate injection 125 mg  (IP TXP KIDNEY POST-OP THYMO WITH PERIPHERAL PRECHECKED)         -- IV Once 05/15/23 1333    05/16/23 0900  antithymocyte globulin (rabbit) 150 mg, hydrocortisone sodium succinate (SOLU-CORTEF) 20 mg in sodium chloride 0.9% 500 mL (FOR PERIPHERAL LINE ADMINISTRATION ONLY)  (IP TXP KIDNEY POST-OP THYMO WITH PERIPHERAL PRECHECKED)         05/18 0859 IV Daily 05/15/23 1333    05/15/23 2021  melatonin tablet 6 mg         -- Oral Nightly PRN 05/15/23 1921    05/15/23 1410  hydrocortisone sodium succinate injection 100 mg  (IP TXP KIDNEY POST-OP THYMO WITH PERIPHERAL PRECHECKED)         10/11 0510 IV Once as needed 05/15/23 1333          Pressors:    Autonomic Drugs (From admission, onward)      Start     Stop Route Frequency Ordered    05/15/23 1410  EPINEPHrine (PF) injection 1 mg  (IP TXP KIDNEY POST-OP THYMO WITH PERIPHERAL PRECHECKED)         10/11 0510 SubQ Once as needed 05/15/23 1333          Hyperglycemia/Diabetes Medications:   Antihyperglycemics (From admission, onward)      Start     Stop Route Frequency Ordered    05/16/23 2100  insulin detemir U-100 (Levemir) pen 8 Units         -- SubQ Nightly 05/16/23 1355    05/16/23 1645  insulin aspart U-100 pen 5 Units         -- SubQ 3 times daily with meals 05/16/23 1355    05/16/23 1455  insulin aspart U-100 pen 1-10 Units         -- SubQ Before meals & nightly PRN  05/16/23 1355    05/15/23 1410  insulin aspart U-100 pen 0-5 Units         -- SubQ Before meals & nightly PRN 05/15/23 1333             PHYSICAL EXAMINATION:  Vitals:    05/16/23 1112   BP:    Pulse:    Resp: 16   Temp:      Body mass index is 28.71 kg/m².     Physical Exam  Pulmonary:      Effort: Pulmonary effort is normal.   Neurological:      General: No focal deficit present.      Mental Status: He is alert.   Psychiatric:         Mood and Affect: Mood normal.         Behavior: Behavior normal.            Labs Reviewed and Include   Recent Labs   Lab 05/16/23  0534 05/16/23  1239   * 296*   CALCIUM 8.0* 8.4*   ALBUMIN 3.2*  --     137   K 4.1 4.1   CO2 21* 19*    108   BUN 35* 32*   CREATININE 3.3* 2.6*     Lab Results   Component Value Date    WBC 12.99 (H) 05/16/2023    HGB 10.9 (L) 05/16/2023    HCT 33.1 (L) 05/16/2023    MCV 86 05/16/2023     (L) 05/16/2023     No results for input(s): TSH, FREET4 in the last 168 hours.  Lab Results   Component Value Date    HGBA1C 5.0 05/15/2023       Nutritional status:   Body mass index is 28.71 kg/m².  Lab Results   Component Value Date    ALBUMIN 3.2 (L) 05/16/2023    ALBUMIN 3.5 05/15/2023    ALBUMIN 4.2 05/15/2023     No results found for: PREALBUMIN    Estimated Creatinine Clearance: 48.9 mL/min (A) (based on SCr of 2.6 mg/dL (H)).    Accu-Checks  Recent Labs     05/15/23  0612 05/15/23  1321 05/15/23  2140 05/16/23  0811 05/16/23  1230   POCTGLUCOSE 92 128* 376* 239* 257*        ASSESSMENT and PLAN    Cardiac/Vascular  Status post angioplasty with stent  - Patient on GLP-1 agonist with Trulicity at home, recommend resuming once patient discharged home for cardio protection    Immunology/Multi System  Prophylactic immunotherapy  - Please see steroids as mentioned above    Endocrine  Type 2 diabetes mellitus with chronic kidney disease on chronic dialysis, without long-term current use of insulin  - Diagnosed in 2009, had bariatric surgery  in 2017 and off insulin since that time. Has had good control with diet and weekly Trulicity 3 mg. Now with significant postprandial hyperglycemia while on high dose steroid taper after renal transplant that took place 05/15/2023  - Received methylprednisolone 125 mg this morning and planned for tomorrow. On prednisone 20 mg daily  - 24 hour glucose trend: Mid 200s    - Start Levemir 8 units nightly starting this evening   - Start aspart 5 units TIDAC+MDC    - If serum glucose greater than 300 mg/dL before a meal, please ask patient not to eat until correctional insulin has brought glucose less than 250 mg/dL  - Will monitor insulin requirements while inpatient, make appropriate adjustments, and have discharge recommendations ready when the time comes. Please keep endocrine notified of discharge plans or dietary changes.      Arturo Winston DO  [facility], 6th [address]  [address] [place], [place], [address]    Office: [phone]  Fax: [phone]    Disclaimer: This note has been generated using voice-recognition software. There may be typographical errors that have been missed during proof-reading.    The above history labs imaging impression and plan were discussed with attending physician who is in agreement and also took part in this patient's care.  I personally reviewed all of the patients available medications, labs, imaging, vitals, allergies, medical history.

## 2023-05-16 NOTE — PLAN OF CARE
Admitted 5/15 for living unrelated kidney transplant   - AAOx4, afebrile, VSS, RA  - RLQ incision CESIA with dermabond intact  - L abdominal PD cath removal sites x 2. Medial incision CESIA with dermabond intact and other incision remains with surgical dressing in place, C/D/I  - Ernst catheter intact draining clear yellow urine, refer to flowsheets for output totals.   - I = O continued overnight. D 5 1/2 NS at 50 cc/hr and NS titration.  - Self med box and blue card set up and ready for teaching  - Glucose elevated last night, 376. 3 units SSI administered as indicated. ACHS accucheck schedule followed. Endocrine consulted  - Plan to receive thymo dose #2 today  - Pain controlled with PRN oxycodone, tramadol  - Cefazolin dose #2 administered O/N  - Pt's wife at bedside, attentive to pt  - Bed in lowest locked position, call light and personal items in reach, nonskid socks on, verbalized understanding to call for assistance

## 2023-05-16 NOTE — ASSESSMENT & PLAN NOTE
- s/p LURT 5/15   - great UOP, Cr clearing   - 2 day toussaint no drains   - strict Is and Os   - avoid nephrotoxic medication   - monitor with renal panel

## 2023-05-16 NOTE — PROGRESS NOTES
[facility] - [facility]plant Stepdown  Kidney Transplant  Progress Note      Reason for Follow-up: Reassessment of Kidney Transplant - 5/15/2023  (#1) recipient and management of immunosuppression.    ORGAN: LEFT KIDNEY    Donor Type: Living    Donor CMV Status:   Donor HBcAB:  Donor HCV Status:  Donor HBV ROX:   Donor HCV ROX:       Subjective:   History of Present Illness:  Mr. Iglesias is a 42 y.o. year old White male who has been approved to receive a living donor kidney transplant.  He has ESRD secondary to diabetic nephropathy.  Patient is on peritoneal dialysis.  He has presented for his final preoperative medical evaluation.  He denies any recent hospitalizations or ED visits.      Hospital Course:  S/p living unrelated kidney transplant and PD cath removal on 05/15/23 (CMV +,+. Thymo induction).  without complication. Intra-op urine observed. No drains. 2 day toussaint.     Interval History   No acute events overnight. Patient is doing very well reports mild soreness of abdomen but pain is well controlled. He is eating a regular diet with no discomfort. Making great UOP, 11L in the last 24 hrs. His Cr is 3.3 down from 6. H/H stable at 10.9/33.1 with no active signs of bleeding. Electrolytes are stable (K 4.1). VSS. Coreg was restarted. Glucose was 208, patient has endocrine consult. His incisions look c/d/I. He has passed flatulence no BM. Bowel sounds are active. Patient has not started walking. No LE edema. He denies N/V/F, SOB, chest pain. Encourage ambulation. Cont to monitor        Past Medical, Surgical, Family, and Social History:   Unchanged from H&P.    Scheduled Meds:   acetaminophen  650 mg Oral Q8H    acetaminophen  650 mg Oral Q24H    antithymocyte globulin (rabbit), hydrocortisone 20mg, with optional heparin 1000 units in NS 500ml (FOR PERIPHERAL LINE ADMINISTRATION ONLY)  1.5 mg/kg (Adjusted) Intravenous Daily    atorvastatin  80 mg Oral QHS    bisacodyL  10 mg Oral QHS    carvediloL  6.25 mg  Oral BID    diphenhydrAMINE  25 mg Oral Q24H    docusate sodium  100 mg Oral TID    ezetimibe  10 mg Oral Daily    famotidine  20 mg Oral QHS    heparin (porcine)  5,000 Units Subcutaneous Q8H    [START ON 5/17/2023] methylPREDNISolone sodium succinate injection  125 mg Intravenous Once    multivitamin  1 tablet Oral Daily    mupirocin  1 g Nasal BID    mycophenolate  1,000 mg Oral BID    [START ON 5/18/2023] predniSONE  20 mg Oral Daily    [START ON 5/25/2023] sulfamethoxazole-trimethoprim 400-80mg  1 tablet Oral Daily AM    tacrolimus  3 mg Oral BID    [START ON 5/25/2023] valGANciclovir  450 mg Oral Daily AM     Continuous Infusions:   sodium chloride 0.9% 100 mL/hr at 05/16/23 1015    glucagon (human recombinant)       PRN Meds:dextrose 10%, diphenhydrAMINE, EPINEPHrine (PF), glucagon (human recombinant), glucose, glucose, glucose, hydrocortisone sodium succinate, insulin aspart U-100, melatonin, ondansetron, oxyCODONE, sodium chloride 0.9%, traMADoL    Intake/Output - Last 3 Shifts         05/14 0700  05/15 0659 05/15 0700  05/16 0659 05/16 0700  05/17 0659    P.O.  1200     I.V. (mL/kg)  8550.4 (82.1)     IV Piggyback  2500     Total Intake(mL/kg)  15865.4 (117.6)     Urine (mL/kg/hr)  78813 (4.6)     Blood  50     Total Output  26455     Net  +675.4                     Review of Systems   Constitutional:  Negative for chills and fever.   HENT:  Negative for sore throat.    Respiratory:  Negative for cough, shortness of breath and wheezing.    Cardiovascular:  Negative for chest pain, palpitations and leg swelling.   Gastrointestinal:  Positive for abdominal pain (expected). Negative for abdominal distention, diarrhea, nausea and vomiting.   Genitourinary:  Negative for decreased urine volume.        Ernst in place   Musculoskeletal:  Negative for myalgias.   Skin:  Positive for wound.   Neurological:  Negative for dizziness.   Psychiatric/Behavioral:  Negative for confusion.     Objective:  "    Vital Signs (Most Recent):  Temp: 97.9 °F (36.6 °C) (05/16/23 1104)  Pulse: 85 (05/16/23 1104)  Resp: 16 (05/16/23 1112)  BP: 127/83 (05/16/23 1104)  SpO2: (!) 94 % (05/16/23 1104) Vital Signs (24h Range):  Temp:  [97.8 °F (36.6 °C)-98.7 °F (37.1 °C)] 97.9 °F (36.6 °C)  Pulse:  [75-97] 85  Resp:  [13-22] 16  SpO2:  [94 %-100 %] 94 %  BP: (104-139)/(62-86) 127/83     Weight: 104.2 kg (229 lb 11.5 oz)  Height: 6' 3" (190.5 cm)  Body mass index is 28.71 kg/m².     Physical Exam  Constitutional:       General: He is not in acute distress.     Appearance: Normal appearance.   HENT:      Head: Normocephalic and atraumatic.      Mouth/Throat:      Mouth: Mucous membranes are moist.   Eyes:      General: No scleral icterus.     Conjunctiva/sclera: Conjunctivae normal.   Cardiovascular:      Rate and Rhythm: Normal rate and regular rhythm.      Pulses: Normal pulses.      Heart sounds: Normal heart sounds.   Pulmonary:      Effort: Pulmonary effort is normal. No respiratory distress.      Breath sounds: Normal breath sounds.   Abdominal:      General: A surgical scar is present. Bowel sounds are normal. There is no distension.      Palpations: Abdomen is soft.      Tenderness: There is abdominal tenderness (expected). There is no guarding or rebound.          Comments: Abdominal incision c/d/I. Dermabond intact.   Musculoskeletal:         General: No swelling.      Cervical back: Normal range of motion.   Skin:     General: Skin is warm and dry.   Neurological:      Mental Status: He is alert and oriented to person, place, and time.   Psychiatric:         Mood and Affect: Mood normal.         Behavior: Behavior normal.         Thought Content: Thought content normal.         Judgment: Judgment normal.        Laboratory:  CBC:   Recent Labs   Lab 05/15/23  1335 05/15/23  2133 05/16/23  0534   WBC  --   --  12.99*   RBC  --   --  3.84*   HGB  --   --  10.9*   HCT 34.0* 33.1* 33.1*   PLT  --   --  145*   MCV  --   --  86 "   MCH  --   --  28.4   MCHC  --   --  32.9     CMP:   Recent Labs   Lab 05/15/23  0610 05/15/23  1335 05/16/23  0534   GLU 91 118* 208*   CALCIUM 9.7 8.4* 8.0*   ALBUMIN 4.2 3.5 3.2*    141 140   K 3.6 3.8 4.1   CO2 25 22* 21*    109 110   BUN 54* 50* 35*   CREATININE 6.5* 6.0* 3.3*     Labs within the past 24 hours have been reviewed.    Diagnostic Results:  reviewed    Assessment/Plan:     * S/P kidney transplant  - s/p LURT 5/15   - great UOP, Cr clearing   - 2 day toussaint no drains   - strict Is and Os   - avoid nephrotoxic medication   - monitor with renal panel       Electrolyte abnormality  - s/p ktx   - UOP high, replace lytes prn   - monitor with bmp       Long-term use of immunosuppressant medication  - see prophylactic immunotherapy       At risk for opportunistic infections  - valcyte for CMV, bactrim PCP, nystatin for thrush       Prophylactic immunotherapy  - MMF, pred, prograf   - monitor prograf levels for toxicity and therapeutic efforts       Status post angioplasty with stent  - ASA held post op, continue when appropriate       Essential hypertension  - cont coreg             Discharge Planning:  Not yet stable for severiano Clark PA-C  Kidney Transplant  [facility] - Transplant Stepdown

## 2023-05-16 NOTE — SUBJECTIVE & OBJECTIVE
Subjective:   History of Present Illness:  Mr. Iglesias is a 42 y.o. year old White male who has been approved to receive a living donor kidney transplant.  He has ESRD secondary to diabetic nephropathy.  Patient is on peritoneal dialysis.  He has presented for his final preoperative medical evaluation.  He denies any recent hospitalizations or ED visits.      Hospital Course:  S/p living unrelated kidney transplant and PD cath removal on 05/15/23 (CMV +,+. Thymo induction).  without complication. Intra-op urine observed. No drains. 2 day toussaint.     Interval History   No acute events overnight. Patient is doing very well reports mild soreness of abdomen but pain is well controlled. He is eating a regular diet with no discomfort. Making great UOP, 11L in the last 24 hrs. His Cr is 3.3 down from 6. H/H stable at 10.9/33.1 with no active signs of bleeding. Electrolytes are stable (K 4.1). VSS. Coreg was restarted. Glucose was 208, patient has endocrine consult. His incisions look c/d/I. He has passed flatulence no BM. Bowel sounds are active. Patient has not started walking. No LE edema. He denies N/V/F, SOB, chest pain. Encourage ambulation. Cont to monitor        Past Medical, Surgical, Family, and Social History:   Unchanged from H&P.    Scheduled Meds:   acetaminophen  650 mg Oral Q8H    acetaminophen  650 mg Oral Q24H    antithymocyte globulin (rabbit), hydrocortisone 20mg, with optional heparin 1000 units in NS 500ml (FOR PERIPHERAL LINE ADMINISTRATION ONLY)  1.5 mg/kg (Adjusted) Intravenous Daily    atorvastatin  80 mg Oral QHS    bisacodyL  10 mg Oral QHS    carvediloL  6.25 mg Oral BID    diphenhydrAMINE  25 mg Oral Q24H    docusate sodium  100 mg Oral TID    ezetimibe  10 mg Oral Daily    famotidine  20 mg Oral QHS    heparin (porcine)  5,000 Units Subcutaneous Q8H    [START ON 5/17/2023] methylPREDNISolone sodium succinate injection  125 mg Intravenous Once    multivitamin  1 tablet Oral Daily     mupirocin  1 g Nasal BID    mycophenolate  1,000 mg Oral BID    [START ON 5/18/2023] predniSONE  20 mg Oral Daily    [START ON 5/25/2023] sulfamethoxazole-trimethoprim 400-80mg  1 tablet Oral Daily AM    tacrolimus  3 mg Oral BID    [START ON 5/25/2023] valGANciclovir  450 mg Oral Daily AM     Continuous Infusions:   sodium chloride 0.9% 100 mL/hr at 05/16/23 1015    glucagon (human recombinant)       PRN Meds:dextrose 10%, diphenhydrAMINE, EPINEPHrine (PF), glucagon (human recombinant), glucose, glucose, glucose, hydrocortisone sodium succinate, insulin aspart U-100, melatonin, ondansetron, oxyCODONE, sodium chloride 0.9%, traMADoL    Intake/Output - Last 3 Shifts         05/14 0700  05/15 0659 05/15 0700  05/16 0659 05/16 0700  05/17 0659    P.O.  1200     I.V. (mL/kg)  8550.4 (82.1)     IV Piggyback  2500     Total Intake(mL/kg)  48271.4 (117.6)     Urine (mL/kg/hr)  00523 (4.6)     Blood  50     Total Output  29040     Net  +675.4                     Review of Systems   Constitutional:  Negative for chills and fever.   HENT:  Negative for sore throat.    Respiratory:  Negative for cough, shortness of breath and wheezing.    Cardiovascular:  Negative for chest pain, palpitations and leg swelling.   Gastrointestinal:  Positive for abdominal pain (expected). Negative for abdominal distention, diarrhea, nausea and vomiting.   Genitourinary:  Negative for decreased urine volume.        Ernst in place   Musculoskeletal:  Negative for myalgias.   Skin:  Positive for wound.   Neurological:  Negative for dizziness.   Psychiatric/Behavioral:  Negative for confusion.     Objective:     Vital Signs (Most Recent):  Temp: 97.9 °F (36.6 °C) (05/16/23 1104)  Pulse: 85 (05/16/23 1104)  Resp: 16 (05/16/23 1112)  BP: 127/83 (05/16/23 1104)  SpO2: (!) 94 % (05/16/23 1104) Vital Signs (24h Range):  Temp:  [97.8 °F (36.6 °C)-98.7 °F (37.1 °C)] 97.9 °F (36.6 °C)  Pulse:  [75-97] 85  Resp:  [13-22] 16  SpO2:  [94 %-100 %] 94 %  BP:  "(104-139)/(62-86) 127/83     Weight: 104.2 kg (229 lb 11.5 oz)  Height: 6' 3" (190.5 cm)  Body mass index is 28.71 kg/m².     Physical Exam  Constitutional:       General: He is not in acute distress.     Appearance: Normal appearance.   HENT:      Head: Normocephalic and atraumatic.      Mouth/Throat:      Mouth: Mucous membranes are moist.   Eyes:      General: No scleral icterus.     Conjunctiva/sclera: Conjunctivae normal.   Cardiovascular:      Rate and Rhythm: Normal rate and regular rhythm.      Pulses: Normal pulses.      Heart sounds: Normal heart sounds.   Pulmonary:      Effort: Pulmonary effort is normal. No respiratory distress.      Breath sounds: Normal breath sounds.   Abdominal:      General: A surgical scar is present. Bowel sounds are normal. There is no distension.      Palpations: Abdomen is soft.      Tenderness: There is abdominal tenderness (expected). There is no guarding or rebound.          Comments: Abdominal incision c/d/I. Dermabond intact.   Musculoskeletal:         General: No swelling.      Cervical back: Normal range of motion.   Skin:     General: Skin is warm and dry.   Neurological:      Mental Status: He is alert and oriented to person, place, and time.   Psychiatric:         Mood and Affect: Mood normal.         Behavior: Behavior normal.         Thought Content: Thought content normal.         Judgment: Judgment normal.        Laboratory:  CBC:   Recent Labs   Lab 05/15/23  1335 05/15/23  2133 05/16/23  0534   WBC  --   --  12.99*   RBC  --   --  3.84*   HGB  --   --  10.9*   HCT 34.0* 33.1* 33.1*   PLT  --   --  145*   MCV  --   --  86   MCH  --   --  28.4   MCHC  --   --  32.9     CMP:   Recent Labs   Lab 05/15/23  0610 05/15/23  1335 05/16/23  0534   GLU 91 118* 208*   CALCIUM 9.7 8.4* 8.0*   ALBUMIN 4.2 3.5 3.2*    141 140   K 3.6 3.8 4.1   CO2 25 22* 21*    109 110   BUN 54* 50* 35*   CREATININE 6.5* 6.0* 3.3*     Labs within the past 24 hours have been " reviewed.    Diagnostic Results:  reviewed

## 2023-05-16 NOTE — PROGRESS NOTES
Admit Note     Met with patient and wife to assess needs. Patient is a 42 y.o.  male, admitted for for kidney transplant via unrelated living donor.      Patient admitted from home on 5/15/2023 .  At this time, patient presents as alert and oriented x 4, pleasant, good eye contact, well groomed, recall good, concentration/judgement good, average intelligence, calm, communicative, cooperative, and asking and answering questions appropriately.  At this time, patients caregiver presents as alert and oriented x 4, pleasant, good eye contact, well groomed, recall good, concentration/judgement good, average intelligence, calm, communicative, cooperative, and asking and answering questions appropriately.    Household/Family Systems     Patient resides with patient's wife, brother, and child(pankaj), age(s) 9 & 12 , at [address] [place] [address].  Support system includes pt's wife and sister Charlie Iglesias ([phone]).  Patient does have dependents that are in need of being cared for. Patient's 9 & 11 yo children are being cared for by their aunts and uncles.     Patients primary caregiver is Shannon Iglesias, patients wife, phone number [phone].  Confirmed patients contact information is [phone] (home);   Telephone Information:   Mobile [phone]   .    During admission, patient's caregiver plans to stay in patient's room.  Confirmed patient and patients caregivers do have access to reliable transportation.    Cognitive Status/Learning     Patient reports reading ability as college and states patient does not have difficulty with N/A.  Patient reports patient learns best by multisensory information.   Needed: No.   Highest education level: Associate/Bachelor Degree    Vocation/Disability   .  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    Adherence     Patient reports a high level  of adherence to patients health care regimen.  Adherence counseling and education provided. Patient verbalizes understanding.    Substance Use    Patient reports the following substance usage.    Tobacco: none, patient denies any use.  Alcohol: none, patient denies any use.  Illicit Drugs/Non-prescribed Medications: none, patient denies any use.  Patient states clear understanding of the potential impact of substance use.  Substance abstinence/cessation counseling, education and resources provided and reviewed.     Services Utilizing/ADLS    Infusion Service: Prior to admission, patient utilizing? no  Home Health: Prior to admission, patient utilizing? no  DME: Prior to admission, yes - diabetic supplies  Pulmonary/Cardiac Rehab: Prior to admission, no - Pt had cardiac rehab after MI in 2018.  Dialysis:  Prior to admission, yes  Transplant Specialty Pharmacy:  Prior to admission, no; patient provides permission to release necessary information to specialty pharmacy for medications post-tranplant. Resources provided and patient is choosing [facility] at this time.    Prior to admission, patient reports patient was independent with ADLS and was driving.  Patient reports patient is not able to care for self at this time due to compromised medical condition (as documented in medical record) and physical weakness..  Patient indicates a willingness to care for self once medically cleared to do so.    Insurance/Medications    Insured by   Payer/Plan Subscr  Sex Relation Sub. Ins. ID Effective Group Num   1. BLUE CROSS BL* ROBIN REYNA* 1980 Male Self [id number]                                    [address] [address]   2. BLUE CROSS BL* REYNAPARISA BELTRAN 1978 Female Spouse [id number] 8/1/15 [id number]                                   [address] [address]      Primary Insurance (for UNOS reporting): Private Insurance  Secondary Insurance (for UNOS reporting): None    Pt reports insurance is being paid for  by the [employer]. SW provided education and information regarding this policy post transplant. Patient and Caregiver verbalized understanding and agreement.  SHERI emailed Transplant Verification Form to [email or web address]. SW remains available to pt, pt's family, and transplant team at [phone].    Patient reports patient is able to obtain and afford medications at this time and at time of discharge.    Living Will/Healthcare Power of     Patient states patient does not have a LW and/or HCPA.   provided education regarding LW and HCPA and the completion of forms.    Coping/Mental Health    Patient is coping adequately with the aid of  family members.  Patient denies mental health difficulties.  Patient confirms having family therapist Carlito Cifuentes PhD available for counseling as needed.    Discharge Planning    At time of discharge, patient plans to return [facility] under the care of wife.  Patients wife will transport patient.  Per rounds today, expected discharge date has not been medically determined at this time. Patient and patients caregiver  verbalize understanding and are involved in treatment planning and discharge process.    Additional Concerns    Patient's caretaker denies additional needs and/or concerns at this time. Patient is being followed for needs, education, resources, information, emotional support, supportive counseling, and for supportive and skilled discharge plan of care.  providing ongoing psychosocial support, education, resources and d/c planning as needed.  SW remains available.  remains available. Patient's caregiver verbalizes understanding and agreement with information reviewed,  availability and how to access available resources as needed. Patient denies additional needs and/or concerns at this time. Patient verbalizes understanding and agreement with information reviewed, social work  availability, and how to access available resources as needed.

## 2023-05-16 NOTE — HPI
Mr. Iglesias is a 42 y.o. year old White male who has been approved to receive a living donor kidney transplant.  He has ESRD secondary to diabetic nephropathy.  Patient is on peritoneal dialysis.  He has presented for his final preoperative medical evaluation.  He denies any recent hospitalizations or ED visits.

## 2023-05-16 NOTE — PROGRESS NOTES
TRANSPLANT NOTE:      ORGAN: LEFT KIDNEY    Disease Etiology: Diabetes Mellitus - Type II  Donor Type: Living    [facility] High Risk: No    Donor CMV Status: positive   Donor HBcAB:     Donor HCV Status:     Peak cPRA % (within 1 year of transplant): 69%      Robb Iglesias is a 42 y.o. male s/p  Living   kidney transplant on 5/15/2023 (Kidney) for Diabetes Mellitus - Type II.   This patient will receive 3 doses of Thymoglobulin for induction.  This patients maintenance immunosuppression will include a steroid taper per protocol to 5mg daily, Prograf, and Cellcept maintenance.  Opportunistic infection prophylaxis will include Valcyte for 3 months (CMV D + , R + ) and Bactrim for 6 months.  Patient is to begin self medications upon transfer to the [facility], and I plan to meet with this patient and his/her support person prior to discharge to review the medication section of the Kidney Transplant Education Manual.  I have reviewed the pre-op medications and have restarted those, as appropriate.

## 2023-05-16 NOTE — ASSESSMENT & PLAN NOTE
- Diagnosed in 2009, had bariatric surgery in 2017 and off insulin since that time. Has had good control with diet and weekly Trulicity 3 mg. Now with significant postprandial hyperglycemia while on high dose steroid taper after renal transplant that took place 05/15/2023  - Received methylprednisolone 125 mg this morning and planned for tomorrow. On prednisone 20 mg daily  - 24 hour glucose trend: Mid 200s    - Start Levemir 8 units nightly starting this evening   - Start aspart 5 units TIDAC+MDC    - If serum glucose greater than 300 mg/dL before a meal, please ask patient not to eat until correctional insulin has brought glucose less than 250 mg/dL  - Will monitor insulin requirements while inpatient, make appropriate adjustments, and have discharge recommendations ready when the time comes. Please keep endocrine notified of discharge plans or dietary changes.

## 2023-05-16 NOTE — PLAN OF CARE
Recommendations    1. Continue Diabetic diet      2. If PO intake <50%, ADD Boost glucose control TID      3. RD to monitor and follow    Goals: Meet % EEN, EPN by RD f/u  Nutrition Goal Status: new  Communication of RD Recs:  (POC)

## 2023-05-16 NOTE — HOSPITAL COURSE
S/p living unrelated kidney transplant and PD cath removal on 05/15/23 (CMV +,+. Thymo induction). Surgery without complication. Intra-op urine observed. No drains. 2 day toussaint.     Interval History   No acute events overnight. Patient is doing very well, mild soreness of abdomen but pain is well controlled. Cr clearing, making great UOP, 11L in the last 24 hrs. H/h stable. Coreg restarted for BP, VSS. Endocrine consulted for BG management, appreciate assistance. Tolerating diet well. +flatus, -BM. On pathway. Encourage ambulation. Cont to monitor

## 2023-05-16 NOTE — HPI
42-year-old  male with type 2 diabetes mellitus, ESRD, CAD, hypertension, hyperlipidemia, post bariatric surgery, and gout presents for planned renal transplant that took place 05/15/2023.    - Endocrinology consulted for glucose management in the context of known type 2 diabetes mellitus and high-dose steroid taper after renal transplant    Regarding Type 2 Diabetes Mellitus:    - Initially diagnosed with Type 2 diabetes mellitus:   - Current diabetic medications include: Trulicity 3 mg weekly and diet controlled, does not have endocrinologist  - Other medications tried: Was on insulin therapy until 2017 at which time he had bariatric surgery after which was diet controlled with Trulicity  - Family History: Mother, grandmother, and siblings with type 2 diabetes mellitus  - Weight based dosin kg x 0.5 = 52 TDD x 0.5 = 26 basal / 26 prandial  - 1700/TDD = 33 (estimated insulin sensitivity factor)  - 450/TDD = 8.6 (estimated starting carb ratio for prandial dosing)    Lab Results   Component Value Date    HGBA1C 5.0 05/15/2023    HGBA1C 5.3 2023    HGBA1C 5.0 2022     Lab Results   Component Value Date    EGFRNORACEVR 47.9 (A) 2023    EGFRNORACEVR 30.8 (A) 2023    EGFRNORACEVR 23.1 (A) 2023

## 2023-05-16 NOTE — NURSING
Transplant Teaching Book given to patient, Robb Iglesias, on 05/16/2023.  During the course of the hospital stay the patient received information regarding kidney transplant. Teaching and instruction were completed.  Areas that were discussed included: how to contact the Transplant Team, the importance of measuring intake of fluids and urine output, and monitoring vital signs such as blood pressure, temperature, and daily weights.  Parameters for which to report abnormal findings were given.  Appointment were provided along with the rational for the importance of lab work and clinic visits.  A written medication list was provided.  The importance of immunosuppressive medications, their common side effects, and treatment to prevent or minimize side effects has been reviewed.  Signs and symptoms of rejection and infection along with various treatments were reviewed.  The need to avoid infection was discussed.  Wound care and special consideration regarding activities of daily living were explained.  Written and verbal teaching of the above information was given.     Discussed with the patient and caregiver the importance of maintaining COVID-19 precautions; wearing a mask, good handwashing, and social distancing.  Also, to report any signs or symptoms (fever, difficulty breathing, loss of taste/smell, etc.), suspected exposure, or COVID testing, immediately to the transplant program.     Patient and patient's spouse were present and attentive throughout education.  All questions, comments, and concerns were addressed to their satisfaction.  Both verbalized understanding to all information provided.

## 2023-05-16 NOTE — NURSING
Pt's glucose reading 376. Pt ate a few bites of dinner before finger stick. Per orders, SANTY Burr notified for glucose reading > 350. 3 units SSI administered. Endocrine consulted.

## 2023-05-17 VITALS
SYSTOLIC BLOOD PRESSURE: 131 MMHG | HEIGHT: 75 IN | TEMPERATURE: 98 F | WEIGHT: 238 LBS | DIASTOLIC BLOOD PRESSURE: 71 MMHG | HEART RATE: 83 BPM | RESPIRATION RATE: 18 BRPM | OXYGEN SATURATION: 96 % | BODY MASS INDEX: 29.59 KG/M2

## 2023-05-17 DIAGNOSIS — Z94.0 KIDNEY REPLACED BY TRANSPLANT: Primary | ICD-10-CM

## 2023-05-17 LAB
ALBUMIN SERPL BCP-MCNC: 3.4 G/DL (ref 3.5–5.2)
ANION GAP SERPL CALC-SCNC: 8 MMOL/L (ref 8–16)
ANISOCYTOSIS BLD QL SMEAR: SLIGHT
BASOPHILS # BLD AUTO: 0.01 K/UL (ref 0–0.2)
BASOPHILS NFR BLD: 0.1 % (ref 0–1.9)
BUN SERPL-MCNC: 25 MG/DL (ref 6–20)
CALCIUM SERPL-MCNC: 8.9 MG/DL (ref 8.7–10.5)
CHLORIDE SERPL-SCNC: 111 MMOL/L (ref 95–110)
CO2 SERPL-SCNC: 22 MMOL/L (ref 23–29)
CREAT SERPL-MCNC: 1.8 MG/DL (ref 0.5–1.4)
DIFFERENTIAL METHOD: ABNORMAL
EOSINOPHIL # BLD AUTO: 0 K/UL (ref 0–0.5)
EOSINOPHIL NFR BLD: 0 % (ref 0–8)
ERYTHROCYTE [DISTWIDTH] IN BLOOD BY AUTOMATED COUNT: 13.2 % (ref 11.5–14.5)
EST. GFR  (NO RACE VARIABLE): 47.9 ML/MIN/1.73 M^2
GLUCOSE SERPL-MCNC: 171 MG/DL (ref 70–110)
HCT VFR BLD AUTO: 33.8 % (ref 40–54)
HGB BLD-MCNC: 11.8 G/DL (ref 14–18)
IMM GRANULOCYTES # BLD AUTO: 0.04 K/UL (ref 0–0.04)
IMM GRANULOCYTES NFR BLD AUTO: 0.3 % (ref 0–0.5)
LYMPHOCYTES # BLD AUTO: 0.2 K/UL (ref 1–4.8)
LYMPHOCYTES NFR BLD: 1.4 % (ref 18–48)
MAGNESIUM SERPL-MCNC: 1.8 MG/DL (ref 1.6–2.6)
MCH RBC QN AUTO: 28.6 PG (ref 27–31)
MCHC RBC AUTO-ENTMCNC: 34.9 G/DL (ref 32–36)
MCV RBC AUTO: 82 FL (ref 82–98)
MONOCYTES # BLD AUTO: 0.6 K/UL (ref 0.3–1)
MONOCYTES NFR BLD: 4.6 % (ref 4–15)
NEUTROPHILS # BLD AUTO: 12 K/UL (ref 1.8–7.7)
NEUTROPHILS NFR BLD: 93.6 % (ref 38–73)
NRBC BLD-RTO: 0 /100 WBC
PHOSPHATE SERPL-MCNC: 2.7 MG/DL (ref 2.7–4.5)
PLATELET # BLD AUTO: 150 K/UL (ref 150–450)
PLATELET BLD QL SMEAR: ABNORMAL
PMV BLD AUTO: 10.5 FL (ref 9.2–12.9)
POCT GLUCOSE: 168 MG/DL (ref 70–110)
POCT GLUCOSE: 187 MG/DL (ref 70–110)
POCT GLUCOSE: 188 MG/DL (ref 70–110)
POCT GLUCOSE: 272 MG/DL (ref 70–110)
POTASSIUM SERPL-SCNC: 4.2 MMOL/L (ref 3.5–5.1)
RBC # BLD AUTO: 4.13 M/UL (ref 4.6–6.2)
SODIUM SERPL-SCNC: 141 MMOL/L (ref 136–145)
TACROLIMUS BLD-MCNC: 6.2 NG/ML (ref 5–15)
WBC # BLD AUTO: 12.84 K/UL (ref 3.9–12.7)

## 2023-05-17 PROCEDURE — 25000003 PHARM REV CODE 250: Performed by: STUDENT IN AN ORGANIZED HEALTH CARE EDUCATION/TRAINING PROGRAM

## 2023-05-17 PROCEDURE — 36415 COLL VENOUS BLD VENIPUNCTURE: CPT | Performed by: STUDENT IN AN ORGANIZED HEALTH CARE EDUCATION/TRAINING PROGRAM

## 2023-05-17 PROCEDURE — 80197 ASSAY OF TACROLIMUS: CPT | Performed by: STUDENT IN AN ORGANIZED HEALTH CARE EDUCATION/TRAINING PROGRAM

## 2023-05-17 PROCEDURE — 99024 POSTOP FOLLOW-UP VISIT: CPT | Mod: ,,, | Performed by: PHYSICIAN ASSISTANT

## 2023-05-17 PROCEDURE — 25000003 PHARM REV CODE 250

## 2023-05-17 PROCEDURE — 99232 PR SUBSEQUENT HOSPITAL CARE,LEVL II: ICD-10-PCS | Mod: ,,, | Performed by: INTERNAL MEDICINE

## 2023-05-17 PROCEDURE — 63600175 PHARM REV CODE 636 W HCPCS: Performed by: PHYSICIAN ASSISTANT

## 2023-05-17 PROCEDURE — 85025 COMPLETE CBC W/AUTO DIFF WBC: CPT | Performed by: STUDENT IN AN ORGANIZED HEALTH CARE EDUCATION/TRAINING PROGRAM

## 2023-05-17 PROCEDURE — 99024 PR POST-OP FOLLOW-UP VISIT: ICD-10-PCS | Mod: ,,, | Performed by: PHYSICIAN ASSISTANT

## 2023-05-17 PROCEDURE — 83735 ASSAY OF MAGNESIUM: CPT | Performed by: STUDENT IN AN ORGANIZED HEALTH CARE EDUCATION/TRAINING PROGRAM

## 2023-05-17 PROCEDURE — 63600175 PHARM REV CODE 636 W HCPCS: Performed by: STUDENT IN AN ORGANIZED HEALTH CARE EDUCATION/TRAINING PROGRAM

## 2023-05-17 PROCEDURE — 99232 SBSQ HOSP IP/OBS MODERATE 35: CPT | Mod: ,,, | Performed by: INTERNAL MEDICINE

## 2023-05-17 PROCEDURE — 80069 RENAL FUNCTION PANEL: CPT | Performed by: STUDENT IN AN ORGANIZED HEALTH CARE EDUCATION/TRAINING PROGRAM

## 2023-05-17 RX ORDER — PEN NEEDLE, DIABETIC 30 GX3/16"
1 NEEDLE, DISPOSABLE MISCELLANEOUS 3 TIMES DAILY
Qty: 100 EACH | Refills: 1 | Status: SHIPPED | OUTPATIENT
Start: 2023-05-17

## 2023-05-17 RX ORDER — DOCUSATE SODIUM 100 MG/1
100 CAPSULE, LIQUID FILLED ORAL 3 TIMES DAILY
Refills: 0 | COMMUNITY
Start: 2023-05-17 | End: 2023-06-30

## 2023-05-17 RX ORDER — PREDNISONE 5 MG/1
TABLET ORAL
Qty: 70 TABLET | Refills: 11 | Status: SHIPPED | OUTPATIENT
Start: 2023-05-17

## 2023-05-17 RX ORDER — INSULIN LISPRO 100 [IU]/ML
4 INJECTION, SOLUTION INTRAVENOUS; SUBCUTANEOUS 3 TIMES DAILY
Qty: 6 ML | Refills: 1 | Status: SHIPPED | OUTPATIENT
Start: 2023-05-17 | End: 2023-05-17 | Stop reason: SDUPTHER

## 2023-05-17 RX ORDER — TACROLIMUS 1 MG/1
2 CAPSULE ORAL ONCE
Status: COMPLETED | OUTPATIENT
Start: 2023-05-17 | End: 2023-05-17

## 2023-05-17 RX ORDER — TACROLIMUS 1 MG/1
5 CAPSULE ORAL EVERY 12 HOURS
Qty: 300 CAPSULE | Refills: 11 | Status: SHIPPED | OUTPATIENT
Start: 2023-05-17 | End: 2023-05-18 | Stop reason: DRUGHIGH

## 2023-05-17 RX ORDER — FAMOTIDINE 20 MG/1
20 TABLET, FILM COATED ORAL NIGHTLY
Qty: 30 TABLET | Refills: 0 | Status: SHIPPED | OUTPATIENT
Start: 2023-05-17 | End: 2023-06-02 | Stop reason: ALTCHOICE

## 2023-05-17 RX ORDER — OXYCODONE HYDROCHLORIDE 5 MG/1
5 TABLET ORAL EVERY 6 HOURS PRN
Qty: 28 TABLET | Refills: 0 | Status: CANCELLED | OUTPATIENT
Start: 2023-05-17

## 2023-05-17 RX ORDER — INSULIN LISPRO-AABC 100 [IU]/ML
4 INJECTION, SOLUTION SUBCUTANEOUS
Qty: 2 PEN | Refills: 7 | Status: SHIPPED | OUTPATIENT
Start: 2023-05-17 | End: 2023-10-18

## 2023-05-17 RX ORDER — CARVEDILOL 6.25 MG/1
6.25 TABLET ORAL 2 TIMES DAILY
Qty: 60 TABLET | Refills: 11 | Status: ON HOLD | OUTPATIENT
Start: 2023-05-17 | End: 2023-06-05 | Stop reason: HOSPADM

## 2023-05-17 RX ORDER — OXYCODONE HYDROCHLORIDE 5 MG/1
5 TABLET ORAL EVERY 6 HOURS PRN
Qty: 28 TABLET | Refills: 0 | Status: SHIPPED | OUTPATIENT
Start: 2023-05-17 | End: 2023-06-30

## 2023-05-17 RX ADMIN — TACROLIMUS 2 MG: 1 CAPSULE ORAL at 11:05

## 2023-05-17 RX ADMIN — ACETAMINOPHEN 650 MG: 325 TABLET ORAL at 08:05

## 2023-05-17 RX ADMIN — THERA TABS 1 TABLET: TAB at 08:05

## 2023-05-17 RX ADMIN — METHYLPREDNISOLONE SODIUM SUCCINATE 125 MG: 125 INJECTION, POWDER, FOR SOLUTION INTRAMUSCULAR; INTRAVENOUS at 08:05

## 2023-05-17 RX ADMIN — MUPIROCIN 1 G: 20 OINTMENT TOPICAL at 08:05

## 2023-05-17 RX ADMIN — HEPARIN SODIUM 5000 UNITS: 5000 INJECTION INTRAVENOUS; SUBCUTANEOUS at 05:05

## 2023-05-17 RX ADMIN — ACETAMINOPHEN 650 MG: 325 TABLET ORAL at 05:05

## 2023-05-17 RX ADMIN — INSULIN ASPART 5 UNITS: 100 INJECTION, SOLUTION INTRAVENOUS; SUBCUTANEOUS at 11:05

## 2023-05-17 RX ADMIN — DOCUSATE SODIUM 100 MG: 100 CAPSULE, LIQUID FILLED ORAL at 08:05

## 2023-05-17 RX ADMIN — TACROLIMUS 4 MG: 1 CAPSULE ORAL at 08:05

## 2023-05-17 RX ADMIN — EZETIMIBE 10 MG: 10 TABLET ORAL at 08:05

## 2023-05-17 RX ADMIN — MYCOPHENOLATE MOFETIL 1000 MG: 250 CAPSULE ORAL at 09:05

## 2023-05-17 RX ADMIN — OXYCODONE HYDROCHLORIDE 5 MG: 5 TABLET ORAL at 08:05

## 2023-05-17 RX ADMIN — DIPHENHYDRAMINE HYDROCHLORIDE 25 MG: 25 CAPSULE ORAL at 08:05

## 2023-05-17 RX ADMIN — INSULIN ASPART 5 UNITS: 100 INJECTION, SOLUTION INTRAVENOUS; SUBCUTANEOUS at 07:05

## 2023-05-17 RX ADMIN — OXYCODONE HYDROCHLORIDE 5 MG: 5 TABLET ORAL at 12:05

## 2023-05-17 RX ADMIN — CARVEDILOL 6.25 MG: 6.25 TABLET, FILM COATED ORAL at 08:05

## 2023-05-17 RX ADMIN — ANTI-THYMOCYTE GLOBULIN (RABBIT) 150 MG: 5 INJECTION, POWDER, LYOPHILIZED, FOR SOLUTION INTRAVENOUS at 08:05

## 2023-05-17 RX ADMIN — INSULIN ASPART 1 UNITS: 100 INJECTION, SOLUTION INTRAVENOUS; SUBCUTANEOUS at 02:05

## 2023-05-17 RX ADMIN — SODIUM BICARBONATE 1300 MG: 650 TABLET ORAL at 08:05

## 2023-05-17 RX ADMIN — SODIUM CHLORIDE: 9 INJECTION, SOLUTION INTRAVENOUS at 05:05

## 2023-05-17 NOTE — PROGRESS NOTES
EDUCATION NOTE:    Met with Robb Iglesias and his caregivers to provide teaching re: immunosuppressant medications.  Reviewed medication section of the Kidney Transplant Education book that was provided.  Emphasized the importance of compliance, role of the blue medication card, concerns for drug interactions, and process of obtaining refills.  Counseled regarding Prograf, Cellcept , prednisone, including directions for use, monitoring, how to handle missed doses, and side effects.  Patient and wife verbalized understanding and had the opportunity to ask questions.

## 2023-05-17 NOTE — DISCHARGE INSTRUCTIONS
"- Take 4 units of insurance approved short-acting insulin (Humalog, NovoLog, Fiasp, or Aspart) with each meal plus low correction scale sees please see below)  - Continue Trulicity 3 mg weekly  - Please keep a log of your fingerstick glucose values before breakfast, before lunch, before dinner, and at bedtime to bring with you to your doctor's appointments    - As prednisone dose decreases, your mealtime insulin requirement may decrease as well.  For each 5 mg of prednisone taken each day, take 1 unit of insulin with each meal (Example:15 mg prednisone equals 3 units insulin with each meal, 10 mg prednisone equals 2 units with each meal and so on)  - If experiencing low blood glucose overnight or if fasting glucose consistently less than 100, may be able to taper down or even discontinue long-acting insulin    Low short-acting insulin correction based on before meal glucose:  150-200: add 1 unit  201-250: add 2 units  251-300: add 3 units  301-350: add 4 units  >350: add 5 units    "15-15 Rule" for treatment of low blood sugar (less than 70 mg/dL)    - Many people tend to want to eat as much as they can for low blood glucose until they feel better. This can cause blood glucose levels to go very high afterwards, which is bad. Using the step-wise approach of the "15-15 Rule" can help you correct a low blood sugar while also preventing a high blood sugar afterwards    - If you experience an episode of low blood sugar (a reading less than 70 mg/dL), please follow the "15-15 rule": Take 15 grams of carbohydrate (see examples below) to raise your blood sugar and recheck it after 15 minutes. If still below 70 mg/dL, take another 15 gram serving of carbohydrate  -Repeat these steps until your blood sugar is at least 70 mg/dL. Once your blood sugar is back to normal, eat a small serving of protein to make sure it doesnt lower again    - 15g of carbohydrate examples:  - 4 ounces (1/2 cup) of juice or regular soda (not " diet)  - 1 tablespoon of sugar, honey, or corn syrup  - Hard candies, jellybeans, or gumdrops--see food label for how many    - Note: When treating a low blood glucose, the choice of carbohydrate source is important. Complex carbohydrates, or foods that contain fats along with carbs (like chocolate) can slow the absorption of glucose and should not be used to treat an emergency low    - Keep a record of how any episodes of low blood sugar you have and talk with your doctor if they are recurrent     - For more information, please visit:  [email or web address]      Arturo Winston DO [facility], [address]  [address] [place], [place], [address]    Office: [phone]  Fax: [phone]

## 2023-05-17 NOTE — PROGRESS NOTES
"[facility] - Transplant Stepdown  Endocrinology  Progress Note    Admit Date: 5/15/2023     42-year-old  male with type 2 diabetes mellitus, ESRD, CAD, hypertension, hyperlipidemia, post bariatric surgery, and gout presents for planned renal transplant that took place 05/15/2023.    - Endocrinology consulted for glucose management in the context of known type 2 diabetes mellitus and high-dose steroid taper after renal transplant    Regarding Type 2 Diabetes Mellitus:    - Initially diagnosed with Type 2 diabetes mellitus:   - Current diabetic medications include: Trulicity 3 mg weekly and diet controlled, does not have endocrinologist  - Other medications tried: Was on insulin therapy until 2017 at which time he had bariatric surgery after which was diet controlled with Trulicity  - Family History: Mother, grandmother, and siblings with type 2 diabetes mellitus  - Weight based dosin kg x 0.5 = 52 TDD x 0.5 = 26 basal / 26 prandial  - 1700/TDD = 33 (estimated insulin sensitivity factor)  - 450/TDD = 8.6 (estimated starting carb ratio for prandial dosing)    Lab Results   Component Value Date    HGBA1C 5.0 05/15/2023    HGBA1C 5.3 2023    HGBA1C 5.0 2022     Lab Results   Component Value Date    EGFRNORACEVR 47.9 (A) 2023    EGFRNORACEVR 30.8 (A) 2023    EGFRNORACEVR 23.1 (A) 2023       Interval HPI:   Overnight events:  No acute events reported overnight  Eatin%  Nausea: No  Hypoglycemia and intervention: No  Fever: No  TPN and/or TF: No  If yes, type of TF/TPN and rate: NA    BP (!) 147/83   Pulse 74   Temp 98.1 °F (36.7 °C)   Resp 18   Ht 6' 3" (1.905 m)   Wt 108 kg (237 lb 15.8 oz)   SpO2 96%   BMI 29.75 kg/m²     Labs Reviewed and Include    Recent Labs   Lab 23  0652   *   CALCIUM 8.9   ALBUMIN 3.4*      K 4.2   CO2 22*   *   BUN 25*   CREATININE 1.8*     Lab Results   Component Value Date    WBC 12.84 (H) 2023    HGB " 11.8 (L) 05/17/2023    HCT 33.8 (L) 05/17/2023    MCV 82 05/17/2023     05/17/2023     No results for input(s): TSH, FREET4 in the last 168 hours.  Lab Results   Component Value Date    HGBA1C 5.0 05/15/2023       Nutritional status:   Body mass index is 29.75 kg/m².  Lab Results   Component Value Date    ALBUMIN 3.4 (L) 05/17/2023    ALBUMIN 3.2 (L) 05/16/2023    ALBUMIN 3.5 05/15/2023     No results found for: PREALBUMIN    Estimated Creatinine Clearance: 71.7 mL/min (A) (based on SCr of 1.8 mg/dL (H)).    Accu-Checks  Recent Labs     05/15/23  0612 05/15/23  1321 05/15/23  2140 05/16/23  0811 05/16/23  1230 05/16/23  1631 05/16/23  2210 05/17/23  0215 05/17/23  0700   POCTGLUCOSE 92 128* 376* 239* 257* 279* 272* 187* 168*       Current Medications and/or Treatments Impacting Glycemic Control  Immunotherapy:    Immunosuppressants           Stop Route Frequency     tacrolimus capsule 4 mg         -- Oral 2 times daily     antithymocyte globulin (rabbit) 150 mg, hydrocortisone sodium succinate (SOLU-CORTEF) 20 mg in sodium chloride 0.9% 500 mL (FOR PERIPHERAL LINE ADMINISTRATION ONLY)         05/18 0859 IV Daily     mycophenolate capsule 1,000 mg         -- Oral 2 times daily          Steroids:   Hormones (From admission, onward)      Start     Stop Route Frequency Ordered    05/18/23 0900  predniSONE tablet 20 mg  (IP TXP KIDNEY POST-OP THYMO WITH PERIPHERAL PRECHECKED)         -- Oral Daily 05/15/23 1333    05/16/23 0900  antithymocyte globulin (rabbit) 150 mg, hydrocortisone sodium succinate (SOLU-CORTEF) 20 mg in sodium chloride 0.9% 500 mL (FOR PERIPHERAL LINE ADMINISTRATION ONLY)  (IP TXP KIDNEY POST-OP THYMO WITH PERIPHERAL PRECHECKED)         05/18 0859 IV Daily 05/15/23 1333    05/15/23 2021  melatonin tablet 6 mg         -- Oral Nightly PRN 05/15/23 1921    05/15/23 1410  hydrocortisone sodium succinate injection 100 mg  (IP TXP KIDNEY POST-OP THYMO WITH PERIPHERAL PRECHECKED)         10/11 0528 IV  Once as needed 05/15/23 1333          Pressors:    Autonomic Drugs (From admission, onward)      Start     Stop Route Frequency Ordered    05/15/23 1410  EPINEPHrine (PF) injection 1 mg  (IP TXP KIDNEY POST-OP THYMO WITH PERIPHERAL PRECHECKED)         10/11 0510 SubQ Once as needed 05/15/23 1333          Hyperglycemia/Diabetes Medications:   Antihyperglycemics (From admission, onward)      Start     Stop Route Frequency Ordered    05/16/23 2100  insulin detemir U-100 (Levemir) pen 8 Units         -- SubQ Nightly 05/16/23 1355    05/16/23 1645  insulin aspart U-100 pen 5 Units         -- SubQ 3 times daily with meals 05/16/23 1355    05/16/23 1455  insulin aspart U-100 pen 1-10 Units         -- SubQ Before meals & nightly PRN 05/16/23 1355            ASSESSMENT and PLAN    Cardiac/Vascular  Status post angioplasty with stent  - Patient on GLP-1 agonist with Trulicity at home, recommend resuming once patient discharged home for cardio protection    Immunology/Multi System  Prophylactic immunotherapy  - Please see steroids as mentioned above    Endocrine  Type 2 diabetes mellitus with chronic kidney disease on chronic dialysis, without long-term current use of insulin  - Diagnosed in 2009, had bariatric surgery in 2017 and off insulin since that time. Has had good control with diet and weekly Trulicity 3 mg. Now with significant postprandial hyperglycemia while on high dose steroid taper after renal transplant that took place 05/15/2023  - Received methylprednisolone 125 mg this morning along with his prednisone 20 mg daily; will have stronger prandial dose compared to basal to address steroid induced postprandial hyperglycemia  - 24 hour glucose trend: mid 200s to mid 100s    - Increase Levemir from 8 up to 10 units at night starting this evening  - Continue aspart 5 units TIDAC+MDC    - If serum glucose greater than 300 mg/dL before a meal, please ask patient not to eat until correctional insulin has brought glucose  less than 250 mg/dL  - Will monitor insulin requirements while inpatient, make appropriate adjustments, and have discharge recommendations ready when the time comes. Please keep endocrine notified of discharge plans or dietary changes.      Arturo Winston DO  [facility], [address]th [address]  [address] [place], [address]    Office: [phone]  Fax: [phone]    Disclaimer: This note has been generated using voice-recognition software. There may be typographical errors that have been missed during proof-reading.    The above history labs imaging impression and plan were discussed with attending physician who is in agreement and also took part in this patient's care.  I personally reviewed all of the patients available medications, labs, imaging, vitals, allergies, medical history.

## 2023-05-17 NOTE — ASSESSMENT & PLAN NOTE
- Diagnosed in 2009, had bariatric surgery in 2017 and off insulin since that time. Has had good control with diet and weekly Trulicity 3 mg. Now with significant postprandial hyperglycemia while on high dose steroid taper after renal transplant that took place 05/15/2023  - Received methylprednisolone 125 mg this morning along with his prednisone 20 mg daily; will have stronger prandial dose compared to basal to address steroid induced postprandial hyperglycemia  - 24 hour glucose trend: mid 200s to mid 100s    - Increase Levemir from 8 up to 10 units at night starting this evening  - Continue aspart 5 units TIDAC+[facility]    - If serum glucose greater than 300 mg/dL before a meal, please ask patient not to eat until correctional insulin has brought glucose less than 250 mg/dL  - Will monitor insulin requirements while inpatient, make appropriate adjustments, and have discharge recommendations ready when the time comes. Please keep endocrine notified of discharge plans or dietary changes.

## 2023-05-17 NOTE — PROGRESS NOTES
DISCHARGE NOTE:    Robb Iglesias is a 42 y.o. male s/p LEFT KIDNEY   Living   transplant on 5/15/2023 (Kidney) for ESRD secondary to Diabetes Mellitus - Type II.      Past Medical History:   Diagnosis Date    Allergic rhinitis     Class 1 obesity due to excess calories with serious comorbidity and body mass index (BMI) of 31.0 to 31.9 in adult 4/7/2017    Coronary artery disease     Coronary artery disease involving native coronary artery of native heart without angina pectoris 2/6/2018    Cath lab procedure 04/23/2018 (Naveen Rios MD) A. Indication/Pre-Operative Diagnosis: The patient is a 37 year old male that was referred for catheterization by [facility] for ACS (NSTEMI). The BELLA risk score is 5.  B. Summary/Post-Operative Diagnosis 1. Single vessel coronary artery disease. 2. Normal LVEF. 3. Diastolic dysfunction. 4. Successful PCI for acute myocardial infarction. 5.     Diabetic nephropathy associated with type 2 diabetes mellitus 1/6/2020    Direct hyperbilirubinemia 3/24/2018    DM (diabetes mellitus) 2008    BS doesn't check any more 08/02/2018    DM (diabetes mellitus) 2012    BS 99 am 06/26/2020    DM (diabetes mellitus)     BS didn't check 06/04/2021    DM (diabetes mellitus) 2008    BS didn't check 07/29/2022     Dyslipidemia associated with type 2 diabetes mellitus 7/31/2017    Elevated bilirubin 3/21/2018    GERD (gastroesophageal reflux disease)     Gout     Hyperlipidemia     Hyperparathyroidism, secondary to chronic kidney disease 6/7/2021    Hypertension associated with chronic kidney disease due to type 2 diabetes mellitus     Hypertension complicating diabetes 3/16/2019    Not candidate for Hypertension Digital Medicine program due to dialysis status. Didn't tolerate amlodipine due to SE of hypotension.    Idiopathic chronic gout, multiple sites, without tophus (tophi) 7/19/2017    Long term (current) use of insulin     MI (myocardial infarction) 07/2017    NSTEMI with CAD s/p  PCI (FAMILIA) of LAD x 2 in 8/2017 7/31/2017    Obesity     HENRY on CPAP     Peritoneal dialysis catheter in place 1/26/2023    Proteinuria     Severe obstructive sleep apnea - Intolerant of CPAP 7/31/2017    Intolerant of CPAP after weeks of trying multiple interfaces. SPLIT-NIGHT SLEEP STUDY 7/28/2015 · SLEEP ARCHITECTURE: Sleep onset was 2.9 minutes and sleep efficiency was 94.4%. Sleep Stage distribution showed 145 sleep stage changes, 6 awakenings and 119 arousals. Sleep distribution showed 53.2% stage NI, 41.8% stage N 11, 0.0% stage N Ill and REM sleep was at 5.0%. There were 2 REM periods. · RE    Stage 5 chronic kidney disease on chronic peritoneal dialysis 6/7/2017    Status post angioplasty with stent 8/4/2017    Steatohepatitis     Fatty Liver    Type 2 diabetes mellitus with chronic kidney disease on chronic dialysis, without long-term current use of insulin 6/21/2017    Type 2 diabetes mellitus with diabetic nephropathy     Type 2 diabetes mellitus with diabetic nephropathy, without long-term current use of insulin 1/6/2020    Type 2 diabetes mellitus with diabetic polyneuropathy, without long-term current use of insulin 6/7/2021    Type 2 diabetes mellitus with hyperglycemia     Type 2 diabetes mellitus with renal manifestations     Type 2 diabetes mellitus with stage 3 chronic kidney disease, without long-term current use of insulin 6/21/2017       Hospital Course:   F: 41 yo  Male LRT 5/15/23 DM2  CMV +/+  cPRA 69% PD  PMH: CAD (stents x2 '17), bariatric sgyn, fatty liver, gout, HENRY, HTN, HLD    Discharged on POD2 progressing well and making good urine. Discharged with home atorvastain 80 mg daily, aspirin 81 mg daily, ezetimibe 10 mg daily and carvedilol 6.25 mg BID     #IMS  Tac 5 mg BID   MMF 1000 mg BID  Pred per taper, do not stop     #OI   Sulfamethoxazole/Trimethoprim SS daily x 6 months (stop 11/12/23)  Valganciclovir  900 mg daily x 3 months (stop 8/14/23)    #CAD   Discharged with the  "following home meds  atorvastain 80 mg daily  aspirin 81 mg daily  ezetimibe 10 mg daily  carvedilol 6.25 mg BID   #DM  Per endo, discharged with the following recs   Insulin lispro 4 units three times daily with meals plus low dose sliding scale (1 unit for every 50 over 180)   Decrease by 1 units weekly as prednisone dose tapers   Trulicity 3 mg weekly     #Gout  History of gout. Pre-txp med list included uloric and colchicine prn. Allopurinol was on med list but per patient was no longer taking   Per Dr. Munoz, do not restart gout meds initially and reassess outpatient as kidney function is currently excellent     Allergies: Review of patient's allergies indicates:  No Known Allergies    Patient Pharmacy: [facility] for first fill then using pharmacy near home     Discharge Medications:     Medication List        START taking these medications      famotidine 20 MG tablet  Commonly known as: PEPCID  Take 1 tablet (20 mg total) by mouth every evening.     LYUMJEDANIELLE KWIKPEN U-100 INSULIN 100 unit/mL pen  Generic drug: insulin lispro-aabc  Inject 4 Units into the skin 3 (three) times daily with meals. Plus sliding scale insulin. Max 27 units     multivitamin Tab  Take 1 tablet by mouth once daily.  Start taking on: May 18, 2023     mycophenolate 250 mg Cap  Commonly known as: CELLCEPT  Take 4 capsules (1,000 mg total) by mouth 2 (two) times daily.     oxyCODONE 5 MG immediate release tablet  Commonly known as: ROXICODONE  Take 1 tablet (5 mg total) by mouth every 6 (six) hours as needed for Pain.     pen needle, diabetic 31 gauge x 5/16" Ndle  Commonly known as: BD ULTRA-FINE SHORT PEN NEEDLE  1 pen by Misc.(Non-Drug; Combo Route) route 3 (three) times daily.     predniSONE 5 MG tablet  Commonly known as: DELTASONE  Take by mouth daily; 5/18-5/24: 20 mg; 5/25-5/31: 15 mg; 6/1-6/7: 10 mg; 6/8/23- thereafter: 5 mg daily; do not stop     sulfamethoxazole-trimethoprim 400-80mg 400-80 mg per tablet  Commonly known as: " BACTRIM,SEPTRA  Take 1 tablet by mouth every morning. Stop 11/12/23     tacrolimus 1 MG Cap  Commonly known as: PROGRAF  Take 5 capsules (5 mg total) by mouth every 12 (twelve) hours.     valGANciclovir 450 mg Tab  Commonly known as: VALCYTE  Take 2 tablets (900 mg total) by mouth every morning. Stop 8/14/23            CHANGE how you take these medications      carvediloL 6.25 MG tablet  Commonly known as: COREG  Take 1 tablet (6.25 mg total) by mouth 2 (two) times daily.  What changed: See the new instructions.     docusate sodium 100 MG capsule  Commonly known as: COLACE  Take 1 capsule (100 mg total) by mouth 3 (three) times daily.  What changed: when to take this            CONTINUE taking these medications      aspirin 81 MG EC tablet  Commonly known as: ECOTRIN  Take 1 tablet (81 mg total) by mouth once daily.     atorvastatin 80 MG tablet  Commonly known as: LIPITOR  Take 1 tablet (80 mg total) by mouth every evening.     blood sugar diagnostic Strp  Check blood glucose 2 times daily as directed and as needed     blood-glucose meter kit  Commonly known as: ONETOUCH ULTRAMINI  Use as instructed     ezetimibe 10 mg tablet  Commonly known as: ZETIA  Take 1 tablet (10 mg total) by mouth once daily.     lancets Misc  Check blood glucose 2 times daily as directed and as needed (dispense insurance preferred brand or patient choice)     nitroGLYCERIN 0.4 MG SL tablet  Commonly known as: NITROSTAT  Dissolve one tablet underneath tongue at onset of angina; may repeat every 5 minutes if needed. Call 911 if angina persists after 2 doses.     TRULICITY 3 mg/0.5 mL pen injector  Generic drug: dulaglutide  Inject 3 mg into the skin every 7 days.            STOP taking these medications      amLODIPine 10 MG tablet  Commonly known as: NORVASC     colchicine 0.6 mg tablet  Commonly known as: COLCRYS     febuxostat 80 mg Tab  Commonly known as: ULORIC     furosemide 20 MG tablet  Commonly known as: LASIX     potassium chloride  "10 MEQ Tbsr  Commonly known as: KLOR-CON     telmisartan 80 MG Tab  Commonly known as: MICARDIS               Where to Get Your Medications        These medications were sent to [facility]  [address] [place] [address]      Hours: Mon-Fri 7a-7p, Sat-Sun 10a-4p Phone: [phone]   carvediloL 6.25 MG tablet  famotidine 20 MG tablet  LYUMJEV KWIKPEN U-100 INSULIN 100 unit/mL pen  mycophenolate 250 mg Cap  oxyCODONE 5 MG immediate release tablet  pen needle, diabetic 31 gauge x 5/16" Ndle  predniSONE 5 MG tablet  sulfamethoxazole-trimethoprim 400-80mg 400-80 mg per tablet  tacrolimus 1 MG Cap  valGANciclovir 450 mg Tab       You can get these medications from any pharmacy    You don't need a prescription for these medications  docusate sodium 100 MG capsule  multivitamin Tab          Pharmacy Interventions/Recommendations:  1) Transplant Immunosuppression: Induction Thymo, and maintenance tac 5 mg BID, MMF 1k, pred per taper, do not stop.     2) Opportunistic Infection prophylaxis:   Sulfamethoxazole/Trimethoprim SS daily x 6 months (stop 11/12/23)  Valganciclovir  900 mg daily x 3 months (stop 8/14/23)    3) Osteoporosis Prevention measures (liver txp): NA    4) Patient Counseling/Education: Demonstrated the use of the BP cuff, thermometer.    5) Follow-Up/Discharge Needs:  NA     6) Patient Assistance Information: none     7) The following medications have been placed on HOLD and should be restarted in the outpatient setting (when appropriate): none     Robb and his caregiver verbalized their understanding and had the opportunity to ask questions.    "

## 2023-05-17 NOTE — NURSING
Copy of Discharge Information Given to Pt and verbal Understanding noted per Pt and wife. Iv Site removed with cath intact x 2 with no s/s of infection noted at site.

## 2023-05-17 NOTE — DISCHARGE SUMMARY
[facility] - Transplant Stepdown  Kidney Transplant  Discharge Summary    Patient Name: Robb Iglesias  MRN: [id number]  Admission Date: 5/15/2023  Hospital Length of Stay: 2 days  Discharge Date and Time:  05/17/2023 1:49 PM  Attending Physician: Reji Hinojosa MD   Discharging Provider: Delmi Myles PA-C  Primary Care Provider: SABIHA Roberson MD    HPI:   Mr. Iglesias is a 42 y.o. year old White male who has been approved to receive a living donor kidney transplant.  He has ESRD secondary to diabetic nephropathy.  Patient is on peritoneal dialysis.  He has presented for his final preoperative medical evaluation.  He denies any recent hospitalizations or ED visits.      Procedure(s) (LRB):  TRANSPLANT, KIDNEY (N/A)  REMOVAL, CATHETER, DIALYSIS, PERITONEAL (N/A)     Hospital Course:    S/p living unrelated kidney transplant and PD cath removal on 05/15/23 (CMV +,+. Thymo induction). Surgery without complication. Intra-op urine observed. No drains. 2 day toussaint. Patient progressing well post-op day UOP acceptable. Cr down trending. VSS. Patient is tolerating normal diet and ambulating w/o complication. His pain is well controlled, with improving soreness. Incisions are c/d/i. 2 day Toussaint removed, patient urinating well on his own. Patient passing flatus and had BM. Patient is stable and ready for discharge. He will follow up with labs and transplant clinic appointment tomorrow 5/18/23. Patient verbalized his understanding prior to discharge.    Goals of Care Treatment Preferences:  Code Status: Full Code      Final Active Diagnoses:    Diagnosis Date Noted POA    PRINCIPAL PROBLEM:  S/P kidney transplant [Z94.0] 05/15/2023 Not Applicable    Long-term use of immunosuppressant medication [Z79.60] 05/16/2023 Not Applicable    Electrolyte abnormality [E87.8] 05/16/2023 Yes    Prophylactic immunotherapy [Z29.8] 05/15/2023 Not Applicable    At risk for opportunistic infections [Z91.89] 05/15/2023 No    Status  post angioplasty with stent [Z95.820] 08/04/2017 Not Applicable     Chronic    Type 2 diabetes mellitus with chronic kidney disease on chronic dialysis, without long-term current use of insulin [E11.22, N18.6, Z99.2] 06/21/2017 Not Applicable     Chronic    Essential hypertension [I10] 04/26/2017 Yes     Chronic      Problems Resolved During this Admission:       Treatments: As avive    Consults (From admission, onward)          Status Ordering Provider     Inpatient consult to Endocrinology  Once        Provider:  (Not yet assigned)    Completed ANTONETTE COHN     Inpatient consult to Registered Dietitian/Nutritionist  Once        Provider:  (Not yet assigned)    Completed LINDA CEDILLO            Pending Diagnostic Studies:       None          Significant Diagnostic Studies: Labs: BMP:   Recent Labs   Lab 05/16/23  0534 05/16/23  1239 05/17/23  0652   * 296* 171*    137 141   K 4.1 4.1 4.2    108 111*   CO2 21* 19* 22*   BUN 35* 32* 25*   CREATININE 3.3* 2.6* 1.8*   CALCIUM 8.0* 8.4* 8.9   MG 1.7  --  1.8    and CBC   Recent Labs   Lab 05/16/23  0534 05/17/23  0652   WBC 12.99* 12.84*   HGB 10.9* 11.8*   HCT 33.1* 33.8*   * 150       Discharged Condition: good    Disposition: Home or Self Care    Follow Up: As above    Patient Instructions:      Diet Adult Regular     Lifting restrictions   Order Comments: Do not lift greater than 10 lbs for 6 weeks from time of transplant     Notify your health care provider if you experience any of the following:  temperature >100.4     Notify your health care provider if you experience any of the following:  persistent nausea and vomiting or diarrhea     Notify your health care provider if you experience any of the following:  severe uncontrolled pain     Notify your health care provider if you experience any of the following:  redness, tenderness, or signs of infection (pain, swelling, redness, odor or green/yellow discharge around incision site)      Notify your health care provider if you experience any of the following:  difficulty breathing or increased cough     Notify your health care provider if you experience any of the following:  severe persistent headache     Notify your health care provider if you experience any of the following:  worsening rash     Notify your health care provider if you experience any of the following:  persistent dizziness, light-headedness, or visual disturbances     Notify your health care provider if you experience any of the following:  increased confusion or weakness     Notify your health care provider if you experience any of the following:   Order Comments: For any other concerning signs or symptoms     Type And Screen Preop   Standing Status: Future Standing Exp. Date: 06/30/24     Medications:  Reconciled Home Medications:      Medication List        START taking these medications      famotidine 20 MG tablet  Commonly known as: PEPCID  Take 1 tablet (20 mg total) by mouth every evening.     insulin aspart U-100 100 unit/mL (3 mL) Inpn pen  Commonly known as: NovoLOG  Inject 4 Units into the skin 3 (three) times daily. Plus sliding scale. Max 27 units per day     multivitamin Tab  Take 1 tablet by mouth once daily.  Start taking on: May 18, 2023     mycophenolate 250 mg Cap  Commonly known as: CELLCEPT  Take 4 capsules (1,000 mg total) by mouth 2 (two) times daily.     oxyCODONE 5 MG immediate release tablet  Commonly known as: ROXICODONE  Take 1 tablet (5 mg total) by mouth every 6 (six) hours as needed for Pain.     predniSONE 5 MG tablet  Commonly known as: DELTASONE  Take by mouth daily; 5/18-5/24: 20 mg; 5/25-5/31: 15 mg; 6/1-6/7: 10 mg; 6/8/23- thereafter: 5 mg daily; do not stop     sulfamethoxazole-trimethoprim 400-80mg 400-80 mg per tablet  Commonly known as: BACTRIM,SEPTRA  Take 1 tablet by mouth every morning. Stop 11/12/23     tacrolimus 1 MG Cap  Commonly known as: PROGRAF  Take 5 capsules (5 mg total) by  mouth every 12 (twelve) hours.     valGANciclovir 450 mg Tab  Commonly known as: VALCYTE  Take 2 tablets (900 mg total) by mouth every morning. Stop 8/14/23            CHANGE how you take these medications      carvediloL 6.25 MG tablet  Commonly known as: COREG  Take 1 tablet (6.25 mg total) by mouth 2 (two) times daily.  What changed: See the new instructions.     docusate sodium 100 MG capsule  Commonly known as: COLACE  Take 1 capsule (100 mg total) by mouth 3 (three) times daily.  What changed: when to take this            CONTINUE taking these medications      aspirin 81 MG EC tablet  Commonly known as: ECOTRIN  Take 1 tablet (81 mg total) by mouth once daily.     atorvastatin 80 MG tablet  Commonly known as: LIPITOR  Take 1 tablet (80 mg total) by mouth every evening.     blood sugar diagnostic Strp  Check blood glucose 2 times daily as directed and as needed     blood-glucose meter kit  Commonly known as: ONETOUCH ULTRAMINI  Use as instructed     ezetimibe 10 mg tablet  Commonly known as: ZETIA  Take 1 tablet (10 mg total) by mouth once daily.     lancets Misc  Check blood glucose 2 times daily as directed and as needed (dispense insurance preferred brand or patient choice)     nitroGLYCERIN 0.4 MG SL tablet  Commonly known as: NITROSTAT  Dissolve one tablet underneath tongue at onset of angina; may repeat every 5 minutes if needed. Call 911 if angina persists after 2 doses.     TRULICITY 3 mg/0.5 mL pen injector  Generic drug: dulaglutide  Inject 3 mg into the skin every 7 days.            STOP taking these medications      amLODIPine 10 MG tablet  Commonly known as: NORVASC     colchicine 0.6 mg tablet  Commonly known as: COLCRYS     febuxostat 80 mg Tab  Commonly known as: ULORIC     furosemide 20 MG tablet  Commonly known as: LASIX     potassium chloride 10 MEQ Tbsr  Commonly known as: KLOR-CON     telmisartan 80 MG Tab  Commonly known as: MICARDIS            Time spent caring for patient (Greater than  1/2 spent in direct face-to-face contact): > 30 minutes    Delmi Myles, PAMarisC  Kidney Transplant  [facility] - Transplant Stepdown

## 2023-05-17 NOTE — PLAN OF CARE
Admitted 5/15 for living unrelated kidney transplant   - AAOx4, afebrile, VSS, RA  - RLQ incision and PD cath removal sites CESIA with dermabond intact  - Ernst catheter intact draining clear yellow urine, refer to flowsheets for output totals. To be removed at 6 A  - NS at 100 cc/hr continued  - Self med box and blue card set up. More education indicated for pulling self medications  - ACHS/2AM accuchecks. 10 P , 3 units SSI and 8 units Levemir administered. 2 A , 1 unit SSI administered.   - Plan to receive thymo dose #3 today  - Pain controlled with PRN oxycodone  - Pt's wife at bedside, attentive to pt  - Bed in lowest locked position, call light and personal items in reach, nonskid socks on, verbalized understanding to call for assistance

## 2023-05-17 NOTE — SUBJECTIVE & OBJECTIVE
"Interval HPI:   Overnight events:  No acute events reported overnight  Eatin%  Nausea: No  Hypoglycemia and intervention: No  Fever: No  TPN and/or TF: No  If yes, type of TF/TPN and rate: NA    BP (!) 147/83   Pulse 74   Temp 98.1 °F (36.7 °C)   Resp 18   Ht 6' 3" (1.905 m)   Wt 108 kg (237 lb 15.8 oz)   SpO2 96%   BMI 29.75 kg/m²     Labs Reviewed and Include    Recent Labs   Lab 23  0652   *   CALCIUM 8.9   ALBUMIN 3.4*      K 4.2   CO2 22*   *   BUN 25*   CREATININE 1.8*     Lab Results   Component Value Date    WBC 12.84 (H) 2023    HGB 11.8 (L) 2023    HCT 33.8 (L) 2023    MCV 82 2023     2023     No results for input(s): TSH, FREET4 in the last 168 hours.  Lab Results   Component Value Date    HGBA1C 5.0 05/15/2023       Nutritional status:   Body mass index is 29.75 kg/m².  Lab Results   Component Value Date    ALBUMIN 3.4 (L) 2023    ALBUMIN 3.2 (L) 2023    ALBUMIN 3.5 05/15/2023     No results found for: PREALBUMIN    Estimated Creatinine Clearance: 71.7 mL/min (A) (based on SCr of 1.8 mg/dL (H)).    Accu-Checks  Recent Labs     05/15/23  0612 05/15/23  1321 05/15/23  2140 23  0811 23  1230 23  1631 23  2210 23  0215 23  0700   POCTGLUCOSE 92 128* 376* 239* 257* 279* 272* 187* 168*       Current Medications and/or Treatments Impacting Glycemic Control  Immunotherapy:    Immunosuppressants           Stop Route Frequency     tacrolimus capsule 4 mg         -- Oral 2 times daily     antithymocyte globulin (rabbit) 150 mg, hydrocortisone sodium succinate (SOLU-CORTEF) 20 mg in sodium chloride 0.9% 500 mL (FOR PERIPHERAL LINE ADMINISTRATION ONLY)          0859 IV Daily     mycophenolate capsule 1,000 mg         -- Oral 2 times daily          Steroids:   Hormones (From admission, onward)      Start     Stop Route Frequency Ordered    23 0900  predniSONE tablet 20 mg  (IP TXP " KIDNEY POST-OP THYMO WITH PERIPHERAL PRECHECKED)         -- Oral Daily 05/15/23 1333    05/16/23 0900  antithymocyte globulin (rabbit) 150 mg, hydrocortisone sodium succinate (SOLU-CORTEF) 20 mg in sodium chloride 0.9% 500 mL (FOR PERIPHERAL LINE ADMINISTRATION ONLY)  (IP TXP KIDNEY POST-OP THYMO WITH PERIPHERAL PRECHECKED)         05/18 0859 IV Daily 05/15/23 1333    05/15/23 2021  melatonin tablet 6 mg         -- Oral Nightly PRN 05/15/23 1921    05/15/23 1410  hydrocortisone sodium succinate injection 100 mg  (IP TXP KIDNEY POST-OP THYMO WITH PERIPHERAL PRECHECKED)         10/11 0510 IV Once as needed 05/15/23 1333          Pressors:    Autonomic Drugs (From admission, onward)      Start     Stop Route Frequency Ordered    05/15/23 1410  EPINEPHrine (PF) injection 1 mg  (IP TXP KIDNEY POST-OP THYMO WITH PERIPHERAL PRECHECKED)         10/11 0510 SubQ Once as needed 05/15/23 1333          Hyperglycemia/Diabetes Medications:   Antihyperglycemics (From admission, onward)      Start     Stop Route Frequency Ordered    05/16/23 2100  insulin detemir U-100 (Levemir) pen 8 Units         -- SubQ Nightly 05/16/23 1355    05/16/23 1645  insulin aspart U-100 pen 5 Units         -- SubQ 3 times daily with meals 05/16/23 1355    05/16/23 1455  insulin aspart U-100 pen 1-10 Units         -- SubQ Before meals & nightly PRN 05/16/23 1355

## 2023-05-17 NOTE — PROGRESS NOTES
[facility] - Transplant Stepdown  Adult Nutrition  Progress Note    SUMMARY       Recommendations    1. Continue Diabetic diet      2. If PO intake <50%, ADD Boost glucose control TID      3. RD to monitor and follow    Goals: Meet % EEN, EPN by RD f/u  Nutrition Goal Status: progressing towards goal  Communication of RD Recs:  (POC)    Assessment and Plan    Nutrition Problem  Increased nutrient needs (protein, energy)     Related to (etiology):   Increased physiological demands     Signs and Symptoms (as evidenced by):   S/p Living kidney transplant (5/15)     Interventions/Recommendations (treatment strategy):  Collaboration with other providers  Nutrition education     Nutrition Diagnosis Status:   New      Reason for Assessment    Reason For Assessment: RD follow-up  Diagnosis:  (s/p kidney transplant)  Relevant Medical History: HTN, T2DM, s/p angioplasty with stent  Interdisciplinary Rounds: did not attend    General Information Comments:   5/17: Pt reports good appetite. Denies N/V, chewing/swallowing difficulties. Pt and family with no additional question regarding post tx diet. No other needs identified at this time.   5/16: S/p Living kidney transplant (5/15). Pt tolerating diet. Reports good PO intake PTA, was following renal diet. Reports hx of bariatric surgery 6 years ago. Stated  lb, denies any significant wt changes recently. Denies N/V, chewing/swallowing difficulties. Educated family on food safety and food-drug interaction. Family verbalized understanding.    Nutrition Discharge Planning: Post transplant nutrition education provided on 5/16. Food safety/drug interactions emphasized. General healthy/low salt diet recommended. Education material left at bedside. No other needs identified.    Nutrition Risk Screen    Nutrition Risk Screen: no indicators present    Nutrition/Diet History    Spiritual, Cultural Beliefs, Uatsdin Practices, Values that Affect Care:  "no    Anthropometrics    Temp: 97.5 °F (36.4 °C)  Height Method: Stated  Height: 6' 3" (190.5 cm)  Height (inches): 75 in  Weight Method: Standard Scale  Weight: 108 kg (237 lb 15.8 oz)  Weight (lb): 237.99 lb  Ideal Body Weight (IBW), Male: 196 lb  % Ideal Body Weight, Male (lb): 117.2 %  BMI (Calculated): 29.7     Lab/Procedures/Meds    Pertinent Labs Reviewed: reviewed  Pertinent Labs Comments: glucose 171, BUN 25, Cr 1.8, albumin 3.4, eGFR 47.9  Pertinent Medications Reviewed: reviewed  Pertinent Medications Comments: prednisone, MV, famotidine, docusate, tacrolimus, insulin, Na bicarb, mycophenolate, atorvastatin    Estimated/Assessed Needs    Weight Used For Calorie Calculations: 103.9 kg (229 lb)  Energy Calorie Requirements (kcal): 2536 kcal  Energy Need Method: Thomson-St Jeor (PAL 1.25)  Protein Requirements: 106-133g (1.2-1.5g/kg IBW)  Weight Used For Protein Calculations: 88.9 kg (196 lb) (IBW)  Fluid Requirements (mL): 1ml/kcal or per MD  Estimated Fluid Requirement Method: RDA Method  RDA Method (mL): 2536     Nutrition Prescription Ordered    Current Diet Order: Diabetic    Evaluation of Received Nutrient/Fluid Intake    I/O: - 3.8 L since admit  Energy Calories Required: not meeting needs  Protein Required: not meeting needs  Comments: LBM 5/16  Tolerance: tolerating    Nutrition Risk    Level of Risk/Frequency of Follow-up:  (1x/week)     Monitor and Evaluation    Food and Nutrient Intake: energy intake, food and beverage intake  Food and Nutrient Adminstration: diet order  Knowledge/Beliefs/Attitudes: food and nutrition knowledge/skill  Physical Activity and Function: nutrition-related ADLs and IADLs  Anthropometric Measurements: height/length, weight, weight change, body mass index  Biochemical Data, Medical Tests and Procedures: electrolyte and renal panel, gastrointestinal profile, glucose/endocrine profile, inflammatory profile, lipid profile  Nutrition-Focused Physical Findings: overall " appearance     Nutrition Follow-Up    RD Follow-up?: Yes

## 2023-05-17 NOTE — PROGRESS NOTES
Patient admitted for Kidney transplant.Transplant coordinator met with the patient on rounds to introduce self and explain the coordinator role. The post-transplant teaching book was given. Transplant Coordinator explained that she will follow the patient while in the hospital and assist with discharge.     ESRD 2/2 DMII  SCD, LRD  Thymo induction  CMV ++

## 2023-05-17 NOTE — NURSING
Toussaint catheter d/c at this time. 1500 cc clear yellow urine emptied from toussaint prior to removal. ~10 cc saline removed from balloon port. Pt instructed to bear down and catheter removed without complications. Catheter tip intact. Urinal provided to pt for accurate output measurements.

## 2023-05-18 ENCOUNTER — CLINICAL SUPPORT (OUTPATIENT)
Dept: TRANSPLANT | Facility: CLINIC | Age: 43
End: 2023-05-18
Payer: COMMERCIAL

## 2023-05-18 ENCOUNTER — PATIENT MESSAGE (OUTPATIENT)
Dept: TRANSPLANT | Facility: CLINIC | Age: 43
End: 2023-05-18

## 2023-05-18 ENCOUNTER — LAB VISIT (OUTPATIENT)
Dept: LAB | Facility: HOSPITAL | Age: 43
End: 2023-05-18
Attending: INTERNAL MEDICINE
Payer: COMMERCIAL

## 2023-05-18 ENCOUNTER — DOCUMENTATION ONLY (OUTPATIENT)
Dept: TRANSPLANT | Facility: CLINIC | Age: 43
End: 2023-05-18

## 2023-05-18 VITALS
SYSTOLIC BLOOD PRESSURE: 119 MMHG | DIASTOLIC BLOOD PRESSURE: 77 MMHG | TEMPERATURE: 97 F | RESPIRATION RATE: 18 BRPM | HEART RATE: 73 BPM | HEIGHT: 75 IN | WEIGHT: 233 LBS | BODY MASS INDEX: 28.97 KG/M2 | OXYGEN SATURATION: 97 %

## 2023-05-18 DIAGNOSIS — E87.8 ELECTROLYTE ABNORMALITY: Primary | ICD-10-CM

## 2023-05-18 DIAGNOSIS — Z94.0 S/P KIDNEY TRANSPLANT: ICD-10-CM

## 2023-05-18 LAB
ALBUMIN SERPL BCP-MCNC: 3.5 G/DL (ref 3.5–5.2)
ANION GAP SERPL CALC-SCNC: 6 MMOL/L (ref 8–16)
BASOPHILS # BLD AUTO: 0 K/UL (ref 0–0.2)
BASOPHILS NFR BLD: 0 % (ref 0–1.9)
BUN SERPL-MCNC: 23 MG/DL (ref 6–20)
CALCIUM SERPL-MCNC: 8.7 MG/DL (ref 8.7–10.5)
CHLORIDE SERPL-SCNC: 110 MMOL/L (ref 95–110)
CO2 SERPL-SCNC: 26 MMOL/L (ref 23–29)
CREAT SERPL-MCNC: 1.6 MG/DL (ref 0.5–1.4)
DIFFERENTIAL METHOD: ABNORMAL
EOSINOPHIL # BLD AUTO: 0 K/UL (ref 0–0.5)
EOSINOPHIL NFR BLD: 0.2 % (ref 0–8)
ERYTHROCYTE [DISTWIDTH] IN BLOOD BY AUTOMATED COUNT: 13.8 % (ref 11.5–14.5)
EST. GFR  (NO RACE VARIABLE): 55.2 ML/MIN/1.73 M^2
GLUCOSE SERPL-MCNC: 112 MG/DL (ref 70–110)
HCT VFR BLD AUTO: 35.9 % (ref 40–54)
HGB BLD-MCNC: 12.5 G/DL (ref 14–18)
IMM GRANULOCYTES # BLD AUTO: 0.04 K/UL (ref 0–0.04)
IMM GRANULOCYTES NFR BLD AUTO: 0.6 % (ref 0–0.5)
LYMPHOCYTES # BLD AUTO: 0.2 K/UL (ref 1–4.8)
LYMPHOCYTES NFR BLD: 3.2 % (ref 18–48)
MAGNESIUM SERPL-MCNC: 1.8 MG/DL (ref 1.6–2.6)
MCH RBC QN AUTO: 29.5 PG (ref 27–31)
MCHC RBC AUTO-ENTMCNC: 34.8 G/DL (ref 32–36)
MCV RBC AUTO: 85 FL (ref 82–98)
MONOCYTES # BLD AUTO: 0.5 K/UL (ref 0.3–1)
MONOCYTES NFR BLD: 8.5 % (ref 4–15)
NEUTROPHILS # BLD AUTO: 5.5 K/UL (ref 1.8–7.7)
NEUTROPHILS NFR BLD: 87.5 % (ref 38–73)
NRBC BLD-RTO: 0 /100 WBC
PHOSPHATE SERPL-MCNC: 1.8 MG/DL (ref 2.7–4.5)
PLATELET # BLD AUTO: 133 K/UL (ref 150–450)
PMV BLD AUTO: 10.5 FL (ref 9.2–12.9)
POTASSIUM SERPL-SCNC: 4.2 MMOL/L (ref 3.5–5.1)
RBC # BLD AUTO: 4.24 M/UL (ref 4.6–6.2)
SODIUM SERPL-SCNC: 142 MMOL/L (ref 136–145)
TACROLIMUS BLD-MCNC: 12.1 NG/ML (ref 5–15)
WBC # BLD AUTO: 6.26 K/UL (ref 3.9–12.7)

## 2023-05-18 PROCEDURE — 83735 ASSAY OF MAGNESIUM: CPT | Performed by: INTERNAL MEDICINE

## 2023-05-18 PROCEDURE — 80197 ASSAY OF TACROLIMUS: CPT | Performed by: INTERNAL MEDICINE

## 2023-05-18 PROCEDURE — 85025 COMPLETE CBC W/AUTO DIFF WBC: CPT | Performed by: INTERNAL MEDICINE

## 2023-05-18 PROCEDURE — 99999 PR PBB SHADOW E&M-EST. PATIENT-LVL III: ICD-10-PCS | Mod: PBBFAC,,,

## 2023-05-18 PROCEDURE — 80069 RENAL FUNCTION PANEL: CPT | Performed by: INTERNAL MEDICINE

## 2023-05-18 PROCEDURE — 99999 PR PBB SHADOW E&M-EST. PATIENT-LVL III: CPT | Mod: PBBFAC,,,

## 2023-05-18 PROCEDURE — 36415 COLL VENOUS BLD VENIPUNCTURE: CPT | Performed by: INTERNAL MEDICINE

## 2023-05-18 RX ORDER — TACROLIMUS 1 MG/1
4 CAPSULE ORAL EVERY 12 HOURS
Qty: 240 CAPSULE | Refills: 11 | Status: SHIPPED | OUTPATIENT
Start: 2023-05-18 | End: 2023-05-23 | Stop reason: DRUGHIGH

## 2023-05-18 NOTE — TELEPHONE ENCOUNTER
KPN started today by pharmMIRIAM in clinic.     Pt notified to hold FK tonight and restart cedrick AM at 4/4    ----- Message from Rell Booth MD sent at 5/18/2023  1:15 PM CDT -----  Hold prograf tonight and lower prograf to 4 mg PO bid  Repeat labs on Monday  Please make sure he is taking KPN correctly (when did he start taking KPN)

## 2023-05-18 NOTE — PROGRESS NOTES
Discharge Note:    Pt will discharge to [facility] (for at least 1 week prior to returning home to [place]) under the care of Shannon Iglesias, patient's wife, phone number [phone].  Pt aware of, involved in, and coping well with this discharge plan. Pt did not have any concerns with the discharge plan at this time. No psychosocial needs indicated for discharge at this time.  SW remains available at [phone].

## 2023-05-18 NOTE — PROGRESS NOTES
"1ST NURSING VISIT POST DISCHARGE NOTE    1st RN appointment with Robb Iglesias post discharge 5/17/23 s/p kidney transplant 5/15/23.  Patient's spouse accompanied him.  Patient reports none.  Patient says that he that he is sleeping well.  Incision  with dermabond .  Patient that he is able to explain daily incision care and showering instructions.  Reviewed I&O monitoring, measuring, and recording, and the need for hydration (i.e., at least 2 liters of water daily with minimal caffeine and no grapefruit products).  Medication list and rationale were reviewed.  Patient did bring blue medication card and medication bottles for review.  Patient reports that he has stopped Dulcolax and has not continued Colace.  Patient has had a bowel movement.  Patient expressed understanding of daily care including BID VS, medications, and I&O documentation.  Patient made aware of today's creatinine level: 1.6.  Patient aware that coordinator will review today's labs with a transplant physician and call the patient with any dose changes indicated.  Next lab appointment scheduled for 5/22/23.  First post-operative transplant team appointment with labs scheduled for 5/24/23.    Using the Kidney Transplant Patient Reference Manual, the patient submitted his open book "Self-assessment of Kidney Transplant Patient Knowledge" test, which was completed in the transplant clinic this morning before 1st nursing visit.  This test includes questions regarding critical dose medications commonly used after kidney transplant, medication dosing and side effects, importance of timed lab draws, important signs and symptoms to report 24/7 immediately post-transplant as well as how to contact the transplant team 24/7.    Test Score: 25/25    After completing the test, the patient was given a copy of the Self-assessment Answer Key to reinforce accurate learning of test content.  Patient expressed his understanding of the value of the information " included in the self-assessment test.    Clean catch mid stream technique reviewed with pt. Pt verbalized understanding.

## 2023-05-18 NOTE — PROGRESS NOTES
Hold prograf tonight and lower prograf to 4 mg PO bid  Repeat labs on Monday  Please make sure he is taking KPN correctly (when did he start taking KPN)

## 2023-05-18 NOTE — PROGRESS NOTES
Clinic Note: First Return to Clinic Post-  Kidney Transplant    Robb Iglesias  is a 42 y.o. male  S/p LEFT KIDNEY   transplant on 5/15/2023 (Kidney) for Diabetes Mellitus - Type II.      Discharge Course (Issues/Concerns):     1) Leaking from incision-- plan to supply with ABD pads  2) Worsening pain-- discussed scheduling Tylenol around the clock to assist with pain management, continue oxycodone PRN  3) Hypophosphatemia-- started Phos replacement  4) Area of erythema at area of previous IV site-- examined by Dr Johnson, recommended warm compresses and report if worsens or area of redness expands      Objective:   Vitals:    05/18/23 0845   BP: 119/77   Pulse: 73   Resp: 18   Temp: 97.3 °F (36.3 °C)       Met with patient and his caregiver in the clinic to review current medication list.     Current Outpatient Medications   Medication Sig Dispense Refill    aspirin (ECOTRIN) 81 MG EC tablet Take 1 tablet (81 mg total) by mouth once daily. 90 tablet 3    atorvastatin (LIPITOR) 80 MG tablet Take 1 tablet (80 mg total) by mouth every evening. 90 tablet 3    carvediloL (COREG) 6.25 MG tablet Take 1 tablet (6.25 mg total) by mouth 2 (two) times daily. 60 tablet 11    docusate sodium (COLACE) 100 MG capsule Take 1 capsule (100 mg total) by mouth 3 (three) times daily.  0    dulaglutide (TRULICITY) 3 mg/0.5 mL pen injector Inject 3 mg into the skin every 7 days. 12 pen 1    ezetimibe (ZETIA) 10 mg tablet Take 1 tablet (10 mg total) by mouth once daily. 90 tablet 3    famotidine (PEPCID) 20 MG tablet Take 1 tablet (20 mg total) by mouth every evening. 30 tablet 0    insulin lispro-aabc (LYUMJEV KWIKPEN U-100 INSULIN) 100 unit/mL pen Inject 4 Units into the skin 3 (three) times daily with meals. Plus sliding scale insulin. Max 27 units 2 pen 7    multivitamin Tab Take 1 tablet by mouth once daily.      mycophenolate (CELLCEPT) 250 mg Cap Take 4 capsules (1,000 mg total) by mouth 2 (two) times daily. 240 capsule 11     "oxyCODONE (ROXICODONE) 5 MG immediate release tablet Take 1 tablet (5 mg total) by mouth every 6 (six) hours as needed for Pain. 28 tablet 0    predniSONE (DELTASONE) 5 MG tablet Take by mouth daily; 5/18-5/24: 20 mg; 5/25-5/31: 15 mg; 6/1-6/7: 10 mg; 6/8/23- thereafter: 5 mg daily; do not stop 70 tablet 11    sulfamethoxazole-trimethoprim 400-80mg (BACTRIM,SEPTRA) 400-80 mg per tablet Take 1 tablet by mouth every morning. Stop 11/12/23 30 tablet 5    tacrolimus (PROGRAF) 1 MG Cap Take 5 capsules (5 mg total) by mouth every 12 (twelve) hours. 300 capsule 11    valGANciclovir (VALCYTE) 450 mg Tab Take 2 tablets (900 mg total) by mouth every morning. Stop 8/14/23 60 tablet 2    blood sugar diagnostic Strp Check blood glucose 2 times daily as directed and as needed 200 each 5    blood-glucose meter (ONETOUCH ULTRAMINI) kit Use as instructed 1 each 0    k phos di & mono-sod phos mono (K-PHOS-NEUTRAL) 250 mg Tab Take 2 tablets by mouth 3 (three) times daily. (Patient not taking: Reported on 5/18/2023) 180 tablet 5    lancets Misc Check blood glucose 2 times daily as directed and as needed (dispense insurance preferred brand or patient choice) 200 each 5    nitroGLYCERIN (NITROSTAT) 0.4 MG SL tablet Dissolve one tablet underneath tongue at onset of angina; may repeat every 5 minutes if needed. Call 911 if angina persists after 2 doses. 25 tablet 5    pen needle, diabetic (BD ULTRA-FINE SHORT PEN NEEDLE) 31 gauge x 5/16" Ndle Use to inject insulin into the skin 3 times daily 100 each 1     No current facility-administered medications for this visit.       Pharmacy Interventions/Recommendations:     1) Graft Function & Immunosuppression Issues: Thymo Induction/Tac/MMF/prednisone taper per protocol    2) Opportunistic Infection prophylaxis:   PCP ppx: Bactrim x 6 months  CMV ppx: Valcyte x 3 months    3) Donor Serologies & Monitoring:     Donor CMV Status: Positive  Donor HCV Status: Negative  Donor HBcAb:   Donor HBV ROX: " Organ record is missing.  Donor HCV ROX: Organ record is missing.      4) Pain Management & Bowel Regimen: Worsening pain-- discussed scheduling Tylenol around the clock to assist with pain management, continue oxycodone PRN    5) Blood Pressure Management: continue to monitor ambulatory BPs and bring logs to clinic    6) Blood Sugar Management & Follow-up: Novolog 4 units with meals plus SSI, Trulicity; has Novolog taper that corresponds to his prednisone taper  7) Electrolyte Management: Hypophosphatemia-- started Phos replacement    8) Osteoporosis Prevention Strategy (Liver Transplant): n/a    9) OTHER medication follow-up (patient assistance, held medications, etc): n/a    10) Reinforced medication education conducted in the hospital, including medication indications, dosing, administration, side effects, monitoring-- including timing of immunosuppressant levels.     Patient received their FIRST fill of medications from [facility].  Discussed the process for obtaining refills of medications, including verifying the dose and mailing address to have medications delivered.     Robb and his caregivers verbalized understanding and had the opportunity to ask questions.

## 2023-05-19 NOTE — PROGRESS NOTES
Transplant Coordinator  5/15-5/17/23     Pt admitted for a LURD kidney transplant. ESRD 2/2 DMII. Thymo induction, CMV +,+.      Surgery without complication.  Cr trending down, 1.8 at time of d/c. Good UOP     RLQ incision CESIA with staples.   PD cath and toussaint remove dprior to d/c.,     Labs 5/18 and RTC to meet with coordinator/pharmD

## 2023-05-22 ENCOUNTER — LAB VISIT (OUTPATIENT)
Dept: LAB | Facility: HOSPITAL | Age: 43
End: 2023-05-22
Attending: INTERNAL MEDICINE
Payer: COMMERCIAL

## 2023-05-22 ENCOUNTER — PATIENT MESSAGE (OUTPATIENT)
Dept: TRANSPLANT | Facility: CLINIC | Age: 43
End: 2023-05-22
Payer: COMMERCIAL

## 2023-05-22 DIAGNOSIS — N18.5 CKD (CHRONIC KIDNEY DISEASE), SYMPTOM MANAGEMENT ONLY, STAGE 5: ICD-10-CM

## 2023-05-22 DIAGNOSIS — Z94.0 KIDNEY REPLACED BY TRANSPLANT: ICD-10-CM

## 2023-05-22 LAB
ALBUMIN SERPL BCP-MCNC: 3.9 G/DL (ref 3.5–5.2)
ANION GAP SERPL CALC-SCNC: 6 MMOL/L (ref 8–16)
BACTERIA #/AREA URNS HPF: ABNORMAL /HPF
BASOPHILS NFR BLD: 0 % (ref 0–1.9)
BILIRUB UR QL STRIP: NEGATIVE
BUN SERPL-MCNC: 23 MG/DL (ref 6–20)
CALCIUM SERPL-MCNC: 9.3 MG/DL (ref 8.7–10.5)
CHLORIDE SERPL-SCNC: 106 MMOL/L (ref 95–110)
CLARITY UR: ABNORMAL
CO2 SERPL-SCNC: 25 MMOL/L (ref 23–29)
COLOR UR: YELLOW
CREAT SERPL-MCNC: 1.9 MG/DL (ref 0.5–1.4)
DIFFERENTIAL METHOD: ABNORMAL
EOSINOPHIL NFR BLD: 4 % (ref 0–8)
ERYTHROCYTE [DISTWIDTH] IN BLOOD BY AUTOMATED COUNT: 13.6 % (ref 11.5–14.5)
EST. GFR  (NO RACE VARIABLE): 44.9 ML/MIN/1.73 M^2
GLUCOSE SERPL-MCNC: 156 MG/DL (ref 70–110)
GLUCOSE UR QL STRIP: ABNORMAL
HCT VFR BLD AUTO: 40.6 % (ref 40–54)
HGB BLD-MCNC: 13.7 G/DL (ref 14–18)
HGB UR QL STRIP: ABNORMAL
HYALINE CASTS #/AREA URNS LPF: 1 /LPF
IMM GRANULOCYTES # BLD AUTO: ABNORMAL K/UL (ref 0–0.04)
IMM GRANULOCYTES NFR BLD AUTO: ABNORMAL % (ref 0–0.5)
KETONES UR QL STRIP: NEGATIVE
LEUKOCYTE ESTERASE UR QL STRIP: NEGATIVE
LYMPHOCYTES NFR BLD: 3 % (ref 18–48)
MCH RBC QN AUTO: 28.9 PG (ref 27–31)
MCHC RBC AUTO-ENTMCNC: 33.7 G/DL (ref 32–36)
MCV RBC AUTO: 86 FL (ref 82–98)
METAMYELOCYTES NFR BLD MANUAL: 1 %
MICROSCOPIC COMMENT: ABNORMAL
MONOCYTES NFR BLD: 4 % (ref 4–15)
NEUTROPHILS NFR BLD: 87 % (ref 38–73)
NEUTS BAND NFR BLD MANUAL: 1 %
NITRITE UR QL STRIP: NEGATIVE
NRBC BLD-RTO: 0 /100 WBC
PH UR STRIP: 7 [PH] (ref 5–8)
PHOSPHATE SERPL-MCNC: 2.8 MG/DL (ref 2.7–4.5)
PLATELET # BLD AUTO: 184 K/UL (ref 150–450)
PLATELET BLD QL SMEAR: ABNORMAL
PMV BLD AUTO: 9.9 FL (ref 9.2–12.9)
POTASSIUM SERPL-SCNC: 4.2 MMOL/L (ref 3.5–5.1)
PROT UR QL STRIP: ABNORMAL
RBC # BLD AUTO: 4.74 M/UL (ref 4.6–6.2)
RBC #/AREA URNS HPF: >100 /HPF (ref 0–4)
SODIUM SERPL-SCNC: 137 MMOL/L (ref 136–145)
SP GR UR STRIP: >=1.03 (ref 1–1.03)
URN SPEC COLLECT METH UR: ABNORMAL
WBC # BLD AUTO: 9.35 K/UL (ref 3.9–12.7)
WBC #/AREA URNS HPF: 4 /HPF (ref 0–5)

## 2023-05-22 PROCEDURE — 36415 COLL VENOUS BLD VENIPUNCTURE: CPT | Performed by: INTERNAL MEDICINE

## 2023-05-22 PROCEDURE — 85007 BL SMEAR W/DIFF WBC COUNT: CPT | Performed by: INTERNAL MEDICINE

## 2023-05-22 PROCEDURE — 81000 URINALYSIS NONAUTO W/SCOPE: CPT | Performed by: INTERNAL MEDICINE

## 2023-05-22 PROCEDURE — 80069 RENAL FUNCTION PANEL: CPT | Performed by: INTERNAL MEDICINE

## 2023-05-22 PROCEDURE — 85027 COMPLETE CBC AUTOMATED: CPT | Performed by: INTERNAL MEDICINE

## 2023-05-22 PROCEDURE — 80197 ASSAY OF TACROLIMUS: CPT | Performed by: INTERNAL MEDICINE

## 2023-05-22 NOTE — PROGRESS NOTES
Kidney Post-Transplant Assessment    Referring Physician: Siva Figueroa  Current Nephrologist: Siva Figueroa    ORGAN: LEFT KIDNEY  Donor Type: living  PHS Increased Risk: no  Cold Ischemia: 48 mins  Induction Medications: thymoglobulin    Subjective:     CC:  Reassessment of renal allograft function and management of chronic immunosuppression.    HPI:  Mr. Iglesias is a 42 y.o. year old White male with history of ESRD secondary to diabetic nephropathy who received a living kidney transplant on 5/15/23 (thymo induction, CMV ++). His most recent creatinine is 1.9. He takes mycophenolate mofetil, prednisone, and tacrolimus for maintenance immunosuppression. His post transplant course has been uncomplicated to date.    Feels well. Drinking 2-3 L water daily, no problems with urination. Home BPs 120-135/80, no peripheral edema.    Has lost a few pounds. He finds it easier to graze and eat smaller meals due to bariatric surgery as well as drinking so much water. He has been taking pepcid, would like to take pepcid complete as well.    Wound healing nicely, occasional serosanguinous drainage. Has been increasing activity level, pain managed. Tolerating IS without issues, no diarrhea or vomiting.     Current Outpatient Medications   Medication Sig Dispense Refill    aspirin (ECOTRIN) 81 MG EC tablet Take 1 tablet (81 mg total) by mouth once daily. 90 tablet 3    atorvastatin (LIPITOR) 80 MG tablet Take 1 tablet (80 mg total) by mouth every evening. 90 tablet 3    blood sugar diagnostic Strp Check blood glucose 2 times daily as directed and as needed 200 each 5    blood-glucose meter (ONETOUCH ULTRAMINI) kit Use as instructed 1 each 0    carvediloL (COREG) 6.25 MG tablet Take 1 tablet (6.25 mg total) by mouth 2 (two) times daily. 60 tablet 11    docusate sodium (COLACE) 100 MG capsule Take 1 capsule (100 mg total) by mouth 3 (three) times daily.  0    dulaglutide (TRULICITY) 3 mg/0.5 mL pen injector Inject 3  "mg into the skin every 7 days. 12 pen 1    ezetimibe (ZETIA) 10 mg tablet Take 1 tablet (10 mg total) by mouth once daily. 90 tablet 3    famotidine (PEPCID) 20 MG tablet Take 1 tablet (20 mg total) by mouth every evening. 30 tablet 0    insulin lispro-aabc (LYUMJEV KWIKPEN U-100 INSULIN) 100 unit/mL pen Inject 4 Units into the skin 3 (three) times daily with meals. Plus sliding scale insulin. Max 27 units 2 pen 7    k phos di & mono-sod phos mono (K-PHOS-NEUTRAL) 250 mg Tab Take 2 tablets by mouth 3 (three) times daily. 180 tablet 5    lancets Misc Check blood glucose 2 times daily as directed and as needed (dispense insurance preferred brand or patient choice) 200 each 5    multivitamin Tab Take 1 tablet by mouth once daily.      mycophenolate (CELLCEPT) 250 mg Cap Take 4 capsules (1,000 mg total) by mouth 2 (two) times daily. 240 capsule 11    nitroGLYCERIN (NITROSTAT) 0.4 MG SL tablet Dissolve one tablet underneath tongue at onset of angina; may repeat every 5 minutes if needed. Call 911 if angina persists after 2 doses. 25 tablet 5    oxyCODONE (ROXICODONE) 5 MG immediate release tablet Take 1 tablet (5 mg total) by mouth every 6 (six) hours as needed for Pain. 28 tablet 0    pen needle, diabetic (BD ULTRA-FINE SHORT PEN NEEDLE) 31 gauge x 5/16" Ndle Use to inject insulin into the skin 3 times daily 100 each 1    predniSONE (DELTASONE) 5 MG tablet Take by mouth daily; 5/18-5/24: 20 mg; 5/25-5/31: 15 mg; 6/1-6/7: 10 mg; 6/8/23- thereafter: 5 mg daily; do not stop 70 tablet 11    sulfamethoxazole-trimethoprim 400-80mg (BACTRIM,SEPTRA) 400-80 mg per tablet Take 1 tablet by mouth every morning. Stop 11/12/23 30 tablet 5    tacrolimus (PROGRAF) 1 MG Cap Take 2 capsules (2 mg total) by mouth every 12 (twelve) hours. 120 capsule 11    valGANciclovir (VALCYTE) 450 mg Tab Take 2 tablets (900 mg total) by mouth every morning. Stop 8/14/23 60 tablet 2     No current facility-administered medications for this visit. "       Past Medical History:   Diagnosis Date    Allergic rhinitis     Class 1 obesity due to excess calories with serious comorbidity and body mass index (BMI) of 31.0 to 31.9 in adult 4/7/2017    Coronary artery disease     Coronary artery disease involving native coronary artery of native heart without angina pectoris 2/6/2018    Cath lab procedure 04/23/2018 (Naveen Rios MD) A. Indication/Pre-Operative Diagnosis: The patient is a 37 year old male that was referred for catheterization by [facility] Self for ACS (NSTEMI). The BELLA risk score is 5.  B. Summary/Post-Operative Diagnosis 1. Single vessel coronary artery disease. 2. Normal LVEF. 3. Diastolic dysfunction. 4. Successful PCI for acute myocardial infarction. 5.     Diabetic nephropathy associated with type 2 diabetes mellitus 1/6/2020    Direct hyperbilirubinemia 3/24/2018    DM (diabetes mellitus) 2008    BS doesn't check any more 08/02/2018    DM (diabetes mellitus) 2012    BS 99 am 06/26/2020    DM (diabetes mellitus)     BS didn't check 06/04/2021    DM (diabetes mellitus) 2008    BS didn't check 07/29/2022     Dyslipidemia associated with type 2 diabetes mellitus 7/31/2017    Elevated bilirubin 3/21/2018    GERD (gastroesophageal reflux disease)     Gout     Hyperlipidemia     Hyperparathyroidism, secondary to chronic kidney disease 6/7/2021    Hypertension associated with chronic kidney disease due to type 2 diabetes mellitus     Hypertension complicating diabetes 3/16/2019    Not candidate for Hypertension Digital Medicine program due to dialysis status. Didn't tolerate amlodipine due to SE of hypotension.    Idiopathic chronic gout, multiple sites, without tophus (tophi) 7/19/2017    Long term (current) use of insulin     MI (myocardial infarction) 07/2017    NSTEMI with CAD s/p PCI (FAMILIA) of LAD x 2 in 8/2017 7/31/2017    Obesity     HENRY on CPAP     Peritoneal dialysis catheter in place 1/26/2023    Proteinuria     Severe obstructive sleep apnea  - Intolerant of CPAP 7/31/2017    Intolerant of CPAP after weeks of trying multiple interfaces. SPLIT-NIGHT SLEEP STUDY 7/28/2015 · SLEEP ARCHITECTURE: Sleep onset was 2.9 minutes and sleep efficiency was 94.4%. Sleep Stage distribution showed 145 sleep stage changes, 6 awakenings and 119 arousals. Sleep distribution showed 53.2% stage NI, 41.8% stage N 11, 0.0% stage N Ill and REM sleep was at 5.0%. There were 2 REM periods. · RE    Stage 5 chronic kidney disease on chronic peritoneal dialysis 6/7/2017    Status post angioplasty with stent 8/4/2017    Steatohepatitis     Fatty Liver    Type 2 diabetes mellitus with chronic kidney disease on chronic dialysis, without long-term current use of insulin 6/21/2017    Type 2 diabetes mellitus with diabetic nephropathy     Type 2 diabetes mellitus with diabetic nephropathy, without long-term current use of insulin 1/6/2020    Type 2 diabetes mellitus with diabetic polyneuropathy, without long-term current use of insulin 6/7/2021    Type 2 diabetes mellitus with hyperglycemia     Type 2 diabetes mellitus with renal manifestations     Type 2 diabetes mellitus with stage 3 chronic kidney disease, without long-term current use of insulin 6/21/2017     Review of Systems   Constitutional:  Negative for activity change, appetite change and fever.   HENT:  Negative for congestion, mouth sores and sore throat.    Eyes:  Negative for visual disturbance.   Respiratory:  Negative for cough, chest tightness and shortness of breath.    Cardiovascular:  Negative for chest pain, palpitations and leg swelling.   Gastrointestinal:  Negative for abdominal distention, abdominal pain, constipation, diarrhea and nausea.   Genitourinary:  Negative for difficulty urinating, frequency and hematuria.   Musculoskeletal:  Negative for arthralgias and gait problem.   Skin:  Positive for wound.   Allergic/Immunologic: Positive for immunocompromised state. Negative for environmental allergies and food  "allergies.   Neurological:  Negative for dizziness, weakness and numbness.   Psychiatric/Behavioral:  Negative for sleep disturbance. The patient is not nervous/anxious.      Objective:   Blood pressure 127/88, pulse 84, temperature 97.2 °F (36.2 °C), temperature source Temporal, resp. rate 16, height 6' 3" (1.905 m), weight 100.2 kg (220 lb 14.4 oz), SpO2 97 %.body mass index is 27.61 kg/m².    Physical Exam  Vitals and nursing note reviewed.   Constitutional:       Appearance: Normal appearance.   HENT:      Head: Normocephalic.   Cardiovascular:      Rate and Rhythm: Normal rate and regular rhythm.      Heart sounds: Normal heart sounds.   Pulmonary:      Effort: Pulmonary effort is normal.      Breath sounds: Normal breath sounds.   Abdominal:      General: Bowel sounds are normal. There is no distension.      Palpations: Abdomen is soft.      Tenderness: There is no abdominal tenderness.      Comments: Surgical incision well approximated with dermabond. Dressing with scant serosang drainage    Musculoskeletal:         General: Normal range of motion.   Skin:     General: Skin is warm and dry.   Neurological:      General: No focal deficit present.      Mental Status: He is alert.   Psychiatric:         Behavior: Behavior normal.       Labs:  Lab Results   Component Value Date    WBC 9.35 05/22/2023    HGB 13.7 (L) 05/22/2023    HCT 40.6 05/22/2023     05/22/2023    K 4.2 05/22/2023     05/22/2023    CO2 25 05/22/2023    BUN 23 (H) 05/22/2023    CREATININE 1.9 (H) 05/22/2023    EGFRNORACEVR 44.9 (A) 05/22/2023    CALCIUM 9.3 05/22/2023    PHOS 2.8 05/22/2023    MG 1.8 05/18/2023    ALBUMIN 3.9 05/22/2023    AST 28 05/02/2023    ALT 41 05/02/2023    UTPCR 1.07 (H) 05/15/2023    .9 (H) 05/15/2023    TACROLIMUS 16.9 (H) 05/22/2023     Labs were reviewed with the patient    Assessment:     1. S/P kidney transplant    2. Long-term use of immunosuppressant medication    3. Diabetic nephropathy " associated with type 2 diabetes mellitus    4. Essential hypertension    5. At risk for opportunistic infections      Plan:   Ureteral stent removal 6/7  Repeating prograf level Thursday        1. Immunosuppression: Prograf trough 16.9, which is SUPRAtherapeutic (target 8-10). Continue Prograf 2/2 (decreased from 4/4 and 2 doses held), MMF 1000 mg BID, and Prednisone taper. Will continue to monitor for drug toxicities    2. Allograft Function: Cr 1.6-->1.9 in the setting of elevated prograf level, continue good oral hydration  - ESRD secondary to diabetic nephropathy s/p living kidney transplant on 5/15/23 (thymo induction, CMV ++).   Lab Results   Component Value Date    CREATININE 1.9 (H) 05/22/2023 5/22/2023  POD 7   eGFR >60 mL/min/1.73 m^2 44.9 (A)       3. Hypertension management: advise low salt diet and home BP monitoring    Coreg 6.25 mg BID     4. Metabolic Bone Disease/Secondary Hyperparathyroidism:  Lab Results   Component Value Date    .9 (H) 05/15/2023    CALCIUM 9.3 05/22/2023    PHOS 2.8 05/22/2023       5. Electrolytes:  Will monitor /guidelines  Lab Results   Component Value Date     05/22/2023    K 4.2 05/22/2023     05/22/2023    CO2 25 05/22/2023       6. Anemia: stable. No need for intervention   Lab Results   Component Value Date    WBC 9.35 05/22/2023    HGB 13.7 (L) 05/22/2023    HCT 40.6 05/22/2023    MCV 86 05/22/2023     05/22/2023         7. Cytopenias: no significant cytopenias. Medicine list reviewed including potential causes of drug-induced cytopenias    8.  Proteinuria: continue p/c ratio as per guidelines     9. BK virus infection screening:  will continue to monitor per guidelines    10. Weight education: provided during the clinic visit   Body mass index is 27.61 kg/m².     11.Patient safety education regarding immunosuppression including prophylaxis posttransplant for CMV, PCP : Education provided about vaccination and prevention of infections      PCP ppx: Bactrim x 6 months (Stop 11/12/23)  CMV ppx: Valcyte x 3 months (Stop 8/14/23)       Follow-up:   Clinic: return to transplant clinic weekly for the first month after transplant; every 2 weeks during months 2-3; then at 6-, 9-, 12-, 18-, 24-, and 36- months post-transplant to reassess for complications from immunosuppression toxicity and monitor for rejection.  Annually thereafter.    Labs: since patient remains at high risk for rejection and drug-related complications that warrant close monitoring, labs will be ordered as follows: continue twice weekly CBC, renal panel, and drug level for first month; then same labs once weekly through 3rd month post-transplant.  Urine for UA and protein/creatinine ratio monthly.  Serum BK - PCR at 1-, 3-, 6-, 9-, 12-, 18-, 24-, 36-, 48-, and 60 months post-transplant.  Hepatic panel at 1-, 2-, 3-, 6-, 9-, 12-, 18-, 24-, and 36- months post-transplant.    Cinda Mccray NP       Education:   Material provided to the patient.  Patient reminded to call with any health changes since these can be early signs of significant complications.  Also, I advised the patient to be sure any new medications or changes of old medications are discussed with either a pharmacist or physician knowledgeable with transplant to avoid rejection/drug toxicity related to significant drug interactions.    Patient advised that it is recommended that all transplanted patients, and their close contacts and household members receive Covid vaccination.

## 2023-05-23 ENCOUNTER — PATIENT MESSAGE (OUTPATIENT)
Dept: TRANSPLANT | Facility: CLINIC | Age: 43
End: 2023-05-23
Payer: COMMERCIAL

## 2023-05-23 DIAGNOSIS — Z94.0 S/P KIDNEY TRANSPLANT: ICD-10-CM

## 2023-05-23 LAB — TACROLIMUS BLD-MCNC: 16.9 NG/ML (ref 5–15)

## 2023-05-23 RX ORDER — TACROLIMUS 1 MG/1
2 CAPSULE ORAL EVERY 12 HOURS
Qty: 120 CAPSULE | Refills: 11 | Status: SHIPPED | OUTPATIENT
Start: 2023-05-24 | End: 2023-06-02 | Stop reason: DRUGHIGH

## 2023-05-23 NOTE — TELEPHONE ENCOUNTER
Pt notified to hold prograf and restart tomorrow night at 2 mg twice a day    ----- Message from Rell Booth MD sent at 5/23/2023 11:48 AM CDT -----  Hold prograf x 2 doses and repeat a level and labs on Thursday  Re-start prograf tomorrow night at 2 mg PO bid

## 2023-05-23 NOTE — PROGRESS NOTES
Hold prograf x 2 doses and repeat a level and labs on Thursday  Re-start prograf tomorrow night at 2 mg PO bid

## 2023-05-24 ENCOUNTER — OFFICE VISIT (OUTPATIENT)
Dept: TRANSPLANT | Facility: CLINIC | Age: 43
End: 2023-05-24
Payer: COMMERCIAL

## 2023-05-24 ENCOUNTER — PATIENT MESSAGE (OUTPATIENT)
Dept: INTERNAL MEDICINE | Facility: CLINIC | Age: 43
End: 2023-05-24
Payer: COMMERCIAL

## 2023-05-24 VITALS
HEART RATE: 84 BPM | DIASTOLIC BLOOD PRESSURE: 88 MMHG | RESPIRATION RATE: 16 BRPM | WEIGHT: 220.88 LBS | SYSTOLIC BLOOD PRESSURE: 127 MMHG | HEIGHT: 75 IN | TEMPERATURE: 97 F | OXYGEN SATURATION: 97 % | BODY MASS INDEX: 27.46 KG/M2

## 2023-05-24 DIAGNOSIS — Z79.60 LONG-TERM USE OF IMMUNOSUPPRESSANT MEDICATION: ICD-10-CM

## 2023-05-24 DIAGNOSIS — I10 ESSENTIAL HYPERTENSION: Chronic | ICD-10-CM

## 2023-05-24 DIAGNOSIS — E11.21 DIABETIC NEPHROPATHY ASSOCIATED WITH TYPE 2 DIABETES MELLITUS: ICD-10-CM

## 2023-05-24 DIAGNOSIS — Z94.0 S/P KIDNEY TRANSPLANT: Primary | ICD-10-CM

## 2023-05-24 DIAGNOSIS — Z48.298 AFTERCARE FOLLOWING ORGAN TRANSPLANT: Primary | ICD-10-CM

## 2023-05-24 DIAGNOSIS — Z91.89 AT RISK FOR OPPORTUNISTIC INFECTIONS: ICD-10-CM

## 2023-05-24 PROCEDURE — 3066F PR DOCUMENTATION OF TREATMENT FOR NEPHROPATHY: ICD-10-PCS | Mod: CPTII,S$GLB,, | Performed by: NURSE PRACTITIONER

## 2023-05-24 PROCEDURE — 3008F BODY MASS INDEX DOCD: CPT | Mod: CPTII,S$GLB,, | Performed by: NURSE PRACTITIONER

## 2023-05-24 PROCEDURE — 1160F PR REVIEW ALL MEDS BY PRESCRIBER/CLIN PHARMACIST DOCUMENTED: ICD-10-PCS | Mod: CPTII,S$GLB,, | Performed by: NURSE PRACTITIONER

## 2023-05-24 PROCEDURE — 1159F PR MEDICATION LIST DOCUMENTED IN MEDICAL RECORD: ICD-10-PCS | Mod: CPTII,S$GLB,, | Performed by: NURSE PRACTITIONER

## 2023-05-24 PROCEDURE — 3072F PR LOW RISK FOR RETINOPATHY: ICD-10-PCS | Mod: CPTII,S$GLB,, | Performed by: NURSE PRACTITIONER

## 2023-05-24 PROCEDURE — 99215 PR OFFICE/OUTPT VISIT, EST, LEVL V, 40-54 MIN: ICD-10-PCS | Mod: S$GLB,,, | Performed by: NURSE PRACTITIONER

## 2023-05-24 PROCEDURE — 3044F HG A1C LEVEL LT 7.0%: CPT | Mod: CPTII,S$GLB,, | Performed by: NURSE PRACTITIONER

## 2023-05-24 PROCEDURE — 3074F SYST BP LT 130 MM HG: CPT | Mod: CPTII,S$GLB,, | Performed by: NURSE PRACTITIONER

## 2023-05-24 PROCEDURE — 4010F ACE/ARB THERAPY RXD/TAKEN: CPT | Mod: CPTII,S$GLB,, | Performed by: NURSE PRACTITIONER

## 2023-05-24 PROCEDURE — 3044F PR MOST RECENT HEMOGLOBIN A1C LEVEL <7.0%: ICD-10-PCS | Mod: CPTII,S$GLB,, | Performed by: NURSE PRACTITIONER

## 2023-05-24 PROCEDURE — 4010F PR ACE/ARB THEARPY RXD/TAKEN: ICD-10-PCS | Mod: CPTII,S$GLB,, | Performed by: NURSE PRACTITIONER

## 2023-05-24 PROCEDURE — 3008F PR BODY MASS INDEX (BMI) DOCUMENTED: ICD-10-PCS | Mod: CPTII,S$GLB,, | Performed by: NURSE PRACTITIONER

## 2023-05-24 PROCEDURE — 99999 PR PBB SHADOW E&M-EST. PATIENT-LVL V: ICD-10-PCS | Mod: PBBFAC,,, | Performed by: NURSE PRACTITIONER

## 2023-05-24 PROCEDURE — 99215 OFFICE O/P EST HI 40 MIN: CPT | Mod: S$GLB,,, | Performed by: NURSE PRACTITIONER

## 2023-05-24 PROCEDURE — 99999 PR PBB SHADOW E&M-EST. PATIENT-LVL V: CPT | Mod: PBBFAC,,, | Performed by: NURSE PRACTITIONER

## 2023-05-24 PROCEDURE — 3066F NEPHROPATHY DOC TX: CPT | Mod: CPTII,S$GLB,, | Performed by: NURSE PRACTITIONER

## 2023-05-24 PROCEDURE — 1111F PR DISCHARGE MEDS RECONCILED W/ CURRENT OUTPATIENT MED LIST: ICD-10-PCS | Mod: CPTII,S$GLB,, | Performed by: NURSE PRACTITIONER

## 2023-05-24 PROCEDURE — 3074F PR MOST RECENT SYSTOLIC BLOOD PRESSURE < 130 MM HG: ICD-10-PCS | Mod: CPTII,S$GLB,, | Performed by: NURSE PRACTITIONER

## 2023-05-24 PROCEDURE — 3079F DIAST BP 80-89 MM HG: CPT | Mod: CPTII,S$GLB,, | Performed by: NURSE PRACTITIONER

## 2023-05-24 PROCEDURE — 1159F MED LIST DOCD IN RCRD: CPT | Mod: CPTII,S$GLB,, | Performed by: NURSE PRACTITIONER

## 2023-05-24 PROCEDURE — 3079F PR MOST RECENT DIASTOLIC BLOOD PRESSURE 80-89 MM HG: ICD-10-PCS | Mod: CPTII,S$GLB,, | Performed by: NURSE PRACTITIONER

## 2023-05-24 PROCEDURE — 3072F LOW RISK FOR RETINOPATHY: CPT | Mod: CPTII,S$GLB,, | Performed by: NURSE PRACTITIONER

## 2023-05-24 PROCEDURE — 1111F DSCHRG MED/CURRENT MED MERGE: CPT | Mod: CPTII,S$GLB,, | Performed by: NURSE PRACTITIONER

## 2023-05-24 PROCEDURE — 1160F RVW MEDS BY RX/DR IN RCRD: CPT | Mod: CPTII,S$GLB,, | Performed by: NURSE PRACTITIONER

## 2023-05-24 NOTE — PATIENT INSTRUCTIONS
It is my privilege to participate in your transplant care! Please be sure to let us know if you have any questions or concerns about your health care - we cannot help you if we do not know. Don't forget we are on call 24/7 for any emergencies.      Best Wishes,  Cinda Mccray NP-C

## 2023-05-24 NOTE — LETTER
May 24, 2023        Siva Figueroa  [address] [address] NEPHROLOGY  [facility] [address]  Phone: [phone]  Fax: [phone]             [facility]- Transplant 1st Fl  [address]  [facility] [address]  Phone: [phone]   Patient: Robb Iglesias   MR Number: [id number]   YOB: 1980   Date of Visit: 5/24/2023       Dear Dr. Siva Figueroa    Thank you for referring Robb Iglesias to me for evaluation. Attached you will find relevant portions of my assessment and plan of care.    If you have questions, please do not hesitate to call me. I look forward to following Robb Iglesias along with you.    Sincerely,    Cinda Mccray NP    Enclosure    If you would like to receive this communication electronically, please contact [email or web address] or [phone] to request [employer] access.    [employer] is a tool which provides read-only access to select patient information with whom you have a relationship. Its easy to use and provides real time access to review your patients record including encounter summaries, notes, results, and demographic information.    If you feel you have received this communication in error or would no longer like to receive these types of communications, please e-mail [email or web address]

## 2023-05-25 ENCOUNTER — PATIENT MESSAGE (OUTPATIENT)
Dept: TRANSPLANT | Facility: CLINIC | Age: 43
End: 2023-05-25
Payer: COMMERCIAL

## 2023-05-25 ENCOUNTER — LAB VISIT (OUTPATIENT)
Dept: LAB | Facility: HOSPITAL | Age: 43
End: 2023-05-25
Attending: INTERNAL MEDICINE
Payer: COMMERCIAL

## 2023-05-25 ENCOUNTER — PATIENT MESSAGE (OUTPATIENT)
Dept: ADMINISTRATIVE | Facility: OTHER | Age: 43
End: 2023-05-25
Payer: COMMERCIAL

## 2023-05-25 DIAGNOSIS — Z94.0 KIDNEY REPLACED BY TRANSPLANT: ICD-10-CM

## 2023-05-25 LAB
ALBUMIN SERPL BCP-MCNC: 3.8 G/DL (ref 3.5–5.2)
ANION GAP SERPL CALC-SCNC: 9 MMOL/L (ref 8–16)
BASOPHILS NFR BLD: 0 % (ref 0–1.9)
BUN SERPL-MCNC: 21 MG/DL (ref 6–20)
CALCIUM SERPL-MCNC: 9.1 MG/DL (ref 8.7–10.5)
CHLORIDE SERPL-SCNC: 107 MMOL/L (ref 95–110)
CO2 SERPL-SCNC: 24 MMOL/L (ref 23–29)
CREAT SERPL-MCNC: 1.8 MG/DL (ref 0.5–1.4)
DIFFERENTIAL METHOD: ABNORMAL
EOSINOPHIL NFR BLD: 1 % (ref 0–8)
ERYTHROCYTE [DISTWIDTH] IN BLOOD BY AUTOMATED COUNT: 14.6 % (ref 11.5–14.5)
EST. GFR  (NO RACE VARIABLE): 47.9 ML/MIN/1.73 M^2
GLUCOSE SERPL-MCNC: 130 MG/DL (ref 70–110)
HCT VFR BLD AUTO: 39 % (ref 40–54)
HGB BLD-MCNC: 13 G/DL (ref 14–18)
IMM GRANULOCYTES # BLD AUTO: ABNORMAL K/UL (ref 0–0.04)
IMM GRANULOCYTES NFR BLD AUTO: ABNORMAL % (ref 0–0.5)
LYMPHOCYTES NFR BLD: 5 % (ref 18–48)
MCH RBC QN AUTO: 28.9 PG (ref 27–31)
MCHC RBC AUTO-ENTMCNC: 33.3 G/DL (ref 32–36)
MCV RBC AUTO: 87 FL (ref 82–98)
METAMYELOCYTES NFR BLD MANUAL: 1 %
MONOCYTES NFR BLD: 4 % (ref 4–15)
NEUTROPHILS NFR BLD: 89 % (ref 38–73)
NRBC BLD-RTO: 0 /100 WBC
PHOSPHATE SERPL-MCNC: 2.8 MG/DL (ref 2.7–4.5)
PLATELET # BLD AUTO: 209 K/UL (ref 150–450)
PLATELET BLD QL SMEAR: ABNORMAL
PMV BLD AUTO: 9.8 FL (ref 9.2–12.9)
POTASSIUM SERPL-SCNC: 4.8 MMOL/L (ref 3.5–5.1)
RBC # BLD AUTO: 4.5 M/UL (ref 4.6–6.2)
SODIUM SERPL-SCNC: 140 MMOL/L (ref 136–145)
WBC # BLD AUTO: 8.68 K/UL (ref 3.9–12.7)

## 2023-05-25 PROCEDURE — 85027 COMPLETE CBC AUTOMATED: CPT | Performed by: INTERNAL MEDICINE

## 2023-05-25 PROCEDURE — 80069 RENAL FUNCTION PANEL: CPT | Performed by: INTERNAL MEDICINE

## 2023-05-25 PROCEDURE — 36415 COLL VENOUS BLD VENIPUNCTURE: CPT | Performed by: INTERNAL MEDICINE

## 2023-05-25 PROCEDURE — 80197 ASSAY OF TACROLIMUS: CPT | Performed by: INTERNAL MEDICINE

## 2023-05-25 PROCEDURE — 85007 BL SMEAR W/DIFF WBC COUNT: CPT | Performed by: INTERNAL MEDICINE

## 2023-05-26 PROBLEM — N18.31 STAGE 3A CHRONIC KIDNEY DISEASE: Status: ACTIVE | Noted: 2023-05-26

## 2023-05-26 LAB — TACROLIMUS BLD-MCNC: 9.4 NG/ML (ref 5–15)

## 2023-05-26 NOTE — PROGRESS NOTES
Kidney Post-Transplant Assessment    Referring Physician: Siva Figueroa  Current Nephrologist: Siva Figueroa    ORGAN: LEFT KIDNEY  Donor Type: living  PHS Increased Risk: no  Cold Ischemia: 48 mins  Induction Medications: thymoglobulin    Subjective:     CC:  Reassessment of renal allograft function and management of chronic immunosuppression.    HPI:  Mr. Iglesias is a 42 y.o. 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 His most recent creatinine is 1.8. He takes mycophenolate mofetil, prednisone, and tacrolimus for maintenance immunosuppression. His post transplant course has been uncomplicated to date      Donor's GFR was 104 ml/min (33 yrs of age)    Had an episode of palpitations s yesterday that lasted for 45 minutes. He does not have h/o cardiac arhythmia    Appetite is fair    Active at home    Refers some diarrhea    Asking for when to drive exercise care of the incision and issues with stent removal    Wife is with the patient     Very pleased with the outcome of the kidney transplant         Current Outpatient Medications on File Prior to Visit   Medication Sig Dispense Refill    aspirin (ECOTRIN) 81 MG EC tablet Take 1 tablet (81 mg total) by mouth once daily. 90 tablet 3    atorvastatin (LIPITOR) 80 MG tablet Take 1 tablet (80 mg total) by mouth every evening. 90 tablet 3    blood sugar diagnostic Strp Check blood glucose 2 times daily as directed and as needed 200 each 5    blood-glucose meter (ONETOUCH ULTRAMINI) kit Use as instructed 1 each 0    carvediloL (COREG) 6.25 MG tablet Take 1 tablet (6.25 mg total) by mouth 2 (two) times daily. 60 tablet 11    docusate sodium (COLACE) 100 MG capsule Take 1 capsule (100 mg total) by mouth 3 (three) times daily.  0    dulaglutide (TRULICITY) 3 mg/0.5 mL pen injector Inject 3 mg into the skin every 7 days. 12 pen 1    ezetimibe (ZETIA) 10 mg tablet Take 1 tablet (10 mg total) by mouth once daily. 90 tablet 3     "famotidine (PEPCID) 20 MG tablet Take 1 tablet (20 mg total) by mouth every evening. 30 tablet 0    insulin lispro-aabc (YUEUMJEV KWIKPEN U-100 INSULIN) 100 unit/mL pen Inject 4 Units into the skin 3 (three) times daily with meals. Plus sliding scale insulin. Max 27 units 2 pen 7    k phos di & mono-sod phos mono (K-PHOS-NEUTRAL) 250 mg Tab Take 2 tablets by mouth 3 (three) times daily. 180 tablet 5    lancets Misc Check blood glucose 2 times daily as directed and as needed (dispense insurance preferred brand or patient choice) 200 each 5    multivitamin Tab Take 1 tablet by mouth once daily.      mycophenolate (CELLCEPT) 250 mg Cap Take 4 capsules (1,000 mg total) by mouth 2 (two) times daily. 240 capsule 11    nitroGLYCERIN (NITROSTAT) 0.4 MG SL tablet Dissolve one tablet underneath tongue at onset of angina; may repeat every 5 minutes if needed. Call 911 if angina persists after 2 doses. 25 tablet 5    oxyCODONE (ROXICODONE) 5 MG immediate release tablet Take 1 tablet (5 mg total) by mouth every 6 (six) hours as needed for Pain. 28 tablet 0    pen needle, diabetic (BD ULTRA-FINE SHORT PEN NEEDLE) 31 gauge x 5/16" Ndle Use to inject insulin into the skin 3 times daily 100 each 1    predniSONE (DELTASONE) 5 MG tablet Take by mouth daily; 5/18-5/24: 20 mg; 5/25-5/31: 15 mg; 6/1-6/7: 10 mg; 6/8/23- thereafter: 5 mg daily; do not stop 70 tablet 11    sulfamethoxazole-trimethoprim 400-80mg (BACTRIM,SEPTRA) 400-80 mg per tablet Take 1 tablet by mouth every morning. Stop 11/12/23 30 tablet 5    tacrolimus (PROGRAF) 1 MG Cap Take 2 capsules (2 mg total) by mouth every 12 (twelve) hours. 120 capsule 11    valGANciclovir (VALCYTE) 450 mg Tab Take 2 tablets (900 mg total) by mouth every morning. Stop 8/14/23 60 tablet 2     No current facility-administered medications on file prior to visit.        Review of Systems    Skin: no skin rash  CNS; no headaches, blurred vision, seizure, or syncope  ENT: No JVD,  Adenopathies,  " "nasal congestion. No oral lesions  Cardiac: No chest pain, dyspnea, claudication, edema or palpitations  Respiratory: No SOB, cough, hemoptysis   Gastro-intestinal: No diarrhea, constipation, abdominal pain, nausea, vomit. No ascitis  Genitourinary: no hematuria, dysuria, frequency, frequency  Musculoskeletal: joint pain, arthritis or vasculitic changes  Psych: alert awake, oriented, No cranial nerves deficit.      Objective:       Physical Exam    /78 (BP Location: Right arm, Patient Position: Sitting, BP Method: Medium (Automatic))   Pulse 82   Temp 97.2 °F (36.2 °C) (Tympanic)   Resp 18   Ht 6' 3" (1.905 m)   Wt 99.9 kg (220 lb 3.8 oz)   SpO2 98%   BMI 27.53 kg/m²       Head: normocephalic  Neck: No JVD, cervical axillary, or femoral adenopathies  Heart: no murmurs, Normal s1 and s2, No gallops, no rubs, No murmurs  Lungs; CTA, good respiratory effort, no crackles  Abdomen: soft, non tender, no splenomegaly or hepatomegaly, no massess, no bruits, incision healing well. No erythema intact   Extremities: No edema, skin rash, joint pain  SNC: awake, alert oriented. Cranial nerves are intact, no focalized, sensitivity and strength preserved      Labs:  Lab Results   Component Value Date    WBC 8.81 05/29/2023    HGB 13.4 (L) 05/29/2023    HCT 40.6 05/29/2023     05/29/2023    K 4.8 05/29/2023     05/29/2023    CO2 24 05/29/2023    BUN 15 05/29/2023    CREATININE 1.8 (H) 05/29/2023    EGFRNORACEVR 47.9 (A) 05/29/2023    CALCIUM 9.6 05/29/2023    PHOS 2.8 05/29/2023    MG 1.8 05/18/2023    ALBUMIN 3.9 05/29/2023    AST 28 05/02/2023    ALT 41 05/02/2023    UTPCR 1.07 (H) 05/15/2023    .9 (H) 05/15/2023    TACROLIMUS 9.4 05/25/2023       No results found for: EXTANC, EXTWBC, EXTSEGS, EXTPLATELETS, EXTHEMOGLOBI, EXTHEMATOCRI, EXTCREATININ, EXTSODIUM, EXTPOTASSIUM, EXTBUN, EXTCO2, EXTCALCIUM, EXTPHOSPHORU, EXTGLUCOSE, EXTALBUMIN, EXTAST, EXTALT, EXTBILITOTAL, EXTLIPASE, EXTAMYLASE    No " results found for: EXTCYCLOSLVL, EXTSIROLIMUS, EXTTACROLVL, EXTPROTCRE, EXTPTHINTACT, EXTPROTEINUA, EXTWBCUA, EXTRBCUA    Labs were reviewed with the patient    Assessment:     1. Hyperparathyroidism, secondary to chronic kidney disease    2. Stage 3a chronic kidney disease    3. Type 2 diabetes mellitus with diabetic polyneuropathy, without long-term current use of insulin    4. Severe obstructive sleep apnea - Intolerant of CPAP    5. Essential hypertension    6. Bariatric surgery status    7. Diabetic nephropathy associated with type 2 diabetes mellitus        Plan:      Will order ECG in [place]. I reviewed ECG at the time of transplant and pre transplant cardiac work up. All satisfactory. He is on B Blocker and his HR at the present time is well controlled. During my physical exam no irregularities suspicious for A Fib.   I advised follow up with his local cardiologist  If similar episode of palpitations patient needs to go to the ER   No change with MMF dose  Tacro level is pending   Continue with prophylaxis  Available blood work discussed in detail  All questions answered  Extensive education provided  I asked the patient /care giver to call me is any other questions or issues       1. CKD stage 3 with stable creatinine at 1.8: will continue follow up as per our center guidelines. patient to continue close follow up with the local General nephrologist. Education provided in appropriate fluid intake, potassium intake. Continue with oral hydration.    1A. Pancreatic Function: N/A for non-pancreas allograft recipients: prograf level today is pending MMF 1000 bid prednisone per protocol     2. High risk immunosuppression medication for organ transplantation, requiring regular intensive follow up and monitoring :   Lab Results   Component Value Date    TACROLIMUS 9.4 05/25/2023    TACROLIMUS 16.9 (H) 05/22/2023    TACROLIMUS 12.1 05/18/2023     No results found for: CYCLOSPORINE  @   Will closely monitor for  toxicities, education provided about adherence to medicines and need to communicate any side effects to the transplant nurse or physician.    3. Allograft Function:essentially stable, the donor GFR was around 100 and his current GFR is 50 , so it is not distant form the expected GFR will monitor for now, trend the creatinine. No need for a kidney biopsy at the present time stable at baseline for the patient. Continue follow up as per our guidelines and with the local General nephrologist. Communication will be sent today.  Lab Results   Component Value Date    CREATININE 1.8 (H) 05/25/2023    CREATININE 1.9 (H) 05/22/2023    CREATININE 1.6 (H) 05/18/2023     No results found for: AMYLASE, LIPASE    4. Hypertension management:  Continue with home blood pressure monitoring, low salt and healthy life discussed with the patient..    5. Metabolic Bone Disease/Secondary Hyperparathyroidism:calcium and phosphorus level discussed with the patient, patient will continue follow up with the general nephrologist for management of metabolic bone disease. Will monitor PTH and Vit D per our transplant center guidelines.      Lab Results   Component Value Date    .9 (H) 05/15/2023    CALCIUM 9.1 05/25/2023    PHOS 2.8 05/25/2023    PHOS 2.8 05/22/2023    PHOS 1.8 (L) 05/18/2023       6. Electrolytes and acid base balance: reviewed with the patient, essentially within the normal range no need for acute changes today, will monitor as per our center guidelines.     Lab Results   Component Value Date     05/25/2023    K 4.8 05/25/2023     05/25/2023    CO2 24 05/25/2023    CO2 25 05/22/2023    CO2 26 05/18/2023       7. Anemia: will continue monitoring as per our center guidelines. No indication for acute intervention today.     Lab Results   Component Value Date    WBC 8.68 05/25/2023    HGB 13.0 (L) 05/25/2023    HCT 39.0 (L) 05/25/2023    MCV 87 05/25/2023     05/25/2023       8.Proteinuria: will continue  with pr/cr ratio as per our center guidelines.  Lab Results   Component Value Date    PROTEINURINE 176 (H) 05/15/2023    CREATRANDUR 165.0 05/15/2023    UTPCR 1.07 (H) 05/15/2023    UTPCR 3.13 (H) 08/18/2022    UTPCR 2.47 (H) 07/20/2022        9. BK virus infection screening: will continue with urine or blood PCR as per our guidelines to prevent BK virus viremia and allograft dysfunction  No results found for: BKVIRUSDNAUR, BKQUANTURINE, BKVIRUSLOG, BKVIRUSURINE, BKVIRUSPCRQB      10. Weight and metabolic management: education provided provided during the clinic visit.   There is no height or weight on file to calculate BMI.       11.Patient safety education regarding immunosuppression including prophylaxis posttransplant for CMV, PCP : Education provided about vaccination and prevention of infections.    12.  Cytopenias: no significant cytopenias will monitor as per our guidelines. Medicine list reviewed including potential causes of drug-induced cytopenias     Lab Results   Component Value Date    WBC 8.68 05/25/2023    HGB 13.0 (L) 05/25/2023    HCT 39.0 (L) 05/25/2023    MCV 87 05/25/2023     05/25/2023       13. Post-transplant Prophylaxis; CMV Infection, PJP and Candida mucosistis and other indicated for this particular patient. Valcyte 900 mg daily and bactrim daily per protocol.     I spoke with the patient for 30 minutes. More than half dedicated to counseling and education. All questions answered    Rell Booth MD  Transplant Nephrology            Follow-up:   Clinic: return to transplant clinic weekly for the first month after transplant; every 2 weeks during months 2-3; then at 6-, 9-, 12-, 18-, 24-, and 36- months post-transplant to reassess for complications from immunosuppression toxicity and monitor for rejection.  Annually thereafter.    Labs: since patient remains at high risk for rejection and drug-related complications that warrant close monitoring, labs will be ordered as follows:  continue twice weekly CBC, renal panel, and drug level for first month; then same labs once weekly through 3rd month post-transplant.  Urine for UA and protein/creatinine ratio monthly.  Serum BK - PCR at 1-, 3-, 6-, 9-, 12-, 18-, 24-, 36-, 48-, and 60 months post-transplant.  Hepatic panel at 1-, 2-, 3-, 6-, 9-, 12-, 18-, 24-, and 36- months post-transplant.    Rell Booth MD       Education:   Material provided to the patient.  Patient reminded to call with any health changes since these can be early signs of significant complications.  Also, I advised the patient to be sure any new medications or changes of old medications are discussed with either a pharmacist or physician knowledgeable with transplant to avoid rejection/drug toxicity related to significant drug interactions.    Patient advised that it is recommended that all transplanted patients, and their close contacts and household members receive Covid vaccination.

## 2023-05-29 ENCOUNTER — LAB VISIT (OUTPATIENT)
Dept: LAB | Facility: HOSPITAL | Age: 43
End: 2023-05-29
Attending: INTERNAL MEDICINE
Payer: COMMERCIAL

## 2023-05-29 ENCOUNTER — PATIENT MESSAGE (OUTPATIENT)
Dept: CARDIOLOGY | Facility: CLINIC | Age: 43
End: 2023-05-29
Payer: COMMERCIAL

## 2023-05-29 ENCOUNTER — PATIENT MESSAGE (OUTPATIENT)
Dept: INTERNAL MEDICINE | Facility: CLINIC | Age: 43
End: 2023-05-29
Payer: COMMERCIAL

## 2023-05-29 ENCOUNTER — OFFICE VISIT (OUTPATIENT)
Dept: TRANSPLANT | Facility: CLINIC | Age: 43
End: 2023-05-29
Payer: COMMERCIAL

## 2023-05-29 ENCOUNTER — PATIENT MESSAGE (OUTPATIENT)
Dept: TRANSPLANT | Facility: CLINIC | Age: 43
End: 2023-05-29
Payer: COMMERCIAL

## 2023-05-29 VITALS
SYSTOLIC BLOOD PRESSURE: 117 MMHG | HEIGHT: 75 IN | WEIGHT: 220.25 LBS | TEMPERATURE: 97 F | RESPIRATION RATE: 18 BRPM | BODY MASS INDEX: 27.38 KG/M2 | DIASTOLIC BLOOD PRESSURE: 78 MMHG | OXYGEN SATURATION: 98 % | HEART RATE: 82 BPM

## 2023-05-29 DIAGNOSIS — E11.21 DIABETIC NEPHROPATHY ASSOCIATED WITH TYPE 2 DIABETES MELLITUS: Chronic | ICD-10-CM

## 2023-05-29 DIAGNOSIS — G47.33 OBSTRUCTIVE SLEEP APNEA: Chronic | ICD-10-CM

## 2023-05-29 DIAGNOSIS — I10 ESSENTIAL HYPERTENSION: Chronic | ICD-10-CM

## 2023-05-29 DIAGNOSIS — N18.31 STAGE 3A CHRONIC KIDNEY DISEASE: ICD-10-CM

## 2023-05-29 DIAGNOSIS — Z99.2 TYPE 2 DIABETES MELLITUS WITH CHRONIC KIDNEY DISEASE ON CHRONIC DIALYSIS, WITHOUT LONG-TERM CURRENT USE OF INSULIN: ICD-10-CM

## 2023-05-29 DIAGNOSIS — N25.81 HYPERPARATHYROIDISM, SECONDARY RENAL: Primary | Chronic | ICD-10-CM

## 2023-05-29 DIAGNOSIS — Z94.0 KIDNEY REPLACED BY TRANSPLANT: ICD-10-CM

## 2023-05-29 DIAGNOSIS — N18.6 TYPE 2 DIABETES MELLITUS WITH CHRONIC KIDNEY DISEASE ON CHRONIC DIALYSIS, WITHOUT LONG-TERM CURRENT USE OF INSULIN: ICD-10-CM

## 2023-05-29 DIAGNOSIS — E11.22 TYPE 2 DIABETES MELLITUS WITH CHRONIC KIDNEY DISEASE ON CHRONIC DIALYSIS, WITHOUT LONG-TERM CURRENT USE OF INSULIN: ICD-10-CM

## 2023-05-29 DIAGNOSIS — E11.42 TYPE 2 DIABETES MELLITUS WITH DIABETIC POLYNEUROPATHY, WITHOUT LONG-TERM CURRENT USE OF INSULIN: Chronic | ICD-10-CM

## 2023-05-29 DIAGNOSIS — Z98.84 BARIATRIC SURGERY STATUS: Chronic | ICD-10-CM

## 2023-05-29 LAB
ALBUMIN SERPL BCP-MCNC: 3.9 G/DL (ref 3.5–5.2)
ANION GAP SERPL CALC-SCNC: 8 MMOL/L (ref 8–16)
BASOPHILS # BLD AUTO: 0.05 K/UL (ref 0–0.2)
BASOPHILS NFR BLD: 0.6 % (ref 0–1.9)
BUN SERPL-MCNC: 15 MG/DL (ref 6–20)
CALCIUM SERPL-MCNC: 9.6 MG/DL (ref 8.7–10.5)
CHLORIDE SERPL-SCNC: 106 MMOL/L (ref 95–110)
CO2 SERPL-SCNC: 24 MMOL/L (ref 23–29)
CREAT SERPL-MCNC: 1.8 MG/DL (ref 0.5–1.4)
DIFFERENTIAL METHOD: ABNORMAL
EOSINOPHIL # BLD AUTO: 0.1 K/UL (ref 0–0.5)
EOSINOPHIL NFR BLD: 1.5 % (ref 0–8)
ERYTHROCYTE [DISTWIDTH] IN BLOOD BY AUTOMATED COUNT: 14.6 % (ref 11.5–14.5)
EST. GFR  (NO RACE VARIABLE): 47.9 ML/MIN/1.73 M^2
GLUCOSE SERPL-MCNC: 145 MG/DL (ref 70–110)
HCT VFR BLD AUTO: 40.6 % (ref 40–54)
HGB BLD-MCNC: 13.4 G/DL (ref 14–18)
IMM GRANULOCYTES # BLD AUTO: 0.11 K/UL (ref 0–0.04)
IMM GRANULOCYTES NFR BLD AUTO: 1.2 % (ref 0–0.5)
LYMPHOCYTES # BLD AUTO: 0.5 K/UL (ref 1–4.8)
LYMPHOCYTES NFR BLD: 5.2 % (ref 18–48)
MCH RBC QN AUTO: 29.1 PG (ref 27–31)
MCHC RBC AUTO-ENTMCNC: 33 G/DL (ref 32–36)
MCV RBC AUTO: 88 FL (ref 82–98)
MONOCYTES # BLD AUTO: 0.4 K/UL (ref 0.3–1)
MONOCYTES NFR BLD: 4.8 % (ref 4–15)
NEUTROPHILS # BLD AUTO: 7.6 K/UL (ref 1.8–7.7)
NEUTROPHILS NFR BLD: 86.7 % (ref 38–73)
NRBC BLD-RTO: 0 /100 WBC
PHOSPHATE SERPL-MCNC: 2.8 MG/DL (ref 2.7–4.5)
PLATELET # BLD AUTO: 175 K/UL (ref 150–450)
PMV BLD AUTO: 9.5 FL (ref 9.2–12.9)
POTASSIUM SERPL-SCNC: 4.8 MMOL/L (ref 3.5–5.1)
RBC # BLD AUTO: 4.61 M/UL (ref 4.6–6.2)
SODIUM SERPL-SCNC: 138 MMOL/L (ref 136–145)
TACROLIMUS BLD-MCNC: 8 NG/ML (ref 5–15)
WBC # BLD AUTO: 8.81 K/UL (ref 3.9–12.7)

## 2023-05-29 PROCEDURE — 1111F DSCHRG MED/CURRENT MED MERGE: CPT | Mod: CPTII,S$GLB,, | Performed by: INTERNAL MEDICINE

## 2023-05-29 PROCEDURE — 4010F ACE/ARB THERAPY RXD/TAKEN: CPT | Mod: CPTII,S$GLB,, | Performed by: INTERNAL MEDICINE

## 2023-05-29 PROCEDURE — 3074F PR MOST RECENT SYSTOLIC BLOOD PRESSURE < 130 MM HG: ICD-10-PCS | Mod: CPTII,S$GLB,, | Performed by: INTERNAL MEDICINE

## 2023-05-29 PROCEDURE — 3044F PR MOST RECENT HEMOGLOBIN A1C LEVEL <7.0%: ICD-10-PCS | Mod: CPTII,S$GLB,, | Performed by: INTERNAL MEDICINE

## 2023-05-29 PROCEDURE — 80197 ASSAY OF TACROLIMUS: CPT | Performed by: INTERNAL MEDICINE

## 2023-05-29 PROCEDURE — 4010F PR ACE/ARB THEARPY RXD/TAKEN: ICD-10-PCS | Mod: CPTII,S$GLB,, | Performed by: INTERNAL MEDICINE

## 2023-05-29 PROCEDURE — 3078F PR MOST RECENT DIASTOLIC BLOOD PRESSURE < 80 MM HG: ICD-10-PCS | Mod: CPTII,S$GLB,, | Performed by: INTERNAL MEDICINE

## 2023-05-29 PROCEDURE — 3066F NEPHROPATHY DOC TX: CPT | Mod: CPTII,S$GLB,, | Performed by: INTERNAL MEDICINE

## 2023-05-29 PROCEDURE — 3008F PR BODY MASS INDEX (BMI) DOCUMENTED: ICD-10-PCS | Mod: CPTII,S$GLB,, | Performed by: INTERNAL MEDICINE

## 2023-05-29 PROCEDURE — 80069 RENAL FUNCTION PANEL: CPT | Performed by: INTERNAL MEDICINE

## 2023-05-29 PROCEDURE — 3072F PR LOW RISK FOR RETINOPATHY: ICD-10-PCS | Mod: CPTII,S$GLB,, | Performed by: INTERNAL MEDICINE

## 2023-05-29 PROCEDURE — 99215 OFFICE O/P EST HI 40 MIN: CPT | Mod: S$GLB,,, | Performed by: INTERNAL MEDICINE

## 2023-05-29 PROCEDURE — 99215 PR OFFICE/OUTPT VISIT, EST, LEVL V, 40-54 MIN: ICD-10-PCS | Mod: S$GLB,,, | Performed by: INTERNAL MEDICINE

## 2023-05-29 PROCEDURE — 99999 PR PBB SHADOW E&M-EST. PATIENT-LVL V: CPT | Mod: PBBFAC,,, | Performed by: INTERNAL MEDICINE

## 2023-05-29 PROCEDURE — 99999 PR PBB SHADOW E&M-EST. PATIENT-LVL V: ICD-10-PCS | Mod: PBBFAC,,, | Performed by: INTERNAL MEDICINE

## 2023-05-29 PROCEDURE — 3072F LOW RISK FOR RETINOPATHY: CPT | Mod: CPTII,S$GLB,, | Performed by: INTERNAL MEDICINE

## 2023-05-29 PROCEDURE — 1159F PR MEDICATION LIST DOCUMENTED IN MEDICAL RECORD: ICD-10-PCS | Mod: CPTII,S$GLB,, | Performed by: INTERNAL MEDICINE

## 2023-05-29 PROCEDURE — 3074F SYST BP LT 130 MM HG: CPT | Mod: CPTII,S$GLB,, | Performed by: INTERNAL MEDICINE

## 2023-05-29 PROCEDURE — 36415 COLL VENOUS BLD VENIPUNCTURE: CPT | Performed by: INTERNAL MEDICINE

## 2023-05-29 PROCEDURE — 3078F DIAST BP <80 MM HG: CPT | Mod: CPTII,S$GLB,, | Performed by: INTERNAL MEDICINE

## 2023-05-29 PROCEDURE — 85025 COMPLETE CBC W/AUTO DIFF WBC: CPT | Performed by: INTERNAL MEDICINE

## 2023-05-29 PROCEDURE — 3008F BODY MASS INDEX DOCD: CPT | Mod: CPTII,S$GLB,, | Performed by: INTERNAL MEDICINE

## 2023-05-29 PROCEDURE — 3066F PR DOCUMENTATION OF TREATMENT FOR NEPHROPATHY: ICD-10-PCS | Mod: CPTII,S$GLB,, | Performed by: INTERNAL MEDICINE

## 2023-05-29 PROCEDURE — 1111F PR DISCHARGE MEDS RECONCILED W/ CURRENT OUTPATIENT MED LIST: ICD-10-PCS | Mod: CPTII,S$GLB,, | Performed by: INTERNAL MEDICINE

## 2023-05-29 PROCEDURE — 3044F HG A1C LEVEL LT 7.0%: CPT | Mod: CPTII,S$GLB,, | Performed by: INTERNAL MEDICINE

## 2023-05-29 PROCEDURE — 1159F MED LIST DOCD IN RCRD: CPT | Mod: CPTII,S$GLB,, | Performed by: INTERNAL MEDICINE

## 2023-05-29 RX ORDER — DULAGLUTIDE 3 MG/.5ML
3 INJECTION, SOLUTION SUBCUTANEOUS
Qty: 12 PEN | Refills: 1 | Status: SHIPPED | OUTPATIENT
Start: 2023-05-29 | End: 2023-06-30 | Stop reason: SDUPTHER

## 2023-05-29 NOTE — TELEPHONE ENCOUNTER
Refill Routing Note   Medication(s) are not appropriate for processing by [facility] for the following reason(s):           [facility] action(s):         Medication Therapy Plan: Per portal note below, patient is requesting refill approval to be sent to [facility] per insurance requirements    Appointments  past 12m or future 3m with PCP    Date Provider   Last Visit   1/26/2023 SABIHA Roberson MD   Next Visit   6/30/2023 SABIHA Roberson MD   ED visits in past 90 days: 0        Note composed:4:19 PM 05/29/2023

## 2023-05-29 NOTE — TELEPHONE ENCOUNTER
Refill Decision Note   Robb Iglesias  is requesting a refill authorization.  Brief Assessment and Rationale for Refill:  Approve     Medication Therapy Plan:  Patient was admitted into hospital on 5/15/23 for kidney transplant. Patient has FOVS on 6/30/23. Pt is to continue Trulicity per hospital discharge notes. Per portal note below, patient is requesting refill approval to be sent to [facility] per insurance requirements    Medication Reconciliation Completed: No   Comments:     No Care Gaps recommended.     Note composed:4:33 PM 05/29/2023

## 2023-05-29 NOTE — TELEPHONE ENCOUNTER
No care due was identified.  [facility] Embedded Care Due Messages. Reference number: [id number].   5/29/2023 4:17:54 PM CDT

## 2023-05-29 NOTE — LETTER
May 29, 2023        Siva Figueroa  [address] [address] NEPHROLOGY  [facility] [address]  Phone: [phone]  Fax: [phone]             [facility]- Transplant 1st Fl  [address]  [facility] [address]  Phone: [phone]   Patient: Robb Iglesias   MR Number: [id number]   YOB: 1980   Date of Visit: 5/29/2023       Dear Dr. Siva Figueroa    Thank you for referring Robb Iglesias to me for evaluation. Attached you will find relevant portions of my assessment and plan of care.    If you have questions, please do not hesitate to call me. I look forward to following Robb Iglesias along with you.    Sincerely,    Rell Booth MD    Enclosure    If you would like to receive this communication electronically, please contact [email or web address] or [phone] to request [employer] [employer] access.    [facility] [employer] is a tool which provides read-only access to select patient information with whom you have a relationship. Its easy to use and provides real time access to review your patients record including encounter summaries, notes, results, and demographic information.    If you feel you have received this communication in error or would no longer like to receive these types of communications, please e-mail [email or web address]

## 2023-05-30 DIAGNOSIS — Z94.0 KIDNEY REPLACED BY TRANSPLANT: Primary | ICD-10-CM

## 2023-06-01 ENCOUNTER — PATIENT MESSAGE (OUTPATIENT)
Dept: ADMINISTRATIVE | Facility: OTHER | Age: 43
End: 2023-06-01
Payer: COMMERCIAL

## 2023-06-01 ENCOUNTER — PATIENT MESSAGE (OUTPATIENT)
Dept: TRANSPLANT | Facility: CLINIC | Age: 43
End: 2023-06-01
Payer: COMMERCIAL

## 2023-06-01 ENCOUNTER — LAB VISIT (OUTPATIENT)
Dept: LAB | Facility: HOSPITAL | Age: 43
End: 2023-06-01
Attending: INTERNAL MEDICINE
Payer: COMMERCIAL

## 2023-06-01 DIAGNOSIS — Z48.298 AFTERCARE FOLLOWING ORGAN TRANSPLANT: ICD-10-CM

## 2023-06-01 DIAGNOSIS — Z94.0 KIDNEY REPLACED BY TRANSPLANT: ICD-10-CM

## 2023-06-01 LAB
ALBUMIN SERPL BCP-MCNC: 4.2 G/DL (ref 3.5–5.2)
ANION GAP SERPL CALC-SCNC: 10 MMOL/L (ref 8–16)
BASOPHILS # BLD AUTO: 0.08 K/UL (ref 0–0.2)
BASOPHILS NFR BLD: 1.1 % (ref 0–1.9)
BUN SERPL-MCNC: 13 MG/DL (ref 6–20)
CALCIUM SERPL-MCNC: 10 MG/DL (ref 8.7–10.5)
CHLORIDE SERPL-SCNC: 104 MMOL/L (ref 95–110)
CO2 SERPL-SCNC: 25 MMOL/L (ref 23–29)
CREAT SERPL-MCNC: 1.8 MG/DL (ref 0.5–1.4)
DIFFERENTIAL METHOD: ABNORMAL
EOSINOPHIL # BLD AUTO: 0.2 K/UL (ref 0–0.5)
EOSINOPHIL NFR BLD: 3.1 % (ref 0–8)
ERYTHROCYTE [DISTWIDTH] IN BLOOD BY AUTOMATED COUNT: 15.2 % (ref 11.5–14.5)
EST. GFR  (NO RACE VARIABLE): 47.9 ML/MIN/1.73 M^2
GLUCOSE SERPL-MCNC: 141 MG/DL (ref 70–110)
HCT VFR BLD AUTO: 39.6 % (ref 40–54)
HGB BLD-MCNC: 12.8 G/DL (ref 14–18)
IMM GRANULOCYTES # BLD AUTO: 0.06 K/UL (ref 0–0.04)
IMM GRANULOCYTES NFR BLD AUTO: 0.8 % (ref 0–0.5)
LYMPHOCYTES # BLD AUTO: 0.5 K/UL (ref 1–4.8)
LYMPHOCYTES NFR BLD: 7.3 % (ref 18–48)
MCH RBC QN AUTO: 28.6 PG (ref 27–31)
MCHC RBC AUTO-ENTMCNC: 32.3 G/DL (ref 32–36)
MCV RBC AUTO: 88 FL (ref 82–98)
MONOCYTES # BLD AUTO: 0.5 K/UL (ref 0.3–1)
MONOCYTES NFR BLD: 7.2 % (ref 4–15)
NEUTROPHILS # BLD AUTO: 5.7 K/UL (ref 1.8–7.7)
NEUTROPHILS NFR BLD: 80.5 % (ref 38–73)
NRBC BLD-RTO: 0 /100 WBC
PHOSPHATE SERPL-MCNC: 2.2 MG/DL (ref 2.7–4.5)
PLATELET # BLD AUTO: 174 K/UL (ref 150–450)
PMV BLD AUTO: 10 FL (ref 9.2–12.9)
POTASSIUM SERPL-SCNC: 4.9 MMOL/L (ref 3.5–5.1)
RBC # BLD AUTO: 4.48 M/UL (ref 4.6–6.2)
SODIUM SERPL-SCNC: 139 MMOL/L (ref 136–145)
URATE SERPL-MCNC: 3.4 MG/DL (ref 3.4–7)
WBC # BLD AUTO: 7.08 K/UL (ref 3.9–12.7)

## 2023-06-01 PROCEDURE — 84550 ASSAY OF BLOOD/URIC ACID: CPT | Performed by: FAMILY MEDICINE

## 2023-06-01 PROCEDURE — 85025 COMPLETE CBC W/AUTO DIFF WBC: CPT | Performed by: INTERNAL MEDICINE

## 2023-06-01 PROCEDURE — 80069 RENAL FUNCTION PANEL: CPT | Performed by: INTERNAL MEDICINE

## 2023-06-01 PROCEDURE — 80197 ASSAY OF TACROLIMUS: CPT | Performed by: INTERNAL MEDICINE

## 2023-06-01 PROCEDURE — 36415 COLL VENOUS BLD VENIPUNCTURE: CPT | Performed by: INTERNAL MEDICINE

## 2023-06-02 ENCOUNTER — PATIENT MESSAGE (OUTPATIENT)
Dept: TRANSPLANT | Facility: CLINIC | Age: 43
End: 2023-06-02
Payer: COMMERCIAL

## 2023-06-02 DIAGNOSIS — Z94.0 KIDNEY REPLACED BY TRANSPLANT: Primary | ICD-10-CM

## 2023-06-02 DIAGNOSIS — Z94.0 S/P KIDNEY TRANSPLANT: ICD-10-CM

## 2023-06-02 LAB — TACROLIMUS BLD-MCNC: 9.8 NG/ML (ref 5–15)

## 2023-06-02 RX ORDER — TACROLIMUS 1 MG/1
CAPSULE ORAL
Qty: 90 CAPSULE | Refills: 11 | Status: SHIPPED | OUTPATIENT
Start: 2023-06-02 | End: 2023-07-11 | Stop reason: DRUGHIGH

## 2023-06-02 RX ORDER — PANTOPRAZOLE SODIUM 40 MG/1
40 TABLET, DELAYED RELEASE ORAL DAILY
Qty: 30 TABLET | Refills: 11 | Status: SHIPPED | OUTPATIENT
Start: 2023-06-02 | End: 2024-02-07 | Stop reason: SDUPTHER

## 2023-06-02 NOTE — TELEPHONE ENCOUNTER
FK to 2/1    ----- Message from Rell Booth MD sent at 6/2/2023 10:15 AM CDT -----  Lower prograf to 2/1   Encourage hydration

## 2023-06-03 ENCOUNTER — HOSPITAL ENCOUNTER (EMERGENCY)
Facility: HOSPITAL | Age: 43
Discharge: ANOTHER HOSPITAL | End: 2023-06-04
Attending: EMERGENCY MEDICINE
Payer: COMMERCIAL

## 2023-06-03 ENCOUNTER — PATIENT MESSAGE (OUTPATIENT)
Dept: TRANSPLANT | Facility: CLINIC | Age: 43
End: 2023-06-03
Payer: COMMERCIAL

## 2023-06-03 DIAGNOSIS — I48.91 ATRIAL FIBRILLATION WITH RVR: Primary | ICD-10-CM

## 2023-06-03 DIAGNOSIS — R07.9 CHEST PAIN: ICD-10-CM

## 2023-06-03 DIAGNOSIS — Z94.0 S/P KIDNEY TRANSPLANT: ICD-10-CM

## 2023-06-03 DIAGNOSIS — R00.0 TACHYCARDIA: ICD-10-CM

## 2023-06-03 LAB
BASOPHILS # BLD AUTO: 0.11 K/UL (ref 0–0.2)
BASOPHILS NFR BLD: 1.2 % (ref 0–1.9)
DIFFERENTIAL METHOD: ABNORMAL
EOSINOPHIL # BLD AUTO: 0.2 K/UL (ref 0–0.5)
EOSINOPHIL NFR BLD: 2.1 % (ref 0–8)
ERYTHROCYTE [DISTWIDTH] IN BLOOD BY AUTOMATED COUNT: 15.2 % (ref 11.5–14.5)
HCT VFR BLD AUTO: 38.9 % (ref 40–54)
HGB BLD-MCNC: 13.3 G/DL (ref 14–18)
IMM GRANULOCYTES # BLD AUTO: 0.05 K/UL (ref 0–0.04)
IMM GRANULOCYTES NFR BLD AUTO: 0.6 % (ref 0–0.5)
LYMPHOCYTES # BLD AUTO: 0.7 K/UL (ref 1–4.8)
LYMPHOCYTES NFR BLD: 8.2 % (ref 18–48)
MCH RBC QN AUTO: 28.9 PG (ref 27–31)
MCHC RBC AUTO-ENTMCNC: 34.2 G/DL (ref 32–36)
MCV RBC AUTO: 85 FL (ref 82–98)
MONOCYTES # BLD AUTO: 0.6 K/UL (ref 0.3–1)
MONOCYTES NFR BLD: 6.4 % (ref 4–15)
NEUTROPHILS # BLD AUTO: 7.2 K/UL (ref 1.8–7.7)
NEUTROPHILS NFR BLD: 81.5 % (ref 38–73)
NRBC BLD-RTO: 0 /100 WBC
PLATELET # BLD AUTO: 160 K/UL (ref 150–450)
PMV BLD AUTO: 9.6 FL (ref 9.2–12.9)
RBC # BLD AUTO: 4.6 M/UL (ref 4.6–6.2)
WBC # BLD AUTO: 8.89 K/UL (ref 3.9–12.7)

## 2023-06-03 PROCEDURE — 96376 TX/PRO/DX INJ SAME DRUG ADON: CPT

## 2023-06-03 PROCEDURE — 84484 ASSAY OF TROPONIN QUANT: CPT | Performed by: NURSE PRACTITIONER

## 2023-06-03 PROCEDURE — 96375 TX/PRO/DX INJ NEW DRUG ADDON: CPT

## 2023-06-03 PROCEDURE — 83880 ASSAY OF NATRIURETIC PEPTIDE: CPT | Performed by: NURSE PRACTITIONER

## 2023-06-03 PROCEDURE — 93010 EKG 12-LEAD: ICD-10-PCS | Mod: ,,, | Performed by: INTERNAL MEDICINE

## 2023-06-03 PROCEDURE — 93010 ELECTROCARDIOGRAM REPORT: CPT | Mod: ,,, | Performed by: INTERNAL MEDICINE

## 2023-06-03 PROCEDURE — 99285 EMERGENCY DEPT VISIT HI MDM: CPT | Mod: 25

## 2023-06-03 PROCEDURE — 85025 COMPLETE CBC W/AUTO DIFF WBC: CPT | Performed by: NURSE PRACTITIONER

## 2023-06-03 PROCEDURE — 96374 THER/PROPH/DIAG INJ IV PUSH: CPT

## 2023-06-03 PROCEDURE — 93005 ELECTROCARDIOGRAM TRACING: CPT

## 2023-06-03 PROCEDURE — 80053 COMPREHEN METABOLIC PANEL: CPT | Performed by: NURSE PRACTITIONER

## 2023-06-03 RX ORDER — INSULIN ASPART 100 [IU]/ML
INJECTION, SOLUTION INTRAVENOUS; SUBCUTANEOUS
Status: ON HOLD | COMMUNITY
End: 2023-06-30

## 2023-06-04 ENCOUNTER — PATIENT MESSAGE (OUTPATIENT)
Dept: TRANSPLANT | Facility: CLINIC | Age: 43
End: 2023-06-04
Payer: COMMERCIAL

## 2023-06-04 ENCOUNTER — HOSPITAL ENCOUNTER (INPATIENT)
Facility: HOSPITAL | Age: 43
LOS: 1 days | Discharge: HOME | DRG: 309 | End: 2023-06-05
Attending: TRANSPLANT SURGERY | Admitting: TRANSPLANT SURGERY
Payer: COMMERCIAL

## 2023-06-04 VITALS
TEMPERATURE: 98 F | RESPIRATION RATE: 15 BRPM | HEART RATE: 83 BPM | WEIGHT: 218.25 LBS | OXYGEN SATURATION: 99 % | BODY MASS INDEX: 27.28 KG/M2 | SYSTOLIC BLOOD PRESSURE: 116 MMHG | DIASTOLIC BLOOD PRESSURE: 78 MMHG

## 2023-06-04 DIAGNOSIS — I48.91 ATRIAL FIBRILLATION: ICD-10-CM

## 2023-06-04 DIAGNOSIS — Z94.0 S/P KIDNEY TRANSPLANT: ICD-10-CM

## 2023-06-04 DIAGNOSIS — Z79.60 LONG-TERM USE OF IMMUNOSUPPRESSANT MEDICATION: ICD-10-CM

## 2023-06-04 DIAGNOSIS — I48.91 ATRIAL FIBRILLATION WITH RVR: ICD-10-CM

## 2023-06-04 DIAGNOSIS — Z91.89 AT RISK FOR OPPORTUNISTIC INFECTIONS: ICD-10-CM

## 2023-06-04 DIAGNOSIS — Z29.89 PROPHYLACTIC IMMUNOTHERAPY: ICD-10-CM

## 2023-06-04 DIAGNOSIS — I48.91 NEW ONSET A-FIB: Primary | ICD-10-CM

## 2023-06-04 LAB
ALBUMIN SERPL BCP-MCNC: 3.6 G/DL (ref 3.5–5.2)
ALBUMIN SERPL BCP-MCNC: 4.1 G/DL (ref 3.5–5.2)
ALP SERPL-CCNC: 108 U/L (ref 55–135)
ALT SERPL W/O P-5'-P-CCNC: 63 U/L (ref 10–44)
ANION GAP SERPL CALC-SCNC: 10 MMOL/L (ref 8–16)
ANION GAP SERPL CALC-SCNC: 8 MMOL/L (ref 8–16)
AST SERPL-CCNC: 29 U/L (ref 10–40)
BACTERIA #/AREA URNS HPF: NORMAL /HPF
BASOPHILS # BLD AUTO: 0.08 K/UL (ref 0–0.2)
BASOPHILS NFR BLD: 1 % (ref 0–1.9)
BILIRUB SERPL-MCNC: 1.7 MG/DL (ref 0.1–1)
BILIRUB UR QL STRIP: NEGATIVE
BNP SERPL-MCNC: 27 PG/ML (ref 0–99)
BUN SERPL-MCNC: 15 MG/DL (ref 6–20)
BUN SERPL-MCNC: 19 MG/DL (ref 6–20)
CALCIUM SERPL-MCNC: 9.1 MG/DL (ref 8.7–10.5)
CALCIUM SERPL-MCNC: 9.3 MG/DL (ref 8.7–10.5)
CHLORIDE SERPL-SCNC: 108 MMOL/L (ref 95–110)
CHLORIDE SERPL-SCNC: 109 MMOL/L (ref 95–110)
CLARITY UR: CLEAR
CO2 SERPL-SCNC: 22 MMOL/L (ref 23–29)
CO2 SERPL-SCNC: 22 MMOL/L (ref 23–29)
COLOR UR: COLORLESS
CREAT SERPL-MCNC: 1.7 MG/DL (ref 0.5–1.4)
CREAT SERPL-MCNC: 1.7 MG/DL (ref 0.5–1.4)
DIFFERENTIAL METHOD: ABNORMAL
EOSINOPHIL # BLD AUTO: 0.2 K/UL (ref 0–0.5)
EOSINOPHIL NFR BLD: 2.4 % (ref 0–8)
ERYTHROCYTE [DISTWIDTH] IN BLOOD BY AUTOMATED COUNT: 15.6 % (ref 11.5–14.5)
EST. GFR  (NO RACE VARIABLE): 51 ML/MIN/1.73 M^2
EST. GFR  (NO RACE VARIABLE): 51.3 ML/MIN/1.73 M^2
GLUCOSE SERPL-MCNC: 153 MG/DL (ref 70–110)
GLUCOSE SERPL-MCNC: 205 MG/DL (ref 70–110)
GLUCOSE UR QL STRIP: ABNORMAL
HCT VFR BLD AUTO: 38.3 % (ref 40–54)
HGB BLD-MCNC: 12.2 G/DL (ref 14–18)
HGB UR QL STRIP: ABNORMAL
IMM GRANULOCYTES # BLD AUTO: 0.07 K/UL (ref 0–0.04)
IMM GRANULOCYTES NFR BLD AUTO: 0.9 % (ref 0–0.5)
KETONES UR QL STRIP: NEGATIVE
LEUKOCYTE ESTERASE UR QL STRIP: NEGATIVE
LYMPHOCYTES # BLD AUTO: 0.5 K/UL (ref 1–4.8)
LYMPHOCYTES NFR BLD: 5.8 % (ref 18–48)
MAGNESIUM SERPL-MCNC: 1.8 MG/DL (ref 1.6–2.6)
MCH RBC QN AUTO: 28.6 PG (ref 27–31)
MCHC RBC AUTO-ENTMCNC: 31.9 G/DL (ref 32–36)
MCV RBC AUTO: 90 FL (ref 82–98)
MICROSCOPIC COMMENT: NORMAL
MONOCYTES # BLD AUTO: 0.5 K/UL (ref 0.3–1)
MONOCYTES NFR BLD: 6.2 % (ref 4–15)
NEUTROPHILS # BLD AUTO: 6.7 K/UL (ref 1.8–7.7)
NEUTROPHILS NFR BLD: 83.7 % (ref 38–73)
NITRITE UR QL STRIP: NEGATIVE
NRBC BLD-RTO: 0 /100 WBC
PH UR STRIP: 6 [PH] (ref 5–8)
PHOSPHATE SERPL-MCNC: 2.7 MG/DL (ref 2.7–4.5)
PHOSPHATE SERPL-MCNC: 3 MG/DL (ref 2.7–4.5)
PLATELET # BLD AUTO: 142 K/UL (ref 150–450)
PMV BLD AUTO: 10 FL (ref 9.2–12.9)
POCT GLUCOSE: 150 MG/DL (ref 70–110)
POCT GLUCOSE: 173 MG/DL (ref 70–110)
POCT GLUCOSE: 250 MG/DL (ref 70–110)
POCT GLUCOSE: 269 MG/DL (ref 70–110)
POTASSIUM SERPL-SCNC: 4 MMOL/L (ref 3.5–5.1)
POTASSIUM SERPL-SCNC: 4.2 MMOL/L (ref 3.5–5.1)
PROT SERPL-MCNC: 6.8 G/DL (ref 6–8.4)
PROT UR QL STRIP: NEGATIVE
RBC # BLD AUTO: 4.26 M/UL (ref 4.6–6.2)
RBC #/AREA URNS HPF: 3 /HPF (ref 0–4)
SARS-COV-2 RDRP RESP QL NAA+PROBE: NEGATIVE
SODIUM SERPL-SCNC: 139 MMOL/L (ref 136–145)
SODIUM SERPL-SCNC: 140 MMOL/L (ref 136–145)
SP GR UR STRIP: 1.01 (ref 1–1.03)
TACROLIMUS BLD-MCNC: 7.8 NG/ML (ref 5–15)
TROPONIN I SERPL DL<=0.01 NG/ML-MCNC: 0.01 NG/ML (ref 0–0.03)
TROPONIN I SERPL DL<=0.01 NG/ML-MCNC: 0.01 NG/ML (ref 0–0.03)
URN SPEC COLLECT METH UR: ABNORMAL
UROBILINOGEN UR STRIP-ACNC: NEGATIVE EU/DL
WBC # BLD AUTO: 7.94 K/UL (ref 3.9–12.7)
WBC #/AREA URNS HPF: 1 /HPF (ref 0–5)
YEAST URNS QL MICRO: NORMAL

## 2023-06-04 PROCEDURE — 84100 ASSAY OF PHOSPHORUS: CPT | Performed by: EMERGENCY MEDICINE

## 2023-06-04 PROCEDURE — 93010 ELECTROCARDIOGRAM REPORT: CPT | Mod: ,,, | Performed by: INTERNAL MEDICINE

## 2023-06-04 PROCEDURE — 93005 ELECTROCARDIOGRAM TRACING: CPT

## 2023-06-04 PROCEDURE — 63600175 PHARM REV CODE 636 W HCPCS: Performed by: EMERGENCY MEDICINE

## 2023-06-04 PROCEDURE — 25000003 PHARM REV CODE 250: Performed by: EMERGENCY MEDICINE

## 2023-06-04 PROCEDURE — 36415 COLL VENOUS BLD VENIPUNCTURE: CPT | Performed by: NURSE PRACTITIONER

## 2023-06-04 PROCEDURE — 63600175 PHARM REV CODE 636 W HCPCS: Performed by: PHYSICIAN ASSISTANT

## 2023-06-04 PROCEDURE — 93010 EKG 12-LEAD: ICD-10-PCS | Mod: ,,, | Performed by: INTERNAL MEDICINE

## 2023-06-04 PROCEDURE — 80197 ASSAY OF TACROLIMUS: CPT | Performed by: NURSE PRACTITIONER

## 2023-06-04 PROCEDURE — 20600001 HC STEP DOWN PRIVATE ROOM

## 2023-06-04 PROCEDURE — 84484 ASSAY OF TROPONIN QUANT: CPT | Performed by: NURSE PRACTITIONER

## 2023-06-04 PROCEDURE — 25000003 PHARM REV CODE 250: Performed by: STUDENT IN AN ORGANIZED HEALTH CARE EDUCATION/TRAINING PROGRAM

## 2023-06-04 PROCEDURE — 83735 ASSAY OF MAGNESIUM: CPT | Performed by: EMERGENCY MEDICINE

## 2023-06-04 PROCEDURE — 99223 PR INITIAL HOSPITAL CARE,LEVL III: ICD-10-PCS | Mod: 24,,, | Performed by: NURSE PRACTITIONER

## 2023-06-04 PROCEDURE — 99222 1ST HOSP IP/OBS MODERATE 55: CPT | Mod: ,,, | Performed by: INTERNAL MEDICINE

## 2023-06-04 PROCEDURE — 25000003 PHARM REV CODE 250: Performed by: TRANSPLANT SURGERY

## 2023-06-04 PROCEDURE — 99232 SBSQ HOSP IP/OBS MODERATE 35: CPT | Mod: ,,, | Performed by: PHYSICIAN ASSISTANT

## 2023-06-04 PROCEDURE — 63600175 PHARM REV CODE 636 W HCPCS: Performed by: NURSE PRACTITIONER

## 2023-06-04 PROCEDURE — 99232 PR SUBSEQUENT HOSPITAL CARE,LEVL II: ICD-10-PCS | Mod: ,,, | Performed by: PHYSICIAN ASSISTANT

## 2023-06-04 PROCEDURE — 99222 PR INITIAL HOSPITAL CARE,LEVL II: ICD-10-PCS | Mod: ,,, | Performed by: INTERNAL MEDICINE

## 2023-06-04 PROCEDURE — 81000 URINALYSIS NONAUTO W/SCOPE: CPT | Performed by: NURSE PRACTITIONER

## 2023-06-04 PROCEDURE — 85025 COMPLETE CBC W/AUTO DIFF WBC: CPT | Performed by: NURSE PRACTITIONER

## 2023-06-04 PROCEDURE — U0002 COVID-19 LAB TEST NON-CDC: HCPCS | Performed by: EMERGENCY MEDICINE

## 2023-06-04 PROCEDURE — 25000003 PHARM REV CODE 250: Performed by: NURSE PRACTITIONER

## 2023-06-04 PROCEDURE — 80069 RENAL FUNCTION PANEL: CPT | Performed by: NURSE PRACTITIONER

## 2023-06-04 PROCEDURE — 99223 1ST HOSP IP/OBS HIGH 75: CPT | Mod: 24,,, | Performed by: NURSE PRACTITIONER

## 2023-06-04 RX ORDER — SODIUM CHLORIDE 0.9 % (FLUSH) 0.9 %
10 SYRINGE (ML) INJECTION
Status: DISCONTINUED | OUTPATIENT
Start: 2023-06-04 | End: 2023-06-05 | Stop reason: HOSPADM

## 2023-06-04 RX ORDER — DILTIAZEM HCL/D5W 125 MG/125
2.5 PLASTIC BAG, INJECTION (ML) INTRAVENOUS CONTINUOUS
Status: DISCONTINUED | OUTPATIENT
Start: 2023-06-04 | End: 2023-06-04

## 2023-06-04 RX ORDER — CARVEDILOL 6.25 MG/1
6.25 TABLET ORAL 2 TIMES DAILY
Status: DISCONTINUED | OUTPATIENT
Start: 2023-06-04 | End: 2023-06-04

## 2023-06-04 RX ORDER — ACETAMINOPHEN 325 MG/1
650 TABLET ORAL EVERY 6 HOURS PRN
Status: DISCONTINUED | OUTPATIENT
Start: 2023-06-04 | End: 2023-06-05 | Stop reason: HOSPADM

## 2023-06-04 RX ORDER — INSULIN ASPART 100 [IU]/ML
5 INJECTION, SOLUTION INTRAVENOUS; SUBCUTANEOUS
Status: DISCONTINUED | OUTPATIENT
Start: 2023-06-04 | End: 2023-06-05 | Stop reason: HOSPADM

## 2023-06-04 RX ORDER — DILTIAZEM HCL 1 MG/ML
0-15 INJECTION, SOLUTION INTRAVENOUS CONTINUOUS
Status: DISCONTINUED | OUTPATIENT
Start: 2023-06-04 | End: 2023-06-04 | Stop reason: HOSPADM

## 2023-06-04 RX ORDER — TALC
6 POWDER (GRAM) TOPICAL NIGHTLY PRN
Status: DISCONTINUED | OUTPATIENT
Start: 2023-06-04 | End: 2023-06-05 | Stop reason: HOSPADM

## 2023-06-04 RX ORDER — VALGANCICLOVIR 450 MG/1
900 TABLET, FILM COATED ORAL EVERY MORNING
Status: DISCONTINUED | OUTPATIENT
Start: 2023-06-04 | End: 2023-06-05 | Stop reason: HOSPADM

## 2023-06-04 RX ORDER — DEXTROSE 40 %
15 GEL (GRAM) ORAL
Status: DISCONTINUED | OUTPATIENT
Start: 2023-06-04 | End: 2023-06-05 | Stop reason: HOSPADM

## 2023-06-04 RX ORDER — PANTOPRAZOLE SODIUM 40 MG/1
40 TABLET, DELAYED RELEASE ORAL DAILY
Status: DISCONTINUED | OUTPATIENT
Start: 2023-06-04 | End: 2023-06-05 | Stop reason: HOSPADM

## 2023-06-04 RX ORDER — DEXTROSE 40 %
30 GEL (GRAM) ORAL
Status: DISCONTINUED | OUTPATIENT
Start: 2023-06-04 | End: 2023-06-04

## 2023-06-04 RX ORDER — PREDNISONE 5 MG/1
15 TABLET ORAL DAILY
Status: DISCONTINUED | OUTPATIENT
Start: 2023-06-04 | End: 2023-06-05 | Stop reason: HOSPADM

## 2023-06-04 RX ORDER — DEXTROSE 40 %
15 GEL (GRAM) ORAL
Status: DISCONTINUED | OUTPATIENT
Start: 2023-06-04 | End: 2023-06-04

## 2023-06-04 RX ORDER — ATORVASTATIN CALCIUM 20 MG/1
80 TABLET, FILM COATED ORAL NIGHTLY
Status: DISCONTINUED | OUTPATIENT
Start: 2023-06-04 | End: 2023-06-05 | Stop reason: HOSPADM

## 2023-06-04 RX ORDER — METOPROLOL TARTRATE 25 MG/1
25 TABLET, FILM COATED ORAL 4 TIMES DAILY
Status: DISCONTINUED | OUTPATIENT
Start: 2023-06-04 | End: 2023-06-05 | Stop reason: HOSPADM

## 2023-06-04 RX ORDER — MYCOPHENOLATE MOFETIL 250 MG/1
1000 CAPSULE ORAL 2 TIMES DAILY
Status: DISCONTINUED | OUTPATIENT
Start: 2023-06-04 | End: 2023-06-05 | Stop reason: HOSPADM

## 2023-06-04 RX ORDER — EZETIMIBE 10 MG/1
10 TABLET ORAL DAILY
Status: DISCONTINUED | OUTPATIENT
Start: 2023-06-04 | End: 2023-06-05 | Stop reason: HOSPADM

## 2023-06-04 RX ORDER — TACROLIMUS 1 MG/1
1 CAPSULE ORAL EVERY EVENING
Status: DISCONTINUED | OUTPATIENT
Start: 2023-06-04 | End: 2023-06-05 | Stop reason: HOSPADM

## 2023-06-04 RX ORDER — SODIUM CHLORIDE 0.9 % (FLUSH) 0.9 %
3 SYRINGE (ML) INJECTION EVERY 8 HOURS
Status: DISCONTINUED | OUTPATIENT
Start: 2023-06-04 | End: 2023-06-04

## 2023-06-04 RX ORDER — IBUPROFEN 200 MG
16 TABLET ORAL
Status: DISCONTINUED | OUTPATIENT
Start: 2023-06-04 | End: 2023-06-04 | Stop reason: RX

## 2023-06-04 RX ORDER — INSULIN ASPART 100 [IU]/ML
1-10 INJECTION, SOLUTION INTRAVENOUS; SUBCUTANEOUS
Status: DISCONTINUED | OUTPATIENT
Start: 2023-06-04 | End: 2023-06-04

## 2023-06-04 RX ORDER — SULFAMETHOXAZOLE AND TRIMETHOPRIM 400; 80 MG/1; MG/1
1 TABLET ORAL EVERY MORNING
Status: DISCONTINUED | OUTPATIENT
Start: 2023-06-04 | End: 2023-06-05 | Stop reason: HOSPADM

## 2023-06-04 RX ORDER — DEXTROSE 40 %
30 GEL (GRAM) ORAL
Status: DISCONTINUED | OUTPATIENT
Start: 2023-06-04 | End: 2023-06-05 | Stop reason: HOSPADM

## 2023-06-04 RX ORDER — INSULIN ASPART 100 [IU]/ML
1-10 INJECTION, SOLUTION INTRAVENOUS; SUBCUTANEOUS
Status: DISCONTINUED | OUTPATIENT
Start: 2023-06-04 | End: 2023-06-05 | Stop reason: HOSPADM

## 2023-06-04 RX ORDER — OXYCODONE HYDROCHLORIDE 5 MG/1
5 TABLET ORAL EVERY 4 HOURS PRN
Status: DISCONTINUED | OUTPATIENT
Start: 2023-06-04 | End: 2023-06-05 | Stop reason: HOSPADM

## 2023-06-04 RX ORDER — ONDANSETRON 2 MG/ML
4 INJECTION INTRAMUSCULAR; INTRAVENOUS
Status: COMPLETED | OUTPATIENT
Start: 2023-06-04 | End: 2023-06-04

## 2023-06-04 RX ORDER — GLUCAGON 1 MG
1 KIT INJECTION
Status: DISCONTINUED | OUTPATIENT
Start: 2023-06-04 | End: 2023-06-05 | Stop reason: HOSPADM

## 2023-06-04 RX ORDER — IBUPROFEN 200 MG
24 TABLET ORAL
Status: DISCONTINUED | OUTPATIENT
Start: 2023-06-04 | End: 2023-06-04 | Stop reason: RX

## 2023-06-04 RX ORDER — SODIUM CHLORIDE 0.9 % (FLUSH) 0.9 %
10 SYRINGE (ML) INJECTION
Status: DISCONTINUED | OUTPATIENT
Start: 2023-06-05 | End: 2023-06-04

## 2023-06-04 RX ORDER — TACROLIMUS 1 MG/1
2 CAPSULE ORAL EVERY MORNING
Status: DISCONTINUED | OUTPATIENT
Start: 2023-06-04 | End: 2023-06-05 | Stop reason: HOSPADM

## 2023-06-04 RX ORDER — DILTIAZEM HYDROCHLORIDE 5 MG/ML
10 INJECTION INTRAVENOUS
Status: COMPLETED | OUTPATIENT
Start: 2023-06-04 | End: 2023-06-04

## 2023-06-04 RX ORDER — ONDANSETRON 8 MG/1
8 TABLET, ORALLY DISINTEGRATING ORAL EVERY 8 HOURS PRN
Status: DISCONTINUED | OUTPATIENT
Start: 2023-06-04 | End: 2023-06-05 | Stop reason: HOSPADM

## 2023-06-04 RX ORDER — GLUCAGON 1 MG
1 KIT INJECTION
Status: DISCONTINUED | OUTPATIENT
Start: 2023-06-04 | End: 2023-06-04

## 2023-06-04 RX ORDER — TACROLIMUS 1 MG/1
2 CAPSULE ORAL 2 TIMES DAILY
Status: DISCONTINUED | OUTPATIENT
Start: 2023-06-04 | End: 2023-06-04

## 2023-06-04 RX ORDER — HEPARIN SODIUM 5000 [USP'U]/ML
5000 INJECTION, SOLUTION INTRAVENOUS; SUBCUTANEOUS EVERY 8 HOURS
Status: DISCONTINUED | OUTPATIENT
Start: 2023-06-04 | End: 2023-06-04

## 2023-06-04 RX ADMIN — ONDANSETRON 4 MG: 2 INJECTION INTRAMUSCULAR; INTRAVENOUS at 03:06

## 2023-06-04 RX ADMIN — INSULIN ASPART 6 UNITS: 100 INJECTION, SOLUTION INTRAVENOUS; SUBCUTANEOUS at 05:06

## 2023-06-04 RX ADMIN — Medication 2.5 MG/HR: at 02:06

## 2023-06-04 RX ADMIN — VALGANCICLOVIR 900 MG: 450 TABLET, FILM COATED ORAL at 08:06

## 2023-06-04 RX ADMIN — ATORVASTATIN CALCIUM 80 MG: 20 TABLET, FILM COATED ORAL at 08:06

## 2023-06-04 RX ADMIN — APIXABAN 5 MG: 5 TABLET, FILM COATED ORAL at 08:06

## 2023-06-04 RX ADMIN — PREDNISONE 15 MG: 5 TABLET ORAL at 08:06

## 2023-06-04 RX ADMIN — INSULIN ASPART 4 UNITS: 100 INJECTION, SOLUTION INTRAVENOUS; SUBCUTANEOUS at 12:06

## 2023-06-04 RX ADMIN — METOPROLOL TARTRATE 25 MG: 25 TABLET, FILM COATED ORAL at 08:06

## 2023-06-04 RX ADMIN — MYCOPHENOLATE MOFETIL 1000 MG: 250 CAPSULE ORAL at 08:06

## 2023-06-04 RX ADMIN — METOPROLOL TARTRATE 25 MG: 25 TABLET, FILM COATED ORAL at 04:06

## 2023-06-04 RX ADMIN — TACROLIMUS 2 MG: 1 CAPSULE ORAL at 08:06

## 2023-06-04 RX ADMIN — DILTIAZEM HYDROCHLORIDE 10 MG: 5 INJECTION INTRAVENOUS at 12:06

## 2023-06-04 RX ADMIN — INSULIN ASPART 5 UNITS: 100 INJECTION, SOLUTION INTRAVENOUS; SUBCUTANEOUS at 05:06

## 2023-06-04 RX ADMIN — APIXABAN 5 MG: 5 TABLET, FILM COATED ORAL at 09:06

## 2023-06-04 RX ADMIN — SULFAMETHOXAZOLE AND TRIMETHOPRIM 1 TABLET: 400; 80 TABLET ORAL at 08:06

## 2023-06-04 RX ADMIN — INSULIN ASPART 1 UNITS: 100 INJECTION, SOLUTION INTRAVENOUS; SUBCUTANEOUS at 10:06

## 2023-06-04 RX ADMIN — EZETIMIBE 10 MG: 10 TABLET ORAL at 08:06

## 2023-06-04 RX ADMIN — Medication 2.5 MG/HR: at 07:06

## 2023-06-04 RX ADMIN — CARVEDILOL 6.25 MG: 6.25 TABLET, FILM COATED ORAL at 08:06

## 2023-06-04 RX ADMIN — PANTOPRAZOLE SODIUM 40 MG: 40 TABLET, DELAYED RELEASE ORAL at 08:06

## 2023-06-04 RX ADMIN — THERA TABS 1 TABLET: TAB at 08:06

## 2023-06-04 RX ADMIN — TACROLIMUS 1 MG: 1 CAPSULE ORAL at 05:06

## 2023-06-04 NOTE — ASSESSMENT & PLAN NOTE
BG goal: 140-180   T2DM.  Recent kidney transplant. On steroids.  Will start on regimen similar to home regimen and monitor.    - -Start Novolog 5 units AC   - Start Novolog  MDC w/ ISF 25 starting at 150  - POCT Glucose before meals and at bedtime  - Hypoglycemia protocol in place      ** Please notify Endocrine for any change and/or advance in diet**  ** Please call Endocrine for any BG related issues **     Discharge Planning:   TBD. Please notify endocrinology prior to discharge.

## 2023-06-04 NOTE — H&P
"[facility] - Transplant Stepdown  Kidney Transplant  H&P      Subjective:     Chief Complaint/Reason for Admission: A-fib RVR     History of Present Illness:  Mr. Iglesias is a 42 y.o. year old White male with history of ESRD secondary to diabetic nephropathy who received a living kidney transplant on 5/15/23 (thymo induction, CMV ++). ESRD 2/2 for Diabetes Mellitus - Type II.  His most recent creatinine is 1.7   PMHX: Coronary angioplasty with stent placement,NSTEMI with CAD 8/2017, HTN, GERD, Sleep Apnea. Patient reports starting 1 days ago felt "heart racing" lasting hours-  Patient presented to OSH ED - where was found to be in A-FIb RVR, patient was placed on cardizem gtt now rate controlled in the 80's - remains in Afib. Patient reported dyspnea- but has improved with heart rate.  He denies CP, fever, chills, and all other sxs at this time, dysuria, or incisional discomfort/drainage.    Patient admitted for further evaluation and treatment- Cardiology consulted. Continue gtt at this time placed on Telemetry, continue home medications.     PTA Medications   Medication Sig    aspirin (ECOTRIN) 81 MG EC tablet Take 1 tablet (81 mg total) by mouth once daily.    atorvastatin (LIPITOR) 80 MG tablet Take 1 tablet (80 mg total) by mouth every evening.    blood sugar diagnostic Strp Check blood glucose 2 times daily as directed and as needed    blood-glucose meter (ONETOUCH ULTRAMINI) kit Use as instructed    carvediloL (COREG) 6.25 MG tablet Take 1 tablet (6.25 mg total) by mouth 2 (two) times daily.    docusate sodium (COLACE) 100 MG capsule Take 1 capsule (100 mg total) by mouth 3 (three) times daily.    dulaglutide (TRULICITY) 3 mg/0.5 mL pen injector Inject 3 mg into the skin every 7 days.    ezetimibe (ZETIA) 10 mg tablet Take 1 tablet (10 mg total) by mouth once daily.    insulin aspart U-100 (NOVOLOG) 100 unit/mL injection Inject into the skin 3 (three) times daily before meals.    insulin lispro-aabc " "(LYUMJEV KWIKPEN U-100 INSULIN) 100 unit/mL pen Inject 4 Units into the skin 3 (three) times daily with meals. Plus sliding scale insulin. Max 27 units    k phos di & mono-sod phos mono (K-PHOS-NEUTRAL) 250 mg Tab Take 2 tablets by mouth 3 (three) times daily.    lancets Misc Check blood glucose 2 times daily as directed and as needed (dispense insurance preferred brand or patient choice)    multivitamin Tab Take 1 tablet by mouth once daily.    mycophenolate (CELLCEPT) 250 mg Cap Take 4 capsules (1,000 mg total) by mouth 2 (two) times daily.    nitroGLYCERIN (NITROSTAT) 0.4 MG SL tablet Dissolve one tablet underneath tongue at onset of angina; may repeat every 5 minutes if needed. Call 911 if angina persists after 2 doses.    oxyCODONE (ROXICODONE) 5 MG immediate release tablet Take 1 tablet (5 mg total) by mouth every 6 (six) hours as needed for Pain.    pantoprazole (PROTONIX) 40 MG tablet Take 1 tablet (40 mg total) by mouth once daily.    pen needle, diabetic (BD ULTRA-FINE SHORT PEN NEEDLE) 31 gauge x 5/16" Ndle Use to inject insulin into the skin 3 times daily    predniSONE (DELTASONE) 5 MG tablet Take by mouth daily; 5/18-5/24: 20 mg; 5/25-5/31: 15 mg; 6/1-6/7: 10 mg; 6/8/23- thereafter: 5 mg daily; do not stop    sulfamethoxazole-trimethoprim 400-80mg (BACTRIM,SEPTRA) 400-80 mg per tablet Take 1 tablet by mouth every morning. Stop 11/12/23    tacrolimus (PROGRAF) 1 MG Cap Take 2 capsules (2 mg total) by mouth every morning AND 1 capsule (1 mg total) every evening.    valGANciclovir (VALCYTE) 450 mg Tab Take 2 tablets (900 mg total) by mouth every morning. Stop 8/14/23       Review of patient's allergies indicates:  No Known Allergies    Past Medical History:   Diagnosis Date    Allergic rhinitis     Atrial fibrillation with RVR 6/4/2023    Class 1 obesity due to excess calories with serious comorbidity and body mass index (BMI) of 31.0 to 31.9 in adult 4/7/2017    Coronary artery disease  "    Coronary artery disease involving native coronary artery of native heart without angina pectoris 2/6/2018    Cath lab procedure 04/23/2018 (Naveen Rios MD) A. Indication/Pre-Operative Diagnosis: The patient is a 37 year old male that was referred for catheterization by Aaareferral Self for ACS (NSTEMI). The BELLA risk score is 5.  B. Summary/Post-Operative Diagnosis 1. Single vessel coronary artery disease. 2. Normal LVEF. 3. Diastolic dysfunction. 4. Successful PCI for acute myocardial infarction. 5.     Diabetic nephropathy associated with type 2 diabetes mellitus 1/6/2020    Direct hyperbilirubinemia 3/24/2018    DM (diabetes mellitus) 2008    BS doesn't check any more 08/02/2018    DM (diabetes mellitus) 2012    BS 99 am 06/26/2020    DM (diabetes mellitus)     BS didn't check 06/04/2021    DM (diabetes mellitus) 2008    BS didn't check 07/29/2022     Dyslipidemia associated with type 2 diabetes mellitus 7/31/2017    Elevated bilirubin 3/21/2018    GERD (gastroesophageal reflux disease)     Gout     Hyperlipidemia     Hyperparathyroidism, secondary to chronic kidney disease 6/7/2021    Hypertension associated with chronic kidney disease due to type 2 diabetes mellitus     Hypertension complicating diabetes 3/16/2019    Not candidate for Hypertension Digital Medicine program due to dialysis status. Didn't tolerate amlodipine due to SE of hypotension.    Idiopathic chronic gout, multiple sites, without tophus (tophi) 7/19/2017    Long term (current) use of insulin     MI (myocardial infarction) 07/2017    NSTEMI with CAD s/p PCI (FAMILIA) of LAD x 2 in 8/2017 7/31/2017    Obesity     HENRY on CPAP     Peritoneal dialysis catheter in place 1/26/2023    Proteinuria     Severe obstructive sleep apnea - Intolerant of CPAP 7/31/2017    Intolerant of CPAP after weeks of trying multiple interfaces. SPLIT-NIGHT SLEEP STUDY 7/28/2015 · SLEEP ARCHITECTURE: Sleep onset was 2.9 minutes and sleep  efficiency was 94.4%. Sleep Stage distribution showed 145 sleep stage changes, 6 awakenings and 119 arousals. Sleep distribution showed 53.2% stage NI, 41.8% stage N 11, 0.0% stage N Ill and REM sleep was at 5.0%. There were 2 REM periods. · RE    Stage 3a chronic kidney disease 5/26/2023    Stage 5 chronic kidney disease on chronic peritoneal dialysis 6/7/2017    Status post angioplasty with stent 8/4/2017    Steatohepatitis     Fatty Liver    Type 2 diabetes mellitus with chronic kidney disease on chronic dialysis, without long-term current use of insulin 6/21/2017    Type 2 diabetes mellitus with diabetic nephropathy     Type 2 diabetes mellitus with diabetic nephropathy, without long-term current use of insulin 1/6/2020    Type 2 diabetes mellitus with diabetic polyneuropathy, without long-term current use of insulin 6/7/2021    Type 2 diabetes mellitus with hyperglycemia     Type 2 diabetes mellitus with renal manifestations     Type 2 diabetes mellitus with stage 3 chronic kidney disease, without long-term current use of insulin 6/21/2017     Past Surgical History:   Procedure Laterality Date    CORONARY ANGIOPLASTY WITH STENT PLACEMENT      KIDNEY TRANSPLANT N/A 5/15/2023    Procedure: TRANSPLANT, KIDNEY;  Surgeon: Reji Hinojosa MD;  Location: [facility] OR [address] [place] [address];  Service: Transplant;  Laterality: N/A;  IN ROOM: 10:37  OUT OF ICE:10:53  REPERFUSION TIME: 11:21      LASIK Bilateral     LIVER BIOPSY      NASAL ENDOSCOPY      NASAL SEPTUM SURGERY      PERITONEAL CATHETER REMOVAL N/A 5/15/2023    Procedure: REMOVAL, CATHETER, DIALYSIS, PERITONEAL;  Surgeon: Reji Hinojosa MD;  Location: [facility] OR [address] [place] [address];  Service: Transplant;  Laterality: N/A;    SLEEVE GASTROPLASTY  03/06/2017     Family History       Problem Relation (Age of Onset)    Diabetes Mother, Maternal Grandmother    Diabetes type II Mother, Brother, Brother    Hyperlipidemia Mother, Father    Hypertension Mother          Tobacco Use  "   Smoking status: Never    Smokeless tobacco: Never   Substance and Sexual Activity    Alcohol use: No     Comment: 1-2 times per month    Drug use: No    Sexual activity: Yes     Partners: Female        Review of Systems   Constitutional:  Negative for activity change and appetite change.   Eyes:  Negative for visual disturbance.   Respiratory:  Negative for cough and shortness of breath.    Cardiovascular:  Negative for chest pain, palpitations and leg swelling.   Gastrointestinal:  Negative for abdominal distention, abdominal pain, constipation, diarrhea, nausea and vomiting.   Genitourinary:  Negative for difficulty urinating.   Musculoskeletal:  Negative for arthralgias.   Skin:  Negative for wound.   Allergic/Immunologic: Positive for immunocompromised state.   Neurological:  Negative for dizziness.   Hematological:  Negative for adenopathy. Does not bruise/bleed easily.   Psychiatric/Behavioral:  Negative for agitation.    Objective:     Vital Signs (Most Recent):  Temp: 97.7 °F (36.5 °C) (06/04/23 0545)  Pulse: 81 (06/04/23 0545)  Resp: 18 (06/04/23 0545)  BP: 115/81 (06/04/23 0545)  SpO2: 98 % (06/04/23 0545)  Height: 6' 3" (190.5 cm)  Weight: 100.5 kg (221 lb 9 oz)  Body mass index is 27.69 kg/m².      Physical Exam  Vitals and nursing note reviewed.   Constitutional:       Appearance: He is well-developed.   HENT:      Head: Normocephalic.   Eyes:      Conjunctiva/sclera: Conjunctivae normal.   Cardiovascular:      Rate and Rhythm: Normal rate.      Heart sounds: No murmur heard.  Pulmonary:      Effort: Pulmonary effort is normal.      Breath sounds: Normal breath sounds.   Abdominal:      General: Bowel sounds are normal. There is no distension.      Palpations: Abdomen is soft.      Tenderness: There is no abdominal tenderness.   Musculoskeletal:         General: Normal range of motion.      Cervical back: Normal range of motion.   Skin:     General: Skin is warm and dry.      Capillary Refill: " Capillary refill takes less than 2 seconds.   Neurological:      Mental Status: He is alert and oriented to person, place, and time.   Psychiatric:         Behavior: Behavior normal.         Thought Content: Thought content normal.         Judgment: Judgment normal.        Laboratory  CBC:   Recent Labs   Lab 05/29/23  0715 06/01/23  0724 06/03/23  2335   WBC 8.81 7.08 8.89   RBC 4.61 4.48* 4.60   HGB 13.4* 12.8* 13.3*   HCT 40.6 39.6* 38.9*    174 160   MCV 88 88 85   MCH 29.1 28.6 28.9   MCHC 33.0 32.3 34.2     BMP:   Recent Labs   Lab 05/29/23  0715 06/01/23  0724 06/03/23  2335   * 141* 205*    139 140   K 4.8 4.9 4.2    104 108   CO2 24 25 22*   BUN 15 13 19   CREATININE 1.8* 1.8* 1.7*   CALCIUM 9.6 10.0 9.1     CMP:   Recent Labs   Lab 05/29/23  0715 06/01/23  0724 06/03/23  2335   * 141* 205*   CALCIUM 9.6 10.0 9.1   ALBUMIN 3.9 4.2 4.1   PROT  --   --  6.8    139 140   K 4.8 4.9 4.2   CO2 24 25 22*    104 108   BUN 15 13 19   CREATININE 1.8* 1.8* 1.7*   ALKPHOS  --   --  108   ALT  --   --  63*   AST  --   --  29     Labs within the past 24 hours have been reviewed.    Diagnostic Results:  EKG: No results found for this or any previous visit.    Patient was SARS-CoV-2 /COVID-19 tested with negative results.     Assessment/Plan:     Cardiac/Vascular  * Atrial fibrillation with RVR  - currently on Cardizem gtt at 2.5 rate controlled  - Cards consulted  - Tele  - monitor       Essential hypertension  - resumed home meds   - monitor for adjustments       Renal/  S/P kidney transplant  -history of ESRD secondary to diabetic nephropathy who received a living kidney transplant on 5/15/23 (thymo induction, CMV ++). ESRD 2/2 for Diabetes Mellitus - Type II.  His most recent creatinine is 1.7       ID  At risk for opportunistic infections  - Bactrim for PCP ppx.  - Valcyte for CMV ppx.      Immunology/Multi System  Prophylactic immunotherapy  - continue prograf  -  continue to monitor for toxic side effects and check daily levels. Will adjust for therapeutic dose      Palliative Care  Long-term use of immunosuppressant medication  - see prophylactic immunotherapy      Discharge Planning:  Not candidate for discharge at this time     MAGGIE Renee  Kidney Transplant  [facility] - Transplant Stepdown

## 2023-06-04 NOTE — SUBJECTIVE & OBJECTIVE
"  Subjective:     Chief Complaint/Reason for Admission: A-fib RVR     History of Present Illness:  Mr. Iglesias is a 42 y.o. year old White male with history of ESRD secondary to diabetic nephropathy who received a living kidney transplant on 5/15/23 (thymo induction, CMV ++). ESRD 2/2 for Diabetes Mellitus - Type II.  His most recent creatinine is 1.7   PMHX: Coronary angioplasty with stent placement,NSTEMI with CAD 8/2017, HTN, GERD, Sleep Apnea. Patient reports starting 1 days ago felt "heart racing" lasting hours-  Patient presented to OSH ED - where was found to be in A-FIb RVR, patient was placed on cardizem gtt now rate controlled in the 80's - remains in Afib. Patient reported dyspnea- but has improved with heart rate.  He denies CP, fever, chills, and all other sxs at this time, dysuria, or incisional discomfort/drainage.    Patient admitted for further evaluation and treatment- Cardiology consulted. Continue gtt at this time placed on Telemetry, continue home medications.     PTA Medications   Medication Sig    aspirin (ECOTRIN) 81 MG EC tablet Take 1 tablet (81 mg total) by mouth once daily.    atorvastatin (LIPITOR) 80 MG tablet Take 1 tablet (80 mg total) by mouth every evening.    blood sugar diagnostic Strp Check blood glucose 2 times daily as directed and as needed    blood-glucose meter (ONETOUCH ULTRAMINI) kit Use as instructed    carvediloL (COREG) 6.25 MG tablet Take 1 tablet (6.25 mg total) by mouth 2 (two) times daily.    docusate sodium (COLACE) 100 MG capsule Take 1 capsule (100 mg total) by mouth 3 (three) times daily.    dulaglutide (TRULICITY) 3 mg/0.5 mL pen injector Inject 3 mg into the skin every 7 days.    ezetimibe (ZETIA) 10 mg tablet Take 1 tablet (10 mg total) by mouth once daily.    insulin aspart U-100 (NOVOLOG) 100 unit/mL injection Inject into the skin 3 (three) times daily before meals.    insulin lispro-aabc (LYUMJEV KWIKPEN U-100 INSULIN) 100 unit/mL pen Inject 4 Units into " "the skin 3 (three) times daily with meals. Plus sliding scale insulin. Max 27 units    k phos di & mono-sod phos mono (K-PHOS-NEUTRAL) 250 mg Tab Take 2 tablets by mouth 3 (three) times daily.    lancets Misc Check blood glucose 2 times daily as directed and as needed (dispense insurance preferred brand or patient choice)    multivitamin Tab Take 1 tablet by mouth once daily.    mycophenolate (CELLCEPT) 250 mg Cap Take 4 capsules (1,000 mg total) by mouth 2 (two) times daily.    nitroGLYCERIN (NITROSTAT) 0.4 MG SL tablet Dissolve one tablet underneath tongue at onset of angina; may repeat every 5 minutes if needed. Call 911 if angina persists after 2 doses.    oxyCODONE (ROXICODONE) 5 MG immediate release tablet Take 1 tablet (5 mg total) by mouth every 6 (six) hours as needed for Pain.    pantoprazole (PROTONIX) 40 MG tablet Take 1 tablet (40 mg total) by mouth once daily.    pen needle, diabetic (BD ULTRA-FINE SHORT PEN NEEDLE) 31 gauge x 5/16" Ndle Use to inject insulin into the skin 3 times daily    predniSONE (DELTASONE) 5 MG tablet Take by mouth daily; 5/18-5/24: 20 mg; 5/25-5/31: 15 mg; 6/1-6/7: 10 mg; 6/8/23- thereafter: 5 mg daily; do not stop    sulfamethoxazole-trimethoprim 400-80mg (BACTRIM,SEPTRA) 400-80 mg per tablet Take 1 tablet by mouth every morning. Stop 11/12/23    tacrolimus (PROGRAF) 1 MG Cap Take 2 capsules (2 mg total) by mouth every morning AND 1 capsule (1 mg total) every evening.    valGANciclovir (VALCYTE) 450 mg Tab Take 2 tablets (900 mg total) by mouth every morning. Stop 8/14/23       Review of patient's allergies indicates:  No Known Allergies    Past Medical History:   Diagnosis Date    Allergic rhinitis     Atrial fibrillation with RVR 6/4/2023    Class 1 obesity due to excess calories with serious comorbidity and body mass index (BMI) of 31.0 to 31.9 in adult 4/7/2017    Coronary artery disease     Coronary artery disease involving native coronary artery of native heart without " angina pectoris 2/6/2018    Cath lab procedure 04/23/2018 (Naveen Rios MD) A. Indication/Pre-Operative Diagnosis: The patient is a 37 year old male that was referred for catheterization by Aaareferral Self for ACS (NSTEMI). The BELLA risk score is 5.  B. Summary/Post-Operative Diagnosis 1. Single vessel coronary artery disease. 2. Normal LVEF. 3. Diastolic dysfunction. 4. Successful PCI for acute myocardial infarction. 5.     Diabetic nephropathy associated with type 2 diabetes mellitus 1/6/2020    Direct hyperbilirubinemia 3/24/2018    DM (diabetes mellitus) 2008    BS doesn't check any more 08/02/2018    DM (diabetes mellitus) 2012    BS 99 am 06/26/2020    DM (diabetes mellitus)     BS didn't check 06/04/2021    DM (diabetes mellitus) 2008    BS didn't check 07/29/2022     Dyslipidemia associated with type 2 diabetes mellitus 7/31/2017    Elevated bilirubin 3/21/2018    GERD (gastroesophageal reflux disease)     Gout     Hyperlipidemia     Hyperparathyroidism, secondary to chronic kidney disease 6/7/2021    Hypertension associated with chronic kidney disease due to type 2 diabetes mellitus     Hypertension complicating diabetes 3/16/2019    Not candidate for Hypertension Digital Medicine program due to dialysis status. Didn't tolerate amlodipine due to SE of hypotension.    Idiopathic chronic gout, multiple sites, without tophus (tophi) 7/19/2017    Long term (current) use of insulin     MI (myocardial infarction) 07/2017    NSTEMI with CAD s/p PCI (FAMILIA) of LAD x 2 in 8/2017 7/31/2017    Obesity     HENRY on CPAP     Peritoneal dialysis catheter in place 1/26/2023    Proteinuria     Severe obstructive sleep apnea - Intolerant of CPAP 7/31/2017    Intolerant of CPAP after weeks of trying multiple interfaces. SPLIT-NIGHT SLEEP STUDY 7/28/2015 · SLEEP ARCHITECTURE: Sleep onset was 2.9 minutes and sleep efficiency was 94.4%. Sleep Stage distribution showed 145 sleep stage changes, 6 awakenings and 119 arousals. Sleep  distribution showed 53.2% stage NI, 41.8% stage N 11, 0.0% stage N Ill and REM sleep was at 5.0%. There were 2 REM periods. · RE    Stage 3a chronic kidney disease 5/26/2023    Stage 5 chronic kidney disease on chronic peritoneal dialysis 6/7/2017    Status post angioplasty with stent 8/4/2017    Steatohepatitis     Fatty Liver    Type 2 diabetes mellitus with chronic kidney disease on chronic dialysis, without long-term current use of insulin 6/21/2017    Type 2 diabetes mellitus with diabetic nephropathy     Type 2 diabetes mellitus with diabetic nephropathy, without long-term current use of insulin 1/6/2020    Type 2 diabetes mellitus with diabetic polyneuropathy, without long-term current use of insulin 6/7/2021    Type 2 diabetes mellitus with hyperglycemia     Type 2 diabetes mellitus with renal manifestations     Type 2 diabetes mellitus with stage 3 chronic kidney disease, without long-term current use of insulin 6/21/2017     Past Surgical History:   Procedure Laterality Date    CORONARY ANGIOPLASTY WITH STENT PLACEMENT      KIDNEY TRANSPLANT N/A 5/15/2023    Procedure: TRANSPLANT, KIDNEY;  Surgeon: Reji Hinojosa MD;  Location: [facility] OR [address] [place] [address];  Service: Transplant;  Laterality: N/A;  IN ROOM: 10:37  OUT OF ICE:10:53  REPERFUSION TIME: 11:21      LASIK Bilateral     LIVER BIOPSY      NASAL ENDOSCOPY      NASAL SEPTUM SURGERY      PERITONEAL CATHETER REMOVAL N/A 5/15/2023    Procedure: REMOVAL, CATHETER, DIALYSIS, PERITONEAL;  Surgeon: Reji Hinojosa MD;  Location: [facility] OR [address] [place] [address];  Service: Transplant;  Laterality: N/A;    SLEEVE GASTROPLASTY  03/06/2017     Family History       Problem Relation (Age of Onset)    Diabetes Mother, Maternal Grandmother    Diabetes type II Mother, Brother, Brother    Hyperlipidemia Mother, Father    Hypertension Mother          Tobacco Use    Smoking status: Never    Smokeless tobacco: Never   Substance and Sexual Activity    Alcohol use: No     Comment: 1-2 times per month  "   Drug use: No    Sexual activity: Yes     Partners: Female        Review of Systems   Constitutional:  Negative for activity change and appetite change.   Eyes:  Negative for visual disturbance.   Respiratory:  Negative for cough and shortness of breath.    Cardiovascular:  Negative for chest pain, palpitations and leg swelling.   Gastrointestinal:  Negative for abdominal distention, abdominal pain, constipation, diarrhea, nausea and vomiting.   Genitourinary:  Negative for difficulty urinating.   Musculoskeletal:  Negative for arthralgias.   Skin:  Negative for wound.   Allergic/Immunologic: Positive for immunocompromised state.   Neurological:  Negative for dizziness.   Hematological:  Negative for adenopathy. Does not bruise/bleed easily.   Psychiatric/Behavioral:  Negative for agitation.    Objective:     Vital Signs (Most Recent):  Temp: 97.7 °F (36.5 °C) (06/04/23 0545)  Pulse: 81 (06/04/23 0545)  Resp: 18 (06/04/23 0545)  BP: 115/81 (06/04/23 0545)  SpO2: 98 % (06/04/23 0545)  Height: 6' 3" (190.5 cm)  Weight: 100.5 kg (221 lb 9 oz)  Body mass index is 27.69 kg/m².      Physical Exam  Vitals and nursing note reviewed.   Constitutional:       Appearance: He is well-developed.   HENT:      Head: Normocephalic.   Eyes:      Conjunctiva/sclera: Conjunctivae normal.   Cardiovascular:      Rate and Rhythm: Normal rate.      Heart sounds: No murmur heard.  Pulmonary:      Effort: Pulmonary effort is normal.      Breath sounds: Normal breath sounds.   Abdominal:      General: Bowel sounds are normal. There is no distension.      Palpations: Abdomen is soft.      Tenderness: There is no abdominal tenderness.   Musculoskeletal:         General: Normal range of motion.      Cervical back: Normal range of motion.   Skin:     General: Skin is warm and dry.      Capillary Refill: Capillary refill takes less than 2 seconds.   Neurological:      Mental Status: He is alert and oriented to person, place, and time. "   Psychiatric:         Behavior: Behavior normal.         Thought Content: Thought content normal.         Judgment: Judgment normal.        Laboratory  CBC:   Recent Labs   Lab 05/29/23  0715 06/01/23  0724 06/03/23  2335   WBC 8.81 7.08 8.89   RBC 4.61 4.48* 4.60   HGB 13.4* 12.8* 13.3*   HCT 40.6 39.6* 38.9*    174 160   MCV 88 88 85   MCH 29.1 28.6 28.9   MCHC 33.0 32.3 34.2     BMP:   Recent Labs   Lab 05/29/23  0715 06/01/23  0724 06/03/23  2335   * 141* 205*    139 140   K 4.8 4.9 4.2    104 108   CO2 24 25 22*   BUN 15 13 19   CREATININE 1.8* 1.8* 1.7*   CALCIUM 9.6 10.0 9.1     CMP:   Recent Labs   Lab 05/29/23  0715 06/01/23  0724 06/03/23  2335   * 141* 205*   CALCIUM 9.6 10.0 9.1   ALBUMIN 3.9 4.2 4.1   PROT  --   --  6.8    139 140   K 4.8 4.9 4.2   CO2 24 25 22*    104 108   BUN 15 13 19   CREATININE 1.8* 1.8* 1.7*   ALKPHOS  --   --  108   ALT  --   --  63*   AST  --   --  29     Labs within the past 24 hours have been reviewed.    Diagnostic Results:  EKG: No results found for this or any previous visit.    Patient was SARS-CoV-2 /COVID-19 tested with negative results.

## 2023-06-04 NOTE — CONSULTS
[facility] - Transplant Step[facility]  Endocrinology  Diabetes Consult Note    Consult Requested by: Reji Hinojosa MD   Reason for admit: Atrial fibrillation with RVR    HISTORY OF PRESENT ILLNESS:  Reason for Consult: Management of T2DM, Hyperglycemia     Surgical Procedure and Date: Kidney txp 5/15/23    Diabetes diagnosis year:     Home Diabetes Medications:  Trulicity 3 mg, Novolog 4 u + SSI    How often checking glucose at home? >4 x day   BG readings on regimen: 100s-300s  Hypoglycemia on the regimen?  No  Missed doses on regimen?  No    Diabetes Complications include:     Hyperglycemia, Diabetic nephropathy  , Diabetic chronic kidney disease     , and Diabetic peripheral neuropathy     Complicating diabetes co morbidities:   CKD and Glucocorticoid use , NSTEMI      HPI:   Patient is a 42 y.o. 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 ESRD 2/2 for Diabetes Mellitus - Type II who presented to [facility] - where was found to be in A-FIb RVR.  Patient reported dyspnea- but has improved with heart rate.  He denies CP, fever, chills, and all other sxs at this time, dysuria, or incisional discomfort/drainage.    Patient admitted for further evaluation and treatment- Cardiology consulted.   Endocrine consulted for bg management      Interval HPI:   Admitted overnight.  BG at or above goal on SSI. Patient in room 94594/52651 A. . Steroid use- Prednisone 15 mg .      Renal function- Abnormal -    Vasopressors-  None     Diet diabetic [facility];  Calorie  Diet NPO     Eatin%  Nausea: No  Hypoglycemia and intervention: No  Fever: No  TPN and/or TF: No    PMH, PSH, FH, SH updated and reviewed     ROS:  Review of Systems    Current Medications and/or Treatments Impacting Glycemic Control  Immunotherapy:    Immunosuppressants           Stop Route Frequency     tacrolimus capsule 1 mg         -- Oral Every evening      mycophenolate capsule 1,000 mg         -- Oral 2 times daily     tacrolimus capsule 2 mg         -- Oral Every morning          Steroids:   Hormones (From admission, onward)      Start     Stop Route Frequency Ordered    06/04/23 0900  predniSONE tablet 15 mg         -- Oral Daily 06/04/23 0645    06/04/23 0743  melatonin tablet 6 mg         -- Oral Nightly PRN 06/04/23 0645          Pressors:    Autonomic Drugs (From admission, onward)      None          Hyperglycemia/Diabetes Medications:   Antihyperglycemics (From admission, onward)      Start     Stop Route Frequency Ordered    06/04/23 0746  insulin aspart U-100 pen 1-10 Units         -- SubQ Before meals & nightly PRN 06/04/23 0647             PHYSICAL EXAMINATION:  Vitals:    06/04/23 1200   BP: 101/61   Pulse:    Resp:    Temp:      Body mass index is 27.69 kg/m².     Physical Exam  Constitutional:       Appearance: He is not ill-appearing.   HENT:      Head: Normocephalic and atraumatic.   Pulmonary:      Effort: Pulmonary effort is normal.      Breath sounds: Normal breath sounds.   Abdominal:      General: There is no distension.      Palpations: There is no mass.   Musculoskeletal:      Right lower leg: No edema.      Left lower leg: No edema.   Neurological:      Mental Status: He is alert.            Labs Reviewed and Include   Recent Labs   Lab 06/03/23  2335 06/04/23  0708   * 153*   CALCIUM 9.1 9.3   ALBUMIN 4.1 3.6   PROT 6.8  --     139   K 4.2 4.0   CO2 22* 22*    109   BUN 19 15   CREATININE 1.7* 1.7*   ALKPHOS 108  --    ALT 63*  --    AST 29  --    BILITOT 1.7*  --      Lab Results   Component Value Date    WBC 7.94 06/04/2023    HGB 12.2 (L) 06/04/2023    HCT 38.3 (L) 06/04/2023    MCV 90 06/04/2023     (L) 06/04/2023     No results for input(s): TSH, FREET4 in the last 168 hours.  Lab Results   Component Value Date    HGBA1C 5.0 05/15/2023       Nutritional status:   Body mass index is 27.69 kg/m².  Lab Results    Component Value Date    ALBUMIN 3.6 06/04/2023    ALBUMIN 4.1 06/03/2023    ALBUMIN 4.2 06/01/2023     No results found for: PREALBUMIN    Estimated Creatinine Clearance: 68.3 mL/min (A) (based on SCr of 1.7 mg/dL (H)).    Accu-Checks  Recent Labs     06/04/23  0803 06/04/23  1203   POCTGLUCOSE 150* 250*        ASSESSMENT and PLAN    Renal/  S/P kidney transplant  Managed by primary team. Optimize bg control      Immunology/Multi System  Prophylactic immunotherapy  On immunosuppressive therapy per transplant team; may elevate BG readings        Endocrine  Type 2 diabetes mellitus with chronic kidney disease on chronic dialysis, without long-term current use of insulin  BG goal: 140-180   T2DM.  Recent kidney transplant. On steroids.  Will start on regimen similar to home regimen and monitor.    - -Start Novolog 5 units AC   - Start Novolog  MDC w/ ISF 25 starting at 150  - POCT Glucose before meals and at bedtime  - Hypoglycemia protocol in place      ** Please notify Endocrine for any change and/or advance in diet**  ** Please call Endocrine for any BG related issues **     Discharge Planning:   TBD. Please notify endocrinology prior to discharge.              Plan discussed with patient, family, and RN at bedside.     Chance Solano PA-C  Endocrinology  [facility] - Trans[facility] Step[facility]

## 2023-06-04 NOTE — FIRST PROVIDER EVALUATION
Medical screening examination initiated.  I have conducted a focused provider triage encounter, findings are as follows:    Brief history of present illness:  Patient with recent right kidney transplant presents with tachycardia, palpitations and chest pain.    Vitals:    06/03/23 2216   BP: 118/81   Pulse: (!) 120   Resp: 19   Temp: 98 °F (36.7 °C)   TempSrc: Oral   SpO2: 99%   Weight: 99 kg (218 lb 4.1 oz)       Pertinent physical exam:      Brief workup plan:      Preliminary workup initiated; this workup will be continued and followed by the physician or advanced practice provider that is assigned to the patient when roomed.

## 2023-06-04 NOTE — HPI
Cardiology consult: New onset AF  Outpatient cardiologist: Dr. Rios  Primary team: Transplant Kidney      Mr. Iglesias is a 42 y.o. 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 Presented to the ED w/ c/o palpitations and SOB for at least 1 day. Found to be in AF w/ RVR and started on cardizem drip at 2.5mg/hr. Electrolytes WNL. Per chart review, on 5/29/23 he reported to his outpatient cardiologist and elevated HR at home of 106. Last LVEF on chart of 55% on 2022.  Chadsvasc 3    PMHX: NSTEMI with CAD s/p PCI FAMILIA of LADX 2 (8/2017), HTN, GERD, Sleep Apnea and DM2.

## 2023-06-04 NOTE — PROGRESS NOTES
AAOx4. VSS. Patient up ad simón - ambulates in room and bathroom independently. Diltiazem infusing continuously @ 2.5ml/hr. Coreg d/c'd - patient started on lopressor QID w/ hold parameters - see MAR. Cr 1.7 (same as prior) on AM labs. Troponin 0.015. Patient on bedside tele - a fib rate controlled - HR 70s-90s - patient occasionally will jump up to 120s-130s but comes right back down - Dr. Pedro MD notified and aware. RLQ incision w/ dermabond - covered w/ guaze dressing. Plan for either LUNA w/ possible cardioversion or cath-lab w/ cardioversion vs. defibrillation tomorrow 6/5 - patient to be NPO at midnight tonight. EKG 12-lead ordered. Non-skid socks on. Bed in low position. Call light within reach. Spouse at bedside.    1600: Patient no longer in a fib on tele - converted on their own - tele now NSR (HR 70s-80s). Will continue to monitor.

## 2023-06-04 NOTE — ASSESSMENT & PLAN NOTE
-history of ESRD secondary to diabetic nephropathy who received a living kidney transplant on 5/15/23 (thymo induction, CMV ++). ESRD 2/2 for Diabetes Mellitus - Type II.  His most recent creatinine is 1.7

## 2023-06-04 NOTE — NURSING
- Patient arrived at [facility] on a stretcher. VSS. Afebrile.Spo2 maintained @ RA. He is on cardiac monitoring. EKG done. Atrial fibrillation noted. Inj Dilitezem contd@ 2.5mg/hr. Will continue to monitor.   Methotrexate Counseling:  Patient counseled regarding adverse effects of methotrexate including but not limited to nausea, vomiting, abnormalities in liver function tests. Patients may develop mouth sores, rash, diarrhea, and abnormalities in blood counts. The patient understands that monitoring is required including LFT's and blood counts.  There is a rare possibility of scarring of the liver and lung problems that can occur when taking methotrexate. Persistent nausea, loss of appetite, pale stools, dark urine, cough, and shortness of breath should be reported immediately. Patient advised to discontinue methotrexate treatment at least three months before attempting to become pregnant.  I discussed the need for folate supplements while taking methotrexate.  These supplements can decrease side effects during methotrexate treatment. The patient verbalized understanding of the proper use and possible adverse effects of methotrexate.  All of the patient's questions and concerns were addressed.

## 2023-06-04 NOTE — ASSESSMENT & PLAN NOTE
Mr. Iglesias is a 42 y.o. year old M with history of ESRD secondary to diabetic nephropathy s/p living kidney transplant on 5/15/23 (thymo induction, CMV ++), HTN, DM2, NSTEMI s/p PCI FAMILIA LAD. Who was found to be in new onset AF w/ RVR, now on a dilt drip. Chadsvasc 3. First reported tachycardia of 106 on 5/29/23 found on his cuff pressure reading.     Recs:   - For better rate control while on dilt drip please change his coreg to metoprolol tartrate 25 QID while monitoring BP.   - Given a chadsvasc of 3 recommend starting anticoagulation w/ eliquis 5 mg BID if ok with transplant team.   - Will set up a LUNA/DCCV for tomorrow, if cardioverted will need to be on anticoagulation for at least 4 weeks.   - Replace electrolytes as needed, keep K>4 and Mag>2.   - Please contact us if any questions or concerns

## 2023-06-04 NOTE — ED PROVIDER NOTES
SCRIBE #1 NOTE: I, Alex Choudhary, am scribing for, and in the presence of, Ashok Hussein MD. I have scribed the entire note.      History      Chief Complaint   Patient presents with    Palpitations     Pt reports feeling like his hr is high. Assessed vitals at home and was found to be tachycardic. Reports SOB. Recent kidney transplant. Hx of MI.        Review of patient's allergies indicates:  No Known Allergies     HPI   HPI    6/4/2023, 12:16 AM   History obtained from the patient      History of Present Illness: Robb Iglesias is a 42 y.o. male patient with a PMHx of MI, ESRD, DM, Recent kidney transplant who presents to the Emergency Department for palpitations that onset today. Pt states that he was tachycardic when he checked his vitals at home. On 5/15/23, Dr. Hinojosa at [facility] performed a kidney transplant on the pt. Symptoms are constant and moderate in severity. No mitigating or exacerbating factors reported. Associated sxs include SOB. Patient denies any CP, fever, chills, and all other sxs at this time. Pt says that he takes one aspirin a day at home. No further complaints or concerns at this time.         Arrival mode: Personal vehicle      PCP: SABIHA Roberson MD       Past Medical History:  Past Medical History:   Diagnosis Date    Allergic rhinitis     Class 1 obesity due to excess calories with serious comorbidity and body mass index (BMI) of 31.0 to 31.9 in adult 4/7/2017    Coronary artery disease     Coronary artery disease involving native coronary artery of native heart without angina pectoris 2/6/2018    Cath lab procedure 04/23/2018 (Naveen Rios MD) A. Indication/Pre-Operative Diagnosis: The patient is a 37 year old male that was referred for catheterization by [facility] for ACS (NSTEMI). The BELLA risk score is 5.  B. Summary/Post-Operative Diagnosis 1. Single vessel coronary artery disease. 2. Normal LVEF. 3. Diastolic dysfunction. 4. Successful PCI for acute  myocardial infarction. 5.     Diabetic nephropathy associated with type 2 diabetes mellitus 1/6/2020    Direct hyperbilirubinemia 3/24/2018    DM (diabetes mellitus) 2008    BS doesn't check any more 08/02/2018    DM (diabetes mellitus) 2012    BS 99 am 06/26/2020    DM (diabetes mellitus)     BS didn't check 06/04/2021    DM (diabetes mellitus) 2008    BS didn't check 07/29/2022     Dyslipidemia associated with type 2 diabetes mellitus 7/31/2017    Elevated bilirubin 3/21/2018    GERD (gastroesophageal reflux disease)     Gout     Hyperlipidemia     Hyperparathyroidism, secondary to chronic kidney disease 6/7/2021    Hypertension associated with chronic kidney disease due to type 2 diabetes mellitus     Hypertension complicating diabetes 3/16/2019    Not candidate for Hypertension Digital Medicine program due to dialysis status. Didn't tolerate amlodipine due to SE of hypotension.    Idiopathic chronic gout, multiple sites, without tophus (tophi) 7/19/2017    Long term (current) use of insulin     MI (myocardial infarction) 07/2017    NSTEMI with CAD s/p PCI (FAMILIA) of LAD x 2 in 8/2017 7/31/2017    Obesity     HENRY on CPAP     Peritoneal dialysis catheter in place 1/26/2023    Proteinuria     Severe obstructive sleep apnea - Intolerant of CPAP 7/31/2017    Intolerant of CPAP after weeks of trying multiple interfaces. SPLIT-NIGHT SLEEP STUDY 7/28/2015 · SLEEP ARCHITECTURE: Sleep onset was 2.9 minutes and sleep efficiency was 94.4%. Sleep Stage distribution showed 145 sleep stage changes, 6 awakenings and 119 arousals. Sleep distribution showed 53.2% stage NI, 41.8% stage N 11, 0.0% stage N Ill and REM sleep was at 5.0%. There were 2 REM periods. · RE    Stage 3a chronic kidney disease 5/26/2023    Stage 5 chronic kidney disease on chronic peritoneal dialysis 6/7/2017    Status post angioplasty with stent 8/4/2017    Steatohepatitis     Fatty Liver    Type 2 diabetes mellitus with chronic kidney disease on chronic  dialysis, without long-term current use of insulin 6/21/2017    Type 2 diabetes mellitus with diabetic nephropathy     Type 2 diabetes mellitus with diabetic nephropathy, without long-term current use of insulin 1/6/2020    Type 2 diabetes mellitus with diabetic polyneuropathy, without long-term current use of insulin 6/7/2021    Type 2 diabetes mellitus with hyperglycemia     Type 2 diabetes mellitus with renal manifestations     Type 2 diabetes mellitus with stage 3 chronic kidney disease, without long-term current use of insulin 6/21/2017       Past Surgical History:  Past Surgical History:   Procedure Laterality Date    CORONARY ANGIOPLASTY WITH STENT PLACEMENT      KIDNEY TRANSPLANT N/A 5/15/2023    Procedure: TRANSPLANT, KIDNEY;  Surgeon: Reji Hinojosa MD;  Location: [facility] OR [address] [place] [address];  Service: Transplant;  Laterality: N/A;  IN ROOM: 10:37  OUT OF ICE:10:53  REPERFUSION TIME: 11:21      LASIK Bilateral     LIVER BIOPSY      NASAL ENDOSCOPY      NASAL SEPTUM SURGERY      PERITONEAL CATHETER REMOVAL N/A 5/15/2023    Procedure: REMOVAL, CATHETER, DIALYSIS, PERITONEAL;  Surgeon: Reji Hinojosa MD;  Location: [facility] OR [address] [place] [address];  Service: Transplant;  Laterality: N/A;    SLEEVE GASTROPLASTY  03/06/2017         Family History:  Family History   Problem Relation Age of Onset    Diabetes type II Mother     Hypertension Mother     Hyperlipidemia Mother     Diabetes Mother     Hyperlipidemia Father     Diabetes type II Brother     Diabetes type II Brother     Diabetes Maternal Grandmother        Social History:  Social History     Tobacco Use    Smoking status: Never    Smokeless tobacco: Never   Substance and Sexual Activity    Alcohol use: No     Comment: 1-2 times per month    Drug use: No    Sexual activity: Yes     Partners: Female       ROS   Review of Systems   Constitutional:  Negative for chills and fever.   HENT:  Negative for sore throat.    Respiratory:  Positive for shortness of breath.    Cardiovascular:   Positive for palpitations. Negative for chest pain.   Gastrointestinal:  Negative for nausea.   Genitourinary:  Negative for dysuria.   Musculoskeletal:  Negative for back pain.   Skin:  Negative for rash.   Neurological:  Negative for weakness.   Hematological:  Does not bruise/bleed easily.   All other systems reviewed and are negative.    Physical Exam      Initial Vitals [06/03/23 2216]   BP Pulse Resp Temp SpO2   118/81 (!) 120 19 98 °F (36.7 °C) 99 %      MAP       --          Physical Exam  Nursing Notes and Vital Signs Reviewed.  Constitutional: Patient is in no acute distress. Well-developed and well-nourished.  Head: Atraumatic. Normocephalic.  Eyes: PERRL. EOM intact. Conjunctivae are not pale. No scleral icterus.  ENT: Mucous membranes are moist. Oropharynx is clear and symmetric.    Neck: Supple. Full ROM. No lymphadenopathy.  Cardiovascular: Irregularly irregular rate and rhythm. No murmurs, rubs, or gallops. Distal pulses are 2+ and symmetric.  Pulmonary/Chest: No respiratory distress. Clear to auscultation bilaterally. No wheezing or rales.  Abdominal: Serosanguineous drainage from surgical wound on RLQ. Soft and non-distended.  Appropriate for post op period.  Musculoskeletal: Moves all extremities. No obvious deformities. No edema.   Skin: Warm and dry.  Neurological:  Alert, awake, and appropriate.  Normal speech.  No acute focal neurological deficits are appreciated.  Psychiatric: Normal affect. Good eye contact. Appropriate in content.    ED Course    Critical Care    Date/Time: 6/4/2023 12:15 AM  Performed by: Ashok Hussein MD  Authorized by: Ashok Hussein MD   Direct patient critical care time: 10 minutes  Additional history critical care time: 15 minutes  Ordering / reviewing critical care time: 15 minutes  Documentation critical care time: 10 minutes  Consulting other physicians critical care time: 10 minutes  Total critical care time (exclusive of procedural time) : 60 minutes  Critical  care time was exclusive of separately billable procedures and treating other patients and teaching time.  Critical care was necessary to treat or prevent imminent or life-threatening deterioration of the following conditions: A. Fib with RVR.  Critical care was time spent personally by me on the following activities: blood draw for specimens, development of treatment plan with patient or surrogate, discussions with consultants, interpretation of cardiac output measurements, evaluation of patient's response to treatment, examination of patient, obtaining history from patient or surrogate, ordering and performing treatments and interventions, ordering and review of laboratory studies, ordering and review of radiographic studies, pulse oximetry, re-evaluation of patient's condition and review of old charts.      ED Vital Signs:  Vitals:    06/03/23 2216 06/03/23 2347 06/04/23 0029 06/04/23 0030   BP: 118/81  111/75 103/66   Pulse: (!) 120 (!) 122  82   Resp: 19   15   Temp: 98 °F (36.7 °C)   97.7 °F (36.5 °C)   TempSrc: Oral   Oral   SpO2: 99%   98%   Weight: 99 kg (218 lb 4.1 oz)          Abnormal Lab Results:  Labs Reviewed   CBC W/ AUTO DIFFERENTIAL - Abnormal; Notable for the following components:       Result Value    Hemoglobin 13.3 (*)     Hematocrit 38.9 (*)     RDW 15.2 (*)     Immature Granulocytes 0.6 (*)     Immature Grans (Abs) 0.05 (*)     Lymph # 0.7 (*)     Gran % 81.5 (*)     Lymph % 8.2 (*)     All other components within normal limits   COMPREHENSIVE METABOLIC PANEL - Abnormal; Notable for the following components:    CO2 22 (*)     Glucose 205 (*)     Creatinine 1.7 (*)     Total Bilirubin 1.7 (*)     ALT 63 (*)     eGFR 51 (*)     All other components within normal limits   URINALYSIS, REFLEX TO URINE CULTURE - Abnormal; Notable for the following components:    Color, UA Colorless (*)     Glucose, UA 3+ (*)     Occult Blood UA 3+ (*)     All other components within normal limits    Narrative:      Specimen Source->Urine   TROPONIN I   MAGNESIUM   PHOSPHORUS   B-TYPE NATRIURETIC PEPTIDE   B-TYPE NATRIURETIC PEPTIDE   URINALYSIS MICROSCOPIC    Narrative:     Specimen Source->Urine   SARS-COV-2 RNA AMPLIFICATION, QUAL        All Lab Results:  Results for orders placed or performed during the hospital encounter of 06/03/23   CBC auto differential   Result Value Ref Range    WBC 8.89 3.90 - 12.70 K/uL    RBC 4.60 4.60 - 6.20 M/uL    Hemoglobin 13.3 (L) 14.0 - 18.0 g/dL    Hematocrit 38.9 (L) 40.0 - 54.0 %    MCV 85 82 - 98 fL    MCH 28.9 27.0 - 31.0 pg    MCHC 34.2 32.0 - 36.0 g/dL    RDW 15.2 (H) 11.5 - 14.5 %    Platelets 160 150 - 450 K/uL    MPV 9.6 9.2 - 12.9 fL    Immature Granulocytes 0.6 (H) 0.0 - 0.5 %    Gran # (ANC) 7.2 1.8 - 7.7 K/uL    Immature Grans (Abs) 0.05 (H) 0.00 - 0.04 K/uL    Lymph # 0.7 (L) 1.0 - 4.8 K/uL    Mono # 0.6 0.3 - 1.0 K/uL    Eos # 0.2 0.0 - 0.5 K/uL    Baso # 0.11 0.00 - 0.20 K/uL    nRBC 0 0 /100 WBC    Gran % 81.5 (H) 38.0 - 73.0 %    Lymph % 8.2 (L) 18.0 - 48.0 %    Mono % 6.4 4.0 - 15.0 %    Eosinophil % 2.1 0.0 - 8.0 %    Basophil % 1.2 0.0 - 1.9 %    Differential Method Automated    Comprehensive metabolic panel   Result Value Ref Range    Sodium 140 136 - 145 mmol/L    Potassium 4.2 3.5 - 5.1 mmol/L    Chloride 108 95 - 110 mmol/L    CO2 22 (L) 23 - 29 mmol/L    Glucose 205 (H) 70 - 110 mg/dL    BUN 19 6 - 20 mg/dL    Creatinine 1.7 (H) 0.5 - 1.4 mg/dL    Calcium 9.1 8.7 - 10.5 mg/dL    Total Protein 6.8 6.0 - 8.4 g/dL    Albumin 4.1 3.5 - 5.2 g/dL    Total Bilirubin 1.7 (H) 0.1 - 1.0 mg/dL    Alkaline Phosphatase 108 55 - 135 U/L    AST 29 10 - 40 U/L    ALT 63 (H) 10 - 44 U/L    Anion Gap 10 8 - 16 mmol/L    eGFR 51 (A) >60 mL/min/1.73 m^2   Troponin I   Result Value Ref Range    Troponin I 0.009 0.000 - 0.026 ng/mL   Urinalysis, Reflex to Urine Culture Urine, Clean Catch    Specimen: Urine   Result Value Ref Range    Specimen UA Urine, Clean Catch     Color, UA  Colorless (A) Yellow, Straw, Yelena    Appearance, UA Clear Clear    pH, UA 6.0 5.0 - 8.0    Specific Gravity, UA 1.010 1.005 - 1.030    Protein, UA Negative Negative    Glucose, UA 3+ (A) Negative    Ketones, UA Negative Negative    Bilirubin (UA) Negative Negative    Occult Blood UA 3+ (A) Negative    Nitrite, UA Negative Negative    Urobilinogen, UA Negative <2.0 EU/dL    Leukocytes, UA Negative Negative   Magnesium   Result Value Ref Range    Magnesium 1.8 1.6 - 2.6 mg/dL   Phosphorus   Result Value Ref Range    Phosphorus 3.0 2.7 - 4.5 mg/dL   BNP   Result Value Ref Range    BNP 27 0 - 99 pg/mL   Urinalysis Microscopic   Result Value Ref Range    RBC, UA 3 0 - 4 /hpf    WBC, UA 1 0 - 5 /hpf    Bacteria None None-Occ /hpf    Yeast, UA None None    Microscopic Comment SEE COMMENT          Imaging Results:  Imaging Results              X-Ray Chest 1 View (Final result)  Result time 06/03/23 22:38:49      Final result by Roberto Pugh MD (06/03/23 22:38:49)                   Impression:      No acute abnormality.      Electronically signed by: Roberto Pugh  Date:    06/03/2023  Time:    22:38               Narrative:    EXAMINATION:  XR CHEST 1 VIEW    CLINICAL HISTORY:  Chest pain, unspecified    TECHNIQUE:  Single frontal view of the chest was performed.    COMPARISON:  Multiple priors.    FINDINGS:  The lungs are clear, with normal appearance of pulmonary vasculature and no pleural effusion or pneumothorax.    The cardiac silhouette is normal in size. The hilar and mediastinal contours are unremarkable.    Bones are intact.                                    The EKG was ordered, reviewed, and independently interpreted by the ED provider.  Interpretation time: 12:13  Rate: 125 BPM  Rhythm: Atria fibrillation with rapid ventricular response with premature ventricular or aberrantly conducted complexes.   Interpretation: Inferior infarct, age undetermined. No STEMI.              The Emergency Provider reviewed  the vital signs and test results, which are outlined above.    ED Discussion     2:05 AM: Consult with Dr. Hinojosa (Transplant Surgery Specialist) at [facility] concerning pt. The pt's transplant team is not located at [facility]. Dr. Hinojosa expresses understanding and will accept transfer for treatment by his transplant team.  Accepting Facility: [facility]  Accepting Physician: Dr. Hinojosa    2:10 AM: Re-evaluated pt. Informed pt and family that there are no transplant specialist services available at this time. I have discussed test results, shared treatment plan, and the need for transfer with patient and family at bedside. All historical, clinical, radiographic, and laboratory findings were reviewed with the patient/family in detail. Patient will be transferred by [facility] services with ACLS care required en route. Patient understands that there could be unforeseen motor vehicle accidents or loss of vital signs that could result in potential death or permanent disability. Pt and family express understanding at this time and agree with all information. All questions answered. Pt and family have no further questions or concerns at this time. Pt is ready for transfer.              ED Medication(s):  Medications   diltiaZEM 125 mg in D5W 125 mL infusion (2.5 mg/hr Intravenous New Bag 6/4/23 0204)   diltiaZEM injection 10 mg (10 mg Intravenous Given 6/4/23 0029)             Medical Decision Making    Medical Decision Making:   Clinical Tests:   Lab Tests: Ordered and Reviewed  Radiological Study: Ordered and Reviewed  Medical Tests: Ordered and Reviewed         Scribe Attestation:   Scribe #1: I performed the above scribed service and the documentation accurately describes the services I performed. I attest to the accuracy of the note.    Attending:   Physician Attestation Statement for Scribe #1: I, Ashok Hussein MD, personally performed the services described in this documentation, as scribed  by Alex Choudhary, in my presence, and it is both accurate and complete.          Clinical Impression       ICD-10-CM ICD-9-CM   1. Atrial fibrillation with RVR  I48.91 427.31   2. Chest pain  R07.9 786.50   3. Tachycardia  R00.0 785.0   4. S/P kidney transplant  Z94.0 V42.0       Disposition:   Disposition: Transferred  Condition: Stable       Ashok Hussein MD  06/04/23 0711

## 2023-06-04 NOTE — SUBJECTIVE & OBJECTIVE
Interval HPI:   Admitted overnight.  BG at or above goal on SSI. Patient in room 53036/78187 A. . Steroid use- Prednisone 15 mg .      Renal function- Abnormal -    Vasopressors-  None     Diet diabetic [facility];  Calorie  Diet NPO     Eatin%  Nausea: No  Hypoglycemia and intervention: No  Fever: No  TPN and/or TF: No    PMH, PSH, FH, SH updated and reviewed     ROS:  Review of Systems    Current Medications and/or Treatments Impacting Glycemic Control  Immunotherapy:    Immunosuppressants           Stop Route Frequency     tacrolimus capsule 1 mg         -- Oral Every evening     mycophenolate capsule 1,000 mg         -- Oral 2 times daily     tacrolimus capsule 2 mg         -- Oral Every morning          Steroids:   Hormones (From admission, onward)      Start     Stop Route Frequency Ordered    23 0900  predniSONE tablet 15 mg         -- Oral Daily 23 0645    23 0743  melatonin tablet 6 mg         -- Oral Nightly PRN 23 0645          Pressors:    Autonomic Drugs (From admission, onward)      None          Hyperglycemia/Diabetes Medications:   Antihyperglycemics (From admission, onward)      Start     Stop Route Frequency Ordered    23 0746  insulin aspart U-100 pen 1-10 Units         -- SubQ Before meals & nightly PRN 23 0647             PHYSICAL EXAMINATION:  Vitals:    23 1200   BP: 101/61   Pulse:    Resp:    Temp:      Body mass index is 27.69 kg/m².     Physical Exam  Constitutional:       Appearance: He is not ill-appearing.   HENT:      Head: Normocephalic and atraumatic.   Pulmonary:      Effort: Pulmonary effort is normal.      Breath sounds: Normal breath sounds.   Abdominal:      General: There is no distension.      Palpations: There is no mass.   Musculoskeletal:      Right lower leg: No edema.      Left lower leg: No edema.   Neurological:      Mental Status: He is alert.

## 2023-06-04 NOTE — HPI
Reason for Consult: Management of T2DM, Hyperglycemia     Surgical Procedure and Date: Kidney txp 5/15/23    Diabetes diagnosis year: 2009    Home Diabetes Medications:  Trulicity 3 mg, Novolog 4 u + SSI    How often checking glucose at home? >4 x day   BG readings on regimen: 100s-300s  Hypoglycemia on the regimen?  No  Missed doses on regimen?  No    Diabetes Complications include:     Hyperglycemia, Diabetic nephropathy  , Diabetic chronic kidney disease     , and Diabetic peripheral neuropathy     Complicating diabetes co morbidities:   CKD and Glucocorticoid use , NSTEMI      HPI:   Patient is a 42 y.o. 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 ESRD 2/2 for Diabetes Mellitus - Type II who presented to [facility] ED - where was found to be in A-FIb RVR.  Patient reported dyspnea- but has improved with heart rate.  He denies CP, fever, chills, and all other sxs at this time, dysuria, or incisional discomfort/drainage.    Patient admitted for further evaluation and treatment- Cardiology consulted.   Endocrine consulted for bg management

## 2023-06-04 NOTE — SUBJECTIVE & OBJECTIVE
Past Medical History:   Diagnosis Date    Allergic rhinitis     Atrial fibrillation with RVR 6/4/2023    Class 1 obesity due to excess calories with serious comorbidity and body mass index (BMI) of 31.0 to 31.9 in adult 4/7/2017    Coronary artery disease     Coronary artery disease involving native coronary artery of native heart without angina pectoris 2/6/2018    Cath lab procedure 04/23/2018 (Naveen Rios MD) A. Indication/Pre-Operative Diagnosis: The patient is a 37 year old male that was referred for catheterization by [facility] Self for ACS (NSTEMI). The BELLA risk score is 5.  B. Summary/Post-Operative Diagnosis 1. Single vessel coronary artery disease. 2. Normal LVEF. 3. Diastolic dysfunction. 4. Successful PCI for acute myocardial infarction. 5.     Diabetic nephropathy associated with type 2 diabetes mellitus 1/6/2020    Direct hyperbilirubinemia 3/24/2018    DM (diabetes mellitus) 2008    BS doesn't check any more 08/02/2018    DM (diabetes mellitus) 2012    BS 99 am 06/26/2020    DM (diabetes mellitus)     BS didn't check 06/04/2021    DM (diabetes mellitus) 2008    BS didn't check 07/29/2022     Dyslipidemia associated with type 2 diabetes mellitus 7/31/2017    Elevated bilirubin 3/21/2018    GERD (gastroesophageal reflux disease)     Gout     Hyperlipidemia     Hyperparathyroidism, secondary to chronic kidney disease 6/7/2021    Hypertension associated with chronic kidney disease due to type 2 diabetes mellitus     Hypertension complicating diabetes 3/16/2019    Not candidate for Hypertension Digital Medicine program due to dialysis status. Didn't tolerate amlodipine due to SE of hypotension.    Idiopathic chronic gout, multiple sites, without tophus (tophi) 7/19/2017    Long term (current) use of insulin     MI (myocardial infarction) 07/2017    NSTEMI with CAD s/p PCI (FAMILIA) of LAD x 2 in 8/2017 7/31/2017    Obesity     HENRY on CPAP     Peritoneal dialysis catheter in place 1/26/2023    Proteinuria      Severe obstructive sleep apnea - Intolerant of CPAP 7/31/2017    Intolerant of CPAP after weeks of trying multiple interfaces. SPLIT-NIGHT SLEEP STUDY 7/28/2015 · SLEEP ARCHITECTURE: Sleep onset was 2.9 minutes and sleep efficiency was 94.4%. Sleep Stage distribution showed 145 sleep stage changes, 6 awakenings and 119 arousals. Sleep distribution showed 53.2% stage NI, 41.8% stage N 11, 0.0% stage N Ill and REM sleep was at 5.0%. There were 2 REM periods. · RE    Stage 3a chronic kidney disease 5/26/2023    Stage 5 chronic kidney disease on chronic peritoneal dialysis 6/7/2017    Status post angioplasty with stent 8/4/2017    Steatohepatitis     Fatty Liver    Type 2 diabetes mellitus with chronic kidney disease on chronic dialysis, without long-term current use of insulin 6/21/2017    Type 2 diabetes mellitus with diabetic nephropathy     Type 2 diabetes mellitus with diabetic nephropathy, without long-term current use of insulin 1/6/2020    Type 2 diabetes mellitus with diabetic polyneuropathy, without long-term current use of insulin 6/7/2021    Type 2 diabetes mellitus with hyperglycemia     Type 2 diabetes mellitus with renal manifestations     Type 2 diabetes mellitus with stage 3 chronic kidney disease, without long-term current use of insulin 6/21/2017       Past Surgical History:   Procedure Laterality Date    CORONARY ANGIOPLASTY WITH STENT PLACEMENT      KIDNEY TRANSPLANT N/A 5/15/2023    Procedure: TRANSPLANT, KIDNEY;  Surgeon: Reji Hinojosa MD;  Location: [facility] OR [address] [place] [address];  Service: Transplant;  Laterality: N/A;  IN ROOM: 10:37  OUT OF ICE:10:53  REPERFUSION TIME: 11:21      LASIK Bilateral     LIVER BIOPSY      NASAL ENDOSCOPY      NASAL SEPTUM SURGERY      PERITONEAL CATHETER REMOVAL N/A 5/15/2023    Procedure: REMOVAL, CATHETER, DIALYSIS, PERITONEAL;  Surgeon: Reji Hinojosa MD;  Location: [facility] OR [address] [place] [address];  Service: Transplant;  Laterality: N/A;    SLEEVE GASTROPLASTY  03/06/2017       Review  of patient's allergies indicates:  No Known Allergies    Current Facility-Administered Medications on File Prior to Encounter   Medication    [COMPLETED] diltiaZEM injection 10 mg    [COMPLETED] ondansetron injection 4 mg    [DISCONTINUED] diltiaZEM 125 mg in D5W 125 mL infusion     Current Outpatient Medications on File Prior to Encounter   Medication Sig    aspirin (ECOTRIN) 81 MG EC tablet Take 1 tablet (81 mg total) by mouth once daily.    atorvastatin (LIPITOR) 80 MG tablet Take 1 tablet (80 mg total) by mouth every evening.    blood sugar diagnostic Strp Check blood glucose 2 times daily as directed and as needed    blood-glucose meter (ONETOUCH ULTRAMINI) kit Use as instructed    carvediloL (COREG) 6.25 MG tablet Take 1 tablet (6.25 mg total) by mouth 2 (two) times daily.    docusate sodium (COLACE) 100 MG capsule Take 1 capsule (100 mg total) by mouth 3 (three) times daily.    dulaglutide (TRULICITY) 3 mg/0.5 mL pen injector Inject 3 mg into the skin every 7 days.    ezetimibe (ZETIA) 10 mg tablet Take 1 tablet (10 mg total) by mouth once daily.    insulin aspart U-100 (NOVOLOG) 100 unit/mL injection Inject into the skin 3 (three) times daily before meals.    insulin lispro-aabc (LYUMJEV KWIKPEN U-100 INSULIN) 100 unit/mL pen Inject 4 Units into the skin 3 (three) times daily with meals. Plus sliding scale insulin. Max 27 units    k phos di & mono-sod phos mono (K-PHOS-NEUTRAL) 250 mg Tab Take 2 tablets by mouth 3 (three) times daily.    lancets Misc Check blood glucose 2 times daily as directed and as needed (dispense insurance preferred brand or patient choice)    multivitamin Tab Take 1 tablet by mouth once daily.    mycophenolate (CELLCEPT) 250 mg Cap Take 4 capsules (1,000 mg total) by mouth 2 (two) times daily.    nitroGLYCERIN (NITROSTAT) 0.4 MG SL tablet Dissolve one tablet underneath tongue at onset of angina; may repeat every 5 minutes if needed. Call 911 if angina persists after 2 doses.     "oxyCODONE (ROXICODONE) 5 MG immediate release tablet Take 1 tablet (5 mg total) by mouth every 6 (six) hours as needed for Pain.    pantoprazole (PROTONIX) 40 MG tablet Take 1 tablet (40 mg total) by mouth once daily.    pen needle, diabetic (BD ULTRA-FINE SHORT PEN NEEDLE) 31 gauge x 5/16" Ndle Use to inject insulin into the skin 3 times daily    predniSONE (DELTASONE) 5 MG tablet Take by mouth daily; 5/18-5/24: 20 mg; 5/25-5/31: 15 mg; 6/1-6/7: 10 mg; 6/8/23- thereafter: 5 mg daily; do not stop    sulfamethoxazole-trimethoprim 400-80mg (BACTRIM,SEPTRA) 400-80 mg per tablet Take 1 tablet by mouth every morning. Stop 11/12/23    tacrolimus (PROGRAF) 1 MG Cap Take 2 capsules (2 mg total) by mouth every morning AND 1 capsule (1 mg total) every evening.    valGANciclovir (VALCYTE) 450 mg Tab Take 2 tablets (900 mg total) by mouth every morning. Stop 8/14/23     Family History       Problem Relation (Age of Onset)    Diabetes Mother, Maternal Grandmother    Diabetes type II Mother, Brother, Brother    Hyperlipidemia Mother, Father    Hypertension Mother          Tobacco Use    Smoking status: Never    Smokeless tobacco: Never   Substance and Sexual Activity    Alcohol use: No     Comment: 1-2 times per month    Drug use: No    Sexual activity: Yes     Partners: Female     ROS    Constitutional:  Negative for activity change and appetite change.   Eyes:  Negative for visual disturbance.   Respiratory:  Negative for cough and shortness of breath.    Cardiovascular:  Negative for chest pain, palpitations and leg swelling.   Gastrointestinal:  Negative for abdominal distention, abdominal pain, constipation, diarrhea, nausea and vomiting.   Genitourinary:  Negative for difficulty urinating.   Musculoskeletal:  Negative for arthralgias.   Skin:  Negative for wound.   Allergic/Immunologic: s/p kidney transplant  Neurological:  Negative for dizziness.   Hematological:  Negative for adenopathy. Does not bruise/bleed easily. "   Psychiatric/Behavioral:  Negative for agitation.      Vital Signs (Most Recent):  Temp: 97.9 °F (36.6 °C) (06/04/23 0737)  Pulse: 94 (06/04/23 0737)  Resp: 18 (06/04/23 0737)  BP: 115/69 (06/04/23 0737)  SpO2: 98 % (06/04/23 0737) Vital Signs (24h Range):  Temp:  [97.7 °F (36.5 °C)-98 °F (36.7 °C)] 97.9 °F (36.6 °C)  Pulse:  [] 94  Resp:  [15-19] 18  SpO2:  [98 %-99 %] 98 %  BP: (103-118)/(66-81) 115/69     Weight: 100.5 kg (221 lb 9 oz)  Body mass index is 27.69 kg/m².    SpO2: 98 %       No intake or output data in the 24 hours ending 06/04/23 0805    Lines/Drains/Airways       Peripheral Intravenous Line  Duration                  Peripheral IV - Single Lumen 06/03/23 2200 18 G Anterior;Proximal;Right Forearm <1 day                     Physical Exam   Vitals and nursing note reviewed.   Constitutional:       Appearance: He is well-developed.   HENT:      Head: Normocephalic.   Eyes:      Conjunctiva/sclera: Conjunctivae normal.   Cardiovascular:      Rate and Rhythm: Normal rate, irregularly irregular rhythm     Heart sounds: No murmur heard.  Pulmonary:      Effort: Pulmonary effort is normal.      Breath sounds: Normal breath sounds.   Abdominal:      General: Bowel sounds are normal. There is no distension.      Palpations: Abdomen is soft.      Tenderness: There is no abdominal tenderness.   Musculoskeletal:         General: Normal range of motion.      Cervical back: Normal range of motion.   Skin:     General: Skin is warm and dry.      Capillary Refill: Capillary refill takes less than 2 seconds.   Neurological:      Mental Status: He is alert and oriented to person, place, and time.   Psychiatric:         Behavior: Behavior normal.         Thought Content: Thought content normal.         Judgment: Judgment normal.         Significant Labs:   Recent Lab Results  (Last 5 results in the past 24 hours)        06/04/23  0708   06/04/23  0209   06/04/23  0039   06/04/23  0003   06/03/23  0133         Albumin         4.1       Alkaline Phosphatase         108       ALT         63       Anion Gap         10       Appearance, UA     Clear           AST         29       Bacteria, UA     None           Baso # 0.08         0.11       Basophil % 1.0         1.2       Bilirubin (UA)     Negative           BILIRUBIN TOTAL         1.7  Comment: For infants and newborns, interpretation of results should be based  on gestational age, weight and in agreement with clinical  observations.    Premature Infant recommended reference ranges:  Up to 24 hours.............<8.0 mg/dL  Up to 48 hours............<12.0 mg/dL  3-5 days..................<15.0 mg/dL  6-29 days.................<15.0 mg/dL         BNP         27  Comment: Values of less than 100 pg/ml are consistent with non-CHF populations.       BUN         19       Calcium         9.1       Chloride         108       CO2         22       Color, UA     Colorless           Creatinine         1.7       Differential Method Automated         Automated       eGFR         51       Eos # 0.2         0.2       Eosinophil % 2.4         2.1       Glucose         205       Glucose, UA     3+           Gran # (ANC) 6.7         7.2       Gran % 83.7         81.5       Hematocrit 38.3         38.9       Hemoglobin 12.2         13.3       Immature Grans (Abs) 0.07  Comment: Mild elevation in immature granulocytes is non specific and   can be seen in a variety of conditions including stress response,   acute inflammation, trauma and pregnancy. Correlation with other   laboratory and clinical findings is essential.           0.05  Comment: Mild elevation in immature granulocytes is non specific and   can be seen in a variety of conditions including stress response,   acute inflammation, trauma and pregnancy. Correlation with other   laboratory and clinical findings is essential.         Immature Granulocytes 0.9         0.6       Ketones, UA     Negative           Leukocytes, UA      Negative           Lymph # 0.5         0.7       Lymph % 5.8         8.2       Magnesium       1.8         MCH 28.6         28.9       MCHC 31.9         34.2       MCV 90         85       Microscopic Comment     SEE COMMENT  Comment: Other formed elements not mentioned in the report are not   present in the microscopic examination.              Mono # 0.5         0.6       Mono % 6.2         6.4       MPV 10.0         9.6       NITRITE UA     Negative           nRBC 0         0       Occult Blood UA     3+           pH, UA     6.0           Phosphorus       3.0         Platelets 142         160       Potassium         4.2       PROTEIN TOTAL         6.8       Protein, UA     Negative  Comment: Recommend a 24 hour urine protein or a urine   protein/creatinine ratio if globulin induced proteinuria is  clinically suspected.             RBC 4.26         4.60       RBC, UA     3           RDW 15.6         15.2       SARS-CoV-2 RNA, Amplification, Qual   Negative  Comment: This test utilizes isothermal nucleic acid amplification technology   to   detect the SARS-CoV-2 RdRp nucleic acid segment. The analytical   sensitivity   (limit of detection) is 500 copies/swab.     A POSITIVE result is indicative of the presence of SARS-CoV-2 RNA;   clinical   correlation with patient history and other diagnostic information is   necessary to determine patient infection status.    A NEGATIVE result means that SARS-CoV-2 nucleic acids are not present   above   the limit of detection. A NEGATIVE result should be treated as   presumptive.   It does not rule out the possibility of COVID-19 and should not be   the sole   basis for treatment decisions. If COVID-19 is strongly suspected   based on   clinical and exposure history, re-testing using an alternate   molecular assay   should be considered.     This test is only for use under the Food and Drug Administration s   Emergency   Use Authorization (EUA).     Commercial kits are provided  by [employer]. Performance   characteristics of the EUA have been independently verified by   [facility] Department of Pathology and Laboratory Medicine.   _________________________________________________________________   The authorized Fact Sheet for Healthcare Providers and the authorized   Fact   Sheet for Patients of the ID NOW COVID-19 are available on the FDA   website:   https://www.fda.gov/media/162574/download   https://www.fda.gov/media/671980/download               Sodium         140       Specific [place], UA     1.010           Specimen UA     Urine, Clean Catch           Troponin I         0.009  Comment: The reference interval for Troponin I represents the 99th percentile   cutoff   for our facility and is consistent with 3rd generation assay   performance.         UROBILINOGEN UA     Negative           WBC, UA     1           WBC 7.94         8.89       Yeast, UA     None

## 2023-06-04 NOTE — HPI
"Mr. Iglesias is a 42 y.o. year old White male with history of ESRD secondary to diabetic nephropathy who received a living kidney transplant on 5/15/23 (thymo induction, CMV ++). ESRD 2/2 for Diabetes Mellitus - Type II.  His most recent creatinine is 1.7   PMHX: Coronary angioplasty with stent placement,NSTEMI with CAD 8/2017, HTN, GERD, Sleep Apnea. Patient reports starting 1 days ago felt "heart racing" lasting hours-  Patient presented to [facility] ED - where was found to be in A-FIb RVR, patient was placed on cardizem gtt now rate controlled in the 80's - remains in Afib. Patient reported dyspnea- but has improved with heart rate.  He denies CP, fever, chills, and all other sxs at this time, dysuria, or incisional discomfort/drainage.    Patient admitted for further evaluation and treatment- Cardiology consulted. Continue gtt at this time placed on Telemetry, continue home medications.   "

## 2023-06-04 NOTE — CONSULTS
[facility]  Cardiology  Consult Note    Patient Name: Robb Iglesias  MRN: [id number]  Admission Date: 6/4/2023  Hospital Length of Stay: 0 days  Code Status: Full Code   Attending Provider: Reji Hinojosa MD   Consulting Provider: Annika Vasquez MD  Primary Care Physician: SABIHA Roberson MD  Principal Problem:Atrial fibrillation with RVR    Patient information was obtained from patient and ER records.     Consult to Cardiology  Consult performed by: Annika Vasquez MD  Consult ordered by: MAGGIE Renee        Subjective:     Chief Complaint:  Palpitations      HPI:   Cardiology consult: New onset AF  Outpatient cardiologist: Dr. Rios  Primary team: Transplant Kidney      Mr. Iglesias is a 42 y.o. 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 Presented to the ED w/ c/o palpitations and SOB for at least 1 day. Found to be in AF w/ RVR and started on cardizem drip at 2.5mg/hr. Electrolytes WNL. Per chart review, on 5/29/23 he reported to his outpatient cardiologist and elevated HR at home of 106. Last LVEF on chart of 55% on 2022.  Chadsvasc 3    PMHX: NSTEMI with CAD s/p PCI FAMILIA of LADX 2 (8/2017), HTN, GERD, Sleep Apnea and DM2.     Admitted lightheadedness and SOB with ambulation yesterday while elevated HR. Currently denies any chest pain, palpitations, dizziness, N/V or any other complaints.       Past Medical History:   Diagnosis Date    Allergic rhinitis     Atrial fibrillation with RVR 6/4/2023    Class 1 obesity due to excess calories with serious comorbidity and body mass index (BMI) of 31.0 to 31.9 in adult 4/7/2017    Coronary artery disease     Coronary artery disease involving native coronary artery of native heart without angina pectoris 2/6/2018    Cath lab procedure 04/23/2018 (Naveen Rios MD) A. Indication/Pre-Operative Diagnosis: The patient is a 37  year old male that was referred for catheterization by [facility] Self for ACS (NSTEMI). The BELLA risk score is 5.  B. Summary/Post-Operative Diagnosis 1. Single vessel coronary artery disease. 2. Normal LVEF. 3. Diastolic dysfunction. 4. Successful PCI for acute myocardial infarction. 5.     Diabetic nephropathy associated with type 2 diabetes mellitus 1/6/2020    Direct hyperbilirubinemia 3/24/2018    DM (diabetes mellitus) 2008    BS doesn't check any more 08/02/2018    DM (diabetes mellitus) 2012    BS 99 am 06/26/2020    DM (diabetes mellitus)     BS didn't check 06/04/2021    DM (diabetes mellitus) 2008    BS didn't check 07/29/2022     Dyslipidemia associated with type 2 diabetes mellitus 7/31/2017    Elevated bilirubin 3/21/2018    GERD (gastroesophageal reflux disease)     Gout     Hyperlipidemia     Hyperparathyroidism, secondary to chronic kidney disease 6/7/2021    Hypertension associated with chronic kidney disease due to type 2 diabetes mellitus     Hypertension complicating diabetes 3/16/2019    Not candidate for Hypertension Digital Medicine program due to dialysis status. Didn't tolerate amlodipine due to SE of hypotension.    Idiopathic chronic gout, multiple sites, without tophus (tophi) 7/19/2017    Long term (current) use of insulin     MI (myocardial infarction) 07/2017    NSTEMI with CAD s/p PCI (FAMILIA) of LAD x 2 in 8/2017 7/31/2017    Obesity     HENRY on CPAP     Peritoneal dialysis catheter in place 1/26/2023    Proteinuria     Severe obstructive sleep apnea - Intolerant of CPAP 7/31/2017    Intolerant of CPAP after weeks of trying multiple interfaces. SPLIT-NIGHT SLEEP STUDY 7/28/2015 · SLEEP ARCHITECTURE: Sleep onset was 2.9 minutes and sleep efficiency was 94.4%. Sleep Stage distribution showed 145 sleep stage changes, 6 awakenings and 119 arousals. Sleep distribution showed 53.2% stage NI, 41.8% stage N 11, 0.0% stage N Ill and REM sleep was at 5.0%. There were 2  REM periods. · RE    Stage 3a chronic kidney disease 5/26/2023    Stage 5 chronic kidney disease on chronic peritoneal dialysis 6/7/2017    Status post angioplasty with stent 8/4/2017    Steatohepatitis     Fatty Liver    Type 2 diabetes mellitus with chronic kidney disease on chronic dialysis, without long-term current use of insulin 6/21/2017    Type 2 diabetes mellitus with diabetic nephropathy     Type 2 diabetes mellitus with diabetic nephropathy, without long-term current use of insulin 1/6/2020    Type 2 diabetes mellitus with diabetic polyneuropathy, without long-term current use of insulin 6/7/2021    Type 2 diabetes mellitus with hyperglycemia     Type 2 diabetes mellitus with renal manifestations     Type 2 diabetes mellitus with stage 3 chronic kidney disease, without long-term current use of insulin 6/21/2017       Past Surgical History:   Procedure Laterality Date    CORONARY ANGIOPLASTY WITH STENT PLACEMENT      KIDNEY TRANSPLANT N/A 5/15/2023    Procedure: TRANSPLANT, KIDNEY;  Surgeon: Reji Hinojosa MD;  Location: [facility] OR [address] [place] [address];  Service: Transplant;  Laterality: N/A;  IN ROOM: 10:37  OUT OF ICE:10:53  REPERFUSION TIME: 11:21      LASIK Bilateral     LIVER BIOPSY      NASAL ENDOSCOPY      NASAL SEPTUM SURGERY      PERITONEAL CATHETER REMOVAL N/A 5/15/2023    Procedure: REMOVAL, CATHETER, DIALYSIS, PERITONEAL;  Surgeon: Reji Hinojosa MD;  Location: [facility] OR [address] [place] [address];  Service: Transplant;  Laterality: N/A;    SLEEVE GASTROPLASTY  03/06/2017       Review of patient's allergies indicates:  No Known Allergies    Current Facility-Administered Medications on File Prior to Encounter   Medication    [COMPLETED] diltiaZEM injection 10 mg    [COMPLETED] ondansetron injection 4 mg    [DISCONTINUED] diltiaZEM 125 mg in D5W 125 mL infusion     Current Outpatient Medications on File Prior to Encounter   Medication Sig    aspirin (ECOTRIN) 81 MG EC tablet Take 1 tablet (81 mg total) by  "mouth once daily.    atorvastatin (LIPITOR) 80 MG tablet Take 1 tablet (80 mg total) by mouth every evening.    blood sugar diagnostic Strp Check blood glucose 2 times daily as directed and as needed    blood-glucose meter (ONETOUCH ULTRAMINI) kit Use as instructed    carvediloL (COREG) 6.25 MG tablet Take 1 tablet (6.25 mg total) by mouth 2 (two) times daily.    docusate sodium (COLACE) 100 MG capsule Take 1 capsule (100 mg total) by mouth 3 (three) times daily.    dulaglutide (TRULICITY) 3 mg/0.5 mL pen injector Inject 3 mg into the skin every 7 days.    ezetimibe (ZETIA) 10 mg tablet Take 1 tablet (10 mg total) by mouth once daily.    insulin aspart U-100 (NOVOLOG) 100 unit/mL injection Inject into the skin 3 (three) times daily before meals.    insulin lispro-aabc (LYUMJEV KWIKPEN U-100 INSULIN) 100 unit/mL pen Inject 4 Units into the skin 3 (three) times daily with meals. Plus sliding scale insulin. Max 27 units    k phos di & mono-sod phos mono (K-PHOS-NEUTRAL) 250 mg Tab Take 2 tablets by mouth 3 (three) times daily.    lancets Misc Check blood glucose 2 times daily as directed and as needed (dispense insurance preferred brand or patient choice)    multivitamin Tab Take 1 tablet by mouth once daily.    mycophenolate (CELLCEPT) 250 mg Cap Take 4 capsules (1,000 mg total) by mouth 2 (two) times daily.    nitroGLYCERIN (NITROSTAT) 0.4 MG SL tablet Dissolve one tablet underneath tongue at onset of angina; may repeat every 5 minutes if needed. Call 911 if angina persists after 2 doses.    oxyCODONE (ROXICODONE) 5 MG immediate release tablet Take 1 tablet (5 mg total) by mouth every 6 (six) hours as needed for Pain.    pantoprazole (PROTONIX) 40 MG tablet Take 1 tablet (40 mg total) by mouth once daily.    pen needle, diabetic (BD ULTRA-FINE SHORT PEN NEEDLE) 31 gauge x 5/16" Ndle Use to inject insulin into the skin 3 times daily    predniSONE (DELTASONE) 5 MG tablet Take by mouth daily; " 5/18-5/24: 20 mg; 5/25-5/31: 15 mg; 6/1-6/7: 10 mg; 6/8/23- thereafter: 5 mg daily; do not stop    sulfamethoxazole-trimethoprim 400-80mg (BACTRIM,SEPTRA) 400-80 mg per tablet Take 1 tablet by mouth every morning. Stop 11/12/23    tacrolimus (PROGRAF) 1 MG Cap Take 2 capsules (2 mg total) by mouth every morning AND 1 capsule (1 mg total) every evening.    valGANciclovir (VALCYTE) 450 mg Tab Take 2 tablets (900 mg total) by mouth every morning. Stop 8/14/23     Family History       Problem Relation (Age of Onset)    Diabetes Mother, Maternal Grandmother    Diabetes type II Mother, Brother, Brother    Hyperlipidemia Mother, Father    Hypertension Mother          Tobacco Use    Smoking status: Never    Smokeless tobacco: Never   Substance and Sexual Activity    Alcohol use: No     Comment: 1-2 times per month    Drug use: No    Sexual activity: Yes     Partners: Female     ROS    Constitutional:  Negative for activity change and appetite change.   Eyes:  Negative for visual disturbance.   Respiratory:  Negative for cough and shortness of breath.    Cardiovascular:  Negative for chest pain, palpitations and leg swelling.   Gastrointestinal:  Negative for abdominal distention, abdominal pain, constipation, diarrhea, nausea and vomiting.   Genitourinary:  Negative for difficulty urinating.   Musculoskeletal:  Negative for arthralgias.   Skin:  Negative for wound.   Allergic/Immunologic: s/p kidney transplant  Neurological:  Negative for dizziness.   Hematological:  Negative for adenopathy. Does not bruise/bleed easily.   Psychiatric/Behavioral:  Negative for agitation.      Vital Signs (Most Recent):  Temp: 97.9 °F (36.6 °C) (06/04/23 0737)  Pulse: 94 (06/04/23 0737)  Resp: 18 (06/04/23 0737)  BP: 115/69 (06/04/23 0737)  SpO2: 98 % (06/04/23 0737) Vital Signs (24h Range):  Temp:  [97.7 °F (36.5 °C)-98 °F (36.7 °C)] 97.9 °F (36.6 °C)  Pulse:  [] 94  Resp:  [15-19] 18  SpO2:  [98 %-99 %] 98 %  BP:  (103-118)/(66-81) 115/69     Weight: 100.5 kg (221 lb 9 oz)  Body mass index is 27.69 kg/m².    SpO2: 98 %       No intake or output data in the 24 hours ending 06/04/23 0805    Lines/Drains/Airways       Peripheral Intravenous Line  Duration                  Peripheral IV - Single Lumen 06/03/23 2200 18 G Anterior;Proximal;Right Forearm <1 day                     Physical Exam   Vitals and nursing note reviewed.   Constitutional:       Appearance: He is well-developed.   HENT:      Head: Normocephalic.   Eyes:      Conjunctiva/sclera: Conjunctivae normal.   Cardiovascular:      Rate and Rhythm: Normal rate, irregularly irregular rhythm     Heart sounds: No murmur heard.  Pulmonary:      Effort: Pulmonary effort is normal.      Breath sounds: Normal breath sounds.   Abdominal:      General: Bowel sounds are normal. There is no distension.      Palpations: Abdomen is soft.      Tenderness: There is no abdominal tenderness.   Musculoskeletal:         General: Normal range of motion.      Cervical back: Normal range of motion.   Skin:     General: Skin is warm and dry.      Capillary Refill: Capillary refill takes less than 2 seconds.   Neurological:      Mental Status: He is alert and oriented to person, place, and time.   Psychiatric:         Behavior: Behavior normal.         Thought Content: Thought content normal.         Judgment: Judgment normal.         Significant Labs:   Recent Lab Results  (Last 5 results in the past 24 hours)        06/04/23  0708   06/04/23  0209   06/04/23  0039   06/04/23  0003   06/03/23  2335        Albumin         4.1       Alkaline Phosphatase         108       ALT         63       Anion Gap         10       Appearance, UA     Clear           AST         29       Bacteria, UA     None           Baso # 0.08         0.11       Basophil % 1.0         1.2       Bilirubin (UA)     Negative           BILIRUBIN TOTAL         1.7  Comment: For infants and newborns, interpretation of results  should be based  on gestational age, weight and in agreement with clinical  observations.    Premature Infant recommended reference ranges:  Up to 24 hours.............<8.0 mg/dL  Up to 48 hours............<12.0 mg/dL  3-5 days..................<15.0 mg/dL  6-29 days.................<15.0 mg/dL         BNP         27  Comment: Values of less than 100 pg/ml are consistent with non-CHF populations.       BUN         19       Calcium         9.1       Chloride         108       CO2         22       Color, UA     Colorless           Creatinine         1.7       Differential Method Automated         Automated       eGFR         51       Eos # 0.2         0.2       Eosinophil % 2.4         2.1       Glucose         205       Glucose, UA     3+           Gran # (ANC) 6.7         7.2       Gran % 83.7         81.5       Hematocrit 38.3         38.9       Hemoglobin 12.2         13.3       Immature Grans (Abs) 0.07  Comment: Mild elevation in immature granulocytes is non specific and   can be seen in a variety of conditions including stress response,   acute inflammation, trauma and pregnancy. Correlation with other   laboratory and clinical findings is essential.           0.05  Comment: Mild elevation in immature granulocytes is non specific and   can be seen in a variety of conditions including stress response,   acute inflammation, trauma and pregnancy. Correlation with other   laboratory and clinical findings is essential.         Immature Granulocytes 0.9         0.6       Ketones, UA     Negative           Leukocytes, UA     Negative           Lymph # 0.5         0.7       Lymph % 5.8         8.2       Magnesium       1.8         MCH 28.6         28.9       MCHC 31.9         34.2       MCV 90         85       Microscopic Comment     SEE COMMENT  Comment: Other formed elements not mentioned in the report are not   present in the microscopic examination.              Mono # 0.5         0.6       Mono % 6.2         6.4        MPV 10.0         9.6       NITRITE UA     Negative           nRBC 0         0       Occult Blood UA     3+           pH, UA     6.0           Phosphorus       3.0         Platelets 142         160       Potassium         4.2       PROTEIN TOTAL         6.8       Protein, UA     Negative  Comment: Recommend a 24 hour urine protein or a urine   protein/creatinine ratio if globulin induced proteinuria is  clinically suspected.             RBC 4.26         4.60       RBC, UA     3           RDW 15.6         15.2       SARS-CoV-2 RNA, Amplification, Qual   Negative  Comment: This test utilizes isothermal nucleic acid amplification technology   to   detect the SARS-CoV-2 RdRp nucleic acid segment. The analytical   sensitivity   (limit of detection) is 500 copies/swab.     A POSITIVE result is indicative of the presence of SARS-CoV-2 RNA;   clinical   correlation with patient history and other diagnostic information is   necessary to determine patient infection status.    A NEGATIVE result means that SARS-CoV-2 nucleic acids are not present   above   the limit of detection. A NEGATIVE result should be treated as   presumptive.   It does not rule out the possibility of COVID-19 and should not be   the sole   basis for treatment decisions. If COVID-19 is strongly suspected   based on   clinical and exposure history, re-testing using an alternate   molecular assay   should be considered.     This test is only for use under the Food and Drug Administration s   Emergency   Use Authorization (EUA).     Commercial kits are provided by [email or web address]. Performance   characteristics of the EUA have been independently verified by   [facility] Department of Pathology and Laboratory Medicine.   _________________________________________________________________   The authorized Fact Sheet for Healthcare Providers and the authorized   Fact   Sheet for Patients of the ID NOW COVID-19 are available on the FDA    website:   https://www.fda.gov/media/038428/download   https://www.fda.gov/media/409058/download               Sodium         140       Specific [place], UA     1.010           Specimen UA     Urine, Clean Catch           Troponin I         0.009  Comment: The reference interval for Troponin I represents the 99th percentile   cutoff   for our facility and is consistent with 3rd generation assay   performance.         UROBILINOGEN UA     Negative           WBC, UA     1           WBC 7.94         8.89       Yeast, UA     None                                      Assessment and Plan:     * Atrial fibrillation with RVR  Mr. Iglesias is a 42 y.o. year old M with history of ESRD secondary to diabetic nephropathy s/p living kidney transplant on 5/15/23 (thymo induction, CMV ++), HTN, DM2, NSTEMI s/p PCI FAMILIA LAD. Who was found to be in new onset AF w/ RVR, now on a dilt drip. Chadsvasc 3. First reported tachycardia of 106 on 5/29/23 found on his cuff pressure reading.     Recs:   - For better rate control while on dilt drip please change his coreg to metoprolol tartrate 25 QID while monitoring BP.   - Given a chadsvasc of 3 recommend starting anticoagulation w/ eliquis 5 mg BID if ok with transplant team.   - Will set up a LUNA/DCCV for tomorrow, if cardioverted will need to be on anticoagulation for at least 4 weeks.   - Replace electrolytes as needed, keep K>4 and Mag>2.   - Please contact us if any questions or concerns        VTE Risk Mitigation (From admission, onward)         Ordered     heparin (porcine) injection 5,000 Units  Every 8 hours         06/04/23 0645     IP VTE HIGH RISK PATIENT  Once         06/04/23 0645     Place sequential compression device  Until discontinued         06/04/23 0645                Thank you for your consult. Attending attestation to follow.     Annika Vasquez MD  Cardiology   [facility] - Transplant Step[facility]

## 2023-06-05 ENCOUNTER — CLINICAL SUPPORT (OUTPATIENT)
Dept: CARDIOLOGY | Facility: HOSPITAL | Age: 43
DRG: 309 | End: 2023-06-05
Attending: INTERNAL MEDICINE
Payer: COMMERCIAL

## 2023-06-05 VITALS
DIASTOLIC BLOOD PRESSURE: 87 MMHG | WEIGHT: 214.81 LBS | BODY MASS INDEX: 26.71 KG/M2 | RESPIRATION RATE: 18 BRPM | SYSTOLIC BLOOD PRESSURE: 132 MMHG | TEMPERATURE: 98 F | OXYGEN SATURATION: 97 % | HEIGHT: 75 IN | HEART RATE: 68 BPM

## 2023-06-05 DIAGNOSIS — I48.91 NEW ONSET A-FIB: ICD-10-CM

## 2023-06-05 DIAGNOSIS — Z29.89 PROPHYLACTIC IMMUNOTHERAPY: ICD-10-CM

## 2023-06-05 DIAGNOSIS — Z91.89 AT RISK FOR OPPORTUNISTIC INFECTIONS: ICD-10-CM

## 2023-06-05 DIAGNOSIS — Z79.60 LONG-TERM USE OF IMMUNOSUPPRESSANT MEDICATION: ICD-10-CM

## 2023-06-05 LAB
ALBUMIN SERPL BCP-MCNC: 3.6 G/DL (ref 3.5–5.2)
ANION GAP SERPL CALC-SCNC: 8 MMOL/L (ref 8–16)
APTT PPP: 25.6 SEC (ref 21–32)
BASOPHILS # BLD AUTO: 0.07 K/UL (ref 0–0.2)
BASOPHILS NFR BLD: 1.3 % (ref 0–1.9)
BUN SERPL-MCNC: 16 MG/DL (ref 6–20)
CALCIUM SERPL-MCNC: 9 MG/DL (ref 8.7–10.5)
CHLORIDE SERPL-SCNC: 109 MMOL/L (ref 95–110)
CO2 SERPL-SCNC: 22 MMOL/L (ref 23–29)
CREAT SERPL-MCNC: 1.7 MG/DL (ref 0.5–1.4)
DIFFERENTIAL METHOD: ABNORMAL
EOSINOPHIL # BLD AUTO: 0.2 K/UL (ref 0–0.5)
EOSINOPHIL NFR BLD: 3 % (ref 0–8)
ERYTHROCYTE [DISTWIDTH] IN BLOOD BY AUTOMATED COUNT: 15.8 % (ref 11.5–14.5)
EST. GFR  (NO RACE VARIABLE): 51.3 ML/MIN/1.73 M^2
GLUCOSE SERPL-MCNC: 115 MG/DL (ref 70–110)
HCT VFR BLD AUTO: 37.2 % (ref 40–54)
HGB BLD-MCNC: 11.9 G/DL (ref 14–18)
IMM GRANULOCYTES # BLD AUTO: 0.04 K/UL (ref 0–0.04)
IMM GRANULOCYTES NFR BLD AUTO: 0.7 % (ref 0–0.5)
INR PPP: 1 (ref 0.8–1.2)
LYMPHOCYTES # BLD AUTO: 0.5 K/UL (ref 1–4.8)
LYMPHOCYTES NFR BLD: 9.8 % (ref 18–48)
MAGNESIUM SERPL-MCNC: 1.7 MG/DL (ref 1.6–2.6)
MCH RBC QN AUTO: 28.7 PG (ref 27–31)
MCHC RBC AUTO-ENTMCNC: 32 G/DL (ref 32–36)
MCV RBC AUTO: 90 FL (ref 82–98)
MONOCYTES # BLD AUTO: 0.2 K/UL (ref 0.3–1)
MONOCYTES NFR BLD: 4.1 % (ref 4–15)
NEUTROPHILS # BLD AUTO: 4.4 K/UL (ref 1.8–7.7)
NEUTROPHILS NFR BLD: 81.1 % (ref 38–73)
NRBC BLD-RTO: 0 /100 WBC
PHOSPHATE SERPL-MCNC: 2.6 MG/DL (ref 2.7–4.5)
PLATELET # BLD AUTO: 132 K/UL (ref 150–450)
PMV BLD AUTO: 9.6 FL (ref 9.2–12.9)
POCT GLUCOSE: 130 MG/DL (ref 70–110)
POCT GLUCOSE: 257 MG/DL (ref 70–110)
POTASSIUM SERPL-SCNC: 4.2 MMOL/L (ref 3.5–5.1)
PROTHROMBIN TIME: 10.8 SEC (ref 9–12.5)
RBC # BLD AUTO: 4.15 M/UL (ref 4.6–6.2)
SODIUM SERPL-SCNC: 139 MMOL/L (ref 136–145)
TACROLIMUS BLD-MCNC: 8.2 NG/ML (ref 5–15)
WBC # BLD AUTO: 5.41 K/UL (ref 3.9–12.7)

## 2023-06-05 PROCEDURE — 83735 ASSAY OF MAGNESIUM: CPT | Performed by: TRANSPLANT SURGERY

## 2023-06-05 PROCEDURE — 99239 HOSP IP/OBS DSCHRG MGMT >30: CPT | Mod: ,,, | Performed by: NURSE PRACTITIONER

## 2023-06-05 PROCEDURE — 25000003 PHARM REV CODE 250: Performed by: NURSE PRACTITIONER

## 2023-06-05 PROCEDURE — 85730 THROMBOPLASTIN TIME PARTIAL: CPT | Performed by: TRANSPLANT SURGERY

## 2023-06-05 PROCEDURE — 85610 PROTHROMBIN TIME: CPT | Performed by: TRANSPLANT SURGERY

## 2023-06-05 PROCEDURE — 25000003 PHARM REV CODE 250: Performed by: TRANSPLANT SURGERY

## 2023-06-05 PROCEDURE — 85025 COMPLETE CBC W/AUTO DIFF WBC: CPT | Performed by: TRANSPLANT SURGERY

## 2023-06-05 PROCEDURE — 99239 PR HOSPITAL DISCHARGE DAY,>30 MIN: ICD-10-PCS | Mod: ,,, | Performed by: NURSE PRACTITIONER

## 2023-06-05 PROCEDURE — 93227 HOLTER MONITOR - 48 HOUR (CUPID ONLY): ICD-10-PCS | Mod: ,,, | Performed by: INTERNAL MEDICINE

## 2023-06-05 PROCEDURE — G0378 HOSPITAL OBSERVATION PER HR: HCPCS

## 2023-06-05 PROCEDURE — 36415 COLL VENOUS BLD VENIPUNCTURE: CPT | Performed by: NURSE PRACTITIONER

## 2023-06-05 PROCEDURE — 99214 PR OFFICE/OUTPT VISIT, EST, LEVL IV, 30-39 MIN: ICD-10-PCS | Mod: ,,, | Performed by: INTERNAL MEDICINE

## 2023-06-05 PROCEDURE — 80069 RENAL FUNCTION PANEL: CPT | Performed by: NURSE PRACTITIONER

## 2023-06-05 PROCEDURE — 99214 OFFICE O/P EST MOD 30 MIN: CPT | Mod: ,,, | Performed by: INTERNAL MEDICINE

## 2023-06-05 PROCEDURE — 63600175 PHARM REV CODE 636 W HCPCS: Performed by: TRANSPLANT SURGERY

## 2023-06-05 PROCEDURE — 80197 ASSAY OF TACROLIMUS: CPT | Performed by: NURSE PRACTITIONER

## 2023-06-05 PROCEDURE — 63600175 PHARM REV CODE 636 W HCPCS: Performed by: NURSE PRACTITIONER

## 2023-06-05 PROCEDURE — 93227 XTRNL ECG REC<48 HR R&I: CPT | Mod: ,,, | Performed by: INTERNAL MEDICINE

## 2023-06-05 PROCEDURE — 93225 XTRNL ECG REC<48 HRS REC: CPT

## 2023-06-05 RX ORDER — SODIUM CHLORIDE 9 MG/ML
INJECTION, SOLUTION INTRAVENOUS CONTINUOUS
Status: DISCONTINUED | OUTPATIENT
Start: 2023-06-05 | End: 2023-06-05 | Stop reason: HOSPADM

## 2023-06-05 RX ORDER — LANOLIN ALCOHOL/MO/W.PET/CERES
400 CREAM (GRAM) TOPICAL 2 TIMES DAILY
Qty: 60 TABLET | Refills: 11 | Status: SHIPPED | OUTPATIENT
Start: 2023-06-05

## 2023-06-05 RX ORDER — METOPROLOL TARTRATE 25 MG/1
25 TABLET, FILM COATED ORAL 2 TIMES DAILY
Qty: 60 TABLET | Refills: 11 | Status: SHIPPED | OUTPATIENT
Start: 2023-06-05 | End: 2023-07-11

## 2023-06-05 RX ORDER — LANOLIN ALCOHOL/MO/W.PET/CERES
400 CREAM (GRAM) TOPICAL 2 TIMES DAILY
Qty: 60 TABLET | Refills: 11 | Status: SHIPPED | OUTPATIENT
Start: 2023-06-05 | End: 2023-06-05 | Stop reason: SDUPTHER

## 2023-06-05 RX ORDER — LANOLIN ALCOHOL/MO/W.PET/CERES
400 CREAM (GRAM) TOPICAL 2 TIMES DAILY
Status: DISCONTINUED | OUTPATIENT
Start: 2023-06-05 | End: 2023-06-05 | Stop reason: HOSPADM

## 2023-06-05 RX ADMIN — METOPROLOL TARTRATE 25 MG: 25 TABLET, FILM COATED ORAL at 08:06

## 2023-06-05 RX ADMIN — APIXABAN 5 MG: 5 TABLET, FILM COATED ORAL at 08:06

## 2023-06-05 RX ADMIN — MYCOPHENOLATE MOFETIL 1000 MG: 250 CAPSULE ORAL at 08:06

## 2023-06-05 RX ADMIN — INSULIN ASPART 6 UNITS: 100 INJECTION, SOLUTION INTRAVENOUS; SUBCUTANEOUS at 12:06

## 2023-06-05 RX ADMIN — PREDNISONE 15 MG: 5 TABLET ORAL at 08:06

## 2023-06-05 RX ADMIN — METOPROLOL TARTRATE 25 MG: 25 TABLET, FILM COATED ORAL at 12:06

## 2023-06-05 RX ADMIN — TACROLIMUS 2 MG: 1 CAPSULE ORAL at 08:06

## 2023-06-05 RX ADMIN — SULFAMETHOXAZOLE AND TRIMETHOPRIM 1 TABLET: 400; 80 TABLET ORAL at 08:06

## 2023-06-05 RX ADMIN — PANTOPRAZOLE SODIUM 40 MG: 40 TABLET, DELAYED RELEASE ORAL at 08:06

## 2023-06-05 RX ADMIN — SODIUM CHLORIDE: 9 INJECTION, SOLUTION INTRAVENOUS at 12:06

## 2023-06-05 RX ADMIN — INSULIN ASPART 5 UNITS: 100 INJECTION, SOLUTION INTRAVENOUS; SUBCUTANEOUS at 12:06

## 2023-06-05 RX ADMIN — VALGANCICLOVIR 900 MG: 450 TABLET, FILM COATED ORAL at 08:06

## 2023-06-05 RX ADMIN — Medication 400 MG: at 12:06

## 2023-06-05 NOTE — PROGRESS NOTES
[facility]  Cardiology  Progress Note    Patient Name: Robb Iglesias  MRN: [id number]  Admission Date: 6/4/2023  Hospital Length of Stay: 1 days  Code Status: Full Code   Attending Physician: No att. providers found   Primary Care Physician: SABIHA Roberson MD  Expected Discharge Date: 6/5/2023  Principal Problem:Atrial fibrillation with RVR    Subjective:     Hospital Course:   No notes on file    Interval History: Plan was to undergo LUNA/DCCV Patient started on Metoprolol 25 TID and converted overnight.      Review of Systems   Constitutional: Negative for fever.   Cardiovascular:  Negative for chest pain, dyspnea on exertion, irregular heartbeat, leg swelling, near-syncope, orthopnea, palpitations and syncope.   Neurological:  Negative for dizziness, headaches and light-headedness.   Objective:     Vital Signs (Most Recent):  Temp: 98.1 °F (36.7 °C) (06/05/23 1243)  Pulse: 68 (06/05/23 1243)  Resp: 18 (06/05/23 1243)  BP: 132/87 (06/05/23 1243)  SpO2: 97 % (06/05/23 1243) Vital Signs (24h Range):  Temp:  [97.7 °F (36.5 °C)-98.1 °F (36.7 °C)] 98.1 °F (36.7 °C)  Pulse:  [66-88] 68  Resp:  [12-18] 18  SpO2:  [96 %-98 %] 97 %  BP: (117-132)/(60-87) 132/87     Weight: 97.4 kg (214 lb 13.4 oz)  Body mass index is 26.85 kg/m².     SpO2: 97 %         Intake/Output Summary (Last 24 hours) at 6/5/2023 1459  Last data filed at 6/5/2023 0600  Gross per 24 hour   Intake 651.44 ml   Output 2250 ml   Net -1598.56 ml       Lines/Drains/Airways       Peripheral Intravenous Line  Duration                  Peripheral IV - Single Lumen 06/03/23 2200 18 G Anterior;Proximal;Right Forearm 1 day                       Physical Exam  Constitutional:       Appearance: Normal appearance.   HENT:      Mouth/Throat:      Mouth: Mucous membranes are moist.   Cardiovascular:      Rate and Rhythm: Normal rate and regular rhythm.      Pulses: Normal pulses.      Heart sounds: Normal heart sounds. No murmur heard.    No  friction rub. No gallop.   Pulmonary:      Effort: No respiratory distress.      Breath sounds: No wheezing or rales.   Musculoskeletal:      Right lower leg: No edema.      Left lower leg: No edema.   Neurological:      General: No focal deficit present.      Mental Status: He is alert and oriented to person, place, and time.          Significant Labs:   Recent Lab Results  (Last 5 results in the past 24 hours)        06/05/23  1226   06/05/23  0833   06/05/23  0637   06/04/23  2203   06/04/23  1708        Albumin     3.6           Anion Gap     8           aPTT     25.6  Comment: Refer to local heparin nomogram for intensity/dose specific   therapeutic   range.             Baso #     0.07           Basophil %     1.3           BUN     16           Calcium     9.0           Chloride     109           CO2     22           Creatinine     1.7           Differential Method     Automated           eGFR     51.3           Eos #     0.2           Eosinophil %     3.0           Glucose     115           Gran # (ANC)     4.4           Gran %     81.1           Hematocrit     37.2           Hemoglobin     11.9           Immature Grans (Abs)     0.04  Comment: Mild elevation in immature granulocytes is non specific and   can be seen in a variety of conditions including stress response,   acute inflammation, trauma and pregnancy. Correlation with other   laboratory and clinical findings is essential.             Immature Granulocytes     0.7           INR     1.0  Comment: Coumadin Therapy:  2.0 - 3.0 for INR for all indicators except mechanical heart valves  and antiphospholipid syndromes which should use 2.5 - 3.5.             Lymph #     0.5           Lymph %     9.8           Magnesium     1.7           MCH     28.7           MCHC     32.0           MCV     90           Mono #     0.2           Mono %     4.1           MPV     9.6           nRBC     0           Phosphorus     2.6           Platelets     132           POCT  Glucose 257   130     173   269       Potassium     4.2           Protime     10.8           RBC     4.15           RDW     15.8           Sodium     139           Tacrolimus Lvl     8.2  Comment: Testing performed by a chemiluminescent microparticle   immunoassay on the [employer] System.    CAUTION: No firm therapeutic range exists for tacrolimus in whole   blood. The   complexity of the clinical state, individual differences in   sensitivity to   immunosuppressive and nephrotoxic effects of tacrolimus,   co-administration   of other immunosuppressants, type of transplant, time post-transplant   and a   number of other factors contribute to different requirements for   optimal   blood levels of tacrolimus. Therefore, individual tacrolimus values   cannot   be used as the sole indicator for making changes in treatment regimen   and   each patient should be thoroughly evaluated clinically before changes   in   treatment regimens are made. Each user must establish his or her own   ranges   based on clinical experience.  Therapeutic ranges vary according to the commercial test used, and   therefore   should be established for each commercial test. Values obtained with   different assay methods cannot be used interchangeably due to   differences in   assay methods and cross-reactivity with metabolites, nor should   correction   factors be applied. Therefore, consistent use of one assay for   individual   patients is recommended.             WBC     5.41                                    Assessment and Plan:       * Atrial fibrillation with RVR  Mr. Iglesias is a 42 y.o. year old M with history of ESRD secondary to diabetic nephropathy s/p living kidney transplant on 5/15/23 (thymo induction, CMV ++), HTN, DM2, NSTEMI s/p PCI FAMILIA LAD. Who was found to be in new onset AF w/ RVR, now on a dilt drip. Chadsvasc 3. First reported tachycardia of 106 on 5/29/23 found on his cuff pressure reading.     Recs:   - Given  that patient converted overnight he will hold on LUNA  -Discharge on metoprolol 25 BID ( dose adjusted for perfusion concerns in the setting of recent renal transplant)  - Given a chadsvasc of 3 recommend starting anticoagulation w/ eliquis 5 mg BID for at least 4 weeks.  -Follow up with EP / Dr. Choudhary.  -Consider 30 day HOLTER ( discussed at bedside the use of smart watch)  - Replace electrolytes as needed, keep K>4 and Mag>2.   - Please contact us if any questions or concerns        VTE Risk Mitigation (From admission, onward)           Ordered     apixaban tablet 5 mg  2 times daily         06/04/23 0927     IP VTE HIGH RISK PATIENT  Once         06/04/23 0645     Place sequential compression device  Until discontinued         06/04/23 0645                    Stanley Guillen MD  Cardiology  [facility]

## 2023-06-05 NOTE — DISCHARGE SUMMARY
"[facility] - [facility]plant Step[facility]  Kidney Transplant  Discharge Summary    Patient Name: Robb Iglesias  MRN: [id number]  Admission Date: 6/4/2023  Hospital Length of Stay: 1 days  Discharge Date and Time:  06/05/2023 12:28 PM  Attending Physician: Reji Hinojosa MD   Discharging Provider: Natalia Soto NP  Primary Care Provider: SABIHA Roberson MD    HPI:   Mr. Iglesias is a 42 y.o. year old White male with history of ESRD secondary to diabetic nephropathy who received a living kidney transplant on 5/15/23 (thymo induction, CMV ++). ESRD 2/2 for Diabetes Mellitus - Type II.  His most recent creatinine is 1.7 Patient's past medical history significant for coronary angioplasty with stent placement, NSTEMI with CAD 8/2017, HTN, GERD and sleep apnea. Patient reports starting 1 days ago felt "heart racing" lasting hours. He reported difficulty catching his breath while heart was racing. Patient presented to OSH ED - where was found to be in A-FIb RVR. Patient was placed on cardizem gtt and rate controlled in the 80's. Pt reported dyspnea improved w rate control. He denies CP, fever, chills. He denies change in UOP. He notes ongoing healing of RLQ incision which was closed w derma bond.  Small amount of serosang drainage noted on Bandage. He denies dysuria, or incisional discomfort/drainage. Patient admitted for further evaluation and treatment. Cardiology consulted. Patient was continued on Diltiazem gtt on admit.       Procedure(s) (LRB):  Cardioversion or Defibrillation (N/A)  Transesophageal echo (LUNA) intra-procedure log documentation (N/A)     Hospital Course:  Patient remained on Diltiazem gtt. Cardiology consulted. CHADS-VASc score 3. Pt remained in a fib and home Coreg changed to Metoprolol tartrate 25 mg QID.  Pt was also started on anticoagulation w Eliquis 5 mg bid. Plan was for LUNA/DCCV Monday however patient chemically converted to NSR. Diltiazem gtt discontinued. 's and couple doses of " Lopressor held per parameters. Discussed w Cards, change Lopressor to 25 mg bid, hold for SBP <100. Ordered 48h holter monitor on discharge as well. Pt will go to 3rd floor clinic tower- Cardiology to have monitor placed and will need to return monitor Wednesday after appts. Pt will f/u w Dr Choudhary in cardiology.     Kidney function remained stable/at baseline. Good urine output. Incision w small opening w minute serosang drainage to top of incision. Majority of dermabond off. No redness or tenderness. Instructed patient and wife to cont to monitor site for signs of infection. Pt state 'always takes long to heal'. Pt has appt 6/7/23 to have ureter     Discharge instructions reviewed by Pharmacist, Nursing and Transplant coordinator.  Patient and CG verbalize understanding and are in agreement with discharge from hospital today.  Patient is medically stable for discharge.  Patient will f/u w labs per transplant coordinator.  Next visit TBD.        Goals of Care Treatment Preferences:  Code Status: Full Code      Final Active Diagnoses:    Diagnosis Date Noted POA    PRINCIPAL PROBLEM:  Atrial fibrillation with RVR [I48.91] 06/04/2023 Yes    Long-term use of immunosuppressant medication [Z79.60] 05/16/2023 Not Applicable    S/P kidney transplant [Z94.0] 05/15/2023 Not Applicable    At risk for opportunistic infections [Z91.89] 05/15/2023 Yes    Prophylactic immunotherapy [Z29.8] 05/15/2023 Not Applicable    Hyperparathyroidism, secondary to chronic kidney disease [N25.81] 06/07/2021 Yes     Chronic    Type 2 diabetes mellitus with chronic kidney disease on chronic dialysis, without long-term current use of insulin [E11.22, N18.6, Z99.2] 06/21/2017 Not Applicable     Chronic    Essential hypertension [I10] 04/26/2017 Yes     Chronic      Problems Resolved During this Admission:       Treatments: see hospital course    Consults (From admission, onward)        Status Ordering Provider     Consult to Cardiology   Once        Provider:  (Not yet assigned)    Completed CAROLINE PIERRE          Pending Diagnostic Studies:     None        Significant Diagnostic Studies: Labs:   CMP   Recent Labs   Lab 06/03/23 2335 06/04/23  0708 06/05/23  0637    139 139   K 4.2 4.0 4.2    109 109   CO2 22* 22* 22*   * 153* 115*   BUN 19 15 16   CREATININE 1.7* 1.7* 1.7*   CALCIUM 9.1 9.3 9.0   PROT 6.8  --   --    ALBUMIN 4.1 3.6 3.6   BILITOT 1.7*  --   --    ALKPHOS 108  --   --    AST 29  --   --    ALT 63*  --   --    ANIONGAP 10 8 8     CBC   Recent Labs   Lab 06/03/23 2335 06/04/23  0708 06/05/23  0637   WBC 8.89 7.94 5.41   HGB 13.3* 12.2* 11.9*   HCT 38.9* 38.3* 37.2*    142* 132*     INR   Lab Results   Component Value Date    INR 1.0 06/05/2023    INR 1.0 05/15/2023    INR 1.0 05/02/2023     All labs within the past 24 hours have been reviewed    Discharged Condition: fair    Disposition: home accompanied by wife    Follow Up:   Follow-up Information     Dwayne Escamilla MD Follow up in 2 week(s).    Specialties: Electrophysiology, Cardiovascular Disease, Cardiology  Contact information:  [address] [place] [address] [id number] [phone]                       Patient Instructions:      Ambulatory referral/consult to Cardiac Electrophysiology   Standing Status: Future   Referral Priority: Routine Referral Type: Consultation   Referral Reason: Specialty Services Required   Referred to Provider: DWAYNE ESCAMILLA Requested Specialty: Cardiology   Number of Visits Requested: 1     Holter monitor - 48 hour   Standing Status: Future Standing Exp. Date: 06/05/24     Order Specific Question Answer Comments   Release to patient Immediate      Medications:  Reconciled Home Medications:      Medication List      START taking these medications    apixaban 5 mg Tab  Commonly known as: ELIQUIS  Take 1 tablet (5 mg total) by mouth 2 (two) times daily.     magnesium oxide 400 mg (241.3 mg magnesium)  "tablet  Commonly known as: MAG-OX  Take 1 tablet (400 mg total) by mouth 2 (two) times daily.     metoprolol tartrate 25 MG tablet  Commonly known as: LOPRESSOR  Take 1 tablet (25 mg total) by mouth 2 (two) times daily.        CONTINUE taking these medications    aspirin 81 MG EC tablet  Commonly known as: ECOTRIN  Take 1 tablet (81 mg total) by mouth once daily.     atorvastatin 80 MG tablet  Commonly known as: LIPITOR  Take 1 tablet (80 mg total) by mouth every evening.     BD ULTRA-FINE SHORT PEN NEEDLE 31 gauge x 5/16" Ndle  Generic drug: pen needle, diabetic  Use to inject insulin into the skin 3 times daily     blood sugar diagnostic Strp  Check blood glucose 2 times daily as directed and as needed     blood-glucose meter kit  Commonly known as: ONETOUCH ULTRAMINI  Use as instructed     docusate sodium 100 MG capsule  Commonly known as: COLACE  Take 1 capsule (100 mg total) by mouth 3 (three) times daily.     ezetimibe 10 mg tablet  Commonly known as: ZETIA  Take 1 tablet (10 mg total) by mouth once daily.     lancets Misc  Check blood glucose 2 times daily as directed and as needed (dispense insurance preferred brand or patient choice)     LYUMJEV KWIKPEN U-100 INSULIN 100 unit/mL pen  Generic drug: insulin lispro-aabc  Inject 4 Units into the skin 3 (three) times daily with meals. Plus sliding scale insulin. Max 27 units     multivitamin Tab  Take 1 tablet by mouth once daily.     mycophenolate 250 mg Cap  Commonly known as: CELLCEPT  Take 4 capsules (1,000 mg total) by mouth 2 (two) times daily.     nitroGLYCERIN 0.4 MG SL tablet  Commonly known as: NITROSTAT  Dissolve one tablet underneath tongue at onset of angina; may repeat every 5 minutes if needed. Call 911 if angina persists after 2 doses.     oxyCODONE 5 MG immediate release tablet  Commonly known as: ROXICODONE  Take 1 tablet (5 mg total) by mouth every 6 (six) hours as needed for Pain.     pantoprazole 40 MG tablet  Commonly known as: " PROTONIX  Take 1 tablet (40 mg total) by mouth once daily.     PHOSPHA 250 NEUTRAL 250 mg Tab  Generic drug: k phos di & mono-sod phos mono  Take 2 tablets by mouth 3 (three) times daily.     predniSONE 5 MG tablet  Commonly known as: DELTASONE  Take by mouth daily; 5/18-5/24: 20 mg; 5/25-5/31: 15 mg; 6/1-6/7: 10 mg; 6/8/23- thereafter: 5 mg daily; do not stop     sulfamethoxazole-trimethoprim 400-80mg 400-80 mg per tablet  Commonly known as: BACTRIM,SEPTRA  Take 1 tablet by mouth every morning. Stop 11/12/23     tacrolimus 1 MG Cap  Commonly known as: PROGRAF  Take 2 capsules (2 mg total) by mouth every morning AND 1 capsule (1 mg total) every evening.     TRULICITY 3 mg/0.5 mL pen injector  Generic drug: dulaglutide  Inject 3 mg into the skin every 7 days.     valGANciclovir 450 mg Tab  Commonly known as: VALCYTE  Take 2 tablets (900 mg total) by mouth every morning. Stop 8/14/23        STOP taking these medications    carvediloL 6.25 MG tablet  Commonly known as: COREG     insulin aspart U-100 100 unit/mL injection  Commonly known as: NovoLOG          Time spent caring for patient (Greater than 1/2 spent in direct face-to-face contact): > 30 minutes    Natalia Soto NP  Kidney Transplant  [facility]

## 2023-06-05 NOTE — SUBJECTIVE & OBJECTIVE
Interval History: Plan was to undergo LUNA/DCCV Patient started on Metoprolol 25 TID and converted overnight.      Review of Systems   Constitutional: Negative for fever.   Cardiovascular:  Negative for chest pain, dyspnea on exertion, irregular heartbeat, leg swelling, near-syncope, orthopnea, palpitations and syncope.   Neurological:  Negative for dizziness, headaches and light-headedness.   Objective:     Vital Signs (Most Recent):  Temp: 98.1 °F (36.7 °C) (06/05/23 1243)  Pulse: 68 (06/05/23 1243)  Resp: 18 (06/05/23 1243)  BP: 132/87 (06/05/23 1243)  SpO2: 97 % (06/05/23 1243) Vital Signs (24h Range):  Temp:  [97.7 °F (36.5 °C)-98.1 °F (36.7 °C)] 98.1 °F (36.7 °C)  Pulse:  [66-88] 68  Resp:  [12-18] 18  SpO2:  [96 %-98 %] 97 %  BP: (117-132)/(60-87) 132/87     Weight: 97.4 kg (214 lb 13.4 oz)  Body mass index is 26.85 kg/m².     SpO2: 97 %         Intake/Output Summary (Last 24 hours) at 6/5/2023 1459  Last data filed at 6/5/2023 0600  Gross per 24 hour   Intake 651.44 ml   Output 2250 ml   Net -1598.56 ml       Lines/Drains/Airways       Peripheral Intravenous Line  Duration                  Peripheral IV - Single Lumen 06/03/23 2200 18 G Anterior;Proximal;Right Forearm 1 day                       Physical Exam  Constitutional:       Appearance: Normal appearance.   HENT:      Mouth/Throat:      Mouth: Mucous membranes are moist.   Cardiovascular:      Rate and Rhythm: Normal rate and regular rhythm.      Pulses: Normal pulses.      Heart sounds: Normal heart sounds. No murmur heard.    No friction rub. No gallop.   Pulmonary:      Effort: No respiratory distress.      Breath sounds: No wheezing or rales.   Musculoskeletal:      Right lower leg: No edema.      Left lower leg: No edema.   Neurological:      General: No focal deficit present.      Mental Status: He is alert and oriented to person, place, and time.          Significant Labs:   Recent Lab Results  (Last 5 results in the past 24 hours)         06/05/23  1226   06/05/23  0833   06/05/23  0637   06/04/23  2203   06/04/23  1708        Albumin     3.6           Anion Gap     8           aPTT     25.6  Comment: Refer to local heparin nomogram for intensity/dose specific   therapeutic   range.             Baso #     0.07           Basophil %     1.3           BUN     16           Calcium     9.0           Chloride     109           CO2     22           Creatinine     1.7           Differential Method     Automated           eGFR     51.3           Eos #     0.2           Eosinophil %     3.0           Glucose     115           Gran # (ANC)     4.4           Gran %     81.1           Hematocrit     37.2           Hemoglobin     11.9           Immature Grans (Abs)     0.04  Comment: Mild elevation in immature granulocytes is non specific and   can be seen in a variety of conditions including stress response,   acute inflammation, trauma and pregnancy. Correlation with other   laboratory and clinical findings is essential.             Immature Granulocytes     0.7           INR     1.0  Comment: Coumadin Therapy:  2.0 - 3.0 for INR for all indicators except mechanical heart valves  and antiphospholipid syndromes which should use 2.5 - 3.5.             Lymph #     0.5           Lymph %     9.8           Magnesium     1.7           MCH     28.7           MCHC     32.0           MCV     90           Mono #     0.2           Mono %     4.1           MPV     9.6           nRBC     0           Phosphorus     2.6           Platelets     132           POCT Glucose 257   130     173   269       Potassium     4.2           Protime     10.8           RBC     4.15           RDW     15.8           Sodium     139           Tacrolimus Lvl     8.2  Comment: Testing performed by a chemiluminescent microparticle   immunoassay on the [employer] i System.    CAUTION: No firm therapeutic range exists for tacrolimus in whole   blood. The   complexity of the clinical state,  individual differences in   sensitivity to   immunosuppressive and nephrotoxic effects of tacrolimus,   co-administration   of other immunosuppressants, type of transplant, time post-transplant   and a   number of other factors contribute to different requirements for   optimal   blood levels of tacrolimus. Therefore, individual tacrolimus values   cannot   be used as the sole indicator for making changes in treatment regimen   and   each patient should be thoroughly evaluated clinically before changes   in   treatment regimens are made. Each user must establish his or her own   ranges   based on clinical experience.  Therapeutic ranges vary according to the commercial test used, and   therefore   should be established for each commercial test. Values obtained with   different assay methods cannot be used interchangeably due to   differences in   assay methods and cross-reactivity with metabolites, nor should   correction   factors be applied. Therefore, consistent use of one assay for   individual   patients is recommended.             WBC     5.41

## 2023-06-05 NOTE — PROGRESS NOTES
"Discharge Medication Note:    Hospital Course:  Admitted for AFib with RVR.  Started on a diltiazem drip and transitioned to metoprolol 25 mg q6h.  Cardiology recommends discharging on metoprolol 25 mg BID and starting Eliquis for 4 weeks until follow up as an outpatient.      Met with Robb Iglesias at discharge to review discharge medications and to update the blue medication card.           Medication List        START taking these medications      apixaban 5 mg Tab  Commonly known as: ELIQUIS  Take 1 tablet (5 mg total) by mouth 2 (two) times daily.     magnesium oxide 400 mg (241.3 mg magnesium) tablet  Commonly known as: MAG-OX  Take 1 tablet (400 mg total) by mouth 2 (two) times daily.     metoprolol tartrate 25 MG tablet  Commonly known as: LOPRESSOR  Take 1 tablet (25 mg total) by mouth 2 (two) times daily.            CONTINUE taking these medications      aspirin 81 MG EC tablet  Commonly known as: ECOTRIN  Take 1 tablet (81 mg total) by mouth once daily.     atorvastatin 80 MG tablet  Commonly known as: LIPITOR  Take 1 tablet (80 mg total) by mouth every evening.     BD ULTRA-FINE SHORT PEN NEEDLE 31 gauge x 5/16" Ndle  Generic drug: pen needle, diabetic  Use to inject insulin into the skin 3 times daily     blood sugar diagnostic Strp  Check blood glucose 2 times daily as directed and as needed     blood-glucose meter kit  Commonly known as: ONETOUCH ULTRAMINI  Use as instructed     docusate sodium 100 MG capsule  Commonly known as: COLACE  Take 1 capsule (100 mg total) by mouth 3 (three) times daily.     ezetimibe 10 mg tablet  Commonly known as: ZETIA  Take 1 tablet (10 mg total) by mouth once daily.     lancets Misc  Check blood glucose 2 times daily as directed and as needed (dispense insurance preferred brand or patient choice)     LYUMJEV KWIKPEN U-100 INSULIN 100 unit/mL pen  Generic drug: insulin lispro-aabc  Inject 4 Units into the skin 3 (three) times daily with meals. Plus sliding scale " insulin. Max 27 units     multivitamin Tab  Take 1 tablet by mouth once daily.     mycophenolate 250 mg Cap  Commonly known as: CELLCEPT  Take 4 capsules (1,000 mg total) by mouth 2 (two) times daily.     nitroGLYCERIN 0.4 MG SL tablet  Commonly known as: NITROSTAT  Dissolve one tablet underneath tongue at onset of angina; may repeat every 5 minutes if needed. Call 911 if angina persists after 2 doses.     oxyCODONE 5 MG immediate release tablet  Commonly known as: ROXICODONE  Take 1 tablet (5 mg total) by mouth every 6 (six) hours as needed for Pain.     pantoprazole 40 MG tablet  Commonly known as: PROTONIX  Take 1 tablet (40 mg total) by mouth once daily.     PHOSPHA 250 NEUTRAL 250 mg Tab  Generic drug: k phos di & mono-sod phos mono  Take 2 tablets by mouth 3 (three) times daily.     predniSONE 5 MG tablet  Commonly known as: DELTASONE  Take by mouth daily; 5/18-5/24: 20 mg; 5/25-5/31: 15 mg; 6/1-6/7: 10 mg; 6/8/23- thereafter: 5 mg daily; do not stop     sulfamethoxazole-trimethoprim 400-80mg 400-80 mg per tablet  Commonly known as: BACTRIM,SEPTRA  Take 1 tablet by mouth every morning. Stop 11/12/23     tacrolimus 1 MG Cap  Commonly known as: PROGRAF  Take 2 capsules (2 mg total) by mouth every morning AND 1 capsule (1 mg total) every evening.     TRULICITY 3 mg/0.5 mL pen injector  Generic drug: dulaglutide  Inject 3 mg into the skin every 7 days.     valGANciclovir 450 mg Tab  Commonly known as: VALCYTE  Take 2 tablets (900 mg total) by mouth every morning. Stop 8/14/23            STOP taking these medications      carvediloL 6.25 MG tablet  Commonly known as: COREG     insulin aspart U-100 100 unit/mL injection  Commonly known as: NovoLOG               Where to Get Your Medications        [place] [facility] [place] [address]  [address] [address]      Phone: [phone]   apixaban 5 mg Tab  magnesium oxide 400 mg (241.3 mg  magnesium) tablet  metoprolol tartrate 25 MG tablet            The following medications have been placed on HOLD and should be restarted in the outpatient setting (when appropriate): none    Robb Iglesias verbalized understanding and had the opportunity to ask questions.

## 2023-06-05 NOTE — ASSESSMENT & PLAN NOTE
Mr. Iglesias is a 42 y.o. year old M with history of ESRD secondary to diabetic nephropathy s/p living kidney transplant on 5/15/23 (thymo induction, CMV ++), HTN, DM2, NSTEMI s/p PCI FAMILIA LAD. Who was found to be in new onset AF w/ RVR, now on a dilt drip. Chadsvasc 3. First reported tachycardia of 106 on 5/29/23 found on his cuff pressure reading.     Recs:   - Given that patient converted overnight he will hold on LUNA  -Discharge on metoprolol 25 BID ( dose adjusted for perfusion concerns in the setting of recent renal transplant)  - Given a chadsvasc of 3 recommend starting anticoagulation w/ eliquis 5 mg BID for at least 4 weeks.  -Follow up with EP / Dr. Choudhary.  -Consider 30 day HOLTER ( discussed at bedside the use of smart watch)  - Replace electrolytes as needed, keep K>4 and Mag>2.   - Please contact us if any questions or concerns

## 2023-06-06 ENCOUNTER — PATIENT MESSAGE (OUTPATIENT)
Dept: TRANSPLANT | Facility: CLINIC | Age: 43
End: 2023-06-06
Payer: COMMERCIAL

## 2023-06-06 NOTE — CARE UPDATE
Care Update:     No acute events overnight. Patient on the TSU in room 31173/69141 A. Blood glucose stable. BG at and above goal on current insulin regimen (SSI and prandial insulin). Steroid use- Prednisone 15 mg.    Renal function- Abnormal - Creatinine 1.7   Vasopressors-  None       No diet orders on file     POCT Glucose   Date Value Ref Range Status   06/05/2023 257 (H) 70 - 110 mg/dL Final   06/05/2023 130 (H) 70 - 110 mg/dL Final   06/04/2023 173 (H) 70 - 110 mg/dL Final   06/04/2023 269 (H) 70 - 110 mg/dL Final   06/04/2023 250 (H) 70 - 110 mg/dL Final   06/04/2023 150 (H) 70 - 110 mg/dL Final     Lab Results   Component Value Date    HGBA1C 5.0 05/15/2023       Endocrinology consulted for BG management.   BG goal 140-180    Diabetes Medications               dulaglutide (TRULICITY) 3 mg/0.5 mL pen injector Inject 3 mg into the skin every 7 days.    insulin lispro-aabc (LYUMJEV KWIKPEN U-100 INSULIN) 100 unit/mL pen Inject 4 Units into the skin 3 (three) times daily with meals. Plus sliding scale insulin. Max 27 units          Hospital Medications    Endocrinology consulted for BG management.   BG goal 140-180    - Novolog (Insulin Aspart) 5 units TIDWM and prn for BG excursions [facility] SSI (150/50)  - BG checks AC/HS  - Hypoglycemia protocol in place  - If blood glucose greater than 300, please ask patient not to eat food or drink anything other than water until correctional insulin has brought it back below 250      ** Please notify Endocrine for any change and/or advance in diet**  ** Please call Endocrine for any BG related issues **    Discharge Planning:   - Can resume home regimen at discharge.       Jason Luna DNP, FNP-C  Department of Endocrinology  Inpatient Glycemic Management

## 2023-06-07 ENCOUNTER — PROCEDURE VISIT (OUTPATIENT)
Dept: UROLOGY | Facility: CLINIC | Age: 43
End: 2023-06-07
Payer: COMMERCIAL

## 2023-06-07 ENCOUNTER — OFFICE VISIT (OUTPATIENT)
Dept: TRANSPLANT | Facility: CLINIC | Age: 43
End: 2023-06-07
Payer: COMMERCIAL

## 2023-06-07 VITALS
DIASTOLIC BLOOD PRESSURE: 88 MMHG | OXYGEN SATURATION: 98 % | SYSTOLIC BLOOD PRESSURE: 135 MMHG | RESPIRATION RATE: 16 BRPM | WEIGHT: 218.94 LBS | TEMPERATURE: 97 F | HEART RATE: 81 BPM | HEIGHT: 75 IN | BODY MASS INDEX: 27.22 KG/M2

## 2023-06-07 VITALS
WEIGHT: 217.81 LBS | HEART RATE: 78 BPM | RESPIRATION RATE: 16 BRPM | HEIGHT: 75 IN | DIASTOLIC BLOOD PRESSURE: 92 MMHG | SYSTOLIC BLOOD PRESSURE: 121 MMHG | BODY MASS INDEX: 27.08 KG/M2

## 2023-06-07 DIAGNOSIS — I48.91 NEW ONSET A-FIB: ICD-10-CM

## 2023-06-07 DIAGNOSIS — Z51.81 ENCOUNTER FOR THERAPEUTIC DRUG MONITORING: Primary | ICD-10-CM

## 2023-06-07 DIAGNOSIS — Z94.0 KIDNEY REPLACED BY TRANSPLANT: ICD-10-CM

## 2023-06-07 DIAGNOSIS — Z94.0 S/P KIDNEY TRANSPLANT: ICD-10-CM

## 2023-06-07 DIAGNOSIS — Z94.0 KIDNEY REPLACED BY TRANSPLANT: Primary | ICD-10-CM

## 2023-06-07 PROCEDURE — 99213 PR OFFICE/OUTPT VISIT, EST, LEVL III, 20-29 MIN: ICD-10-PCS | Mod: 24,S$GLB,, | Performed by: SURGERY

## 2023-06-07 PROCEDURE — 3066F PR DOCUMENTATION OF TREATMENT FOR NEPHROPATHY: ICD-10-PCS | Mod: CPTII,S$GLB,, | Performed by: SURGERY

## 2023-06-07 PROCEDURE — 4010F PR ACE/ARB THEARPY RXD/TAKEN: ICD-10-PCS | Mod: CPTII,S$GLB,, | Performed by: SURGERY

## 2023-06-07 PROCEDURE — 1159F MED LIST DOCD IN RCRD: CPT | Mod: CPTII,S$GLB,, | Performed by: SURGERY

## 2023-06-07 PROCEDURE — 3075F PR MOST RECENT SYSTOLIC BLOOD PRESS GE 130-139MM HG: ICD-10-PCS | Mod: CPTII,S$GLB,, | Performed by: SURGERY

## 2023-06-07 PROCEDURE — 1159F PR MEDICATION LIST DOCUMENTED IN MEDICAL RECORD: ICD-10-PCS | Mod: CPTII,S$GLB,, | Performed by: SURGERY

## 2023-06-07 PROCEDURE — 99999 PR PBB SHADOW E&M-EST. PATIENT-LVL V: CPT | Mod: PBBFAC,,,

## 2023-06-07 PROCEDURE — 1111F DSCHRG MED/CURRENT MED MERGE: CPT | Mod: CPTII,S$GLB,, | Performed by: SURGERY

## 2023-06-07 PROCEDURE — 52310 CYSTOSCOPY AND TREATMENT: CPT | Mod: S$GLB,,, | Performed by: UROLOGY

## 2023-06-07 PROCEDURE — 3008F PR BODY MASS INDEX (BMI) DOCUMENTED: ICD-10-PCS | Mod: CPTII,S$GLB,, | Performed by: SURGERY

## 2023-06-07 PROCEDURE — 3044F HG A1C LEVEL LT 7.0%: CPT | Mod: CPTII,S$GLB,, | Performed by: SURGERY

## 2023-06-07 PROCEDURE — 99999 PR PBB SHADOW E&M-EST. PATIENT-LVL V: ICD-10-PCS | Mod: PBBFAC,,,

## 2023-06-07 PROCEDURE — 1111F PR DISCHARGE MEDS RECONCILED W/ CURRENT OUTPATIENT MED LIST: ICD-10-PCS | Mod: CPTII,S$GLB,, | Performed by: SURGERY

## 2023-06-07 PROCEDURE — 3079F PR MOST RECENT DIASTOLIC BLOOD PRESSURE 80-89 MM HG: ICD-10-PCS | Mod: CPTII,S$GLB,, | Performed by: SURGERY

## 2023-06-07 PROCEDURE — 3066F NEPHROPATHY DOC TX: CPT | Mod: CPTII,S$GLB,, | Performed by: SURGERY

## 2023-06-07 PROCEDURE — 4010F ACE/ARB THERAPY RXD/TAKEN: CPT | Mod: CPTII,S$GLB,, | Performed by: SURGERY

## 2023-06-07 PROCEDURE — 3072F LOW RISK FOR RETINOPATHY: CPT | Mod: CPTII,S$GLB,, | Performed by: SURGERY

## 2023-06-07 PROCEDURE — 52310 PR CYSTOSCOPY,REMV CALCULUS,SIMPLE: ICD-10-PCS | Mod: S$GLB,,, | Performed by: UROLOGY

## 2023-06-07 PROCEDURE — 3044F PR MOST RECENT HEMOGLOBIN A1C LEVEL <7.0%: ICD-10-PCS | Mod: CPTII,S$GLB,, | Performed by: SURGERY

## 2023-06-07 PROCEDURE — 3075F SYST BP GE 130 - 139MM HG: CPT | Mod: CPTII,S$GLB,, | Performed by: SURGERY

## 2023-06-07 PROCEDURE — 3008F BODY MASS INDEX DOCD: CPT | Mod: CPTII,S$GLB,, | Performed by: SURGERY

## 2023-06-07 PROCEDURE — 99213 OFFICE O/P EST LOW 20 MIN: CPT | Mod: 24,S$GLB,, | Performed by: SURGERY

## 2023-06-07 PROCEDURE — 3079F DIAST BP 80-89 MM HG: CPT | Mod: CPTII,S$GLB,, | Performed by: SURGERY

## 2023-06-07 PROCEDURE — 3072F PR LOW RISK FOR RETINOPATHY: ICD-10-PCS | Mod: CPTII,S$GLB,, | Performed by: SURGERY

## 2023-06-07 RX ORDER — LIDOCAINE HYDROCHLORIDE 20 MG/ML
JELLY TOPICAL
Status: DISCONTINUED | OUTPATIENT
Start: 2023-06-07 | End: 2023-06-30 | Stop reason: HOSPADM

## 2023-06-07 NOTE — PROCEDURES
CYSTOSCOPY W/ STENT REMOVAL    Date/Time: 6/7/2023 10:45 AM  Performed by: Angelic Roberson MD  Authorized by: Eliu Jhonson Jr., MD   Comments: Procedure: Flexible cysto-uretheroscopy and stent removal   Pre Procedure Diagnosis:s/p kidney transplant   Post Procedure Diagnosis:same   Surgeon: Angelic Roberson MD   Anesthesia: 2% uro-jet lidocaine jelly for local analgesia   Flexible cysto-urethroscopy was performed after consent was obtained. The risks and benefits were explained.   2% lidocaine urojet was used for local analgesia.   The genitalia was prepped and draped in the sterile fashion with betadine.   The flexible scope was advanced into the urethra and into the bladder. The stent was removed without difficulty.   The patient tolerated the procedure well without complication.   They will follow up with transplant.

## 2023-06-07 NOTE — PATIENT INSTRUCTIONS
What to Expect After a Cystoscopy with Stent Removal  For the next 24-48 hours, you may feel a mild burning when you urinate. This burning is normal and expected. Drink plenty of water to dilute the urine to help relieve the burning sensation. You may also see a small amount of blood in your urine after the procedure.    Unless you are already taking antibiotics, you may be given an antibiotic after the test to prevent infection.    Signs and Symptoms to Report  Call the [facility] at [phone] if you develop any of the following:  Fever of 101 degrees or higher  Chills or persistent bleeding  Inability to urinate    After hours or on weekends, you may reach a urology resident on call at this number: [phone].

## 2023-06-07 NOTE — PROGRESS NOTES
Transplant Surgery  Kidney Transplant Recipient Follow-up    Referring Physician: Siva Figueroa  Current Nephrologist: Siva Figueroa    Subjective:     Chief Complaint: Robb Iglesias is a 42 y.o. year old White male who is status post Kidney transplant performed on 5/15/2023.    Complicated with Atrial fibrillation - managed with beta blockade, jen; currently on holter monitor.    ORGAN: LEFT KIDNEY   Disease Etiology: Diabetes Mellitus - Type II  Donor Type: Living   Donor CMV Status:    Donor HBcAB:    Donor HCV Status:      History of Present Illness: He reports no concerns.  From a transplant perspective, he reports normal urination.  Robb is here for management of his immunosuppression medication.  Robb states that his immunosuppression is being well tolerated.  Hypertension is not present.    External provider notes reviewed: No    Review of Systems   Constitutional:  Negative for fatigue.   HENT:  Negative for drooling, postnasal drip and sore throat.    Eyes:  Negative for discharge and itching.   Respiratory:  Negative for choking and stridor.    Gastrointestinal:  Negative for rectal pain.   Endocrine: Negative for polydipsia.   Genitourinary:  Negative for enuresis and genital sores.   Musculoskeletal:  Negative for back pain, neck pain and neck stiffness.   Allergic/Immunologic: Positive for immunocompromised state.   Neurological:  Negative for facial asymmetry and numbness.   Hematological:  Negative for adenopathy.   Psychiatric/Behavioral:  Negative for behavioral problems, self-injury and suicidal ideas.      Objective:     Physical Exam  Abdominal:          Comments: Wound healing well, slight leakage, no infection noted.   Lab Results   Component Value Date    CREATININE 1.7 (H) 06/05/2023    BUN 16 06/05/2023     Lab Results   Component Value Date    WBC 5.41 06/05/2023    HGB 11.9 (L) 06/05/2023    HCT 37.2 (L) 06/05/2023    HCT 38 05/15/2023     (L) 06/05/2023      Lab Results   Component Value Date    TACROLIMUS 8.2 06/05/2023       Diagnostics:  The following labs have been reviewed: CBC  CMP  TACROLIMUS LEVEL    Assessment and Plan:        S/P Kidney transplant.  Chronic immunosuppressive medications for rejection prophylaxis at target.  Plan: no adjustment needed.  Continue monitoring symptoms, labs and drug levels for drug-related toxicity and side effects.  Renal hypertension at target.    Additional testing to be completed according to the Kidney: Written Order Guideline for Kidney Transplant Follow-Up (KI-09)    Interpretation of tests and discussion of patient management involves all members of the multidisciplinary transplant team.  Patient advised that it is recommended that all transplant candidates, and their close contacts and household members receive Covid vaccination.  Follow-up: Patient reminded to call with any health changes, since these can be early signs of significant complications.  Also, I advised the patient to be sure any new medications or changes of old medications are discussed with either a pharmacist, or physician knowledgeable with transplant to avoid rejection/drug toxicity related to significant drug interactions.    Cesar Bray MD       [facility] Patient Status  Functional Status: 90% - Able to carry on normal activity: minor symptoms of disease  Physical Capacity: No Limitations

## 2023-06-08 ENCOUNTER — LAB VISIT (OUTPATIENT)
Dept: LAB | Facility: HOSPITAL | Age: 43
End: 2023-06-08
Attending: INTERNAL MEDICINE
Payer: COMMERCIAL

## 2023-06-08 DIAGNOSIS — Z94.0 KIDNEY REPLACED BY TRANSPLANT: ICD-10-CM

## 2023-06-08 LAB
ALBUMIN SERPL BCP-MCNC: 4.1 G/DL (ref 3.5–5.2)
ANION GAP SERPL CALC-SCNC: 9 MMOL/L (ref 8–16)
BASOPHILS # BLD AUTO: 0.05 K/UL (ref 0–0.2)
BASOPHILS NFR BLD: 1.1 % (ref 0–1.9)
BUN SERPL-MCNC: 15 MG/DL (ref 6–20)
CALCIUM SERPL-MCNC: 9.5 MG/DL (ref 8.7–10.5)
CHLORIDE SERPL-SCNC: 107 MMOL/L (ref 95–110)
CO2 SERPL-SCNC: 25 MMOL/L (ref 23–29)
CREAT SERPL-MCNC: 1.9 MG/DL (ref 0.5–1.4)
DIFFERENTIAL METHOD: ABNORMAL
EOSINOPHIL # BLD AUTO: 0.1 K/UL (ref 0–0.5)
EOSINOPHIL NFR BLD: 3 % (ref 0–8)
ERYTHROCYTE [DISTWIDTH] IN BLOOD BY AUTOMATED COUNT: 16.7 % (ref 11.5–14.5)
EST. GFR  (NO RACE VARIABLE): 44.9 ML/MIN/1.73 M^2
GLUCOSE SERPL-MCNC: 120 MG/DL (ref 70–110)
HCT VFR BLD AUTO: 39.9 % (ref 40–54)
HGB BLD-MCNC: 12.8 G/DL (ref 14–18)
IMM GRANULOCYTES # BLD AUTO: 0.03 K/UL (ref 0–0.04)
IMM GRANULOCYTES NFR BLD AUTO: 0.6 % (ref 0–0.5)
LYMPHOCYTES # BLD AUTO: 0.6 K/UL (ref 1–4.8)
LYMPHOCYTES NFR BLD: 12.7 % (ref 18–48)
MAGNESIUM SERPL-MCNC: 1.7 MG/DL (ref 1.6–2.6)
MCH RBC QN AUTO: 28.7 PG (ref 27–31)
MCHC RBC AUTO-ENTMCNC: 32.1 G/DL (ref 32–36)
MCV RBC AUTO: 90 FL (ref 82–98)
MONOCYTES # BLD AUTO: 0.3 K/UL (ref 0.3–1)
MONOCYTES NFR BLD: 5.4 % (ref 4–15)
NEUTROPHILS # BLD AUTO: 3.6 K/UL (ref 1.8–7.7)
NEUTROPHILS NFR BLD: 77.2 % (ref 38–73)
NRBC BLD-RTO: 0 /100 WBC
PHOSPHATE SERPL-MCNC: 2.8 MG/DL (ref 2.7–4.5)
PLATELET # BLD AUTO: 168 K/UL (ref 150–450)
PMV BLD AUTO: 9.5 FL (ref 9.2–12.9)
POTASSIUM SERPL-SCNC: 4.4 MMOL/L (ref 3.5–5.1)
RBC # BLD AUTO: 4.46 M/UL (ref 4.6–6.2)
SODIUM SERPL-SCNC: 141 MMOL/L (ref 136–145)
WBC # BLD AUTO: 4.64 K/UL (ref 3.9–12.7)

## 2023-06-08 PROCEDURE — 36415 COLL VENOUS BLD VENIPUNCTURE: CPT | Performed by: INTERNAL MEDICINE

## 2023-06-08 PROCEDURE — 80197 ASSAY OF TACROLIMUS: CPT | Performed by: INTERNAL MEDICINE

## 2023-06-08 PROCEDURE — 83735 ASSAY OF MAGNESIUM: CPT | Performed by: INTERNAL MEDICINE

## 2023-06-08 PROCEDURE — 85025 COMPLETE CBC W/AUTO DIFF WBC: CPT | Performed by: INTERNAL MEDICINE

## 2023-06-08 PROCEDURE — 80069 RENAL FUNCTION PANEL: CPT | Performed by: INTERNAL MEDICINE

## 2023-06-09 ENCOUNTER — TELEPHONE (OUTPATIENT)
Dept: TRANSPLANT | Facility: CLINIC | Age: 43
End: 2023-06-09
Payer: COMMERCIAL

## 2023-06-09 ENCOUNTER — OFFICE VISIT (OUTPATIENT)
Dept: CARDIOLOGY | Facility: CLINIC | Age: 43
End: 2023-06-09
Payer: COMMERCIAL

## 2023-06-09 ENCOUNTER — PATIENT MESSAGE (OUTPATIENT)
Dept: TRANSPLANT | Facility: CLINIC | Age: 43
End: 2023-06-09
Payer: COMMERCIAL

## 2023-06-09 VITALS
OXYGEN SATURATION: 97 % | HEART RATE: 88 BPM | BODY MASS INDEX: 27.11 KG/M2 | DIASTOLIC BLOOD PRESSURE: 72 MMHG | SYSTOLIC BLOOD PRESSURE: 118 MMHG | HEIGHT: 75 IN | WEIGHT: 218.06 LBS

## 2023-06-09 DIAGNOSIS — Z99.2 TYPE 2 DIABETES MELLITUS WITH CHRONIC KIDNEY DISEASE ON CHRONIC DIALYSIS, WITHOUT LONG-TERM CURRENT USE OF INSULIN: Chronic | ICD-10-CM

## 2023-06-09 DIAGNOSIS — E11.21 DIABETIC NEPHROPATHY ASSOCIATED WITH TYPE 2 DIABETES MELLITUS: Chronic | ICD-10-CM

## 2023-06-09 DIAGNOSIS — E87.8 ELECTROLYTE ABNORMALITY: ICD-10-CM

## 2023-06-09 DIAGNOSIS — Z99.2 PERITONEAL DIALYSIS CATHETER IN PLACE: Chronic | ICD-10-CM

## 2023-06-09 DIAGNOSIS — N18.31 STAGE 3A CHRONIC KIDNEY DISEASE: ICD-10-CM

## 2023-06-09 DIAGNOSIS — Z99.2 STAGE 5 CHRONIC KIDNEY DISEASE ON CHRONIC DIALYSIS: Chronic | ICD-10-CM

## 2023-06-09 DIAGNOSIS — N25.81 HYPERPARATHYROIDISM, SECONDARY RENAL: Chronic | ICD-10-CM

## 2023-06-09 DIAGNOSIS — N18.6 STAGE 5 CHRONIC KIDNEY DISEASE ON CHRONIC DIALYSIS: Chronic | ICD-10-CM

## 2023-06-09 DIAGNOSIS — Z94.0 KIDNEY REPLACED BY TRANSPLANT: Primary | ICD-10-CM

## 2023-06-09 DIAGNOSIS — Z94.0 S/P KIDNEY TRANSPLANT: ICD-10-CM

## 2023-06-09 DIAGNOSIS — G47.33 OBSTRUCTIVE SLEEP APNEA: Chronic | ICD-10-CM

## 2023-06-09 DIAGNOSIS — E11.59 HYPERTENSION COMPLICATING DIABETES: Chronic | ICD-10-CM

## 2023-06-09 DIAGNOSIS — N18.6 TYPE 2 DIABETES MELLITUS WITH CHRONIC KIDNEY DISEASE ON CHRONIC DIALYSIS, WITHOUT LONG-TERM CURRENT USE OF INSULIN: Chronic | ICD-10-CM

## 2023-06-09 DIAGNOSIS — E11.22 TYPE 2 DIABETES MELLITUS WITH CHRONIC KIDNEY DISEASE ON CHRONIC DIALYSIS, WITHOUT LONG-TERM CURRENT USE OF INSULIN: Chronic | ICD-10-CM

## 2023-06-09 DIAGNOSIS — E78.5 DYSLIPIDEMIA ASSOCIATED WITH TYPE 2 DIABETES MELLITUS: Chronic | ICD-10-CM

## 2023-06-09 DIAGNOSIS — Z95.820 STATUS POST ANGIOPLASTY WITH STENT: Chronic | ICD-10-CM

## 2023-06-09 DIAGNOSIS — Z76.82 KIDNEY TRANSPLANT CANDIDATE: Chronic | ICD-10-CM

## 2023-06-09 DIAGNOSIS — E66.9 CLASS 1 OBESITY WITH SERIOUS COMORBIDITY AND BODY MASS INDEX (BMI) OF 30.0 TO 30.9 IN ADULT, UNSPECIFIED OBESITY TYPE: ICD-10-CM

## 2023-06-09 DIAGNOSIS — I48.91 ATRIAL FIBRILLATION WITH RVR: Primary | ICD-10-CM

## 2023-06-09 DIAGNOSIS — Z98.84 BARIATRIC SURGERY STATUS: Chronic | ICD-10-CM

## 2023-06-09 DIAGNOSIS — E80.6 DIRECT HYPERBILIRUBINEMIA: Chronic | ICD-10-CM

## 2023-06-09 DIAGNOSIS — I15.2 HYPERTENSION COMPLICATING DIABETES: Chronic | ICD-10-CM

## 2023-06-09 DIAGNOSIS — I25.10 CORONARY ARTERY DISEASE INVOLVING NATIVE CORONARY ARTERY OF NATIVE HEART WITHOUT ANGINA PECTORIS: Chronic | ICD-10-CM

## 2023-06-09 DIAGNOSIS — E11.42 TYPE 2 DIABETES MELLITUS WITH DIABETIC POLYNEUROPATHY, WITHOUT LONG-TERM CURRENT USE OF INSULIN: Chronic | ICD-10-CM

## 2023-06-09 DIAGNOSIS — E11.69 DYSLIPIDEMIA ASSOCIATED WITH TYPE 2 DIABETES MELLITUS: Chronic | ICD-10-CM

## 2023-06-09 DIAGNOSIS — I10 ESSENTIAL HYPERTENSION: Chronic | ICD-10-CM

## 2023-06-09 LAB
OHS CV EVENT MONITOR DAY: 0
OHS CV HOLTER LENGTH DECIMAL HOURS: 48
OHS CV HOLTER LENGTH HOURS: 48
OHS CV HOLTER LENGTH MINUTES: 0
OHS CV HOLTER SINUS AVERAGE HR: 82
OHS CV HOLTER SINUS MAX HR: 121
OHS CV HOLTER SINUS MIN HR: 50
TACROLIMUS BLD-MCNC: 7.7 NG/ML (ref 5–15)

## 2023-06-09 PROCEDURE — 99999 PR PBB SHADOW E&M-EST. PATIENT-LVL V: ICD-10-PCS | Mod: PBBFAC,,, | Performed by: INTERNAL MEDICINE

## 2023-06-09 PROCEDURE — 3008F PR BODY MASS INDEX (BMI) DOCUMENTED: ICD-10-PCS | Mod: CPTII,S$GLB,, | Performed by: INTERNAL MEDICINE

## 2023-06-09 PROCEDURE — 4010F PR ACE/ARB THEARPY RXD/TAKEN: ICD-10-PCS | Mod: CPTII,S$GLB,, | Performed by: INTERNAL MEDICINE

## 2023-06-09 PROCEDURE — 3044F HG A1C LEVEL LT 7.0%: CPT | Mod: CPTII,S$GLB,, | Performed by: INTERNAL MEDICINE

## 2023-06-09 PROCEDURE — 1111F PR DISCHARGE MEDS RECONCILED W/ CURRENT OUTPATIENT MED LIST: ICD-10-PCS | Mod: CPTII,S$GLB,, | Performed by: INTERNAL MEDICINE

## 2023-06-09 PROCEDURE — 4010F ACE/ARB THERAPY RXD/TAKEN: CPT | Mod: CPTII,S$GLB,, | Performed by: INTERNAL MEDICINE

## 2023-06-09 PROCEDURE — 3066F PR DOCUMENTATION OF TREATMENT FOR NEPHROPATHY: ICD-10-PCS | Mod: CPTII,S$GLB,, | Performed by: INTERNAL MEDICINE

## 2023-06-09 PROCEDURE — 1160F RVW MEDS BY RX/DR IN RCRD: CPT | Mod: CPTII,S$GLB,, | Performed by: INTERNAL MEDICINE

## 2023-06-09 PROCEDURE — 99214 OFFICE O/P EST MOD 30 MIN: CPT | Mod: S$GLB,,, | Performed by: INTERNAL MEDICINE

## 2023-06-09 PROCEDURE — 1159F PR MEDICATION LIST DOCUMENTED IN MEDICAL RECORD: ICD-10-PCS | Mod: CPTII,S$GLB,, | Performed by: INTERNAL MEDICINE

## 2023-06-09 PROCEDURE — 3072F LOW RISK FOR RETINOPATHY: CPT | Mod: CPTII,S$GLB,, | Performed by: INTERNAL MEDICINE

## 2023-06-09 PROCEDURE — 3078F PR MOST RECENT DIASTOLIC BLOOD PRESSURE < 80 MM HG: ICD-10-PCS | Mod: CPTII,S$GLB,, | Performed by: INTERNAL MEDICINE

## 2023-06-09 PROCEDURE — 3008F BODY MASS INDEX DOCD: CPT | Mod: CPTII,S$GLB,, | Performed by: INTERNAL MEDICINE

## 2023-06-09 PROCEDURE — 3066F NEPHROPATHY DOC TX: CPT | Mod: CPTII,S$GLB,, | Performed by: INTERNAL MEDICINE

## 2023-06-09 PROCEDURE — 3044F PR MOST RECENT HEMOGLOBIN A1C LEVEL <7.0%: ICD-10-PCS | Mod: CPTII,S$GLB,, | Performed by: INTERNAL MEDICINE

## 2023-06-09 PROCEDURE — 99999 PR PBB SHADOW E&M-EST. PATIENT-LVL V: CPT | Mod: PBBFAC,,, | Performed by: INTERNAL MEDICINE

## 2023-06-09 PROCEDURE — 1111F DSCHRG MED/CURRENT MED MERGE: CPT | Mod: CPTII,S$GLB,, | Performed by: INTERNAL MEDICINE

## 2023-06-09 PROCEDURE — 1160F PR REVIEW ALL MEDS BY PRESCRIBER/CLIN PHARMACIST DOCUMENTED: ICD-10-PCS | Mod: CPTII,S$GLB,, | Performed by: INTERNAL MEDICINE

## 2023-06-09 PROCEDURE — 99214 PR OFFICE/OUTPT VISIT, EST, LEVL IV, 30-39 MIN: ICD-10-PCS | Mod: S$GLB,,, | Performed by: INTERNAL MEDICINE

## 2023-06-09 PROCEDURE — 3072F PR LOW RISK FOR RETINOPATHY: ICD-10-PCS | Mod: CPTII,S$GLB,, | Performed by: INTERNAL MEDICINE

## 2023-06-09 PROCEDURE — 3074F PR MOST RECENT SYSTOLIC BLOOD PRESSURE < 130 MM HG: ICD-10-PCS | Mod: CPTII,S$GLB,, | Performed by: INTERNAL MEDICINE

## 2023-06-09 PROCEDURE — 3078F DIAST BP <80 MM HG: CPT | Mod: CPTII,S$GLB,, | Performed by: INTERNAL MEDICINE

## 2023-06-09 PROCEDURE — 3074F SYST BP LT 130 MM HG: CPT | Mod: CPTII,S$GLB,, | Performed by: INTERNAL MEDICINE

## 2023-06-09 PROCEDURE — 1159F MED LIST DOCD IN RCRD: CPT | Mod: CPTII,S$GLB,, | Performed by: INTERNAL MEDICINE

## 2023-06-09 NOTE — PROGRESS NOTES
Subjective:   Patient ID:  Robb Iglesias is a 42 y.o. male who presents for follow up of No chief complaint on file.      HPI  6/11/2020  A 38 yo male with cad s/p lad stent old mi htn hlp diabetes is here for f /u he is still exercising regulaqrily he is compliant with diet exercise lost some 10 lbs he is on digital htn however having issues with logistics he is going to o bar. Has no new chest pain or shortness of breath. He is not using his cpap regularily has issues clinically with compliance.  He has isusses with cpap compliance . I think this is a an anaxiety related to mask or claustrophobia. He is able to exercise w/o any issues. Lipids a1c are not on target.he has labs pending for am.     6/16/2021  HE IS HERE FOR F/U HIS HTN HAS BEEN AN ISSUE CLINICALLY HE IS COMPLIANT WITH MEDS AND DIET HE HAS BP MEDS SWITCHED TO MICARDIS FROM [place] . HE IS INTOLERANT TO CPAP. HE IS COMPLIANT WITH DITE HE TAKES MEDS REGULARTIY STARTED EXERCISING HIS BP HAS BEEN ELEVATED.HE IS ON AMLODIPINE AND CARDIZEM. NO LEG SWELLING      9/9/2021   HERE FOR F /U HAS BEEN IN DIGITAL PROGRAM FOR DIABETES And htn numbers are better. Today bp is more elevated than usual he thinks it is always higher in office. Has no new complaints of chest pain or shortness of breath no leg swelling he tries to exercise has house gym tries to be compliant with diet they cook their food stays away from eating out.has no chf symptoms. Has sleep apnea previously not able to use cpap previously he got retested has mild sleep apnea after weight loss no therapy he sleeps sitting up a little.      3/10/2022  Her efrof /u has been compliant with diet had recurrent covid his renal function deteriorated but improving had been under a lot of stress. Has no angina or chf symptoms claudication tia. Had palpitation after drinking coffee in the afternoon self terminated.      9/30/2022   He has progression of renal disease he has uremic symptoms he has  fatigue nausea he is in need of pd catheter placement to initiate dialysis. He is asymptomatic cardiac wise. He is only on asa.he ahs some swelling in feet at the heena of the day. He claims compliance with diet.      4/5/2023  He is volume dependent affecting his bp he has no other issues clinically. Has no chest pain he feels fatigue. Has not used cpap. Has no other issues clinically.     6/9/2023had kidney transplant subsequent afib needed admission at [facility] converted with b blockers. He is asymptomatic holter pending no further palpitation. Denies any chest pain or shortness of breath no bleeding from eliquis  . has still leak at incision he was evaluated with transplant team no fever no chills. He is tolerated meds well   Has smart watch no afib with spot check.   Past Medical History:   Diagnosis Date    Allergic rhinitis     Atrial fibrillation with RVR 6/4/2023    Class 1 obesity due to excess calories with serious comorbidity and body mass index (BMI) of 31.0 to 31.9 in adult 4/7/2017    Coronary artery disease     Coronary artery disease involving native coronary artery of native heart without angina pectoris 2/6/2018    Cath lab procedure 04/23/2018 (Naveen Rios MD) A. Indication/Pre-Operative Diagnosis: The patient is a 37 year old male that was referred for catheterization by [facility] Self for ACS (NSTEMI). The BELLA risk score is 5.  B. Summary/Post-Operative Diagnosis 1. Single vessel coronary artery disease. 2. Normal LVEF. 3. Diastolic dysfunction. 4. Successful PCI for acute myocardial infarction. 5.     Diabetic nephropathy associated with type 2 diabetes mellitus 1/6/2020    Direct hyperbilirubinemia 3/24/2018    DM (diabetes mellitus) 2008    BS doesn't check any more 08/02/2018    DM (diabetes mellitus) 2012    BS 99 am 06/26/2020    DM (diabetes mellitus)     BS didn't check 06/04/2021    DM (diabetes mellitus) 2008    BS didn't check 07/29/2022     Dyslipidemia associated with type 2  diabetes mellitus 7/31/2017    Elevated bilirubin 3/21/2018    GERD (gastroesophageal reflux disease)     Gout     Hyperlipidemia     Hyperparathyroidism, secondary to chronic kidney disease 6/7/2021    Hypertension associated with chronic kidney disease due to type 2 diabetes mellitus     Hypertension complicating diabetes 3/16/2019    Not candidate for Hypertension Digital Medicine program due to dialysis status. Didn't tolerate amlodipine due to SE of hypotension.    Idiopathic chronic gout, multiple sites, without tophus (tophi) 7/19/2017    Long term (current) use of insulin     MI (myocardial infarction) 07/2017    NSTEMI with CAD s/p PCI (FAMILIA) of LAD x 2 in 8/2017 7/31/2017    Obesity     HENRY on CPAP     Peritoneal dialysis catheter in place 1/26/2023    Proteinuria     Severe obstructive sleep apnea - Intolerant of CPAP 7/31/2017    Intolerant of CPAP after weeks of trying multiple interfaces. SPLIT-NIGHT SLEEP STUDY 7/28/2015 · SLEEP ARCHITECTURE: Sleep onset was 2.9 minutes and sleep efficiency was 94.4%. Sleep Stage distribution showed 145 sleep stage changes, 6 awakenings and 119 arousals. Sleep distribution showed 53.2% stage NI, 41.8% stage N 11, 0.0% stage N Ill and REM sleep was at 5.0%. There were 2 REM periods. · RE    Stage 3a chronic kidney disease 5/26/2023    Stage 5 chronic kidney disease on chronic peritoneal dialysis 6/7/2017    Status post angioplasty with stent 8/4/2017    Steatohepatitis     Fatty Liver    Type 2 diabetes mellitus with chronic kidney disease on chronic dialysis, without long-term current use of insulin 6/21/2017    Type 2 diabetes mellitus with diabetic nephropathy     Type 2 diabetes mellitus with diabetic nephropathy, without long-term current use of insulin 1/6/2020    Type 2 diabetes mellitus with diabetic polyneuropathy, without long-term current use of insulin 6/7/2021    Type 2 diabetes mellitus with hyperglycemia     Type 2 diabetes mellitus with renal  manifestations     Type 2 diabetes mellitus with stage 3 chronic kidney disease, without long-term current use of insulin 6/21/2017       Past Surgical History:   Procedure Laterality Date    CORONARY ANGIOPLASTY WITH STENT PLACEMENT      CYSTOSCOPY      KIDNEY TRANSPLANT N/A 05/15/2023    Procedure: TRANSPLANT, KIDNEY;  Surgeon: Reji Hinojosa MD;  Location: [facility] OR [address] [place] [address];  Service: Transplant;  Laterality: N/A;  IN ROOM: 10:37  OUT OF ICE:10:53  REPERFUSION TIME: 11:21      LASIK Bilateral     LIVER BIOPSY      NASAL ENDOSCOPY      NASAL SEPTUM SURGERY      PERITONEAL CATHETER REMOVAL N/A 05/15/2023    Procedure: REMOVAL, CATHETER, DIALYSIS, PERITONEAL;  Surgeon: Reji Hinojosa MD;  Location: [facility] OR [address] [place] [address];  Service: Transplant;  Laterality: N/A;    SLEEVE GASTROPLASTY  03/06/2017       Social History     Tobacco Use    Smoking status: Never    Smokeless tobacco: Never   Substance Use Topics    Alcohol use: No     Comment: 1-2 times per month    Drug use: No       Family History   Problem Relation Age of Onset    Diabetes type II Mother     Hypertension Mother     Hyperlipidemia Mother     Diabetes Mother     Hyperlipidemia Father     Diabetes type II Brother     Diabetes type II Brother     Diabetes Maternal Grandmother        Current Outpatient Medications   Medication Sig    apixaban (ELIQUIS) 5 mg Tab Take 1 tablet (5 mg total) by mouth 2 (two) times daily.    aspirin (ECOTRIN) 81 MG EC tablet Take 1 tablet (81 mg total) by mouth once daily.    atorvastatin (LIPITOR) 80 MG tablet Take 1 tablet (80 mg total) by mouth every evening.    blood sugar diagnostic Strp Check blood glucose 2 times daily as directed and as needed    blood-glucose meter (ONETOUCH ULTRAMINI) kit Use as instructed    docusate sodium (COLACE) 100 MG capsule Take 1 capsule (100 mg total) by mouth 3 (three) times daily.    dulaglutide (TRULICITY) 3 mg/0.5 mL pen injector Inject 3 mg into the skin every 7 days.    ezetimibe (ZETIA) 10 mg  "tablet Take 1 tablet (10 mg total) by mouth once daily.    insulin lispro-aabc (LYUMJEV KWIKPEN U-100 INSULIN) 100 unit/mL pen Inject 4 Units into the skin 3 (three) times daily with meals. Plus sliding scale insulin. Max 27 units    k phos di & mono-sod phos mono (K-PHOS-NEUTRAL) 250 mg Tab Take 2 tablets by mouth 3 (three) times daily.    lancets Misc Check blood glucose 2 times daily as directed and as needed (dispense insurance preferred brand or patient choice)    magnesium oxide (MAG-OX) 400 mg (241.3 mg magnesium) tablet Take 1 tablet (400 mg total) by mouth 2 (two) times daily.    metoprolol tartrate (LOPRESSOR) 25 MG tablet Take 1 tablet (25 mg total) by mouth 2 (two) times daily.    multivitamin Tab Take 1 tablet by mouth once daily.    mycophenolate (CELLCEPT) 250 mg Cap Take 4 capsules (1,000 mg total) by mouth 2 (two) times daily.    nitroGLYCERIN (NITROSTAT) 0.4 MG SL tablet Dissolve one tablet underneath tongue at onset of angina; may repeat every 5 minutes if needed. Call 911 if angina persists after 2 doses.    oxyCODONE (ROXICODONE) 5 MG immediate release tablet Take 1 tablet (5 mg total) by mouth every 6 (six) hours as needed for Pain.    pantoprazole (PROTONIX) 40 MG tablet Take 1 tablet (40 mg total) by mouth once daily.    pen needle, diabetic (BD ULTRA-FINE SHORT PEN NEEDLE) 31 gauge x 5/16" Ndle Use to inject insulin into the skin 3 times daily    predniSONE (DELTASONE) 5 MG tablet Take by mouth daily; 5/18-5/24: 20 mg; 5/25-5/31: 15 mg; 6/1-6/7: 10 mg; 6/8/23- thereafter: 5 mg daily; do not stop    sulfamethoxazole-trimethoprim 400-80mg (BACTRIM,SEPTRA) 400-80 mg per tablet Take 1 tablet by mouth every morning. Stop 11/12/23    tacrolimus (PROGRAF) 1 MG Cap Take 2 capsules (2 mg total) by mouth every morning AND 1 capsule (1 mg total) every evening.    valGANciclovir (VALCYTE) 450 mg Tab Take 2 tablets (900 mg total) by mouth every morning. Stop 8/14/23     Current Facility-Administered " Medications   Medication    LIDOcaine HCl 2% urojet     Current Outpatient Medications on File Prior to Visit   Medication Sig    apixaban (ELIQUIS) 5 mg Tab Take 1 tablet (5 mg total) by mouth 2 (two) times daily.    aspirin (ECOTRIN) 81 MG EC tablet Take 1 tablet (81 mg total) by mouth once daily.    atorvastatin (LIPITOR) 80 MG tablet Take 1 tablet (80 mg total) by mouth every evening.    blood sugar diagnostic Strp Check blood glucose 2 times daily as directed and as needed    blood-glucose meter (ONETOUCH ULTRAMINI) kit Use as instructed    docusate sodium (COLACE) 100 MG capsule Take 1 capsule (100 mg total) by mouth 3 (three) times daily.    dulaglutide (TRULICITY) 3 mg/0.5 mL pen injector Inject 3 mg into the skin every 7 days.    ezetimibe (ZETIA) 10 mg tablet Take 1 tablet (10 mg total) by mouth once daily.    insulin lispro-aabc (LYUMJEV KWIKPEN U-100 INSULIN) 100 unit/mL pen Inject 4 Units into the skin 3 (three) times daily with meals. Plus sliding scale insulin. Max 27 units    k phos di & mono-sod phos mono (K-PHOS-NEUTRAL) 250 mg Tab Take 2 tablets by mouth 3 (three) times daily.    lancets Misc Check blood glucose 2 times daily as directed and as needed (dispense insurance preferred brand or patient choice)    magnesium oxide (MAG-OX) 400 mg (241.3 mg magnesium) tablet Take 1 tablet (400 mg total) by mouth 2 (two) times daily.    metoprolol tartrate (LOPRESSOR) 25 MG tablet Take 1 tablet (25 mg total) by mouth 2 (two) times daily.    multivitamin Tab Take 1 tablet by mouth once daily.    mycophenolate (CELLCEPT) 250 mg Cap Take 4 capsules (1,000 mg total) by mouth 2 (two) times daily.    nitroGLYCERIN (NITROSTAT) 0.4 MG SL tablet Dissolve one tablet underneath tongue at onset of angina; may repeat every 5 minutes if needed. Call 911 if angina persists after 2 doses.    oxyCODONE (ROXICODONE) 5 MG immediate release tablet Take 1 tablet (5 mg total) by mouth every 6 (six) hours as needed for Pain.     "pantoprazole (PROTONIX) 40 MG tablet Take 1 tablet (40 mg total) by mouth once daily.    pen needle, diabetic (BD ULTRA-FINE SHORT PEN NEEDLE) 31 gauge x 5/16" Ndle Use to inject insulin into the skin 3 times daily    predniSONE (DELTASONE) 5 MG tablet Take by mouth daily; 5/18-5/24: 20 mg; 5/25-5/31: 15 mg; 6/1-6/7: 10 mg; 6/8/23- thereafter: 5 mg daily; do not stop    sulfamethoxazole-trimethoprim 400-80mg (BACTRIM,SEPTRA) 400-80 mg per tablet Take 1 tablet by mouth every morning. Stop 11/12/23    tacrolimus (PROGRAF) 1 MG Cap Take 2 capsules (2 mg total) by mouth every morning AND 1 capsule (1 mg total) every evening.    valGANciclovir (VALCYTE) 450 mg Tab Take 2 tablets (900 mg total) by mouth every morning. Stop 8/14/23     Current Facility-Administered Medications on File Prior to Visit   Medication    LIDOcaine HCl 2% urojet     Review of patient's allergies indicates:  No Known Allergies   Review of Systems   Constitutional: Negative for malaise/fatigue.   Eyes:  Negative for blurred vision.   Cardiovascular:  Negative for chest pain, claudication, cyanosis, dyspnea on exertion, irregular heartbeat, leg swelling, near-syncope, orthopnea, palpitations and paroxysmal nocturnal dyspnea.   Respiratory:  Negative for cough, hemoptysis and shortness of breath.    Hematologic/Lymphatic: Negative for bleeding problem. Does not bruise/bleed easily.   Skin:  Negative for dry skin and itching.   Musculoskeletal:  Negative for falls, muscle weakness and myalgias.   Gastrointestinal:  Negative for abdominal pain, diarrhea, heartburn, hematemesis, hematochezia and melena.   Genitourinary:  Negative for flank pain and hematuria.   Neurological:  Negative for dizziness, focal weakness, headaches, light-headedness, numbness, paresthesias, seizures and weakness.   Psychiatric/Behavioral:  Negative for altered mental status and memory loss. The patient is not nervous/anxious.    Allergic/Immunologic: Negative for hives. " "    Objective:   Physical Exam  Vitals and nursing note reviewed.   Constitutional:       General: He is not in acute distress.     Appearance: He is well-developed. He is not diaphoretic.   HENT:      Head: Normocephalic and atraumatic.   Eyes:      General:         Right eye: No discharge.         Left eye: No discharge.      Pupils: Pupils are equal, round, and reactive to light.   Neck:      Thyroid: No thyromegaly.      Vascular: No JVD.   Cardiovascular:      Rate and Rhythm: Normal rate and regular rhythm.      Pulses: Intact distal pulses.      Heart sounds: Normal heart sounds. No murmur heard.    No friction rub. No gallop.   Pulmonary:      Effort: Pulmonary effort is normal. No respiratory distress.      Breath sounds: Normal breath sounds. No wheezing or rales.   Chest:      Chest wall: No tenderness.   Abdominal:      General: Bowel sounds are normal. There is no distension.      Palpations: Abdomen is soft.      Tenderness: There is no abdominal tenderness.      Comments: Scar well healed.    Musculoskeletal:         General: Normal range of motion.      Cervical back: Neck supple.   Skin:     General: Skin is warm and dry.      Findings: No erythema or rash.   Neurological:      Mental Status: He is alert and oriented to person, place, and time.      Cranial Nerves: No cranial nerve deficit.   Psychiatric:         Behavior: Behavior normal.     Vitals:    06/09/23 0827 06/09/23 0830   BP: 116/68 118/72   BP Location: Right arm Left arm   Patient Position: Sitting Sitting   BP Method: Large (Manual) Large (Manual)   Pulse: 88    SpO2: 97%    Weight: 98.9 kg (218 lb 0.6 oz)    Height: 6' 3" (1.905 m)      Lab Results   Component Value Date    CHOL 105 (L) 05/02/2023    CHOL 88 (L) 09/07/2022    CHOL 91 (L) 05/28/2022      Body mass index is 27.25 kg/m².   Lab Results   Component Value Date    HGBA1C 5.0 05/15/2023      BMP  Lab Results   Component Value Date     06/08/2023    K 4.4 06/08/2023    "  06/08/2023    CO2 25 06/08/2023    BUN 15 06/08/2023    CREATININE 1.9 (H) 06/08/2023    CALCIUM 9.5 06/08/2023    ANIONGAP 9 06/08/2023    EGFRNORACEVR 44.9 (A) 06/08/2023      Lab Results   Component Value Date    HDL 28 (L) 05/02/2023    HDL 27 (L) 09/07/2022    HDL 26 (L) 05/28/2022     Lab Results   Component Value Date    LDLCALC 48.0 (L) 05/02/2023    LDLCALC 37.2 (L) 09/07/2022    LDLCALC 41.0 (L) 05/28/2022     Lab Results   Component Value Date    TRIG 145 05/02/2023    TRIG 119 09/07/2022    TRIG 120 05/28/2022     Lab Results   Component Value Date    CHOLHDL 26.7 05/02/2023    CHOLHDL 30.7 09/07/2022    CHOLHDL 28.6 05/28/2022       Chemistry        Component Value Date/Time     06/08/2023 0725    K 4.4 06/08/2023 0725     06/08/2023 0725    CO2 25 06/08/2023 0725    BUN 15 06/08/2023 0725    CREATININE 1.9 (H) 06/08/2023 0725     (H) 06/08/2023 0725        Component Value Date/Time    CALCIUM 9.5 06/08/2023 0725    ALKPHOS 108 06/03/2023 2335    AST 29 06/03/2023 2335    ALT 63 (H) 06/03/2023 2335    BILITOT 1.7 (H) 06/03/2023 2335    ESTGFRAFRICA 13 (A) 07/26/2022 0818    EGFRNONAA 11 (A) 07/26/2022 0818          Lab Results   Component Value Date    TSH 0.727 07/31/2017     Lab Results   Component Value Date    INR 1.0 06/05/2023    INR 1.0 05/15/2023    INR 1.0 05/02/2023     Lab Results   Component Value Date    WBC 4.64 06/08/2023    HGB 12.8 (L) 06/08/2023    HCT 39.9 (L) 06/08/2023    MCV 90 06/08/2023     06/08/2023     BMP  Sodium   Date Value Ref Range Status   06/08/2023 141 136 - 145 mmol/L Final     Potassium   Date Value Ref Range Status   06/08/2023 4.4 3.5 - 5.1 mmol/L Final     Chloride   Date Value Ref Range Status   06/08/2023 107 95 - 110 mmol/L Final     CO2   Date Value Ref Range Status   06/08/2023 25 23 - 29 mmol/L Final     BUN   Date Value Ref Range Status   06/08/2023 15 6 - 20 mg/dL Final     Creatinine   Date Value Ref Range Status    06/08/2023 1.9 (H) 0.5 - 1.4 mg/dL Final     Calcium   Date Value Ref Range Status   06/08/2023 9.5 8.7 - 10.5 mg/dL Final     Anion Gap   Date Value Ref Range Status   06/08/2023 9 8 - 16 mmol/L Final     eGFR if    Date Value Ref Range Status   07/26/2022 13 (A) >60 mL/min/1.73 m^2 Final     eGFR if non    Date Value Ref Range Status   07/26/2022 11 (A) >60 mL/min/1.73 m^2 Final     Comment:     Calculation used to obtain the estimated glomerular filtration  rate (eGFR) is the CKD-EPI equation.        Estimated Creatinine Clearance: 61.2 mL/min (A) (based on SCr of 1.9 mg/dL (H)).  Summary    The test was stopped because the patient experienced fatigue and shortness of breath. The patient requested the test to be stopped.  The patient's exercise capacity was normal.  During stress, the following significant arrhythmias were observed: occasional PVCs.  The ECG portion of this study is negative for myocardial ischemia.  The stress echo portion of this study is negative for myocardial ischemia.  The left ventricle is normal in size with concentric remodeling and normal systolic function.  The estimated ejection fraction is 55%.  Normal left ventricular diastolic function.  Normal right ventricular size with normal right ventricular systolic function.     Assessment:     1. Atrial fibrillation with RVR    2. Coronary artery disease involving native coronary artery of native heart without angina pectoris    3. Dyslipidemia associated with type 2 diabetes mellitus    4. Essential hypertension    5. Hypertension complicating diabetes    6. Status post angioplasty with stent    7. Diabetic nephropathy associated with type 2 diabetes mellitus    8. Electrolyte abnormality    9. Kidney transplant candidate    10. Peritoneal dialysis catheter in place    11. S/P kidney transplant    12. Stage 3a chronic kidney disease    13. Stage 5 chronic kidney disease on chronic peritoneal dialysis     14. Bariatric surgery status    15. Class 1 obesity with serious comorbidity and body mass index (BMI) of 30.0 to 30.9 in adult, unspecified obesity type    16. Hyperparathyroidism, secondary to chronic kidney disease    17. Type 2 diabetes mellitus with chronic kidney disease on chronic dialysis, without long-term current use of insulin    18. Type 2 diabetes mellitus with diabetic polyneuropathy, without long-term current use of insulin    19. Direct hyperbilirubinemia    20. Severe obstructive sleep apnea - Intolerant of CPAP      Had post op afib now in sinus rhythm on b blockers and eliquis tolerated well continue the same holter pending f/u with DR ESCAMILLA scheduled.  Esrd previously on hd s/p kidney transplant doing well.   Hlp on target contineu same tolerated meds well.  Htn controlled contineu b blockers.   Diabetes well controlled contineu current therapy    Sleep apnea untreated laste bigg in 2015 he will need reevaluation and make decisin on therapy to avoid afib recurrence.    Resume exerciose when cleared by surgery team.  Plan:   Continue current therapy  Cardiac low salt diet.  Risk factor modification and excercise program./weight loss  F/u in 6 months with lipid cmp a1c  Sleep eval.

## 2023-06-12 ENCOUNTER — TELEPHONE (OUTPATIENT)
Dept: CARDIOLOGY | Facility: CLINIC | Age: 43
End: 2023-06-12
Payer: COMMERCIAL

## 2023-06-12 ENCOUNTER — LAB VISIT (OUTPATIENT)
Dept: LAB | Facility: HOSPITAL | Age: 43
End: 2023-06-12
Attending: INTERNAL MEDICINE
Payer: COMMERCIAL

## 2023-06-12 DIAGNOSIS — Z94.0 KIDNEY REPLACED BY TRANSPLANT: ICD-10-CM

## 2023-06-12 LAB
ALBUMIN SERPL BCP-MCNC: 3.8 G/DL (ref 3.5–5.2)
ANION GAP SERPL CALC-SCNC: 9 MMOL/L (ref 8–16)
BASOPHILS # BLD AUTO: 0.06 K/UL (ref 0–0.2)
BASOPHILS NFR BLD: 1.5 % (ref 0–1.9)
BUN SERPL-MCNC: 13 MG/DL (ref 6–20)
CALCIUM SERPL-MCNC: 9 MG/DL (ref 8.7–10.5)
CHLORIDE SERPL-SCNC: 110 MMOL/L (ref 95–110)
CO2 SERPL-SCNC: 23 MMOL/L (ref 23–29)
CREAT SERPL-MCNC: 1.4 MG/DL (ref 0.5–1.4)
DIFFERENTIAL METHOD: ABNORMAL
EOSINOPHIL # BLD AUTO: 0.2 K/UL (ref 0–0.5)
EOSINOPHIL NFR BLD: 4.1 % (ref 0–8)
ERYTHROCYTE [DISTWIDTH] IN BLOOD BY AUTOMATED COUNT: 16.6 % (ref 11.5–14.5)
EST. GFR  (NO RACE VARIABLE): >60 ML/MIN/1.73 M^2
GLUCOSE SERPL-MCNC: 114 MG/DL (ref 70–110)
HCT VFR BLD AUTO: 39 % (ref 40–54)
HGB BLD-MCNC: 12.5 G/DL (ref 14–18)
IMM GRANULOCYTES # BLD AUTO: 0.03 K/UL (ref 0–0.04)
IMM GRANULOCYTES NFR BLD AUTO: 0.8 % (ref 0–0.5)
LYMPHOCYTES # BLD AUTO: 0.5 K/UL (ref 1–4.8)
LYMPHOCYTES NFR BLD: 12.7 % (ref 18–48)
MAGNESIUM SERPL-MCNC: 1.7 MG/DL (ref 1.6–2.6)
MCH RBC QN AUTO: 29 PG (ref 27–31)
MCHC RBC AUTO-ENTMCNC: 32.1 G/DL (ref 32–36)
MCV RBC AUTO: 91 FL (ref 82–98)
MONOCYTES # BLD AUTO: 0.3 K/UL (ref 0.3–1)
MONOCYTES NFR BLD: 7.3 % (ref 4–15)
NEUTROPHILS # BLD AUTO: 2.9 K/UL (ref 1.8–7.7)
NEUTROPHILS NFR BLD: 73.6 % (ref 38–73)
NRBC BLD-RTO: 0 /100 WBC
PHOSPHATE SERPL-MCNC: 2.4 MG/DL (ref 2.7–4.5)
PLATELET # BLD AUTO: 153 K/UL (ref 150–450)
PMV BLD AUTO: 9.6 FL (ref 9.2–12.9)
POTASSIUM SERPL-SCNC: 4 MMOL/L (ref 3.5–5.1)
RBC # BLD AUTO: 4.31 M/UL (ref 4.6–6.2)
SODIUM SERPL-SCNC: 142 MMOL/L (ref 136–145)
WBC # BLD AUTO: 3.95 K/UL (ref 3.9–12.7)

## 2023-06-12 PROCEDURE — 86977 RBC SERUM PRETX INCUBJ/INHIB: CPT | Mod: 59 | Performed by: INTERNAL MEDICINE

## 2023-06-12 PROCEDURE — 80197 ASSAY OF TACROLIMUS: CPT | Performed by: INTERNAL MEDICINE

## 2023-06-12 PROCEDURE — 80069 RENAL FUNCTION PANEL: CPT | Performed by: INTERNAL MEDICINE

## 2023-06-12 PROCEDURE — 83735 ASSAY OF MAGNESIUM: CPT | Performed by: INTERNAL MEDICINE

## 2023-06-12 PROCEDURE — 86832 HLA CLASS I HIGH DEFIN QUAL: CPT | Performed by: INTERNAL MEDICINE

## 2023-06-12 PROCEDURE — 36415 COLL VENOUS BLD VENIPUNCTURE: CPT | Performed by: INTERNAL MEDICINE

## 2023-06-12 PROCEDURE — 86833 HLA CLASS II HIGH DEFIN QUAL: CPT | Performed by: INTERNAL MEDICINE

## 2023-06-12 PROCEDURE — 85025 COMPLETE CBC W/AUTO DIFF WBC: CPT | Performed by: INTERNAL MEDICINE

## 2023-06-12 NOTE — PROGRESS NOTES
Kidney Post-Transplant Assessment    Referring Physician: Siva Figueroa  Current Nephrologist: Siva Figueroa    ORGAN: LEFT KIDNEY  Donor Type: living  PHS Increased Risk: no  Cold Ischemia: 48 mins  Induction Medications: thymoglobulin    Subjective:   The patient location is: [place]  The chief complaint leading to consultation is: Kidney Post-Transplant Assessment    Visit type: audiovisual    Face to Face time with patient:   30 minutes of total time spent on the encounter, which includes face to face time and non-face to face time preparing to see the patient (eg, review of tests), Obtaining and/or reviewing separately obtained history, Documenting clinical information in the electronic or other health record, Independently interpreting results (not separately reported) and communicating results to the patient/family/caregiver, or Care coordination (not separately reported).     Each patient to whom he or she provides medical services by telemedicine is:  (1) informed of the relationship between the physician and patient and the respective role of any other health care provider with respect to management of the patient; and (2) notified that he or she may decline to receive medical services by telemedicine and may withdraw from such care at any time.      CC:  Reassessment of renal allograft function and management of chronic immunosuppression.    HPI:  Mr. Iglesias is a 42 y.o. year old White male with history of ESRD secondary to diabetic nephropathy who received a living kidney transplant on 5/15/23 (thymo induction, CMV ++). PMH coronary angioplasty with stent placement, NSTEMI with CAD 8/2017, HTN, GERD and sleep apnea. His most recent creatinine is 1.4. He takes mycophenolate mofetil, prednisone, and tacrolimus for maintenance immunosuppression. His post transplant course has been complicated by new onset afib with RVR .    Hospitalized 6/4-6/5 for new onset afib with RVR. Placed on cardizem  drip, was planning for LUNA/DCCV but he converted to NSR. Discharged on lopressor 25 mg BID (hold for SBP <100) and eliquis. Had follow up cardiology visit with Dr. Rios 6/9 and had EP visit with Dr. Choudhary today-plans to reassess in 6 weeks.     Has smart watch, has been monitoring HR and rhythm at home as well. Feels great, getting his energy back. Has been trying to do 3000 steps daily. Drinking 3 L water daily, no problems with urination. BP at goal, no hypotension with lopressor. No peripheral edema.    Wound nearly healed, occasional scant serous drainage from top of incision. No pain over allograft.    Tolerating IS without issues, no diarrhea or vomiting. 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     Current Outpatient Medications   Medication Sig Dispense Refill    apixaban (ELIQUIS) 5 mg Tab Take 1 tablet (5 mg total) by mouth 2 (two) times daily. 30 tablet 11    aspirin (ECOTRIN) 81 MG EC tablet Take 1 tablet (81 mg total) by mouth once daily. 90 tablet 3    atorvastatin (LIPITOR) 80 MG tablet Take 1 tablet (80 mg total) by mouth every evening. 90 tablet 3    blood sugar diagnostic Strp Check blood glucose 2 times daily as directed and as needed 200 each 5    blood-glucose meter (ONETOUCH ULTRAMINI) kit Use as instructed 1 each 0    docusate sodium (COLACE) 100 MG capsule Take 1 capsule (100 mg total) by mouth 3 (three) times daily. (Patient not taking: Reported on 6/14/2023)  0    dulaglutide (TRULICITY) 3 mg/0.5 mL pen injector Inject 3 mg into the skin every 7 days. 12 pen 1    ezetimibe (ZETIA) 10 mg tablet Take 1 tablet (10 mg total) by mouth once daily. 90 tablet 3    insulin lispro-aabc (LYUMJEV KWIKPEN U-100 INSULIN) 100 unit/mL pen Inject 4 Units into the skin 3 (three) times daily with meals. Plus sliding scale insulin. Max 27 units 2 pen 7    k phos di & mono-sod phos mono (K-PHOS-NEUTRAL) 250 mg Tab Take 2 tablets by mouth 3 (three) times daily. 180  "tablet 5    lancets Misc Check blood glucose 2 times daily as directed and as needed (dispense insurance preferred brand or patient choice) 200 each 5    magnesium hydroxide 400 mg/5 ml (MILK OF MAGNESIA) 400 mg/5 mL Susp Take 2,400 mg by mouth daily as needed.      magnesium oxide (MAG-OX) 400 mg (241.3 mg magnesium) tablet Take 1 tablet (400 mg total) by mouth 2 (two) times daily. 60 tablet 11    metoprolol tartrate (LOPRESSOR) 25 MG tablet Take 1 tablet (25 mg total) by mouth 2 (two) times daily. 60 tablet 11    multivitamin Tab Take 1 tablet by mouth once daily.      mycophenolate (CELLCEPT) 250 mg Cap Take 4 capsules (1,000 mg total) by mouth 2 (two) times daily. 240 capsule 11    nitroGLYCERIN (NITROSTAT) 0.4 MG SL tablet Dissolve one tablet underneath tongue at onset of angina; may repeat every 5 minutes if needed. Call 911 if angina persists after 2 doses. 25 tablet 5    pantoprazole (PROTONIX) 40 MG tablet Take 1 tablet (40 mg total) by mouth once daily. 30 tablet 11    pen needle, diabetic (BD ULTRA-FINE SHORT PEN NEEDLE) 31 gauge x 5/16" Ndle Use to inject insulin into the skin 3 times daily 100 each 1    predniSONE (DELTASONE) 5 MG tablet Take by mouth daily; 5/18-5/24: 20 mg; 5/25-5/31: 15 mg; 6/1-6/7: 10 mg; 6/8/23- thereafter: 5 mg daily; do not stop 70 tablet 11    sulfamethoxazole-trimethoprim 400-80mg (BACTRIM,SEPTRA) 400-80 mg per tablet Take 1 tablet by mouth every morning. Stop 11/12/23 30 tablet 5    tacrolimus (PROGRAF) 1 MG Cap Take 2 capsules (2 mg total) by mouth every morning AND 1 capsule (1 mg total) every evening. 90 capsule 11    valGANciclovir (VALCYTE) 450 mg Tab Take 2 tablets (900 mg total) by mouth every morning. Stop 8/14/23 60 tablet 2     Current Facility-Administered Medications   Medication Dose Route Frequency Provider Last Rate Last Admin    LIDOcaine HCl 2% urojet   Urethral 1 time in Clinic/HOD Angelic Roberson MD           Past Medical History:   Diagnosis Date    " Allergic rhinitis     Atrial fibrillation with RVR 6/4/2023    Class 1 obesity due to excess calories with serious comorbidity and body mass index (BMI) of 31.0 to 31.9 in adult 4/7/2017    Coronary artery disease     Coronary artery disease involving native coronary artery of native heart without angina pectoris 2/6/2018    Cath lab procedure 04/23/2018 (Naveen Rios MD) A. Indication/Pre-Operative Diagnosis: The patient is a 37 year old male that was referred for catheterization by [facility] Self for ACS (NSTEMI). The BELLA risk score is 5.  B. Summary/Post-Operative Diagnosis 1. Single vessel coronary artery disease. 2. Normal LVEF. 3. Diastolic dysfunction. 4. Successful PCI for acute myocardial infarction. 5.     Diabetic nephropathy associated with type 2 diabetes mellitus 1/6/2020    Direct hyperbilirubinemia 3/24/2018    DM (diabetes mellitus) 2008    BS doesn't check any more 08/02/2018    DM (diabetes mellitus) 2012    BS 99 am 06/26/2020    DM (diabetes mellitus)     BS didn't check 06/04/2021    DM (diabetes mellitus) 2008    BS didn't check 07/29/2022     Dyslipidemia associated with type 2 diabetes mellitus 7/31/2017    Elevated bilirubin 3/21/2018    GERD (gastroesophageal reflux disease)     Gout     Hyperlipidemia     Hyperparathyroidism, secondary to chronic kidney disease 6/7/2021    Hypertension associated with chronic kidney disease due to type 2 diabetes mellitus     Hypertension complicating diabetes 3/16/2019    Not candidate for Hypertension Digital Medicine program due to dialysis status. Didn't tolerate amlodipine due to SE of hypotension.    Idiopathic chronic gout, multiple sites, without tophus (tophi) 7/19/2017    Long term (current) use of insulin     MI (myocardial infarction) 07/2017    NSTEMI with CAD s/p PCI (FAMILIA) of LAD x 2 in 8/2017 7/31/2017    Obesity     HENRY on CPAP     Peritoneal dialysis catheter in place 1/26/2023    Proteinuria     Severe obstructive sleep apnea -  Intolerant of CPAP 7/31/2017    Intolerant of CPAP after weeks of trying multiple interfaces. SPLIT-NIGHT SLEEP STUDY 7/28/2015 · SLEEP ARCHITECTURE: Sleep onset was 2.9 minutes and sleep efficiency was 94.4%. Sleep Stage distribution showed 145 sleep stage changes, 6 awakenings and 119 arousals. Sleep distribution showed 53.2% stage NI, 41.8% stage N 11, 0.0% stage N Ill and REM sleep was at 5.0%. There were 2 REM periods. · RE    Stage 3a chronic kidney disease 5/26/2023    Stage 5 chronic kidney disease on chronic peritoneal dialysis 6/7/2017    Status post angioplasty with stent 8/4/2017    Steatohepatitis     Fatty Liver    Type 2 diabetes mellitus with chronic kidney disease on chronic dialysis, without long-term current use of insulin 6/21/2017    Type 2 diabetes mellitus with diabetic nephropathy     Type 2 diabetes mellitus with diabetic nephropathy, without long-term current use of insulin 1/6/2020    Type 2 diabetes mellitus with diabetic polyneuropathy, without long-term current use of insulin 6/7/2021    Type 2 diabetes mellitus with hyperglycemia     Type 2 diabetes mellitus with renal manifestations     Type 2 diabetes mellitus with stage 3 chronic kidney disease, without long-term current use of insulin 6/21/2017     Review of Systems   Constitutional:  Negative for activity change, appetite change and fever.   HENT:  Negative for congestion, mouth sores and sore throat.    Eyes:  Negative for visual disturbance.   Respiratory:  Negative for cough, chest tightness and shortness of breath.    Cardiovascular:  Negative for chest pain, palpitations and leg swelling.   Gastrointestinal:  Negative for abdominal distention, abdominal pain, constipation, diarrhea and nausea.   Genitourinary:  Negative for difficulty urinating, frequency and hematuria.   Musculoskeletal:  Negative for arthralgias and gait problem.   Allergic/Immunologic: Positive for immunocompromised state. Negative for environmental  allergies and food allergies.   Neurological:  Negative for dizziness, weakness and numbness.   Hematological:  Bruises/bleeds easily.        On eliquis   Psychiatric/Behavioral:  Negative for sleep disturbance. The patient is not nervous/anxious.      Objective:   There were no vitals taken for this visit.body mass index is unknown because there is no height or weight on file.    Physical Exam-VIRTUAL VISIT    Labs:  Lab Results   Component Value Date    WBC 3.95 06/12/2023    HGB 12.5 (L) 06/12/2023    HCT 39.0 (L) 06/12/2023     06/12/2023    K 4.0 06/12/2023     06/12/2023    CO2 23 06/12/2023    BUN 13 06/12/2023    CREATININE 1.4 06/12/2023    EGFRNORACEVR >60.0 06/12/2023    CALCIUM 9.0 06/12/2023    PHOS 2.4 (L) 06/12/2023    MG 1.7 06/12/2023    ALBUMIN 3.8 06/12/2023    AST 29 06/03/2023    ALT 63 (H) 06/03/2023    UTPCR 1.07 (H) 05/15/2023    .9 (H) 05/15/2023    TACROLIMUS 7.9 06/12/2023     Labs were reviewed with the patient    Assessment:     1. S/P kidney transplant    2. Long-term use of immunosuppressant medication    3. Essential hypertension    4. Diabetic nephropathy associated with type 2 diabetes mellitus    5. New onset a-fib    6. At risk for opportunistic infections      Plan:   New onset afib with RVR-currently in NSR, saw EP this morning, has follow up visit 7/26 for re-assessment. Remains on eliquis and lopressor.   Needs UPC and BK PCR with next labs per protocol       1. Immunosuppression: Prograf trough 7.9. Continue Prograf 2/2, MMF 1000 mg BID, and Prednisone 5 mg QD. Will continue to monitor for drug toxicities    2. Allograft Function: stable, continue good oral hydration  - ESRD secondary to diabetic nephropathy s/p living kidney transplant on 5/15/23 (thymo induction, CMV ++).   Lab Results   Component Value Date    CREATININE 1.4 06/12/2023 6/12/2023  POD 28   eGFR >60 mL/min/1.73 m^2 >60.0       3. Hypertension management: advise low salt diet and home  BP monitoring. Continue Coreg 6.25 mg BID   Afib: lopressor 25 mg BID (hold for SBP <100)    4. Metabolic Bone Disease/Secondary Hyperparathyroidism:  MagOx 400 mg BID,  mg TID, high magnesium and phosphorous diet  Lab Results   Component Value Date    .9 (H) 05/15/2023    CALCIUM 9.0 06/12/2023    PHOS 2.4 (L) 06/12/2023 6/12/2023  POD 28   Magnesium 1.6 - 2.6 mg/dL 1.7       5. Electrolytes:  Will monitor /guidelines  Lab Results   Component Value Date     06/12/2023    K 4.0 06/12/2023     06/12/2023    CO2 23 06/12/2023       6. Anemia: stable. No need for intervention   Lab Results   Component Value Date    WBC 3.95 06/12/2023    HGB 12.5 (L) 06/12/2023    HCT 39.0 (L) 06/12/2023    MCV 91 06/12/2023     06/12/2023         7. Cytopenias: no significant cytopenias. Medicine list reviewed including potential causes of drug-induced cytopenias    8.  Proteinuria: continue p/c ratio as per guidelines     9. BK virus infection screening:  will continue to monitor per guidelines    10. Weight education: provided during the clinic visit   There is no height or weight on file to calculate BMI.     11.Patient safety education regarding immunosuppression including prophylaxis posttransplant for CMV, PCP : Education provided about vaccination and prevention of infections     PCP ppx: Bactrim x 6 months (Stop 11/12/23)  CMV ppx: Valcyte x 3 months (Stop 8/14/23)       Follow-up:   Clinic: return to transplant clinic weekly for the first month after transplant; every 2 weeks during months 2-3; then at 6-, 9-, 12-, 18-, 24-, and 36- months post-transplant to reassess for complications from immunosuppression toxicity and monitor for rejection.  Annually thereafter.    Labs: since patient remains at high risk for rejection and drug-related complications that warrant close monitoring, labs will be ordered as follows: continue twice weekly CBC, renal panel, and drug level for first month;  then same labs once weekly through 3rd month post-transplant.  Urine for UA and protein/creatinine ratio monthly.  Serum BK - PCR at 1-, 3-, 6-, 9-, 12-, 18-, 24-, 36-, 48-, and 60 months post-transplant.  Hepatic panel at 1-, 2-, 3-, 6-, 9-, 12-, 18-, 24-, and 36- months post-transplant.    Cinda Mccray NP       Education:   Material provided to the patient.  Patient reminded to call with any health changes since these can be early signs of significant complications.  Also, I advised the patient to be sure any new medications or changes of old medications are discussed with either a pharmacist or physician knowledgeable with transplant to avoid rejection/drug toxicity related to significant drug interactions.    Patient advised that it is recommended that all transplanted patients, and their close contacts and household members receive Covid vaccination.

## 2023-06-12 NOTE — TELEPHONE ENCOUNTER
Patient was notified of results. All questions were answered. Pt verbalized understanding. Pt will call back with any other questions or concerns.      ----- Message from Monica Rios MD sent at 6/12/2023  7:16 AM CDT -----  No afib minimal skipped beats will observe.

## 2023-06-13 ENCOUNTER — PATIENT MESSAGE (OUTPATIENT)
Dept: CARDIOLOGY | Facility: CLINIC | Age: 43
End: 2023-06-13
Payer: COMMERCIAL

## 2023-06-13 LAB — TACROLIMUS BLD-MCNC: 7.9 NG/ML (ref 5–15)

## 2023-06-14 ENCOUNTER — OFFICE VISIT (OUTPATIENT)
Dept: CARDIOLOGY | Facility: CLINIC | Age: 43
End: 2023-06-14
Payer: COMMERCIAL

## 2023-06-14 ENCOUNTER — PATIENT MESSAGE (OUTPATIENT)
Dept: TRANSPLANT | Facility: CLINIC | Age: 43
End: 2023-06-14

## 2023-06-14 ENCOUNTER — OFFICE VISIT (OUTPATIENT)
Dept: TRANSPLANT | Facility: CLINIC | Age: 43
End: 2023-06-14
Payer: COMMERCIAL

## 2023-06-14 VITALS
OXYGEN SATURATION: 99 % | HEART RATE: 85 BPM | BODY MASS INDEX: 27.42 KG/M2 | DIASTOLIC BLOOD PRESSURE: 80 MMHG | WEIGHT: 219.38 LBS | SYSTOLIC BLOOD PRESSURE: 118 MMHG

## 2023-06-14 DIAGNOSIS — Z94.0 S/P KIDNEY TRANSPLANT: Primary | ICD-10-CM

## 2023-06-14 DIAGNOSIS — I10 ESSENTIAL HYPERTENSION: Chronic | ICD-10-CM

## 2023-06-14 DIAGNOSIS — I48.0 PAROXYSMAL ATRIAL FIBRILLATION: ICD-10-CM

## 2023-06-14 DIAGNOSIS — I48.91 ATRIAL FIBRILLATION WITH RVR: ICD-10-CM

## 2023-06-14 DIAGNOSIS — E11.21 DIABETIC NEPHROPATHY ASSOCIATED WITH TYPE 2 DIABETES MELLITUS: ICD-10-CM

## 2023-06-14 DIAGNOSIS — I48.91 NEW ONSET A-FIB: ICD-10-CM

## 2023-06-14 DIAGNOSIS — Z79.60 LONG-TERM USE OF IMMUNOSUPPRESSANT MEDICATION: ICD-10-CM

## 2023-06-14 DIAGNOSIS — Z91.89 AT RISK FOR OPPORTUNISTIC INFECTIONS: ICD-10-CM

## 2023-06-14 PROCEDURE — 99205 OFFICE O/P NEW HI 60 MIN: CPT | Mod: S$GLB,,, | Performed by: INTERNAL MEDICINE

## 2023-06-14 PROCEDURE — 3072F LOW RISK FOR RETINOPATHY: CPT | Mod: CPTII,95,, | Performed by: NURSE PRACTITIONER

## 2023-06-14 PROCEDURE — 3072F PR LOW RISK FOR RETINOPATHY: ICD-10-PCS | Mod: CPTII,95,, | Performed by: NURSE PRACTITIONER

## 2023-06-14 PROCEDURE — 4010F PR ACE/ARB THEARPY RXD/TAKEN: ICD-10-PCS | Mod: CPTII,S$GLB,, | Performed by: INTERNAL MEDICINE

## 2023-06-14 PROCEDURE — 3008F BODY MASS INDEX DOCD: CPT | Mod: CPTII,S$GLB,, | Performed by: INTERNAL MEDICINE

## 2023-06-14 PROCEDURE — 1159F MED LIST DOCD IN RCRD: CPT | Mod: CPTII,95,, | Performed by: NURSE PRACTITIONER

## 2023-06-14 PROCEDURE — 3072F PR LOW RISK FOR RETINOPATHY: ICD-10-PCS | Mod: CPTII,S$GLB,, | Performed by: INTERNAL MEDICINE

## 2023-06-14 PROCEDURE — 1160F PR REVIEW ALL MEDS BY PRESCRIBER/CLIN PHARMACIST DOCUMENTED: ICD-10-PCS | Mod: CPTII,95,, | Performed by: NURSE PRACTITIONER

## 2023-06-14 PROCEDURE — 1160F RVW MEDS BY RX/DR IN RCRD: CPT | Mod: CPTII,95,, | Performed by: NURSE PRACTITIONER

## 2023-06-14 PROCEDURE — 3066F PR DOCUMENTATION OF TREATMENT FOR NEPHROPATHY: ICD-10-PCS | Mod: CPTII,95,, | Performed by: NURSE PRACTITIONER

## 2023-06-14 PROCEDURE — 1159F MED LIST DOCD IN RCRD: CPT | Mod: CPTII,S$GLB,, | Performed by: INTERNAL MEDICINE

## 2023-06-14 PROCEDURE — 3079F PR MOST RECENT DIASTOLIC BLOOD PRESSURE 80-89 MM HG: ICD-10-PCS | Mod: CPTII,S$GLB,, | Performed by: INTERNAL MEDICINE

## 2023-06-14 PROCEDURE — 3074F PR MOST RECENT SYSTOLIC BLOOD PRESSURE < 130 MM HG: ICD-10-PCS | Mod: CPTII,S$GLB,, | Performed by: INTERNAL MEDICINE

## 2023-06-14 PROCEDURE — 3074F SYST BP LT 130 MM HG: CPT | Mod: CPTII,S$GLB,, | Performed by: INTERNAL MEDICINE

## 2023-06-14 PROCEDURE — 99999 PR PBB SHADOW E&M-EST. PATIENT-LVL V: CPT | Mod: PBBFAC,,, | Performed by: INTERNAL MEDICINE

## 2023-06-14 PROCEDURE — 3044F PR MOST RECENT HEMOGLOBIN A1C LEVEL <7.0%: ICD-10-PCS | Mod: CPTII,S$GLB,, | Performed by: INTERNAL MEDICINE

## 2023-06-14 PROCEDURE — 1111F DSCHRG MED/CURRENT MED MERGE: CPT | Mod: CPTII,95,, | Performed by: NURSE PRACTITIONER

## 2023-06-14 PROCEDURE — 99205 PR OFFICE/OUTPT VISIT, NEW, LEVL V, 60-74 MIN: ICD-10-PCS | Mod: S$GLB,,, | Performed by: INTERNAL MEDICINE

## 2023-06-14 PROCEDURE — 99214 OFFICE O/P EST MOD 30 MIN: CPT | Mod: 95,,, | Performed by: NURSE PRACTITIONER

## 2023-06-14 PROCEDURE — 1159F PR MEDICATION LIST DOCUMENTED IN MEDICAL RECORD: ICD-10-PCS | Mod: CPTII,S$GLB,, | Performed by: INTERNAL MEDICINE

## 2023-06-14 PROCEDURE — 3008F PR BODY MASS INDEX (BMI) DOCUMENTED: ICD-10-PCS | Mod: CPTII,S$GLB,, | Performed by: INTERNAL MEDICINE

## 2023-06-14 PROCEDURE — 1111F PR DISCHARGE MEDS RECONCILED W/ CURRENT OUTPATIENT MED LIST: ICD-10-PCS | Mod: CPTII,95,, | Performed by: NURSE PRACTITIONER

## 2023-06-14 PROCEDURE — 3066F NEPHROPATHY DOC TX: CPT | Mod: CPTII,95,, | Performed by: NURSE PRACTITIONER

## 2023-06-14 PROCEDURE — 3044F HG A1C LEVEL LT 7.0%: CPT | Mod: CPTII,95,, | Performed by: NURSE PRACTITIONER

## 2023-06-14 PROCEDURE — 3066F PR DOCUMENTATION OF TREATMENT FOR NEPHROPATHY: ICD-10-PCS | Mod: CPTII,S$GLB,, | Performed by: INTERNAL MEDICINE

## 2023-06-14 PROCEDURE — 4010F ACE/ARB THERAPY RXD/TAKEN: CPT | Mod: CPTII,95,, | Performed by: NURSE PRACTITIONER

## 2023-06-14 PROCEDURE — 4010F PR ACE/ARB THEARPY RXD/TAKEN: ICD-10-PCS | Mod: CPTII,95,, | Performed by: NURSE PRACTITIONER

## 2023-06-14 PROCEDURE — 99214 PR OFFICE/OUTPT VISIT, EST, LEVL IV, 30-39 MIN: ICD-10-PCS | Mod: 95,,, | Performed by: NURSE PRACTITIONER

## 2023-06-14 PROCEDURE — 1159F PR MEDICATION LIST DOCUMENTED IN MEDICAL RECORD: ICD-10-PCS | Mod: CPTII,95,, | Performed by: NURSE PRACTITIONER

## 2023-06-14 PROCEDURE — 99999 PR PBB SHADOW E&M-EST. PATIENT-LVL V: ICD-10-PCS | Mod: PBBFAC,,, | Performed by: INTERNAL MEDICINE

## 2023-06-14 PROCEDURE — 3079F DIAST BP 80-89 MM HG: CPT | Mod: CPTII,S$GLB,, | Performed by: INTERNAL MEDICINE

## 2023-06-14 PROCEDURE — 4010F ACE/ARB THERAPY RXD/TAKEN: CPT | Mod: CPTII,S$GLB,, | Performed by: INTERNAL MEDICINE

## 2023-06-14 PROCEDURE — 3044F HG A1C LEVEL LT 7.0%: CPT | Mod: CPTII,S$GLB,, | Performed by: INTERNAL MEDICINE

## 2023-06-14 PROCEDURE — 3072F LOW RISK FOR RETINOPATHY: CPT | Mod: CPTII,S$GLB,, | Performed by: INTERNAL MEDICINE

## 2023-06-14 PROCEDURE — 3066F NEPHROPATHY DOC TX: CPT | Mod: CPTII,S$GLB,, | Performed by: INTERNAL MEDICINE

## 2023-06-14 PROCEDURE — 3044F PR MOST RECENT HEMOGLOBIN A1C LEVEL <7.0%: ICD-10-PCS | Mod: CPTII,95,, | Performed by: NURSE PRACTITIONER

## 2023-06-14 RX ORDER — ADHESIVE BANDAGE
2400 BANDAGE TOPICAL DAILY PRN
COMMUNITY
End: 2023-06-30

## 2023-06-14 NOTE — LETTER
June 14, 2023        Siva Figueroa  [address] [address] NEPHROLOGY  [facility] [address]  Phone: [phone]  Fax: [phone]             [facility]- Transplant 1st Fl  [address]  [facility] [address]  Phone: [phone]   Patient: Robb Iglesias   MR Number: [id number]   YOB: 1980   Date of Visit: 6/14/2023       Dear Dr. Siva Figueroa    Thank you for referring Robb Iglesias to me for evaluation. Attached you will find relevant portions of my assessment and plan of care.    If you have questions, please do not hesitate to call me. I look forward to following Robb Iglesias along with you.    Sincerely,    Cinda Mccray NP    Enclosure    If you would like to receive this communication electronically, please contact [email or web address] or [phone] to request [employer] access.    [employer] is a tool which provides read-only access to select patient information with whom you have a relationship. Its easy to use and provides real time access to review your patients record including encounter summaries, notes, results, and demographic information.    If you feel you have received this communication in error or would no longer like to receive these types of communications, please e-mail [email or web address]

## 2023-06-14 NOTE — PROGRESS NOTES
Subjective:   Patient ID:  Robb Iglesias is a 42 y.o. male who presents for evaluation of Atrial Fibrillation      42 yoM CAD s/p MI x 2, ESRD s/p renal transplant here for AF management. He started HD October 2022 after 6 years of CKD. He underwent renal transplant 5/15/23. He developed symptomatic AF/RVR 9PM 6/3/23 leading to an ER visit at . He converted on diltiazem infusion and was discharged. He was started on eliquis as well. Holter on discharge showed on recurrence.     Echo 12/22:  · The test was stopped because the patient experienced fatigue and shortness of breath. The patient requested the test to be stopped.  · The patient's exercise capacity was normal.  · During stress, the following significant arrhythmias were observed: occasional PVCs.  · The ECG portion of this study is negative for myocardial ischemia.  · The stress echo portion of this study is negative for myocardial ischemia.  · The left ventricle is normal in size with concentric remodeling and normal systolic function.  · The estimated ejection fraction is 55%.  · Normal left ventricular diastolic function.  · Normal right ventricular size with normal right ventricular systolic function.    Past Medical History:  No date: Allergic rhinitis  6/4/2023: Atrial fibrillation with RVR  4/7/2017: Class 1 obesity due to excess calories with serious   comorbidity and body mass index (BMI) of 31.0 to 31.9 in adult  No date: Coronary artery disease  2/6/2018: Coronary artery disease involving native coronary artery of   native heart without angina pectoris      Comment:  Cath lab procedure 04/23/2018 (Naveen Rios MD) A.                Indication/Pre-Operative Diagnosis: The patient is a 37                year old male that was referred for catheterization by                [facility] for ACS (NSTEMI). The BELLA risk score is               5.  B. Summary/Post-Operative Diagnosis 1. Single vessel                coronary artery disease.  2. Normal LVEF. 3. Diastolic                dysfunction. 4. Successful PCI for acute myocardial                infarction. 5.   1/6/2020: Diabetic nephropathy associated with type 2 diabetes   mellitus  3/24/2018: Direct hyperbilirubinemia  2008: DM (diabetes mellitus)      Comment:  BS doesn't check any more 08/02/2018  2012: DM (diabetes mellitus)      Comment:  BS 99 am 06/26/2020  No date: DM (diabetes mellitus)      Comment:  BS didn't check 06/04/2021 2008: DM (diabetes mellitus)      Comment:  BS didn't check 07/29/2022 7/31/2017: Dyslipidemia associated with type 2 diabetes mellitus  3/21/2018: Elevated bilirubin  No date: GERD (gastroesophageal reflux disease)  No date: Gout  No date: Hyperlipidemia  6/7/2021: Hyperparathyroidism, secondary to chronic kidney disease  No date: Hypertension associated with chronic kidney disease due to   type 2 diabetes mellitus  3/16/2019: Hypertension complicating diabetes      Comment:  Not candidate for Hypertension Digital Medicine program                due to dialysis status. Didn't tolerate amlodipine due to               SE of hypotension.  7/19/2017: Idiopathic chronic gout, multiple sites, without tophus   (tophi)  No date: Long term (current) use of insulin  07/2017: MI (myocardial infarction)  7/31/2017: NSTEMI with CAD s/p PCI (FAMILIA) of LAD x 2 in 8/2017  No date: Obesity  No date: HENRY on CPAP  1/26/2023: Peritoneal dialysis catheter in place  No date: Proteinuria  7/31/2017: Severe obstructive sleep apnea - Intolerant of CPAP      Comment:  Intolerant of CPAP after weeks of trying multiple                interfaces. SPLIT-NIGHT SLEEP STUDY 7/28/2015 · SLEEP                ARCHITECTURE: Sleep onset was 2.9 minutes and sleep                efficiency was 94.4%. Sleep Stage distribution showed 145               sleep stage changes, 6 awakenings and 119 arousals. Sleep               distribution showed 53.2% stage NI, 41.8% stage N 11,                0.0% stage N  Ill and REM sleep was at 5.0%. There were 2                REM periods. · RE  5/26/2023: Stage 3a chronic kidney disease  6/7/2017: Stage 5 chronic kidney disease on chronic peritoneal   dialysis  8/4/2017: Status post angioplasty with stent  No date: Steatohepatitis      Comment:  Fatty Liver  6/21/2017: Type 2 diabetes mellitus with chronic kidney disease on   chronic dialysis, without long-term current use of insulin  No date: Type 2 diabetes mellitus with diabetic nephropathy  1/6/2020: Type 2 diabetes mellitus with diabetic nephropathy, without   long-term current use of insulin  6/7/2021: Type 2 diabetes mellitus with diabetic polyneuropathy,   without long-term current use of insulin  No date: Type 2 diabetes mellitus with hyperglycemia  No date: Type 2 diabetes mellitus with renal manifestations  6/21/2017: Type 2 diabetes mellitus with stage 3 chronic kidney   disease, without long-term current use of insulin    Past Surgical History:  No date: CORONARY ANGIOPLASTY WITH STENT PLACEMENT  No date: CYSTOSCOPY  05/15/2023: KIDNEY TRANSPLANT; N/A      Comment:  Procedure: TRANSPLANT, KIDNEY;  Surgeon: Reji Hinojosa MD;  Location: [facility] OR [address] [place] [address];  Service: Transplant;                 Laterality: N/A;  IN ROOM: 10:37OUT OF                ICE:10:53REPERFUSION TIME: 11:21  No date: LASIK; Bilateral  No date: LIVER BIOPSY  No date: NASAL ENDOSCOPY  No date: NASAL SEPTUM SURGERY  05/15/2023: PERITONEAL CATHETER REMOVAL; N/A      Comment:  Procedure: REMOVAL, CATHETER, DIALYSIS, PERITONEAL;                 Surgeon: Reji Hinojosa MD;  Location: [facility] OR [address] [place] [address];                Service: Transplant;  Laterality: N/A;  03/06/2017: SLEEVE GASTROPLASTY    Social History    Socioeconomic History      Marital status:       Spouse name: Shannon Iglesias      Number of children: 2      Years of education: Some College    Tobacco Use      Smoking status: Never      Smokeless tobacco: Never    Substance and  Sexual Activity      Alcohol use: No        Comment: 1-2 times per month      Drug use: No      Sexual activity: Yes        Partners: Female    Social History Narrative      Full time employed,  with 2 children.      Caregiver Shannon       Review of patient's family history indicates:    Current Outpatient Medications:  apixaban (ELIQUIS) 5 mg Tab, Take 1 tablet (5 mg total) by mouth 2 (two) times daily., Disp: 30 tablet, Rfl: 0  aspirin (ECOTRIN) 81 MG EC tablet, Take 1 tablet (81 mg total) by mouth once daily., Disp: 90 tablet, Rfl: 3  atorvastatin (LIPITOR) 80 MG tablet, Take 1 tablet (80 mg total) by mouth every evening., Disp: 90 tablet, Rfl: 3  blood sugar diagnostic Strp, Check blood glucose 2 times daily as directed and as needed, Disp: 200 each, Rfl: 5  blood-glucose meter (ONETOUCH ULTRAMINI) kit, Use as instructed, Disp: 1 each, Rfl: 0  docusate sodium (COLACE) 100 MG capsule, Take 1 capsule (100 mg total) by mouth 3 (three) times daily., Disp: , Rfl: 0  dulaglutide (TRULICITY) 3 mg/0.5 mL pen injector, Inject 3 mg into the skin every 7 days., Disp: 12 pen, Rfl: 1  ezetimibe (ZETIA) 10 mg tablet, Take 1 tablet (10 mg total) by mouth once daily., Disp: 90 tablet, Rfl: 3  insulin lispro-aabc (LYUMJEV KWIKPEN U-100 INSULIN) 100 unit/mL pen, Inject 4 Units into the skin 3 (three) times daily with meals. Plus sliding scale insulin. Max 27 units, Disp: 2 pen, Rfl: 7  k phos di & mono-sod phos mono (K-PHOS-NEUTRAL) 250 mg Tab, Take 2 tablets by mouth 3 (three) times daily., Disp: 180 tablet, Rfl: 5  lancets Misc, Check blood glucose 2 times daily as directed and as needed (dispense insurance preferred brand or patient choice), Disp: 200 each, Rfl: 5  magnesium oxide (MAG-OX) 400 mg (241.3 mg magnesium) tablet, Take 1 tablet (400 mg total) by mouth 2 (two) times daily., Disp: 60 tablet, Rfl: 11  metoprolol tartrate (LOPRESSOR) 25 MG tablet, Take 1 tablet (25 mg total) by mouth 2 (two) times daily., Disp: 60  "tablet, Rfl: 11  multivitamin Tab, Take 1 tablet by mouth once daily., Disp: , Rfl:   mycophenolate (CELLCEPT) 250 mg Cap, Take 4 capsules (1,000 mg total) by mouth 2 (two) times daily., Disp: 240 capsule, Rfl: 11  nitroGLYCERIN (NITROSTAT) 0.4 MG SL tablet, Dissolve one tablet underneath tongue at onset of angina; may repeat every 5 minutes if needed. Call 911 if angina persists after 2 doses., Disp: 25 tablet, Rfl: 5  oxyCODONE (ROXICODONE) 5 MG immediate release tablet, Take 1 tablet (5 mg total) by mouth every 6 (six) hours as needed for Pain., Disp: 28 tablet, Rfl: 0  pantoprazole (PROTONIX) 40 MG tablet, Take 1 tablet (40 mg total) by mouth once daily., Disp: 30 tablet, Rfl: 11  pen needle, diabetic (BD ULTRA-FINE SHORT PEN NEEDLE) 31 gauge x 5/16" Ndle, Use to inject insulin into the skin 3 times daily, Disp: 100 each, Rfl: 1  predniSONE (DELTASONE) 5 MG tablet, Take by mouth daily; 5/18-5/24: 20 mg; 5/25-5/31: 15 mg; 6/1-6/7: 10 mg; 6/8/23- thereafter: 5 mg daily; do not stop, Disp: 70 tablet, Rfl: 11  sulfamethoxazole-trimethoprim 400-80mg (BACTRIM,SEPTRA) 400-80 mg per tablet, Take 1 tablet by mouth every morning. Stop 11/12/23, Disp: 30 tablet, Rfl: 5  tacrolimus (PROGRAF) 1 MG Cap, Take 2 capsules (2 mg total) by mouth every morning AND 1 capsule (1 mg total) every evening., Disp: 90 capsule, Rfl: 11  valGANciclovir (VALCYTE) 450 mg Tab, Take 2 tablets (900 mg total) by mouth every morning. Stop 8/14/23, Disp: 60 tablet, Rfl: 2    Current Facility-Administered Medications:  LIDOcaine HCl 2% urojet, , Urethral, 1 time in Clinic/HOD, Angelic Roberson MD            Review of Systems   Constitutional: Negative.   HENT: Negative.     Eyes: Negative.    Cardiovascular:  Negative for chest pain, dyspnea on exertion, leg swelling, near-syncope, palpitations and syncope.   Respiratory: Negative.  Negative for shortness of breath.    Endocrine: Negative.    Hematologic/Lymphatic: Negative.    Skin: Negative. "    Musculoskeletal: Negative.    Gastrointestinal: Negative.    Genitourinary: Negative.    Neurological: Negative.  Negative for dizziness and light-headedness.   Psychiatric/Behavioral: Negative.     Allergic/Immunologic: Negative.      Objective:   Physical Exam  Vitals reviewed.   Constitutional:       General: He is not in acute distress.     Appearance: He is well-developed.   HENT:      Head: Normocephalic and atraumatic.   Eyes:      Pupils: Pupils are equal, round, and reactive to light.   Neck:      Thyroid: No thyromegaly.      Vascular: No JVD.   Cardiovascular:      Rate and Rhythm: Normal rate and regular rhythm.      Chest Wall: PMI is not displaced.      Heart sounds: Normal heart sounds, S1 normal and S2 normal. No murmur heard.    No friction rub. No gallop.   Pulmonary:      Effort: Pulmonary effort is normal. No respiratory distress.      Breath sounds: Normal breath sounds. No wheezing or rales.   Abdominal:      General: Bowel sounds are normal. There is no distension.      Palpations: Abdomen is soft.      Tenderness: There is no abdominal tenderness. There is no guarding or rebound.   Musculoskeletal:         General: No tenderness. Normal range of motion.      Cervical back: Normal range of motion.   Skin:     General: Skin is warm and dry.      Findings: No erythema or rash.   Neurological:      Mental Status: He is alert and oriented to person, place, and time.      Cranial Nerves: No cranial nerve deficit.   Psychiatric:         Behavior: Behavior normal.         Thought Content: Thought content normal.         Judgment: Judgment normal.       Assessment:      1. S/P kidney transplant    2. Atrial fibrillation    3. Atrial fibrillation with RVR    4. New onset a-fib    5. Essential hypertension        Plan:     42 yoM CAD/PCI, ESRD s/p renal transplant here for AF management. He had AF within a few weeks of his renal transplant. Will reassess in 6 weeks. He has a goggle watch with ECG and  monitoring capability. If AF recurs, only amiodarone would be AAD option.

## 2023-06-15 ENCOUNTER — PATIENT MESSAGE (OUTPATIENT)
Dept: ADMINISTRATIVE | Facility: OTHER | Age: 43
End: 2023-06-15
Payer: COMMERCIAL

## 2023-06-15 ENCOUNTER — PATIENT MESSAGE (OUTPATIENT)
Dept: INTERNAL MEDICINE | Facility: CLINIC | Age: 43
End: 2023-06-15
Payer: COMMERCIAL

## 2023-06-15 ENCOUNTER — LAB VISIT (OUTPATIENT)
Dept: LAB | Facility: HOSPITAL | Age: 43
End: 2023-06-15
Payer: COMMERCIAL

## 2023-06-15 ENCOUNTER — TELEPHONE (OUTPATIENT)
Dept: TRANSPLANT | Facility: CLINIC | Age: 43
End: 2023-06-15
Payer: COMMERCIAL

## 2023-06-15 ENCOUNTER — LAB VISIT (OUTPATIENT)
Dept: LAB | Facility: HOSPITAL | Age: 43
End: 2023-06-15
Attending: INTERNAL MEDICINE
Payer: COMMERCIAL

## 2023-06-15 DIAGNOSIS — Z94.0 KIDNEY REPLACED BY TRANSPLANT: Primary | ICD-10-CM

## 2023-06-15 DIAGNOSIS — Z79.52 ON PREDNISONE THERAPY: ICD-10-CM

## 2023-06-15 DIAGNOSIS — E11.22 TYPE 2 DIABETES MELLITUS WITH DIABETIC CHRONIC KIDNEY DISEASE, UNSPECIFIED CKD STAGE, UNSPECIFIED WHETHER LONG TERM INSULIN USE: ICD-10-CM

## 2023-06-15 DIAGNOSIS — R79.89 ELEVATED LIVER FUNCTION TESTS: ICD-10-CM

## 2023-06-15 DIAGNOSIS — Z94.0 KIDNEY REPLACED BY TRANSPLANT: ICD-10-CM

## 2023-06-15 DIAGNOSIS — E80.6 HYPERBILIRUBINEMIA: ICD-10-CM

## 2023-06-15 LAB
ALBUMIN SERPL BCP-MCNC: 4.2 G/DL (ref 3.5–5.2)
ALBUMIN SERPL BCP-MCNC: 4.2 G/DL (ref 3.5–5.2)
ALLOSURE COMMENT: NORMAL
ALLOSURE SCORE KIDNEY: 0.31 %
ALP SERPL-CCNC: 98 U/L (ref 55–135)
ALT SERPL W/O P-5'-P-CCNC: 52 U/L (ref 10–44)
ANION GAP SERPL CALC-SCNC: 9 MMOL/L (ref 8–16)
AST SERPL-CCNC: 27 U/L (ref 10–40)
BACTERIA #/AREA URNS HPF: NORMAL /HPF
BASOPHILS # BLD AUTO: 0.05 K/UL (ref 0–0.2)
BASOPHILS NFR BLD: 1.2 % (ref 0–1.9)
BILIRUB DIRECT SERPL-MCNC: 0.3 MG/DL (ref 0.1–0.3)
BILIRUB SERPL-MCNC: 2.2 MG/DL (ref 0.1–1)
BILIRUB UR QL STRIP: NEGATIVE
BUN SERPL-MCNC: 11 MG/DL (ref 6–20)
CALCIUM SERPL-MCNC: 9.6 MG/DL (ref 8.7–10.5)
CHLORIDE SERPL-SCNC: 108 MMOL/L (ref 95–110)
CLARITY UR: CLEAR
CLASS I ANTIBODY COMMENTS - LUMINEX: NORMAL
CLASS II ANTIBODY COMMENTS - LUMINEX: NORMAL
CO2 SERPL-SCNC: 25 MMOL/L (ref 23–29)
COLOR UR: YELLOW
CREAT SERPL-MCNC: 1.6 MG/DL (ref 0.5–1.4)
CREAT UR-MCNC: 258 MG/DL (ref 23–375)
DIFFERENTIAL METHOD: ABNORMAL
DSA1 TESTING DATE: NORMAL
DSA12 TESTING DATE: NORMAL
DSA2 TESTING DATE: NORMAL
EOSINOPHIL # BLD AUTO: 0.2 K/UL (ref 0–0.5)
EOSINOPHIL NFR BLD: 3.5 % (ref 0–8)
ERYTHROCYTE [DISTWIDTH] IN BLOOD BY AUTOMATED COUNT: 16.7 % (ref 11.5–14.5)
EST. GFR  (NO RACE VARIABLE): 55.2 ML/MIN/1.73 M^2
GLUCOSE SERPL-MCNC: 120 MG/DL (ref 70–110)
GLUCOSE UR QL STRIP: NEGATIVE
HCT VFR BLD AUTO: 40.5 % (ref 40–54)
HGB BLD-MCNC: 13 G/DL (ref 14–18)
HGB UR QL STRIP: NEGATIVE
HYALINE CASTS #/AREA URNS LPF: 1 /LPF
IMM GRANULOCYTES # BLD AUTO: 0.04 K/UL (ref 0–0.04)
IMM GRANULOCYTES NFR BLD AUTO: 0.9 % (ref 0–0.5)
INFORMATION: NORMAL
KETONES UR QL STRIP: NEGATIVE
LEUKOCYTE ESTERASE UR QL STRIP: NEGATIVE
LYMPHOCYTES # BLD AUTO: 0.4 K/UL (ref 1–4.8)
LYMPHOCYTES NFR BLD: 10.3 % (ref 18–48)
MAGNESIUM SERPL-MCNC: 1.8 MG/DL (ref 1.6–2.6)
MCH RBC QN AUTO: 29.3 PG (ref 27–31)
MCHC RBC AUTO-ENTMCNC: 32.1 G/DL (ref 32–36)
MCV RBC AUTO: 91 FL (ref 82–98)
MICROSCOPIC COMMENT: NORMAL
MONOCYTES # BLD AUTO: 0.4 K/UL (ref 0.3–1)
MONOCYTES NFR BLD: 8.6 % (ref 4–15)
NEUTROPHILS # BLD AUTO: 3.2 K/UL (ref 1.8–7.7)
NEUTROPHILS NFR BLD: 75.5 % (ref 38–73)
NITRITE UR QL STRIP: NEGATIVE
NRBC BLD-RTO: 0 /100 WBC
PH UR STRIP: 6 [PH] (ref 5–8)
PHOSPHATE SERPL-MCNC: 2.5 MG/DL (ref 2.7–4.5)
PLATELET # BLD AUTO: 173 K/UL (ref 150–450)
PMV BLD AUTO: 10 FL (ref 9.2–12.9)
POTASSIUM SERPL-SCNC: 4.5 MMOL/L (ref 3.5–5.1)
PROT SERPL-MCNC: 6.5 G/DL (ref 6–8.4)
PROT UR QL STRIP: ABNORMAL
PROT UR-MCNC: 136 MG/DL (ref 0–15)
PROT/CREAT UR: 0.53 MG/G{CREAT} (ref 0–0.2)
RBC # BLD AUTO: 4.43 M/UL (ref 4.6–6.2)
RBC #/AREA URNS HPF: 3 /HPF (ref 0–4)
SERUM COLLECTION DT - LUMINEX CLASS I: NORMAL
SERUM COLLECTION DT - LUMINEX CLASS II: NORMAL
SODIUM SERPL-SCNC: 142 MMOL/L (ref 136–145)
SP GR UR STRIP: >=1.03 (ref 1–1.03)
URN SPEC COLLECT METH UR: ABNORMAL
WBC # BLD AUTO: 4.28 K/UL (ref 3.9–12.7)
WBC #/AREA URNS HPF: 4 /HPF (ref 0–5)
YEAST URNS QL MICRO: NORMAL

## 2023-06-15 PROCEDURE — 81000 URINALYSIS NONAUTO W/SCOPE: CPT | Performed by: INTERNAL MEDICINE

## 2023-06-15 PROCEDURE — 84075 ASSAY ALKALINE PHOSPHATASE: CPT | Performed by: INTERNAL MEDICINE

## 2023-06-15 PROCEDURE — 80197 ASSAY OF TACROLIMUS: CPT | Performed by: INTERNAL MEDICINE

## 2023-06-15 PROCEDURE — 83735 ASSAY OF MAGNESIUM: CPT | Performed by: INTERNAL MEDICINE

## 2023-06-15 PROCEDURE — 36415 COLL VENOUS BLD VENIPUNCTURE: CPT | Performed by: INTERNAL MEDICINE

## 2023-06-15 PROCEDURE — 84156 ASSAY OF PROTEIN URINE: CPT | Performed by: INTERNAL MEDICINE

## 2023-06-15 PROCEDURE — 87799 DETECT AGENT NOS DNA QUANT: CPT | Performed by: INTERNAL MEDICINE

## 2023-06-15 PROCEDURE — 80069 RENAL FUNCTION PANEL: CPT | Performed by: INTERNAL MEDICINE

## 2023-06-15 PROCEDURE — 85025 COMPLETE CBC W/AUTO DIFF WBC: CPT | Performed by: INTERNAL MEDICINE

## 2023-06-15 NOTE — TELEPHONE ENCOUNTER
Hepatology cx. Liver us ordered.  to reach out to pt. repeat LFTs in 2 wks    ----- Message from Rell Booth MD sent at 6/15/2023  4:21 PM CDT -----  Let;s repeat LFT's in 2 weeks  Let's also order a liver US and refer to hepatology. Apparently in the past he also had some elevation of Bilirubin, this is likely benign, but get  baseline evaluation

## 2023-06-15 NOTE — PROGRESS NOTES
Let;s repeat LFT's in 2 weeks  Let's also order a liver US and refer to hepatology. Apparently in the past he also had some elevation of Bilirubin, this is likely benign, but get  baseline evaluation

## 2023-06-16 LAB — TACROLIMUS BLD-MCNC: 8.3 NG/ML (ref 5–15)

## 2023-06-19 ENCOUNTER — LAB VISIT (OUTPATIENT)
Dept: LAB | Facility: HOSPITAL | Age: 43
End: 2023-06-19
Attending: INTERNAL MEDICINE
Payer: COMMERCIAL

## 2023-06-19 DIAGNOSIS — Z94.0 KIDNEY REPLACED BY TRANSPLANT: ICD-10-CM

## 2023-06-19 LAB
ALBUMIN SERPL BCP-MCNC: 4.2 G/DL (ref 3.5–5.2)
ANION GAP SERPL CALC-SCNC: 9 MMOL/L (ref 8–16)
BASOPHILS # BLD AUTO: 0.06 K/UL (ref 0–0.2)
BASOPHILS NFR BLD: 1.4 % (ref 0–1.9)
BKV DNA SERPL NAA+PROBE-ACNC: <125 COPIES/ML
BKV DNA SERPL NAA+PROBE-LOG#: <2.1 LOG (10) COPIES/ML
BKV DNA SERPL QL NAA+PROBE: NOT DETECTED
BUN SERPL-MCNC: 12 MG/DL (ref 6–20)
CALCIUM SERPL-MCNC: 9.4 MG/DL (ref 8.7–10.5)
CHLORIDE SERPL-SCNC: 108 MMOL/L (ref 95–110)
CO2 SERPL-SCNC: 25 MMOL/L (ref 23–29)
CREAT SERPL-MCNC: 1.7 MG/DL (ref 0.5–1.4)
DIFFERENTIAL METHOD: ABNORMAL
EOSINOPHIL # BLD AUTO: 0.1 K/UL (ref 0–0.5)
EOSINOPHIL NFR BLD: 2.2 % (ref 0–8)
ERYTHROCYTE [DISTWIDTH] IN BLOOD BY AUTOMATED COUNT: 16.9 % (ref 11.5–14.5)
EST. GFR  (NO RACE VARIABLE): 51.3 ML/MIN/1.73 M^2
GLUCOSE SERPL-MCNC: 118 MG/DL (ref 70–110)
HCT VFR BLD AUTO: 40.2 % (ref 40–54)
HGB BLD-MCNC: 13.1 G/DL (ref 14–18)
IMM GRANULOCYTES # BLD AUTO: 0.03 K/UL (ref 0–0.04)
IMM GRANULOCYTES NFR BLD AUTO: 0.7 % (ref 0–0.5)
LYMPHOCYTES # BLD AUTO: 0.5 K/UL (ref 1–4.8)
LYMPHOCYTES NFR BLD: 11.8 % (ref 18–48)
MAGNESIUM SERPL-MCNC: 1.7 MG/DL (ref 1.6–2.6)
MCH RBC QN AUTO: 29.8 PG (ref 27–31)
MCHC RBC AUTO-ENTMCNC: 32.6 G/DL (ref 32–36)
MCV RBC AUTO: 91 FL (ref 82–98)
MONOCYTES # BLD AUTO: 0.3 K/UL (ref 0.3–1)
MONOCYTES NFR BLD: 8.2 % (ref 4–15)
NEUTROPHILS # BLD AUTO: 3.1 K/UL (ref 1.8–7.7)
NEUTROPHILS NFR BLD: 75.7 % (ref 38–73)
NRBC BLD-RTO: 0 /100 WBC
PHOSPHATE SERPL-MCNC: 2.9 MG/DL (ref 2.7–4.5)
PLATELET # BLD AUTO: 178 K/UL (ref 150–450)
PMV BLD AUTO: 9.7 FL (ref 9.2–12.9)
POTASSIUM SERPL-SCNC: 4.6 MMOL/L (ref 3.5–5.1)
RBC # BLD AUTO: 4.4 M/UL (ref 4.6–6.2)
SODIUM SERPL-SCNC: 142 MMOL/L (ref 136–145)
WBC # BLD AUTO: 4.15 K/UL (ref 3.9–12.7)

## 2023-06-19 PROCEDURE — 83735 ASSAY OF MAGNESIUM: CPT | Performed by: INTERNAL MEDICINE

## 2023-06-19 PROCEDURE — 36415 COLL VENOUS BLD VENIPUNCTURE: CPT | Performed by: INTERNAL MEDICINE

## 2023-06-19 PROCEDURE — 80197 ASSAY OF TACROLIMUS: CPT | Performed by: INTERNAL MEDICINE

## 2023-06-19 PROCEDURE — 85025 COMPLETE CBC W/AUTO DIFF WBC: CPT | Performed by: INTERNAL MEDICINE

## 2023-06-19 PROCEDURE — 80069 RENAL FUNCTION PANEL: CPT | Performed by: INTERNAL MEDICINE

## 2023-06-20 ENCOUNTER — PATIENT MESSAGE (OUTPATIENT)
Dept: TRANSPLANT | Facility: CLINIC | Age: 43
End: 2023-06-20
Payer: COMMERCIAL

## 2023-06-20 LAB — TACROLIMUS BLD-MCNC: 8 NG/ML (ref 5–15)

## 2023-06-20 NOTE — TELEPHONE ENCOUNTER
Robb Bender.    I received your message asking about treatment for your diabetes.    With your A1c numbers looking so good, I expect I'll be able to meet your needs. If not, I'll get you to the right person who can.    I recommend that we give this the time and attention it deserves and address this at your upcoming appointment with me on Friday, June 30th at 2:00 PM.    I look forward to seeing you then!    All the best,    KEVIN Roberson MD

## 2023-06-21 ENCOUNTER — TELEPHONE (OUTPATIENT)
Dept: HEPATOLOGY | Facility: CLINIC | Age: 43
End: 2023-06-21
Payer: COMMERCIAL

## 2023-06-21 ENCOUNTER — OFFICE VISIT (OUTPATIENT)
Dept: HEPATOLOGY | Facility: CLINIC | Age: 43
End: 2023-06-21
Payer: COMMERCIAL

## 2023-06-21 VITALS
DIASTOLIC BLOOD PRESSURE: 86 MMHG | SYSTOLIC BLOOD PRESSURE: 118 MMHG | BODY MASS INDEX: 27.55 KG/M2 | HEIGHT: 75 IN | WEIGHT: 221.56 LBS | HEART RATE: 86 BPM

## 2023-06-21 DIAGNOSIS — Z94.0 KIDNEY REPLACED BY TRANSPLANT: ICD-10-CM

## 2023-06-21 DIAGNOSIS — E80.6 HYPERBILIRUBINEMIA: ICD-10-CM

## 2023-06-21 DIAGNOSIS — R79.89 ELEVATED LIVER FUNCTION TESTS: ICD-10-CM

## 2023-06-21 PROCEDURE — 3008F BODY MASS INDEX DOCD: CPT | Mod: CPTII,S$GLB,, | Performed by: NURSE PRACTITIONER

## 2023-06-21 PROCEDURE — 3079F DIAST BP 80-89 MM HG: CPT | Mod: CPTII,S$GLB,, | Performed by: NURSE PRACTITIONER

## 2023-06-21 PROCEDURE — 3066F NEPHROPATHY DOC TX: CPT | Mod: CPTII,S$GLB,, | Performed by: NURSE PRACTITIONER

## 2023-06-21 PROCEDURE — 3079F PR MOST RECENT DIASTOLIC BLOOD PRESSURE 80-89 MM HG: ICD-10-PCS | Mod: CPTII,S$GLB,, | Performed by: NURSE PRACTITIONER

## 2023-06-21 PROCEDURE — 99204 OFFICE O/P NEW MOD 45 MIN: CPT | Mod: S$GLB,,, | Performed by: NURSE PRACTITIONER

## 2023-06-21 PROCEDURE — 3044F HG A1C LEVEL LT 7.0%: CPT | Mod: CPTII,S$GLB,, | Performed by: NURSE PRACTITIONER

## 2023-06-21 PROCEDURE — 3074F PR MOST RECENT SYSTOLIC BLOOD PRESSURE < 130 MM HG: ICD-10-PCS | Mod: CPTII,S$GLB,, | Performed by: NURSE PRACTITIONER

## 2023-06-21 PROCEDURE — 1159F PR MEDICATION LIST DOCUMENTED IN MEDICAL RECORD: ICD-10-PCS | Mod: CPTII,S$GLB,, | Performed by: NURSE PRACTITIONER

## 2023-06-21 PROCEDURE — 4010F PR ACE/ARB THEARPY RXD/TAKEN: ICD-10-PCS | Mod: CPTII,S$GLB,, | Performed by: NURSE PRACTITIONER

## 2023-06-21 PROCEDURE — 1111F PR DISCHARGE MEDS RECONCILED W/ CURRENT OUTPATIENT MED LIST: ICD-10-PCS | Mod: CPTII,S$GLB,, | Performed by: NURSE PRACTITIONER

## 2023-06-21 PROCEDURE — 3074F SYST BP LT 130 MM HG: CPT | Mod: CPTII,S$GLB,, | Performed by: NURSE PRACTITIONER

## 2023-06-21 PROCEDURE — 99204 PR OFFICE/OUTPT VISIT, NEW, LEVL IV, 45-59 MIN: ICD-10-PCS | Mod: S$GLB,,, | Performed by: NURSE PRACTITIONER

## 2023-06-21 PROCEDURE — 3072F PR LOW RISK FOR RETINOPATHY: ICD-10-PCS | Mod: CPTII,S$GLB,, | Performed by: NURSE PRACTITIONER

## 2023-06-21 PROCEDURE — 99999 PR PBB SHADOW E&M-EST. PATIENT-LVL V: ICD-10-PCS | Mod: PBBFAC,,, | Performed by: NURSE PRACTITIONER

## 2023-06-21 PROCEDURE — 1159F MED LIST DOCD IN RCRD: CPT | Mod: CPTII,S$GLB,, | Performed by: NURSE PRACTITIONER

## 2023-06-21 PROCEDURE — 3008F PR BODY MASS INDEX (BMI) DOCUMENTED: ICD-10-PCS | Mod: CPTII,S$GLB,, | Performed by: NURSE PRACTITIONER

## 2023-06-21 PROCEDURE — 3066F PR DOCUMENTATION OF TREATMENT FOR NEPHROPATHY: ICD-10-PCS | Mod: CPTII,S$GLB,, | Performed by: NURSE PRACTITIONER

## 2023-06-21 PROCEDURE — 4010F ACE/ARB THERAPY RXD/TAKEN: CPT | Mod: CPTII,S$GLB,, | Performed by: NURSE PRACTITIONER

## 2023-06-21 PROCEDURE — 3044F PR MOST RECENT HEMOGLOBIN A1C LEVEL <7.0%: ICD-10-PCS | Mod: CPTII,S$GLB,, | Performed by: NURSE PRACTITIONER

## 2023-06-21 PROCEDURE — 99999 PR PBB SHADOW E&M-EST. PATIENT-LVL V: CPT | Mod: PBBFAC,,, | Performed by: NURSE PRACTITIONER

## 2023-06-21 PROCEDURE — 3072F LOW RISK FOR RETINOPATHY: CPT | Mod: CPTII,S$GLB,, | Performed by: NURSE PRACTITIONER

## 2023-06-21 PROCEDURE — 1111F DSCHRG MED/CURRENT MED MERGE: CPT | Mod: CPTII,S$GLB,, | Performed by: NURSE PRACTITIONER

## 2023-06-21 RX ORDER — CEPHALEXIN 500 MG/1
CAPSULE ORAL
COMMUNITY
Start: 2022-10-24 | End: 2023-06-30

## 2023-06-21 RX ORDER — AMLODIPINE BESYLATE 10 MG/1
1 TABLET ORAL NIGHTLY
COMMUNITY
Start: 2022-09-19 | End: 2023-06-30

## 2023-06-21 NOTE — TELEPHONE ENCOUNTER
Called the patient to schedule a hepatology consult from referral.  Patient request to be seen in [facility] or [facility] clinic.  Scheduled consult with Ms. Estella Erika today 6/21 @ 11 am.  Patient confirmed and agreed with the schedule.

## 2023-06-21 NOTE — PROGRESS NOTES
Care was transferred to RN, report was given, questions answered.  The patient's vital signs are stable.     Clinic Consult:  [facility] Consultation Note    Reason for Consult:  Diagnoses of Kidney replaced by transplant, Hyperbilirubinemia, and Elevated liver function tests were pertinent to this visit.    PCP: SABHIA Roberson   [address] / [place] [address]    HPI:  This is a 42 y.o. male here for evaluation of the above  Pt referred by kidney transplant team for recently noted elevated ALT  He reports a recent kidney transplant about 6 weeks ago  With recent labs, he was noted to have persistently elevated total bilirubin and a new onset elevated ALT.   He carries a PMH of DM, HLD, HTN, obesity, new onset A fib.   He has been placed on multiple new medications since the transplant.   He denies any jaundice.  No abdominal pain.    He was on high dose prednisone that is being tapered down.  With that, there was a significant increase in blood glucose levels.   He has an abdominal US next week     Review of Systems   Constitutional:  Negative for chills, fever, malaise/fatigue and weight loss.   Respiratory:  Negative for cough.    Cardiovascular:  Negative for chest pain.   Gastrointestinal:         Per HPI   Musculoskeletal:  Negative for myalgias.   Skin:  Negative for itching and rash.   Neurological:  Negative for headaches.   Psychiatric/Behavioral:  The patient is not nervous/anxious.      Medical History:   Past Medical History:   Diagnosis Date    Allergic rhinitis     Atrial fibrillation with RVR 6/4/2023    Class 1 obesity due to excess calories with serious comorbidity and body mass index (BMI) of 31.0 to 31.9 in adult 4/7/2017    Coronary artery disease     Coronary artery disease involving native coronary artery of native heart without angina pectoris 2/6/2018    Cath lab procedure 04/23/2018 (Naveen Rios MD) A. Indication/Pre-Operative Diagnosis: The patient is a 37 year old male that [facility] (NSTEMI[facility] The BELLA risk score is  5.  B. Summary/Post-Operative Diagnosis 1. Single vessel coronary artery disease. 2. Normal LVEF. 3. Diastolic dysfunction. 4. Successful PCI for acute myocardial infarction. 5.     Diabetic nephropathy associated with type 2 diabetes mellitus 1/6/2020    Direct hyperbilirubinemia 3/24/2018    DM (diabetes mellitus) 2008    BS doesn't check any more 08/02/2018    DM (diabetes mellitus) 2012    BS 99 am 06/26/2020    DM (diabetes mellitus)     BS didn't check 06/04/2021    DM (diabetes mellitus) 2008    BS didn't check 07/29/2022     Dyslipidemia associated with type 2 diabetes mellitus 7/31/2017    Elevated bilirubin 3/21/2018    GERD (gastroesophageal reflux disease)     Gout     Hyperlipidemia     Hyperparathyroidism, secondary to chronic kidney disease 6/7/2021    Hypertension associated with chronic kidney disease due to type 2 diabetes mellitus     Hypertension complicating diabetes 3/16/2019    Not candidate for Hypertension Digital Medicine program due to dialysis status. Didn't tolerate amlodipine due to SE of hypotension.    Idiopathic chronic gout, multiple sites, without tophus (tophi) 7/19/2017    Long term (current) use of insulin     MI (myocardial infarction) 07/2017    NSTEMI with CAD s/p PCI (FAMILIA) of LAD x 2 in 8/2017 7/31/2017    Obesity     HENRY on CPAP     Peritoneal dialysis catheter in place 1/26/2023    Proteinuria     Severe obstructive sleep apnea - Intolerant of CPAP 7/31/2017    Intolerant of CPAP after weeks of trying multiple interfaces. SPLIT-NIGHT SLEEP STUDY 7/28/2015 · SLEEP ARCHITECTURE: Sleep onset was 2.9 minutes and sleep efficiency was 94.4%. Sleep Stage distribution showed 145 sleep stage changes, 6 awakenings and 119 arousals. Sleep distribution showed 53.2% stage NI, 41.8% stage N 11, 0.0% stage N Ill and REM sleep was at 5.0%. There were 2 REM periods. · RE    Stage 3a chronic kidney disease 5/26/2023    Stage 5 chronic kidney disease on chronic peritoneal dialysis 6/7/2017     Status post angioplasty with stent 8/4/2017    Steatohepatitis     Fatty Liver    Type 2 diabetes mellitus with chronic kidney disease on chronic dialysis, without long-term current use of insulin 6/21/2017    Type 2 diabetes mellitus with diabetic nephropathy     Type 2 diabetes mellitus with diabetic nephropathy, without long-term current use of insulin 1/6/2020    Type 2 diabetes mellitus with diabetic polyneuropathy, without long-term current use of insulin 6/7/2021    Type 2 diabetes mellitus with hyperglycemia     Type 2 diabetes mellitus with renal manifestations     Type 2 diabetes mellitus with stage 3 chronic kidney disease, without long-term current use of insulin 6/21/2017       Surgical History:  Past Surgical History:   Procedure Laterality Date    CORONARY ANGIOPLASTY WITH STENT PLACEMENT      CYSTOSCOPY      KIDNEY TRANSPLANT N/A 05/15/2023    Procedure: TRANSPLANT, KIDNEY;  Surgeon: Reji Hinojosa MD;  Location: [facility] OR [address] [place] [address];  Service: Transplant;  Laterality: N/A;  IN ROOM: 10:37  OUT OF ICE:10:53  REPERFUSION TIME: 11:21      LASIK Bilateral     LIVER BIOPSY      NASAL ENDOSCOPY      NASAL SEPTUM SURGERY      PERITONEAL CATHETER REMOVAL N/A 05/15/2023    Procedure: REMOVAL, CATHETER, DIALYSIS, PERITONEAL;  Surgeon: Reji Hinojosa MD;  Location: [facility] OR [address] [place] [address];  Service: Transplant;  Laterality: N/A;    SLEEVE GASTROPLASTY  03/06/2017       Family History:   Family History   Problem Relation Age of Onset    Diabetes type II Mother     Hypertension Mother     Hyperlipidemia Mother     Diabetes Mother     Hyperlipidemia Father     Diabetes type II Brother     Diabetes type II Brother     Diabetes Maternal Grandmother        Social History:   Social History     Tobacco Use    Smoking status: Never    Smokeless tobacco: Never   Substance Use Topics    Alcohol use: No     Comment: 1-2 times per month    Drug use: No       Allergies: Reviewed    Home Medications:   Current Outpatient Medications  on File Prior to Visit   Medication Sig Dispense Refill    amLODIPine (NORVASC) 10 MG tablet Take 1 tablet by mouth every evening.      apixaban (ELIQUIS) 5 mg Tab Take 1 tablet (5 mg total) by mouth 2 (two) times daily. 30 tablet 11    aspirin (ECOTRIN) 81 MG EC tablet Take 1 tablet (81 mg total) by mouth once daily. 90 tablet 3    atorvastatin (LIPITOR) 80 MG tablet Take 1 tablet (80 mg total) by mouth every evening. 90 tablet 3    blood sugar diagnostic Strp Check blood glucose 2 times daily as directed and as needed 200 each 5    blood-glucose meter (ONETOUCH ULTRAMINI) kit Use as instructed 1 each 0    cephALEXin (KEFLEX) 500 MG capsule TAKE TWO CAPSULES BY MOUTH NOW THEN TAKE ONE CAPSULE TWO TIMES A DAY      docusate sodium (COLACE) 100 MG capsule Take 1 capsule (100 mg total) by mouth 3 (three) times daily.  0    dulaglutide (TRULICITY) 3 mg/0.5 mL pen injector Inject 3 mg into the skin every 7 days. 12 pen 1    ezetimibe (ZETIA) 10 mg tablet Take 1 tablet (10 mg total) by mouth once daily. 90 tablet 3    insulin lispro-aabc (LYUMJEV KWIKPEN U-100 INSULIN) 100 unit/mL pen Inject 4 Units into the skin 3 (three) times daily with meals. Plus sliding scale insulin. Max 27 units 2 pen 7    k phos di & mono-sod phos mono (K-PHOS-NEUTRAL) 250 mg Tab Take 2 tablets by mouth 3 (three) times daily. 180 tablet 5    lancets Misc Check blood glucose 2 times daily as directed and as needed (dispense insurance preferred brand or patient choice) 200 each 5    magnesium oxide (MAG-OX) 400 mg (241.3 mg magnesium) tablet Take 1 tablet (400 mg total) by mouth 2 (two) times daily. 60 tablet 11    metoprolol tartrate (LOPRESSOR) 25 MG tablet Take 1 tablet (25 mg total) by mouth 2 (two) times daily. 60 tablet 11    multivitamin Tab Take 1 tablet by mouth once daily.      mycophenolate (CELLCEPT) 250 mg Cap Take 4 capsules (1,000 mg total) by mouth 2 (two) times daily. 240 capsule 11    nitroGLYCERIN (NITROSTAT) 0.4 MG SL tablet  "Dissolve one tablet underneath tongue at onset of angina; may repeat every 5 minutes if needed. Call 911 if angina persists after 2 doses. 25 tablet 5    pantoprazole (PROTONIX) 40 MG tablet Take 1 tablet (40 mg total) by mouth once daily. 30 tablet 11    pen needle, diabetic (BD ULTRA-FINE SHORT PEN NEEDLE) 31 gauge x 5/16" Ndle Use to inject insulin into the skin 3 times daily 100 each 1    predniSONE (DELTASONE) 5 MG tablet Take by mouth daily; 5/18-5/24: 20 mg; 5/25-5/31: 15 mg; 6/1-6/7: 10 mg; 6/8/23- thereafter: 5 mg daily; do not stop 70 tablet 11    sulfamethoxazole-trimethoprim 400-80mg (BACTRIM,SEPTRA) 400-80 mg per tablet Take 1 tablet by mouth every morning. Stop 11/12/23 30 tablet 5    tacrolimus (PROGRAF) 1 MG Cap Take 2 capsules (2 mg total) by mouth every morning AND 1 capsule (1 mg total) every evening. 90 capsule 11    valGANciclovir (VALCYTE) 450 mg Tab Take 2 tablets (900 mg total) by mouth every morning. Stop 8/14/23 60 tablet 2    magnesium hydroxide 400 mg/5 ml (MILK OF MAGNESIA) 400 mg/5 mL Susp Take 2,400 mg by mouth daily as needed.       Current Facility-Administered Medications on File Prior to Visit   Medication Dose Route Frequency Provider Last Rate Last Admin    LIDOcaine HCl 2% urojet   Urethral 1 time in Clinic/HOD Angelic Roberson MD           Physical Exam:  Vital Signs:  /86 (BP Location: Left arm, Patient Position: Sitting, BP Method: Medium (Manual))   Pulse 86   Ht 6' 3" (1.905 m)   Wt 100.5 kg (221 lb 9 oz)   BMI 27.69 kg/m²   Body mass index is 27.69 kg/m².  Physical Exam  Vitals reviewed.   Constitutional:       Appearance: He is well-developed.   HENT:      Head: Normocephalic.   Eyes:      General: No scleral icterus.  Cardiovascular:      Rate and Rhythm: Normal rate.   Pulmonary:      Effort: Pulmonary effort is normal.   Abdominal:      General: There is no distension.      Palpations: Abdomen is soft.   Musculoskeletal:         General: Normal range of " motion.      Cervical back: Normal range of motion.   Skin:     General: Skin is dry.   Neurological:      Mental Status: He is alert and oriented to person, place, and time.       Labs: Pertinent labs reviewed.      Assessment:  1. Kidney replaced by transplant    2. Hyperbilirubinemia    3. Elevated liver function tests         Recommendations:  I suspect the chronically elevated total bilirubin is related to underlying Gilbert's syndrome.  Mostly indirect bilirubin noted on most recent and previous labs  The ALT elevation, likely related to underlying NAFLD given his PMH.    -will get repeat LFTs next week with his other regular labs.   - continue with plan for previously ordered US.  If NAFLD noted, will plan for Fibroscan for staging.   - if the LFTs continue to increase without clear etiology, will need additional evaluation with labs and possible liver biopsy.     Follow up to be determined by results of above.        Thank you so much for allowing me to participate in the care of ASHWINI Yousif

## 2023-06-21 NOTE — TELEPHONE ENCOUNTER
----- Message from Domonique Randle MA sent at 6/15/2023  4:32 PM CDT -----  Regarding: Scheduling an Appt  Hello,    I am needing to see about getting the above patient scheduled for Elevated Liver Function Test (LFT) / Fatty Liver / Hepatosplenomegaly. Referral is placed in Epic.    Thanks,   KAREN Youssef

## 2023-06-26 ENCOUNTER — PATIENT MESSAGE (OUTPATIENT)
Dept: TRANSPLANT | Facility: CLINIC | Age: 43
End: 2023-06-26
Payer: COMMERCIAL

## 2023-06-26 DIAGNOSIS — E87.8 ELECTROLYTE ABNORMALITY: ICD-10-CM

## 2023-06-27 ENCOUNTER — LAB VISIT (OUTPATIENT)
Dept: LAB | Facility: HOSPITAL | Age: 43
End: 2023-06-27
Attending: INTERNAL MEDICINE
Payer: COMMERCIAL

## 2023-06-27 DIAGNOSIS — E80.6 HYPERBILIRUBINEMIA: ICD-10-CM

## 2023-06-27 DIAGNOSIS — R79.89 ELEVATED LIVER FUNCTION TESTS: ICD-10-CM

## 2023-06-27 DIAGNOSIS — Z94.0 KIDNEY REPLACED BY TRANSPLANT: ICD-10-CM

## 2023-06-27 LAB
ALBUMIN SERPL BCP-MCNC: 4.5 G/DL (ref 3.5–5.2)
ALBUMIN SERPL BCP-MCNC: 4.5 G/DL (ref 3.5–5.2)
ALP SERPL-CCNC: 98 U/L (ref 55–135)
ALT SERPL W/O P-5'-P-CCNC: 51 U/L (ref 10–44)
ANION GAP SERPL CALC-SCNC: 9 MMOL/L (ref 8–16)
AST SERPL-CCNC: 29 U/L (ref 10–40)
BASOPHILS # BLD AUTO: 0.05 K/UL (ref 0–0.2)
BASOPHILS NFR BLD: 0.8 % (ref 0–1.9)
BILIRUB DIRECT SERPL-MCNC: 0.2 MG/DL (ref 0.1–0.3)
BILIRUB SERPL-MCNC: 2.7 MG/DL (ref 0.1–1)
BUN SERPL-MCNC: 13 MG/DL (ref 6–20)
CALCIUM SERPL-MCNC: 9.9 MG/DL (ref 8.7–10.5)
CHLORIDE SERPL-SCNC: 106 MMOL/L (ref 95–110)
CO2 SERPL-SCNC: 24 MMOL/L (ref 23–29)
CREAT SERPL-MCNC: 1.6 MG/DL (ref 0.5–1.4)
DIFFERENTIAL METHOD: ABNORMAL
EOSINOPHIL # BLD AUTO: 0.1 K/UL (ref 0–0.5)
EOSINOPHIL NFR BLD: 1.6 % (ref 0–8)
ERYTHROCYTE [DISTWIDTH] IN BLOOD BY AUTOMATED COUNT: 16.6 % (ref 11.5–14.5)
EST. GFR  (NO RACE VARIABLE): 55.2 ML/MIN/1.73 M^2
GLUCOSE SERPL-MCNC: 130 MG/DL (ref 70–110)
HCT VFR BLD AUTO: 43.6 % (ref 40–54)
HGB BLD-MCNC: 13.8 G/DL (ref 14–18)
IMM GRANULOCYTES # BLD AUTO: 0.05 K/UL (ref 0–0.04)
IMM GRANULOCYTES NFR BLD AUTO: 0.8 % (ref 0–0.5)
LYMPHOCYTES # BLD AUTO: 0.7 K/UL (ref 1–4.8)
LYMPHOCYTES NFR BLD: 11.2 % (ref 18–48)
MAGNESIUM SERPL-MCNC: 1.8 MG/DL (ref 1.6–2.6)
MCH RBC QN AUTO: 29.9 PG (ref 27–31)
MCHC RBC AUTO-ENTMCNC: 31.7 G/DL (ref 32–36)
MCV RBC AUTO: 94 FL (ref 82–98)
MONOCYTES # BLD AUTO: 0.5 K/UL (ref 0.3–1)
MONOCYTES NFR BLD: 7.4 % (ref 4–15)
NEUTROPHILS # BLD AUTO: 4.8 K/UL (ref 1.8–7.7)
NEUTROPHILS NFR BLD: 78.2 % (ref 38–73)
NRBC BLD-RTO: 0 /100 WBC
PHOSPHATE SERPL-MCNC: 1.9 MG/DL (ref 2.7–4.5)
PLATELET # BLD AUTO: 191 K/UL (ref 150–450)
PMV BLD AUTO: 10.2 FL (ref 9.2–12.9)
POTASSIUM SERPL-SCNC: 4.9 MMOL/L (ref 3.5–5.1)
PROT SERPL-MCNC: 7 G/DL (ref 6–8.4)
RBC # BLD AUTO: 4.62 M/UL (ref 4.6–6.2)
SODIUM SERPL-SCNC: 139 MMOL/L (ref 136–145)
WBC # BLD AUTO: 6.08 K/UL (ref 3.9–12.7)

## 2023-06-27 PROCEDURE — 80069 RENAL FUNCTION PANEL: CPT | Performed by: INTERNAL MEDICINE

## 2023-06-27 PROCEDURE — 80197 ASSAY OF TACROLIMUS: CPT | Performed by: INTERNAL MEDICINE

## 2023-06-27 PROCEDURE — 80076 HEPATIC FUNCTION PANEL: CPT | Performed by: NURSE PRACTITIONER

## 2023-06-27 PROCEDURE — 36415 COLL VENOUS BLD VENIPUNCTURE: CPT | Performed by: INTERNAL MEDICINE

## 2023-06-27 PROCEDURE — 83735 ASSAY OF MAGNESIUM: CPT | Performed by: INTERNAL MEDICINE

## 2023-06-27 PROCEDURE — 85025 COMPLETE CBC W/AUTO DIFF WBC: CPT | Performed by: INTERNAL MEDICINE

## 2023-06-28 DIAGNOSIS — Z94.0 KIDNEY REPLACED BY TRANSPLANT: Primary | ICD-10-CM

## 2023-06-28 LAB — TACROLIMUS BLD-MCNC: 7.4 NG/ML (ref 5–15)

## 2023-06-30 ENCOUNTER — HOSPITAL ENCOUNTER (OUTPATIENT)
Dept: RADIOLOGY | Facility: HOSPITAL | Age: 43
Discharge: HOME | End: 2023-06-30
Attending: INTERNAL MEDICINE
Payer: COMMERCIAL

## 2023-06-30 ENCOUNTER — PATIENT MESSAGE (OUTPATIENT)
Dept: TRANSPLANT | Facility: CLINIC | Age: 43
End: 2023-06-30
Payer: COMMERCIAL

## 2023-06-30 ENCOUNTER — OFFICE VISIT (OUTPATIENT)
Dept: INTERNAL MEDICINE | Facility: CLINIC | Age: 43
End: 2023-06-30
Payer: COMMERCIAL

## 2023-06-30 VITALS
OXYGEN SATURATION: 97 % | TEMPERATURE: 98 F | HEIGHT: 75 IN | WEIGHT: 216.69 LBS | BODY MASS INDEX: 26.94 KG/M2 | SYSTOLIC BLOOD PRESSURE: 110 MMHG | DIASTOLIC BLOOD PRESSURE: 68 MMHG | RESPIRATION RATE: 17 BRPM | HEART RATE: 83 BPM

## 2023-06-30 DIAGNOSIS — N18.31 STAGE 3A CHRONIC KIDNEY DISEASE: Chronic | ICD-10-CM

## 2023-06-30 DIAGNOSIS — E80.6 HYPERBILIRUBINEMIA: ICD-10-CM

## 2023-06-30 DIAGNOSIS — E78.5 DYSLIPIDEMIA ASSOCIATED WITH TYPE 2 DIABETES MELLITUS: Chronic | ICD-10-CM

## 2023-06-30 DIAGNOSIS — I25.10 CORONARY ARTERY DISEASE INVOLVING NATIVE CORONARY ARTERY OF NATIVE HEART WITHOUT ANGINA PECTORIS: Chronic | ICD-10-CM

## 2023-06-30 DIAGNOSIS — Z94.0 KIDNEY REPLACED BY TRANSPLANT: ICD-10-CM

## 2023-06-30 DIAGNOSIS — Z94.0 S/P KIDNEY TRANSPLANT: ICD-10-CM

## 2023-06-30 DIAGNOSIS — I48.0 PAROXYSMAL ATRIAL FIBRILLATION: Chronic | ICD-10-CM

## 2023-06-30 DIAGNOSIS — E11.22 TYPE 2 DIABETES MELLITUS WITH STAGE 3A CHRONIC KIDNEY DISEASE, WITHOUT LONG-TERM CURRENT USE OF INSULIN: Chronic | ICD-10-CM

## 2023-06-30 DIAGNOSIS — I15.2 HYPERTENSION COMPLICATING DIABETES: Primary | Chronic | ICD-10-CM

## 2023-06-30 DIAGNOSIS — E11.22 TYPE 2 DIABETES MELLITUS WITH CHRONIC KIDNEY DISEASE ON CHRONIC DIALYSIS, WITHOUT LONG-TERM CURRENT USE OF INSULIN: ICD-10-CM

## 2023-06-30 DIAGNOSIS — E11.69 DYSLIPIDEMIA ASSOCIATED WITH TYPE 2 DIABETES MELLITUS: Chronic | ICD-10-CM

## 2023-06-30 DIAGNOSIS — E11.65 TYPE 2 DIABETES MELLITUS WITH HYPERGLYCEMIA, WITHOUT LONG-TERM CURRENT USE OF INSULIN: ICD-10-CM

## 2023-06-30 DIAGNOSIS — I21.4 NSTEMI (NON-ST ELEVATED MYOCARDIAL INFARCTION): ICD-10-CM

## 2023-06-30 DIAGNOSIS — E11.59 HYPERTENSION COMPLICATING DIABETES: Primary | Chronic | ICD-10-CM

## 2023-06-30 DIAGNOSIS — Z99.2 TYPE 2 DIABETES MELLITUS WITH CHRONIC KIDNEY DISEASE ON CHRONIC DIALYSIS, WITHOUT LONG-TERM CURRENT USE OF INSULIN: ICD-10-CM

## 2023-06-30 DIAGNOSIS — R79.89 ELEVATED LIVER FUNCTION TESTS: ICD-10-CM

## 2023-06-30 DIAGNOSIS — N18.31 TYPE 2 DIABETES MELLITUS WITH STAGE 3A CHRONIC KIDNEY DISEASE, WITHOUT LONG-TERM CURRENT USE OF INSULIN: Chronic | ICD-10-CM

## 2023-06-30 DIAGNOSIS — N18.6 TYPE 2 DIABETES MELLITUS WITH CHRONIC KIDNEY DISEASE ON CHRONIC DIALYSIS, WITHOUT LONG-TERM CURRENT USE OF INSULIN: ICD-10-CM

## 2023-06-30 PROBLEM — I48.91 ATRIAL FIBRILLATION WITH RVR: Chronic | Status: ACTIVE | Noted: 2023-06-04

## 2023-06-30 PROBLEM — E87.8 ELECTROLYTE ABNORMALITY: Status: RESOLVED | Noted: 2023-05-16 | Resolved: 2023-06-30

## 2023-06-30 PROBLEM — Z76.82 KIDNEY TRANSPLANT CANDIDATE: Chronic | Status: RESOLVED | Noted: 2023-01-26 | Resolved: 2023-06-30

## 2023-06-30 PROCEDURE — 1160F RVW MEDS BY RX/DR IN RCRD: CPT | Mod: CPTII,S$GLB,, | Performed by: FAMILY MEDICINE

## 2023-06-30 PROCEDURE — 3074F SYST BP LT 130 MM HG: CPT | Mod: CPTII,S$GLB,, | Performed by: FAMILY MEDICINE

## 2023-06-30 PROCEDURE — 93976 VASCULAR STUDY: CPT | Mod: TC

## 2023-06-30 PROCEDURE — 3072F PR LOW RISK FOR RETINOPATHY: ICD-10-PCS | Mod: CPTII,S$GLB,, | Performed by: FAMILY MEDICINE

## 2023-06-30 PROCEDURE — 4010F PR ACE/ARB THEARPY RXD/TAKEN: ICD-10-PCS | Mod: CPTII,S$GLB,, | Performed by: FAMILY MEDICINE

## 2023-06-30 PROCEDURE — 3072F LOW RISK FOR RETINOPATHY: CPT | Mod: CPTII,S$GLB,, | Performed by: FAMILY MEDICINE

## 2023-06-30 PROCEDURE — 3044F PR MOST RECENT HEMOGLOBIN A1C LEVEL <7.0%: ICD-10-PCS | Mod: CPTII,S$GLB,, | Performed by: FAMILY MEDICINE

## 2023-06-30 PROCEDURE — 93976 US LIVER WITH DOPPLER: ICD-10-PCS | Mod: 26,,, | Performed by: RADIOLOGY

## 2023-06-30 PROCEDURE — 3078F DIAST BP <80 MM HG: CPT | Mod: CPTII,S$GLB,, | Performed by: FAMILY MEDICINE

## 2023-06-30 PROCEDURE — 93976 VASCULAR STUDY: CPT | Mod: 26,,, | Performed by: RADIOLOGY

## 2023-06-30 PROCEDURE — 1159F MED LIST DOCD IN RCRD: CPT | Mod: CPTII,S$GLB,, | Performed by: FAMILY MEDICINE

## 2023-06-30 PROCEDURE — 1111F PR DISCHARGE MEDS RECONCILED W/ CURRENT OUTPATIENT MED LIST: ICD-10-PCS | Mod: CPTII,S$GLB,, | Performed by: FAMILY MEDICINE

## 2023-06-30 PROCEDURE — 3060F POS MICROALBUMINURIA REV: CPT | Mod: CPTII,S$GLB,, | Performed by: FAMILY MEDICINE

## 2023-06-30 PROCEDURE — 1111F DSCHRG MED/CURRENT MED MERGE: CPT | Mod: CPTII,S$GLB,, | Performed by: FAMILY MEDICINE

## 2023-06-30 PROCEDURE — 1159F PR MEDICATION LIST DOCUMENTED IN MEDICAL RECORD: ICD-10-PCS | Mod: CPTII,S$GLB,, | Performed by: FAMILY MEDICINE

## 2023-06-30 PROCEDURE — 3066F NEPHROPATHY DOC TX: CPT | Mod: CPTII,S$GLB,, | Performed by: FAMILY MEDICINE

## 2023-06-30 PROCEDURE — 99214 PR OFFICE/OUTPT VISIT, EST, LEVL IV, 30-39 MIN: ICD-10-PCS | Mod: S$GLB,,, | Performed by: FAMILY MEDICINE

## 2023-06-30 PROCEDURE — 3044F HG A1C LEVEL LT 7.0%: CPT | Mod: CPTII,S$GLB,, | Performed by: FAMILY MEDICINE

## 2023-06-30 PROCEDURE — 3066F PR DOCUMENTATION OF TREATMENT FOR NEPHROPATHY: ICD-10-PCS | Mod: CPTII,S$GLB,, | Performed by: FAMILY MEDICINE

## 2023-06-30 PROCEDURE — 4010F ACE/ARB THERAPY RXD/TAKEN: CPT | Mod: CPTII,S$GLB,, | Performed by: FAMILY MEDICINE

## 2023-06-30 PROCEDURE — 3074F PR MOST RECENT SYSTOLIC BLOOD PRESSURE < 130 MM HG: ICD-10-PCS | Mod: CPTII,S$GLB,, | Performed by: FAMILY MEDICINE

## 2023-06-30 PROCEDURE — 3008F PR BODY MASS INDEX (BMI) DOCUMENTED: ICD-10-PCS | Mod: CPTII,S$GLB,, | Performed by: FAMILY MEDICINE

## 2023-06-30 PROCEDURE — 3008F BODY MASS INDEX DOCD: CPT | Mod: CPTII,S$GLB,, | Performed by: FAMILY MEDICINE

## 2023-06-30 PROCEDURE — 1160F PR REVIEW ALL MEDS BY PRESCRIBER/CLIN PHARMACIST DOCUMENTED: ICD-10-PCS | Mod: CPTII,S$GLB,, | Performed by: FAMILY MEDICINE

## 2023-06-30 PROCEDURE — 99999 PR PBB SHADOW E&M-EST. PATIENT-LVL V: CPT | Mod: PBBFAC,,, | Performed by: FAMILY MEDICINE

## 2023-06-30 PROCEDURE — 3078F PR MOST RECENT DIASTOLIC BLOOD PRESSURE < 80 MM HG: ICD-10-PCS | Mod: CPTII,S$GLB,, | Performed by: FAMILY MEDICINE

## 2023-06-30 PROCEDURE — 99214 OFFICE O/P EST MOD 30 MIN: CPT | Mod: S$GLB,,, | Performed by: FAMILY MEDICINE

## 2023-06-30 PROCEDURE — 76705 US LIVER WITH DOPPLER: ICD-10-PCS | Mod: 26,59,, | Performed by: RADIOLOGY

## 2023-06-30 PROCEDURE — 99999 PR PBB SHADOW E&M-EST. PATIENT-LVL V: ICD-10-PCS | Mod: PBBFAC,,, | Performed by: FAMILY MEDICINE

## 2023-06-30 PROCEDURE — 3060F PR POS MICROALBUMINURIA RESULT DOCUMENTED/REVIEW: ICD-10-PCS | Mod: CPTII,S$GLB,, | Performed by: FAMILY MEDICINE

## 2023-06-30 PROCEDURE — 76705 ECHO EXAM OF ABDOMEN: CPT | Mod: 26,59,, | Performed by: RADIOLOGY

## 2023-06-30 RX ORDER — DULAGLUTIDE 3 MG/.5ML
3 INJECTION, SOLUTION SUBCUTANEOUS
Qty: 12 PEN | Refills: 1 | Status: SHIPPED | OUTPATIENT
Start: 2023-06-30 | End: 2023-08-07 | Stop reason: SDUPTHER

## 2023-06-30 RX ORDER — NITROGLYCERIN 0.4 MG/1
TABLET SUBLINGUAL
Qty: 25 TABLET | Refills: 5 | Status: SHIPPED | OUTPATIENT
Start: 2023-06-30 | End: 2024-03-26 | Stop reason: SDUPTHER

## 2023-06-30 RX ORDER — LANCETS
EACH MISCELLANEOUS
Qty: 200 EACH | Refills: 5 | Status: SHIPPED | OUTPATIENT
Start: 2023-06-30

## 2023-06-30 RX ORDER — EZETIMIBE 10 MG/1
10 TABLET ORAL DAILY
Qty: 90 TABLET | Refills: 3 | Status: SHIPPED | OUTPATIENT
Start: 2023-06-30 | End: 2023-08-07 | Stop reason: SDUPTHER

## 2023-06-30 RX ORDER — ATORVASTATIN CALCIUM 80 MG/1
80 TABLET, FILM COATED ORAL NIGHTLY
Qty: 90 TABLET | Refills: 3 | Status: SHIPPED | OUTPATIENT
Start: 2023-06-30 | End: 2023-08-07 | Stop reason: SDUPTHER

## 2023-06-30 RX ORDER — ASPIRIN 81 MG/1
81 TABLET ORAL DAILY
Qty: 90 TABLET | Refills: 3 | Status: SHIPPED | OUTPATIENT
Start: 2023-06-30 | End: 2023-11-11

## 2023-06-30 NOTE — ASSESSMENT & PLAN NOTE
BP Readings from Last 6 Encounters:   06/30/23 110/68   06/21/23 118/86   06/14/23 118/80   06/09/23 118/72   06/07/23 135/88   06/07/23 (!) 121/92      Last 5 Patient Entered Readings                Current 30 Day Average:   Recent Readings 6/28/2023 6/26/2023 6/25/2023 6/21/2023 6/20/2023   SBP (mmHg) 120 115 115 130 120   DBP (mmHg) 84 88 82 90 90   Pulse 74 74 77 74 75               Lab Results   Component Value Date    EGFRNORACEVR 55.2 (A) 06/27/2023    CREATININE 1.6 (H) 06/27/2023    BUN 13 06/27/2023    K 4.9 06/27/2023     06/27/2023     06/27/2023     Results for orders placed or performed during the hospital encounter of 06/04/23   EKG 12-lead    Collection Time: 06/04/23  6:08 PM    Narrative    Test Reason : I48.91,    Vent. Rate : 077 BPM     Atrial Rate : 077 BPM     P-R Int : 172 ms          QRS Dur : 092 ms      QT Int : 392 ms       P-R-T Axes : 047 018 044 degrees     QTc Int : 443 ms    Normal sinus rhythm  Normal ECG  When compared with ECG of 04-JUN-2023 05:38,  Sinus rhythm has replaced Atrial fibrillation  Confirmed by Hayden GERONIMO MD ([id number]) on 6/5/2023 2:57:17 PM    Referred By: JAZZ CALLAHAN           Confirmed By:Hayden GERONIMO MD

## 2023-06-30 NOTE — PROGRESS NOTES
"OFFICE VISIT 6/30/23  2:00 PM CDT    Subjective   CHIEF COMPLAINT: Annual Exam    Had to be on insulin after kidney transplant due to steroids. Off insulin last 2 weeks.    Labs scheduled for 7/3/23  -Hepatitis C RNA, Quantitative, PCR [HBI027]  -HIV RNA, Quantitative, PCR [DFQ790]  -HEPATITIS B VIRAL DNA, QUANTITATIVE [MCN4110]  -Tacrolimus level [MRP941]  -CBC auto differential [QDC7919]  -Renal function panel [LAB19]  -Magnesium [VDY191]  -HLA Donor Specific Antibodies [CBL1304]  -AlloSure Kidney [YZV8591]  -Hepatic function panel [LAB20]    Review of Systems   Respiratory:  Negative for chest tightness and shortness of breath.    Cardiovascular:  Negative for chest pain.   Endocrine: Negative for polydipsia and polyuria.       Objective   Vitals:    06/30/23 1343   BP: 110/68   BP Location: Right arm   Patient Position: Sitting   BP Method: Large (Manual)   Pulse: 83   Resp: 17   Temp: 97.7 °F (36.5 °C)   SpO2: 97%   Weight: 98.3 kg (216 lb 11.4 oz)   Height: 6' 3" (1.905 m)   Physical Exam  Vitals reviewed.   Constitutional:       General: He is not in acute distress.     Appearance: Normal appearance. He is not ill-appearing or diaphoretic.   Cardiovascular:      Rate and Rhythm: Normal rate and regular rhythm.   Pulmonary:      Effort: Pulmonary effort is normal.      Breath sounds: Normal breath sounds.   Skin:     General: Skin is warm and dry.   Neurological:      Mental Status: He is alert and oriented to person, place, and time. Mental status is at baseline.   Psychiatric:         Mood and Affect: Mood normal.         Behavior: Behavior normal.         Judgment: Judgment normal.     DIABETIC FOOT EXAM:  Protective Sensation (w/ 10 gram monofilament):  Right: Decreased  Left: Decreased    Visual Inspection:  Normal -  Bilateral    Pedal Pulses:   Right: Present  Left: Present    Posterior Tibialis Pulses:   Right:Present  Left: Present        Assessment and Plan   1. Hypertension complicating " diabetes  Overview:  Not candidate for Hypertension Digital Medicine program due to dialysis status.  Didn't tolerate amlodipine due to SE of hypotension.    Assessment & Plan:  BP Readings from Last 6 Encounters:   06/30/23 110/68   06/21/23 118/86   06/14/23 118/80   06/09/23 118/72   06/07/23 135/88   06/07/23 (!) 121/92      Last 5 Patient Entered Readings                Current 30 Day Average:   Recent Readings 6/28/2023 6/26/2023 6/25/2023 6/21/2023 6/20/2023   SBP (mmHg) 120 115 115 130 120   DBP (mmHg) 84 88 82 90 90   Pulse 74 74 77 74 75                 Lab Results   Component Value Date    EGFRNORACEVR 55.2 (A) 06/27/2023    CREATININE 1.6 (H) 06/27/2023    BUN 13 06/27/2023    K 4.9 06/27/2023     06/27/2023     06/27/2023     Results for orders placed or performed during the hospital encounter of 06/04/23   EKG 12-lead    Collection Time: 06/04/23  6:08 PM    Narrative    Test Reason : I48.91,    Vent. Rate : 077 BPM     Atrial Rate : 077 BPM     P-R Int : 172 ms          QRS Dur : 092 ms      QT Int : 392 ms       P-R-T Axes : 047 018 044 degrees     QTc Int : 443 ms    Normal sinus rhythm  Normal ECG  When compared with ECG of 04-JUN-2023 05:38,  Sinus rhythm has replaced Atrial fibrillation  Confirmed by Hayden GERONIMO MD ([id number]) on 6/5/2023 2:57:17 PM    Referred By: JAZZ CALLAHAN           Confirmed By:Hayden GERONIMO MD           2. Dyslipidemia associated with type 2 diabetes mellitus  Assessment & Plan:  Lab Results   Component Value Date    CHOL 105 (L) 05/02/2023    CHOL 88 (L) 09/07/2022    TRIG 145 05/02/2023    TRIG 119 09/07/2022    HDL 28 (L) 05/02/2023    HDL 27 (L) 09/07/2022    LDLCALC 48.0 (L) 05/02/2023    LDLCALC 37.2 (L) 09/07/2022    NONHDLCHOL 77 05/02/2023    NONHDLCHOL 61 09/07/2022    AST 29 06/27/2023    ALT 51 (H) 06/27/2023     The ASCVD Risk score (Greg GAFFNEY, et al., 2019) failed to calculate for the following reasons:    The patient has a prior MI or stroke diagnosis      Orders:  -     ezetimibe (ZETIA) 10 mg tablet; Take 1 tablet (10 mg total) by mouth once daily.  Dispense: 90 tablet; Refill: 3  -     atorvastatin (LIPITOR) 80 MG tablet; Take 1 tablet (80 mg total) by mouth every evening.  Dispense: 90 tablet; Refill: 3    3. Coronary artery disease involving native coronary artery of native heart without angina pectoris  Overview:  Cath lab procedure 04/23/2018 (Naveen Rios MD)  A. Indication/Pre-Operative Diagnosis: The patient is a 37 year old male that was referred for catheterization by [facility] for ACS (NSTEMI). The BELLA risk score is 5.    B. Summary/Post-Operative Diagnosis  1. Single vessel coronary artery disease.  2. Normal LVEF.  3. Diastolic dysfunction.  4. Successful PCI for acute myocardial infarction.  5. The patient did not undergo primary PCI.    Orders:  -     nitroGLYCERIN (NITROSTAT) 0.4 MG SL tablet; Dissolve one tablet underneath tongue at onset of angina; may repeat every 5 minutes if needed. Call 911 if angina persists after 2 doses.  Dispense: 25 tablet; Refill: 5  -     ezetimibe (ZETIA) 10 mg tablet; Take 1 tablet (10 mg total) by mouth once daily.  Dispense: 90 tablet; Refill: 3  -     atorvastatin (LIPITOR) 80 MG tablet; Take 1 tablet (80 mg total) by mouth every evening.  Dispense: 90 tablet; Refill: 3  -     aspirin (ECOTRIN) 81 MG EC tablet; Take 1 tablet (81 mg total) by mouth once daily.  Dispense: 90 tablet; Refill: 3    4. Stage 3a chronic kidney disease  Assessment & Plan:  Lab Results   Component Value Date    EGFRNORACEVR 55.2 (A) 06/27/2023    EGFRNORACEVR 51.3 (A) 06/19/2023    EGFRNORACEVR 55.2 (A) 06/15/2023    CREATININE 1.6 (H) 06/27/2023    CREATININE 1.7 (H) 06/19/2023    CREATININE 1.6 (H) 06/15/2023    BUN 13 06/27/2023    BUN 12 06/19/2023    BUN 11 06/15/2023    CYSTATINC 3.31 (H) 10/13/2022    CYSTATINC 2.06 (H) 06/12/2020    CYSTATINC 1.31 (H) 04/09/2018    EGFRCYSTC 17 (L) 10/13/2022    EGFRCYSTC 33 06/12/2020     EGFRCYSTC 60 04/09/2018          5. S/P kidney transplant    6. Type 2 diabetes mellitus with stage 3a chronic kidney disease, without long-term current use of insulin  Assessment & Plan:  Diabetes Management Status    Statin: Taking  ACE/ARB: Not taking    Screening or Prevention Patient's value Goal Complete/Controlled?   HgA1C Testing and Control   Lab Results   Component Value Date    HGBA1C 5.0 05/15/2023      Annually/Less than 8% Yes   Lipid profile : 05/02/2023 Annually Yes   LDL control Lab Results   Component Value Date    LDLCALC 48.0 (L) 05/02/2023    Annually/Less than 100 mg/dl  Yes   Nephropathy screening Lab Results   Component Value Date    LABMICR 4358.0 05/28/2022     Lab Results   Component Value Date    PROTEINUA 2+ (A) 06/15/2023    Annually Yes   Blood pressure BP Readings from Last 1 Encounters:   06/30/23 110/68    Less than 140/90 Yes   Dilated retinal exam : 07/29/2022 Annually Yes   Foot exam   : 06/28/2022 Annually No     Lab Results   Component Value Date    HGBA1C 5.0 05/15/2023    HGBA1C 5.3 01/09/2023    HGBA1C 5.0 11/30/2022    CYSTATINC 3.31 (H) 10/13/2022    EGFRCYSTC 17 (L) 10/13/2022    EGFRNORACEVR 55.2 (A) 06/27/2023    MICALBCREAT 2690.1 (H) 05/28/2022    LDLCALC 48.0 (L) 05/02/2023     No results found for: GLUTAMICACID, CPEPTIDE   Last 5 Patient Entered Readings                                          Most Recent A1c:     There is no flowsheet data to display.        HEALTH MAINTENANCE: Diabetic health maintenance interventions reviewed and are up to date except for:      Topic    Eye Exam         Orders:  -     Hemoglobin A1C; Future; Expected date: 07/03/2023  -     Microalbumin/Creatinine Ratio, Urine; Future; Expected date: 07/03/2023  -     Ambulatory referral/consult to Diabetic Advanced Practice Providers (Medical Management); Future; Expected date: 07/07/2023    7. Type 2 diabetes mellitus with chronic kidney disease on chronic dialysis, without long-term  "current use of insulin  -     dulaglutide (TRULICITY) 3 mg/0.5 mL pen injector; Inject 3 mg into the skin every 7 days.  Dispense: 12 pen; Refill: 1    8. NSTEMI with CAD s/p PCI (FAMILIA) of LAD x 2 in 8/2017  -     ezetimibe (ZETIA) 10 mg tablet; Take 1 tablet (10 mg total) by mouth once daily.  Dispense: 90 tablet; Refill: 3  -     atorvastatin (LIPITOR) 80 MG tablet; Take 1 tablet (80 mg total) by mouth every evening.  Dispense: 90 tablet; Refill: 3  -     aspirin (ECOTRIN) 81 MG EC tablet; Take 1 tablet (81 mg total) by mouth once daily.  Dispense: 90 tablet; Refill: 3    9. Type 2 diabetes mellitus with hyperglycemia, without long-term current use of insulin  -     blood sugar diagnostic Strp; Check blood glucose 2 times daily as directed and as needed  Dispense: 200 each; Refill: 5  -     lancets Misc; Check blood glucose 2 times daily as directed and as needed (dispense insurance preferred brand or patient choice)  Dispense: 200 each; Refill: 5    10. Paroxysmal atrial fibrillation    Unless noted herein, any chronic conditions are represented as and appear stable, and no other significant complaints or concerns were reported.    Follow up in about 7 weeks (around 8/18/2023) for virtual visit, review test results, discuss treatment plan, re-evaluation.        Documentation entered by me for this encounter may have been done in part using speech-recognition technology. Although I have made an effort to ensure accuracy, "sound like" errors may exist and should be interpreted in context.  "

## 2023-06-30 NOTE — ASSESSMENT & PLAN NOTE
Lab Results   Component Value Date    CHOL 105 (L) 05/02/2023    CHOL 88 (L) 09/07/2022    TRIG 145 05/02/2023    TRIG 119 09/07/2022    HDL 28 (L) 05/02/2023    HDL 27 (L) 09/07/2022    LDLCALC 48.0 (L) 05/02/2023    LDLCALC 37.2 (L) 09/07/2022    NONHDLCHOL 77 05/02/2023    NONHDLCHOL 61 09/07/2022    AST 29 06/27/2023    ALT 51 (H) 06/27/2023     The ASCVD Risk score (Greg GAFFNEY, et al., 2019) failed to calculate for the following reasons:    The patient has a prior MI or stroke diagnosis    Pt states she is able to bathe and dress.  She says she does not need OT services.

## 2023-06-30 NOTE — ASSESSMENT & PLAN NOTE
Diabetes Management Status    Statin: Taking  ACE/ARB: Not taking    Screening or Prevention Patient's value Goal Complete/Controlled?   HgA1C Testing and Control   Lab Results   Component Value Date    HGBA1C 5.0 05/15/2023      Annually/Less than 8% Yes   Lipid profile : 05/02/2023 Annually Yes   LDL control Lab Results   Component Value Date    LDLCALC 48.0 (L) 05/02/2023    Annually/Less than 100 mg/dl  Yes   Nephropathy screening Lab Results   Component Value Date    LABMICR 4358.0 05/28/2022     Lab Results   Component Value Date    PROTEINUA 2+ (A) 06/15/2023    Annually Yes   Blood pressure BP Readings from Last 1 Encounters:   06/30/23 110/68    Less than 140/90 Yes   Dilated retinal exam : 07/29/2022 Annually Yes   Foot exam   : 06/28/2022 Annually No     Lab Results   Component Value Date    HGBA1C 5.0 05/15/2023    HGBA1C 5.3 01/09/2023    HGBA1C 5.0 11/30/2022    CYSTATINC 3.31 (H) 10/13/2022    EGFRCYSTC 17 (L) 10/13/2022    EGFRNORACEVR 55.2 (A) 06/27/2023    MICALBCREAT 2690.1 (H) 05/28/2022    LDLCALC 48.0 (L) 05/02/2023     No results found for: GLUTAMICACID, CPEPTIDE   Last 5 Patient Entered Readings                                          Most Recent A1c:     There is no flowsheet data to display.        HEALTH MAINTENANCE: Diabetic health maintenance interventions reviewed and are up to date except for:      Topic    Eye Exam

## 2023-06-30 NOTE — Clinical Note
Trent is no longer on dialysis since his kidney transplant. I want him back in the Diabetes Digital Medicine program if possible.  Can you please help?  Thank you for your help caring for our patients!  -GLEN

## 2023-06-30 NOTE — ASSESSMENT & PLAN NOTE
Lab Results   Component Value Date    EGFRNORACEVR 55.2 (A) 06/27/2023    EGFRNORACEVR 51.3 (A) 06/19/2023    EGFRNORACEVR 55.2 (A) 06/15/2023    CREATININE 1.6 (H) 06/27/2023    CREATININE 1.7 (H) 06/19/2023    CREATININE 1.6 (H) 06/15/2023    BUN 13 06/27/2023    BUN 12 06/19/2023    BUN 11 06/15/2023    CYSTATINC 3.31 (H) 10/13/2022    CYSTATINC 2.06 (H) 06/12/2020    CYSTATINC 1.31 (H) 04/09/2018    EGFRCYSTC 17 (L) 10/13/2022    EGFRCYSTC 33 06/12/2020    EGFRCYSTC 60 04/09/2018

## 2023-07-03 ENCOUNTER — LAB VISIT (OUTPATIENT)
Dept: LAB | Facility: HOSPITAL | Age: 43
End: 2023-07-03
Attending: INTERNAL MEDICINE
Payer: COMMERCIAL

## 2023-07-03 DIAGNOSIS — I21.4 NSTEMI (NON-ST ELEVATED MYOCARDIAL INFARCTION): ICD-10-CM

## 2023-07-03 DIAGNOSIS — N18.31 TYPE 2 DIABETES MELLITUS WITH STAGE 3A CHRONIC KIDNEY DISEASE, WITHOUT LONG-TERM CURRENT USE OF INSULIN: Chronic | ICD-10-CM

## 2023-07-03 DIAGNOSIS — Z94.0 KIDNEY REPLACED BY TRANSPLANT: ICD-10-CM

## 2023-07-03 DIAGNOSIS — E11.22 TYPE 2 DIABETES MELLITUS WITH STAGE 3A CHRONIC KIDNEY DISEASE, WITHOUT LONG-TERM CURRENT USE OF INSULIN: Chronic | ICD-10-CM

## 2023-07-03 DIAGNOSIS — I25.10 CORONARY ARTERY DISEASE INVOLVING NATIVE CORONARY ARTERY OF NATIVE HEART WITHOUT ANGINA PECTORIS: Chronic | ICD-10-CM

## 2023-07-03 DIAGNOSIS — E11.69 DYSLIPIDEMIA ASSOCIATED WITH TYPE 2 DIABETES MELLITUS: Chronic | ICD-10-CM

## 2023-07-03 DIAGNOSIS — E78.5 DYSLIPIDEMIA ASSOCIATED WITH TYPE 2 DIABETES MELLITUS: Chronic | ICD-10-CM

## 2023-07-03 LAB
ALBUMIN SERPL BCP-MCNC: 4.2 G/DL (ref 3.5–5.2)
ALBUMIN SERPL BCP-MCNC: 4.2 G/DL (ref 3.5–5.2)
ALBUMIN/CREAT UR: 38.1 UG/MG (ref 0–30)
ALP SERPL-CCNC: 100 U/L (ref 55–135)
ALT SERPL W/O P-5'-P-CCNC: 38 U/L (ref 10–44)
ANION GAP SERPL CALC-SCNC: 9 MMOL/L (ref 8–16)
AST SERPL-CCNC: 24 U/L (ref 10–40)
BASOPHILS # BLD AUTO: 0.05 K/UL (ref 0–0.2)
BASOPHILS NFR BLD: 0.9 % (ref 0–1.9)
BILIRUB DIRECT SERPL-MCNC: 0.3 MG/DL (ref 0.1–0.3)
BILIRUB SERPL-MCNC: 2.2 MG/DL (ref 0.1–1)
BUN SERPL-MCNC: 12 MG/DL (ref 6–20)
CALCIUM SERPL-MCNC: 9.3 MG/DL (ref 8.7–10.5)
CHLORIDE SERPL-SCNC: 108 MMOL/L (ref 95–110)
CO2 SERPL-SCNC: 24 MMOL/L (ref 23–29)
CREAT SERPL-MCNC: 1.7 MG/DL (ref 0.5–1.4)
CREAT UR-MCNC: 257 MG/DL (ref 23–375)
DIFFERENTIAL METHOD: ABNORMAL
EOSINOPHIL # BLD AUTO: 0.1 K/UL (ref 0–0.5)
EOSINOPHIL NFR BLD: 1.7 % (ref 0–8)
ERYTHROCYTE [DISTWIDTH] IN BLOOD BY AUTOMATED COUNT: 16.1 % (ref 11.5–14.5)
EST. GFR  (NO RACE VARIABLE): 51.3 ML/MIN/1.73 M^2
ESTIMATED AVG GLUCOSE: 114 MG/DL (ref 68–131)
GLUCOSE SERPL-MCNC: 150 MG/DL (ref 70–110)
HBA1C MFR BLD: 5.6 % (ref 4–5.6)
HCT VFR BLD AUTO: 39.2 % (ref 40–54)
HGB BLD-MCNC: 12.7 G/DL (ref 14–18)
IMM GRANULOCYTES # BLD AUTO: 0.05 K/UL (ref 0–0.04)
IMM GRANULOCYTES NFR BLD AUTO: 0.9 % (ref 0–0.5)
LYMPHOCYTES # BLD AUTO: 0.6 K/UL (ref 1–4.8)
LYMPHOCYTES NFR BLD: 11.3 % (ref 18–48)
MAGNESIUM SERPL-MCNC: 1.8 MG/DL (ref 1.6–2.6)
MCH RBC QN AUTO: 30 PG (ref 27–31)
MCHC RBC AUTO-ENTMCNC: 32.4 G/DL (ref 32–36)
MCV RBC AUTO: 93 FL (ref 82–98)
MICROALBUMIN UR DL<=1MG/L-MCNC: 98 UG/ML
MONOCYTES # BLD AUTO: 0.3 K/UL (ref 0.3–1)
MONOCYTES NFR BLD: 6 % (ref 4–15)
NEUTROPHILS # BLD AUTO: 4.2 K/UL (ref 1.8–7.7)
NEUTROPHILS NFR BLD: 79.2 % (ref 38–73)
NRBC BLD-RTO: 0 /100 WBC
PHOSPHATE SERPL-MCNC: 2.4 MG/DL (ref 2.7–4.5)
PLATELET # BLD AUTO: 176 K/UL (ref 150–450)
PMV BLD AUTO: 9.8 FL (ref 9.2–12.9)
POTASSIUM SERPL-SCNC: 4 MMOL/L (ref 3.5–5.1)
PROT SERPL-MCNC: 6.7 G/DL (ref 6–8.4)
RBC # BLD AUTO: 4.24 M/UL (ref 4.6–6.2)
SODIUM SERPL-SCNC: 141 MMOL/L (ref 136–145)
WBC # BLD AUTO: 5.3 K/UL (ref 3.9–12.7)

## 2023-07-03 PROCEDURE — 80069 RENAL FUNCTION PANEL: CPT | Performed by: INTERNAL MEDICINE

## 2023-07-03 PROCEDURE — 84075 ASSAY ALKALINE PHOSPHATASE: CPT | Performed by: INTERNAL MEDICINE

## 2023-07-03 PROCEDURE — 86833 HLA CLASS II HIGH DEFIN QUAL: CPT | Performed by: INTERNAL MEDICINE

## 2023-07-03 PROCEDURE — 87517 HEPATITIS B DNA QUANT: CPT | Performed by: INTERNAL MEDICINE

## 2023-07-03 PROCEDURE — 83036 HEMOGLOBIN GLYCOSYLATED A1C: CPT | Performed by: FAMILY MEDICINE

## 2023-07-03 PROCEDURE — 86832 HLA CLASS I HIGH DEFIN QUAL: CPT | Performed by: INTERNAL MEDICINE

## 2023-07-03 PROCEDURE — 87536 HIV-1 QUANT&REVRSE TRNSCRPJ: CPT | Performed by: INTERNAL MEDICINE

## 2023-07-03 PROCEDURE — 83735 ASSAY OF MAGNESIUM: CPT | Performed by: INTERNAL MEDICINE

## 2023-07-03 PROCEDURE — 82570 ASSAY OF URINE CREATININE: CPT | Performed by: FAMILY MEDICINE

## 2023-07-03 PROCEDURE — 85025 COMPLETE CBC W/AUTO DIFF WBC: CPT | Performed by: INTERNAL MEDICINE

## 2023-07-03 PROCEDURE — 80197 ASSAY OF TACROLIMUS: CPT | Performed by: INTERNAL MEDICINE

## 2023-07-03 PROCEDURE — 86977 RBC SERUM PRETX INCUBJ/INHIB: CPT | Mod: 59 | Performed by: INTERNAL MEDICINE

## 2023-07-03 PROCEDURE — 36415 COLL VENOUS BLD VENIPUNCTURE: CPT | Performed by: INTERNAL MEDICINE

## 2023-07-03 PROCEDURE — 87522 HEPATITIS C REVRS TRNSCRPJ: CPT | Performed by: INTERNAL MEDICINE

## 2023-07-03 RX ORDER — ATORVASTATIN CALCIUM 80 MG/1
80 TABLET, FILM COATED ORAL NIGHTLY
Qty: 30 TABLET | Refills: 3 | OUTPATIENT
Start: 2023-07-03 | End: 2026-03-28

## 2023-07-03 NOTE — TELEPHONE ENCOUNTER
No care due was identified.  [facility] Embedded Care Due Messages. Reference number: [id number].   7/03/2023 10:30:05 AM CDT

## 2023-07-04 LAB — TACROLIMUS BLD-MCNC: 7.8 NG/ML (ref 5–15)

## 2023-07-04 NOTE — TELEPHONE ENCOUNTER
Refill Decision Note   Robb Harris  is requesting a refill authorization.  Brief Assessment and Rationale for Refill:  Quick Discontinue     Medication Therapy Plan:         Comments:     Note composed:8:49 PM 07/03/2023

## 2023-07-05 LAB
HCV RNA SERPL QL NAA+PROBE: NOT DETECTED
HCV RNA SPEC NAA+PROBE-ACNC: NOT DETECTED IU/ML
HIV1 RNA # SERPL NAA+PROBE: NOT DETECTED COPIES/ML
HIV1 RNA SERPL QL NAA+PROBE: NOT DETECTED

## 2023-07-06 ENCOUNTER — PATIENT MESSAGE (OUTPATIENT)
Dept: TRANSPLANT | Facility: CLINIC | Age: 43
End: 2023-07-06
Payer: COMMERCIAL

## 2023-07-07 ENCOUNTER — TELEPHONE (OUTPATIENT)
Dept: DIABETES | Facility: CLINIC | Age: 43
End: 2023-07-07
Payer: COMMERCIAL

## 2023-07-07 LAB
HBV DNA SERPL NAA+PROBE-ACNC: <10 IU/ML
HBV DNA SERPL NAA+PROBE-LOG IU: <1 LOG (10) IU/ML
HBV DNA SERPL QL NAA+PROBE: NOT DETECTED

## 2023-07-08 LAB
CLASS I ANTIBODY COMMENTS - LUMINEX: NORMAL
CLASS II ANTIBODY COMMENTS - LUMINEX: NORMAL
DSA1 TESTING DATE: NORMAL
DSA12 TESTING DATE: NORMAL
DSA2 TESTING DATE: NORMAL
SERUM COLLECTION DT - LUMINEX CLASS I: NORMAL
SERUM COLLECTION DT - LUMINEX CLASS II: NORMAL

## 2023-07-08 NOTE — PROGRESS NOTES
Last 5 Patient Entered [place] Current 30 Day Average: 128/91     Recent Readings 8/12/2017 8/10/2017 8/9/2017 8/8/2017 8/8/2017    Systolic BP (mmHg) 126 124 119 117 125    Diastolic BP (mmHg) 93 97 84 87 96    Pulse 74 82 75 72 73          LVM  
Last 5 Patient Entered [place] Current 30 Day Average: 130/92     Recent Readings 8/23/2017 8/20/2017 8/12/2017 8/10/2017 8/9/2017    Systolic BP (mmHg) 139 135 126 124 119    Diastolic BP (mmHg) 94 92 93 97 84    Pulse 79 67 74 82 75          LVM and requested Mr. Robb Owen Henderson call back so I can complete the initial introduction of the health coaches role in the program.   
Last 5 Patient Entered [place] Current 30 Day Average: 132/92     Recent Readings 8/27/2017 8/23/2017 8/20/2017 8/12/2017 8/10/2017    Systolic BP (mmHg) 149 139 135 126 124    Diastolic BP (mmHg) 98 94 92 93 97    Pulse 67 79 67 74 82        LVM and requested Mr. Newmaneloy Weaver Henderson call back so I can complete the initial introduction of the health coaches role in the program.     Sent MyChart msg at last call attempt - pt read and did not respond.  
Last 5 Patient Entered Red[place] Current 30 Day Average: 141/91     Recent Readings 9/25/2017 9/19/2017 9/11/2017 9/11/2017 9/2/2017    Systolic BP (mmHg) 120 141 161 174 133    Diastolic BP (mmHg) 70 96 100 111 91    Pulse 61 61 66 79 78        Dropped due to non-compliance.    
Last 5 Patient Entered [place] Current 30 Day Average: 143/95     Recent Readings 9/11/2017 9/11/2017 9/2/2017 8/27/2017 8/23/2017    Systolic BP (mmHg) 161 174 133 149 139    Diastolic BP (mmHg) 100 111 91 98 94    Pulse 66 79 78 67 79        Unable to establish contact. Sent non-compliance letter.    
stretcher

## 2023-07-10 ENCOUNTER — LAB VISIT (OUTPATIENT)
Dept: LAB | Facility: HOSPITAL | Age: 43
End: 2023-07-10
Attending: INTERNAL MEDICINE
Payer: COMMERCIAL

## 2023-07-10 DIAGNOSIS — I48.91 NEW ONSET A-FIB: ICD-10-CM

## 2023-07-10 DIAGNOSIS — I48.0 PAROXYSMAL ATRIAL FIBRILLATION: ICD-10-CM

## 2023-07-10 DIAGNOSIS — I48.91 ATRIAL FIBRILLATION WITH RVR: ICD-10-CM

## 2023-07-10 DIAGNOSIS — Z94.0 S/P KIDNEY TRANSPLANT: ICD-10-CM

## 2023-07-10 DIAGNOSIS — Z94.0 KIDNEY REPLACED BY TRANSPLANT: ICD-10-CM

## 2023-07-10 LAB
ALBUMIN SERPL BCP-MCNC: 4.4 G/DL (ref 3.5–5.2)
ANION GAP SERPL CALC-SCNC: 8 MMOL/L (ref 8–16)
BACTERIA #/AREA URNS HPF: NORMAL /HPF
BASOPHILS # BLD AUTO: 0.04 K/UL (ref 0–0.2)
BASOPHILS NFR BLD: 0.7 % (ref 0–1.9)
BILIRUB UR QL STRIP: NEGATIVE
BUN SERPL-MCNC: 11 MG/DL (ref 6–20)
CALCIUM SERPL-MCNC: 9.6 MG/DL (ref 8.7–10.5)
CHLORIDE SERPL-SCNC: 108 MMOL/L (ref 95–110)
CLARITY UR: CLEAR
CO2 SERPL-SCNC: 24 MMOL/L (ref 23–29)
COLOR UR: YELLOW
CREAT SERPL-MCNC: 1.8 MG/DL (ref 0.5–1.4)
CREAT UR-MCNC: 260 MG/DL (ref 23–375)
DIFFERENTIAL METHOD: ABNORMAL
EOSINOPHIL # BLD AUTO: 0.1 K/UL (ref 0–0.5)
EOSINOPHIL NFR BLD: 1.9 % (ref 0–8)
ERYTHROCYTE [DISTWIDTH] IN BLOOD BY AUTOMATED COUNT: 15.7 % (ref 11.5–14.5)
EST. GFR  (NO RACE VARIABLE): 47.9 ML/MIN/1.73 M^2
GLUCOSE SERPL-MCNC: 133 MG/DL (ref 70–110)
GLUCOSE UR QL STRIP: ABNORMAL
HCT VFR BLD AUTO: 43.4 % (ref 40–54)
HGB BLD-MCNC: 13.8 G/DL (ref 14–18)
HGB UR QL STRIP: NEGATIVE
HYALINE CASTS #/AREA URNS LPF: 1 /LPF
IMM GRANULOCYTES # BLD AUTO: 0.02 K/UL (ref 0–0.04)
IMM GRANULOCYTES NFR BLD AUTO: 0.3 % (ref 0–0.5)
KETONES UR QL STRIP: NEGATIVE
LEUKOCYTE ESTERASE UR QL STRIP: NEGATIVE
LYMPHOCYTES # BLD AUTO: 0.6 K/UL (ref 1–4.8)
LYMPHOCYTES NFR BLD: 10.4 % (ref 18–48)
MAGNESIUM SERPL-MCNC: 1.8 MG/DL (ref 1.6–2.6)
MCH RBC QN AUTO: 29.7 PG (ref 27–31)
MCHC RBC AUTO-ENTMCNC: 31.8 G/DL (ref 32–36)
MCV RBC AUTO: 93 FL (ref 82–98)
MICROSCOPIC COMMENT: NORMAL
MONOCYTES # BLD AUTO: 0.4 K/UL (ref 0.3–1)
MONOCYTES NFR BLD: 7 % (ref 4–15)
NEUTROPHILS # BLD AUTO: 4.7 K/UL (ref 1.8–7.7)
NEUTROPHILS NFR BLD: 79.7 % (ref 38–73)
NITRITE UR QL STRIP: NEGATIVE
NRBC BLD-RTO: 0 /100 WBC
PH UR STRIP: 6 [PH] (ref 5–8)
PHOSPHATE SERPL-MCNC: 2.2 MG/DL (ref 2.7–4.5)
PLATELET # BLD AUTO: 197 K/UL (ref 150–450)
PMV BLD AUTO: 10.3 FL (ref 9.2–12.9)
POTASSIUM SERPL-SCNC: 4.5 MMOL/L (ref 3.5–5.1)
PROT UR QL STRIP: ABNORMAL
PROT UR-MCNC: 130 MG/DL (ref 0–15)
PROT/CREAT UR: 0.5 MG/G{CREAT} (ref 0–0.2)
RBC # BLD AUTO: 4.65 M/UL (ref 4.6–6.2)
RBC #/AREA URNS HPF: 1 /HPF (ref 0–4)
SODIUM SERPL-SCNC: 140 MMOL/L (ref 136–145)
SP GR UR STRIP: >=1.03 (ref 1–1.03)
URN SPEC COLLECT METH UR: ABNORMAL
WBC # BLD AUTO: 5.86 K/UL (ref 3.9–12.7)
WBC #/AREA URNS HPF: 3 /HPF (ref 0–5)
YEAST URNS QL MICRO: NORMAL

## 2023-07-10 PROCEDURE — 87799 DETECT AGENT NOS DNA QUANT: CPT | Performed by: INTERNAL MEDICINE

## 2023-07-10 PROCEDURE — 84156 ASSAY OF PROTEIN URINE: CPT | Performed by: INTERNAL MEDICINE

## 2023-07-10 PROCEDURE — 83735 ASSAY OF MAGNESIUM: CPT | Performed by: INTERNAL MEDICINE

## 2023-07-10 PROCEDURE — 81000 URINALYSIS NONAUTO W/SCOPE: CPT | Performed by: INTERNAL MEDICINE

## 2023-07-10 PROCEDURE — 85025 COMPLETE CBC W/AUTO DIFF WBC: CPT | Performed by: INTERNAL MEDICINE

## 2023-07-10 PROCEDURE — 80069 RENAL FUNCTION PANEL: CPT | Performed by: INTERNAL MEDICINE

## 2023-07-10 PROCEDURE — 80197 ASSAY OF TACROLIMUS: CPT | Performed by: INTERNAL MEDICINE

## 2023-07-10 PROCEDURE — 36415 COLL VENOUS BLD VENIPUNCTURE: CPT | Performed by: INTERNAL MEDICINE

## 2023-07-11 ENCOUNTER — PATIENT MESSAGE (OUTPATIENT)
Dept: TRANSPLANT | Facility: CLINIC | Age: 43
End: 2023-07-11
Payer: COMMERCIAL

## 2023-07-11 LAB — TACROLIMUS BLD-MCNC: 8.7 NG/ML (ref 5–15)

## 2023-07-11 RX ORDER — TACROLIMUS 0.5 MG/1
CAPSULE ORAL
Qty: 150 CAPSULE | Refills: 11 | Status: SHIPPED | OUTPATIENT
Start: 2023-07-11 | End: 2023-07-14

## 2023-07-11 RX ORDER — METOPROLOL TARTRATE 25 MG/1
25 TABLET, FILM COATED ORAL 2 TIMES DAILY
Qty: 60 TABLET | Refills: 11 | Status: SHIPPED | OUTPATIENT
Start: 2023-07-11 | End: 2024-02-07 | Stop reason: SDUPTHER

## 2023-07-11 NOTE — PROGRESS NOTES
New Patient - Virtual Telehealth Visit     The patient location is: Home ([place])  The chief complaint leading to consultation is: Diabetes Follow-up  Visit type: Virtual visit with synchronous audio and video via Epic [employer] vinicio  Total time spent with patient: 30 minutes     Each patient to whom I provide medical services by telemedicine is:  (1) informed of the relationship between the physician and patient and the respective role of any other health care provider with respect to management of the patient; and (2) notified that they may decline to receive medical services by telemedicine and may withdraw from such care at any time. Patient verbally consented to receive this service via voice-only telephone call.    PCP: SABIHA Roberson MD    Subjective:     Chief Complaint: Diabetes Consult    History of Present Illness: 42 y.o.  male for diabetes consult.   Type II diabetes since 2010 .  Comorbidities: HTN, HLD, CAD, ESRD, S/p Right Kidney Transplant May 2023,  S/p Bariatric Surgery 2017, and Atrial Fibrillation   100 lbs weight loss after gastric sleeve surgery 2017  On Prednisone 5 mg daily    Known diabetes complications:  DKA/HHS: no  Retinopathy: no   Peripheral neuropathy: yes   Nephropathy: yes    Denies recent hospital admissions or ER visits.   Denies having hypoglycemia.   Endorses diabetes related symptoms: Intermittent tingling bilateral big toes.    Admits to BG within normal range over the last 3 weeks.  Not needing to use mealtime insulin.    Blood glucose monitoring: fingerstick: 1 x/day   Per patient's recall, over the last 2 weeks   Fasting BG average 140-150  Preprandial 100's  's    Activity Level: Sedentary previously swimming  Meal Planning:3 meals/day; infrequent snacks/day.    Medication compliance all of the time, diet compliance all of the time.   Has attended diabetes education in the past.     Previous regimen: Metformin, Janumet, insulin (unable to recall  name)    Past failed treatment: Not applicable    Current DM Medications:  Diabetes Medications               dulaglutide (TRULICITY) 3 mg/0.5 mL pen injector Inject 3 mg into the skin every 7 days.    insulin lispro-aabc (LYUMJEV KWIKPEN U-100 INSULIN) 100 unit/mL pen Inject 4 Units into the skin 3 (three) times daily with meals. Plus sliding scale insulin. Max 27 units  Using Novolog, taking if premeal BG above 120, SS starts at 150     Allergies  Review of patient's allergies indicates:  No Known Allergies    Labs Reviewed:  His most recent A1C is:  Lab Results   Component Value Date    HGBA1C 5.6 07/03/2023    HGBA1C 5.0 05/15/2023    HGBA1C 5.3 01/09/2023       His most recent BMP is:  Lab Results   Component Value Date     07/10/2023    K 4.5 07/10/2023     07/10/2023    CO2 24 07/10/2023    BUN 11 07/10/2023    CREATININE 1.8 (H) 07/10/2023    CALCIUM 9.6 07/10/2023    ANIONGAP 8 07/10/2023    ESTGFRAFRICA 13 (A) 07/26/2022    EGFRNONAA 11 (A) 07/26/2022       No results found for: CPEPTIDE  No results found for: GLUTAMICACID    There is no height or weight on file to calculate BMI.    Wt Readings from Last 3 Encounters:   06/30/23 1343 98.3 kg (216 lb 11.4 oz)   06/21/23 1058 100.5 kg (221 lb 9 oz)   06/14/23 1326 99.5 kg (219 lb 5.7 oz)        His blood sugar in clinic today is: Not assessed due to type of visit.    Diabetes Management Status  Statin: Taking  ACE/ARB: Not taking    Screening or Prevention Patient's value Goal Complete/Controlled?   HgA1C Testing and Control   Lab Results   Component Value Date    HGBA1C 5.6 07/03/2023      Annually/Less than 8% Yes   Lipid profile : 05/02/2023 Annually Yes   LDL control Lab Results   Component Value Date    LDLCALC 48.0 (L) 05/02/2023    Annually/Less than 100 mg/dl  Yes   Nephropathy screening Lab Results   Component Value Date    MICALBCREAT 38.1 (H) 07/03/2023     Lab Results   Component Value Date    PROTEINUA 2+ (A) 07/10/2023    Annually  Yes   Blood pressure BP Readings from Last 1 Encounters:   06/30/23 110/68    Less than 140/90 Yes   Dilated retinal exam : 07/29/2022 Annually Yes    Foot exam   : 06/30/2023 Annually Yes     Social History     Socioeconomic History    Marital status:      Spouse name: Shannon Iglesias    Number of children: 2    Years of education: Some College   Tobacco Use    Smoking status: Never    Smokeless tobacco: Never   Substance and Sexual Activity    Alcohol use: No     Comment: 1-2 times per month    Drug use: No    Sexual activity: Yes     Partners: Female   Social History Narrative    Full time employed,  with 2 children.    Caregiver Shannon      Past Medical History:   Diagnosis Date    Allergic rhinitis     Atrial fibrillation with RVR 6/4/2023    Class 1 obesity due to excess calories with serious comorbidity and body mass index (BMI) of 31.0 to 31.9 in adult 4/7/2017    Coronary artery disease     Coronary artery disease involving native coronary artery of native heart without angina pectoris 2/6/2018    Cath lab procedure 04/23/2018 (Naveen Rios MD) A. Indication/Pre-Operative Diagnosis: The patient is a 37 year old male that was referred for catheterization by Aaareferral Self for ACS (NSTEMI). The BELLA risk score is 5.  B. Summary/Post-Operative Diagnosis 1. Single vessel coronary artery disease. 2. Normal LVEF. 3. Diastolic dysfunction. 4. Successful PCI for acute myocardial infarction. 5.     Diabetic nephropathy associated with type 2 diabetes mellitus 1/6/2020    Direct hyperbilirubinemia 3/24/2018    DM (diabetes mellitus) 2008    BS doesn't check any more 08/02/2018    DM (diabetes mellitus) 2012    BS 99 am 06/26/2020    DM (diabetes mellitus)     BS didn't check 06/04/2021    DM (diabetes mellitus) 2008    BS didn't check 07/29/2022     Dyslipidemia associated with type 2 diabetes mellitus 7/31/2017    Elevated bilirubin 3/21/2018    GERD (gastroesophageal reflux disease)     Gout      Hyperlipidemia     Hyperparathyroidism, secondary to chronic kidney disease 6/7/2021    Hypertension associated with chronic kidney disease due to type 2 diabetes mellitus     Hypertension complicating diabetes 3/16/2019    Not candidate for Hypertension Digital Medicine program due to dialysis status. Didn't tolerate amlodipine due to SE of hypotension.    Idiopathic chronic gout, multiple sites, without tophus (tophi) 7/19/2017    Long term (current) use of insulin     MI (myocardial infarction) 07/2017    NSTEMI with CAD s/p PCI (FAMILIA) of LAD x 2 in 8/2017 7/31/2017    Obesity     HENRY on CPAP     Peritoneal dialysis catheter in place 1/26/2023    Proteinuria     Severe obstructive sleep apnea - Intolerant of CPAP 7/31/2017    Intolerant of CPAP after weeks of trying multiple interfaces. SPLIT-NIGHT SLEEP STUDY 7/28/2015 · SLEEP ARCHITECTURE: Sleep onset was 2.9 minutes and sleep efficiency was 94.4%. Sleep Stage distribution showed 145 sleep stage changes, 6 awakenings and 119 arousals. Sleep distribution showed 53.2% stage NI, 41.8% stage N 11, 0.0% stage N Ill and REM sleep was at 5.0%. There were 2 REM periods. · RE    Stage 3a chronic kidney disease 5/26/2023    Stage 5 chronic kidney disease on chronic peritoneal dialysis 6/7/2017    Stage 5 chronic kidney disease on chronic peritoneal dialysis 6/7/2017    Status post angioplasty with stent 8/4/2017    Steatohepatitis     Fatty Liver    Type 2 diabetes mellitus with chronic kidney disease on chronic dialysis, without long-term current use of insulin 6/21/2017    Type 2 diabetes mellitus with diabetic nephropathy     Type 2 diabetes mellitus with diabetic nephropathy, without long-term current use of insulin 1/6/2020    Type 2 diabetes mellitus with diabetic polyneuropathy, without long-term current use of insulin 6/7/2021    Type 2 diabetes mellitus with hyperglycemia     Type 2 diabetes mellitus with renal manifestations     Type 2 diabetes mellitus with  stage 3 chronic kidney disease, without long-term current use of insulin 6/21/2017       Review of Systems   Constitutional:  Negative for activity change, appetite change, fatigue and unexpected weight change.   HENT:  Negative for dental problem and trouble swallowing.    Eyes:  Negative for visual disturbance.   Respiratory:  Negative for chest tightness and shortness of breath.    Cardiovascular:  Negative for chest pain, palpitations and leg swelling.   Gastrointestinal:  Negative for abdominal pain, constipation, diarrhea, nausea and vomiting.   Endocrine: Negative for polydipsia, polyphagia and polyuria.   Genitourinary:  Negative for difficulty urinating, dysuria, frequency and urgency.        Negative yeast infection   Musculoskeletal:  Negative for gait problem, myalgias and neck pain.   Integumentary:  Negative for rash and wound.   Allergic/Immunologic: Negative.    Neurological:  Negative for dizziness, syncope, weakness, numbness and headaches.        Intermittent tingling bilateral big toes     Hematological: Negative.    Psychiatric/Behavioral:  Negative for confusion and sleep disturbance.    All other systems reviewed and are negative.     Objective:      Physical Exam  Constitutional:       General: He is awake.      Appearance: Normal appearance. He is well-developed and well-groomed.   HENT:      Head: Normocephalic and atraumatic.   Eyes:      General: Lids are normal. Gaze aligned appropriately.   Pulmonary:      Effort: Pulmonary effort is normal. No respiratory distress.   Neurological:      Mental Status: He is alert and oriented to person, place, and time.   Psychiatric:         Attention and Perception: Attention normal.         Mood and Affect: Mood and affect normal.         Speech: Speech normal.         Behavior: Behavior normal.         Thought Content: Thought content normal.         Cognition and Memory: Cognition normal.         Judgment: Judgment normal.         Assessment /  Plan:     Type 2 diabetes mellitus with stage 3a chronic kidney disease, without long-term current use of insulin  -     Ambulatory referral/consult to Diabetic Advanced Practice Providers (Medical Management)    Dyslipidemia associated with type 2 diabetes mellitus    Essential hypertension    Hypertension complicating diabetes    S/P kidney transplant    Prophylactic immunotherapy    Additional Plan Details:  - Condition: Controlled, most recent A1C of 5.6% was completed 1 week ago.   - Monitor blood glucose:  4x daily.Goals reviewed. Maintain BG log. Would benefit from CGM use. Discussed with patient, declined need at the moment.  - Hypoglycemia protocol: Reviewed and risk discussed.  Notify if BG readings below 80. Protocol printout provided.   - Meal Planning: Limit carbohydrate intake 30-45 grams/meal, 3x daily and 15 grams/snack, limit 2/day.   - Activity level: Recommend at least 150 minutes of exercise in a week.  - BP and LDL: Recommend lifestyle modifications and follow up with PCP for management.   - Medication Regimen:     Continue Trulicity 3 mg once a week  Continue Novolog 4 units before each meal if premeal BG above 120, PLUS sliding scale if BG above 150 (previous order from Transplant team)    - Medication consideration: Continue regimen.  - Health Maintenance standards addressed today: None - up to date. Eye exam UTD with Dr. Karla NEVAREZ 8/2022  - Risks of uncontrolled DM explained. Importance of routine foot and eye exams reviewed. Scheduled as appropriate.  -The patient was explained the above plan and given opportunity to ask questions.  He understands, chooses and consents to this plan and accepts all the risks, which include but are not limited to the risks mentioned above.   - Labs ordered as above.  - CDE referral: Deferred   - Follow up: 3 months in clinic.        Charlotte T. Delacruz, FNP-C [facility]    A total of 30 minutes was spent in face to face time, of which  over 50 % was spent in counseling patient on disease process, complications, treatment, and side effects of medications.

## 2023-07-11 NOTE — TELEPHONE ENCOUNTER
Pt made aware of below orders          ----- Message from Rell Booth MD sent at 7/11/2023  2:22 PM CDT -----  Please lower prograf to 1.5 mg AM and 1 pm PM, repeat labs next week. Thanks

## 2023-07-11 NOTE — TELEPHONE ENCOUNTER
Requesting refills.        ----- Message from Rell Booth MD sent at 7/11/2023  2:22 PM CDT -----  Please lower prograf to 1.5 mg AM and 1 pm PM, repeat labs next week. Thanks

## 2023-07-12 ENCOUNTER — OFFICE VISIT (OUTPATIENT)
Dept: DIABETES | Facility: CLINIC | Age: 43
End: 2023-07-12
Payer: COMMERCIAL

## 2023-07-12 DIAGNOSIS — E11.22 TYPE 2 DIABETES MELLITUS WITH STAGE 3A CHRONIC KIDNEY DISEASE, WITHOUT LONG-TERM CURRENT USE OF INSULIN: Primary | Chronic | ICD-10-CM

## 2023-07-12 DIAGNOSIS — Z94.0 S/P KIDNEY TRANSPLANT: Chronic | ICD-10-CM

## 2023-07-12 DIAGNOSIS — E11.69 DYSLIPIDEMIA ASSOCIATED WITH TYPE 2 DIABETES MELLITUS: Chronic | ICD-10-CM

## 2023-07-12 DIAGNOSIS — Z29.89 PROPHYLACTIC IMMUNOTHERAPY: ICD-10-CM

## 2023-07-12 DIAGNOSIS — I10 ESSENTIAL HYPERTENSION: Chronic | ICD-10-CM

## 2023-07-12 DIAGNOSIS — N18.31 TYPE 2 DIABETES MELLITUS WITH STAGE 3A CHRONIC KIDNEY DISEASE, WITHOUT LONG-TERM CURRENT USE OF INSULIN: Primary | Chronic | ICD-10-CM

## 2023-07-12 DIAGNOSIS — E11.59 HYPERTENSION COMPLICATING DIABETES: Chronic | ICD-10-CM

## 2023-07-12 DIAGNOSIS — E78.5 DYSLIPIDEMIA ASSOCIATED WITH TYPE 2 DIABETES MELLITUS: Chronic | ICD-10-CM

## 2023-07-12 DIAGNOSIS — I15.2 HYPERTENSION COMPLICATING DIABETES: Chronic | ICD-10-CM

## 2023-07-12 LAB
BKV DNA SERPL NAA+PROBE-ACNC: <125 COPIES/ML
BKV DNA SERPL NAA+PROBE-LOG#: <2.1 LOG (10) COPIES/ML
BKV DNA SERPL QL NAA+PROBE: DETECTED

## 2023-07-12 PROCEDURE — 99214 OFFICE O/P EST MOD 30 MIN: CPT | Mod: 95,,, | Performed by: NURSE PRACTITIONER

## 2023-07-12 PROCEDURE — 1159F PR MEDICATION LIST DOCUMENTED IN MEDICAL RECORD: ICD-10-PCS | Mod: CPTII,95,, | Performed by: NURSE PRACTITIONER

## 2023-07-12 PROCEDURE — 3066F PR DOCUMENTATION OF TREATMENT FOR NEPHROPATHY: ICD-10-PCS | Mod: CPTII,95,, | Performed by: NURSE PRACTITIONER

## 2023-07-12 PROCEDURE — 3072F LOW RISK FOR RETINOPATHY: CPT | Mod: CPTII,95,, | Performed by: NURSE PRACTITIONER

## 2023-07-12 PROCEDURE — 99214 PR OFFICE/OUTPT VISIT, EST, LEVL IV, 30-39 MIN: ICD-10-PCS | Mod: 95,,, | Performed by: NURSE PRACTITIONER

## 2023-07-12 PROCEDURE — 4010F PR ACE/ARB THEARPY RXD/TAKEN: ICD-10-PCS | Mod: CPTII,95,, | Performed by: NURSE PRACTITIONER

## 2023-07-12 PROCEDURE — 3060F PR POS MICROALBUMINURIA RESULT DOCUMENTED/REVIEW: ICD-10-PCS | Mod: CPTII,95,, | Performed by: NURSE PRACTITIONER

## 2023-07-12 PROCEDURE — 3066F NEPHROPATHY DOC TX: CPT | Mod: CPTII,95,, | Performed by: NURSE PRACTITIONER

## 2023-07-12 PROCEDURE — 3060F POS MICROALBUMINURIA REV: CPT | Mod: CPTII,95,, | Performed by: NURSE PRACTITIONER

## 2023-07-12 PROCEDURE — 1160F PR REVIEW ALL MEDS BY PRESCRIBER/CLIN PHARMACIST DOCUMENTED: ICD-10-PCS | Mod: CPTII,95,, | Performed by: NURSE PRACTITIONER

## 2023-07-12 PROCEDURE — 3072F PR LOW RISK FOR RETINOPATHY: ICD-10-PCS | Mod: CPTII,95,, | Performed by: NURSE PRACTITIONER

## 2023-07-12 PROCEDURE — 4010F ACE/ARB THERAPY RXD/TAKEN: CPT | Mod: CPTII,95,, | Performed by: NURSE PRACTITIONER

## 2023-07-12 PROCEDURE — 3044F HG A1C LEVEL LT 7.0%: CPT | Mod: CPTII,95,, | Performed by: NURSE PRACTITIONER

## 2023-07-12 PROCEDURE — 1160F RVW MEDS BY RX/DR IN RCRD: CPT | Mod: CPTII,95,, | Performed by: NURSE PRACTITIONER

## 2023-07-12 PROCEDURE — 3044F PR MOST RECENT HEMOGLOBIN A1C LEVEL <7.0%: ICD-10-PCS | Mod: CPTII,95,, | Performed by: NURSE PRACTITIONER

## 2023-07-12 PROCEDURE — 1159F MED LIST DOCD IN RCRD: CPT | Mod: CPTII,95,, | Performed by: NURSE PRACTITIONER

## 2023-07-12 NOTE — PATIENT INSTRUCTIONS
Medication Regimen:   Continue Trulicity 3 mg once a week  Continue Novolog 4 units before each meal if premeal blood glucose above 120, PLUS sliding scale if blood glucose above 150.    Patient Instructions:  Carbohydrate Count: 30-45 grams/meal and 15 grams/snack with limit of 2 snacks per day.  Exercise: Goal is 150 minutes or more per week.  Bring meter and blood sugar log to each appointment.     Goals for Blood Sugar:  Fastin-130 mg/dl  2 hours after meal:  mg/dl  Before Bed: 100-150 mg/dl  If Blood sugar is below 70 mg/dl, DO NOT take diabetes medication. Eat first and then recheck blood sugar in 20 minutes.  Foods to eat if blood sugar is below 70 mg/dl  1/2 glass of orange juice   1/2 regular cola can   3-4 hard candies   3-4 small glucose tabs.   Foods to eat if blood sugar is below 50 mg/dl  1 glass of orange juice  1 regular cola can  8 hard candies  8 small glucose tabs.   If you have repeated eating/blood sugar recheck process 2 times and blood sugar still below 70 mg/dl, call 911.

## 2023-07-13 ENCOUNTER — PATIENT MESSAGE (OUTPATIENT)
Dept: TRANSPLANT | Facility: CLINIC | Age: 43
End: 2023-07-13
Payer: COMMERCIAL

## 2023-07-14 DIAGNOSIS — Z94.0 S/P KIDNEY TRANSPLANT: ICD-10-CM

## 2023-07-14 DIAGNOSIS — Z76.82 KIDNEY TRANSPLANT CANDIDATE: Chronic | ICD-10-CM

## 2023-07-14 RX ORDER — TACROLIMUS 0.5 MG/1
CAPSULE ORAL
Qty: 150 CAPSULE | Refills: 11 | Status: SHIPPED | OUTPATIENT
Start: 2023-07-14 | End: 2023-07-14 | Stop reason: SDUPTHER

## 2023-07-14 RX ORDER — TACROLIMUS 0.5 MG/1
CAPSULE ORAL
Qty: 150 CAPSULE | Refills: 11 | Status: SHIPPED | OUTPATIENT
Start: 2023-07-14 | End: 2023-08-08 | Stop reason: DRUGHIGH

## 2023-07-14 RX ORDER — MYCOPHENOLATE MOFETIL 250 MG/1
1000 CAPSULE ORAL 2 TIMES DAILY
Qty: 240 CAPSULE | Refills: 11 | Status: SHIPPED | OUTPATIENT
Start: 2023-07-14 | End: 2023-09-20

## 2023-07-14 RX ORDER — MYCOPHENOLATE MOFETIL 250 MG/1
1000 CAPSULE ORAL 2 TIMES DAILY
Qty: 240 CAPSULE | Refills: 11 | Status: SHIPPED | OUTPATIENT
Start: 2023-07-14 | End: 2023-07-14 | Stop reason: SDUPTHER

## 2023-07-14 NOTE — PROGRESS NOTES
Kidney Post-Transplant Assessment    Referring Physician: Siva Figueroa  Current Nephrologist: Siva Figueroa    ORGAN: LEFT KIDNEY  Donor Type: living  PHS Increased Risk: no  Cold Ischemia: 48 mins  Induction Medications: thymoglobulin    Subjective:   The patient location is: [place]  The chief complaint leading to consultation is: Kidney Post-Transplant Assessment    Visit type: audiovisual    Face to Face time with patient:   30 minutes of total time spent on the encounter, which includes face to face time and non-face to face time preparing to see the patient (eg, review of tests), Obtaining and/or reviewing separately obtained history, Documenting clinical information in the electronic or other health record, Independently interpreting results (not separately reported) and communicating results to the patient/family/caregiver, or Care coordination (not separately reported).     Each patient to whom he or she provides medical services by telemedicine is:  (1) informed of the relationship between the physician and patient and the respective role of any other health care provider with respect to management of the patient; and (2) notified that he or she may decline to receive medical services by telemedicine and may withdraw from such care at any time.      CC:  Reassessment of renal allograft function and management of chronic immunosuppression.    HPI:  Mr. Iglesias is a 42 y.o. year old White male with history of ESRD secondary to diabetic nephropathy who received a living kidney transplant on 5/15/23 (thymo induction, CMV ++). PMH coronary angioplasty with stent placement, NSTEMI with CAD 8/2017, HTN, GERD and sleep apnea. His most recent creatinine is 1.8. He takes mycophenolate mofetil, prednisone, and tacrolimus for maintenance immunosuppression. His post transplant course has been complicated by new onset afib with RVR .      Feels well without complaints. Drinking 2.5-3 L water daily, no  problems with urination.     Has not noticed any fast heart rate or palpitations, has smart watch and his daily EKG has been normal. Has been increasing exercise-going on walks and swimming laps in the pool. Feels energy level is improving.    Home -130, no peripheral edema. Tolerating IS without issues, no diarrhea or vomiting.       Current Outpatient Medications   Medication Sig Dispense Refill    apixaban (ELIQUIS) 5 mg Tab Take 1 tablet (5 mg total) by mouth once daily. 90 tablet 3    aspirin (ECOTRIN) 81 MG EC tablet Take 1 tablet (81 mg total) by mouth once daily. 90 tablet 3    atorvastatin (LIPITOR) 80 MG tablet Take 1 tablet (80 mg total) by mouth every evening. 90 tablet 3    blood sugar diagnostic Strp Check blood glucose 2 times daily as directed and as needed 200 each 5    blood-glucose meter (ONETOUCH ULTRAMINI) kit Use as instructed 1 each 0    dulaglutide (TRULICITY) 3 mg/0.5 mL pen injector Inject 3 mg into the skin every 7 days. 12 pen 1    ezetimibe (ZETIA) 10 mg tablet Take 1 tablet (10 mg total) by mouth once daily. 90 tablet 3    insulin lispro-aabc (LYUMJEV KWIKPEN U-100 INSULIN) 100 unit/mL pen Inject 4 Units into the skin 3 (three) times daily with meals. Plus sliding scale insulin. Max 27 units 2 pen 7    k phos di & mono-sod phos mono (K-PHOS-NEUTRAL) 250 mg Tab Take 2 tablets by mouth 3 (three) times daily. 180 tablet 11    lancets Misc Check blood glucose 2 times daily as directed and as needed (dispense insurance preferred brand or patient choice) 200 each 5    magnesium oxide (MAG-OX) 400 mg (241.3 mg magnesium) tablet Take 1 tablet (400 mg total) by mouth 2 (two) times daily. 60 tablet 11    metoprolol tartrate (LOPRESSOR) 25 MG tablet Take 1 tablet (25 mg total) by mouth 2 (two) times daily. 60 tablet 11    multivitamin Tab Take 1 tablet by mouth once daily.      mycophenolate (CELLCEPT) 250 mg Cap Take 4 capsules (1,000 mg total) by mouth 2 (two) times daily. 240 capsule 11  "   nitroGLYCERIN (NITROSTAT) 0.4 MG SL tablet Dissolve one tablet underneath tongue at onset of angina; may repeat every 5 minutes if needed. Call 911 if angina persists after 2 doses. 25 tablet 5    pantoprazole (PROTONIX) 40 MG tablet Take 1 tablet (40 mg total) by mouth once daily. 30 tablet 11    pen needle, diabetic (BD ULTRA-FINE SHORT PEN NEEDLE) 31 gauge x 5/16" Ndle Use to inject insulin into the skin 3 times daily (Patient not taking: Reported on 6/30/2023) 100 each 1    predniSONE (DELTASONE) 5 MG tablet Take by mouth daily; 5/18-5/24: 20 mg; 5/25-5/31: 15 mg; 6/1-6/7: 10 mg; 6/8/23- thereafter: 5 mg daily; do not stop 70 tablet 11    sulfamethoxazole-trimethoprim 400-80mg (BACTRIM,SEPTRA) 400-80 mg per tablet Take 1 tablet by mouth every morning. Stop 11/12/23 30 tablet 5    tacrolimus (PROGRAF) 0.5 MG Cap Take 3 capsules (1.5 mg total) by mouth every morning AND 2 capsules (1 mg total) every evening. 150 capsule 11    valGANciclovir (VALCYTE) 450 mg Tab Take 2 tablets (900 mg total) by mouth every morning. Stop 8/14/23 60 tablet 2     No current facility-administered medications for this visit.       Past Medical History:   Diagnosis Date    Allergic rhinitis     Atrial fibrillation with RVR 6/4/2023    Class 1 obesity due to excess calories with serious comorbidity and body mass index (BMI) of 31.0 to 31.9 in adult 4/7/2017    Coronary artery disease     Coronary artery disease involving native coronary artery of native heart without angina pectoris 2/6/2018    Cath lab procedure 04/23/2018 (Naveen Rios MD) A. Indication/Pre-Operative Diagnosis: The patient is a 37 year old male that was referred for catheterization by [facility]ferral Self for ACS (NSTEMI). The BELLA risk score is 5.  B. Summary/Post-Operative Diagnosis 1. Single vessel coronary artery disease. 2. Normal LVEF. 3. Diastolic dysfunction. 4. Successful PCI for acute myocardial infarction. 5.     Diabetic nephropathy associated with type 2 " diabetes mellitus 1/6/2020    Direct hyperbilirubinemia 3/24/2018    DM (diabetes mellitus) 2008    BS doesn't check any more 08/02/2018    DM (diabetes mellitus) 2012    BS 99 am 06/26/2020    DM (diabetes mellitus)     BS didn't check 06/04/2021    DM (diabetes mellitus) 2008    BS didn't check 07/29/2022     Dyslipidemia associated with type 2 diabetes mellitus 7/31/2017    Elevated bilirubin 3/21/2018    GERD (gastroesophageal reflux disease)     Gout     Hyperlipidemia     Hyperparathyroidism, secondary to chronic kidney disease 6/7/2021    Hypertension associated with chronic kidney disease due to type 2 diabetes mellitus     Hypertension complicating diabetes 3/16/2019    Not candidate for Hypertension Digital Medicine program due to dialysis status. Didn't tolerate amlodipine due to SE of hypotension.    Idiopathic chronic gout, multiple sites, without tophus (tophi) 7/19/2017    Long term (current) use of insulin     MI (myocardial infarction) 07/2017    NSTEMI with CAD s/p PCI (FAMILIA) of LAD x 2 in 8/2017 7/31/2017    Obesity     HENRY on CPAP     Peritoneal dialysis catheter in place 1/26/2023    Proteinuria     Severe obstructive sleep apnea - Intolerant of CPAP 7/31/2017    Intolerant of CPAP after weeks of trying multiple interfaces. SPLIT-NIGHT SLEEP STUDY 7/28/2015 · SLEEP ARCHITECTURE: Sleep onset was 2.9 minutes and sleep efficiency was 94.4%. Sleep Stage distribution showed 145 sleep stage changes, 6 awakenings and 119 arousals. Sleep distribution showed 53.2% stage NI, 41.8% stage N 11, 0.0% stage N Ill and REM sleep was at 5.0%. There were 2 REM periods. · RE    Stage 3a chronic kidney disease 5/26/2023    Stage 5 chronic kidney disease on chronic peritoneal dialysis 6/7/2017    Stage 5 chronic kidney disease on chronic peritoneal dialysis 6/7/2017    Status post angioplasty with stent 8/4/2017    Steatohepatitis     Fatty Liver    Type 2 diabetes mellitus with chronic kidney disease on chronic  dialysis, without long-term current use of insulin 6/21/2017    Type 2 diabetes mellitus with diabetic nephropathy     Type 2 diabetes mellitus with diabetic nephropathy, without long-term current use of insulin 1/6/2020    Type 2 diabetes mellitus with diabetic polyneuropathy, without long-term current use of insulin 6/7/2021    Type 2 diabetes mellitus with hyperglycemia     Type 2 diabetes mellitus with renal manifestations     Type 2 diabetes mellitus with stage 3 chronic kidney disease, without long-term current use of insulin 6/21/2017     Review of Systems   Constitutional:  Negative for activity change, appetite change and fever.   HENT:  Negative for congestion, mouth sores and sore throat.    Eyes:  Negative for visual disturbance.   Respiratory:  Negative for cough, chest tightness and shortness of breath.    Cardiovascular:  Negative for chest pain, palpitations and leg swelling.   Gastrointestinal:  Negative for abdominal distention, abdominal pain, constipation, diarrhea and nausea.   Genitourinary:  Negative for difficulty urinating, frequency and hematuria.   Musculoskeletal:  Negative for arthralgias and gait problem.   Allergic/Immunologic: Positive for immunocompromised state. Negative for environmental allergies and food allergies.   Neurological:  Negative for dizziness, weakness and numbness.   Hematological:  Bruises/bleeds easily.        On eliquis   Psychiatric/Behavioral:  Negative for sleep disturbance. The patient is not nervous/anxious.      Objective:   There were no vitals taken for this visit.body mass index is unknown because there is no height or weight on file.    Physical Exam-VIRTUAL VISIT    Labs:  Lab Results   Component Value Date    WBC 5.86 07/10/2023    HGB 13.8 (L) 07/10/2023    HCT 43.4 07/10/2023     07/10/2023    K 4.5 07/10/2023     07/10/2023    CO2 24 07/10/2023    BUN 11 07/10/2023    CREATININE 1.8 (H) 07/10/2023    EGFRNORACEVR 47.9 (A) 07/10/2023     CALCIUM 9.6 07/10/2023    PHOS 2.2 (L) 07/10/2023    MG 1.8 07/10/2023    ALBUMIN 4.4 07/10/2023    AST 24 07/03/2023    ALT 38 07/03/2023    UTPCR 0.50 (H) 07/10/2023    .9 (H) 05/15/2023    TACROLIMUS 8.7 07/10/2023     Labs were reviewed with the patient    Assessment:     1. S/P kidney transplant    2. Long-term use of immunosuppressant medication    3. Stage 3a chronic kidney disease    4. Paroxysmal atrial fibrillation    5. Diabetic nephropathy associated with type 2 diabetes mellitus    6. At risk for opportunistic infections      Plan:   Morning lab work pending  Has cardiology follow up 7/26 for new onset afib          1. Immunosuppression: Prograf trough PENDING. Continue Prograf 1.5/1, MMF 1000 mg BID, and Prednisone 5 mg QD. Will continue to monitor for drug toxicities    2. Allograft Function: stable, continue good oral hydration  - ESRD secondary to diabetic nephropathy s/p living kidney transplant on 5/15/23 (thymo induction, CMV ++).   Lab Results   Component Value Date    CREATININE 1.8 (H) 07/10/2023       7/10/2023  POD 56   eGFR >60 mL/min/1.73 m^2 47.9 (A)       3. Hypertension management: advise low salt diet and home BP monitoring. Continue Coreg 6.25 mg BID   Afib: lopressor 25 mg BID (hold for SBP <100)    4. Metabolic Bone Disease/Secondary Hyperparathyroidism:  MagOx 400 mg BID,  mg TID, high phosphorous diet  Lab Results   Component Value Date    .9 (H) 05/15/2023    CALCIUM 9.6 07/10/2023    PHOS 2.2 (L) 07/10/2023       7/10/2023  POD 56   Magnesium 1.6 - 2.6 mg/dL 1.8       5. Electrolytes:  Will monitor /guidelines  Lab Results   Component Value Date     07/10/2023    K 4.5 07/10/2023     07/10/2023    CO2 24 07/10/2023       6. Anemia: stable. No need for intervention   Lab Results   Component Value Date    WBC 5.86 07/10/2023    HGB 13.8 (L) 07/10/2023    HCT 43.4 07/10/2023    MCV 93 07/10/2023     07/10/2023         7. Cytopenias: no  significant cytopenias. Medicine list reviewed including potential causes of drug-induced cytopenias    8.  Proteinuria: continue p/c ratio as per guidelines    7/10/2023  POD 56   Prot/Creat Ratio, Urine 0.00 - 0.20 0.50 (A)     9. BK virus infection screening:  will continue to monitor per guidelines   7/10/2023  POD 56   BK Virus DNA, Blood Not detected DETECTED (A)   BK Virus DNA PCR, Quant, Blood <125 Copies/mL <125     10. Weight education: provided during the clinic visit   There is no height or weight on file to calculate BMI.     11.Patient safety education regarding immunosuppression including prophylaxis posttransplant for CMV, PCP : Education provided about vaccination and prevention of infections     PCP ppx: Bactrim x 6 months (Stop 11/12/23)  CMV ppx: Valcyte x 3 months (Stop 8/14/23)       Follow-up:   Clinic: return to transplant clinic weekly for the first month after transplant; every 2 weeks during months 2-3; then at 6-, 9-, 12-, 18-, 24-, and 36- months post-transplant to reassess for complications from immunosuppression toxicity and monitor for rejection.  Annually thereafter.    Labs: since patient remains at high risk for rejection and drug-related complications that warrant close monitoring, labs will be ordered as follows: continue twice weekly CBC, renal panel, and drug level for first month; then same labs once weekly through 3rd month post-transplant.  Urine for UA and protein/creatinine ratio monthly.  Serum BK - PCR at 1-, 3-, 6-, 9-, 12-, 18-, 24-, 36-, 48-, and 60 months post-transplant.  Hepatic panel at 1-, 2-, 3-, 6-, 9-, 12-, 18-, 24-, and 36- months post-transplant.    Cinda Mccray NP       Education:   Material provided to the patient.  Patient reminded to call with any health changes since these can be early signs of significant complications.  Also, I advised the patient to be sure any new medications or changes of old medications are discussed with either a pharmacist  or physician knowledgeable with transplant to avoid rejection/drug toxicity related to significant drug interactions.    Patient advised that it is recommended that all transplanted patients, and their close contacts and household members receive Covid vaccination.

## 2023-07-14 NOTE — PHYSICIAN QUERY
PT Name: Robb Iglesias  MR #: [id number]    DOCUMENTATION CLARIFICATION     CDS/: Vanessa Borjas RN CDIS           Contact information: [email or web address]     This form is a permanent document in the medical record.     Query Date: July 14, 2023    By submitting this query, we are merely seeking further clarification of documentation. Please utilize your independent clinical judgment when addressing the question(s) below.    The Medical Record contains the following:   Indicators Supporting Clinical Findings Location in Medical Record   x Atrial Fibrillation Atrial fibrillation with RVR  Mr. Iglesias is a 42 y.o. year old M with history of ESRD secondary to diabetic nephropathy s/p living kidney transplant on 5/15/23 (thymo induction, CMV ++), HTN, DM2, NSTEMI s/p PCI FAMILIA LAD. Who was found to be in new onset AF w/ RVR, now on a dilt drip. Chadsvasc 3. First reported tachycardia of 106 on 5/29/23 found on his cuff pressure reading.      Recs:   - Given that patient converted overnight he will hold on LUNA  -Discharge on metoprolol 25 BID ( dose adjusted for perfusion concerns in the setting of recent renal transplant)  - Given a chadsvasc of 3 recommend starting anticoagulation w/ eliquis 5 mg BID for at least 4 weeks.  -Follow up with EP / Dr. Choudhary.  -Consider 30 day HOLTER ( discussed at bedside the use of smart watch)  - Replace electrolytes as needed, keep K>4 and Mag>2.   - Please contact us if any questions or concerns Cards note 6/5   x EKG Results Normal sinus rhythm   Normal ECG   When compared with ECG of 04-JUN-2023 05:38,   Sinus rhythm has replaced Atrial fibrillation   Confirmed by Hayden GERONIMO MD ([id number]) on 6/5/2023 2:57:17 PM  EKG 6/4   x Medication diltiaZEM 125 mg in dextrose 5% 125 mL infusion (non-titrating)  Rate: 2.5 mL/hr Dose: 2.5 mg/hr  Freq: Continuous Route: IV  Start: 06/04/23 0815 End: 06/04/23 2010    metoprolol tartrate (LOPRESSOR) tablet 25 mg  Dose: 25 mg  Freq: 4  times daily Route: Oral  Start: 06/04/23 0930 End: 06/05/23 1708 MAR           MAR     Treatment      Other       The clinical guidelines noted are only a system guideline. It does not replace the provider's clinical judgment.    Provider, please specify the type of Atrial Fibrillation (AF):    [ x ] Paroxysmal - defined as AF that terminates spontaneously or with intervention within < 7 days of onset, episodes may recur with variable frequency   [  ] Unspecified Atrial Fibrillation   [  ] Other cardiac diagnosis (please specify): ____________           Please document in your progress notes daily for the duration of treatment until resolved, and include in your discharge summary.    Reference:  DAVID Astorga MD. (2020, September 14). Overview of atrial fibrillation (JOSE JUAN Enriquez MD & ORLIN Cross MD, Eds.). Retrieved October 22, 2020, from [email or web address]?search=atrial%20fibrillation&source=search_result&selectedTitle=1~150&usage_type=default&display_rank=1    Form No. 17588

## 2023-07-14 NOTE — TELEPHONE ENCOUNTER
Pt stating that [facility] will not ship meds in time to pt, so pt now asking for meds to be sent to [facility].

## 2023-07-17 ENCOUNTER — OFFICE VISIT (OUTPATIENT)
Dept: TRANSPLANT | Facility: CLINIC | Age: 43
End: 2023-07-17
Payer: COMMERCIAL

## 2023-07-17 ENCOUNTER — LAB VISIT (OUTPATIENT)
Dept: LAB | Facility: HOSPITAL | Age: 43
End: 2023-07-17
Attending: INTERNAL MEDICINE
Payer: COMMERCIAL

## 2023-07-17 DIAGNOSIS — Z94.0 S/P KIDNEY TRANSPLANT: Primary | Chronic | ICD-10-CM

## 2023-07-17 DIAGNOSIS — Z91.89 AT RISK FOR OPPORTUNISTIC INFECTIONS: ICD-10-CM

## 2023-07-17 DIAGNOSIS — Z94.0 KIDNEY REPLACED BY TRANSPLANT: ICD-10-CM

## 2023-07-17 DIAGNOSIS — Z79.60 LONG-TERM USE OF IMMUNOSUPPRESSANT MEDICATION: ICD-10-CM

## 2023-07-17 DIAGNOSIS — I48.0 PAROXYSMAL ATRIAL FIBRILLATION: Chronic | ICD-10-CM

## 2023-07-17 DIAGNOSIS — N18.31 STAGE 3A CHRONIC KIDNEY DISEASE: Chronic | ICD-10-CM

## 2023-07-17 DIAGNOSIS — E11.21 DIABETIC NEPHROPATHY ASSOCIATED WITH TYPE 2 DIABETES MELLITUS: ICD-10-CM

## 2023-07-17 LAB
ALBUMIN SERPL BCP-MCNC: 4.4 G/DL (ref 3.5–5.2)
ANION GAP SERPL CALC-SCNC: 9 MMOL/L (ref 8–16)
BASOPHILS # BLD AUTO: 0.05 K/UL (ref 0–0.2)
BASOPHILS NFR BLD: 1 % (ref 0–1.9)
BUN SERPL-MCNC: 13 MG/DL (ref 6–20)
CALCIUM SERPL-MCNC: 9.5 MG/DL (ref 8.7–10.5)
CHLORIDE SERPL-SCNC: 108 MMOL/L (ref 95–110)
CO2 SERPL-SCNC: 24 MMOL/L (ref 23–29)
CREAT SERPL-MCNC: 1.6 MG/DL (ref 0.5–1.4)
DIFFERENTIAL METHOD: ABNORMAL
EOSINOPHIL # BLD AUTO: 0.1 K/UL (ref 0–0.5)
EOSINOPHIL NFR BLD: 2.8 % (ref 0–8)
ERYTHROCYTE [DISTWIDTH] IN BLOOD BY AUTOMATED COUNT: 15.4 % (ref 11.5–14.5)
EST. GFR  (NO RACE VARIABLE): 55.2 ML/MIN/1.73 M^2
GLUCOSE SERPL-MCNC: 115 MG/DL (ref 70–110)
HCT VFR BLD AUTO: 41.9 % (ref 40–54)
HGB BLD-MCNC: 13.3 G/DL (ref 14–18)
IMM GRANULOCYTES # BLD AUTO: 0.01 K/UL (ref 0–0.04)
IMM GRANULOCYTES NFR BLD AUTO: 0.2 % (ref 0–0.5)
LYMPHOCYTES # BLD AUTO: 0.6 K/UL (ref 1–4.8)
LYMPHOCYTES NFR BLD: 10.8 % (ref 18–48)
MAGNESIUM SERPL-MCNC: 1.9 MG/DL (ref 1.6–2.6)
MCH RBC QN AUTO: 29.8 PG (ref 27–31)
MCHC RBC AUTO-ENTMCNC: 31.7 G/DL (ref 32–36)
MCV RBC AUTO: 94 FL (ref 82–98)
MONOCYTES # BLD AUTO: 0.4 K/UL (ref 0.3–1)
MONOCYTES NFR BLD: 7.1 % (ref 4–15)
NEUTROPHILS # BLD AUTO: 4 K/UL (ref 1.8–7.7)
NEUTROPHILS NFR BLD: 78.1 % (ref 38–73)
NRBC BLD-RTO: 0 /100 WBC
PHOSPHATE SERPL-MCNC: 2.2 MG/DL (ref 2.7–4.5)
PLATELET # BLD AUTO: 174 K/UL (ref 150–450)
PMV BLD AUTO: 10.2 FL (ref 9.2–12.9)
POTASSIUM SERPL-SCNC: 4.3 MMOL/L (ref 3.5–5.1)
RBC # BLD AUTO: 4.46 M/UL (ref 4.6–6.2)
SODIUM SERPL-SCNC: 141 MMOL/L (ref 136–145)
WBC # BLD AUTO: 5.07 K/UL (ref 3.9–12.7)

## 2023-07-17 PROCEDURE — 4010F PR ACE/ARB THEARPY RXD/TAKEN: ICD-10-PCS | Mod: CPTII,95,, | Performed by: NURSE PRACTITIONER

## 2023-07-17 PROCEDURE — 99214 OFFICE O/P EST MOD 30 MIN: CPT | Mod: 95,,, | Performed by: NURSE PRACTITIONER

## 2023-07-17 PROCEDURE — 3044F HG A1C LEVEL LT 7.0%: CPT | Mod: CPTII,95,, | Performed by: NURSE PRACTITIONER

## 2023-07-17 PROCEDURE — 4010F ACE/ARB THERAPY RXD/TAKEN: CPT | Mod: CPTII,95,, | Performed by: NURSE PRACTITIONER

## 2023-07-17 PROCEDURE — 99214 PR OFFICE/OUTPT VISIT, EST, LEVL IV, 30-39 MIN: ICD-10-PCS | Mod: 95,,, | Performed by: NURSE PRACTITIONER

## 2023-07-17 PROCEDURE — 1160F PR REVIEW ALL MEDS BY PRESCRIBER/CLIN PHARMACIST DOCUMENTED: ICD-10-PCS | Mod: CPTII,95,, | Performed by: NURSE PRACTITIONER

## 2023-07-17 PROCEDURE — 3066F NEPHROPATHY DOC TX: CPT | Mod: CPTII,95,, | Performed by: NURSE PRACTITIONER

## 2023-07-17 PROCEDURE — 1160F RVW MEDS BY RX/DR IN RCRD: CPT | Mod: CPTII,95,, | Performed by: NURSE PRACTITIONER

## 2023-07-17 PROCEDURE — 36415 COLL VENOUS BLD VENIPUNCTURE: CPT | Performed by: INTERNAL MEDICINE

## 2023-07-17 PROCEDURE — 80197 ASSAY OF TACROLIMUS: CPT | Performed by: INTERNAL MEDICINE

## 2023-07-17 PROCEDURE — 1159F PR MEDICATION LIST DOCUMENTED IN MEDICAL RECORD: ICD-10-PCS | Mod: CPTII,95,, | Performed by: NURSE PRACTITIONER

## 2023-07-17 PROCEDURE — 1159F MED LIST DOCD IN RCRD: CPT | Mod: CPTII,95,, | Performed by: NURSE PRACTITIONER

## 2023-07-17 PROCEDURE — 3060F POS MICROALBUMINURIA REV: CPT | Mod: CPTII,95,, | Performed by: NURSE PRACTITIONER

## 2023-07-17 PROCEDURE — 85025 COMPLETE CBC W/AUTO DIFF WBC: CPT | Performed by: INTERNAL MEDICINE

## 2023-07-17 PROCEDURE — 3060F PR POS MICROALBUMINURIA RESULT DOCUMENTED/REVIEW: ICD-10-PCS | Mod: CPTII,95,, | Performed by: NURSE PRACTITIONER

## 2023-07-17 PROCEDURE — 3044F PR MOST RECENT HEMOGLOBIN A1C LEVEL <7.0%: ICD-10-PCS | Mod: CPTII,95,, | Performed by: NURSE PRACTITIONER

## 2023-07-17 PROCEDURE — 3072F PR LOW RISK FOR RETINOPATHY: ICD-10-PCS | Mod: CPTII,95,, | Performed by: NURSE PRACTITIONER

## 2023-07-17 PROCEDURE — 3066F PR DOCUMENTATION OF TREATMENT FOR NEPHROPATHY: ICD-10-PCS | Mod: CPTII,95,, | Performed by: NURSE PRACTITIONER

## 2023-07-17 PROCEDURE — 3072F LOW RISK FOR RETINOPATHY: CPT | Mod: CPTII,95,, | Performed by: NURSE PRACTITIONER

## 2023-07-17 PROCEDURE — 80069 RENAL FUNCTION PANEL: CPT | Performed by: INTERNAL MEDICINE

## 2023-07-17 PROCEDURE — 83735 ASSAY OF MAGNESIUM: CPT | Performed by: INTERNAL MEDICINE

## 2023-07-17 NOTE — LETTER
July 17, 2023        Siva Figueroa  [address] [address] NEPHROLOGY  [facility] [address]  Phone: [phone]  Fax: [phone]             [facility]- Transplant 1st Fl  [address]  [facility] [address]  Phone: [phone]   Patient: Robb Iglesias   MR Number: [id number]   YOB: 1980   Date of Visit: 7/17/2023       Dear Dr. Siva Figueroa    Thank you for referring Robb Iglesias to me for evaluation. Attached you will find relevant portions of my assessment and plan of care.    If you have questions, please do not hesitate to call me. I look forward to following Robb Iglesias along with you.    Sincerely,    Cinda Mccray NP    Enclosure    If you would like to receive this communication electronically, please contact [email or web address] or [phone] to request "[employer] Link access.    "[employer] Link is a tool which provides read-only access to select patient information with whom you have a relationship. Its easy to use and provides real time access to review your patients record including encounter summaries, notes, results, and demographic information.    If you feel you have received this communication in error or would no longer like to receive these types of communications, please e-mail [email or web address]

## 2023-07-18 LAB — TACROLIMUS BLD-MCNC: 6.5 NG/ML (ref 5–15)

## 2023-07-23 ENCOUNTER — PATIENT MESSAGE (OUTPATIENT)
Dept: TRANSPLANT | Facility: CLINIC | Age: 43
End: 2023-07-23
Payer: COMMERCIAL

## 2023-07-24 ENCOUNTER — LAB VISIT (OUTPATIENT)
Dept: LAB | Facility: HOSPITAL | Age: 43
End: 2023-07-24
Attending: INTERNAL MEDICINE
Payer: COMMERCIAL

## 2023-07-24 DIAGNOSIS — Z94.0 KIDNEY REPLACED BY TRANSPLANT: ICD-10-CM

## 2023-07-24 DIAGNOSIS — E87.8 ELECTROLYTE ABNORMALITY: ICD-10-CM

## 2023-07-24 LAB
ALBUMIN SERPL BCP-MCNC: 4.3 G/DL (ref 3.5–5.2)
ANION GAP SERPL CALC-SCNC: 8 MMOL/L (ref 8–16)
BASOPHILS # BLD AUTO: 0.04 K/UL (ref 0–0.2)
BASOPHILS NFR BLD: 0.9 % (ref 0–1.9)
BUN SERPL-MCNC: 13 MG/DL (ref 6–20)
CALCIUM SERPL-MCNC: 9.5 MG/DL (ref 8.7–10.5)
CHLORIDE SERPL-SCNC: 108 MMOL/L (ref 95–110)
CO2 SERPL-SCNC: 24 MMOL/L (ref 23–29)
CREAT SERPL-MCNC: 1.7 MG/DL (ref 0.5–1.4)
DIFFERENTIAL METHOD: ABNORMAL
EOSINOPHIL # BLD AUTO: 0.1 K/UL (ref 0–0.5)
EOSINOPHIL NFR BLD: 2.8 % (ref 0–8)
ERYTHROCYTE [DISTWIDTH] IN BLOOD BY AUTOMATED COUNT: 14.3 % (ref 11.5–14.5)
EST. GFR  (NO RACE VARIABLE): 51.3 ML/MIN/1.73 M^2
GLUCOSE SERPL-MCNC: 147 MG/DL (ref 70–110)
HCT VFR BLD AUTO: 40.9 % (ref 40–54)
HGB BLD-MCNC: 13.2 G/DL (ref 14–18)
IMM GRANULOCYTES # BLD AUTO: 0.01 K/UL (ref 0–0.04)
IMM GRANULOCYTES NFR BLD AUTO: 0.2 % (ref 0–0.5)
LYMPHOCYTES # BLD AUTO: 0.5 K/UL (ref 1–4.8)
LYMPHOCYTES NFR BLD: 11.1 % (ref 18–48)
MAGNESIUM SERPL-MCNC: 1.7 MG/DL (ref 1.6–2.6)
MCH RBC QN AUTO: 29.9 PG (ref 27–31)
MCHC RBC AUTO-ENTMCNC: 32.3 G/DL (ref 32–36)
MCV RBC AUTO: 93 FL (ref 82–98)
MONOCYTES # BLD AUTO: 0.4 K/UL (ref 0.3–1)
MONOCYTES NFR BLD: 9.4 % (ref 4–15)
NEUTROPHILS # BLD AUTO: 3.5 K/UL (ref 1.8–7.7)
NEUTROPHILS NFR BLD: 75.6 % (ref 38–73)
NRBC BLD-RTO: 0 /100 WBC
PHOSPHATE SERPL-MCNC: 1.8 MG/DL (ref 2.7–4.5)
PLATELET # BLD AUTO: 178 K/UL (ref 150–450)
PMV BLD AUTO: 10.3 FL (ref 9.2–12.9)
POTASSIUM SERPL-SCNC: 4.6 MMOL/L (ref 3.5–5.1)
RBC # BLD AUTO: 4.42 M/UL (ref 4.6–6.2)
SODIUM SERPL-SCNC: 140 MMOL/L (ref 136–145)
WBC # BLD AUTO: 4.67 K/UL (ref 3.9–12.7)

## 2023-07-24 PROCEDURE — 83735 ASSAY OF MAGNESIUM: CPT | Performed by: INTERNAL MEDICINE

## 2023-07-24 PROCEDURE — 80069 RENAL FUNCTION PANEL: CPT | Performed by: INTERNAL MEDICINE

## 2023-07-24 PROCEDURE — 36415 COLL VENOUS BLD VENIPUNCTURE: CPT | Performed by: INTERNAL MEDICINE

## 2023-07-24 PROCEDURE — 85025 COMPLETE CBC W/AUTO DIFF WBC: CPT | Performed by: INTERNAL MEDICINE

## 2023-07-24 PROCEDURE — 80197 ASSAY OF TACROLIMUS: CPT | Performed by: INTERNAL MEDICINE

## 2023-07-24 NOTE — PROGRESS NOTES
Subjective:   Patient ID:  Robb Iglesias is a 42 y.o. male who presents for follow-up of Atrial Fibrillation (Pt received kidney transplant and after went into a fib converted with meds in er back 6 weeks ago)      42 yoM CAD s/p MI x 2, ESRD s/p renal transplant here for AF management.     6/23: He started HD October 2022 after 6 years of CKD. He underwent renal transplant 5/15/23. He developed symptomatic AF/RVR 9PM 6/3/23 leading to an ER visit at . He converted on diltiazem infusion and was discharged. He was started on eliquis as well. Holter on discharge showed on recurrence.     Interval history: No recurrence by symptoms and by watch surveillance.     Echo 12/22:  · The test was stopped because the patient experienced fatigue and shortness of breath. The patient requested the test to be stopped.  · The patient's exercise capacity was normal.  · During stress, the following significant arrhythmias were observed: occasional PVCs.  · The ECG portion of this study is negative for myocardial ischemia.  · The stress echo portion of this study is negative for myocardial ischemia.  · The left ventricle is normal in size with concentric remodeling and normal systolic function.  · The estimated ejection fraction is 55%.  · Normal left ventricular diastolic function.  · Normal right ventricular size with normal right ventricular systolic function.    Past Medical History:  No date: Allergic rhinitis  6/4/2023: Atrial fibrillation with RVR  4/7/2017: Class 1 obesity due to excess calories with serious   comorbidity and body mass index (BMI) of 31.0 to 31.9 in adult  No date: Coronary artery disease  2/6/2018: Coronary artery disease involving native coronary artery of   native heart without angina pectoris      Comment:  Cath lab procedure 04/23/2018 (Naveen Rios MD) A.                Indication/Pre-Operative Diagnosis: The patient is a 37                year old male that was referred for catheterization  by                [facility] for ACS (NSTEMI). The BELLA risk score is               5.  B. Summary/Post-Operative Diagnosis 1. Single vessel                coronary artery disease. 2. Normal LVEF. 3. Diastolic                dysfunction. 4. Successful PCI for acute myocardial                infarction. 5.   1/6/2020: Diabetic nephropathy associated with type 2 diabetes   mellitus  3/24/2018: Direct hyperbilirubinemia  2008: DM (diabetes mellitus)      Comment:  BS doesn't check any more 08/02/2018  2012: DM (diabetes mellitus)      Comment:  BS 99 am 06/26/2020  No date: DM (diabetes mellitus)      Comment:  BS didn't check 06/04/2021 2008: DM (diabetes mellitus)      Comment:  BS didn't check 07/29/2022 7/31/2017: Dyslipidemia associated with type 2 diabetes mellitus  3/21/2018: Elevated bilirubin  No date: GERD (gastroesophageal reflux disease)  No date: Gout  No date: Hyperlipidemia  6/7/2021: Hyperparathyroidism, secondary to chronic kidney disease  No date: Hypertension associated with chronic kidney disease due to   type 2 diabetes mellitus  3/16/2019: Hypertension complicating diabetes      Comment:  Not candidate for Hypertension Digital Medicine program                due to dialysis status. Didn't tolerate amlodipine due to               SE of hypotension.  7/19/2017: Idiopathic chronic gout, multiple sites, without tophus   (tophi)  No date: Long term (current) use of insulin  07/2017: MI (myocardial infarction)  7/31/2017: NSTEMI with CAD s/p PCI (FAMILIA) of LAD x 2 in 8/2017  No date: Obesity  No date: HENRY on CPAP  1/26/2023: Peritoneal dialysis catheter in place  No date: Proteinuria  7/31/2017: Severe obstructive sleep apnea - Intolerant of CPAP      Comment:  Intolerant of CPAP after weeks of trying multiple                interfaces. SPLIT-NIGHT SLEEP STUDY 7/28/2015 · SLEEP                ARCHITECTURE: Sleep onset was 2.9 minutes and sleep                efficiency was 94.4%. Sleep Stage  distribution showed 145               sleep stage changes, 6 awakenings and 119 arousals. Sleep               distribution showed 53.2% stage NI, 41.8% stage N 11,                0.0% stage N Ill and REM sleep was at 5.0%. There were 2                REM periods. · RE  5/26/2023: Stage 3a chronic kidney disease  6/7/2017: Stage 5 chronic kidney disease on chronic peritoneal   dialysis  6/7/2017: Stage 5 chronic kidney disease on chronic peritoneal   dialysis  8/4/2017: Status post angioplasty with stent  No date: Steatohepatitis      Comment:  Fatty Liver  6/21/2017: Type 2 diabetes mellitus with chronic kidney disease on   chronic dialysis, without long-term current use of insulin  No date: Type 2 diabetes mellitus with diabetic nephropathy  1/6/2020: Type 2 diabetes mellitus with diabetic nephropathy, without   long-term current use of insulin  6/7/2021: Type 2 diabetes mellitus with diabetic polyneuropathy,   without long-term current use of insulin  No date: Type 2 diabetes mellitus with hyperglycemia  No date: Type 2 diabetes mellitus with renal manifestations  6/21/2017: Type 2 diabetes mellitus with stage 3 chronic kidney   disease, without long-term current use of insulin    Past Surgical History:  No date: CORONARY ANGIOPLASTY WITH STENT PLACEMENT  No date: CYSTOSCOPY  05/15/2023: KIDNEY TRANSPLANT; N/A      Comment:  Procedure: TRANSPLANT, KIDNEY;  Surgeon: Reji Hinojosa MD;  Location: [address];  Service: Transplant;                 Laterality: N/A;  IN ROOM: 10:37OUT OF                ICE:10:53REPERFUSION TIME: 11:21  No date: LASIK; Bilateral  No date: LIVER BIOPSY  No date: NASAL ENDOSCOPY  No date: NASAL SEPTUM SURGERY  05/15/2023: PERITONEAL CATHETER REMOVAL; N/A      Comment:  Procedure: REMOVAL, CATHETER, DIALYSIS, PERITONEAL;                 Surgeon: Reji Hinojosa MD;  Location: [facility] OR [address] [place] [address];                Service: Transplant;  Laterality: N/A;  03/06/2017: SLEEVE  GASTROPLASTY    Social History    Socioeconomic History      Marital status:       Spouse name: Shannon Iglesias      Number of children: 2      Years of education: Some College    Tobacco Use      Smoking status: Never      Smokeless tobacco: Never    Substance and Sexual Activity      Alcohol use: No        Comment: 1-2 times per month      Drug use: No      Sexual activity: Yes        Partners: Female    Social History Narrative      Full time employed,  with 2 children.      Caregiver Shannon     Review of patient's family history indicates:    Current Outpatient Medications:  apixaban (ELIQUIS) 5 mg Tab, Take 1 tablet (5 mg total) by mouth 2 (two) times daily., Disp: 180 tablet, Rfl: 3  aspirin (ECOTRIN) 81 MG EC tablet, Take 1 tablet (81 mg total) by mouth once daily., Disp: 90 tablet, Rfl: 3  atorvastatin (LIPITOR) 80 MG tablet, Take 1 tablet (80 mg total) by mouth every evening., Disp: 90 tablet, Rfl: 3  blood sugar diagnostic Strp, Check blood glucose 2 times daily as directed and as needed, Disp: 200 each, Rfl: 5  blood-glucose meter (ONETOUCH ULTRAMINI) kit, Use as instructed, Disp: 1 each, Rfl: 0  dulaglutide (TRULICITY) 3 mg/0.5 mL pen injector, Inject 3 mg into the skin every 7 days., Disp: 12 pen, Rfl: 1  ezetimibe (ZETIA) 10 mg tablet, Take 1 tablet (10 mg total) by mouth once daily., Disp: 90 tablet, Rfl: 3  k phos di & mono-sod phos mono (K-PHOS-NEUTRAL) 250 mg Tab, Take 2 tablets by mouth 3 (three) times daily., Disp: 180 tablet, Rfl: 11  lancets Misc, Check blood glucose 2 times daily as directed and as needed (dispense insurance preferred brand or patient choice), Disp: 200 each, Rfl: 5  magnesium oxide (MAG-OX) 400 mg (241.3 mg magnesium) tablet, Take 1 tablet (400 mg total) by mouth 2 (two) times daily., Disp: 60 tablet, Rfl: 11  metoprolol tartrate (LOPRESSOR) 25 MG tablet, Take 1 tablet (25 mg total) by mouth 2 (two) times daily., Disp: 60 tablet, Rfl: 11  multivitamin Tab, Take 1  "tablet by mouth once daily., Disp: , Rfl:   mycophenolate (CELLCEPT) 250 mg Cap, Take 4 capsules (1,000 mg total) by mouth 2 (two) times daily., Disp: 240 capsule, Rfl: 11  nitroGLYCERIN (NITROSTAT) 0.4 MG SL tablet, Dissolve one tablet underneath tongue at onset of angina; may repeat every 5 minutes if needed. Call 911 if angina persists after 2 doses., Disp: 25 tablet, Rfl: 5  pantoprazole (PROTONIX) 40 MG tablet, Take 1 tablet (40 mg total) by mouth once daily., Disp: 30 tablet, Rfl: 11  predniSONE (DELTASONE) 5 MG tablet, Take by mouth daily; 5/18-5/24: 20 mg; 5/25-5/31: 15 mg; 6/1-6/7: 10 mg; 6/8/23- thereafter: 5 mg daily; do not stop, Disp: 70 tablet, Rfl: 11  sulfamethoxazole-trimethoprim 400-80mg (BACTRIM,SEPTRA) 400-80 mg per tablet, Take 1 tablet by mouth every morning. Stop 11/12/23, Disp: 30 tablet, Rfl: 5  tacrolimus (PROGRAF) 0.5 MG Cap, Take 3 capsules (1.5 mg total) by mouth every morning AND 2 capsules (1 mg total) every evening., Disp: 150 capsule, Rfl: 11  valGANciclovir (VALCYTE) 450 mg Tab, Take 2 tablets (900 mg total) by mouth every morning. Stop 8/14/23, Disp: 60 tablet, Rfl: 2  insulin lispro-aabc (LYUMJEV KWIKPEN U-100 INSULIN) 100 unit/mL pen, Inject 4 Units into the skin 3 (three) times daily with meals. Plus sliding scale insulin. Max 27 units (Patient not taking: Reported on 7/26/2023), Disp: 2 pen, Rfl: 7  pen needle, diabetic (BD ULTRA-FINE SHORT PEN NEEDLE) 31 gauge x 5/16" Ndle, Use to inject insulin into the skin 3 times daily (Patient not taking: Reported on 6/30/2023), Disp: 100 each, Rfl: 1    No current facility-administered medications for this visit.      Review of Systems   Constitutional: Negative.   HENT: Negative.     Eyes: Negative.    Cardiovascular:  Negative for chest pain, dyspnea on exertion, leg swelling, near-syncope, palpitations and syncope.   Respiratory: Negative.  Negative for shortness of breath.    Endocrine: Negative.    Hematologic/Lymphatic: Negative.  "   Skin: Negative.    Musculoskeletal: Negative.    Gastrointestinal: Negative.    Genitourinary: Negative.    Neurological: Negative.  Negative for dizziness and light-headedness.   Psychiatric/Behavioral: Negative.     Allergic/Immunologic: Negative.      Objective:   Physical Exam  Vitals reviewed.   Constitutional:       General: He is not in acute distress.     Appearance: He is well-developed.   HENT:      Head: Normocephalic and atraumatic.   Eyes:      Pupils: Pupils are equal, round, and reactive to light.   Neck:      Thyroid: No thyromegaly.      Vascular: No JVD.   Cardiovascular:      Rate and Rhythm: Normal rate and regular rhythm.      Chest Wall: PMI is not displaced.      Heart sounds: Normal heart sounds, S1 normal and S2 normal. No murmur heard.    No friction rub. No gallop.   Pulmonary:      Effort: Pulmonary effort is normal. No respiratory distress.      Breath sounds: Normal breath sounds. No wheezing or rales.   Abdominal:      General: Bowel sounds are normal. There is no distension.      Palpations: Abdomen is soft.      Tenderness: There is no abdominal tenderness. There is no guarding or rebound.   Musculoskeletal:         General: No tenderness. Normal range of motion.      Cervical back: Normal range of motion.   Skin:     General: Skin is warm and dry.      Findings: No erythema or rash.   Neurological:      Mental Status: He is alert and oriented to person, place, and time.      Cranial Nerves: No cranial nerve deficit.   Psychiatric:         Behavior: Behavior normal.         Thought Content: Thought content normal.         Judgment: Judgment normal.       Assessment:      1. Paroxysmal atrial fibrillation    2. S/P kidney transplant        Plan:     42 yoM here for follow up. He has had a single AF episode within a couple weeks of his renal transplant. No subsequent events. He can stop his eliquis. If further events, would treat as de cl AF. RTC 3m

## 2023-07-24 NOTE — TELEPHONE ENCOUNTER
Pt requesting k phos script be sent to [facility] due to insurance reasons.

## 2023-07-25 LAB — TACROLIMUS BLD-MCNC: 6.8 NG/ML (ref 5–15)

## 2023-07-26 ENCOUNTER — OFFICE VISIT (OUTPATIENT)
Dept: CARDIOLOGY | Facility: CLINIC | Age: 43
End: 2023-07-26
Payer: COMMERCIAL

## 2023-07-26 VITALS
WEIGHT: 220.25 LBS | DIASTOLIC BLOOD PRESSURE: 80 MMHG | HEIGHT: 75 IN | BODY MASS INDEX: 27.38 KG/M2 | SYSTOLIC BLOOD PRESSURE: 106 MMHG | HEART RATE: 72 BPM | OXYGEN SATURATION: 98 %

## 2023-07-26 DIAGNOSIS — Z94.0 S/P KIDNEY TRANSPLANT: Chronic | ICD-10-CM

## 2023-07-26 DIAGNOSIS — I48.0 PAROXYSMAL ATRIAL FIBRILLATION: Primary | Chronic | ICD-10-CM

## 2023-07-26 PROCEDURE — 1159F PR MEDICATION LIST DOCUMENTED IN MEDICAL RECORD: ICD-10-PCS | Mod: CPTII,S$GLB,, | Performed by: INTERNAL MEDICINE

## 2023-07-26 PROCEDURE — 4010F PR ACE/ARB THEARPY RXD/TAKEN: ICD-10-PCS | Mod: CPTII,S$GLB,, | Performed by: INTERNAL MEDICINE

## 2023-07-26 PROCEDURE — 1160F PR REVIEW ALL MEDS BY PRESCRIBER/CLIN PHARMACIST DOCUMENTED: ICD-10-PCS | Mod: CPTII,S$GLB,, | Performed by: INTERNAL MEDICINE

## 2023-07-26 PROCEDURE — 3079F DIAST BP 80-89 MM HG: CPT | Mod: CPTII,S$GLB,, | Performed by: INTERNAL MEDICINE

## 2023-07-26 PROCEDURE — 99999 PR PBB SHADOW E&M-EST. PATIENT-LVL V: ICD-10-PCS | Mod: PBBFAC,,, | Performed by: INTERNAL MEDICINE

## 2023-07-26 PROCEDURE — 3060F PR POS MICROALBUMINURIA RESULT DOCUMENTED/REVIEW: ICD-10-PCS | Mod: CPTII,S$GLB,, | Performed by: INTERNAL MEDICINE

## 2023-07-26 PROCEDURE — 3066F PR DOCUMENTATION OF TREATMENT FOR NEPHROPATHY: ICD-10-PCS | Mod: CPTII,S$GLB,, | Performed by: INTERNAL MEDICINE

## 2023-07-26 PROCEDURE — 3074F PR MOST RECENT SYSTOLIC BLOOD PRESSURE < 130 MM HG: ICD-10-PCS | Mod: CPTII,S$GLB,, | Performed by: INTERNAL MEDICINE

## 2023-07-26 PROCEDURE — 99213 PR OFFICE/OUTPT VISIT, EST, LEVL III, 20-29 MIN: ICD-10-PCS | Mod: S$GLB,,, | Performed by: INTERNAL MEDICINE

## 2023-07-26 PROCEDURE — 3072F LOW RISK FOR RETINOPATHY: CPT | Mod: CPTII,S$GLB,, | Performed by: INTERNAL MEDICINE

## 2023-07-26 PROCEDURE — 3074F SYST BP LT 130 MM HG: CPT | Mod: CPTII,S$GLB,, | Performed by: INTERNAL MEDICINE

## 2023-07-26 PROCEDURE — 3060F POS MICROALBUMINURIA REV: CPT | Mod: CPTII,S$GLB,, | Performed by: INTERNAL MEDICINE

## 2023-07-26 PROCEDURE — 3066F NEPHROPATHY DOC TX: CPT | Mod: CPTII,S$GLB,, | Performed by: INTERNAL MEDICINE

## 2023-07-26 PROCEDURE — 3072F PR LOW RISK FOR RETINOPATHY: ICD-10-PCS | Mod: CPTII,S$GLB,, | Performed by: INTERNAL MEDICINE

## 2023-07-26 PROCEDURE — 99999 PR PBB SHADOW E&M-EST. PATIENT-LVL V: CPT | Mod: PBBFAC,,, | Performed by: INTERNAL MEDICINE

## 2023-07-26 PROCEDURE — 3079F PR MOST RECENT DIASTOLIC BLOOD PRESSURE 80-89 MM HG: ICD-10-PCS | Mod: CPTII,S$GLB,, | Performed by: INTERNAL MEDICINE

## 2023-07-26 PROCEDURE — 1160F RVW MEDS BY RX/DR IN RCRD: CPT | Mod: CPTII,S$GLB,, | Performed by: INTERNAL MEDICINE

## 2023-07-26 PROCEDURE — 3008F BODY MASS INDEX DOCD: CPT | Mod: CPTII,S$GLB,, | Performed by: INTERNAL MEDICINE

## 2023-07-26 PROCEDURE — 3044F PR MOST RECENT HEMOGLOBIN A1C LEVEL <7.0%: ICD-10-PCS | Mod: CPTII,S$GLB,, | Performed by: INTERNAL MEDICINE

## 2023-07-26 PROCEDURE — 3008F PR BODY MASS INDEX (BMI) DOCUMENTED: ICD-10-PCS | Mod: CPTII,S$GLB,, | Performed by: INTERNAL MEDICINE

## 2023-07-26 PROCEDURE — 1159F MED LIST DOCD IN RCRD: CPT | Mod: CPTII,S$GLB,, | Performed by: INTERNAL MEDICINE

## 2023-07-26 PROCEDURE — 3044F HG A1C LEVEL LT 7.0%: CPT | Mod: CPTII,S$GLB,, | Performed by: INTERNAL MEDICINE

## 2023-07-26 PROCEDURE — 4010F ACE/ARB THERAPY RXD/TAKEN: CPT | Mod: CPTII,S$GLB,, | Performed by: INTERNAL MEDICINE

## 2023-07-26 PROCEDURE — 99213 OFFICE O/P EST LOW 20 MIN: CPT | Mod: S$GLB,,, | Performed by: INTERNAL MEDICINE

## 2023-07-26 RX ORDER — LANOLIN ALCOHOL/MO/W.PET/CERES
1 CREAM (GRAM) TOPICAL DAILY
COMMUNITY
Start: 2023-07-03 | End: 2023-07-26 | Stop reason: SDUPTHER

## 2023-07-31 ENCOUNTER — PATIENT MESSAGE (OUTPATIENT)
Dept: INTERNAL MEDICINE | Facility: CLINIC | Age: 43
End: 2023-07-31
Payer: COMMERCIAL

## 2023-07-31 ENCOUNTER — PATIENT MESSAGE (OUTPATIENT)
Dept: TRANSPLANT | Facility: CLINIC | Age: 43
End: 2023-07-31
Payer: COMMERCIAL

## 2023-07-31 ENCOUNTER — LAB VISIT (OUTPATIENT)
Dept: LAB | Facility: HOSPITAL | Age: 43
End: 2023-07-31
Attending: INTERNAL MEDICINE
Payer: COMMERCIAL

## 2023-07-31 ENCOUNTER — NURSE TRIAGE (OUTPATIENT)
Dept: ADMINISTRATIVE | Facility: CLINIC | Age: 43
End: 2023-07-31
Payer: COMMERCIAL

## 2023-07-31 DIAGNOSIS — Z94.0 KIDNEY REPLACED BY TRANSPLANT: ICD-10-CM

## 2023-07-31 LAB
ALBUMIN SERPL BCP-MCNC: 4.4 G/DL (ref 3.5–5.2)
ANION GAP SERPL CALC-SCNC: 9 MMOL/L (ref 8–16)
BASOPHILS # BLD AUTO: 0.05 K/UL (ref 0–0.2)
BASOPHILS NFR BLD: 1 % (ref 0–1.9)
BUN SERPL-MCNC: 13 MG/DL (ref 6–20)
CALCIUM SERPL-MCNC: 9.4 MG/DL (ref 8.7–10.5)
CHLORIDE SERPL-SCNC: 107 MMOL/L (ref 95–110)
CO2 SERPL-SCNC: 23 MMOL/L (ref 23–29)
CREAT SERPL-MCNC: 1.6 MG/DL (ref 0.5–1.4)
DIFFERENTIAL METHOD: ABNORMAL
EOSINOPHIL # BLD AUTO: 0.1 K/UL (ref 0–0.5)
EOSINOPHIL NFR BLD: 2.8 % (ref 0–8)
ERYTHROCYTE [DISTWIDTH] IN BLOOD BY AUTOMATED COUNT: 14.8 % (ref 11.5–14.5)
EST. GFR  (NO RACE VARIABLE): 55.2 ML/MIN/1.73 M^2
GLUCOSE SERPL-MCNC: 151 MG/DL (ref 70–110)
HCT VFR BLD AUTO: 43.9 % (ref 40–54)
HGB BLD-MCNC: 13.9 G/DL (ref 14–18)
IMM GRANULOCYTES # BLD AUTO: 0.02 K/UL (ref 0–0.04)
IMM GRANULOCYTES NFR BLD AUTO: 0.4 % (ref 0–0.5)
LYMPHOCYTES # BLD AUTO: 0.5 K/UL (ref 1–4.8)
LYMPHOCYTES NFR BLD: 10 % (ref 18–48)
MAGNESIUM SERPL-MCNC: 2 MG/DL (ref 1.6–2.6)
MCH RBC QN AUTO: 30 PG (ref 27–31)
MCHC RBC AUTO-ENTMCNC: 31.7 G/DL (ref 32–36)
MCV RBC AUTO: 95 FL (ref 82–98)
MONOCYTES # BLD AUTO: 0.5 K/UL (ref 0.3–1)
MONOCYTES NFR BLD: 10 % (ref 4–15)
NEUTROPHILS # BLD AUTO: 3.8 K/UL (ref 1.8–7.7)
NEUTROPHILS NFR BLD: 75.8 % (ref 38–73)
NRBC BLD-RTO: 0 /100 WBC
PHOSPHATE SERPL-MCNC: 2.2 MG/DL (ref 2.7–4.5)
PLATELET # BLD AUTO: 172 K/UL (ref 150–450)
PMV BLD AUTO: 10 FL (ref 9.2–12.9)
POTASSIUM SERPL-SCNC: 4.8 MMOL/L (ref 3.5–5.1)
RBC # BLD AUTO: 4.64 M/UL (ref 4.6–6.2)
SODIUM SERPL-SCNC: 139 MMOL/L (ref 136–145)
WBC # BLD AUTO: 4.99 K/UL (ref 3.9–12.7)

## 2023-07-31 PROCEDURE — 80069 RENAL FUNCTION PANEL: CPT | Performed by: INTERNAL MEDICINE

## 2023-07-31 PROCEDURE — 85025 COMPLETE CBC W/AUTO DIFF WBC: CPT | Performed by: INTERNAL MEDICINE

## 2023-07-31 PROCEDURE — 80197 ASSAY OF TACROLIMUS: CPT | Performed by: INTERNAL MEDICINE

## 2023-07-31 PROCEDURE — 83735 ASSAY OF MAGNESIUM: CPT | Performed by: INTERNAL MEDICINE

## 2023-07-31 PROCEDURE — 36415 COLL VENOUS BLD VENIPUNCTURE: CPT | Performed by: INTERNAL MEDICINE

## 2023-08-01 ENCOUNTER — PATIENT MESSAGE (OUTPATIENT)
Dept: TRANSPLANT | Facility: CLINIC | Age: 43
End: 2023-08-01
Payer: COMMERCIAL

## 2023-08-01 LAB — TACROLIMUS BLD-MCNC: 8.1 NG/ML (ref 5–15)

## 2023-08-01 NOTE — TELEPHONE ENCOUNTER
KIDNEY TRANSPLANT 5/15/23    Pt tested + for COVID today. States his symptoms began Saturday. Denies any fever, CP, SOB and cough. States he has had diarrhea, body aches, runny nose, and sore throat. Per protocol advised to CALL PCP within 24 hours. verbalized understanding. Advised per BPA 21 of OTC medication that is safe. Also advised of OCA. verbalized understanding. Denies any further questions or concerns at this time, advised to call back if they have any that come up. Advised pt to call back with any other concerns or worsening symptoms. Verbalized understanding and will route message to provider.       Reason for Disposition   [1] HIGH RISK patient (e.g., weak immune system, age > 64 years, obesity with BMI 30 or higher, pregnant, chronic lung disease or other chronic medical condition) AND [2] COVID symptoms (e.g., cough, fever)  (Exceptions: Already seen by PCP and no new or worsening symptoms.)    Additional Information   Negative: SEVERE difficulty breathing (e.g., struggling for each breath, speaks in single words)   Negative: Bluish (or gray) lips or face now   Negative: Difficult to awaken or acting confused (e.g., disoriented, slurred speech)   Negative: Shock suspected (e.g., cold/pale/clammy skin, too weak to stand, low BP, rapid pulse)   Negative: Sounds like a life-threatening emergency to the triager   Negative: SEVERE or constant chest pain or pressure  (Exception: Mild central chest pain, present only when coughing.)   Negative: MODERATE difficulty breathing (e.g., speaks in phrases, SOB even at rest, pulse 100-120)   Negative: [1] Headache AND [2] stiff neck (can't touch chin to chest)   Negative: Oxygen level (e.g., pulse oximetry) 90 percent or lower   Negative: Chest pain or pressure  (Exception: MILD central chest pain, present only when coughing.)   Negative: [1] Drinking very little AND [2] dehydration suspected (e.g., no urine > 12 hours, very dry mouth, very lightheaded)   Negative:  Patient sounds very sick or weak to the triager   Negative: MILD difficulty breathing (e.g., minimal/no SOB at rest, SOB with walking, pulse <100)   Negative: Fever > 103 F (39.4 C)   Negative: [1] Fever > 101 F (38.3 C) AND [2] age > 60 years   Negative: [1] Fever > 100.0 F (37.8 C) AND [2] bedridden (e.g., CVA, chronic illness, recovering from surgery)   Negative: Oxygen level (e.g., pulse oximetry) 91 to 94 percent    Protocols used: Coronavirus (COVID-19) Diagnosed or [id number]

## 2023-08-03 ENCOUNTER — OFFICE VISIT (OUTPATIENT)
Dept: OPHTHALMOLOGY | Facility: CLINIC | Age: 43
End: 2023-08-03
Payer: COMMERCIAL

## 2023-08-03 ENCOUNTER — PATIENT MESSAGE (OUTPATIENT)
Dept: OPHTHALMOLOGY | Facility: CLINIC | Age: 43
End: 2023-08-03

## 2023-08-03 DIAGNOSIS — Z98.890 STATUS POST BILATERAL LASIK SURGERY: ICD-10-CM

## 2023-08-03 DIAGNOSIS — H35.411 LATTICE DEGENERATION OF RIGHT RETINA: ICD-10-CM

## 2023-08-03 DIAGNOSIS — Z46.0 ENCOUNTER FOR FITTING OR ADJUSTMENT OF SPECTACLES OR CONTACT LENSES: ICD-10-CM

## 2023-08-03 DIAGNOSIS — E11.9 DIABETES MELLITUS TYPE 2 WITHOUT RETINOPATHY: Primary | ICD-10-CM

## 2023-08-03 DIAGNOSIS — H52.13 MYOPIA, BILATERAL: ICD-10-CM

## 2023-08-03 DIAGNOSIS — I10 ESSENTIAL HYPERTENSION: ICD-10-CM

## 2023-08-03 PROCEDURE — 92310 CONTACT LENS FITTING OU: CPT | Mod: ,,, | Performed by: OPTOMETRIST

## 2023-08-03 PROCEDURE — 92015 DETERMINE REFRACTIVE STATE: CPT | Mod: S$GLB,,, | Performed by: OPTOMETRIST

## 2023-08-03 PROCEDURE — 92014 PR EYE EXAM, EST PATIENT,COMPREHESV: ICD-10-PCS | Mod: S$GLB,,, | Performed by: OPTOMETRIST

## 2023-08-03 PROCEDURE — 3060F POS MICROALBUMINURIA REV: CPT | Mod: CPTII,S$GLB,, | Performed by: OPTOMETRIST

## 2023-08-03 PROCEDURE — 92310 PR CONTACT LENS FITTING (NO CHANGE): ICD-10-PCS | Mod: ,,, | Performed by: OPTOMETRIST

## 2023-08-03 PROCEDURE — 4010F PR ACE/ARB THEARPY RXD/TAKEN: ICD-10-PCS | Mod: CPTII,S$GLB,, | Performed by: OPTOMETRIST

## 2023-08-03 PROCEDURE — 3044F HG A1C LEVEL LT 7.0%: CPT | Mod: CPTII,S$GLB,, | Performed by: OPTOMETRIST

## 2023-08-03 PROCEDURE — 99999 PR PBB SHADOW E&M-EST. PATIENT-LVL III: CPT | Mod: PBBFAC,,, | Performed by: OPTOMETRIST

## 2023-08-03 PROCEDURE — 92015 PR REFRACTION: ICD-10-PCS | Mod: S$GLB,,, | Performed by: OPTOMETRIST

## 2023-08-03 PROCEDURE — 3060F PR POS MICROALBUMINURIA RESULT DOCUMENTED/REVIEW: ICD-10-PCS | Mod: CPTII,S$GLB,, | Performed by: OPTOMETRIST

## 2023-08-03 PROCEDURE — 99999 PR PBB SHADOW E&M-EST. PATIENT-LVL III: ICD-10-PCS | Mod: PBBFAC,,, | Performed by: OPTOMETRIST

## 2023-08-03 PROCEDURE — 2023F PR DILATED RETINAL EXAM W/O EVID OF RETINOPATHY: ICD-10-PCS | Mod: CPTII,S$GLB,, | Performed by: OPTOMETRIST

## 2023-08-03 PROCEDURE — 4010F ACE/ARB THERAPY RXD/TAKEN: CPT | Mod: CPTII,S$GLB,, | Performed by: OPTOMETRIST

## 2023-08-03 PROCEDURE — 92014 COMPRE OPH EXAM EST PT 1/>: CPT | Mod: S$GLB,,, | Performed by: OPTOMETRIST

## 2023-08-03 PROCEDURE — 3066F NEPHROPATHY DOC TX: CPT | Mod: CPTII,S$GLB,, | Performed by: OPTOMETRIST

## 2023-08-03 PROCEDURE — 3066F PR DOCUMENTATION OF TREATMENT FOR NEPHROPATHY: ICD-10-PCS | Mod: CPTII,S$GLB,, | Performed by: OPTOMETRIST

## 2023-08-03 PROCEDURE — 1159F PR MEDICATION LIST DOCUMENTED IN MEDICAL RECORD: ICD-10-PCS | Mod: CPTII,S$GLB,, | Performed by: OPTOMETRIST

## 2023-08-03 PROCEDURE — 3044F PR MOST RECENT HEMOGLOBIN A1C LEVEL <7.0%: ICD-10-PCS | Mod: CPTII,S$GLB,, | Performed by: OPTOMETRIST

## 2023-08-03 PROCEDURE — 1159F MED LIST DOCD IN RCRD: CPT | Mod: CPTII,S$GLB,, | Performed by: OPTOMETRIST

## 2023-08-03 PROCEDURE — 2023F DILAT RTA XM W/O RTNOPTHY: CPT | Mod: CPTII,S$GLB,, | Performed by: OPTOMETRIST

## 2023-08-03 NOTE — PROGRESS NOTES
HPI     Annual Exam     Additional comments: Patient states his vision is stable. Patient states   its just time for an annual exam. Patient states he would like to get an   updated rx for both glasses and ctl's today. Patient denies pain and   discomfort.            Comments    DM x 2012          Last edited by Evita Randhawa on 8/3/2023 11:01 AM.            Assessment /Plan     For exam results, see Encounter Report.    Diabetes mellitus type 2 without retinopathy    Essential hypertension    Encounter for fitting or adjustment of spectacles or contact lenses    Status post bilateral LASIK surgery    Lattice degeneration of right retina    Myopia, bilateral      No Background Diabetic Retinopathy  Strict BG control, f/u w/ PCP, and annual DFE  Stressed importance of DM control to preserve vision    No HTN Retinopathy    Stable LASIK    Unchanged lattice OU    Discussed CL charges.  Dispense Final Rx for glasses  No changes CL Rx  RTC 1 year  Discussed above and answered questions.

## 2023-08-07 ENCOUNTER — LAB VISIT (OUTPATIENT)
Dept: LAB | Facility: HOSPITAL | Age: 43
End: 2023-08-07
Attending: INTERNAL MEDICINE
Payer: COMMERCIAL

## 2023-08-07 ENCOUNTER — OFFICE VISIT (OUTPATIENT)
Dept: INTERNAL MEDICINE | Facility: CLINIC | Age: 43
End: 2023-08-07
Payer: COMMERCIAL

## 2023-08-07 DIAGNOSIS — E11.69 DYSLIPIDEMIA ASSOCIATED WITH TYPE 2 DIABETES MELLITUS: Primary | Chronic | ICD-10-CM

## 2023-08-07 DIAGNOSIS — I25.10 CORONARY ARTERY DISEASE INVOLVING NATIVE CORONARY ARTERY OF NATIVE HEART WITHOUT ANGINA PECTORIS: Chronic | ICD-10-CM

## 2023-08-07 DIAGNOSIS — Z94.0 KIDNEY REPLACED BY TRANSPLANT: ICD-10-CM

## 2023-08-07 DIAGNOSIS — I25.2 HISTORY OF NON-ST ELEVATION MYOCARDIAL INFARCTION (NSTEMI): ICD-10-CM

## 2023-08-07 DIAGNOSIS — E11.22 TYPE 2 DIABETES MELLITUS WITH STAGE 3A CHRONIC KIDNEY DISEASE, WITHOUT LONG-TERM CURRENT USE OF INSULIN: Chronic | ICD-10-CM

## 2023-08-07 DIAGNOSIS — U07.1 COVID-19: ICD-10-CM

## 2023-08-07 DIAGNOSIS — E11.42 TYPE 2 DIABETES MELLITUS WITH DIABETIC POLYNEUROPATHY, WITHOUT LONG-TERM CURRENT USE OF INSULIN: Chronic | ICD-10-CM

## 2023-08-07 DIAGNOSIS — E78.5 DYSLIPIDEMIA ASSOCIATED WITH TYPE 2 DIABETES MELLITUS: Primary | Chronic | ICD-10-CM

## 2023-08-07 DIAGNOSIS — I48.0 PAROXYSMAL ATRIAL FIBRILLATION: Chronic | ICD-10-CM

## 2023-08-07 DIAGNOSIS — N18.31 TYPE 2 DIABETES MELLITUS WITH STAGE 3A CHRONIC KIDNEY DISEASE, WITHOUT LONG-TERM CURRENT USE OF INSULIN: Chronic | ICD-10-CM

## 2023-08-07 LAB
ALBUMIN SERPL BCP-MCNC: 4.3 G/DL (ref 3.5–5.2)
ANION GAP SERPL CALC-SCNC: 10 MMOL/L (ref 8–16)
BASOPHILS # BLD AUTO: 0.04 K/UL (ref 0–0.2)
BASOPHILS NFR BLD: 1 % (ref 0–1.9)
BUN SERPL-MCNC: 13 MG/DL (ref 6–20)
CALCIUM SERPL-MCNC: 9.5 MG/DL (ref 8.7–10.5)
CHLORIDE SERPL-SCNC: 106 MMOL/L (ref 95–110)
CO2 SERPL-SCNC: 23 MMOL/L (ref 23–29)
CREAT SERPL-MCNC: 1.5 MG/DL (ref 0.5–1.4)
DIFFERENTIAL METHOD: ABNORMAL
EOSINOPHIL # BLD AUTO: 0.1 K/UL (ref 0–0.5)
EOSINOPHIL NFR BLD: 3.4 % (ref 0–8)
ERYTHROCYTE [DISTWIDTH] IN BLOOD BY AUTOMATED COUNT: 14.5 % (ref 11.5–14.5)
EST. GFR  (NO RACE VARIABLE): 59.6 ML/MIN/1.73 M^2
GLUCOSE SERPL-MCNC: 114 MG/DL (ref 70–110)
HCT VFR BLD AUTO: 42.1 % (ref 40–54)
HGB BLD-MCNC: 13.9 G/DL (ref 14–18)
IMM GRANULOCYTES # BLD AUTO: 0.01 K/UL (ref 0–0.04)
IMM GRANULOCYTES NFR BLD AUTO: 0.3 % (ref 0–0.5)
LYMPHOCYTES # BLD AUTO: 0.5 K/UL (ref 1–4.8)
LYMPHOCYTES NFR BLD: 13.7 % (ref 18–48)
MAGNESIUM SERPL-MCNC: 1.7 MG/DL (ref 1.6–2.6)
MCH RBC QN AUTO: 30.3 PG (ref 27–31)
MCHC RBC AUTO-ENTMCNC: 33 G/DL (ref 32–36)
MCV RBC AUTO: 92 FL (ref 82–98)
MONOCYTES # BLD AUTO: 0.4 K/UL (ref 0.3–1)
MONOCYTES NFR BLD: 11.1 % (ref 4–15)
NEUTROPHILS # BLD AUTO: 2.7 K/UL (ref 1.8–7.7)
NEUTROPHILS NFR BLD: 70.5 % (ref 38–73)
NRBC BLD-RTO: 0 /100 WBC
PHOSPHATE SERPL-MCNC: 2.3 MG/DL (ref 2.7–4.5)
PLATELET # BLD AUTO: 183 K/UL (ref 150–450)
PMV BLD AUTO: 9.9 FL (ref 9.2–12.9)
POTASSIUM SERPL-SCNC: 4.2 MMOL/L (ref 3.5–5.1)
RBC # BLD AUTO: 4.59 M/UL (ref 4.6–6.2)
SODIUM SERPL-SCNC: 139 MMOL/L (ref 136–145)
WBC # BLD AUTO: 3.86 K/UL (ref 3.9–12.7)

## 2023-08-07 PROCEDURE — 3066F NEPHROPATHY DOC TX: CPT | Mod: CPTII,95,, | Performed by: FAMILY MEDICINE

## 2023-08-07 PROCEDURE — 3060F POS MICROALBUMINURIA REV: CPT | Mod: CPTII,95,, | Performed by: FAMILY MEDICINE

## 2023-08-07 PROCEDURE — 3044F HG A1C LEVEL LT 7.0%: CPT | Mod: CPTII,95,, | Performed by: FAMILY MEDICINE

## 2023-08-07 PROCEDURE — 1160F PR REVIEW ALL MEDS BY PRESCRIBER/CLIN PHARMACIST DOCUMENTED: ICD-10-PCS | Mod: CPTII,95,, | Performed by: FAMILY MEDICINE

## 2023-08-07 PROCEDURE — 1159F PR MEDICATION LIST DOCUMENTED IN MEDICAL RECORD: ICD-10-PCS | Mod: CPTII,95,, | Performed by: FAMILY MEDICINE

## 2023-08-07 PROCEDURE — 83735 ASSAY OF MAGNESIUM: CPT | Performed by: INTERNAL MEDICINE

## 2023-08-07 PROCEDURE — 99214 PR OFFICE/OUTPT VISIT, EST, LEVL IV, 30-39 MIN: ICD-10-PCS | Mod: 95,,, | Performed by: FAMILY MEDICINE

## 2023-08-07 PROCEDURE — 99214 OFFICE O/P EST MOD 30 MIN: CPT | Mod: 95,,, | Performed by: FAMILY MEDICINE

## 2023-08-07 PROCEDURE — 3044F PR MOST RECENT HEMOGLOBIN A1C LEVEL <7.0%: ICD-10-PCS | Mod: CPTII,95,, | Performed by: FAMILY MEDICINE

## 2023-08-07 PROCEDURE — 86833 HLA CLASS II HIGH DEFIN QUAL: CPT | Performed by: INTERNAL MEDICINE

## 2023-08-07 PROCEDURE — 36415 COLL VENOUS BLD VENIPUNCTURE: CPT | Performed by: INTERNAL MEDICINE

## 2023-08-07 PROCEDURE — 4010F PR ACE/ARB THEARPY RXD/TAKEN: ICD-10-PCS | Mod: CPTII,95,, | Performed by: FAMILY MEDICINE

## 2023-08-07 PROCEDURE — 3072F LOW RISK FOR RETINOPATHY: CPT | Mod: CPTII,95,, | Performed by: FAMILY MEDICINE

## 2023-08-07 PROCEDURE — 85025 COMPLETE CBC W/AUTO DIFF WBC: CPT | Performed by: INTERNAL MEDICINE

## 2023-08-07 PROCEDURE — 1159F MED LIST DOCD IN RCRD: CPT | Mod: CPTII,95,, | Performed by: FAMILY MEDICINE

## 2023-08-07 PROCEDURE — 80069 RENAL FUNCTION PANEL: CPT | Performed by: INTERNAL MEDICINE

## 2023-08-07 PROCEDURE — 80197 ASSAY OF TACROLIMUS: CPT | Performed by: INTERNAL MEDICINE

## 2023-08-07 PROCEDURE — 4010F ACE/ARB THERAPY RXD/TAKEN: CPT | Mod: CPTII,95,, | Performed by: FAMILY MEDICINE

## 2023-08-07 PROCEDURE — 3066F PR DOCUMENTATION OF TREATMENT FOR NEPHROPATHY: ICD-10-PCS | Mod: CPTII,95,, | Performed by: FAMILY MEDICINE

## 2023-08-07 PROCEDURE — 3060F PR POS MICROALBUMINURIA RESULT DOCUMENTED/REVIEW: ICD-10-PCS | Mod: CPTII,95,, | Performed by: FAMILY MEDICINE

## 2023-08-07 PROCEDURE — 86977 RBC SERUM PRETX INCUBJ/INHIB: CPT | Mod: 59 | Performed by: INTERNAL MEDICINE

## 2023-08-07 PROCEDURE — 1160F RVW MEDS BY RX/DR IN RCRD: CPT | Mod: CPTII,95,, | Performed by: FAMILY MEDICINE

## 2023-08-07 PROCEDURE — 3072F PR LOW RISK FOR RETINOPATHY: ICD-10-PCS | Mod: CPTII,95,, | Performed by: FAMILY MEDICINE

## 2023-08-07 PROCEDURE — 86832 HLA CLASS I HIGH DEFIN QUAL: CPT | Performed by: INTERNAL MEDICINE

## 2023-08-07 RX ORDER — EZETIMIBE 10 MG/1
10 TABLET ORAL DAILY
Qty: 90 TABLET | Refills: 3 | Status: SHIPPED | OUTPATIENT
Start: 2023-08-07 | End: 2024-02-07 | Stop reason: SDUPTHER

## 2023-08-07 RX ORDER — DULAGLUTIDE 3 MG/.5ML
3 INJECTION, SOLUTION SUBCUTANEOUS
Qty: 12 PEN | Refills: 2 | Status: SHIPPED | OUTPATIENT
Start: 2023-08-07 | End: 2023-10-23 | Stop reason: SDUPTHER

## 2023-08-07 RX ORDER — ATORVASTATIN CALCIUM 80 MG/1
80 TABLET, FILM COATED ORAL NIGHTLY
Qty: 90 TABLET | Refills: 3 | Status: SHIPPED | OUTPATIENT
Start: 2023-08-07 | End: 2024-02-07 | Stop reason: SDUPTHER

## 2023-08-07 NOTE — PROGRESS NOTES
TELEMEDICINE VIRTUAL VISIT  8/7/23  1:20 PM CDT    Visit Type: Audiovisual    Patient's Location: Robb represents that they are located within the state of [place].    Subjective   CHIEF COMPLAINT: Follow-up    He reports he is eating healthy, exercising, feeling better and better each day, in spite of recent mild COVID infection. Chronic conditions are represented as and appear to be compensated/controlled and stable. Robb reports he is doing well on his current medicines managed by me. Robb reports preceiving no adverse side-effects and wants to continue current treatment.      Review of Systems   Constitutional:  Negative for activity change and unexpected weight change.   HENT:  Negative for hearing loss, rhinorrhea and trouble swallowing.    Eyes:  Negative for discharge and visual disturbance.   Respiratory:  Negative for chest tightness and wheezing.    Cardiovascular:  Negative for chest pain and palpitations.   Gastrointestinal:  Negative for blood in stool, constipation, diarrhea and vomiting.   Endocrine: Negative for polydipsia and polyuria.   Genitourinary:  Negative for difficulty urinating, hematuria and urgency.   Musculoskeletal:  Negative for arthralgias, joint swelling and neck pain.   Neurological:  Negative for weakness and headaches.   Psychiatric/Behavioral:  Negative for confusion and dysphoric mood.          Objective   Physical Exam  Constitutional:       General: He is not in acute distress.     Appearance: Normal appearance. He is not ill-appearing.   Pulmonary:      Effort: Pulmonary effort is normal. No respiratory distress.   Skin:     Coloration: Skin is not jaundiced.   Neurological:      Mental Status: He is alert. Mental status is at baseline.   Psychiatric:         Mood and Affect: Mood normal.         Behavior: Behavior normal.         Thought Content: Thought content normal.          Assessment and Plan   1. Dyslipidemia associated with type 2 diabetes  mellitus  Assessment & Plan:  Lab Results   Component Value Date    CHOL 105 (L) 05/02/2023    CHOL 88 (L) 09/07/2022    TRIG 145 05/02/2023    TRIG 119 09/07/2022    HDL 28 (L) 05/02/2023    HDL 27 (L) 09/07/2022    LDLCALC 48.0 (L) 05/02/2023    LDLCALC 37.2 (L) 09/07/2022    NONHDLCHOL 77 05/02/2023    NONHDLCHOL 61 09/07/2022    AST 24 07/03/2023    ALT 38 07/03/2023     The ASCVD Risk score (Greg DK, et al., 2019) failed to calculate for the following reasons:    The patient has a prior MI or stroke diagnosis     Orders:  -     atorvastatin (LIPITOR) 80 MG tablet; Take 1 tablet (80 mg total) by mouth every evening.  Dispense: 90 tablet; Refill: 3  -     ezetimibe (ZETIA) 10 mg tablet; Take 1 tablet (10 mg total) by mouth once daily.  Dispense: 90 tablet; Refill: 3    2. Coronary artery disease involving native coronary artery of native heart without angina pectoris  Overview:  Cath lab procedure 04/23/2018 (Naveen Rios MD)  A. Indication/Pre-Operative Diagnosis: The patient is a 37 year old male that [facility] ([facility]). The BELLA risk score is 5.    B. Summary/Post-Operative Diagnosis  1. Single vessel coronary artery disease.  2. Normal LVEF.  3. Diastolic dysfunction.  4. Successful PCI for acute myocardial infarction.  5. The patient did not undergo primary PCI.    Orders:  -     atorvastatin (LIPITOR) 80 MG tablet; Take 1 tablet (80 mg total) by mouth every evening.  Dispense: 90 tablet; Refill: 3  -     ezetimibe (ZETIA) 10 mg tablet; Take 1 tablet (10 mg total) by mouth once daily.  Dispense: 90 tablet; Refill: 3    3. History of NSTEMI with CAD s/p PCI (FAMILIA) of LAD x 2 in 8/2017  -     atorvastatin (LIPITOR) 80 MG tablet; Take 1 tablet (80 mg total) by mouth every evening.  Dispense: 90 tablet; Refill: 3  -     ezetimibe (ZETIA) 10 mg tablet; Take 1 tablet (10 mg total) by mouth once daily.  Dispense: 90 tablet; Refill: 3    4. Paroxysmal atrial  "fibrillation  Overview:  Isolated episode in early June, 2023, believed provoked by post-op state. Cardiologist D/C anticoagulation at end of July, left on ASA 81 mg.      5. History of COVID-19 (Tested Positive July 31, 2023)  Assessment & Plan:  Minimal symptoms for 24-48 hours. No treatment required.      6. Type 2 diabetes mellitus with diabetic polyneuropathy, without long-term current use of insulin  -     dulaglutide (TRULICITY) 3 mg/0.5 mL pen injector; Inject 3 mg into the skin every 7 days.  Dispense: 12 pen ; Refill: 2    7. Type 2 diabetes mellitus with stage 3a chronic kidney disease, without long-term current use of insulin  -     dulaglutide (TRULICITY) 3 mg/0.5 mL pen injector; Inject 3 mg into the skin every 7 days.  Dispense: 12 pen ; Refill: 2    Unless noted herein, any chronic conditions are represented as and appear stable, and no other significant complaints or concerns were reported.    Follow up in about 6 months (around 1/26/2024) for virtual visit, re-evaluation.          Documentation entered by me for this encounter may have been done in part using speech-recognition technology. Although I have made an effort to ensure accuracy, "sound like" errors may exist and should be interpreted in context.   TOTAL TIME evaluating and managing this patient for this encounter was greater than or equal to 22 minutes. This time was exclusive of any separately billable procedures for this patient and exclusive of time spent treating any other patient.    Documentation entered by me for this encounter may have been done in part using speech-recognition technology. Although I have made an effort to ensure accuracy, "sound like" errors may exist and should be interpreted in context.    Each patient to whom medical services are provided by telemedicine is: (1) informed of the relationship between the physician and patient and the respective role of any other health care provider with respect to management " of the patient; and (2) notified that he or she may decline to receive medical services by telemedicine and may withdraw from such care at any time.

## 2023-08-07 NOTE — ASSESSMENT & PLAN NOTE
Lab Results   Component Value Date    CHOL 105 (L) 05/02/2023    CHOL 88 (L) 09/07/2022    TRIG 145 05/02/2023    TRIG 119 09/07/2022    HDL 28 (L) 05/02/2023    HDL 27 (L) 09/07/2022    LDLCALC 48.0 (L) 05/02/2023    LDLCALC 37.2 (L) 09/07/2022    NONHDLCHOL 77 05/02/2023    NONHDLCHOL 61 09/07/2022    AST 24 07/03/2023    ALT 38 07/03/2023     The ASCVD Risk score (Greg GAFFNEY, et al., 2019) failed to calculate for the following reasons:    The patient has a prior MI or stroke diagnosis

## 2023-08-08 DIAGNOSIS — Z94.0 S/P KIDNEY TRANSPLANT: ICD-10-CM

## 2023-08-08 LAB — TACROLIMUS BLD-MCNC: 6.7 NG/ML (ref 5–15)

## 2023-08-08 RX ORDER — TACROLIMUS 0.5 MG/1
CAPSULE ORAL
Qty: 180 CAPSULE | Refills: 11 | Status: SHIPPED | OUTPATIENT
Start: 2023-08-08 | End: 2023-11-20 | Stop reason: DRUGHIGH

## 2023-08-08 NOTE — TELEPHONE ENCOUNTER
Pt made aware of below orders          ----- Message from Rell Booth MD sent at 8/8/2023 10:17 AM CDT -----  If the tacro level is accurate please increase prograf from 1.5/1 (dose in Epic) to 1.5 mg PO bid

## 2023-08-14 ENCOUNTER — LAB VISIT (OUTPATIENT)
Dept: LAB | Facility: HOSPITAL | Age: 43
End: 2023-08-14
Attending: INTERNAL MEDICINE
Payer: COMMERCIAL

## 2023-08-14 DIAGNOSIS — Z94.0 KIDNEY REPLACED BY TRANSPLANT: ICD-10-CM

## 2023-08-14 LAB
ALBUMIN SERPL BCP-MCNC: 4.2 G/DL (ref 3.5–5.2)
ANION GAP SERPL CALC-SCNC: 8 MMOL/L (ref 8–16)
BASOPHILS # BLD AUTO: 0.04 K/UL (ref 0–0.2)
BASOPHILS NFR BLD: 1.1 % (ref 0–1.9)
BUN SERPL-MCNC: 11 MG/DL (ref 6–20)
CALCIUM SERPL-MCNC: 9.4 MG/DL (ref 8.7–10.5)
CHLORIDE SERPL-SCNC: 108 MMOL/L (ref 95–110)
CLASS I ANTIBODY COMMENTS - LUMINEX: NORMAL
CLASS II ANTIBODY COMMENTS - LUMINEX: NORMAL
CO2 SERPL-SCNC: 26 MMOL/L (ref 23–29)
CREAT SERPL-MCNC: 1.5 MG/DL (ref 0.5–1.4)
DIFFERENTIAL METHOD: ABNORMAL
DSA1 TESTING DATE: NORMAL
DSA12 TESTING DATE: NORMAL
DSA2 TESTING DATE: NORMAL
EOSINOPHIL # BLD AUTO: 0.1 K/UL (ref 0–0.5)
EOSINOPHIL NFR BLD: 3.6 % (ref 0–8)
ERYTHROCYTE [DISTWIDTH] IN BLOOD BY AUTOMATED COUNT: 14 % (ref 11.5–14.5)
EST. GFR  (NO RACE VARIABLE): 59.6 ML/MIN/1.73 M^2
GLUCOSE SERPL-MCNC: 115 MG/DL (ref 70–110)
HCT VFR BLD AUTO: 41.1 % (ref 40–54)
HGB BLD-MCNC: 13.3 G/DL (ref 14–18)
IMM GRANULOCYTES # BLD AUTO: 0.02 K/UL (ref 0–0.04)
IMM GRANULOCYTES NFR BLD AUTO: 0.6 % (ref 0–0.5)
LYMPHOCYTES # BLD AUTO: 0.5 K/UL (ref 1–4.8)
LYMPHOCYTES NFR BLD: 12.5 % (ref 18–48)
MAGNESIUM SERPL-MCNC: 1.8 MG/DL (ref 1.6–2.6)
MCH RBC QN AUTO: 30.2 PG (ref 27–31)
MCHC RBC AUTO-ENTMCNC: 32.4 G/DL (ref 32–36)
MCV RBC AUTO: 93 FL (ref 82–98)
MONOCYTES # BLD AUTO: 0.5 K/UL (ref 0.3–1)
MONOCYTES NFR BLD: 12.8 % (ref 4–15)
NEUTROPHILS # BLD AUTO: 2.5 K/UL (ref 1.8–7.7)
NEUTROPHILS NFR BLD: 69.4 % (ref 38–73)
NRBC BLD-RTO: 0 /100 WBC
PHOSPHATE SERPL-MCNC: 2.1 MG/DL (ref 2.7–4.5)
PLATELET # BLD AUTO: 173 K/UL (ref 150–450)
PMV BLD AUTO: 9.8 FL (ref 9.2–12.9)
POTASSIUM SERPL-SCNC: 4.6 MMOL/L (ref 3.5–5.1)
RBC # BLD AUTO: 4.41 M/UL (ref 4.6–6.2)
SERUM COLLECTION DT - LUMINEX CLASS I: NORMAL
SERUM COLLECTION DT - LUMINEX CLASS II: NORMAL
SODIUM SERPL-SCNC: 142 MMOL/L (ref 136–145)
WBC # BLD AUTO: 3.6 K/UL (ref 3.9–12.7)

## 2023-08-14 PROCEDURE — 80069 RENAL FUNCTION PANEL: CPT | Performed by: INTERNAL MEDICINE

## 2023-08-14 PROCEDURE — 80197 ASSAY OF TACROLIMUS: CPT | Performed by: INTERNAL MEDICINE

## 2023-08-14 PROCEDURE — 36415 COLL VENOUS BLD VENIPUNCTURE: CPT | Performed by: INTERNAL MEDICINE

## 2023-08-14 PROCEDURE — 85025 COMPLETE CBC W/AUTO DIFF WBC: CPT | Performed by: INTERNAL MEDICINE

## 2023-08-14 PROCEDURE — 83735 ASSAY OF MAGNESIUM: CPT | Performed by: INTERNAL MEDICINE

## 2023-08-15 LAB — TACROLIMUS BLD-MCNC: 7.5 NG/ML (ref 5–15)

## 2023-08-30 ENCOUNTER — OFFICE VISIT (OUTPATIENT)
Dept: SLEEP MEDICINE | Facility: CLINIC | Age: 43
End: 2023-08-30
Payer: COMMERCIAL

## 2023-08-30 VITALS
RESPIRATION RATE: 18 BRPM | OXYGEN SATURATION: 98 % | HEART RATE: 90 BPM | HEIGHT: 75 IN | SYSTOLIC BLOOD PRESSURE: 110 MMHG | BODY MASS INDEX: 27.58 KG/M2 | DIASTOLIC BLOOD PRESSURE: 80 MMHG | WEIGHT: 221.81 LBS

## 2023-08-30 DIAGNOSIS — I48.0 PAROXYSMAL ATRIAL FIBRILLATION: Chronic | ICD-10-CM

## 2023-08-30 DIAGNOSIS — I25.2 HISTORY OF NON-ST ELEVATION MYOCARDIAL INFARCTION (NSTEMI): ICD-10-CM

## 2023-08-30 DIAGNOSIS — G47.33 OBSTRUCTIVE SLEEP APNEA: Primary | Chronic | ICD-10-CM

## 2023-08-30 DIAGNOSIS — Z94.0 S/P KIDNEY TRANSPLANT: Chronic | ICD-10-CM

## 2023-08-30 PROCEDURE — 3079F DIAST BP 80-89 MM HG: CPT | Mod: CPTII,S$GLB,, | Performed by: NURSE PRACTITIONER

## 2023-08-30 PROCEDURE — 1159F MED LIST DOCD IN RCRD: CPT | Mod: CPTII,S$GLB,, | Performed by: NURSE PRACTITIONER

## 2023-08-30 PROCEDURE — 3044F PR MOST RECENT HEMOGLOBIN A1C LEVEL <7.0%: ICD-10-PCS | Mod: CPTII,S$GLB,, | Performed by: NURSE PRACTITIONER

## 2023-08-30 PROCEDURE — 1160F PR REVIEW ALL MEDS BY PRESCRIBER/CLIN PHARMACIST DOCUMENTED: ICD-10-PCS | Mod: CPTII,S$GLB,, | Performed by: NURSE PRACTITIONER

## 2023-08-30 PROCEDURE — 3008F PR BODY MASS INDEX (BMI) DOCUMENTED: ICD-10-PCS | Mod: CPTII,S$GLB,, | Performed by: NURSE PRACTITIONER

## 2023-08-30 PROCEDURE — 3066F PR DOCUMENTATION OF TREATMENT FOR NEPHROPATHY: ICD-10-PCS | Mod: CPTII,S$GLB,, | Performed by: NURSE PRACTITIONER

## 2023-08-30 PROCEDURE — 4010F ACE/ARB THERAPY RXD/TAKEN: CPT | Mod: CPTII,S$GLB,, | Performed by: NURSE PRACTITIONER

## 2023-08-30 PROCEDURE — 3060F POS MICROALBUMINURIA REV: CPT | Mod: CPTII,S$GLB,, | Performed by: NURSE PRACTITIONER

## 2023-08-30 PROCEDURE — 3079F PR MOST RECENT DIASTOLIC BLOOD PRESSURE 80-89 MM HG: ICD-10-PCS | Mod: CPTII,S$GLB,, | Performed by: NURSE PRACTITIONER

## 2023-08-30 PROCEDURE — 99204 OFFICE O/P NEW MOD 45 MIN: CPT | Mod: S$GLB,,, | Performed by: NURSE PRACTITIONER

## 2023-08-30 PROCEDURE — 1160F RVW MEDS BY RX/DR IN RCRD: CPT | Mod: CPTII,S$GLB,, | Performed by: NURSE PRACTITIONER

## 2023-08-30 PROCEDURE — 3060F PR POS MICROALBUMINURIA RESULT DOCUMENTED/REVIEW: ICD-10-PCS | Mod: CPTII,S$GLB,, | Performed by: NURSE PRACTITIONER

## 2023-08-30 PROCEDURE — 3074F SYST BP LT 130 MM HG: CPT | Mod: CPTII,S$GLB,, | Performed by: NURSE PRACTITIONER

## 2023-08-30 PROCEDURE — 99204 PR OFFICE/OUTPT VISIT, NEW, LEVL IV, 45-59 MIN: ICD-10-PCS | Mod: S$GLB,,, | Performed by: NURSE PRACTITIONER

## 2023-08-30 PROCEDURE — 3008F BODY MASS INDEX DOCD: CPT | Mod: CPTII,S$GLB,, | Performed by: NURSE PRACTITIONER

## 2023-08-30 PROCEDURE — 99999 PR PBB SHADOW E&M-EST. PATIENT-LVL V: CPT | Mod: PBBFAC,,, | Performed by: NURSE PRACTITIONER

## 2023-08-30 PROCEDURE — 4010F PR ACE/ARB THEARPY RXD/TAKEN: ICD-10-PCS | Mod: CPTII,S$GLB,, | Performed by: NURSE PRACTITIONER

## 2023-08-30 PROCEDURE — 3066F NEPHROPATHY DOC TX: CPT | Mod: CPTII,S$GLB,, | Performed by: NURSE PRACTITIONER

## 2023-08-30 PROCEDURE — 1159F PR MEDICATION LIST DOCUMENTED IN MEDICAL RECORD: ICD-10-PCS | Mod: CPTII,S$GLB,, | Performed by: NURSE PRACTITIONER

## 2023-08-30 PROCEDURE — 3074F PR MOST RECENT SYSTOLIC BLOOD PRESSURE < 130 MM HG: ICD-10-PCS | Mod: CPTII,S$GLB,, | Performed by: NURSE PRACTITIONER

## 2023-08-30 PROCEDURE — 99999 PR PBB SHADOW E&M-EST. PATIENT-LVL V: ICD-10-PCS | Mod: PBBFAC,,, | Performed by: NURSE PRACTITIONER

## 2023-08-30 PROCEDURE — 3044F HG A1C LEVEL LT 7.0%: CPT | Mod: CPTII,S$GLB,, | Performed by: NURSE PRACTITIONER

## 2023-08-30 RX ORDER — LANOLIN ALCOHOL/MO/W.PET/CERES
CREAM (GRAM) TOPICAL
COMMUNITY
Start: 2023-07-31

## 2023-08-30 NOTE — PROGRESS NOTES
"Subjective:      Patient ID: Robb Iglesias is a 42 y.o. male.    Chief Complaint: Sleep Apnea    HPI  Consult for sleep apnea.   Originally dx in 2015 with /hr.  Repeat study 2017 after weight loss continued severe HENRY with AHI 39/hr with BMI 29.   His BMI is currently 27.   He is intolerant to CPAP but states his sleep quality has improved.   He is unsure if he snores but his wife does not complain.   [place] Sleepiness scale score: 4     MI age 36  Kidney transplant patient.       Patient Active Problem List   Diagnosis    Bariatric surgery status    Class 1 obesity with serious comorbidity and body mass index (BMI) of 30.0 to 30.9 in adult    Essential hypertension    Type 2 diabetes mellitus with stage 3a chronic kidney disease, without long-term current use of insulin    Idiopathic chronic gout, multiple sites, without tophus (tophi)    Chronic nonseasonal allergic rhinitis due to pollen    Severe obstructive sleep apnea - Intolerant of CPAP    Dyslipidemia associated with type 2 diabetes mellitus    Status post angioplasty with stent    PLMD (periodic limb movement disorder)    Coronary artery disease involving native coronary artery of native heart without angina pectoris    Direct hyperbilirubinemia    Hypertension complicating diabetes    Diabetic nephropathy associated with type 2 diabetes mellitus    Type 2 diabetes mellitus with diabetic polyneuropathy, without long-term current use of insulin    Hyperparathyroidism, secondary to chronic kidney disease    S/P kidney transplant    Prophylactic immunotherapy    At risk for opportunistic infections    Long-term use of immunosuppressant medication    Stage 3a chronic kidney disease    Paroxysmal atrial fibrillation    History of COVID-19 (Tested Positive July 31, 2023)    History of NSTEMI with CAD s/p PCI (FAMILIA) of LAD x 2 in 8/2017     /80   Pulse 90   Resp 18   Ht 6' 3" (1.905 m)   Wt 100.6 kg (221 lb 12.5 oz)   SpO2 98%   BMI " 27.72 kg/m²   Body mass index is 27.72 kg/m².    Review of Systems   Constitutional: Negative.    HENT: Negative.     Respiratory: Negative.     Cardiovascular: Negative.    Musculoskeletal: Negative.    Gastrointestinal: Negative.    Neurological: Negative.    Psychiatric/Behavioral: Negative.       Objective:      Physical Exam  Constitutional:       Appearance: Normal appearance. He is well-developed.   HENT:      Head: Normocephalic and atraumatic.      Mouth/Throat:      Comments: Mallampati Score: II-III  Neck:      Comments:    Cardiovascular:      Rate and Rhythm: Normal rate and regular rhythm.   Pulmonary:      Effort: Pulmonary effort is normal.      Breath sounds: Normal breath sounds.   Abdominal:      Palpations: Abdomen is soft.   Musculoskeletal:         General: Normal range of motion.      Cervical back: Normal range of motion and neck supple.   Skin:     General: Skin is warm and dry.   Neurological:      Mental Status: He is alert and oriented to person, place, and time.   Psychiatric:         Mood and Affect: Mood normal.         Behavior: Behavior normal.       Personal Diagnostic Review    Results for orders placed during the hospital encounter of 05/02/23    X-Ray Chest PA And Lateral    Narrative  EXAMINATION:  XR CHEST PA AND LATERAL    CLINICAL HISTORY:  Awaiting organ transplant status    TECHNIQUE:  PA and lateral views of the chest were performed.    COMPARISON:  10/13/2022    FINDINGS:  The cardiomediastinal silhouette is not enlarged, stable in configuration as compared to the previous exam..  There is no pleural effusion.  The trachea is midline.  The lungs are symmetrically expanded bilaterally without evidence of acute parenchymal process. No large focal consolidation seen.  There is no pneumothorax.  The osseous structures are remarkable for degenerative changes..    Impression  1. No acute cardiopulmonary process, stable chronic findings.      Electronically signed by: Zoltan  MD Yuliana  Date:    05/02/2023  Time:    10:53        Assessment:       1. Severe obstructive sleep apnea - Intolerant of CPAP    2. History of NSTEMI with CAD s/p PCI (FAMILIA) of LAD x 2 in 8/2017    3. Paroxysmal atrial fibrillation    4. S/P kidney transplant        Outpatient Encounter Medications as of 8/30/2023   Medication Sig Dispense Refill    aspirin (ECOTRIN) 81 MG EC tablet Take 1 tablet (81 mg total) by mouth once daily. 90 tablet 3    atorvastatin (LIPITOR) 80 MG tablet Take 1 tablet (80 mg total) by mouth every evening. 90 tablet 3    blood sugar diagnostic Strp Check blood glucose 2 times daily as directed and as needed 200 each 5    blood-glucose meter (ONETOUCH ULTRAMINI) kit Use as instructed 1 each 0    dulaglutide (TRULICITY) 3 mg/0.5 mL pen injector Inject 3 mg into the skin every 7 days. 12 pen 2    ezetimibe (ZETIA) 10 mg tablet Take 1 tablet (10 mg total) by mouth once daily. 90 tablet 3    insulin lispro-aabc (YUEUMJEV KWIKPEN U-100 INSULIN) 100 unit/mL pen Inject 4 Units into the skin 3 (three) times daily with meals. Plus sliding scale insulin. Max 27 units 2 pen 7    k phos di & mono-sod phos mono (K-PHOS-NEUTRAL) 250 mg Tab Take 2 tablets by mouth 3 (three) times daily. 180 tablet 11    lancets Misc Check blood glucose 2 times daily as directed and as needed (dispense insurance preferred brand or patient choice) 200 each 5    magnesium oxide (MAG-OX) 400 mg (241.3 mg magnesium) tablet Take 1 tablet (400 mg total) by mouth 2 (two) times daily. 60 tablet 11    magnesium oxide (MAG-OX) 400 mg (241.3 mg magnesium) tablet TAKE 1 TABLET BY MOUTH 2 TIMES DAILY.      metoprolol tartrate (LOPRESSOR) 25 MG tablet Take 1 tablet (25 mg total) by mouth 2 (two) times daily. 60 tablet 11    multivitamin Tab Take 1 tablet by mouth once daily.      mycophenolate (CELLCEPT) 250 mg Cap Take 4 capsules (1,000 mg total) by mouth 2 (two) times daily. 240 capsule 11    nitroGLYCERIN (NITROSTAT) 0.4 MG SL  "tablet Dissolve one tablet underneath tongue at onset of angina; may repeat every 5 minutes if needed. Call 911 if angina persists after 2 doses. 25 tablet 5    pantoprazole (PROTONIX) 40 MG tablet Take 1 tablet (40 mg total) by mouth once daily. 30 tablet 11    pen needle, diabetic (BD ULTRA-FINE SHORT PEN NEEDLE) 31 gauge x 5/16" Ndle Use to inject insulin into the skin 3 times daily 100 each 1    predniSONE (DELTASONE) 5 MG tablet Take by mouth daily; 5/18-5/24: 20 mg; 5/25-5/31: 15 mg; 6/1-6/7: 10 mg; 6/8/23- thereafter: 5 mg daily; do not stop 70 tablet 11    sulfamethoxazole-trimethoprim 400-80mg (BACTRIM,SEPTRA) 400-80 mg per tablet Take 1 tablet by mouth every morning. Stop 11/12/23 30 tablet 5    tacrolimus (PROGRAF) 0.5 MG Cap Take 3 capsules (1.5 mg total) by mouth every morning AND 3 capsules (1.5 mg total) every evening. 180 capsule 11    valGANciclovir (VALCYTE) 450 mg Tab Take 2 tablets (900 mg total) by mouth every morning. Stop 8/14/23 60 tablet 2     No facility-administered encounter medications on file as of 8/30/2023.     Orders Placed This Encounter   Procedures    Polysomnogram (CPAP will be added if patient meets diagnostic criteria.)     Standing Status:   Future     Standing Expiration Date:   8/29/2024     Plan:   Improved sleep symptoms, but we need to reevaluate due to severity of HENRY and comorbidities.   Sleep study and review when available.     Problem List Items Addressed This Visit          Cardiac/Vascular    Paroxysmal atrial fibrillation (Chronic)    Overview     Isolated episode in early June, 2023, believed provoked by post-op state. Cardiologist D/C anticoagulation at end of July, left on ASA 81 mg.         History of NSTEMI with CAD s/p PCI (FAMILIA) of LAD x 2 in 8/2017    Relevant Orders    Polysomnogram (CPAP will be added if patient meets diagnostic criteria.)       Renal/    S/P kidney transplant (Chronic)       Other    Severe obstructive sleep apnea - Intolerant of CPAP " - Primary (Chronic)    Overview     Intolerant of CPAP after weeks of trying multiple interfaces.  SPLIT-NIGHT SLEEP STUDY 7/28/2015  SLEEP ARCHITECTURE: Sleep onset was 2.9 minutes and sleep efficiency was 94.4%. Sleep Stage distribution showed 145 sleep stage changes, 6 awakenings and 119 arousals. Sleep distribution showed 53.2% stage NI, 41.8% stage N 11, 0.0% stage N Ill and REM sleep was at 5.0%. There were 2 REM periods.  RESPIRATORY SUMMARY: 257 respiratory events were present including 201 obstructive apneas and  central apneas, 0 mixed apneas and 56 hypopneas for a total AHI of 109.4. The non-rem index was 108.8 /hr while the AHI during stage R was 120.0 /hr. Oxygen desaturations were associated with the respiratory events. There were 226 desaturations. The lowest oxygen saturation was 78.0% and the mean oxygen saturation was 92.4%. Oxygen saturations were below: 88% for 24.3 minutes of the time spent asleep.  CARDIAC SUMMARY: Normal sinus rhythm with heart rate variation between 60.0 and 94.0 was noted.         Relevant Orders    Polysomnogram (CPAP will be added if patient meets diagnostic criteria.)         Thank you Dr. Rios for this consultation.             Elizabeth LeJeune, ACNP, ANP

## 2023-09-06 ENCOUNTER — PATIENT MESSAGE (OUTPATIENT)
Dept: TRANSPLANT | Facility: CLINIC | Age: 43
End: 2023-09-06
Payer: COMMERCIAL

## 2023-09-13 ENCOUNTER — PATIENT MESSAGE (OUTPATIENT)
Dept: TRANSPLANT | Facility: CLINIC | Age: 43
End: 2023-09-13
Payer: COMMERCIAL

## 2023-09-14 ENCOUNTER — LAB VISIT (OUTPATIENT)
Dept: LAB | Facility: HOSPITAL | Age: 43
End: 2023-09-14
Attending: INTERNAL MEDICINE
Payer: COMMERCIAL

## 2023-09-14 DIAGNOSIS — Z94.0 KIDNEY REPLACED BY TRANSPLANT: ICD-10-CM

## 2023-09-14 DIAGNOSIS — Z94.0 S/P KIDNEY TRANSPLANT: ICD-10-CM

## 2023-09-14 LAB
ALBUMIN SERPL BCP-MCNC: 4.2 G/DL (ref 3.5–5.2)
ANION GAP SERPL CALC-SCNC: 10 MMOL/L (ref 8–16)
BASOPHILS # BLD AUTO: 0.04 K/UL (ref 0–0.2)
BASOPHILS NFR BLD: 1.2 % (ref 0–1.9)
BUN SERPL-MCNC: 14 MG/DL (ref 6–20)
CALCIUM SERPL-MCNC: 9.4 MG/DL (ref 8.7–10.5)
CHLORIDE SERPL-SCNC: 107 MMOL/L (ref 95–110)
CO2 SERPL-SCNC: 23 MMOL/L (ref 23–29)
CREAT SERPL-MCNC: 1.5 MG/DL (ref 0.5–1.4)
DIFFERENTIAL METHOD: ABNORMAL
EOSINOPHIL # BLD AUTO: 0.2 K/UL (ref 0–0.5)
EOSINOPHIL NFR BLD: 4.8 % (ref 0–8)
ERYTHROCYTE [DISTWIDTH] IN BLOOD BY AUTOMATED COUNT: 12.6 % (ref 11.5–14.5)
EST. GFR  (NO RACE VARIABLE): 59.6 ML/MIN/1.73 M^2
GLUCOSE SERPL-MCNC: 108 MG/DL (ref 70–110)
HCT VFR BLD AUTO: 44.2 % (ref 40–54)
HGB BLD-MCNC: 14 G/DL (ref 14–18)
IMM GRANULOCYTES # BLD AUTO: 0.01 K/UL (ref 0–0.04)
IMM GRANULOCYTES NFR BLD AUTO: 0.3 % (ref 0–0.5)
LYMPHOCYTES # BLD AUTO: 0.4 K/UL (ref 1–4.8)
LYMPHOCYTES NFR BLD: 13 % (ref 18–48)
MAGNESIUM SERPL-MCNC: 1.8 MG/DL (ref 1.6–2.6)
MCH RBC QN AUTO: 28.9 PG (ref 27–31)
MCHC RBC AUTO-ENTMCNC: 31.7 G/DL (ref 32–36)
MCV RBC AUTO: 91 FL (ref 82–98)
MONOCYTES # BLD AUTO: 0.7 K/UL (ref 0.3–1)
MONOCYTES NFR BLD: 21.5 % (ref 4–15)
NEUTROPHILS # BLD AUTO: 2 K/UL (ref 1.8–7.7)
NEUTROPHILS NFR BLD: 59.2 % (ref 38–73)
NRBC BLD-RTO: 0 /100 WBC
PHOSPHATE SERPL-MCNC: 2.7 MG/DL (ref 2.7–4.5)
PLATELET # BLD AUTO: 151 K/UL (ref 150–450)
PMV BLD AUTO: 10.1 FL (ref 9.2–12.9)
POTASSIUM SERPL-SCNC: 4.6 MMOL/L (ref 3.5–5.1)
RBC # BLD AUTO: 4.85 M/UL (ref 4.6–6.2)
SODIUM SERPL-SCNC: 140 MMOL/L (ref 136–145)
WBC # BLD AUTO: 3.31 K/UL (ref 3.9–12.7)

## 2023-09-14 PROCEDURE — 80069 RENAL FUNCTION PANEL: CPT | Performed by: INTERNAL MEDICINE

## 2023-09-14 PROCEDURE — 83735 ASSAY OF MAGNESIUM: CPT | Performed by: INTERNAL MEDICINE

## 2023-09-14 PROCEDURE — 36415 COLL VENOUS BLD VENIPUNCTURE: CPT | Performed by: INTERNAL MEDICINE

## 2023-09-14 PROCEDURE — 85025 COMPLETE CBC W/AUTO DIFF WBC: CPT | Performed by: INTERNAL MEDICINE

## 2023-09-14 PROCEDURE — 80197 ASSAY OF TACROLIMUS: CPT | Performed by: INTERNAL MEDICINE

## 2023-09-14 PROCEDURE — 87799 DETECT AGENT NOS DNA QUANT: CPT | Performed by: INTERNAL MEDICINE

## 2023-09-15 LAB — TACROLIMUS BLD-MCNC: 8.9 NG/ML (ref 5–15)

## 2023-09-20 ENCOUNTER — PATIENT MESSAGE (OUTPATIENT)
Dept: TRANSPLANT | Facility: CLINIC | Age: 43
End: 2023-09-20
Payer: COMMERCIAL

## 2023-09-20 ENCOUNTER — HOSPITAL ENCOUNTER (OUTPATIENT)
Dept: SLEEP MEDICINE | Facility: HOSPITAL | Age: 43
Discharge: HOME | End: 2023-09-20
Attending: FAMILY MEDICINE
Payer: COMMERCIAL

## 2023-09-20 ENCOUNTER — TELEPHONE (OUTPATIENT)
Dept: TRANSPLANT | Facility: CLINIC | Age: 43
End: 2023-09-20
Payer: COMMERCIAL

## 2023-09-20 DIAGNOSIS — G47.61 PERIODIC LIMB MOVEMENT DISORDER (PLMD): ICD-10-CM

## 2023-09-20 DIAGNOSIS — Z72.821 INADEQUATE SLEEP HYGIENE: ICD-10-CM

## 2023-09-20 DIAGNOSIS — G47.33 OBSTRUCTIVE SLEEP APNEA: Primary | Chronic | ICD-10-CM

## 2023-09-20 DIAGNOSIS — I25.2 HISTORY OF NON-ST ELEVATION MYOCARDIAL INFARCTION (NSTEMI): ICD-10-CM

## 2023-09-20 LAB
BKV DNA SERPL NAA+PROBE-ACNC: ABNORMAL COPIES/ML
BKV DNA SERPL NAA+PROBE-LOG#: 4.91 LOG (10) COPIES/ML
BKV DNA SERPL QL NAA+PROBE: DETECTED

## 2023-09-20 PROCEDURE — 95810 POLYSOM 6/> YRS 4/> PARAM: CPT

## 2023-09-20 NOTE — TELEPHONE ENCOUNTER
Pt made aware of below orders                ----- Message from Rell Booth MD sent at 9/20/2023  2:09 PM CDT -----  Please d/c MMF and start serum BK per protocol every 2 weeks

## 2023-09-21 DIAGNOSIS — Z94.0 KIDNEY REPLACED BY TRANSPLANT: Primary | ICD-10-CM

## 2023-09-22 PROCEDURE — 95810 PR POLYSOMNOGRAPHY, 4 OR MORE: ICD-10-PCS | Mod: 26,,, | Performed by: PSYCHOLOGIST

## 2023-09-22 PROCEDURE — 95810 POLYSOM 6/> YRS 4/> PARAM: CPT | Mod: 26,,, | Performed by: PSYCHOLOGIST

## 2023-09-22 NOTE — PROCEDURES
"Patient Name: Robb Iglesias   Date of Report: 23 Study Date:  2023  [facility] No.: [id number]    : 1980  Time of Study:  09:18:39 PM - 04:50:15 AM   Sex:  Male      Age:  42 year    Weight:  221.0 lbs Height:  6' 3"   Type of Study:  Diagnostic    REASONS FOR REFERRAL: Mr. Iglesias is a 42 year year old male, referred for diagnostic polysomnography by Elizabeth LeJeune, ACNP, based on the patient's  dx PSG (Apnea + Hypopnea Index = 39.0, severe HENRY; prescribed CPAP but discontinued due to CPAP intolerance).  His current [place] Sleepiness Scale score was 4, not clinically significant, but the dx PSG is still needed for clearance testing prior to intended bariatric surgery. Dr. Monica Rios was the originating referring physician, Dr. KEVIN Roberson is the patient's primary care physician.    STUDY PARAMETERS: This diagnostic study involved analysis of the patient's sleep pattern while breathing unassisted. The study was performed with a sleep technologist in attendance for the entire test period, with video monitoring throughout the study, and routine laboratory clinical parameters recorded:  NOTE:  This polysomnographic sleep study was reviewed epoch-by-epoch, interpreted and signed below by an American Academy of Sleep Medicine Board Certified Sleep Specialist    SUMMARY STATEMENTS  DIAGNOSTIC IMPRESSIONS  G47.33  /  327.23 Mild Obstructive Sleep Apnea, Adult (OSAHS)  G47.61  /  327.51 Severe Periodic Limb Movement (PLM) Disorder   Z72.821 /  V69.4 Inadequate Sleep Hygiene  F51.04   /  307.42 r/o Psychophysiological Insomnia (stress - related and / conditioned)    F51.12   /  307.44 r/o Insufficient Sleep Syndrome  G47.22  /  327.32 r/o Advanced Sleep - Wake Phase Disorder     PRIMARY TREATMENT RECOMMENDATIONS  Treat, or refer to Sleep Disorders Center.  The diagnostic polysomnography revealed a mild obstructive sleep apnea / hypopnea syndrome (A + H Index = 13.5 events / hr asleep " with 8.4 respiratory event - related arousals / hr asleep and no RERAs (respiratory effort -  related arousals) for the study. The mean SpO2 value was 94.9 %, mild, minimum oxygen saturation during sleep was 84.0 %, and the maximum waking baseline SpO2 was 98.0 %.  Sporadic, mild snoring was noted.  A CPAP titration polysomnography is recommended.  Weight loss to the normal range is recommended as it can decrease respiratory events and snoring in overweight patients.  The following changes in sleep hygiene / sleep - related behavior are recommended after medical treatments are successful  Set time for sleep (now 7.0 hrs) to hours of sleep needed for adequate daytime functioning (7.5 to 9.0 hrs / night).  Regular bedtimes and wake times, including weekends: Total sleep time / night should not be more than one hour more than usual, and bedtime or wake time should not be more than one hour earlier or later than usual.    Do not attempt to make up lost sleep by extending sleep periods.      SECONDARY TREATMENT RECOMMENDATIONS  Treat, or refer to SDC if problems are not satisfactorily resolved by the above.  A severe PLM disorder was observed (PLMS Index = 132.5 / hr asleep, with a PLMS arousal index of 38.3 / hr sleep), and treatment might be optimal because the PLMS were very disruptive of sleep (38.3 arousals / hr asleep); though there were no signs of restless legs syndrome in the SDI, H & P or PSG.  Consider treatment of PLM disorder if PLMS symptoms are sufficiently bothersome to the patient. Note that benzodiazepine medications sometimes used to treat PLM disorder (e.g., clonazepam) may exacerbate some sleep - related respiratory disorders, and dopaminergic medications such as Mirapex and Requip can be used in such instances.    If insomnia persists after treatments for medical sleep disorders have proven effective, recommend follow - up inquiry regarding frequency, duration and nature of reported sleep loss, and  poor sleep maintenance (r/o  stress - related and / or conditioned psychophysiological insomnia, r/o  advanced sleep phase;  r/o insufficient sleep syndrome) and referral for behavioral and cognitive-behavioral treatment of insomnia if indicated. Consider if the 7 hr sleep period he reported is due to insomnia or voluntary   sleep restriction. Please see SDI.    See below for a complete interpretation of data from the polysomnography and Sleep Disorders Inventory.     Thank you for referring this patient to the [facility].      Siva Krishnan, Ph.D., ABPP; Diplomate, American Board of Sleep Medicine

## 2023-10-02 ENCOUNTER — PATIENT MESSAGE (OUTPATIENT)
Dept: TRANSPLANT | Facility: CLINIC | Age: 43
End: 2023-10-02
Payer: COMMERCIAL

## 2023-10-03 ENCOUNTER — PATIENT MESSAGE (OUTPATIENT)
Dept: INTERNAL MEDICINE | Facility: CLINIC | Age: 43
End: 2023-10-03
Payer: COMMERCIAL

## 2023-10-03 ENCOUNTER — LAB VISIT (OUTPATIENT)
Dept: LAB | Facility: HOSPITAL | Age: 43
End: 2023-10-03
Attending: INTERNAL MEDICINE
Payer: COMMERCIAL

## 2023-10-03 DIAGNOSIS — Z94.0 KIDNEY REPLACED BY TRANSPLANT: ICD-10-CM

## 2023-10-03 LAB
ALBUMIN SERPL BCP-MCNC: 4.2 G/DL (ref 3.5–5.2)
ANION GAP SERPL CALC-SCNC: 6 MMOL/L (ref 8–16)
ANISOCYTOSIS BLD QL SMEAR: SLIGHT
BASOPHILS NFR BLD: 1 % (ref 0–1.9)
BUN SERPL-MCNC: 15 MG/DL (ref 6–20)
CALCIUM SERPL-MCNC: 9.6 MG/DL (ref 8.7–10.5)
CHLORIDE SERPL-SCNC: 107 MMOL/L (ref 95–110)
CO2 SERPL-SCNC: 26 MMOL/L (ref 23–29)
CREAT SERPL-MCNC: 1.6 MG/DL (ref 0.5–1.4)
DIFFERENTIAL METHOD: ABNORMAL
EOSINOPHIL NFR BLD: 3 % (ref 0–8)
ERYTHROCYTE [DISTWIDTH] IN BLOOD BY AUTOMATED COUNT: 12.9 % (ref 11.5–14.5)
EST. GFR  (NO RACE VARIABLE): 55.2 ML/MIN/1.73 M^2
GLUCOSE SERPL-MCNC: 111 MG/DL (ref 70–110)
HCT VFR BLD AUTO: 41.8 % (ref 40–54)
HGB BLD-MCNC: 13.6 G/DL (ref 14–18)
IMM GRANULOCYTES # BLD AUTO: ABNORMAL K/UL (ref 0–0.04)
IMM GRANULOCYTES NFR BLD AUTO: ABNORMAL % (ref 0–0.5)
LYMPHOCYTES NFR BLD: 13 % (ref 18–48)
MAGNESIUM SERPL-MCNC: 1.8 MG/DL (ref 1.6–2.6)
MCH RBC QN AUTO: 28.1 PG (ref 27–31)
MCHC RBC AUTO-ENTMCNC: 32.5 G/DL (ref 32–36)
MCV RBC AUTO: 86 FL (ref 82–98)
MONOCYTES NFR BLD: 9 % (ref 4–15)
NEUTROPHILS NFR BLD: 74 % (ref 38–73)
NRBC BLD-RTO: 0 /100 WBC
PHOSPHATE SERPL-MCNC: 2.5 MG/DL (ref 2.7–4.5)
PLATELET # BLD AUTO: 151 K/UL (ref 150–450)
PLATELET BLD QL SMEAR: ABNORMAL
PMV BLD AUTO: 10 FL (ref 9.2–12.9)
POTASSIUM SERPL-SCNC: 4.1 MMOL/L (ref 3.5–5.1)
RBC # BLD AUTO: 4.84 M/UL (ref 4.6–6.2)
SODIUM SERPL-SCNC: 139 MMOL/L (ref 136–145)
WBC # BLD AUTO: 3.1 K/UL (ref 3.9–12.7)

## 2023-10-03 PROCEDURE — 36415 COLL VENOUS BLD VENIPUNCTURE: CPT | Performed by: INTERNAL MEDICINE

## 2023-10-03 PROCEDURE — 83735 ASSAY OF MAGNESIUM: CPT | Performed by: INTERNAL MEDICINE

## 2023-10-03 PROCEDURE — 80197 ASSAY OF TACROLIMUS: CPT | Performed by: INTERNAL MEDICINE

## 2023-10-03 PROCEDURE — 87799 DETECT AGENT NOS DNA QUANT: CPT | Performed by: INTERNAL MEDICINE

## 2023-10-03 PROCEDURE — 85027 COMPLETE CBC AUTOMATED: CPT | Performed by: INTERNAL MEDICINE

## 2023-10-03 PROCEDURE — 80069 RENAL FUNCTION PANEL: CPT | Performed by: INTERNAL MEDICINE

## 2023-10-03 PROCEDURE — 85007 BL SMEAR W/DIFF WBC COUNT: CPT | Performed by: INTERNAL MEDICINE

## 2023-10-04 ENCOUNTER — PATIENT MESSAGE (OUTPATIENT)
Dept: TRANSPLANT | Facility: CLINIC | Age: 43
End: 2023-10-04
Payer: COMMERCIAL

## 2023-10-04 ENCOUNTER — TELEPHONE (OUTPATIENT)
Dept: TRANSPLANT | Facility: CLINIC | Age: 43
End: 2023-10-04
Payer: COMMERCIAL

## 2023-10-04 LAB — TACROLIMUS BLD-MCNC: 6.7 NG/ML (ref 5–15)

## 2023-10-04 NOTE — TELEPHONE ENCOUNTER
Spoke with pt, pt verified off of MMF          ----- Message from Rell Booth MD sent at 10/3/2023  7:10 PM CDT -----  Please continue with serum BK pcr per protocol, verify he is OFF MMF

## 2023-10-06 LAB
ALLOSURE COMMENT: NORMAL
ALLOSURE SCORE KIDNEY: 0.15 %
BKV DNA SERPL NAA+PROBE-ACNC: ABNORMAL COPIES/ML
BKV DNA SERPL NAA+PROBE-LOG#: 4.58 LOG (10) COPIES/ML
BKV DNA SERPL QL NAA+PROBE: DETECTED
INFORMATION: NORMAL

## 2023-10-18 ENCOUNTER — PATIENT MESSAGE (OUTPATIENT)
Dept: INTERNAL MEDICINE | Facility: CLINIC | Age: 43
End: 2023-10-18
Payer: COMMERCIAL

## 2023-10-18 ENCOUNTER — PATIENT MESSAGE (OUTPATIENT)
Dept: SLEEP MEDICINE | Facility: CLINIC | Age: 43
End: 2023-10-18
Payer: COMMERCIAL

## 2023-10-18 ENCOUNTER — OFFICE VISIT (OUTPATIENT)
Dept: DIABETES | Facility: CLINIC | Age: 43
End: 2023-10-18
Payer: COMMERCIAL

## 2023-10-18 VITALS
WEIGHT: 226.19 LBS | DIASTOLIC BLOOD PRESSURE: 81 MMHG | BODY MASS INDEX: 28.12 KG/M2 | HEART RATE: 74 BPM | SYSTOLIC BLOOD PRESSURE: 124 MMHG | HEIGHT: 75 IN

## 2023-10-18 DIAGNOSIS — E11.69 DYSLIPIDEMIA ASSOCIATED WITH TYPE 2 DIABETES MELLITUS: ICD-10-CM

## 2023-10-18 DIAGNOSIS — I10 ESSENTIAL HYPERTENSION: ICD-10-CM

## 2023-10-18 DIAGNOSIS — N18.31 TYPE 2 DIABETES MELLITUS WITH STAGE 3A CHRONIC KIDNEY DISEASE, WITHOUT LONG-TERM CURRENT USE OF INSULIN: Primary | ICD-10-CM

## 2023-10-18 DIAGNOSIS — Z29.89 PROPHYLACTIC IMMUNOTHERAPY: ICD-10-CM

## 2023-10-18 DIAGNOSIS — Z12.83 SKIN CANCER SCREENING: Primary | ICD-10-CM

## 2023-10-18 DIAGNOSIS — E78.5 DYSLIPIDEMIA ASSOCIATED WITH TYPE 2 DIABETES MELLITUS: ICD-10-CM

## 2023-10-18 DIAGNOSIS — E11.22 TYPE 2 DIABETES MELLITUS WITH STAGE 3A CHRONIC KIDNEY DISEASE, WITHOUT LONG-TERM CURRENT USE OF INSULIN: Primary | ICD-10-CM

## 2023-10-18 DIAGNOSIS — Z94.0 S/P KIDNEY TRANSPLANT: ICD-10-CM

## 2023-10-18 LAB — GLUCOSE SERPL-MCNC: 145 MG/DL (ref 70–110)

## 2023-10-18 PROCEDURE — 3079F DIAST BP 80-89 MM HG: CPT | Mod: CPTII,S$GLB,, | Performed by: NURSE PRACTITIONER

## 2023-10-18 PROCEDURE — 3008F PR BODY MASS INDEX (BMI) DOCUMENTED: ICD-10-PCS | Mod: CPTII,S$GLB,, | Performed by: NURSE PRACTITIONER

## 2023-10-18 PROCEDURE — 99999 PR PBB SHADOW E&M-EST. PATIENT-LVL IV: CPT | Mod: PBBFAC,,, | Performed by: NURSE PRACTITIONER

## 2023-10-18 PROCEDURE — 3060F POS MICROALBUMINURIA REV: CPT | Mod: CPTII,S$GLB,, | Performed by: NURSE PRACTITIONER

## 2023-10-18 PROCEDURE — 1159F PR MEDICATION LIST DOCUMENTED IN MEDICAL RECORD: ICD-10-PCS | Mod: CPTII,S$GLB,, | Performed by: NURSE PRACTITIONER

## 2023-10-18 PROCEDURE — 1160F RVW MEDS BY RX/DR IN RCRD: CPT | Mod: CPTII,S$GLB,, | Performed by: NURSE PRACTITIONER

## 2023-10-18 PROCEDURE — 3079F PR MOST RECENT DIASTOLIC BLOOD PRESSURE 80-89 MM HG: ICD-10-PCS | Mod: CPTII,S$GLB,, | Performed by: NURSE PRACTITIONER

## 2023-10-18 PROCEDURE — 99214 OFFICE O/P EST MOD 30 MIN: CPT | Mod: S$GLB,,, | Performed by: NURSE PRACTITIONER

## 2023-10-18 PROCEDURE — 3066F PR DOCUMENTATION OF TREATMENT FOR NEPHROPATHY: ICD-10-PCS | Mod: CPTII,S$GLB,, | Performed by: NURSE PRACTITIONER

## 2023-10-18 PROCEDURE — 3044F PR MOST RECENT HEMOGLOBIN A1C LEVEL <7.0%: ICD-10-PCS | Mod: CPTII,S$GLB,, | Performed by: NURSE PRACTITIONER

## 2023-10-18 PROCEDURE — 4010F ACE/ARB THERAPY RXD/TAKEN: CPT | Mod: CPTII,S$GLB,, | Performed by: NURSE PRACTITIONER

## 2023-10-18 PROCEDURE — 3066F NEPHROPATHY DOC TX: CPT | Mod: CPTII,S$GLB,, | Performed by: NURSE PRACTITIONER

## 2023-10-18 PROCEDURE — 3060F PR POS MICROALBUMINURIA RESULT DOCUMENTED/REVIEW: ICD-10-PCS | Mod: CPTII,S$GLB,, | Performed by: NURSE PRACTITIONER

## 2023-10-18 PROCEDURE — 3008F BODY MASS INDEX DOCD: CPT | Mod: CPTII,S$GLB,, | Performed by: NURSE PRACTITIONER

## 2023-10-18 PROCEDURE — 99214 PR OFFICE/OUTPT VISIT, EST, LEVL IV, 30-39 MIN: ICD-10-PCS | Mod: S$GLB,,, | Performed by: NURSE PRACTITIONER

## 2023-10-18 PROCEDURE — 99999 PR PBB SHADOW E&M-EST. PATIENT-LVL IV: ICD-10-PCS | Mod: PBBFAC,,, | Performed by: NURSE PRACTITIONER

## 2023-10-18 PROCEDURE — 3074F PR MOST RECENT SYSTOLIC BLOOD PRESSURE < 130 MM HG: ICD-10-PCS | Mod: CPTII,S$GLB,, | Performed by: NURSE PRACTITIONER

## 2023-10-18 PROCEDURE — 3044F HG A1C LEVEL LT 7.0%: CPT | Mod: CPTII,S$GLB,, | Performed by: NURSE PRACTITIONER

## 2023-10-18 PROCEDURE — 1159F MED LIST DOCD IN RCRD: CPT | Mod: CPTII,S$GLB,, | Performed by: NURSE PRACTITIONER

## 2023-10-18 PROCEDURE — 82962 GLUCOSE BLOOD TEST: CPT | Mod: S$GLB,,, | Performed by: NURSE PRACTITIONER

## 2023-10-18 PROCEDURE — 82962 POCT GLUCOSE, HAND-HELD DEVICE: ICD-10-PCS | Mod: S$GLB,,, | Performed by: NURSE PRACTITIONER

## 2023-10-18 PROCEDURE — 1160F PR REVIEW ALL MEDS BY PRESCRIBER/CLIN PHARMACIST DOCUMENTED: ICD-10-PCS | Mod: CPTII,S$GLB,, | Performed by: NURSE PRACTITIONER

## 2023-10-18 PROCEDURE — 3074F SYST BP LT 130 MM HG: CPT | Mod: CPTII,S$GLB,, | Performed by: NURSE PRACTITIONER

## 2023-10-18 PROCEDURE — 4010F PR ACE/ARB THEARPY RXD/TAKEN: ICD-10-PCS | Mod: CPTII,S$GLB,, | Performed by: NURSE PRACTITIONER

## 2023-10-18 RX ORDER — INSULIN ASPART 100 [IU]/ML
4 INJECTION, SOLUTION INTRAVENOUS; SUBCUTANEOUS
COMMUNITY
End: 2024-02-07

## 2023-10-18 NOTE — TELEPHONE ENCOUNTER
I have entered a dermatology referral order for you. You should be able to schedule the appointment using [facility] or by calling our appointment desk at [phone].  Let me know if I can do anything else for you.    Orders Placed This Encounter   Procedures    Ambulatory referral/consult to Dermatology

## 2023-10-18 NOTE — PATIENT INSTRUCTIONS
Medication Regimen:   Continue Trulicity 3 mg once a week  Continue Novolog 4 units before each meal if premeal blood glucose above 120, PLUS sliding scale if BG above 150 (previous order from Transplant team)    Patient Instructions:  Carbohydrate Count: 30-45 grams/meal and 15 grams/snack with limit of 2 snacks per day.  Exercise: Goal is 150 minutes or more per week.  Bring meter and blood sugar log to each appointment.     Goals for Blood Sugar:  Fastin-130 mg/dl  2 hours after meal:  mg/dl  Before Bed: 100-150 mg/dl  If Blood sugar is below 70 mg/dl, DO NOT take diabetes medication. Eat first and then recheck blood sugar in 20 minutes.  Foods to eat if blood sugar is below 70 mg/dl  1/2 glass of orange juice   1/2 regular cola can   3-4 hard candies   3-4 small glucose tabs.   Foods to eat if blood sugar is below 50 mg/dl  1 glass of orange juice  1 regular cola can  8 hard candies  8 small glucose tabs.   If you have repeated eating/blood sugar recheck process 2 times and blood sugar still below 70 mg/dl, call 911.

## 2023-10-18 NOTE — PROGRESS NOTES
PCP: SABIHA Roberson MD    Subjective:     Chief Complaint: Diabetes follow up.  Last visit (virtual) with me: 7/12/23 for initial consult.    History of Present Illness: 42 y.o.  male for diabetes follow up.   Type II diabetes since 2010.  Comorbidities: HTN, HLD, CAD, ESRD, S/p Right Kidney Transplant May 2023,  S/p Bariatric Surgery 2017, and Atrial Fibrillation   100 lbs weight loss after gastric sleeve surgery 2017  On Prednisone 5 mg daily    Known diabetes complications:  DKA/HHS: no  Retinopathy: no   Peripheral neuropathy: yes   Nephropathy: yes    Interval history:  Since last visit, continues to have BG readings within normal range.  Not needing to use mealtime insulin.    Denies recent hospital admissions or ER visits.   Denies having hypoglycemia.   Endorses diabetes related symptoms: Intermittent tingling bilateral big toes.    Blood glucose monitoring: fingerstick: 1x/week  Per patient's recall, over the last 4 weeks   Fasting 110-120's   's    Activity Level: Sedentary previously swimming  Meal Planning:3 meals/day; infrequent snacks/day.    Medication compliance all of the time, diet compliance all of the time.   Has attended diabetes education in the past.     Previous regimen: Metformin, Janumet, insulin (unable to recall name)    Diabetes Medications               dulaglutide (TRULICITY) 3 mg/0.5 mL pen injector Inject 3 mg into the skin every 7 days.    insulin aspart U-100 (NOVOLOG FLEXPEN U-100 INSULIN) 100 unit/mL (3 mL) InPn pen Inject 4 Units into the skin 3 (three) times daily before meals. Plus sliding scale  Not needing to use since July     Allergies  Review of patient's allergies indicates:  No Known Allergies    Labs Reviewed:  His most recent A1C is:  Lab Results   Component Value Date    HGBA1C 5.6 07/03/2023    HGBA1C 5.0 05/15/2023    HGBA1C 5.3 01/09/2023       His most recent BMP is:  Lab Results   Component Value Date     10/03/2023    K 4.1 10/03/2023  "    10/03/2023    CO2 26 10/03/2023    BUN 15 10/03/2023    CREATININE 1.6 (H) 10/03/2023    CALCIUM 9.6 10/03/2023    ANIONGAP 6 (L) 10/03/2023    ESTGFRAFRICA 13 (A) 07/26/2022    EGFRNONAA 11 (A) 07/26/2022       No results found for: "CPEPTIDE"  No results found for: "GLUTAMICACID"    Body mass index is 28.27 kg/m².    Wt Readings from Last 3 Encounters:   10/18/23 1553 102.6 kg (226 lb 3.1 oz)   08/30/23 1136 100.6 kg (221 lb 12.5 oz)   07/26/23 0953 99.9 kg (220 lb 3.8 oz)        His blood sugar in clinic today is:  Lab Results   Component Value Date    POCGLU 145 (A) 10/18/2023       Diabetes Management Status  Statin: Taking  ACE/ARB: Not taking    Screening or Prevention Patient's value Goal Complete/Controlled?   HgA1C Testing and Control   Lab Results   Component Value Date    HGBA1C 5.6 07/03/2023      Annually/Less than 8% Yes   Lipid profile : 05/02/2023 Annually Yes   LDL control Lab Results   Component Value Date    LDLCALC 48.0 (L) 05/02/2023    Annually/Less than 100 mg/dl  Yes   Nephropathy screening Lab Results   Component Value Date    MICALBCREAT 38.1 (H) 07/03/2023     Lab Results   Component Value Date    PROTEINUA 2+ (A) 07/10/2023    Annually Yes   Blood pressure BP Readings from Last 1 Encounters:   10/18/23 124/81    Less than 140/90 Yes   Dilated retinal exam : 08/03/2023 Annually Yes    Foot exam   : 10/18/2023 Annually Yes     Social History     Socioeconomic History    Marital status:      Spouse name: Shannon Iglesias    Number of children: 2    Years of education: Some College   Tobacco Use    Smoking status: Never    Smokeless tobacco: Never   Substance and Sexual Activity    Alcohol use: No     Comment: 1-2 times per month    Drug use: No    Sexual activity: Yes     Partners: Female   Social History Narrative    Full time employed,  with 2 children.    Caregiver Shannon      Social Determinants of Health     Financial Resource Strain: Low Risk  (1/15/2020)    " Overall Financial Resource Strain (CARDIA)     Difficulty of Paying Living Expenses: Not very hard   Food Insecurity: No Food Insecurity (1/15/2020)    Hunger Vital Sign     Worried About Running Out of Food in the Last Year: Never true     Ran Out of Food in the Last Year: Never true   Transportation Needs: No Transportation Needs (1/15/2020)    PRAPARE - Transportation     Lack of Transportation (Medical): No     Lack of Transportation (Non-Medical): No   Social Connections: Unknown (1/15/2020)    Social Connection and Isolation Panel [NHANES]     Frequency of Communication with Friends and Family: Twice a week     Past Medical History:   Diagnosis Date    Allergic rhinitis     Atrial fibrillation with RVR 6/4/2023    Class 1 obesity due to excess calories with serious comorbidity and body mass index (BMI) of 31.0 to 31.9 in adult 4/7/2017    Coronary artery disease     Coronary artery disease involving native coronary artery of native heart without angina pectoris 2/6/2018    Cath lab procedure 04/23/2018 (Naveen Rios MD) A. Indication/Pre-Operative Diagnosis: The patient is a 37 year old male that was referred for catheterization by [facility] Self for ACS (NSTEMI). The BELLA risk score is 5.  B. Summary/Post-Operative Diagnosis 1. Single vessel coronary artery disease. 2. Normal LVEF. 3. Diastolic dysfunction. 4. Successful PCI for acute myocardial infarction. 5.     Diabetic nephropathy associated with type 2 diabetes mellitus 1/6/2020    Direct hyperbilirubinemia 3/24/2018    DM (diabetes mellitus) 2008    BS doesn't check any more 08/02/2018    DM (diabetes mellitus) 2012    BS 99 am 06/26/2020    DM (diabetes mellitus)     BS didn't check 06/04/2021    DM (diabetes mellitus) 2008    BS didn't check 07/29/2022     Dyslipidemia associated with type 2 diabetes mellitus 7/31/2017    Elevated bilirubin 3/21/2018    GERD (gastroesophageal reflux disease)     Gout     Hyperlipidemia     Hyperparathyroidism,  secondary to chronic kidney disease 6/7/2021    Hypertension associated with chronic kidney disease due to type 2 diabetes mellitus     Hypertension complicating diabetes 3/16/2019    Not candidate for Hypertension Digital Medicine program due to dialysis status. Didn't tolerate amlodipine due to SE of hypotension.    Idiopathic chronic gout, multiple sites, without tophus (tophi) 7/19/2017    Long term (current) use of insulin     MI (myocardial infarction) 07/2017    NSTEMI with CAD s/p PCI (FAMILIA) of LAD x 2 in 8/2017 7/31/2017    Obesity     HENRY on CPAP     Peritoneal dialysis catheter in place 1/26/2023    Proteinuria     Severe obstructive sleep apnea - Intolerant of CPAP 7/31/2017    Intolerant of CPAP after weeks of trying multiple interfaces. SPLIT-NIGHT SLEEP STUDY 7/28/2015 · SLEEP ARCHITECTURE: Sleep onset was 2.9 minutes and sleep efficiency was 94.4%. Sleep Stage distribution showed 145 sleep stage changes, 6 awakenings and 119 arousals. Sleep distribution showed 53.2% stage NI, 41.8% stage N 11, 0.0% stage N Ill and REM sleep was at 5.0%. There were 2 REM periods. · RE    Stage 3a chronic kidney disease 5/26/2023    Stage 5 chronic kidney disease on chronic peritoneal dialysis 6/7/2017    Stage 5 chronic kidney disease on chronic peritoneal dialysis 6/7/2017    Status post angioplasty with stent 8/4/2017    Steatohepatitis     Fatty Liver    Type 2 diabetes mellitus with chronic kidney disease on chronic dialysis, without long-term current use of insulin 6/21/2017    Type 2 diabetes mellitus with diabetic nephropathy     Type 2 diabetes mellitus with diabetic nephropathy, without long-term current use of insulin 1/6/2020    Type 2 diabetes mellitus with diabetic polyneuropathy, without long-term current use of insulin 6/7/2021    Type 2 diabetes mellitus with hyperglycemia     Type 2 diabetes mellitus with renal manifestations     Type 2 diabetes mellitus with stage 3 chronic kidney disease, without  long-term current use of insulin 6/21/2017     Review of Systems   Constitutional:  Negative for activity change, appetite change, fatigue and unexpected weight change.   HENT:  Negative for dental problem and trouble swallowing.    Eyes:  Negative for visual disturbance.   Respiratory:  Negative for chest tightness and shortness of breath.    Cardiovascular:  Negative for chest pain, palpitations and leg swelling.   Gastrointestinal:  Negative for abdominal pain, constipation, diarrhea, nausea and vomiting.   Endocrine: Negative for polydipsia, polyphagia and polyuria.   Genitourinary:  Negative for difficulty urinating, dysuria, frequency and urgency.        Negative yeast infection   Musculoskeletal:  Negative for gait problem, myalgias and neck pain.   Integumentary:  Negative for rash and wound.   Allergic/Immunologic: Negative.    Neurological:  Negative for dizziness, syncope, weakness, numbness and headaches.        Intermittent tingling bilateral big toes  Hematological: Negative.    Psychiatric/Behavioral:  Negative for confusion and sleep disturbance.    All other systems reviewed and are negative.     Objective:      Physical Exam  Vitals reviewed.   Constitutional:       Appearance: Normal appearance.   HENT:      Head: Normocephalic and atraumatic.   Eyes:      General: Vision grossly intact.      Extraocular Movements: Extraocular movements intact.      Pupils: Pupils are equal, round, and reactive to light.   Neck:      Thyroid: No thyroid mass or thyroid tenderness.   Cardiovascular:      Rate and Rhythm: Normal rate and regular rhythm.      Pulses: Normal pulses.           Radial pulses are 2+ on the right side and 2+ on the left side.        Dorsalis pedis pulses are 2+ on the right side and 2+ on the left side.      Heart sounds: Normal heart sounds.   Pulmonary:      Effort: Pulmonary effort is normal.      Breath sounds: Normal breath sounds.   Abdominal:      General: Bowel sounds are normal.       Palpations: Abdomen is soft.   Genitourinary:     Comments: Deferred  Musculoskeletal:         General: Normal range of motion.      Cervical back: Normal range of motion and neck supple.      Right lower leg: No edema.      Left lower leg: No edema.      Right foot: No deformity or bunion.      Left foot: No deformity or bunion.   Feet:      Right foot:      Protective Sensation: 6 sites tested.  6 sites sensed.      Skin integrity: Callus and dry skin present. No ulcer or skin breakdown.      Toenail Condition: Right toenails are normal.      Left foot:      Protective Sensation: 6 sites tested.  6 sites sensed.      Skin integrity: Callus and dry skin present. No ulcer or skin breakdown.      Toenail Condition: Left toenails are normal.      Comments: Pinprick exam completed with 10-g monofilament. Vibration sensation intact.  Lymphadenopathy:      Cervical: No cervical adenopathy.   Skin:     General: Skin is warm and dry.      Findings: No rash or wound.      Comments: Negative for acanthosis nigricans. Well-healed SQ injection sites.   Neurological:      Mental Status: He is alert and oriented to person, place, and time.      Coordination: Coordination is intact.      Gait: Gait is intact.   Psychiatric:         Attention and Perception: Attention normal.         Mood and Affect: Mood normal.         Speech: Speech normal.         Behavior: Behavior normal. Behavior is cooperative.         Thought Content: Thought content normal.         Cognition and Memory: Cognition normal.         Assessment / Plan:     Diagnoses and all orders for this visit:    Type 2 diabetes mellitus with stage 3a chronic kidney disease, without long-term current use of insulin  -     POCT Glucose, Hand-Held Device  -     Hemoglobin A1C; Future    Dyslipidemia associated with type 2 diabetes mellitus    Essential hypertension    S/P kidney transplant    Prophylactic immunotherapy     Additional Plan Details:  - Condition:  Controlled, most recent A1C of 5.6% was completed 3 months ago.   - Monitor blood glucose:  4x daily.Goals reviewed. Maintain BG log. Would benefit from CGM use. Discussed with patient, declined need at the moment.  - Hypoglycemia protocol: Reviewed and risk discussed.  Notify if BG readings below 80. Protocol printout provided.   - Meal Planning: Limit carbohydrate intake 30-45 grams/meal, 3x daily and 15 grams/snack, limit 2/day.   - Activity level: Recommend at least 150 minutes of exercise in a week.  - BP and LDL: Recommend lifestyle modifications and follow up with PCP for management.   - Medication Regimen:     Continue Trulicity 3 mg once a week  Continue Novolog 4 units before each meal if premeal blood glucose above 120, PLUS sliding scale if BG above 150 (previous order from Transplant team)    - Medication consideration: Continue regimen.  - Health Maintenance standards addressed today: None - up to date. Eye exam UTD with Dr. Karla NEVAREZ 8/2023  - Risks of uncontrolled DM explained. Importance of routine foot and eye exams reviewed. Scheduled as appropriate.  -The patient was explained the above plan and given opportunity to ask questions.  He understands, chooses and consents to this plan and accepts all the risks, which include but are not limited to the risks mentioned above.   - Labs ordered as above.  - CDE referral: Deferred   - Follow up: 6 months in clinic.        Charlotte T. Delacruz, FNP-C [facility]    A total of 30 minutes was spent in face to face time, of which over 50 % was spent in counseling patient on disease process, complications, treatment, and side effects of medications.

## 2023-10-20 ENCOUNTER — PATIENT MESSAGE (OUTPATIENT)
Dept: TRANSPLANT | Facility: CLINIC | Age: 43
End: 2023-10-20
Payer: COMMERCIAL

## 2023-10-23 ENCOUNTER — PATIENT MESSAGE (OUTPATIENT)
Dept: INTERNAL MEDICINE | Facility: CLINIC | Age: 43
End: 2023-10-23
Payer: COMMERCIAL

## 2023-10-23 DIAGNOSIS — E11.22 TYPE 2 DIABETES MELLITUS WITH STAGE 3A CHRONIC KIDNEY DISEASE, WITHOUT LONG-TERM CURRENT USE OF INSULIN: Chronic | ICD-10-CM

## 2023-10-23 DIAGNOSIS — N18.31 TYPE 2 DIABETES MELLITUS WITH STAGE 3A CHRONIC KIDNEY DISEASE, WITHOUT LONG-TERM CURRENT USE OF INSULIN: Chronic | ICD-10-CM

## 2023-10-23 DIAGNOSIS — E11.42 TYPE 2 DIABETES MELLITUS WITH DIABETIC POLYNEUROPATHY, WITHOUT LONG-TERM CURRENT USE OF INSULIN: Chronic | ICD-10-CM

## 2023-10-23 NOTE — PROGRESS NOTES
Subjective:   Patient ID:  Robb Iglesias is a 42 y.o. male who presents for follow-up of Atrial Fibrillation      42 yoM CAD s/p MI x 2, ESRD s/p renal transplant here for AF management.     6/23: He started HD October 2022 after 6 years of CKD. He underwent renal transplant 5/15/23. He developed symptomatic AF/RVR 9PM 6/3/23 leading to an ER visit at . He converted on diltiazem infusion and was discharged. He was started on eliquis as well. Holter on discharge showed no recurrence.     7/23: No recurrence by symptoms and by watch surveillance.   Eliquis stopped    Interval history: No AF recurrence     Echo 12/22:  · The test was stopped because the patient experienced fatigue and shortness of breath. The patient requested the test to be stopped.  · The patient's exercise capacity was normal.  · During stress, the following significant arrhythmias were observed: occasional PVCs.  · The ECG portion of this study is negative for myocardial ischemia.  · The stress echo portion of this study is negative for myocardial ischemia.  · The left ventricle is normal in size with concentric remodeling and normal systolic function.  · The estimated ejection fraction is 55%.  · Normal left ventricular diastolic function.  · Normal right ventricular size with normal right ventricular systolic function.    Past Medical History:  No date: Allergic rhinitis  6/4/2023: Atrial fibrillation with RVR  4/7/2017: Class 1 obesity due to excess calories with serious   comorbidity and body mass index (BMI) of 31.0 to 31.9 in adult  No date: Coronary artery disease  2/6/2018: Coronary artery disease involving native coronary artery of   native heart without angina pectoris      Comment:  Cath lab procedure 04/23/2018 (Naveen Rios MD) A.                Indication/Pre-Operative Diagnosis: The patient is a 37                year old male that was referred for catheterization by                [facility] for ACS (NSTEMI). The  BELLA risk score is               5.  B. Summary/Post-Operative Diagnosis 1. Single vessel                coronary artery disease. 2. Normal LVEF. 3. Diastolic                dysfunction. 4. Successful PCI for acute myocardial                infarction. 5.   1/6/2020: Diabetic nephropathy associated with type 2 diabetes   mellitus  3/24/2018: Direct hyperbilirubinemia  2008: DM (diabetes mellitus)      Comment:  BS doesn't check any more 08/02/2018  2012: DM (diabetes mellitus)      Comment:  BS 99 am 06/26/2020  No date: DM (diabetes mellitus)      Comment:  BS didn't check 06/04/2021  2008: DM (diabetes mellitus)      Comment:  BS didn't check 07/29/2022 7/31/2017: Dyslipidemia associated with type 2 diabetes mellitus  3/21/2018: Elevated bilirubin  No date: GERD (gastroesophageal reflux disease)  No date: Gout  No date: Hyperlipidemia  6/7/2021: Hyperparathyroidism, secondary to chronic kidney disease  No date: Hypertension associated with chronic kidney disease due to   type 2 diabetes mellitus  3/16/2019: Hypertension complicating diabetes      Comment:  Not candidate for Hypertension Digital Medicine program                due to dialysis status. Didn't tolerate amlodipine due to               SE of hypotension.  7/19/2017: Idiopathic chronic gout, multiple sites, without tophus   (tophi)  No date: Long term (current) use of insulin  07/2017: MI (myocardial infarction)  7/31/2017: NSTEMI with CAD s/p PCI (FAMILIA) of LAD x 2 in 8/2017  No date: Obesity  No date: HENRY on CPAP  1/26/2023: Peritoneal dialysis catheter in place  No date: Proteinuria  7/31/2017: Severe obstructive sleep apnea - Intolerant of CPAP      Comment:  Intolerant of CPAP after weeks of trying multiple                interfaces. SPLIT-NIGHT SLEEP STUDY 7/28/2015 · SLEEP                ARCHITECTURE: Sleep onset was 2.9 minutes and sleep                efficiency was 94.4%. Sleep Stage distribution showed 145               sleep stage changes, 6  awakenings and 119 arousals. Sleep               distribution showed 53.2% stage NI, 41.8% stage N 11,                0.0% stage N Ill and REM sleep was at 5.0%. There were 2                REM periods. · RE  5/26/2023: Stage 3a chronic kidney disease  6/7/2017: Stage 5 chronic kidney disease on chronic peritoneal   dialysis  6/7/2017: Stage 5 chronic kidney disease on chronic peritoneal   dialysis  8/4/2017: Status post angioplasty with stent  No date: Steatohepatitis      Comment:  Fatty Liver  6/21/2017: Type 2 diabetes mellitus with chronic kidney disease on   chronic dialysis, without long-term current use of insulin  No date: Type 2 diabetes mellitus with diabetic nephropathy  1/6/2020: Type 2 diabetes mellitus with diabetic nephropathy, without   long-term current use of insulin  6/7/2021: Type 2 diabetes mellitus with diabetic polyneuropathy,   without long-term current use of insulin  No date: Type 2 diabetes mellitus with hyperglycemia  No date: Type 2 diabetes mellitus with renal manifestations  6/21/2017: Type 2 diabetes mellitus with stage 3 chronic kidney   disease, without long-term current use of insulin    Past Surgical History:  No date: CORONARY ANGIOPLASTY WITH STENT PLACEMENT  No date: CYSTOSCOPY  05/15/2023: KIDNEY TRANSPLANT; N/A      Comment:  Procedure: TRANSPLANT, KIDNEY;  Surgeon: Reji Hinojosa MD;  Location: [address];  Service: Transplant;                 Laterality: N/A;  IN ROOM: 10:37OUT OF                ICE:10:53REPERFUSION TIME: 11:21  No date: LASIK; Bilateral  No date: LIVER BIOPSY  No date: NASAL ENDOSCOPY  No date: NASAL SEPTUM SURGERY  05/15/2023: PERITONEAL CATHETER REMOVAL; N/A      Comment:  Procedure: REMOVAL, CATHETER, DIALYSIS, PERITONEAL;                 Surgeon: Reji Hinojosa MD;  Location: [address];                Service: Transplant;  Laterality: N/A;  03/06/2017: SLEEVE GASTROPLASTY    Social History    Socioeconomic History       Marital status:       Spouse name: Shannon Iglesias      Number of children: 2      Years of education: Some College    Tobacco Use      Smoking status: Never      Smokeless tobacco: Never    Substance and Sexual Activity      Alcohol use: No        Comment: 1-2 times per month      Drug use: No      Sexual activity: Yes        Partners: Female    Social History Narrative      Full time employed,  with 2 children.      Caregiver Shannon     Social Determinants of Health  Financial Resource Strain: Low Risk  (1/15/2020)      Overall Financial Resource Strain (CARDIA)          Difficulty of Paying Living Expenses: Not very hard  Food Insecurity: No Food Insecurity (1/15/2020)      Hunger Vital Sign          Worried About Running Out of Food in the Last Year: Never true          Ran Out of Food in the Last Year: Never true  Transportation Needs: No Transportation Needs (1/15/2020)      PRAPARE - Transportation          Lack of Transportation (Medical): No          Lack of Transportation (Non-Medical): No  Social Connections: Unknown (1/15/2020)      Social Connection and Isolation Panel [NHANES]          Frequency of Communication with Friends and Family: Twice a week    Review of patient's family history indicates:  Problem: Diabetes type II      Relation: Mother          Age of Onset: (Not Specified)  Problem: Hypertension      Relation: Mother          Age of Onset: (Not Specified)  Problem: Hyperlipidemia      Relation: Mother          Age of Onset: (Not Specified)  Problem: Diabetes      Relation: Mother          Age of Onset: (Not Specified)  Problem: Hyperlipidemia      Relation: Father          Age of Onset: (Not Specified)  Problem: Diabetes type II      Relation: Brother          Age of Onset: (Not Specified)  Problem: Diabetes type II      Relation: Brother          Age of Onset: (Not Specified)  Problem: Diabetes      Relation: Maternal Grandmother          Age of Onset: (Not  Specified)      Current Outpatient Medications:  aspirin (ECOTRIN) 81 MG EC tablet, Take 1 tablet (81 mg total) by mouth once daily., Disp: 90 tablet, Rfl: 3  atorvastatin (LIPITOR) 80 MG tablet, Take 1 tablet (80 mg total) by mouth every evening., Disp: 90 tablet, Rfl: 3  blood sugar diagnostic Strp, Check blood glucose 2 times daily as directed and as needed, Disp: 200 each, Rfl: 5  blood-glucose meter (ONETOUCH ULTRAMINI) kit, Use as instructed, Disp: 1 each, Rfl: 0  dulaglutide (TRULICITY) 3 mg/0.5 mL pen injector, Inject 3 mg into the skin every 7 days., Disp: 12 pen , Rfl: 2  ezetimibe (ZETIA) 10 mg tablet, Take 1 tablet (10 mg total) by mouth once daily., Disp: 90 tablet, Rfl: 3  insulin aspart U-100 (NOVOLOG FLEXPEN U-100 INSULIN) 100 unit/mL (3 mL) InPn pen, Inject 4 Units into the skin 3 (three) times daily before meals. Plus sliding scale, Disp: , Rfl:   k phos di & mono-sod phos mono (K-PHOS-NEUTRAL) 250 mg Tab, Take 2 tablets by mouth 3 (three) times daily., Disp: 180 tablet, Rfl: 11  lancets Misc, Check blood glucose 2 times daily as directed and as needed (dispense insurance preferred brand or patient choice), Disp: 200 each, Rfl: 5  magnesium oxide (MAG-OX) 400 mg (241.3 mg magnesium) tablet, Take 1 tablet (400 mg total) by mouth 2 (two) times daily., Disp: 60 tablet, Rfl: 11  magnesium oxide (MAG-OX) 400 mg (241.3 mg magnesium) tablet, TAKE 1 TABLET BY MOUTH 2 TIMES DAILY., Disp: , Rfl:   metoprolol tartrate (LOPRESSOR) 25 MG tablet, Take 1 tablet (25 mg total) by mouth 2 (two) times daily., Disp: 60 tablet, Rfl: 11  multivitamin Tab, Take 1 tablet by mouth once daily., Disp: , Rfl:   nitroGLYCERIN (NITROSTAT) 0.4 MG SL tablet, Dissolve one tablet underneath tongue at onset of angina; may repeat every 5 minutes if needed. Call 911 if angina persists after 2 doses., Disp: 25 tablet, Rfl: 5  pantoprazole (PROTONIX) 40 MG tablet, Take 1 tablet (40 mg total) by mouth once daily., Disp: 30 tablet, Rfl:  "11  pen needle, diabetic (BD ULTRA-FINE SHORT PEN NEEDLE) 31 gauge x 5/16" Ndle, Use to inject insulin into the skin 3 times daily, Disp: 100 each, Rfl: 1  predniSONE (DELTASONE) 5 MG tablet, Take by mouth daily; 5/18-5/24: 20 mg; 5/25-5/31: 15 mg; 6/1-6/7: 10 mg; 6/8/23- thereafter: 5 mg daily; do not stop, Disp: 70 tablet, Rfl: 11  sulfamethoxazole-trimethoprim 400-80mg (BACTRIM,SEPTRA) 400-80 mg per tablet, Take 1 tablet by mouth every morning. Stop 11/12/23, Disp: 30 tablet, Rfl: 5  tacrolimus (PROGRAF) 0.5 MG Cap, Take 3 capsules (1.5 mg total) by mouth every morning AND 3 capsules (1.5 mg total) every evening., Disp: 180 capsule, Rfl: 11  valGANciclovir (VALCYTE) 450 mg Tab, Take 2 tablets (900 mg total) by mouth every morning. Stop 8/14/23, Disp: 60 tablet, Rfl: 2    No current facility-administered medications for this visit.            Review of Systems   Constitutional: Negative.   HENT: Negative.     Eyes: Negative.    Cardiovascular:  Negative for chest pain, dyspnea on exertion, leg swelling, near-syncope, palpitations and syncope.   Respiratory: Negative.  Negative for shortness of breath.    Endocrine: Negative.    Hematologic/Lymphatic: Negative.    Skin: Negative.    Musculoskeletal: Negative.    Gastrointestinal: Negative.    Genitourinary: Negative.    Neurological: Negative.  Negative for dizziness and light-headedness.   Psychiatric/Behavioral: Negative.     Allergic/Immunologic: Negative.        Objective:   Physical Exam  Vitals reviewed.   Constitutional:       General: He is not in acute distress.     Appearance: He is well-developed.   HENT:      Head: Normocephalic and atraumatic.   Eyes:      Pupils: Pupils are equal, round, and reactive to light.   Neck:      Thyroid: No thyromegaly.      Vascular: No JVD.   Cardiovascular:      Rate and Rhythm: Normal rate and regular rhythm.      Chest Wall: PMI is not displaced.      Heart sounds: Normal heart sounds, S1 normal and S2 normal. No " murmur heard.     No friction rub. No gallop.   Pulmonary:      Effort: Pulmonary effort is normal. No respiratory distress.      Breath sounds: Normal breath sounds. No wheezing or rales.   Abdominal:      General: Bowel sounds are normal. There is no distension.      Palpations: Abdomen is soft.      Tenderness: There is no abdominal tenderness. There is no guarding or rebound.   Musculoskeletal:         General: No tenderness. Normal range of motion.      Cervical back: Normal range of motion.   Skin:     General: Skin is warm and dry.      Findings: No erythema or rash.   Neurological:      Mental Status: He is alert and oriented to person, place, and time.      Cranial Nerves: No cranial nerve deficit.   Psychiatric:         Behavior: Behavior normal.         Thought Content: Thought content normal.         Judgment: Judgment normal.         Assessment:      1. Kidney replaced by transplant    2. New onset a-fib        Plan:       42 yoM here for AF management. No recurrence since the event shortly after his index arrhythmia detection. No need to continue with AF therapy. Continue metoprolol for BP. RTC as needed

## 2023-10-24 ENCOUNTER — PATIENT MESSAGE (OUTPATIENT)
Dept: CARDIOLOGY | Facility: CLINIC | Age: 43
End: 2023-10-24
Payer: COMMERCIAL

## 2023-10-24 DIAGNOSIS — I48.0 PAROXYSMAL ATRIAL FIBRILLATION: Primary | ICD-10-CM

## 2023-10-24 DIAGNOSIS — I48.91 ATRIAL FIBRILLATION WITH RVR: ICD-10-CM

## 2023-10-25 ENCOUNTER — OFFICE VISIT (OUTPATIENT)
Dept: CARDIOLOGY | Facility: CLINIC | Age: 43
End: 2023-10-25
Payer: COMMERCIAL

## 2023-10-25 ENCOUNTER — HOSPITAL ENCOUNTER (OUTPATIENT)
Dept: CARDIOLOGY | Facility: HOSPITAL | Age: 43
Discharge: HOME | End: 2023-10-25
Attending: INTERNAL MEDICINE
Payer: COMMERCIAL

## 2023-10-25 VITALS
BODY MASS INDEX: 28.07 KG/M2 | WEIGHT: 225.75 LBS | OXYGEN SATURATION: 97 % | SYSTOLIC BLOOD PRESSURE: 124 MMHG | HEART RATE: 70 BPM | HEIGHT: 75 IN | DIASTOLIC BLOOD PRESSURE: 80 MMHG

## 2023-10-25 DIAGNOSIS — Z94.0 KIDNEY REPLACED BY TRANSPLANT: ICD-10-CM

## 2023-10-25 DIAGNOSIS — I48.91 ATRIAL FIBRILLATION WITH RVR: ICD-10-CM

## 2023-10-25 DIAGNOSIS — I48.91 NEW ONSET A-FIB: ICD-10-CM

## 2023-10-25 DIAGNOSIS — I48.0 PAROXYSMAL ATRIAL FIBRILLATION: ICD-10-CM

## 2023-10-25 PROBLEM — B34.8 BK VIREMIA: Status: ACTIVE | Noted: 2023-10-25

## 2023-10-25 PROCEDURE — 99214 OFFICE O/P EST MOD 30 MIN: CPT | Mod: S$GLB,,, | Performed by: INTERNAL MEDICINE

## 2023-10-25 PROCEDURE — 3044F PR MOST RECENT HEMOGLOBIN A1C LEVEL <7.0%: ICD-10-PCS | Mod: CPTII,S$GLB,, | Performed by: INTERNAL MEDICINE

## 2023-10-25 PROCEDURE — 93005 ELECTROCARDIOGRAM TRACING: CPT

## 2023-10-25 PROCEDURE — 99999 PR PBB SHADOW E&M-EST. PATIENT-LVL V: ICD-10-PCS | Mod: PBBFAC,,, | Performed by: INTERNAL MEDICINE

## 2023-10-25 PROCEDURE — 99214 PR OFFICE/OUTPT VISIT, EST, LEVL IV, 30-39 MIN: ICD-10-PCS | Mod: S$GLB,,, | Performed by: INTERNAL MEDICINE

## 2023-10-25 PROCEDURE — 1160F RVW MEDS BY RX/DR IN RCRD: CPT | Mod: CPTII,S$GLB,, | Performed by: INTERNAL MEDICINE

## 2023-10-25 PROCEDURE — 93010 ELECTROCARDIOGRAM REPORT: CPT | Mod: ,,, | Performed by: INTERNAL MEDICINE

## 2023-10-25 PROCEDURE — 4010F ACE/ARB THERAPY RXD/TAKEN: CPT | Mod: CPTII,S$GLB,, | Performed by: INTERNAL MEDICINE

## 2023-10-25 PROCEDURE — 3066F PR DOCUMENTATION OF TREATMENT FOR NEPHROPATHY: ICD-10-PCS | Mod: CPTII,S$GLB,, | Performed by: INTERNAL MEDICINE

## 2023-10-25 PROCEDURE — 4010F PR ACE/ARB THEARPY RXD/TAKEN: ICD-10-PCS | Mod: CPTII,S$GLB,, | Performed by: INTERNAL MEDICINE

## 2023-10-25 PROCEDURE — 3060F PR POS MICROALBUMINURIA RESULT DOCUMENTED/REVIEW: ICD-10-PCS | Mod: CPTII,S$GLB,, | Performed by: INTERNAL MEDICINE

## 2023-10-25 PROCEDURE — 1159F PR MEDICATION LIST DOCUMENTED IN MEDICAL RECORD: ICD-10-PCS | Mod: CPTII,S$GLB,, | Performed by: INTERNAL MEDICINE

## 2023-10-25 PROCEDURE — 3079F PR MOST RECENT DIASTOLIC BLOOD PRESSURE 80-89 MM HG: ICD-10-PCS | Mod: CPTII,S$GLB,, | Performed by: INTERNAL MEDICINE

## 2023-10-25 PROCEDURE — 3008F PR BODY MASS INDEX (BMI) DOCUMENTED: ICD-10-PCS | Mod: CPTII,S$GLB,, | Performed by: INTERNAL MEDICINE

## 2023-10-25 PROCEDURE — 3008F BODY MASS INDEX DOCD: CPT | Mod: CPTII,S$GLB,, | Performed by: INTERNAL MEDICINE

## 2023-10-25 PROCEDURE — 3079F DIAST BP 80-89 MM HG: CPT | Mod: CPTII,S$GLB,, | Performed by: INTERNAL MEDICINE

## 2023-10-25 PROCEDURE — 3060F POS MICROALBUMINURIA REV: CPT | Mod: CPTII,S$GLB,, | Performed by: INTERNAL MEDICINE

## 2023-10-25 PROCEDURE — 3074F PR MOST RECENT SYSTOLIC BLOOD PRESSURE < 130 MM HG: ICD-10-PCS | Mod: CPTII,S$GLB,, | Performed by: INTERNAL MEDICINE

## 2023-10-25 PROCEDURE — 1159F MED LIST DOCD IN RCRD: CPT | Mod: CPTII,S$GLB,, | Performed by: INTERNAL MEDICINE

## 2023-10-25 PROCEDURE — 1160F PR REVIEW ALL MEDS BY PRESCRIBER/CLIN PHARMACIST DOCUMENTED: ICD-10-PCS | Mod: CPTII,S$GLB,, | Performed by: INTERNAL MEDICINE

## 2023-10-25 PROCEDURE — 93010 EKG 12-LEAD: ICD-10-PCS | Mod: ,,, | Performed by: INTERNAL MEDICINE

## 2023-10-25 PROCEDURE — 3066F NEPHROPATHY DOC TX: CPT | Mod: CPTII,S$GLB,, | Performed by: INTERNAL MEDICINE

## 2023-10-25 PROCEDURE — 3044F HG A1C LEVEL LT 7.0%: CPT | Mod: CPTII,S$GLB,, | Performed by: INTERNAL MEDICINE

## 2023-10-25 PROCEDURE — 3074F SYST BP LT 130 MM HG: CPT | Mod: CPTII,S$GLB,, | Performed by: INTERNAL MEDICINE

## 2023-10-25 PROCEDURE — 99999 PR PBB SHADOW E&M-EST. PATIENT-LVL V: CPT | Mod: PBBFAC,,, | Performed by: INTERNAL MEDICINE

## 2023-10-25 NOTE — PROGRESS NOTES
Kidney Post-Transplant Assessment    Referring Physician: Siva Figueroa  Current Nephrologist: Siva Figueroa    ORGAN: LEFT KIDNEY  Donor Type: living  PHS Increased Risk: no  Cold Ischemia: 48 mins  Induction Medications: thymoglobulin    Subjective:   The patient location is: [place]  The chief complaint leading to consultation is: Kidney Post-Transplant Assessment    Visit type: audiovisual    Face to Face time with patient:   30 minutes of total time spent on the encounter, which includes face to face time and non-face to face time preparing to see the patient (eg, review of tests), Obtaining and/or reviewing separately obtained history, Documenting clinical information in the electronic or other health record, Independently interpreting results (not separately reported) and communicating results to the patient/family/caregiver, or Care coordination (not separately reported).     Each patient to whom he or she provides medical services by telemedicine is:  (1) informed of the relationship between the physician and patient and the respective role of any other health care provider with respect to management of the patient; and (2) notified that he or she may decline to receive medical services by telemedicine and may withdraw from such care at any time.      CC:  Reassessment of renal allograft function and management of chronic immunosuppression.    HPI:  Mr. Iglesias is a 42 y.o. year old White male with history of ESRD secondary to diabetic nephropathy who received a living kidney transplant on 5/15/23 (thymo induction, CMV ++). PMH coronary angioplasty with stent placement, NSTEMI with CAD 8/2017, HTN, GERD and sleep apnea. Post transplant course complicated with new onset afib with RVR. He has CKD stage 3 - GFR 30-59 and his baseline creatinine is between 1.5-1.6. He takes prednisone and tacrolimus for maintenance immunosuppression. 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 He denies any recent hospitalizations  or ER visits since his previous clinic visit.    Had follow up visit with Dr. Choudhary on Wednesday, has had no recurrence of AF, no need to continue with AF management. Plans to continue metoprolol for BP.     Has recent follow up with endocrinology, A1c doing well with trulicity.     Feels well without complaints. Staying hydrated, no problems with urination. Home , no peripheral edema. Energy level great, has been working on building muscle.     Tolerating IS without issues, no diarrhea or vomiting.     Current Outpatient Medications   Medication Sig Dispense Refill    aspirin (ECOTRIN) 81 MG EC tablet Take 1 tablet (81 mg total) by mouth once daily. 90 tablet 3    atorvastatin (LIPITOR) 80 MG tablet Take 1 tablet (80 mg total) by mouth every evening. 90 tablet 3    blood sugar diagnostic Strp Check blood glucose 2 times daily as directed and as needed 200 each 5    blood-glucose meter (ONETOUCH ULTRAMINI) kit Use as instructed 1 each 0    dulaglutide (TRULICITY) 3 mg/0.5 mL pen injector Inject 3 mg into the skin every 7 days. 12 pen 2    ezetimibe (ZETIA) 10 mg tablet Take 1 tablet (10 mg total) by mouth once daily. 90 tablet 3    insulin aspart U-100 (NOVOLOG FLEXPEN U-100 INSULIN) 100 unit/mL (3 mL) InPn pen Inject 4 Units into the skin 3 (three) times daily before meals. Plus sliding scale      k phos di & mono-sod phos mono (K-PHOS-NEUTRAL) 250 mg Tab Take 2 tablets by mouth 3 (three) times daily. 180 tablet 11    lancets Misc Check blood glucose 2 times daily as directed and as needed (dispense insurance preferred brand or patient choice) 200 each 5    magnesium oxide (MAG-OX) 400 mg (241.3 mg magnesium) tablet Take 1 tablet (400 mg total) by mouth 2 (two) times daily. 60 tablet 11    magnesium oxide (MAG-OX) 400 mg (241.3 mg magnesium) tablet TAKE 1 TABLET BY MOUTH 2 TIMES DAILY.      metoprolol tartrate (LOPRESSOR) 25 MG tablet Take 1 tablet (25 mg total) by mouth 2 (two) times daily. 60 tablet 11  "   multivitamin Tab Take 1 tablet by mouth once daily.      nitroGLYCERIN (NITROSTAT) 0.4 MG SL tablet Dissolve one tablet underneath tongue at onset of angina; may repeat every 5 minutes if needed. Call 911 if angina persists after 2 doses. 25 tablet 5    pantoprazole (PROTONIX) 40 MG tablet Take 1 tablet (40 mg total) by mouth once daily. 30 tablet 11    pen needle, diabetic (BD ULTRA-FINE SHORT PEN NEEDLE) 31 gauge x 5/16" Ndle Use to inject insulin into the skin 3 times daily 100 each 1    predniSONE (DELTASONE) 5 MG tablet Take by mouth daily; 5/18-5/24: 20 mg; 5/25-5/31: 15 mg; 6/1-6/7: 10 mg; 6/8/23- thereafter: 5 mg daily; do not stop 70 tablet 11    sulfamethoxazole-trimethoprim 400-80mg (BACTRIM,SEPTRA) 400-80 mg per tablet Take 1 tablet by mouth every morning. Stop 11/12/23 30 tablet 5    tacrolimus (PROGRAF) 0.5 MG Cap Take 3 capsules (1.5 mg total) by mouth every morning AND 3 capsules (1.5 mg total) every evening. 180 capsule 11     No current facility-administered medications for this visit.       Past Medical History:   Diagnosis Date    Allergic rhinitis     Atrial fibrillation with RVR 6/4/2023    Class 1 obesity due to excess calories with serious comorbidity and body mass index (BMI) of 31.0 to 31.9 in adult 4/7/2017    Coronary artery disease     Coronary artery disease involving native coronary artery of native heart without angina pectoris 2/6/2018    Cath lab procedure 04/23/2018 (Naveen Rios MD) A. Indication/Pre-Operative Diagnosis: The patient is a 37 year old male that was referred for catheterization by Aaareferral Self for ACS (NSTEMI). The BELLA risk score is 5.  B. Summary/Post-Operative Diagnosis 1. Single vessel coronary artery disease. 2. Normal LVEF. 3. Diastolic dysfunction. 4. Successful PCI for acute myocardial infarction. 5.     Diabetic nephropathy associated with type 2 diabetes mellitus 1/6/2020    Direct hyperbilirubinemia 3/24/2018    DM (diabetes mellitus) 2008    BS " doesn't check any more 08/02/2018    DM (diabetes mellitus) 2012    BS 99 am 06/26/2020    DM (diabetes mellitus)     BS didn't check 06/04/2021    DM (diabetes mellitus) 2008    BS didn't check 07/29/2022     Dyslipidemia associated with type 2 diabetes mellitus 7/31/2017    Elevated bilirubin 3/21/2018    GERD (gastroesophageal reflux disease)     Gout     Hyperlipidemia     Hyperparathyroidism, secondary to chronic kidney disease 6/7/2021    Hypertension associated with chronic kidney disease due to type 2 diabetes mellitus     Hypertension complicating diabetes 3/16/2019    Not candidate for Hypertension Digital Medicine program due to dialysis status. Didn't tolerate amlodipine due to SE of hypotension.    Idiopathic chronic gout, multiple sites, without tophus (tophi) 7/19/2017    Long term (current) use of insulin     MI (myocardial infarction) 07/2017    NSTEMI with CAD s/p PCI (FAMILIA) of LAD x 2 in 8/2017 7/31/2017    Obesity     HENRY on CPAP     Peritoneal dialysis catheter in place 1/26/2023    Proteinuria     Severe obstructive sleep apnea - Intolerant of CPAP 7/31/2017    Intolerant of CPAP after weeks of trying multiple interfaces. SPLIT-NIGHT SLEEP STUDY 7/28/2015 · SLEEP ARCHITECTURE: Sleep onset was 2.9 minutes and sleep efficiency was 94.4%. Sleep Stage distribution showed 145 sleep stage changes, 6 awakenings and 119 arousals. Sleep distribution showed 53.2% stage NI, 41.8% stage N 11, 0.0% stage N Ill and REM sleep was at 5.0%. There were 2 REM periods. · RE    Stage 3a chronic kidney disease 5/26/2023    Stage 5 chronic kidney disease on chronic peritoneal dialysis 6/7/2017    Stage 5 chronic kidney disease on chronic peritoneal dialysis 6/7/2017    Status post angioplasty with stent 8/4/2017    Steatohepatitis     Fatty Liver    Type 2 diabetes mellitus with chronic kidney disease on chronic dialysis, without long-term current use of insulin 6/21/2017    Type 2 diabetes mellitus with diabetic  nephropathy     Type 2 diabetes mellitus with diabetic nephropathy, without long-term current use of insulin 1/6/2020    Type 2 diabetes mellitus with diabetic polyneuropathy, without long-term current use of insulin 6/7/2021    Type 2 diabetes mellitus with hyperglycemia     Type 2 diabetes mellitus with renal manifestations     Type 2 diabetes mellitus with stage 3 chronic kidney disease, without long-term current use of insulin 6/21/2017     Review of Systems   Constitutional:  Negative for activity change, appetite change and fever.   HENT:  Negative for congestion, mouth sores and sore throat.    Eyes:  Negative for visual disturbance.   Respiratory:  Negative for cough, chest tightness and shortness of breath.    Cardiovascular:  Negative for chest pain, palpitations and leg swelling.   Gastrointestinal:  Negative for abdominal distention, abdominal pain, constipation, diarrhea and nausea.   Genitourinary:  Negative for difficulty urinating, frequency and hematuria.   Musculoskeletal:  Negative for arthralgias and gait problem.   Allergic/Immunologic: Positive for immunocompromised state. Negative for environmental allergies and food allergies.   Neurological:  Negative for dizziness, weakness and numbness.   Hematological:  Does not bruise/bleed easily.   Psychiatric/Behavioral:  Negative for sleep disturbance. The patient is not nervous/anxious.        Objective:   There were no vitals taken for this visit.body mass index is unknown because there is no height or weight on file.    Physical Exam-VIRTUAL VISIT    Labs:  Lab Results   Component Value Date    WBC 3.10 (L) 10/03/2023    HGB 13.6 (L) 10/03/2023    HCT 41.8 10/03/2023     10/03/2023    K 4.1 10/03/2023     10/03/2023    CO2 26 10/03/2023    BUN 15 10/03/2023    CREATININE 1.6 (H) 10/03/2023    EGFRNORACEVR 55.2 (A) 10/03/2023    CALCIUM 9.6 10/03/2023    PHOS 2.5 (L) 10/03/2023    MG 1.8 10/03/2023    ALBUMIN 4.2 10/03/2023    AST 24  07/03/2023    ALT 38 07/03/2023    UTPCR 0.50 (H) 07/10/2023    .9 (H) 05/15/2023    TACROLIMUS 6.7 10/03/2023     Labs were reviewed with the patient    Assessment:     1. S/P kidney transplant    2. Stage 3a chronic kidney disease    3. Long-term use of immunosuppressant medication    4. Diabetic nephropathy associated with type 2 diabetes mellitus    5. BK viremia    6. At risk for opportunistic infections      Plan:   BK viremia: MMF on hold, continue BK PCR every 2 weeks. 10/27/2023 PCR pending        1. Immunosuppression: Prograf trough 6.7. Continue Prograf 1.5/1.5 and Prednisone 5 mg QD. 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 Will continue to monitor for drug toxicities    2. Allograft Function: stable, continue good oral hydration  - ESRD secondary to diabetic nephropathy s/p living kidney transplant on 5/15/23 (thymo induction, CMV ++).   - Post transplant course complicated with new onset afib with RVR.  - baseline creatinine is between 1.5-1.6.   Lab Results   Component Value Date    CREATININE 1.6 (H) 10/03/2023       Latest Reference Range & Units 4mo 2wk,   Kidney-Post 1 Year  10/03/23 07:52   eGFR >60 mL/min/1.73 m^2 55.2 !         3. Hypertension management: advise low salt diet and home BP monitoring.   lopressor 25 mg BID (hold for SBP <100)    4. Metabolic Bone Disease/Secondary Hyperparathyroidism:  MagOx 400 mg BID,  mg TID, high phosphorous diet  Lab Results   Component Value Date    .9 (H) 05/15/2023    CALCIUM 9.6 10/03/2023    PHOS 2.5 (L) 10/03/2023       Latest Reference Range & Units 4mo 2wk,   Kidney-Post 1 Year  10/03/23 07:52   Magnesium  1.6 - 2.6 mg/dL 1.8       5. Electrolytes:  Will monitor /guidelines  Lab Results   Component Value Date     10/03/2023    K 4.1 10/03/2023     10/03/2023    CO2 26 10/03/2023       6. Anemia: stable. No need for intervention   Lab Results   Component Value Date    WBC 3.10 (L) 10/03/2023    HGB 13.6 (L) 10/03/2023    HCT  41.8 10/03/2023    MCV 86 10/03/2023     10/03/2023         7. Cytopenias: no significant cytopenias. Medicine list reviewed including potential causes of drug-induced cytopenias    8.  Proteinuria: continue p/c ratio as per guidelines   [facility] 10/3/2023: 0.15    9. BK virus infection screening: MMF on hold, continue BK PCR every 2 weeks. BK PCR 10/27/2023 pending   Latest Reference Range & Units 4mo 2wk,   Kidney-Post 1 Year  10/03/23 07:52   BK Virus DNA, Blood Not detected  DETECTED !   BK Virus DNA PCR, Quant, Blood <125 Copies/mL 37,837 (H)       10. Weight education: provided during the clinic visit   There is no height or weight on file to calculate BMI.     11.Patient safety education regarding immunosuppression including prophylaxis posttransplant for CMV, PCP : Education provided about vaccination and prevention of infections     PCP ppx: Bactrim x 6 months (Stop 11/12/23)  CMV ppx: Valcyte x 3 months (Stop 8/14/23)-done       Follow-up:   Clinic: return to transplant clinic weekly for the first month after transplant; every 2 weeks during months 2-3; then at 6-, 9-, 12-, 18-, 24-, and 36- months post-transplant to reassess for complications from immunosuppression toxicity and monitor for rejection.  Annually thereafter.    Labs: since patient remains at high risk for rejection and drug-related complications that warrant close monitoring, labs will be ordered as follows: continue twice weekly CBC, renal panel, and drug level for first month; then same labs once weekly through 3rd month post-transplant.  Urine for UA and protein/creatinine ratio monthly.  Serum BK - PCR at 1-, 3-, 6-, 9-, 12-, 18-, 24-, 36-, 48-, and 60 months post-transplant.  Hepatic panel at 1-, 2-, 3-, 6-, 9-, 12-, 18-, 24-, and 36- months post-transplant.    Cinda Mccray NP       Education:   Material provided to the patient.  Patient reminded to call with any health changes since these can be early signs of significant  complications.  Also, I advised the patient to be sure any new medications or changes of old medications are discussed with either a pharmacist or physician knowledgeable with transplant to avoid rejection/drug toxicity related to significant drug interactions.    Patient advised that it is recommended that all transplanted patients, and their close contacts and household members receive Covid vaccination.

## 2023-10-26 ENCOUNTER — PATIENT MESSAGE (OUTPATIENT)
Dept: TRANSPLANT | Facility: CLINIC | Age: 43
End: 2023-10-26
Payer: COMMERCIAL

## 2023-10-27 ENCOUNTER — OFFICE VISIT (OUTPATIENT)
Dept: TRANSPLANT | Facility: CLINIC | Age: 43
End: 2023-10-27
Payer: COMMERCIAL

## 2023-10-27 ENCOUNTER — PATIENT MESSAGE (OUTPATIENT)
Dept: TRANSPLANT | Facility: CLINIC | Age: 43
End: 2023-10-27

## 2023-10-27 ENCOUNTER — LAB VISIT (OUTPATIENT)
Dept: LAB | Facility: HOSPITAL | Age: 43
End: 2023-10-27
Attending: INTERNAL MEDICINE
Payer: COMMERCIAL

## 2023-10-27 DIAGNOSIS — Z94.0 KIDNEY REPLACED BY TRANSPLANT: ICD-10-CM

## 2023-10-27 DIAGNOSIS — N18.31 STAGE 3A CHRONIC KIDNEY DISEASE: ICD-10-CM

## 2023-10-27 DIAGNOSIS — Z91.89 AT RISK FOR OPPORTUNISTIC INFECTIONS: ICD-10-CM

## 2023-10-27 DIAGNOSIS — Z79.60 LONG-TERM USE OF IMMUNOSUPPRESSANT MEDICATION: ICD-10-CM

## 2023-10-27 DIAGNOSIS — E11.21 DIABETIC NEPHROPATHY ASSOCIATED WITH TYPE 2 DIABETES MELLITUS: Chronic | ICD-10-CM

## 2023-10-27 DIAGNOSIS — B34.8 BK VIREMIA: ICD-10-CM

## 2023-10-27 DIAGNOSIS — Z94.0 S/P KIDNEY TRANSPLANT: Primary | Chronic | ICD-10-CM

## 2023-10-27 LAB
BASOPHILS # BLD AUTO: 0.03 K/UL (ref 0–0.2)
BASOPHILS NFR BLD: 0.5 % (ref 0–1.9)
DIFFERENTIAL METHOD: ABNORMAL
EOSINOPHIL # BLD AUTO: 0.1 K/UL (ref 0–0.5)
EOSINOPHIL NFR BLD: 1.4 % (ref 0–8)
ERYTHROCYTE [DISTWIDTH] IN BLOOD BY AUTOMATED COUNT: 13.8 % (ref 11.5–14.5)
HCT VFR BLD AUTO: 44.5 % (ref 40–54)
HGB BLD-MCNC: 14.1 G/DL (ref 14–18)
IMM GRANULOCYTES # BLD AUTO: 0.02 K/UL (ref 0–0.04)
IMM GRANULOCYTES NFR BLD AUTO: 0.3 % (ref 0–0.5)
LYMPHOCYTES # BLD AUTO: 1.6 K/UL (ref 1–4.8)
LYMPHOCYTES NFR BLD: 26.7 % (ref 18–48)
MCH RBC QN AUTO: 28.4 PG (ref 27–31)
MCHC RBC AUTO-ENTMCNC: 31.7 G/DL (ref 32–36)
MCV RBC AUTO: 90 FL (ref 82–98)
MONOCYTES # BLD AUTO: 0.6 K/UL (ref 0.3–1)
MONOCYTES NFR BLD: 10.2 % (ref 4–15)
NEUTROPHILS # BLD AUTO: 3.6 K/UL (ref 1.8–7.7)
NEUTROPHILS NFR BLD: 60.9 % (ref 38–73)
NRBC BLD-RTO: 0 /100 WBC
PLATELET # BLD AUTO: 166 K/UL (ref 150–450)
PMV BLD AUTO: 9.5 FL (ref 9.2–12.9)
RBC # BLD AUTO: 4.96 M/UL (ref 4.6–6.2)
WBC # BLD AUTO: 5.88 K/UL (ref 3.9–12.7)

## 2023-10-27 PROCEDURE — 3060F POS MICROALBUMINURIA REV: CPT | Mod: CPTII,95,, | Performed by: NURSE PRACTITIONER

## 2023-10-27 PROCEDURE — 3044F HG A1C LEVEL LT 7.0%: CPT | Mod: CPTII,95,, | Performed by: NURSE PRACTITIONER

## 2023-10-27 PROCEDURE — 1160F RVW MEDS BY RX/DR IN RCRD: CPT | Mod: CPTII,95,, | Performed by: NURSE PRACTITIONER

## 2023-10-27 PROCEDURE — 99214 PR OFFICE/OUTPT VISIT, EST, LEVL IV, 30-39 MIN: ICD-10-PCS | Mod: 95,,, | Performed by: NURSE PRACTITIONER

## 2023-10-27 PROCEDURE — 99214 OFFICE O/P EST MOD 30 MIN: CPT | Mod: 95,,, | Performed by: NURSE PRACTITIONER

## 2023-10-27 PROCEDURE — 1159F MED LIST DOCD IN RCRD: CPT | Mod: CPTII,95,, | Performed by: NURSE PRACTITIONER

## 2023-10-27 PROCEDURE — 85025 COMPLETE CBC W/AUTO DIFF WBC: CPT | Performed by: INTERNAL MEDICINE

## 2023-10-27 PROCEDURE — 3060F PR POS MICROALBUMINURIA RESULT DOCUMENTED/REVIEW: ICD-10-PCS | Mod: CPTII,95,, | Performed by: NURSE PRACTITIONER

## 2023-10-27 PROCEDURE — 3044F PR MOST RECENT HEMOGLOBIN A1C LEVEL <7.0%: ICD-10-PCS | Mod: CPTII,95,, | Performed by: NURSE PRACTITIONER

## 2023-10-27 PROCEDURE — 1160F PR REVIEW ALL MEDS BY PRESCRIBER/CLIN PHARMACIST DOCUMENTED: ICD-10-PCS | Mod: CPTII,95,, | Performed by: NURSE PRACTITIONER

## 2023-10-27 PROCEDURE — 3066F PR DOCUMENTATION OF TREATMENT FOR NEPHROPATHY: ICD-10-PCS | Mod: CPTII,95,, | Performed by: NURSE PRACTITIONER

## 2023-10-27 PROCEDURE — 1159F PR MEDICATION LIST DOCUMENTED IN MEDICAL RECORD: ICD-10-PCS | Mod: CPTII,95,, | Performed by: NURSE PRACTITIONER

## 2023-10-27 PROCEDURE — 87799 DETECT AGENT NOS DNA QUANT: CPT | Performed by: INTERNAL MEDICINE

## 2023-10-27 PROCEDURE — 4010F PR ACE/ARB THEARPY RXD/TAKEN: ICD-10-PCS | Mod: CPTII,95,, | Performed by: NURSE PRACTITIONER

## 2023-10-27 PROCEDURE — 36415 COLL VENOUS BLD VENIPUNCTURE: CPT | Performed by: INTERNAL MEDICINE

## 2023-10-27 PROCEDURE — 4010F ACE/ARB THERAPY RXD/TAKEN: CPT | Mod: CPTII,95,, | Performed by: NURSE PRACTITIONER

## 2023-10-27 PROCEDURE — 3066F NEPHROPATHY DOC TX: CPT | Mod: CPTII,95,, | Performed by: NURSE PRACTITIONER

## 2023-10-27 NOTE — LETTER
October 27, 2023        Siva Figueroa  [address] [address] NEPHROLOGY  [facility] [address]  Phone: [phone]  Fax: [phone]             [facility]- Transplant 1st Fl  [address]  [facility] [address]  Phone: [phone]   Patient: Robb Iglesias   MR Number: [id number]   YOB: 1980   Date of Visit: 10/27/2023       Dear Dr. Siva Figueroa    Thank you for referring Robb Iglesias to me for evaluation. Attached you will find relevant portions of my assessment and plan of care.    If you have questions, please do not hesitate to call me. I look forward to following Robb Iglesias along with you.    Sincerely,    Cinda Mccray NP    Enclosure    If you would like to receive this communication electronically, please contact [email or web address] or [phone] to request [employer] Link access.    [employer] Link is a tool which provides read-only access to select patient information with whom you have a relationship. Its easy to use and provides real time access to review your patients record including encounter summaries, notes, results, and demographic information.    If you feel you have received this communication in error or would no longer like to receive these types of communications, please e-mail [email or web address]

## 2023-10-30 LAB
BKV DNA SERPL NAA+PROBE-ACNC: ABNORMAL COPIES/ML
BKV DNA SERPL NAA+PROBE-LOG#: 4.13 LOG (10) COPIES/ML
BKV DNA SERPL QL NAA+PROBE: DETECTED

## 2023-10-30 RX ORDER — DULAGLUTIDE 3 MG/.5ML
3 INJECTION, SOLUTION SUBCUTANEOUS
Qty: 12 PEN | Refills: 1 | Status: SHIPPED | OUTPATIENT
Start: 2023-10-30 | End: 2023-12-19 | Stop reason: ALTCHOICE

## 2023-10-30 NOTE — TELEPHONE ENCOUNTER
Care Due:                  Date            Visit Type   Department     Provider  --------------------------------------------------------------------------------                                ESTABLISHED                              PATIENT -    [facility]  Last Visit: 08-      VIRTUAL      MEDICINE       Kwan Roberson                              ESTABLISHED                              PATIENT -    [facility]  Next Visit: 02-      VIRTUAL      MEDICINE       Kwan Roberson                                                            Last  Test          Frequency    Reason                     Performed    Due Date  --------------------------------------------------------------------------------    HBA1C.......  6 months...  dulaglutide..............  07- 12-    [facility] Embedded Care Due Messages. Reference number: [id number].   10/30/2023 8:13:15 AM CDT

## 2023-10-30 NOTE — TELEPHONE ENCOUNTER
Refill Decision Note   Robb Harris  is requesting a refill authorization.  Brief Assessment and Rationale for Refill:  Approve     Medication Therapy Plan:  FLOS      Comments:     Note composed:8:33 AM 10/30/2023

## 2023-11-07 ENCOUNTER — PATIENT MESSAGE (OUTPATIENT)
Dept: TRANSPLANT | Facility: CLINIC | Age: 43
End: 2023-11-07
Payer: COMMERCIAL

## 2023-11-13 ENCOUNTER — LAB VISIT (OUTPATIENT)
Dept: LAB | Facility: HOSPITAL | Age: 43
End: 2023-11-13
Attending: INTERNAL MEDICINE
Payer: COMMERCIAL

## 2023-11-13 DIAGNOSIS — Z94.0 KIDNEY REPLACED BY TRANSPLANT: ICD-10-CM

## 2023-11-13 LAB
ALBUMIN SERPL BCP-MCNC: 4 G/DL (ref 3.5–5.2)
ANION GAP SERPL CALC-SCNC: 7 MMOL/L (ref 8–16)
BASOPHILS # BLD AUTO: 0.02 K/UL (ref 0–0.2)
BASOPHILS NFR BLD: 0.4 % (ref 0–1.9)
BUN SERPL-MCNC: 16 MG/DL (ref 6–20)
CALCIUM SERPL-MCNC: 9.4 MG/DL (ref 8.7–10.5)
CHLORIDE SERPL-SCNC: 107 MMOL/L (ref 95–110)
CO2 SERPL-SCNC: 28 MMOL/L (ref 23–29)
CREAT SERPL-MCNC: 1.6 MG/DL (ref 0.5–1.4)
DIFFERENTIAL METHOD: ABNORMAL
EOSINOPHIL # BLD AUTO: 0.1 K/UL (ref 0–0.5)
EOSINOPHIL NFR BLD: 2.3 % (ref 0–8)
ERYTHROCYTE [DISTWIDTH] IN BLOOD BY AUTOMATED COUNT: 13.7 % (ref 11.5–14.5)
EST. GFR  (NO RACE VARIABLE): 55.2 ML/MIN/1.73 M^2
GLUCOSE SERPL-MCNC: 129 MG/DL (ref 70–110)
HCT VFR BLD AUTO: 43.6 % (ref 40–54)
HGB BLD-MCNC: 13.6 G/DL (ref 14–18)
IMM GRANULOCYTES # BLD AUTO: 0.03 K/UL (ref 0–0.04)
IMM GRANULOCYTES NFR BLD AUTO: 0.5 % (ref 0–0.5)
LYMPHOCYTES # BLD AUTO: 1.2 K/UL (ref 1–4.8)
LYMPHOCYTES NFR BLD: 20.8 % (ref 18–48)
MAGNESIUM SERPL-MCNC: 1.7 MG/DL (ref 1.6–2.6)
MCH RBC QN AUTO: 27.9 PG (ref 27–31)
MCHC RBC AUTO-ENTMCNC: 31.2 G/DL (ref 32–36)
MCV RBC AUTO: 90 FL (ref 82–98)
MONOCYTES # BLD AUTO: 0.6 K/UL (ref 0.3–1)
MONOCYTES NFR BLD: 11 % (ref 4–15)
NEUTROPHILS # BLD AUTO: 3.7 K/UL (ref 1.8–7.7)
NEUTROPHILS NFR BLD: 65 % (ref 38–73)
NRBC BLD-RTO: 0 /100 WBC
PHOSPHATE SERPL-MCNC: 2.6 MG/DL (ref 2.7–4.5)
PLATELET # BLD AUTO: 149 K/UL (ref 150–450)
PMV BLD AUTO: 9.7 FL (ref 9.2–12.9)
POTASSIUM SERPL-SCNC: 4.6 MMOL/L (ref 3.5–5.1)
RBC # BLD AUTO: 4.87 M/UL (ref 4.6–6.2)
SODIUM SERPL-SCNC: 142 MMOL/L (ref 136–145)
WBC # BLD AUTO: 5.66 K/UL (ref 3.9–12.7)

## 2023-11-13 PROCEDURE — 87799 DETECT AGENT NOS DNA QUANT: CPT | Performed by: INTERNAL MEDICINE

## 2023-11-13 PROCEDURE — 83735 ASSAY OF MAGNESIUM: CPT | Performed by: INTERNAL MEDICINE

## 2023-11-13 PROCEDURE — 80069 RENAL FUNCTION PANEL: CPT | Performed by: INTERNAL MEDICINE

## 2023-11-13 PROCEDURE — 85025 COMPLETE CBC W/AUTO DIFF WBC: CPT | Performed by: INTERNAL MEDICINE

## 2023-11-13 PROCEDURE — 80197 ASSAY OF TACROLIMUS: CPT | Performed by: INTERNAL MEDICINE

## 2023-11-14 LAB — TACROLIMUS BLD-MCNC: 7.6 NG/ML (ref 5–15)

## 2023-11-18 LAB
BKV DNA SERPL NAA+PROBE-ACNC: ABNORMAL COPIES/ML
BKV DNA SERPL NAA+PROBE-LOG#: 4.42 LOG (10) COPIES/ML
BKV DNA SERPL QL NAA+PROBE: DETECTED

## 2023-11-20 ENCOUNTER — PATIENT MESSAGE (OUTPATIENT)
Dept: TRANSPLANT | Facility: CLINIC | Age: 43
End: 2023-11-20
Payer: COMMERCIAL

## 2023-11-20 DIAGNOSIS — Z94.0 S/P KIDNEY TRANSPLANT: ICD-10-CM

## 2023-11-20 RX ORDER — TACROLIMUS 0.5 MG/1
CAPSULE ORAL
Qty: 150 CAPSULE | Refills: 11 | Status: SHIPPED | OUTPATIENT
Start: 2023-11-20 | End: 2024-03-11 | Stop reason: DRUGHIGH

## 2023-11-20 NOTE — TELEPHONE ENCOUNTER
Pt states off MMF, lowered prograf to 1.5/1.  Will continue BK protocol          ----- Message from Rell Booth MD sent at 11/20/2023  7:22 AM CST -----  Please verify he is OFF MMF  Lowe prograf to 1.5 mg AM and 1 mg PM (per Epic he is currently on 1.5 mg po bid)  Continue with labs per BK protocol.

## 2023-11-21 ENCOUNTER — PATIENT MESSAGE (OUTPATIENT)
Dept: TRANSPLANT | Facility: CLINIC | Age: 43
End: 2023-11-21
Payer: COMMERCIAL

## 2023-11-22 ENCOUNTER — PATIENT MESSAGE (OUTPATIENT)
Dept: TRANSPLANT | Facility: CLINIC | Age: 43
End: 2023-11-22
Payer: COMMERCIAL

## 2023-12-04 ENCOUNTER — LAB VISIT (OUTPATIENT)
Dept: LAB | Facility: HOSPITAL | Age: 43
End: 2023-12-04
Attending: INTERNAL MEDICINE
Payer: COMMERCIAL

## 2023-12-04 DIAGNOSIS — Z94.0 KIDNEY REPLACED BY TRANSPLANT: ICD-10-CM

## 2023-12-04 LAB
ALBUMIN SERPL BCP-MCNC: 4.1 G/DL (ref 3.5–5.2)
ANION GAP SERPL CALC-SCNC: 4 MMOL/L (ref 8–16)
BASOPHILS # BLD AUTO: 0.03 K/UL (ref 0–0.2)
BASOPHILS NFR BLD: 0.5 % (ref 0–1.9)
BUN SERPL-MCNC: 19 MG/DL (ref 6–20)
CALCIUM SERPL-MCNC: 9.7 MG/DL (ref 8.7–10.5)
CHLORIDE SERPL-SCNC: 110 MMOL/L (ref 95–110)
CO2 SERPL-SCNC: 27 MMOL/L (ref 23–29)
CREAT SERPL-MCNC: 1.4 MG/DL (ref 0.5–1.4)
DIFFERENTIAL METHOD: NORMAL
EOSINOPHIL # BLD AUTO: 0.2 K/UL (ref 0–0.5)
EOSINOPHIL NFR BLD: 2.6 % (ref 0–8)
ERYTHROCYTE [DISTWIDTH] IN BLOOD BY AUTOMATED COUNT: 13.6 % (ref 11.5–14.5)
EST. GFR  (NO RACE VARIABLE): >60 ML/MIN/1.73 M^2
GLUCOSE SERPL-MCNC: 144 MG/DL (ref 70–110)
HCT VFR BLD AUTO: 44.1 % (ref 40–54)
HGB BLD-MCNC: 14.3 G/DL (ref 14–18)
IMM GRANULOCYTES # BLD AUTO: 0.02 K/UL (ref 0–0.04)
IMM GRANULOCYTES NFR BLD AUTO: 0.3 % (ref 0–0.5)
LYMPHOCYTES # BLD AUTO: 1.4 K/UL (ref 1–4.8)
LYMPHOCYTES NFR BLD: 23.7 % (ref 18–48)
MAGNESIUM SERPL-MCNC: 1.7 MG/DL (ref 1.6–2.6)
MCH RBC QN AUTO: 27.8 PG (ref 27–31)
MCHC RBC AUTO-ENTMCNC: 32.4 G/DL (ref 32–36)
MCV RBC AUTO: 86 FL (ref 82–98)
MONOCYTES # BLD AUTO: 0.7 K/UL (ref 0.3–1)
MONOCYTES NFR BLD: 11.7 % (ref 4–15)
NEUTROPHILS # BLD AUTO: 3.5 K/UL (ref 1.8–7.7)
NEUTROPHILS NFR BLD: 61.2 % (ref 38–73)
NRBC BLD-RTO: 0 /100 WBC
PHOSPHATE SERPL-MCNC: 2.4 MG/DL (ref 2.7–4.5)
PLATELET # BLD AUTO: 158 K/UL (ref 150–450)
PMV BLD AUTO: 10.3 FL (ref 9.2–12.9)
POTASSIUM SERPL-SCNC: 4.7 MMOL/L (ref 3.5–5.1)
RBC # BLD AUTO: 5.14 M/UL (ref 4.6–6.2)
SODIUM SERPL-SCNC: 141 MMOL/L (ref 136–145)
WBC # BLD AUTO: 5.79 K/UL (ref 3.9–12.7)

## 2023-12-04 PROCEDURE — 80069 RENAL FUNCTION PANEL: CPT | Performed by: INTERNAL MEDICINE

## 2023-12-04 PROCEDURE — 87799 DETECT AGENT NOS DNA QUANT: CPT | Performed by: INTERNAL MEDICINE

## 2023-12-04 PROCEDURE — 83735 ASSAY OF MAGNESIUM: CPT | Performed by: INTERNAL MEDICINE

## 2023-12-04 PROCEDURE — 80197 ASSAY OF TACROLIMUS: CPT | Performed by: INTERNAL MEDICINE

## 2023-12-04 PROCEDURE — 36415 COLL VENOUS BLD VENIPUNCTURE: CPT | Performed by: INTERNAL MEDICINE

## 2023-12-04 PROCEDURE — 85025 COMPLETE CBC W/AUTO DIFF WBC: CPT | Performed by: INTERNAL MEDICINE

## 2023-12-05 LAB — TACROLIMUS BLD-MCNC: 7.1 NG/ML (ref 5–15)

## 2023-12-07 LAB
BKV DNA SERPL NAA+PROBE-ACNC: ABNORMAL COPIES/ML
BKV DNA SERPL NAA+PROBE-LOG#: 4.1 LOG (10) COPIES/ML
BKV DNA SERPL QL NAA+PROBE: DETECTED

## 2023-12-12 ENCOUNTER — PATIENT MESSAGE (OUTPATIENT)
Dept: DIABETES | Facility: CLINIC | Age: 43
End: 2023-12-12
Payer: COMMERCIAL

## 2023-12-12 ENCOUNTER — TELEPHONE (OUTPATIENT)
Dept: DIABETES | Facility: CLINIC | Age: 43
End: 2023-12-12
Payer: COMMERCIAL

## 2023-12-12 ENCOUNTER — PATIENT MESSAGE (OUTPATIENT)
Dept: TRANSPLANT | Facility: CLINIC | Age: 43
End: 2023-12-12
Payer: COMMERCIAL

## 2023-12-12 DIAGNOSIS — Z94.0 KIDNEY REPLACED BY TRANSPLANT: Primary | ICD-10-CM

## 2023-12-12 DIAGNOSIS — E11.69 DYSLIPIDEMIA ASSOCIATED WITH TYPE 2 DIABETES MELLITUS: Primary | Chronic | ICD-10-CM

## 2023-12-12 DIAGNOSIS — E78.5 DYSLIPIDEMIA ASSOCIATED WITH TYPE 2 DIABETES MELLITUS: Primary | Chronic | ICD-10-CM

## 2023-12-18 ENCOUNTER — LAB VISIT (OUTPATIENT)
Dept: LAB | Facility: HOSPITAL | Age: 43
End: 2023-12-18
Attending: INTERNAL MEDICINE
Payer: COMMERCIAL

## 2023-12-18 DIAGNOSIS — Z94.0 KIDNEY REPLACED BY TRANSPLANT: ICD-10-CM

## 2023-12-18 DIAGNOSIS — E11.22 TYPE 2 DIABETES MELLITUS WITH STAGE 3A CHRONIC KIDNEY DISEASE, WITHOUT LONG-TERM CURRENT USE OF INSULIN: ICD-10-CM

## 2023-12-18 DIAGNOSIS — N18.31 TYPE 2 DIABETES MELLITUS WITH STAGE 3A CHRONIC KIDNEY DISEASE, WITHOUT LONG-TERM CURRENT USE OF INSULIN: ICD-10-CM

## 2023-12-18 LAB
ESTIMATED AVG GLUCOSE: 163 MG/DL (ref 68–131)
HBA1C MFR BLD: 7.3 % (ref 4–5.6)

## 2023-12-18 PROCEDURE — 83036 HEMOGLOBIN GLYCOSYLATED A1C: CPT | Performed by: NURSE PRACTITIONER

## 2023-12-18 PROCEDURE — 87799 DETECT AGENT NOS DNA QUANT: CPT | Performed by: INTERNAL MEDICINE

## 2023-12-18 NOTE — PROGRESS NOTES
Coverage for Kirstie Wang - A1c is worse at 7.3%. Get an update on how blood sugars have been running and see if he wants to move up his appt with one of the providers?

## 2023-12-19 ENCOUNTER — OFFICE VISIT (OUTPATIENT)
Dept: DIABETES | Facility: CLINIC | Age: 43
End: 2023-12-19
Payer: COMMERCIAL

## 2023-12-19 ENCOUNTER — TELEPHONE (OUTPATIENT)
Dept: DIABETES | Facility: CLINIC | Age: 43
End: 2023-12-19
Payer: COMMERCIAL

## 2023-12-19 VITALS
BODY MASS INDEX: 28.32 KG/M2 | WEIGHT: 227.75 LBS | HEIGHT: 75 IN | SYSTOLIC BLOOD PRESSURE: 156 MMHG | HEART RATE: 71 BPM | DIASTOLIC BLOOD PRESSURE: 90 MMHG

## 2023-12-19 DIAGNOSIS — Z29.89 PROPHYLACTIC IMMUNOTHERAPY: ICD-10-CM

## 2023-12-19 DIAGNOSIS — E11.59 HYPERTENSION COMPLICATING DIABETES: ICD-10-CM

## 2023-12-19 DIAGNOSIS — I15.2 HYPERTENSION COMPLICATING DIABETES: ICD-10-CM

## 2023-12-19 DIAGNOSIS — N18.31 TYPE 2 DIABETES MELLITUS WITH STAGE 3A CHRONIC KIDNEY DISEASE, WITHOUT LONG-TERM CURRENT USE OF INSULIN: Primary | ICD-10-CM

## 2023-12-19 DIAGNOSIS — E78.5 DYSLIPIDEMIA ASSOCIATED WITH TYPE 2 DIABETES MELLITUS: ICD-10-CM

## 2023-12-19 DIAGNOSIS — I10 ESSENTIAL HYPERTENSION: ICD-10-CM

## 2023-12-19 DIAGNOSIS — Z94.0 S/P KIDNEY TRANSPLANT: ICD-10-CM

## 2023-12-19 DIAGNOSIS — E11.22 TYPE 2 DIABETES MELLITUS WITH STAGE 3A CHRONIC KIDNEY DISEASE, WITHOUT LONG-TERM CURRENT USE OF INSULIN: Primary | ICD-10-CM

## 2023-12-19 DIAGNOSIS — E11.69 DYSLIPIDEMIA ASSOCIATED WITH TYPE 2 DIABETES MELLITUS: ICD-10-CM

## 2023-12-19 LAB — GLUCOSE SERPL-MCNC: 143 MG/DL (ref 70–110)

## 2023-12-19 PROCEDURE — 99999 PR PBB SHADOW E&M-EST. PATIENT-LVL V: CPT | Mod: PBBFAC,,, | Performed by: NURSE PRACTITIONER

## 2023-12-19 PROCEDURE — 3077F SYST BP >= 140 MM HG: CPT | Mod: CPTII,S$GLB,, | Performed by: NURSE PRACTITIONER

## 2023-12-19 PROCEDURE — 3077F PR MOST RECENT SYSTOLIC BLOOD PRESSURE >= 140 MM HG: ICD-10-PCS | Mod: CPTII,S$GLB,, | Performed by: NURSE PRACTITIONER

## 2023-12-19 PROCEDURE — 1160F PR REVIEW ALL MEDS BY PRESCRIBER/CLIN PHARMACIST DOCUMENTED: ICD-10-PCS | Mod: CPTII,S$GLB,, | Performed by: NURSE PRACTITIONER

## 2023-12-19 PROCEDURE — 3051F PR MOST RECENT HEMOGLOBIN A1C LEVEL 7.0 - < 8.0%: ICD-10-PCS | Mod: CPTII,S$GLB,, | Performed by: NURSE PRACTITIONER

## 2023-12-19 PROCEDURE — 1159F PR MEDICATION LIST DOCUMENTED IN MEDICAL RECORD: ICD-10-PCS | Mod: CPTII,S$GLB,, | Performed by: NURSE PRACTITIONER

## 2023-12-19 PROCEDURE — 99214 OFFICE O/P EST MOD 30 MIN: CPT | Mod: S$GLB,,, | Performed by: NURSE PRACTITIONER

## 2023-12-19 PROCEDURE — 3008F BODY MASS INDEX DOCD: CPT | Mod: CPTII,S$GLB,, | Performed by: NURSE PRACTITIONER

## 2023-12-19 PROCEDURE — 3051F HG A1C>EQUAL 7.0%<8.0%: CPT | Mod: CPTII,S$GLB,, | Performed by: NURSE PRACTITIONER

## 2023-12-19 PROCEDURE — 3066F NEPHROPATHY DOC TX: CPT | Mod: CPTII,S$GLB,, | Performed by: NURSE PRACTITIONER

## 2023-12-19 PROCEDURE — 3080F PR MOST RECENT DIASTOLIC BLOOD PRESSURE >= 90 MM HG: ICD-10-PCS | Mod: CPTII,S$GLB,, | Performed by: NURSE PRACTITIONER

## 2023-12-19 PROCEDURE — 4010F ACE/ARB THERAPY RXD/TAKEN: CPT | Mod: CPTII,S$GLB,, | Performed by: NURSE PRACTITIONER

## 2023-12-19 PROCEDURE — 3060F PR POS MICROALBUMINURIA RESULT DOCUMENTED/REVIEW: ICD-10-PCS | Mod: CPTII,S$GLB,, | Performed by: NURSE PRACTITIONER

## 2023-12-19 PROCEDURE — 3008F PR BODY MASS INDEX (BMI) DOCUMENTED: ICD-10-PCS | Mod: CPTII,S$GLB,, | Performed by: NURSE PRACTITIONER

## 2023-12-19 PROCEDURE — 3066F PR DOCUMENTATION OF TREATMENT FOR NEPHROPATHY: ICD-10-PCS | Mod: CPTII,S$GLB,, | Performed by: NURSE PRACTITIONER

## 2023-12-19 PROCEDURE — 1159F MED LIST DOCD IN RCRD: CPT | Mod: CPTII,S$GLB,, | Performed by: NURSE PRACTITIONER

## 2023-12-19 PROCEDURE — 99214 PR OFFICE/OUTPT VISIT, EST, LEVL IV, 30-39 MIN: ICD-10-PCS | Mod: S$GLB,,, | Performed by: NURSE PRACTITIONER

## 2023-12-19 PROCEDURE — 4010F PR ACE/ARB THEARPY RXD/TAKEN: ICD-10-PCS | Mod: CPTII,S$GLB,, | Performed by: NURSE PRACTITIONER

## 2023-12-19 PROCEDURE — 82962 POCT GLUCOSE, HAND-HELD DEVICE: ICD-10-PCS | Mod: S$GLB,,, | Performed by: NURSE PRACTITIONER

## 2023-12-19 PROCEDURE — 1160F RVW MEDS BY RX/DR IN RCRD: CPT | Mod: CPTII,S$GLB,, | Performed by: NURSE PRACTITIONER

## 2023-12-19 PROCEDURE — 82962 GLUCOSE BLOOD TEST: CPT | Mod: S$GLB,,, | Performed by: NURSE PRACTITIONER

## 2023-12-19 PROCEDURE — 3080F DIAST BP >= 90 MM HG: CPT | Mod: CPTII,S$GLB,, | Performed by: NURSE PRACTITIONER

## 2023-12-19 PROCEDURE — 99999 PR PBB SHADOW E&M-EST. PATIENT-LVL V: ICD-10-PCS | Mod: PBBFAC,,, | Performed by: NURSE PRACTITIONER

## 2023-12-19 PROCEDURE — 3060F POS MICROALBUMINURIA REV: CPT | Mod: CPTII,S$GLB,, | Performed by: NURSE PRACTITIONER

## 2023-12-19 RX ORDER — TIRZEPATIDE 7.5 MG/.5ML
7.5 INJECTION, SOLUTION SUBCUTANEOUS
Qty: 4 PEN | Refills: 5 | Status: SHIPPED | OUTPATIENT
Start: 2023-12-19 | End: 2024-01-12

## 2023-12-19 NOTE — PROGRESS NOTES
Subjective:         Patient ID: Robb Iglesias is a 42 y.o. male.  Patient's current PCP is SABIHA Roberson MD.     Chief Complaint: Diabetes Mellitus    HPI  Robb Iglesias is a 42 y.o. White male presenting for a new consult with me, previously seen by Kirstie Wang NP  for diabetes. Patient has been diagnosed with type 2 diabetes since 2010 .  Received diabetes education: Yes    CURRENT DM MEDICATIONS:   Diabetes Medications               dulaglutide (TRULICITY) 3 mg/0.5 mL pen injector Inject 3 mg into the skin every 7 days.    insulin aspart U-100 (NOVOLOG FLEXPEN U-100 INSULIN) 100 unit/mL (3 mL) InPn pen Inject 4 Units into the skin 3 (three) times daily before meals. Plus sliding scale Has not been taking           Past failed treatment include: Metformin, Janumet. Ozempic - formulary change only    Blood glucose testing is performed not currently performed. Patient is testing 0 times per day.  Meter: Did not bring to appt  Preferred lab: [place]    Any episodes of hypoglycemia? no     Complications related to diabetes: nephropathy and cardiovascular disease    His blood sugar in the clinic today was:   Lab Results   Component Value Date    POCGLU 143 (A) 12/19/2023     Robb Iglesias presents today for follow up visit to discuss diabetes management. Last visit with Kirstie Wang 10/18/2023. At that time, glycemic control was stable - he was not requiring the use of Novolog. He scheduled a sooner appt as recent A1c worsened and now 7.3%. He reports the following: Having issues with appetite returning, and gaining some weight back (about 5 lbs recently). He has been eating more sweets lately, and is increasing walking regimen.  He has been limited with exercise with restrictions following surgery. He is agreeable to re-start checking Bgs. We discussed Mounjaro - he is agreeable to switch.     S/P L kidney transplant 05/2023- on chronic Prednisone 5 mg daily and Prograf.  Followed by the transplant team as well as nephrologist, Dr. Figueroa.    H/o bariatric surgery - 2017. Lost 100 lbs.    Current diet: 3 meals/day; snacking more often now   Activity Level: Non-sedentary -- walking - trying to increase    Lab Results   Component Value Date    HGBA1C 7.3 (H) 12/18/2023    HGBA1C 5.6 07/03/2023    HGBA1C 5.0 05/15/2023     STANDARDS OF CARE  Diabetes Management Status    Statin: Taking  ACE/ARB: Not taking    Screening or Prevention Patient's value Goal Complete/Controlled?   HgA1C Testing and Control   Lab Results   Component Value Date    HGBA1C 7.3 (H) 12/18/2023      Annually/Less than 8% Yes   Lipid profile : 05/02/2023 Annually Yes   LDL control Lab Results   Component Value Date    LDLCALC 48.0 (L) 05/02/2023    Annually/Less than 100 mg/dl  Yes   Nephropathy screening Lab Results   Component Value Date    LABMICR 98.0 07/03/2023     Lab Results   Component Value Date    PROTEINUA 2+ (A) 07/10/2023     Lab Results   Component Value Date    UTPCR 0.50 (H) 07/10/2023      Annually Yes   Blood pressure BP Readings from Last 1 Encounters:   12/19/23 (!) 156/90    Less than 140/90 Yes   Dilated retinal exam : 08/03/2023 Annually Yes   Foot exam   : 10/18/2023 Annually Yes      Labs reviewed and are noted below.    Lab Results   Component Value Date    WBC 5.79 12/04/2023    HGB 14.3 12/04/2023    HCT 44.1 12/04/2023     12/04/2023    CHOL 105 (L) 05/02/2023    TRIG 145 05/02/2023    HDL 28 (L) 05/02/2023    LDLCALC 48.0 (L) 05/02/2023    ALT 38 07/03/2023    AST 24 07/03/2023     12/04/2023    K 4.7 12/04/2023     12/04/2023    ANIONGAP 4 (L) 12/04/2023    CREATININE 1.4 12/04/2023    ESTGFRAFRICA 13 (A) 07/26/2022    EGFRNONAA 11 (A) 07/26/2022    BUN 19 12/04/2023    CO2 27 12/04/2023    TSH 0.727 07/31/2017    INR 1.0 06/05/2023     (H) 12/04/2023    UTPCR 0.50 (H) 07/10/2023     Lab Results   Component Value Date    TSH 0.727 07/31/2017    FERRITIN 114  "07/05/2017    ALORKSKD48 363 07/05/2017    .9 (H) 05/15/2023    CALCIUM 9.7 12/04/2023    PHOS 2.4 (L) 12/04/2023     No results found for: "CPEPTIDE"  No results found for: "GLUTAMICACID"  Glucose   Date Value Ref Range Status   12/04/2023 144 (H) 70 - 110 mg/dL Final     Anion Gap   Date Value Ref Range Status   12/04/2023 4 (L) 8 - 16 mmol/L Final     eGFR if    Date Value Ref Range Status   07/26/2022 13 (A) >60 mL/min/1.73 m^2 Final     eGFR if non    Date Value Ref Range Status   07/26/2022 11 (A) >60 mL/min/1.73 m^2 Final     Comment:     Calculation used to obtain the estimated glomerular filtration  rate (eGFR) is the CKD-EPI equation.        The following portions of the patient's history were reviewed and updated as appropriate: allergies, current medications, past family history, past medical history, past social history, past surgical history, and problem list.    Review of patient's allergies indicates:  No Known Allergies  Social History     Socioeconomic History    Marital status:      Spouse name: Shannon Iglesias    Number of children: 2    Years of education: Some College   Tobacco Use    Smoking status: Never    Smokeless tobacco: Never   Substance and Sexual Activity    Alcohol use: No     Comment: 1-2 times per month    Drug use: No    Sexual activity: Yes     Partners: Female   Social History Narrative    Full time employed,  with 2 children.    Caregiver Shannon      Social Determinants of Health     Financial Resource Strain: Low Risk  (1/15/2020)    Overall Financial Resource Strain (CARDIA)     Difficulty of Paying Living Expenses: Not very hard   Food Insecurity: No Food Insecurity (1/15/2020)    Hunger Vital Sign     Worried About Running Out of Food in the Last Year: Never true     Ran Out of Food in the Last Year: Never true   Transportation Needs: No Transportation Needs (1/15/2020)    PRAPARE - Transportation     Lack of Transportation " (Medical): No     Lack of Transportation (Non-Medical): No   Social Connections: Unknown (1/15/2020)    Social Connection and Isolation Panel [NHANES]     Frequency of Communication with Friends and Family: Twice a week     Past Medical History:   Diagnosis Date    Allergic rhinitis     Atrial fibrillation with RVR 6/4/2023    Class 1 obesity due to excess calories with serious comorbidity and body mass index (BMI) of 31.0 to 31.9 in adult 4/7/2017    Coronary artery disease     Coronary artery disease involving native coronary artery of native heart without angina pectoris 2/6/2018    Cath lab procedure 04/23/2018 (Naveen Rios MD) A. Indication/Pre-Operative Diagnosis: The patient is a 37 year old male that was referred for catheterization by [facility] for ACS (NSTEMI). The BELLA risk score is 5.  B. Summary/Post-Operative Diagnosis 1. Single vessel coronary artery disease. 2. Normal LVEF. 3. Diastolic dysfunction. 4. Successful PCI for acute myocardial infarction. 5.     Diabetic nephropathy associated with type 2 diabetes mellitus 1/6/2020    Direct hyperbilirubinemia 3/24/2018    DM (diabetes mellitus) 2008    BS doesn't check any more 08/02/2018    DM (diabetes mellitus) 2012    BS 99 am 06/26/2020    DM (diabetes mellitus)     BS didn't check 06/04/2021    DM (diabetes mellitus) 2008    BS didn't check 07/29/2022     Dyslipidemia associated with type 2 diabetes mellitus 7/31/2017    Elevated bilirubin 3/21/2018    GERD (gastroesophageal reflux disease)     Gout     Hyperlipidemia     Hyperparathyroidism, secondary to chronic kidney disease 6/7/2021    Hypertension associated with chronic kidney disease due to type 2 diabetes mellitus     Hypertension complicating diabetes 3/16/2019    Not candidate for Hypertension Digital Medicine program due to dialysis status. Didn't tolerate amlodipine due to SE of hypotension.    Idiopathic chronic gout, multiple sites, without tophus (tophi) 7/19/2017    Long  term (current) use of insulin     MI (myocardial infarction) 07/2017    NSTEMI with CAD s/p PCI (FAMILIA) of LAD x 2 in 8/2017 7/31/2017    Obesity     HENRY on CPAP     Peritoneal dialysis catheter in place 1/26/2023    Proteinuria     Severe obstructive sleep apnea - Intolerant of CPAP 7/31/2017    Intolerant of CPAP after weeks of trying multiple interfaces. SPLIT-NIGHT SLEEP STUDY 7/28/2015 · SLEEP ARCHITECTURE: Sleep onset was 2.9 minutes and sleep efficiency was 94.4%. Sleep Stage distribution showed 145 sleep stage changes, 6 awakenings and 119 arousals. Sleep distribution showed 53.2% stage NI, 41.8% stage N 11, 0.0% stage N Ill and REM sleep was at 5.0%. There were 2 REM periods. · RE    Stage 3a chronic kidney disease 5/26/2023    Stage 5 chronic kidney disease on chronic peritoneal dialysis 6/7/2017    Stage 5 chronic kidney disease on chronic peritoneal dialysis 6/7/2017    Status post angioplasty with stent 8/4/2017    Steatohepatitis     Fatty Liver    Type 2 diabetes mellitus with chronic kidney disease on chronic dialysis, without long-term current use of insulin 6/21/2017    Type 2 diabetes mellitus with diabetic nephropathy     Type 2 diabetes mellitus with diabetic nephropathy, without long-term current use of insulin 1/6/2020    Type 2 diabetes mellitus with diabetic polyneuropathy, without long-term current use of insulin 6/7/2021    Type 2 diabetes mellitus with hyperglycemia     Type 2 diabetes mellitus with renal manifestations     Type 2 diabetes mellitus with stage 3 chronic kidney disease, without long-term current use of insulin 6/21/2017     REVIEW OF SYSTEMS:  Eyes No history of DR.  Cardiovascular: History of CAD, Afib, HTN, and HLD.  GI: Tolerating Trulicity well without GI side effects.   : Prior h/o ESRD, s/p L kidney transplant 05/2023  Neuro: No neuropathy.  PSYCH: No tobacco use.  ENDO: See HPI.        Objective:      Vitals:    12/19/23 1033   BP: (!) 156/90   Pulse: 71  "  RESPIRATORY: No respiratory distress  NEUROLOGIC: Cranial nerves II-XII grossly intact.   PSYCHIATRIC: Alert & oriented x3. Normal mood and affect.  FOOT EXAMINATION: UTD    Assessment:       1. Type 2 diabetes mellitus with stage 3a chronic kidney disease, without long-term current use of insulin    2. Dyslipidemia associated with type 2 diabetes mellitus    3. Essential hypertension    4. S/P kidney transplant    5. Prophylactic immunotherapy    6. Hypertension complicating diabetes        Plan:   Type 2 diabetes mellitus with stage 3a chronic kidney disease, without long-term current use of insulin  -     POCT Glucose, Hand-Held Device  -     tirzepatide (MOUNJARO) 7.5 mg/0.5 mL PnIj; Inject 7.5 mg into the skin every 7 days.  Dispense: 4 pen ; Refill: 5    Chronic -     Medication Regimen:   Finish Trulicity. Start Mounjaro 7.5 mg once a week. We can increase as tolerated.   Continue Novolog 4 units before each meal if premeal blood glucose above 120, PLUS sliding scale if BG above 150 (previous order from Transplant team)    Dyslipidemia associated with type 2 diabetes mellitus    Essential hypertension    S/P kidney transplant    Prophylactic immunotherapy    Hypertension complicating diabetes    - Follow up: 6 weeks    Portions of this note may have been created with voice recognition software. Occasional "wrong-word" or "sound-a-like" substitutions may have occurred due to the inherent limitations of voice recognition software. Please, read the note carefully and recognize, using context, where substitutions have occurred.           Heather Sorensen,MAGGIE-C, BC-ADM [facility]    "

## 2023-12-19 NOTE — PATIENT INSTRUCTIONS
Medication Regimen:   Finish Trulicity. Start Mounjaro 7.5 mg once a week. We can increase as tolerated.   Continue Novolog 4 units before each meal if premeal blood glucose above 120, PLUS sliding scale if BG above 150 (previous order from Transplant team)    Patient Instructions:  Carbohydrate Count: 30-45 grams/meal and 15 grams/snack with limit of 2 snacks per day.  Exercise: Goal is 150 minutes or more per week.  Bring meter and blood sugar log to each appointment.     Goals for Blood Sugar:  Fastin-130 mg/dl  2 hours after meal:  mg/dl  Before Bed: 100-150 mg/dl  If Blood sugar is below 70 mg/dl, DO NOT take diabetes medication. Eat first and then recheck blood sugar in 20 minutes.  Foods to eat if blood sugar is below 70 mg/dl  1/2 glass of orange juice   1/2 regular cola can   3-4 hard candies   3-4 small glucose tabs.   Foods to eat if blood sugar is below 50 mg/dl  1 glass of orange juice  1 regular cola can  8 hard candies  8 small glucose tabs.   If you have repeated eating/blood sugar recheck process 2 times and blood sugar still below 70 mg/dl, call 911.

## 2023-12-19 NOTE — TELEPHONE ENCOUNTER
----- Message from Heather Sorensen NP sent at 12/18/2023  4:45 PM CST -----  Coverage for Kirstie Wang - A1c is worse at 7.3%. Get an update on how blood sugars have been running and see if he wants to move up his appt with one of the providers?

## 2023-12-21 ENCOUNTER — PATIENT MESSAGE (OUTPATIENT)
Dept: TRANSPLANT | Facility: CLINIC | Age: 43
End: 2023-12-21
Payer: COMMERCIAL

## 2023-12-21 LAB
BKV DNA SERPL NAA+PROBE-ACNC: ABNORMAL COPIES/ML
BKV DNA SERPL NAA+PROBE-LOG#: 4.45 LOG (10) COPIES/ML
BKV DNA SERPL QL NAA+PROBE: DETECTED

## 2023-12-21 NOTE — PROGRESS NOTES
Lets continue with serum BK per protocol. If the bk goes higher Ill lower more tacro dose, make sure he is OFF MMF please

## 2024-01-02 ENCOUNTER — LAB VISIT (OUTPATIENT)
Dept: LAB | Facility: HOSPITAL | Age: 44
End: 2024-01-02
Payer: COMMERCIAL

## 2024-01-02 ENCOUNTER — LAB VISIT (OUTPATIENT)
Dept: LAB | Facility: HOSPITAL | Age: 44
End: 2024-01-02
Attending: INTERNAL MEDICINE
Payer: COMMERCIAL

## 2024-01-02 DIAGNOSIS — Z94.0 KIDNEY REPLACED BY TRANSPLANT: ICD-10-CM

## 2024-01-02 DIAGNOSIS — Z94.0 S/P KIDNEY TRANSPLANT: ICD-10-CM

## 2024-01-02 LAB
ALBUMIN SERPL BCP-MCNC: 4.2 G/DL (ref 3.5–5.2)
ANION GAP SERPL CALC-SCNC: 8 MMOL/L (ref 8–16)
BACTERIA #/AREA URNS HPF: NORMAL /HPF
BASOPHILS # BLD AUTO: 0.03 K/UL (ref 0–0.2)
BASOPHILS NFR BLD: 0.5 % (ref 0–1.9)
BILIRUB UR QL STRIP: NEGATIVE
BUN SERPL-MCNC: 14 MG/DL (ref 6–20)
CALCIUM SERPL-MCNC: 9.6 MG/DL (ref 8.7–10.5)
CHLORIDE SERPL-SCNC: 107 MMOL/L (ref 95–110)
CLARITY UR: CLEAR
CO2 SERPL-SCNC: 27 MMOL/L (ref 23–29)
COLOR UR: YELLOW
CREAT SERPL-MCNC: 1.4 MG/DL (ref 0.5–1.4)
CREAT UR-MCNC: 131 MG/DL (ref 23–375)
DIFFERENTIAL METHOD BLD: ABNORMAL
EOSINOPHIL # BLD AUTO: 0.1 K/UL (ref 0–0.5)
EOSINOPHIL NFR BLD: 2.3 % (ref 0–8)
ERYTHROCYTE [DISTWIDTH] IN BLOOD BY AUTOMATED COUNT: 14.5 % (ref 11.5–14.5)
EST. GFR  (NO RACE VARIABLE): >60 ML/MIN/1.73 M^2
GLUCOSE SERPL-MCNC: 126 MG/DL (ref 70–110)
GLUCOSE UR QL STRIP: ABNORMAL
HCT VFR BLD AUTO: 43.7 % (ref 40–54)
HGB BLD-MCNC: 14.7 G/DL (ref 14–18)
HGB UR QL STRIP: ABNORMAL
HYALINE CASTS #/AREA URNS LPF: 0 /LPF
IMM GRANULOCYTES # BLD AUTO: 0.04 K/UL (ref 0–0.04)
IMM GRANULOCYTES NFR BLD AUTO: 0.7 % (ref 0–0.5)
KETONES UR QL STRIP: NEGATIVE
LEUKOCYTE ESTERASE UR QL STRIP: NEGATIVE
LYMPHOCYTES # BLD AUTO: 1.4 K/UL (ref 1–4.8)
LYMPHOCYTES NFR BLD: 22.6 % (ref 18–48)
MAGNESIUM SERPL-MCNC: 1.8 MG/DL (ref 1.6–2.6)
MCH RBC QN AUTO: 27.8 PG (ref 27–31)
MCHC RBC AUTO-ENTMCNC: 33.6 G/DL (ref 32–36)
MCV RBC AUTO: 83 FL (ref 82–98)
MICROSCOPIC COMMENT: NORMAL
MONOCYTES # BLD AUTO: 0.7 K/UL (ref 0.3–1)
MONOCYTES NFR BLD: 11.3 % (ref 4–15)
NEUTROPHILS # BLD AUTO: 3.8 K/UL (ref 1.8–7.7)
NEUTROPHILS NFR BLD: 62.6 % (ref 38–73)
NITRITE UR QL STRIP: NEGATIVE
NRBC BLD-RTO: 0 /100 WBC
PH UR STRIP: 7 [PH] (ref 5–8)
PHOSPHATE SERPL-MCNC: 2 MG/DL (ref 2.7–4.5)
PLATELET # BLD AUTO: 161 K/UL (ref 150–450)
PMV BLD AUTO: 9.4 FL (ref 9.2–12.9)
POTASSIUM SERPL-SCNC: 4.3 MMOL/L (ref 3.5–5.1)
PROT UR QL STRIP: ABNORMAL
PROT UR-MCNC: 67 MG/DL (ref 0–15)
PROT/CREAT UR: 0.51 MG/G{CREAT} (ref 0–0.2)
RBC # BLD AUTO: 5.28 M/UL (ref 4.6–6.2)
RBC #/AREA URNS HPF: 4 /HPF (ref 0–4)
SODIUM SERPL-SCNC: 142 MMOL/L (ref 136–145)
SP GR UR STRIP: 1.02 (ref 1–1.03)
URN SPEC COLLECT METH UR: ABNORMAL
WBC # BLD AUTO: 6.03 K/UL (ref 3.9–12.7)
WBC #/AREA URNS HPF: 1 /HPF (ref 0–5)

## 2024-01-02 PROCEDURE — 80069 RENAL FUNCTION PANEL: CPT | Performed by: INTERNAL MEDICINE

## 2024-01-02 PROCEDURE — 83735 ASSAY OF MAGNESIUM: CPT | Performed by: INTERNAL MEDICINE

## 2024-01-02 PROCEDURE — 84156 ASSAY OF PROTEIN URINE: CPT | Performed by: INTERNAL MEDICINE

## 2024-01-02 PROCEDURE — 80197 ASSAY OF TACROLIMUS: CPT | Performed by: INTERNAL MEDICINE

## 2024-01-02 PROCEDURE — 85025 COMPLETE CBC W/AUTO DIFF WBC: CPT | Performed by: INTERNAL MEDICINE

## 2024-01-02 PROCEDURE — 81000 URINALYSIS NONAUTO W/SCOPE: CPT | Performed by: INTERNAL MEDICINE

## 2024-01-02 PROCEDURE — 87799 DETECT AGENT NOS DNA QUANT: CPT | Performed by: INTERNAL MEDICINE

## 2024-01-03 ENCOUNTER — PATIENT MESSAGE (OUTPATIENT)
Dept: INTERNAL MEDICINE | Facility: CLINIC | Age: 44
End: 2024-01-03
Payer: COMMERCIAL

## 2024-01-03 LAB — TACROLIMUS BLD-MCNC: 6.1 NG/ML (ref 5–15)

## 2024-01-05 ENCOUNTER — TELEPHONE (OUTPATIENT)
Dept: TRANSPLANT | Facility: CLINIC | Age: 44
End: 2024-01-05
Payer: COMMERCIAL

## 2024-01-05 ENCOUNTER — IMMUNIZATION (OUTPATIENT)
Dept: INTERNAL MEDICINE | Facility: CLINIC | Age: 44
End: 2024-01-05
Payer: COMMERCIAL

## 2024-01-05 DIAGNOSIS — Z23 NEED FOR VACCINATION: Primary | ICD-10-CM

## 2024-01-05 LAB
BKV DNA SERPL NAA+PROBE-ACNC: ABNORMAL COPIES/ML
BKV DNA SERPL NAA+PROBE-LOG#: 3.61 LOG (10) COPIES/ML
BKV DNA SERPL QL NAA+PROBE: DETECTED

## 2024-01-05 PROCEDURE — 90686 IIV4 VACC NO PRSV 0.5 ML IM: CPT | Mod: S$GLB,,, | Performed by: PHYSICIAN ASSISTANT

## 2024-01-05 PROCEDURE — 90471 IMMUNIZATION ADMIN: CPT | Mod: S$GLB,,, | Performed by: PHYSICIAN ASSISTANT

## 2024-01-05 NOTE — TELEPHONE ENCOUNTER
Next BK check on 1/16    ----- Message from Rell Booth MD sent at 1/5/2024  2:13 PM CST -----  Please continue with serum BK per protocol

## 2024-01-09 ENCOUNTER — OFFICE VISIT (OUTPATIENT)
Dept: SLEEP MEDICINE | Facility: CLINIC | Age: 44
End: 2024-01-09
Payer: COMMERCIAL

## 2024-01-09 VITALS
SYSTOLIC BLOOD PRESSURE: 140 MMHG | DIASTOLIC BLOOD PRESSURE: 90 MMHG | HEIGHT: 75 IN | OXYGEN SATURATION: 99 % | WEIGHT: 228.38 LBS | HEART RATE: 88 BPM | BODY MASS INDEX: 28.4 KG/M2 | RESPIRATION RATE: 18 BRPM

## 2024-01-09 DIAGNOSIS — G47.33 OBSTRUCTIVE SLEEP APNEA: Primary | Chronic | ICD-10-CM

## 2024-01-09 DIAGNOSIS — G47.33 OSA (OBSTRUCTIVE SLEEP APNEA): ICD-10-CM

## 2024-01-09 DIAGNOSIS — I25.2 HISTORY OF NON-ST ELEVATION MYOCARDIAL INFARCTION (NSTEMI): ICD-10-CM

## 2024-01-09 DIAGNOSIS — I25.10 CORONARY ARTERY DISEASE INVOLVING NATIVE CORONARY ARTERY OF NATIVE HEART WITHOUT ANGINA PECTORIS: Chronic | ICD-10-CM

## 2024-01-09 PROCEDURE — 99999 PR PBB SHADOW E&M-EST. PATIENT-LVL V: CPT | Mod: PBBFAC,,, | Performed by: NURSE PRACTITIONER

## 2024-01-09 PROCEDURE — 3072F LOW RISK FOR RETINOPATHY: CPT | Mod: CPTII,S$GLB,, | Performed by: NURSE PRACTITIONER

## 2024-01-09 PROCEDURE — 1160F RVW MEDS BY RX/DR IN RCRD: CPT | Mod: CPTII,S$GLB,, | Performed by: NURSE PRACTITIONER

## 2024-01-09 PROCEDURE — 3077F SYST BP >= 140 MM HG: CPT | Mod: CPTII,S$GLB,, | Performed by: NURSE PRACTITIONER

## 2024-01-09 PROCEDURE — 1159F MED LIST DOCD IN RCRD: CPT | Mod: CPTII,S$GLB,, | Performed by: NURSE PRACTITIONER

## 2024-01-09 PROCEDURE — 99214 OFFICE O/P EST MOD 30 MIN: CPT | Mod: S$GLB,,, | Performed by: NURSE PRACTITIONER

## 2024-01-09 PROCEDURE — 3080F DIAST BP >= 90 MM HG: CPT | Mod: CPTII,S$GLB,, | Performed by: NURSE PRACTITIONER

## 2024-01-09 PROCEDURE — 3008F BODY MASS INDEX DOCD: CPT | Mod: CPTII,S$GLB,, | Performed by: NURSE PRACTITIONER

## 2024-01-09 NOTE — PATIENT INSTRUCTIONS
[facility] Kurt A. LeJeune D.S.S.  [address] [place] [address]  Phone:[phone]

## 2024-01-09 NOTE — PROGRESS NOTES
"Subjective:      Patient ID: Robb Iglesias is a 43 y.o. male.    Chief Complaint: Sleep Apnea    HPI  Follow up for sleep apnea.   Originally dx in 2015 with /hr.  Repeat study 2017 after weight loss continued severe HENRY with AHI 39/hr with BMI 29.   His BMI is currently 28.   He is intolerant to CPAP but states his sleep quality has improved.   He is unsure if he snores but his wife does not complain.   [place] Sleepiness scale score: 0  He had a repeat sleep study.       MI age 36  Kidney transplant patient.       Patient Active Problem List   Diagnosis    Bariatric surgery status    Class 1 obesity with serious comorbidity and body mass index (BMI) of 30.0 to 30.9 in adult    Essential hypertension    Type 2 diabetes mellitus with stage 3a chronic kidney disease, without long-term current use of insulin    Idiopathic chronic gout, multiple sites, without tophus (tophi)    Chronic nonseasonal allergic rhinitis due to pollen    Severe obstructive sleep apnea - Intolerant of CPAP    Dyslipidemia associated with type 2 diabetes mellitus    Status post angioplasty with stent    PLMD (periodic limb movement disorder)    Coronary artery disease involving native coronary artery of native heart without angina pectoris    Direct hyperbilirubinemia    Hypertension complicating diabetes    Diabetic nephropathy associated with type 2 diabetes mellitus    Type 2 diabetes mellitus with diabetic polyneuropathy, without long-term current use of insulin    Hyperparathyroidism, secondary to chronic kidney disease    S/P kidney transplant    Prophylactic immunotherapy    At risk for opportunistic infections    Long-term use of immunosuppressant medication    Stage 3a chronic kidney disease    Paroxysmal atrial fibrillation    History of COVID-19 (Tested Positive July 31, 2023)    History of NSTEMI with CAD s/p PCI (FAMILIA) of LAD x 2 in 8/2017    BK viremia     BP (!) 140/90   Pulse 88   Resp 18   Ht 6' 3" (1.905 m)  "  Wt 103.6 kg (228 lb 6.3 oz)   SpO2 99%   BMI 28.55 kg/m²   Body mass index is 28.55 kg/m².    Review of Systems   Constitutional: Negative.    HENT: Negative.     Respiratory: Negative.     Cardiovascular: Negative.    Musculoskeletal: Negative.    Gastrointestinal: Negative.    Neurological: Negative.    Psychiatric/Behavioral: Negative.       Objective:      Physical Exam  Constitutional:       Appearance: Normal appearance. He is well-developed.   HENT:      Head: Normocephalic and atraumatic.      Mouth/Throat:      Comments: Mallampati Score: II-III  Neck:      Comments:    Cardiovascular:      Rate and Rhythm: Normal rate and regular rhythm.   Pulmonary:      Effort: Pulmonary effort is normal.      Breath sounds: Normal breath sounds.   Abdominal:      Palpations: Abdomen is soft.   Musculoskeletal:         General: Normal range of motion.      Cervical back: Normal range of motion and neck supple.   Skin:     General: Skin is warm and dry.   Neurological:      Mental Status: He is alert and oriented to person, place, and time.   Psychiatric:         Mood and Affect: Mood normal.         Behavior: Behavior normal.       Personal Diagnostic Review    9/22/23 polysomnogram:       PRIMARY TREATMENT RECOMMENDATIONS Treat, or refer to [facility].   1. The diagnostic polysomnography revealed a mild obstructive sleep apnea / hypopnea syndrome (A + H Index = 13.5 events / hr asleep with 8.4 respiratory event - related arousals / hr asleep and no RERAs (respiratory effort - related arousals) for the study. The mean SpO2 value was 94.9 %, mild, minimum oxygen saturation during sleep was 84.0 %, and the maximum waking baseline SpO2 was 98.0 %. Sporadic, mild snoring was noted.       Assessment:       1. Severe obstructive sleep apnea - Intolerant of CPAP    2. HENRY (obstructive sleep apnea)    3. Coronary artery disease involving native coronary artery of native heart without angina pectoris    4. History  "of NSTEMI with CAD s/p PCI (FAMILIA) of LAD x 2 in 8/2017        Outpatient Encounter Medications as of 1/9/2024   Medication Sig Dispense Refill    aspirin (ECOTRIN) 81 MG EC tablet TAKE 1 TABLET (81 MG TOTAL) BY MOUTH ONCE DAILY. 90 tablet 4    atorvastatin (LIPITOR) 80 MG tablet Take 1 tablet (80 mg total) by mouth every evening. 90 tablet 3    blood sugar diagnostic Strp Check blood glucose 2 times daily as directed and as needed 200 each 5    ezetimibe (ZETIA) 10 mg tablet Take 1 tablet (10 mg total) by mouth once daily. 90 tablet 3    insulin aspart U-100 (NOVOLOG FLEXPEN U-100 INSULIN) 100 unit/mL (3 mL) InPn pen Inject 4 Units into the skin 3 (three) times daily before meals. Plus sliding scale      k phos di & mono-sod phos mono (K-PHOS-NEUTRAL) 250 mg Tab Take 2 tablets by mouth 3 (three) times daily. 180 tablet 11    lancets Misc Check blood glucose 2 times daily as directed and as needed (dispense insurance preferred brand or patient choice) 200 each 5    magnesium oxide (MAG-OX) 400 mg (241.3 mg magnesium) tablet Take 1 tablet (400 mg total) by mouth 2 (two) times daily. 60 tablet 11    magnesium oxide (MAG-OX) 400 mg (241.3 mg magnesium) tablet TAKE 1 TABLET BY MOUTH 2 TIMES DAILY.      metoprolol tartrate (LOPRESSOR) 25 MG tablet Take 1 tablet (25 mg total) by mouth 2 (two) times daily. 60 tablet 11    multivitamin Tab Take 1 tablet by mouth once daily.      nitroGLYCERIN (NITROSTAT) 0.4 MG SL tablet Dissolve one tablet underneath tongue at onset of angina; may repeat every 5 minutes if needed. Call 911 if angina persists after 2 doses. 25 tablet 5    pantoprazole (PROTONIX) 40 MG tablet Take 1 tablet (40 mg total) by mouth once daily. 30 tablet 11    pen needle, diabetic (BD ULTRA-FINE SHORT PEN NEEDLE) 31 gauge x 5/16" Ndle Use to inject insulin into the skin 3 times daily 100 each 1    predniSONE (DELTASONE) 5 MG tablet Take by mouth daily; 5/18-5/24: 20 mg; 5/25-5/31: 15 mg; 6/1-6/7: 10 mg; 6/8/23- " thereafter: 5 mg daily; do not stop 70 tablet 11    tacrolimus (PROGRAF) 0.5 MG Cap Take 3 capsules (1.5 mg total) by mouth every morning AND 2 capsules (1 mg total) every evening. 150 capsule 11    tirzepatide (MOUNJARO) 7.5 mg/0.5 mL PnIj Inject 7.5 mg into the skin every 7 days. 4 pen 5    blood-glucose meter (ONETOUCH ULTRAMINI) kit Use as instructed 1 each 0     No facility-administered encounter medications on file as of 1/9/2024.     Orders Placed This Encounter   Procedures    Ambulatory referral/consult to Dentistry     Standing Status:   Future     Standing Expiration Date:   2/9/2025     Referral Priority:   Routine     Referral Type:   Consultation     Referral Reason:   Specialty Services Required     Referred to Provider:   Lejeune, Kurt A., DDS     Requested Specialty:   Dental General Practice     Number of Visits Requested:   1     Plan:     He has improved sleep apnea due to weight loss- continues to have AHI 13/hr with comorbidities.   Recommend treating   Not candidate for Inspire.   Discussed dental device. - referral placed.         Problem List Items Addressed This Visit          Cardiac/Vascular    Coronary artery disease involving native coronary artery of native heart without angina pectoris (Chronic)    Overview     Cath lab procedure 04/23/2018 (Naveen Rios MD)  A. Indication/Pre-Operative Diagnosis: The patient is a 37 year old male that [facility] The BELLA risk score is 5.    B. Summary/Post-Operative Diagnosis  1. Single vessel coronary artery disease.  2. Normal LVEF.  3. Diastolic dysfunction.  4. Successful PCI for acute myocardial infarction.  5. The patient did not undergo primary PCI.         History of NSTEMI with CAD s/p PCI (FAMILIA) of LAD x 2 in 8/2017       Other    Severe obstructive sleep apnea - Intolerant of CPAP - Primary (Chronic)    Overview     Intolerant of CPAP after weeks of trying multiple  interfaces.  SPLIT-NIGHT SLEEP STUDY 7/28/2015  SLEEP ARCHITECTURE: Sleep onset was 2.9 minutes and sleep efficiency was 94.4%. Sleep Stage distribution showed 145 sleep stage changes, 6 awakenings and 119 arousals. Sleep distribution showed 53.2% stage NI, 41.8% stage N 11, 0.0% stage N Ill and REM sleep was at 5.0%. There were 2 REM periods.  RESPIRATORY SUMMARY: 257 respiratory events were present including 201 obstructive apneas and  central apneas, 0 mixed apneas and 56 hypopneas for a total AHI of 109.4. The non-rem index was 108.8 /hr while the AHI during stage R was 120.0 /hr. Oxygen desaturations were associated with the respiratory events. There were 226 desaturations. The lowest oxygen saturation was 78.0% and the mean oxygen saturation was 92.4%. Oxygen saturations were below: 88% for 24.3 minutes of the time spent asleep.  CARDIAC SUMMARY: Normal sinus rhythm with heart rate variation between 60.0 and 94.0 was noted.          Other Visit Diagnoses       HENRY (obstructive sleep apnea)        Relevant Orders    Ambulatory referral/consult to Dentistry                   Elizabeth LeJeune, ACNP, ANP

## 2024-01-10 DIAGNOSIS — E87.8 ELECTROLYTE ABNORMALITY: ICD-10-CM

## 2024-01-12 DIAGNOSIS — E11.22 TYPE 2 DIABETES MELLITUS WITH STAGE 3A CHRONIC KIDNEY DISEASE, WITHOUT LONG-TERM CURRENT USE OF INSULIN: ICD-10-CM

## 2024-01-12 DIAGNOSIS — N18.31 TYPE 2 DIABETES MELLITUS WITH STAGE 3A CHRONIC KIDNEY DISEASE, WITHOUT LONG-TERM CURRENT USE OF INSULIN: ICD-10-CM

## 2024-01-12 RX ORDER — TIRZEPATIDE 7.5 MG/.5ML
7.5 INJECTION, SOLUTION SUBCUTANEOUS WEEKLY
Qty: 12 PEN | Refills: 1 | Status: SHIPPED | OUTPATIENT
Start: 2024-01-12 | End: 2024-02-07

## 2024-01-18 ENCOUNTER — PATIENT MESSAGE (OUTPATIENT)
Dept: TRANSPLANT | Facility: CLINIC | Age: 44
End: 2024-01-18
Payer: COMMERCIAL

## 2024-01-23 ENCOUNTER — LAB VISIT (OUTPATIENT)
Dept: LAB | Facility: HOSPITAL | Age: 44
End: 2024-01-23
Attending: INTERNAL MEDICINE
Payer: COMMERCIAL

## 2024-01-23 DIAGNOSIS — Z94.0 KIDNEY REPLACED BY TRANSPLANT: Primary | ICD-10-CM

## 2024-01-23 DIAGNOSIS — Z94.0 KIDNEY REPLACED BY TRANSPLANT: ICD-10-CM

## 2024-01-23 PROCEDURE — 36415 COLL VENOUS BLD VENIPUNCTURE: CPT | Performed by: INTERNAL MEDICINE

## 2024-01-23 PROCEDURE — 87799 DETECT AGENT NOS DNA QUANT: CPT | Performed by: INTERNAL MEDICINE

## 2024-01-26 LAB
BKV DNA SERPL NAA+PROBE-ACNC: ABNORMAL COPIES/ML
BKV DNA SERPL NAA+PROBE-LOG#: 4.07 LOG (10) COPIES/ML
BKV DNA SERPL QL NAA+PROBE: DETECTED

## 2024-01-26 NOTE — PROGRESS NOTES
D: SWS has been following to coordinate d/c. Pt has been accepted at [facility].   P: SW will continue to follow to coordinate d/c to [facility].     ISMAEL Jacobson  Casual  [id number]   Please lets get a prograf level early next week thanks

## 2024-01-29 ENCOUNTER — PATIENT MESSAGE (OUTPATIENT)
Dept: TRANSPLANT | Facility: CLINIC | Age: 44
End: 2024-01-29
Payer: COMMERCIAL

## 2024-01-30 ENCOUNTER — LAB VISIT (OUTPATIENT)
Dept: LAB | Facility: HOSPITAL | Age: 44
End: 2024-01-30
Attending: INTERNAL MEDICINE
Payer: COMMERCIAL

## 2024-01-30 DIAGNOSIS — I21.4 NSTEMI (NON-ST ELEVATED MYOCARDIAL INFARCTION): ICD-10-CM

## 2024-01-30 DIAGNOSIS — I25.10 CORONARY ARTERY DISEASE INVOLVING NATIVE CORONARY ARTERY OF NATIVE HEART WITHOUT ANGINA PECTORIS: Chronic | ICD-10-CM

## 2024-01-30 DIAGNOSIS — Z94.0 KIDNEY REPLACED BY TRANSPLANT: ICD-10-CM

## 2024-01-30 PROCEDURE — 36415 COLL VENOUS BLD VENIPUNCTURE: CPT | Performed by: INTERNAL MEDICINE

## 2024-01-30 PROCEDURE — 80197 ASSAY OF TACROLIMUS: CPT | Performed by: INTERNAL MEDICINE

## 2024-01-30 NOTE — TELEPHONE ENCOUNTER
Refill Routing Note   Medication(s) are not appropriate for processing by [facility] for the following reason(s):        Outside of protocol    [facility] action(s):  Route               Appointments  past 12m or future 3m with PCP    Date Provider   Last Visit   8/7/2023 SABIHA Roberson MD   Next Visit   2/7/2024 SABIHA Roberson MD   ED visits in past 90 days: 0        Note composed:3:26 PM 01/30/2024

## 2024-01-31 LAB — TACROLIMUS BLD-MCNC: 5.5 NG/ML (ref 5–15)

## 2024-02-02 RX ORDER — ASPIRIN 81 MG/1
81 TABLET ORAL DAILY
Qty: 90 TABLET | Refills: 0 | Status: SHIPPED | OUTPATIENT
Start: 2024-02-02 | End: 2024-05-02

## 2024-02-02 NOTE — TELEPHONE ENCOUNTER
REFILL APPROVED. Will address further refills at upcoming appointment with me listed below.  #LMRX   --------------------------------  Future Appointments   Date Time Provider Department Center   2/5/2024  8:10 AM SPECIMEN, [facility] HG SPECLAB [facility]   2/5/2024  8:55 AM LABORATORY, [facility] LAB [facility]   2/7/2024  7:20 AM SABIHA Roberson MD [facility] IM [facility]   2/7/2024 12:40 PM Monica Rios MD [facility] VAS CAR [place]   2/16/2024  2:30 PM Cinda Mccray, NP [facility] SOTERO [facility]   2/29/2024  2:30 PM Heather Sorensen NP [facility] DIABETE [facility]   4/18/2024  1:00 PM [facility]

## 2024-02-05 ENCOUNTER — LAB VISIT (OUTPATIENT)
Dept: LAB | Facility: HOSPITAL | Age: 44
End: 2024-02-05
Attending: INTERNAL MEDICINE
Payer: COMMERCIAL

## 2024-02-05 ENCOUNTER — LAB VISIT (OUTPATIENT)
Dept: LAB | Facility: HOSPITAL | Age: 44
End: 2024-02-05
Payer: COMMERCIAL

## 2024-02-05 DIAGNOSIS — Z94.0 KIDNEY REPLACED BY TRANSPLANT: ICD-10-CM

## 2024-02-05 LAB
ALBUMIN SERPL BCP-MCNC: 4.1 G/DL (ref 3.5–5.2)
ALBUMIN SERPL BCP-MCNC: 4.1 G/DL (ref 3.5–5.2)
ALP SERPL-CCNC: 99 U/L (ref 55–135)
ALT SERPL W/O P-5'-P-CCNC: 67 U/L (ref 10–44)
ANION GAP SERPL CALC-SCNC: 8 MMOL/L (ref 8–16)
AST SERPL-CCNC: 36 U/L (ref 10–40)
BACTERIA #/AREA URNS HPF: NORMAL /HPF
BASOPHILS # BLD AUTO: 0.03 K/UL (ref 0–0.2)
BASOPHILS NFR BLD: 0.5 % (ref 0–1.9)
BILIRUB DIRECT SERPL-MCNC: 0.2 MG/DL (ref 0.1–0.3)
BILIRUB SERPL-MCNC: 2.7 MG/DL (ref 0.1–1)
BILIRUB UR QL STRIP: NEGATIVE
BUN SERPL-MCNC: 16 MG/DL (ref 6–20)
CALCIUM SERPL-MCNC: 9.7 MG/DL (ref 8.7–10.5)
CHLORIDE SERPL-SCNC: 110 MMOL/L (ref 95–110)
CLARITY UR: CLEAR
CO2 SERPL-SCNC: 23 MMOL/L (ref 23–29)
COLOR UR: YELLOW
CREAT SERPL-MCNC: 1.4 MG/DL (ref 0.5–1.4)
CREAT UR-MCNC: 171 MG/DL (ref 23–375)
DIFFERENTIAL METHOD BLD: NORMAL
EOSINOPHIL # BLD AUTO: 0.2 K/UL (ref 0–0.5)
EOSINOPHIL NFR BLD: 3 % (ref 0–8)
ERYTHROCYTE [DISTWIDTH] IN BLOOD BY AUTOMATED COUNT: 14.3 % (ref 11.5–14.5)
EST. GFR  (NO RACE VARIABLE): >60 ML/MIN/1.73 M^2
GLUCOSE SERPL-MCNC: 154 MG/DL (ref 70–110)
GLUCOSE UR QL STRIP: ABNORMAL
HCT VFR BLD AUTO: 44.3 % (ref 40–54)
HGB BLD-MCNC: 14.4 G/DL (ref 14–18)
HGB UR QL STRIP: ABNORMAL
HYALINE CASTS #/AREA URNS LPF: 0 /LPF
IMM GRANULOCYTES # BLD AUTO: 0.03 K/UL (ref 0–0.04)
IMM GRANULOCYTES NFR BLD AUTO: 0.5 % (ref 0–0.5)
KETONES UR QL STRIP: NEGATIVE
LEUKOCYTE ESTERASE UR QL STRIP: NEGATIVE
LYMPHOCYTES # BLD AUTO: 1.2 K/UL (ref 1–4.8)
LYMPHOCYTES NFR BLD: 21.7 % (ref 18–48)
MAGNESIUM SERPL-MCNC: 1.9 MG/DL (ref 1.6–2.6)
MCH RBC QN AUTO: 27.9 PG (ref 27–31)
MCHC RBC AUTO-ENTMCNC: 32.5 G/DL (ref 32–36)
MCV RBC AUTO: 86 FL (ref 82–98)
MICROSCOPIC COMMENT: NORMAL
MONOCYTES # BLD AUTO: 0.7 K/UL (ref 0.3–1)
MONOCYTES NFR BLD: 12.2 % (ref 4–15)
NEUTROPHILS # BLD AUTO: 3.5 K/UL (ref 1.8–7.7)
NEUTROPHILS NFR BLD: 62.1 % (ref 38–73)
NITRITE UR QL STRIP: NEGATIVE
NRBC BLD-RTO: 0 /100 WBC
PH UR STRIP: 7 [PH] (ref 5–8)
PHOSPHATE SERPL-MCNC: 2.8 MG/DL (ref 2.7–4.5)
PLATELET # BLD AUTO: 160 K/UL (ref 150–450)
PMV BLD AUTO: 9.5 FL (ref 9.2–12.9)
POTASSIUM SERPL-SCNC: 4.8 MMOL/L (ref 3.5–5.1)
PROT SERPL-MCNC: 6.8 G/DL (ref 6–8.4)
PROT UR QL STRIP: ABNORMAL
PROT UR-MCNC: 105 MG/DL (ref 0–15)
PROT/CREAT UR: 0.61 MG/G{CREAT} (ref 0–0.2)
RBC # BLD AUTO: 5.17 M/UL (ref 4.6–6.2)
RBC #/AREA URNS HPF: 4 /HPF (ref 0–4)
SODIUM SERPL-SCNC: 141 MMOL/L (ref 136–145)
SP GR UR STRIP: >=1.03 (ref 1–1.03)
URN SPEC COLLECT METH UR: ABNORMAL
WBC # BLD AUTO: 5.67 K/UL (ref 3.9–12.7)
WBC #/AREA URNS HPF: 2 /HPF (ref 0–5)

## 2024-02-05 PROCEDURE — 85025 COMPLETE CBC W/AUTO DIFF WBC: CPT | Performed by: INTERNAL MEDICINE

## 2024-02-05 PROCEDURE — 83735 ASSAY OF MAGNESIUM: CPT | Performed by: INTERNAL MEDICINE

## 2024-02-05 PROCEDURE — 87799 DETECT AGENT NOS DNA QUANT: CPT | Performed by: INTERNAL MEDICINE

## 2024-02-05 PROCEDURE — 84075 ASSAY ALKALINE PHOSPHATASE: CPT | Performed by: INTERNAL MEDICINE

## 2024-02-05 PROCEDURE — 82247 BILIRUBIN TOTAL: CPT | Performed by: INTERNAL MEDICINE

## 2024-02-05 PROCEDURE — 80197 ASSAY OF TACROLIMUS: CPT | Performed by: INTERNAL MEDICINE

## 2024-02-05 PROCEDURE — 82570 ASSAY OF URINE CREATININE: CPT | Performed by: INTERNAL MEDICINE

## 2024-02-05 PROCEDURE — 81000 URINALYSIS NONAUTO W/SCOPE: CPT | Performed by: INTERNAL MEDICINE

## 2024-02-05 PROCEDURE — 80069 RENAL FUNCTION PANEL: CPT | Performed by: INTERNAL MEDICINE

## 2024-02-06 ENCOUNTER — PATIENT MESSAGE (OUTPATIENT)
Dept: TRANSPLANT | Facility: CLINIC | Age: 44
End: 2024-02-06
Payer: COMMERCIAL

## 2024-02-06 ENCOUNTER — TELEPHONE (OUTPATIENT)
Dept: TRANSPLANT | Facility: CLINIC | Age: 44
End: 2024-02-06
Payer: COMMERCIAL

## 2024-02-06 DIAGNOSIS — Z94.0 KIDNEY REPLACED BY TRANSPLANT: Primary | ICD-10-CM

## 2024-02-06 DIAGNOSIS — R74.8 ELEVATED LIVER ENZYMES: ICD-10-CM

## 2024-02-06 LAB — TACROLIMUS BLD-MCNC: 6.8 NG/ML (ref 5–15)

## 2024-02-06 NOTE — TELEPHONE ENCOUNTER
----- Message from Rell Booth MD sent at 2/5/2024  6:29 PM CST -----  Please order a liver and biliary tree US and refer to hepatology

## 2024-02-07 ENCOUNTER — TELEPHONE (OUTPATIENT)
Dept: TRANSPLANT | Facility: CLINIC | Age: 44
End: 2024-02-07
Payer: COMMERCIAL

## 2024-02-07 ENCOUNTER — TELEPHONE (OUTPATIENT)
Dept: INTERNAL MEDICINE | Facility: CLINIC | Age: 44
End: 2024-02-07

## 2024-02-07 ENCOUNTER — OFFICE VISIT (OUTPATIENT)
Dept: INTERNAL MEDICINE | Facility: CLINIC | Age: 44
End: 2024-02-07
Payer: COMMERCIAL

## 2024-02-07 VITALS — DIASTOLIC BLOOD PRESSURE: 80 MMHG | SYSTOLIC BLOOD PRESSURE: 135 MMHG

## 2024-02-07 DIAGNOSIS — E78.5 DYSLIPIDEMIA ASSOCIATED WITH TYPE 2 DIABETES MELLITUS: ICD-10-CM

## 2024-02-07 DIAGNOSIS — N25.81 HYPERPARATHYROIDISM, SECONDARY RENAL: ICD-10-CM

## 2024-02-07 DIAGNOSIS — I25.10 CORONARY ARTERY DISEASE INVOLVING NATIVE CORONARY ARTERY OF NATIVE HEART WITHOUT ANGINA PECTORIS: Chronic | ICD-10-CM

## 2024-02-07 DIAGNOSIS — I25.2 HISTORY OF NON-ST ELEVATION MYOCARDIAL INFARCTION (NSTEMI): ICD-10-CM

## 2024-02-07 DIAGNOSIS — K21.9 GASTROESOPHAGEAL REFLUX DISEASE WITHOUT ESOPHAGITIS: ICD-10-CM

## 2024-02-07 DIAGNOSIS — I15.2 HYPERTENSION COMPLICATING DIABETES: Primary | Chronic | ICD-10-CM

## 2024-02-07 DIAGNOSIS — E11.59 HYPERTENSION COMPLICATING DIABETES: Primary | Chronic | ICD-10-CM

## 2024-02-07 DIAGNOSIS — I48.91 LONE ATRIAL FIBRILLATION: ICD-10-CM

## 2024-02-07 DIAGNOSIS — E11.69 DYSLIPIDEMIA ASSOCIATED WITH TYPE 2 DIABETES MELLITUS: ICD-10-CM

## 2024-02-07 DIAGNOSIS — E11.42 TYPE 2 DIABETES MELLITUS WITH DIABETIC POLYNEUROPATHY, WITHOUT LONG-TERM CURRENT USE OF INSULIN: Chronic | ICD-10-CM

## 2024-02-07 PROCEDURE — 3072F LOW RISK FOR RETINOPATHY: CPT | Mod: CPTII,95,, | Performed by: FAMILY MEDICINE

## 2024-02-07 PROCEDURE — 1159F MED LIST DOCD IN RCRD: CPT | Mod: CPTII,95,, | Performed by: FAMILY MEDICINE

## 2024-02-07 PROCEDURE — 3075F SYST BP GE 130 - 139MM HG: CPT | Mod: CPTII,95,, | Performed by: FAMILY MEDICINE

## 2024-02-07 PROCEDURE — 3079F DIAST BP 80-89 MM HG: CPT | Mod: CPTII,95,, | Performed by: FAMILY MEDICINE

## 2024-02-07 PROCEDURE — 99214 OFFICE O/P EST MOD 30 MIN: CPT | Mod: 95,,, | Performed by: FAMILY MEDICINE

## 2024-02-07 PROCEDURE — 1160F RVW MEDS BY RX/DR IN RCRD: CPT | Mod: CPTII,95,, | Performed by: FAMILY MEDICINE

## 2024-02-07 RX ORDER — EZETIMIBE 10 MG/1
10 TABLET ORAL DAILY
Qty: 90 TABLET | Refills: 3 | Status: SHIPPED | OUTPATIENT
Start: 2024-02-07 | End: 2024-03-18 | Stop reason: SDUPTHER

## 2024-02-07 RX ORDER — ATORVASTATIN CALCIUM 80 MG/1
80 TABLET, FILM COATED ORAL NIGHTLY
Qty: 90 TABLET | Refills: 3 | Status: SHIPPED | OUTPATIENT
Start: 2024-02-07 | End: 2024-03-18 | Stop reason: SDUPTHER

## 2024-02-07 RX ORDER — METOPROLOL TARTRATE 25 MG/1
25 TABLET, FILM COATED ORAL 2 TIMES DAILY
Qty: 180 TABLET | Refills: 3 | Status: SHIPPED | OUTPATIENT
Start: 2024-02-07 | End: 2024-03-11 | Stop reason: SDUPTHER

## 2024-02-07 RX ORDER — PANTOPRAZOLE SODIUM 40 MG/1
40 TABLET, DELAYED RELEASE ORAL DAILY
Qty: 90 TABLET | Refills: 3 | Status: SHIPPED | OUTPATIENT
Start: 2024-02-07 | End: 2024-03-11

## 2024-02-07 NOTE — ASSESSMENT & PLAN NOTE
BP Readings from Last 6 Encounters:   02/07/24 135/80   01/09/24 (!) 140/90   12/19/23 (!) 156/90   10/25/23 124/80   10/18/23 124/81   08/30/23 110/80     Lab Results   Component Value Date    EGFRNORACEVR >60.0 02/05/2024    CREATININE 1.4 02/05/2024    BUN 16 02/05/2024    K 4.8 02/05/2024     02/05/2024     02/05/2024     Results for orders placed or performed during the hospital encounter of 10/25/23   SCHEDULED EKG 12-LEAD (to Muse)    Collection Time: 10/25/23  8:28 AM    Narrative    Test Reason : I48.0,I48.91,    Vent. Rate : 070 BPM     Atrial Rate : 070 BPM     P-R Int : 146 ms          QRS Dur : 082 ms      QT Int : 382 ms       P-R-T Axes : 021 012 049 degrees     QTc Int : 412 ms    Normal sinus rhythm  Normal ECG  When compared with ECG of 04-JUN-2023 18:08,  No significant change was found  Confirmed by JOSIAS VELEZ MD ([id number]) on 10/25/2023 3:28:07 PM    Referred By: PEACE ESCAMILLA           Confirmed By:JOSIAS VELEZ MD

## 2024-02-07 NOTE — PROGRESS NOTES
TELEMEDICINE VIRTUAL VIDEO VISIT  2/7/24  7:20 AM CST    Visit Type: Audiovisual    Patient's Location: Robb represents that they are located within the state of [place].    CHIEF COMPLAINT: Follow-up    Robb reports that he is doing very well. He reports no complaints or concerns. Chronic conditions are represented as and appear to be compensated/controlled and stable. oRbb reports that he is doing well on current medicines, he perceives no adverse side effects, and he wants to continue his current treatment.       1. Hypertension complicating diabetes  Overview:  Didn't tolerate amlodipine due to SE of hypotension when on dialysis.    Assessment & Plan:  BP Readings from Last 6 Encounters:   02/07/24 135/80   01/09/24 (!) 140/90   12/19/23 (!) 156/90   10/25/23 124/80   10/18/23 124/81   08/30/23 110/80     Lab Results   Component Value Date    EGFRNORACEVR >60.0 02/05/2024    CREATININE 1.4 02/05/2024    BUN 16 02/05/2024    K 4.8 02/05/2024     02/05/2024     02/05/2024     Results for orders placed or performed during the hospital encounter of 10/25/23   SCHEDULED EKG 12-LEAD (to Muse)    Collection Time: 10/25/23  8:28 AM    Narrative    Test Reason : I48.0,I48.91,    Vent. Rate : 070 BPM     Atrial Rate : 070 BPM     P-R Int : 146 ms          QRS Dur : 082 ms      QT Int : 382 ms       P-R-T Axes : 021 012 049 degrees     QTc Int : 412 ms    Normal sinus rhythm  Normal ECG  When compared with ECG of 04-JUN-2023 18:08,  No significant change was found  Confirmed by JOSIAS VELEZ MD ([id number]) on 10/25/2023 3:28:07 PM    Referred By: PEACE ESCAMILLA           Confirmed By:JOSIAS VELEZ MD        Orders:  -     metoprolol tartrate (LOPRESSOR) 25 MG tablet; Take 1 tablet (25 mg total) by mouth 2 (two) times daily.  Dispense: 180 tablet; Refill: 3  -     Comprehensive Metabolic Panel; Standing  -     Lipid Panel; Standing    2. Dyslipidemia associated with type 2 diabetes mellitus  Assessment &  Plan:  Lab Results   Component Value Date    CHOL 105 (L) 05/02/2023    CHOL 88 (L) 09/07/2022    TRIG 145 05/02/2023    TRIG 119 09/07/2022    HDL 28 (L) 05/02/2023    HDL 27 (L) 09/07/2022    LDLCALC 48.0 (L) 05/02/2023    LDLCALC 37.2 (L) 09/07/2022    NONHDLCHOL 77 05/02/2023    NONHDLCHOL 61 09/07/2022    AST 36 02/05/2024    ALT 67 (H) 02/05/2024   ALT mildly elevated. Benefits > risks for statin.  PLAN: Continue current treatment and re-check labs.    Orders:  -     atorvastatin (LIPITOR) 80 MG tablet; Take 1 tablet (80 mg total) by mouth every evening.  Dispense: 90 tablet; Refill: 3  -     ezetimibe (ZETIA) 10 mg tablet; Take 1 tablet (10 mg total) by mouth once daily.  Dispense: 90 tablet; Refill: 3  -     Comprehensive Metabolic Panel; Standing  -     Lipid Panel; Standing    3. Lone atrial fibrillation  Overview:  Isolated episode in early June, 2023, believed provoked by post-op state. Cardiologist D/C anticoagulation at end of July, left on ASA 81 mg.    Assessment & Plan:  Isolated episode. No recurrence on metoprolol. Off anticoagulation per EP.  PLAN: Continue present treatment.    Orders:  -     metoprolol tartrate (LOPRESSOR) 25 MG tablet; Take 1 tablet (25 mg total) by mouth 2 (two) times daily.  Dispense: 180 tablet; Refill: 3    4. Hyperparathyroidism, secondary renal  Assessment & Plan:  Lab Results   Component Value Date    .9 (H) 05/15/2023    .1 (H) 10/13/2022    .1 (H) 10/13/2022    CALCIUM 9.7 02/05/2024    CALCIUM 9.6 01/02/2024    CALCIUM 9.7 12/04/2023    ALBUMIN 4.1 02/05/2024    ALBUMIN 4.1 02/05/2024    PHOS 2.8 02/05/2024    PHOS 2.0 (L) 01/02/2024    PHOS 2.4 (L) 12/04/2023    [id number] 26 (L) 05/15/2023    RFVPEDFV89YX 44 06/07/2022    [id number] 39 05/28/2022     Lab Results   Component Value Date    EGFRNORACEVR >60.0 02/05/2024    EGFRNORACEVR >60.0 01/02/2024    EGFRNORACEVR >60.0 12/04/2023    CREATININE 1.4 02/05/2024    CREATININE 1.4 01/02/2024     CREATININE 1.4 12/04/2023     Lab Results   Component Value Date    ROJELIODUR 171.0 02/05/2024   PLAN: Continue PHOSPHA 250 NEUTRAL 250 mg, re-check PTH    Orders:  -     PTH, intact; Future; Expected date: 02/07/2024    5. Coronary artery disease involving native coronary artery of native heart without angina pectoris  Overview:  Cath lab procedure 04/23/2018 (Naveen Rios MD)  A. Indication/Pre-Operative Diagnosis: The patient is a 37 year old male that was referred for catheterization by [facility]ferral Self for ACS (NSTEMI). The BELLA risk score is 5.    B. Summary/Post-Operative Diagnosis  1. Single vessel coronary artery disease.  2. Normal LVEF.  3. Diastolic dysfunction.  4. Successful PCI for acute myocardial infarction.  5. The patient did not undergo primary PCI.    Orders:  -     atorvastatin (LIPITOR) 80 MG tablet; Take 1 tablet (80 mg total) by mouth every evening.  Dispense: 90 tablet; Refill: 3  -     ezetimibe (ZETIA) 10 mg tablet; Take 1 tablet (10 mg total) by mouth once daily.  Dispense: 90 tablet; Refill: 3    6. History of NSTEMI with CAD s/p PCI (FAMILIA) of LAD x 2 in 8/2017  -     atorvastatin (LIPITOR) 80 MG tablet; Take 1 tablet (80 mg total) by mouth every evening.  Dispense: 90 tablet; Refill: 3  -     ezetimibe (ZETIA) 10 mg tablet; Take 1 tablet (10 mg total) by mouth once daily.  Dispense: 90 tablet; Refill: 3    7. Gastroesophageal reflux disease without esophagitis  -     pantoprazole (PROTONIX) 40 MG tablet; Take 1 tablet (40 mg total) by mouth once daily.  Dispense: 90 tablet; Refill: 3    8. Type 2 diabetes mellitus with diabetic polyneuropathy, without long-term current use of insulin  -     Hemoglobin A1C; Standing  -     Comprehensive Metabolic Panel; Standing  -     Lipid Panel; Standing  -     Microalbumin/Creatinine Ratio, Urine; Standing    Unless noted herein, any chronic conditions are represented as and appear stable, and no other significant complaints or concerns were  reported.    Review of Systems   Constitutional:  Negative for activity change and unexpected weight change.   HENT:  Negative for hearing loss, rhinorrhea and trouble swallowing.    Eyes:  Negative for discharge and visual disturbance.   Respiratory:  Negative for chest tightness and wheezing.    Cardiovascular:  Negative for chest pain and palpitations.   Gastrointestinal:  Negative for blood in stool, constipation, diarrhea and vomiting.   Endocrine: Negative for polydipsia and polyuria.   Genitourinary:  Negative for difficulty urinating, hematuria and urgency.   Musculoskeletal:  Negative for arthralgias, joint swelling and neck pain.   Neurological:  Negative for weakness and headaches.   Psychiatric/Behavioral:  Negative for confusion and dysphoric mood.        Physical Exam  Constitutional:       General: He is not in acute distress.     Appearance: Normal appearance. He is not ill-appearing.   Pulmonary:      Effort: Pulmonary effort is normal. No respiratory distress.   Skin:     Coloration: Skin is not jaundiced.   Neurological:      Mental Status: He is alert. Mental status is at baseline.   Psychiatric:         Mood and Affect: Mood normal.         Behavior: Behavior normal.         Thought Content: Thought content normal.       Follow up in about 5 months (around 7/7/2024) for annual exam.   Schedule fasting labs a few days before his 4/18/24 appointment with Heather Sorensen NP.  Schedule annual wellness visit with me in July.  Future Appointments   Date Time Provider Department Center   2/7/2024 12:40 PM Monica Rios MD [facility] [place]   2/16/2024  2:30 PM Mccray, Cinda L., NP [facility] [address]   2/29/2024  2:30 PM Heather Sorensen NP [facility] [place]   4/18/2024  1:00 PM Edu, Heather A., NP [facility] [place]       Documentation entered by me for this encounter may have been done in part using speech-recognition technology. Although I have made an effort to ensure  "accuracy, "sound like" errors may exist and should be interpreted in context.   TOTAL TIME evaluating and managing this patient for this encounter was greater than or equal to 33 minutes.  This time was exclusive of any separately billable procedures for this patient and exclusive of time spent treating any other patient.    Documentation entered by me for this encounter may have been done in part using speech-recognition technology. Although I have made an effort to ensure accuracy, "sound like" errors may exist and should be interpreted in context.    Each patient to whom medical services are provided by telemedicine is: (1) informed of the relationship between the physician and patient and the respective role of any other health care provider with respect to management of the patient; and (2) notified that he or she may decline to receive medical services by telemedicine and may withdraw from such care at any time.  "

## 2024-02-07 NOTE — Clinical Note
Hi, Team. Robb couldn't participate in the Hypertension Digital Medicine program while on dialysis. His renal function is now normal since his kidney transplant. Can you please re-enroll him? (I can't enter his order, although it shows him as eligible.) Thank you for your help caring for our patients!  -LM

## 2024-02-07 NOTE — ASSESSMENT & PLAN NOTE
Lab Results   Component Value Date    CHOL 105 (L) 05/02/2023    CHOL 88 (L) 09/07/2022    TRIG 145 05/02/2023    TRIG 119 09/07/2022    HDL 28 (L) 05/02/2023    HDL 27 (L) 09/07/2022    LDLCALC 48.0 (L) 05/02/2023    LDLCALC 37.2 (L) 09/07/2022    NONHDLCHOL 77 05/02/2023    NONHDLCHOL 61 09/07/2022    AST 36 02/05/2024    ALT 67 (H) 02/05/2024   ALT mildly elevated. Benefits > risks for statin.  PLAN: Continue current treatment and re-check labs.

## 2024-02-07 NOTE — ASSESSMENT & PLAN NOTE
Isolated episode. No recurrence on metoprolol. Off anticoagulation per EP.  PLAN: Continue present treatment.

## 2024-02-07 NOTE — ASSESSMENT & PLAN NOTE
Lab Results   Component Value Date    .9 (H) 05/15/2023    .1 (H) 10/13/2022    .1 (H) 10/13/2022    CALCIUM 9.7 02/05/2024    CALCIUM 9.6 01/02/2024    CALCIUM 9.7 12/04/2023    ALBUMIN 4.1 02/05/2024    ALBUMIN 4.1 02/05/2024    PHOS 2.8 02/05/2024    PHOS 2.0 (L) 01/02/2024    PHOS 2.4 (L) 12/04/2023    [id number] 26 (L) 05/15/2023    [id number] 44 06/07/2022    PFSEBKKW21DE 39 05/28/2022     Lab Results   Component Value Date    EGFRNORACEVR >60.0 02/05/2024    EGFRNORACEVR >60.0 01/02/2024    EGFRNORACEVR >60.0 12/04/2023    CREATININE 1.4 02/05/2024    CREATININE 1.4 01/02/2024    CREATININE 1.4 12/04/2023     Lab Results   Component Value Date    CREATRANDUR 171.0 02/05/2024   PLAN: Continue PHOSPHA 250 NEUTRAL 250 mg, re-check PTH

## 2024-02-07 NOTE — ASSESSMENT & PLAN NOTE
BP Readings from Last 6 Encounters:   01/09/24 (!) 140/90   12/19/23 (!) 156/90   10/25/23 124/80   10/18/23 124/81   08/30/23 110/80   07/26/23 106/80      Last 5 Patient Entered Readings                Current 30 Day Average:   Recent Readings 7/9/2023 7/5/2023 7/2/2023 6/28/2023 6/26/2023   SBP (mmHg) 128 130 130 120 115   DBP (mmHg) 82 85 84 84 88   Pulse 74 78 88 74 74                 Lab Results   Component Value Date    EGFRNORACEVR >60.0 02/05/2024    CREATININE 1.4 02/05/2024    BUN 16 02/05/2024    K 4.8 02/05/2024     02/05/2024     02/05/2024     Results for orders placed or performed during the hospital encounter of 10/25/23   SCHEDULED EKG 12-LEAD (to Muse)    Collection Time: 10/25/23  8:28 AM    Narrative    Test Reason : I48.0,I48.91,    Vent. Rate : 070 BPM     Atrial Rate : 070 BPM     P-R Int : 146 ms          QRS Dur : 082 ms      QT Int : 382 ms       P-R-T Axes : 021 012 049 degrees     QTc Int : 412 ms    Normal sinus rhythm  Normal ECG  When compared with ECG of 04-JUN-2023 18:08,  No significant change was found  Confirmed by JOSIAS VELEZ MD ([id number]) on 10/25/2023 3:28:07 PM    Referred By: PEACE ESCAMILLA           Confirmed By:JOSIAS VELEZ MD

## 2024-02-07 NOTE — Clinical Note
Schedule fasting labs a few days before his 4/18/24 appointment with Heather Sorensen NP. Schedule annual wellness visit with me in July.

## 2024-02-09 LAB
BKV DNA SERPL NAA+PROBE-ACNC: ABNORMAL COPIES/ML
BKV DNA SERPL NAA+PROBE-LOG#: 4.01 LOG (10) COPIES/ML
BKV DNA SERPL QL NAA+PROBE: DETECTED

## 2024-02-12 NOTE — PROGRESS NOTES
Kidney Post-Transplant Assessment    Referring Physician: Siva Figueroa  Current Nephrologist: Siva Figueroa    ORGAN: LEFT KIDNEY  Donor Type: living  PHS Increased Risk: no  Cold Ischemia: 48 mins  Induction Medications: thymoglobulin    Subjective:   The patient location is: [place]  The chief complaint leading to consultation is: Kidney Post-Transplant Assessment    Visit type: audiovisual    Face to Face time with patient:   30 minutes of total time spent on the encounter, which includes face to face time and non-face to face time preparing to see the patient (eg, review of tests), Obtaining and/or reviewing separately obtained history, Documenting clinical information in the electronic or other health record, Independently interpreting results (not separately reported) and communicating results to the patient/family/caregiver, or Care coordination (not separately reported).     Each patient to whom he or she provides medical services by telemedicine is:  (1) informed of the relationship between the physician and patient and the respective role of any other health care provider with respect to management of the patient; and (2) notified that he or she may decline to receive medical services by telemedicine and may withdraw from such care at any time.      CC:  Reassessment of renal allograft function and management of chronic immunosuppression.    HPI:  Mr. Iglesias is a 43 y.o. year old White male with history of ESRD secondary to diabetic nephropathy who received a living kidney transplant on 5/15/23 (thymo induction, CMV ++). PMH coronary angioplasty with stent placement, NSTEMI with CAD 8/2017, HTN, GERD and sleep apnea. Post transplant course complicated with new onset afib with RVR. He has CKD stage 3 - GFR 30-59 and his baseline creatinine is between 1.5-1.6. He takes prednisone and tacrolimus for maintenance immunosuppression. 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 He denies any recent hospitalizations  or ER visits since his previous clinic visit.    Planning for hepatology visit for elevated LFTs, planning to get visit re-scheduled.     Going to re-establish with Dr. Figueroa for CKD care. Follows with PCP, planning to re-enroll in HTN digital medicine. Feels great without complaints. Energy levels great, planning a family trip to [place] in July. Staying hydrated, no problems with urination. Home , no peripheral edema. Tolerating IS without issues, no diarrhea or vomiting.     Current Outpatient Medications   Medication Sig Dispense Refill    aspirin (ECOTRIN) 81 MG EC tablet Take 1 tablet (81 mg total) by mouth once daily. 90 tablet 0    atorvastatin (LIPITOR) 80 MG tablet Take 1 tablet (80 mg total) by mouth every evening. 90 tablet 3    blood sugar diagnostic Strp Check blood glucose 2 times daily as directed and as needed 200 each 5    blood-glucose meter (ONETOUCH ULTRAMINI) kit Use as instructed 1 each 0    ezetimibe (ZETIA) 10 mg tablet Take 1 tablet (10 mg total) by mouth once daily. 90 tablet 3    k phos di & mono-sod phos mono (PHOSPHA 250 NEUTRAL) 250 mg Tab Take 2 tablets by mouth 3 (three) times daily. 180 tablet 11    lancets Misc Check blood glucose 2 times daily as directed and as needed (dispense insurance preferred brand or patient choice) 200 each 5    magnesium oxide (MAG-OX) 400 mg (241.3 mg magnesium) tablet Take 1 tablet (400 mg total) by mouth 2 (two) times daily. 60 tablet 11    magnesium oxide (MAG-OX) 400 mg (241.3 mg magnesium) tablet TAKE 1 TABLET BY MOUTH 2 TIMES DAILY.      metoprolol tartrate (LOPRESSOR) 25 MG tablet Take 1 tablet (25 mg total) by mouth 2 (two) times daily. 180 tablet 3    multivitamin Tab Take 1 tablet by mouth once daily.      nitroGLYCERIN (NITROSTAT) 0.4 MG SL tablet Dissolve one tablet underneath tongue at onset of angina; may repeat every 5 minutes if needed. Call 911 if angina persists after 2 doses. 25 tablet 5    pantoprazole (PROTONIX) 40 MG  "tablet Take 1 tablet (40 mg total) by mouth once daily. 90 tablet 3    pen needle, diabetic (BD ULTRA-FINE SHORT PEN NEEDLE) 31 gauge x 5/16" Ndle Use to inject insulin into the skin 3 times daily 100 each 1    predniSONE (DELTASONE) 5 MG tablet Take by mouth daily; 5/18-5/24: 20 mg; 5/25-5/31: 15 mg; 6/1-6/7: 10 mg; 6/8/23- thereafter: 5 mg daily; do not stop 70 tablet 11    tacrolimus (PROGRAF) 0.5 MG Cap Take 3 capsules (1.5 mg total) by mouth every morning AND 2 capsules (1 mg total) every evening. 150 capsule 11     No current facility-administered medications for this visit.       Past Medical History:   Diagnosis Date    Allergic rhinitis     Atrial fibrillation with RVR 6/4/2023    Class 1 obesity due to excess calories with serious comorbidity and body mass index (BMI) of 31.0 to 31.9 in adult 4/7/2017    Coronary artery disease     Coronary artery disease involving native coronary artery of native heart without angina pectoris 2/6/2018    Cath lab procedure 04/23/2018 (Naveen Rios MD) A. Indication/Pre-Operative Diagnosis: The patient is a 37 year old male that was referred for catheterization by [facility] Self for ACS (NSTEMI). The BELLA risk score is 5.  B. Summary/Post-Operative Diagnosis 1. Single vessel coronary artery disease. 2. Normal LVEF. 3. Diastolic dysfunction. 4. Successful PCI for acute myocardial infarction. 5.     Diabetic nephropathy associated with type 2 diabetes mellitus 1/6/2020    Direct hyperbilirubinemia 3/24/2018    DM (diabetes mellitus) 2008    BS doesn't check any more 08/02/2018    DM (diabetes mellitus) 2012    BS 99 am 06/26/2020    DM (diabetes mellitus)     BS didn't check 06/04/2021    DM (diabetes mellitus) 2008    BS didn't check 07/29/2022     Dyslipidemia associated with type 2 diabetes mellitus 7/31/2017    Elevated bilirubin 3/21/2018    GERD (gastroesophageal reflux disease)     Gout     Hyperlipidemia     Hyperparathyroidism, secondary to chronic kidney " disease 6/7/2021    Hypertension associated with chronic kidney disease due to type 2 diabetes mellitus     Hypertension complicating diabetes 3/16/2019    Not candidate for Hypertension Digital Medicine program due to dialysis status. Didn't tolerate amlodipine due to SE of hypotension.    Idiopathic chronic gout, multiple sites, without tophus (tophi) 7/19/2017    Long term (current) use of insulin     MI (myocardial infarction) 07/2017    NSTEMI with CAD s/p PCI (FAMILIA) of LAD x 2 in 8/2017 7/31/2017    Obesity     HENRY on CPAP     Peritoneal dialysis catheter in place 1/26/2023    Proteinuria     Severe obstructive sleep apnea - Intolerant of CPAP 7/31/2017    Intolerant of CPAP after weeks of trying multiple interfaces. SPLIT-NIGHT SLEEP STUDY 7/28/2015 · SLEEP ARCHITECTURE: Sleep onset was 2.9 minutes and sleep efficiency was 94.4%. Sleep Stage distribution showed 145 sleep stage changes, 6 awakenings and 119 arousals. Sleep distribution showed 53.2% stage NI, 41.8% stage N 11, 0.0% stage N Ill and REM sleep was at 5.0%. There were 2 REM periods. · RE    Stage 3a chronic kidney disease 5/26/2023    Stage 5 chronic kidney disease on chronic peritoneal dialysis 6/7/2017    Stage 5 chronic kidney disease on chronic peritoneal dialysis 6/7/2017    Status post angioplasty with stent 8/4/2017    Steatohepatitis     Fatty Liver    Type 2 diabetes mellitus with chronic kidney disease on chronic dialysis, without long-term current use of insulin 6/21/2017    Type 2 diabetes mellitus with diabetic nephropathy     Type 2 diabetes mellitus with diabetic nephropathy, without long-term current use of insulin 1/6/2020    Type 2 diabetes mellitus with diabetic polyneuropathy, without long-term current use of insulin 6/7/2021    Type 2 diabetes mellitus with hyperglycemia     Type 2 diabetes mellitus with renal manifestations     Type 2 diabetes mellitus with stage 3 chronic kidney disease, without long-term current use of  insulin 6/21/2017     Review of Systems   Constitutional:  Negative for activity change, appetite change and fever.   HENT:  Negative for congestion, mouth sores and sore throat.    Eyes:  Negative for visual disturbance.   Respiratory:  Negative for cough, chest tightness and shortness of breath.    Cardiovascular:  Negative for chest pain, palpitations and leg swelling.   Gastrointestinal:  Negative for abdominal distention, abdominal pain, constipation, diarrhea and nausea.   Genitourinary:  Negative for difficulty urinating, frequency and hematuria.   Musculoskeletal:  Negative for arthralgias and gait problem.   Allergic/Immunologic: Positive for immunocompromised state. Negative for environmental allergies and food allergies.   Neurological:  Negative for dizziness, weakness and numbness.   Hematological:  Does not bruise/bleed easily.   Psychiatric/Behavioral:  Negative for sleep disturbance. The patient is not nervous/anxious.        Objective:   There were no vitals taken for this visit.body mass index is unknown because there is no height or weight on file.    Physical Exam-VIRTUAL VISIT    Labs:  Lab Results   Component Value Date    WBC 5.67 02/05/2024    HGB 14.4 02/05/2024    HCT 44.3 02/05/2024     02/05/2024    K 4.8 02/05/2024     02/05/2024    CO2 23 02/05/2024    BUN 16 02/05/2024    CREATININE 1.4 02/05/2024    EGFRNORACEVR >60.0 02/05/2024    CALCIUM 9.7 02/05/2024    PHOS 2.8 02/05/2024    MG 1.9 02/05/2024    ALBUMIN 4.1 02/05/2024    ALBUMIN 4.1 02/05/2024    AST 36 02/05/2024    ALT 67 (H) 02/05/2024    UTPCR 0.61 (H) 02/05/2024    .9 (H) 05/15/2023    TACROLIMUS 6.8 02/05/2024     Labs were reviewed with the patient    Assessment:     1. S/P kidney transplant    2. Long-term use of immunosuppressant medication    3. Diabetic nephropathy associated with type 2 diabetes mellitus    4. BK viremia    5. CKD (chronic kidney disease), stage II    6. At risk for opportunistic  infections      Plan:   BK viremia: MMF on hold, continue BK PCR every 2 weeks.   Planning for hepatology visit for elevated LFTs, he is going to re-reschedule        1. Immunosuppression: Prograf trough 6.8. Continue Prograf 1.5/1 and Prednisone 5 mg QD. 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 Will continue to monitor for drug toxicities    2. Allograft Function: stable, continue good oral hydration  - ESRD secondary to diabetic nephropathy s/p living kidney transplant on 5/15/23 (thymo induction, CMV ++).   - Post transplant course complicated with new onset afib with RVR.  - baseline creatinine is between 1.5-1.6.   Lab Results   Component Value Date    CREATININE 1.4 02/05/2024       Latest Reference Range & Units 8mo 3wk,  Kidney-Post 1 Year  02/05/24   eGFR >60 mL/min/1.73 m^2 >60.0         3. Hypertension management: advise low salt diet and home BP monitoring.   lopressor 25 mg BID (hold for SBP <100)    4. Metabolic Bone Disease/Secondary Hyperparathyroidism:  MagOx 400 mg BID,  mg TID  Lab Results   Component Value Date    .9 (H) 05/15/2023    CALCIUM 9.7 02/05/2024    PHOS 2.8 02/05/2024       Latest Reference Range & Units 4mo 2wk,   Kidney-Post 1 Year  10/03/23 07:52   Magnesium  1.6 - 2.6 mg/dL 1.8       5. Electrolytes:  Will monitor /guidelines  Lab Results   Component Value Date     02/05/2024    K 4.8 02/05/2024     02/05/2024    CO2 23 02/05/2024       6. Anemia: stable. No need for intervention   Lab Results   Component Value Date    WBC 5.67 02/05/2024    HGB 14.4 02/05/2024    HCT 44.3 02/05/2024    MCV 86 02/05/2024     02/05/2024         7. Cytopenias: no significant cytopenias. Medicine list reviewed including potential causes of drug-induced cytopenias    8.  Proteinuria: continue p/c ratio as per guidelines   [facility] [place] 2/5/2024: 0.61    9. BK virus infection screening: MMF on hold, continue BK PCR every 2 weeks.    Latest Reference Range & Units 8mo 3wk,  Kidney-Post 1  Year  02/05/24   BK Virus DNA, Blood Not detected  DETECTED !   BK Virus DNA PCR, Quant, Blood <125 Copies/mL 10,286 (H)       10. Weight education: provided during the clinic visit   There is no height or weight on file to calculate BMI.     11.Patient safety education regarding immunosuppression including prophylaxis posttransplant for CMV, PCP : Education provided about vaccination and prevention of infections     PCP ppx: Bactrim x 6 months (Stop 11/12/23)-done  CMV ppx: Valcyte x 3 months (Stop 8/14/23)-done       Follow-up:   Clinic: return to transplant clinic weekly for the first month after transplant; every 2 weeks during months 2-3; then at 6-, 9-, 12-, 18-, 24-, and 36- months post-transplant to reassess for complications from immunosuppression toxicity and monitor for rejection.  Annually thereafter.    Labs: since patient remains at high risk for rejection and drug-related complications that warrant close monitoring, labs will be ordered as follows: continue twice weekly CBC, renal panel, and drug level for first month; then same labs once weekly through 3rd month post-transplant.  Urine for UA and protein/creatinine ratio monthly.  Serum BK - PCR at 1-, 3-, 6-, 9-, 12-, 18-, 24-, 36-, 48-, and 60 months post-transplant.  Hepatic panel at 1-, 2-, 3-, 6-, 9-, 12-, 18-, 24-, and 36- months post-transplant.    Cinda Mccray, NP

## 2024-02-16 ENCOUNTER — OFFICE VISIT (OUTPATIENT)
Dept: TRANSPLANT | Facility: CLINIC | Age: 44
End: 2024-02-16
Payer: COMMERCIAL

## 2024-02-16 DIAGNOSIS — Z79.60 LONG-TERM USE OF IMMUNOSUPPRESSANT MEDICATION: ICD-10-CM

## 2024-02-16 DIAGNOSIS — Z94.0 S/P KIDNEY TRANSPLANT: Primary | ICD-10-CM

## 2024-02-16 DIAGNOSIS — B34.8 BK VIREMIA: ICD-10-CM

## 2024-02-16 DIAGNOSIS — N18.2 CKD (CHRONIC KIDNEY DISEASE), STAGE II: ICD-10-CM

## 2024-02-16 DIAGNOSIS — E11.21 DIABETIC NEPHROPATHY ASSOCIATED WITH TYPE 2 DIABETES MELLITUS: ICD-10-CM

## 2024-02-16 DIAGNOSIS — Z91.89 AT RISK FOR OPPORTUNISTIC INFECTIONS: ICD-10-CM

## 2024-02-16 PROCEDURE — 3072F LOW RISK FOR RETINOPATHY: CPT | Mod: CPTII,95,, | Performed by: NURSE PRACTITIONER

## 2024-02-16 PROCEDURE — 1159F MED LIST DOCD IN RCRD: CPT | Mod: CPTII,95,, | Performed by: NURSE PRACTITIONER

## 2024-02-16 PROCEDURE — 1160F RVW MEDS BY RX/DR IN RCRD: CPT | Mod: CPTII,95,, | Performed by: NURSE PRACTITIONER

## 2024-02-16 PROCEDURE — 99214 OFFICE O/P EST MOD 30 MIN: CPT | Mod: 95,,, | Performed by: NURSE PRACTITIONER

## 2024-02-16 NOTE — LETTER
February 16, 2024        Siva Figueroa  [address] [address] NEPHROLOGY  [facility] [address]  Phone: [phone]  Fax: [phone]             [facility]- Transplant 1st Fl  [address]  [facility] [address]  Phone: [phone]   Patient: Robb Iglesias   MR Number: [id number]   YOB: 1980   Date of Visit: 2/16/2024       Dear Dr. Siva Figueroa    Thank you for referring Robb Iglesias to me for evaluation. Attached you will find relevant portions of my assessment and plan of care.    If you have questions, please do not hesitate to call me. I look forward to following Robb Iglesias along with you.    Sincerely,    Cinda Mccray NP    Enclosure    If you would like to receive this communication electronically, please contact [email or web address] or [phone] to request [employer] [facility] Link access.    [employer] [facility] is a tool which provides read-only access to select patient information with whom you have a relationship. Its easy to use and provides real time access to review your patients record including encounter summaries, notes, results, and demographic information.    If you feel you have received this communication in error or would no longer like to receive these types of communications, please e-mail [email or web address]

## 2024-02-19 ENCOUNTER — LAB VISIT (OUTPATIENT)
Dept: LAB | Facility: HOSPITAL | Age: 44
End: 2024-02-19
Attending: INTERNAL MEDICINE
Payer: COMMERCIAL

## 2024-02-19 DIAGNOSIS — Z94.0 KIDNEY REPLACED BY TRANSPLANT: ICD-10-CM

## 2024-02-19 PROCEDURE — 87799 DETECT AGENT NOS DNA QUANT: CPT | Performed by: INTERNAL MEDICINE

## 2024-02-19 PROCEDURE — 36415 COLL VENOUS BLD VENIPUNCTURE: CPT | Performed by: INTERNAL MEDICINE

## 2024-02-22 LAB
BKV DNA SERPL NAA+PROBE-ACNC: ABNORMAL COPIES/ML
BKV DNA SERPL NAA+PROBE-LOG#: 3.99 LOG (10) COPIES/ML
BKV DNA SERPL QL NAA+PROBE: DETECTED

## 2024-02-26 ENCOUNTER — PATIENT MESSAGE (OUTPATIENT)
Dept: DIABETES | Facility: CLINIC | Age: 44
End: 2024-02-26
Payer: COMMERCIAL

## 2024-02-26 ENCOUNTER — PATIENT MESSAGE (OUTPATIENT)
Dept: TRANSPLANT | Facility: CLINIC | Age: 44
End: 2024-02-26
Payer: COMMERCIAL

## 2024-02-29 ENCOUNTER — OFFICE VISIT (OUTPATIENT)
Dept: DIABETES | Facility: CLINIC | Age: 44
End: 2024-02-29
Payer: COMMERCIAL

## 2024-02-29 DIAGNOSIS — E11.22 TYPE 2 DIABETES MELLITUS WITH STAGE 3A CHRONIC KIDNEY DISEASE, WITHOUT LONG-TERM CURRENT USE OF INSULIN: Primary | ICD-10-CM

## 2024-02-29 DIAGNOSIS — N18.31 TYPE 2 DIABETES MELLITUS WITH STAGE 3A CHRONIC KIDNEY DISEASE, WITHOUT LONG-TERM CURRENT USE OF INSULIN: Primary | ICD-10-CM

## 2024-02-29 DIAGNOSIS — E78.5 DYSLIPIDEMIA ASSOCIATED WITH TYPE 2 DIABETES MELLITUS: ICD-10-CM

## 2024-02-29 DIAGNOSIS — Z94.0 S/P KIDNEY TRANSPLANT: ICD-10-CM

## 2024-02-29 DIAGNOSIS — I10 ESSENTIAL HYPERTENSION: ICD-10-CM

## 2024-02-29 DIAGNOSIS — E11.69 DYSLIPIDEMIA ASSOCIATED WITH TYPE 2 DIABETES MELLITUS: ICD-10-CM

## 2024-02-29 DIAGNOSIS — Z29.89 PROPHYLACTIC IMMUNOTHERAPY: ICD-10-CM

## 2024-02-29 PROCEDURE — 1160F RVW MEDS BY RX/DR IN RCRD: CPT | Mod: CPTII,95,, | Performed by: NURSE PRACTITIONER

## 2024-02-29 PROCEDURE — 3072F LOW RISK FOR RETINOPATHY: CPT | Mod: CPTII,95,, | Performed by: NURSE PRACTITIONER

## 2024-02-29 PROCEDURE — 99214 OFFICE O/P EST MOD 30 MIN: CPT | Mod: 95,,, | Performed by: NURSE PRACTITIONER

## 2024-02-29 PROCEDURE — 1159F MED LIST DOCD IN RCRD: CPT | Mod: CPTII,95,, | Performed by: NURSE PRACTITIONER

## 2024-02-29 RX ORDER — TIRZEPATIDE 7.5 MG/.5ML
7.5 INJECTION, SOLUTION SUBCUTANEOUS
Qty: 12 PEN | Refills: 1 | Status: SHIPPED | OUTPATIENT
Start: 2024-02-29 | End: 2024-03-25 | Stop reason: SDUPTHER

## 2024-02-29 NOTE — PROGRESS NOTES
The patient location is: [place]  The chief complaint leading to consultation is: diabetes management follow up     Visit type: audiovisual    Face to Face time with patient: 8 minutes  12 minutes of total time spent on the encounter, which includes face to face time and non-face to face time preparing to see the patient (eg, review of tests), Obtaining and/or reviewing separately obtained history, Documenting clinical information in the electronic or other health record, Independently interpreting results (not separately reported) and communicating results to the patient/family/caregiver, or Care coordination (not separately reported).         Each patient to whom he or she provides medical services by telemedicine is:  (1) informed of the relationship between the physician and patient and the respective role of any other health care provider with respect to management of the patient; and (2) notified that he or she may decline to receive medical services by telemedicine and may withdraw from such care at any time.    Notes:      Subjective:         Patient ID: Robb Iglesias is a 43 y.o. male.  Patient's current PCP is SABIHA Roberson MD.     Chief Complaint: Diabetes Mellitus    HPI  Robb Iglesias is a 43 y.o. White male presenting for a new consult with me, previously seen by Kirstie Wang NP  for diabetes. Patient has been diagnosed with type 2 diabetes since 2010 .  Received diabetes education: Yes    CURRENT DM MEDICATIONS:   Diabetes Medications               tirzepatide (MOUNJARO) 7.5 mg/0.5 mL PnIj Inject 7.5 mg into the skin every 7 days.     Past failed treatment include: Metformin, Janumet. Ozempic - formulary change only; Trulicity-failure to control diabetes    Blood glucose testing is performed 1-2 times per day.  Meter: One Touch  Preferred lab: [place]    Any episodes of hypoglycemia? no     Complications related to diabetes: nephropathy and cardiovascular disease    His blood sugar  in the clinic today was:   Lab Results   Component Value Date    POCGLU 143 (A) 12/19/2023     Robb Iglesias presents today for follow up visit to discuss diabetes management. At last visit, he was started on Mounjaro. He is following blood sugars routinely: Blood sugars post-meal 130-170 range, typically mid-100. Fasting Bgs 110-120. He is tolerating Mounjaro well without GI side effects. He has no further questions/concerns, and labs are in place to complete in April.     S/P L kidney transplant 05/2023- on chronic Prednisone 5 mg daily and Prograf. Followed by the transplant team as well as nephrologist, Dr. Figueroa.    H/o bariatric surgery - 2017. Lost 100 lbs.    Current diet: 3 meals/day; snacking more often now   Activity Level: Non-sedentary -- walking - trying to increase    Lab Results   Component Value Date    HGBA1C 7.3 (H) 12/18/2023    HGBA1C 5.6 07/03/2023    HGBA1C 5.0 05/15/2023     STANDARDS OF CARE  Diabetes Management Status    Statin: Taking  ACE/ARB: Not taking    Screening or Prevention Patient's value Goal Complete/Controlled?   HgA1C Testing and Control   Lab Results   Component Value Date    HGBA1C 7.3 (H) 12/18/2023      Annually/Less than 8% Yes   Lipid profile : 05/02/2023 Annually Yes   LDL control Lab Results   Component Value Date    LDLCALC 48.0 (L) 05/02/2023    Annually/Less than 100 mg/dl  Yes   Nephropathy screening Lab Results   Component Value Date    LABMICR 98.0 07/03/2023     Lab Results   Component Value Date    PROTEINUA 2+ (A) 02/05/2024     Lab Results   Component Value Date    UTPCR 0.61 (H) 02/05/2024      Annually Yes   Blood pressure BP Readings from Last 1 Encounters:   02/07/24 135/80    Less than 140/90 Yes   Dilated retinal exam : 08/03/2023 Annually Yes   Foot exam   : 10/18/2023 Annually Yes      Labs reviewed and are noted below.    Lab Results   Component Value Date    WBC 5.67 02/05/2024    HGB 14.4 02/05/2024    HCT 44.3 02/05/2024      "02/05/2024    CHOL 105 (L) 05/02/2023    TRIG 145 05/02/2023    HDL 28 (L) 05/02/2023    LDLCALC 48.0 (L) 05/02/2023    ALT 67 (H) 02/05/2024    AST 36 02/05/2024     02/05/2024    K 4.8 02/05/2024     02/05/2024    ANIONGAP 8 02/05/2024    CREATININE 1.4 02/05/2024    ESTGFRAFRICA 13 (A) 07/26/2022    EGFRNONAA 11 (A) 07/26/2022    BUN 16 02/05/2024    CO2 23 02/05/2024    TSH 0.727 07/31/2017    INR 1.0 06/05/2023     (H) 02/05/2024    UTPCR 0.61 (H) 02/05/2024     Lab Results   Component Value Date    TSH 0.727 07/31/2017    FERRITIN 114 07/05/2017    ZZEXBLAD96 363 07/05/2017    .9 (H) 05/15/2023    CALCIUM 9.7 02/05/2024    PHOS 2.8 02/05/2024     No results found for: "CPEPTIDE"  No results found for: "GLUTAMICACID"  Glucose   Date Value Ref Range Status   02/05/2024 154 (H) 70 - 110 mg/dL Final     Anion Gap   Date Value Ref Range Status   02/05/2024 8 8 - 16 mmol/L Final     eGFR if    Date Value Ref Range Status   07/26/2022 13 (A) >60 mL/min/1.73 m^2 Final     eGFR if non    Date Value Ref Range Status   07/26/2022 11 (A) >60 mL/min/1.73 m^2 Final     Comment:     Calculation used to obtain the estimated glomerular filtration  rate (eGFR) is the CKD-EPI equation.        The following portions of the patient's history were reviewed and updated as appropriate: allergies, current medications, past family history, past medical history, past social history, past surgical history, and problem list.    Review of patient's allergies indicates:  No Known Allergies  Social History     Socioeconomic History    Marital status:      Spouse name: Shannon Iglesias    Number of children: 2    Years of education: Some College   Tobacco Use    Smoking status: Never    Smokeless tobacco: Never   Substance and Sexual Activity    Alcohol use: No     Comment: 1-2 times per month    Drug use: No    Sexual activity: Yes     Partners: Female   Social History Narrative "    Full time employed,  with 2 children.    Caregiver Shannon      Social Determinants of Health     Financial Resource Strain: Low Risk  (2/16/2024)    Overall Financial Resource Strain (CARDIA)     Difficulty of Paying Living Expenses: Not very hard   Food Insecurity: No Food Insecurity (2/16/2024)    Hunger Vital Sign     Worried About Running Out of Food in the Last Year: Never true     Ran Out of Food in the Last Year: Never true   Transportation Needs: No Transportation Needs (2/16/2024)    PRAPARE - Transportation     Lack of Transportation (Medical): No     Lack of Transportation (Non-Medical): No   Physical Activity: Sufficiently Active (2/16/2024)    Exercise Vital Sign     Days of Exercise per Week: 4 days     Minutes of Exercise per Session: 60 min   Stress: No Stress Concern Present (2/16/2024)    [employer] [place] [employer] - Occupational Stress Questionnaire     Feeling of Stress : Not at all   Social Connections: Unknown (2/16/2024)    Social Connection and Isolation Panel [NHANES]     Frequency of Communication with Friends and Family: More than three times a week     Frequency of Social Gatherings with Friends and Family: Three times a week     Active Member of Clubs or Organizations: No     Attends Club or Organization Meetings: Never     Marital Status:    Housing Stability: Low Risk  (2/16/2024)    Housing Stability Vital Sign     Unable to Pay for Housing in the Last Year: No     Number of Places Lived in the Last Year: 1     Unstable Housing in the Last Year: No     Past Medical History:   Diagnosis Date    Allergic rhinitis     Atrial fibrillation with RVR 6/4/2023    Class 1 obesity due to excess calories with serious comorbidity and body mass index (BMI) of 31.0 to 31.9 in adult 4/7/2017    Coronary artery disease     Coronary artery disease involving native coronary artery of native heart without angina pectoris 2/6/2018    Cath lab procedure 04/23/2018 Jeferson GAUTHIER  MD Blanca) A. Indication/Pre-Operative Diagnosis: The patient is a 37 year old male that was referred for catheterization by [facility] for ACS (NSTEMI). The BELLA risk score is 5.  B. Summary/Post-Operative Diagnosis 1. Single vessel coronary artery disease. 2. Normal LVEF. 3. Diastolic dysfunction. 4. Successful PCI for acute myocardial infarction. 5.     Diabetic nephropathy associated with type 2 diabetes mellitus 1/6/2020    Direct hyperbilirubinemia 3/24/2018    DM (diabetes mellitus) 2008    BS doesn't check any more 08/02/2018    DM (diabetes mellitus) 2012    BS 99 am 06/26/2020    DM (diabetes mellitus)     BS didn't check 06/04/2021    DM (diabetes mellitus) 2008    BS didn't check 07/29/2022     Dyslipidemia associated with type 2 diabetes mellitus 7/31/2017    Elevated bilirubin 3/21/2018    GERD (gastroesophageal reflux disease)     Gout     Hyperlipidemia     Hyperparathyroidism, secondary to chronic kidney disease 6/7/2021    Hypertension associated with chronic kidney disease due to type 2 diabetes mellitus     Hypertension complicating diabetes 3/16/2019    Not candidate for Hypertension Digital Medicine program due to dialysis status. Didn't tolerate amlodipine due to SE of hypotension.    Idiopathic chronic gout, multiple sites, without tophus (tophi) 7/19/2017    Long term (current) use of insulin     MI (myocardial infarction) 07/2017    NSTEMI with CAD s/p PCI (FAMILIA) of LAD x 2 in 8/2017 7/31/2017    Obesity     HENRY on CPAP     Peritoneal dialysis catheter in place 1/26/2023    Proteinuria     Severe obstructive sleep apnea - Intolerant of CPAP 7/31/2017    Intolerant of CPAP after weeks of trying multiple interfaces. SPLIT-NIGHT SLEEP STUDY 7/28/2015 · SLEEP ARCHITECTURE: Sleep onset was 2.9 minutes and sleep efficiency was 94.4%. Sleep Stage distribution showed 145 sleep stage changes, 6 awakenings and 119 arousals. Sleep distribution showed 53.2% stage NI, 41.8% stage N 11, 0.0% stage N  Ill and REM sleep was at 5.0%. There were 2 REM periods. · RE    Stage 3a chronic kidney disease 5/26/2023    Stage 5 chronic kidney disease on chronic peritoneal dialysis 6/7/2017    Stage 5 chronic kidney disease on chronic peritoneal dialysis 6/7/2017    Status post angioplasty with stent 8/4/2017    Steatohepatitis     Fatty Liver    Type 2 diabetes mellitus with chronic kidney disease on chronic dialysis, without long-term current use of insulin 6/21/2017    Type 2 diabetes mellitus with diabetic nephropathy     Type 2 diabetes mellitus with diabetic nephropathy, without long-term current use of insulin 1/6/2020    Type 2 diabetes mellitus with diabetic polyneuropathy, without long-term current use of insulin 6/7/2021    Type 2 diabetes mellitus with hyperglycemia     Type 2 diabetes mellitus with renal manifestations     Type 2 diabetes mellitus with stage 3 chronic kidney disease, without long-term current use of insulin 6/21/2017     REVIEW OF SYSTEMS:  Eyes No history of DR.  Cardiovascular: History of CAD, Afib, HTN, and HLD.  GI: Tolerating Trulicity well without GI side effects.   : Prior h/o ESRD, s/p L kidney transplant 05/2023  Neuro: No neuropathy.  PSYCH: No tobacco use.  ENDO: See HPI.        Objective:      There were no vitals filed for this visit.  RESPIRATORY: No respiratory distress  NEUROLOGIC: not assessed-virtual visit  PSYCHIATRIC: Alert & oriented x3. Normal mood and affect.  FOOT EXAMINATION: UTD    Assessment:       1. Type 2 diabetes mellitus with stage 3a chronic kidney disease, without long-term current use of insulin    2. Dyslipidemia associated with type 2 diabetes mellitus    3. Essential hypertension    4. S/P kidney transplant    5. Prophylactic immunotherapy          Plan:   Robb was seen today for diabetes mellitus.    Diagnoses and all orders for this visit:    Type 2 diabetes mellitus with stage 3a chronic kidney disease, without long-term current use of insulin  -      "tirzepatide (MOUNJARO) 7.5 mg/0.5 mL PnIj; Inject 7.5 mg into the skin every 7 days.    Chronic -     Medication Regimen:   Continue Mounjaro 7.5 mg once a week. We can increase as tolerated.     Dyslipidemia associated with type 2 diabetes mellitus    Essential hypertension    S/P kidney transplant    Prophylactic immunotherapy    - Follow up:  6 months    Portions of this note may have been created with voice recognition software. Occasional "wrong-word" or "sound-a-like" substitutions may have occurred due to the inherent limitations of voice recognition software. Please, read the note carefully and recognize, using context, where substitutions have occurred.           Heather Sorensen,MAGGIE-C, BC-ADM  [facility]  "

## 2024-02-29 NOTE — PATIENT INSTRUCTIONS
Medication Regimen:   Continue Mounjaro 7.5 mg once a week. We can increase as tolerated.     Patient Instructions:  Carbohydrate Count: 30-45 grams/meal and 15 grams/snack with limit of 2 snacks per day.  Exercise: Goal is 150 minutes or more per week.  Bring meter and blood sugar log to each appointment.     Goals for Blood Sugar:  Fastin-130 mg/dl  2 hours after meal:  mg/dl  Before Bed: 100-150 mg/dl  If Blood sugar is below 70 mg/dl, DO NOT take diabetes medication. Eat first and then recheck blood sugar in 20 minutes.  Foods to eat if blood sugar is below 70 mg/dl  1/2 glass of orange juice   1/2 regular cola can   3-4 hard candies   3-4 small glucose tabs.   Foods to eat if blood sugar is below 50 mg/dl  1 glass of orange juice  1 regular cola can  8 hard candies  8 small glucose tabs.   If you have repeated eating/blood sugar recheck process 2 times and blood sugar still below 70 mg/dl, call 911.                                                           
None

## 2024-02-29 NOTE — Clinical Note
Please call patient to schedule a 6 month follow up visit with me. Notes: No dev.  OK to cancel the appt with me in April.

## 2024-03-05 ENCOUNTER — PATIENT MESSAGE (OUTPATIENT)
Dept: TRANSPLANT | Facility: CLINIC | Age: 44
End: 2024-03-05
Payer: COMMERCIAL

## 2024-03-07 ENCOUNTER — LAB VISIT (OUTPATIENT)
Dept: LAB | Facility: HOSPITAL | Age: 44
End: 2024-03-07
Attending: INTERNAL MEDICINE
Payer: COMMERCIAL

## 2024-03-07 DIAGNOSIS — Z94.0 KIDNEY REPLACED BY TRANSPLANT: ICD-10-CM

## 2024-03-07 LAB
ALBUMIN SERPL BCP-MCNC: 4.2 G/DL (ref 3.5–5.2)
ANION GAP SERPL CALC-SCNC: 9 MMOL/L (ref 8–16)
BASOPHILS # BLD AUTO: 0.04 K/UL (ref 0–0.2)
BASOPHILS NFR BLD: 0.7 % (ref 0–1.9)
BUN SERPL-MCNC: 13 MG/DL (ref 6–20)
CALCIUM SERPL-MCNC: 9.5 MG/DL (ref 8.7–10.5)
CHLORIDE SERPL-SCNC: 108 MMOL/L (ref 95–110)
CO2 SERPL-SCNC: 25 MMOL/L (ref 23–29)
CREAT SERPL-MCNC: 1.6 MG/DL (ref 0.5–1.4)
DIFFERENTIAL METHOD BLD: NORMAL
EOSINOPHIL # BLD AUTO: 0.1 K/UL (ref 0–0.5)
EOSINOPHIL NFR BLD: 2.2 % (ref 0–8)
ERYTHROCYTE [DISTWIDTH] IN BLOOD BY AUTOMATED COUNT: 14.2 % (ref 11.5–14.5)
EST. GFR  (NO RACE VARIABLE): 54.8 ML/MIN/1.73 M^2
GLUCOSE SERPL-MCNC: 128 MG/DL (ref 70–110)
HCT VFR BLD AUTO: 45.8 % (ref 40–54)
HGB BLD-MCNC: 14.8 G/DL (ref 14–18)
IMM GRANULOCYTES # BLD AUTO: 0.02 K/UL (ref 0–0.04)
IMM GRANULOCYTES NFR BLD AUTO: 0.3 % (ref 0–0.5)
LYMPHOCYTES # BLD AUTO: 1.2 K/UL (ref 1–4.8)
LYMPHOCYTES NFR BLD: 20.2 % (ref 18–48)
MAGNESIUM SERPL-MCNC: 1.8 MG/DL (ref 1.6–2.6)
MCH RBC QN AUTO: 27.9 PG (ref 27–31)
MCHC RBC AUTO-ENTMCNC: 32.3 G/DL (ref 32–36)
MCV RBC AUTO: 86 FL (ref 82–98)
MONOCYTES # BLD AUTO: 0.7 K/UL (ref 0.3–1)
MONOCYTES NFR BLD: 12.1 % (ref 4–15)
NEUTROPHILS # BLD AUTO: 3.7 K/UL (ref 1.8–7.7)
NEUTROPHILS NFR BLD: 64.5 % (ref 38–73)
NRBC BLD-RTO: 0 /100 WBC
PHOSPHATE SERPL-MCNC: 2.7 MG/DL (ref 2.7–4.5)
PLATELET # BLD AUTO: 184 K/UL (ref 150–450)
PMV BLD AUTO: 10 FL (ref 9.2–12.9)
POTASSIUM SERPL-SCNC: 4.4 MMOL/L (ref 3.5–5.1)
RBC # BLD AUTO: 5.3 M/UL (ref 4.6–6.2)
SODIUM SERPL-SCNC: 142 MMOL/L (ref 136–145)
WBC # BLD AUTO: 5.78 K/UL (ref 3.9–12.7)

## 2024-03-07 PROCEDURE — 80069 RENAL FUNCTION PANEL: CPT | Performed by: INTERNAL MEDICINE

## 2024-03-07 PROCEDURE — 83735 ASSAY OF MAGNESIUM: CPT | Performed by: INTERNAL MEDICINE

## 2024-03-07 PROCEDURE — 80197 ASSAY OF TACROLIMUS: CPT | Performed by: INTERNAL MEDICINE

## 2024-03-07 PROCEDURE — 85025 COMPLETE CBC W/AUTO DIFF WBC: CPT | Performed by: INTERNAL MEDICINE

## 2024-03-07 PROCEDURE — 87799 DETECT AGENT NOS DNA QUANT: CPT | Performed by: INTERNAL MEDICINE

## 2024-03-08 LAB — TACROLIMUS BLD-MCNC: 6.7 NG/ML (ref 5–15)

## 2024-03-08 NOTE — PROGRESS NOTES
Please continue with serum BK per protocol  Renal function tests and tacrolimus level with tests for BK as well please   Encourage more water hydration

## 2024-03-11 ENCOUNTER — PATIENT MESSAGE (OUTPATIENT)
Dept: TRANSPLANT | Facility: CLINIC | Age: 44
End: 2024-03-11
Payer: COMMERCIAL

## 2024-03-11 DIAGNOSIS — K21.9 GASTROESOPHAGEAL REFLUX DISEASE WITHOUT ESOPHAGITIS: ICD-10-CM

## 2024-03-11 DIAGNOSIS — I48.91 LONE ATRIAL FIBRILLATION: ICD-10-CM

## 2024-03-11 DIAGNOSIS — I15.2 HYPERTENSION COMPLICATING DIABETES: Chronic | ICD-10-CM

## 2024-03-11 DIAGNOSIS — E11.59 HYPERTENSION COMPLICATING DIABETES: Chronic | ICD-10-CM

## 2024-03-11 DIAGNOSIS — Z94.0 S/P KIDNEY TRANSPLANT: ICD-10-CM

## 2024-03-11 LAB
BKV DNA SERPL NAA+PROBE-ACNC: ABNORMAL COPIES/ML
BKV DNA SERPL NAA+PROBE-LOG#: 3.98 LOG (10) COPIES/ML
BKV DNA SERPL QL NAA+PROBE: DETECTED

## 2024-03-11 RX ORDER — PANTOPRAZOLE SODIUM 40 MG/1
40 TABLET, DELAYED RELEASE ORAL DAILY
Qty: 30 TABLET | Refills: 3 | Status: SHIPPED | OUTPATIENT
Start: 2024-03-11

## 2024-03-11 RX ORDER — TACROLIMUS 0.5 MG/1
CAPSULE ORAL
Qty: 240 CAPSULE | Refills: 11 | Status: SHIPPED | OUTPATIENT
Start: 2024-03-11 | End: 2024-03-22 | Stop reason: DRUGHIGH

## 2024-03-11 NOTE — TELEPHONE ENCOUNTER
We did not order the protonix, I do not know how to get the script off before sending you the prograf.          Pt made aware of below orders          ----- Message from Rell Booth MD sent at 3/11/2024  1:47 PM CDT -----  Please lower prograf to 2 mg PO bid   Please continue with serum BK per protocol  Renal function tests and tacrolimus level with tests for BK as well please  Encourage more water hydration

## 2024-03-11 NOTE — TELEPHONE ENCOUNTER
Refill Routing Note   Medication(s) are not appropriate for processing by [facility] for the following reason(s):        New or recently adjusted medication    [facility] action(s):  Defer        Medication Therapy Plan: Patient is requesting different pharmacy; [facility]      Appointments  past 12m or future 3m with PCP    Date Provider   Last Visit   2/7/2024 SABIHA Roberson MD   Next Visit   7/11/2024 SABIHA Roberson MD   ED visits in past 90 days: 0        Note composed:6:08 PM 03/11/2024

## 2024-03-11 NOTE — PROGRESS NOTES
Please lower prograf to 2 mg PO bid   Please continue with serum BK per protocol  Renal function tests and tacrolimus level with tests for BK as well please  Encourage more water hydration

## 2024-03-11 NOTE — TELEPHONE ENCOUNTER
Care Due:                  Date            Visit Type   Department     Provider  --------------------------------------------------------------------------------                                ESTABLISHED                              PATIENT -    [facility] INTER[facility]  Last Visit: 02-      VIRTUAL      MEDICINE       Kwan Roberson                               -                              PRIMARY      [facility] INTER[facility]  Next Visit: 07-      CARE ([facility])   MEDICINE       Kwna Roberson                                                            Last  Test          Frequency    Reason                     Performed    Due Date  --------------------------------------------------------------------------------    Lipid Panel.  12 months..  atorvastatin, ezetimibe..  05- 04-    [facility] Embedded Care Due Messages. Reference number: [id number].   3/11/2024 11:36:39 AM CDT

## 2024-03-12 RX ORDER — METOPROLOL TARTRATE 25 MG/1
25 TABLET, FILM COATED ORAL 2 TIMES DAILY
Qty: 180 TABLET | Refills: 3 | Status: SHIPPED | OUTPATIENT
Start: 2024-03-12 | End: 2025-03-13

## 2024-03-12 NOTE — TELEPHONE ENCOUNTER
REFILL REQUEST APPROVED.  Requested Prescriptions   Pending Prescriptions Disp Refills    metoprolol tartrate (LOPRESSOR) 25 MG tablet 180 tablet 3     Sig: Take 1 tablet (25 mg total) by mouth 2 (two) times daily.

## 2024-03-15 ENCOUNTER — PATIENT MESSAGE (OUTPATIENT)
Dept: TRANSPLANT | Facility: CLINIC | Age: 44
End: 2024-03-15
Payer: COMMERCIAL

## 2024-03-18 ENCOUNTER — PATIENT MESSAGE (OUTPATIENT)
Dept: INTERNAL MEDICINE | Facility: CLINIC | Age: 44
End: 2024-03-18
Payer: COMMERCIAL

## 2024-03-18 ENCOUNTER — PATIENT MESSAGE (OUTPATIENT)
Dept: TRANSPLANT | Facility: CLINIC | Age: 44
End: 2024-03-18
Payer: COMMERCIAL

## 2024-03-18 DIAGNOSIS — I25.10 CORONARY ARTERY DISEASE INVOLVING NATIVE CORONARY ARTERY OF NATIVE HEART WITHOUT ANGINA PECTORIS: Chronic | ICD-10-CM

## 2024-03-18 DIAGNOSIS — E11.69 DYSLIPIDEMIA ASSOCIATED WITH TYPE 2 DIABETES MELLITUS: ICD-10-CM

## 2024-03-18 DIAGNOSIS — I25.2 HISTORY OF NON-ST ELEVATION MYOCARDIAL INFARCTION (NSTEMI): ICD-10-CM

## 2024-03-18 DIAGNOSIS — E78.5 DYSLIPIDEMIA ASSOCIATED WITH TYPE 2 DIABETES MELLITUS: ICD-10-CM

## 2024-03-18 RX ORDER — ATORVASTATIN CALCIUM 80 MG/1
80 TABLET, FILM COATED ORAL NIGHTLY
Qty: 90 TABLET | Refills: 0 | Status: SHIPPED | OUTPATIENT
Start: 2024-03-18 | End: 2024-06-16

## 2024-03-18 RX ORDER — EZETIMIBE 10 MG/1
10 TABLET ORAL DAILY
Qty: 90 TABLET | Refills: 0 | Status: SHIPPED | OUTPATIENT
Start: 2024-03-18 | End: 2024-06-16

## 2024-03-18 NOTE — TELEPHONE ENCOUNTER
Refill Decision Note   Robb Harris  is requesting a refill authorization.  Brief Assessment and Rationale for Refill:  Approve     Medication Therapy Plan:         Comments:     Note composed:12:35 PM 03/18/2024

## 2024-03-18 NOTE — TELEPHONE ENCOUNTER
No care due was identified.  [facility] Embedded Care Due Messages. Reference number: [id number].   3/18/2024 12:31:50 PM CDT

## 2024-03-20 DIAGNOSIS — I25.2 HISTORY OF NON-ST ELEVATION MYOCARDIAL INFARCTION (NSTEMI): ICD-10-CM

## 2024-03-20 DIAGNOSIS — I25.10 CORONARY ARTERY DISEASE INVOLVING NATIVE CORONARY ARTERY OF NATIVE HEART WITHOUT ANGINA PECTORIS: Chronic | ICD-10-CM

## 2024-03-20 DIAGNOSIS — E11.69 DYSLIPIDEMIA ASSOCIATED WITH TYPE 2 DIABETES MELLITUS: ICD-10-CM

## 2024-03-20 DIAGNOSIS — E78.5 DYSLIPIDEMIA ASSOCIATED WITH TYPE 2 DIABETES MELLITUS: ICD-10-CM

## 2024-03-21 ENCOUNTER — LAB VISIT (OUTPATIENT)
Dept: LAB | Facility: HOSPITAL | Age: 44
End: 2024-03-21
Attending: INTERNAL MEDICINE
Payer: COMMERCIAL

## 2024-03-21 DIAGNOSIS — Z94.0 KIDNEY REPLACED BY TRANSPLANT: ICD-10-CM

## 2024-03-21 LAB
ALBUMIN SERPL BCP-MCNC: 4.3 G/DL (ref 3.5–5.2)
ANION GAP SERPL CALC-SCNC: 5 MMOL/L (ref 8–16)
BASOPHILS # BLD AUTO: 0.03 K/UL (ref 0–0.2)
BASOPHILS NFR BLD: 0.5 % (ref 0–1.9)
BUN SERPL-MCNC: 14 MG/DL (ref 6–20)
CALCIUM SERPL-MCNC: 9.5 MG/DL (ref 8.7–10.5)
CHLORIDE SERPL-SCNC: 109 MMOL/L (ref 95–110)
CO2 SERPL-SCNC: 28 MMOL/L (ref 23–29)
CREAT SERPL-MCNC: 1.6 MG/DL (ref 0.5–1.4)
DIFFERENTIAL METHOD BLD: ABNORMAL
EOSINOPHIL # BLD AUTO: 0.1 K/UL (ref 0–0.5)
EOSINOPHIL NFR BLD: 2.6 % (ref 0–8)
ERYTHROCYTE [DISTWIDTH] IN BLOOD BY AUTOMATED COUNT: 14.5 % (ref 11.5–14.5)
EST. GFR  (NO RACE VARIABLE): 54.8 ML/MIN/1.73 M^2
GLUCOSE SERPL-MCNC: 129 MG/DL (ref 70–110)
HCT VFR BLD AUTO: 45.5 % (ref 40–54)
HGB BLD-MCNC: 14.5 G/DL (ref 14–18)
IMM GRANULOCYTES # BLD AUTO: 0.02 K/UL (ref 0–0.04)
IMM GRANULOCYTES NFR BLD AUTO: 0.4 % (ref 0–0.5)
LYMPHOCYTES # BLD AUTO: 1.2 K/UL (ref 1–4.8)
LYMPHOCYTES NFR BLD: 21.7 % (ref 18–48)
MAGNESIUM SERPL-MCNC: 1.7 MG/DL (ref 1.6–2.6)
MCH RBC QN AUTO: 27.2 PG (ref 27–31)
MCHC RBC AUTO-ENTMCNC: 31.9 G/DL (ref 32–36)
MCV RBC AUTO: 85 FL (ref 82–98)
MONOCYTES # BLD AUTO: 0.7 K/UL (ref 0.3–1)
MONOCYTES NFR BLD: 12.2 % (ref 4–15)
NEUTROPHILS # BLD AUTO: 3.4 K/UL (ref 1.8–7.7)
NEUTROPHILS NFR BLD: 62.6 % (ref 38–73)
NRBC BLD-RTO: 0 /100 WBC
PHOSPHATE SERPL-MCNC: 2.7 MG/DL (ref 2.7–4.5)
PLATELET # BLD AUTO: 164 K/UL (ref 150–450)
PMV BLD AUTO: 9.9 FL (ref 9.2–12.9)
POTASSIUM SERPL-SCNC: 4.5 MMOL/L (ref 3.5–5.1)
RBC # BLD AUTO: 5.33 M/UL (ref 4.6–6.2)
SODIUM SERPL-SCNC: 142 MMOL/L (ref 136–145)
WBC # BLD AUTO: 5.49 K/UL (ref 3.9–12.7)

## 2024-03-21 PROCEDURE — 83735 ASSAY OF MAGNESIUM: CPT | Performed by: INTERNAL MEDICINE

## 2024-03-21 PROCEDURE — 85025 COMPLETE CBC W/AUTO DIFF WBC: CPT | Performed by: INTERNAL MEDICINE

## 2024-03-21 PROCEDURE — 87799 DETECT AGENT NOS DNA QUANT: CPT | Performed by: INTERNAL MEDICINE

## 2024-03-21 PROCEDURE — 80197 ASSAY OF TACROLIMUS: CPT | Performed by: INTERNAL MEDICINE

## 2024-03-21 PROCEDURE — 80069 RENAL FUNCTION PANEL: CPT | Performed by: INTERNAL MEDICINE

## 2024-03-21 PROCEDURE — 36415 COLL VENOUS BLD VENIPUNCTURE: CPT | Performed by: INTERNAL MEDICINE

## 2024-03-22 ENCOUNTER — PATIENT MESSAGE (OUTPATIENT)
Dept: TRANSPLANT | Facility: CLINIC | Age: 44
End: 2024-03-22
Payer: COMMERCIAL

## 2024-03-22 DIAGNOSIS — Z94.0 S/P KIDNEY TRANSPLANT: ICD-10-CM

## 2024-03-22 LAB — TACROLIMUS BLD-MCNC: 11.3 NG/ML (ref 5–15)

## 2024-03-22 RX ORDER — TACROLIMUS 0.5 MG/1
CAPSULE ORAL
Qty: 210 CAPSULE | Refills: 11 | Status: SHIPPED | OUTPATIENT
Start: 2024-03-22 | End: 2024-05-06 | Stop reason: DRUGHIGH

## 2024-03-22 NOTE — TELEPHONE ENCOUNTER
Pt verified he took prograf after his blood was drawn, decreased prograf dose to 2/1.5 BID.                  -- Message from Rell Booth MD sent at 3/22/2024  8:36 AM CDT -----  Lets lower to 2 mg AM and 1.5 mg PM

## 2024-03-25 ENCOUNTER — PATIENT MESSAGE (OUTPATIENT)
Dept: DIABETES | Facility: CLINIC | Age: 44
End: 2024-03-25
Payer: COMMERCIAL

## 2024-03-25 ENCOUNTER — PATIENT MESSAGE (OUTPATIENT)
Dept: TRANSPLANT | Facility: CLINIC | Age: 44
End: 2024-03-25
Payer: COMMERCIAL

## 2024-03-25 DIAGNOSIS — E11.22 TYPE 2 DIABETES MELLITUS WITH STAGE 3A CHRONIC KIDNEY DISEASE, WITHOUT LONG-TERM CURRENT USE OF INSULIN: ICD-10-CM

## 2024-03-25 DIAGNOSIS — N18.31 TYPE 2 DIABETES MELLITUS WITH STAGE 3A CHRONIC KIDNEY DISEASE, WITHOUT LONG-TERM CURRENT USE OF INSULIN: ICD-10-CM

## 2024-03-25 LAB
BKV DNA SERPL NAA+PROBE-ACNC: ABNORMAL COPIES/ML
BKV DNA SERPL NAA+PROBE-LOG#: 4.25 LOG (10) COPIES/ML
BKV DNA SERPL QL NAA+PROBE: DETECTED

## 2024-03-25 RX ORDER — TIRZEPATIDE 7.5 MG/.5ML
7.5 INJECTION, SOLUTION SUBCUTANEOUS
Qty: 4 PEN | Refills: 2 | Status: SHIPPED | OUTPATIENT
Start: 2024-03-25 | End: 2024-05-06

## 2024-03-25 RX ORDER — SODIUM CHLORIDE 0.9 % (FLUSH) 0.9 %
10 SYRINGE (ML) INJECTION
Status: CANCELLED | OUTPATIENT
Start: 2024-03-27

## 2024-03-26 ENCOUNTER — OFFICE VISIT (OUTPATIENT)
Dept: INTERNAL MEDICINE | Facility: CLINIC | Age: 44
End: 2024-03-26
Payer: COMMERCIAL

## 2024-03-26 DIAGNOSIS — I25.10 CORONARY ARTERY DISEASE INVOLVING NATIVE CORONARY ARTERY OF NATIVE HEART WITHOUT ANGINA PECTORIS: Chronic | ICD-10-CM

## 2024-03-26 DIAGNOSIS — N18.31 TYPE 2 DIABETES MELLITUS WITH STAGE 3A CHRONIC KIDNEY DISEASE, WITHOUT LONG-TERM CURRENT USE OF INSULIN: Chronic | ICD-10-CM

## 2024-03-26 DIAGNOSIS — Z94.0 S/P KIDNEY TRANSPLANT: Chronic | ICD-10-CM

## 2024-03-26 DIAGNOSIS — E11.22 TYPE 2 DIABETES MELLITUS WITH STAGE 3A CHRONIC KIDNEY DISEASE, WITHOUT LONG-TERM CURRENT USE OF INSULIN: Chronic | ICD-10-CM

## 2024-03-26 DIAGNOSIS — Z87.898 HISTORY OF MOTION SICKNESS: Primary | ICD-10-CM

## 2024-03-26 PROCEDURE — 99214 OFFICE O/P EST MOD 30 MIN: CPT | Mod: 95,,, | Performed by: FAMILY MEDICINE

## 2024-03-26 PROCEDURE — 1160F RVW MEDS BY RX/DR IN RCRD: CPT | Mod: CPTII,95,, | Performed by: FAMILY MEDICINE

## 2024-03-26 PROCEDURE — 3072F LOW RISK FOR RETINOPATHY: CPT | Mod: CPTII,95,, | Performed by: FAMILY MEDICINE

## 2024-03-26 PROCEDURE — 1159F MED LIST DOCD IN RCRD: CPT | Mod: CPTII,95,, | Performed by: FAMILY MEDICINE

## 2024-03-26 RX ORDER — SCOLOPAMINE TRANSDERMAL SYSTEM 1 MG/1
1 PATCH, EXTENDED RELEASE TRANSDERMAL
Qty: 10 PATCH | Refills: 0 | Status: SHIPPED | OUTPATIENT
Start: 2024-03-26 | End: 2025-03-26

## 2024-03-26 RX ORDER — NITROGLYCERIN 0.4 MG/1
TABLET SUBLINGUAL
Qty: 25 TABLET | Refills: 5 | Status: SHIPPED | OUTPATIENT
Start: 2024-03-26

## 2024-03-26 NOTE — ASSESSMENT & PLAN NOTE
Lab Results   Component Value Date    HGBA1C 7.3 (H) 12/18/2023    HGBA1C 5.6 07/03/2023    HGBA1C 5.0 05/15/2023    EGFRNORACEVR 54.8 (A) 03/21/2024    MICALBCREAT 38.1 (H) 07/03/2023    LDLCALC 48.0 (L) 05/02/2023

## 2024-03-26 NOTE — PROGRESS NOTES
TELEMEDICINE VIRTUAL VIDEO VISIT  3/26/24  7:20 AM CDT    Visit Type: Audiovisual    Patient's Location: Robb represents that they are located within the state of [place].    History of Present Illness    Robb presents today to discuss remedies for motion sickness before a planned cruise.    The patient reports a history of motion sickness experienced during cruises. He had a prior successful use of a motion sickness patch and expressed interest in utilizing the patch again for an upcoming cruise at the end of May. He raised concerns regarding potential drug interactions due to his current regimen of multiple medications, particularly Nitroglycerin which he routinely uses and always carries with him. All medications are well-stocked ahead of his trip. As the last refill for Nitroglycerin was in June, he agreed for reordering it to ensure freshness and availability during his travel.    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 He is in communication with his transplant team about this issue, with a potential scheduled infusion of antiviral medication, pending insurance approval, to manage the virus. He indicates concern about how his current medications may be interplaying with his ability to shake this virus, yet emphasizes the necessity of the current regimen to avoid organ rejection.    The patient verifies that he checks his blood pressure at home approximately once a week. He was removed from a digital medicine program while under dialysis but plans to reach out to the program himself for re-enrollment.    The patient plans his first significant vacation, a cruise to the [facility], since he experienced a heart attack in 2017. He will ensure that his nitroglycerin is readily available during his travels.       Review of Systems   Constitutional:  Negative for activity change and unexpected weight change.   HENT:  Negative for hearing loss, rhinorrhea  and trouble swallowing.    Eyes:  Negative for discharge and visual disturbance.   Respiratory:  Negative for chest tightness and wheezing.    Cardiovascular:  Negative for chest pain and palpitations.   Gastrointestinal:  Negative for blood in stool, constipation, diarrhea and vomiting.   Endocrine: Negative for polydipsia and polyuria.   Genitourinary:  Negative for difficulty urinating, hematuria and urgency.   Musculoskeletal:  Negative for arthralgias, joint swelling and neck pain.   Neurological:  Negative for weakness and headaches.   Psychiatric/Behavioral:  Negative for confusion and dysphoric mood.        Assessment & Plan     Prescribed motion sickness patches for the patient's upcoming cruise, ensuring no significant drug interactions with current medications.   Instructed the patient to apply one patch to the skin every 72 hours, commencing 24 hours prior to travel.   Reordered nitroglycerin to maintain a fresh supply for the patient.   Advised the patient to secure all medication refills by mid-May in preparation for their end-of-May cruise.   Pending insurance approval, an antiviral infusion to combat the BK virus is planned.   Ordered the patient to reconnect with the digital medicine team for the blood pressure monitoring program.   Scheduled to see the patient at their next regular appointment.       1. History of motion sickness  -     scopolamine (TRANSDERM-SCOP) 1.3-1.5 mg (1 mg over 3 days); Place 1 patch onto the skin every 72 hours. Apply behind ear. Start 24 hours before travel.  Dispense: 10 patch; Refill: 0    2. Coronary artery disease involving native coronary artery of native heart without angina pectoris  Overview:  Cath lab procedure 04/23/2018 (Naveen Rios MD)  A. Indication/Pre-Operative Diagnosis: The patient is a 37 year old male that was referred for catheterization by [facility] for ACS (NSTEMI). The BELLA risk score is 5.    B. Summary/Post-Operative Diagnosis  1.  Single vessel coronary artery disease.  2. Normal LVEF.  3. Diastolic dysfunction.  4. Successful PCI for acute myocardial infarction.  5. The patient did not undergo primary PCI.    Orders:  -     nitroGLYCERIN (NITROSTAT) 0.4 MG SL tablet; Dissolve one tablet underneath tongue at onset of angina; may repeat every 5 minutes if needed. Call 911 if angina persists after 2 doses.  Dispense: 25 tablet; Refill: 5    3. Type 2 diabetes mellitus with stage 3a chronic kidney disease, without long-term current use of insulin  Assessment & Plan:  Lab Results   Component Value Date    HGBA1C 7.3 (H) 12/18/2023    HGBA1C 5.6 07/03/2023    HGBA1C 5.0 05/15/2023    EGFRNORACEVR 54.8 (A) 03/21/2024    MICALBCREAT 38.1 (H) 07/03/2023    LDLCALC 48.0 (L) 05/02/2023          4. S/P kidney transplant       Unless noted herein, any chronic conditions are represented as and appear stable, and no other significant complaints or concerns were reported.    There were no vitals filed for this visit.  Physical Exam    Constitutional: No acute distress. Normal appearance. Not ill-appearing.  Pulmonary: Pulmonary effort is normal. No respiratory distress.  Skin: Skin is not jaundiced.  Neurological: Alert. Mental status is at baseline.  Psychiatric: Mood normal. Behavior normal. Thought content normal.         This note was generated with the assistance of ambient listening technology. Verbal consent was obtained by the patient and accompanying visitor(s) for the recording of patient appointment to facilitate this note. I attest to having reviewed and edited the generated note for accuracy, though some syntax or spelling errors may persist. Please contact the author of this note for any clarification.      TOTAL TIME evaluating and managing this patient for this encounter was greater than or equal to 11 minutes.  This time was exclusive of any separately billable procedures for this patient and exclusive of time spent treating any other  "patient.    Documentation entered by me for this encounter may have been done in part using speech-recognition technology. Although I have made an effort to ensure accuracy, "sound like" errors may exist and should be interpreted in context.    Each patient to whom medical services are provided by telemedicine is: (1) informed of the relationship between the physician and patient and the respective role of any other health care provider with respect to management of the patient; and (2) notified that he or she may decline to receive medical services by telemedicine and may withdraw from such care at any time.   "

## 2024-03-27 ENCOUNTER — PATIENT MESSAGE (OUTPATIENT)
Dept: TRANSPLANT | Facility: CLINIC | Age: 44
End: 2024-03-27
Payer: COMMERCIAL

## 2024-04-05 ENCOUNTER — PATIENT MESSAGE (OUTPATIENT)
Dept: PODIATRY | Facility: CLINIC | Age: 44
End: 2024-04-05
Payer: COMMERCIAL

## 2024-04-05 ENCOUNTER — INFUSION (OUTPATIENT)
Dept: INFUSION THERAPY | Facility: HOSPITAL | Age: 44
End: 2024-04-05
Attending: INTERNAL MEDICINE
Payer: COMMERCIAL

## 2024-04-05 VITALS
WEIGHT: 225 LBS | HEART RATE: 66 BPM | BODY MASS INDEX: 28.12 KG/M2 | OXYGEN SATURATION: 97 % | DIASTOLIC BLOOD PRESSURE: 85 MMHG | TEMPERATURE: 98 F | SYSTOLIC BLOOD PRESSURE: 156 MMHG | RESPIRATION RATE: 18 BRPM

## 2024-04-05 DIAGNOSIS — B34.8 BK VIREMIA: Primary | ICD-10-CM

## 2024-04-05 PROCEDURE — 25000003 PHARM REV CODE 250: Performed by: INTERNAL MEDICINE

## 2024-04-05 PROCEDURE — 63600175 PHARM REV CODE 636 W HCPCS: Mod: JZ,JG | Performed by: INTERNAL MEDICINE

## 2024-04-05 PROCEDURE — 96365 THER/PROPH/DIAG IV INF INIT: CPT

## 2024-04-05 RX ORDER — SODIUM CHLORIDE 0.9 % (FLUSH) 0.9 %
10 SYRINGE (ML) INJECTION
Status: CANCELLED | OUTPATIENT
Start: 2024-04-19

## 2024-04-05 RX ORDER — SODIUM CHLORIDE 0.9 % (FLUSH) 0.9 %
10 SYRINGE (ML) INJECTION
Status: DISCONTINUED | OUTPATIENT
Start: 2024-04-05 | End: 2024-04-05 | Stop reason: HOSPADM

## 2024-04-05 RX ADMIN — CIDOFOVIR DIHYDRATE 50 MG: 375 INJECTION, SOLUTION INTRAVENOUS at 10:04

## 2024-04-05 NOTE — PLAN OF CARE
Discussed plan of care with pt. Addressed any and ongoing concerns. Pt denies   Problem: Adult Inpatient Plan of Care  Goal: Plan of Care Review  Outcome: Ongoing, Progressing  Goal: Patient-Specific Goal (Individualized)  Outcome: Ongoing, Progressing  Flowsheets (Taken 4/5/2024 1134)  Anxieties, Fears or Concerns: denies  Individualized Care Needs: reclined position with warm blanket  Goal: Absence of Hospital-Acquired Illness or Injury  Outcome: Ongoing, Progressing  Intervention: Identify and Manage Fall Risk  Flowsheets (Taken 4/5/2024 1134)  Safety Promotion/Fall Prevention: in recliner, wheels locked  Intervention: Prevent Infection  Flowsheets (Taken 4/5/2024 1134)  Infection Prevention:   equipment surfaces disinfected   hand hygiene promoted   personal protective equipment utilized  Goal: Optimal Comfort and Wellbeing  Outcome: Ongoing, Progressing  Intervention: Provide Person-Centered Care  Flowsheets (Taken 4/5/2024 1134)  Trust Relationship/Rapport:   care explained   choices provided   emotional support provided   reassurance provided   questions encouraged   thoughts/feelings acknowledged   empathic listening provided   questions answered

## 2024-04-11 ENCOUNTER — LAB VISIT (OUTPATIENT)
Dept: LAB | Facility: HOSPITAL | Age: 44
End: 2024-04-11
Attending: FAMILY MEDICINE
Payer: COMMERCIAL

## 2024-04-11 DIAGNOSIS — E78.5 DYSLIPIDEMIA ASSOCIATED WITH TYPE 2 DIABETES MELLITUS: ICD-10-CM

## 2024-04-11 DIAGNOSIS — I15.2 HYPERTENSION COMPLICATING DIABETES: Chronic | ICD-10-CM

## 2024-04-11 DIAGNOSIS — Z94.0 KIDNEY REPLACED BY TRANSPLANT: ICD-10-CM

## 2024-04-11 DIAGNOSIS — E11.69 DYSLIPIDEMIA ASSOCIATED WITH TYPE 2 DIABETES MELLITUS: ICD-10-CM

## 2024-04-11 DIAGNOSIS — N25.81 HYPERPARATHYROIDISM, SECONDARY RENAL: ICD-10-CM

## 2024-04-11 DIAGNOSIS — E11.42 TYPE 2 DIABETES MELLITUS WITH DIABETIC POLYNEUROPATHY, WITHOUT LONG-TERM CURRENT USE OF INSULIN: Chronic | ICD-10-CM

## 2024-04-11 DIAGNOSIS — E11.59 HYPERTENSION COMPLICATING DIABETES: Chronic | ICD-10-CM

## 2024-04-11 LAB
ALBUMIN SERPL BCP-MCNC: 4.1 G/DL (ref 3.5–5.2)
ALBUMIN SERPL BCP-MCNC: 4.1 G/DL (ref 3.5–5.2)
ALBUMIN/CREAT UR: 145 UG/MG (ref 0–30)
ALP SERPL-CCNC: 100 U/L (ref 55–135)
ALT SERPL W/O P-5'-P-CCNC: 50 U/L (ref 10–44)
ANION GAP SERPL CALC-SCNC: 9 MMOL/L (ref 8–16)
ANION GAP SERPL CALC-SCNC: 9 MMOL/L (ref 8–16)
AST SERPL-CCNC: 27 U/L (ref 10–40)
BILIRUB SERPL-MCNC: 3.2 MG/DL (ref 0.1–1)
BUN SERPL-MCNC: 14 MG/DL (ref 6–20)
BUN SERPL-MCNC: 14 MG/DL (ref 6–20)
CALCIUM SERPL-MCNC: 9.7 MG/DL (ref 8.7–10.5)
CALCIUM SERPL-MCNC: 9.7 MG/DL (ref 8.7–10.5)
CHLORIDE SERPL-SCNC: 107 MMOL/L (ref 95–110)
CHLORIDE SERPL-SCNC: 107 MMOL/L (ref 95–110)
CHOLEST SERPL-MCNC: 72 MG/DL (ref 120–199)
CHOLEST/HDLC SERPL: 2.4 {RATIO} (ref 2–5)
CO2 SERPL-SCNC: 24 MMOL/L (ref 23–29)
CO2 SERPL-SCNC: 24 MMOL/L (ref 23–29)
CREAT SERPL-MCNC: 1.4 MG/DL (ref 0.5–1.4)
CREAT SERPL-MCNC: 1.4 MG/DL (ref 0.5–1.4)
CREAT UR-MCNC: 129 MG/DL (ref 23–375)
EST. GFR  (NO RACE VARIABLE): >60 ML/MIN/1.73 M^2
EST. GFR  (NO RACE VARIABLE): >60 ML/MIN/1.73 M^2
ESTIMATED AVG GLUCOSE: 143 MG/DL (ref 68–131)
GLUCOSE SERPL-MCNC: 137 MG/DL (ref 70–110)
GLUCOSE SERPL-MCNC: 137 MG/DL (ref 70–110)
HBA1C MFR BLD: 6.6 % (ref 4–5.6)
HDLC SERPL-MCNC: 30 MG/DL (ref 40–75)
HDLC SERPL: 41.7 % (ref 20–50)
LDLC SERPL CALC-MCNC: 26.4 MG/DL (ref 63–159)
MICROALBUMIN UR DL<=1MG/L-MCNC: 187 UG/ML
NONHDLC SERPL-MCNC: 42 MG/DL
PHOSPHATE SERPL-MCNC: 2.9 MG/DL (ref 2.7–4.5)
POTASSIUM SERPL-SCNC: 4.6 MMOL/L (ref 3.5–5.1)
POTASSIUM SERPL-SCNC: 4.6 MMOL/L (ref 3.5–5.1)
PROT SERPL-MCNC: 6.9 G/DL (ref 6–8.4)
PTH-INTACT SERPL-MCNC: 210 PG/ML (ref 9–77)
SODIUM SERPL-SCNC: 140 MMOL/L (ref 136–145)
SODIUM SERPL-SCNC: 140 MMOL/L (ref 136–145)
TRIGL SERPL-MCNC: 78 MG/DL (ref 30–150)

## 2024-04-11 PROCEDURE — 82043 UR ALBUMIN QUANTITATIVE: CPT | Performed by: FAMILY MEDICINE

## 2024-04-11 PROCEDURE — 80061 LIPID PANEL: CPT | Performed by: FAMILY MEDICINE

## 2024-04-11 PROCEDURE — 80053 COMPREHEN METABOLIC PANEL: CPT | Performed by: FAMILY MEDICINE

## 2024-04-11 PROCEDURE — 87799 DETECT AGENT NOS DNA QUANT: CPT | Performed by: INTERNAL MEDICINE

## 2024-04-11 PROCEDURE — 83970 ASSAY OF PARATHORMONE: CPT | Performed by: FAMILY MEDICINE

## 2024-04-11 PROCEDURE — 83036 HEMOGLOBIN GLYCOSYLATED A1C: CPT | Performed by: FAMILY MEDICINE

## 2024-04-11 PROCEDURE — 80069 RENAL FUNCTION PANEL: CPT | Performed by: INTERNAL MEDICINE

## 2024-04-12 ENCOUNTER — TELEPHONE (OUTPATIENT)
Dept: CARDIOLOGY | Facility: CLINIC | Age: 44
End: 2024-04-12
Payer: COMMERCIAL

## 2024-04-12 NOTE — TELEPHONE ENCOUNTER
----- Message from Hellen Camacho sent at 4/12/2024 12:32 PM CDT -----  Contact: Robb  Type:  Patient Returning Call    Who Called:Robb   Who Left Message for Patient: Nydia  Does the patient know what this is regarding?: results  Would the patient rather a call back or a response via [facility]? Call back   Best Call Back Number: [phone]  Additional Information:     Thanks   Am

## 2024-04-12 NOTE — TELEPHONE ENCOUNTER
I have attempted without success to contact this patient by phone to discuss lab results and I left a message on answering machine.      ----- Message from Monica Rios MD sent at 4/11/2024  6:02 PM CDT -----  Labs look good

## 2024-04-15 ENCOUNTER — PATIENT MESSAGE (OUTPATIENT)
Dept: CARDIOLOGY | Facility: CLINIC | Age: 44
End: 2024-04-15
Payer: COMMERCIAL

## 2024-04-15 LAB
BKV DNA SERPL NAA+PROBE-ACNC: ABNORMAL COPIES/ML
BKV DNA SERPL NAA+PROBE-LOG#: 3.96 LOG (10) COPIES/ML
BKV DNA SERPL QL NAA+PROBE: DETECTED

## 2024-04-19 ENCOUNTER — INFUSION (OUTPATIENT)
Dept: INFUSION THERAPY | Facility: HOSPITAL | Age: 44
End: 2024-04-19
Attending: INTERNAL MEDICINE
Payer: COMMERCIAL

## 2024-04-19 VITALS
RESPIRATION RATE: 18 BRPM | TEMPERATURE: 98 F | SYSTOLIC BLOOD PRESSURE: 131 MMHG | DIASTOLIC BLOOD PRESSURE: 85 MMHG | OXYGEN SATURATION: 97 % | WEIGHT: 225.31 LBS | HEART RATE: 78 BPM | HEIGHT: 75 IN | BODY MASS INDEX: 28.01 KG/M2

## 2024-04-19 DIAGNOSIS — B34.8 BK VIREMIA: Primary | ICD-10-CM

## 2024-04-19 PROCEDURE — 96365 THER/PROPH/DIAG IV INF INIT: CPT

## 2024-04-19 PROCEDURE — 63600175 PHARM REV CODE 636 W HCPCS: Mod: JZ,JG | Performed by: INTERNAL MEDICINE

## 2024-04-19 PROCEDURE — 25000003 PHARM REV CODE 250: Performed by: INTERNAL MEDICINE

## 2024-04-19 RX ORDER — SODIUM CHLORIDE 0.9 % (FLUSH) 0.9 %
10 SYRINGE (ML) INJECTION
Status: CANCELLED | OUTPATIENT
Start: 2024-05-03

## 2024-04-19 RX ORDER — SODIUM CHLORIDE 0.9 % (FLUSH) 0.9 %
10 SYRINGE (ML) INJECTION
Status: DISCONTINUED | OUTPATIENT
Start: 2024-04-19 | End: 2024-04-19 | Stop reason: HOSPADM

## 2024-04-19 RX ADMIN — CIDOFOVIR DIHYDRATE 50 MG: 375 INJECTION, SOLUTION INTRAVENOUS at 02:04

## 2024-04-19 NOTE — PLAN OF CARE
Discussed plan of care with pt. Addressed any and ongoing concerns. Pt denies   Problem: Adult Inpatient Plan of Care  Goal: Plan of Care Review  Outcome: Ongoing, Progressing  Goal: Patient-Specific Goal (Individualized)  Outcome: Ongoing, Progressing  Flowsheets (Taken 4/19/2024 1452)  Anxieties, Fears or Concerns: denies  Individualized Care Needs: Reclined position, pillow provided  Goal: Absence of Hospital-Acquired Illness or Injury  Outcome: Ongoing, Progressing  Intervention: Identify and Manage Fall Risk  Flowsheets (Taken 4/19/2024 1452)  Safety Promotion/Fall Prevention: in recliner, wheels locked  Intervention: Prevent Infection  Flowsheets (Taken 4/19/2024 1452)  Infection Prevention:   equipment surfaces disinfected   hand hygiene promoted   personal protective equipment utilized  Goal: Optimal Comfort and Wellbeing  Outcome: Ongoing, Progressing  Intervention: Provide Person-Centered Care  Flowsheets (Taken 4/19/2024 1452)  Trust Relationship/Rapport:   care explained   questions encouraged   choices provided   empathic listening provided   questions answered   thoughts/feelings acknowledged

## 2024-04-22 ENCOUNTER — PATIENT MESSAGE (OUTPATIENT)
Dept: TRANSPLANT | Facility: CLINIC | Age: 44
End: 2024-04-22
Payer: COMMERCIAL

## 2024-04-25 ENCOUNTER — PATIENT MESSAGE (OUTPATIENT)
Dept: TRANSPLANT | Facility: CLINIC | Age: 44
End: 2024-04-25
Payer: COMMERCIAL

## 2024-04-29 ENCOUNTER — PATIENT MESSAGE (OUTPATIENT)
Dept: TRANSPLANT | Facility: CLINIC | Age: 44
End: 2024-04-29
Payer: COMMERCIAL

## 2024-05-02 ENCOUNTER — LAB VISIT (OUTPATIENT)
Dept: LAB | Facility: HOSPITAL | Age: 44
End: 2024-05-02
Attending: INTERNAL MEDICINE
Payer: COMMERCIAL

## 2024-05-02 DIAGNOSIS — Z94.0 KIDNEY REPLACED BY TRANSPLANT: ICD-10-CM

## 2024-05-02 LAB
ALBUMIN SERPL BCP-MCNC: 4.1 G/DL (ref 3.5–5.2)
ANION GAP SERPL CALC-SCNC: 6 MMOL/L (ref 8–16)
BUN SERPL-MCNC: 14 MG/DL (ref 6–20)
CALCIUM SERPL-MCNC: 9.6 MG/DL (ref 8.7–10.5)
CHLORIDE SERPL-SCNC: 107 MMOL/L (ref 95–110)
CO2 SERPL-SCNC: 27 MMOL/L (ref 23–29)
CREAT SERPL-MCNC: 1.6 MG/DL (ref 0.5–1.4)
EST. GFR  (NO RACE VARIABLE): 54.8 ML/MIN/1.73 M^2
GLUCOSE SERPL-MCNC: 140 MG/DL (ref 70–110)
PHOSPHATE SERPL-MCNC: 2.7 MG/DL (ref 2.7–4.5)
POTASSIUM SERPL-SCNC: 4.4 MMOL/L (ref 3.5–5.1)
SODIUM SERPL-SCNC: 140 MMOL/L (ref 136–145)

## 2024-05-02 PROCEDURE — 87799 DETECT AGENT NOS DNA QUANT: CPT | Performed by: INTERNAL MEDICINE

## 2024-05-02 PROCEDURE — 36415 COLL VENOUS BLD VENIPUNCTURE: CPT | Performed by: INTERNAL MEDICINE

## 2024-05-02 PROCEDURE — 80069 RENAL FUNCTION PANEL: CPT | Performed by: INTERNAL MEDICINE

## 2024-05-02 PROCEDURE — 80197 ASSAY OF TACROLIMUS: CPT | Performed by: INTERNAL MEDICINE

## 2024-05-03 ENCOUNTER — INFUSION (OUTPATIENT)
Dept: INFUSION THERAPY | Facility: HOSPITAL | Age: 44
End: 2024-05-03
Attending: INTERNAL MEDICINE
Payer: COMMERCIAL

## 2024-05-03 VITALS
TEMPERATURE: 98 F | OXYGEN SATURATION: 99 % | RESPIRATION RATE: 16 BRPM | HEART RATE: 88 BPM | DIASTOLIC BLOOD PRESSURE: 85 MMHG | SYSTOLIC BLOOD PRESSURE: 130 MMHG

## 2024-05-03 DIAGNOSIS — B34.8 BK VIREMIA: Primary | ICD-10-CM

## 2024-05-03 LAB — TACROLIMUS BLD-MCNC: 9.5 NG/ML (ref 5–15)

## 2024-05-03 PROCEDURE — 63600175 PHARM REV CODE 636 W HCPCS: Mod: JZ,JG | Performed by: INTERNAL MEDICINE

## 2024-05-03 PROCEDURE — 25000003 PHARM REV CODE 250: Performed by: INTERNAL MEDICINE

## 2024-05-03 PROCEDURE — 96365 THER/PROPH/DIAG IV INF INIT: CPT

## 2024-05-03 RX ORDER — SODIUM CHLORIDE 0.9 % (FLUSH) 0.9 %
10 SYRINGE (ML) INJECTION
Status: CANCELLED | OUTPATIENT
Start: 2024-05-03

## 2024-05-03 RX ORDER — SODIUM CHLORIDE 0.9 % (FLUSH) 0.9 %
10 SYRINGE (ML) INJECTION
Status: DISCONTINUED | OUTPATIENT
Start: 2024-05-03 | End: 2024-05-03 | Stop reason: HOSPADM

## 2024-05-03 RX ADMIN — CIDOFOVIR DIHYDRATE 50 MG: 375 INJECTION, SOLUTION INTRAVENOUS at 01:05

## 2024-05-03 NOTE — PLAN OF CARE
Patient reports feeling tired today. Tolerated infusion well with no adverse reactions. Patient to follow-up with transplant during this month.

## 2024-05-03 NOTE — DISCHARGE INSTRUCTIONS
[facility]  [address] [facility]  [address]  [phone] phone     [phone] fax  Hours of Operation: Monday- Friday 8:00am- 5:00pm  After hours phone  [phone]  Hematology / Oncology Physicians on call    Dr. Dell Shaffer        Nurse Practitioners:    Viky Jacques, OYVANNY Mcfarlane, YOVANNY Boucher, YOVANNY Wright, YOVANNY Pinto, PA      Please don't hesitate to call if you have any concerns.

## 2024-05-06 ENCOUNTER — PATIENT MESSAGE (OUTPATIENT)
Dept: TRANSPLANT | Facility: CLINIC | Age: 44
End: 2024-05-06
Payer: COMMERCIAL

## 2024-05-06 DIAGNOSIS — N18.31 TYPE 2 DIABETES MELLITUS WITH STAGE 3A CHRONIC KIDNEY DISEASE, WITHOUT LONG-TERM CURRENT USE OF INSULIN: ICD-10-CM

## 2024-05-06 DIAGNOSIS — Z94.0 S/P KIDNEY TRANSPLANT: ICD-10-CM

## 2024-05-06 DIAGNOSIS — E11.22 TYPE 2 DIABETES MELLITUS WITH STAGE 3A CHRONIC KIDNEY DISEASE, WITHOUT LONG-TERM CURRENT USE OF INSULIN: ICD-10-CM

## 2024-05-06 LAB
BKV DNA SERPL NAA+PROBE-ACNC: ABNORMAL COPIES/ML
BKV DNA SERPL NAA+PROBE-LOG#: 4.38 LOG (10) COPIES/ML
BKV DNA SERPL QL NAA+PROBE: DETECTED

## 2024-05-06 RX ORDER — DIPHENHYDRAMINE HYDROCHLORIDE 50 MG/ML
50 INJECTION INTRAMUSCULAR; INTRAVENOUS ONCE AS NEEDED
Status: CANCELLED | OUTPATIENT
Start: 2024-05-07

## 2024-05-06 RX ORDER — ACETAMINOPHEN 500 MG
500 TABLET ORAL
Status: CANCELLED | OUTPATIENT
Start: 2024-05-07

## 2024-05-06 RX ORDER — EPINEPHRINE 0.3 MG/.3ML
0.3 INJECTION SUBCUTANEOUS ONCE AS NEEDED
Status: CANCELLED | OUTPATIENT
Start: 2024-05-07

## 2024-05-06 RX ORDER — DIPHENHYDRAMINE HCL 25 MG
25 CAPSULE ORAL
Status: CANCELLED | OUTPATIENT
Start: 2024-05-07

## 2024-05-06 RX ORDER — DIPHENHYDRAMINE HYDROCHLORIDE 50 MG/ML
25 INJECTION INTRAMUSCULAR; INTRAVENOUS
Status: CANCELLED | OUTPATIENT
Start: 2024-05-07

## 2024-05-06 RX ORDER — TACROLIMUS 0.5 MG/1
CAPSULE ORAL
Qty: 150 CAPSULE | Refills: 11 | Status: SHIPPED | OUTPATIENT
Start: 2024-05-06 | End: 2025-05-06

## 2024-05-06 RX ORDER — SODIUM CHLORIDE 0.9 % (FLUSH) 0.9 %
10 SYRINGE (ML) INJECTION
Status: CANCELLED | OUTPATIENT
Start: 2024-05-07

## 2024-05-06 RX ORDER — TIRZEPATIDE 7.5 MG/.5ML
7.5 INJECTION, SOLUTION SUBCUTANEOUS
Qty: 12 PEN | Refills: 1 | Status: SHIPPED | OUTPATIENT
Start: 2024-05-06

## 2024-05-06 NOTE — TELEPHONE ENCOUNTER
Pt made aware of below orders              ----- Message from Rell Booth MD sent at 5/6/2024  3:26 PM CDT -----  Did he get any IVIG?  Lower prograf to 1.5 mg AM and 1 mg PM.  Labs in 1 week  Continue serum BK per protocol.

## 2024-05-06 NOTE — PROGRESS NOTES
Did he get any IVIG?  Lower prograf to 1.5 mg AM and 1 mg PM.  Labs in 1 week  Continue serum BK per protocol.

## 2024-05-09 ENCOUNTER — PATIENT MESSAGE (OUTPATIENT)
Dept: TRANSPLANT | Facility: CLINIC | Age: 44
End: 2024-05-09
Payer: COMMERCIAL

## 2024-05-13 ENCOUNTER — LAB VISIT (OUTPATIENT)
Dept: LAB | Facility: HOSPITAL | Age: 44
End: 2024-05-13
Attending: INTERNAL MEDICINE
Payer: COMMERCIAL

## 2024-05-13 DIAGNOSIS — Z94.0 KIDNEY REPLACED BY TRANSPLANT: ICD-10-CM

## 2024-05-13 LAB
ALBUMIN SERPL BCP-MCNC: 4.1 G/DL (ref 3.5–5.2)
ANION GAP SERPL CALC-SCNC: 10 MMOL/L (ref 8–16)
BASOPHILS # BLD AUTO: 0.04 K/UL (ref 0–0.2)
BASOPHILS NFR BLD: 0.7 % (ref 0–1.9)
BUN SERPL-MCNC: 14 MG/DL (ref 6–20)
CALCIUM SERPL-MCNC: 9.4 MG/DL (ref 8.7–10.5)
CHLORIDE SERPL-SCNC: 109 MMOL/L (ref 95–110)
CO2 SERPL-SCNC: 23 MMOL/L (ref 23–29)
CREAT SERPL-MCNC: 1.4 MG/DL (ref 0.5–1.4)
DIFFERENTIAL METHOD BLD: ABNORMAL
EOSINOPHIL # BLD AUTO: 0.2 K/UL (ref 0–0.5)
EOSINOPHIL NFR BLD: 2.9 % (ref 0–8)
ERYTHROCYTE [DISTWIDTH] IN BLOOD BY AUTOMATED COUNT: 14.6 % (ref 11.5–14.5)
EST. GFR  (NO RACE VARIABLE): >60 ML/MIN/1.73 M^2
GLUCOSE SERPL-MCNC: 145 MG/DL (ref 70–110)
HCT VFR BLD AUTO: 44.7 % (ref 40–54)
HGB BLD-MCNC: 14.5 G/DL (ref 14–18)
IMM GRANULOCYTES # BLD AUTO: 0.03 K/UL (ref 0–0.04)
IMM GRANULOCYTES NFR BLD AUTO: 0.5 % (ref 0–0.5)
LYMPHOCYTES # BLD AUTO: 1.1 K/UL (ref 1–4.8)
LYMPHOCYTES NFR BLD: 20.6 % (ref 18–48)
MAGNESIUM SERPL-MCNC: 1.7 MG/DL (ref 1.6–2.6)
MCH RBC QN AUTO: 28 PG (ref 27–31)
MCHC RBC AUTO-ENTMCNC: 32.4 G/DL (ref 32–36)
MCV RBC AUTO: 86 FL (ref 82–98)
MONOCYTES # BLD AUTO: 0.7 K/UL (ref 0.3–1)
MONOCYTES NFR BLD: 12.6 % (ref 4–15)
NEUTROPHILS # BLD AUTO: 3.5 K/UL (ref 1.8–7.7)
NEUTROPHILS NFR BLD: 62.7 % (ref 38–73)
NRBC BLD-RTO: 0 /100 WBC
PHOSPHATE SERPL-MCNC: 2.8 MG/DL (ref 2.7–4.5)
PLATELET # BLD AUTO: 155 K/UL (ref 150–450)
PMV BLD AUTO: 9.7 FL (ref 9.2–12.9)
POTASSIUM SERPL-SCNC: 4.2 MMOL/L (ref 3.5–5.1)
RBC # BLD AUTO: 5.18 M/UL (ref 4.6–6.2)
SODIUM SERPL-SCNC: 142 MMOL/L (ref 136–145)
WBC # BLD AUTO: 5.54 K/UL (ref 3.9–12.7)

## 2024-05-13 PROCEDURE — 83735 ASSAY OF MAGNESIUM: CPT | Performed by: INTERNAL MEDICINE

## 2024-05-13 PROCEDURE — 36415 COLL VENOUS BLD VENIPUNCTURE: CPT | Performed by: INTERNAL MEDICINE

## 2024-05-13 PROCEDURE — 80197 ASSAY OF TACROLIMUS: CPT | Performed by: INTERNAL MEDICINE

## 2024-05-13 PROCEDURE — 85025 COMPLETE CBC W/AUTO DIFF WBC: CPT | Performed by: INTERNAL MEDICINE

## 2024-05-13 PROCEDURE — 80069 RENAL FUNCTION PANEL: CPT | Performed by: INTERNAL MEDICINE

## 2024-05-13 PROCEDURE — 87799 DETECT AGENT NOS DNA QUANT: CPT | Performed by: INTERNAL MEDICINE

## 2024-05-14 ENCOUNTER — PATIENT MESSAGE (OUTPATIENT)
Dept: TRANSPLANT | Facility: CLINIC | Age: 44
End: 2024-05-14
Payer: COMMERCIAL

## 2024-05-14 LAB — TACROLIMUS BLD-MCNC: 6.6 NG/ML (ref 5–15)

## 2024-05-16 ENCOUNTER — OFFICE VISIT (OUTPATIENT)
Dept: PODIATRY | Facility: CLINIC | Age: 44
End: 2024-05-16
Payer: COMMERCIAL

## 2024-05-16 VITALS — HEIGHT: 75 IN | WEIGHT: 225.31 LBS | BODY MASS INDEX: 28.01 KG/M2

## 2024-05-16 DIAGNOSIS — G57.92 PERIPHERAL NEURITIS OF LEFT FOOT: ICD-10-CM

## 2024-05-16 DIAGNOSIS — E11.42 TYPE 2 DIABETES MELLITUS WITH DIABETIC POLYNEUROPATHY, WITHOUT LONG-TERM CURRENT USE OF INSULIN: Primary | ICD-10-CM

## 2024-05-16 PROCEDURE — 3066F NEPHROPATHY DOC TX: CPT | Mod: CPTII,S$GLB,, | Performed by: PODIATRIST

## 2024-05-16 PROCEDURE — 3072F LOW RISK FOR RETINOPATHY: CPT | Mod: CPTII,S$GLB,, | Performed by: PODIATRIST

## 2024-05-16 PROCEDURE — 99999 PR PBB SHADOW E&M-EST. PATIENT-LVL III: CPT | Mod: PBBFAC,,, | Performed by: PODIATRIST

## 2024-05-16 PROCEDURE — 1159F MED LIST DOCD IN RCRD: CPT | Mod: CPTII,S$GLB,, | Performed by: PODIATRIST

## 2024-05-16 PROCEDURE — 99214 OFFICE O/P EST MOD 30 MIN: CPT | Mod: 25,S$GLB,, | Performed by: PODIATRIST

## 2024-05-16 PROCEDURE — 3008F BODY MASS INDEX DOCD: CPT | Mod: CPTII,S$GLB,, | Performed by: PODIATRIST

## 2024-05-16 PROCEDURE — 3060F POS MICROALBUMINURIA REV: CPT | Mod: CPTII,S$GLB,, | Performed by: PODIATRIST

## 2024-05-16 PROCEDURE — 3044F HG A1C LEVEL LT 7.0%: CPT | Mod: CPTII,S$GLB,, | Performed by: PODIATRIST

## 2024-05-16 PROCEDURE — 1160F RVW MEDS BY RX/DR IN RCRD: CPT | Mod: CPTII,S$GLB,, | Performed by: PODIATRIST

## 2024-05-16 RX ORDER — PREGABALIN 50 MG/1
50 CAPSULE ORAL 3 TIMES DAILY
Qty: 90 CAPSULE | Refills: 0 | Status: SHIPPED | OUTPATIENT
Start: 2024-05-16

## 2024-05-16 NOTE — PROGRESS NOTES
Subjective:     Patient ID: Robb Iglesias is a 43 y.o. male.    Chief Complaint: Diabetic Foot Exam (Pt c/o left hallux toe itching, pt c/o 0/10 pain, diabetic pt wears tennis shoes, PCP Dr. Roberson lat seen 3-26-24) and Foot Problem    Robb is a 43 y.o. male who presents to the clinic for evaluation and treatment of high risk feet. Robb has a past medical history of Allergic rhinitis, Atrial fibrillation with RVR (6/4/2023), Class 1 obesity due to excess calories with serious comorbidity and body mass index (BMI) of 31.0 to 31.9 in adult (4/7/2017), Coronary artery disease, Coronary artery disease involving native coronary artery of native heart without angina pectoris (2/6/2018), Diabetic nephropathy associated with type 2 diabetes mellitus (1/6/2020), Direct hyperbilirubinemia (3/24/2018), DM (diabetes mellitus) (2008), DM (diabetes mellitus) (2012), DM (diabetes mellitus), DM (diabetes mellitus) (2008), Dyslipidemia associated with type 2 diabetes mellitus (7/31/2017), Elevated bilirubin (3/21/2018), GERD (gastroesophageal reflux disease), Gout, Hyperlipidemia, Hyperparathyroidism, secondary to chronic kidney disease (6/7/2021), Hypertension associated with chronic kidney disease due to type 2 diabetes mellitus, Hypertension complicating diabetes (3/16/2019), Idiopathic chronic gout, multiple sites, without tophus (tophi) (7/19/2017), Long term (current) use of insulin, MI (myocardial infarction) (07/2017), NSTEMI with CAD s/p PCI (FAMILIA) of LAD x 2 in 8/2017 (7/31/2017), Obesity, HENRY on CPAP, Peritoneal dialysis catheter in place (1/26/2023), Proteinuria, Severe obstructive sleep apnea - Intolerant of CPAP (7/31/2017), Stage 3a chronic kidney disease (5/26/2023), Stage 5 chronic kidney disease on chronic peritoneal dialysis (6/7/2017), Stage 5 chronic kidney disease on chronic peritoneal dialysis (6/7/2017), Status post angioplasty with stent (8/4/2017), Steatohepatitis, Type 2 diabetes mellitus  with chronic kidney disease on chronic dialysis, without long-term current use of insulin (6/21/2017), Type 2 diabetes mellitus with diabetic nephropathy, Type 2 diabetes mellitus with diabetic nephropathy, without long-term current use of insulin (1/6/2020), Type 2 diabetes mellitus with diabetic polyneuropathy, without long-term current use of insulin (6/7/2021), Type 2 diabetes mellitus with hyperglycemia, Type 2 diabetes mellitus with renal manifestations, and Type 2 diabetes mellitus with stage 3 chronic kidney disease, without long-term current use of insulin (6/21/2017). The patient's chief complaint is itching of left big toe mainly at night time. This patient has documented high risk feet requiring routine maintenance secondary to peripheral neuropathy.    PCP: SABIHA Roberson MD    Date Last Seen by PCP: 03/26/2024    Current shoe gear:  Affected Foot: Tennis shoes     Unaffected Foot: Tennis shoes    Hemoglobin A1C   Date Value Ref Range Status   04/11/2024 6.6 (H) 4.0 - 5.6 % Final     Comment:     ADA Screening Guidelines:  5.7-6.4%  Consistent with prediabetes  >or=6.5%  Consistent with diabetes    High levels of fetal hemoglobin interfere with the HbA1C  assay. Heterozygous hemoglobin variants (HbS, HgC, etc)do  not significantly interfere with this assay.   However, presence of multiple variants may affect accuracy.     12/18/2023 7.3 (H) 4.0 - 5.6 % Final     Comment:     ADA Screening Guidelines:  5.7-6.4%  Consistent with prediabetes  >or=6.5%  Consistent with diabetes    High levels of fetal hemoglobin interfere with the HbA1C  assay. Heterozygous hemoglobin variants (HbS, HgC, etc)do  not significantly interfere with this assay.   However, presence of multiple variants may affect accuracy.     07/03/2023 5.6 4.0 - 5.6 % Final     Comment:     ADA Screening Guidelines:  5.7-6.4%  Consistent with prediabetes  >or=6.5%  Consistent with diabetes    High levels of fetal hemoglobin interfere with  the HbA1C  assay. Heterozygous hemoglobin variants (HbS, HgC, etc)do  not significantly interfere with this assay.   However, presence of multiple variants may affect accuracy.     01/09/2023 5.3 4.2 - 5.6 % Final       Patient Active Problem List   Diagnosis    Gastroesophageal reflux disease without esophagitis    Bariatric surgery status    Class 1 obesity with serious comorbidity and body mass index (BMI) of 30.0 to 30.9 in adult    Essential hypertension    Type 2 diabetes mellitus with stage 3a chronic kidney disease, without long-term current use of insulin    Idiopathic chronic gout, multiple sites, without tophus (tophi)    Chronic nonseasonal allergic rhinitis due to pollen    Severe obstructive sleep apnea - Intolerant of CPAP    Dyslipidemia associated with type 2 diabetes mellitus    Status post angioplasty with stent    PLMD (periodic limb movement disorder)    Coronary artery disease involving native coronary artery of native heart without angina pectoris    Direct hyperbilirubinemia    Hypertension complicating diabetes    Diabetic nephropathy associated with type 2 diabetes mellitus    Type 2 diabetes mellitus with diabetic polyneuropathy, without long-term current use of insulin    Hyperparathyroidism, secondary to chronic kidney disease    S/P kidney transplant    Prophylactic immunotherapy    At risk for opportunistic infections    Long-term use of immunosuppressant medication    Stage 3a chronic kidney disease    Paroxysmal atrial fibrillation    History of COVID-19 (Tested Positive July 31, 2023)    History of NSTEMI with CAD s/p PCI (FAMILIA) of LAD x 2 in 8/2017    BK viremia    History of motion sickness       Medication List with Changes/Refills   New Medications    PREGABALIN (LYRICA) 50 MG CAPSULE    Take 1 capsule (50 mg total) by mouth 3 (three) times daily.   Current Medications    ASPIRIN (ECOTRIN) 81 MG EC TABLET    Take 1 tablet (81 mg total) by mouth once daily.    ATORVASTATIN  "(LIPITOR) 80 MG TABLET    Take 1 tablet (80 mg total) by mouth every evening.    BLOOD SUGAR DIAGNOSTIC STRP    Check blood glucose 2 times daily as directed and as needed    BLOOD-GLUCOSE METER (ONETOUCH ULTRAMINI) KIT    Use as instructed    EZETIMIBE (ZETIA) 10 MG TABLET    Take 1 tablet (10 mg total) by mouth once daily.    K PHOS DI & MONO-SOD PHOS MONO (PHOSPHA 250 NEUTRAL) 250 MG TAB    Take 2 tablets by mouth 3 (three) times daily.    LANCETS MISC    Check blood glucose 2 times daily as directed and as needed (dispense insurance preferred brand or patient choice)    MAGNESIUM OXIDE (MAG-OX) 400 MG (241.3 MG MAGNESIUM) TABLET    Take 1 tablet (400 mg total) by mouth 2 (two) times daily.    MAGNESIUM OXIDE (MAG-OX) 400 MG (241.3 MG MAGNESIUM) TABLET    TAKE 1 TABLET BY MOUTH 2 TIMES DAILY.    METOPROLOL TARTRATE (LOPRESSOR) 25 MG TABLET    Take 1 tablet (25 mg total) by mouth 2 (two) times daily.    MULTIVITAMIN TAB    Take 1 tablet by mouth once daily.    NITROGLYCERIN (NITROSTAT) 0.4 MG SL TABLET    Dissolve one tablet underneath tongue at onset of angina; may repeat every 5 minutes if needed. Call 911 if angina persists after 2 doses.    PANTOPRAZOLE (PROTONIX) 40 MG TABLET    Take 1 tablet (40 mg total) by mouth once daily.    PEN NEEDLE, DIABETIC (BD ULTRA-FINE SHORT PEN NEEDLE) 31 GAUGE X 5/16" NDLE    Use to inject insulin into the skin 3 times daily    PREDNISONE (DELTASONE) 5 MG TABLET    Take by mouth daily; 5/18-5/24: 20 mg; 5/25-5/31: 15 mg; 6/1-6/7: 10 mg; 6/8/23- thereafter: 5 mg daily; do not stop    SCOPOLAMINE (TRANSDERM-SCOP) 1.3-1.5 MG (1 MG OVER 3 DAYS)    Place 1 patch onto the skin every 72 hours. Apply behind ear. Start 24 hours before travel.    TACROLIMUS (PROGRAF) 0.5 MG CAP    Take 3 capsules (1.5 mg total) by mouth every morning AND 2 capsules (1 mg total) every evening.    TIRZEPATIDE (MOUNJARO) 7.5 MG/0.5 ML PNIJ    Inject 7.5 mg into the skin every 7 days.       Review of " patient's allergies indicates:  No Known Allergies    Past Surgical History:   Procedure Laterality Date    CORONARY ANGIOPLASTY WITH STENT PLACEMENT      CYSTOSCOPY      KIDNEY TRANSPLANT N/A 05/15/2023    Procedure: TRANSPLANT, KIDNEY;  Surgeon: Reji Hinojosa MD;  Location: [facility] OR [address] [place] [address];  Service: Transplant;  Laterality: N/A;  IN ROOM: 10:37  OUT OF ICE:10:53  REPERFUSION TIME: 11:21      LASIK Bilateral     LIVER BIOPSY      NASAL ENDOSCOPY      NASAL SEPTUM SURGERY      PERITONEAL CATHETER REMOVAL N/A 05/15/2023    Procedure: REMOVAL, CATHETER, DIALYSIS, PERITONEAL;  Surgeon: Reji Hinojosa MD;  Location: [facility] OR [address] [place] [address];  Service: Transplant;  Laterality: N/A;    SLEEVE GASTROPLASTY  03/06/2017       Family History   Problem Relation Name Age of Onset    Diabetes type II Mother      Hypertension Mother      Hyperlipidemia Mother      Diabetes Mother      Hyperlipidemia Father      Diabetes type II Brother      Diabetes type II Brother      Diabetes Maternal Grandmother         Social History     Socioeconomic History    Marital status:      Spouse name: Shannon Iglesias    Number of children: 2    Years of education: Some College   Tobacco Use    Smoking status: Never    Smokeless tobacco: Never   Substance and Sexual Activity    Alcohol use: No     Comment: 1-2 times per month    Drug use: No    Sexual activity: Yes     Partners: Female   Social History Narrative    Full time employed,  with 2 children.    Caregiver Shannon      Social Determinants of Health     Financial Resource Strain: Low Risk  (2/16/2024)    Overall Financial Resource Strain (CARDIA)     Difficulty of Paying Living Expenses: Not very hard   Food Insecurity: No Food Insecurity (2/16/2024)    Hunger Vital Sign     Worried About Running Out of Food in the Last Year: Never true     Ran Out of Food in the Last Year: Never true   Transportation Needs: No Transportation Needs (2/16/2024)    PRAPARE - Transportation     Lack of  "Transportation (Medical): No     Lack of Transportation (Non-Medical): No   Physical Activity: Sufficiently Active (2/16/2024)    Exercise Vital Sign     Days of Exercise per Week: 4 days     Minutes of Exercise per Session: 60 min   Stress: No Stress Concern Present (2/16/2024)    [employer] [place] [employer] - Occupational Stress Questionnaire     Feeling of Stress : Not at all   Housing Stability: Low Risk  (2/16/2024)    Housing Stability Vital Sign     Unable to Pay for Housing in the Last Year: No     Number of Places Lived in the Last Year: 1     Unstable Housing in the Last Year: No       Vitals:    05/16/24 0816   Weight: 102.2 kg (225 lb 5 oz)   Height: 6' 3" (1.905 m)   PainSc: 0-No pain         Hemoglobin A1C   Date Value Ref Range Status   04/11/2024 6.6 (H) 4.0 - 5.6 % Final     Comment:     ADA Screening Guidelines:  5.7-6.4%  Consistent with prediabetes  >or=6.5%  Consistent with diabetes    High levels of fetal hemoglobin interfere with the HbA1C  assay. Heterozygous hemoglobin variants (HbS, HgC, etc)do  not significantly interfere with this assay.   However, presence of multiple variants may affect accuracy.     12/18/2023 7.3 (H) 4.0 - 5.6 % Final     Comment:     ADA Screening Guidelines:  5.7-6.4%  Consistent with prediabetes  >or=6.5%  Consistent with diabetes    High levels of fetal hemoglobin interfere with the HbA1C  assay. Heterozygous hemoglobin variants (HbS, HgC, etc)do  not significantly interfere with this assay.   However, presence of multiple variants may affect accuracy.     07/03/2023 5.6 4.0 - 5.6 % Final     Comment:     ADA Screening Guidelines:  5.7-6.4%  Consistent with prediabetes  >or=6.5%  Consistent with diabetes    High levels of fetal hemoglobin interfere with the HbA1C  assay. Heterozygous hemoglobin variants (HbS, HgC, etc)do  not significantly interfere with this assay.   However, presence of multiple variants may affect accuracy.     01/09/2023 5.3 4.2 - " 5.6 % Final       Review of Systems   Constitutional:  Negative for chills and fever.   Respiratory:  Negative for shortness of breath.    Cardiovascular:  Negative for chest pain, palpitations, orthopnea, claudication and leg swelling.   Gastrointestinal:  Negative for diarrhea, nausea and vomiting.   Musculoskeletal:  Negative for joint pain.   Skin:  Negative for rash.   Neurological:  Positive for sensory change.   Psychiatric/Behavioral: Negative.           Objective:      PHYSICAL EXAM: Apperance: Alert and orient in no distress,well developed, and with good attention to grooming and body habits  Patient presents ambulating in tennis shoes  LOWER EXTREMITY EXAM:  VASCULAR: Dorsalis pedis pulses 2/4 bilateral and Posterior Tibial pulses 2/4 bilateral. Capillary fill time <4 seconds bilateral. No edema observed bilateral. Varicosities absent bilateral. Skin temperature of the lower extremities is warm to warm, proximal to distal. Hair growth WNL bilateral.  DERMATOLOGICAL: No skin rashes, subcutaneous nodules, lesions, or ulcers observed bilateral. Nails 1,2,3,4,5 bilateral normal length and thickness. Webspaces 1,2,3,4 bilateral clean, dry and without evidence of break in skin integrity.   NEUROLOGICAL: Light touch, sharp-dull, proprioception all present and equal bilaterally.  Vibratory sensation diminished at bilateral hallux. Protective sensation absent at toe sites only as tested with a [place] 5.07 monofilament.   MUSCULOSKELETAL: Muscle strength is 5/5 for foot inverters, everters, plantarflexors, and dorsiflexors. Muscle tone is normal.           Assessment:       ICD-10-CM ICD-9-CM   1. Type 2 diabetes mellitus with diabetic polyneuropathy, without long-term current use of insulin  E11.42 250.60     357.2   2. Peripheral neuritis of left foot  G57.92 355.8         Plan:   Type 2 diabetes mellitus with diabetic polyneuropathy, without long-term current use of insulin  -     pregabalin (LYRICA)  50 MG capsule; Take 1 capsule (50 mg total) by mouth 3 (three) times daily.  Dispense: 90 capsule; Refill: 0    Peripheral neuritis of left foot  -     pregabalin (LYRICA) 50 MG capsule; Take 1 capsule (50 mg total) by mouth 3 (three) times daily.  Dispense: 90 capsule; Refill: 0    I counseled the patient on his conditions, regarding findings of my examination, my impressions, and usual treatment plan.   This visit spent on counseling and coordination of care.  Appointment spent on education about the diabetic foot, neuropathy, and prevention of limb loss.  Shoe inspection. Diabetic Foot Education. Patient reminded of the importance of good nutrition and blood sugar control to help prevent podiatric complications of diabetes. Patient instructed on proper foot hygeine. We discussed wearing proper shoe gear, daily foot inspections, never walking without protective shoe gear, never putting sharp instruments to feet.    Discussed with patient treatments for neuropathy consisting of topical or oral medication.  Prescription written for Lyrica 50mg to be taken once nightly. Informed patient of possible side effects including but not limited to disorientation and drowsiness. Patient instructed to discontinue use if there are any adverse effects. Patient states he understands.   Patient  will continue to monitor the areas daily, inspect feet, wear protective shoe gear when ambulatory, moisturizer to maintain skin integrity. Patient reminded of the importance of good nutrition and blood sugar control to help prevent podiatric complications of diabetes.  Patient to return 12 months or sooner if needed.        Corine Nieto DPM  [facility]

## 2024-05-17 ENCOUNTER — TELEPHONE (OUTPATIENT)
Dept: TRANSPLANT | Facility: CLINIC | Age: 44
End: 2024-05-17
Payer: COMMERCIAL

## 2024-05-17 NOTE — TELEPHONE ENCOUNTER
Called back number listed below and Daria was unavailable it said.                ----- Message from Stacy Nieto sent at 5/17/2024 10:57 AM CDT -----  Regarding: Patient advice  Contact: Daria  [phone] Ext  [phone]          Name of Caller:  Daria toby [email or web address]      Contact Preference:  [phone] Ext [phone]     Nature of Call:  Wanted to make contact with office to give contact information is nor pt's case management  if any assistance is needed

## 2024-05-18 DIAGNOSIS — Z76.82 KIDNEY TRANSPLANT CANDIDATE: Chronic | ICD-10-CM

## 2024-05-20 ENCOUNTER — PATIENT MESSAGE (OUTPATIENT)
Dept: TRANSPLANT | Facility: CLINIC | Age: 44
End: 2024-05-20
Payer: COMMERCIAL

## 2024-05-20 RX ORDER — PREDNISONE 5 MG/1
5 TABLET ORAL
Qty: 30 TABLET | Refills: 10 | Status: SHIPPED | OUTPATIENT
Start: 2024-05-20

## 2024-05-24 ENCOUNTER — PATIENT MESSAGE (OUTPATIENT)
Dept: TRANSPLANT | Facility: CLINIC | Age: 44
End: 2024-05-24
Payer: COMMERCIAL

## 2024-06-04 NOTE — PROGRESS NOTES
Kidney Post-Transplant Assessment    Referring Physician: Siva Figueroa  Current Nephrologist: Siva Figueroa    ORGAN: LEFT KIDNEY  Donor Type: living  PHS Increased Risk: no  Cold Ischemia: 48 mins  Induction Medications: thymoglobulin    Subjective:   The patient location is: [place]  The chief complaint leading to consultation is: Kidney Post-Transplant Assessment    Visit type: audiovisual    Face to Face time with patient:   30 minutes of total time spent on the encounter, which includes face to face time and non-face to face time preparing to see the patient (eg, review of tests), Obtaining and/or reviewing separately obtained history, Documenting clinical information in the electronic or other health record, Independently interpreting results (not separately reported) and communicating results to the patient/family/caregiver, or Care coordination (not separately reported).     Each patient to whom he or she provides medical services by telemedicine is:  (1) informed of the relationship between the physician and patient and the respective role of any other health care provider with respect to management of the patient; and (2) notified that he or she may decline to receive medical services by telemedicine and may withdraw from such care at any time.      CC:  Reassessment of renal allograft function and management of chronic immunosuppression.    HPI:  Mr. Iglesias is a 43 y.o. year old White male with history of ESRD secondary to diabetic nephropathy who received a living kidney transplant on 5/15/23 (thymo induction, CMV ++). PMH coronary angioplasty with stent placement, NSTEMI with CAD 8/2017, HTN, GERD and sleep apnea. Post transplant course complicated with new onset afib and BK viremia. He has CKD stage 3 - GFR 30-59 and his baseline creatinine is between 1.4-1.6. He takes prednisone and tacrolimus for maintenance immunosuppression. 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 He denies any recent  hospitalizations or ER visits since his previous clinic visit.    Still with BK viremia, has been off MMF. Completed Cidofovir x3 and is scheduled for IVIG 6/10.    Going to re-establish with Dr. Figueroa for CKD care. Follows with PCP for BP management and endocrinology for DM management.    Recently returned from a cruise, had a blast. Having headache and nasal congestion since returning, no fevers. Symptoms have improved with dayquil. No CP or SOB.     Staying hydrated, no problems with urination. Home -135, no peripheral edema. Tolerating IS without issues, no diarrhea or vomiting.     Current Outpatient Medications   Medication Sig Dispense Refill    aspirin (ECOTRIN) 81 MG EC tablet Take 1 tablet (81 mg total) by mouth once daily. 90 tablet 0    atorvastatin (LIPITOR) 80 MG tablet Take 1 tablet (80 mg total) by mouth every evening. 90 tablet 0    blood sugar diagnostic Strp Check blood glucose 2 times daily as directed and as needed 200 each 5    blood-glucose meter (ONETOUCH ULTRAMINI) kit Use as instructed 1 each 0    ezetimibe (ZETIA) 10 mg tablet Take 1 tablet (10 mg total) by mouth once daily. 90 tablet 0    k phos di & mono-sod phos mono (PHOSPHA 250 NEUTRAL) 250 mg Tab Take 2 tablets by mouth 3 (three) times daily. 180 tablet 11    lancets Misc Check blood glucose 2 times daily as directed and as needed (dispense insurance preferred brand or patient choice) 200 each 5    magnesium oxide (MAG-OX) 400 mg (241.3 mg magnesium) tablet Take 1 tablet (400 mg total) by mouth 2 (two) times daily. 60 tablet 11    magnesium oxide (MAG-OX) 400 mg (241.3 mg magnesium) tablet TAKE 1 TABLET BY MOUTH 2 TIMES DAILY.      metoprolol tartrate (LOPRESSOR) 25 MG tablet Take 1 tablet (25 mg total) by mouth 2 (two) times daily. 180 tablet 3    multivitamin Tab Take 1 tablet by mouth once daily.      nitroGLYCERIN (NITROSTAT) 0.4 MG SL tablet Dissolve one tablet underneath tongue at onset of angina; may repeat every 5  "minutes if needed. Call 911 if angina persists after 2 doses. 25 tablet 5    pantoprazole (PROTONIX) 40 MG tablet Take 1 tablet (40 mg total) by mouth once daily. 30 tablet 3    pen needle, diabetic (BD ULTRA-FINE SHORT PEN NEEDLE) 31 gauge x 5/16" Ndle Use to inject insulin into the skin 3 times daily 100 each 1    predniSONE (DELTASONE) 5 MG tablet TAKE ONE TABLET BY MOUTH DAILY 30 tablet 10    pregabalin (LYRICA) 50 MG capsule Take 1 capsule (50 mg total) by mouth 3 (three) times daily. 90 capsule 0    scopolamine (TRANSDERM-SCOP) 1.3-1.5 mg (1 mg over 3 days) Place 1 patch onto the skin every 72 hours. Apply behind ear. Start 24 hours before travel. 10 patch 0    tacrolimus (PROGRAF) 0.5 MG Cap Take 3 capsules (1.5 mg total) by mouth every morning AND 2 capsules (1 mg total) every evening. 150 capsule 11    tirzepatide (MOUNJARO) 7.5 mg/0.5 mL PnIj Inject 7.5 mg into the skin every 7 days. 12 Pen 1     No current facility-administered medications for this visit.       Past Medical History:   Diagnosis Date    Allergic rhinitis     Atrial fibrillation with RVR 6/4/2023    Class 1 obesity due to excess calories with serious comorbidity and body mass index (BMI) of 31.0 to 31.9 in adult 4/7/2017    Coronary artery disease     Coronary artery disease involving native coronary artery of native heart without angina pectoris 2/6/2018    Cath lab procedure 04/23/2018 (Naveen Rios MD) A. Indication/Pre-Operative Diagnosis: The patient is a 37 year old male that was referred for catheterization by [facility] for ACS (NSTEMI). The BELLA risk score is 5.  B. Summary/Post-Operative Diagnosis 1. Single vessel coronary artery disease. 2. Normal LVEF. 3. Diastolic dysfunction. 4. Successful PCI for acute myocardial infarction. 5.     Diabetic nephropathy associated with type 2 diabetes mellitus 1/6/2020    Direct hyperbilirubinemia 3/24/2018    DM (diabetes mellitus) 2008    BS doesn't check any more 08/02/2018    DM " (diabetes mellitus) 2012    BS 99 am 06/26/2020    DM (diabetes mellitus)     BS didn't check 06/04/2021    DM (diabetes mellitus) 2008    BS didn't check 07/29/2022     Dyslipidemia associated with type 2 diabetes mellitus 7/31/2017    Elevated bilirubin 3/21/2018    GERD (gastroesophageal reflux disease)     Gout     Hyperlipidemia     Hyperparathyroidism, secondary to chronic kidney disease 6/7/2021    Hypertension associated with chronic kidney disease due to type 2 diabetes mellitus     Hypertension complicating diabetes 3/16/2019    Not candidate for Hypertension Digital Medicine program due to dialysis status. Didn't tolerate amlodipine due to SE of hypotension.    Idiopathic chronic gout, multiple sites, without tophus (tophi) 7/19/2017    Long term (current) use of insulin     MI (myocardial infarction) 07/2017    NSTEMI with CAD s/p PCI (FAMILIA) of LAD x 2 in 8/2017 7/31/2017    Obesity     HENRY on CPAP     Peritoneal dialysis catheter in place 1/26/2023    Proteinuria     Severe obstructive sleep apnea - Intolerant of CPAP 7/31/2017    Intolerant of CPAP after weeks of trying multiple interfaces. SPLIT-NIGHT SLEEP STUDY 7/28/2015 · SLEEP ARCHITECTURE: Sleep onset was 2.9 minutes and sleep efficiency was 94.4%. Sleep Stage distribution showed 145 sleep stage changes, 6 awakenings and 119 arousals. Sleep distribution showed 53.2% stage NI, 41.8% stage N 11, 0.0% stage N Ill and REM sleep was at 5.0%. There were 2 REM periods. · RE    Stage 3a chronic kidney disease 5/26/2023    Stage 5 chronic kidney disease on chronic peritoneal dialysis 6/7/2017    Stage 5 chronic kidney disease on chronic peritoneal dialysis 6/7/2017    Status post angioplasty with stent 8/4/2017    Steatohepatitis     Fatty Liver    Type 2 diabetes mellitus with chronic kidney disease on chronic dialysis, without long-term current use of insulin 6/21/2017    Type 2 diabetes mellitus with diabetic nephropathy     Type 2 diabetes mellitus  with diabetic nephropathy, without long-term current use of insulin 1/6/2020    Type 2 diabetes mellitus with diabetic polyneuropathy, without long-term current use of insulin 6/7/2021    Type 2 diabetes mellitus with hyperglycemia     Type 2 diabetes mellitus with renal manifestations     Type 2 diabetes mellitus with stage 3 chronic kidney disease, without long-term current use of insulin 6/21/2017     Review of Systems   Constitutional:  Negative for activity change and fever.   HENT:  Positive for congestion.    Eyes:  Negative for visual disturbance.   Respiratory:  Negative for cough and shortness of breath.    Cardiovascular:  Negative for chest pain and leg swelling.   Gastrointestinal:  Negative for abdominal pain, constipation, diarrhea and nausea.   Genitourinary:  Negative for difficulty urinating, frequency and hematuria.   Musculoskeletal:  Negative for arthralgias and myalgias.   Skin:  Negative for wound.   Allergic/Immunologic: Positive for immunocompromised state.   Neurological:  Positive for headaches. Negative for weakness.   Psychiatric/Behavioral:  Negative for sleep disturbance.        Objective:   There were no vitals taken for this visit.body mass index is unknown because there is no height or weight on file.    Physical Exam-VIRTUAL VISIT    Labs:  Lab Results   Component Value Date    WBC 5.54 05/13/2024    HGB 14.5 05/13/2024    HCT 44.7 05/13/2024     05/13/2024    K 4.2 05/13/2024     05/13/2024    CO2 23 05/13/2024    BUN 14 05/13/2024    CREATININE 1.4 05/13/2024    EGFRNORACEVR >60.0 05/13/2024    CALCIUM 9.4 05/13/2024    PHOS 2.8 05/13/2024    MG 1.7 05/13/2024    ALBUMIN 4.1 05/13/2024    AST 27 04/11/2024    ALT 50 (H) 04/11/2024    UTPCR 0.61 (H) 02/05/2024    .0 (H) 04/11/2024    TACROLIMUS 6.6 05/13/2024     Labs were reviewed with the patient    Assessment:     1. S/P kidney transplant    2. CKD (chronic kidney disease), stage II    3. Long-term use of  immunosuppressant medication    4. BK viremia    5. At risk for opportunistic infections    6. Diabetic nephropathy associated with type 2 diabetes mellitus      Plan:   BK viremia: MMF on hold, Completed Cidofovir x3 and is scheduled for IVIG 6/10  Lab work rescheduled for tomorrow  Going to re-establish with general nephrology for CKD care, no need for IS refills today  Continue supportive care for likely viral illness, to notify if symptoms do not improve or develops fever, SOB      1. Immunosuppression: Prograf trough 6.6. Continue Prograf 1.5/1 and Prednisone 5 mg QD. 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 Will continue to monitor for drug toxicities    2. Allograft Function: stable, continue good oral hydration  - ESRD secondary to diabetic nephropathy s/p living kidney transplant on 5/15/23 (thymo induction, CMV ++).   - Post transplant course complicated with new onset afib, BK viremia  - baseline creatinine is between 1.4-1.6.   Lab Results   Component Value Date    CREATININE 1.4 05/13/2024       Latest Reference Range & Units 11mo 4wk,  Kidney-Post 1 Year  05/13/24 07:53   eGFR >60 mL/min/1.73 m^2 >60.0         3. Hypertension management: advise low salt diet and home BP monitoring.   lopressor 25 mg BID (hold for SBP <100)    4. Metabolic Bone Disease/Secondary Hyperparathyroidism:  MagOx 400 mg BID,  mg TID  Lab Results   Component Value Date    .0 (H) 04/11/2024    CALCIUM 9.4 05/13/2024    PHOS 2.8 05/13/2024          5. Electrolytes:  Will monitor /guidelines  Lab Results   Component Value Date     05/13/2024    K 4.2 05/13/2024     05/13/2024    CO2 23 05/13/2024       6. Anemia: stable. No need for intervention   Lab Results   Component Value Date    WBC 5.54 05/13/2024    HGB 14.5 05/13/2024    HCT 44.7 05/13/2024    MCV 86 05/13/2024     05/13/2024         7. Cytopenias: no significant cytopenias. Medicine list reviewed including potential causes of drug-induced  cytopenias    8.  Proteinuria: continue p/c ratio as per guidelines     9. BK virus infection screening: MMF on hold, Completed Cidofovir x3 and is scheduled for IVIG 6/10   Latest Reference Range & Units 11mo 4wk,  Kidney-Post 1 Year  05/13/24 07:53   BK Virus DNA, Blood Not detected  DETECTED !   BK Virus DNA PCR, Quant, Blood <125 Copies/mL 12,664 (H)       10. Weight education: provided during the clinic visit   There is no height or weight on file to calculate BMI.     11.Patient safety education regarding immunosuppression including prophylaxis posttransplant for CMV, PCP : Education provided about vaccination and prevention of infections     PCP ppx: Bactrim x 6 months (Stop 11/12/23)-done  CMV ppx: Valcyte x 3 months (Stop 8/14/23)-done       Follow-up:   Clinic: return to transplant clinic weekly for the first month after transplant; every 2 weeks during months 2-3; then at 6-, 9-, 12-, 18-, 24-, and 36- months post-transplant to reassess for complications from immunosuppression toxicity and monitor for rejection.  Annually thereafter.    Labs: since patient remains at high risk for rejection and drug-related complications that warrant close monitoring, labs will be ordered as follows: continue twice weekly CBC, renal panel, and drug level for first month; then same labs once weekly through 3rd month post-transplant.  Urine for UA and protein/creatinine ratio monthly.  Serum BK - PCR at 1-, 3-, 6-, 9-, 12-, 18-, 24-, 36-, 48-, and 60 months post-transplant.  Hepatic panel at 1-, 2-, 3-, 6-, 9-, 12-, 18-, 24-, and 36- months post-transplant.    Cinda Mccray NP

## 2024-06-05 ENCOUNTER — PATIENT MESSAGE (OUTPATIENT)
Dept: TRANSPLANT | Facility: CLINIC | Age: 44
End: 2024-06-05

## 2024-06-05 ENCOUNTER — OFFICE VISIT (OUTPATIENT)
Dept: TRANSPLANT | Facility: CLINIC | Age: 44
End: 2024-06-05
Payer: COMMERCIAL

## 2024-06-05 DIAGNOSIS — B34.8 BK VIREMIA: ICD-10-CM

## 2024-06-05 DIAGNOSIS — N18.2 CKD (CHRONIC KIDNEY DISEASE), STAGE II: ICD-10-CM

## 2024-06-05 DIAGNOSIS — Z79.60 LONG-TERM USE OF IMMUNOSUPPRESSANT MEDICATION: ICD-10-CM

## 2024-06-05 DIAGNOSIS — Z94.0 S/P KIDNEY TRANSPLANT: Primary | ICD-10-CM

## 2024-06-05 DIAGNOSIS — E11.21 DIABETIC NEPHROPATHY ASSOCIATED WITH TYPE 2 DIABETES MELLITUS: ICD-10-CM

## 2024-06-05 DIAGNOSIS — Z91.89 AT RISK FOR OPPORTUNISTIC INFECTIONS: ICD-10-CM

## 2024-06-05 PROCEDURE — 3060F POS MICROALBUMINURIA REV: CPT | Mod: CPTII,95,, | Performed by: NURSE PRACTITIONER

## 2024-06-05 PROCEDURE — 99214 OFFICE O/P EST MOD 30 MIN: CPT | Mod: 95,,, | Performed by: NURSE PRACTITIONER

## 2024-06-05 PROCEDURE — 3072F LOW RISK FOR RETINOPATHY: CPT | Mod: CPTII,95,, | Performed by: NURSE PRACTITIONER

## 2024-06-05 PROCEDURE — 1160F RVW MEDS BY RX/DR IN RCRD: CPT | Mod: CPTII,95,, | Performed by: NURSE PRACTITIONER

## 2024-06-05 PROCEDURE — 1159F MED LIST DOCD IN RCRD: CPT | Mod: CPTII,95,, | Performed by: NURSE PRACTITIONER

## 2024-06-05 PROCEDURE — 3044F HG A1C LEVEL LT 7.0%: CPT | Mod: CPTII,95,, | Performed by: NURSE PRACTITIONER

## 2024-06-05 PROCEDURE — 3066F NEPHROPATHY DOC TX: CPT | Mod: CPTII,95,, | Performed by: NURSE PRACTITIONER

## 2024-06-06 ENCOUNTER — PATIENT MESSAGE (OUTPATIENT)
Dept: INFUSION THERAPY | Facility: HOSPITAL | Age: 44
End: 2024-06-06
Payer: COMMERCIAL

## 2024-06-06 ENCOUNTER — LAB VISIT (OUTPATIENT)
Dept: LAB | Facility: HOSPITAL | Age: 44
End: 2024-06-06
Attending: INTERNAL MEDICINE
Payer: COMMERCIAL

## 2024-06-06 DIAGNOSIS — Z94.0 KIDNEY REPLACED BY TRANSPLANT: ICD-10-CM

## 2024-06-06 LAB
ALBUMIN SERPL BCP-MCNC: 3.9 G/DL (ref 3.5–5.2)
ANION GAP SERPL CALC-SCNC: 9 MMOL/L (ref 8–16)
BASOPHILS # BLD AUTO: 0.04 K/UL (ref 0–0.2)
BASOPHILS NFR BLD: 0.7 % (ref 0–1.9)
BUN SERPL-MCNC: 11 MG/DL (ref 6–20)
CALCIUM SERPL-MCNC: 9.8 MG/DL (ref 8.7–10.5)
CHLORIDE SERPL-SCNC: 108 MMOL/L (ref 95–110)
CO2 SERPL-SCNC: 23 MMOL/L (ref 23–29)
CREAT SERPL-MCNC: 1.4 MG/DL (ref 0.5–1.4)
DIFFERENTIAL METHOD BLD: ABNORMAL
EOSINOPHIL # BLD AUTO: 0.2 K/UL (ref 0–0.5)
EOSINOPHIL NFR BLD: 3.3 % (ref 0–8)
ERYTHROCYTE [DISTWIDTH] IN BLOOD BY AUTOMATED COUNT: 14.5 % (ref 11.5–14.5)
EST. GFR  (NO RACE VARIABLE): >60 ML/MIN/1.73 M^2
GLUCOSE SERPL-MCNC: 142 MG/DL (ref 70–110)
HCT VFR BLD AUTO: 47 % (ref 40–54)
HGB BLD-MCNC: 14.7 G/DL (ref 14–18)
IMM GRANULOCYTES # BLD AUTO: 0.02 K/UL (ref 0–0.04)
IMM GRANULOCYTES NFR BLD AUTO: 0.4 % (ref 0–0.5)
LYMPHOCYTES # BLD AUTO: 1.2 K/UL (ref 1–4.8)
LYMPHOCYTES NFR BLD: 21.2 % (ref 18–48)
MCH RBC QN AUTO: 27.6 PG (ref 27–31)
MCHC RBC AUTO-ENTMCNC: 31.3 G/DL (ref 32–36)
MCV RBC AUTO: 88 FL (ref 82–98)
MONOCYTES # BLD AUTO: 0.8 K/UL (ref 0.3–1)
MONOCYTES NFR BLD: 15 % (ref 4–15)
NEUTROPHILS # BLD AUTO: 3.3 K/UL (ref 1.8–7.7)
NEUTROPHILS NFR BLD: 59.4 % (ref 38–73)
NRBC BLD-RTO: 0 /100 WBC
PHOSPHATE SERPL-MCNC: 2.5 MG/DL (ref 2.7–4.5)
PLATELET # BLD AUTO: 164 K/UL (ref 150–450)
PMV BLD AUTO: 10.1 FL (ref 9.2–12.9)
POTASSIUM SERPL-SCNC: 4.3 MMOL/L (ref 3.5–5.1)
RBC # BLD AUTO: 5.33 M/UL (ref 4.6–6.2)
SODIUM SERPL-SCNC: 140 MMOL/L (ref 136–145)
WBC # BLD AUTO: 5.48 K/UL (ref 3.9–12.7)

## 2024-06-06 PROCEDURE — 80069 RENAL FUNCTION PANEL: CPT | Performed by: INTERNAL MEDICINE

## 2024-06-06 PROCEDURE — 85025 COMPLETE CBC W/AUTO DIFF WBC: CPT | Performed by: INTERNAL MEDICINE

## 2024-06-06 PROCEDURE — 80197 ASSAY OF TACROLIMUS: CPT | Performed by: INTERNAL MEDICINE

## 2024-06-06 PROCEDURE — 87799 DETECT AGENT NOS DNA QUANT: CPT | Performed by: INTERNAL MEDICINE

## 2024-06-07 ENCOUNTER — PATIENT MESSAGE (OUTPATIENT)
Dept: TRANSPLANT | Facility: CLINIC | Age: 44
End: 2024-06-07
Payer: COMMERCIAL

## 2024-06-07 LAB — TACROLIMUS BLD-MCNC: 6.3 NG/ML (ref 5–15)

## 2024-06-10 ENCOUNTER — TELEPHONE (OUTPATIENT)
Dept: TRANSPLANT | Facility: CLINIC | Age: 44
End: 2024-06-10
Payer: COMMERCIAL

## 2024-06-10 NOTE — TELEPHONE ENCOUNTER
Call returned to Carlos with [facility]. Explained to her the authorization is pending.     ----- Message from Kylie Sam sent at 6/10/2024 11:38 AM CDT -----  Regarding: Consult/Advisory  Contact: [phone]  CONSULT/ADVISORY    Name of Caller:  Carlos with [facility]    Contact Preference:  [phone]    Nature of Call:  Requesting a call back in regards to no prior auth for pts Infusion Therapy.  States this is her second time calling and hasn't received a call back.

## 2024-06-11 ENCOUNTER — PATIENT MESSAGE (OUTPATIENT)
Dept: TRANSPLANT | Facility: CLINIC | Age: 44
End: 2024-06-11
Payer: COMMERCIAL

## 2024-06-11 LAB
BKV DNA SERPL NAA+PROBE-ACNC: ABNORMAL COPIES/ML
BKV DNA SERPL NAA+PROBE-LOG#: 4.52 LOG (10) COPIES/ML
BKV DNA SERPL QL NAA+PROBE: DETECTED

## 2024-06-13 ENCOUNTER — TELEPHONE (OUTPATIENT)
Dept: TRANSPLANT | Facility: CLINIC | Age: 44
End: 2024-06-13
Payer: COMMERCIAL

## 2024-06-13 DIAGNOSIS — T86.10 COMPLICATION OF TRANSPLANTED KIDNEY, UNSPECIFIED COMPLICATION: Primary | ICD-10-CM

## 2024-06-13 NOTE — TELEPHONE ENCOUNTER
Notified patient of Dr. Munoz's review of 6/6/24 lab results. Informed patient of recommendations to have a kidney US done to assess for ureteral stricture. Patient request to have the US done at [facility] or [facility] location.  will contact patient with the appointment. Patient verbalized understanding.     ----- Message from Vivi Mckeon RN sent at 6/12/2024  5:48 PM CDT -----  ----- Message -----  From: Samantha Munoz DO  Sent: 6/12/2024   3:03 PM CDT  To: [facility] Post-Kidney Transplant Clinical    Long standing hx of BK viremia. Unable to clear. Not on [employer]  Can we check a US kidney transplant to ensure that he has not developed BK ureteral stricture

## 2024-06-21 ENCOUNTER — HOSPITAL ENCOUNTER (OUTPATIENT)
Dept: RADIOLOGY | Facility: HOSPITAL | Age: 44
Discharge: HOME | End: 2024-06-21
Attending: INTERNAL MEDICINE
Payer: COMMERCIAL

## 2024-06-21 DIAGNOSIS — T86.10 COMPLICATION OF TRANSPLANTED KIDNEY, UNSPECIFIED COMPLICATION: ICD-10-CM

## 2024-06-21 PROCEDURE — 76776 US EXAM K TRANSPL W/DOPPLER: CPT | Mod: 26,,, | Performed by: RADIOLOGY

## 2024-06-21 PROCEDURE — 76776 US EXAM K TRANSPL W/DOPPLER: CPT | Mod: TC

## 2024-06-24 DIAGNOSIS — I48.91 ATRIAL FIBRILLATION WITH RVR: ICD-10-CM

## 2024-06-25 ENCOUNTER — PATIENT MESSAGE (OUTPATIENT)
Dept: TRANSPLANT | Facility: CLINIC | Age: 44
End: 2024-06-25
Payer: COMMERCIAL

## 2024-06-25 RX ORDER — LANOLIN ALCOHOL/MO/W.PET/CERES
1 CREAM (GRAM) TOPICAL 2 TIMES DAILY
Qty: 60 TABLET | Refills: 11 | Status: SHIPPED | OUTPATIENT
Start: 2024-06-25

## 2024-06-26 ENCOUNTER — PATIENT MESSAGE (OUTPATIENT)
Dept: TRANSPLANT | Facility: CLINIC | Age: 44
End: 2024-06-26
Payer: COMMERCIAL

## 2024-06-27 ENCOUNTER — LAB VISIT (OUTPATIENT)
Dept: LAB | Facility: HOSPITAL | Age: 44
End: 2024-06-27
Attending: INTERNAL MEDICINE
Payer: COMMERCIAL

## 2024-06-27 DIAGNOSIS — Z94.0 KIDNEY REPLACED BY TRANSPLANT: ICD-10-CM

## 2024-06-27 LAB
ALBUMIN SERPL BCP-MCNC: 4.1 G/DL (ref 3.5–5.2)
ANION GAP SERPL CALC-SCNC: 7 MMOL/L (ref 8–16)
BASOPHILS # BLD AUTO: 0.04 K/UL (ref 0–0.2)
BASOPHILS NFR BLD: 0.7 % (ref 0–1.9)
BUN SERPL-MCNC: 14 MG/DL (ref 6–20)
CALCIUM SERPL-MCNC: 9.3 MG/DL (ref 8.7–10.5)
CHLORIDE SERPL-SCNC: 108 MMOL/L (ref 95–110)
CO2 SERPL-SCNC: 26 MMOL/L (ref 23–29)
CREAT SERPL-MCNC: 1.5 MG/DL (ref 0.5–1.4)
DIFFERENTIAL METHOD BLD: NORMAL
EOSINOPHIL # BLD AUTO: 0.1 K/UL (ref 0–0.5)
EOSINOPHIL NFR BLD: 2.5 % (ref 0–8)
ERYTHROCYTE [DISTWIDTH] IN BLOOD BY AUTOMATED COUNT: 14.3 % (ref 11.5–14.5)
EST. GFR  (NO RACE VARIABLE): 59.2 ML/MIN/1.73 M^2
GLUCOSE SERPL-MCNC: 121 MG/DL (ref 70–110)
HCT VFR BLD AUTO: 44.3 % (ref 40–54)
HGB BLD-MCNC: 14.5 G/DL (ref 14–18)
IMM GRANULOCYTES # BLD AUTO: 0.02 K/UL (ref 0–0.04)
IMM GRANULOCYTES NFR BLD AUTO: 0.4 % (ref 0–0.5)
LYMPHOCYTES # BLD AUTO: 1.3 K/UL (ref 1–4.8)
LYMPHOCYTES NFR BLD: 23.3 % (ref 18–48)
MCH RBC QN AUTO: 28.3 PG (ref 27–31)
MCHC RBC AUTO-ENTMCNC: 32.7 G/DL (ref 32–36)
MCV RBC AUTO: 86 FL (ref 82–98)
MONOCYTES # BLD AUTO: 0.7 K/UL (ref 0.3–1)
MONOCYTES NFR BLD: 12.2 % (ref 4–15)
NEUTROPHILS # BLD AUTO: 3.4 K/UL (ref 1.8–7.7)
NEUTROPHILS NFR BLD: 60.9 % (ref 38–73)
NRBC BLD-RTO: 0 /100 WBC
PHOSPHATE SERPL-MCNC: 3.3 MG/DL (ref 2.7–4.5)
PLATELET # BLD AUTO: 180 K/UL (ref 150–450)
PMV BLD AUTO: 9.7 FL (ref 9.2–12.9)
POTASSIUM SERPL-SCNC: 4 MMOL/L (ref 3.5–5.1)
RBC # BLD AUTO: 5.13 M/UL (ref 4.6–6.2)
SODIUM SERPL-SCNC: 141 MMOL/L (ref 136–145)
WBC # BLD AUTO: 5.58 K/UL (ref 3.9–12.7)

## 2024-06-27 PROCEDURE — 80069 RENAL FUNCTION PANEL: CPT | Performed by: INTERNAL MEDICINE

## 2024-06-27 PROCEDURE — 36415 COLL VENOUS BLD VENIPUNCTURE: CPT | Performed by: INTERNAL MEDICINE

## 2024-06-27 PROCEDURE — 80197 ASSAY OF TACROLIMUS: CPT | Performed by: INTERNAL MEDICINE

## 2024-06-27 PROCEDURE — 87799 DETECT AGENT NOS DNA QUANT: CPT | Performed by: INTERNAL MEDICINE

## 2024-06-27 PROCEDURE — 85025 COMPLETE CBC W/AUTO DIFF WBC: CPT | Performed by: INTERNAL MEDICINE

## 2024-06-28 LAB — TACROLIMUS BLD-MCNC: 7.7 NG/ML (ref 5–15)

## 2024-07-01 ENCOUNTER — PATIENT MESSAGE (OUTPATIENT)
Dept: TRANSPLANT | Facility: CLINIC | Age: 44
End: 2024-07-01
Payer: COMMERCIAL

## 2024-07-01 DIAGNOSIS — Z94.0 S/P KIDNEY TRANSPLANT: ICD-10-CM

## 2024-07-01 LAB
BKV DNA SERPL NAA+PROBE-ACNC: ABNORMAL COPIES/ML
BKV DNA SERPL NAA+PROBE-LOG#: 4.39 LOG (10) COPIES/ML
BKV DNA SERPL QL NAA+PROBE: DETECTED

## 2024-07-01 RX ORDER — TACROLIMUS 0.5 MG/1
CAPSULE ORAL
Qty: 120 CAPSULE | Refills: 11 | Status: SHIPPED | OUTPATIENT
Start: 2024-07-01 | End: 2025-07-01

## 2024-07-01 NOTE — TELEPHONE ENCOUNTER
Pt made aware of below orders        ----- Message from Rell Booth MD sent at 7/1/2024  2:01 PM CDT -----  Please lower prograf to 1 mg PO bid due to persistent BK viremia

## 2024-07-02 DIAGNOSIS — E11.59 HYPERTENSION COMPLICATING DIABETES: Chronic | ICD-10-CM

## 2024-07-02 DIAGNOSIS — I15.2 HYPERTENSION COMPLICATING DIABETES: Chronic | ICD-10-CM

## 2024-07-02 DIAGNOSIS — I48.91 LONE ATRIAL FIBRILLATION: ICD-10-CM

## 2024-07-02 RX ORDER — METOPROLOL TARTRATE 25 MG/1
25 TABLET, FILM COATED ORAL 2 TIMES DAILY
Qty: 180 TABLET | Refills: 2 | Status: SHIPPED | OUTPATIENT
Start: 2024-07-02

## 2024-07-02 NOTE — TELEPHONE ENCOUNTER
Refill Routing Note   Medication(s) are not appropriate for processing by [facility] for the following reason(s):        Drug-disease interaction    [facility] action(s):  Defer        Medication Therapy Plan: metoprolol tartrate and Status post angioplasty with stent; Hypertension complicating diabetes    Pharmacist review requested: Yes     Appointments  past 12m or future 3m with PCP    Date Provider   Last Visit   3/26/2024 SABIHA Roberson MD   Next Visit   7/11/2024 SABIHA Roberson MD   ED visits in past 90 days: 0        Note composed:6:15 PM 07/02/2024

## 2024-07-02 NOTE — TELEPHONE ENCOUNTER
Refill Decision Note   Robb Iglesias  is requesting a refill authorization.  Brief Assessment and Rationale for Refill:  Approve     Medication Therapy Plan:        Pharmacist review requested: Yes   Extended chart review required: Yes   Comments:     Note composed:6:21 PM 07/02/2024

## 2024-07-02 NOTE — TELEPHONE ENCOUNTER
No care due was identified.  [facility] Embedded Care Due Messages. Reference number: [id number].   7/02/2024 4:00:14 PM CDT

## 2024-07-05 ENCOUNTER — OFFICE VISIT (OUTPATIENT)
Dept: OPHTHALMOLOGY | Facility: CLINIC | Age: 44
End: 2024-07-05
Payer: COMMERCIAL

## 2024-07-05 ENCOUNTER — PATIENT MESSAGE (OUTPATIENT)
Dept: OPHTHALMOLOGY | Facility: CLINIC | Age: 44
End: 2024-07-05

## 2024-07-05 ENCOUNTER — PATIENT MESSAGE (OUTPATIENT)
Dept: TRANSPLANT | Facility: CLINIC | Age: 44
End: 2024-07-05
Payer: COMMERCIAL

## 2024-07-05 DIAGNOSIS — Z98.890 STATUS POST BILATERAL LASIK SURGERY: ICD-10-CM

## 2024-07-05 DIAGNOSIS — H52.7 REFRACTIVE ERRORS: ICD-10-CM

## 2024-07-05 DIAGNOSIS — E11.9 DIABETES MELLITUS TYPE 2 WITHOUT RETINOPATHY: Primary | ICD-10-CM

## 2024-07-05 DIAGNOSIS — I10 ESSENTIAL HYPERTENSION: ICD-10-CM

## 2024-07-05 DIAGNOSIS — H35.411 LATTICE DEGENERATION OF RIGHT RETINA: ICD-10-CM

## 2024-07-05 PROCEDURE — 99999 PR PBB SHADOW E&M-EST. PATIENT-LVL III: CPT | Mod: PBBFAC,,, | Performed by: OPTOMETRIST

## 2024-07-05 NOTE — PROGRESS NOTES
SUBJECTIVE  Robb Iglesias is 43 y.o. male  Corrected distance visual acuity was 20/20 in the right eye and 20/20 in the left eye. Corrected near visual acuity was J1 in the right eye and J1 in the left eye.   Chief Complaint   Patient presents with    Diabetic Eye Exam     Lab Results       Component                Value               Date                       HGBA1C                   6.6 (H)             04/11/2024                    HPI     Diabetic Eye Exam     Additional comments: Lab Results       Component                Value               Date                       HGBA1C                   6.6 (H)             04/11/2024                     Comments    Patient states no visual complaints.  No ocular pain/discomfort.  Not using any otc drops.  Wear contact lens and glasses.          Last edited by Beatriz Vaca on 7/5/2024  1:34 PM.         Assessment /Plan :  1. Diabetes mellitus type 2 without retinopathy   No Background Diabetic Retinopathy  Strict BG control, f/u w/ PCP, and annual DFE  Stressed importance of DM control to preserve vision      2. Essential hypertension   No HTN Retinopathy, monitor annually.      3. Lattice degeneration of right retina   Discussed signs and symptoms of retinal detachment.  Informed patient to return to clinic if any worsening of symptoms or decreased vision.     4. Status post bilateral LASIK surgery   Unchanged.     5. Refractive errors   Dispense Final Rx for glasses.  RTC 1 year  Discussed above and answered questions.

## 2024-07-09 ENCOUNTER — LAB VISIT (OUTPATIENT)
Dept: LAB | Facility: HOSPITAL | Age: 44
End: 2024-07-09
Attending: INTERNAL MEDICINE
Payer: COMMERCIAL

## 2024-07-09 DIAGNOSIS — Z94.0 KIDNEY REPLACED BY TRANSPLANT: ICD-10-CM

## 2024-07-09 LAB
ALBUMIN SERPL BCP-MCNC: 3.7 G/DL (ref 3.5–5.2)
ANION GAP SERPL CALC-SCNC: 5 MMOL/L (ref 8–16)
BASOPHILS # BLD AUTO: 0.04 K/UL (ref 0–0.2)
BASOPHILS NFR BLD: 0.9 % (ref 0–1.9)
BUN SERPL-MCNC: 11 MG/DL (ref 6–20)
CALCIUM SERPL-MCNC: 9.1 MG/DL (ref 8.7–10.5)
CHLORIDE SERPL-SCNC: 108 MMOL/L (ref 95–110)
CO2 SERPL-SCNC: 28 MMOL/L (ref 23–29)
CREAT SERPL-MCNC: 1.3 MG/DL (ref 0.5–1.4)
DIFFERENTIAL METHOD BLD: ABNORMAL
EOSINOPHIL # BLD AUTO: 0.1 K/UL (ref 0–0.5)
EOSINOPHIL NFR BLD: 3.1 % (ref 0–8)
ERYTHROCYTE [DISTWIDTH] IN BLOOD BY AUTOMATED COUNT: 13.9 % (ref 11.5–14.5)
EST. GFR  (NO RACE VARIABLE): >60 ML/MIN/1.73 M^2
GLUCOSE SERPL-MCNC: 123 MG/DL (ref 70–110)
HCT VFR BLD AUTO: 43.4 % (ref 40–54)
HGB BLD-MCNC: 14.3 G/DL (ref 14–18)
IMM GRANULOCYTES # BLD AUTO: 0.03 K/UL (ref 0–0.04)
IMM GRANULOCYTES NFR BLD AUTO: 0.7 % (ref 0–0.5)
LYMPHOCYTES # BLD AUTO: 1.2 K/UL (ref 1–4.8)
LYMPHOCYTES NFR BLD: 25.4 % (ref 18–48)
MCH RBC QN AUTO: 27.9 PG (ref 27–31)
MCHC RBC AUTO-ENTMCNC: 32.9 G/DL (ref 32–36)
MCV RBC AUTO: 85 FL (ref 82–98)
MONOCYTES # BLD AUTO: 0.6 K/UL (ref 0.3–1)
MONOCYTES NFR BLD: 13.1 % (ref 4–15)
NEUTROPHILS # BLD AUTO: 2.6 K/UL (ref 1.8–7.7)
NEUTROPHILS NFR BLD: 56.8 % (ref 38–73)
NRBC BLD-RTO: 0 /100 WBC
PHOSPHATE SERPL-MCNC: 3.2 MG/DL (ref 2.7–4.5)
PLATELET # BLD AUTO: 148 K/UL (ref 150–450)
PLATELET BLD QL SMEAR: ABNORMAL
PMV BLD AUTO: 9.9 FL (ref 9.2–12.9)
POTASSIUM SERPL-SCNC: 3.8 MMOL/L (ref 3.5–5.1)
RBC # BLD AUTO: 5.12 M/UL (ref 4.6–6.2)
SODIUM SERPL-SCNC: 141 MMOL/L (ref 136–145)
WBC # BLD AUTO: 4.57 K/UL (ref 3.9–12.7)

## 2024-07-09 PROCEDURE — 80197 ASSAY OF TACROLIMUS: CPT | Performed by: INTERNAL MEDICINE

## 2024-07-09 PROCEDURE — 87799 DETECT AGENT NOS DNA QUANT: CPT | Performed by: INTERNAL MEDICINE

## 2024-07-09 PROCEDURE — 80069 RENAL FUNCTION PANEL: CPT | Performed by: INTERNAL MEDICINE

## 2024-07-09 PROCEDURE — 85025 COMPLETE CBC W/AUTO DIFF WBC: CPT | Performed by: INTERNAL MEDICINE

## 2024-07-10 DIAGNOSIS — G57.92 PERIPHERAL NEURITIS OF LEFT FOOT: ICD-10-CM

## 2024-07-10 DIAGNOSIS — E11.42 TYPE 2 DIABETES MELLITUS WITH DIABETIC POLYNEUROPATHY, WITHOUT LONG-TERM CURRENT USE OF INSULIN: ICD-10-CM

## 2024-07-10 LAB — TACROLIMUS BLD-MCNC: 6.4 NG/ML (ref 5–15)

## 2024-07-11 ENCOUNTER — PATIENT MESSAGE (OUTPATIENT)
Dept: INTERNAL MEDICINE | Facility: CLINIC | Age: 44
End: 2024-07-11

## 2024-07-11 ENCOUNTER — OFFICE VISIT (OUTPATIENT)
Dept: INTERNAL MEDICINE | Facility: CLINIC | Age: 44
End: 2024-07-11
Payer: COMMERCIAL

## 2024-07-11 VITALS
RESPIRATION RATE: 18 BRPM | BODY MASS INDEX: 28.95 KG/M2 | OXYGEN SATURATION: 97 % | HEIGHT: 75 IN | WEIGHT: 232.81 LBS | DIASTOLIC BLOOD PRESSURE: 90 MMHG | HEART RATE: 76 BPM | SYSTOLIC BLOOD PRESSURE: 140 MMHG

## 2024-07-11 DIAGNOSIS — I25.10 CORONARY ARTERY DISEASE INVOLVING NATIVE CORONARY ARTERY OF NATIVE HEART WITHOUT ANGINA PECTORIS: Chronic | ICD-10-CM

## 2024-07-11 DIAGNOSIS — N18.31 TYPE 2 DIABETES MELLITUS WITH STAGE 3A CHRONIC KIDNEY DISEASE, WITHOUT LONG-TERM CURRENT USE OF INSULIN: Chronic | ICD-10-CM

## 2024-07-11 DIAGNOSIS — B34.8 BK VIREMIA: Chronic | ICD-10-CM

## 2024-07-11 DIAGNOSIS — R09.89 DECREASED PEDAL PULSES: ICD-10-CM

## 2024-07-11 DIAGNOSIS — E78.5 DYSLIPIDEMIA ASSOCIATED WITH TYPE 2 DIABETES MELLITUS: ICD-10-CM

## 2024-07-11 DIAGNOSIS — I25.2 HISTORY OF NON-ST ELEVATION MYOCARDIAL INFARCTION (NSTEMI): Chronic | ICD-10-CM

## 2024-07-11 DIAGNOSIS — E11.42 TYPE 2 DIABETES MELLITUS WITH DIABETIC POLYNEUROPATHY, WITHOUT LONG-TERM CURRENT USE OF INSULIN: Chronic | ICD-10-CM

## 2024-07-11 DIAGNOSIS — R20.9 BILATERAL COLD FEET: ICD-10-CM

## 2024-07-11 DIAGNOSIS — E66.3 OVERWEIGHT (BMI 25.0-29.9): Primary | Chronic | ICD-10-CM

## 2024-07-11 DIAGNOSIS — E11.69 DYSLIPIDEMIA ASSOCIATED WITH TYPE 2 DIABETES MELLITUS: ICD-10-CM

## 2024-07-11 DIAGNOSIS — I10 ESSENTIAL HYPERTENSION: Chronic | ICD-10-CM

## 2024-07-11 DIAGNOSIS — E11.22 TYPE 2 DIABETES MELLITUS WITH STAGE 3A CHRONIC KIDNEY DISEASE, WITHOUT LONG-TERM CURRENT USE OF INSULIN: Chronic | ICD-10-CM

## 2024-07-11 PROCEDURE — 3066F NEPHROPATHY DOC TX: CPT | Mod: CPTII,S$GLB,, | Performed by: FAMILY MEDICINE

## 2024-07-11 PROCEDURE — 99999 PR PBB SHADOW E&M-EST. PATIENT-LVL V: CPT | Mod: PBBFAC,,, | Performed by: FAMILY MEDICINE

## 2024-07-11 PROCEDURE — 1159F MED LIST DOCD IN RCRD: CPT | Mod: CPTII,S$GLB,, | Performed by: FAMILY MEDICINE

## 2024-07-11 PROCEDURE — 1160F RVW MEDS BY RX/DR IN RCRD: CPT | Mod: CPTII,S$GLB,, | Performed by: FAMILY MEDICINE

## 2024-07-11 PROCEDURE — 3077F SYST BP >= 140 MM HG: CPT | Mod: CPTII,S$GLB,, | Performed by: FAMILY MEDICINE

## 2024-07-11 PROCEDURE — G2211 COMPLEX E/M VISIT ADD ON: HCPCS | Mod: S$GLB,,, | Performed by: FAMILY MEDICINE

## 2024-07-11 PROCEDURE — 3044F HG A1C LEVEL LT 7.0%: CPT | Mod: CPTII,S$GLB,, | Performed by: FAMILY MEDICINE

## 2024-07-11 PROCEDURE — 3008F BODY MASS INDEX DOCD: CPT | Mod: CPTII,S$GLB,, | Performed by: FAMILY MEDICINE

## 2024-07-11 PROCEDURE — 99214 OFFICE O/P EST MOD 30 MIN: CPT | Mod: S$GLB,,, | Performed by: FAMILY MEDICINE

## 2024-07-11 PROCEDURE — 3080F DIAST BP >= 90 MM HG: CPT | Mod: CPTII,S$GLB,, | Performed by: FAMILY MEDICINE

## 2024-07-11 PROCEDURE — 3060F POS MICROALBUMINURIA REV: CPT | Mod: CPTII,S$GLB,, | Performed by: FAMILY MEDICINE

## 2024-07-11 RX ORDER — EZETIMIBE 10 MG/1
10 TABLET ORAL DAILY
Qty: 90 TABLET | Refills: 3 | Status: SHIPPED | OUTPATIENT
Start: 2024-07-11

## 2024-07-11 RX ORDER — PREGABALIN 50 MG/1
CAPSULE ORAL
Qty: 90 CAPSULE | Refills: 0 | Status: SHIPPED | OUTPATIENT
Start: 2024-07-11

## 2024-07-11 NOTE — ASSESSMENT & PLAN NOTE
"Wt Readings from Last 6 Encounters:   07/11/24 105.6 kg (232 lb 12.9 oz)   05/16/24 102.2 kg (225 lb 5 oz)   04/19/24 102.2 kg (225 lb 5 oz)   04/05/24 102.1 kg (225 lb)   01/09/24 103.6 kg (228 lb 6.3 oz)   12/19/23 103.3 kg (227 lb 11.8 oz)     Estimated body mass index is 29.1 kg/m² as calculated from the following:    Height as of this encounter: 6' 3" (1.905 m).    Weight as of this encounter: 105.6 kg (232 lb 12.9 oz).    "

## 2024-07-11 NOTE — PROGRESS NOTES
"OFFICE VISIT 7/11/24 10:20 AM CDT    History of Present Illness    Robb presents today for follow up of blood pressure, IVIG treatment, and recent life stressors.    Robb reports significant life stressors after being laid off from his job 3 weeks ago, which "threw his life upside down." He has found a new job and received severance pay, mitigating financial concerns. However, the change has significantly disrupted his routine and sleep schedule. He is trying to re-establish his routine and had the best sleep in the past few weeks last night, though still only slept for about 6.5 hours. He attributes his elevated blood pressure to the stress of the situation.    Blood pressure readings have been elevated in the range of 130-140/88-90 following job loss and initiation of IVIG treatment 3 weeks ago. Readings have not exceeded 140/90. He has been monitoring occasionally, typically in the morning. Denies other associated symptoms.    Received two IVIG treatments over the past 3 weeks. The first treatment was 2 weeks ago, followed by the second treatment 1 week ago.    Currently taking metoprolol tartrate 25mg twice daily for blood pressure, aspirin nightly, Zetia, and Lyrica which has significantly helped with itching in his great toe from diabetic neuropathy that was interfering with sleep. Denies any side effects.    Last A1c 3 months ago was 6.6, indicating well-controlled diabetes. Currently on Mounjaro 7.5 mg for management.    Experiencing itching sensation in left great toe, topical treatments were ineffective. Lyrica has significantly alleviated symptoms. On exam, diminished sensation in left great toe and little toe, sensation intact elsewhere in foot. Denies numbness, tingling, or burning.    Reports cold sensation in feet for the past 3-4 weeks, primarily in the evenings. Manages by wearing thick socks to bed. Home thermostat set to 70°F which he describes as "chilly." Denies other associated symptoms or " concerns.    History of previous heart attack, has completed cardiac rehabilitation. Cleared to resume exercise and joined [employer] with his brother. Consistent exercise has improved his blood pressure, blood sugar, and overall well-being in the past. Hasn't been able to exercise recently due to fatigue from IVIG but plans to resume cardiac workout regimen.    Released from transplant center back to nephrologist. Currently 5 months away from transitioning to annual checkups with transplant team. After 1.5 years post-transplant, will return to Dr. Figueroa for routine nephrology follow-up.    Health insurance provided through wife's employer, [employer], which offers very good benefits.       Review of Systems: Except as noted herein, ROS is otherwise negative.     Assessment & Plan     Blood pressure mildly elevated in the 130s/80s range, likely due to recent major life stressors. Given short duration, not a major concern at this time. Will continue to monitor.   Diabetes well-controlled with recent A1c of 6.6%. Tolerating current Mounjaro dose.   Patient reports cold sensation in feet, particularly in evenings. Exam shows diminished pulses in left foot. Ordered arterial ultrasound of legs to assess for circulatory issues, though significant blockages not expected.   Experiencing itching and discomfort in left great toe, diagnosed as diabetic neuropathy by Dr. Nieto. Currently on Lyrica for symptom control.  I10 ESSENTIAL (PRIMARY) HYPERTENSION:   Continued metoprolol tartrate 25 mg twice daily for blood pressure management.   Ordered an e-visit for blood pressure follow up in 4 weeks and sent an invitation to the patient portal.   Scheduled a follow up in 6 months (January) if e-visit blood pressure check, labs, and arterial ultrasound are all satisfactory.   The patient reports elevated blood pressure readings between 130-140/88-90 since being laid off and starting IVIG treatment.   Acknowledged that the blood  pressure readings are at the upper limit of acceptable range, but considered them borderline given recent stressful life events.   Assessed that short-term elevation in blood pressure due to recent stressors is not a significant long-term risk.   Advised the patient to continue monitoring blood pressure at home.  Z56.0 UNEMPLOYMENT, UNSPECIFIED:   The patient reports being laid off from work three weeks ago, causing stress and disruption to routine.   Acknowledged job loss as a major life stressor affecting the patient's health.   The patient has found a new job, which is helping to alleviate the stress of unemployment.  Z94.0 KIDNEY TRANSPLANT STATUS:   Coordinated timing of labs with those already being done every 2-3 weeks for transplant monitoring.   The patient is one year post-kidney transplant and is still under the care of the transplant center.   The patient undergoes regular labs including renal panel, complete blood count, Prograf level, and BK virus testing.   Acknowledged the ongoing care from the transplant center and planned for future transition to nephrologist care.   The patient continues with regular lab monitoring and follow-ups with the transplant center.  Z51.81 ENCOUNTER FOR THERAPEUTIC DRUG LEVEL MONITORING:   Ordered A1c and comprehensive metabolic panel, to be done around 8/26, a few days prior to 8/29 appointment with Amelia Sorensen in diabetes clinic.   The patient undergoes regular labs including Prograf level monitoring.   Scheduled regular labs for monitoring, including Prograf levels.  R20.8 OTHER DISTURBANCES OF SKIN SENSATION:   The patient reports cold feet in the evenings for the past 3-4 weeks.   Performed sensory exam and found diminished sensation in the great toe and little toe of the left foot.   Considered potential circulatory issues and ordered an ultrasound of leg arteries.   The patient manages cold feet by wearing thick socks.  I25.2 OLD MYOCARDIAL INFARCTION:    "Continued aspirin at night.   Performed cardiac exam and found heart to be solid and steady.   Considered the patient's history of heart attack when ordering leg artery ultrasound.   The patient has been cleared to start working out again and plans to follow cardiac workout routine.  I73.9 PERIPHERAL VASCULAR DISEASE, UNSPECIFIED:   Ordered arterial ultrasound of bilateral lower extremities to evaluate circulation to the feet.   Found diminished pulses in the left foot during exam.   Ordered ultrasound of leg a  rteries to evaluate circulation.  E11.42 TYPE 2 DIABETES MELLITUS WITH DIABETIC POLYNEUROPATHY:   Continued Mounjaro 7.5 mg for diabetes management.   Continued Lyrica for diabetic neuropathy, as prescribed by Dr. Nieto.   Scheduled a follow-up with Amelia Sorensen in diabetes clinic on 8/29.   The patient reports itching in the great toe, which was keeping him up at night.   Performed sensory exam and found diminished sensation in the great toe and little toe of the left foot.   Noted the last A1C was 6.6 three months ago.   Acknowledged the diagnosis of diabetic neuropathy made by Dr. Nieto.  LIFESTYLE CHANGES:   Started Zetia (refilled and sent to [facility]).   Robb may follow up sooner if needed.       1. Overweight (BMI 25.0-29.9)  Assessment & Plan:  Wt Readings from Last 6 Encounters:   07/11/24 105.6 kg (232 lb 12.9 oz)   05/16/24 102.2 kg (225 lb 5 oz)   04/19/24 102.2 kg (225 lb 5 oz)   04/05/24 102.1 kg (225 lb)   01/09/24 103.6 kg (228 lb 6.3 oz)   12/19/23 103.3 kg (227 lb 11.8 oz)     Estimated body mass index is 29.1 kg/m² as calculated from the following:    Height as of this encounter: 6' 3" (1.905 m).    Weight as of this encounter: 105.6 kg (232 lb 12.9 oz).        2. Type 2 diabetes mellitus with stage 3a chronic kidney disease, without long-term current use of insulin  Assessment & Plan:  Lab Results   Component Value Date    HGBA1C 6.6 (H) 04/11/2024    HGBA1C 7.3 (H) " 12/18/2023    HGBA1C 5.6 07/03/2023    EGFRNORACEVR >60.0 07/09/2024    MICALBCREAT 145.0 (H) 04/11/2024    LDLCALC 26.4 (L) 04/11/2024        Orders:  -     Hemoglobin A1C; Future; Expected date: 08/26/2024  -     Comprehensive Metabolic Panel; Future; Expected date: 08/26/2024    3. BK viremia    4. Essential hypertension  -     Hemoglobin A1C; Future; Expected date: 08/26/2024  -     Comprehensive Metabolic Panel; Future; Expected date: 08/26/2024  -     MYC E-VISIT    5. Dyslipidemia associated with type 2 diabetes mellitus  -     ezetimibe (ZETIA) 10 mg tablet; Take 1 tablet (10 mg total) by mouth once daily.  Dispense: 90 tablet; Refill: 3    6. Coronary artery disease involving native coronary artery of native heart without angina pectoris  Overview:  Cath lab procedure 04/23/2018 (Naveen Rios MD)  A. Indication/Pre-Operative Diagnosis: The patient is a 37 year old male that was referred for catheterization by [facility] for ACS (NSTEMI). The BELLA risk score is 5.    B. Summary/Post-Operative Diagnosis  1. Single vessel coronary artery disease.  2. Normal LVEF.  3. Diastolic dysfunction.  4. Successful PCI for acute myocardial infarction.  5. The patient did not undergo primary PCI.    Orders:  -     ezetimibe (ZETIA) 10 mg tablet; Take 1 tablet (10 mg total) by mouth once daily.  Dispense: 90 tablet; Refill: 3  -     CV Ultrasound doppler arterial legs bilat; Future    7. History of NSTEMI with CAD s/p PCI (FAMILIA) of LAD x 2 in 8/2017  -     ezetimibe (ZETIA) 10 mg tablet; Take 1 tablet (10 mg total) by mouth once daily.  Dispense: 90 tablet; Refill: 3    8. Bilateral cold feet  -     CV Ultrasound doppler arterial legs bilat; Future    9. Decreased pedal pulses  -     CV Ultrasound doppler arterial legs bilat; Future    10. Type 2 diabetes mellitus with diabetic polyneuropathy, without long-term current use of insulin  Overview:  Paresthesia/Dysesthesia >> Anesthesia.    Assessment & Plan:  Much  "improved on Lyrica.      No other significant complaints or concerns were reported.    Today's visit involved the intricate management of episodic problem(s) and the ongoing care for the patient's serious or complex condition(s) listed above, reflecting the inherent complexity of providing longitudinal, comprehensive evaluation and management as the central hub for the patient's primary care services.  Vitals:    07/11/24 1022   BP: (!) 140/90   BP Location: Right arm   Patient Position: Sitting   BP Method: Large (Manual)   Pulse: 76   Resp: 18   SpO2: 97%   Weight: 105.6 kg (232 lb 12.9 oz)   Height: 6' 3" (1.905 m)   Physical Exam  Vitals reviewed.   Constitutional:       General: He is not in acute distress.     Appearance: Normal appearance. He is not ill-appearing or diaphoretic.   Neck:      Thyroid: No thyroid mass, thyromegaly or thyroid tenderness.   Cardiovascular:      Rate and Rhythm: Normal rate and regular rhythm.   Pulmonary:      Effort: Pulmonary effort is normal.      Breath sounds: Normal breath sounds.   Skin:     General: Skin is warm and dry.   Neurological:      Mental Status: He is alert and oriented to person, place, and time. Mental status is at baseline.   Psychiatric:         Mood and Affect: Mood normal.         Behavior: Behavior normal.         Judgment: Judgment normal.       DIABETIC FOOT EXAM:  Protective Sensation (w/ 10 gram monofilament):  Right: Intact  Left: Decreased    Visual Inspection:  Normal -  Bilateral    Pedal Pulses:   Right: Present  Left: Diminished    Posterior Tibialis Pulses:   Right:Present  Left: Diminished       This note was generated with the assistance of ambient listening technology. Verbal consent was obtained by the patient and accompanying visitor(s) for the recording of patient appointment to facilitate this note. I attest to having reviewed and edited the generated note for accuracy, though some syntax or spelling errors may persist. Please contact " "the author of this note for any clarification.    Documentation entered by me for this encounter may have been done in part using speech-recognition technology. Although I have made an effort to ensure accuracy, "sound like" errors may exist and should be interpreted in context.   "

## 2024-07-11 NOTE — ASSESSMENT & PLAN NOTE
Lab Results   Component Value Date    HGBA1C 6.6 (H) 04/11/2024    HGBA1C 7.3 (H) 12/18/2023    HGBA1C 5.6 07/03/2023    EGFRNORACEVR >60.0 07/09/2024    MICALBCREAT 145.0 (H) 04/11/2024    LDLCALC 26.4 (L) 04/11/2024

## 2024-07-12 ENCOUNTER — HOSPITAL ENCOUNTER (OUTPATIENT)
Dept: CARDIOLOGY | Facility: HOSPITAL | Age: 44
Discharge: HOME | End: 2024-07-12
Attending: FAMILY MEDICINE
Payer: COMMERCIAL

## 2024-07-12 VITALS — WEIGHT: 232 LBS | HEIGHT: 75 IN | BODY MASS INDEX: 28.85 KG/M2

## 2024-07-12 DIAGNOSIS — I25.10 CORONARY ARTERY DISEASE INVOLVING NATIVE CORONARY ARTERY OF NATIVE HEART WITHOUT ANGINA PECTORIS: Chronic | ICD-10-CM

## 2024-07-12 DIAGNOSIS — R09.89 DECREASED PEDAL PULSES: ICD-10-CM

## 2024-07-12 DIAGNOSIS — R20.9 BILATERAL COLD FEET: ICD-10-CM

## 2024-07-12 LAB
BKV DNA SERPL NAA+PROBE-ACNC: ABNORMAL COPIES/ML
BKV DNA SERPL NAA+PROBE-LOG#: 4.34 LOG (10) COPIES/ML
BKV DNA SERPL QL NAA+PROBE: DETECTED
LEFT ANT TIBIAL SYS PSV: 52 CM/S
LEFT CFA PSV: 89 CM/S
LEFT PERONEAL SYS PSV: 59 CM/S
LEFT POPLITEAL PSV: 68 CM/S
LEFT POST TIBIAL SYS PSV: 55 CM/S
LEFT PROFUNDA SYS PSV: 64 CM/S
LEFT SUPER FEMORAL DIST SYS PSV: 67 CM/S
LEFT SUPER FEMORAL MID SYS PSV: 86 CM/S
LEFT SUPER FEMORAL OSTIAL SYS PSV: 72 CM/S
LEFT SUPER FEMORAL PROX SYS PSV: 78 CM/S
LEFT TIB/PER TRUNK SYS PSV: 135 CM/S
RIGHT ANT TIBIAL SYS PSV: 74 CM/S
RIGHT CFA PSV: 102 CM/S
RIGHT PERONEAL SYS PSV: 40 CM/S
RIGHT POPLITEAL PSV: 55 CM/S
RIGHT POST TIBIAL SYS PSV: 50 CM/S
RIGHT PROFUNDA SYS PSV: 70 CM/S
RIGHT SUPER FEMORAL DIST SYS PSV: 80 CM/S
RIGHT SUPER FEMORAL MID SYS PSV: 91 CM/S
RIGHT SUPER FEMORAL OSTIAL SYS PSV: 102 CM/S
RIGHT SUPER FEMORAL PROX SYS PSV: 78 CM/S
RIGHT TIB/PER TRUNK SYS PSV: 56 CM/S

## 2024-07-12 PROCEDURE — 93925 LOWER EXTREMITY STUDY: CPT

## 2024-07-12 PROCEDURE — 93925 LOWER EXTREMITY STUDY: CPT | Mod: 26,,, | Performed by: INTERNAL MEDICINE

## 2024-07-18 DIAGNOSIS — Z94.0 KIDNEY REPLACED BY TRANSPLANT: Primary | ICD-10-CM

## 2024-07-23 ENCOUNTER — PATIENT MESSAGE (OUTPATIENT)
Dept: INTERNAL MEDICINE | Facility: CLINIC | Age: 44
End: 2024-07-23
Payer: COMMERCIAL

## 2024-07-23 ENCOUNTER — LAB VISIT (OUTPATIENT)
Dept: LAB | Facility: HOSPITAL | Age: 44
End: 2024-07-23
Attending: INTERNAL MEDICINE
Payer: COMMERCIAL

## 2024-07-23 DIAGNOSIS — Z94.0 KIDNEY REPLACED BY TRANSPLANT: ICD-10-CM

## 2024-07-23 LAB
ALBUMIN SERPL BCP-MCNC: 3.9 G/DL (ref 3.5–5.2)
ANION GAP SERPL CALC-SCNC: 5 MMOL/L (ref 8–16)
BASOPHILS # BLD AUTO: 0.04 K/UL (ref 0–0.2)
BASOPHILS NFR BLD: 0.8 % (ref 0–1.9)
BUN SERPL-MCNC: 15 MG/DL (ref 6–20)
CALCIUM SERPL-MCNC: 9.3 MG/DL (ref 8.7–10.5)
CHLORIDE SERPL-SCNC: 108 MMOL/L (ref 95–110)
CO2 SERPL-SCNC: 28 MMOL/L (ref 23–29)
CREAT SERPL-MCNC: 1.5 MG/DL (ref 0.5–1.4)
DIFFERENTIAL METHOD BLD: NORMAL
EOSINOPHIL # BLD AUTO: 0.2 K/UL (ref 0–0.5)
EOSINOPHIL NFR BLD: 3.1 % (ref 0–8)
ERYTHROCYTE [DISTWIDTH] IN BLOOD BY AUTOMATED COUNT: 14.1 % (ref 11.5–14.5)
EST. GFR  (NO RACE VARIABLE): 59.2 ML/MIN/1.73 M^2
GLUCOSE SERPL-MCNC: 127 MG/DL (ref 70–110)
HCT VFR BLD AUTO: 45.4 % (ref 40–54)
HGB BLD-MCNC: 14.7 G/DL (ref 14–18)
IMM GRANULOCYTES # BLD AUTO: 0.02 K/UL (ref 0–0.04)
IMM GRANULOCYTES NFR BLD AUTO: 0.4 % (ref 0–0.5)
LYMPHOCYTES # BLD AUTO: 1.3 K/UL (ref 1–4.8)
LYMPHOCYTES NFR BLD: 25.6 % (ref 18–48)
MAGNESIUM SERPL-MCNC: 1.7 MG/DL (ref 1.6–2.6)
MCH RBC QN AUTO: 27.3 PG (ref 27–31)
MCHC RBC AUTO-ENTMCNC: 32.4 G/DL (ref 32–36)
MCV RBC AUTO: 84 FL (ref 82–98)
MONOCYTES # BLD AUTO: 0.7 K/UL (ref 0.3–1)
MONOCYTES NFR BLD: 13.8 % (ref 4–15)
NEUTROPHILS # BLD AUTO: 2.9 K/UL (ref 1.8–7.7)
NEUTROPHILS NFR BLD: 56.3 % (ref 38–73)
NRBC BLD-RTO: 0 /100 WBC
PHOSPHATE SERPL-MCNC: 2.9 MG/DL (ref 2.7–4.5)
PLATELET # BLD AUTO: 183 K/UL (ref 150–450)
PMV BLD AUTO: 10.1 FL (ref 9.2–12.9)
POTASSIUM SERPL-SCNC: 4.1 MMOL/L (ref 3.5–5.1)
RBC # BLD AUTO: 5.38 M/UL (ref 4.6–6.2)
SODIUM SERPL-SCNC: 141 MMOL/L (ref 136–145)
WBC # BLD AUTO: 5.2 K/UL (ref 3.9–12.7)

## 2024-07-23 PROCEDURE — 80197 ASSAY OF TACROLIMUS: CPT | Performed by: INTERNAL MEDICINE

## 2024-07-23 PROCEDURE — 36415 COLL VENOUS BLD VENIPUNCTURE: CPT | Performed by: INTERNAL MEDICINE

## 2024-07-23 PROCEDURE — 85025 COMPLETE CBC W/AUTO DIFF WBC: CPT | Performed by: INTERNAL MEDICINE

## 2024-07-23 PROCEDURE — 87799 DETECT AGENT NOS DNA QUANT: CPT | Performed by: INTERNAL MEDICINE

## 2024-07-23 PROCEDURE — 83735 ASSAY OF MAGNESIUM: CPT | Performed by: INTERNAL MEDICINE

## 2024-07-23 PROCEDURE — 80069 RENAL FUNCTION PANEL: CPT | Performed by: INTERNAL MEDICINE

## 2024-07-24 LAB — TACROLIMUS BLD-MCNC: 5.6 NG/ML (ref 5–15)

## 2024-07-25 NOTE — LETTER
June 5, 2024        Siva Figueroa  [address] [address] NEPHROLOGY  [facility] [address]  Phone: [phone]  Fax: [phone]             [facility]- Transplant 1st Fl  [address]  [facility] [address]  Phone: [phone]   Patient: Robb Iglesias   MR Number: [id number]   YOB: 1980   Date of Visit: 6/5/2024       Dear Dr. Siva Figueroa    Thank you for referring Robb Iglesias to me for evaluation. Attached you will find relevant portions of my assessment and plan of care.    If you have questions, please do not hesitate to call me. I look forward to following Robb Iglesias along with you.    Sincerely,    Cinda Mccray NP    Enclosure    If you would like to receive this communication electronically, please contact [email or web address] or [phone] to request [employer] access.    [employer] is a tool which provides read-only access to select patient information with whom you have a relationship. Its easy to use and provides real time access to review your patients record including encounter summaries, notes, results, and demographic information.    If you feel you have received this communication in error or would no longer like to receive these types of communications, please e-mail [email or web address]    TRANSITIONAL CARE MANAGEMENT    Subjective   Patient ID: Lois is a 73 year old female and is unaccompanied today.  DOA-07/15/2024  DOD-07/18/2024    Discharge Diagnosis  #HFrEF, new  #Dysarthria, resolved  #L sided facial droop, resolved  #R carotid siphon aneurysm  #Hypertension  #Dyslipidemia  #Acute kidney injury  #COPD   #Hypothyroidismas   in hospital Monday , discharge on thursday evening  Home for a week, doing good, back to normal,walking dog, walking with walker  Anxious, less confident  Anxious about tomorrow  Mri,brainscan,ctscan done  Going tomorrow for angiogram  Have new list of medication  Worried about bp   Left leg little swollen, elevating helps  Seeing neurosurgery for carotid on August 15th  Was on losartan and coreg but was held  Chief Complaint   Patient presents with   • Hospital F/U     Hosp discharge f/u. Speech has improved.     The patient was discharged from the hospital on 07/18/2024 to her home. The Discharge Summary was reviewed. It documents that the patient was hospitalized for stroke.    Pertinent and un-finalized hospital performed diagnostic tests - were reviewed..    Pertinent un-finalized hospital lab tests - were reviewed.    Advanced Directives:  none    DME/Assistive devices prescribed: None     Medications:  The discharge medication list was reviewed. Outpatient medications were updated today. She is fully compliant with the medication regimen prescribed at the time of discharge.    Patient's medications, allergies, past medical, surgical, social and family histories were reviewed and updated as appropriate.      Review of Systems   All other systems reviewed and are negative.      Objective   Visit Vitals  BP (!) 149/91   Pulse 87   Temp 98 °F (36.7 °C) (Oral)   Ht 5' 1\" (1.549 m)   Wt 63.2 kg (139 lb 5.3 oz)   SpO2 90%   BMI 26.33 kg/m²     Physical Exam  Vitals reviewed.   Constitutional:       Appearance: Normal appearance.   Cardiovascular:      Rate and Rhythm: Normal  rate and regular rhythm.      Pulses: Normal pulses.      Heart sounds: Normal heart sounds.   Pulmonary:      Effort: Pulmonary effort is normal.      Breath sounds: Normal breath sounds.   Abdominal:      General: Bowel sounds are normal.      Palpations: Abdomen is soft.   Neurological:      Mental Status: She is alert.         Assessment   Problem List Items Addressed This Visit        Cardiac and Vasculature    Hypercholesterolemia     Check lipids  On statin         Relevant Medications    losartan (COZAAR) 50 MG tablet    carvedilol (COREG) 12.5 MG tablet    Other Relevant Orders    CBC with Automated Differential    Basic Metabolic Panel    Lipid Panel With Reflex    Prothrombin Time (INR/PT)    Essential hypertension - Primary     D/w DR.Nishant Badillo, angio tomorrow  Advised to start losartan 50mg daily and coreg 12.5mg bid, prescribed, pt will  from pharmacy now and start today         Relevant Medications    losartan (COZAAR) 50 MG tablet    carvedilol (COREG) 12.5 MG tablet    Other Relevant Orders    CBC with Automated Differential    Basic Metabolic Panel    Lipid Panel With Reflex    Prothrombin Time (INR/PT)    Ischemic cardiomyopathy     Angio scheduled for tomorrow           Relevant Medications    losartan (COZAAR) 50 MG tablet    carvedilol (COREG) 12.5 MG tablet    Other Relevant Orders    CBC with Automated Differential    Basic Metabolic Panel    Lipid Panel With Reflex    Prothrombin Time (INR/PT)    Carotid artery stenosis     Appt with neurosurgery in couple of weeks         Relevant Medications    losartan (COZAAR) 50 MG tablet    carvedilol (COREG) 12.5 MG tablet       Endocrine and Metabolic    Hypothyroidism     On levothyroxine 100 mcg daily  Have appt with endo            Genitourinary and Reproductive    MARISOL (acute kidney injury) (CMD)     Check BMP            Neuro    Facial droop     improved            Disease/condition/illness specific self-management education was  provided to the patient.                                                            Patient adherence to her treatment plan was assessed. She is fully adherent with the entire discharge treatment plan.

## 2024-07-26 ENCOUNTER — PATIENT MESSAGE (OUTPATIENT)
Dept: TRANSPLANT | Facility: CLINIC | Age: 44
End: 2024-07-26
Payer: COMMERCIAL

## 2024-07-26 LAB
BKV DNA SERPL NAA+PROBE-ACNC: ABNORMAL COPIES/ML
BKV DNA SERPL NAA+PROBE-LOG#: 3.85 LOG (10) COPIES/ML
BKV DNA SERPL QL NAA+PROBE: DETECTED

## 2024-08-01 ENCOUNTER — PATIENT MESSAGE (OUTPATIENT)
Dept: TRANSPLANT | Facility: CLINIC | Age: 44
End: 2024-08-01
Payer: COMMERCIAL

## 2024-08-02 ENCOUNTER — PATIENT OUTREACH (OUTPATIENT)
Dept: ADMINISTRATIVE | Facility: HOSPITAL | Age: 44
End: 2024-08-02
Payer: COMMERCIAL

## 2024-08-02 NOTE — PROGRESS NOTES
VBHM Score: 1     Uncontrolled BP                       Health Maintenance Topic(s) Outreach Outcomes & Actions Taken:    Blood Pressure - Outreach Outcomes & Actions Taken  : Pt has cuff at home, will take bp and send via portal.             Additional Notes:    Spoke to pt he will send via portal             Care Management, Digital Medicine, and/or Education Referrals    OPCM Risk Score: 29.3         Next Steps - Referral Actions: Declined services

## 2024-08-05 ENCOUNTER — LAB VISIT (OUTPATIENT)
Dept: LAB | Facility: HOSPITAL | Age: 44
End: 2024-08-05
Attending: INTERNAL MEDICINE
Payer: COMMERCIAL

## 2024-08-05 ENCOUNTER — TELEPHONE (OUTPATIENT)
Dept: ADMINISTRATIVE | Facility: HOSPITAL | Age: 44
End: 2024-08-05
Payer: COMMERCIAL

## 2024-08-05 VITALS — DIASTOLIC BLOOD PRESSURE: 89 MMHG | SYSTOLIC BLOOD PRESSURE: 141 MMHG

## 2024-08-05 DIAGNOSIS — Z94.0 KIDNEY REPLACED BY TRANSPLANT: ICD-10-CM

## 2024-08-05 LAB
ALBUMIN SERPL BCP-MCNC: 3.9 G/DL (ref 3.5–5.2)
ANION GAP SERPL CALC-SCNC: 8 MMOL/L (ref 8–16)
BASOPHILS # BLD AUTO: 0.04 K/UL (ref 0–0.2)
BASOPHILS NFR BLD: 0.5 % (ref 0–1.9)
BUN SERPL-MCNC: 15 MG/DL (ref 6–20)
CALCIUM SERPL-MCNC: 9.4 MG/DL (ref 8.7–10.5)
CHLORIDE SERPL-SCNC: 109 MMOL/L (ref 95–110)
CO2 SERPL-SCNC: 26 MMOL/L (ref 23–29)
CREAT SERPL-MCNC: 1.4 MG/DL (ref 0.5–1.4)
DIFFERENTIAL METHOD BLD: ABNORMAL
EOSINOPHIL # BLD AUTO: 0.2 K/UL (ref 0–0.5)
EOSINOPHIL NFR BLD: 2.7 % (ref 0–8)
ERYTHROCYTE [DISTWIDTH] IN BLOOD BY AUTOMATED COUNT: 14.3 % (ref 11.5–14.5)
EST. GFR  (NO RACE VARIABLE): >60 ML/MIN/1.73 M^2
GLUCOSE SERPL-MCNC: 137 MG/DL (ref 70–110)
HCT VFR BLD AUTO: 45.5 % (ref 40–54)
HGB BLD-MCNC: 14.6 G/DL (ref 14–18)
IMM GRANULOCYTES # BLD AUTO: 0.04 K/UL (ref 0–0.04)
IMM GRANULOCYTES NFR BLD AUTO: 0.5 % (ref 0–0.5)
LYMPHOCYTES # BLD AUTO: 1.4 K/UL (ref 1–4.8)
LYMPHOCYTES NFR BLD: 17.8 % (ref 18–48)
MCH RBC QN AUTO: 28 PG (ref 27–31)
MCHC RBC AUTO-ENTMCNC: 32.1 G/DL (ref 32–36)
MCV RBC AUTO: 87 FL (ref 82–98)
MONOCYTES # BLD AUTO: 0.8 K/UL (ref 0.3–1)
MONOCYTES NFR BLD: 9.9 % (ref 4–15)
NEUTROPHILS # BLD AUTO: 5.3 K/UL (ref 1.8–7.7)
NEUTROPHILS NFR BLD: 68.6 % (ref 38–73)
NRBC BLD-RTO: 0 /100 WBC
PHOSPHATE SERPL-MCNC: 2.8 MG/DL (ref 2.7–4.5)
PLATELET # BLD AUTO: 183 K/UL (ref 150–450)
PMV BLD AUTO: 10.1 FL (ref 9.2–12.9)
POTASSIUM SERPL-SCNC: 4.4 MMOL/L (ref 3.5–5.1)
RBC # BLD AUTO: 5.22 M/UL (ref 4.6–6.2)
SODIUM SERPL-SCNC: 143 MMOL/L (ref 136–145)
WBC # BLD AUTO: 7.71 K/UL (ref 3.9–12.7)

## 2024-08-05 PROCEDURE — 87799 DETECT AGENT NOS DNA QUANT: CPT | Performed by: INTERNAL MEDICINE

## 2024-08-05 PROCEDURE — 80069 RENAL FUNCTION PANEL: CPT | Performed by: INTERNAL MEDICINE

## 2024-08-05 PROCEDURE — 85025 COMPLETE CBC W/AUTO DIFF WBC: CPT | Performed by: INTERNAL MEDICINE

## 2024-08-05 PROCEDURE — 80197 ASSAY OF TACROLIMUS: CPT | Performed by: INTERNAL MEDICINE

## 2024-08-06 LAB — TACROLIMUS BLD-MCNC: 5.5 NG/ML (ref 5–15)

## 2024-08-08 ENCOUNTER — E-VISIT (OUTPATIENT)
Dept: INTERNAL MEDICINE | Facility: CLINIC | Age: 44
End: 2024-08-08
Payer: COMMERCIAL

## 2024-08-08 DIAGNOSIS — I10 ESSENTIAL HYPERTENSION: Primary | Chronic | ICD-10-CM

## 2024-08-08 PROCEDURE — 99422 OL DIG E/M SVC 11-20 MIN: CPT | Mod: ,,, | Performed by: FAMILY MEDICINE

## 2024-08-08 NOTE — Clinical Note
Cinda Bender. I started Robb on valsartan 80 mg for his blood pressure. See my note for details.  This is just FYI only. No action required by you unless you recommend a different treatment or need to contact the patient.  Thank you for your help caring for our patients!  -GLEN

## 2024-08-11 VITALS — SYSTOLIC BLOOD PRESSURE: 140 MMHG | DIASTOLIC BLOOD PRESSURE: 94 MMHG

## 2024-08-11 RX ORDER — VALSARTAN 80 MG/1
80 TABLET ORAL DAILY
Qty: 30 TABLET | Refills: 0 | Status: SHIPPED | OUTPATIENT
Start: 2024-08-11 | End: 2025-08-11

## 2024-08-11 NOTE — PROGRESS NOTES
Patient ID: Robb Iglesias is a 43 y.o. male.    Chief Complaint: Hypertension (Entered automatically based on patient selection in [employer].)    The patient initiated a request through [employer] on 8/8/2024 for evaluation and management with a chief complaint of Hypertension (Entered automatically based on patient selection in [employer].)     I evaluated the questionnaire submission on 08/11/2024.    [facility] Peq Evisit Hypertension    8/10/2024 10:52 AM CDT - Filed by Patient   Do you agree to participate in an E-Visit? Yes   If you have any of the following symptoms, please do not complete an E-Visit. Instead, schedule an appointment with your provider I acknowledge   Choose the state of your primary residence [place]   What would you like addressed about your blood pressure? Recent blood pressure medication change   What is the main issue you would like addressed today? Update doctor on blood pressure   How would you classify your blood pressure? Well controlled   Are you having any of the following symptoms from your high blood pressure? None of the above   Are you taking any of the following medications? Steroids, such as prednisone, prednisolone, methylprednisolone, dexamethasone, etc.   The following factors can make high blood pressure worse or harder to control. Which of them might be contributing to your high blood pressure?  Stress;  Medication side effects   Have you taken blood pressure medications in the past that caused you problems or side effects? No   Are you currently taking medication(s) for your blood pressure? Yes   Have you recently started a new medication or changed your dose? Yes   How often are you taking your medication per week?  Every day   Have you had any side effects from your current blood pressure medication? No   Are you able to take your blood pressure? Yes   Please give your most recent blood pressure readings    Reading 1 140/94 8/10/24 11am   Reading 2    Reading 3    Reading  "4    Reading 5    If you are able to take your pulse, please provide it below. 65   Provide any additional information you feel is important.    Please attach any relevant images or files    Are you able to take any other vitals? No       Current Outpatient Medications   Medication    aspirin (ECOTRIN) 81 MG EC tablet    atorvastatin (LIPITOR) 80 MG tablet    blood sugar diagnostic Strp    blood-glucose meter (ONETOUCH ULTRAMINI) kit    ezetimibe (ZETIA) 10 mg tablet    k phos di & mono-sod phos mono (PHOSPHA 250 NEUTRAL) 250 mg Tab    lancets Misc    magnesium oxide (MAG-OX) 400 mg (241.3 mg magnesium) tablet    magnesium oxide (MAG-OX) 400 mg (241.3 mg magnesium) tablet    metoprolol tartrate (LOPRESSOR) 25 MG tablet    multivitamin Tab    nitroGLYCERIN (NITROSTAT) 0.4 MG SL tablet    pantoprazole (PROTONIX) 40 MG tablet    pen needle, diabetic (BD ULTRA-FINE SHORT PEN NEEDLE) 31 gauge x 5/16" Ndle    predniSONE (DELTASONE) 5 MG tablet    pregabalin (LYRICA) 50 MG capsule    scopolamine (TRANSDERM-SCOP) 1.3-1.5 mg (1 mg over 3 days)    tacrolimus (PROGRAF) 0.5 MG Cap    tirzepatide (MOUNJARO) 7.5 mg/0.5 mL PnIj    valsartan (DIOVAN) 80 MG tablet     No current facility-administered medications for this visit.      Encounter Diagnosis   Name Primary?    Essential hypertension Yes        No orders of the defined types were placed in this encounter.     Medications Ordered This Encounter   Medications    valsartan (DIOVAN) 80 MG tablet     Sig: Take 1 tablet (80 mg total) by mouth once daily.     Dispense:  30 tablet     Refill:  0     .        No follow-ups on file.      E-Visit Time Tracking:    Day 1 Time (in minutes): 12    Total Time (in minutes): 12        BP Readings from Last 6 Encounters:   08/11/24 (!) 140/94   08/05/24 (!) 141/89   07/11/24 (!) 140/90   05/03/24 130/85   04/19/24 131/85   04/05/24 (!) 156/85     Lab Results   Component Value Date    EGFRNORACEVR >60.0 08/05/2024    CREATININE 1.4 " 08/05/2024    BUN 15 08/05/2024    K 4.4 08/05/2024     08/05/2024     08/05/2024     Results for orders placed or performed during the hospital encounter of 10/25/23   SCHEDULED EKG 12-LEAD (to Muse)    Collection Time: 10/25/23  8:28 AM    Narrative    Test Reason : I48.0,I48.91,    Vent. Rate : 070 BPM     Atrial Rate : 070 BPM     P-R Int : 146 ms          QRS Dur : 082 ms      QT Int : 382 ms       P-R-T Axes : 021 012 049 degrees     QTc Int : 412 ms    Normal sinus rhythm  Normal ECG  When compared with ECG of 04-JUN-2023 18:08,  No significant change was found  Confirmed by AGUSTIN MASTERS, JOSIAS ([id number]) on 10/25/2023 3:28:07 PM    Referred By: PEACE ESCAMILLA           Confirmed By:JOSIAS VELEZ MD         Dear Robb,    Talia for providing the information about your blood pressure. Your home blood pressure reading of 140/94 and most of your recent in-office readings have been too high. Given these persistent elevated levels and considering your history as a kidney transplant recipient, Im starting you on a blood pressure medication called valsartan at a dose of 80 milligrams daily. This medication will help to bring your blood pressure down to a healthier range.    Its possible that we may need to adjust the dose, so please do another e-visit for blood pressure management in about two to three weeks. This will help us decide if valsartan is the best choice and if the dose needs to be changed.    Ill also be sending a copy of this information to Cinda Mccray NP, to ensure your transplant team is kept in the loop with your care.    Please take care, and I look forward to our next steps in managing your blood pressure.    Best regards,    Dr. BURCH    Medications Ordered This Encounter   Medications    valsartan (DIOVAN) 80 MG tablet     Sig: Take 1 tablet (80 mg total) by mouth once daily.     Dispense:  30 tablet     Refill:  0     .

## 2024-08-19 ENCOUNTER — PATIENT MESSAGE (OUTPATIENT)
Dept: TRANSPLANT | Facility: CLINIC | Age: 44
End: 2024-08-19
Payer: COMMERCIAL

## 2024-08-27 ENCOUNTER — LAB VISIT (OUTPATIENT)
Dept: LAB | Facility: HOSPITAL | Age: 44
End: 2024-08-27
Attending: FAMILY MEDICINE
Payer: COMMERCIAL

## 2024-08-27 DIAGNOSIS — N18.31 TYPE 2 DIABETES MELLITUS WITH STAGE 3A CHRONIC KIDNEY DISEASE, WITHOUT LONG-TERM CURRENT USE OF INSULIN: Chronic | ICD-10-CM

## 2024-08-27 DIAGNOSIS — Z94.0 KIDNEY REPLACED BY TRANSPLANT: ICD-10-CM

## 2024-08-27 DIAGNOSIS — I10 ESSENTIAL HYPERTENSION: Chronic | ICD-10-CM

## 2024-08-27 DIAGNOSIS — E11.22 TYPE 2 DIABETES MELLITUS WITH STAGE 3A CHRONIC KIDNEY DISEASE, WITHOUT LONG-TERM CURRENT USE OF INSULIN: Chronic | ICD-10-CM

## 2024-08-27 LAB
ALBUMIN SERPL BCP-MCNC: 4.2 G/DL (ref 3.5–5.2)
ALBUMIN SERPL BCP-MCNC: 4.2 G/DL (ref 3.5–5.2)
ALP SERPL-CCNC: 90 U/L (ref 55–135)
ALT SERPL W/O P-5'-P-CCNC: 43 U/L (ref 10–44)
ANION GAP SERPL CALC-SCNC: 11 MMOL/L (ref 8–16)
ANION GAP SERPL CALC-SCNC: 11 MMOL/L (ref 8–16)
AST SERPL-CCNC: 27 U/L (ref 10–40)
BILIRUB SERPL-MCNC: 2.8 MG/DL (ref 0.1–1)
BUN SERPL-MCNC: 13 MG/DL (ref 6–20)
BUN SERPL-MCNC: 13 MG/DL (ref 6–20)
CALCIUM SERPL-MCNC: 9.6 MG/DL (ref 8.7–10.5)
CALCIUM SERPL-MCNC: 9.6 MG/DL (ref 8.7–10.5)
CHLORIDE SERPL-SCNC: 108 MMOL/L (ref 95–110)
CHLORIDE SERPL-SCNC: 108 MMOL/L (ref 95–110)
CO2 SERPL-SCNC: 23 MMOL/L (ref 23–29)
CO2 SERPL-SCNC: 23 MMOL/L (ref 23–29)
CREAT SERPL-MCNC: 1.5 MG/DL (ref 0.5–1.4)
CREAT SERPL-MCNC: 1.5 MG/DL (ref 0.5–1.4)
EST. GFR  (NO RACE VARIABLE): 59.2 ML/MIN/1.73 M^2
EST. GFR  (NO RACE VARIABLE): 59.2 ML/MIN/1.73 M^2
ESTIMATED AVG GLUCOSE: 137 MG/DL (ref 68–131)
GLUCOSE SERPL-MCNC: 129 MG/DL (ref 70–110)
GLUCOSE SERPL-MCNC: 129 MG/DL (ref 70–110)
HBA1C MFR BLD: 6.4 % (ref 4–5.6)
PHOSPHATE SERPL-MCNC: 2.5 MG/DL (ref 2.7–4.5)
POTASSIUM SERPL-SCNC: 4.4 MMOL/L (ref 3.5–5.1)
POTASSIUM SERPL-SCNC: 4.4 MMOL/L (ref 3.5–5.1)
PROT SERPL-MCNC: 7 G/DL (ref 6–8.4)
SODIUM SERPL-SCNC: 142 MMOL/L (ref 136–145)
SODIUM SERPL-SCNC: 142 MMOL/L (ref 136–145)

## 2024-08-27 PROCEDURE — 36415 COLL VENOUS BLD VENIPUNCTURE: CPT | Performed by: FAMILY MEDICINE

## 2024-08-27 PROCEDURE — 80069 RENAL FUNCTION PANEL: CPT | Performed by: INTERNAL MEDICINE

## 2024-08-27 PROCEDURE — 83036 HEMOGLOBIN GLYCOSYLATED A1C: CPT | Performed by: FAMILY MEDICINE

## 2024-08-27 PROCEDURE — 80053 COMPREHEN METABOLIC PANEL: CPT | Performed by: FAMILY MEDICINE

## 2024-08-27 PROCEDURE — 87799 DETECT AGENT NOS DNA QUANT: CPT | Performed by: INTERNAL MEDICINE

## 2024-08-29 ENCOUNTER — OFFICE VISIT (OUTPATIENT)
Dept: DIABETES | Facility: CLINIC | Age: 44
End: 2024-08-29
Payer: COMMERCIAL

## 2024-08-29 VITALS
HEART RATE: 74 BPM | WEIGHT: 234.56 LBS | BODY MASS INDEX: 29.16 KG/M2 | HEIGHT: 75 IN | DIASTOLIC BLOOD PRESSURE: 87 MMHG | SYSTOLIC BLOOD PRESSURE: 126 MMHG

## 2024-08-29 DIAGNOSIS — E11.22 TYPE 2 DIABETES MELLITUS WITH STAGE 3A CHRONIC KIDNEY DISEASE, WITHOUT LONG-TERM CURRENT USE OF INSULIN: Primary | ICD-10-CM

## 2024-08-29 DIAGNOSIS — N18.31 TYPE 2 DIABETES MELLITUS WITH STAGE 3A CHRONIC KIDNEY DISEASE, WITHOUT LONG-TERM CURRENT USE OF INSULIN: Primary | ICD-10-CM

## 2024-08-29 DIAGNOSIS — E11.69 DYSLIPIDEMIA ASSOCIATED WITH TYPE 2 DIABETES MELLITUS: ICD-10-CM

## 2024-08-29 DIAGNOSIS — I10 ESSENTIAL HYPERTENSION: ICD-10-CM

## 2024-08-29 DIAGNOSIS — E78.5 DYSLIPIDEMIA ASSOCIATED WITH TYPE 2 DIABETES MELLITUS: ICD-10-CM

## 2024-08-29 DIAGNOSIS — Z94.0 S/P KIDNEY TRANSPLANT: ICD-10-CM

## 2024-08-29 LAB — GLUCOSE SERPL-MCNC: 188 MG/DL (ref 70–110)

## 2024-08-29 PROCEDURE — 3060F POS MICROALBUMINURIA REV: CPT | Mod: CPTII,S$GLB,, | Performed by: NURSE PRACTITIONER

## 2024-08-29 PROCEDURE — 1159F MED LIST DOCD IN RCRD: CPT | Mod: CPTII,S$GLB,, | Performed by: NURSE PRACTITIONER

## 2024-08-29 PROCEDURE — 3079F DIAST BP 80-89 MM HG: CPT | Mod: CPTII,S$GLB,, | Performed by: NURSE PRACTITIONER

## 2024-08-29 PROCEDURE — 82962 GLUCOSE BLOOD TEST: CPT | Mod: S$GLB,,, | Performed by: NURSE PRACTITIONER

## 2024-08-29 PROCEDURE — 99214 OFFICE O/P EST MOD 30 MIN: CPT | Mod: S$GLB,,, | Performed by: NURSE PRACTITIONER

## 2024-08-29 PROCEDURE — 3008F BODY MASS INDEX DOCD: CPT | Mod: CPTII,S$GLB,, | Performed by: NURSE PRACTITIONER

## 2024-08-29 PROCEDURE — 4010F ACE/ARB THERAPY RXD/TAKEN: CPT | Mod: CPTII,S$GLB,, | Performed by: NURSE PRACTITIONER

## 2024-08-29 PROCEDURE — 3044F HG A1C LEVEL LT 7.0%: CPT | Mod: CPTII,S$GLB,, | Performed by: NURSE PRACTITIONER

## 2024-08-29 PROCEDURE — 3074F SYST BP LT 130 MM HG: CPT | Mod: CPTII,S$GLB,, | Performed by: NURSE PRACTITIONER

## 2024-08-29 PROCEDURE — 99999 PR PBB SHADOW E&M-EST. PATIENT-LVL V: CPT | Mod: PBBFAC,,, | Performed by: NURSE PRACTITIONER

## 2024-08-29 PROCEDURE — G2211 COMPLEX E/M VISIT ADD ON: HCPCS | Mod: S$GLB,,, | Performed by: NURSE PRACTITIONER

## 2024-08-29 PROCEDURE — 1160F RVW MEDS BY RX/DR IN RCRD: CPT | Mod: CPTII,S$GLB,, | Performed by: NURSE PRACTITIONER

## 2024-08-29 PROCEDURE — 3066F NEPHROPATHY DOC TX: CPT | Mod: CPTII,S$GLB,, | Performed by: NURSE PRACTITIONER

## 2024-08-29 NOTE — PATIENT INSTRUCTIONS
Medication Regimen:   Continue Mounjaro 7.5 mg once a week. We can increase as tolerated.     Patient Instructions:  Carbohydrate Count: 30-45 grams/meal and 15 grams/snack with limit of 2 snacks per day.  Exercise: Goal is 150 minutes or more per week.  Bring meter and blood sugar log to each appointment.     Goals for Blood Sugar:  Fastin-130 mg/dl  2 hours after meal:  mg/dl  Before Bed: 100-150 mg/dl  If Blood sugar is below 70 mg/dl, DO NOT take diabetes medication. Eat first and then recheck blood sugar in 20 minutes.  Foods to eat if blood sugar is below 70 mg/dl  1/2 glass of orange juice   1/2 regular cola can   3-4 hard candies   3-4 small glucose tabs.   Foods to eat if blood sugar is below 50 mg/dl  1 glass of orange juice  1 regular cola can  8 hard candies  8 small glucose tabs.   If you have repeated eating/blood sugar recheck process 2 times and blood sugar still below 70 mg/dl, call 911.

## 2024-08-29 NOTE — PROGRESS NOTES
Subjective:         Patient ID: Robb Iglesias is a 43 y.o. male.  Patient's current PCP is SABIHA Roberson MD.     Chief Complaint: Diabetes Mellitus    HPI  Robb Iglesias is a 43 y.o. White male presenting for a follow up for diabetes. Patient has been diagnosed with type 2 diabetes since 2010 .  Received diabetes education: Yes    CURRENT DM MEDICATIONS:   Diabetes Medications               tirzepatide (MOUNJARO) 7.5 mg/0.5 mL PnIj Inject 7.5 mg into the skin every 7 days.     Past failed treatment include: Metformin, Janumet. Ozempic - formulary change only; Trulicity-failure to control diabetes    Blood glucose testing is performed 1-2 times per day.  Meter: One Touch  Preferred lab: [facility]    Any episodes of hypoglycemia? no     Complications related to diabetes: nephropathy, peripheral neuropathy, and cardiovascular disease    His blood sugar in the clinic today was:   Lab Results   Component Value Date    POCGLU 188 (A) 08/29/2024     Robb Iglesias presents today for follow up visit to discuss diabetes management.     Neuropathy- working with podiatry. Between visits, tried Lyrica but experienced side effects - weight gain, increased cravings, irritable, difficulty focusing. He has also not tolerated Gabapentin in the past.    Reports Bgs have been stable and at goal. He is tolerating Mounjaro well without side effects.     Reviewed labs at time of visit. Eye and foot exams UTD.     No further questions/concerns at this time.     S/P L kidney transplant 05/2023- on chronic Prednisone 5 mg daily and Prograf. Followed by the transplant team as well as nephrologist, Dr. Figueroa.    H/o bariatric surgery - 2017. Lost 100 lbs.    Current diet: 3 meals/day; snacking more often now   Activity Level: Non-sedentary -- walking - trying to increase    Lab Results   Component Value Date    HGBA1C 6.4 (H) 08/27/2024    HGBA1C 6.6 (H) 04/11/2024    HGBA1C 7.3 (H) 12/18/2023     STANDARDS OF  CARE  Diabetes Management Status    Statin: Not taking  ACE/ARB: Taking    Screening or Prevention Patient's value Goal Complete/Controlled?   HgA1C Testing and Control   Lab Results   Component Value Date    HGBA1C 6.4 (H) 08/27/2024      Annually/Less than 8% Yes   Lipid profile : 04/11/2024 Annually Yes   LDL control Lab Results   Component Value Date    LDLCALC 26.4 (L) 04/11/2024    Annually/Less than 100 mg/dl  Yes   Nephropathy screening Lab Results   Component Value Date    LABMICR 187.0 04/11/2024     Lab Results   Component Value Date    PROTEINUA 2+ (A) 02/05/2024     Lab Results   Component Value Date    UTPCR 0.61 (H) 02/05/2024      Annually Yes   Blood pressure BP Readings from Last 1 Encounters:   08/29/24 126/87    Less than 140/90 Yes   Dilated retinal exam : 07/05/2024 Annually Yes   Foot exam   : 07/11/2024 Annually Yes      Labs reviewed and are noted below.    Lab Results   Component Value Date    WBC 7.71 08/05/2024    HGB 14.6 08/05/2024    HCT 45.5 08/05/2024     08/05/2024    CHOL 72 (L) 04/11/2024    TRIG 78 04/11/2024    HDL 30 (L) 04/11/2024    LDLCALC 26.4 (L) 04/11/2024    ALT 43 08/27/2024    AST 27 08/27/2024     08/27/2024     08/27/2024    K 4.4 08/27/2024    K 4.4 08/27/2024     08/27/2024     08/27/2024    ANIONGAP 11 08/27/2024    ANIONGAP 11 08/27/2024    CREATININE 1.5 (H) 08/27/2024    CREATININE 1.5 (H) 08/27/2024    ESTGFRAFRICA 13 (A) 07/26/2022    EGFRNONAA 11 (A) 07/26/2022    BUN 13 08/27/2024    BUN 13 08/27/2024    CO2 23 08/27/2024    CO2 23 08/27/2024    TSH 0.727 07/31/2017    INR 1.0 06/05/2023     (H) 08/27/2024     (H) 08/27/2024    UTPCR 0.61 (H) 02/05/2024     Lab Results   Component Value Date    TSH 0.727 07/31/2017    FERRITIN 114 07/05/2017    MKNUNVVW88 363 07/05/2017    .0 (H) 04/11/2024    CALCIUM 9.6 08/27/2024    CALCIUM 9.6 08/27/2024    PHOS 2.5 (L) 08/27/2024     No results found for:  ""CPEPTIDE"  No results found for: "GLUTAMICACID"  Glucose   Date Value Ref Range Status   08/27/2024 129 (H) 70 - 110 mg/dL Final   08/27/2024 129 (H) 70 - 110 mg/dL Final     Anion Gap   Date Value Ref Range Status   08/27/2024 11 8 - 16 mmol/L Final   08/27/2024 11 8 - 16 mmol/L Final     eGFR if    Date Value Ref Range Status   07/26/2022 13 (A) >60 mL/min/1.73 m^2 Final     eGFR if non    Date Value Ref Range Status   07/26/2022 11 (A) >60 mL/min/1.73 m^2 Final     Comment:     Calculation used to obtain the estimated glomerular filtration  rate (eGFR) is the CKD-EPI equation.        The following portions of the patient's history were reviewed and updated as appropriate: allergies, current medications, past family history, past medical history, past social history, past surgical history, and problem list.    Review of patient's allergies indicates:  No Known Allergies  Social History     Socioeconomic History    Marital status:      Spouse name: Shannon Iglesias    Number of children: 2    Years of education: Some College   Tobacco Use    Smoking status: Never    Smokeless tobacco: Never   Substance and Sexual Activity    Alcohol use: No     Comment: 1-2 times per month    Drug use: No    Sexual activity: Yes     Partners: Female   Social History Narrative    Full time employed,  with 2 children.    Caregiver Shannon      Social Determinants of Health     Financial Resource Strain: Low Risk  (2/16/2024)    Overall Financial Resource Strain (CARDIA)     Difficulty of Paying Living Expenses: Not very hard   Food Insecurity: No Food Insecurity (2/16/2024)    Hunger Vital Sign     Worried About Running Out of Food in the Last Year: Never true     Ran Out of Food in the Last Year: Never true   Transportation Needs: No Transportation Needs (2/16/2024)    PRAPARE - Transportation     Lack of Transportation (Medical): No     Lack of Transportation (Non-Medical): No   Physical " Activity: Sufficiently Active (2/16/2024)    Exercise Vital Sign     Days of Exercise per Week: 4 days     Minutes of Exercise per Session: 60 min   Stress: No Stress Concern Present (2/16/2024)    [employer] [place] [employer] - Occupational Stress Questionnaire     Feeling of Stress : Not at all   Housing Stability: Low Risk  (2/16/2024)    Housing Stability Vital Sign     Unable to Pay for Housing in the Last Year: No     Number of Places Lived in the Last Year: 1     Unstable Housing in the Last Year: No     Past Medical History:   Diagnosis Date    Allergic rhinitis     Atrial fibrillation with RVR 6/4/2023    Class 1 obesity due to excess calories with serious comorbidity and body mass index (BMI) of 31.0 to 31.9 in adult 4/7/2017    Coronary artery disease     Coronary artery disease involving native coronary artery of native heart without angina pectoris 2/6/2018    Cath lab procedure 04/23/2018 (Naveen Rios MD) A. Indication/Pre-Operative Diagnosis: The patient is a 37 year old male that was referred for catheterization by [facility] Self for ACS (NSTEMI). The BELLA risk score is 5.  B. Summary/Post-Operative Diagnosis 1. Single vessel coronary artery disease. 2. Normal LVEF. 3. Diastolic dysfunction. 4. Successful PCI for acute myocardial infarction. 5.     Diabetic nephropathy associated with type 2 diabetes mellitus 1/6/2020    Direct hyperbilirubinemia 3/24/2018    DM (diabetes mellitus) 2008    BS doesn't check any more 08/02/2018    DM (diabetes mellitus) 2012    BS 99 am 06/26/2020    DM (diabetes mellitus)     BS didn't check 06/04/2021    DM (diabetes mellitus) 2008    BS didn't check 07/29/2022     Dyslipidemia associated with type 2 diabetes mellitus 7/31/2017    Elevated bilirubin 3/21/2018    GERD (gastroesophageal reflux disease)     Gout     Hyperlipidemia     Hyperparathyroidism, secondary to chronic kidney disease 6/7/2021    Hypertension associated with chronic kidney disease  due to type 2 diabetes mellitus     Hypertension complicating diabetes 3/16/2019    Not candidate for Hypertension Digital Medicine program due to dialysis status. Didn't tolerate amlodipine due to SE of hypotension.    Idiopathic chronic gout, multiple sites, without tophus (tophi) 7/19/2017    Long term (current) use of insulin     MI (myocardial infarction) 07/2017    NSTEMI with CAD s/p PCI (FAMILIA) of LAD x 2 in 8/2017 7/31/2017    Obesity     HENRY on CPAP     Peritoneal dialysis catheter in place 1/26/2023    Proteinuria     Severe obstructive sleep apnea - Intolerant of CPAP 7/31/2017    Intolerant of CPAP after weeks of trying multiple interfaces. SPLIT-NIGHT SLEEP STUDY 7/28/2015 · SLEEP ARCHITECTURE: Sleep onset was 2.9 minutes and sleep efficiency was 94.4%. Sleep Stage distribution showed 145 sleep stage changes, 6 awakenings and 119 arousals. Sleep distribution showed 53.2% stage NI, 41.8% stage N 11, 0.0% stage N Ill and REM sleep was at 5.0%. There were 2 REM periods. · RE    Stage 3a chronic kidney disease 5/26/2023    Stage 5 chronic kidney disease on chronic peritoneal dialysis 6/7/2017    Stage 5 chronic kidney disease on chronic peritoneal dialysis 6/7/2017    Status post angioplasty with stent 8/4/2017    Steatohepatitis     Fatty Liver    Type 2 diabetes mellitus with chronic kidney disease on chronic dialysis, without long-term current use of insulin 6/21/2017    Type 2 diabetes mellitus with diabetic nephropathy     Type 2 diabetes mellitus with diabetic nephropathy, without long-term current use of insulin 1/6/2020    Type 2 diabetes mellitus with diabetic polyneuropathy, without long-term current use of insulin 6/7/2021    Type 2 diabetes mellitus with hyperglycemia     Type 2 diabetes mellitus with renal manifestations     Type 2 diabetes mellitus with stage 3 chronic kidney disease, without long-term current use of insulin 6/21/2017     REVIEW OF SYSTEMS:  Eyes No history of  "  Cardiovascular: History of CAD, Afib, HTN, and HLD.  GI: Tolerating Trulicity well without GI side effects.   : Prior h/o ESRD, s/p L kidney transplant 05/2023  Neuro: No neuropathy.  PSYCH: No tobacco use.  ENDO: See HPI.        Objective:      Vitals:    08/29/24 1255   BP: 126/87   Pulse: 74     RESPIRATORY: No respiratory distress  NEUROLOGIC: not assessed-virtual visit  PSYCHIATRIC: Alert & oriented x3. Normal mood and affect.  FOOT EXAMINATION: UTD    Assessment:       1. Type 2 diabetes mellitus with stage 3a chronic kidney disease, without long-term current use of insulin    2. Dyslipidemia associated with type 2 diabetes mellitus    3. Essential hypertension    4. S/P kidney transplant          Plan:   Robb was seen today for diabetes mellitus.    Diagnoses and all orders for this visit:    Type 2 diabetes mellitus with stage 3a chronic kidney disease, without long-term current use of insulin  -     POCT Glucose, Hand-Held Device    Chronic -     Medication Regimen:   Continue Mounjaro 7.5 mg once a week. We can increase as tolerated.     Patient Instructions:  Carbohydrate Count: 30-45 grams/meal and 15 grams/snack with limit of 2 snacks per day.  Exercise: Goal is 150 minutes or more per week.  Bring meter and blood sugar log to each appointment.       Dyslipidemia associated with type 2 diabetes mellitus    Essential hypertension    S/P kidney transplant      - Follow up:  6 months    Portions of this note may have been created with voice recognition software. Occasional "wrong-word" or "sound-a-like" substitutions may have occurred due to the inherent limitations of voice recognition software. Please, read the note carefully and recognize, using context, where substitutions have occurred.           SUSSY DasilvaC, BC-ADM  [facility]  "

## 2024-08-30 LAB
BKV DNA SERPL NAA+PROBE-ACNC: ABNORMAL COPIES/ML
BKV DNA SERPL NAA+PROBE-LOG#: 3.7 LOG (10) COPIES/ML
BKV DNA SERPL QL NAA+PROBE: DETECTED

## 2024-09-04 ENCOUNTER — PATIENT MESSAGE (OUTPATIENT)
Dept: TRANSPLANT | Facility: CLINIC | Age: 44
End: 2024-09-04
Payer: COMMERCIAL

## 2024-09-04 DIAGNOSIS — Z94.0 S/P KIDNEY TRANSPLANT: ICD-10-CM

## 2024-09-04 RX ORDER — TACROLIMUS 0.5 MG/1
CAPSULE ORAL
Qty: 120 CAPSULE | Refills: 11 | Status: SHIPPED | OUTPATIENT
Start: 2024-09-04 | End: 2025-09-04

## 2024-09-13 ENCOUNTER — PATIENT MESSAGE (OUTPATIENT)
Dept: INTERNAL MEDICINE | Facility: CLINIC | Age: 44
End: 2024-09-13
Payer: COMMERCIAL

## 2024-09-16 DIAGNOSIS — I10 ESSENTIAL HYPERTENSION: Chronic | ICD-10-CM

## 2024-09-16 NOTE — TELEPHONE ENCOUNTER
No care due was identified.  [facility] Embedded Care Due Messages. Reference number: [id number].   9/16/2024 8:10:24 AM CDT

## 2024-09-17 NOTE — TELEPHONE ENCOUNTER
Refill Routing Note   Medication(s) are not appropriate for processing by [facility] for the following reason(s):        Required labs abnormal  New or recently adjusted medication    [facility] action(s):  Defer               Appointments  past 12m or future 3m with PCP    Date Provider   Last Visit   8/8/2024 SABIHA Roberson MD   Next Visit   1/10/2025 SABIHA Roberson MD   ED visits in past 90 days: 0        Note composed:11:11 PM 09/16/2024

## 2024-09-18 ENCOUNTER — PATIENT MESSAGE (OUTPATIENT)
Dept: INTERNAL MEDICINE | Facility: CLINIC | Age: 44
End: 2024-09-18
Payer: COMMERCIAL

## 2024-09-20 ENCOUNTER — LAB VISIT (OUTPATIENT)
Dept: LAB | Facility: HOSPITAL | Age: 44
End: 2024-09-20
Attending: INTERNAL MEDICINE
Payer: COMMERCIAL

## 2024-09-20 DIAGNOSIS — Z94.0 KIDNEY REPLACED BY TRANSPLANT: ICD-10-CM

## 2024-09-20 LAB
ALBUMIN SERPL BCP-MCNC: 3.8 G/DL (ref 3.5–5.2)
ANION GAP SERPL CALC-SCNC: 7 MMOL/L (ref 8–16)
BASOPHILS # BLD AUTO: 0.04 K/UL (ref 0–0.2)
BASOPHILS NFR BLD: 0.8 % (ref 0–1.9)
BUN SERPL-MCNC: 19 MG/DL (ref 6–20)
CALCIUM SERPL-MCNC: 9.1 MG/DL (ref 8.7–10.5)
CHLORIDE SERPL-SCNC: 107 MMOL/L (ref 95–110)
CO2 SERPL-SCNC: 26 MMOL/L (ref 23–29)
CREAT SERPL-MCNC: 1.5 MG/DL (ref 0.5–1.4)
DIFFERENTIAL METHOD BLD: ABNORMAL
EOSINOPHIL # BLD AUTO: 0.1 K/UL (ref 0–0.5)
EOSINOPHIL NFR BLD: 2.6 % (ref 0–8)
ERYTHROCYTE [DISTWIDTH] IN BLOOD BY AUTOMATED COUNT: 14.6 % (ref 11.5–14.5)
EST. GFR  (NO RACE VARIABLE): 59.2 ML/MIN/1.73 M^2
GLUCOSE SERPL-MCNC: 112 MG/DL (ref 70–110)
HCT VFR BLD AUTO: 42.9 % (ref 40–54)
HGB BLD-MCNC: 13.6 G/DL (ref 14–18)
IMM GRANULOCYTES # BLD AUTO: 0.01 K/UL (ref 0–0.04)
IMM GRANULOCYTES NFR BLD AUTO: 0.2 % (ref 0–0.5)
LYMPHOCYTES # BLD AUTO: 1.3 K/UL (ref 1–4.8)
LYMPHOCYTES NFR BLD: 26.4 % (ref 18–48)
MCH RBC QN AUTO: 26.9 PG (ref 27–31)
MCHC RBC AUTO-ENTMCNC: 31.7 G/DL (ref 32–36)
MCV RBC AUTO: 85 FL (ref 82–98)
MONOCYTES # BLD AUTO: 0.6 K/UL (ref 0.3–1)
MONOCYTES NFR BLD: 12.7 % (ref 4–15)
NEUTROPHILS # BLD AUTO: 2.8 K/UL (ref 1.8–7.7)
NEUTROPHILS NFR BLD: 57.3 % (ref 38–73)
NRBC BLD-RTO: 0 /100 WBC
PHOSPHATE SERPL-MCNC: 2.9 MG/DL (ref 2.7–4.5)
PLATELET # BLD AUTO: 163 K/UL (ref 150–450)
PMV BLD AUTO: 9.5 FL (ref 9.2–12.9)
POTASSIUM SERPL-SCNC: 4.4 MMOL/L (ref 3.5–5.1)
RBC # BLD AUTO: 5.06 M/UL (ref 4.6–6.2)
SODIUM SERPL-SCNC: 140 MMOL/L (ref 136–145)
WBC # BLD AUTO: 4.96 K/UL (ref 3.9–12.7)

## 2024-09-20 PROCEDURE — 87799 DETECT AGENT NOS DNA QUANT: CPT | Performed by: INTERNAL MEDICINE

## 2024-09-20 PROCEDURE — 80069 RENAL FUNCTION PANEL: CPT | Performed by: INTERNAL MEDICINE

## 2024-09-20 PROCEDURE — 85025 COMPLETE CBC W/AUTO DIFF WBC: CPT | Performed by: INTERNAL MEDICINE

## 2024-09-20 PROCEDURE — 80197 ASSAY OF TACROLIMUS: CPT | Performed by: INTERNAL MEDICINE

## 2024-09-21 LAB — TACROLIMUS BLD-MCNC: 6.5 NG/ML (ref 5–15)

## 2024-09-23 ENCOUNTER — PATIENT MESSAGE (OUTPATIENT)
Dept: TRANSPLANT | Facility: CLINIC | Age: 44
End: 2024-09-23
Payer: COMMERCIAL

## 2024-09-23 DIAGNOSIS — Z94.0 S/P KIDNEY TRANSPLANT: ICD-10-CM

## 2024-09-23 LAB
BKV DNA SERPL NAA+PROBE-ACNC: ABNORMAL COPIES/ML
BKV DNA SERPL NAA+PROBE-LOG#: 3.87 LOG (10) COPIES/ML
BKV DNA SERPL QL NAA+PROBE: DETECTED

## 2024-09-23 RX ORDER — TACROLIMUS 0.5 MG/1
CAPSULE ORAL
Qty: 90 CAPSULE | Refills: 11 | Status: SHIPPED | OUTPATIENT
Start: 2024-09-23 | End: 2025-09-23

## 2024-09-23 NOTE — TELEPHONE ENCOUNTER
Pt made aware of below orders            ----- Message from Rell Booth MD sent at 9/23/2024  2:06 PM CDT -----  Lets lower prograf to 1 mg AM and 0.5 mg PM, and repeat tacro level next time with serum BK pcr

## 2024-09-24 RX ORDER — VALSARTAN 80 MG/1
80 TABLET ORAL DAILY
Qty: 30 TABLET | Refills: 0 | Status: SHIPPED | OUTPATIENT
Start: 2024-09-24 | End: 2025-09-24

## 2024-09-24 NOTE — TELEPHONE ENCOUNTER
REFILL APPROVED. Will address further refills at upcoming appointment with me listed below.  #LMRX   --------------------------------  Future Appointments   Date Time Provider Department Center   1/10/2025  8:20 AM SABIHA Roberson MD [facility] [place]   2/27/2025  1:00 PM Heather Sorensen NP [facility] [place]

## 2024-10-02 ENCOUNTER — TELEPHONE (OUTPATIENT)
Dept: TRANSPLANT | Facility: CLINIC | Age: 44
End: 2024-10-02
Payer: COMMERCIAL

## 2024-10-02 ENCOUNTER — PATIENT MESSAGE (OUTPATIENT)
Dept: TRANSPLANT | Facility: CLINIC | Age: 44
End: 2024-10-02
Payer: COMMERCIAL

## 2024-10-02 DIAGNOSIS — Z94.0 KIDNEY REPLACED BY TRANSPLANT: Primary | ICD-10-CM

## 2024-10-04 ENCOUNTER — PATIENT MESSAGE (OUTPATIENT)
Dept: INTERNAL MEDICINE | Facility: CLINIC | Age: 44
End: 2024-10-04
Payer: COMMERCIAL

## 2024-10-04 DIAGNOSIS — K21.9 GASTROESOPHAGEAL REFLUX DISEASE WITHOUT ESOPHAGITIS: ICD-10-CM

## 2024-10-04 NOTE — TELEPHONE ENCOUNTER
No care due was identified.  [facility] Embedded Care Due Messages. Reference number: [id number].   10/04/2024 9:19:06 AM CDT

## 2024-10-04 NOTE — TELEPHONE ENCOUNTER
Refill Routing Note   Medication(s) are not appropriate for processing by [facility] for the following reason(s):        No active prescription written by provider    [facility] action(s):  Defer               Appointments  past 12m or future 3m with PCP    Date Provider   Last Visit   8/8/2024 SABIHA Roberson MD   Next Visit   1/10/2025 SABIHA Roberson MD   ED visits in past 90 days: 0        Note composed:2:37 PM 10/04/2024

## 2024-10-05 RX ORDER — PANTOPRAZOLE SODIUM 40 MG/1
40 TABLET, DELAYED RELEASE ORAL DAILY
Qty: 90 TABLET | Refills: 3 | Status: SHIPPED | OUTPATIENT
Start: 2024-10-05

## 2024-10-05 NOTE — TELEPHONE ENCOUNTER
REFILL REQUEST APPROVED.  Requested Prescriptions     Pending Prescriptions Disp Refills    pantoprazole (PROTONIX) 40 MG tablet 90 tablet 3     Sig: Take 1 tablet (40 mg total) by mouth once daily.

## 2024-10-07 ENCOUNTER — LAB VISIT (OUTPATIENT)
Dept: LAB | Facility: HOSPITAL | Age: 44
End: 2024-10-07
Attending: INTERNAL MEDICINE
Payer: COMMERCIAL

## 2024-10-07 DIAGNOSIS — Z94.0 KIDNEY REPLACED BY TRANSPLANT: ICD-10-CM

## 2024-10-07 LAB
ALBUMIN SERPL BCP-MCNC: 3.9 G/DL (ref 3.5–5.2)
ANION GAP SERPL CALC-SCNC: 6 MMOL/L (ref 8–16)
BASOPHILS # BLD AUTO: 0.03 K/UL (ref 0–0.2)
BASOPHILS NFR BLD: 0.6 % (ref 0–1.9)
BUN SERPL-MCNC: 15 MG/DL (ref 6–20)
CALCIUM SERPL-MCNC: 9.1 MG/DL (ref 8.7–10.5)
CHLORIDE SERPL-SCNC: 113 MMOL/L (ref 95–110)
CO2 SERPL-SCNC: 22 MMOL/L (ref 23–29)
CREAT SERPL-MCNC: 1.4 MG/DL (ref 0.5–1.4)
DIFFERENTIAL METHOD BLD: ABNORMAL
EOSINOPHIL # BLD AUTO: 0.2 K/UL (ref 0–0.5)
EOSINOPHIL NFR BLD: 2.9 % (ref 0–8)
ERYTHROCYTE [DISTWIDTH] IN BLOOD BY AUTOMATED COUNT: 14.7 % (ref 11.5–14.5)
EST. GFR  (NO RACE VARIABLE): >60 ML/MIN/1.73 M^2
GLUCOSE SERPL-MCNC: 161 MG/DL (ref 70–110)
HCT VFR BLD AUTO: 43.1 % (ref 40–54)
HGB BLD-MCNC: 14.3 G/DL (ref 14–18)
IMM GRANULOCYTES # BLD AUTO: 0.03 K/UL (ref 0–0.04)
IMM GRANULOCYTES NFR BLD AUTO: 0.6 % (ref 0–0.5)
LYMPHOCYTES # BLD AUTO: 1.2 K/UL (ref 1–4.8)
LYMPHOCYTES NFR BLD: 23.6 % (ref 18–48)
MAGNESIUM SERPL-MCNC: 2 MG/DL (ref 1.6–2.6)
MCH RBC QN AUTO: 27.8 PG (ref 27–31)
MCHC RBC AUTO-ENTMCNC: 33.2 G/DL (ref 32–36)
MCV RBC AUTO: 84 FL (ref 82–98)
MONOCYTES # BLD AUTO: 0.6 K/UL (ref 0.3–1)
MONOCYTES NFR BLD: 11.4 % (ref 4–15)
NEUTROPHILS # BLD AUTO: 3.2 K/UL (ref 1.8–7.7)
NEUTROPHILS NFR BLD: 60.9 % (ref 38–73)
NRBC BLD-RTO: 0 /100 WBC
PHOSPHATE SERPL-MCNC: 2.7 MG/DL (ref 2.7–4.5)
PLATELET # BLD AUTO: 162 K/UL (ref 150–450)
PMV BLD AUTO: 9.9 FL (ref 9.2–12.9)
POTASSIUM SERPL-SCNC: 3.7 MMOL/L (ref 3.5–5.1)
RBC # BLD AUTO: 5.15 M/UL (ref 4.6–6.2)
SODIUM SERPL-SCNC: 141 MMOL/L (ref 136–145)
WBC # BLD AUTO: 5.18 K/UL (ref 3.9–12.7)

## 2024-10-07 PROCEDURE — 80069 RENAL FUNCTION PANEL: CPT | Performed by: INTERNAL MEDICINE

## 2024-10-07 PROCEDURE — 87799 DETECT AGENT NOS DNA QUANT: CPT | Performed by: INTERNAL MEDICINE

## 2024-10-07 PROCEDURE — 36415 COLL VENOUS BLD VENIPUNCTURE: CPT | Performed by: INTERNAL MEDICINE

## 2024-10-07 PROCEDURE — 85025 COMPLETE CBC W/AUTO DIFF WBC: CPT | Performed by: INTERNAL MEDICINE

## 2024-10-07 PROCEDURE — 83735 ASSAY OF MAGNESIUM: CPT | Performed by: INTERNAL MEDICINE

## 2024-10-07 PROCEDURE — 80197 ASSAY OF TACROLIMUS: CPT | Performed by: INTERNAL MEDICINE

## 2024-10-08 LAB — TACROLIMUS BLD-MCNC: 4.1 NG/ML (ref 5–15)

## 2024-10-10 LAB
ALLOSURE COMMENT: NORMAL
ALLOSURE SCORE KIDNEY: 0.43 %
BKV DNA SERPL NAA+PROBE-ACNC: ABNORMAL COPIES/ML
BKV DNA SERPL NAA+PROBE-LOG#: 3.54 LOG (10) COPIES/ML
BKV DNA SERPL QL NAA+PROBE: DETECTED
INFORMATION: NORMAL

## 2024-10-22 ENCOUNTER — PATIENT OUTREACH (OUTPATIENT)
Dept: ADMINISTRATIVE | Facility: HOSPITAL | Age: 44
End: 2024-10-22
Payer: COMMERCIAL

## 2024-10-28 DIAGNOSIS — I10 ESSENTIAL HYPERTENSION: Chronic | ICD-10-CM

## 2024-10-29 RX ORDER — VALSARTAN 80 MG/1
80 TABLET ORAL DAILY
Qty: 90 TABLET | Refills: 2 | Status: SHIPPED | OUTPATIENT
Start: 2024-10-29

## 2024-10-30 ENCOUNTER — TELEPHONE (OUTPATIENT)
Dept: NEPHROLOGY | Facility: CLINIC | Age: 44
End: 2024-10-30
Payer: COMMERCIAL

## 2024-10-30 DIAGNOSIS — N18.31 STAGE 3A CHRONIC KIDNEY DISEASE: Primary | Chronic | ICD-10-CM

## 2024-10-31 ENCOUNTER — PATIENT MESSAGE (OUTPATIENT)
Dept: TRANSPLANT | Facility: CLINIC | Age: 44
End: 2024-10-31
Payer: COMMERCIAL

## 2024-10-31 DIAGNOSIS — N18.31 TYPE 2 DIABETES MELLITUS WITH STAGE 3A CHRONIC KIDNEY DISEASE, WITHOUT LONG-TERM CURRENT USE OF INSULIN: ICD-10-CM

## 2024-10-31 DIAGNOSIS — Z94.0 KIDNEY REPLACED BY TRANSPLANT: Primary | ICD-10-CM

## 2024-10-31 DIAGNOSIS — E11.22 TYPE 2 DIABETES MELLITUS WITH STAGE 3A CHRONIC KIDNEY DISEASE, WITHOUT LONG-TERM CURRENT USE OF INSULIN: ICD-10-CM

## 2024-10-31 RX ORDER — TIRZEPATIDE 7.5 MG/.5ML
INJECTION, SOLUTION SUBCUTANEOUS
Qty: 6 ML | Refills: 3 | Status: SHIPPED | OUTPATIENT
Start: 2024-10-31

## 2024-11-01 ENCOUNTER — LAB VISIT (OUTPATIENT)
Dept: LAB | Facility: HOSPITAL | Age: 44
End: 2024-11-01
Attending: INTERNAL MEDICINE
Payer: COMMERCIAL

## 2024-11-01 DIAGNOSIS — Z94.0 KIDNEY REPLACED BY TRANSPLANT: ICD-10-CM

## 2024-11-01 DIAGNOSIS — N18.31 STAGE 3A CHRONIC KIDNEY DISEASE: Chronic | ICD-10-CM

## 2024-11-01 PROCEDURE — 87799 DETECT AGENT NOS DNA QUANT: CPT | Performed by: INTERNAL MEDICINE

## 2024-11-01 PROCEDURE — 36415 COLL VENOUS BLD VENIPUNCTURE: CPT | Performed by: INTERNAL MEDICINE

## 2024-11-01 PROCEDURE — 85025 COMPLETE CBC W/AUTO DIFF WBC: CPT | Performed by: INTERNAL MEDICINE

## 2024-11-01 PROCEDURE — 80069 RENAL FUNCTION PANEL: CPT | Performed by: INTERNAL MEDICINE

## 2024-11-02 LAB
ALBUMIN SERPL BCP-MCNC: 4.2 G/DL (ref 3.5–5.2)
ALBUMIN SERPL BCP-MCNC: 4.2 G/DL (ref 3.5–5.2)
ANION GAP SERPL CALC-SCNC: 7 MMOL/L (ref 8–16)
ANION GAP SERPL CALC-SCNC: 7 MMOL/L (ref 8–16)
BASOPHILS # BLD AUTO: 0.04 K/UL (ref 0–0.2)
BASOPHILS NFR BLD: 0.5 % (ref 0–1.9)
BUN SERPL-MCNC: 16 MG/DL (ref 6–20)
BUN SERPL-MCNC: 16 MG/DL (ref 6–20)
CALCIUM SERPL-MCNC: 9.4 MG/DL (ref 8.7–10.5)
CALCIUM SERPL-MCNC: 9.4 MG/DL (ref 8.7–10.5)
CHLORIDE SERPL-SCNC: 108 MMOL/L (ref 95–110)
CHLORIDE SERPL-SCNC: 108 MMOL/L (ref 95–110)
CO2 SERPL-SCNC: 25 MMOL/L (ref 23–29)
CO2 SERPL-SCNC: 25 MMOL/L (ref 23–29)
CREAT SERPL-MCNC: 1.7 MG/DL (ref 0.5–1.4)
CREAT SERPL-MCNC: 1.7 MG/DL (ref 0.5–1.4)
DIFFERENTIAL METHOD BLD: ABNORMAL
EOSINOPHIL # BLD AUTO: 0.1 K/UL (ref 0–0.5)
EOSINOPHIL NFR BLD: 1.4 % (ref 0–8)
ERYTHROCYTE [DISTWIDTH] IN BLOOD BY AUTOMATED COUNT: 15.3 % (ref 11.5–14.5)
EST. GFR  (NO RACE VARIABLE): 51 ML/MIN/1.73 M^2
EST. GFR  (NO RACE VARIABLE): 51 ML/MIN/1.73 M^2
GLUCOSE SERPL-MCNC: 201 MG/DL (ref 70–110)
GLUCOSE SERPL-MCNC: 201 MG/DL (ref 70–110)
HCT VFR BLD AUTO: 42.8 % (ref 40–54)
HGB BLD-MCNC: 14 G/DL (ref 14–18)
IMM GRANULOCYTES # BLD AUTO: 0.03 K/UL (ref 0–0.04)
IMM GRANULOCYTES NFR BLD AUTO: 0.4 % (ref 0–0.5)
LYMPHOCYTES # BLD AUTO: 1.2 K/UL (ref 1–4.8)
LYMPHOCYTES NFR BLD: 15.7 % (ref 18–48)
MCH RBC QN AUTO: 28.7 PG (ref 27–31)
MCHC RBC AUTO-ENTMCNC: 32.7 G/DL (ref 32–36)
MCV RBC AUTO: 88 FL (ref 82–98)
MONOCYTES # BLD AUTO: 0.6 K/UL (ref 0.3–1)
MONOCYTES NFR BLD: 7.9 % (ref 4–15)
NEUTROPHILS # BLD AUTO: 5.4 K/UL (ref 1.8–7.7)
NEUTROPHILS NFR BLD: 74.1 % (ref 38–73)
NRBC BLD-RTO: 0 /100 WBC
PHOSPHATE SERPL-MCNC: 3.1 MG/DL (ref 2.7–4.5)
PHOSPHATE SERPL-MCNC: 3.1 MG/DL (ref 2.7–4.5)
PLATELET # BLD AUTO: 174 K/UL (ref 150–450)
PMV BLD AUTO: 9.7 FL (ref 9.2–12.9)
POTASSIUM SERPL-SCNC: 4.6 MMOL/L (ref 3.5–5.1)
POTASSIUM SERPL-SCNC: 4.6 MMOL/L (ref 3.5–5.1)
RBC # BLD AUTO: 4.87 M/UL (ref 4.6–6.2)
SODIUM SERPL-SCNC: 140 MMOL/L (ref 136–145)
SODIUM SERPL-SCNC: 140 MMOL/L (ref 136–145)
WBC # BLD AUTO: 7.34 K/UL (ref 3.9–12.7)

## 2024-11-04 LAB
BKV DNA SERPL NAA+PROBE-ACNC: ABNORMAL COPIES/ML
BKV DNA SERPL NAA+PROBE-LOG#: 3.66 LOG (10) COPIES/ML
BKV DNA SERPL QL NAA+PROBE: DETECTED

## 2024-11-05 ENCOUNTER — OFFICE VISIT (OUTPATIENT)
Dept: NEPHROLOGY | Facility: CLINIC | Age: 44
End: 2024-11-05
Payer: COMMERCIAL

## 2024-11-05 VITALS
HEIGHT: 75 IN | SYSTOLIC BLOOD PRESSURE: 140 MMHG | WEIGHT: 246.25 LBS | BODY MASS INDEX: 30.62 KG/M2 | HEART RATE: 70 BPM | DIASTOLIC BLOOD PRESSURE: 80 MMHG

## 2024-11-05 DIAGNOSIS — Z94.0 KIDNEY TRANSPLANT STATUS: Primary | ICD-10-CM

## 2024-11-05 DIAGNOSIS — N18.31 STAGE 3A CHRONIC KIDNEY DISEASE: ICD-10-CM

## 2024-11-05 DIAGNOSIS — D84.9 IMMUNOSUPPRESSION: ICD-10-CM

## 2024-11-05 DIAGNOSIS — R80.1 PERSISTENT PROTEINURIA: ICD-10-CM

## 2024-11-05 DIAGNOSIS — B34.8 BK POLYOMA VIREMIA: ICD-10-CM

## 2024-11-05 PROCEDURE — 99999 PR PBB SHADOW E&M-EST. PATIENT-LVL III: CPT | Mod: PBBFAC,,, | Performed by: INTERNAL MEDICINE

## 2024-11-05 PROCEDURE — 4010F ACE/ARB THERAPY RXD/TAKEN: CPT | Mod: CPTII,S$GLB,, | Performed by: INTERNAL MEDICINE

## 2024-11-05 PROCEDURE — 3066F NEPHROPATHY DOC TX: CPT | Mod: CPTII,S$GLB,, | Performed by: INTERNAL MEDICINE

## 2024-11-05 PROCEDURE — 99214 OFFICE O/P EST MOD 30 MIN: CPT | Mod: S$GLB,,, | Performed by: INTERNAL MEDICINE

## 2024-11-05 PROCEDURE — 3079F DIAST BP 80-89 MM HG: CPT | Mod: CPTII,S$GLB,, | Performed by: INTERNAL MEDICINE

## 2024-11-05 PROCEDURE — 3044F HG A1C LEVEL LT 7.0%: CPT | Mod: CPTII,S$GLB,, | Performed by: INTERNAL MEDICINE

## 2024-11-05 PROCEDURE — 3060F POS MICROALBUMINURIA REV: CPT | Mod: CPTII,S$GLB,, | Performed by: INTERNAL MEDICINE

## 2024-11-05 PROCEDURE — 1159F MED LIST DOCD IN RCRD: CPT | Mod: CPTII,S$GLB,, | Performed by: INTERNAL MEDICINE

## 2024-11-05 PROCEDURE — 1160F RVW MEDS BY RX/DR IN RCRD: CPT | Mod: CPTII,S$GLB,, | Performed by: INTERNAL MEDICINE

## 2024-11-05 PROCEDURE — 3008F BODY MASS INDEX DOCD: CPT | Mod: CPTII,S$GLB,, | Performed by: INTERNAL MEDICINE

## 2024-11-05 PROCEDURE — 3077F SYST BP >= 140 MM HG: CPT | Mod: CPTII,S$GLB,, | Performed by: INTERNAL MEDICINE

## 2024-11-05 NOTE — PROGRESS NOTES
"Subjective:       Patient ID: Robb Iglesias is a 43 y.o. male.    Chief Complaint:  Kidney transplant status    HPI    He presents to clinic today for routine follow-up.  Since his last office visit he has been doing well and has no specific or new complaints.  His laboratory studies and medications were reviewed.  All Nephrology related questions were answered to his satisfaction.      Review of Systems   Constitutional: Negative.    HENT: Negative.     Respiratory: Negative.     Cardiovascular: Negative.    Gastrointestinal: Negative.    Genitourinary: Negative.    Musculoskeletal: Negative.    Skin: Negative.        BP (!) 140/80 (BP Location: Left arm, Patient Position: Sitting)   Pulse 70   Ht 6' 3" (1.905 m)   Wt 111.7 kg (246 lb 4.1 oz)   BMI 30.78 kg/m²     Lab Results   Component Value Date    WBC 7.34 11/01/2024    HGB 14.0 11/01/2024    HCT 42.8 11/01/2024    MCV 88 11/01/2024     11/01/2024      BMP  Lab Results   Component Value Date     11/01/2024     11/01/2024    K 4.6 11/01/2024    K 4.6 11/01/2024     11/01/2024     11/01/2024    CO2 25 11/01/2024    CO2 25 11/01/2024    BUN 16 11/01/2024    BUN 16 11/01/2024    CREATININE 1.7 (H) 11/01/2024    CREATININE 1.7 (H) 11/01/2024    CALCIUM 9.4 11/01/2024    CALCIUM 9.4 11/01/2024    ANIONGAP 7 (L) 11/01/2024    ANIONGAP 7 (L) 11/01/2024    ESTGFRAFRICA 13 (A) 07/26/2022    EGFRNONAA 11 (A) 07/26/2022     CMP  Sodium   Date Value Ref Range Status   11/01/2024 140 136 - 145 mmol/L Final   11/01/2024 140 136 - 145 mmol/L Final     Potassium   Date Value Ref Range Status   11/01/2024 4.6 3.5 - 5.1 mmol/L Final   11/01/2024 4.6 3.5 - 5.1 mmol/L Final     Chloride   Date Value Ref Range Status   11/01/2024 108 95 - 110 mmol/L Final   11/01/2024 108 95 - 110 mmol/L Final     CO2   Date Value Ref Range Status   11/01/2024 25 23 - 29 mmol/L Final   11/01/2024 25 23 - 29 mmol/L Final     Glucose   Date Value Ref Range " Status   11/01/2024 201 (H) 70 - 110 mg/dL Final   11/01/2024 201 (H) 70 - 110 mg/dL Final     BUN   Date Value Ref Range Status   11/01/2024 16 6 - 20 mg/dL Final   11/01/2024 16 6 - 20 mg/dL Final     Creatinine   Date Value Ref Range Status   11/01/2024 1.7 (H) 0.5 - 1.4 mg/dL Final   11/01/2024 1.7 (H) 0.5 - 1.4 mg/dL Final     Calcium   Date Value Ref Range Status   11/01/2024 9.4 8.7 - 10.5 mg/dL Final   11/01/2024 9.4 8.7 - 10.5 mg/dL Final     Total Protein   Date Value Ref Range Status   08/27/2024 7.0 6.0 - 8.4 g/dL Final     Albumin   Date Value Ref Range Status   11/01/2024 4.2 3.5 - 5.2 g/dL Final   11/01/2024 4.2 3.5 - 5.2 g/dL Final     Total Bilirubin   Date Value Ref Range Status   08/27/2024 2.8 (H) 0.1 - 1.0 mg/dL Final     Comment:     For infants and newborns, interpretation of results should be based  on gestational age, weight and in agreement with clinical  observations.    Premature Infant recommended reference ranges:  Up to 24 hours.............<8.0 mg/dL  Up to 48 hours............<12.0 mg/dL  3-5 days..................<15.0 mg/dL  6-29 days.................<15.0 mg/dL       Alkaline Phosphatase   Date Value Ref Range Status   08/27/2024 90 55 - 135 U/L Final     AST   Date Value Ref Range Status   08/27/2024 27 10 - 40 U/L Final     ALT   Date Value Ref Range Status   08/27/2024 43 10 - 44 U/L Final     Anion Gap   Date Value Ref Range Status   11/01/2024 7 (L) 8 - 16 mmol/L Final   11/01/2024 7 (L) 8 - 16 mmol/L Final     eGFR if    Date Value Ref Range Status   07/26/2022 13 (A) >60 mL/min/1.73 m^2 Final     eGFR if non    Date Value Ref Range Status   07/26/2022 11 (A) >60 mL/min/1.73 m^2 Final     Comment:     Calculation used to obtain the estimated glomerular filtration  rate (eGFR) is the CKD-EPI equation.               Objective:            Physical Exam  Constitutional:       Appearance: Normal appearance.   HENT:      Head: Normocephalic and  atraumatic.   Eyes:      General: No scleral icterus.     Extraocular Movements: Extraocular movements intact.      Pupils: Pupils are equal, round, and reactive to light.   Pulmonary:      Effort: Pulmonary effort is normal.      Breath sounds: No stridor.   Musculoskeletal:      Right lower leg: No edema.      Left lower leg: No edema.   Skin:     General: Skin is warm and dry.   Neurological:      General: No focal deficit present.      Mental Status: He is alert and oriented to person, place, and time.   Psychiatric:         Mood and Affect: Mood normal.         Behavior: Behavior normal.       Assessment:       1. Kidney transplant status    2. Immunosuppression    3. Stage 3a chronic kidney disease    4. Persistent proteinuria    5. BK polyoma viremia        Plan:       1. Status post kidney transplant on May 15, 2023.  Stable renal allograft.    2. Currently on 1 mg in the morning 0.5 mg in the evening of Prograf.  Most recent level was 4.1.  Levels are being run intentionally on the low side secondary to ongoing BK viremia.  Mycophenolate is on hold.  He is on prednisone 5 mg daily.    3. Creatinine ranges between 1.4 and 1.7.  This is hemodynamic in nature.  The day that he had his labs drawn showing a creatinine 1.7 he stated that he had not been drinking enough water.  Will continue to monitor.  He continues to have low-grade proteinuria.  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  Is likely that this proteinuria is from his native kidneys.    4. Persistent low-grade proteinuria about 1.2 g by PC ratio on most recent labs.  As per above this likely from his native kidneys.  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    5. He continues to show positive polyoma titers.  Will defer to transplant medicine for management.      Siva Figueroa MD

## 2024-11-06 ENCOUNTER — PATIENT MESSAGE (OUTPATIENT)
Dept: TRANSPLANT | Facility: CLINIC | Age: 44
End: 2024-11-06
Payer: COMMERCIAL

## 2024-11-06 LAB
ALLOSURE COMMENT: NORMAL
ALLOSURE SCORE KIDNEY: 1.4 %
INFORMATION: NORMAL

## 2024-11-07 ENCOUNTER — PATIENT MESSAGE (OUTPATIENT)
Dept: TRANSPLANT | Facility: CLINIC | Age: 44
End: 2024-11-07
Payer: COMMERCIAL

## 2024-11-07 DIAGNOSIS — Z94.0 KIDNEY REPLACED BY TRANSPLANT: Primary | ICD-10-CM

## 2024-11-07 DIAGNOSIS — Z94.0 S/P KIDNEY TRANSPLANT: ICD-10-CM

## 2024-11-07 RX ORDER — TACROLIMUS 0.5 MG/1
CAPSULE ORAL
Qty: 120 CAPSULE | Refills: 11 | Status: SHIPPED | OUTPATIENT
Start: 2024-11-07 | End: 2025-11-07

## 2024-11-07 NOTE — TELEPHONE ENCOUNTER
Pt made aware of below orders          ----- Message from Rell Booth MD sent at 11/7/2024  9:08 AM CST -----  Let's schedule a kidney biopsy, hold ASA. He has a  high allosure BK viremia and reduced immunosuppression.  Also repeat DSA's  Increase prograf to 1 mg PO bid

## 2024-11-07 NOTE — PROGRESS NOTES
Let's schedule a kidney biopsy, hold ASA. He has a  high allosure BK viremia and reduced immunosuppression.  Also repeat DSA's  Increase prograf to 1 mg PO bid

## 2024-11-12 ENCOUNTER — TELEPHONE (OUTPATIENT)
Dept: INTERVENTIONAL RADIOLOGY/VASCULAR | Facility: CLINIC | Age: 44
End: 2024-11-12
Payer: COMMERCIAL

## 2024-11-21 ENCOUNTER — HOSPITAL ENCOUNTER (OUTPATIENT)
Facility: OTHER | Age: 44
Discharge: HOME | End: 2024-11-21
Attending: RADIOLOGY | Admitting: RADIOLOGY
Payer: COMMERCIAL

## 2024-11-21 VITALS
SYSTOLIC BLOOD PRESSURE: 130 MMHG | WEIGHT: 240 LBS | TEMPERATURE: 98 F | BODY MASS INDEX: 29.84 KG/M2 | HEIGHT: 75 IN | OXYGEN SATURATION: 97 % | RESPIRATION RATE: 16 BRPM | HEART RATE: 76 BPM | DIASTOLIC BLOOD PRESSURE: 78 MMHG

## 2024-11-21 DIAGNOSIS — B34.8 BK POLYOMA VIREMIA: ICD-10-CM

## 2024-11-21 DIAGNOSIS — Z94.0 KIDNEY REPLACED BY TRANSPLANT: ICD-10-CM

## 2024-11-21 DIAGNOSIS — R80.1 PERSISTENT PROTEINURIA: ICD-10-CM

## 2024-11-21 DIAGNOSIS — N18.31 STAGE 3A CHRONIC KIDNEY DISEASE: ICD-10-CM

## 2024-11-21 DIAGNOSIS — Z94.0 KIDNEY TRANSPLANT STATUS: ICD-10-CM

## 2024-11-21 LAB
ALBUMIN SERPL BCP-MCNC: 4.1 G/DL (ref 3.5–5.2)
AMORPH CRY URNS QL MICRO: ABNORMAL
ANION GAP SERPL CALC-SCNC: 8 MMOL/L (ref 8–16)
BACTERIA #/AREA URNS HPF: ABNORMAL /HPF
BILIRUB UR QL STRIP: NEGATIVE
BUN SERPL-MCNC: 13 MG/DL (ref 6–20)
CALCIUM SERPL-MCNC: 8.9 MG/DL (ref 8.7–10.5)
CHLORIDE SERPL-SCNC: 110 MMOL/L (ref 95–110)
CLARITY UR: ABNORMAL
CO2 SERPL-SCNC: 25 MMOL/L (ref 23–29)
COLOR UR: YELLOW
CREAT SERPL-MCNC: 1.5 MG/DL (ref 0.5–1.4)
CREAT UR-MCNC: 95.6 MG/DL (ref 23–375)
EST. GFR  (NO RACE VARIABLE): 59 ML/MIN/1.73 M^2
GLUCOSE SERPL-MCNC: 124 MG/DL (ref 70–110)
GLUCOSE UR QL STRIP: ABNORMAL
HGB UR QL STRIP: ABNORMAL
HYALINE CASTS #/AREA URNS LPF: 0 /LPF
INR PPP: 1 (ref 0.8–1.2)
KETONES UR QL STRIP: NEGATIVE
LEUKOCYTE ESTERASE UR QL STRIP: NEGATIVE
MAGNESIUM SERPL-MCNC: 2 MG/DL (ref 1.6–2.6)
MICROSCOPIC COMMENT: ABNORMAL
NITRITE UR QL STRIP: NEGATIVE
PH UR STRIP: 7 [PH] (ref 5–8)
PHOSPHATE SERPL-MCNC: 2.7 MG/DL (ref 2.7–4.5)
POCT GLUCOSE: 114 MG/DL (ref 70–110)
POTASSIUM SERPL-SCNC: 3.9 MMOL/L (ref 3.5–5.1)
PROT UR QL STRIP: ABNORMAL
PROT UR-MCNC: 137 MG/DL (ref 0–15)
PROT/CREAT UR: 1.43 MG/G{CREAT} (ref 0–0.2)
PROTHROMBIN TIME: 11.1 SEC (ref 9–12.5)
PTH-INTACT SERPL-MCNC: 202.4 PG/ML (ref 9–77)
RBC #/AREA URNS HPF: 60 /HPF (ref 0–4)
SODIUM SERPL-SCNC: 143 MMOL/L (ref 136–145)
SP GR UR STRIP: 1.01 (ref 1–1.03)
URN SPEC COLLECT METH UR: ABNORMAL
UROBILINOGEN UR STRIP-ACNC: NEGATIVE EU/DL
WBC #/AREA URNS HPF: 2 /HPF (ref 0–5)
YEAST URNS QL MICRO: ABNORMAL

## 2024-11-21 PROCEDURE — 80197 ASSAY OF TACROLIMUS: CPT | Performed by: INTERNAL MEDICINE

## 2024-11-21 PROCEDURE — 81000 URINALYSIS NONAUTO W/SCOPE: CPT | Performed by: INTERNAL MEDICINE

## 2024-11-21 PROCEDURE — 82962 GLUCOSE BLOOD TEST: CPT | Performed by: RADIOLOGY

## 2024-11-21 PROCEDURE — 86977 RBC SERUM PRETX INCUBJ/INHIB: CPT | Mod: 59 | Performed by: INTERNAL MEDICINE

## 2024-11-21 PROCEDURE — 63600175 PHARM REV CODE 636 W HCPCS: Performed by: RADIOLOGY

## 2024-11-21 PROCEDURE — 86832 HLA CLASS I HIGH DEFIN QUAL: CPT | Performed by: INTERNAL MEDICINE

## 2024-11-21 PROCEDURE — 0TB03ZX EXCISION OF RIGHT KIDNEY, PERCUTANEOUS APPROACH, DIAGNOSTIC: ICD-10-PCS | Performed by: RADIOLOGY

## 2024-11-21 PROCEDURE — 99152 MOD SED SAME PHYS/QHP 5/>YRS: CPT | Performed by: RADIOLOGY

## 2024-11-21 PROCEDURE — 86833 HLA CLASS II HIGH DEFIN QUAL: CPT | Performed by: INTERNAL MEDICINE

## 2024-11-21 PROCEDURE — 83970 ASSAY OF PARATHORMONE: CPT | Performed by: INTERNAL MEDICINE

## 2024-11-21 PROCEDURE — 99153 MOD SED SAME PHYS/QHP EA: CPT | Performed by: RADIOLOGY

## 2024-11-21 PROCEDURE — 85610 PROTHROMBIN TIME: CPT | Performed by: RADIOLOGY

## 2024-11-21 PROCEDURE — 84156 ASSAY OF PROTEIN URINE: CPT | Performed by: INTERNAL MEDICINE

## 2024-11-21 PROCEDURE — 83735 ASSAY OF MAGNESIUM: CPT | Performed by: INTERNAL MEDICINE

## 2024-11-21 PROCEDURE — 80069 RENAL FUNCTION PANEL: CPT | Performed by: INTERNAL MEDICINE

## 2024-11-21 PROCEDURE — 36415 COLL VENOUS BLD VENIPUNCTURE: CPT | Performed by: RADIOLOGY

## 2024-11-21 RX ORDER — FENTANYL CITRATE 50 UG/ML
INJECTION, SOLUTION INTRAMUSCULAR; INTRAVENOUS
Status: DISCONTINUED | OUTPATIENT
Start: 2024-11-21 | End: 2024-11-21 | Stop reason: HOSPADM

## 2024-11-21 RX ORDER — HYDRALAZINE HYDROCHLORIDE 20 MG/ML
INJECTION INTRAMUSCULAR; INTRAVENOUS
Status: DISCONTINUED | OUTPATIENT
Start: 2024-11-21 | End: 2024-11-21 | Stop reason: HOSPADM

## 2024-11-21 RX ORDER — MIDAZOLAM HYDROCHLORIDE 1 MG/ML
INJECTION INTRAMUSCULAR; INTRAVENOUS
Status: DISCONTINUED | OUTPATIENT
Start: 2024-11-21 | End: 2024-11-21 | Stop reason: HOSPADM

## 2024-11-21 NOTE — Clinical Note
A pulse oximeter was placed on the patient's left index finger and left index finger. [address] [place], [place] [id number]

## 2024-11-21 NOTE — Clinical Note
The right abdomen was prepped. The site was prepped with ChloraPrep. The site was clipped. The patient was draped.

## 2024-11-21 NOTE — DISCHARGE SUMMARY
Radiology Discharge Summary      Hospital Course: No complications    Admit Date: 11/21/2024  Discharge Date: 11/21/2024     Instructions Given to Patient: Yes  Diet: Resume prior diet  Activity: activity as tolerated and no driving for today    Description of Condition on Discharge: Stable  Vital Signs (Most Recent): Temp: 98.2 °F (36.8 °C) (11/21/24 0747)  Pulse: 81 (11/21/24 1139)  Resp: 16 (11/21/24 1139)  BP: (!) 158/78 (11/21/24 1139)  SpO2: 98 % (11/21/24 1139)    Discharge Disposition: Home    Discharge Diagnosis: kidney transplant with proteinuria and CKD 3a, s/p US guided transplant kidney bx     Follow-up: with Transplant Nephrology for bx results    Eliu Nunez MD  Staff Radiologist  Department of Radiology  Pager: [phone]

## 2024-11-21 NOTE — OP NOTE
Radiology Post-Procedure Note    Pre Op Diagnosis:  kidney transplant with proteinuria and CKD 3a      Post Op Diagnosis:  same      Procedure:  RLQ transplant kidney biopsy      Procedure performed by: Eliu Nunez MD    Written Informed Consent Obtained: Yes    Specimen Removed: YES 3 cores    Estimated Blood Loss: less than 50     Findings:   Using US guidance a 17g sheath needle was placed into a  RLQ kidney transplant  . 18g monopty biopsy gun used to take 3 core biopsy samples. Specimen sent to pathology.     Patient tolerated procedure well.    Eliu Nunez MD  Staff Radiologist  Department of Radiology  Pager: [phone]

## 2024-11-21 NOTE — H&P
Radiology History & Physical      SUBJECTIVE:     Chief Complaint: kidney transplant with proteinuria, CKD 3a    History of Present Illness:  Robb Iglesias is a 43 y.o. male who presents for transplant kidney biopsy  Past Medical History:   Diagnosis Date    Allergic rhinitis     Atrial fibrillation with RVR 6/4/2023    Class 1 obesity due to excess calories with serious comorbidity and body mass index (BMI) of 31.0 to 31.9 in adult 4/7/2017    Coronary artery disease     Coronary artery disease involving native coronary artery of native heart without angina pectoris 2/6/2018    Cath lab procedure 04/23/2018 (Naveen Rios MD) A. Indication/Pre-Operative Diagnosis: The patient is a 37 year old male that was referred for catheterization by Aaareferral Self for ACS (NSTEMI). The BELLA risk score is 5.  B. Summary/Post-Operative Diagnosis 1. Single vessel coronary artery disease. 2. Normal LVEF. 3. Diastolic dysfunction. 4. Successful PCI for acute myocardial infarction. 5.     Diabetic nephropathy associated with type 2 diabetes mellitus 1/6/2020    Direct hyperbilirubinemia 3/24/2018    DM (diabetes mellitus) 2008    BS doesn't check any more 08/02/2018    DM (diabetes mellitus) 2012    BS 99 am 06/26/2020    DM (diabetes mellitus)     BS didn't check 06/04/2021    DM (diabetes mellitus) 2008    BS didn't check 07/29/2022     Dyslipidemia associated with type 2 diabetes mellitus 7/31/2017    Elevated bilirubin 3/21/2018    GERD (gastroesophageal reflux disease)     Gout     Hyperlipidemia     Hyperparathyroidism, secondary to chronic kidney disease 6/7/2021    Hypertension associated with chronic kidney disease due to type 2 diabetes mellitus     Hypertension complicating diabetes 3/16/2019    Not candidate for Hypertension Digital Medicine program due to dialysis status. Didn't tolerate amlodipine due to SE of hypotension.    Idiopathic chronic gout, multiple sites, without tophus (tophi) 7/19/2017    Long  term (current) use of insulin     MI (myocardial infarction) 07/2017    NSTEMI with CAD s/p PCI (FAMILIA) of LAD x 2 in 8/2017 7/31/2017    Obesity     HERNY on CPAP     Peritoneal dialysis catheter in place 1/26/2023    Proteinuria     Severe obstructive sleep apnea - Intolerant of CPAP 7/31/2017    Intolerant of CPAP after weeks of trying multiple interfaces. SPLIT-NIGHT SLEEP STUDY 7/28/2015 · SLEEP ARCHITECTURE: Sleep onset was 2.9 minutes and sleep efficiency was 94.4%. Sleep Stage distribution showed 145 sleep stage changes, 6 awakenings and 119 arousals. Sleep distribution showed 53.2% stage NI, 41.8% stage N 11, 0.0% stage N Ill and REM sleep was at 5.0%. There were 2 REM periods. · RE    Stage 3a chronic kidney disease 5/26/2023    Stage 5 chronic kidney disease on chronic peritoneal dialysis 6/7/2017    Stage 5 chronic kidney disease on chronic peritoneal dialysis 6/7/2017    Status post angioplasty with stent 8/4/2017    Steatohepatitis     Fatty Liver    Type 2 diabetes mellitus with chronic kidney disease on chronic dialysis, without long-term current use of insulin 6/21/2017    Type 2 diabetes mellitus with diabetic nephropathy     Type 2 diabetes mellitus with diabetic nephropathy, without long-term current use of insulin 1/6/2020    Type 2 diabetes mellitus with diabetic polyneuropathy, without long-term current use of insulin 6/7/2021    Type 2 diabetes mellitus with hyperglycemia     Type 2 diabetes mellitus with renal manifestations     Type 2 diabetes mellitus with stage 3 chronic kidney disease, without long-term current use of insulin 6/21/2017     Past Surgical History:   Procedure Laterality Date    CORONARY ANGIOPLASTY WITH STENT PLACEMENT      CYSTOSCOPY      KIDNEY TRANSPLANT N/A 05/15/2023    Procedure: TRANSPLANT, KIDNEY;  Surgeon: Reji Hinojosa MD;  Location: [facility] OR [address] [place] [address];  Service: Transplant;  Laterality: N/A;  IN ROOM: 10:37  OUT OF ICE:10:53  REPERFUSION TIME: 11:21      LASIK  Bilateral     LIVER BIOPSY      NASAL ENDOSCOPY      NASAL SEPTUM SURGERY      PERITONEAL CATHETER REMOVAL N/A 05/15/2023    Procedure: REMOVAL, CATHETER, DIALYSIS, PERITONEAL;  Surgeon: Reji Hinojosa MD;  Location: [facility] OR [address] [place] [address];  Service: Transplant;  Laterality: N/A;    SLEEVE GASTROPLASTY  03/06/2017       Home Meds:   Prior to Admission medications    Medication Sig Start Date End Date Taking? Authorizing Provider   atorvastatin (LIPITOR) 80 MG tablet Take 1 tablet (80 mg total) by mouth every evening. 3/18/24 11/21/24 Yes SABIHA Roberson MD   blood sugar diagnostic Strp Check blood glucose 2 times daily as directed and as needed 6/30/23  Yes SABIHA Roberson MD   blood-glucose meter (ONETOUCH ULTRAMINI) kit Use as instructed 11/18/22 11/21/24 Yes SABIHA Roberson MD   ezetimibe (ZETIA) 10 mg tablet Take 1 tablet (10 mg total) by mouth once daily. 7/11/24  Yes SABIHA Roberson MD   GINGER ROOT, BULK, MISC by Misc.(Non-Drug; Combo Route) route.   Yes Provider, Historical   k phos di & mono-sod phos mono (PHOSPHA 250 NEUTRAL) 250 mg Tab Take 2 tablets by mouth 3 (three) times daily. 1/11/24 1/11/25 Yes Rell Booth MD   lancets [facility] Check blood glucose 2 times daily as directed and as needed (dispense insurance preferred brand or patient choice) 6/30/23  Yes SABIHA Roberson MD   magnesium oxide (MAG-OX) 400 mg (241.3 mg magnesium) tablet TAKE 1 TABLET BY MOUTH 2 TIMES DAILY. 6/25/24  Yes Samantha Munoz,    metoprolol tartrate (LOPRESSOR) 25 MG tablet Take 1 tablet (25 mg total) by mouth 2 (two) times daily. 7/2/24  Yes SABIHA Roberson MD   MOUNJARO 7.5 mg/0.5 mL PnIj INJECT 7.5 MG UNDER THE SKIN EVERY 7 DAYS 10/31/24  Yes Heather Sorensen NP   multivitamin Tab Take 1 tablet by mouth once daily. 5/18/23  Yes Reji Hinojosa MD   pantoprazole (PROTONIX) 40 MG tablet Take 1 tablet (40 mg total) by mouth once daily. 10/5/24  Yes SABIHA Roberson MD   pen needle, diabetic (BD ULTRA-FINE  "SHORT PEN NEEDLE) 31 gauge x 5/16" Ndle Use to inject insulin into the skin 3 times daily 5/17/23  Yes Reji Hinojosa MD   predniSONE (DELTASONE) 5 MG tablet TAKE ONE TABLET BY MOUTH DAILY 5/20/24  Yes Rell Booth MD   tacrolimus (PROGRAF) 0.5 MG Cap Take 2 capsules (1 mg total) by mouth every morning AND 2 capsules (1 mg total) every evening. 11/7/24 11/7/25 Yes Rell Booth MD   valsartan (DIOVAN) 80 MG tablet Take 1 tablet (80 mg total) by mouth once daily. 10/29/24  Yes SABIHA Roberson MD   aspirin (ECOTRIN) 81 MG EC tablet Take 1 tablet (81 mg total) by mouth once daily. 2/2/24 5/2/24  SABIHA Roberson MD   magnesium oxide (MAG-OX) 400 mg (241.3 mg magnesium) tablet TAKE 1 TABLET BY MOUTH 2 TIMES DAILY. 7/31/23   Provider, Historical   nitroGLYCERIN (NITROSTAT) 0.4 MG SL tablet Dissolve one tablet underneath tongue at onset of angina; may repeat every 5 minutes if needed. Call 911 if angina persists after 2 doses. 3/26/24   SABIHA Roberson MD     Anticoagulants/Antiplatelets: aspirin stopped 1 week    Allergies: Review of patient's allergies indicates:  No Known Allergies  Sedation History:  no adverse reactions    Review of Systems:   Hematological: no known coagulopathies  Respiratory: no shortness of breath  Cardiovascular: no chest pain  Gastrointestinal: no abdominal pain  Genito-Urinary: no dysuria  Musculoskeletal: negative  Neurological: no TIA or stroke symptoms         OBJECTIVE:     Vital Signs (Most Recent)  Temp: 98.2 °F (36.8 °C) (11/21/24 0747)  Pulse: 70 (11/21/24 0747)  Resp: 16 (11/21/24 0747)  BP: (!) 151/93 (11/21/24 0807)  SpO2: 99 % (11/21/24 0747)    Physical Exam:  ASA: 2  Mallampati: 2    General: no acute distress  Mental Status: alert and oriented to person, place and time  HEENT: normocephalic, atraumatic  Chest: unlabored breathing  Heart: regular heart rate  Abdomen: nondistended  Extremity: moves all extremities    Laboratory  Lab Results   Component Value " Date    INR 1.0 11/21/2024       Lab Results   Component Value Date    WBC 7.34 11/01/2024    HGB 14.0 11/01/2024    HCT 42.8 11/01/2024    MCV 88 11/01/2024     11/01/2024      Lab Results   Component Value Date     (H) 11/21/2024     11/21/2024    K 3.9 11/21/2024     11/21/2024    CO2 25 11/21/2024    BUN 13 11/21/2024    CREATININE 1.5 (H) 11/21/2024    CALCIUM 8.9 11/21/2024    MG 2.0 11/21/2024    ALT 43 08/27/2024    AST 27 08/27/2024    ALBUMIN 4.1 11/21/2024    BILITOT 2.8 (H) 08/27/2024    BILIDIR 0.2 02/05/2024       ASSESSMENT/PLAN:     Sedation Plan: moderate  Patient will undergo US guided transplant kidney biopsy.    Eliu Nunez MD  Staff Radiologist  Department of Radiology  Pager: [phone]

## 2024-11-22 ENCOUNTER — HOSPITAL ENCOUNTER (INPATIENT)
Facility: HOSPITAL | Age: 44
LOS: 3 days | Discharge: HOME | DRG: 699 | End: 2024-11-25
Attending: INTERNAL MEDICINE | Admitting: INTERNAL MEDICINE
Payer: COMMERCIAL

## 2024-11-22 ENCOUNTER — TELEPHONE (OUTPATIENT)
Dept: TRANSPLANT | Facility: CLINIC | Age: 44
End: 2024-11-22
Payer: COMMERCIAL

## 2024-11-22 DIAGNOSIS — E11.22 TYPE 2 DIABETES MELLITUS WITH STAGE 3A CHRONIC KIDNEY DISEASE, WITHOUT LONG-TERM CURRENT USE OF INSULIN: Chronic | ICD-10-CM

## 2024-11-22 DIAGNOSIS — Z94.0 S/P KIDNEY TRANSPLANT: Chronic | ICD-10-CM

## 2024-11-22 DIAGNOSIS — B34.8 BK VIREMIA: ICD-10-CM

## 2024-11-22 DIAGNOSIS — Z79.60 LONG-TERM USE OF IMMUNOSUPPRESSANT MEDICATION: ICD-10-CM

## 2024-11-22 DIAGNOSIS — E78.5 DYSLIPIDEMIA ASSOCIATED WITH TYPE 2 DIABETES MELLITUS: Chronic | ICD-10-CM

## 2024-11-22 DIAGNOSIS — Z76.82 KIDNEY TRANSPLANT CANDIDATE: Chronic | ICD-10-CM

## 2024-11-22 DIAGNOSIS — E11.69 DYSLIPIDEMIA ASSOCIATED WITH TYPE 2 DIABETES MELLITUS: Chronic | ICD-10-CM

## 2024-11-22 DIAGNOSIS — I48.0 PAROXYSMAL ATRIAL FIBRILLATION: Chronic | ICD-10-CM

## 2024-11-22 DIAGNOSIS — Z91.89 AT RISK FOR OPPORTUNISTIC INFECTIONS: ICD-10-CM

## 2024-11-22 DIAGNOSIS — D84.821 IMMUNOCOMPROMISED STATE DUE TO DRUG THERAPY: ICD-10-CM

## 2024-11-22 DIAGNOSIS — E11.42 TYPE 2 DIABETES MELLITUS WITH DIABETIC POLYNEUROPATHY, WITHOUT LONG-TERM CURRENT USE OF INSULIN: Chronic | ICD-10-CM

## 2024-11-22 DIAGNOSIS — Z79.899 IMMUNOCOMPROMISED STATE DUE TO DRUG THERAPY: ICD-10-CM

## 2024-11-22 DIAGNOSIS — I25.2 HISTORY OF NON-ST ELEVATION MYOCARDIAL INFARCTION (NSTEMI): Chronic | ICD-10-CM

## 2024-11-22 DIAGNOSIS — I10 ESSENTIAL HYPERTENSION: Chronic | ICD-10-CM

## 2024-11-22 DIAGNOSIS — N18.31 TYPE 2 DIABETES MELLITUS WITH STAGE 3A CHRONIC KIDNEY DISEASE, WITHOUT LONG-TERM CURRENT USE OF INSULIN: Chronic | ICD-10-CM

## 2024-11-22 DIAGNOSIS — K21.9 GASTROESOPHAGEAL REFLUX DISEASE WITHOUT ESOPHAGITIS: ICD-10-CM

## 2024-11-22 DIAGNOSIS — T86.11 ACUTE REJECTION OF KIDNEY TRANSPLANT: Primary | ICD-10-CM

## 2024-11-22 DIAGNOSIS — T86.11 KIDNEY TRANSPLANT REJECTION: ICD-10-CM

## 2024-11-22 DIAGNOSIS — Z29.89 PROPHYLACTIC IMMUNOTHERAPY: ICD-10-CM

## 2024-11-22 LAB
CLASS I ANTIBODY COMMENTS - LUMINEX: NORMAL
CLASS II ANTIBODY COMMENTS - LUMINEX: NORMAL
DSA1 TESTING DATE: NORMAL
DSA12 TESTING DATE: NORMAL
DSA2 TESTING DATE: NORMAL
POCT GLUCOSE: 122 MG/DL (ref 70–110)
SERUM COLLECTION DT - LUMINEX CLASS I: NORMAL
SERUM COLLECTION DT - LUMINEX CLASS II: NORMAL
TACROLIMUS BLD-MCNC: 6.1 NG/ML (ref 5–15)

## 2024-11-22 PROCEDURE — 25000003 PHARM REV CODE 250

## 2024-11-22 PROCEDURE — 63600175 PHARM REV CODE 636 W HCPCS

## 2024-11-22 PROCEDURE — 99223 1ST HOSP IP/OBS HIGH 75: CPT | Mod: ,,, | Performed by: CLINICAL NURSE SPECIALIST

## 2024-11-22 PROCEDURE — 20600001 HC STEP DOWN PRIVATE ROOM

## 2024-11-22 RX ORDER — ONDANSETRON HYDROCHLORIDE 2 MG/ML
4 INJECTION, SOLUTION INTRAVENOUS EVERY 12 HOURS PRN
Status: DISCONTINUED | OUTPATIENT
Start: 2024-11-22 | End: 2024-11-25 | Stop reason: HOSPADM

## 2024-11-22 RX ORDER — SODIUM CHLORIDE 0.9 % (FLUSH) 0.9 %
10 SYRINGE (ML) INJECTION EVERY 6 HOURS PRN
Status: DISCONTINUED | OUTPATIENT
Start: 2024-11-22 | End: 2024-11-25 | Stop reason: HOSPADM

## 2024-11-22 RX ORDER — TACROLIMUS 1 MG/1
1 CAPSULE ORAL EVERY MORNING
Status: DISCONTINUED | OUTPATIENT
Start: 2024-11-23 | End: 2024-11-24

## 2024-11-22 RX ORDER — TACROLIMUS 1 MG/1
1 CAPSULE ORAL EVERY EVENING
Status: DISCONTINUED | OUTPATIENT
Start: 2024-11-23 | End: 2024-11-24

## 2024-11-22 RX ORDER — ASPIRIN 81 MG/1
81 TABLET ORAL DAILY
Status: DISCONTINUED | OUTPATIENT
Start: 2024-11-23 | End: 2024-11-25 | Stop reason: HOSPADM

## 2024-11-22 RX ORDER — INSULIN ASPART 100 [IU]/ML
0-5 INJECTION, SOLUTION INTRAVENOUS; SUBCUTANEOUS
Status: DISCONTINUED | OUTPATIENT
Start: 2024-11-22 | End: 2024-11-23

## 2024-11-22 RX ORDER — GLUCAGON 1 MG
1 KIT INJECTION
Status: DISCONTINUED | OUTPATIENT
Start: 2024-11-22 | End: 2024-11-25 | Stop reason: HOSPADM

## 2024-11-22 RX ORDER — METOPROLOL TARTRATE 25 MG/1
25 TABLET, FILM COATED ORAL 2 TIMES DAILY
Status: DISCONTINUED | OUTPATIENT
Start: 2024-11-22 | End: 2024-11-25 | Stop reason: HOSPADM

## 2024-11-22 RX ORDER — IBUPROFEN 200 MG
16 TABLET ORAL
Status: DISCONTINUED | OUTPATIENT
Start: 2024-11-22 | End: 2024-11-25 | Stop reason: HOSPADM

## 2024-11-22 RX ORDER — TALC
6 POWDER (GRAM) TOPICAL NIGHTLY PRN
Status: DISCONTINUED | OUTPATIENT
Start: 2024-11-22 | End: 2024-11-25 | Stop reason: HOSPADM

## 2024-11-22 RX ORDER — LANOLIN ALCOHOL/MO/W.PET/CERES
400 CREAM (GRAM) TOPICAL 2 TIMES DAILY
Status: DISCONTINUED | OUTPATIENT
Start: 2024-11-22 | End: 2024-11-25 | Stop reason: HOSPADM

## 2024-11-22 RX ORDER — VALSARTAN 40 MG/1
80 TABLET ORAL DAILY
Status: DISCONTINUED | OUTPATIENT
Start: 2024-11-23 | End: 2024-11-25 | Stop reason: HOSPADM

## 2024-11-22 RX ORDER — PANTOPRAZOLE SODIUM 40 MG/1
40 TABLET, DELAYED RELEASE ORAL DAILY
Status: DISCONTINUED | OUTPATIENT
Start: 2024-11-23 | End: 2024-11-25 | Stop reason: HOSPADM

## 2024-11-22 RX ORDER — IBUPROFEN 200 MG
24 TABLET ORAL
Status: DISCONTINUED | OUTPATIENT
Start: 2024-11-22 | End: 2024-11-25 | Stop reason: HOSPADM

## 2024-11-22 RX ORDER — HEPARIN SODIUM 5000 [USP'U]/ML
5000 INJECTION, SOLUTION INTRAVENOUS; SUBCUTANEOUS EVERY 8 HOURS
Status: DISCONTINUED | OUTPATIENT
Start: 2024-11-22 | End: 2024-11-25 | Stop reason: HOSPADM

## 2024-11-22 RX ORDER — ACETAMINOPHEN 325 MG/1
650 TABLET ORAL EVERY 6 HOURS PRN
Status: DISCONTINUED | OUTPATIENT
Start: 2024-11-22 | End: 2024-11-25 | Stop reason: HOSPADM

## 2024-11-22 RX ORDER — ATORVASTATIN CALCIUM 20 MG/1
80 TABLET, FILM COATED ORAL NIGHTLY
Status: DISCONTINUED | OUTPATIENT
Start: 2024-11-22 | End: 2024-11-25 | Stop reason: HOSPADM

## 2024-11-22 RX ADMIN — METHYLPREDNISOLONE SODIUM SUCCINATE 500 MG: 500 INJECTION INTRAMUSCULAR; INTRAVENOUS at 09:11

## 2024-11-22 NOTE — ASSESSMENT & PLAN NOTE
- Baseline Cr ~1.4  - Pretransplant PRA 50 to 69%.   - 0.4 allosure in August 2024 and recently jumped to 1.4. I  - Immunosuppression reduced due to BK viremia, last serum BK PCR was 4,523 copies.   - Current DSA ordered 11/21 is pending.   - Kidney biopsy 11/21 showed 19 glomeruli, 5% fibrosis, significant microvascular inflammation (capillaritis and glomerularitis) C4d <10% (considered negative) no ACr No AVR. No viral changes but SV40 is pending, biopsy highly suspicious for rejection.  - Admit for SM pulses, and PLEX + IVIG pending DSA burden  - Plan for SM #1 on admit

## 2024-11-22 NOTE — TELEPHONE ENCOUNTER
CAMPOS From Dr YANA Booth,  Direct admit to [facility] for ABMR.  Plan SMP,IVIG and PLEX.  _____________________  Patient repeated back and voice a understanding of orders.  All appropriate personnel has been notified CSN # [id number].  Patient states he will be here around 8pm tonight.

## 2024-11-22 NOTE — ASSESSMENT & PLAN NOTE
- Continue Prograf. Monitor trough daily and adjust dose as needed to achieve therapeutic level.  - Continue steroids.

## 2024-11-22 NOTE — HPI
Mr. Iglesias is a 43 y.o. 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 Baseline Cr ~1.4. Post op course notable for:     Afib (converted with Dilt gtt in June 2023, on Eliquis for ~4 weeks post episode, remains on Lopressor).   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 Iglesias presents as a direct admit 11/22 for treatment of kidney transplant rejection. Pretransplant PRA 50 to 69%. He had a 0.4 allosure in August 2024 but recently increased to 1.4. 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 Current DSA ordered 11/21 is pending. Kidney biopsy showed 19 glomeruli, 5% fibrosis, significant microvascular inflammation (capillaritis and glomerularitis) C4d <10% (considered negative) no ACr No AVR. No viral changes but SV40 is pending, biopsy highly suspicious for rejection. Plan to start with SM pulses and PLEX + IVIG pending DSA burden. On admit, patient reports feeling well, without complaint. Denies pain, N/V/D, SOB, CP, change in bowel or bladder fxn. Plan for SM #1 tonight and follow-up DSA level. Will check BK PCR.

## 2024-11-22 NOTE — ASSESSMENT & PLAN NOTE
- BK viremia: first detected 7/10/23; most recent PCR 4,523 copies/ml on 11/1/24 (on reduced IS)  - Cont biweekly BK, PCR pending on admit

## 2024-11-22 NOTE — ASSESSMENT & PLAN NOTE
"Mr. Iglesias is a 43 y.o. year old white male with history of coronary angioplasty with stent placement, NSTEMI with CAD 8/2017, HTN, GERD, sleep apnea, and ESRD secondary to diabetic nephropathy who received a living kidney transplant on 5/15/23 (thymo induction, CMV +/+) and ESRD 2/2 T2DM. Baseline Cr ~1.4. Post op course notable for:     Afib (converted with Dilt gtt in June 2023, on Eliquis for ~4 weeks post episode).   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 Daily weights.   "

## 2024-11-23 PROBLEM — Z79.899 IMMUNOCOMPROMISED STATE DUE TO DRUG THERAPY: Status: ACTIVE | Noted: 2023-05-16

## 2024-11-23 PROBLEM — D84.821 IMMUNOCOMPROMISED STATE DUE TO DRUG THERAPY: Status: ACTIVE | Noted: 2023-05-16

## 2024-11-23 LAB
ANION GAP SERPL CALC-SCNC: 7 MMOL/L (ref 8–16)
BASOPHILS # BLD AUTO: 0.01 K/UL (ref 0–0.2)
BASOPHILS NFR BLD: 0.2 % (ref 0–1.9)
BUN SERPL-MCNC: 14 MG/DL (ref 6–20)
CALCIUM SERPL-MCNC: 9.1 MG/DL (ref 8.7–10.5)
CHLORIDE SERPL-SCNC: 110 MMOL/L (ref 95–110)
CO2 SERPL-SCNC: 20 MMOL/L (ref 23–29)
CREAT SERPL-MCNC: 1.4 MG/DL (ref 0.5–1.4)
DIFFERENTIAL METHOD BLD: ABNORMAL
EOSINOPHIL # BLD AUTO: 0 K/UL (ref 0–0.5)
EOSINOPHIL NFR BLD: 0 % (ref 0–8)
ERYTHROCYTE [DISTWIDTH] IN BLOOD BY AUTOMATED COUNT: 14.3 % (ref 11.5–14.5)
EST. GFR  (NO RACE VARIABLE): >60 ML/MIN/1.73 M^2
GLUCOSE SERPL-MCNC: 282 MG/DL (ref 70–110)
HCT VFR BLD AUTO: 40.4 % (ref 40–54)
HGB BLD-MCNC: 13.8 G/DL (ref 14–18)
IMM GRANULOCYTES # BLD AUTO: 0.03 K/UL (ref 0–0.04)
IMM GRANULOCYTES NFR BLD AUTO: 0.5 % (ref 0–0.5)
LYMPHOCYTES # BLD AUTO: 0.7 K/UL (ref 1–4.8)
LYMPHOCYTES NFR BLD: 11.7 % (ref 18–48)
MAGNESIUM SERPL-MCNC: 1.9 MG/DL (ref 1.6–2.6)
MCH RBC QN AUTO: 28.2 PG (ref 27–31)
MCHC RBC AUTO-ENTMCNC: 34.2 G/DL (ref 32–36)
MCV RBC AUTO: 82 FL (ref 82–98)
MONOCYTES # BLD AUTO: 0.1 K/UL (ref 0.3–1)
MONOCYTES NFR BLD: 1.5 % (ref 4–15)
NEUTROPHILS # BLD AUTO: 5.3 K/UL (ref 1.8–7.7)
NEUTROPHILS NFR BLD: 86.1 % (ref 38–73)
NRBC BLD-RTO: 0 /100 WBC
PHOSPHATE SERPL-MCNC: 1.9 MG/DL (ref 2.7–4.5)
PLATELET # BLD AUTO: 153 K/UL (ref 150–450)
PMV BLD AUTO: 9.4 FL (ref 9.2–12.9)
POCT GLUCOSE: 278 MG/DL (ref 70–110)
POCT GLUCOSE: 290 MG/DL (ref 70–110)
POCT GLUCOSE: 306 MG/DL (ref 70–110)
POCT GLUCOSE: 327 MG/DL (ref 70–110)
POTASSIUM SERPL-SCNC: 4.2 MMOL/L (ref 3.5–5.1)
RBC # BLD AUTO: 4.9 M/UL (ref 4.6–6.2)
SODIUM SERPL-SCNC: 137 MMOL/L (ref 136–145)
TACROLIMUS BLD-MCNC: 6.6 NG/ML (ref 5–15)
WBC # BLD AUTO: 6.16 K/UL (ref 3.9–12.7)

## 2024-11-23 PROCEDURE — 80048 BASIC METABOLIC PNL TOTAL CA: CPT

## 2024-11-23 PROCEDURE — 99222 1ST HOSP IP/OBS MODERATE 55: CPT | Mod: ,,,

## 2024-11-23 PROCEDURE — 25000003 PHARM REV CODE 250

## 2024-11-23 PROCEDURE — 20600001 HC STEP DOWN PRIVATE ROOM

## 2024-11-23 PROCEDURE — 63600175 PHARM REV CODE 636 W HCPCS

## 2024-11-23 PROCEDURE — 99233 SBSQ HOSP IP/OBS HIGH 50: CPT | Mod: ,,, | Performed by: INTERNAL MEDICINE

## 2024-11-23 PROCEDURE — 63600175 PHARM REV CODE 636 W HCPCS: Performed by: INTERNAL MEDICINE

## 2024-11-23 PROCEDURE — 94761 N-INVAS EAR/PLS OXIMETRY MLT: CPT

## 2024-11-23 PROCEDURE — 36415 COLL VENOUS BLD VENIPUNCTURE: CPT

## 2024-11-23 PROCEDURE — 83735 ASSAY OF MAGNESIUM: CPT

## 2024-11-23 PROCEDURE — 84100 ASSAY OF PHOSPHORUS: CPT

## 2024-11-23 PROCEDURE — 80197 ASSAY OF TACROLIMUS: CPT

## 2024-11-23 PROCEDURE — 85025 COMPLETE CBC W/AUTO DIFF WBC: CPT

## 2024-11-23 PROCEDURE — 87799 DETECT AGENT NOS DNA QUANT: CPT | Performed by: CLINICAL NURSE SPECIALIST

## 2024-11-23 PROCEDURE — 25000003 PHARM REV CODE 250: Performed by: INTERNAL MEDICINE

## 2024-11-23 RX ORDER — INSULIN ASPART 100 [IU]/ML
5 INJECTION, SOLUTION INTRAVENOUS; SUBCUTANEOUS
Status: DISCONTINUED | OUTPATIENT
Start: 2024-11-23 | End: 2024-11-23

## 2024-11-23 RX ORDER — INSULIN ASPART 100 [IU]/ML
0-10 INJECTION, SOLUTION INTRAVENOUS; SUBCUTANEOUS
Status: DISCONTINUED | OUTPATIENT
Start: 2024-11-23 | End: 2024-11-24

## 2024-11-23 RX ORDER — INSULIN ASPART 100 [IU]/ML
7 INJECTION, SOLUTION INTRAVENOUS; SUBCUTANEOUS
Status: DISCONTINUED | OUTPATIENT
Start: 2024-11-24 | End: 2024-11-24

## 2024-11-23 RX ADMIN — DIBASIC SODIUM PHOSPHATE, MONOBASIC POTASSIUM PHOSPHATE AND MONOBASIC SODIUM PHOSPHATE 2 TABLET: 852; 155; 130 TABLET ORAL at 08:11

## 2024-11-23 RX ADMIN — ATORVASTATIN CALCIUM 80 MG: 20 TABLET, FILM COATED ORAL at 10:11

## 2024-11-23 RX ADMIN — TACROLIMUS 1 MG: 1 CAPSULE ORAL at 08:11

## 2024-11-23 RX ADMIN — METOPROLOL TARTRATE 25 MG: 25 TABLET, FILM COATED ORAL at 10:11

## 2024-11-23 RX ADMIN — METHYLPREDNISOLONE SODIUM SUCCINATE 500 MG: 500 INJECTION INTRAMUSCULAR; INTRAVENOUS at 03:11

## 2024-11-23 RX ADMIN — TACROLIMUS 1 MG: 1 CAPSULE ORAL at 06:11

## 2024-11-23 RX ADMIN — PANTOPRAZOLE SODIUM 40 MG: 40 TABLET, DELAYED RELEASE ORAL at 08:11

## 2024-11-23 RX ADMIN — DIBASIC SODIUM PHOSPHATE, MONOBASIC POTASSIUM PHOSPHATE AND MONOBASIC SODIUM PHOSPHATE 2 TABLET: 852; 155; 130 TABLET ORAL at 10:11

## 2024-11-23 RX ADMIN — METOPROLOL TARTRATE 25 MG: 25 TABLET, FILM COATED ORAL at 08:11

## 2024-11-23 RX ADMIN — INSULIN ASPART 5 UNITS: 100 INJECTION, SOLUTION INTRAVENOUS; SUBCUTANEOUS at 05:11

## 2024-11-23 RX ADMIN — INSULIN ASPART 8 UNITS: 100 INJECTION, SOLUTION INTRAVENOUS; SUBCUTANEOUS at 01:11

## 2024-11-23 RX ADMIN — INSULIN ASPART 8 UNITS: 100 INJECTION, SOLUTION INTRAVENOUS; SUBCUTANEOUS at 05:11

## 2024-11-23 RX ADMIN — INSULIN ASPART 3 UNITS: 100 INJECTION, SOLUTION INTRAVENOUS; SUBCUTANEOUS at 10:11

## 2024-11-23 RX ADMIN — Medication 400 MG: at 10:11

## 2024-11-23 RX ADMIN — Medication 400 MG: at 08:11

## 2024-11-23 RX ADMIN — INSULIN ASPART 3 UNITS: 100 INJECTION, SOLUTION INTRAVENOUS; SUBCUTANEOUS at 08:11

## 2024-11-23 RX ADMIN — DIBASIC SODIUM PHOSPHATE, MONOBASIC POTASSIUM PHOSPHATE AND MONOBASIC SODIUM PHOSPHATE 2 TABLET: 852; 155; 130 TABLET ORAL at 03:11

## 2024-11-23 RX ADMIN — INSULIN ASPART 5 UNITS: 100 INJECTION, SOLUTION INTRAVENOUS; SUBCUTANEOUS at 01:11

## 2024-11-23 RX ADMIN — THERA TABS 1 TABLET: TAB at 08:11

## 2024-11-23 RX ADMIN — VALSARTAN 80 MG: 40 TABLET, FILM COATED ORAL at 08:11

## 2024-11-23 RX ADMIN — ASPIRIN 81 MG: 81 TABLET, COATED ORAL at 08:11

## 2024-11-23 NOTE — CONSULTS
"[facility]  Endocrinology  Diabetes Consult Note    Consult Requested by: Angélica Gutierrez*   Reason for admit: Acute rejection of kidney transplant    HISTORY OF PRESENT ILLNESS:  Reason for Consult: Management of T2DM, Hyperglycemia     Surgical Procedure and Date: Kidney txp 5/15/23     Diabetes diagnosis year:     Home Diabetes Medications:  Trulicity 7.5 mg weekly     How often checking glucose at home? Not checking    Missed doses on regimen?  No    Diabetes Complications include:     Hyperglycemia    Complicating diabetes co morbidities:   Glucocorticoid use       HPI:   Patient is a 43 y.o. 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 Baseline Cr ~1.4. Mr. Iglesias presents as a direct admit  for treatment of kidney transplant rejection. Endocrine consulted for BG management in the context of high dose steroids.         Interval HPI:   No acute events overnight. Patient in room 50252/55537 A. Blood glucose stable. BG above goal on current insulin regimen (SSI ). Steroid use- Methylprednisolone  pulse #2.      Renal function-   Lab Results   Component Value Date    CREATININE 1.4 2024        Vasopressors-  None     Diet diabetic Cardiac (Low Na/Chol); 2000 Calories (up to 75 gm per meal)     Eatin%  Nausea: No  Hypoglycemia and intervention: No  Fever: No  TPN and/or TF: No    PMH, PSH, FH, SH updated and reviewed     ROS:  Review of Systems   Gastrointestinal:  Negative for constipation, diarrhea, nausea and vomiting.   Endocrine: Negative for polydipsia and polyuria.       Current Medications and/or Treatments Impacting Glycemic Control  Immunotherapy:    Immunosuppressants           Stop Route Frequency     tacrolimus capsule 1 mg        Placed in "And" Linked Group    -- Oral " "Every evening     tacrolimus capsule 1 mg        Placed in "And" Linked Group    -- Oral Every morning          Steroids:   Hormones (From admission, onward)      Start     Stop Route Frequency Ordered    11/23/24 1700  methylPREDNISolone sodium succinate (SOLU-MEDROL) 500 mg in 0.9% NaCl 100 mL IVPB         11/25/24 0859 IV Daily 11/23/24 0821    11/22/24 2029  melatonin tablet 6 mg         -- Oral Nightly PRN 11/22/24 2029          Pressors:    Autonomic Drugs (From admission, onward)      None          Hyperglycemia/Diabetes Medications:   Antihyperglycemics (From admission, onward)      Start     Stop Route Frequency Ordered    11/23/24 1130  insulin aspart U-100 pen 5 Units         -- SubQ 3 times daily with meals 11/23/24 0903    11/23/24 1002  insulin aspart U-100 pen 0-10 Units         -- SubQ Before meals & nightly PRN 11/23/24 0902             PHYSICAL EXAMINATION:  Vitals:    11/23/24 0722   BP: (!) 157/99   Pulse: 81   Resp: 18   Temp: 97.7 °F (36.5 °C)     Body mass index is 30.23 kg/m².     Physical Exam  Constitutional:       General: He is not in acute distress.     Appearance: Normal appearance. He is not ill-appearing.   HENT:      Head: Normocephalic and atraumatic.      Right Ear: External ear normal.      Left Ear: External ear normal.      Nose: Nose normal.   Pulmonary:      Effort: Pulmonary effort is normal. No respiratory distress.   Neurological:      Mental Status: He is alert.   Psychiatric:         Mood and Affect: Mood normal.         Behavior: Behavior normal.            Labs Reviewed and Include   Recent Labs   Lab 11/23/24  0649   *   CALCIUM 9.1      K 4.2   CO2 20*      BUN 14   CREATININE 1.4     Lab Results   Component Value Date    WBC 6.16 11/23/2024    HGB 13.8 (L) 11/23/2024    HCT 40.4 11/23/2024    MCV 82 11/23/2024     11/23/2024     No results for input(s): "TSH", "FREET4" in the last 168 hours.  Lab Results   Component Value Date    HGBA1C 6.4 " "(H) 08/27/2024       Nutritional status:   Body mass index is 30.23 kg/m².  Lab Results   Component Value Date    ALBUMIN 4.1 11/21/2024    ALBUMIN 4.2 11/01/2024    ALBUMIN 4.2 11/01/2024     No results found for: "PREALBUMIN"    Estimated Creatinine Clearance: 92 mL/min (based on SCr of 1.4 mg/dL).    Accu-Checks  Recent Labs     11/21/24  0800 11/22/24 2117 11/23/24  0817   POCTGLUCOSE 114* 122* 278*        ASSESSMENT and PLAN    Cardiac/Vascular  Dyslipidemia associated with type 2 diabetes mellitus  On statin per ADA guidelines       Renal/  * Acute rejection of kidney transplant  Managed per primary team      Endocrine  Type 2 diabetes mellitus with stage 3a chronic kidney disease, without long-term current use of insulin  BG goal: 140-180    - Start Novolog 5 units TIDWM (0.3 u/kg/day WBD)   - [facility] SSI PRN (150/25)   - POCT Glucose before meals and at bedtime  - Hypoglycemia protocol in place      ** Please notify Endocrine for any change and/or advance in diet**  ** Please call Endocrine for any BG related issues **     Discharge Planning:   TBD. Please notify endocrinology prior to discharge.            Plan discussed with patient, family at bedside.     Cele Loza PA-C  Endocrinology  [facility]  "

## 2024-11-23 NOTE — PLAN OF CARE
Plan of care reviewed with the patient upon admission. Pt admitted with rejection. SMP dose #1 given overnight. Blood glucose monitored ac and hs. 122 overnight. Fall precautions maintained. He is up independently without difficulty. Pt remained free from falls and injury this shift. Bed locked in lowest position, side rails up x2, call light within reach. Instructed pt to call for assistance as needed. Pt verbalized understanding. Vitals stable. Tele monitoring continued. Pt afebrile overnight. Skin is intact. No acute issues overnight. Will continue to monitor.

## 2024-11-23 NOTE — H&P
[facility]  Kidney Transplant  H&P      Subjective:     Chief Complaint/Reason for Admission: Kidney allograft rejection    History of Present Illness:  Mr. Iglesias is a 43 y.o. 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 Baseline Cr ~1.4. Post op course notable for:     Afib (converted with Dilt gtt in June 2023, on Eliquis for ~4 weeks post episode, remains on Lopressor).   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 Iglesias presents as a direct admit 11/22 for treatment of kidney transplant rejection. Pretransplant PRA 50 to 69%. He had a 0.4 allosure in August 2024 but recently increased to 1.4. 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 Current DSA ordered 11/21 is pending. Kidney biopsy showed 19 glomeruli, 5% fibrosis, significant microvascular inflammation (capillaritis and glomerularitis) C4d <10% (considered negative) no ACr No AVR. No viral changes but SV40 is pending, biopsy highly suspicious for rejection. Plan to start with SM pulses and PLEX + IVIG pending DSA burden. On admit, patient reports feeling well, without complaint. Denies pain, N/V/D, SOB, CP, change in bowel or bladder fxn. Plan for SM #1 tonight and follow-up DSA level. Will check BK PCR.       PTA Medications   Medication Sig    aspirin (ECOTRIN) 81 MG EC tablet Take 1 tablet (81 mg total) by mouth once daily.    atorvastatin (LIPITOR) 80 MG tablet Take 1 tablet (80 mg total) by mouth every evening.    blood sugar diagnostic Strp Check blood glucose 2 times daily as directed and as needed    blood-glucose meter (ONETOUCH ULTRAMINI) kit Use as instructed    ezetimibe (ZETIA) 10 mg tablet Take 1 tablet (10 mg total) by mouth once daily.    GINGER ROOT, BULK,  "MISC by Misc.(Non-Drug; Combo Route) route.    k phos di & mono-sod phos mono (PHOSPHA 250 NEUTRAL) 250 mg Tab Take 2 tablets by mouth 3 (three) times daily.    lancets Misc Check blood glucose 2 times daily as directed and as needed (dispense insurance preferred brand or patient choice)    magnesium oxide (MAG-OX) 400 mg (241.3 mg magnesium) tablet TAKE 1 TABLET BY MOUTH 2 TIMES DAILY.    magnesium oxide (MAG-OX) 400 mg (241.3 mg magnesium) tablet TAKE 1 TABLET BY MOUTH 2 TIMES DAILY.    metoprolol tartrate (LOPRESSOR) 25 MG tablet Take 1 tablet (25 mg total) by mouth 2 (two) times daily.    MOUNJARO 7.5 mg/0.5 mL PnIj INJECT 7.5 MG UNDER THE SKIN EVERY 7 DAYS    multivitamin Tab Take 1 tablet by mouth once daily.    nitroGLYCERIN (NITROSTAT) 0.4 MG SL tablet Dissolve one tablet underneath tongue at onset of angina; may repeat every 5 minutes if needed. Call 911 if angina persists after 2 doses.    pantoprazole (PROTONIX) 40 MG tablet Take 1 tablet (40 mg total) by mouth once daily.    pen needle, diabetic (BD ULTRA-FINE SHORT PEN NEEDLE) 31 gauge x 5/16" Ndle Use to inject insulin into the skin 3 times daily    predniSONE (DELTASONE) 5 MG tablet TAKE ONE TABLET BY MOUTH DAILY    tacrolimus (PROGRAF) 0.5 MG Cap Take 2 capsules (1 mg total) by mouth every morning AND 2 capsules (1 mg total) every evening.    valsartan (DIOVAN) 80 MG tablet Take 1 tablet (80 mg total) by mouth once daily.       Review of patient's allergies indicates:  No Known Allergies    Past Medical History:   Diagnosis Date    Allergic rhinitis     Atrial fibrillation with RVR 6/4/2023    Class 1 obesity due to excess calories with serious comorbidity and body mass index (BMI) of 31.0 to 31.9 in adult 4/7/2017    Coronary artery disease     Coronary artery disease involving native coronary artery of native heart without angina pectoris 2/6/2018    Cath lab procedure 04/23/2018 (Naveen Rios MD) A. Indication/Pre-Operative Diagnosis: The " patient is a 37 year old male that was referred for catheterization by [facility] Self for ACS (NSTEMI). The BELLA risk score is 5.  B. Summary/Post-Operative Diagnosis 1. Single vessel coronary artery disease. 2. Normal LVEF. 3. Diastolic dysfunction. 4. Successful PCI for acute myocardial infarction. 5.     Diabetic nephropathy associated with type 2 diabetes mellitus 1/6/2020    Direct hyperbilirubinemia 3/24/2018    DM (diabetes mellitus) 2008    BS doesn't check any more 08/02/2018    DM (diabetes mellitus) 2012    BS 99 am 06/26/2020    DM (diabetes mellitus)     BS didn't check 06/04/2021    DM (diabetes mellitus) 2008    BS didn't check 07/29/2022     Dyslipidemia associated with type 2 diabetes mellitus 7/31/2017    Elevated bilirubin 3/21/2018    GERD (gastroesophageal reflux disease)     Gout     Hyperlipidemia     Hyperparathyroidism, secondary to chronic kidney disease 6/7/2021    Hypertension associated with chronic kidney disease due to type 2 diabetes mellitus     Hypertension complicating diabetes 3/16/2019    Not candidate for Hypertension Digital Medicine program due to dialysis status. Didn't tolerate amlodipine due to SE of hypotension.    Idiopathic chronic gout, multiple sites, without tophus (tophi) 7/19/2017    Long term (current) use of insulin     MI (myocardial infarction) 07/2017    NSTEMI with CAD s/p PCI (FAMILIA) of LAD x 2 in 8/2017 7/31/2017    Obesity     HENRY on CPAP     Peritoneal dialysis catheter in place 1/26/2023    Proteinuria     Severe obstructive sleep apnea - Intolerant of CPAP 7/31/2017    Intolerant of CPAP after weeks of trying multiple interfaces. SPLIT-NIGHT SLEEP STUDY 7/28/2015 · SLEEP ARCHITECTURE: Sleep onset was 2.9 minutes and sleep efficiency was 94.4%. Sleep Stage distribution showed 145 sleep stage changes, 6 awakenings and 119 arousals. Sleep distribution showed 53.2% stage NI, 41.8% stage N 11, 0.0% stage N Ill and REM sleep was at 5.0%. There were 2 REM  periods. · RE    Stage 3a chronic kidney disease 5/26/2023    Stage 5 chronic kidney disease on chronic peritoneal dialysis 6/7/2017    Stage 5 chronic kidney disease on chronic peritoneal dialysis 6/7/2017    Status post angioplasty with stent 8/4/2017    Steatohepatitis     Fatty Liver    Type 2 diabetes mellitus with chronic kidney disease on chronic dialysis, without long-term current use of insulin 6/21/2017    Type 2 diabetes mellitus with diabetic nephropathy     Type 2 diabetes mellitus with diabetic nephropathy, without long-term current use of insulin 1/6/2020    Type 2 diabetes mellitus with diabetic polyneuropathy, without long-term current use of insulin 6/7/2021    Type 2 diabetes mellitus with hyperglycemia     Type 2 diabetes mellitus with renal manifestations     Type 2 diabetes mellitus with stage 3 chronic kidney disease, without long-term current use of insulin 6/21/2017     Past Surgical History:   Procedure Laterality Date    BIOPSY, WITH CT GUIDANCE N/A 11/21/2024    Procedure: TRANSPLANT RENAL [id number];  Surgeon: Eliu Nunez MD;  Location: [facility] CATH LAB;  Service: Radiology;  Laterality: N/A;    CORONARY ANGIOPLASTY WITH STENT PLACEMENT      CYSTOSCOPY      KIDNEY TRANSPLANT N/A 05/15/2023    Procedure: TRANSPLANT, KIDNEY;  Surgeon: Reji Hinojosa MD;  Location: [facility] OR [address] [place] [address];  Service: Transplant;  Laterality: N/A;  IN ROOM: 10:37  OUT OF ICE:10:53  REPERFUSION TIME: 11:21      LASIK Bilateral     LIVER BIOPSY      NASAL ENDOSCOPY      NASAL SEPTUM SURGERY      PERITONEAL CATHETER REMOVAL N/A 05/15/2023    Procedure: REMOVAL, CATHETER, DIALYSIS, PERITONEAL;  Surgeon: Reji Hinojosa MD;  [facility];  Service: Transplant;  Laterality: N/A;    SLEEVE GASTROPLASTY  03/06/2017     Family History       Problem Relation (Age of Onset)    Diabetes Mother, Maternal Grandmother    Diabetes type II Mother, Brother, Brother    Hyperlipidemia Mother, Father    Hypertension  "Mother          Tobacco Use    Smoking status: Never    Smokeless tobacco: Never   Substance and Sexual Activity    Alcohol use: No     Comment: 1-2 times per month    Drug use: No    Sexual activity: Yes     Partners: Female        Review of Systems   Constitutional:  Negative for activity change, chills, fatigue and fever.   HENT: Negative.     Respiratory:  Negative for cough and shortness of breath.    Cardiovascular:  Negative for leg swelling.   Gastrointestinal:  Negative for abdominal distention and abdominal pain.   Genitourinary:  Negative for difficulty urinating and dysuria.   Allergic/Immunologic: Positive for immunocompromised state.   Neurological:  Negative for dizziness and headaches.   Psychiatric/Behavioral:  Negative for agitation and confusion.      Objective:     Vital Signs (Most Recent):  Temp: 98.1 °F (36.7 °C) (11/22/24 2024)  Pulse: 81 (11/22/24 2024)  Resp: 16 (11/22/24 2024)  BP: (!) 158/99 (11/22/24 2024)  SpO2: 97 % (11/22/24 2024)  Height: 6' 3" (190.5 cm)  Weight: 109.7 kg (241 lb 13.5 oz)  Body mass index is 30.23 kg/m².      Physical Exam  Vitals and nursing note reviewed.   Constitutional:       Appearance: Normal appearance.   HENT:      Mouth/Throat:      Pharynx: Oropharynx is clear.   Cardiovascular:      Rate and Rhythm: Normal rate.      Pulses: Normal pulses.   Pulmonary:      Effort: Pulmonary effort is normal.   Abdominal:      General: Bowel sounds are normal.      Palpations: Abdomen is soft.      Comments: RLQ well healed kidney txp scar   Skin:     General: Skin is warm.   Neurological:      Mental Status: He is alert and oriented to person, place, and time. Mental status is at baseline.   Psychiatric:         Mood and Affect: Mood normal.         Behavior: Behavior normal.          Laboratory  CBC: No results for input(s): "WBC", "RBC", "HGB", "HCT", "PLT", "MCV", "MCH", "MCHC" in the last 168 hours.  CMP:   Recent Labs   Lab 11/21/24  0800   *   CALCIUM 8.9 " "  ALBUMIN 4.1      K 3.9   CO2 25      BUN 13   CREATININE 1.5*       Diagnostic Results:  None      Assessment/Plan:     Cardiac/Vascular  History of NSTEMI with CAD s/p PCI (FAMILIA) of LAD x 2 in 8/2017  - Continue ASA/statin & monitor with tele      Paroxysmal atrial fibrillation  - Continue ASA 81 mg and Lopressor  - Continue tele      Dyslipidemia associated with type 2 diabetes mellitus  - Continue statin.      Essential hypertension  - Continue Lopressor.  - Hold Valsartan as patient may need PLEX and contraindicated.       Renal/  * Acute rejection of kidney transplant  - Baseline Cr ~1.4  - Pretransplant PRA 50 to 69%.   - 0.4 allosure in August 2024 and recently jumped to 1.4. I  - Immunosuppression reduced due to BK viremia, last serum BK PCR was 4,523 copies.   - Current DSA ordered 11/21 is pending.   - Kidney biopsy 11/21 showed 19 glomeruli, 5% fibrosis, significant microvascular inflammation (capillaritis and glomerularitis) C4d <10% (considered negative) no ACr No AVR. No viral changes but SV40 is pending, biopsy highly suspicious for rejection.  - Admit for SM pulses, and PLEX + IVIG pending DSA burden  - Plan for SM #1 on admit      S/P kidney transplant  Mr. Iglesias is a 43 y.o. year old white male with history of coronary angioplasty with stent placement, NSTEMI with CAD 8/2017, HTN, GERD, sleep apnea, and ESRD secondary to diabetic nephropathy who received a living kidney transplant on 5/15/23 (thymo induction, CMV +/+) and ESRD 2/2 T2DM. Baseline Cr ~1.4. Post op course notable for:     Afib (converted with Dilt gtt in June 2023, on Eliquis for ~4 weeks post episode).   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 Daily weights.     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      Immunology/Multi System  Prophylactic immunotherapy  - Continue Prograf. Monitor trough daily and adjust dose as needed to achieve therapeutic level.  - Continue steroids.      Endocrine  Type 2 diabetes mellitus with stage 3a chronic kidney disease, without long-term current use of insulin  - Endocrine consulted for BG management, appreciate assistance.       GI  Gastroesophageal reflux disease without esophagitis  - Continue Protonix.      Palliative Care  Long-term use of immunosuppressant medication  - See "prophylactic immunotherapy."        Discharge Planning:  Not suitable for discharge at this time    Medical decision making for this encounter includes review of pertinent labs and diagnostic studies, assessment and planning, discussions with consulting providers, discussion with patient/family, and participation in multidisciplinary rounds. Time spent caring for patient: 60 minutes    Ganga Rogers NP  Kidney Transplant  [facility]    "

## 2024-11-23 NOTE — PLAN OF CARE
Patient is AAOx4.  Afebrile  RA  Telemetry monitoring remains in place.   AC&HS glucose monitoring  Solumedrol dose #2 planned for this evening.   Patient is resting comfortably between care.   Instructed to call for assistance.   Call light remains within reach.   Bed is in lowest position with wheels locked.   Plan of care is ongoing.

## 2024-11-23 NOTE — SUBJECTIVE & OBJECTIVE
Subjective:     Chief Complaint/Reason for Admission: Kidney allograft rejection    History of Present Illness:  Mr. Iglesias is a 43 y.o. 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 Baseline Cr ~1.4. Post op course notable for:     Afib (converted with Dilt gtt in June 2023, on Eliquis for ~4 weeks post episode, remains on Lopressor).   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 Iglesias presents as a direct admit 11/22 for treatment of kidney transplant rejection. Pretransplant PRA 50 to 69%. He had a 0.4 allosure in August 2024 but recently increased to 1.4. 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 Current DSA ordered 11/21 is pending. Kidney biopsy showed 19 glomeruli, 5% fibrosis, significant microvascular inflammation (capillaritis and glomerularitis) C4d <10% (considered negative) no ACr No AVR. No viral changes but SV40 is pending, biopsy highly suspicious for rejection. Plan to start with SM pulses and PLEX + IVIG pending DSA burden. On admit, patient reports feeling well, without complaint. Denies pain, N/V/D, SOB, CP, change in bowel or bladder fxn. Plan for SM #1 tonight and follow-up DSA level. Will check BK PCR.       PTA Medications   Medication Sig    aspirin (ECOTRIN) 81 MG EC tablet Take 1 tablet (81 mg total) by mouth once daily.    atorvastatin (LIPITOR) 80 MG tablet Take 1 tablet (80 mg total) by mouth every evening.    blood sugar diagnostic Strp Check blood glucose 2 times daily as directed and as needed    blood-glucose meter (ONETOUCH ULTRAMINI) kit Use as instructed    ezetimibe (ZETIA) 10 mg tablet Take 1 tablet (10 mg total) by mouth once daily.    GINGER ROOT, BULK, MISC by Misc.(Non-Drug; Combo Route) route.    k phos di &  "mono-sod phos mono (PHOSPHA 250 NEUTRAL) 250 mg Tab Take 2 tablets by mouth 3 (three) times daily.    lancets Misc Check blood glucose 2 times daily as directed and as needed (dispense insurance preferred brand or patient choice)    magnesium oxide (MAG-OX) 400 mg (241.3 mg magnesium) tablet TAKE 1 TABLET BY MOUTH 2 TIMES DAILY.    magnesium oxide (MAG-OX) 400 mg (241.3 mg magnesium) tablet TAKE 1 TABLET BY MOUTH 2 TIMES DAILY.    metoprolol tartrate (LOPRESSOR) 25 MG tablet Take 1 tablet (25 mg total) by mouth 2 (two) times daily.    MOUNJARO 7.5 mg/0.5 mL PnIj INJECT 7.5 MG UNDER THE SKIN EVERY 7 DAYS    multivitamin Tab Take 1 tablet by mouth once daily.    nitroGLYCERIN (NITROSTAT) 0.4 MG SL tablet Dissolve one tablet underneath tongue at onset of angina; may repeat every 5 minutes if needed. Call 911 if angina persists after 2 doses.    pantoprazole (PROTONIX) 40 MG tablet Take 1 tablet (40 mg total) by mouth once daily.    pen needle, diabetic (BD ULTRA-FINE SHORT PEN NEEDLE) 31 gauge x 5/16" Ndle Use to inject insulin into the skin 3 times daily    predniSONE (DELTASONE) 5 MG tablet TAKE ONE TABLET BY MOUTH DAILY    tacrolimus (PROGRAF) 0.5 MG Cap Take 2 capsules (1 mg total) by mouth every morning AND 2 capsules (1 mg total) every evening.    valsartan (DIOVAN) 80 MG tablet Take 1 tablet (80 mg total) by mouth once daily.       Review of patient's allergies indicates:  No Known Allergies    Past Medical History:   Diagnosis Date    Allergic rhinitis     Atrial fibrillation with RVR 6/4/2023    Class 1 obesity due to excess calories with serious comorbidity and body mass index (BMI) of 31.0 to 31.9 in adult 4/7/2017    Coronary artery disease     Coronary artery disease involving native coronary artery of native heart without angina pectoris 2/6/2018    Cath lab procedure 04/23/2018 (Naveen Rios MD) A. Indication/Pre-Operative Diagnosis: The patient is a 37 year old male that was referred for " catheterization by Aaareferral Self for ACS (NSTEMI). The BELLA risk score is 5.  B. Summary/Post-Operative Diagnosis 1. Single vessel coronary artery disease. 2. Normal LVEF. 3. Diastolic dysfunction. 4. Successful PCI for acute myocardial infarction. 5.     Diabetic nephropathy associated with type 2 diabetes mellitus 1/6/2020    Direct hyperbilirubinemia 3/24/2018    DM (diabetes mellitus) 2008    BS doesn't check any more 08/02/2018    DM (diabetes mellitus) 2012    BS 99 am 06/26/2020    DM (diabetes mellitus)     BS didn't check 06/04/2021    DM (diabetes mellitus) 2008    BS didn't check 07/29/2022     Dyslipidemia associated with type 2 diabetes mellitus 7/31/2017    Elevated bilirubin 3/21/2018    GERD (gastroesophageal reflux disease)     Gout     Hyperlipidemia     Hyperparathyroidism, secondary to chronic kidney disease 6/7/2021    Hypertension associated with chronic kidney disease due to type 2 diabetes mellitus     Hypertension complicating diabetes 3/16/2019    Not candidate for Hypertension Digital Medicine program due to dialysis status. Didn't tolerate amlodipine due to SE of hypotension.    Idiopathic chronic gout, multiple sites, without tophus (tophi) 7/19/2017    Long term (current) use of insulin     MI (myocardial infarction) 07/2017    NSTEMI with CAD s/p PCI (FAMILIA) of LAD x 2 in 8/2017 7/31/2017    Obesity     HENRY on CPAP     Peritoneal dialysis catheter in place 1/26/2023    Proteinuria     Severe obstructive sleep apnea - Intolerant of CPAP 7/31/2017    Intolerant of CPAP after weeks of trying multiple interfaces. SPLIT-NIGHT SLEEP STUDY 7/28/2015 · SLEEP ARCHITECTURE: Sleep onset was 2.9 minutes and sleep efficiency was 94.4%. Sleep Stage distribution showed 145 sleep stage changes, 6 awakenings and 119 arousals. Sleep distribution showed 53.2% stage NI, 41.8% stage N 11, 0.0% stage N Ill and REM sleep was at 5.0%. There were 2 REM periods. · RE    Stage 3a chronic kidney disease  5/26/2023    Stage 5 chronic kidney disease on chronic peritoneal dialysis 6/7/2017    Stage 5 chronic kidney disease on chronic peritoneal dialysis 6/7/2017    Status post angioplasty with stent 8/4/2017    Steatohepatitis     Fatty Liver    Type 2 diabetes mellitus with chronic kidney disease on chronic dialysis, without long-term current use of insulin 6/21/2017    Type 2 diabetes mellitus with diabetic nephropathy     Type 2 diabetes mellitus with diabetic nephropathy, without long-term current use of insulin 1/6/2020    Type 2 diabetes mellitus with diabetic polyneuropathy, without long-term current use of insulin 6/7/2021    Type 2 diabetes mellitus with hyperglycemia     Type 2 diabetes mellitus with renal manifestations     Type 2 diabetes mellitus with stage 3 chronic kidney disease, without long-term current use of insulin 6/21/2017     Past Surgical History:   Procedure Laterality Date    BIOPSY, WITH CT GUIDANCE N/A 11/21/2024    Procedure: TRANSPLANT RENAL [id number];  Surgeon: Eliu Nunez MD;  Location: [facility] CATH LAB;  Service: Radiology;  Laterality: N/A;    CORONARY ANGIOPLASTY WITH STENT PLACEMENT      CYSTOSCOPY      KIDNEY TRANSPLANT N/A 05/15/2023    Procedure: TRANSPLANT, KIDNEY;  Surgeon: Reji Hinojosa MD;  Location: [facility] OR [address] [place] [address];  Service: Transplant;  Laterality: N/A;  IN ROOM: 10:37  OUT OF ICE:10:53  REPERFUSION TIME: 11:21      LASIK Bilateral     LIVER BIOPSY      NASAL ENDOSCOPY      NASAL SEPTUM SURGERY      PERITONEAL CATHETER REMOVAL N/A 05/15/2023    Procedure: REMOVAL, CATHETER, DIALYSIS, PERITONEAL;  Surgeon: Reji Hinojosa MD;  [facility];  Service: Transplant;  Laterality: N/A;    SLEEVE GASTROPLASTY  03/06/2017     Family History       Problem Relation (Age of Onset)    Diabetes Mother, Maternal Grandmother    Diabetes type II Mother, Brother, Brother    Hyperlipidemia Mother, Father    Hypertension Mother          Tobacco Use    Smoking status: Never  "   Smokeless tobacco: Never   Substance and Sexual Activity    Alcohol use: No     Comment: 1-2 times per month    Drug use: No    Sexual activity: Yes     Partners: Female        Review of Systems   Constitutional:  Negative for activity change, chills, fatigue and fever.   HENT: Negative.     Respiratory:  Negative for cough and shortness of breath.    Cardiovascular:  Negative for leg swelling.   Gastrointestinal:  Negative for abdominal distention and abdominal pain.   Genitourinary:  Negative for difficulty urinating and dysuria.   Allergic/Immunologic: Positive for immunocompromised state.   Neurological:  Negative for dizziness and headaches.   Psychiatric/Behavioral:  Negative for agitation and confusion.      Objective:     Vital Signs (Most Recent):  Temp: 98.1 °F (36.7 °C) (11/22/24 2024)  Pulse: 81 (11/22/24 2024)  Resp: 16 (11/22/24 2024)  BP: (!) 158/99 (11/22/24 2024)  SpO2: 97 % (11/22/24 2024)  Height: 6' 3" (190.5 cm)  Weight: 109.7 kg (241 lb 13.5 oz)  Body mass index is 30.23 kg/m².      Physical Exam  Vitals and nursing note reviewed.   Constitutional:       Appearance: Normal appearance.   HENT:      Mouth/Throat:      Pharynx: Oropharynx is clear.   Cardiovascular:      Rate and Rhythm: Normal rate.      Pulses: Normal pulses.   Pulmonary:      Effort: Pulmonary effort is normal.   Abdominal:      General: Bowel sounds are normal.      Palpations: Abdomen is soft.      Comments: RLQ well healed kidney txp scar   Skin:     General: Skin is warm.   Neurological:      Mental Status: He is alert and oriented to person, place, and time. Mental status is at baseline.   Psychiatric:         Mood and Affect: Mood normal.         Behavior: Behavior normal.          Laboratory  CBC: No results for input(s): "WBC", "RBC", "HGB", "HCT", "PLT", "MCV", "MCH", "MCHC" in the last 168 hours.  CMP:   Recent Labs   Lab 11/21/24  0800   *   CALCIUM 8.9   ALBUMIN 4.1      K 3.9   CO2 25      BUN " 13   CREATININE 1.5*       Diagnostic Results:  None

## 2024-11-23 NOTE — HPI
Reason for Consult: Management of T2DM, Hyperglycemia     Surgical Procedure and Date: Kidney txp 5/15/23     Diabetes diagnosis year: 2009    Home Diabetes Medications:  Trulicity 7.5 mg weekly     How often checking glucose at home? Not checking    Missed doses on regimen?  No    Diabetes Complications include:     Hyperglycemia    Complicating diabetes co morbidities:   Glucocorticoid use       HPI:   Patient is a 43 y.o. male with  coronary angioplasty with stent placement, NSTEMI with CAD 8/2017, HTN, GERD, sleep apnea, and ESRD secondary to diabetic nephropathy who received a living kidney transplant on 5/15/23 (thymo induction, CMV +/+). Baseline Cr ~1.4. Mr. Iglesias presents as a direct admit 11/22 for treatment of kidney transplant rejection. Endocrine consulted for BG management in the context of high dose steroids.

## 2024-11-23 NOTE — ASSESSMENT & PLAN NOTE
BG goal: 140-180    - Start Novolog 5 units TIDWM (0.3 u/kg/day WBD)   - [facility] [place] (150/25)   - POCT Glucose before meals and at bedtime  - Hypoglycemia protocol in place      ** Please notify Endocrine for any change and/or advance in diet**  ** Please call Endocrine for any BG related issues **     Discharge Planning:   TBD. Please notify endocrinology prior to discharge.

## 2024-11-23 NOTE — SUBJECTIVE & OBJECTIVE
"Interval HPI:   No acute events overnight. Patient in room 46152/13845 A. Blood glucose stable. BG above goal on current insulin regimen (SSI ). Steroid use- Methylprednisolone  pulse #2.      Renal function-   Lab Results   Component Value Date    CREATININE 1.4 2024        Vasopressors-  None     Diet diabetic Cardiac (Low Na/Chol); 2000 Calories (up to 75 gm per meal)     Eatin%  Nausea: No  Hypoglycemia and intervention: No  Fever: No  TPN and/or TF: No    PMH, PSH, FH, SH updated and reviewed     ROS:  Review of Systems   Gastrointestinal:  Negative for constipation, diarrhea, nausea and vomiting.   Endocrine: Negative for polydipsia and polyuria.       Current Medications and/or Treatments Impacting Glycemic Control  Immunotherapy:    Immunosuppressants           Stop Route Frequency     tacrolimus capsule 1 mg        Placed in "And" Linked Group    -- Oral Every evening     tacrolimus capsule 1 mg        Placed in "And" Linked Group    -- Oral Every morning          Steroids:   Hormones (From admission, onward)      Start     Stop Route Frequency Ordered    24 1700  methylPREDNISolone sodium succinate (SOLU-MEDROL) 500 mg in 0.9% NaCl 100 mL IVPB         24 0859 IV Daily 24 0821    24  melatonin tablet 6 mg         -- Oral Nightly PRN 24          Pressors:    Autonomic Drugs (From admission, onward)      None          Hyperglycemia/Diabetes Medications:   Antihyperglycemics (From admission, onward)      Start     Stop Route Frequency Ordered    24 1130  insulin aspart U-100 pen 5 Units         -- SubQ 3 times daily with meals 24 0903    24 1002  insulin aspart U-100 pen 0-10 Units         -- SubQ Before meals & nightly PRN 24 0902             PHYSICAL EXAMINATION:  Vitals:    24 0722   BP: (!) 157/99   Pulse: 81   Resp: 18   Temp: 97.7 °F (36.5 °C)     Body mass index is 30.23 kg/m².     Physical Exam  Constitutional:       " General: He is not in acute distress.     Appearance: Normal appearance. He is not ill-appearing.   HENT:      Head: Normocephalic and atraumatic.      Right Ear: External ear normal.      Left Ear: External ear normal.      Nose: Nose normal.   Pulmonary:      Effort: Pulmonary effort is normal. No respiratory distress.   Neurological:      Mental Status: He is alert.   Psychiatric:         Mood and Affect: Mood normal.         Behavior: Behavior normal.

## 2024-11-24 LAB
ANION GAP SERPL CALC-SCNC: 9 MMOL/L (ref 8–16)
BASOPHILS # BLD AUTO: 0.01 K/UL (ref 0–0.2)
BASOPHILS NFR BLD: 0.1 % (ref 0–1.9)
BUN SERPL-MCNC: 19 MG/DL (ref 6–20)
CALCIUM SERPL-MCNC: 8.9 MG/DL (ref 8.7–10.5)
CHLORIDE SERPL-SCNC: 110 MMOL/L (ref 95–110)
CO2 SERPL-SCNC: 20 MMOL/L (ref 23–29)
CREAT SERPL-MCNC: 1.4 MG/DL (ref 0.5–1.4)
DIFFERENTIAL METHOD BLD: ABNORMAL
EOSINOPHIL # BLD AUTO: 0 K/UL (ref 0–0.5)
EOSINOPHIL NFR BLD: 0 % (ref 0–8)
ERYTHROCYTE [DISTWIDTH] IN BLOOD BY AUTOMATED COUNT: 14.1 % (ref 11.5–14.5)
EST. GFR  (NO RACE VARIABLE): >60 ML/MIN/1.73 M^2
GLUCOSE SERPL-MCNC: 268 MG/DL (ref 70–110)
HCT VFR BLD AUTO: 38.4 % (ref 40–54)
HGB BLD-MCNC: 13 G/DL (ref 14–18)
IMM GRANULOCYTES # BLD AUTO: 0.11 K/UL (ref 0–0.04)
IMM GRANULOCYTES NFR BLD AUTO: 0.8 % (ref 0–0.5)
LYMPHOCYTES # BLD AUTO: 0.8 K/UL (ref 1–4.8)
LYMPHOCYTES NFR BLD: 5.7 % (ref 18–48)
MAGNESIUM SERPL-MCNC: 2.1 MG/DL (ref 1.6–2.6)
MCH RBC QN AUTO: 28.3 PG (ref 27–31)
MCHC RBC AUTO-ENTMCNC: 33.9 G/DL (ref 32–36)
MCV RBC AUTO: 84 FL (ref 82–98)
MONOCYTES # BLD AUTO: 0.7 K/UL (ref 0.3–1)
MONOCYTES NFR BLD: 4.6 % (ref 4–15)
NEUTROPHILS # BLD AUTO: 13 K/UL (ref 1.8–7.7)
NEUTROPHILS NFR BLD: 88.8 % (ref 38–73)
NRBC BLD-RTO: 0 /100 WBC
PHOSPHATE SERPL-MCNC: 3 MG/DL (ref 2.7–4.5)
PLATELET # BLD AUTO: 188 K/UL (ref 150–450)
PMV BLD AUTO: 10.2 FL (ref 9.2–12.9)
POCT GLUCOSE: 246 MG/DL (ref 70–110)
POCT GLUCOSE: 305 MG/DL (ref 70–110)
POCT GLUCOSE: 333 MG/DL (ref 70–110)
POCT GLUCOSE: 362 MG/DL (ref 70–110)
POTASSIUM SERPL-SCNC: 4.1 MMOL/L (ref 3.5–5.1)
RBC # BLD AUTO: 4.6 M/UL (ref 4.6–6.2)
SODIUM SERPL-SCNC: 139 MMOL/L (ref 136–145)
TACROLIMUS BLD-MCNC: 5.4 NG/ML (ref 5–15)
WBC # BLD AUTO: 14.65 K/UL (ref 3.9–12.7)

## 2024-11-24 PROCEDURE — 20600001 HC STEP DOWN PRIVATE ROOM

## 2024-11-24 PROCEDURE — 80048 BASIC METABOLIC PNL TOTAL CA: CPT

## 2024-11-24 PROCEDURE — 63600175 PHARM REV CODE 636 W HCPCS

## 2024-11-24 PROCEDURE — 25000003 PHARM REV CODE 250: Performed by: INTERNAL MEDICINE

## 2024-11-24 PROCEDURE — 99233 SBSQ HOSP IP/OBS HIGH 50: CPT | Mod: ,,, | Performed by: INTERNAL MEDICINE

## 2024-11-24 PROCEDURE — 80197 ASSAY OF TACROLIMUS: CPT

## 2024-11-24 PROCEDURE — 63600175 PHARM REV CODE 636 W HCPCS: Performed by: INTERNAL MEDICINE

## 2024-11-24 PROCEDURE — 85025 COMPLETE CBC W/AUTO DIFF WBC: CPT

## 2024-11-24 PROCEDURE — 99232 SBSQ HOSP IP/OBS MODERATE 35: CPT | Mod: ,,,

## 2024-11-24 PROCEDURE — 84100 ASSAY OF PHOSPHORUS: CPT

## 2024-11-24 PROCEDURE — 25000003 PHARM REV CODE 250

## 2024-11-24 PROCEDURE — 36415 COLL VENOUS BLD VENIPUNCTURE: CPT

## 2024-11-24 PROCEDURE — 83735 ASSAY OF MAGNESIUM: CPT

## 2024-11-24 RX ORDER — TACROLIMUS 1 MG/1
1 CAPSULE ORAL ONCE
Status: COMPLETED | OUTPATIENT
Start: 2024-11-24 | End: 2024-11-24

## 2024-11-24 RX ORDER — PREDNISONE 20 MG/1
20 TABLET ORAL DAILY
Status: DISCONTINUED | OUTPATIENT
Start: 2024-11-25 | End: 2024-11-25 | Stop reason: HOSPADM

## 2024-11-24 RX ORDER — TACROLIMUS 1 MG/1
2 CAPSULE ORAL 2 TIMES DAILY
Status: DISCONTINUED | OUTPATIENT
Start: 2024-11-24 | End: 2024-11-25

## 2024-11-24 RX ORDER — TACROLIMUS 1 MG/1
2 CAPSULE ORAL 2 TIMES DAILY
Status: DISCONTINUED | OUTPATIENT
Start: 2024-11-24 | End: 2024-11-24

## 2024-11-24 RX ORDER — INSULIN ASPART 100 [IU]/ML
0-10 INJECTION, SOLUTION INTRAVENOUS; SUBCUTANEOUS
Status: DISCONTINUED | OUTPATIENT
Start: 2024-11-24 | End: 2024-11-25 | Stop reason: HOSPADM

## 2024-11-24 RX ORDER — SULFAMETHOXAZOLE AND TRIMETHOPRIM 400; 80 MG/1; MG/1
1 TABLET ORAL DAILY
Status: DISCONTINUED | OUTPATIENT
Start: 2024-11-24 | End: 2024-11-25 | Stop reason: HOSPADM

## 2024-11-24 RX ORDER — INSULIN ASPART 100 [IU]/ML
12 INJECTION, SOLUTION INTRAVENOUS; SUBCUTANEOUS
Status: DISCONTINUED | OUTPATIENT
Start: 2024-11-24 | End: 2024-11-25

## 2024-11-24 RX ORDER — INSULIN ASPART 100 [IU]/ML
9 INJECTION, SOLUTION INTRAVENOUS; SUBCUTANEOUS
Status: DISCONTINUED | OUTPATIENT
Start: 2024-11-24 | End: 2024-11-24

## 2024-11-24 RX ADMIN — METOPROLOL TARTRATE 25 MG: 25 TABLET, FILM COATED ORAL at 09:11

## 2024-11-24 RX ADMIN — INSULIN ASPART 7 UNITS: 100 INJECTION, SOLUTION INTRAVENOUS; SUBCUTANEOUS at 08:11

## 2024-11-24 RX ADMIN — THERA TABS 1 TABLET: TAB at 07:11

## 2024-11-24 RX ADMIN — ASPIRIN 81 MG: 81 TABLET, COATED ORAL at 07:11

## 2024-11-24 RX ADMIN — TACROLIMUS 2 MG: 1 CAPSULE ORAL at 05:11

## 2024-11-24 RX ADMIN — DIBASIC SODIUM PHOSPHATE, MONOBASIC POTASSIUM PHOSPHATE AND MONOBASIC SODIUM PHOSPHATE 2 TABLET: 852; 155; 130 TABLET ORAL at 07:11

## 2024-11-24 RX ADMIN — METHYLPREDNISOLONE SODIUM SUCCINATE 500 MG: 500 INJECTION INTRAMUSCULAR; INTRAVENOUS at 09:11

## 2024-11-24 RX ADMIN — INSULIN ASPART 9 UNITS: 100 INJECTION, SOLUTION INTRAVENOUS; SUBCUTANEOUS at 12:11

## 2024-11-24 RX ADMIN — INSULIN ASPART 4 UNITS: 100 INJECTION, SOLUTION INTRAVENOUS; SUBCUTANEOUS at 10:11

## 2024-11-24 RX ADMIN — SULFAMETHOXAZOLE AND TRIMETHOPRIM 1 TABLET: 400; 80 TABLET ORAL at 11:11

## 2024-11-24 RX ADMIN — ATORVASTATIN CALCIUM 80 MG: 20 TABLET, FILM COATED ORAL at 09:11

## 2024-11-24 RX ADMIN — TACROLIMUS 1 MG: 1 CAPSULE ORAL at 12:11

## 2024-11-24 RX ADMIN — INSULIN ASPART 8 UNITS: 100 INJECTION, SOLUTION INTRAVENOUS; SUBCUTANEOUS at 06:11

## 2024-11-24 RX ADMIN — DIBASIC SODIUM PHOSPHATE, MONOBASIC POTASSIUM PHOSPHATE AND MONOBASIC SODIUM PHOSPHATE 2 TABLET: 852; 155; 130 TABLET ORAL at 09:11

## 2024-11-24 RX ADMIN — METOPROLOL TARTRATE 25 MG: 25 TABLET, FILM COATED ORAL at 07:11

## 2024-11-24 RX ADMIN — DIBASIC SODIUM PHOSPHATE, MONOBASIC POTASSIUM PHOSPHATE AND MONOBASIC SODIUM PHOSPHATE 2 TABLET: 852; 155; 130 TABLET ORAL at 02:11

## 2024-11-24 RX ADMIN — INSULIN ASPART 10 UNITS: 100 INJECTION, SOLUTION INTRAVENOUS; SUBCUTANEOUS at 12:11

## 2024-11-24 RX ADMIN — Medication 400 MG: at 07:11

## 2024-11-24 RX ADMIN — Medication 400 MG: at 09:11

## 2024-11-24 RX ADMIN — VALSARTAN 80 MG: 40 TABLET, FILM COATED ORAL at 07:11

## 2024-11-24 RX ADMIN — TACROLIMUS 1 MG: 1 CAPSULE ORAL at 07:11

## 2024-11-24 RX ADMIN — INSULIN ASPART 12 UNITS: 100 INJECTION, SOLUTION INTRAVENOUS; SUBCUTANEOUS at 06:11

## 2024-11-24 RX ADMIN — PANTOPRAZOLE SODIUM 40 MG: 40 TABLET, DELAYED RELEASE ORAL at 07:11

## 2024-11-24 RX ADMIN — INSULIN ASPART 4 UNITS: 100 INJECTION, SOLUTION INTRAVENOUS; SUBCUTANEOUS at 08:11

## 2024-11-24 NOTE — PROGRESS NOTES
"[facility] - Trans[facility] Step[facility]  Kidney Transplant  Progress Note      Reason for Follow-up: Reassessment of Kidney Transplant - 5/15/2023  (#1) recipient and management of immunosuppression.     ORGAN: LEFT KIDNEY      Donor Type: Living         Interval history: He feels well, denies any concerns overnight after IV steroids. No LUTS or other concerns noted.    Past medical, surgical, family, social history reviewed & unchanged since admission.     I/O last 3 completed shifts:  In: 1680 [P.O.:1680]  Out: 2150 [Urine:2150]    VITALS:  height is 6' 3" (1.905 m) and weight is 109.7 kg (241 lb 13.5 oz). His oral temperature is 98.2 °F (36.8 °C). His blood pressure is 158/98 (abnormal) and his pulse is 93. His respiration is 18 and oxygen saturation is 95%.       A&O x3, NAD.Speech and affect normal.  Lungs CTA, unlabored.  Heart irregular  Abdomen soft, nontender, no masses.  Allograft nontender.  Edema: none.    Recent Labs   Lab 11/23/24  0648   WBC 6.16   HGB 13.8*   HCT 40.4          Recent Labs   Lab 11/21/24  0800 11/23/24  0649    137   K 3.9 4.2    110   CO2 25 20*   BUN 13 14   CREATININE 1.5* 1.4   CALCIUM 8.9 9.1   PHOS 2.7 1.9*     Recent Labs   Lab 10/07/24  0810 11/21/24  0800 11/23/24  0648   Tacrolimus Lvl 4.1 L 6.1 6.6     Assessment/Plan:     * Acute rejection of kidney transplant  - Baseline Cr ~1.4  - Pretransplant PRA 50 to 69%.   - 0.4 allosure in August 2024 and recently jumped to 1.4. 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   - Current DSA negative for donor specific antibodies (Ab)  - Kidney biopsy 11/21 showed 19 glomeruli, 5% fibrosis, significant microvascular inflammation (capillaritis and glomerularitis) C4d <10% (considered negative) no ACr No AVR. No viral changes but SV40 is pending, biopsy highly suspicious for Ab mediated rejection (ABMR).  - Admit for SM pulses, " "and ?PLEX + IVIG pending DSA burden  - Plan for SM #2  11/23      Prophylactic immunotherapy  - Continue Prograf. Monitor trough daily and adjust dose as needed to achieve therapeutic level.  - Continue steroids.      History of NSTEMI with CAD s/p PCI (FAMILIA) of LAD x 2 in 8/2017  - Continue ASA/statin & monitor with tele  - No ACS symptoms      Immunocompromised state due to drug therapy  -He remains in immunosuppressed state, at risk of infections and, rejection, and toxicity.    -Monitor for side effects and toxicities, given narrow therapeutic window and significant risk of AE. Thus, ongoing monitoring is needed to assess for toxicities and other adverse effects.   --See prophylactic immunotherapy.       Essential hypertension  - Continue Lopressor.  - Restart Valsartan (no contraindication with PLEX)      S/P kidney transplant  Mr. Iglesias is a 43 y.o. year old white male with history of coronary angioplasty with stent placement, NSTEMI with CAD 8/2017, HTN, GERD, sleep apnea, and ESRD secondary to diabetic nephropathy who received a living kidney transplant on 5/15/23 (thymo induction, CMV +/+) and ESRD 2/2 T2DM. Baseline Cr ~1.4.   - See "acute rejection."  - Strict I/O. Daily weights.   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      Dyslipidemia associated with type 2 diabetes mellitus  - Continue statin.      Type 2 diabetes mellitus with stage 3a chronic kidney disease, without long-term current use of insulin  - Endocrine consulted for BG " management, appreciate assistance.       Gastroesophageal reflux disease without esophagitis  - Continue Protonix.          Discharge Planning:  Not ready for DC  Medical decision making for this encounter includes review of pertinent labs and diagnostic studies, assessment and planning, discussions with consulting providers, discussion with patient/family, and participation in multidisciplinary rounds. Time spent caring for patient:  45+ min.    Angélica Gutierrez MD  Kidney Transplant  [facility] - Trans[facility] Step[facility]

## 2024-11-24 NOTE — SUBJECTIVE & OBJECTIVE
"Interval HPI:   No acute events overnight. Patient in room 87444/79939 A. Blood glucose stable. BG above goal on current insulin regimen (SSI ). Steroid use- Methylprednisolone  pulse #3.   Renal function-   Lab Results   Component Value Date    CREATININE 1.4 2024          Vasopressors-  None      Diet diabetic Cardiac (Low Na/Chol); 2000 Calories (up to 75 gm per meal)      Eatin%  Nausea: No  Hypoglycemia and intervention: No  Fever: No  TPN and/or TF: No    BP (!) 152/91 (BP Location: Right arm, Patient Position: Lying)   Pulse 88   Temp 98 °F (36.7 °C) (Oral)   Resp 16   Ht 6' 3" (1.905 m)   Wt 109.7 kg (241 lb 13.5 oz)   SpO2 95%   BMI 30.23 kg/m²     Labs Reviewed and Include    Recent Labs   Lab 24  0625   *   CALCIUM 8.9      K 4.1   CO2 20*      BUN 19   CREATININE 1.4     Lab Results   Component Value Date    WBC 14.65 (H) 2024    HGB 13.0 (L) 2024    HCT 38.4 (L) 2024    MCV 84 2024     2024     No results for input(s): "TSH", "FREET4" in the last 168 hours.  Lab Results   Component Value Date    HGBA1C 6.4 (H) 2024       Nutritional status:   Body mass index is 30.23 kg/m².  Lab Results   Component Value Date    ALBUMIN 4.1 2024    ALBUMIN 4.2 2024    ALBUMIN 4.2 2024     No results found for: "PREALBUMIN"    Estimated Creatinine Clearance: 92 mL/min (based on SCr of 1.4 mg/dL).    Accu-Checks  Recent Labs     24  2117 24  0817 24  1224 24  1734 24  2216   POCTGLUCOSE 122* 278* 306* 327* 290*       Current Medications and/or Treatments Impacting Glycemic Control  Immunotherapy:    Immunosuppressants           Stop Route Frequency     tacrolimus capsule 1 mg        Placed in "And" Linked Group    -- Oral Every evening     tacrolimus capsule 1 mg        Placed in "And" Linked Group    -- Oral Every morning          Steroids:   Hormones (From admission, onward)      Start  "    Stop Route Frequency Ordered    11/23/24 1700  methylPREDNISolone sodium succinate (SOLU-MEDROL) 500 mg in 0.9% NaCl 100 mL IVPB         11/25/24 0859 IV Daily 11/23/24 0821    11/22/24 2029  melatonin tablet 6 mg         -- Oral Nightly PRN 11/22/24 2029          Pressors:    Autonomic Drugs (From admission, onward)      None          Hyperglycemia/Diabetes Medications:   Antihyperglycemics (From admission, onward)      Start     Stop Route Frequency Ordered    11/24/24 0715  insulin aspart U-100 pen 7 Units         -- SubQ 3 times daily with meals 11/23/24 1805    11/23/24 1002  insulin aspart U-100 pen 0-10 Units         -- SubQ Before meals & nightly PRN 11/23/24 0902

## 2024-11-24 NOTE — ASSESSMENT & PLAN NOTE
- Baseline Cr ~1.4  - Pretransplant PRA 50 to 69%.   - 0.4 allosure in August 2024 and recently jumped to 1.4. 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   - Current DSA negative for donor specific antibodies (Ab)  - Kidney biopsy 11/21 showed 19 glomeruli, 5% fibrosis, significant microvascular inflammation (capillaritis and glomerularitis) C4d <10% (considered negative) no ACr No AVR. No viral changes but SV40 is pending, biopsy highly suspicious for Ab mediated rejection (ABMR).  - Admit for SM pulses, and ?PLEX + IVIG pending DSA burden  - Plan for SM #2  11/23

## 2024-11-24 NOTE — ASSESSMENT & PLAN NOTE
"Mr. Iglesias is a 43 y.o. year old white male with history of coronary angioplasty with stent placement, NSTEMI with CAD 8/2017, HTN, GERD, sleep apnea, and ESRD secondary to diabetic nephropathy who received a living kidney transplant on 5/15/23 (thymo induction, CMV +/+) and ESRD 2/2 T2DM. Baseline Cr ~1.4.   - See "acute rejection."  - Strict I/O. Daily weights.   - Avoid nephrotoxic agents (NSAIDs, IV contrast dye, ACEI/ARB anti-HTN medications, Aminoglycoside-containing antibiotics)  -Renally dose all appropriate medications, including antibiotics   "

## 2024-11-24 NOTE — PROGRESS NOTES
"[facility] - [facility] Step[facility]  Endocrinology  Progress Note    Admit Date: 2024     Reason for Consult: Management of T2DM, Hyperglycemia     Surgical Procedure and Date: Kidney txp 5/15/23     Diabetes diagnosis year:     Home Diabetes Medications:  Trulicity 7.5 mg weekly     How often checking glucose at home? Not checking    Missed doses on regimen?  No    Diabetes Complications include:     Hyperglycemia    Complicating diabetes co morbidities:   Glucocorticoid use       HPI:   Patient is a 43 y.o. 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 Baseline Cr ~1.4. Mr. Iglesias presents as a direct admit  for treatment of kidney transplant rejection. Endocrine consulted for BG management in the context of high dose steroids.         Interval HPI:   No acute events overnight. Patient in room 29871/33832 A. Blood glucose stable. BG above goal on current insulin regimen (SSI ). Steroid use- Methylprednisolone  pulse #3.   Renal function-   Lab Results   Component Value Date    CREATININE 1.4 2024          Vasopressors-  None      Diet diabetic Cardiac (Low Na/Chol); 2000 Calories (up to 75 gm per meal)      Eatin%  Nausea: No  Hypoglycemia and intervention: No  Fever: No  TPN and/or TF: No    BP (!) 152/91 (BP Location: Right arm, Patient Position: Lying)   Pulse 88   Temp 98 °F (36.7 °C) (Oral)   Resp 16   Ht 6' 3" (1.905 m)   Wt 109.7 kg (241 lb 13.5 oz)   SpO2 95%   BMI 30.23 kg/m²     Labs Reviewed and Include    Recent Labs   Lab 24  0625   *   CALCIUM 8.9      K 4.1   CO2 20*      BUN 19   CREATININE 1.4     Lab Results   Component Value Date    WBC 14.65 (H) 2024    HGB 13.0 (L) 2024    HCT 38.4 (L) 2024    MCV 84 2024     2024     No " "results for input(s): "TSH", "FREET4" in the last 168 hours.  Lab Results   Component Value Date    HGBA1C 6.4 (H) 08/27/2024       Nutritional status:   Body mass index is 30.23 kg/m².  Lab Results   Component Value Date    ALBUMIN 4.1 11/21/2024    ALBUMIN 4.2 11/01/2024    ALBUMIN 4.2 11/01/2024     No results found for: "PREALBUMIN"    Estimated Creatinine Clearance: 92 mL/min (based on SCr of 1.4 mg/dL).    Accu-Checks  Recent Labs     11/22/24  2117 11/23/24  0817 11/23/24  1224 11/23/24  1734 11/23/24  2216   POCTGLUCOSE 122* 278* 306* 327* 290*       Current Medications and/or Treatments Impacting Glycemic Control  Immunotherapy:    Immunosuppressants           Stop Route Frequency     tacrolimus capsule 1 mg        Placed in "And" Linked Group    -- Oral Every evening     tacrolimus capsule 1 mg        Placed in "And" Linked Group    -- Oral Every morning          Steroids:   Hormones (From admission, onward)      Start     Stop Route Frequency Ordered    11/23/24 1700  methylPREDNISolone sodium succinate (SOLU-MEDROL) 500 mg in 0.9% NaCl 100 mL IVPB         11/25/24 0859 IV Daily 11/23/24 0821    11/22/24 2029  melatonin tablet 6 mg         -- Oral Nightly PRN 11/22/24 2029          Pressors:    Autonomic Drugs (From admission, onward)      None          Hyperglycemia/Diabetes Medications:   Antihyperglycemics (From admission, onward)      Start     Stop Route Frequency Ordered    11/24/24 0715  insulin aspart U-100 pen 7 Units         -- SubQ 3 times daily with meals 11/23/24 1805    11/23/24 1002  insulin aspart U-100 pen 0-10 Units         -- SubQ Before meals & nightly PRN 11/23/24 0902            ASSESSMENT and PLAN    Cardiac/Vascular  Dyslipidemia associated with type 2 diabetes mellitus  On statin per ADA guidelines       Renal/  * Acute rejection of kidney transplant  Managed per primary team      Endocrine  Type 2 diabetes mellitus with stage 3a chronic kidney disease, without long-term current " use of insulin  BG goal: 140-180    - Novolog 9 units TIDWM (re-WBD at 0.5 u/kg/day)   - [facility] [place] (150/25)   - POCT Glucose before meals and at bedtime  - Hypoglycemia protocol in place      ** Please notify Endocrine for any change and/or advance in diet**  ** Please call Endocrine for any BG related issues **     Discharge Planning:   TBD. Please notify endocrinology prior to discharge.            Cele Loza PA-C  Endocrinology  [facility] - [facility]

## 2024-11-24 NOTE — ASSESSMENT & PLAN NOTE
BG goal: 140-180    - Novolog 7 units TIDWM (20% increase due to post-prandial BG excursions)   - [facility] SSI PRN (150/25)   - POCT Glucose before meals and at bedtime  - Hypoglycemia protocol in place      ** Please notify Endocrine for any change and/or advance in diet**  ** Please call Endocrine for any BG related issues **     Discharge Planning:   TBD. Please notify endocrinology prior to discharge.

## 2024-11-24 NOTE — ASSESSMENT & PLAN NOTE
-He remains in immunosuppressed state, at risk of infections and, rejection, and toxicity.    -Monitor for side effects and toxicities, given narrow therapeutic window and significant risk of AE. Thus, ongoing monitoring is needed to assess for toxicities and other adverse effects.   --See prophylactic immunotherapy.

## 2024-11-24 NOTE — PLAN OF CARE
Patient is AAOx4.  Afebrile  RA  Telemetry monitoring remains in place.   AC&HS glucose monitoring  Insulin adjusted,.  Solumedrol #3 of 3 infused this shift.   Plan for line placement for PLEX treatments.  Patient's wife is at bedside and participating in care.   Call light remains within reach.   Instructed to call for assistance.   Bed is in lowest position with wheels locked.   Plan of care is ongoing.

## 2024-11-25 ENCOUNTER — PATIENT MESSAGE (OUTPATIENT)
Dept: ENDOCRINOLOGY | Facility: HOSPITAL | Age: 44
End: 2024-11-25
Payer: COMMERCIAL

## 2024-11-25 ENCOUNTER — PATIENT MESSAGE (OUTPATIENT)
Dept: TRANSPLANT | Facility: CLINIC | Age: 44
End: 2024-11-25
Payer: COMMERCIAL

## 2024-11-25 VITALS
TEMPERATURE: 98 F | HEART RATE: 79 BPM | DIASTOLIC BLOOD PRESSURE: 78 MMHG | RESPIRATION RATE: 18 BRPM | OXYGEN SATURATION: 96 % | BODY MASS INDEX: 30.07 KG/M2 | WEIGHT: 241.88 LBS | SYSTOLIC BLOOD PRESSURE: 133 MMHG | HEIGHT: 75 IN

## 2024-11-25 DIAGNOSIS — Z94.0 KIDNEY REPLACED BY TRANSPLANT: Primary | ICD-10-CM

## 2024-11-25 LAB
ABO + RH BLD: NORMAL
ANION GAP SERPL CALC-SCNC: 6 MMOL/L (ref 8–16)
BASOPHILS # BLD AUTO: 0.03 K/UL (ref 0–0.2)
BASOPHILS NFR BLD: 0.2 % (ref 0–1.9)
BLD GP AB SCN CELLS X3 SERPL QL: NORMAL
BUN SERPL-MCNC: 25 MG/DL (ref 6–20)
CALCIUM SERPL-MCNC: 8.9 MG/DL (ref 8.7–10.5)
CHLORIDE SERPL-SCNC: 112 MMOL/L (ref 95–110)
CO2 SERPL-SCNC: 22 MMOL/L (ref 23–29)
CREAT SERPL-MCNC: 1.5 MG/DL (ref 0.5–1.4)
DIFFERENTIAL METHOD BLD: ABNORMAL
EOSINOPHIL # BLD AUTO: 0 K/UL (ref 0–0.5)
EOSINOPHIL NFR BLD: 0 % (ref 0–8)
ERYTHROCYTE [DISTWIDTH] IN BLOOD BY AUTOMATED COUNT: 14.2 % (ref 11.5–14.5)
EST. GFR  (NO RACE VARIABLE): 59.2 ML/MIN/1.73 M^2
GLUCOSE SERPL-MCNC: 225 MG/DL (ref 70–110)
HCT VFR BLD AUTO: 37.6 % (ref 40–54)
HGB BLD-MCNC: 12.9 G/DL (ref 14–18)
IMM GRANULOCYTES # BLD AUTO: 0.14 K/UL (ref 0–0.04)
IMM GRANULOCYTES NFR BLD AUTO: 1 % (ref 0–0.5)
LYMPHOCYTES # BLD AUTO: 0.9 K/UL (ref 1–4.8)
LYMPHOCYTES NFR BLD: 6 % (ref 18–48)
MAGNESIUM SERPL-MCNC: 2.1 MG/DL (ref 1.6–2.6)
MCH RBC QN AUTO: 28.2 PG (ref 27–31)
MCHC RBC AUTO-ENTMCNC: 34.3 G/DL (ref 32–36)
MCV RBC AUTO: 82 FL (ref 82–98)
MONOCYTES # BLD AUTO: 1 K/UL (ref 0.3–1)
MONOCYTES NFR BLD: 6.7 % (ref 4–15)
NEUTROPHILS # BLD AUTO: 12.6 K/UL (ref 1.8–7.7)
NEUTROPHILS NFR BLD: 86.1 % (ref 38–73)
NRBC BLD-RTO: 0 /100 WBC
PHOSPHATE SERPL-MCNC: 3.4 MG/DL (ref 2.7–4.5)
PLATELET # BLD AUTO: 165 K/UL (ref 150–450)
PMV BLD AUTO: 9.7 FL (ref 9.2–12.9)
POCT GLUCOSE: 185 MG/DL (ref 70–110)
POCT GLUCOSE: 207 MG/DL (ref 70–110)
POCT GLUCOSE: 274 MG/DL (ref 70–110)
POTASSIUM SERPL-SCNC: 4.1 MMOL/L (ref 3.5–5.1)
RBC # BLD AUTO: 4.57 M/UL (ref 4.6–6.2)
SODIUM SERPL-SCNC: 140 MMOL/L (ref 136–145)
SPECIMEN OUTDATE: NORMAL
TACROLIMUS BLD-MCNC: 5.9 NG/ML (ref 5–15)
WBC # BLD AUTO: 14.61 K/UL (ref 3.9–12.7)

## 2024-11-25 PROCEDURE — 25000003 PHARM REV CODE 250: Mod: JZ,JG | Performed by: PATHOLOGY

## 2024-11-25 PROCEDURE — 63600175 PHARM REV CODE 636 W HCPCS

## 2024-11-25 PROCEDURE — 99232 SBSQ HOSP IP/OBS MODERATE 35: CPT | Mod: ,,,

## 2024-11-25 PROCEDURE — 36415 COLL VENOUS BLD VENIPUNCTURE: CPT | Performed by: INTERNAL MEDICINE

## 2024-11-25 PROCEDURE — 36514 APHERESIS PLASMA: CPT | Mod: ,,, | Performed by: PATHOLOGY

## 2024-11-25 PROCEDURE — 36415 COLL VENOUS BLD VENIPUNCTURE: CPT

## 2024-11-25 PROCEDURE — 80048 BASIC METABOLIC PNL TOTAL CA: CPT

## 2024-11-25 PROCEDURE — 86850 RBC ANTIBODY SCREEN: CPT | Performed by: INTERNAL MEDICINE

## 2024-11-25 PROCEDURE — G0425 INPT/ED TELECONSULT30: HCPCS | Mod: GT,,, | Performed by: PATHOLOGY

## 2024-11-25 PROCEDURE — 85025 COMPLETE CBC W/AUTO DIFF WBC: CPT

## 2024-11-25 PROCEDURE — 6A550Z3 PHERESIS OF PLASMA, SINGLE: ICD-10-PCS | Performed by: PATHOLOGY

## 2024-11-25 PROCEDURE — 63600175 PHARM REV CODE 636 W HCPCS: Performed by: INTERNAL MEDICINE

## 2024-11-25 PROCEDURE — 83735 ASSAY OF MAGNESIUM: CPT

## 2024-11-25 PROCEDURE — 99239 HOSP IP/OBS DSCHRG MGMT >30: CPT | Mod: ,,,

## 2024-11-25 PROCEDURE — 25000003 PHARM REV CODE 250: Performed by: INTERNAL MEDICINE

## 2024-11-25 PROCEDURE — 25000003 PHARM REV CODE 250

## 2024-11-25 PROCEDURE — 80197 ASSAY OF TACROLIMUS: CPT

## 2024-11-25 PROCEDURE — 84100 ASSAY OF PHOSPHORUS: CPT

## 2024-11-25 PROCEDURE — 63600175 PHARM REV CODE 636 W HCPCS: Performed by: PATHOLOGY

## 2024-11-25 PROCEDURE — P9045 ALBUMIN (HUMAN), 5%, 250 ML: HCPCS | Mod: JZ,JG | Performed by: PATHOLOGY

## 2024-11-25 RX ORDER — SULFAMETHOXAZOLE AND TRIMETHOPRIM 400; 80 MG/1; MG/1
1 TABLET ORAL DAILY
Qty: 30 TABLET | Refills: 5 | Status: SHIPPED | OUTPATIENT
Start: 2024-11-26 | End: 2025-05-25

## 2024-11-25 RX ORDER — DIPHENHYDRAMINE HYDROCHLORIDE 50 MG/ML
50 INJECTION INTRAMUSCULAR; INTRAVENOUS ONCE AS NEEDED
Status: CANCELLED | OUTPATIENT
Start: 2024-11-28

## 2024-11-25 RX ORDER — CALCIUM GLUCONATE 20 MG/ML
2 INJECTION, SOLUTION INTRAVENOUS ONCE
Status: COMPLETED | OUTPATIENT
Start: 2024-11-25 | End: 2024-11-25

## 2024-11-25 RX ORDER — ALBUMIN HUMAN 50 G/1000ML
200 SOLUTION INTRAVENOUS ONCE
Status: COMPLETED | OUTPATIENT
Start: 2024-11-25 | End: 2024-11-25

## 2024-11-25 RX ORDER — TACROLIMUS 1 MG/1
3 CAPSULE ORAL 2 TIMES DAILY
Status: DISCONTINUED | OUTPATIENT
Start: 2024-11-25 | End: 2024-11-25 | Stop reason: HOSPADM

## 2024-11-25 RX ORDER — EPINEPHRINE 0.3 MG/.3ML
0.3 INJECTION SUBCUTANEOUS ONCE AS NEEDED
Status: CANCELLED | OUTPATIENT
Start: 2024-11-28

## 2024-11-25 RX ORDER — SODIUM CHLORIDE 0.9 % (FLUSH) 0.9 %
10 SYRINGE (ML) INJECTION
OUTPATIENT
Start: 2024-11-28

## 2024-11-25 RX ORDER — DIPHENHYDRAMINE HYDROCHLORIDE 50 MG/ML
25 INJECTION INTRAMUSCULAR; INTRAVENOUS
Status: CANCELLED | OUTPATIENT
Start: 2024-11-28

## 2024-11-25 RX ORDER — INSULIN ASPART 100 [IU]/ML
15 INJECTION, SOLUTION INTRAVENOUS; SUBCUTANEOUS ONCE
Status: COMPLETED | OUTPATIENT
Start: 2024-11-25 | End: 2024-11-25

## 2024-11-25 RX ORDER — HEPARIN 100 UNIT/ML
500 SYRINGE INTRAVENOUS
OUTPATIENT
Start: 2024-11-28

## 2024-11-25 RX ORDER — PREDNISONE 5 MG/1
TABLET ORAL
Qty: 75 TABLET | Refills: 10 | Status: SHIPPED | OUTPATIENT
Start: 2024-11-25

## 2024-11-25 RX ORDER — PEN NEEDLE, DIABETIC 30 GX3/16"
1 NEEDLE, DISPOSABLE MISCELLANEOUS 3 TIMES DAILY
Qty: 100 EACH | Refills: 5 | Status: SHIPPED | OUTPATIENT
Start: 2024-11-25

## 2024-11-25 RX ORDER — HEPARIN 100 UNIT/ML
500 SYRINGE INTRAVENOUS
Status: CANCELLED | OUTPATIENT
Start: 2024-11-28

## 2024-11-25 RX ORDER — DIPHENHYDRAMINE HYDROCHLORIDE 50 MG/ML
25 INJECTION INTRAMUSCULAR; INTRAVENOUS
OUTPATIENT
Start: 2024-11-28

## 2024-11-25 RX ORDER — ACETAMINOPHEN 500 MG
500 TABLET ORAL
OUTPATIENT
Start: 2024-11-28

## 2024-11-25 RX ORDER — DIPHENHYDRAMINE HYDROCHLORIDE 50 MG/ML
50 INJECTION INTRAMUSCULAR; INTRAVENOUS ONCE AS NEEDED
OUTPATIENT
Start: 2024-11-28

## 2024-11-25 RX ORDER — INSULIN ASPART 100 [IU]/ML
15 INJECTION, SOLUTION INTRAVENOUS; SUBCUTANEOUS
Status: DISCONTINUED | OUTPATIENT
Start: 2024-11-25 | End: 2024-11-25

## 2024-11-25 RX ORDER — OXYCODONE AND ACETAMINOPHEN 5; 325 MG/1; MG/1
1 TABLET ORAL ONCE
Status: COMPLETED | OUTPATIENT
Start: 2024-11-25 | End: 2024-11-25

## 2024-11-25 RX ORDER — SODIUM CHLORIDE 0.9 % (FLUSH) 0.9 %
10 SYRINGE (ML) INJECTION
Status: CANCELLED | OUTPATIENT
Start: 2024-11-28

## 2024-11-25 RX ORDER — TACROLIMUS 1 MG/1
1 CAPSULE ORAL ONCE
Status: COMPLETED | OUTPATIENT
Start: 2024-11-25 | End: 2024-11-25

## 2024-11-25 RX ORDER — EPINEPHRINE 0.3 MG/.3ML
0.3 INJECTION SUBCUTANEOUS ONCE AS NEEDED
OUTPATIENT
Start: 2024-11-28

## 2024-11-25 RX ORDER — ACETAMINOPHEN 500 MG
500 TABLET ORAL
Status: CANCELLED | OUTPATIENT
Start: 2024-11-28

## 2024-11-25 RX ORDER — INSULIN ASPART 100 [IU]/ML
12 INJECTION, SOLUTION INTRAVENOUS; SUBCUTANEOUS
Status: DISCONTINUED | OUTPATIENT
Start: 2024-11-25 | End: 2024-11-25 | Stop reason: HOSPADM

## 2024-11-25 RX ORDER — DIPHENHYDRAMINE HCL 25 MG
25 CAPSULE ORAL
Status: CANCELLED | OUTPATIENT
Start: 2024-11-28

## 2024-11-25 RX ORDER — INSULIN LISPRO 100 [IU]/ML
9 INJECTION, SOLUTION INTRAVENOUS; SUBCUTANEOUS 3 TIMES DAILY
Qty: 15 ML | Refills: 11 | Status: SHIPPED | OUTPATIENT
Start: 2024-11-25 | End: 2025-11-25

## 2024-11-25 RX ORDER — TACROLIMUS 1 MG/1
3 CAPSULE ORAL EVERY 12 HOURS
Qty: 360 CAPSULE | Refills: 11 | Status: SHIPPED | OUTPATIENT
Start: 2024-11-25 | End: 2024-12-03 | Stop reason: DRUGHIGH

## 2024-11-25 RX ORDER — LORAZEPAM 2 MG/ML
0.5 INJECTION INTRAMUSCULAR ONCE
Status: COMPLETED | OUTPATIENT
Start: 2024-11-25 | End: 2024-11-25

## 2024-11-25 RX ORDER — DIPHENHYDRAMINE HCL 25 MG
25 CAPSULE ORAL
OUTPATIENT
Start: 2024-11-28

## 2024-11-25 RX ORDER — HEPARIN SODIUM 1000 [USP'U]/ML
3000 INJECTION, SOLUTION INTRAVENOUS; SUBCUTANEOUS ONCE
Status: COMPLETED | OUTPATIENT
Start: 2024-11-25 | End: 2024-11-25

## 2024-11-25 RX ADMIN — INSULIN ASPART 15 UNITS: 100 INJECTION, SOLUTION INTRAVENOUS; SUBCUTANEOUS at 12:11

## 2024-11-25 RX ADMIN — DIBASIC SODIUM PHOSPHATE, MONOBASIC POTASSIUM PHOSPHATE AND MONOBASIC SODIUM PHOSPHATE 2 TABLET: 852; 155; 130 TABLET ORAL at 07:11

## 2024-11-25 RX ADMIN — VALSARTAN 80 MG: 40 TABLET, FILM COATED ORAL at 07:11

## 2024-11-25 RX ADMIN — INSULIN ASPART 15 UNITS: 100 INJECTION, SOLUTION INTRAVENOUS; SUBCUTANEOUS at 09:11

## 2024-11-25 RX ADMIN — PANTOPRAZOLE SODIUM 40 MG: 40 TABLET, DELAYED RELEASE ORAL at 07:11

## 2024-11-25 RX ADMIN — TACROLIMUS 1 MG: 1 CAPSULE ORAL at 01:11

## 2024-11-25 RX ADMIN — CALCIUM GLUCONATE 2 G: 20 INJECTION, SOLUTION INTRAVENOUS at 10:11

## 2024-11-25 RX ADMIN — INSULIN ASPART 4 UNITS: 100 INJECTION, SOLUTION INTRAVENOUS; SUBCUTANEOUS at 09:11

## 2024-11-25 RX ADMIN — INSULIN ASPART 2 UNITS: 100 INJECTION, SOLUTION INTRAVENOUS; SUBCUTANEOUS at 12:11

## 2024-11-25 RX ADMIN — OXYCODONE HYDROCHLORIDE AND ACETAMINOPHEN 1 TABLET: 5; 325 TABLET ORAL at 09:11

## 2024-11-25 RX ADMIN — Medication 400 MG: at 07:11

## 2024-11-25 RX ADMIN — METOPROLOL TARTRATE 25 MG: 25 TABLET, FILM COATED ORAL at 07:11

## 2024-11-25 RX ADMIN — LORAZEPAM 0.5 MG: 2 INJECTION INTRAMUSCULAR; INTRAVENOUS at 08:11

## 2024-11-25 RX ADMIN — THERA TABS 1 TABLET: TAB at 07:11

## 2024-11-25 RX ADMIN — HEPARIN SODIUM 2400 UNITS: 1000 INJECTION, SOLUTION INTRAVENOUS; SUBCUTANEOUS at 12:11

## 2024-11-25 RX ADMIN — SULFAMETHOXAZOLE AND TRIMETHOPRIM 1 TABLET: 400; 80 TABLET ORAL at 07:11

## 2024-11-25 RX ADMIN — ASPIRIN 81 MG: 81 TABLET, COATED ORAL at 07:11

## 2024-11-25 RX ADMIN — ALBUMIN (HUMAN) 200 G: 12.5 SOLUTION INTRAVENOUS at 10:11

## 2024-11-25 RX ADMIN — INSULIN ASPART 3 UNITS: 100 INJECTION, SOLUTION INTRAVENOUS; SUBCUTANEOUS at 02:11

## 2024-11-25 RX ADMIN — PREDNISONE 20 MG: 20 TABLET ORAL at 07:11

## 2024-11-25 RX ADMIN — TACROLIMUS 2 MG: 1 CAPSULE ORAL at 07:11

## 2024-11-25 NOTE — ASSESSMENT & PLAN NOTE
BG goal: 140-180    - Novolog 15 units TIDWM (20% increase due to post-prandial BG excursions and increased based on SSI requirements)   - [facility] [place] (150/25)   - POCT Glucose before meals, at bedtime and at 2 am  - Hypoglycemia protocol in place      ** Please notify Endocrine for any change and/or advance in diet**  ** Please call Endocrine for any BG related issues **     Discharge Planning:   TBD. Please notify endocrinology prior to discharge.

## 2024-11-25 NOTE — PROGRESS NOTES
RIJ trialysis placed at the bedside under sterile precautions and ultrasound guidance. Pt tolerated procedure well - oxy 5mg po x1 given at end.  Biopatch and central line dressing placed and dated.  Awaiting xray to bedside for placement.

## 2024-11-25 NOTE — PROGRESS NOTES
NP at bedside to place trialysis.  Time out performed. Ativan 0.5mg IV given per order.  Telemetry in place at bedside.

## 2024-11-25 NOTE — PLAN OF CARE
Pt AAOx4, afebrile, VSS, stable on RA. SMP 3/3 completed yesterday. ACHS/2AM accuchecks assessed, 333 & 274 - SSI administered as indicated. Endocrine following. Plan for central line placement today and PLEX 1/3. Refer to flowsheets for I&O totals. Spouse at bedside, supportive of pt. Bed in lowest locked position, call light and personal items in reach, verbalized understanding to call for assistance.

## 2024-11-25 NOTE — PROGRESS NOTES
TPE #1 of series     Patient seen lying in bed awake no resp distress or expression of pain. Transplant team present in room discussing with patient and his spouse his care. Orders and consents verified. Right IJ dialysis catheter assessed, skin surrounding site dry and intact, dressing clean,dry,and intact. Dialysis catheter accessed per policy, both lumens patent. 4 L exchange started at 1055 without problem. Patient tolerating well, vitals monitoring on flowsheet.     Meds/Replacements:  4 L 5% albumin IV  2 g calcium gluconate IVPB  2400 units heparin intra-catheter         1214 Rinseback and exchange completed at 1219 without major complications. Patient tolerated well. Catheter de-accessed per policy, flushed and heparin locked. Patient at baseline stable, no resp distress. Next exchange 11/26/2024 as an outpatient. Report given to primary nurse, BETTINA Roman.

## 2024-11-25 NOTE — ASSESSMENT & PLAN NOTE
Managed per primary team     Albendazole Pregnancy And Lactation Text: This medication is Pregnancy Category C and it isn't known if it is safe during pregnancy. It is also excreted in breast milk.

## 2024-11-25 NOTE — PROGRESS NOTES
Transplant Admit Note/ Discharge Note    The Kidney SW team met with the patient and his wife at bedside. The patient admitted on 11-22-24.  The patient is a 43 y.o.  male, admitted for Acute Rejection of Kidney Transplant.     At this time, patient presents as alert and oriented x 4, pleasant, good eye contact, well groomed, recall good, concentration/judgement good, average intelligence, calm, communicative, cooperative, and asking and answering questions appropriately.  At this time, patients caregiver presents as alert and oriented x 4, pleasant, good eye contact, well groomed, recall good, concentration/judgement good, average intelligence, calm, communicative, cooperative, and asking and answering questions appropriately.    Household/Family Systems     Patient resides with patient's wife, brother, and child(pankaj), age(s) 9 & 12 , at [address] [place] [address].  Support system includes pt's wife and sister Charlie Iglesias ([phone]).  Patient does have dependents that are in need of being cared for. Patient's 9 & 13 yo children are being cared for by the patient's siblings .     Patients primary caregiver is Shannon Iglesias, patients wife, phone number [phone].  Confirmed patients contact information is [phone] (home);   Telephone Information:   Mobile [phone]       During admission, patient's caregiver plans to stay in patient's room.  Confirmed patient and patients caregivers do have access to reliable transportation.    Cognitive Status/Learning     Patient reports reading ability as college and states patient does not have difficulty with N/A.  Patient reports patient learns best by multisensory information.   Needed: No.   Highest education level: Associate/Bachelor Degree    Vocation/Disability   Working for Income: yes  If yes, working activity level: Working Full Time      Adherence     Patient reports a high level of adherence to patients health care  regimen.  Adherence counseling and education provided. Patient verbalizes understanding.    Substance Use    Patient reports the following substance usage.    Tobacco: none, patient denies any use.  Alcohol: none, patient denies any use.  Illicit Drugs/Non-prescribed Medications: none, patient denies any use.  Patient states clear understanding of the potential impact of substance use.  Substance abstinence/cessation counseling, education and resources provided and reviewed.     Services Utilizing/ADLS    Infusion Service: Prior to admission, patient utilizing? no  Home Health: Prior to admission, patient utilizing? no  DME: Prior to admission, yes - diabetic supplies  Pulmonary/Cardiac Rehab: Prior to admission, no - Pt had cardiac rehab after MI in 2018.  Dialysis:  Prior to admission, No  Transplant Specialty Pharm[facility]: [facility].     Prior to admission, patient reports patient was independent with ADLS and was driving.      Insurance/Medications    Insured by   Payer/Plan Subscr  Sex Relation Sub. Ins. ID Effective Group Num   1. BLUE CROSS * ROBIN REYNA* 1980 Male Self [id number]                                    [address] [address]   2. Pho[employer]ent CROSS PARISA FLOYD 1979 Female Spouse [id number] 8/1/15 [id number]                                   [address] [address]      Primary Insurance (for UNOS reporting): Private Insurance  Secondary Insurance (for UNOS reporting): None    Patient reports patient is able to obtain and afford medications at this time and at time of discharge.    Living Will/Healthcare Power of     Patient states patient does not have a LW and/or HCPA.   provided education regarding LW and HCPA and the completion of forms.    Coping/Mental Health    The patient informed the  Kidney Transplant Team that things has been rough for a while. The patient reported that he has lost several family members and his wife  recently had to have surgery. The patient reported that finally things were starting to feel normal now this. The SW inquired about providing the patient with additional resources for mental health but the patient declined. The patient reported that he has his wife. The patient reported that he has a good support system. The patient reported that he is taking it one day at a time. The patient's wife reported that their insurance provider offers ten free counseling session that are available to them if needed.  The patient and his caregiver agree to contact transplant tram with needs, questions, or concerns as they arise.     Discharge Planning  At this time of discharge, the patient plans to return home under the care of his wife Shannon Iglesias. During rounds today, this patient is expected to discharge. The patient and his wife are both in agreement with the discharge plans. Patient and patient's caregiver verbalize understanding and are involved in treatment planning and discharge process.       Additional Concerns    Patient's caretaker denies additional needs and/or concerns currently. Patient is being followed for needs, education, resources, information, emotional support, supportive counseling, and for supportive and skilled discharge plan of care.  providing ongoing psychosocial support, education, resources, and d/c planning as needed.   remains available. Patient's caregiver verbalizes understanding and agreement with information reviewed,  availability and how to access available resources as needed. Patient denies additional needs and/or concerns at this time. Patient verbalizes understanding and agreement with information reviewed, social work availability, and how to access available resources as needed.

## 2024-11-25 NOTE — ASSESSMENT & PLAN NOTE
-He remains in immunosuppressed state, at risk of infections and, rejection, and toxicity.    -Monitor for side effects and toxicities, given narrow therapeutic window and significant risk of AE. Thus, ongoing monitoring is needed to assess for toxicities and other adverse effects.   -See prophylactic immunotherapy.

## 2024-11-25 NOTE — PROGRESS NOTES
Discharge Medication Note:    Hospital Course:  Patient admitted for treatment of ABMR. Plan to complete PLEX and IVIG outpatient.    Met with Robb Iglesias at discharge to review discharge medications and to update the blue medication card.           Medication List        START taking these medications      sulfamethoxazole-trimethoprim 400-80mg 400-80 mg per tablet  Commonly known as: BACTRIM,SEPTRA  Take 1 tablet by mouth once daily.  Start taking on: November 26, 2024            CHANGE how you take these medications      magnesium oxide 400 mg (241.3 mg magnesium) tablet  Commonly known as: MAG-OX  TAKE 1 TABLET BY MOUTH 2 TIMES DAILY.  What changed: Another medication with the same name was removed. Continue taking this medication, and follow the directions you see here.     predniSONE 5 MG tablet  Commonly known as: DELTASONE  Take by mouth daily: 20mg 11/25-12/1, 15mg 12/2-12/8, 10mg 12/9-12/152024, 5mg 12/16/2024-  What changed:   how much to take  how to take this  additional instructions     tacrolimus 1 MG Cap  Commonly known as: PROGRAF  Take 3 capsules (3 mg total) by mouth every 12 (twelve) hours.  What changed:   medication strength  See the new instructions.            CONTINUE taking these medications      aspirin 81 MG EC tablet  Commonly known as: ECOTRIN  Take 1 tablet (81 mg total) by mouth once daily.     atorvastatin 80 MG tablet  Commonly known as: LIPITOR  Take 1 tablet (80 mg total) by mouth every evening.     blood sugar diagnostic Strp  Check blood glucose 2 times daily as directed and as needed     blood-glucose meter kit  Commonly known as: ONETOUCH ULTRAMINI  Use as instructed     ezetimibe 10 mg tablet  Commonly known as: ZETIA  Take 1 tablet (10 mg total) by mouth once daily.     GINGER ROOT (BULK) MISC     k phos di & mono-sod phos mono 250 mg Tab  Commonly known as: PHOSPHA 250 NEUTRAL  Take 2 tablets by mouth 3 (three) times daily.     lancets Misc  Check blood glucose 2  "times daily as directed and as needed (dispense insurance preferred brand or patient choice)     metoprolol tartrate 25 MG tablet  Commonly known as: LOPRESSOR  Take 1 tablet (25 mg total) by mouth 2 (two) times daily.     MOUNJARO 7.5 mg/0.5 mL Pnij  Generic drug: tirzepatide  INJECT 7.5 MG UNDER THE SKIN EVERY 7 DAYS     multivitamin Tab  Take 1 tablet by mouth once daily.     nitroGLYCERIN 0.4 MG SL tablet  Commonly known as: NITROSTAT  Dissolve one tablet underneath tongue at onset of angina; may repeat every 5 minutes if needed. Call 911 if angina persists after 2 doses.     pantoprazole 40 MG tablet  Commonly known as: PROTONIX  Take 1 tablet (40 mg total) by mouth once daily.     pen needle, diabetic 31 gauge x 5/16" Ndle  Commonly known as: BD ULTRA-FINE SHORT PEN NEEDLE  Use to inject insulin into the skin 3 times daily     valsartan 80 MG tablet  Commonly known as: DIOVAN  Take 1 tablet (80 mg total) by mouth once daily.               Where to Get Your Medications        These medications were sent to [facility]  [address] [address]      Hours: Always Open Phone: [phone]   predniSONE 5 MG tablet  sulfamethoxazole-trimethoprim 400-80mg 400-80 mg per tablet  tacrolimus 1 MG Cap            The following medications have been placed on HOLD and should be restarted in the outpatient setting (when appropriate): MMF (MELISSA)    Robb Iglesias verbalized understanding and had the opportunity to ask questions.  "

## 2024-11-25 NOTE — CONSULTS
[facility] - Transplant Stepdown  Transfusion Medicine  Consult Note    Patient Name: Robb Iglesias  MRN: [id number]  Admission Date: 11/22/2024  Hospital Length of Stay: 3 days  Attending Physician: Angélica Gutierrez*  Primary Care Provider: SABIHA Roberson MD     Inpatient consult to [facility]  Consult performed by: Albert Bonilla MD  Consult ordered by: Angélica Gutierrez MD  Reason for consult: Kidney Rejection  Assessment/Recommendations: TPE x3      Subjective:     Principal Problem:Acute rejection of kidney transplant    History of Present Illness: Mr. Iglesias is a 43 y.o. year old male with history of ESRD secondary to diabetic nephropathy s/p living kidney transplant on 5/15/23 who was admitted recently for acute rejection. Patient underwent a kidney biopsy (on 11/21/24) that showed 19 glomeruli, 5% fibrosis, significant microvascular inflammation (capillaritis and glomerularitis), C4d <10% (considered negative), and no ACR/AVR. Based on biopsy results, transplant tean is concerned for ABMR and would like to initiate PLEX for additional support.     PMH and PSH reviewed 11/25/2024 and relevant items addressed in HPI.    Review of patient's allergies indicates:  No Known Allergies    All medications reviewed 11/25/2024 and ace inhibitors not identified.    Current Facility-Administered Medications:     acetaminophen tablet 650 mg, 650 mg, Oral, Q6H PRN, Mirtha Ha NP    aspirin EC tablet 81 mg, 81 mg, Oral, Daily, Mirtha Ha NP, 81 mg at 11/25/24 0757    atorvastatin tablet 80 mg, 80 mg, Oral, QHS, Mirtha Ha NP, 80 mg at 11/24/24 2115    dextrose 10% bolus 125 mL 125 mL, 12.5 g, Intravenous, PRN, Mirtha Ha NP    dextrose 10% bolus 250 mL 250 mL, 25 g, Intravenous, PRN, Mirtha Ha NP    glucagon (human recombinant) injection 1 mg, 1 mg, Intramuscular, PRN, Mirtha Ha NP    glucose chewable tablet 16 g, 16 g, Oral, PRN, Mirtha Ha, NP     glucose chewable tablet 24 g, 24 g, Oral, PRN, Mirtha Ha NP    heparin (porcine) injection 5,000 Units, 5,000 Units, Subcutaneous, Q8H, Mirtha Ha NP    insulin aspart U-100 pen 0-10 Units, 0-10 Units, Subcutaneous, AC + HS + 0200 PRN, Cele Loza PA-C, 3 Units at 11/25/24 0259    insulin aspart U-100 pen 15 Units, 15 Units, Subcutaneous, TIDWM, Cele Loza PA-C    insulin aspart U-100 pen 15 Units, 15 Units, Subcutaneous, Once, Cele Loza PA-C    k phos di & mono-sod phos mono 250 mg tablet 2 tablet, 500 mg, Oral, TID, Angélica Gutierrez MD, 2 tablet at 11/25/24 0757    magnesium oxide tablet 400 mg, 400 mg, Oral, BID, Mirtha Ha NP, 400 mg at 11/25/24 0757    melatonin tablet 6 mg, 6 mg, Oral, Nightly PRN, Mirtha Ha NP    metoprolol tartrate (LOPRESSOR) tablet 25 mg, 25 mg, Oral, BID, Mirtha Ha NP, 25 mg at 11/25/24 0757    multivitamin tablet, 1 tablet, Oral, Daily, Mirtha Ha NP, 1 tablet at 11/25/24 0757    ondansetron injection 4 mg, 4 mg, Intravenous, Q12H PRN, Mirtah Ha NP    pantoprazole EC tablet 40 mg, 40 mg, Oral, Daily, Mirtha Ha NP, 40 mg at 11/25/24 0756    predniSONE tablet 20 mg, 20 mg, Oral, Daily, Angélica Gutierrez MD, 20 mg at 11/25/24 0757    sodium chloride 0.9% flush 10 mL, 10 mL, Intravenous, Q6H PRN, Mirtha Ha NP    sulfamethoxazole-trimethoprim 400-80mg per tablet 1 tablet, 1 tablet, Oral, Daily, Angélica Gutierrez MD, 1 tablet at 11/25/24 0757    tacrolimus capsule 2 mg, 2 mg, Oral, BID, Angélica Gutierrez MD, 2 mg at 11/25/24 0756    valsartan tablet 80 mg, 80 mg, Oral, Daily, Mirtha Ha, NP, 80 mg at 11/25/24 0756     Family History       Problem Relation (Age of Onset)    Diabetes Mother, Maternal Grandmother    Diabetes type II Mother, Brother, Brother    Hyperlipidemia Mother, Father    Hypertension Mother          Tobacco Use    Smoking status: Never    Smokeless tobacco: Never   Substance and  "Sexual Activity    Alcohol use: No     Comment: 1-2 times per month    Drug use: No    Sexual activity: Yes     Partners: Female     Review of Systems   Unable to perform ROS: Other     Objective:     Vital Signs (Most Recent):  Temp: 98.4 °F (36.9 °C) (11/24/24 2339)  Pulse: 82 (11/24/24 2339)  Resp: 18 (11/24/24 2339)  BP: 135/80 (11/24/24 2339)  SpO2: 95 % (11/24/24 2339) Vital Signs (24h Range):  Temp:  [97.7 °F (36.5 °C)-98.4 °F (36.9 °C)] 98.4 °F (36.9 °C)  Pulse:  [82-90] 82  Resp:  [16-18] 18  SpO2:  [93 %-97 %] 95 %  BP: (128-173)/(77-94) 135/80     Weight: 109.7 kg (241 lb 13.5 oz)    Physical Exam  Vitals and nursing note reviewed.         Significant Labs: CBC:   Recent Labs   Lab 11/24/24 0625 11/25/24  0550   WBC 14.65* 14.61*   HGB 13.0* 12.9*   HCT 38.4* 37.6*    165     CMP:   Recent Labs   Lab 11/24/24 0625 11/25/24  0550    140   K 4.1 4.1    112*   CO2 20* 22*   * 225*   BUN 19 25*   CREATININE 1.4 1.5*   CALCIUM 8.9 8.9   ANIONGAP 9 6*     Coagulation: No results for input(s): "PT", "INR", "APTT" in the last 48 hours.  LDH: No results for input(s): "LDHTOTAL" in the last 48 hours.    Assessment/Plan:     ABMR of kidney allograft carries a Category I Grade 1B indication for therapeutic apheresis via the 2023 Journal of Clinical Apheresis Guidelines (Rusty LANDIS et al. Journal of Clinical Apheresis 2023; 348:). 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] 10/25/2023 Yes    History of NSTEMI with CAD s/p PCI (FAMILIA) of LAD x 2 in 8/2017 [I25.2] 08/07/2023 Not Applicable "     Chronic    Paroxysmal atrial fibrillation [I48.0] 06/04/2023 Yes     Chronic    Immunocompromised state due to drug therapy [D84.821, Z79.899] 05/16/2023 Not Applicable    S/P kidney transplant [Z94.0] 05/15/2023 Not Applicable     Chronic    Prophylactic immunotherapy [Z29.89] 05/15/2023 Not Applicable    At risk for opportunistic infections [Z91.89] 05/15/2023 Yes    Dyslipidemia associated with type 2 diabetes mellitus [E11.69, E78.5] 07/31/2017 Yes     Chronic    Type 2 diabetes mellitus with stage 3a chronic kidney disease, without long-term current use of insulin [E11.22, N18.31] 06/21/2017 Yes     Chronic    Essential hypertension [I10] 04/26/2017 Yes     Chronic    Gastroesophageal reflux disease without esophagitis [K21.9] 04/07/2017 Yes      Problems Resolved During this Admission:       Albert Bonilla MD  Transfusion Medicine  [facility] - Transplant Stepdown

## 2024-11-25 NOTE — PROGRESS NOTES
"[facility] - Transplant Stepdown  Kidney Transplant  Progress Note      Reason for Follow-up: Reassessment of Kidney Transplant - 5/15/2023  (#1) recipient and management of immunosuppression.     ORGAN: LEFT KIDNEY      Donor Type: Living         Interval history:  he tolerated 2nd dose of steroids without issues Saturday, 3rd dose ordered for today.  I reviewed his case with Dr. Booth, and we agreed he should receive plasmapheresis.  Plans made for him to get catheter placed Monday morning followed by pheresis, and then hopefully discharge.    I/O last 3 completed shifts:  In: 2820 [P.O.:2820]  Out: 2050 [Urine:2050]    VITALS:  height is 6' 3" (1.905 m) and weight is 109.7 kg (241 lb 13.5 oz). His temperature is 97.7 °F (36.5 °C). His blood pressure is 154/90 (abnormal) and his pulse is 89. His respiration is 18 and oxygen saturation is 97%.       A&O x3, NAD.Speech and affect normal.  Lungs CTA, unlabored.  Heart irregular  Abdomen soft, nontender, no masses.  Allograft nontender.  Edema: none.    Recent Labs   Lab 11/23/24  0648 11/24/24  0625   WBC 6.16 14.65*   HGB 13.8* 13.0*   HCT 40.4 38.4*    188       Recent Labs   Lab 11/21/24  0800 11/23/24  0649 11/24/24  0625    137 139   K 3.9 4.2 4.1    110 110   CO2 25 20* 20*   BUN 13 14 19   CREATININE 1.5* 1.4 1.4   CALCIUM 8.9 9.1 8.9   PHOS 2.7 1.9* 3.0     Recent Labs   Lab 11/21/24  0800 11/23/24  0648 11/24/24  0625   Tacrolimus Lvl 6.1 6.6 5.4     Assessment/Plan:     * Acute rejection of kidney transplant  - Baseline Cr ~1.4  - Pretransplant PRA 50 to 69%.   - 0.4 allosure in August 2024 and recently jumped to 1.4. 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   - Current DSA negative for donor specific antibodies (Ab)  - Kidney biopsy 11/21 showed 19 glomeruli, 5% fibrosis, significant microvascular inflammation (capillaritis and " "glomerularitis) C4d <10% (considered negative) no ACr No AVR. No viral changes but SV40 is pending, biopsy highly suspicious for Ab mediated rejection (ABMR).  - completed  #3/3 11/24  - Plan plasmapheresis x3, 1st on Monday after line placement.  - Dr. Booth   Will determine further plan of care after he receives results of C4d and BK stains Monday.      Prophylactic immunotherapy  - Continue Prograf. Monitor trough daily and adjust dose as needed to achieve therapeutic level.  - Continue steroids.  -   Watch for toxicities, none noted      History of NSTEMI with CAD s/p PCI (FAMILIA) of LAD x 2 in 8/2017  - Continue ASA/statin & monitor with tele  - No ACS symptoms      Immunocompromised state due to drug therapy  -He remains in immunosuppressed state, at risk of infections and, rejection, and toxicity.    -Monitor for side effects and toxicities, given narrow therapeutic window and significant risk of AE. Thus, ongoing monitoring is needed to assess for toxicities and other adverse effects.   -See prophylactic immunotherapy.       Essential hypertension  - Continue Lopressor.  - Restart Valsartan (no contraindication with PLEX)      S/P kidney transplant  Mr. Iglesias is a 43 y.o. year old white male with history of coronary angioplasty with stent placement, NSTEMI with CAD 8/2017, HTN, GERD, sleep apnea, and ESRD secondary to diabetic nephropathy who received a living kidney transplant on 5/15/23 (thymo induction, CMV +/+) and ESRD 2/2 T2DM. Baseline Cr ~1.4.   - See "acute rejection."  - Strict I/O. Daily weights.   - Avoid nephrotoxic agents (NSAIDs, IV contrast dye, ACEI/ARB anti-HTN medications, Aminoglycoside-containing antibiotics)  -Renally dose all appropriate medications, including antibiotics     BK viremia  - BK viremia: first detected 7/10/23; most recent PCR 4,523 copies/ml on 11/1/24 (on reduced IS)  - Cont biweekly BK, PCR pending on admit          Paroxysmal atrial fibrillation  - Continue ASA 81 " mg and Lopressor  - Continue tele      At risk for opportunistic infections  - Resume OI ppx per protocol.      Dyslipidemia associated with type 2 diabetes mellitus  - Continue statin.      Type 2 diabetes mellitus with stage 3a chronic kidney disease, without long-term current use of insulin  - Endocrine consulted for BG management, appreciate assistance.       Gastroesophageal reflux disease without esophagitis  - Continue Protonix.      Discharge Planning:  Not ready for DC  Medical decision making for this encounter includes review of pertinent labs and diagnostic studies, assessment and planning, discussions with consulting providers, discussion with patient/family, and participation in multidisciplinary rounds. Time spent caring for patient:  50+ min.    Angélica Gutierrez MD  Kidney Transplant  [facility] - Transplant Stepdown

## 2024-11-25 NOTE — PROGRESS NOTES
Patient discharged per order. PIV removed. RIJ remains in place. Dressing is CDI. AVS reviewed with patient and patient verbalizes complete understanding of all discharge instructions. No acute distress noted. Patient is currently awaiting bedside delivery of medications.    Detail Level: Zone Detail Level: Simple Detail Level: Generalized

## 2024-11-25 NOTE — PROCEDURES
[facility] - Transplant [facility]  Transfusion Medicine  Procedure Note    SUMMARY   Therapeutic Plasma Exchange (Apheresis)    Date/Time: 11/25/2024 10:00 AM    Performed by: Elmira Maldonado MD  Authorized by: Elmira Maldonado MD        Date of Procedure: 11/25/2024     Procedure: Plasma Exchange    Provider: Elmira Maldonado MD     Assisting Provider: None    Pre-Procedure Diagnosis: Acute rejection of kidney transplant    Post-Procedure Diagnosis: Acute rejection of kidney transplant    Follow-up Assessment: Mr. Iglesias is a 43 y.o. year old male with history of ESRD secondary to diabetic nephropathy s/p living kidney transplant on 5/15/23 who was admitted recently for acute rejection. Patient underwent a kidney biopsy (on 11/21/24) that showed 19 glomeruli, 5% fibrosis, significant microvascular inflammation (capillaritis and glomerularitis), C4d <10% (considered negative), and no ACR/AVR. Based on biopsy results, transplant tean is concerned for ABMR and would like to initiate PLEX for additional support. Total of 3 PLEX requested to start inpatient and then outpatient.     Today's procedure (#1 of 3) was well tolerated and without complications. Next treatment scheduled for 11/26/2024.      Pertinent Laboratory Data:   Complete Blood Count:   Lab Results   Component Value Date    HGB 12.9 (L) 11/25/2024    HCT 37.6 (L) 11/25/2024     11/25/2024    WBC 14.61 (H) 11/25/2024     Basic Metabolic Panel:   Lab Results   Component Value Date     11/25/2024    K 4.1 11/25/2024     (H) 11/25/2024    CO2 22 (L) 11/25/2024     (H) 11/25/2024    BUN 25 (H) 11/25/2024    CREATININE 1.5 (H) 11/25/2024    CALCIUM 8.9 11/25/2024    ANIONGAP 6 (L) 11/25/2024    ESTGFRAFRICA 13 (A) 07/26/2022    EGFRNONAA 11 (A) 07/26/2022     Comprehensive Metabolic Panel:   Lab Results   Component Value Date     11/25/2024    K 4.1 11/25/2024     (H) 11/25/2024    CO2 22 (L) 11/25/2024     (H)  11/25/2024    BUN 25 (H) 11/25/2024    CREATININE 1.5 (H) 11/25/2024    CALCIUM 8.9 11/25/2024    PROT 7.0 08/27/2024    ALBUMIN 4.1 11/21/2024    BILITOT 2.8 (H) 08/27/2024    ALKPHOS 90 08/27/2024    AST 27 08/27/2024    ALT 43 08/27/2024    ANIONGAP 6 (L) 11/25/2024    EGFRNONAA 11 (A) 07/26/2022       Pertinent Medications: None contraindicated in plasma exchange    Review of patient's allergies indicates:  No Known Allergies      Current Facility-Administered Medications:     acetaminophen tablet 650 mg, 650 mg, Oral, Q6H PRN, KuneMirtha, NP    aspirin EC tablet 81 mg, 81 mg, Oral, Daily, Mule, Mirtha, NP, 81 mg at 11/25/24 0757    atorvastatin tablet 80 mg, 80 mg, Oral, QHS, Mule, Mirtha, NP, 80 mg at 11/24/24 2115    dextrose 10% bolus 125 mL 125 mL, 12.5 g, Intravenous, PRN, Kune, Mirtha, NP    dextrose 10% bolus 250 mL 250 mL, 25 g, Intravenous, PRN, Kune, Mirtha, NP    glucagon (human recombinant) injection 1 mg, 1 mg, Intramuscular, PRN, Kune, Mirtha, NP    glucose chewable tablet 16 g, 16 g, Oral, PRN, Mule, Mirtha, NP    glucose chewable tablet 24 g, 24 g, Oral, PRN, Kune, Mirtha, NP    heparin (porcine) injection 5,000 Units, 5,000 Units, Subcutaneous, Q8H, KuneMirtha, NP    insulin aspart U-100 pen 0-10 Units, 0-10 Units, Subcutaneous, AC + HS + 0200 PRN, Cele Loza PA-C, 2 Units at 11/25/24 1248    insulin aspart U-100 pen 12 Units, 12 Units, Subcutaneous, TIDWM, Cele Loza PA-C    k phos di & mono-sod phos mono 250 mg tablet 2 tablet, 500 mg, Oral, TID, Angélica Gutierrez MD, 2 tablet at 11/25/24 0757    magnesium oxide tablet 400 mg, 400 mg, Oral, BID, Mirtha Ha, YOVANNY, 400 mg at 11/25/24 0757    melatonin tablet 6 mg, 6 mg, Oral, Nightly PRN, Mirtha Ha NP    metoprolol tartrate (LOPRESSOR) tablet 25 mg, 25 mg, Oral, BID, Mirtha Ha, NP, 25 mg at 11/25/24 0757    multivitamin tablet, 1 tablet, Oral, Daily, Mirtha Ha NP, 1 tablet at 11/25/24 0757     ondansetron injection 4 mg, 4 mg, Intravenous, Q12H PRN, Mirtha Ha NP    pantoprazole EC tablet 40 mg, 40 mg, Oral, Daily, Mirtha Ha NP, 40 mg at 11/25/24 0756    predniSONE tablet 20 mg, 20 mg, Oral, Daily, Angélica Gutierrez MD, 20 mg at 11/25/24 0757    sodium chloride 0.9% flush 10 mL, 10 mL, Intravenous, Q6H PRN, Mirtha Ha NP    sulfamethoxazole-trimethoprim 400-80mg per tablet 1 tablet, 1 tablet, Oral, Daily, Angélica Gutierrez MD, 1 tablet at 11/25/24 0757    tacrolimus capsule 1 mg, 1 mg, Oral, Once, Angi Clark PA-C    tacrolimus capsule 3 mg, 3 mg, Oral, BID, Angi Clark PA-C    valsartan tablet 80 mg, 80 mg, Oral, Daily, Mirtha Ha NP, 80 mg at 11/25/24 0756      Anesthesia: None     Technical Procedures Used: Plasma Exchange: Volume exchanged - 4.0 Liters; Replacement fluid - Albumin; Number of procedures 1; Date of next procedure - 11/26/24.    Description of the Findings of the Procedure:     Please see Apheresis Nurse flowsheet for details.    The patient was evaluated and all clinical and laboratory data relevant to the treatment was reviewed, and a decision was made to proceed with the Apheresis procedure.    I was available to the clinical staff throughout the procedure.    Significant Surgical Tasks Conducted by the Assistant(s): Not applicable    Complications: None    Estimated Blood Loss (EBL): None    Implants: None     Specimens: None

## 2024-11-25 NOTE — SUBJECTIVE & OBJECTIVE
"Interval HPI:   No acute events overnight. Patient in room 65744/36644 A. Blood glucose stable. BG above goal on current insulin regimen (SSI ). Steroid use- Prednisone 20 mg    Renal function-   Lab Results   Component Value Date    CREATININE 1.5 (H) 2024           Vasopressors-  None      Diet diabetic Cardiac (Low Na/Chol); 2000 Calories (up to 75 gm per meal)      Eatin%  Nausea: No  Hypoglycemia and intervention: No  Fever: No  TPN and/or TF: No    BP (!) 155/82 (BP Location: Right arm, Patient Position: Lying)   Pulse 72   Temp 98.3 °F (36.8 °C)   Resp 18   Ht 6' 3" (1.905 m)   Wt 109.7 kg (241 lb 13.5 oz)   SpO2 99%   BMI 30.23 kg/m²     Labs Reviewed and Include    Recent Labs   Lab 24  0550   *   CALCIUM 8.9      K 4.1   CO2 22*   *   BUN 25*   CREATININE 1.5*     Lab Results   Component Value Date    WBC 14.61 (H) 2024    HGB 12.9 (L) 2024    HCT 37.6 (L) 2024    MCV 82 2024     2024     No results for input(s): "TSH", "FREET4" in the last 168 hours.  Lab Results   Component Value Date    HGBA1C 6.4 (H) 2024       Nutritional status:   Body mass index is 30.23 kg/m².  Lab Results   Component Value Date    ALBUMIN 4.1 2024    ALBUMIN 4.2 2024    ALBUMIN 4.2 2024     No results found for: "PREALBUMIN"    Estimated Creatinine Clearance: 85.8 mL/min (A) (based on SCr of 1.5 mg/dL (H)).    Accu-Checks  Recent Labs     24  0817 24  1224 24  1734 24  2216 24  0756 24  1200 24  1734 24  2244 24  0258 24  0754   POCTGLUCOSE 278* 306* 327* 290* 246* 362* 305* 333* 274* 207*       Current Medications and/or Treatments Impacting Glycemic Control  Immunotherapy:    Immunosuppressants           Stop Route Frequency     tacrolimus capsule 2 mg         -- Oral 2 times daily          Steroids:   Hormones (From admission, onward)      Start     Stop Route " Frequency Ordered    11/25/24 0900  predniSONE tablet 20 mg         -- Oral Daily 11/24/24 1054    11/22/24 2029  melatonin tablet 6 mg         -- Oral Nightly PRN 11/22/24 2029          Pressors:    Autonomic Drugs (From admission, onward)      None          Hyperglycemia/Diabetes Medications:   Antihyperglycemics (From admission, onward)      Start     Stop Route Frequency Ordered    11/25/24 1130  insulin aspart U-100 pen 15 Units         -- SubQ 3 times daily with meals 11/25/24 0844    11/24/24 1207  insulin aspart U-100 pen 0-10 Units         -- SubQ Before meals, nightly and at 0200 PRN 11/24/24 1108

## 2024-11-25 NOTE — PROGRESS NOTES
"[facility] - [facility]plant Step[facility]  Endocrinology  Progress Note    Admit Date: 2024     Reason for Consult: Management of T2DM, Hyperglycemia     Surgical Procedure and Date: Kidney txp 5/15/23     Diabetes diagnosis year:     Home Diabetes Medications:  Trulicity 7.5 mg weekly     How often checking glucose at home? Not checking    Missed doses on regimen?  No    Diabetes Complications include:     Hyperglycemia    Complicating diabetes co morbidities:   Glucocorticoid use       HPI:   Patient is a 43 y.o. 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 Baseline Cr ~1.4. Mr. Iglesias presents as a direct admit  for treatment of kidney transplant rejection. Endocrine consulted for BG management in the context of high dose steroids.         Interval HPI:   No acute events overnight. Patient in room 33081/89702 A. Blood glucose stable. BG above goal on current insulin regimen (SSI ). Steroid use- Prednisone 20 mg    Renal function-   Lab Results   Component Value Date    CREATININE 1.5 (H) 2024           Vasopressors-  None      Diet diabetic Cardiac (Low Na/Chol); 2000 Calories (up to 75 gm per meal)      Eatin%  Nausea: No  Hypoglycemia and intervention: No  Fever: No  TPN and/or TF: No    BP (!) 155/82 (BP Location: Right arm, Patient Position: Lying)   Pulse 72   Temp 98.3 °F (36.8 °C)   Resp 18   Ht 6' 3" (1.905 m)   Wt 109.7 kg (241 lb 13.5 oz)   SpO2 99%   BMI 30.23 kg/m²     Labs Reviewed and Include    Recent Labs   Lab 24  0550   *   CALCIUM 8.9      K 4.1   CO2 22*   *   BUN 25*   CREATININE 1.5*     Lab Results   Component Value Date    WBC 14.61 (H) 2024    HGB 12.9 (L) 2024    HCT 37.6 (L) 2024    MCV 82 2024     2024     No results for " "input(s): "TSH", "FREET4" in the last 168 hours.  Lab Results   Component Value Date    HGBA1C 6.4 (H) 08/27/2024       Nutritional status:   Body mass index is 30.23 kg/m².  Lab Results   Component Value Date    ALBUMIN 4.1 11/21/2024    ALBUMIN 4.2 11/01/2024    ALBUMIN 4.2 11/01/2024     No results found for: "PREALBUMIN"    Estimated Creatinine Clearance: 85.8 mL/min (A) (based on SCr of 1.5 mg/dL (H)).    Accu-Checks  Recent Labs     11/23/24  0817 11/23/24  1224 11/23/24  1734 11/23/24  2216 11/24/24  0756 11/24/24  1200 11/24/24  1734 11/24/24  2244 11/25/24  0258 11/25/24  0754   POCTGLUCOSE 278* 306* 327* 290* 246* 362* 305* 333* 274* 207*       Current Medications and/or Treatments Impacting Glycemic Control  Immunotherapy:    Immunosuppressants           Stop Route Frequency     tacrolimus capsule 2 mg         -- Oral 2 times daily          Steroids:   Hormones (From admission, onward)      Start     Stop Route Frequency Ordered    11/25/24 0900  predniSONE tablet 20 mg         -- Oral Daily 11/24/24 1054    11/22/24 2029  melatonin tablet 6 mg         -- Oral Nightly PRN 11/22/24 2029          Pressors:    Autonomic Drugs (From admission, onward)      None          Hyperglycemia/Diabetes Medications:   Antihyperglycemics (From admission, onward)      Start     Stop Route Frequency Ordered    11/25/24 1130  insulin aspart U-100 pen 15 Units         -- SubQ 3 times daily with meals 11/25/24 0844    11/24/24 1207  insulin aspart U-100 pen 0-10 Units         -- SubQ Before meals, nightly and at 0200 PRN 11/24/24 1108            ASSESSMENT and PLAN    Cardiac/Vascular  Dyslipidemia associated with type 2 diabetes mellitus  On statin per ADA guidelines       Renal/  * Acute rejection of kidney transplant  Managed per primary team      Endocrine  Type 2 diabetes mellitus with stage 3a chronic kidney disease, without long-term current use of insulin  BG goal: 140-180    - Novolog 12 units TIDWM (20% decrease " as pulse steroids are complete)   - [facility] [place] PRN (150/25)   - POCT Glucose before meals, at bedtime and at 2 am  - Hypoglycemia protocol in place      ** Please notify Endocrine for any change and/or advance in diet**  ** Please call Endocrine for any BG related issues **     Discharge Planning:   Discharge Planning:   - Novolog 9 units TIDWM (Hold if BG < 100 mg/dL) (0.5 u/kg/day WBD)   - Novolog SSI for BG excursions:  Add correction scale if needed.  Blood sugar 150 to 200 add 2 units  Blood sugar 201 to 250 add 4 units  Blood sugar 251 to 300 add 6 units  Blood sugar 301 to 350 add 8 units  Blood sugar greater than 350 add 10 units  - Insurance approved diabetes testing supplies to check blood sugar 4 times a day (Before meals and at bedtime) and as needed.  - BD pen needles   - Send BG logs in 4 days  - Follow-up in discharge clinic in 2 weeks.       Cele Loza PA-C  Endocrinology  [facility] - [facility]

## 2024-11-25 NOTE — ASSESSMENT & PLAN NOTE
- Continue Prograf. Monitor trough daily and adjust dose as needed to achieve therapeutic level.  - Continue steroids.  -   Watch for toxicities, none noted

## 2024-11-25 NOTE — ASSESSMENT & PLAN NOTE
- Baseline Cr ~1.4  - Pretransplant PRA 50 to 69%.   - 0.4 allosure in August 2024 and recently jumped to 1.4. 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   - Current DSA negative for donor specific antibodies (Ab)  - Kidney biopsy 11/21 showed 19 glomeruli, 5% fibrosis, significant microvascular inflammation (capillaritis and glomerularitis) C4d <10% (considered negative) no ACr No AVR. No viral changes but SV40 is pending, biopsy highly suspicious for Ab mediated rejection (ABMR).  - completed  #3/3 11/24  - Plan plasmapheresis x3, 1st on Monday after line placement.  - Dr. Booth   Will determine further plan of care after he receives results of C4d and BK stains Monday.

## 2024-11-25 NOTE — DISCHARGE SUMMARY
[facility] - [facility]  Kidney Transplant  Discharge Summary    Patient Name: Robb Iglesias  MRN: [id number]  Admission Date: 11/22/2024  Hospital Length of Stay: 3 days  Discharge Date and Time:  11/25/2024 11:53 AM  Attending Physician: Samantha Munoz DO   Discharging Provider: Angi Clark PA-C  Primary Care Provider: SABIHA Roberson MD    HPI:   Mr. Iglesias is a 43 y.o. 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 Baseline Cr ~1.4. Post op course notable for:     Afib (converted with Dilt gtt in June 2023, on Eliquis for ~4 weeks post episode, remains on Lopressor).   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 Iglesias presents as a direct admit 11/22 for treatment of kidney transplant rejection. Pretransplant PRA 50 to 69%. He had a 0.4 allosure in August 2024 but recently increased to 1.4. 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 Current DSA ordered 11/21 is pending. Kidney biopsy showed 19 glomeruli, 5% fibrosis, significant microvascular inflammation (capillaritis and glomerularitis) C4d <10% (considered negative) no ACr No AVR. No viral changes but SV40 is pending, biopsy highly suspicious for rejection. Plan to start with SM pulses and PLEX + IVIG pending DSA burden. On admit, patient reports feeling well, without complaint. Denies pain, N/V/D, SOB, CP, change in bowel or bladder fxn. Plan for SM #1 tonight and follow-up DSA level. Will check BK PCR.   * No surgery found *     Hospital Course:    Patient treated for ABMR rejection with SMP x 3. He received 1 round PLEX inpatient, will have 2 more PLEX (1 Tuesday 1 Wednesday). IVIG to follow. No DSA on labs. Labs to be drawn prior to each session, f/u BK. Dr. Booth  aware.     Patient ready and stable for discharge at this time. On day of discharge, patient ambulating without assistance, tolerating diet, pain well controlled. Trialysis in correct positioning confirmed by CXR. F/u labs tomorrow before PLEX.  Patient understands discharge instruction and importance of follow up.      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] 10/25/2023 Yes    History of NSTEMI with CAD s/p PCI (FAMILIA) of LAD x 2 in 8/2017 [I25.2] 08/07/2023 Not Applicable     Chronic    Paroxysmal atrial fibrillation [I48.0] 06/04/2023 Yes     Chronic    Immunocompromised state due to drug therapy [D84.821, Z79.899] 05/16/2023 Not Applicable    S/P kidney transplant [Z94.0] 05/15/2023 Not Applicable     Chronic    Prophylactic immunotherapy [Z29.89] 05/15/2023 Not Applicable    At risk for opportunistic infections [Z91.89] 05/15/2023 Yes    Dyslipidemia associated with type 2 diabetes mellitus [E11.69, E78.5] 07/31/2017 Yes     Chronic    Type 2 diabetes mellitus with stage 3a chronic kidney disease, without long-term current use of insulin [E11.22, N18.31] 06/21/2017 Yes     Chronic    Essential hypertension [I10] 04/26/2017 Yes     Chronic    Gastroesophageal reflux disease without esophagitis [K21.9] 04/07/2017 Yes      Problems Resolved During this Admission:     Treatments: see above     Consults (From admission, onward)          Status Ordering Provider     [facility]  Once        Provider:  (Not yet assigned)    Completed FOREIGN CLARKE     Consult to Endocrinology  Once        Provider:  (Not yet assigned)    Completed BRENDA RODRIGUEZ            Pending Diagnostic Studies:       Procedure Component Value Units Date/Time    BK virus, DNA, quantitative [facility]; Plasma [[id number]]  Collected: 11/23/24 0649    Order Status: Sent Lab Status: In process Updated: 11/23/24 0706    Specimen: Blood           Significant Diagnostic Studies: Labs: CMP   Recent Labs   Lab 11/24/24 0625 11/25/24  0550    140   K 4.1 4.1    112*   CO2 20* 22*   * 225*   BUN 19 25*   CREATININE 1.4 1.5*   CALCIUM 8.9 8.9   ANIONGAP 9 6*   , CBC   Recent Labs   Lab 11/24/24 0625 11/25/24  0550   WBC 14.65* 14.61*   HGB 13.0* 12.9*   HCT 38.4* 37.6*    165   , and All labs within the past 24 hours have been reviewed    Discharged Condition: good    Disposition: Home or Self Care    Patient Instructions:      Diet Adult Regular     Notify your health care provider if you experience any of the following:  increased confusion or weakness     Notify your health care provider if you experience any of the following:  persistent dizziness, light-headedness, or visual disturbances     Notify your health care provider if you experience any of the following:  worsening rash     Notify your health care provider if you experience any of the following:  difficulty breathing or increased cough     Notify your health care provider if you experience any of the following:  redness, tenderness, or signs of infection (pain, swelling, redness, odor or green/yellow discharge around incision site)     Notify your health care provider if you experience any of the following:  severe uncontrolled pain     Notify your health care provider if you experience any of the following:  persistent nausea and vomiting or diarrhea     Notify your health care provider if you experience any of the following:  temperature >100.4     Notify your health care provider if you experience any of the following:  severe persistent headache     Leave dressing on - Keep it clean, dry, and intact until clinic visit     Activity as tolerated     Medications:  Reconciled Home Medications:      Medication List        START taking these medications       sulfamethoxazole-trimethoprim 400-80mg 400-80 mg per tablet  Commonly known as: BACTRIM,SEPTRA  Take 1 tablet by mouth once daily.  Start taking on: November 26, 2024            CHANGE how you take these medications      magnesium oxide 400 mg (241.3 mg magnesium) tablet  Commonly known as: MAG-OX  TAKE 1 TABLET BY MOUTH 2 TIMES DAILY.  What changed: Another medication with the same name was removed. Continue taking this medication, and follow the directions you see here.     predniSONE 5 MG tablet  Commonly known as: DELTASONE  Take by mouth daily: 20mg 11/25-12/1, 15mg 12/2-12/8, 10mg 12/9-12/152024, 5mg 12/16/2024-  What changed:   how much to take  how to take this  additional instructions     tacrolimus 1 MG Cap  Commonly known as: PROGRAF  Take 3 capsules (3 mg total) by mouth every 12 (twelve) hours.  What changed:   medication strength  See the new instructions.            CONTINUE taking these medications      aspirin 81 MG EC tablet  Commonly known as: ECOTRIN  Take 1 tablet (81 mg total) by mouth once daily.     atorvastatin 80 MG tablet  Commonly known as: LIPITOR  Take 1 tablet (80 mg total) by mouth every evening.     blood sugar diagnostic Strp  Check blood glucose 2 times daily as directed and as needed     blood-glucose meter kit  Commonly known as: ONETOUCH ULTRAMINI  Use as instructed     ezetimibe 10 mg tablet  Commonly known as: ZETIA  Take 1 tablet (10 mg total) by mouth once daily.     GINGER ROOT (BULK) MISC  by Misc.(Non-Drug; Combo Route) route.     k phos di & mono-sod phos mono 250 mg Tab  Commonly known as: PHOSPHA 250 NEUTRAL  Take 2 tablets by mouth 3 (three) times daily.     lancets Misc  Check blood glucose 2 times daily as directed and as needed (dispense insurance preferred brand or patient choice)     metoprolol tartrate 25 MG tablet  Commonly known as: LOPRESSOR  Take 1 tablet (25 mg total) by mouth 2 (two) times daily.     MOUNJARO 7.5 mg/0.5 mL Pnij  Generic drug:  "tirzepatide  INJECT 7.5 MG UNDER THE SKIN EVERY 7 DAYS     multivitamin Tab  Take 1 tablet by mouth once daily.     nitroGLYCERIN 0.4 MG SL tablet  Commonly known as: NITROSTAT  Dissolve one tablet underneath tongue at onset of angina; may repeat every 5 minutes if needed. Call 911 if angina persists after 2 doses.     pantoprazole 40 MG tablet  Commonly known as: PROTONIX  Take 1 tablet (40 mg total) by mouth once daily.     pen needle, diabetic 31 gauge x 5/16" Ndle  Commonly known as: BD ULTRA-FINE SHORT PEN NEEDLE  Use to inject insulin into the skin 3 times daily     valsartan 80 MG tablet  Commonly known as: DIOVAN  Take 1 tablet (80 mg total) by mouth once daily.            Time spent caring for patient (Greater than 1/2 spent in direct face-to-face contact): > 30 minutes    Angi Clark PA-C  Kidney Transplant  [facility] - [facility]  "

## 2024-11-26 ENCOUNTER — HOSPITAL ENCOUNTER (OUTPATIENT)
Dept: TRANSFUSION MEDICINE | Facility: HOSPITAL | Age: 44
Discharge: HOME | End: 2024-11-26
Payer: COMMERCIAL

## 2024-11-26 ENCOUNTER — PATIENT OUTREACH (OUTPATIENT)
Dept: ADMINISTRATIVE | Facility: CLINIC | Age: 44
End: 2024-11-26
Payer: COMMERCIAL

## 2024-11-26 ENCOUNTER — PATIENT MESSAGE (OUTPATIENT)
Dept: TRANSPLANT | Facility: CLINIC | Age: 44
End: 2024-11-26
Payer: COMMERCIAL

## 2024-11-26 VITALS
SYSTOLIC BLOOD PRESSURE: 114 MMHG | WEIGHT: 241.88 LBS | TEMPERATURE: 98 F | HEART RATE: 77 BPM | BODY MASS INDEX: 30.07 KG/M2 | DIASTOLIC BLOOD PRESSURE: 75 MMHG | HEIGHT: 75 IN | RESPIRATION RATE: 17 BRPM

## 2024-11-26 DIAGNOSIS — T86.11 ACUTE REJECTION OF KIDNEY TRANSPLANT: Primary | ICD-10-CM

## 2024-11-26 LAB
FINAL PATHOLOGIC DIAGNOSIS: NORMAL
GROSS: NORMAL
Lab: NORMAL

## 2024-11-26 PROCEDURE — 36514 APHERESIS PLASMA: CPT | Mod: ,,, | Performed by: PATHOLOGY

## 2024-11-26 PROCEDURE — 63600175 PHARM REV CODE 636 W HCPCS: Performed by: PATHOLOGY

## 2024-11-26 PROCEDURE — 25000003 PHARM REV CODE 250: Mod: JZ,JG | Performed by: PATHOLOGY

## 2024-11-26 PROCEDURE — 36514 APHERESIS PLASMA: CPT

## 2024-11-26 PROCEDURE — P9045 ALBUMIN (HUMAN), 5%, 250 ML: HCPCS | Mod: JZ,JG | Performed by: PATHOLOGY

## 2024-11-26 RX ORDER — CALCIUM GLUCONATE 20 MG/ML
2 INJECTION, SOLUTION INTRAVENOUS ONCE
Status: DISCONTINUED | OUTPATIENT
Start: 2024-11-26 | End: 2024-11-26

## 2024-11-26 RX ORDER — HEPARIN SODIUM 1000 [USP'U]/ML
3000 INJECTION, SOLUTION INTRAVENOUS; SUBCUTANEOUS ONCE
Status: COMPLETED | OUTPATIENT
Start: 2024-11-26 | End: 2024-11-26

## 2024-11-26 RX ORDER — CALCIUM GLUCONATE 20 MG/ML
1 INJECTION, SOLUTION INTRAVENOUS
Status: COMPLETED | OUTPATIENT
Start: 2024-11-26 | End: 2024-11-26

## 2024-11-26 RX ORDER — ALBUMIN HUMAN 50 G/1000ML
200 SOLUTION INTRAVENOUS ONCE
Status: COMPLETED | OUTPATIENT
Start: 2024-11-26 | End: 2024-11-26

## 2024-11-26 RX ADMIN — CALCIUM GLUCONATE 1 G: 20 INJECTION, SOLUTION INTRAVENOUS at 10:11

## 2024-11-26 RX ADMIN — HEPARIN SODIUM 2400 UNITS: 1000 INJECTION, SOLUTION INTRAVENOUS; SUBCUTANEOUS at 12:11

## 2024-11-26 RX ADMIN — CALCIUM GLUCONATE 1 G: 20 INJECTION, SOLUTION INTRAVENOUS at 11:11

## 2024-11-26 RX ADMIN — ALBUMIN (HUMAN) 200 G: 12.5 SOLUTION INTRAVENOUS at 10:11

## 2024-11-26 NOTE — PROGRESS NOTES
Pt presented to the outpt apheresis dept ambulatory with his wife.  He is oriented x4, states no pain.  Apheresis tx #2 started at 10:48 without difficulty via RIJ cath, cath patent, dressing clean, dry and intact.  4 liter plasma exchange.  Replacement fluids 5% albumin.  2 grams of calcium gluconate given IVPB over duration of exchange.  Tx ended at 12:40.  Cath flushed and locked.  Pt tolerated tx well.  Next tx 11/27/2024.  Pt exited dept ambulatory with his wife.

## 2024-11-27 ENCOUNTER — OFFICE VISIT (OUTPATIENT)
Dept: TRANSPLANT | Facility: CLINIC | Age: 44
End: 2024-11-27
Payer: COMMERCIAL

## 2024-11-27 ENCOUNTER — HOSPITAL ENCOUNTER (OUTPATIENT)
Dept: TRANSFUSION MEDICINE | Facility: HOSPITAL | Age: 44
Discharge: HOME | End: 2024-11-27
Payer: COMMERCIAL

## 2024-11-27 ENCOUNTER — TELEPHONE (OUTPATIENT)
Dept: TRANSPLANT | Facility: CLINIC | Age: 44
End: 2024-11-27
Payer: COMMERCIAL

## 2024-11-27 ENCOUNTER — TELEPHONE (OUTPATIENT)
Dept: TRANSPLANT | Facility: CLINIC | Age: 44
End: 2024-11-27

## 2024-11-27 ENCOUNTER — PATIENT MESSAGE (OUTPATIENT)
Dept: TRANSPLANT | Facility: CLINIC | Age: 44
End: 2024-11-27

## 2024-11-27 ENCOUNTER — DOCUMENTATION ONLY (OUTPATIENT)
Dept: TRANSPLANT | Facility: CLINIC | Age: 44
End: 2024-11-27
Payer: COMMERCIAL

## 2024-11-27 VITALS
RESPIRATION RATE: 18 BRPM | HEIGHT: 75 IN | TEMPERATURE: 98 F | WEIGHT: 240.5 LBS | SYSTOLIC BLOOD PRESSURE: 130 MMHG | OXYGEN SATURATION: 97 % | BODY MASS INDEX: 29.9 KG/M2 | DIASTOLIC BLOOD PRESSURE: 80 MMHG | HEART RATE: 69 BPM

## 2024-11-27 VITALS
RESPIRATION RATE: 18 BRPM | TEMPERATURE: 98 F | HEART RATE: 78 BPM | WEIGHT: 241.88 LBS | HEIGHT: 75 IN | DIASTOLIC BLOOD PRESSURE: 80 MMHG | BODY MASS INDEX: 30.07 KG/M2 | SYSTOLIC BLOOD PRESSURE: 120 MMHG

## 2024-11-27 DIAGNOSIS — E11.21 DIABETIC NEPHROPATHY ASSOCIATED WITH TYPE 2 DIABETES MELLITUS: Chronic | ICD-10-CM

## 2024-11-27 DIAGNOSIS — T86.11 ACUTE REJECTION OF KIDNEY TRANSPLANT: Primary | ICD-10-CM

## 2024-11-27 DIAGNOSIS — Z94.0 S/P KIDNEY TRANSPLANT: Chronic | ICD-10-CM

## 2024-11-27 DIAGNOSIS — Z94.0 KIDNEY REPLACED BY TRANSPLANT: Primary | ICD-10-CM

## 2024-11-27 DIAGNOSIS — N18.2 CKD (CHRONIC KIDNEY DISEASE) STAGE 2, GFR 60-89 ML/MIN: Primary | ICD-10-CM

## 2024-11-27 DIAGNOSIS — I25.2 HISTORY OF NON-ST ELEVATION MYOCARDIAL INFARCTION (NSTEMI): Chronic | ICD-10-CM

## 2024-11-27 DIAGNOSIS — N25.81 HYPERPARATHYROIDISM, SECONDARY RENAL: Chronic | ICD-10-CM

## 2024-11-27 DIAGNOSIS — B34.8 BK VIREMIA: ICD-10-CM

## 2024-11-27 DIAGNOSIS — T86.11 ACUTE REJECTION OF KIDNEY TRANSPLANT: ICD-10-CM

## 2024-11-27 LAB
BKV DNA SERPL NAA+PROBE-ACNC: ABNORMAL COPIES/ML
BKV DNA SERPL NAA+PROBE-LOG#: 3.67 LOG (10) COPIES/ML
BKV DNA SERPL QL NAA+PROBE: DETECTED

## 2024-11-27 PROCEDURE — 3075F SYST BP GE 130 - 139MM HG: CPT | Mod: CPTII,S$GLB,, | Performed by: INTERNAL MEDICINE

## 2024-11-27 PROCEDURE — 3060F POS MICROALBUMINURIA REV: CPT | Mod: CPTII,S$GLB,, | Performed by: INTERNAL MEDICINE

## 2024-11-27 PROCEDURE — 1160F RVW MEDS BY RX/DR IN RCRD: CPT | Mod: CPTII,S$GLB,, | Performed by: INTERNAL MEDICINE

## 2024-11-27 PROCEDURE — P9045 ALBUMIN (HUMAN), 5%, 250 ML: HCPCS | Mod: JZ,JG | Performed by: PATHOLOGY

## 2024-11-27 PROCEDURE — 3044F HG A1C LEVEL LT 7.0%: CPT | Mod: CPTII,S$GLB,, | Performed by: INTERNAL MEDICINE

## 2024-11-27 PROCEDURE — 63600175 PHARM REV CODE 636 W HCPCS: Performed by: PATHOLOGY

## 2024-11-27 PROCEDURE — 3066F NEPHROPATHY DOC TX: CPT | Mod: CPTII,S$GLB,, | Performed by: INTERNAL MEDICINE

## 2024-11-27 PROCEDURE — 1111F DSCHRG MED/CURRENT MED MERGE: CPT | Mod: CPTII,S$GLB,, | Performed by: INTERNAL MEDICINE

## 2024-11-27 PROCEDURE — 3008F BODY MASS INDEX DOCD: CPT | Mod: CPTII,S$GLB,, | Performed by: INTERNAL MEDICINE

## 2024-11-27 PROCEDURE — 99215 OFFICE O/P EST HI 40 MIN: CPT | Mod: S$GLB,,, | Performed by: INTERNAL MEDICINE

## 2024-11-27 PROCEDURE — 4010F ACE/ARB THERAPY RXD/TAKEN: CPT | Mod: CPTII,S$GLB,, | Performed by: INTERNAL MEDICINE

## 2024-11-27 PROCEDURE — 25000003 PHARM REV CODE 250: Mod: JZ,JG | Performed by: PATHOLOGY

## 2024-11-27 PROCEDURE — 3079F DIAST BP 80-89 MM HG: CPT | Mod: CPTII,S$GLB,, | Performed by: INTERNAL MEDICINE

## 2024-11-27 PROCEDURE — 1159F MED LIST DOCD IN RCRD: CPT | Mod: CPTII,S$GLB,, | Performed by: INTERNAL MEDICINE

## 2024-11-27 PROCEDURE — 99999 PR PBB SHADOW E&M-EST. PATIENT-LVL III: CPT | Mod: PBBFAC,,, | Performed by: INTERNAL MEDICINE

## 2024-11-27 RX ORDER — HEPARIN SODIUM 1000 [USP'U]/ML
2400 INJECTION, SOLUTION INTRAVENOUS; SUBCUTANEOUS ONCE
Status: COMPLETED | OUTPATIENT
Start: 2024-11-27 | End: 2024-11-27

## 2024-11-27 RX ORDER — ALBUMIN HUMAN 50 G/1000ML
200 SOLUTION INTRAVENOUS ONCE
Status: COMPLETED | OUTPATIENT
Start: 2024-11-27 | End: 2024-11-27

## 2024-11-27 RX ORDER — MYCOPHENOLATE MOFETIL 250 MG/1
500 CAPSULE ORAL 2 TIMES DAILY
Qty: 120 CAPSULE | Refills: 11 | Status: SHIPPED | OUTPATIENT
Start: 2024-11-27 | End: 2025-11-27

## 2024-11-27 RX ORDER — CALCIUM GLUCONATE 20 MG/ML
2 INJECTION, SOLUTION INTRAVENOUS ONCE
Status: COMPLETED | OUTPATIENT
Start: 2024-11-27 | End: 2024-11-27

## 2024-11-27 RX ADMIN — CALCIUM GLUCONATE 2 G: 20 INJECTION, SOLUTION INTRAVENOUS at 09:11

## 2024-11-27 RX ADMIN — HEPARIN SODIUM 2400 UNITS: 1000 INJECTION, SOLUTION INTRAVENOUS; SUBCUTANEOUS at 10:11

## 2024-11-27 RX ADMIN — ALBUMIN (HUMAN) 200 G: 12.5 SOLUTION INTRAVENOUS at 09:11

## 2024-11-27 NOTE — PROCEDURES
"[facility] - Apheresis  Transfusion Medicine  Procedure Note    SUMMARY   Procedures  Date of Procedure: 11/27/2024     Procedure: Plasma Exchange    Provider: Denton Noble Jr, MD     Assisting Provider: None    Pre-Procedure Diagnosis: AMR, Kidney Txp    Post-Procedure Diagnosis: AMR, Kidney Txp    Follow-up Assessment: Mr. Iglesias is a 43 y.o. year old male with history of ESRD secondary to diabetic nephropathy s/p living kidney transplant on 5/15/23 who was admitted recently for acute rejection. Patient underwent a kidney biopsy (on 11/21/24) that showed 19 glomeruli, 5% fibrosis, significant microvascular inflammation (capillaritis and glomerularitis), C4d <10% (considered negative), and no ACR/AVR. Based on biopsy results, transplant tean is concerned for ABMR and would like to initiate PLEX for additional support. Total of 3 PLEX requested.     PLEX #3/3 completed as OP today.  To have line removed this afternoon.    Pertinent Laboratory Data: Complete Blood Count:   Lab Results   Component Value Date    HGB 14.8 11/27/2024    HCT 44.1 11/27/2024     11/27/2024    WBC 6.67 11/27/2024     Lactate Dehydrogenase (LDH): No results found for: "LDH"  Basic Metabolic Panel:   Lab Results   Component Value Date     11/27/2024    K 3.8 11/27/2024     (H) 11/27/2024    CO2 22 (L) 11/27/2024     (H) 11/27/2024    BUN 16 11/27/2024    CREATININE 1.4 11/27/2024    CALCIUM 8.6 (L) 11/27/2024    ANIONGAP 7 (L) 11/27/2024    ESTGFRAFRICA 13 (A) 07/26/2022    EGFRNONAA 11 (A) 07/26/2022     Comprehensive Metabolic Panel:   Lab Results   Component Value Date     11/27/2024    K 3.8 11/27/2024     (H) 11/27/2024    CO2 22 (L) 11/27/2024     (H) 11/27/2024    BUN 16 11/27/2024    CREATININE 1.4 11/27/2024    CALCIUM 8.6 (L) 11/27/2024    PROT 5.4 (L) 11/27/2024    ALBUMIN 4.6 11/27/2024    BILITOT 2.1 (H) 11/27/2024    ALKPHOS 24 (L) 11/27/2024    AST 11 11/27/2024    ALT 20 " 11/27/2024    ANIONGAP 7 (L) 11/27/2024    EGFRNONAA 11 (A) 07/26/2022       Pertinent Medications: n/a    Review of patient's allergies indicates:  No Known Allergies    Anesthesia: None     Technical Procedures Used: Plasma Exchange: Volume exchanged - 4.0 Liters; Replacement fluid - Albumin; Number of procedures 3; Date of next procedure n/a.    Description of the Findings of the Procedure:     Please see Apheresis Nurse flowsheet for details.    The patient was evaluated and all clinical and laboratory data relevant to the treatment was reviewed, and a decision was made to proceed with the Apheresis procedure.    I was available to the clinical staff throughout the procedure.    Significant Surgical Tasks Conducted by the Assistant(s): Not applicable    Complications: None    Estimated Blood Loss (EBL): None    Implants: None     Specimens: None

## 2024-11-27 NOTE — PROGRESS NOTES
RIJ catheter removed intact per protocol.  Pt tolerated well.  Vital signs stable; bp 134/78, pulse 76, temp 98.  Verbal post cath removal care given, pt and wife express understanding.  Pt exited dept ambulatory with his wife.

## 2024-11-27 NOTE — PROGRESS NOTES
TPE #3 of series     Patient arrived to apheresis unit ambulatory, no resp distress or expression of pain. Right IJ dialysis catheter assessed, skin surrounding site dry and intact, dressing clean,dry,and intact. Dialysis catheter accessed per policy, both lumens patent. 4 L exchange started at 0920 without problem. Patient tolerating well, vitals monitoring on flowsheet.     Meds/Replacements:  4 L 5% albumin IV  2 g calcium gluconate IVPB  2400 units heparin intra-catheter     1050 Rinseback and exchange completed at 1050 without major complications. Patient tolerated well. Catheter de-accessed per policy, flushed and heparin locked. Patient stable, VSS, no resp distress. Patient will be back in unit 4 hours s/p PLEX for line removal. Patient exited off unit ambulatory with spouse.

## 2024-11-27 NOTE — PROGRESS NOTES
"   Kidney Post-Transplant Assessment    Referring Physician: Siav Figueroa  Current Nephrologist: Siva Figueroa    ORGAN: LEFT KIDNEY  Donor Type: living  PHS Increased Risk: no  Cold Ischemia: 48 mins  Induction Medications: thymoglobulin    Subjective:     CC:  Reassessment of renal allograft function and management of chronic immunosuppression.    HPI:  Mr. Iglesias is a 43 y.o. 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  He has CKD stage 2 - GFR 60-89 and his baseline creatinine is: please see chart below. . He takes prednisone and tacrolimus for maintenance immunosuppression. He denies any recent hospitalizations or ER visits since his previous clinic visit.  history of coronary angioplasty with stent placement, NSTEMI with CAD 8/2017, HTN, GERD, sleep apnea, and ESRD secondary to diabetic nephropathy who received a living kidney transplant on 5/15/23 (thymo induction, CMV +/+). Baseline Cr ~1.4.   admit 11/22 for treatment of kidney transplant rejection. Pretransplant PRA 50 to 69%. He had a 0.4 allosure in August 2024 but recently increased to 1.4. 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 Current DSA ordered 11/21 is pending. Kidney biopsy showed 19 glomeruli, 5% fibrosis, significant microvascular inflammation (capillaritis and glomerularitis) C4d <10% (considered negative) no ACr No AVR. No viral changes but SV40 is pending, biopsy highly suspicious for rejection. Plan to start with SM pulses and PLEX + IVIG     Patient states that he was feeling fine before PLEX after the treatments he fees "drain". Otherwise no complains  He had the last PLEX today. Triple lumen will be removed today at 2:15 pm  He and his wife had some questions about various aspects of rejection, BK outcomes and IVIG that I answered          Review of Systems    Skin: no skin rash  CNS; no headaches, blurred vision, seizure, or syncope  ENT: No JVD,  Adenopathies,  nasal congestion. " "No oral lesions  Cardiac: No chest pain, dyspnea, claudication, edema or palpitations  Respiratory: No SOB, cough, hemoptysis   Gastro-intestinal: No diarrhea, constipation, abdominal pain, nausea, vomit. No ascitis  Genitourinary: no hematuria, dysuria, frequency, frequency  Musculoskeletal: joint pain, arthritis or vasculitic changes  Psych: alert awake, oriented, No cranial nerves deficit.      Objective:         Physical Exam  /80 (BP Location: Right arm, Patient Position: Sitting)   Pulse 69   Temp 97.7 °F (36.5 °C) (Temporal)   Resp 18   Ht 6' 3" (1.905 m)   Wt 109.1 kg (240 lb 8.4 oz)   SpO2 97%   BMI 30.06 kg/m²       Head: normocephalic  Neck: No JVD, cervical axillary, or femoral adenopathies  Heart: no murmurs, Normal s1 and s2, No gallops, no rubs, No murmurs  Lungs; CTA, good respiratory effort, no crackles  Abdomen: soft, non tender, no splenomegaly or hepatomegaly, no massess, no bruits  Extremities: No edema, skin rash, joint pain  SNC: awake, alert oriented. Cranial nerves are intact, no focalized, sensitivity and strength preserved      Labs:  Lab Results   Component Value Date    WBC 6.67 11/27/2024    HGB 14.8 11/27/2024    HCT 44.1 11/27/2024     11/27/2024    K 3.8 11/27/2024     (H) 11/27/2024    CO2 22 (L) 11/27/2024    BUN 16 11/27/2024    CREATININE 1.4 11/27/2024    EGFRNORACEVR >60.0 11/27/2024    CALCIUM 8.6 (L) 11/27/2024    PHOS 3.4 11/25/2024    MG 2.1 11/25/2024    ALBUMIN 4.6 11/27/2024    AST 11 11/27/2024    ALT 20 11/27/2024    UTPCR 1.43 (H) 11/21/2024    .4 (H) 11/21/2024    TACROLIMUS 5.9 11/25/2024       No results found for: "EXTANC", "EXTWBC", "EXTSEGS", "EXTPLATELETS", "EXTHEMOGLOBI", "EXTHEMATOCRI", "EXTCREATININ", "EXTSODIUM", "EXTPOTASSIUM", "EXTBUN", "EXTCO2", "EXTCALCIUM", "EXTPHOSPHORU", "EXTGLUCOSE", "EXTALBUMIN", "EXTAST", "EXTALT", "EXTBILITOTAL", "EXTLIPASE", "EXTAMYLASE"    No results found for: "EXTCYCLOSLVL", "EXTSIROLIMUS", " ""EXTTACROLVL", "EXTPROTCRE", "EXTPTHINTACT", "EXTPROTEINUA", "EXTWBCUA", "EXTRBCUA"    Labs were reviewed with the patient.    Assessment:     1. CKD (chronic kidney disease) stage 2, GFR 60-89 ml/min    2. Acute rejection of kidney transplant    3. BK viremia    4. Diabetic nephropathy associated with type 2 diabetes mellitus    5. History of NSTEMI with CAD s/p PCI (FAMILIA) of LAD x 2 in 8/2017    6. S/P kidney transplant    7. Hyperparathyroidism, secondary to chronic kidney disease        Plan:   PLEX completed today  IVIG ordered, pending infusion date. Waiting for insurance authorization  Will re-start  bid  Continue with prograf   Weekly labs  We discussed benefits of IVIG in terms of rejection and BK viremia  Serum BK every 2 weeks. We discussed biopsy results and potential outcomes of the kidney allograft  We discussed transplant glomerulopathy    1. CKD stage 2 after treatment of AMR with SM pulses x 3 PLEX x 3 and future IVIG: will continue follow up as per our center guidelines. patient to continue close follow up with the local General nephrologist. Education provided in appropriate fluid intake, potassium intake. Continue with oral hydration.    1A. Pancreatic Function: N/A for non-pancreas allograft recipients:     2. High risk immunosuppression medication for organ transplantation, requiring regular intensive follow up and monitoring : will start  bid and continue with tacro level around 7 to 9 with serum BK every 2 weeks  Lab Results   Component Value Date    TACROLIMUS 5.9 11/25/2024    TACROLIMUS 5.4 11/24/2024    TACROLIMUS 6.6 11/23/2024     No results found for: "CYCLOSPORINE"  @   Will closely monitor for toxicities, education provided about adherence to medicines and need to communicate any side effects to the transplant nurse or physician.    3. Allograft Function:stable at baseline for the patient. Continue follow up as per our guidelines and with the local General nephrologist. " "Communication will be sent today.  Lab Results   Component Value Date    CREATININE 1.4 11/27/2024    CREATININE 1.5 (H) 11/26/2024    CREATININE 1.5 (H) 11/25/2024     No results found for: "AMYLASE", "LIPASE"    4. Hypertension management:  well controlled Continue with home blood pressure monitoring, low salt and healthy life discussed with the patient..    5. Metabolic Bone Disease/Secondary Hyperparathyroidism:calcium and phosphorus level discussed with the patient, patient will continue follow up with the general nephrologist for management of metabolic bone disease. Will monitor PTH and Vit D per our transplant center guidelines.      Lab Results   Component Value Date    .4 (H) 11/21/2024    CALCIUM 8.6 (L) 11/27/2024    PHOS 3.4 11/25/2024    PHOS 3.0 11/24/2024    PHOS 1.9 (L) 11/23/2024       6. Electrolytes and acid base balance: reviewed with the patient, essentially within the normal range no need for acute changes today, will monitor as per our center guidelines.     Lab Results   Component Value Date     11/27/2024    K 3.8 11/27/2024     (H) 11/27/2024    CO2 22 (L) 11/27/2024    CO2 24 11/26/2024    CO2 22 (L) 11/25/2024       7. Anemia: will continue monitoring as per our center guidelines. No indication for acute intervention today.     Lab Results   Component Value Date    WBC 6.67 11/27/2024    HGB 14.8 11/27/2024    HCT 44.1 11/27/2024    MCV 84 11/27/2024     11/27/2024       8.Proteinuria: will continue with pr/cr ratio as per our center guidelines.  Lab Results   Component Value Date    PROTEINURINE 137 (H) 11/21/2024    CREATRANDUR 95.6 11/21/2024    UTPCR 1.43 (H) 11/21/2024    UTPCR 1.20 (H) 11/01/2024    UTPCR 0.61 (H) 02/05/2024        9. BK virus infection screening: will continue with urine or blood PCR as per our guidelines to prevent BK virus viremia and allograft dysfunction  Lab Results   Component Value Date    BKVIRUSPCRQB 4,695 (H) 11/23/2024     "     10. Weight and metabolic management: education provided provided during the clinic visit.   There is no height or weight on file to calculate BMI.       11.Patient safety education regarding immunosuppression including prophylaxis posttransplant for CMV, PCP : Education provided about vaccination and prevention of infections.    12.  Cytopenias: no significant cytopenias will monitor as per our guidelines. Medicine list reviewed including potential causes of drug-induced cytopenias     Lab Results   Component Value Date    WBC 6.67 11/27/2024    HGB 14.8 11/27/2024    HCT 44.1 11/27/2024    MCV 84 11/27/2024     11/27/2024       13. Post-transplant Prophylaxis; CMV Infection, PJP and Candida mucosistis and other indicated for this particular patient. Per rejection protocol.     I spoke with the patient for 30 minutes. More than half dedicated to counseling and education. All questions answered    Rell Booth MD  Transplant Nephrology            Follow-up:   Clinic: return to transplant clinic weekly for the first month after transplant; every 2 weeks during months 2-3; then at 6-, 9-, 12-, 18-, 24-, and 36- months post-transplant to reassess for complications from immunosuppression toxicity and monitor for rejection.  Annually thereafter.    Labs: since patient remains at high risk for rejection and drug-related complications that warrant close monitoring, labs will be ordered as follows: continue twice weekly CBC, renal panel, and drug level for first month; then same labs once weekly through 3rd month post-transplant.  Urine for UA and protein/creatinine ratio monthly.  Serum BK - PCR at 1-, 3-, 6-, 9-, 12-, 18-, 24-, 36- 48-, and 60 months post-transplant.  Hepatic panel at 1-, 2-, 3-, 6-, 9-, 12-, 18-, 24-, and 36- months post-transplant.    Rell Booth MD       Education:   Material provided to the patient.  Patient reminded to call with any health changes since these can be early signs of  significant complications.  Also, I advised the patient to be sure any new medications or changes of old medications are discussed with either a pharmacist or physician knowledgeable with transplant to avoid rejection/drug toxicity related to significant drug interactions.    Patient advised that it is recommended that all transplanted patients, and their close contacts and household members receive Covid vaccination.    UNOS Patient Status  Functional Status: 100% - Normal, no complaints, no evidence of disease  Physical Capacity: No Limitations

## 2024-12-02 ENCOUNTER — PATIENT MESSAGE (OUTPATIENT)
Dept: TRANSPLANT | Facility: CLINIC | Age: 44
End: 2024-12-02
Payer: COMMERCIAL

## 2024-12-02 ENCOUNTER — LAB VISIT (OUTPATIENT)
Dept: LAB | Facility: HOSPITAL | Age: 44
End: 2024-12-02
Attending: INTERNAL MEDICINE
Payer: COMMERCIAL

## 2024-12-02 ENCOUNTER — HOSPITAL ENCOUNTER (EMERGENCY)
Facility: HOSPITAL | Age: 44
Discharge: HOME | End: 2024-12-02
Attending: EMERGENCY MEDICINE
Payer: COMMERCIAL

## 2024-12-02 ENCOUNTER — NURSE TRIAGE (OUTPATIENT)
Dept: ADMINISTRATIVE | Facility: CLINIC | Age: 44
End: 2024-12-02
Payer: COMMERCIAL

## 2024-12-02 VITALS
RESPIRATION RATE: 15 BRPM | SYSTOLIC BLOOD PRESSURE: 104 MMHG | HEIGHT: 75 IN | OXYGEN SATURATION: 97 % | BODY MASS INDEX: 29.22 KG/M2 | DIASTOLIC BLOOD PRESSURE: 60 MMHG | HEART RATE: 85 BPM | WEIGHT: 235 LBS | TEMPERATURE: 98 F

## 2024-12-02 DIAGNOSIS — Z94.0 KIDNEY REPLACED BY TRANSPLANT: ICD-10-CM

## 2024-12-02 DIAGNOSIS — R07.9 CHEST PAIN: ICD-10-CM

## 2024-12-02 DIAGNOSIS — I48.91 ATRIAL FIBRILLATION WITH RAPID VENTRICULAR RESPONSE: Primary | ICD-10-CM

## 2024-12-02 LAB
ALBUMIN SERPL BCP-MCNC: 4.4 G/DL (ref 3.5–5.2)
ALBUMIN SERPL BCP-MCNC: 4.8 G/DL (ref 3.5–5.2)
ALP SERPL-CCNC: 50 U/L (ref 40–150)
ALT SERPL W/O P-5'-P-CCNC: 67 U/L (ref 10–44)
ANION GAP SERPL CALC-SCNC: 10 MMOL/L (ref 8–16)
ANION GAP SERPL CALC-SCNC: 11 MMOL/L (ref 8–16)
AST SERPL-CCNC: 29 U/L (ref 10–40)
BASOPHILS # BLD AUTO: 0.04 K/UL (ref 0–0.2)
BASOPHILS # BLD AUTO: 0.06 K/UL (ref 0–0.2)
BASOPHILS NFR BLD: 0.3 % (ref 0–1.9)
BASOPHILS NFR BLD: 0.7 % (ref 0–1.9)
BILIRUB SERPL-MCNC: 1.9 MG/DL (ref 0.1–1)
BNP SERPL-MCNC: 65 PG/ML (ref 0–99)
BUN SERPL-MCNC: 19 MG/DL (ref 6–20)
BUN SERPL-MCNC: 23 MG/DL (ref 6–20)
CALCIUM SERPL-MCNC: 9.3 MG/DL (ref 8.7–10.5)
CALCIUM SERPL-MCNC: 9.6 MG/DL (ref 8.7–10.5)
CHLORIDE SERPL-SCNC: 107 MMOL/L (ref 95–110)
CHLORIDE SERPL-SCNC: 108 MMOL/L (ref 95–110)
CO2 SERPL-SCNC: 20 MMOL/L (ref 23–29)
CO2 SERPL-SCNC: 23 MMOL/L (ref 23–29)
CREAT SERPL-MCNC: 1.4 MG/DL (ref 0.5–1.4)
CREAT SERPL-MCNC: 1.6 MG/DL (ref 0.5–1.4)
DIFFERENTIAL METHOD BLD: ABNORMAL
DIFFERENTIAL METHOD BLD: ABNORMAL
EOSINOPHIL # BLD AUTO: 0.1 K/UL (ref 0–0.5)
EOSINOPHIL # BLD AUTO: 0.1 K/UL (ref 0–0.5)
EOSINOPHIL NFR BLD: 0.4 % (ref 0–8)
EOSINOPHIL NFR BLD: 1.6 % (ref 0–8)
ERYTHROCYTE [DISTWIDTH] IN BLOOD BY AUTOMATED COUNT: 14.3 % (ref 11.5–14.5)
ERYTHROCYTE [DISTWIDTH] IN BLOOD BY AUTOMATED COUNT: 14.4 % (ref 11.5–14.5)
EST. GFR  (NO RACE VARIABLE): 55 ML/MIN/1.73 M^2
EST. GFR  (NO RACE VARIABLE): >60 ML/MIN/1.73 M^2
GLUCOSE SERPL-MCNC: 202 MG/DL (ref 70–110)
GLUCOSE SERPL-MCNC: 317 MG/DL (ref 70–110)
HCT VFR BLD AUTO: 43.7 % (ref 40–54)
HCT VFR BLD AUTO: 46.2 % (ref 40–54)
HCV AB SERPL QL IA: NEGATIVE
HEP C VIRUS HOLD SPECIMEN: NORMAL
HGB BLD-MCNC: 14.4 G/DL (ref 14–18)
HGB BLD-MCNC: 15.7 G/DL (ref 14–18)
HIV 1+2 AB+HIV1 P24 AG SERPL QL IA: NEGATIVE
IMM GRANULOCYTES # BLD AUTO: 0.32 K/UL (ref 0–0.04)
IMM GRANULOCYTES # BLD AUTO: 0.39 K/UL (ref 0–0.04)
IMM GRANULOCYTES NFR BLD AUTO: 2.7 % (ref 0–0.5)
IMM GRANULOCYTES NFR BLD AUTO: 4.7 % (ref 0–0.5)
LYMPHOCYTES # BLD AUTO: 1.4 K/UL (ref 1–4.8)
LYMPHOCYTES # BLD AUTO: 1.4 K/UL (ref 1–4.8)
LYMPHOCYTES NFR BLD: 11.8 % (ref 18–48)
LYMPHOCYTES NFR BLD: 17.5 % (ref 18–48)
MAGNESIUM SERPL-MCNC: 1.7 MG/DL (ref 1.6–2.6)
MCH RBC QN AUTO: 28.3 PG (ref 27–31)
MCH RBC QN AUTO: 28.3 PG (ref 27–31)
MCHC RBC AUTO-ENTMCNC: 33 G/DL (ref 32–36)
MCHC RBC AUTO-ENTMCNC: 34 G/DL (ref 32–36)
MCV RBC AUTO: 83 FL (ref 82–98)
MCV RBC AUTO: 86 FL (ref 82–98)
MONOCYTES # BLD AUTO: 0.9 K/UL (ref 0.3–1)
MONOCYTES # BLD AUTO: 1 K/UL (ref 0.3–1)
MONOCYTES NFR BLD: 11.4 % (ref 4–15)
MONOCYTES NFR BLD: 8.1 % (ref 4–15)
NEUTROPHILS # BLD AUTO: 5.3 K/UL (ref 1.8–7.7)
NEUTROPHILS # BLD AUTO: 9 K/UL (ref 1.8–7.7)
NEUTROPHILS NFR BLD: 64.1 % (ref 38–73)
NEUTROPHILS NFR BLD: 76.7 % (ref 38–73)
NRBC BLD-RTO: 0 /100 WBC
NRBC BLD-RTO: 0 /100 WBC
PHOSPHATE SERPL-MCNC: 3.2 MG/DL (ref 2.7–4.5)
PLATELET # BLD AUTO: 184 K/UL (ref 150–450)
PLATELET # BLD AUTO: 217 K/UL (ref 150–450)
PMV BLD AUTO: 9.1 FL (ref 9.2–12.9)
PMV BLD AUTO: 9.5 FL (ref 9.2–12.9)
POTASSIUM SERPL-SCNC: 3.9 MMOL/L (ref 3.5–5.1)
POTASSIUM SERPL-SCNC: 4.4 MMOL/L (ref 3.5–5.1)
PROT SERPL-MCNC: 7.1 G/DL (ref 6–8.4)
RBC # BLD AUTO: 5.09 M/UL (ref 4.6–6.2)
RBC # BLD AUTO: 5.54 M/UL (ref 4.6–6.2)
SODIUM SERPL-SCNC: 139 MMOL/L (ref 136–145)
SODIUM SERPL-SCNC: 140 MMOL/L (ref 136–145)
TROPONIN I SERPL DL<=0.01 NG/ML-MCNC: 0.01 NG/ML (ref 0–0.03)
WBC # BLD AUTO: 11.78 K/UL (ref 3.9–12.7)
WBC # BLD AUTO: 8.25 K/UL (ref 3.9–12.7)

## 2024-12-02 PROCEDURE — 93010 ELECTROCARDIOGRAM REPORT: CPT | Mod: ,,, | Performed by: INTERNAL MEDICINE

## 2024-12-02 PROCEDURE — 83880 ASSAY OF NATRIURETIC PEPTIDE: CPT | Performed by: EMERGENCY MEDICINE

## 2024-12-02 PROCEDURE — 87389 HIV-1 AG W/HIV-1&-2 AB AG IA: CPT | Performed by: EMERGENCY MEDICINE

## 2024-12-02 PROCEDURE — 25000003 PHARM REV CODE 250: Performed by: EMERGENCY MEDICINE

## 2024-12-02 PROCEDURE — 84484 ASSAY OF TROPONIN QUANT: CPT | Performed by: EMERGENCY MEDICINE

## 2024-12-02 PROCEDURE — 83735 ASSAY OF MAGNESIUM: CPT | Performed by: INTERNAL MEDICINE

## 2024-12-02 PROCEDURE — 80053 COMPREHEN METABOLIC PANEL: CPT | Performed by: EMERGENCY MEDICINE

## 2024-12-02 PROCEDURE — 36415 COLL VENOUS BLD VENIPUNCTURE: CPT | Performed by: INTERNAL MEDICINE

## 2024-12-02 PROCEDURE — 85025 COMPLETE CBC W/AUTO DIFF WBC: CPT | Mod: 91 | Performed by: EMERGENCY MEDICINE

## 2024-12-02 PROCEDURE — 99285 EMERGENCY DEPT VISIT HI MDM: CPT | Mod: 25

## 2024-12-02 PROCEDURE — 80069 RENAL FUNCTION PANEL: CPT | Performed by: INTERNAL MEDICINE

## 2024-12-02 PROCEDURE — 96374 THER/PROPH/DIAG INJ IV PUSH: CPT

## 2024-12-02 PROCEDURE — 86803 HEPATITIS C AB TEST: CPT | Performed by: EMERGENCY MEDICINE

## 2024-12-02 PROCEDURE — 93005 ELECTROCARDIOGRAM TRACING: CPT

## 2024-12-02 PROCEDURE — 87799 DETECT AGENT NOS DNA QUANT: CPT | Performed by: INTERNAL MEDICINE

## 2024-12-02 PROCEDURE — 85025 COMPLETE CBC W/AUTO DIFF WBC: CPT | Performed by: INTERNAL MEDICINE

## 2024-12-02 PROCEDURE — 80197 ASSAY OF TACROLIMUS: CPT | Performed by: INTERNAL MEDICINE

## 2024-12-02 RX ORDER — DILTIAZEM HYDROCHLORIDE 5 MG/ML
20 INJECTION INTRAVENOUS
Status: COMPLETED | OUTPATIENT
Start: 2024-12-02 | End: 2024-12-02

## 2024-12-02 RX ORDER — DILTIAZEM HYDROCHLORIDE 5 MG/ML
INJECTION INTRAVENOUS
Status: DISCONTINUED
Start: 2024-12-02 | End: 2024-12-02 | Stop reason: HOSPADM

## 2024-12-02 RX ORDER — ASPIRIN 325 MG
325 TABLET ORAL
Status: COMPLETED | OUTPATIENT
Start: 2024-12-02 | End: 2024-12-02

## 2024-12-02 RX ORDER — METOPROLOL TARTRATE 25 MG/1
25 TABLET, FILM COATED ORAL
Status: DISCONTINUED | OUTPATIENT
Start: 2024-12-02 | End: 2024-12-02 | Stop reason: HOSPADM

## 2024-12-02 RX ADMIN — ASPIRIN 325 MG ORAL TABLET 325 MG: 325 PILL ORAL at 08:12

## 2024-12-02 RX ADMIN — DILTIAZEM HYDROCHLORIDE 20 MG: 5 INJECTION INTRAVENOUS at 08:12

## 2024-12-03 ENCOUNTER — PATIENT MESSAGE (OUTPATIENT)
Dept: CARDIOLOGY | Facility: CLINIC | Age: 44
End: 2024-12-03
Payer: COMMERCIAL

## 2024-12-03 ENCOUNTER — PATIENT MESSAGE (OUTPATIENT)
Dept: TRANSPLANT | Facility: CLINIC | Age: 44
End: 2024-12-03
Payer: COMMERCIAL

## 2024-12-03 DIAGNOSIS — Z94.0 S/P KIDNEY TRANSPLANT: Chronic | ICD-10-CM

## 2024-12-03 LAB
OHS QRS DURATION: 88 MS
OHS QTC CALCULATION: 477 MS
TACROLIMUS BLD-MCNC: 11.8 NG/ML (ref 5–15)

## 2024-12-03 RX ORDER — TACROLIMUS 1 MG/1
CAPSULE ORAL
Qty: 360 CAPSULE | Refills: 11 | Status: SHIPPED | OUTPATIENT
Start: 2024-12-03

## 2024-12-03 NOTE — TELEPHONE ENCOUNTER
Pt made aware of below orders          ----- Message from Rell Booth MD sent at 12/3/2024 10:33 AM CST -----  Please lower prograf to 3/2

## 2024-12-03 NOTE — TELEPHONE ENCOUNTER
Spouse calling on behalf of patient who is present. Reports patient noted an increased HR. Assessment w/ smart watch while resting is 141. Reports normal blood pressure. Patient feels palpitations and is having SOB. I have advised, per protocol. Spouse questions should patient begin on Eliquis like last time there was a similar event. I have paged cardiology in this regard. Dr Dalton agrees with triage disposition that patient should be evaluated in the ED. Spouse updated and verbalizes understanding.     Reason for Disposition   Difficulty breathing    Additional Information   Negative: Passed out (e.g., fainted, lost consciousness, blacked out and was not responding)   Negative: Shock suspected (e.g., cold/pale/clammy skin, too weak to stand, low BP, rapid pulse)   Negative: Difficult to awaken or acting confused (e.g., disoriented, slurred speech)   Negative: Visible sweat on face or sweat dripping down face   Negative: Unable to walk, or can only walk with assistance (e.g., requires support)   Negative: [1] Received SHOCK from implantable cardiac defibrillator AND [2] persisting symptoms (i.e., palpitations, lightheadedness)   Negative: [1] Dizziness, lightheadedness, or weakness AND [2] heart beating very rapidly (e.g., > 140 / minute)   Negative: [1] Feeling weak or lightheaded (e.g., woozy, feeling like they might faint) AND [2] heart beating very slowly (e.g., < 50 / minute)   Negative: Sounds like a life-threatening emergency to the triager    Protocols used: Heart Rate and Heartbeat [id number]

## 2024-12-03 NOTE — DISCHARGE INSTRUCTIONS
Continue all your home medications.  Restart your Eliquis.  Follow up with your heart doctor and transplant doctor next available return as needed for any worsening symptoms, problems, questions or concerns

## 2024-12-03 NOTE — TELEPHONE ENCOUNTER
Spoke to pt. He's been discharged from ER after having an episode of a fib with RVR. He was given IV Cardizem in the ER, which put him back in SR. This was his first occurrence since last seeing Dr. Choudhary on 10/25/23. Pt denies any symptoms since discharge and states HR and BP are fine. Advised if he has increased HR, chest pain, SOB, or dizziness again to go to ER. He was started back on Eliquis 5 mg BID at discharge. Advised I will speak to Dr. Choudhary to see if he wants a follow up. Patient verbalized understanding and had no further questions.

## 2024-12-03 NOTE — ED PROVIDER NOTES
SCRIBE #1 NOTE: I, Anatoliy Sam, am scribing for, and in the presence of, J Carlos Shipman Jr., MD. I have scribed the entire note.       History     Chief Complaint   Patient presents with    Chest Pain     Mid sternal chest, palpitations, lightlessness, SOB x one hour. Hx. of A-Fib. Pulse 104-146 while sitting in triage.     Review of patient's allergies indicates:  No Known Allergies      History of Present Illness     HPI    12/2/2024, 8:26 PM  History obtained from the patient      History of Present Illness: Robb Iglesias is a 43 y.o. male patient with a PMHx of proteinuria, HENRY on CPAP, serohepatitis, gout, MI, elevated bilirubin, HLD, CAD, DM II, Afib with RVR, and a kidney transplant who presents to the Emergency Department in Afib with RVR, as pt's HR was 104-146 while sitting in triage. Pt c/o palpitations, mid sternal CP, and SOB when he went into Afib. He states these sxs are similar to that last time he went into Afib a few months after his kidney transplant on May 15th, 2023. Pt was put on IV Eliquis his past episode, which improved his rhythm. Pt was taken off his Eliquis a few months pta, although he reports taking one en route. Upon eval, pt's chest pain has subsided. Pt states he had his usual cup of coffee this morning. Patient denies any and all other sxs at this time. Pt was recently discharged from the hospital on 11/25 following antibody rejection, where he received 1 round PLEX inpatient and 2 rounds outpatient. No further complaints or concerns at this time.       Arrival mode: Personal vehicle    PCP: SABIHA Roberson MD        Past Medical History:  Past Medical History:   Diagnosis Date    Allergic rhinitis     Atrial fibrillation with RVR 06/04/2023    CKD (chronic kidney disease) stage 2, GFR 60-89 ml/min 11/27/2024    Class 1 obesity due to excess calories with serious comorbidity and body mass index (BMI) of 31.0 to 31.9 in adult 04/07/2017    Coronary artery disease      Coronary artery disease involving native coronary artery of native heart without angina pectoris 02/06/2018    Cath lab procedure 04/23/2018 (Naveen Rios MD) A. Indication/Pre-Operative Diagnosis: The patient is a 37 year old male that was referred for catheterization by Aaareferral Self for ACS (NSTEMI). The BELLA risk score is 5.  B. Summary/Post-Operative Diagnosis 1. Single vessel coronary artery disease. 2. Normal LVEF. 3. Diastolic dysfunction. 4. Successful PCI for acute myocardial infarction. 5.     Diabetic nephropathy associated with type 2 diabetes mellitus 01/06/2020    Direct hyperbilirubinemia 03/24/2018    DM (diabetes mellitus) 2008    BS doesn't check any more 08/02/2018    DM (diabetes mellitus) 2012    BS 99 am 06/26/2020    DM (diabetes mellitus)     BS didn't check 06/04/2021    DM (diabetes mellitus) 2008    BS didn't check 07/29/2022     Dyslipidemia associated with type 2 diabetes mellitus 07/31/2017    Elevated bilirubin 03/21/2018    GERD (gastroesophageal reflux disease)     Gout     Hyperlipidemia     Hyperparathyroidism, secondary to chronic kidney disease 06/07/2021    Hypertension associated with chronic kidney disease due to type 2 diabetes mellitus     Hypertension complicating diabetes 03/16/2019    Not candidate for Hypertension Digital Medicine program due to dialysis status. Didn't tolerate amlodipine due to SE of hypotension.    Idiopathic chronic gout, multiple sites, without tophus (tophi) 07/19/2017    Long term (current) use of insulin     MI (myocardial infarction) 07/2017    NSTEMI with CAD s/p PCI (FAMILIA) of LAD x 2 in 8/2017 07/31/2017    Obesity     HENRY on CPAP     Peritoneal dialysis catheter in place 01/26/2023    Proteinuria     Severe obstructive sleep apnea - Intolerant of CPAP 07/31/2017    Intolerant of CPAP after weeks of trying multiple interfaces. SPLIT-NIGHT SLEEP STUDY 7/28/2015 · SLEEP ARCHITECTURE: Sleep onset was 2.9 minutes and sleep efficiency was  94.4%. Sleep Stage distribution showed 145 sleep stage changes, 6 awakenings and 119 arousals. Sleep distribution showed 53.2% stage NI, 41.8% stage N 11, 0.0% stage N Ill and REM sleep was at 5.0%. There were 2 REM periods. · RE    Stage 3a chronic kidney disease 05/26/2023    Stage 5 chronic kidney disease on chronic peritoneal dialysis 06/07/2017    Stage 5 chronic kidney disease on chronic peritoneal dialysis 06/07/2017    Status post angioplasty with stent 08/04/2017    Steatohepatitis     Fatty Liver    Type 2 diabetes mellitus with chronic kidney disease on chronic dialysis, without long-term current use of insulin 06/21/2017    Type 2 diabetes mellitus with diabetic nephropathy     Type 2 diabetes mellitus with diabetic nephropathy, without long-term current use of insulin 01/06/2020    Type 2 diabetes mellitus with diabetic polyneuropathy, without long-term current use of insulin 06/07/2021    Type 2 diabetes mellitus with hyperglycemia     Type 2 diabetes mellitus with renal manifestations     Type 2 diabetes mellitus with stage 3 chronic kidney disease, without long-term current use of insulin 06/21/2017       Past Surgical History:  Past Surgical History:   Procedure Laterality Date    BIOPSY, WITH CT GUIDANCE N/A 11/21/2024    Procedure: TRANSPLANT RENAL [id number];  Surgeon: Eliu Nunez MD;  Location: [facility] CATH LAB;  Service: Radiology;  Laterality: N/A;    CORONARY ANGIOPLASTY WITH STENT PLACEMENT      CYSTOSCOPY      KIDNEY TRANSPLANT N/A 05/15/2023    Procedure: TRANSPLANT, KIDNEY;  Surgeon: Reji Hinojosa MD;  Location: [facility] OR [address] [place] [address];  Service: Transplant;  Laterality: N/A;  IN ROOM: 10:37  OUT OF ICE:10:53  REPERFUSION TIME: 11:21      LASIK Bilateral     LIVER BIOPSY      NASAL ENDOSCOPY      NASAL SEPTUM SURGERY      PERITONEAL CATHETER REMOVAL N/A 05/15/2023    Procedure: REMOVAL, CATHETER, DIALYSIS, PERITONEAL;  Surgeon: Reji Hinojosa MD;  Location: [facility] OR [address] [place] [address];  Service:  Transplant;  Laterality: N/A;    SLEEVE GASTROPLASTY  03/06/2017         Family History:  Family History   Problem Relation Name Age of Onset    Diabetes type II Mother      Hypertension Mother      Hyperlipidemia Mother      Diabetes Mother      Hyperlipidemia Father      Diabetes type II Brother      Diabetes type II Brother      Diabetes Maternal Grandmother         Social History:  Social History     Tobacco Use    Smoking status: Never    Smokeless tobacco: Never   Substance and Sexual Activity    Alcohol use: No     Comment: 1-2 times per month    Drug use: No    Sexual activity: Yes     Partners: Female        Review of Systems     Review of Systems   Constitutional:  Negative for fever.   HENT:  Negative for sore throat.    Respiratory:  Positive for shortness of breath.    Cardiovascular:  Positive for chest pain (subsided) and palpitations.   Gastrointestinal:  Negative for nausea.   Genitourinary:  Negative for dysuria.   Musculoskeletal:  Negative for back pain.   Skin:  Negative for rash.   Neurological:  Negative for weakness.   Hematological:  Does not bruise/bleed easily.   All other systems reviewed and are negative.     Physical Exam     Initial Vitals [12/02/24 1944]   BP Pulse Resp Temp SpO2   112/89 (!) 142 20 97.7 °F (36.5 °C) 97 %      MAP       --          Physical Exam  Nursing Notes and Vital Signs Reviewed.  Constitutional: Patient is in no acute distress. Well-developed and well-nourished.  Head: Atraumatic. Normocephalic.  Eyes:  EOM intact.  No scleral icterus.  ENT: Mucous membranes are moist.  Nares clear   Neck:  Full ROM. No JVD.  Cardiovascular: Tachycardic. Afib with RVR. No murmurs, rubs, or gallops. Distal pulses are 2+ and symmetric  Pulmonary/Chest: No respiratory distress. Clear to auscultation bilaterally. No wheezing or rales.  Equal chest wall rise bilaterally  Abdominal: Soft and non-distended.  There is no tenderness.  No rebound, guarding, or rigidity. Good bowel  sounds.  Genitourinary: No CVA tenderness.  No suprapubic tenderness  Musculoskeletal: Moves all extremities. No obvious deformities.  5 x 5 strength in all extremities   Skin: Warm and dry.  Neurological:  Alert, awake, and appropriate.  Normal speech.  No acute focal neurological deficits are appreciated.  Two through 12 intact bilaterally.  Psychiatric: Normal affect. Good eye contact. Appropriate in content.       ED Course   Critical Care    Date/Time: 12/2/2024 9:13 PM    Performed by: J Carlos Shipman Jr., MD  Authorized by: J Carlos Shipman Jr., MD  Direct patient critical care time: 18 minutes  Additional history critical care time: 7 minutes  Ordering / reviewing critical care time: 5 minutes  Documentation critical care time: 6 minutes  Consulting other physicians critical care time: 5 minutes  Consult with family critical care time: 3 minutes  Other critical care time: 1 minutes  Total critical care time (exclusive of procedural time) : 45 minutes  Critical care time was exclusive of separately billable procedures and treating other patients and teaching time.  Critical care was necessary to treat or prevent imminent or life-threatening deterioration of the following conditions: Afib with RVR.  Critical care was time spent personally by me on the following activities: blood draw for specimens, development of treatment plan with patient or surrogate, discussions with consultants, interpretation of cardiac output measurements, review of old charts, re-evaluation of patient's condition, ordering and performing treatments and interventions, obtaining history from patient or surrogate, examination of patient, evaluation of patient's response to treatment, ordering and review of laboratory studies, ordering and review of radiographic studies and pulse oximetry.        ED Vital Signs:  Vitals:    12/02/24 1944 12/02/24 2006 12/02/24 2017 12/02/24 2031   BP: 112/89 109/62 96/61 (!) 112/59   Pulse: (!) 142 79  "71 77   Resp: 20 12 10 14   Temp: 97.7 °F (36.5 °C)      TempSrc: Oral      SpO2: 97% 96% 97% 97%   Weight: 106.6 kg (235 lb)      Height: 6' 3" (1.905 m)       12/02/24 2102 12/02/24 2117   BP: 111/68 104/60   Pulse: 85 85   Resp: 14 15   Temp:     TempSrc:     SpO2: 96% 97%   Weight:     Height:         Abnormal Lab Results:  Labs Reviewed   CBC W/ AUTO DIFFERENTIAL - Abnormal       Result Value    WBC 11.78      RBC 5.54      Hemoglobin 15.7      Hematocrit 46.2      MCV 83      MCH 28.3      MCHC 34.0      RDW 14.3      Platelets 217      MPV 9.1 (*)     Immature Granulocytes 2.7 (*)     Gran # (ANC) 9.0 (*)     Immature Grans (Abs) 0.32 (*)     Lymph # 1.4      Mono # 1.0      Eos # 0.1      Baso # 0.04      nRBC 0      Gran % 76.7 (*)     Lymph % 11.8 (*)     Mono % 8.1      Eosinophil % 0.4      Basophil % 0.3      Differential Method Automated      Narrative:     Release to patient->Immediate   COMPREHENSIVE METABOLIC PANEL - Abnormal    Sodium 139      Potassium 4.4      Chloride 108      CO2 20 (*)     Glucose 317 (*)     BUN 23 (*)     Creatinine 1.6 (*)     Calcium 9.6      Total Protein 7.1      Albumin 4.8      Total Bilirubin 1.9 (*)     Alkaline Phosphatase 50      AST 29      ALT 67 (*)     eGFR 55 (*)     Anion Gap 11      Narrative:     Release to patient->Immediate   HEPATITIS C ANTIBODY    Hepatitis C Ab Negative      Narrative:     Release to patient->Immediate   HEP C VIRUS HOLD SPECIMEN    HEP C Virus Hold Specimen Hold for HCV sendout      Narrative:     Release to patient->Immediate   HIV 1 / 2 ANTIBODY    HIV 1/2 Ag/Ab Negative      Narrative:     Release to patient->Immediate   TROPONIN I    Troponin I 0.013      Narrative:     Release to patient->Immediate   B-TYPE NATRIURETIC PEPTIDE    BNP 65      Narrative:     Release to patient->Immediate        All Lab Results:  Results for orders placed or performed during the hospital encounter of 12/02/24   Hepatitis C Antibody    Collection " Time: 12/02/24  8:04 PM   Result Value Ref Range    Hepatitis C Ab Negative Negative   HCV Virus Hold Specimen    Collection Time: 12/02/24  8:04 PM   Result Value Ref Range    HEP C Virus Hold Specimen Hold for HCV sendout    HIV 1/2 Ag/Ab (4th Gen)    Collection Time: 12/02/24  8:04 PM   Result Value Ref Range    HIV 1/2 Ag/Ab Negative Negative   CBC auto differential    Collection Time: 12/02/24  8:04 PM   Result Value Ref Range    WBC 11.78 3.90 - 12.70 K/uL    RBC 5.54 4.60 - 6.20 M/uL    Hemoglobin 15.7 14.0 - 18.0 g/dL    Hematocrit 46.2 40.0 - 54.0 %    MCV 83 82 - 98 fL    MCH 28.3 27.0 - 31.0 pg    MCHC 34.0 32.0 - 36.0 g/dL    RDW 14.3 11.5 - 14.5 %    Platelets 217 150 - 450 K/uL    MPV 9.1 (L) 9.2 - 12.9 fL    Immature Granulocytes 2.7 (H) 0.0 - 0.5 %    Gran # (ANC) 9.0 (H) 1.8 - 7.7 K/uL    Immature Grans (Abs) 0.32 (H) 0.00 - 0.04 K/uL    Lymph # 1.4 1.0 - 4.8 K/uL    Mono # 1.0 0.3 - 1.0 K/uL    Eos # 0.1 0.0 - 0.5 K/uL    Baso # 0.04 0.00 - 0.20 K/uL    nRBC 0 0 /100 WBC    Gran % 76.7 (H) 38.0 - 73.0 %    Lymph % 11.8 (L) 18.0 - 48.0 %    Mono % 8.1 4.0 - 15.0 %    Eosinophil % 0.4 0.0 - 8.0 %    Basophil % 0.3 0.0 - 1.9 %    Differential Method Automated    Comprehensive metabolic panel    Collection Time: 12/02/24  8:04 PM   Result Value Ref Range    Sodium 139 136 - 145 mmol/L    Potassium 4.4 3.5 - 5.1 mmol/L    Chloride 108 95 - 110 mmol/L    CO2 20 (L) 23 - 29 mmol/L    Glucose 317 (H) 70 - 110 mg/dL    BUN 23 (H) 6 - 20 mg/dL    Creatinine 1.6 (H) 0.5 - 1.4 mg/dL    Calcium 9.6 8.7 - 10.5 mg/dL    Total Protein 7.1 6.0 - 8.4 g/dL    Albumin 4.8 3.5 - 5.2 g/dL    Total Bilirubin 1.9 (H) 0.1 - 1.0 mg/dL    Alkaline Phosphatase 50 40 - 150 U/L    AST 29 10 - 40 U/L    ALT 67 (H) 10 - 44 U/L    eGFR 55 (A) >60 mL/min/1.73 m^2    Anion Gap 11 8 - 16 mmol/L   Troponin I #1    Collection Time: 12/02/24  8:04 PM   Result Value Ref Range    Troponin I 0.013 0.000 - 0.026 ng/mL   BNP    Collection  Time: 12/02/24  8:04 PM   Result Value Ref Range    BNP 65 0 - 99 pg/mL     *Note: Due to a large number of results and/or encounters for the requested time period, some results have not been displayed. A complete set of results can be found in Results Review.         Imaging Results:  Imaging Results              X-Ray Chest AP Portable (Final result)  Result time 12/02/24 20:49:22      Final result by Ry IslasJose), MD (12/02/24 20:49:22)                   Impression:      Clear lungs      Electronically signed by: Ry Islas MD  Date:    12/02/2024  Time:    20:49               Narrative:    EXAMINATION:  XR CHEST AP PORTABLE    CLINICAL HISTORY:  , Chest Pain;    COMPARISON:  Chest, 11/25/2024.    FINDINGS:  One view.  Heart is normal in size.  Lungs appear clear                                       The EKG was ordered, reviewed, and independently interpreted by the ED provider.  Interpretation time: 19:44  Rate: 135 BPM  Rhythm: atrial fibrillation with RVR  Interpretation: No STEMI.         The Emergency Provider reviewed the vital signs and test results, which are outlined above.     ED Discussion       9:04 PM: Discussed pt's case with Dr. Rogers (Cardiology) who is in agreement with plan to discharge pt home with Eliquis and rate controlling medication.    9:12 PM: Reassessed pt at this time. Discussed with pt all pertinent ED information and results. Discussed pt dx and plan of tx. Gave pt all f/u and return to the ED instructions. All questions and concerns were addressed at this time. Pt expresses understanding of information and instructions, and is comfortable with plan to discharge. Pt is stable for discharge.    I discussed with patient and/or family/caretaker that evaluation in the ED does not suggest any emergent or life threatening medical conditions requiring immediate intervention beyond what was provided in the ED, and I believe patient is safe for discharge.  Regardless, an  unremarkable evaluation in the ED does not preclude the development or presence of a serious of life threatening condition. As such, patient was instructed to return immediately for any worsening or change in current symptoms.      ED Course as of 12/02/24 2145   Mon Dec 02, 2024   2030 Rate is well-controlled with Cardizem [RT]      ED Course User Index  [RT] J Carlos Shipman Jr., MD     Medical Decision Making  Differential diagnosis: AFib RVR, chest pain, ASCVD, cardiac ischemia    Patient was evaluated history physical examination.  He was in AFib RVR on arrival easily treated with Cardizem with good rate control.  He was previously been on Eliquis but discontinued after prolonged period of no further issues.  He was recently had his medications adjusted regarding his kidney transplant secondary to acute organ rejection.  He was kidney function in his at baseline.  Patient was workup was otherwise negative consultation with Cardiology with the patient was will be re-initiated on Eliquis continued medications and follow up with his cardiologist at next available.  Patient was in his wife both verbalized understanding and agreement with the plan of care seems reliable.  Stable safe for discharge in my opinion    Amount and/or Complexity of Data Reviewed  Independent Historian: spouse     Details: Patient wife at bedside corroborating history  External Data Reviewed: labs and notes.     Details: Baseline labs prior history reviewed at length  Labs: ordered. Decision-making details documented in ED Course.     Details: Troponin 0.013 and normal.  BNP is 65.  Patient was a glucose of 317 with normal gap.  BUN is 23 and creatinine 1.6.  It was consistent with his baseline.  Normal white count.  Hemoglobin is left 15.7.  Radiology: ordered. Decision-making details documented in ED Course.     Details: Chest x-ray is clear  ECG/medicine tests: ordered and independent interpretation performed. Decision-making details  documented in ED Course.     Details: AFib RVR  Discussion of management or test interpretation with external provider(s): I discussed the case with Dr. Kaushal Rogers with Cardiology.  Light of the patient was negative workup he was okay with re-initiation of Eliquis continuation of rate control medications and close follow up with Cardiology.  Patient was it was aware and in agreement    Risk  OTC drugs.  Prescription drug management.  Drug therapy requiring intensive monitoring for toxicity.  Decision regarding hospitalization.  Diagnosis or treatment significantly limited by social determinants of health.  Risk Details: Social determinants: Patient was a kidney transplant patient was    Critical Care  Total time providing critical care: 45 minutes                ED Medication(s):  Medications   metoprolol tartrate (LOPRESSOR) tablet 25 mg (25 mg Oral Not Given 12/2/24 2120)   aspirin tablet 325 mg (325 mg Oral Given 12/2/24 2007)   diltiaZEM injection 20 mg (20 mg Intravenous Not Given 12/2/24 2015)       Discharge Medication List as of 12/2/2024  9:11 PM           Follow-up Information       SABIHA Roberson MD.    Specialty: Family Medicine  Contact information:  [address]  [place] [address] [phone]               Monica Rios MD.    Specialties: Interventional Cardiology, Cardiology  Contact information:  [address] [facility] [address] [phone]                                 Scribe Attestation:   Scribe #1: I performed the above scribed service and the documentation accurately describes the services I performed. I attest to the accuracy of the note.     Attending:   Physician Attestation Statement for Scribe #1: I, J Carlos Shipamn Jr., MD, personally performed the services described in this documentation, as scribed by Anatoliy Sam, in my presence, and it is both accurate and complete.           Clinical Impression       ICD-10-CM ICD-9-CM   1. Atrial  fibrillation with rapid ventricular response  I48.91 427.31   2. Chest pain  R07.9 786.50       Disposition:   Disposition: Discharged  Condition: Stable         J Carlos Shipman Jr., MD  12/02/24 [id number]

## 2024-12-04 ENCOUNTER — PATIENT MESSAGE (OUTPATIENT)
Dept: TRANSPLANT | Facility: CLINIC | Age: 44
End: 2024-12-04
Payer: COMMERCIAL

## 2024-12-04 LAB
ALLOSURE COMMENT: NORMAL
ALLOSURE SCORE KIDNEY: 0.42 %
INFORMATION: NORMAL

## 2024-12-05 ENCOUNTER — OFFICE VISIT (OUTPATIENT)
Dept: CARDIOLOGY | Facility: CLINIC | Age: 44
End: 2024-12-05
Payer: COMMERCIAL

## 2024-12-05 ENCOUNTER — HOSPITAL ENCOUNTER (OUTPATIENT)
Dept: CARDIOLOGY | Facility: HOSPITAL | Age: 44
Discharge: HOME | End: 2024-12-05
Attending: NURSE PRACTITIONER
Payer: COMMERCIAL

## 2024-12-05 VITALS
DIASTOLIC BLOOD PRESSURE: 70 MMHG | OXYGEN SATURATION: 97 % | HEART RATE: 84 BPM | BODY MASS INDEX: 29.97 KG/M2 | SYSTOLIC BLOOD PRESSURE: 100 MMHG | WEIGHT: 239.75 LBS

## 2024-12-05 DIAGNOSIS — E11.69 DYSLIPIDEMIA ASSOCIATED WITH TYPE 2 DIABETES MELLITUS: Chronic | ICD-10-CM

## 2024-12-05 DIAGNOSIS — N18.31 TYPE 2 DIABETES MELLITUS WITH STAGE 3A CHRONIC KIDNEY DISEASE, WITHOUT LONG-TERM CURRENT USE OF INSULIN: Chronic | ICD-10-CM

## 2024-12-05 DIAGNOSIS — I25.10 CORONARY ARTERY DISEASE INVOLVING NATIVE CORONARY ARTERY OF NATIVE HEART WITHOUT ANGINA PECTORIS: Chronic | ICD-10-CM

## 2024-12-05 DIAGNOSIS — Z94.0 S/P KIDNEY TRANSPLANT: Chronic | ICD-10-CM

## 2024-12-05 DIAGNOSIS — I48.0 PAROXYSMAL ATRIAL FIBRILLATION: Chronic | ICD-10-CM

## 2024-12-05 DIAGNOSIS — E11.22 TYPE 2 DIABETES MELLITUS WITH STAGE 3A CHRONIC KIDNEY DISEASE, WITHOUT LONG-TERM CURRENT USE OF INSULIN: Chronic | ICD-10-CM

## 2024-12-05 DIAGNOSIS — E78.5 DYSLIPIDEMIA ASSOCIATED WITH TYPE 2 DIABETES MELLITUS: Chronic | ICD-10-CM

## 2024-12-05 DIAGNOSIS — I48.0 PAROXYSMAL ATRIAL FIBRILLATION: Primary | Chronic | ICD-10-CM

## 2024-12-05 DIAGNOSIS — T86.11 ACUTE REJECTION OF KIDNEY TRANSPLANT: ICD-10-CM

## 2024-12-05 LAB
BKV DNA SERPL NAA+PROBE-ACNC: ABNORMAL COPIES/ML
BKV DNA SERPL NAA+PROBE-LOG#: 3.79 LOG (10) COPIES/ML
BKV DNA SERPL QL NAA+PROBE: DETECTED

## 2024-12-05 PROCEDURE — 4010F ACE/ARB THERAPY RXD/TAKEN: CPT | Mod: CPTII,S$GLB,, | Performed by: NURSE PRACTITIONER

## 2024-12-05 PROCEDURE — 3074F SYST BP LT 130 MM HG: CPT | Mod: CPTII,S$GLB,, | Performed by: NURSE PRACTITIONER

## 2024-12-05 PROCEDURE — 3066F NEPHROPATHY DOC TX: CPT | Mod: CPTII,S$GLB,, | Performed by: NURSE PRACTITIONER

## 2024-12-05 PROCEDURE — 99999 PR PBB SHADOW E&M-EST. PATIENT-LVL IV: CPT | Mod: PBBFAC,,, | Performed by: NURSE PRACTITIONER

## 2024-12-05 PROCEDURE — 3008F BODY MASS INDEX DOCD: CPT | Mod: CPTII,S$GLB,, | Performed by: NURSE PRACTITIONER

## 2024-12-05 PROCEDURE — 1111F DSCHRG MED/CURRENT MED MERGE: CPT | Mod: CPTII,S$GLB,, | Performed by: NURSE PRACTITIONER

## 2024-12-05 PROCEDURE — 3060F POS MICROALBUMINURIA REV: CPT | Mod: CPTII,S$GLB,, | Performed by: NURSE PRACTITIONER

## 2024-12-05 PROCEDURE — 1159F MED LIST DOCD IN RCRD: CPT | Mod: CPTII,S$GLB,, | Performed by: NURSE PRACTITIONER

## 2024-12-05 PROCEDURE — 99214 OFFICE O/P EST MOD 30 MIN: CPT | Mod: S$GLB,,, | Performed by: NURSE PRACTITIONER

## 2024-12-05 PROCEDURE — 3078F DIAST BP <80 MM HG: CPT | Mod: CPTII,S$GLB,, | Performed by: NURSE PRACTITIONER

## 2024-12-05 PROCEDURE — 3044F HG A1C LEVEL LT 7.0%: CPT | Mod: CPTII,S$GLB,, | Performed by: NURSE PRACTITIONER

## 2024-12-05 RX ORDER — HUMAN IMMUNOGLOBULIN G 5 G/50ML
LIQUID INTRAVENOUS
COMMUNITY
Start: 2024-08-09

## 2024-12-05 NOTE — PROGRESS NOTES
Patient ID: Robb Iglesias is a 43 y.o. male.    Chief Complaint: Atrial Fibrillation, Shortness of Breath, Chest Pain, and Dizziness (Pt stated Monday his HR went up to 140 and PT was in Afib and was SOB with dizziness, Pt stated he went back into Afib last night )    The patient initiated a request through [facility] on 12/5/2024 for evaluation and management with a chief complaint of Atrial Fibrillation, Shortness of Breath, Chest Pain, and Dizziness (Pt stated Monday his HR went up to 140 and PT was in Afib and was SOB with dizziness, Pt stated he went back into Afib last night )     Robb Iglesias is a 43 year old male who presents to Arrhythmia clinic for further eval of AFIB.     HIs current medical conditions include PAF, DM Type II, h/o NSTEMI with CAD s/p PCI of LAD x 2 in 8/2017, s/p kidney transplant, acute rejection, HTN, HLP.     He was seen at [facility] in Nov due to acute rejection. He was discharged and had AFIB/RVR and was seen at [facility] ED 12/2/2024 and treated with IV Cardizem with improvement in HR and return to NSR.     He returns today and states he is doing ok.   Had another episode of brief paf last night but resolved quickly.     He does endorse dizziness, LH, shortness of breath symptoms with AF.   Feels good today in office.     Has resumed taking eliquis 5 mg BID, new Rx sent to pharmacy today.   Continue BB for rate control.  Will obtain 30 day event monitor to assess AF burden prior to further treatment decisions.     Denies any excess caffeine intake.   Rare etoh use.  No smoking or tobacco history.     Reports compliance with medications.   Denies any syncope or near syncopal events.     Encounter Diagnoses   Name Primary?    Paroxysmal atrial fibrillation Yes    Type 2 diabetes mellitus with stage 3a chronic kidney disease, without long-term current use of insulin     Coronary artery disease involving native coronary artery of native heart without angina pectoris      Dyslipidemia associated with type 2 diabetes mellitus     S/P kidney transplant     Acute rejection of kidney transplant         Orders Placed This Encounter   Procedures    Cardiac event monitor     Standing Status:   Future     Standing Expiration Date:   12/5/2025     Order Specific Question:   Cardiac Event Monitor     Answer:   Auto Trigger     Order Specific Question:   Release to patient     Answer:   Immediate      Medications Ordered This Encounter   Medications    apixaban (ELIQUIS) 5 mg Tab     Sig: Take 1 tablet (5 mg total) by mouth 2 (two) times daily.     Dispense:  60 tablet     Refill:  11        No follow-ups on file.        Past Medical History:   Diagnosis Date    Allergic rhinitis     Atrial fibrillation with RVR 06/04/2023    CKD (chronic kidney disease) stage 2, GFR 60-89 ml/min 11/27/2024    Class 1 obesity due to excess calories with serious comorbidity and body mass index (BMI) of 31.0 to 31.9 in adult 04/07/2017    Coronary artery disease     Coronary artery disease involving native coronary artery of native heart without angina pectoris 02/06/2018    Cath lab procedure 04/23/2018 (Naveen Rios MD) A. Indication/Pre-Operative Diagnosis: The patient is a 37 year old male that [facility] (N[facility]). The BELLA risk score is 5.  B. Summary/Post-Operative Diagnosis 1. Single vessel coronary artery disease. 2. Normal LVEF. 3. Diastolic dysfunction. 4. Successful PCI for acute myocardial infarction. 5.     Diabetic nephropathy associated with type 2 diabetes mellitus 01/06/2020    Direct hyperbilirubinemia 03/24/2018    DM (diabetes mellitus) 2008    BS doesn't check any more 08/02/2018    DM (diabetes mellitus) 2012    BS 99 am 06/26/2020    DM (diabetes mellitus)     BS didn't check 06/04/2021    DM (diabetes mellitus) 2008    BS didn't check 07/29/2022     Dyslipidemia associated with type 2 diabetes mellitus 07/31/2017    Elevated bilirubin  03/21/2018    GERD (gastroesophageal reflux disease)     Gout     Hyperlipidemia     Hyperparathyroidism, secondary to chronic kidney disease 06/07/2021    Hypertension associated with chronic kidney disease due to type 2 diabetes mellitus     Hypertension complicating diabetes 03/16/2019    Not candidate for Hypertension Digital Medicine program due to dialysis status. Didn't tolerate amlodipine due to SE of hypotension.    Idiopathic chronic gout, multiple sites, without tophus (tophi) 07/19/2017    Long term (current) use of insulin     MI (myocardial infarction) 07/2017    NSTEMI with CAD s/p PCI (FAMILIA) of LAD x 2 in 8/2017 07/31/2017    Obesity     HENRY on CPAP     Peritoneal dialysis catheter in place 01/26/2023    Proteinuria     Severe obstructive sleep apnea - Intolerant of CPAP 07/31/2017    Intolerant of CPAP after weeks of trying multiple interfaces. SPLIT-NIGHT SLEEP STUDY 7/28/2015 · SLEEP ARCHITECTURE: Sleep onset was 2.9 minutes and sleep efficiency was 94.4%. Sleep Stage distribution showed 145 sleep stage changes, 6 awakenings and 119 arousals. Sleep distribution showed 53.2% stage NI, 41.8% stage N 11, 0.0% stage N Ill and REM sleep was at 5.0%. There were 2 REM periods. · RE    Stage 3a chronic kidney disease 05/26/2023    Stage 5 chronic kidney disease on chronic peritoneal dialysis 06/07/2017    Stage 5 chronic kidney disease on chronic peritoneal dialysis 06/07/2017    Status post angioplasty with stent 08/04/2017    Steatohepatitis     Fatty Liver    Type 2 diabetes mellitus with chronic kidney disease on chronic dialysis, without long-term current use of insulin 06/21/2017    Type 2 diabetes mellitus with diabetic nephropathy     Type 2 diabetes mellitus with diabetic nephropathy, without long-term current use of insulin 01/06/2020    Type 2 diabetes mellitus with diabetic polyneuropathy, without long-term current use of insulin 06/07/2021    Type 2 diabetes mellitus with hyperglycemia      Type 2 diabetes mellitus with renal manifestations     Type 2 diabetes mellitus with stage 3 chronic kidney disease, without long-term current use of insulin 06/21/2017       Past Surgical History:   Procedure Laterality Date    BIOPSY, WITH CT GUIDANCE N/A 11/21/2024    Procedure: TRANSPLANT RENAL [id number];  Surgeon: Eliu Nunez MD;  Location: [facility] CATH LAB;  Service: Radiology;  Laterality: N/A;    CORONARY ANGIOPLASTY WITH STENT PLACEMENT      CYSTOSCOPY      KIDNEY TRANSPLANT N/A 05/15/2023    Procedure: TRANSPLANT, KIDNEY;  Surgeon: Reji Hinojosa MD;  Location: [facility] OR [address] [place] [address];  Service: Transplant;  Laterality: N/A;  IN ROOM: 10:37  OUT OF ICE:10:53  REPERFUSION TIME: 11:21      LASIK Bilateral     LIVER BIOPSY      NASAL ENDOSCOPY      NASAL SEPTUM SURGERY      PERITONEAL CATHETER REMOVAL N/A 05/15/2023    Procedure: REMOVAL, CATHETER, DIALYSIS, PERITONEAL;  Surgeon: Reji Hinojosa MD;  Location: [facility] OR [address] [place] [address];  Service: Transplant;  Laterality: N/A;    SLEEVE GASTROPLASTY  03/06/2017       Social History     Tobacco Use    Smoking status: Never    Smokeless tobacco: Never   Substance Use Topics    Alcohol use: No     Comment: 1-2 times per month    Drug use: No       Family History   Problem Relation Name Age of Onset    Diabetes type II Mother      Hypertension Mother      Hyperlipidemia Mother      Diabetes Mother      Hyperlipidemia Father      Diabetes type II Brother      Diabetes type II Brother      Diabetes Maternal Grandmother         Wt Readings from Last 3 Encounters:   12/05/24 108.7 kg (239 lb 12 oz)   12/02/24 106.6 kg (235 lb)   11/27/24 109.7 kg (241 lb 13.5 oz)     Temp Readings from Last 3 Encounters:   12/02/24 97.7 °F (36.5 °C) (Oral)   11/27/24 98 °F (36.7 °C) (Oral)   11/27/24 97.7 °F (36.5 °C) (Temporal)     BP Readings from Last 3 Encounters:   12/05/24 100/70   12/02/24 104/60   11/27/24 120/80     Pulse Readings from Last 3 Encounters:   12/05/24 84   12/02/24 85  "  11/27/24 78       Current Outpatient Medications on File Prior to Visit   Medication Sig Dispense Refill    aspirin (ECOTRIN) 81 MG EC tablet Take 1 tablet (81 mg total) by mouth once daily. 90 tablet 0    atorvastatin (LIPITOR) 80 MG tablet Take 1 tablet (80 mg total) by mouth every evening. 90 tablet 0    blood sugar diagnostic Strp Check blood glucose 2 times daily as directed and as needed 200 each 5    blood-glucose meter (ONETOUCH ULTRAMINI) kit Use as instructed 1 each 0    ezetimibe (ZETIA) 10 mg tablet Take 1 tablet (10 mg total) by mouth once daily. 90 tablet 3    GINGER ROOT, BULK, MISC by Misc.(Non-Drug; Combo Route) route.      insulin lispro (HUMALOG KWIKPEN INSULIN) 100 unit/mL pen Inject 9 Units into the skin 3 (three) times daily. Plus sliding scale, MDD: 67 units 15 mL 11    k phos di & mono-sod phos mono (PHOSPHA 250 NEUTRAL) 250 mg Tab Take 2 tablets by mouth 3 (three) times daily. 180 tablet 11    lancets Misc Check blood glucose 2 times daily as directed and as needed (dispense insurance preferred brand or patient choice) 200 each 5    magnesium oxide (MAG-OX) 400 mg (241.3 mg magnesium) tablet TAKE 1 TABLET BY MOUTH 2 TIMES DAILY. 60 tablet 11    metoprolol tartrate (LOPRESSOR) 25 MG tablet Take 1 tablet (25 mg total) by mouth 2 (two) times daily. 180 tablet 2    MOUNJARO 7.5 mg/0.5 mL PnIj INJECT 7.5 MG UNDER THE SKIN EVERY 7 DAYS 6 mL 3    multivitamin Tab Take 1 tablet by mouth once daily.      mycophenolate (CELLCEPT) 250 mg Cap Take 2 capsules (500 mg total) by mouth 2 (two) times daily. 120 capsule 11    nitroGLYCERIN (NITROSTAT) 0.4 MG SL tablet Dissolve one tablet underneath tongue at onset of angina; may repeat every 5 minutes if needed. Call 911 if angina persists after 2 doses. 25 tablet 5    pantoprazole (PROTONIX) 40 MG tablet Take 1 tablet (40 mg total) by mouth once daily. 90 tablet 3    pen needle, diabetic (BD ULTRA-FINE SHORT PEN NEEDLE) 31 gauge x 5/16" Ndle Use to inject " "insulin into the skin 3 times daily 100 each 1    pen needle, diabetic (BD ULTRA-FINE SHORT PEN NEEDLE) 31 gauge x 5/16" Ndle Use to inject insulin 3 (three) times daily. 100 each 5    predniSONE (DELTASONE) 5 MG tablet Take by mouth daily: 20mg 11/25-12/1, 15mg 12/2-12/8, 10mg 12/9-12/152024, 5mg 12/16/2024- 75 tablet 10    PRIVIGEN 10 % Soln       sulfamethoxazole-trimethoprim 400-80mg (BACTRIM,SEPTRA) 400-80 mg per tablet Take 1 tablet by mouth once daily. 30 tablet 5    tacrolimus (PROGRAF) 1 MG Cap Take 3 capsules (3 mg total) by mouth every morning AND 2 capsules (2 mg total) every evening. 360 capsule 11    valsartan (DIOVAN) 80 MG tablet Take 1 tablet (80 mg total) by mouth once daily. 90 tablet 2     No current facility-administered medications on file prior to visit.       No cardiac monitor results found for the past 12 months    No results found for this or any previous visit.    No results found for this or any previous visit.        Results for orders placed or performed during the hospital encounter of 12/02/24   EKG 12-lead    Collection Time: 12/02/24  7:44 PM   Result Value Ref Range    QRS Duration 88 ms    [facility] QTC Calculation 477 ms    Narrative    Test Reason : R07.9,    Vent. Rate : 135 BPM     Atrial Rate :    BPM     P-R Int :    ms          QRS Dur :  88 ms      QT Int : 318 ms       P-R-T Axes :      8  60 degrees    QTcB Int : 477 ms    Atrial fibrillation with rapid ventricular response  Abnormal ECG  When compared with ECG of 25-Oct-2023 08:28,  Atrial fibrillation has replaced Sinus rhythm  Vent. rate has increased by  65 bpm  Confirmed by Monica Rios ([id number]) on 12/3/2024 7:53:45 PM    Referred By: AAAREFERRAL SELF           Confirmed By: Monica Rios         Review of Systems   Constitutional: Positive for malaise/fatigue.   HENT:  Negative for hearing loss and hoarse voice.    Eyes:  Negative for blurred vision and visual disturbance.   Cardiovascular:  Positive for palpitations. " Negative for chest pain, claudication, dyspnea on exertion, irregular heartbeat, leg swelling, near-syncope, orthopnea, paroxysmal nocturnal dyspnea and syncope.   Respiratory:  Negative for cough, hemoptysis, shortness of breath, sleep disturbances due to breathing, snoring and wheezing.    Endocrine: Negative for cold intolerance and heat intolerance.   Hematologic/Lymphatic: Bruises/bleeds easily (on eliquis).   Skin:  Negative for color change, dry skin and nail changes.   Musculoskeletal:  Negative for back pain, joint pain and myalgias.   Gastrointestinal:  Negative for bloating, abdominal pain, constipation, nausea and vomiting.   Genitourinary:  Negative for dysuria, flank pain, hematuria and hesitancy.   Neurological:  Negative for headaches, light-headedness, loss of balance, numbness, paresthesias and weakness.   Psychiatric/Behavioral:  Negative for altered mental status.    Allergic/Immunologic: Negative for environmental allergies.         Objective:/70 (BP Location: Left arm, Patient Position: Sitting)   Pulse 84   Wt 108.7 kg (239 lb 12 oz)   SpO2 97%   BMI 29.97 kg/m²      Physical Exam  Vitals and nursing note reviewed.   Constitutional:       General: He is not in acute distress.     Appearance: Normal appearance. He is well-developed. He is not ill-appearing.   HENT:      Head: Normocephalic and atraumatic.      Nose: Nose normal.      Mouth/Throat:      Mouth: Mucous membranes are moist.   Eyes:      Pupils: Pupils are equal, round, and reactive to light.   Neck:      Thyroid: No thyromegaly.      Vascular: No JVD.      Trachea: No tracheal deviation.   Cardiovascular:      Rate and Rhythm: Normal rate and regular rhythm.      Chest Wall: PMI is not displaced.      Pulses: Intact distal pulses.           Radial pulses are 2+ on the right side and 2+ on the left side.        Dorsalis pedis pulses are 2+ on the right side and 2+ on the left side.      Heart sounds: S1 normal and S2  normal. Heart sounds not distant. No murmur heard.     Comments: Remains in NSR  Pulmonary:      Effort: Pulmonary effort is normal. No respiratory distress.      Breath sounds: Normal breath sounds. No wheezing.   Abdominal:      General: Bowel sounds are normal. There is no distension.      Palpations: Abdomen is soft.      Tenderness: There is no abdominal tenderness.   Musculoskeletal:         General: No swelling. Normal range of motion.      Cervical back: Full passive range of motion without pain, normal range of motion and neck supple.      Right lower leg: No edema.      Left lower leg: No edema.      Right ankle: No swelling.      Left ankle: No swelling.   Skin:     General: Skin is warm and dry.      Capillary Refill: Capillary refill takes less than 2 seconds.      Nails: There is no clubbing.   Neurological:      General: No focal deficit present.      Mental Status: He is alert and oriented to person, place, and time.      Motor: No weakness.   Psychiatric:         Speech: Speech normal.         Behavior: Behavior normal.         Thought Content: Thought content normal.         Judgment: Judgment normal.         Lab Results   Component Value Date    CHOL 72 (L) 04/11/2024    CHOL 105 (L) 05/02/2023    CHOL 88 (L) 09/07/2022     Lab Results   Component Value Date    HDL 30 (L) 04/11/2024    HDL 28 (L) 05/02/2023    HDL 27 (L) 09/07/2022     Lab Results   Component Value Date    LDLCALC 26.4 (L) 04/11/2024    LDLCALC 48.0 (L) 05/02/2023    LDLCALC 37.2 (L) 09/07/2022     Lab Results   Component Value Date    TRIG 78 04/11/2024    TRIG 145 05/02/2023    TRIG 119 09/07/2022     Lab Results   Component Value Date    CHOLHDL 41.7 04/11/2024    CHOLHDL 26.7 05/02/2023    CHOLHDL 30.7 09/07/2022       Chemistry        Component Value Date/Time     12/02/2024 2004    K 4.4 12/02/2024 2004     12/02/2024 2004    CO2 20 (L) 12/02/2024 2004    BUN 23 (H) 12/02/2024 2004    CREATININE 1.6 (H)  12/02/2024 2004     (H) 12/02/2024 2004        Component Value Date/Time    CALCIUM 9.6 12/02/2024 2004    ALKPHOS 50 12/02/2024 2004    AST 29 12/02/2024 2004    ALT 67 (H) 12/02/2024 2004    BILITOT 1.9 (H) 12/02/2024 2004    ESTGFRAFRICA 13 (A) 07/26/2022 0818    EGFRNONAA 11 (A) 07/26/2022 0818          Lab Results   Component Value Date    TSH 0.727 07/31/2017     Lab Results   Component Value Date    INR 1.0 11/21/2024    INR 1.0 06/05/2023    INR 1.0 05/15/2023     Lab Results   Component Value Date    WBC 11.78 12/02/2024    HGB 15.7 12/02/2024    HCT 46.2 12/02/2024    MCV 83 12/02/2024     12/02/2024          Assessment:      1. Paroxysmal atrial fibrillation    2. Type 2 diabetes mellitus with stage 3a chronic kidney disease, without long-term current use of insulin    3. Coronary artery disease involving native coronary artery of native heart without angina pectoris    4. Dyslipidemia associated with type 2 diabetes mellitus    5. S/P kidney transplant    6. Acute rejection of kidney transplant        Plan:     Continue Eliquis for cardio-embolic protection  Continue BB  Obtain 30 day event monitor to assess AF burden  Guide further decisions after monitor completed    Nicole May, FNP-C [facility]

## 2024-12-06 ENCOUNTER — PATIENT MESSAGE (OUTPATIENT)
Dept: PHARMACY | Facility: HOSPITAL | Age: 44
End: 2024-12-06
Payer: COMMERCIAL

## 2024-12-06 ENCOUNTER — PATIENT MESSAGE (OUTPATIENT)
Dept: TRANSPLANT | Facility: CLINIC | Age: 44
End: 2024-12-06
Payer: COMMERCIAL

## 2024-12-09 ENCOUNTER — PATIENT MESSAGE (OUTPATIENT)
Dept: TRANSPLANT | Facility: CLINIC | Age: 44
End: 2024-12-09
Payer: COMMERCIAL

## 2024-12-09 ENCOUNTER — LAB VISIT (OUTPATIENT)
Dept: LAB | Facility: HOSPITAL | Age: 44
End: 2024-12-09
Attending: INTERNAL MEDICINE
Payer: COMMERCIAL

## 2024-12-09 DIAGNOSIS — Z94.0 KIDNEY REPLACED BY TRANSPLANT: ICD-10-CM

## 2024-12-09 LAB
ALBUMIN SERPL BCP-MCNC: 4.1 G/DL (ref 3.5–5.2)
ANION GAP SERPL CALC-SCNC: 9 MMOL/L (ref 8–16)
BASOPHILS # BLD AUTO: 0.05 K/UL (ref 0–0.2)
BASOPHILS NFR BLD: 0.6 % (ref 0–1.9)
BUN SERPL-MCNC: 18 MG/DL (ref 6–20)
CALCIUM SERPL-MCNC: 8.9 MG/DL (ref 8.7–10.5)
CHLORIDE SERPL-SCNC: 107 MMOL/L (ref 95–110)
CO2 SERPL-SCNC: 26 MMOL/L (ref 23–29)
CREAT SERPL-MCNC: 1.5 MG/DL (ref 0.5–1.4)
DIFFERENTIAL METHOD BLD: ABNORMAL
EOSINOPHIL # BLD AUTO: 0.1 K/UL (ref 0–0.5)
EOSINOPHIL NFR BLD: 1.3 % (ref 0–8)
ERYTHROCYTE [DISTWIDTH] IN BLOOD BY AUTOMATED COUNT: 14.6 % (ref 11.5–14.5)
EST. GFR  (NO RACE VARIABLE): 59.2 ML/MIN/1.73 M^2
GLUCOSE SERPL-MCNC: 199 MG/DL (ref 70–110)
HCT VFR BLD AUTO: 44.5 % (ref 40–54)
HGB BLD-MCNC: 14.1 G/DL (ref 14–18)
IMM GRANULOCYTES # BLD AUTO: 0.07 K/UL (ref 0–0.04)
IMM GRANULOCYTES NFR BLD AUTO: 0.8 % (ref 0–0.5)
LYMPHOCYTES # BLD AUTO: 1.6 K/UL (ref 1–4.8)
LYMPHOCYTES NFR BLD: 18.4 % (ref 18–48)
MAGNESIUM SERPL-MCNC: 1.7 MG/DL (ref 1.6–2.6)
MCH RBC QN AUTO: 28 PG (ref 27–31)
MCHC RBC AUTO-ENTMCNC: 31.7 G/DL (ref 32–36)
MCV RBC AUTO: 89 FL (ref 82–98)
MONOCYTES # BLD AUTO: 0.8 K/UL (ref 0.3–1)
MONOCYTES NFR BLD: 9.7 % (ref 4–15)
NEUTROPHILS # BLD AUTO: 5.9 K/UL (ref 1.8–7.7)
NEUTROPHILS NFR BLD: 69.2 % (ref 38–73)
NRBC BLD-RTO: 0 /100 WBC
PHOSPHATE SERPL-MCNC: 3.2 MG/DL (ref 2.7–4.5)
PLATELET # BLD AUTO: 167 K/UL (ref 150–450)
PMV BLD AUTO: 10.4 FL (ref 9.2–12.9)
POTASSIUM SERPL-SCNC: 4.1 MMOL/L (ref 3.5–5.1)
RBC # BLD AUTO: 5.03 M/UL (ref 4.6–6.2)
SODIUM SERPL-SCNC: 142 MMOL/L (ref 136–145)
WBC # BLD AUTO: 8.57 K/UL (ref 3.9–12.7)

## 2024-12-09 PROCEDURE — 85025 COMPLETE CBC W/AUTO DIFF WBC: CPT | Performed by: INTERNAL MEDICINE

## 2024-12-09 PROCEDURE — 80197 ASSAY OF TACROLIMUS: CPT | Performed by: INTERNAL MEDICINE

## 2024-12-09 PROCEDURE — 36415 COLL VENOUS BLD VENIPUNCTURE: CPT | Performed by: INTERNAL MEDICINE

## 2024-12-09 PROCEDURE — 80069 RENAL FUNCTION PANEL: CPT | Performed by: INTERNAL MEDICINE

## 2024-12-09 PROCEDURE — 83735 ASSAY OF MAGNESIUM: CPT | Performed by: INTERNAL MEDICINE

## 2024-12-10 ENCOUNTER — PATIENT MESSAGE (OUTPATIENT)
Dept: TRANSPLANT | Facility: CLINIC | Age: 44
End: 2024-12-10
Payer: COMMERCIAL

## 2024-12-10 DIAGNOSIS — Z94.0 S/P KIDNEY TRANSPLANT: Chronic | ICD-10-CM

## 2024-12-10 LAB — TACROLIMUS BLD-MCNC: 11 NG/ML (ref 5–15)

## 2024-12-10 RX ORDER — TACROLIMUS 1 MG/1
CAPSULE ORAL
Qty: 360 CAPSULE | Refills: 11 | Status: SHIPPED | OUTPATIENT
Start: 2024-12-10

## 2024-12-10 NOTE — TELEPHONE ENCOUNTER
Pt made aware of below orders            ----- Message from Rell Booth MD sent at 12/10/2024 10:09 AM CST -----  Please lower prograf to 2 mg PO bid and repeat labs in 10 days

## 2024-12-13 PROBLEM — Z79.899 IMMUNOSUPPRESSIVE MANAGEMENT ENCOUNTER FOLLOWING KIDNEY TRANSPLANT: Status: ACTIVE | Noted: 2024-12-13

## 2024-12-13 PROBLEM — Z94.0 IMMUNOSUPPRESSIVE MANAGEMENT ENCOUNTER FOLLOWING KIDNEY TRANSPLANT: Status: ACTIVE | Noted: 2024-12-13

## 2024-12-13 NOTE — PROGRESS NOTES
The patient location is:  home  The chief complaint leading to consultation is:  Reassessment of kidney function, complex immunosuppressive medication, BK infection protocol, management of electrolytes, anemia, proteinuria and blood pressure advice.   Visit type: Virtual visit with synchronous audio and video  Total time spent with patient: 12  min  Each patient to whom he or she provides medical services by telemedicine is:  (1) informed of the relationship between the physician and patient and the respective role of any other health care provider with respect to management of the patient; and (2) notified that he or she may decline to receive medical services by telemedicine and may withdraw from such care at any time.    Kidney Post-Transplant Assessment    Referring Physician: Siva Figueroa  Current Nephrologist: Siva Figueroa    ORGAN: LEFT KIDNEY  Donor Type: living  PHS Increased Risk: no  Cold Ischemia: 48 mins  Induction Medications: thymoglobulin    Subjective:     CC:  Reassessment of renal allograft function and management of chronic immunosuppression.    HPI:  Mr. Iglesias is a 43 y.o. 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  He has CKD stage 2 - GFR 60-89 and his baseline creatinine is between 1.3 to 1.6. He takes mycophenolate mofetil, prednisone, and tacrolimus for maintenance immunosuppression. He denies any recent hospitalizations or ER visits since his previous clinic visit.    He completed ivig and plex  After ivig infusion went to a fib  requiring an ER visit  and was treated with iv meds (? B Blockers) .patient saw  a cardiologist and ordered a heart monitor for 30 days. He is also on Apixiban now. Feels well no chest pain but refers two episodes of A Fib    Otherwise no complaints    Patient had some questions about a procedure for a cavity. I did not see a contra-indication but asked to discuss with dentist his anticoagulation. I am not opposed  to antibiotics  but it is not in our protocol anymore unless he qualifies under the /ACP guidelines     history of coronary angioplasty with stent placement, NSTEMI with CAD 8/2017, HTN, GERD, sleep apnea, and ESRD secondary to diabetic nephropathy who received a living kidney transplant on 5/15/23 (thymo induction, CMV +/+). Baseline Cr ~1.4.   admit 11/22 for treatment of kidney transplant rejection. Pretransplant PRA 50 to 69%. He had a 0.4 allosure in August 2024 but recently increased to 1.4. 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 Current DSA ordered 11/21 is pending. Kidney biopsy showed 19 glomeruli, 5% fibrosis, significant microvascular inflammation (capillaritis and glomerularitis) C4d <10% (considered negative) no ACr No AVR. No viral changes but SV40 is pending, biopsy highly suspicious for rejection. Plan to start with SM pulses and PLEX + IVIG     Current Outpatient Medications on File Prior to Visit   Medication Sig Dispense Refill    apixaban (ELIQUIS) 5 mg Tab Take 1 tablet (5 mg total) by mouth 2 (two) times daily. 60 tablet 11    aspirin (ECOTRIN) 81 MG EC tablet Take 1 tablet (81 mg total) by mouth once daily. 90 tablet 0    atorvastatin (LIPITOR) 80 MG tablet Take 1 tablet (80 mg total) by mouth every evening. 90 tablet 0    blood sugar diagnostic Strp Check blood glucose 2 times daily as directed and as needed 200 each 5    blood-glucose meter (ONETOUCH ULTRAMINI) kit Use as instructed 1 each 0    ezetimibe (ZETIA) 10 mg tablet Take 1 tablet (10 mg total) by mouth once daily. 90 tablet 3    GINGER ROOT, BULK, MISC by Misc.(Non-Drug; Combo Route) route.      insulin lispro (HUMALOG KWIKPEN INSULIN) 100 unit/mL pen Inject 9 Units into the skin 3 (three) times daily. Plus sliding scale, MDD: 67 units 15 mL 11    k phos di & mono-sod phos mono (PHOSPHA 250 NEUTRAL) 250 mg Tab Take 2 tablets by mouth 3 (three) times daily. 180 tablet 11    lancets Misc Check blood glucose 2 times  "daily as directed and as needed (dispense insurance preferred brand or patient choice) 200 each 5    magnesium oxide (MAG-OX) 400 mg (241.3 mg magnesium) tablet TAKE 1 TABLET BY MOUTH 2 TIMES DAILY. 60 tablet 11    metoprolol tartrate (LOPRESSOR) 25 MG tablet Take 1 tablet (25 mg total) by mouth 2 (two) times daily. 180 tablet 2    MOUNJARO 7.5 mg/0.5 mL PnIj INJECT 7.5 MG UNDER THE SKIN EVERY 7 DAYS 6 mL 3    multivitamin Tab Take 1 tablet by mouth once daily.      mycophenolate (CELLCEPT) 250 mg Cap Take 2 capsules (500 mg total) by mouth 2 (two) times daily. 120 capsule 11    nitroGLYCERIN (NITROSTAT) 0.4 MG SL tablet Dissolve one tablet underneath tongue at onset of angina; may repeat every 5 minutes if needed. Call 911 if angina persists after 2 doses. 25 tablet 5    pantoprazole (PROTONIX) 40 MG tablet Take 1 tablet (40 mg total) by mouth once daily. 90 tablet 3    pen needle, diabetic (BD ULTRA-FINE SHORT PEN NEEDLE) 31 gauge x 5/16" Ndle Use to inject insulin into the skin 3 times daily 100 each 1    pen needle, diabetic (BD ULTRA-FINE SHORT PEN NEEDLE) 31 gauge x 5/16" Ndle Use to inject insulin 3 (three) times daily. 100 each 5    predniSONE (DELTASONE) 5 MG tablet Take by mouth daily: 20mg 11/25-12/1, 15mg 12/2-12/8, 10mg 12/9-12/152024, 5mg 12/16/2024- 75 tablet 10    PRIVIGEN 10 % Soln       sulfamethoxazole-trimethoprim 400-80mg (BACTRIM,SEPTRA) 400-80 mg per tablet Take 1 tablet by mouth once daily. 30 tablet 5    tacrolimus (PROGRAF) 1 MG Cap Take 2 capsules (2 mg total) by mouth every morning AND 2 capsules (2 mg total) every evening. 360 capsule 11    valsartan (DIOVAN) 80 MG tablet Take 1 tablet (80 mg total) by mouth once daily. 90 tablet 2     No current facility-administered medications on file prior to visit.           Review of Systems    Skin: no skin rash  CNS; no headaches, blurred vision, seizure, or syncope  ENT: No JVD,  Adenopathies,  nasal congestion. No oral lesions  Cardiac: No " "chest pain, dyspnea, claudication, edema or palpitations  Respiratory: No SOB, cough, hemoptysis   Gastro-intestinal: No diarrhea, constipation, abdominal pain, nausea, vomit. No ascitis  Genitourinary: no hematuria, dysuria, frequency, frequency  Musculoskeletal: joint pain, arthritis or vasculitic changes  Psych: alert awake, oriented, No cranial nerves deficit.      Objective:         Video visit     Labs:  Lab Results   Component Value Date    WBC 8.57 12/09/2024    HGB 14.1 12/09/2024    HCT 44.5 12/09/2024     12/09/2024    K 4.1 12/09/2024     12/09/2024    CO2 26 12/09/2024    BUN 18 12/09/2024    CREATININE 1.5 (H) 12/09/2024    EGFRNORACEVR 59.2 (A) 12/09/2024    CALCIUM 8.9 12/09/2024    PHOS 3.2 12/09/2024    MG 1.7 12/09/2024    ALBUMIN 4.1 12/09/2024    AST 29 12/02/2024    ALT 67 (H) 12/02/2024    UTPCR 1.43 (H) 11/21/2024    .4 (H) 11/21/2024    TACROLIMUS 11.0 12/09/2024       No results found for: "EXTANC", "EXTWBC", "EXTSEGS", "EXTPLATELETS", "EXTHEMOGLOBI", "EXTHEMATOCRI", "EXTCREATININ", "EXTSODIUM", "EXTPOTASSIUM", "EXTBUN", "EXTCO2", "EXTCALCIUM", "EXTPHOSPHORU", "EXTGLUCOSE", "EXTALBUMIN", "EXTAST", "EXTALT", "EXTBILITOTAL", "EXTLIPASE", "EXTAMYLASE"    No results found for: "EXTCYCLOSLVL", "EXTSIROLIMUS", "EXTTACROLVL", "EXTPROTCRE", "EXTPTHINTACT", "EXTPROTEINUA", "EXTWBCUA", "EXTRBCUA"    Labs were reviewed with the patient.    Assessment:     1. CKD (chronic kidney disease) stage 2, GFR 60-89 ml/min    2. Diabetic nephropathy associated with type 2 diabetes mellitus    3. Acute rejection of kidney transplant    4. At risk for opportunistic infections    5. Coronary artery disease involving native coronary artery of native heart without angina pectoris    6. Essential hypertension    7. History of NSTEMI with CAD s/p PCI (FAMILIA) of LAD x 2 in 8/2017    8. Severe obstructive sleep apnea - Intolerant of CPAP    9. Immunosuppressive management encounter following kidney " "transplant    10. Hyperparathyroidism, secondary to chronic kidney disease        Plan:   Labs today pending  Will review and send a message to nurse coordinator  He finished PLEX and IVIG  See above    1. CKD stage 2/3 with treatment for AMR with SM pulses x 3 PLEX and IVIG: will continue follow up as per our center guidelines. patient to continue close follow up with the local General nephrologist. Education provided in appropriate fluid intake, potassium intake. Continue with oral hydration.    1A. Pancreatic Function: N/A for non-pancreas allograft recipients:     2. High risk immunosuppression medication for organ transplantation, requiring regular intensive follow up and monitoring : tacro level reviewed at target  bid due to ongoing BK viremia. Patient to get IVIG beneficial for BK and AMR  Lab Results   Component Value Date    TACROLIMUS 11.0 12/09/2024    TACROLIMUS 11.8 12/02/2024    TACROLIMUS 5.9 11/25/2024     No results found for: "CYCLOSPORINE"  @   Will closely monitor for toxicities, education provided about adherence to medicines and need to communicate any side effects to the transplant nurse or physician.    3. Allograft Function:stable at baseline for the patient. Continue follow up as per our guidelines and with the local General nephrologist. Communication will be sent today.  Lab Results   Component Value Date    CREATININE 1.5 (H) 12/09/2024    CREATININE 1.6 (H) 12/02/2024    CREATININE 1.4 12/02/2024     No results found for: "AMYLASE", "LIPASE"    4. Hypertension management:  Continue with home blood pressure monitoring, low salt and healthy life discussed with the patient..    5. Metabolic Bone Disease/Secondary Hyperparathyroidism:calcium and phosphorus level discussed with the patient, patient will continue follow up with the general nephrologist for management of metabolic bone disease. Will monitor PTH and Vit D per our transplant center guidelines.      Lab Results "   Component Value Date    .4 (H) 11/21/2024    CALCIUM 8.9 12/09/2024    PHOS 3.2 12/09/2024    PHOS 3.2 12/02/2024    PHOS 3.4 11/25/2024       6. Electrolytes and acid base balance: reviewed with the patient, essentially within the normal range no need for acute changes today, will monitor as per our center guidelines.     Lab Results   Component Value Date     12/09/2024    K 4.1 12/09/2024     12/09/2024    CO2 26 12/09/2024    CO2 20 (L) 12/02/2024    CO2 23 12/02/2024       7. Anemia: will continue monitoring as per our center guidelines. No indication for acute intervention today.     Lab Results   Component Value Date    WBC 8.57 12/09/2024    HGB 14.1 12/09/2024    HCT 44.5 12/09/2024    MCV 89 12/09/2024     12/09/2024       8.Proteinuria: will continue with pr/cr ratio as per our center guidelines.  Lab Results   Component Value Date    PROTEINURINE 137 (H) 11/21/2024    CREATRANDUR 95.6 11/21/2024    UTPCR 1.43 (H) 11/21/2024    UTPCR 1.20 (H) 11/01/2024    UTPCR 0.61 (H) 02/05/2024        9. BK virus infection screening: will continue with urine or blood PCR as per our guidelines to prevent BK virus viremia and allograft dysfunction  Lab Results   Component Value Date    BKVIRUSPCRQB 6,194 (H) 12/02/2024         10. Weight and metabolic management: education provided provided during the clinic visit.   There is no height or weight on file to calculate BMI.       11.Patient safety education regarding immunosuppression including prophylaxis posttransplant for CMV, PCP : Education provided about vaccination and prevention of infections.    12.  Cytopenias: no significant cytopenias will monitor as per our guidelines. Medicine list reviewed including potential causes of drug-induced cytopenias     Lab Results   Component Value Date    WBC 8.57 12/09/2024    HGB 14.1 12/09/2024    HCT 44.5 12/09/2024    MCV 89 12/09/2024     12/09/2024       13. Post-transplant Prophylaxis;  CMV Infection, PJP and Candida mucosistis and other indicated for this particular patient. Per rejection protocol.    I spoke with the patient for 30 minutes. More than half dedicated to counseling and education. All questions answered    Rell Booth MD  Transplant Nephrology            Follow-up:   Clinic: return to transplant clinic weekly for the first month after transplant; every 2 weeks during months 2-3; then at 6-, 9-, 12-, 18-, 24-, and 36- months post-transplant to reassess for complications from immunosuppression toxicity and monitor for rejection.  Annually thereafter.    Labs: since patient remains at high risk for rejection and drug-related complications that warrant close monitoring, labs will be ordered as follows: continue twice weekly CBC, renal panel, and drug level for first month; then same labs once weekly through 3rd month post-transplant.  Urine for UA and protein/creatinine ratio monthly.  Serum BK - PCR at 1-, 3-, 6-, 9-, 12-, 18-, 24-, 36- 48-, and 60 months post-transplant.  Hepatic panel at 1-, 2-, 3-, 6-, 9-, 12-, 18-, 24-, and 36- months post-transplant.    Rell Booth MD       Education:   Material provided to the patient.  Patient reminded to call with any health changes since these can be early signs of significant complications.  Also, I advised the patient to be sure any new medications or changes of old medications are discussed with either a pharmacist or physician knowledgeable with transplant to avoid rejection/drug toxicity related to significant drug interactions.    Patient advised that it is recommended that all transplanted patients, and their close contacts and household members receive Covid vaccination.    UNOS Patient Status  Functional Status: 100% - Normal, no complaints, no evidence of disease  Physical Capacity: No Limitations

## 2024-12-16 ENCOUNTER — OFFICE VISIT (OUTPATIENT)
Dept: TRANSPLANT | Facility: CLINIC | Age: 44
End: 2024-12-16
Payer: COMMERCIAL

## 2024-12-16 ENCOUNTER — LAB VISIT (OUTPATIENT)
Dept: LAB | Facility: HOSPITAL | Age: 44
End: 2024-12-16
Attending: INTERNAL MEDICINE
Payer: COMMERCIAL

## 2024-12-16 VITALS — DIASTOLIC BLOOD PRESSURE: 84 MMHG | SYSTOLIC BLOOD PRESSURE: 134 MMHG

## 2024-12-16 DIAGNOSIS — E11.21 DIABETIC NEPHROPATHY ASSOCIATED WITH TYPE 2 DIABETES MELLITUS: Chronic | ICD-10-CM

## 2024-12-16 DIAGNOSIS — I10 ESSENTIAL HYPERTENSION: Chronic | ICD-10-CM

## 2024-12-16 DIAGNOSIS — Z94.0 IMMUNOSUPPRESSIVE MANAGEMENT ENCOUNTER FOLLOWING KIDNEY TRANSPLANT: ICD-10-CM

## 2024-12-16 DIAGNOSIS — N18.2 CKD (CHRONIC KIDNEY DISEASE) STAGE 2, GFR 60-89 ML/MIN: Primary | ICD-10-CM

## 2024-12-16 DIAGNOSIS — Z91.89 AT RISK FOR OPPORTUNISTIC INFECTIONS: ICD-10-CM

## 2024-12-16 DIAGNOSIS — Z94.0 KIDNEY REPLACED BY TRANSPLANT: Primary | ICD-10-CM

## 2024-12-16 DIAGNOSIS — Z94.0 KIDNEY REPLACED BY TRANSPLANT: ICD-10-CM

## 2024-12-16 DIAGNOSIS — N25.81 HYPERPARATHYROIDISM, SECONDARY RENAL: Chronic | ICD-10-CM

## 2024-12-16 DIAGNOSIS — I25.10 CORONARY ARTERY DISEASE INVOLVING NATIVE CORONARY ARTERY OF NATIVE HEART WITHOUT ANGINA PECTORIS: Chronic | ICD-10-CM

## 2024-12-16 DIAGNOSIS — T86.11 ACUTE REJECTION OF KIDNEY TRANSPLANT: ICD-10-CM

## 2024-12-16 DIAGNOSIS — Z79.899 IMMUNOSUPPRESSIVE MANAGEMENT ENCOUNTER FOLLOWING KIDNEY TRANSPLANT: ICD-10-CM

## 2024-12-16 DIAGNOSIS — G47.33 OBSTRUCTIVE SLEEP APNEA: Chronic | ICD-10-CM

## 2024-12-16 DIAGNOSIS — I25.2 HISTORY OF NON-ST ELEVATION MYOCARDIAL INFARCTION (NSTEMI): Chronic | ICD-10-CM

## 2024-12-16 LAB
ALBUMIN SERPL BCP-MCNC: 3.7 G/DL (ref 3.5–5.2)
ANION GAP SERPL CALC-SCNC: 8 MMOL/L (ref 8–16)
BASOPHILS # BLD AUTO: 0.02 K/UL (ref 0–0.2)
BASOPHILS NFR BLD: 0.5 % (ref 0–1.9)
BUN SERPL-MCNC: 18 MG/DL (ref 6–20)
CALCIUM SERPL-MCNC: 8.9 MG/DL (ref 8.7–10.5)
CHLORIDE SERPL-SCNC: 108 MMOL/L (ref 95–110)
CO2 SERPL-SCNC: 23 MMOL/L (ref 23–29)
CREAT SERPL-MCNC: 1.9 MG/DL (ref 0.5–1.4)
DIFFERENTIAL METHOD BLD: ABNORMAL
EOSINOPHIL # BLD AUTO: 0.1 K/UL (ref 0–0.5)
EOSINOPHIL NFR BLD: 1.8 % (ref 0–8)
ERYTHROCYTE [DISTWIDTH] IN BLOOD BY AUTOMATED COUNT: 14.6 % (ref 11.5–14.5)
EST. GFR  (NO RACE VARIABLE): 44.6 ML/MIN/1.73 M^2
GLUCOSE SERPL-MCNC: 156 MG/DL (ref 70–110)
HCT VFR BLD AUTO: 42.2 % (ref 40–54)
HGB BLD-MCNC: 13.9 G/DL (ref 14–18)
IMM GRANULOCYTES # BLD AUTO: 0.02 K/UL (ref 0–0.04)
IMM GRANULOCYTES NFR BLD AUTO: 0.5 % (ref 0–0.5)
LYMPHOCYTES # BLD AUTO: 1.1 K/UL (ref 1–4.8)
LYMPHOCYTES NFR BLD: 27.4 % (ref 18–48)
MAGNESIUM SERPL-MCNC: 1.6 MG/DL (ref 1.6–2.6)
MCH RBC QN AUTO: 28 PG (ref 27–31)
MCHC RBC AUTO-ENTMCNC: 32.9 G/DL (ref 32–36)
MCV RBC AUTO: 85 FL (ref 82–98)
MONOCYTES # BLD AUTO: 0.5 K/UL (ref 0.3–1)
MONOCYTES NFR BLD: 13.1 % (ref 4–15)
NEUTROPHILS # BLD AUTO: 2.3 K/UL (ref 1.8–7.7)
NEUTROPHILS NFR BLD: 56.7 % (ref 38–73)
NRBC BLD-RTO: 0 /100 WBC
PHOSPHATE SERPL-MCNC: 3.2 MG/DL (ref 2.7–4.5)
PLATELET # BLD AUTO: 153 K/UL (ref 150–450)
PMV BLD AUTO: 10.3 FL (ref 9.2–12.9)
POTASSIUM SERPL-SCNC: 4 MMOL/L (ref 3.5–5.1)
RBC # BLD AUTO: 4.96 M/UL (ref 4.6–6.2)
SODIUM SERPL-SCNC: 139 MMOL/L (ref 136–145)
WBC # BLD AUTO: 3.98 K/UL (ref 3.9–12.7)

## 2024-12-16 PROCEDURE — 1111F DSCHRG MED/CURRENT MED MERGE: CPT | Mod: CPTII,95,, | Performed by: INTERNAL MEDICINE

## 2024-12-16 PROCEDURE — 3044F HG A1C LEVEL LT 7.0%: CPT | Mod: CPTII,95,, | Performed by: INTERNAL MEDICINE

## 2024-12-16 PROCEDURE — 80197 ASSAY OF TACROLIMUS: CPT | Performed by: INTERNAL MEDICINE

## 2024-12-16 PROCEDURE — 85025 COMPLETE CBC W/AUTO DIFF WBC: CPT | Performed by: INTERNAL MEDICINE

## 2024-12-16 PROCEDURE — 3066F NEPHROPATHY DOC TX: CPT | Mod: CPTII,95,, | Performed by: INTERNAL MEDICINE

## 2024-12-16 PROCEDURE — 4010F ACE/ARB THERAPY RXD/TAKEN: CPT | Mod: CPTII,95,, | Performed by: INTERNAL MEDICINE

## 2024-12-16 PROCEDURE — 80069 RENAL FUNCTION PANEL: CPT | Performed by: INTERNAL MEDICINE

## 2024-12-16 PROCEDURE — 83735 ASSAY OF MAGNESIUM: CPT | Performed by: INTERNAL MEDICINE

## 2024-12-16 PROCEDURE — 87799 DETECT AGENT NOS DNA QUANT: CPT | Performed by: INTERNAL MEDICINE

## 2024-12-16 PROCEDURE — 3060F POS MICROALBUMINURIA REV: CPT | Mod: CPTII,95,, | Performed by: INTERNAL MEDICINE

## 2024-12-16 PROCEDURE — 3079F DIAST BP 80-89 MM HG: CPT | Mod: CPTII,95,, | Performed by: INTERNAL MEDICINE

## 2024-12-16 PROCEDURE — 99215 OFFICE O/P EST HI 40 MIN: CPT | Mod: 95,,, | Performed by: INTERNAL MEDICINE

## 2024-12-16 PROCEDURE — 3075F SYST BP GE 130 - 139MM HG: CPT | Mod: CPTII,95,, | Performed by: INTERNAL MEDICINE

## 2024-12-16 NOTE — LETTER
December 16, 2024        Siva Figueroa  [address] [address] NEPHROLOGY  [facility] [address]  Phone: [phone]  Fax: [phone]             [facility] Transplant 1st Fl  [address]  [facility] [address]  Phone: [phone]   Patient: Robb Iglesias   MR Number: [id number]   YOB: 1980   Date of Visit: 12/16/2024       Dear Dr. Siva Figueroa    Thank you for referring Robb Iglesias to me for evaluation. Attached you will find relevant portions of my assessment and plan of care.    If you have questions, please do not hesitate to call me. I look forward to following Robb Iglesias along with you.    Sincerely,    Rell Booth MD    Enclosure    If you would like to receive this communication electronically, please contact [email or web address] or [phone] to request [employer] Link access.    [employer] Link is a tool which provides read-only access to select patient information with whom you have a relationship. Its easy to use and provides real time access to review your patients record including encounter summaries, notes, results, and demographic information.    If you feel you have received this communication in error or would no longer like to receive these types of communications, please e-mail [email or web address]

## 2024-12-16 NOTE — TELEPHONE ENCOUNTER
----- Message from Shi Lynne sent at 12/16/2024  3:59 PM CST -----  Regarding: FW: checking for plan of care corrected and resent  Contact: Contact # [phone] ext [phone] (Tameka)    ----- Message -----  From: Jacky Dominguez MA  Sent: 12/16/2024   3:56 PM CST  To: [facility] Transplant Clinical  Subject: FW: checking for plan of care corrected and #      ----- Message -----  From: Rachel Hawkins  Sent: 12/16/2024   3:16 PM CST  To: [facility] Staff  Subject: checking for plan of care corrected and rese#    Checking on a plan of care sent over to be signed by Dr. Booth -one was signed and sent back but needs more info that was then was re faxed on 12/10      Contact # [phone] ext [phone] (Tameka)

## 2024-12-17 ENCOUNTER — PATIENT MESSAGE (OUTPATIENT)
Dept: TRANSPLANT | Facility: CLINIC | Age: 44
End: 2024-12-17
Payer: COMMERCIAL

## 2024-12-17 LAB — TACROLIMUS BLD-MCNC: 12.4 NG/ML (ref 5–15)

## 2024-12-18 RX ORDER — TACROLIMUS 1 MG/1
CAPSULE ORAL
Qty: 90 CAPSULE | Refills: 11 | Status: SHIPPED | OUTPATIENT
Start: 2024-12-18 | End: 2025-12-18

## 2024-12-18 NOTE — TELEPHONE ENCOUNTER
Notified patient of Dr. Booth review of 12/16/24 lab results via [facility] message.     [facility] message sent to patient:    Napoleon Zamudio,     Dr. Booth reviewed your 12/16/24 lab results. Your prograf level 12.4 is higher than it should be. He wants you to please decrease your prograf dose to 2mg in the AM & 1mg in the PM. We'll need to recheck your prograf level on 12/26/24.    Thanks,     Vivi     ----- Message from Rell Booth MD sent at 12/17/2024  9:37 AM CST -----  Please lower prograf to 2/1 and repeat a level in 1 week

## 2024-12-19 ENCOUNTER — PATIENT MESSAGE (OUTPATIENT)
Dept: TRANSPLANT | Facility: CLINIC | Age: 44
End: 2024-12-19
Payer: COMMERCIAL

## 2024-12-19 ENCOUNTER — TELEPHONE (OUTPATIENT)
Dept: TRANSPLANT | Facility: CLINIC | Age: 44
End: 2024-12-19
Payer: COMMERCIAL

## 2024-12-19 LAB
BKV DNA SERPL NAA+PROBE-ACNC: ABNORMAL COPIES/ML
BKV DNA SERPL NAA+PROBE-LOG#: 4.18 LOG (10) COPIES/ML
BKV DNA SERPL QL NAA+PROBE: DETECTED

## 2024-12-19 RX ORDER — SODIUM CHLORIDE 0.9 % (FLUSH) 0.9 %
10 SYRINGE (ML) INJECTION
OUTPATIENT
Start: 2024-12-19

## 2024-12-19 NOTE — PROGRESS NOTES
BK PCR trending up: what treatment did he have received by far?  Stop MMF for now   BK PCR every 2weeks

## 2024-12-19 NOTE — TELEPHONE ENCOUNTER
----- Message from Elin Kenny MD sent at 12/19/2024  1:30 PM CST -----  BK PCR trending up: what treatment did he have received by far?  Stop MMF for now   BK PCR every 2weeks

## 2024-12-19 NOTE — TELEPHONE ENCOUNTER
Notified patient of Dr. Kenny's review of 12/16/24 lab results telephone & [facility] message.  Instructed patient to STOP taking Cellcept due to increase in BK viral load. Informed patient he will need to receive cidofovir infusions x 6. Will contact [facility] to get the treatment scheduled. Will continue to monitor his BK, PCR every 2 weeks. Patient verbalized understanding.      [facility] message sent to patient:    Napoleon Zamudio,     Per our telephone conversation, Dr. Kenny reviewed your 12/16/24 BK result. The viral load has increased, so she wants you to STOP taking Cellcept until further instructed to restart. You will be set up to receive 6 more Cidofovir infusions, to be given every 2 weeks.. The 1st available appointment at the [facility] center is Friday 1/3/25 at 3PM. We will also continue monitoring your BK level every 2 weeks.     Your creatinine bumped up to 1.9. Dr. Booth recommendations are for you to increase your oral hydration to at least 2.5-3Liters daily.  We'll recheck your labs on 12/26/24.     Thanks,     Vivi       ----- Message from Elin Kenny MD sent at 12/19/2024  1:55 PM CST -----  Please cidofovir X 6  Do you think IVIG would be denied again?  ----- Message -----  From: Deysi TSANG, PharmD  Sent: 12/19/2024   1:50 PM CST  To: Elin Kenny MD; #    Looks like he last got cidofovir in May, but I don't seem him ever getting any IVIG.  The IVIG therapy plan placed in Nov was not approved by insurance.  ----- Message -----  From: Elin Kenny MD  Sent: 12/19/2024   1:30 PM CST  To: [facility] Transplant Pharmacists; #    BK PCR trending up: what treatment did he have received by far?  Stop MMF for now   BK PCR every 2weeks

## 2024-12-23 ENCOUNTER — OFFICE VISIT (OUTPATIENT)
Dept: INTERNAL MEDICINE | Facility: CLINIC | Age: 44
End: 2024-12-23
Payer: COMMERCIAL

## 2024-12-23 ENCOUNTER — HOSPITAL ENCOUNTER (OUTPATIENT)
Dept: RADIOLOGY | Facility: HOSPITAL | Age: 44
Discharge: HOME | End: 2024-12-23
Attending: NURSE PRACTITIONER
Payer: COMMERCIAL

## 2024-12-23 VITALS
TEMPERATURE: 97 F | HEIGHT: 75 IN | BODY MASS INDEX: 29.88 KG/M2 | DIASTOLIC BLOOD PRESSURE: 88 MMHG | HEART RATE: 83 BPM | WEIGHT: 240.31 LBS | OXYGEN SATURATION: 100 % | RESPIRATION RATE: 18 BRPM | SYSTOLIC BLOOD PRESSURE: 120 MMHG

## 2024-12-23 DIAGNOSIS — M62.838 MUSCLE SPASM OF RIGHT LEG: ICD-10-CM

## 2024-12-23 DIAGNOSIS — I73.9 CLAUDICATION OF RIGHT LOWER EXTREMITY: ICD-10-CM

## 2024-12-23 DIAGNOSIS — M62.838 MUSCLE SPASM OF RIGHT LEG: Primary | ICD-10-CM

## 2024-12-23 DIAGNOSIS — R06.02 SOB (SHORTNESS OF BREATH): ICD-10-CM

## 2024-12-23 DIAGNOSIS — N18.31 STAGE 3A CHRONIC KIDNEY DISEASE: Chronic | ICD-10-CM

## 2024-12-23 DIAGNOSIS — I48.0 PAROXYSMAL ATRIAL FIBRILLATION: Chronic | ICD-10-CM

## 2024-12-23 DIAGNOSIS — E11.21 DIABETIC NEPHROPATHY ASSOCIATED WITH TYPE 2 DIABETES MELLITUS: Chronic | ICD-10-CM

## 2024-12-23 PROCEDURE — 3044F HG A1C LEVEL LT 7.0%: CPT | Mod: CPTII,S$GLB,, | Performed by: NURSE PRACTITIONER

## 2024-12-23 PROCEDURE — 3008F BODY MASS INDEX DOCD: CPT | Mod: CPTII,S$GLB,, | Performed by: NURSE PRACTITIONER

## 2024-12-23 PROCEDURE — 3060F POS MICROALBUMINURIA REV: CPT | Mod: CPTII,S$GLB,, | Performed by: NURSE PRACTITIONER

## 2024-12-23 PROCEDURE — 1159F MED LIST DOCD IN RCRD: CPT | Mod: CPTII,S$GLB,, | Performed by: NURSE PRACTITIONER

## 2024-12-23 PROCEDURE — 3066F NEPHROPATHY DOC TX: CPT | Mod: CPTII,S$GLB,, | Performed by: NURSE PRACTITIONER

## 2024-12-23 PROCEDURE — 73521 X-RAY EXAM HIPS BI 2 VIEWS: CPT | Mod: TC

## 2024-12-23 PROCEDURE — 3079F DIAST BP 80-89 MM HG: CPT | Mod: CPTII,S$GLB,, | Performed by: NURSE PRACTITIONER

## 2024-12-23 PROCEDURE — 93926 LOWER EXTREMITY STUDY: CPT | Mod: 26,RT,, | Performed by: RADIOLOGY

## 2024-12-23 PROCEDURE — 3074F SYST BP LT 130 MM HG: CPT | Mod: CPTII,S$GLB,, | Performed by: NURSE PRACTITIONER

## 2024-12-23 PROCEDURE — 99214 OFFICE O/P EST MOD 30 MIN: CPT | Mod: S$GLB,,, | Performed by: NURSE PRACTITIONER

## 2024-12-23 PROCEDURE — 93926 LOWER EXTREMITY STUDY: CPT | Mod: TC,RT

## 2024-12-23 PROCEDURE — G2211 COMPLEX E/M VISIT ADD ON: HCPCS | Mod: S$GLB,,, | Performed by: NURSE PRACTITIONER

## 2024-12-23 PROCEDURE — 73521 X-RAY EXAM HIPS BI 2 VIEWS: CPT | Mod: 26,,, | Performed by: RADIOLOGY

## 2024-12-23 PROCEDURE — 1111F DSCHRG MED/CURRENT MED MERGE: CPT | Mod: CPTII,S$GLB,, | Performed by: NURSE PRACTITIONER

## 2024-12-23 PROCEDURE — 99999 PR PBB SHADOW E&M-EST. PATIENT-LVL V: CPT | Mod: PBBFAC,,, | Performed by: NURSE PRACTITIONER

## 2024-12-23 PROCEDURE — 4010F ACE/ARB THERAPY RXD/TAKEN: CPT | Mod: CPTII,S$GLB,, | Performed by: NURSE PRACTITIONER

## 2024-12-23 NOTE — PROGRESS NOTES
Subjective:      Patient ID: Robb Iglesias is a 43 y.o. male.    Chief Complaint: thigh pain (Worse at night)    History of Present Illness    CHIEF COMPLAINT:  Robb presents today for follow-up after experiencing a cramp in the right thigh.    MUSCULOSKELETAL:  He experienced a cramp in the right thigh during the night lasting approximately 10 minutes, with persistent soreness that worsens at night. Heat application exacerbates the condition. He has a history of intermittent cramping throughout his life. He also reports hip pain in the evening, described as fatigue and soreness, causing him to limp.    TRANSPLANT HISTORY:  He underwent kidney transplantation last year with an episode of acute rejection before Thanksgiving requiring hospitalization and plasma exchange treatment, resulting in fatigue and exhaustion.    CARDIOVASCULAR:  He developed atrial fibrillation after steroid administration with heart rate up to 150 BPM. He is currently on Eliquis and wearing a heart rate monitor. Subsequent AFib episodes have been shorter (30-45 minutes) with heart rates not exceeding 110 BPM.    RESPIRATORY:  He reports improving shortness of breath with exertion since receiving IVIG treatments one week ago. He denies shortness of breath at rest.    DIABETES:  He had uncontrolled diabetes prior to 2017, which improved after weight loss surgery. Currently on weekly Mounjaro. Recent steroid use has elevated blood sugar, requiring resumption of insulin therapy.    FLUID INTAKE:  He drinks primarily water, consuming five 17-ounce bottles yesterday, though reports a period of not tracking fluid intake.      ROS:  General: -fever, -chills, +fatigue, -weight gain, -weight loss  Eyes: -vision changes, -redness, -discharge  ENT: -ear pain, -nasal congestion, -sore throat  Cardiovascular: -chest pain, +palpitations, -lower extremity edema  Respiratory: -cough, +shortness of breath  Gastrointestinal: -abdominal pain,  -nausea, -vomiting, -diarrhea, -constipation, -blood in stool  Genitourinary: -dysuria, -hematuria, -frequency  Musculoskeletal: +joint pain, -muscle pain, +muscle cramps  Skin: -rash, -lesion  Neurological: -headache, -dizziness, -numbness, -tingling  Psychiatric: -anxiety, -depression, -sleep difficulty          Patient Active Problem List   Diagnosis    Gastroesophageal reflux disease without esophagitis    Bariatric surgery status    Overweight (BMI 25.0-29.9)    Essential hypertension    Type 2 diabetes mellitus with stage 3a chronic kidney disease, without long-term current use of insulin    Idiopathic chronic gout, multiple sites, without tophus (tophi)    Chronic nonseasonal allergic rhinitis due to pollen    Severe obstructive sleep apnea - Intolerant of CPAP    Dyslipidemia associated with type 2 diabetes mellitus    Status post angioplasty with stent    PLMD (periodic limb movement disorder)    Coronary artery disease involving native coronary artery of native heart without angina pectoris    Direct hyperbilirubinemia    Hypertension complicating diabetes    Diabetic nephropathy associated with type 2 diabetes mellitus    Type 2 diabetes mellitus with diabetic polyneuropathy, without long-term current use of insulin    Hyperparathyroidism, secondary to chronic kidney disease    S/P kidney transplant    Prophylactic immunotherapy    At risk for opportunistic infections    Immunocompromised state due to drug therapy    Stage 3a chronic kidney disease    Paroxysmal atrial fibrillation    History of COVID-19 (Tested Positive July 31, 2023)    History of NSTEMI with CAD s/p PCI (FAMILIA) of LAD x 2 in 8/2017    BK viremia    History of motion sickness    Acute rejection of kidney transplant    CKD (chronic kidney disease) stage 2, GFR 60-89 ml/min    Immunosuppressive management encounter following kidney transplant         Current Outpatient Medications:     apixaban (ELIQUIS) 5 mg Tab, Take 1 tablet (5 mg total)  by mouth 2 (two) times daily., Disp: 60 tablet, Rfl: 11    aspirin (ECOTRIN) 81 MG EC tablet, Take 1 tablet (81 mg total) by mouth once daily., Disp: 90 tablet, Rfl: 0    atorvastatin (LIPITOR) 80 MG tablet, Take 1 tablet (80 mg total) by mouth every evening., Disp: 90 tablet, Rfl: 0    blood sugar diagnostic Strp, Check blood glucose 2 times daily as directed and as needed, Disp: 200 each, Rfl: 5    blood-glucose meter (ONETOUCH ULTRAMINI) kit, Use as instructed, Disp: 1 each, Rfl: 0    ezetimibe (ZETIA) 10 mg tablet, Take 1 tablet (10 mg total) by mouth once daily., Disp: 90 tablet, Rfl: 3    GINGER ROOT, BULK, MISC, by Misc.(Non-Drug; Combo Route) route., Disp: , Rfl:     insulin lispro (HUMALOG KWIKPEN INSULIN) 100 unit/mL pen, Inject 9 Units into the skin 3 (three) times daily. Plus sliding scale, MDD: 67 units, Disp: 15 mL, Rfl: 11    k phos di & mono-sod phos mono (PHOSPHA 250 NEUTRAL) 250 mg Tab, Take 2 tablets by mouth 3 (three) times daily., Disp: 180 tablet, Rfl: 11    lancets Misc, Check blood glucose 2 times daily as directed and as needed (dispense insurance preferred brand or patient choice), Disp: 200 each, Rfl: 5    magnesium oxide (MAG-OX) 400 mg (241.3 mg magnesium) tablet, TAKE 1 TABLET BY MOUTH 2 TIMES DAILY., Disp: 60 tablet, Rfl: 11    metoprolol tartrate (LOPRESSOR) 25 MG tablet, Take 1 tablet (25 mg total) by mouth 2 (two) times daily., Disp: 180 tablet, Rfl: 2    MOUNJARO 7.5 mg/0.5 mL PnIj, INJECT 7.5 MG UNDER THE SKIN EVERY 7 DAYS, Disp: 6 mL, Rfl: 3    multivitamin Tab, Take 1 tablet by mouth once daily., Disp: , Rfl:     nitroGLYCERIN (NITROSTAT) 0.4 MG SL tablet, Dissolve one tablet underneath tongue at onset of angina; may repeat every 5 minutes if needed. Call 911 if angina persists after 2 doses., Disp: 25 tablet, Rfl: 5    pantoprazole (PROTONIX) 40 MG tablet, Take 1 tablet (40 mg total) by mouth once daily., Disp: 90 tablet, Rfl: 3    pen needle, diabetic (BD ULTRA-FINE SHORT PEN  "NEEDLE) 31 gauge x 5/16" Ndle, Use to inject insulin into the skin 3 times daily, Disp: 100 each, Rfl: 1    pen needle, diabetic (BD ULTRA-FINE SHORT PEN NEEDLE) 31 gauge x 5/16" Ndle, Use to inject insulin 3 (three) times daily., Disp: 100 each, Rfl: 5    predniSONE (DELTASONE) 5 MG tablet, Take by mouth daily: 20mg 11/25-12/1, 15mg 12/2-12/8, 10mg 12/9-12/152024, 5mg 12/16/2024-, Disp: 75 tablet, Rfl: 10    sulfamethoxazole-trimethoprim 400-80mg (BACTRIM,SEPTRA) 400-80 mg per tablet, Take 1 tablet by mouth once daily., Disp: 30 tablet, Rfl: 5    tacrolimus (PROGRAF) 1 MG Cap, Take 2 capsules (2 mg total) by mouth every morning AND 1 capsule (1 mg total) every evening. Z94.0;Kidney txp on 5/15/2023., Disp: 90 capsule, Rfl: 11    valsartan (DIOVAN) 80 MG tablet, Take 1 tablet (80 mg total) by mouth once daily., Disp: 90 tablet, Rfl: 2    mycophenolate (CELLCEPT) 250 mg Cap, Take 2 capsules (500 mg total) by mouth 2 (two) times daily. (Patient not taking: Reported on 12/23/2024), Disp: 120 capsule, Rfl: 11    PRIVIGEN 10 % Soln, , Disp: , Rfl:       Objective:   /88 (BP Location: Left arm, Patient Position: Sitting)   Pulse 83   Temp 96.6 °F (35.9 °C)   Resp 18   Ht 6' 3" (1.905 m)   Wt 109 kg (240 lb 4.8 oz)   SpO2 100%   BMI 30.04 kg/m²     Physical Exam             Physical Exam  Vitals and nursing note reviewed.   Constitutional:       General: He is not in acute distress.     Appearance: Normal appearance. He is not ill-appearing, toxic-appearing or diaphoretic.   HENT:      Head: Normocephalic.   Cardiovascular:      Rate and Rhythm: Normal rate and regular rhythm.      Heart sounds: Normal heart sounds. No murmur heard.  Pulmonary:      Effort: Pulmonary effort is normal. No respiratory distress.      Breath sounds: Normal breath sounds. No stridor. No wheezing, rhonchi or rales.   Musculoskeletal:      Cervical back: Normal range of motion.        Legs:    Neurological:      Mental Status: He is " alert.      Gait: Gait normal.          Assessment/Plan :   1. Muscle spasm of right leg  -     X-Ray Hips Bilateral 2 View Incl AP Pelvis; Future; Expected date: 12/23/2024  -     COMPREHENSIVE METABOLIC PANEL; Future; Expected date: 12/23/2024    2. Claudication of right lower extremity  -     US Lower Extremity Veins Bilateral; Future; Expected date: 12/23/2024  -     COMPREHENSIVE METABOLIC PANEL; Future; Expected date: 12/23/2024  -     Cancel: X-Ray Chest PA And Lateral; Future; Expected date: 12/23/2024  -     Cancel: Radiology US Lower Extremity Arteries Bilateral; Future; Expected date: 12/23/2024  -     US Lower Extremity Arteries Right; Future; Expected date: 12/23/2024    3. SOB (shortness of breath)  -     D-Dimer, Quantitative; Future; Expected date: 12/23/2024  -     COMPREHENSIVE METABOLIC PANEL; Future; Expected date: 12/23/2024  -     Cancel: X-Ray Chest PA And Lateral; Future; Expected date: 12/23/2024  -     Cancel: Radiology US Lower Extremity Arteries Bilateral; Future; Expected date: 12/23/2024  -     US Lower Extremity Arteries Right; Future; Expected date: 12/23/2024    4. Paroxysmal atrial fibrillation  Overview:  Isolated episode in early June, 2023, believed provoked by post-op state. Cardiologist D/C anticoagulation at end of July, left on ASA 81 mg.    Orders:  -     Cancel: X-Ray Chest PA And Lateral; Future; Expected date: 12/23/2024    5. Diabetic nephropathy associated with type 2 diabetes mellitus    6. Stage 3a chronic kidney disease         Assessment & Plan    Suspected dehydration as primary cause of patient's muscle cramps  Considered possibility of blood clot due to recent AFib episodes and Eliquis use  Evaluated for potential osteonecrosis of hip due to steroid use  Assessed shortness of breath and fatigue, potentially related to recent IVIG treatment  Considered pulmonary embolism due to reported symptoms    ATRIAL FIBRILLATION:  - Discussed risk of clots with elevated  heart rate during AFib episodes.  - Continued Eliquis.  - Contact the office if heart rate exceeds 130 bpm during AFib episodes.    MUSCLE CONTRACTURE AND CRAMPS:  - Explained link between dehydration and muscle cramps.  - Recommend performing leg exercises (specific exercises to be provided).  - Robb to stand up or straighten leg when cramp starts.  - Recommend using warm compress or heating pad on affected area.  - Robb should massage affected area when cramping occurs.  - Started vitamin B complex (OTC, Nature Made brand recommended).  - Continued magnesium (twice daily).    TYPE 2 DIABETES MELLITUS:  - Continued Mounjaro (weekly injection).    DEHYDRATION AND FLUID MANAGEMENT:  - Discussed how heat can worsen dehydration.  - Recommend increasing daily water intake to 100-120 ounces.  - Robb should avoid hot showers; use warm water instead.    STEROID-RELATED CONCERNS:  - Explained potential side effects of steroids, including impact on blood sugar and need for increased water intake.    DIAGNOSTIC TESTS AND IMAGING:  - Ordered hip x-ray (bilateral).  - Ordered D-dimer blood test.  - Ordered ultrasound of leg.  - Ordered complete metabolic panel (CMP).  - Ordered CT chest WO Contrast.          No follow-ups on file.    This note was generated with the assistance of ambient listening technology. Verbal consent was obtained by the patient and accompanying visitor(s) for the recording of patient appointment to facilitate this note. I attest to having reviewed and edited the generated note for accuracy, though some syntax or spelling errors may persist. Please contact the author of this note for any clarification.       Answers submitted by the patient for this visit:  Review of Systems Questionnaire (Submitted on 12/23/2024)  activity change: Yes  unexpected weight change: No  neck pain: No  hearing loss: No  rhinorrhea: No  trouble swallowing: No  eye discharge: No  visual disturbance: No  chest tightness:  No  wheezing: No  chest pain: No  palpitations: Yes  blood in stool: No  constipation: No  vomiting: No  diarrhea: No  polydipsia: No  polyuria: No  difficulty urinating: No  urgency: No  hematuria: No  joint swelling: No  arthralgias: No  headaches: No  weakness: Yes  confusion: No  dysphoric mood: No

## 2024-12-26 ENCOUNTER — LAB VISIT (OUTPATIENT)
Dept: LAB | Facility: HOSPITAL | Age: 44
End: 2024-12-26
Attending: INTERNAL MEDICINE
Payer: COMMERCIAL

## 2024-12-26 DIAGNOSIS — Z94.0 KIDNEY REPLACED BY TRANSPLANT: ICD-10-CM

## 2024-12-26 LAB
ALBUMIN SERPL BCP-MCNC: 3.8 G/DL (ref 3.5–5.2)
ANION GAP SERPL CALC-SCNC: 7 MMOL/L (ref 8–16)
BASOPHILS # BLD AUTO: 0.03 K/UL (ref 0–0.2)
BASOPHILS NFR BLD: 0.6 % (ref 0–1.9)
BUN SERPL-MCNC: 15 MG/DL (ref 6–20)
CALCIUM SERPL-MCNC: 8.8 MG/DL (ref 8.7–10.5)
CHLORIDE SERPL-SCNC: 108 MMOL/L (ref 95–110)
CO2 SERPL-SCNC: 26 MMOL/L (ref 23–29)
CREAT SERPL-MCNC: 1.6 MG/DL (ref 0.5–1.4)
DIFFERENTIAL METHOD BLD: ABNORMAL
EOSINOPHIL # BLD AUTO: 0.1 K/UL (ref 0–0.5)
EOSINOPHIL NFR BLD: 2.5 % (ref 0–8)
ERYTHROCYTE [DISTWIDTH] IN BLOOD BY AUTOMATED COUNT: 15.1 % (ref 11.5–14.5)
EST. GFR  (NO RACE VARIABLE): 54.8 ML/MIN/1.73 M^2
GLUCOSE SERPL-MCNC: 158 MG/DL (ref 70–110)
HCT VFR BLD AUTO: 42.7 % (ref 40–54)
HGB BLD-MCNC: 14.3 G/DL (ref 14–18)
IMM GRANULOCYTES # BLD AUTO: 0.03 K/UL (ref 0–0.04)
IMM GRANULOCYTES NFR BLD AUTO: 0.6 % (ref 0–0.5)
LYMPHOCYTES # BLD AUTO: 1.3 K/UL (ref 1–4.8)
LYMPHOCYTES NFR BLD: 27.3 % (ref 18–48)
MAGNESIUM SERPL-MCNC: 1.7 MG/DL (ref 1.6–2.6)
MCH RBC QN AUTO: 28.2 PG (ref 27–31)
MCHC RBC AUTO-ENTMCNC: 33.5 G/DL (ref 32–36)
MCV RBC AUTO: 84 FL (ref 82–98)
MONOCYTES # BLD AUTO: 0.6 K/UL (ref 0.3–1)
MONOCYTES NFR BLD: 12.5 % (ref 4–15)
NEUTROPHILS # BLD AUTO: 2.8 K/UL (ref 1.8–7.7)
NEUTROPHILS NFR BLD: 56.5 % (ref 38–73)
NRBC BLD-RTO: 0 /100 WBC
PHOSPHATE SERPL-MCNC: 2.7 MG/DL (ref 2.7–4.5)
PLATELET # BLD AUTO: 144 K/UL (ref 150–450)
PMV BLD AUTO: 9.7 FL (ref 9.2–12.9)
POTASSIUM SERPL-SCNC: 4.4 MMOL/L (ref 3.5–5.1)
RBC # BLD AUTO: 5.07 M/UL (ref 4.6–6.2)
SODIUM SERPL-SCNC: 141 MMOL/L (ref 136–145)
WBC # BLD AUTO: 4.87 K/UL (ref 3.9–12.7)

## 2024-12-26 PROCEDURE — 83735 ASSAY OF MAGNESIUM: CPT | Performed by: INTERNAL MEDICINE

## 2024-12-26 PROCEDURE — 36415 COLL VENOUS BLD VENIPUNCTURE: CPT | Performed by: INTERNAL MEDICINE

## 2024-12-26 PROCEDURE — 80069 RENAL FUNCTION PANEL: CPT | Performed by: INTERNAL MEDICINE

## 2024-12-26 PROCEDURE — 80197 ASSAY OF TACROLIMUS: CPT | Performed by: INTERNAL MEDICINE

## 2024-12-26 PROCEDURE — 85025 COMPLETE CBC W/AUTO DIFF WBC: CPT | Performed by: INTERNAL MEDICINE

## 2024-12-27 ENCOUNTER — PATIENT MESSAGE (OUTPATIENT)
Dept: TRANSPLANT | Facility: CLINIC | Age: 44
End: 2024-12-27
Payer: COMMERCIAL

## 2024-12-27 DIAGNOSIS — Z94.0 KIDNEY REPLACED BY TRANSPLANT: Primary | ICD-10-CM

## 2024-12-27 LAB — TACROLIMUS BLD-MCNC: 8.5 NG/ML (ref 5–15)

## 2024-12-27 NOTE — TELEPHONE ENCOUNTER
Notified patient of Dr. Mota's review of 12/26/24 lab results via [facility] message.    Dr. Nakul Long reviewed your 12/26/24 lab results. Your prograf level 8.5 is higher than it should be. She wants you to please decrease your prograf dose to 1.5mg in the AM & 1mg in the PM. If you still have 0.5mg capsules you can start the dose change tomorrow. Take one of the 1mg capsules with one of the 0.5mg capsules every AM. A new prescription will be sent to [facility] to reflect the dose change.     We'll need to recheck your level in 2 weeks. The 12/30 lab appointment will be changed to 1/9/25.     So far I haven't received an update on the billing issues we discussed last week. The issue was escalated to one of the billing representatives but no word yet.     Thanks,     Vivi     ----- Message from Angélica Gutierrez MD sent at 12/27/2024  9:51 AM CST -----  LOWER TACRO TO 1.5 MG QAM AND 1.0 MG NIGHTLY  Repeat level in 2 weeks

## 2024-12-30 RX ORDER — TACROLIMUS 1 MG/1
1 CAPSULE ORAL EVERY 12 HOURS
Qty: 60 CAPSULE | Refills: 11 | Status: SHIPPED | OUTPATIENT
Start: 2024-12-30 | End: 2025-12-30

## 2024-12-30 RX ORDER — TACROLIMUS 0.5 MG/1
0.5 CAPSULE ORAL EVERY MORNING
Qty: 30 CAPSULE | Refills: 11 | Status: SHIPPED | OUTPATIENT
Start: 2024-12-30 | End: 2025-12-30

## 2025-01-01 ENCOUNTER — PATIENT MESSAGE (OUTPATIENT)
Dept: TRANSPLANT | Facility: CLINIC | Age: 45
End: 2025-01-01
Payer: COMMERCIAL

## 2025-01-02 ENCOUNTER — PATIENT MESSAGE (OUTPATIENT)
Dept: TRANSPLANT | Facility: CLINIC | Age: 45
End: 2025-01-02
Payer: COMMERCIAL

## 2025-01-02 ENCOUNTER — PATIENT MESSAGE (OUTPATIENT)
Dept: INFUSION THERAPY | Facility: HOSPITAL | Age: 45
End: 2025-01-02
Payer: COMMERCIAL

## 2025-01-02 DIAGNOSIS — Z94.0 KIDNEY REPLACED BY TRANSPLANT: Primary | ICD-10-CM

## 2025-01-03 ENCOUNTER — INFUSION (OUTPATIENT)
Dept: INFUSION THERAPY | Facility: HOSPITAL | Age: 45
End: 2025-01-03
Attending: NURSE PRACTITIONER
Payer: COMMERCIAL

## 2025-01-03 VITALS
WEIGHT: 240.31 LBS | TEMPERATURE: 99 F | DIASTOLIC BLOOD PRESSURE: 93 MMHG | HEART RATE: 77 BPM | RESPIRATION RATE: 16 BRPM | SYSTOLIC BLOOD PRESSURE: 148 MMHG | HEIGHT: 75 IN | OXYGEN SATURATION: 100 % | BODY MASS INDEX: 29.88 KG/M2

## 2025-01-03 DIAGNOSIS — B34.8 BK VIREMIA: Primary | ICD-10-CM

## 2025-01-03 PROCEDURE — 25000003 PHARM REV CODE 250: Performed by: INTERNAL MEDICINE

## 2025-01-03 PROCEDURE — 96361 HYDRATE IV INFUSION ADD-ON: CPT

## 2025-01-03 PROCEDURE — 96365 THER/PROPH/DIAG IV INF INIT: CPT

## 2025-01-03 PROCEDURE — 63600175 PHARM REV CODE 636 W HCPCS: Mod: JZ,TB | Performed by: INTERNAL MEDICINE

## 2025-01-03 RX ORDER — SODIUM CHLORIDE 0.9 % (FLUSH) 0.9 %
10 SYRINGE (ML) INJECTION
OUTPATIENT
Start: 2025-01-17

## 2025-01-03 RX ORDER — SODIUM CHLORIDE 9 MG/ML
INJECTION, SOLUTION INTRAVENOUS
Status: COMPLETED | OUTPATIENT
Start: 2025-01-03 | End: 2025-01-03

## 2025-01-03 RX ADMIN — SODIUM CHLORIDE: 9 INJECTION, SOLUTION INTRAVENOUS at 03:01

## 2025-01-03 RX ADMIN — SODIUM CHLORIDE 55 MG: 9 INJECTION, SOLUTION INTRAVENOUS at 04:01

## 2025-01-03 NOTE — PLAN OF CARE
"  Problem: Adult Inpatient Plan of Care  Goal: Plan of Care Review  Outcome: Progressing  Flowsheets (Taken 1/3/2025 1522)  Plan of Care Reviewed With:   patient   spouse  Goal: Patient-Specific Goal (Individualized)  Outcome: Progressing  Flowsheets (Taken 1/3/2025 1522)  Individualized Care Needs: none today  Anxieties, Fears or Concerns: feeling "run down"  Goal: Absence of Hospital-Acquired Illness or Injury  Outcome: Progressing  Intervention: Prevent Infection  Flowsheets (Taken 1/3/2025 1522)  Infection Prevention:   equipment surfaces disinfected   hand hygiene promoted   personal protective equipment utilized   rest/sleep promoted  Goal: Optimal Comfort and Wellbeing  Outcome: Progressing  Intervention: Provide Person-Centered Care  Flowsheets (Taken 1/3/2025 1522)  Trust Relationship/Rapport:   care explained   thoughts/feelings acknowledged   choices provided   emotional support provided   empathic listening provided   questions encouraged   questions answered   reassurance provided     "

## 2025-01-06 ENCOUNTER — LAB VISIT (OUTPATIENT)
Dept: LAB | Facility: HOSPITAL | Age: 45
End: 2025-01-06
Attending: INTERNAL MEDICINE
Payer: COMMERCIAL

## 2025-01-06 DIAGNOSIS — Z94.0 KIDNEY REPLACED BY TRANSPLANT: ICD-10-CM

## 2025-01-06 LAB
ALBUMIN SERPL BCP-MCNC: 4 G/DL (ref 3.5–5.2)
ANION GAP SERPL CALC-SCNC: 10 MMOL/L (ref 8–16)
BASOPHILS # BLD AUTO: 0.02 K/UL (ref 0–0.2)
BASOPHILS NFR BLD: 0.2 % (ref 0–1.9)
BUN SERPL-MCNC: 15 MG/DL (ref 6–20)
CALCIUM SERPL-MCNC: 9.7 MG/DL (ref 8.7–10.5)
CHLORIDE SERPL-SCNC: 107 MMOL/L (ref 95–110)
CO2 SERPL-SCNC: 24 MMOL/L (ref 23–29)
CREAT SERPL-MCNC: 1.4 MG/DL (ref 0.5–1.4)
DIFFERENTIAL METHOD BLD: ABNORMAL
EOSINOPHIL # BLD AUTO: 0.1 K/UL (ref 0–0.5)
EOSINOPHIL NFR BLD: 1.6 % (ref 0–8)
ERYTHROCYTE [DISTWIDTH] IN BLOOD BY AUTOMATED COUNT: 15 % (ref 11.5–14.5)
EST. GFR  (NO RACE VARIABLE): >60 ML/MIN/1.73 M^2
GLUCOSE SERPL-MCNC: 179 MG/DL (ref 70–110)
HCT VFR BLD AUTO: 44.5 % (ref 40–54)
HGB BLD-MCNC: 14.5 G/DL (ref 14–18)
IMM GRANULOCYTES # BLD AUTO: 0.03 K/UL (ref 0–0.04)
IMM GRANULOCYTES NFR BLD AUTO: 0.4 % (ref 0–0.5)
LYMPHOCYTES # BLD AUTO: 1.4 K/UL (ref 1–4.8)
LYMPHOCYTES NFR BLD: 16.7 % (ref 18–48)
MAGNESIUM SERPL-MCNC: 1.7 MG/DL (ref 1.6–2.6)
MCH RBC QN AUTO: 28.2 PG (ref 27–31)
MCHC RBC AUTO-ENTMCNC: 32.6 G/DL (ref 32–36)
MCV RBC AUTO: 86 FL (ref 82–98)
MONOCYTES # BLD AUTO: 0.8 K/UL (ref 0.3–1)
MONOCYTES NFR BLD: 10.1 % (ref 4–15)
NEUTROPHILS # BLD AUTO: 5.9 K/UL (ref 1.8–7.7)
NEUTROPHILS NFR BLD: 71 % (ref 38–73)
NRBC BLD-RTO: 0 /100 WBC
PHOSPHATE SERPL-MCNC: 2.9 MG/DL (ref 2.7–4.5)
PLATELET # BLD AUTO: 187 K/UL (ref 150–450)
PMV BLD AUTO: 9.7 FL (ref 9.2–12.9)
POTASSIUM SERPL-SCNC: 4.1 MMOL/L (ref 3.5–5.1)
RBC # BLD AUTO: 5.15 M/UL (ref 4.6–6.2)
SODIUM SERPL-SCNC: 141 MMOL/L (ref 136–145)
WBC # BLD AUTO: 8.24 K/UL (ref 3.9–12.7)

## 2025-01-06 PROCEDURE — 83735 ASSAY OF MAGNESIUM: CPT | Performed by: INTERNAL MEDICINE

## 2025-01-06 PROCEDURE — 85025 COMPLETE CBC W/AUTO DIFF WBC: CPT | Performed by: INTERNAL MEDICINE

## 2025-01-06 PROCEDURE — 80197 ASSAY OF TACROLIMUS: CPT | Performed by: INTERNAL MEDICINE

## 2025-01-06 PROCEDURE — 36415 COLL VENOUS BLD VENIPUNCTURE: CPT | Performed by: INTERNAL MEDICINE

## 2025-01-06 PROCEDURE — 80069 RENAL FUNCTION PANEL: CPT | Performed by: INTERNAL MEDICINE

## 2025-01-06 PROCEDURE — 87799 DETECT AGENT NOS DNA QUANT: CPT | Performed by: INTERNAL MEDICINE

## 2025-01-07 ENCOUNTER — PATIENT MESSAGE (OUTPATIENT)
Dept: INTERNAL MEDICINE | Facility: CLINIC | Age: 45
End: 2025-01-07
Payer: COMMERCIAL

## 2025-01-07 LAB — TACROLIMUS BLD-MCNC: 6.7 NG/ML (ref 5–15)

## 2025-01-09 ENCOUNTER — PATIENT MESSAGE (OUTPATIENT)
Dept: TRANSPLANT | Facility: CLINIC | Age: 45
End: 2025-01-09
Payer: COMMERCIAL

## 2025-01-09 ENCOUNTER — TELEPHONE (OUTPATIENT)
Dept: TRANSPLANT | Facility: CLINIC | Age: 45
End: 2025-01-09
Payer: COMMERCIAL

## 2025-01-09 LAB
BKV DNA SERPL NAA+PROBE-ACNC: ABNORMAL COPIES/ML
BKV DNA SERPL NAA+PROBE-LOG#: 4.44 LOG (10) COPIES/ML
BKV DNA SERPL QL NAA+PROBE: DETECTED

## 2025-01-09 NOTE — TELEPHONE ENCOUNTER
Pt made aware of below orders          ----- Message from Rell Booth MD sent at 1/9/2025  1:56 PM CST -----  Hold MMF for 2 weeks

## 2025-01-13 ENCOUNTER — LAB VISIT (OUTPATIENT)
Dept: LAB | Facility: HOSPITAL | Age: 45
End: 2025-01-13
Attending: INTERNAL MEDICINE
Payer: COMMERCIAL

## 2025-01-13 DIAGNOSIS — Z94.0 KIDNEY REPLACED BY TRANSPLANT: ICD-10-CM

## 2025-01-13 LAB
ALBUMIN SERPL BCP-MCNC: 3.7 G/DL (ref 3.5–5.2)
ANION GAP SERPL CALC-SCNC: 9 MMOL/L (ref 8–16)
BASOPHILS # BLD AUTO: 0.04 K/UL (ref 0–0.2)
BASOPHILS NFR BLD: 0.6 % (ref 0–1.9)
BUN SERPL-MCNC: 13 MG/DL (ref 6–20)
CALCIUM SERPL-MCNC: 9 MG/DL (ref 8.7–10.5)
CHLORIDE SERPL-SCNC: 108 MMOL/L (ref 95–110)
CO2 SERPL-SCNC: 25 MMOL/L (ref 23–29)
CREAT SERPL-MCNC: 1.4 MG/DL (ref 0.5–1.4)
DIFFERENTIAL METHOD BLD: ABNORMAL
EOSINOPHIL # BLD AUTO: 0.1 K/UL (ref 0–0.5)
EOSINOPHIL NFR BLD: 2.2 % (ref 0–8)
ERYTHROCYTE [DISTWIDTH] IN BLOOD BY AUTOMATED COUNT: 14.6 % (ref 11.5–14.5)
EST. GFR  (NO RACE VARIABLE): >60 ML/MIN/1.73 M^2
GLUCOSE SERPL-MCNC: 167 MG/DL (ref 70–110)
HCT VFR BLD AUTO: 41.2 % (ref 40–54)
HGB BLD-MCNC: 13.1 G/DL (ref 14–18)
IMM GRANULOCYTES # BLD AUTO: 0.07 K/UL (ref 0–0.04)
IMM GRANULOCYTES NFR BLD AUTO: 1.1 % (ref 0–0.5)
LYMPHOCYTES # BLD AUTO: 1.9 K/UL (ref 1–4.8)
LYMPHOCYTES NFR BLD: 30.4 % (ref 18–48)
MAGNESIUM SERPL-MCNC: 1.8 MG/DL (ref 1.6–2.6)
MCH RBC QN AUTO: 27.6 PG (ref 27–31)
MCHC RBC AUTO-ENTMCNC: 31.8 G/DL (ref 32–36)
MCV RBC AUTO: 87 FL (ref 82–98)
MONOCYTES # BLD AUTO: 0.8 K/UL (ref 0.3–1)
MONOCYTES NFR BLD: 12 % (ref 4–15)
NEUTROPHILS # BLD AUTO: 3.4 K/UL (ref 1.8–7.7)
NEUTROPHILS NFR BLD: 53.7 % (ref 38–73)
NRBC BLD-RTO: 0 /100 WBC
PHOSPHATE SERPL-MCNC: 2.6 MG/DL (ref 2.7–4.5)
PLATELET # BLD AUTO: 197 K/UL (ref 150–450)
PMV BLD AUTO: 9.4 FL (ref 9.2–12.9)
POTASSIUM SERPL-SCNC: 4.3 MMOL/L (ref 3.5–5.1)
RBC # BLD AUTO: 4.75 M/UL (ref 4.6–6.2)
SODIUM SERPL-SCNC: 142 MMOL/L (ref 136–145)
WBC # BLD AUTO: 6.32 K/UL (ref 3.9–12.7)

## 2025-01-13 PROCEDURE — 36415 COLL VENOUS BLD VENIPUNCTURE: CPT | Performed by: INTERNAL MEDICINE

## 2025-01-13 PROCEDURE — 80069 RENAL FUNCTION PANEL: CPT | Performed by: INTERNAL MEDICINE

## 2025-01-13 PROCEDURE — 80197 ASSAY OF TACROLIMUS: CPT | Performed by: INTERNAL MEDICINE

## 2025-01-13 PROCEDURE — 85025 COMPLETE CBC W/AUTO DIFF WBC: CPT | Performed by: INTERNAL MEDICINE

## 2025-01-13 PROCEDURE — 83735 ASSAY OF MAGNESIUM: CPT | Performed by: INTERNAL MEDICINE

## 2025-01-14 LAB — TACROLIMUS BLD-MCNC: 5.5 NG/ML (ref 5–15)

## 2025-01-17 ENCOUNTER — PATIENT MESSAGE (OUTPATIENT)
Dept: TRANSPLANT | Facility: CLINIC | Age: 45
End: 2025-01-17
Payer: COMMERCIAL

## 2025-01-17 ENCOUNTER — INFUSION (OUTPATIENT)
Dept: INFUSION THERAPY | Facility: HOSPITAL | Age: 45
End: 2025-01-17
Attending: NURSE PRACTITIONER
Payer: COMMERCIAL

## 2025-01-17 VITALS
TEMPERATURE: 98 F | SYSTOLIC BLOOD PRESSURE: 140 MMHG | OXYGEN SATURATION: 98 % | DIASTOLIC BLOOD PRESSURE: 90 MMHG | HEART RATE: 85 BPM | RESPIRATION RATE: 16 BRPM

## 2025-01-17 DIAGNOSIS — B34.8 BK VIREMIA: Primary | ICD-10-CM

## 2025-01-17 PROCEDURE — 25000003 PHARM REV CODE 250: Performed by: INTERNAL MEDICINE

## 2025-01-17 PROCEDURE — 63600175 PHARM REV CODE 636 W HCPCS: Mod: JZ,TB | Performed by: INTERNAL MEDICINE

## 2025-01-17 PROCEDURE — 96365 THER/PROPH/DIAG IV INF INIT: CPT

## 2025-01-17 PROCEDURE — 96361 HYDRATE IV INFUSION ADD-ON: CPT

## 2025-01-17 RX ORDER — SODIUM CHLORIDE 0.9 % (FLUSH) 0.9 %
10 SYRINGE (ML) INJECTION
Status: CANCELLED | OUTPATIENT
Start: 2025-01-31

## 2025-01-17 RX ADMIN — CIDOFOVIR DIHYDRATE 55 MG: 375 INJECTION, SOLUTION INTRAVENOUS at 01:01

## 2025-01-17 RX ADMIN — SODIUM CHLORIDE 1000 ML: 9 INJECTION, SOLUTION INTRAVENOUS at 02:01

## 2025-01-17 NOTE — DISCHARGE INSTRUCTIONS
.[facility]  [address]  [address]  [phone] phone     [phone] fax  Hours of Operation: Monday- Friday 8:00am- 5:00pm  After hours phone  [phone]  Hematology / Oncology Physicians on call    Dr. Dell Kahn           Nurse Practitioners:     Rain Vera, YOVANNY Mcfarlane, SANTY Wallis, YOVANNY Wright, YOVANNY Jacques, NP    Please don't hesitate to call if you have any concerns.      FALL PREVENTION   Falls often occur due to slipping, tripping or losing your balance. Here are ways to reduce your risk of falling again.   Was there anything that caused your fall that can be fixed, removed or replaced?   Make your home safe by keeping walkways clear of objects you may trip over.   Use non-slip pads under rugs.   Do not walk in poorly lit areas.   Do not stand on chairs or wobbly ladders.   Use caution when reaching overhead or looking upward. This position can cause a loss of balance.   Be sure your shoes fit properly, have non-slip bottoms and are in good condition.   Be cautious when going up and down stairs, curbs, and when walking on uneven sidewalks.   If your balance is poor, consider using a cane or walker.   If your fall was related to alcohol use, stop or limit alcohol intake.   If your fall was related to use of sleeping medicines, talk to your doctor about this. You may need to reduce your dosage at bedtime if you awaken during the night to go to the bathroom.   To reduce the need for nighttime bathroom trips:   Avoid drinking fluids for several hours before going to bed   Empty your bladder before going to bed   Men can keep a urinal at the bedside   © 0967-2010 [employer], [address] [place] [address], [place] [address]. All rights reserved. This information is not intended as a substitute for professional medical care. Always follow your healthcare  professional's instructions.    WAYS TO HELP PREVENT INFECTION        WASH YOUR HANDS OFTEN DURING THE DAY, ESPECIALLY BEFORE YOU EAT, AFTER USING THE BATHROOM, AND AFTER TOUCHING ANIMALS    STAY AWAY FROM PEOPLE WHO HAVE ILLNESSES YOU CAN CATCH; SUCH AS COLDS, FLU, CHICKEN POX    TRY TO AVOID CROWDS    STAY AWAY FROM CHILDREN WHO RECENTLY HAVE RECEIVED LIVE VIRUS VACCINES    MAINTAIN GOOD MOUTH CARE    DO NOT SQUEEZE OR SCRATCH PIMPLES    CLEAN CUTS & SCRAPES RIGHT AWAY AND DAILY UNTIL HEALED WITH WARM WATER, SOAP & AN ANTISEPTIC    AVOID CONTACT WITH LITTER BOXES, BIRD CAGES, & FISH TANKS    AVOID STANDING WATER, IE., BIRD BATHS, FLOWER POTS/VASES, OR HUMIDIFIERS    WEAR GLOVES WHEN GARDENING OR CLEANING UP AFTER OTHERS, ESPECIALLY BABIES & SMALL CHILDREN    DO NOT EAT RAW FISH, SEAFOOD, MEAT, OR EGGS

## 2025-01-17 NOTE — PLAN OF CARE
Problem: Adult Inpatient Plan of Care  Goal: Plan of Care Review  Outcome: Progressing  Flowsheets (Taken 1/17/2025 1603)  Plan of Care Reviewed With: patient  Goal: Patient-Specific Goal (Individualized)  Outcome: Progressing  Flowsheets (Taken 1/17/2025 1603)  Individualized Care Needs: Warm blanket provided  Anxieties, Fears or Concerns: No complaints expressed  Patient/Family-Specific Goals (Include Timeframe): tolerated infusion today  Goal: Optimal Comfort and Wellbeing  Outcome: Progressing  Intervention: Monitor Pain and Promote Comfort  Flowsheets (Taken 1/17/2025 1603)  Pain Management Interventions:   warm blanket provided   quiet environment facilitated   pillow support provided  Intervention: Provide Person-Centered Care  Flowsheets (Taken 1/17/2025 1603)  Trust Relationship/Rapport:   care explained   reassurance provided   choices provided   emotional support provided   thoughts/feelings acknowledged   empathic listening provided   questions answered   questions encouraged     Problem: Infection  Goal: Absence of Infection Signs and Symptoms  Outcome: Progressing  Intervention: Prevent or Manage Infection  Flowsheets (Taken 1/17/2025 1603)  Infection Management: aseptic technique maintained  Isolation Precautions: precautions maintained

## 2025-01-27 ENCOUNTER — LAB VISIT (OUTPATIENT)
Dept: LAB | Facility: HOSPITAL | Age: 45
End: 2025-01-27
Attending: INTERNAL MEDICINE
Payer: COMMERCIAL

## 2025-01-27 DIAGNOSIS — Z94.0 KIDNEY REPLACED BY TRANSPLANT: ICD-10-CM

## 2025-01-27 LAB
ALBUMIN SERPL BCP-MCNC: 3.9 G/DL (ref 3.5–5.2)
ANION GAP SERPL CALC-SCNC: 7 MMOL/L (ref 8–16)
BASOPHILS # BLD AUTO: 0.03 K/UL (ref 0–0.2)
BASOPHILS NFR BLD: 0.5 % (ref 0–1.9)
BUN SERPL-MCNC: 15 MG/DL (ref 6–20)
CALCIUM SERPL-MCNC: 9 MG/DL (ref 8.7–10.5)
CHLORIDE SERPL-SCNC: 109 MMOL/L (ref 95–110)
CO2 SERPL-SCNC: 25 MMOL/L (ref 23–29)
CREAT SERPL-MCNC: 1.3 MG/DL (ref 0.5–1.4)
DIFFERENTIAL METHOD BLD: ABNORMAL
EOSINOPHIL # BLD AUTO: 0.2 K/UL (ref 0–0.5)
EOSINOPHIL NFR BLD: 3 % (ref 0–8)
ERYTHROCYTE [DISTWIDTH] IN BLOOD BY AUTOMATED COUNT: 15.1 % (ref 11.5–14.5)
EST. GFR  (NO RACE VARIABLE): >60 ML/MIN/1.73 M^2
GLUCOSE SERPL-MCNC: 143 MG/DL (ref 70–110)
HCT VFR BLD AUTO: 41.7 % (ref 40–54)
HGB BLD-MCNC: 13.3 G/DL (ref 14–18)
IMM GRANULOCYTES # BLD AUTO: 0.03 K/UL (ref 0–0.04)
IMM GRANULOCYTES NFR BLD AUTO: 0.5 % (ref 0–0.5)
LYMPHOCYTES # BLD AUTO: 1.3 K/UL (ref 1–4.8)
LYMPHOCYTES NFR BLD: 23.4 % (ref 18–48)
MAGNESIUM SERPL-MCNC: 1.7 MG/DL (ref 1.6–2.6)
MCH RBC QN AUTO: 27.7 PG (ref 27–31)
MCHC RBC AUTO-ENTMCNC: 31.9 G/DL (ref 32–36)
MCV RBC AUTO: 87 FL (ref 82–98)
MONOCYTES # BLD AUTO: 0.6 K/UL (ref 0.3–1)
MONOCYTES NFR BLD: 11.4 % (ref 4–15)
NEUTROPHILS # BLD AUTO: 3.4 K/UL (ref 1.8–7.7)
NEUTROPHILS NFR BLD: 61.2 % (ref 38–73)
NRBC BLD-RTO: 0 /100 WBC
PHOSPHATE SERPL-MCNC: 2.4 MG/DL (ref 2.7–4.5)
PLATELET # BLD AUTO: 174 K/UL (ref 150–450)
PMV BLD AUTO: 9.4 FL (ref 9.2–12.9)
POTASSIUM SERPL-SCNC: 4.2 MMOL/L (ref 3.5–5.1)
RBC # BLD AUTO: 4.81 M/UL (ref 4.6–6.2)
SODIUM SERPL-SCNC: 141 MMOL/L (ref 136–145)
WBC # BLD AUTO: 5.59 K/UL (ref 3.9–12.7)

## 2025-01-27 PROCEDURE — 83735 ASSAY OF MAGNESIUM: CPT | Performed by: INTERNAL MEDICINE

## 2025-01-27 PROCEDURE — 80197 ASSAY OF TACROLIMUS: CPT | Performed by: INTERNAL MEDICINE

## 2025-01-27 PROCEDURE — 80069 RENAL FUNCTION PANEL: CPT | Performed by: INTERNAL MEDICINE

## 2025-01-27 PROCEDURE — 87799 DETECT AGENT NOS DNA QUANT: CPT | Performed by: INTERNAL MEDICINE

## 2025-01-27 PROCEDURE — 85025 COMPLETE CBC W/AUTO DIFF WBC: CPT | Performed by: INTERNAL MEDICINE

## 2025-01-28 ENCOUNTER — PATIENT MESSAGE (OUTPATIENT)
Dept: INFUSION THERAPY | Facility: HOSPITAL | Age: 45
End: 2025-01-28
Payer: COMMERCIAL

## 2025-01-28 ENCOUNTER — PATIENT MESSAGE (OUTPATIENT)
Dept: CARDIOLOGY | Facility: CLINIC | Age: 45
End: 2025-01-28
Payer: COMMERCIAL

## 2025-01-28 ENCOUNTER — TELEPHONE (OUTPATIENT)
Dept: INFUSION THERAPY | Facility: HOSPITAL | Age: 45
End: 2025-01-28
Payer: COMMERCIAL

## 2025-01-28 LAB — TACROLIMUS BLD-MCNC: 6 NG/ML (ref 5–15)

## 2025-01-28 NOTE — TELEPHONE ENCOUNTER
----- Message from AYoliesegun sent at 1/27/2025  8:36 AM CST -----  Contact: self [phone]  Would like to receive medical advice.     Would they like a call back or a response via [facility]:  call back     Additional information:  Calling to speak with the office about getting his appt moved to a Wednesday or Thursday.

## 2025-01-29 ENCOUNTER — TELEPHONE (OUTPATIENT)
Dept: CARDIOLOGY | Facility: CLINIC | Age: 45
End: 2025-01-29

## 2025-01-29 ENCOUNTER — INFUSION (OUTPATIENT)
Dept: INFUSION THERAPY | Facility: HOSPITAL | Age: 45
End: 2025-01-29
Attending: NURSE PRACTITIONER
Payer: COMMERCIAL

## 2025-01-29 VITALS
OXYGEN SATURATION: 97 % | RESPIRATION RATE: 17 BRPM | DIASTOLIC BLOOD PRESSURE: 78 MMHG | HEART RATE: 87 BPM | TEMPERATURE: 98 F | SYSTOLIC BLOOD PRESSURE: 138 MMHG | BODY MASS INDEX: 30.31 KG/M2 | WEIGHT: 242.5 LBS

## 2025-01-29 DIAGNOSIS — B34.8 BK VIREMIA: Primary | ICD-10-CM

## 2025-01-29 PROCEDURE — 63600175 PHARM REV CODE 636 W HCPCS: Mod: JZ,TB | Performed by: INTERNAL MEDICINE

## 2025-01-29 PROCEDURE — 96366 THER/PROPH/DIAG IV INF ADDON: CPT

## 2025-01-29 PROCEDURE — 96365 THER/PROPH/DIAG IV INF INIT: CPT

## 2025-01-29 PROCEDURE — 96361 HYDRATE IV INFUSION ADD-ON: CPT

## 2025-01-29 PROCEDURE — 25000003 PHARM REV CODE 250: Performed by: INTERNAL MEDICINE

## 2025-01-29 RX ORDER — SODIUM CHLORIDE 0.9 % (FLUSH) 0.9 %
10 SYRINGE (ML) INJECTION
OUTPATIENT
Start: 2025-01-31

## 2025-01-29 RX ORDER — SODIUM CHLORIDE 0.9 % (FLUSH) 0.9 %
10 SYRINGE (ML) INJECTION
Status: DISCONTINUED | OUTPATIENT
Start: 2025-01-29 | End: 2025-01-29 | Stop reason: HOSPADM

## 2025-01-29 RX ADMIN — SODIUM CHLORIDE 1000 ML: 9 INJECTION, SOLUTION INTRAVENOUS at 10:01

## 2025-01-29 RX ADMIN — CIDOFOVIR DIHYDRATE 55 MG: 375 INJECTION, SOLUTION INTRAVENOUS at 11:01

## 2025-01-29 NOTE — TELEPHONE ENCOUNTER
Jazmyne Valenzuela FNP-[facility] Staff  Please call patient    Monitor reviewed  Short episode of AF of 5 hours but no recurrent episodes  Average HR in 80s  For now continue BB and Eliquis  RTC in 4-6 months    Thanks  ASHWINI Gomez      Called patient to advise of results per Jazmyne Valenzuela, spoke to patient verbalized understanding 4-6 month f/u scheduled

## 2025-01-30 LAB
BKV DNA SERPL NAA+PROBE-ACNC: ABNORMAL COPIES/ML
BKV DNA SERPL NAA+PROBE-LOG#: 3.95 LOG (10) COPIES/ML
BKV DNA SERPL QL NAA+PROBE: DETECTED

## 2025-02-03 ENCOUNTER — LAB VISIT (OUTPATIENT)
Dept: LAB | Facility: HOSPITAL | Age: 45
End: 2025-02-03
Attending: INTERNAL MEDICINE
Payer: COMMERCIAL

## 2025-02-03 ENCOUNTER — PATIENT OUTREACH (OUTPATIENT)
Dept: ADMINISTRATIVE | Facility: HOSPITAL | Age: 45
End: 2025-02-03
Payer: COMMERCIAL

## 2025-02-03 DIAGNOSIS — Z94.0 KIDNEY REPLACED BY TRANSPLANT: ICD-10-CM

## 2025-02-03 LAB
ALBUMIN SERPL BCP-MCNC: 4 G/DL (ref 3.5–5.2)
ANION GAP SERPL CALC-SCNC: 9 MMOL/L (ref 8–16)
BASOPHILS # BLD AUTO: 0.04 K/UL (ref 0–0.2)
BASOPHILS NFR BLD: 0.8 % (ref 0–1.9)
BUN SERPL-MCNC: 18 MG/DL (ref 6–20)
CALCIUM SERPL-MCNC: 9 MG/DL (ref 8.7–10.5)
CHLORIDE SERPL-SCNC: 109 MMOL/L (ref 95–110)
CO2 SERPL-SCNC: 24 MMOL/L (ref 23–29)
CREAT SERPL-MCNC: 1.4 MG/DL (ref 0.5–1.4)
DIFFERENTIAL METHOD BLD: ABNORMAL
EOSINOPHIL # BLD AUTO: 0.2 K/UL (ref 0–0.5)
EOSINOPHIL NFR BLD: 3.4 % (ref 0–8)
ERYTHROCYTE [DISTWIDTH] IN BLOOD BY AUTOMATED COUNT: 14.8 % (ref 11.5–14.5)
EST. GFR  (NO RACE VARIABLE): >60 ML/MIN/1.73 M^2
GLUCOSE SERPL-MCNC: 152 MG/DL (ref 70–110)
HCT VFR BLD AUTO: 39.5 % (ref 40–54)
HGB BLD-MCNC: 12.8 G/DL (ref 14–18)
IMM GRANULOCYTES # BLD AUTO: 0.02 K/UL (ref 0–0.04)
IMM GRANULOCYTES NFR BLD AUTO: 0.4 % (ref 0–0.5)
LYMPHOCYTES # BLD AUTO: 1.2 K/UL (ref 1–4.8)
LYMPHOCYTES NFR BLD: 24.5 % (ref 18–48)
MAGNESIUM SERPL-MCNC: 1.7 MG/DL (ref 1.6–2.6)
MCH RBC QN AUTO: 27.8 PG (ref 27–31)
MCHC RBC AUTO-ENTMCNC: 32.4 G/DL (ref 32–36)
MCV RBC AUTO: 86 FL (ref 82–98)
MONOCYTES # BLD AUTO: 0.6 K/UL (ref 0.3–1)
MONOCYTES NFR BLD: 12.4 % (ref 4–15)
NEUTROPHILS # BLD AUTO: 2.9 K/UL (ref 1.8–7.7)
NEUTROPHILS NFR BLD: 58.5 % (ref 38–73)
NRBC BLD-RTO: 0 /100 WBC
PHOSPHATE SERPL-MCNC: 3.1 MG/DL (ref 2.7–4.5)
PLATELET # BLD AUTO: 146 K/UL (ref 150–450)
PMV BLD AUTO: 9.9 FL (ref 9.2–12.9)
POTASSIUM SERPL-SCNC: 4.3 MMOL/L (ref 3.5–5.1)
RBC # BLD AUTO: 4.61 M/UL (ref 4.6–6.2)
SODIUM SERPL-SCNC: 142 MMOL/L (ref 136–145)
WBC # BLD AUTO: 5.02 K/UL (ref 3.9–12.7)

## 2025-02-03 PROCEDURE — 83735 ASSAY OF MAGNESIUM: CPT | Performed by: INTERNAL MEDICINE

## 2025-02-03 PROCEDURE — 80197 ASSAY OF TACROLIMUS: CPT | Performed by: INTERNAL MEDICINE

## 2025-02-03 PROCEDURE — 87799 DETECT AGENT NOS DNA QUANT: CPT | Performed by: INTERNAL MEDICINE

## 2025-02-03 PROCEDURE — 36415 COLL VENOUS BLD VENIPUNCTURE: CPT | Performed by: INTERNAL MEDICINE

## 2025-02-03 PROCEDURE — 80069 RENAL FUNCTION PANEL: CPT | Performed by: INTERNAL MEDICINE

## 2025-02-03 PROCEDURE — 85025 COMPLETE CBC W/AUTO DIFF WBC: CPT | Performed by: INTERNAL MEDICINE

## 2025-02-04 LAB — TACROLIMUS BLD-MCNC: 6.3 NG/ML (ref 5–15)

## 2025-02-06 LAB
BKV DNA SERPL NAA+PROBE-ACNC: ABNORMAL COPIES/ML
BKV DNA SERPL NAA+PROBE-LOG#: 3.93 LOG (10) COPIES/ML
BKV DNA SERPL QL NAA+PROBE: DETECTED

## 2025-02-10 ENCOUNTER — LAB VISIT (OUTPATIENT)
Dept: LAB | Facility: HOSPITAL | Age: 45
End: 2025-02-10
Attending: INTERNAL MEDICINE
Payer: COMMERCIAL

## 2025-02-10 ENCOUNTER — PATIENT MESSAGE (OUTPATIENT)
Dept: TRANSPLANT | Facility: CLINIC | Age: 45
End: 2025-02-10
Payer: COMMERCIAL

## 2025-02-10 DIAGNOSIS — Z94.0 KIDNEY REPLACED BY TRANSPLANT: ICD-10-CM

## 2025-02-10 LAB
ALBUMIN SERPL BCP-MCNC: 3.9 G/DL (ref 3.5–5.2)
ANION GAP SERPL CALC-SCNC: 8 MMOL/L (ref 8–16)
BASOPHILS # BLD AUTO: 0.04 K/UL (ref 0–0.2)
BASOPHILS NFR BLD: 0.8 % (ref 0–1.9)
BUN SERPL-MCNC: 15 MG/DL (ref 6–20)
CALCIUM SERPL-MCNC: 8.8 MG/DL (ref 8.7–10.5)
CHLORIDE SERPL-SCNC: 109 MMOL/L (ref 95–110)
CO2 SERPL-SCNC: 24 MMOL/L (ref 23–29)
CREAT SERPL-MCNC: 1.4 MG/DL (ref 0.5–1.4)
DIFFERENTIAL METHOD BLD: ABNORMAL
EOSINOPHIL # BLD AUTO: 0.2 K/UL (ref 0–0.5)
EOSINOPHIL NFR BLD: 3.7 % (ref 0–8)
ERYTHROCYTE [DISTWIDTH] IN BLOOD BY AUTOMATED COUNT: 14.4 % (ref 11.5–14.5)
EST. GFR  (NO RACE VARIABLE): >60 ML/MIN/1.73 M^2
GLUCOSE SERPL-MCNC: 152 MG/DL (ref 70–110)
HCT VFR BLD AUTO: 39.2 % (ref 40–54)
HGB BLD-MCNC: 12.8 G/DL (ref 14–18)
IMM GRANULOCYTES # BLD AUTO: 0.04 K/UL (ref 0–0.04)
IMM GRANULOCYTES NFR BLD AUTO: 0.8 % (ref 0–0.5)
LYMPHOCYTES # BLD AUTO: 1.4 K/UL (ref 1–4.8)
LYMPHOCYTES NFR BLD: 27.3 % (ref 18–48)
MAGNESIUM SERPL-MCNC: 1.7 MG/DL (ref 1.6–2.6)
MCH RBC QN AUTO: 27.8 PG (ref 27–31)
MCHC RBC AUTO-ENTMCNC: 32.7 G/DL (ref 32–36)
MCV RBC AUTO: 85 FL (ref 82–98)
MONOCYTES # BLD AUTO: 0.6 K/UL (ref 0.3–1)
MONOCYTES NFR BLD: 11.6 % (ref 4–15)
NEUTROPHILS # BLD AUTO: 2.9 K/UL (ref 1.8–7.7)
NEUTROPHILS NFR BLD: 55.8 % (ref 38–73)
NRBC BLD-RTO: 0 /100 WBC
PHOSPHATE SERPL-MCNC: 2.5 MG/DL (ref 2.7–4.5)
PLATELET # BLD AUTO: 156 K/UL (ref 150–450)
PMV BLD AUTO: 9.7 FL (ref 9.2–12.9)
POTASSIUM SERPL-SCNC: 3.9 MMOL/L (ref 3.5–5.1)
RBC # BLD AUTO: 4.6 M/UL (ref 4.6–6.2)
SODIUM SERPL-SCNC: 141 MMOL/L (ref 136–145)
WBC # BLD AUTO: 5.17 K/UL (ref 3.9–12.7)

## 2025-02-10 PROCEDURE — 85025 COMPLETE CBC W/AUTO DIFF WBC: CPT | Performed by: INTERNAL MEDICINE

## 2025-02-10 PROCEDURE — 80069 RENAL FUNCTION PANEL: CPT | Performed by: INTERNAL MEDICINE

## 2025-02-10 PROCEDURE — 83735 ASSAY OF MAGNESIUM: CPT | Performed by: INTERNAL MEDICINE

## 2025-02-10 PROCEDURE — 36415 COLL VENOUS BLD VENIPUNCTURE: CPT | Performed by: INTERNAL MEDICINE

## 2025-02-10 PROCEDURE — 80197 ASSAY OF TACROLIMUS: CPT | Performed by: INTERNAL MEDICINE

## 2025-02-11 DIAGNOSIS — I25.10 CORONARY ARTERY DISEASE INVOLVING NATIVE CORONARY ARTERY OF NATIVE HEART WITHOUT ANGINA PECTORIS: Chronic | ICD-10-CM

## 2025-02-11 DIAGNOSIS — I10 HYPERTENSION COMPLICATING DIABETES: Chronic | ICD-10-CM

## 2025-02-11 DIAGNOSIS — E78.5 DYSLIPIDEMIA ASSOCIATED WITH TYPE 2 DIABETES MELLITUS: ICD-10-CM

## 2025-02-11 DIAGNOSIS — I48.91 LONE ATRIAL FIBRILLATION: ICD-10-CM

## 2025-02-11 DIAGNOSIS — E11.69 DYSLIPIDEMIA ASSOCIATED WITH TYPE 2 DIABETES MELLITUS: ICD-10-CM

## 2025-02-11 DIAGNOSIS — E11.9 HYPERTENSION COMPLICATING DIABETES: Chronic | ICD-10-CM

## 2025-02-11 DIAGNOSIS — I25.2 HISTORY OF NON-ST ELEVATION MYOCARDIAL INFARCTION (NSTEMI): ICD-10-CM

## 2025-02-11 LAB — TACROLIMUS BLD-MCNC: 5.9 NG/ML (ref 5–15)

## 2025-02-11 NOTE — TELEPHONE ENCOUNTER
Care Due:                  Date            Visit Type   Department     Provider  --------------------------------------------------------------------------------                                EP -                              PRIMARY      [facility] INTERNAL  Last Visit: 07-      CARE ([facility])   PARUL Roberson                              EP -                              PRIMARY      [facility] INTERNAL  Next Visit: 03-      CARE ([facility])   PARUL Roberson                                                            Last  Test          Frequency    Reason                     Performed    Due Date  --------------------------------------------------------------------------------    Lipid Panel.  12 months..  atorvastatin, ezetimibe..  04- 04-    [facility] Embedded Care Due Messages. Reference number: [id number].   2/11/2025 7:22:24 AM CST

## 2025-02-11 NOTE — TELEPHONE ENCOUNTER
Refill Routing Note   Medication(s) are not appropriate for processing by [facility] for the following reason(s):        ED/Hospital Visit since last OV with provider    [facility] action(s):  Defer        Medication Therapy Plan: FLOS; Acute care/admission documentation reviewed. No change in therapy; ED ENCOUNTER  Atrial fibrillation with rapid ventricul    Extended chart review required: Yes     Appointments  past 12m or future 3m with PCP    Date Provider   Last Visit   8/8/2024 SABIHA Roberson MD   Next Visit   3/7/2025 SABIHA Roberson MD   ED visits in past 90 days: 1        Note composed:9:31 AM 02/11/2025

## 2025-02-12 ENCOUNTER — INFUSION (OUTPATIENT)
Dept: INFUSION THERAPY | Facility: HOSPITAL | Age: 45
End: 2025-02-12
Attending: NURSE PRACTITIONER
Payer: COMMERCIAL

## 2025-02-12 VITALS
BODY MASS INDEX: 30.31 KG/M2 | RESPIRATION RATE: 17 BRPM | WEIGHT: 242.5 LBS | HEART RATE: 76 BPM | TEMPERATURE: 98 F | SYSTOLIC BLOOD PRESSURE: 144 MMHG | DIASTOLIC BLOOD PRESSURE: 85 MMHG | OXYGEN SATURATION: 97 %

## 2025-02-12 DIAGNOSIS — B34.8 BK VIREMIA: Primary | ICD-10-CM

## 2025-02-12 PROCEDURE — 25000003 PHARM REV CODE 250: Performed by: INTERNAL MEDICINE

## 2025-02-12 PROCEDURE — 63600175 PHARM REV CODE 636 W HCPCS: Mod: JZ,TB | Performed by: INTERNAL MEDICINE

## 2025-02-12 PROCEDURE — 96365 THER/PROPH/DIAG IV INF INIT: CPT

## 2025-02-12 RX ORDER — SODIUM CHLORIDE 0.9 % (FLUSH) 0.9 %
10 SYRINGE (ML) INJECTION
Status: DISCONTINUED | OUTPATIENT
Start: 2025-02-12 | End: 2025-02-12 | Stop reason: HOSPADM

## 2025-02-12 RX ORDER — SODIUM CHLORIDE 0.9 % (FLUSH) 0.9 %
10 SYRINGE (ML) INJECTION
OUTPATIENT
Start: 2025-02-26

## 2025-02-12 RX ADMIN — CIDOFOVIR DIHYDRATE 55 MG: 375 INJECTION, SOLUTION INTRAVENOUS at 10:02

## 2025-02-12 RX ADMIN — SODIUM CHLORIDE 1000 ML: 9 INJECTION, SOLUTION INTRAVENOUS at 09:02

## 2025-02-14 ENCOUNTER — PATIENT MESSAGE (OUTPATIENT)
Dept: TRANSPLANT | Facility: CLINIC | Age: 45
End: 2025-02-14
Payer: COMMERCIAL

## 2025-02-18 ENCOUNTER — LAB VISIT (OUTPATIENT)
Dept: LAB | Facility: HOSPITAL | Age: 45
End: 2025-02-18
Attending: INTERNAL MEDICINE
Payer: COMMERCIAL

## 2025-02-18 ENCOUNTER — PATIENT MESSAGE (OUTPATIENT)
Dept: TRANSPLANT | Facility: CLINIC | Age: 45
End: 2025-02-18
Payer: COMMERCIAL

## 2025-02-18 DIAGNOSIS — Z94.0 KIDNEY REPLACED BY TRANSPLANT: ICD-10-CM

## 2025-02-18 LAB
ALBUMIN SERPL BCP-MCNC: 4 G/DL (ref 3.5–5.2)
ANION GAP SERPL CALC-SCNC: 8 MMOL/L (ref 8–16)
BASOPHILS # BLD AUTO: 0.03 K/UL (ref 0–0.2)
BASOPHILS NFR BLD: 0.6 % (ref 0–1.9)
BUN SERPL-MCNC: 14 MG/DL (ref 6–20)
CALCIUM SERPL-MCNC: 9.2 MG/DL (ref 8.7–10.5)
CHLORIDE SERPL-SCNC: 109 MMOL/L (ref 95–110)
CO2 SERPL-SCNC: 23 MMOL/L (ref 23–29)
CREAT SERPL-MCNC: 1.5 MG/DL (ref 0.5–1.4)
DIFFERENTIAL METHOD BLD: ABNORMAL
EOSINOPHIL # BLD AUTO: 0.2 K/UL (ref 0–0.5)
EOSINOPHIL NFR BLD: 2.8 % (ref 0–8)
ERYTHROCYTE [DISTWIDTH] IN BLOOD BY AUTOMATED COUNT: 14.6 % (ref 11.5–14.5)
EST. GFR  (NO RACE VARIABLE): 58.9 ML/MIN/1.73 M^2
GLUCOSE SERPL-MCNC: 142 MG/DL (ref 70–110)
HCT VFR BLD AUTO: 42.7 % (ref 40–54)
HGB BLD-MCNC: 13.8 G/DL (ref 14–18)
IMM GRANULOCYTES # BLD AUTO: 0.03 K/UL (ref 0–0.04)
IMM GRANULOCYTES NFR BLD AUTO: 0.6 % (ref 0–0.5)
LYMPHOCYTES # BLD AUTO: 1.2 K/UL (ref 1–4.8)
LYMPHOCYTES NFR BLD: 23.5 % (ref 18–48)
MAGNESIUM SERPL-MCNC: 1.8 MG/DL (ref 1.6–2.6)
MCH RBC QN AUTO: 27.7 PG (ref 27–31)
MCHC RBC AUTO-ENTMCNC: 32.3 G/DL (ref 32–36)
MCV RBC AUTO: 86 FL (ref 82–98)
MONOCYTES # BLD AUTO: 0.7 K/UL (ref 0.3–1)
MONOCYTES NFR BLD: 12.7 % (ref 4–15)
NEUTROPHILS # BLD AUTO: 3.2 K/UL (ref 1.8–7.7)
NEUTROPHILS NFR BLD: 59.8 % (ref 38–73)
NRBC BLD-RTO: 0 /100 WBC
PHOSPHATE SERPL-MCNC: 2.1 MG/DL (ref 2.7–4.5)
PLATELET # BLD AUTO: 173 K/UL (ref 150–450)
PMV BLD AUTO: 9.8 FL (ref 9.2–12.9)
POTASSIUM SERPL-SCNC: 4.3 MMOL/L (ref 3.5–5.1)
RBC # BLD AUTO: 4.99 M/UL (ref 4.6–6.2)
SODIUM SERPL-SCNC: 140 MMOL/L (ref 136–145)
WBC # BLD AUTO: 5.28 K/UL (ref 3.9–12.7)

## 2025-02-18 PROCEDURE — 80197 ASSAY OF TACROLIMUS: CPT | Performed by: INTERNAL MEDICINE

## 2025-02-18 PROCEDURE — 80069 RENAL FUNCTION PANEL: CPT | Performed by: INTERNAL MEDICINE

## 2025-02-18 PROCEDURE — 87799 DETECT AGENT NOS DNA QUANT: CPT | Performed by: INTERNAL MEDICINE

## 2025-02-18 PROCEDURE — 83735 ASSAY OF MAGNESIUM: CPT | Performed by: INTERNAL MEDICINE

## 2025-02-18 PROCEDURE — 36415 COLL VENOUS BLD VENIPUNCTURE: CPT | Performed by: INTERNAL MEDICINE

## 2025-02-18 PROCEDURE — 85025 COMPLETE CBC W/AUTO DIFF WBC: CPT | Performed by: INTERNAL MEDICINE

## 2025-02-19 ENCOUNTER — PATIENT MESSAGE (OUTPATIENT)
Dept: TRANSPLANT | Facility: CLINIC | Age: 45
End: 2025-02-19
Payer: COMMERCIAL

## 2025-02-19 ENCOUNTER — PATIENT MESSAGE (OUTPATIENT)
Dept: INTERNAL MEDICINE | Facility: CLINIC | Age: 45
End: 2025-02-19
Payer: COMMERCIAL

## 2025-02-19 ENCOUNTER — TELEPHONE (OUTPATIENT)
Dept: TRANSPLANT | Facility: CLINIC | Age: 45
End: 2025-02-19
Payer: COMMERCIAL

## 2025-02-19 DIAGNOSIS — E87.8 ELECTROLYTE ABNORMALITY: ICD-10-CM

## 2025-02-19 LAB — TACROLIMUS BLD-MCNC: 5.9 NG/ML (ref 5–15)

## 2025-02-20 ENCOUNTER — PATIENT MESSAGE (OUTPATIENT)
Dept: DIABETES | Facility: CLINIC | Age: 45
End: 2025-02-20
Payer: COMMERCIAL

## 2025-02-20 RX ORDER — METOPROLOL TARTRATE 25 MG/1
TABLET, FILM COATED ORAL
Qty: 180 TABLET | Refills: 0 | Status: SHIPPED | OUTPATIENT
Start: 2025-02-20

## 2025-02-20 RX ORDER — ATORVASTATIN CALCIUM 80 MG/1
TABLET, FILM COATED ORAL
Qty: 90 TABLET | Refills: 0 | Status: SHIPPED | OUTPATIENT
Start: 2025-02-20

## 2025-02-20 NOTE — TELEPHONE ENCOUNTER
REFILL REQUEST APPROVED  Requested Prescriptions     Pending Prescriptions Disp Refills    atorvastatin (LIPITOR) 80 MG tablet [Pharmacy Med Name: ATORVASTATIN 80 MG TABLET] 90 tablet 0     Sig: take 1 tablet (80 MILLIGRAM total) by mouth every evening.    metoprolol tartrate (LOPRESSOR) 25 MG tablet [Pharmacy Med Name: METOPROLOL TARTRATE 25 MG TAB] 180 tablet 0     Sig: take 1 tablet (25 MILLIGRAM total) by mouth 2 (two) times daily.

## 2025-02-22 LAB
BKV DNA SERPL NAA+PROBE-ACNC: ABNORMAL COPIES/ML
BKV DNA SERPL NAA+PROBE-LOG#: 4.14 LOG (10) COPIES/ML
BKV DNA SERPL QL NAA+PROBE: DETECTED

## 2025-02-23 ENCOUNTER — RESULTS FOLLOW-UP (OUTPATIENT)
Dept: TRANSPLANT | Facility: CLINIC | Age: 45
End: 2025-02-23

## 2025-02-26 ENCOUNTER — INFUSION (OUTPATIENT)
Dept: INFUSION THERAPY | Facility: HOSPITAL | Age: 45
End: 2025-02-26
Attending: NURSE PRACTITIONER
Payer: COMMERCIAL

## 2025-02-26 VITALS
HEART RATE: 80 BPM | WEIGHT: 243.63 LBS | TEMPERATURE: 98 F | RESPIRATION RATE: 18 BRPM | BODY MASS INDEX: 30.29 KG/M2 | HEIGHT: 75 IN | SYSTOLIC BLOOD PRESSURE: 145 MMHG | OXYGEN SATURATION: 98 % | DIASTOLIC BLOOD PRESSURE: 85 MMHG

## 2025-02-26 DIAGNOSIS — B34.8 BK VIREMIA: Primary | ICD-10-CM

## 2025-02-26 PROCEDURE — 96361 HYDRATE IV INFUSION ADD-ON: CPT

## 2025-02-26 PROCEDURE — 96365 THER/PROPH/DIAG IV INF INIT: CPT

## 2025-02-26 PROCEDURE — 25000003 PHARM REV CODE 250: Performed by: INTERNAL MEDICINE

## 2025-02-26 PROCEDURE — 63600175 PHARM REV CODE 636 W HCPCS: Mod: JZ,TB | Performed by: INTERNAL MEDICINE

## 2025-02-26 RX ORDER — SODIUM CHLORIDE 0.9 % (FLUSH) 0.9 %
10 SYRINGE (ML) INJECTION
OUTPATIENT
Start: 2025-03-12

## 2025-02-26 RX ORDER — SODIUM CHLORIDE 0.9 % (FLUSH) 0.9 %
10 SYRINGE (ML) INJECTION
Status: DISCONTINUED | OUTPATIENT
Start: 2025-02-26 | End: 2025-02-26 | Stop reason: HOSPADM

## 2025-02-26 RX ADMIN — SODIUM CHLORIDE 1000 ML: 9 INJECTION, SOLUTION INTRAVENOUS at 09:02

## 2025-02-26 RX ADMIN — CIDOFOVIR DIHYDRATE 55 MG: 375 INJECTION, SOLUTION INTRAVENOUS at 10:02

## 2025-02-26 NOTE — PLAN OF CARE
Discussed plan of care with pt. Addressed any and ongoing concerns. Pt denies   Problem: Adult Inpatient Plan of Care  Goal: Plan of Care Review  Outcome: Progressing  Goal: Patient-Specific Goal (Individualized)  Outcome: Progressing  Flowsheets (Taken 2/26/2025 0915)  Individualized Care Needs: In recliner no other prn needs  Anxieties, Fears or Concerns: no conerns expressed  Patient/Family-Specific Goals (Include Timeframe): will tolerate infusion  Goal: Absence of Hospital-Acquired Illness or Injury  Outcome: Progressing  Intervention: Identify and Manage Fall Risk  Flowsheets (Taken 2/26/2025 0915)  Safety Promotion/Fall Prevention: in recliner, wheels locked  Intervention: Prevent Infection  Flowsheets (Taken 2/26/2025 0915)  Infection Prevention:   equipment surfaces disinfected   hand hygiene promoted   personal protective equipment utilized  Goal: Optimal Comfort and Wellbeing  Outcome: Progressing  Intervention: Provide Person-Centered Care  Flowsheets (Taken 2/26/2025 0915)  Trust Relationship/Rapport:   care explained   questions encouraged   choices provided   reassurance provided   emotional support provided   thoughts/feelings acknowledged   empathic listening provided   questions answered

## 2025-02-26 NOTE — DISCHARGE INSTRUCTIONS
[facility]  [address] [address]  [address]  [phone] phone     [phone] fax  Hours of Operation: Monday- Friday 8:00am- 5:00pm  After hours phone  [phone]  Hematology / Oncology Physicians on call      KIT Hernández [address] NP Phaon Dunbar, NP Khelsea Conley, FNP    Please call with any concerns regarding your appointment today.

## 2025-02-28 ENCOUNTER — PATIENT MESSAGE (OUTPATIENT)
Dept: NEPHROLOGY | Facility: CLINIC | Age: 45
End: 2025-02-28
Payer: COMMERCIAL

## 2025-02-28 ENCOUNTER — PATIENT MESSAGE (OUTPATIENT)
Dept: TRANSPLANT | Facility: CLINIC | Age: 45
End: 2025-02-28
Payer: COMMERCIAL

## 2025-03-07 ENCOUNTER — LAB VISIT (OUTPATIENT)
Dept: LAB | Facility: HOSPITAL | Age: 45
End: 2025-03-07
Attending: INTERNAL MEDICINE
Payer: COMMERCIAL

## 2025-03-07 DIAGNOSIS — Z94.0 KIDNEY TRANSPLANT STATUS: ICD-10-CM

## 2025-03-07 DIAGNOSIS — Z94.0 KIDNEY REPLACED BY TRANSPLANT: ICD-10-CM

## 2025-03-07 DIAGNOSIS — N18.31 STAGE 3A CHRONIC KIDNEY DISEASE: ICD-10-CM

## 2025-03-07 DIAGNOSIS — R80.1 PERSISTENT PROTEINURIA: ICD-10-CM

## 2025-03-07 DIAGNOSIS — D84.9 IMMUNOSUPPRESSION: ICD-10-CM

## 2025-03-07 DIAGNOSIS — B34.8 BK POLYOMA VIREMIA: ICD-10-CM

## 2025-03-07 LAB
25(OH)D3+25(OH)D2 SERPL-MCNC: 41 NG/ML (ref 30–96)
ALBUMIN SERPL BCP-MCNC: 4 G/DL (ref 3.5–5.2)
ALBUMIN SERPL BCP-MCNC: 4 G/DL (ref 3.5–5.2)
ANION GAP SERPL CALC-SCNC: 7 MMOL/L (ref 8–16)
ANION GAP SERPL CALC-SCNC: 7 MMOL/L (ref 8–16)
BASOPHILS # BLD AUTO: 0.04 K/UL (ref 0–0.2)
BASOPHILS NFR BLD: 0.7 % (ref 0–1.9)
BUN SERPL-MCNC: 12 MG/DL (ref 6–20)
BUN SERPL-MCNC: 12 MG/DL (ref 6–20)
CALCIUM SERPL-MCNC: 8.9 MG/DL (ref 8.7–10.5)
CALCIUM SERPL-MCNC: 8.9 MG/DL (ref 8.7–10.5)
CHLORIDE SERPL-SCNC: 108 MMOL/L (ref 95–110)
CHLORIDE SERPL-SCNC: 108 MMOL/L (ref 95–110)
CO2 SERPL-SCNC: 25 MMOL/L (ref 23–29)
CO2 SERPL-SCNC: 25 MMOL/L (ref 23–29)
CREAT SERPL-MCNC: 1.2 MG/DL (ref 0.5–1.4)
CREAT SERPL-MCNC: 1.2 MG/DL (ref 0.5–1.4)
DIFFERENTIAL METHOD BLD: ABNORMAL
EOSINOPHIL # BLD AUTO: 0.1 K/UL (ref 0–0.5)
EOSINOPHIL NFR BLD: 2.4 % (ref 0–8)
ERYTHROCYTE [DISTWIDTH] IN BLOOD BY AUTOMATED COUNT: 14.4 % (ref 11.5–14.5)
EST. GFR  (NO RACE VARIABLE): >60 ML/MIN/1.73 M^2
EST. GFR  (NO RACE VARIABLE): >60 ML/MIN/1.73 M^2
GLUCOSE SERPL-MCNC: 160 MG/DL (ref 70–110)
GLUCOSE SERPL-MCNC: 160 MG/DL (ref 70–110)
HCT VFR BLD AUTO: 41.7 % (ref 40–54)
HGB BLD-MCNC: 13.4 G/DL (ref 14–18)
IMM GRANULOCYTES # BLD AUTO: 0.04 K/UL (ref 0–0.04)
IMM GRANULOCYTES NFR BLD AUTO: 0.7 % (ref 0–0.5)
LYMPHOCYTES # BLD AUTO: 1.3 K/UL (ref 1–4.8)
LYMPHOCYTES NFR BLD: 23.9 % (ref 18–48)
MAGNESIUM SERPL-MCNC: 1.7 MG/DL (ref 1.6–2.6)
MAGNESIUM SERPL-MCNC: 1.7 MG/DL (ref 1.6–2.6)
MCH RBC QN AUTO: 27.2 PG (ref 27–31)
MCHC RBC AUTO-ENTMCNC: 32.1 G/DL (ref 32–36)
MCV RBC AUTO: 85 FL (ref 82–98)
MONOCYTES # BLD AUTO: 0.6 K/UL (ref 0.3–1)
MONOCYTES NFR BLD: 11.2 % (ref 4–15)
NEUTROPHILS # BLD AUTO: 3.3 K/UL (ref 1.8–7.7)
NEUTROPHILS NFR BLD: 61.1 % (ref 38–73)
NRBC BLD-RTO: 0 /100 WBC
PHOSPHATE SERPL-MCNC: 2.4 MG/DL (ref 2.7–4.5)
PHOSPHATE SERPL-MCNC: 2.4 MG/DL (ref 2.7–4.5)
PLATELET # BLD AUTO: 182 K/UL (ref 150–450)
PMV BLD AUTO: 9.8 FL (ref 9.2–12.9)
POTASSIUM SERPL-SCNC: 4 MMOL/L (ref 3.5–5.1)
POTASSIUM SERPL-SCNC: 4 MMOL/L (ref 3.5–5.1)
PTH-INTACT SERPL-MCNC: 164.3 PG/ML (ref 9–77)
RBC # BLD AUTO: 4.92 M/UL (ref 4.6–6.2)
SODIUM SERPL-SCNC: 140 MMOL/L (ref 136–145)
SODIUM SERPL-SCNC: 140 MMOL/L (ref 136–145)
WBC # BLD AUTO: 5.35 K/UL (ref 3.9–12.7)

## 2025-03-07 PROCEDURE — 83735 ASSAY OF MAGNESIUM: CPT | Performed by: INTERNAL MEDICINE

## 2025-03-07 PROCEDURE — 82306 VITAMIN D 25 HYDROXY: CPT | Performed by: INTERNAL MEDICINE

## 2025-03-07 PROCEDURE — 85025 COMPLETE CBC W/AUTO DIFF WBC: CPT | Performed by: INTERNAL MEDICINE

## 2025-03-07 PROCEDURE — 83970 ASSAY OF PARATHORMONE: CPT | Performed by: INTERNAL MEDICINE

## 2025-03-07 PROCEDURE — 36415 COLL VENOUS BLD VENIPUNCTURE: CPT | Performed by: INTERNAL MEDICINE

## 2025-03-07 PROCEDURE — 80069 RENAL FUNCTION PANEL: CPT | Performed by: INTERNAL MEDICINE

## 2025-03-07 PROCEDURE — 87799 DETECT AGENT NOS DNA QUANT: CPT | Performed by: INTERNAL MEDICINE

## 2025-03-07 PROCEDURE — 80197 ASSAY OF TACROLIMUS: CPT | Performed by: INTERNAL MEDICINE

## 2025-03-08 LAB
TACROLIMUS BLD-MCNC: 6.1 NG/ML (ref 5–15)
TACROLIMUS BLD-MCNC: 6.1 NG/ML (ref 5–15)

## 2025-03-10 ENCOUNTER — PATIENT MESSAGE (OUTPATIENT)
Dept: TRANSPLANT | Facility: CLINIC | Age: 45
End: 2025-03-10
Payer: COMMERCIAL

## 2025-03-10 LAB
BKV DNA SERPL NAA+PROBE-ACNC: ABNORMAL COPIES/ML
BKV DNA SERPL NAA+PROBE-ACNC: ABNORMAL COPIES/ML
BKV DNA SERPL NAA+PROBE-LOG#: 3.86 LOG (10) COPIES/ML
BKV DNA SERPL NAA+PROBE-LOG#: 3.86 LOG (10) COPIES/ML
BKV DNA SERPL QL NAA+PROBE: DETECTED
BKV DNA SERPL QL NAA+PROBE: DETECTED

## 2025-03-10 RX ORDER — DIPHENHYDRAMINE HCL 25 MG
25 CAPSULE ORAL
Status: CANCELLED | OUTPATIENT
Start: 2025-03-17

## 2025-03-10 RX ORDER — EPINEPHRINE 0.3 MG/.3ML
0.3 INJECTION SUBCUTANEOUS ONCE AS NEEDED
Status: CANCELLED | OUTPATIENT
Start: 2025-03-17

## 2025-03-10 RX ORDER — ACETAMINOPHEN 500 MG
500 TABLET ORAL
Status: CANCELLED | OUTPATIENT
Start: 2025-03-17

## 2025-03-10 RX ORDER — SODIUM CHLORIDE 0.9 % (FLUSH) 0.9 %
10 SYRINGE (ML) INJECTION
Status: CANCELLED | OUTPATIENT
Start: 2025-03-17

## 2025-03-10 RX ORDER — DIPHENHYDRAMINE HYDROCHLORIDE 50 MG/ML
50 INJECTION, SOLUTION INTRAMUSCULAR; INTRAVENOUS ONCE AS NEEDED
Status: CANCELLED | OUTPATIENT
Start: 2025-03-17

## 2025-03-11 ENCOUNTER — OFFICE VISIT (OUTPATIENT)
Dept: TRANSPLANT | Facility: CLINIC | Age: 45
End: 2025-03-11
Payer: COMMERCIAL

## 2025-03-11 ENCOUNTER — PATIENT MESSAGE (OUTPATIENT)
Dept: TRANSPLANT | Facility: CLINIC | Age: 45
End: 2025-03-11

## 2025-03-11 ENCOUNTER — TELEPHONE (OUTPATIENT)
Dept: TRANSPLANT | Facility: CLINIC | Age: 45
End: 2025-03-11

## 2025-03-11 VITALS — DIASTOLIC BLOOD PRESSURE: 93 MMHG | SYSTOLIC BLOOD PRESSURE: 140 MMHG

## 2025-03-11 DIAGNOSIS — Z95.820 STATUS POST ANGIOPLASTY WITH STENT: Chronic | ICD-10-CM

## 2025-03-11 DIAGNOSIS — I25.2 HISTORY OF NON-ST ELEVATION MYOCARDIAL INFARCTION (NSTEMI): Chronic | ICD-10-CM

## 2025-03-11 DIAGNOSIS — I10 ESSENTIAL HYPERTENSION: Chronic | ICD-10-CM

## 2025-03-11 DIAGNOSIS — Z94.0 IMMUNOSUPPRESSIVE MANAGEMENT ENCOUNTER FOLLOWING KIDNEY TRANSPLANT: ICD-10-CM

## 2025-03-11 DIAGNOSIS — D84.821 IMMUNOCOMPROMISED STATE DUE TO DRUG THERAPY: ICD-10-CM

## 2025-03-11 DIAGNOSIS — Z79.899 IMMUNOCOMPROMISED STATE DUE TO DRUG THERAPY: ICD-10-CM

## 2025-03-11 DIAGNOSIS — B34.8 BK POLYOMA VIREMIA: ICD-10-CM

## 2025-03-11 DIAGNOSIS — E78.5 DYSLIPIDEMIA ASSOCIATED WITH TYPE 2 DIABETES MELLITUS: Chronic | ICD-10-CM

## 2025-03-11 DIAGNOSIS — T86.11 ACUTE REJECTION OF KIDNEY TRANSPLANT: ICD-10-CM

## 2025-03-11 DIAGNOSIS — N18.2 CKD (CHRONIC KIDNEY DISEASE) STAGE 2, GFR 60-89 ML/MIN: Primary | ICD-10-CM

## 2025-03-11 DIAGNOSIS — Z79.899 IMMUNOSUPPRESSIVE MANAGEMENT ENCOUNTER FOLLOWING KIDNEY TRANSPLANT: ICD-10-CM

## 2025-03-11 DIAGNOSIS — Z94.0 S/P KIDNEY TRANSPLANT: Chronic | ICD-10-CM

## 2025-03-11 DIAGNOSIS — E11.69 DYSLIPIDEMIA ASSOCIATED WITH TYPE 2 DIABETES MELLITUS: Chronic | ICD-10-CM

## 2025-03-11 NOTE — LETTER
March 11, 2025        Siva Figueroa  [address] [address] NEPHROLOGY  [facility] [address]  Phone: [phone]  Fax: [phone]             Ariel Murillo- Transplant 1st Fl  [address]  [facility] [address]  Phone: [phone]   Patient: Robb Iglesias   MR Number: [id number]   YOB: 1980   Date of Visit: 3/11/2025       Dear Dr. Siva Figueroa    Thank you for referring Robb Iglesias to me for evaluation. Attached you will find relevant portions of my assessment and plan of care.    If you have questions, please do not hesitate to call me. I look forward to following Robb Iglesias along with you.    Sincerely,    Rell Booth MD    Enclosure    If you would like to receive this communication electronically, please contact [email or web address] or [phone] to request [employer] access.    [employer] is a tool which provides read-only access to select patient information with whom you have a relationship. Its easy to use and provides real time access to review your patients record including encounter summaries, notes, results, and demographic information.    If you feel you have received this communication in error or would no longer like to receive these types of communications, please e-mail [email or web address]

## 2025-03-11 NOTE — PROGRESS NOTES
"     The patient location is:  home  The chief complaint leading to consultation is:  Reassessment of kidney function, complex immunosuppressive medication, BK infection protocol, management of electrolytes, anemia, proteinuria and blood pressure advice.   Visit type: Virtual visit with synchronous audio and video  Total time spent with patient: 10  min  Each patient to whom he or she provides medical services by telemedicine is:  (1) informed of the relationship between the physician and patient and the respective role of any other health care provider with respect to management of the patient; and (2) notified that he or she may decline to receive medical services by telemedicine and may withdraw from such care at any time.    Kidney Post-Transplant Assessment    Referring Physician: Siva Figueroa  Current Nephrologist: Siva Figueroa    ORGAN: LEFT KIDNEY  Donor Type: living  PHS Increased Risk: no  Cold Ischemia: 48 mins  Induction Medications: thymoglobulin    Subjective:     CC:  Reassessment of renal allograft function and management of chronic immunosuppression.    HPI:  Mr. Iglesias is a 44 y.o. 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  He has CKD stage 2 - GFR 60-89 and his baseline creatinine is between please see table below. He takes mycophenolic acid, prednisone, and tacrolimus for maintenance immunosuppression. He denies any recent hospitalizations or ER visits since his previous clinic visit.    Feels much better in the last few months   No complaints  Patient told me that in January after IVIG he felt like "having the flu" but w/o any fever  Otherwise feel great now Very happy to the blood work mainly the kidney function  Following with Dr Figueroa.          Review of Systems    Skin: no skin rash  CNS; no headaches, blurred vision, seizure, or syncope  ENT: No JVD,  Adenopathies,  nasal congestion. No oral lesions  Cardiac: No chest pain, dyspnea, " "claudication, edema or palpitations  Respiratory: No SOB, cough, hemoptysis   Gastro-intestinal: No diarrhea, constipation, abdominal pain, nausea, vomit. No ascitis  Genitourinary: no hematuria, dysuria, frequency, frequency  Musculoskeletal: joint pain, arthritis or vasculitic changes  Psych: alert awake, oriented, No cranial nerves deficit.      Objective:         Physical Exam    Labs:  Lab Results   Component Value Date    WBC 5.35 03/07/2025    HGB 13.4 (L) 03/07/2025    HCT 41.7 03/07/2025     03/07/2025     03/07/2025    K 4.0 03/07/2025    K 4.0 03/07/2025     03/07/2025     03/07/2025    CO2 25 03/07/2025    CO2 25 03/07/2025    BUN 12 03/07/2025    BUN 12 03/07/2025    CREATININE 1.2 03/07/2025    CREATININE 1.2 03/07/2025    EGFRNORACEVR >60.0 03/07/2025    EGFRNORACEVR >60.0 03/07/2025    CALCIUM 8.9 03/07/2025    CALCIUM 8.9 03/07/2025    PHOS 2.4 (L) 03/07/2025    PHOS 2.4 (L) 03/07/2025    MG 1.7 03/07/2025    MG 1.7 03/07/2025    ALBUMIN 4.0 03/07/2025    ALBUMIN 4.0 03/07/2025    AST 32 12/23/2024    ALT 53 (H) 12/23/2024    UTPCR 1.43 (H) 11/21/2024    .3 (H) 03/07/2025    TACROLIMUS 6.1 03/07/2025    TACROLIMUS 6.1 03/07/2025       No results found for: "EXTANC", "EXTWBC", "EXTSEGS", "EXTPLATELETS", "EXTHEMOGLOBI", "EXTHEMATOCRI", "EXTCREATININ", "EXTSODIUM", "EXTPOTASSIUM", "EXTBUN", "EXTCO2", "EXTCALCIUM", "EXTPHOSPHORU", "EXTGLUCOSE", "EXTALBUMIN", "EXTAST", "EXTALT", "EXTBILITOTAL", "EXTLIPASE", "EXTAMYLASE"    No results found for: "EXTCYCLOSLVL", "EXTSIROLIMUS", "EXTTACROLVL", "EXTPROTCRE", "EXTPTHINTACT", "EXTPROTEINUA", "EXTWBCUA", "EXTRBCUA"    Labs were reviewed with the patient.    Assessment:     1. CKD (chronic kidney disease) stage 2, GFR 60-89 ml/min    2. Dyslipidemia associated with type 2 diabetes mellitus    3. History of NSTEMI with CAD s/p PCI (FAMILIA) of LAD x 2 in 8/2017    4. Immunocompromised state due to drug therapy    5. Immunosuppressive " management encounter following kidney transplant    6. Status post angioplasty with stent    7. S/P kidney transplant    8. Essential hypertension    9. 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 CKD stage 2 with excellent creatinine: will continue follow up as per our center guidelines. patient to continue close follow up with the local General nephrologist. Education provided in appropriate fluid intake, potassium intake. Continue with oral hydration.    Lab Results   Component Value Date    CREATININE 1.2 03/07/2025    CREATININE 1.2 03/07/2025    CREATININE 1.5 (H) 02/18/2025    CREATININE 1.4 02/10/2025    CREATININE 1.4 02/03/2025     Prot/Creat Ratio, Urine   Date Value Ref Range Status   11/21/2024 1.43 (H) 0.00 - 0.20 Final   11/01/2024 1.20 (H) 0.00 - 0.20 Final   02/05/2024 0.61 (H) 0.00 - 0.20 Final     Results for orders placed during the hospital encounter of 06/21/24    US Transplant Kidney With Doppler    Narrative  EXAMINATION:  US TRANSPLANT KIDNEY WITH DOPPLER    CLINICAL HISTORY:  Assess for ureteral stricture;  Unspecified complication of kidney transplant    TECHNIQUE:  Grayscale, duplex, and color Doppler evaluation of transplant kidney performed.    COMPARISON:  None.    FINDINGS:  There is a transplant kidney identified in the right lower quadrant.  Renal allograft measures 12.6 cm in craniocaudal length.  No perinephric fluid or collection identified.  No renal mass or hydronephrosis.    Doppler evaluation demonstrates a peak systolic velocity in the main renal artery of 157 cm/sec.  The intrarenal arterial waveforms are normal without evidence of parvus tardus. The main renal artery to iliac artery velocity ratio is not elevated measuring 1.3.  The main renal vein is patent.    The intrarenal resistive indices are as follows:    Segmental upper pole: 0.59    Segmental midpole: 0.62    Segmental lower  "pole: 0.59    Interlobar: 0.54    Impression  Unremarkable Doppler evaluation of transplant kidney.      Electronically signed by: MIRIAM Yoon MD  Date:    2024  Time:    13:15      No results found for: "EXTANC", "EXTWBC", "EXTSEGS", "EXTPLATELETS", "EXTHEMOGLOBI", "EXTHEMATOCRI", "EXTCREATININ", "EXTSODIUM", "EXTPOTASSIUM", "EXTBUN", "EXTCO2", "EXTCALCIUM", "EXTPHOSPHORU", "EXTGLUCOSE", "EXTALBUMIN", "EXTAST", "EXTALT", "EXTBILITOTAL", "EXTLIPASE", "EXTAMYLASE"    Results for orders placed or performed during the hospital encounter of 24   Specimen to Pathology, Surgery Medical Renal Biopsy    Collection Time: 24 10:30 AM   Result Value Ref Range    Final Pathologic Diagnosis       [facility] DIAGNOSIS:     KIDNEY (PERCUTANEOUS TRANSPLANT BIOPSY): 1) MODERATE-TO-SEVERE MICROCIRCULATION   INFLAMMATION AND NEGATIVE C4d IN PERITUBULAR , CAPILLARIES, HIGHLY SUSPICIOUS FOR ANTIBODY-  MEDIATED REJECTION; 2) MILD TRANSPLANT GLOMERULOPATHY, 3) MILD MESANGIAL DEPOSITS BY IMMUNOFLUORESCENCE (SEE COMMENT).      Note: Please see attached report for comment.     Performing location:   [address], [address] [place] [address]    &quot;Disclaimer:  This case diagnosis was rendered completely by the outside consultation pathologist and the case is electronically signed by an [facility] listed below solely to release the report into the medical record.&quot;      Gross       Pathology ID: [id number]    :  1980        SURGERY MRN:  [id number]/PATHOLOGY MRN:  [id number]     PART 1:  The case is designated as &quot;transplant kidney 3 pieces&quot;. The specimen is dispersed between three separate specimen containers that are arbitrarily designated as Container #1. Container #2, and Container #3  CONTAINER #1:  Suspended in formalin is a single pink-white needle core biopsy (2.2 cm in length x 0.1 cm in diameter).  CONTAINER " "#2:  Suspended in Jaxon transport medium is a single pink-white needle core biopsy (2.1 cm in length x 0.1 cm in diameter).  CONTAINER #3:  Suspended in glutaraldehyde is a single pink-white needle core biopsy (2.0 cm in length x 0.1 cm in diameter).    SENDOUT FACILITY:  The tissues are sent in their respective containers to [facility] for additional processing.      -Katerina Brian MS, PA([facility])      Disclaimer       Unless the case is a 'gross only' or additional testing only, the final diagnosis for each specimen is based on a microscopic examination of appropriate tissue sections.       1A. Pancreatic Function: N/A for non-pancreas allograft recipients:     Lab Results   Component Value Date    HGBA1C 6.4 (H) 08/27/2024       2. High risk immunosuppression medication for organ transplantation, requiring regular intensive follow up and monitoring : prograf level at at target MMF and prednisone. I reviewed level of high risk medication   Lab Results   Component Value Date    TACROLIMUS 6.1 03/07/2025    TACROLIMUS 6.1 03/07/2025    TACROLIMUS 5.9 02/18/2025    TACROLIMUS 5.9 02/10/2025    TACROLIMUS 6.3 02/03/2025    TACROLIMUS 6.0 01/27/2025     No results found for: "CYCLOSPORINE"  @   Will closely monitor for toxicities, education provided about adherence to medicines and need to communicate any side effects to the transplant nurse or physician.    3. Allograft Function:excellent creatinine stable at baseline for the patient. Continue follow up as per our guidelines and with the local General nephrologist. Communication will be sent today.    Lab Results   Component Value Date    CREATININE 1.2 03/07/2025    CREATININE 1.2 03/07/2025    CREATININE 1.5 (H) 02/18/2025    CREATININE 1.4 02/10/2025    CREATININE 1.4 02/03/2025    CREATININE 1.3 01/27/2025     No results found for: "AMYLASE", "LIPASE"    4. Hypertension management:  well controlled overall. this chronic and potentially life threatening " "condition was reviewed including blood pressure medicines. Continue with current regimen, home blood pressure monitoring, low salt and healthy life discussed with the patient..    5. Metabolic Bone Disease/Secondary Hyperparathyroidism:calcium and phosphorus level discussed with the patient, patient will continue follow up with the general nephrologist for management of metabolic bone disease. Will monitor PTH and Vit D per our transplant center guidelines.        Lab Results   Component Value Date    .3 (H) 03/07/2025    CALCIUM 8.9 03/07/2025    CALCIUM 8.9 03/07/2025    PHOS 2.4 (L) 03/07/2025    PHOS 2.4 (L) 03/07/2025    PHOS 2.1 (L) 02/18/2025       6. Electrolytes and acid base balance: reviewed with the patient, essentially within the normal range no need for acute changes today, will monitor as per our center guidelines.       Lab Results   Component Value Date     03/07/2025     03/07/2025    K 4.0 03/07/2025    K 4.0 03/07/2025     03/07/2025     03/07/2025    CO2 25 03/07/2025    CO2 25 03/07/2025    CO2 23 02/18/2025       7. Anemia: normal h/h no action indicated will continue monitoring as per our center guidelines. No indication for acute intervention today.       Lab Results   Component Value Date    WBC 5.35 03/07/2025    HGB 13.4 (L) 03/07/2025    HCT 41.7 03/07/2025    MCV 85 03/07/2025     03/07/2025       8.Proteinuria: will continue with pr/cr ratio as per our center guidelines.    Lab Results   Component Value Date    PROTEINURINE 137 (H) 11/21/2024    CREATRANDUR 95.6 11/21/2024    UTPCR 1.43 (H) 11/21/2024    UTPCR 1.20 (H) 11/01/2024    UTPCR 0.61 (H) 02/05/2024        9. BK virus infection screening: will continue with urine or blood PCR as per our guidelines to prevent BK virus viremia and allograft dysfunction: will hold on OVOG infusion for now.     No results found for: "BKVIRUSDNAUR", "BKVIRUSLOG", "BKVIRUSURINE"      K Virus DNA PCR, Quant, " Plasma 7,166 High  7,166 High  13,725 High  8,459 High  9,002 High  27,401 High  15,101 High        10. Weight and metabolic management: education provided provided during the clinic visit.     There is no height or weight on file to calculate BMI.   Wt Readings from Last 5 Encounters:   02/26/25 110.5 kg (243 lb 9.7 oz)   02/12/25 110 kg (242 lb 8.1 oz)   01/29/25 110 kg (242 lb 8.1 oz)   01/03/25 109 kg (240 lb 4.8 oz)   12/23/24 109 kg (240 lb 4.8 oz)       11.Patient safety education regarding immunosuppression including prophylaxis posttransplant for CMV, PCP : Education provided about vaccination and prevention of infections.    12.  Cytopenias: no significant cytopenias will monitor as per our guidelines. Medicine list reviewed including potential causes of drug-induced cytopenias       Lab Results   Component Value Date    WBC 5.35 03/07/2025    HGB 13.4 (L) 03/07/2025    HCT 41.7 03/07/2025    MCV 85 03/07/2025     03/07/2025       13. Post-transplant Prophylaxis:  CMV Infection, PJP and Candida mucositis.  The patient will continue Bactrim 1 SS tab daily for PJP prophylaxis (6 months) and Valcyte for CMV prophylaxis (3 months for low and intermediate risk and 6 months for high risk) per our center guidelines. Per rejection protocol      14.Medications review:    Prior to Admission medications    Medication Sig Start Date End Date Taking? Authorizing Provider   apixaban (ELIQUIS) 5 mg Tab Take 1 tablet (5 mg total) by mouth 2 (two) times daily. 12/5/24   Jazmyne Valenzuela FNP-EULALIO   aspirin (ECOTRIN) 81 MG EC tablet Take 1 tablet (81 mg total) by mouth once daily. 2/2/24 12/23/24  SABIHA Roberson MD   atorvastatin (LIPITOR) 80 MG tablet take 1 tablet (80 MILLIGRAM total) by mouth every evening. 2/20/25   SABIHA Roberson MD   blood sugar diagnostic Strp Check blood glucose 2 times daily as directed and as needed 6/30/23   SABIHA Roberson MD   blood-glucose meter (ONETOUCH ULTRAMINI) kit Use as  "instructed 11/18/22 12/23/24  SABIHA Roberson MD   ezetimibe (ZETIA) 10 mg tablet Take 1 tablet (10 mg total) by mouth once daily. 7/11/24   SABIHA Roberson MD GINGER ROOT, BULK, MISC by Misc.(Non-Drug; Combo Route) route.    Provider, Historical   insulin lispro (HUMALOG KWIKPEN INSULIN) 100 unit/mL pen Inject 9 Units into the skin 3 (three) times daily. Plus sliding scale, MDD: 67 units 11/25/24 11/25/25  Samantha Munoz,    k phos di & mono-sod phos mono (PHOSPHA 250 NEUTRAL) 250 mg Tab Take 2 tablets by mouth 3 (three) times daily. 2/19/25 2/20/26  Rell Booth MD   lancets [facility] Check blood glucose 2 times daily as directed and as needed (dispense insurance preferred brand or patient choice) 6/30/23   SABIHA Roberson MD   magnesium oxide (MAG-OX) 400 mg (241.3 mg magnesium) tablet TAKE 1 TABLET BY MOUTH 2 TIMES DAILY. 6/25/24   Samantha Munoz,    metoprolol tartrate (LOPRESSOR) 25 MG tablet take 1 tablet (25 MILLIGRAM total) by mouth 2 (two) times daily. 2/20/25   SABIHA Roberson MD   MOUNJARO 7.5 mg/0.5 mL PnIj INJECT 7.5 MG UNDER THE SKIN EVERY 7 DAYS 10/31/24   Heather Sorensen NP   multivitamin Tab Take 1 tablet by mouth once daily. 5/18/23   Reji Hinojosa MD   mycophenolate (CELLCEPT) 250 mg Cap Take 2 capsules (500 mg total) by mouth 2 (two) times daily.  Patient not taking: Reported on 12/23/2024 11/27/24 11/27/25  Rell Booth MD   nitroGLYCERIN (NITROSTAT) 0.4 MG SL tablet Dissolve one tablet underneath tongue at onset of angina; may repeat every 5 minutes if needed. Call 911 if angina persists after 2 doses. 3/26/24   SABIHA Roberson MD   pantoprazole (PROTONIX) 40 MG tablet Take 1 tablet (40 mg total) by mouth once daily. 10/5/24   SABIHA Roberson MD   pen needle, diabetic (BD ULTRA-FINE SHORT PEN NEEDLE) 31 gauge x 5/16" Ndle Use to inject insulin into the skin 3 times daily 5/17/23   Reji Hinojosa MD   pen needle, diabetic (BD ULTRA-FINE SHORT PEN NEEDLE) 31 " "gauge x 5/16" Ndle Use to inject insulin 3 (three) times daily. 11/25/24   Samantha Munoz DO   predniSONE (DELTASONE) 5 MG tablet Take by mouth daily: 20mg 11/25-12/1, 15mg 12/2-12/8, 10mg 12/9-12/989138, 5mg 12/16/2024- 11/25/24   Samantha Munoz DO   PRIVIGEN 10 % Soln  8/9/24   Provider, Historical   sulfamethoxazole-trimethoprim 400-80mg (BACTRIM,SEPTRA) 400-80 mg per tablet Take 1 tablet by mouth once daily. 11/26/24 5/25/25  Samantha Munoz DO   tacrolimus (PROGRAF) 0.5 MG Cap Take 1 capsule (0.5 mg total) by mouth every morning. Take along with one of the 1mg capsules every AM (total 1.5mg). Z94.0;Kidney txp 5/15/23 12/30/24 12/30/25  Angélica Gutierrez MD   tacrolimus (PROGRAF) 1 MG Cap Take 1 capsule (1 mg total) by mouth every 12 (twelve) hours. Z94.0;Kidney txp on 5/15/23 12/30/24 12/30/25  Angélica Gutierrez MD   valsartan (DIOVAN) 80 MG tablet Take 1 tablet (80 mg total) by mouth once daily. 10/29/24   SABIHA Roberson MD       I reviewed records and imaging studies for 40 minutes in preparation for this clinic visit. I also spent more than half of the face to face or virtual encounter time to counseling and education. All questions answered          Rell Booth MD  Transplant Nephrology  , Transplant Nephrology Fellowship  Assist. Prof. [employer] [facility] [employer] [facility] [employer]            Follow-up:   Clinic: return to transplant clinic weekly for the first month after transplant; every 2 weeks during months 2-3; then at 6-, 9-, 12-, 18-, 24-, and 36- months post-transplant to reassess for complications from immunosuppression toxicity and monitor for rejection.  Annually thereafter.    Labs: since patient remains at high risk for rejection and drug-related complications that warrant close monitoring, labs will be ordered as follows: continue twice weekly CBC, renal panel, and drug level for first month; then same labs once weekly through 3rd month " post-transplant.  Urine for UA and protein/creatinine ratio monthly.  Serum BK - PCR at 1-, 3-, 6-, 9-, 12-, 18-, 24-, 36- 48-, and 60 months post-transplant.  Hepatic panel at 1-, 2-, 3-, 6-, 9-, 12-, 18-, 24-, and 36- months post-transplant.    Rell Booth MD       Education:   Material provided to the patient.  Patient reminded to call with any health changes since these can be early signs of significant complications.  Also, I advised the patient to be sure any new medications or changes of old medications are discussed with either a pharmacist or physician knowledgeable with transplant to avoid rejection/drug toxicity related to significant drug interactions.    Patient advised that it is recommended that all transplanted patients, and their close contacts and household members receive Covid vaccination.    UNOS Patient Status  Functional Status: 100% - Normal, no complaints, no evidence of disease  Physical Capacity: No Limitations

## 2025-03-11 NOTE — TELEPHONE ENCOUNTER
Pt made aware we will hold off on ivig for now.  Will continue to monitor bk levels.               ----- Message from Rell Booth MD sent at 3/11/2025  1:45 PM CDT -----  Let's hold on the IVIG infusion for now and continue with serum BK per protocol.

## 2025-03-14 ENCOUNTER — PATIENT MESSAGE (OUTPATIENT)
Dept: TRANSPLANT | Facility: CLINIC | Age: 45
End: 2025-03-14
Payer: COMMERCIAL

## 2025-03-14 ENCOUNTER — OFFICE VISIT (OUTPATIENT)
Dept: NEPHROLOGY | Facility: CLINIC | Age: 45
End: 2025-03-14
Payer: COMMERCIAL

## 2025-03-14 DIAGNOSIS — D84.9 IMMUNOSUPPRESSION: ICD-10-CM

## 2025-03-14 DIAGNOSIS — N25.81 SECONDARY HYPERPARATHYROIDISM OF RENAL ORIGIN: ICD-10-CM

## 2025-03-14 DIAGNOSIS — B33.8 BK POLYOMA NEPHROPATHY: ICD-10-CM

## 2025-03-14 DIAGNOSIS — Z94.0 KIDNEY TRANSPLANT STATUS: Primary | ICD-10-CM

## 2025-03-14 DIAGNOSIS — N05.8 BK POLYOMA NEPHROPATHY: ICD-10-CM

## 2025-03-14 DIAGNOSIS — I10 PRIMARY HYPERTENSION: ICD-10-CM

## 2025-03-14 DIAGNOSIS — N18.2 CKD (CHRONIC KIDNEY DISEASE) STAGE 2, GFR 60-89 ML/MIN: ICD-10-CM

## 2025-03-14 PROBLEM — N18.31 TYPE 2 DIABETES MELLITUS WITH STAGE 3A CHRONIC KIDNEY DISEASE, WITHOUT LONG-TERM CURRENT USE OF INSULIN: Chronic | Status: RESOLVED | Noted: 2017-06-21 | Resolved: 2025-03-14

## 2025-03-14 PROBLEM — E11.22 TYPE 2 DIABETES MELLITUS WITH STAGE 3A CHRONIC KIDNEY DISEASE, WITHOUT LONG-TERM CURRENT USE OF INSULIN: Chronic | Status: RESOLVED | Noted: 2017-06-21 | Resolved: 2025-03-14

## 2025-03-14 PROBLEM — N18.31 STAGE 3A CHRONIC KIDNEY DISEASE: Chronic | Status: RESOLVED | Noted: 2023-05-26 | Resolved: 2025-03-14

## 2025-03-14 NOTE — PROGRESS NOTES
The patient location is:  Work  The chief complaint leading to consultation is:  Kidney transplant status, immunosuppression    Visit type: audiovisual    Face to Face time with patient:  15  30 minutes of total time spent on the encounter, which includes face to face time and non-face to face time preparing to see the patient (eg, review of tests), Obtaining and/or reviewing separately obtained history, Documenting clinical information in the electronic or other health record, Independently interpreting results (not separately reported) and communicating results to the patient/family/caregiver, or Care coordination (not separately reported).         Each patient to whom he or she provides medical services by telemedicine is:  (1) informed of the relationship between the physician and patient and the respective role of any other health care provider with respect to management of the patient; and (2) notified that he or she may decline to receive medical services by telemedicine and may withdraw from such care at any time.    Notes:     Subjective:       Patient ID: Robb Iglesias is a 44 y.o. male.    Chief Complaint:  As above    HPI    He presents to clinic today for routine follow-up.  Since his last office visit he had an episode of rejection.  Fortunately he made a full recovery in his creatinine has returned to better than his posttransplant status and 1.2.  He has also been treated ongoing for Polyomavirus.  He has had multiple rounds of IVIG.  His laboratory studies and medications were reviewed.  All Nephrology related questions were answered to his satisfaction.      Review of Systems   Constitutional: Negative.    HENT: Negative.     Respiratory: Negative.     Cardiovascular: Negative.    Gastrointestinal: Negative.    Genitourinary: Negative.    Musculoskeletal: Negative.    Skin: Negative.        There were no vitals taken for this visit.    Lab Results   Component Value Date    WBC 5.35  03/07/2025    HGB 13.4 (L) 03/07/2025    HCT 41.7 03/07/2025    MCV 85 03/07/2025     03/07/2025      BMP  Lab Results   Component Value Date     03/07/2025     03/07/2025    K 4.0 03/07/2025    K 4.0 03/07/2025     03/07/2025     03/07/2025    CO2 25 03/07/2025    CO2 25 03/07/2025    BUN 12 03/07/2025    BUN 12 03/07/2025    CREATININE 1.2 03/07/2025    CREATININE 1.2 03/07/2025    CALCIUM 8.9 03/07/2025    CALCIUM 8.9 03/07/2025    ANIONGAP 7 (L) 03/07/2025    ANIONGAP 7 (L) 03/07/2025    ESTGFRAFRICA 13 (A) 07/26/2022    EGFRNONAA 11 (A) 07/26/2022     CMP  Sodium   Date Value Ref Range Status   03/07/2025 140 136 - 145 mmol/L Final   03/07/2025 140 136 - 145 mmol/L Final     Potassium   Date Value Ref Range Status   03/07/2025 4.0 3.5 - 5.1 mmol/L Final   03/07/2025 4.0 3.5 - 5.1 mmol/L Final     Chloride   Date Value Ref Range Status   03/07/2025 108 95 - 110 mmol/L Final   03/07/2025 108 95 - 110 mmol/L Final     CO2   Date Value Ref Range Status   03/07/2025 25 23 - 29 mmol/L Final   03/07/2025 25 23 - 29 mmol/L Final     Glucose   Date Value Ref Range Status   03/07/2025 160 (H) 70 - 110 mg/dL Final   03/07/2025 160 (H) 70 - 110 mg/dL Final     BUN   Date Value Ref Range Status   03/07/2025 12 6 - 20 mg/dL Final   03/07/2025 12 6 - 20 mg/dL Final     Creatinine   Date Value Ref Range Status   03/07/2025 1.2 0.5 - 1.4 mg/dL Final   03/07/2025 1.2 0.5 - 1.4 mg/dL Final     Calcium   Date Value Ref Range Status   03/07/2025 8.9 8.7 - 10.5 mg/dL Final   03/07/2025 8.9 8.7 - 10.5 mg/dL Final     Total Protein   Date Value Ref Range Status   12/23/2024 7.8 6.0 - 8.4 g/dL Final     Albumin   Date Value Ref Range Status   03/07/2025 4.0 3.5 - 5.2 g/dL Final   03/07/2025 4.0 3.5 - 5.2 g/dL Final     Total Bilirubin   Date Value Ref Range Status   12/23/2024 2.3 (H) 0.1 - 1.0 mg/dL Final     Comment:     For infants and newborns, interpretation of results should be based  on gestational  age, weight and in agreement with clinical  observations.    Premature Infant recommended reference ranges:  Up to 24 hours.............<8.0 mg/dL  Up to 48 hours............<12.0 mg/dL  3-5 days..................<15.0 mg/dL  6-29 days.................<15.0 mg/dL       Alkaline Phosphatase   Date Value Ref Range Status   12/23/2024 68 40 - 150 U/L Final     AST   Date Value Ref Range Status   12/23/2024 32 10 - 40 U/L Final     ALT   Date Value Ref Range Status   12/23/2024 53 (H) 10 - 44 U/L Final     Anion Gap   Date Value Ref Range Status   03/07/2025 7 (L) 8 - 16 mmol/L Final   03/07/2025 7 (L) 8 - 16 mmol/L Final     eGFR if    Date Value Ref Range Status   07/26/2022 13 (A) >60 mL/min/1.73 m^2 Final     eGFR if non    Date Value Ref Range Status   07/26/2022 11 (A) >60 mL/min/1.73 m^2 Final     Comment:     Calculation used to obtain the estimated glomerular filtration  rate (eGFR) is the CKD-EPI equation.               Objective:            Physical Exam  Vitals reviewed: Physical exam was not performed, virtual visit.       Assessment:       1. Kidney transplant status    2. Immunosuppression    3. CKD (chronic kidney disease) stage 2, GFR 60-89 ml/min    4. Primary hypertension    5. BK polyoma nephropathy    6. Secondary hyperparathyroidism of renal origin        Plan:       1. Status post kidney transplant in May of 2023.  Stable creatinine at 1.2.  He has ranged between 1.2 and 1.5 for the past 6 months or more.    2. He is stable on 1.5 in the morning 1 mg in the evening Prograf.  Most recent level is 6.1.  He is also on mycophenolate 500 mg twice daily of prednisone 5 mg daily.    3. Creatinine is stable at around 1.2.    4. Blood pressure is adequately controlled continue RAAS inhibition.    5. He received several rounds of IVIG.  Monitor closely by kidney transplant medicine [place].  Overall BK titers have been decreasing.    6. Intact PTH is elevated 164.   Phosphorus is low at 2.4.  Calcium is stable at 8.9 with an albumin of 4.0.        Siva Figueroa MD

## 2025-03-18 ENCOUNTER — PATIENT OUTREACH (OUTPATIENT)
Dept: ADMINISTRATIVE | Facility: HOSPITAL | Age: 45
End: 2025-03-18
Payer: COMMERCIAL

## 2025-03-24 ENCOUNTER — TELEPHONE (OUTPATIENT)
Dept: INTERNAL MEDICINE | Facility: CLINIC | Age: 45
End: 2025-03-24

## 2025-03-24 ENCOUNTER — HOSPITAL ENCOUNTER (OUTPATIENT)
Dept: RADIOLOGY | Facility: HOSPITAL | Age: 45
Discharge: HOME | End: 2025-03-24
Attending: FAMILY MEDICINE
Payer: COMMERCIAL

## 2025-03-24 ENCOUNTER — OFFICE VISIT (OUTPATIENT)
Dept: INTERNAL MEDICINE | Facility: CLINIC | Age: 45
End: 2025-03-24
Payer: COMMERCIAL

## 2025-03-24 VITALS
WEIGHT: 242.94 LBS | HEART RATE: 84 BPM | RESPIRATION RATE: 18 BRPM | SYSTOLIC BLOOD PRESSURE: 110 MMHG | DIASTOLIC BLOOD PRESSURE: 84 MMHG | OXYGEN SATURATION: 98 % | HEIGHT: 75 IN | BODY MASS INDEX: 30.21 KG/M2 | TEMPERATURE: 98 F

## 2025-03-24 DIAGNOSIS — Z79.899 IMMUNOSUPPRESSIVE MANAGEMENT ENCOUNTER FOLLOWING KIDNEY TRANSPLANT: Chronic | ICD-10-CM

## 2025-03-24 DIAGNOSIS — Z94.0 IMMUNOSUPPRESSIVE MANAGEMENT ENCOUNTER FOLLOWING KIDNEY TRANSPLANT: Chronic | ICD-10-CM

## 2025-03-24 DIAGNOSIS — M40.04 POSTURAL KYPHOSIS OF THORACIC REGION: Chronic | ICD-10-CM

## 2025-03-24 DIAGNOSIS — K21.9 GASTROESOPHAGEAL REFLUX DISEASE WITHOUT ESOPHAGITIS: Chronic | ICD-10-CM

## 2025-03-24 DIAGNOSIS — I10 ESSENTIAL HYPERTENSION: Chronic | ICD-10-CM

## 2025-03-24 DIAGNOSIS — E11.69 DYSLIPIDEMIA ASSOCIATED WITH TYPE 2 DIABETES MELLITUS: Chronic | ICD-10-CM

## 2025-03-24 DIAGNOSIS — M40.04 POSTURAL KYPHOSIS OF THORACIC REGION: ICD-10-CM

## 2025-03-24 DIAGNOSIS — E78.5 DYSLIPIDEMIA ASSOCIATED WITH TYPE 2 DIABETES MELLITUS: Chronic | ICD-10-CM

## 2025-03-24 DIAGNOSIS — E11.42 TYPE 2 DIABETES MELLITUS WITH DIABETIC POLYNEUROPATHY, WITHOUT LONG-TERM CURRENT USE OF INSULIN: Primary | Chronic | ICD-10-CM

## 2025-03-24 DIAGNOSIS — B34.8 BK POLYOMA VIREMIA: Chronic | ICD-10-CM

## 2025-03-24 DIAGNOSIS — I25.10 CORONARY ARTERY DISEASE INVOLVING NATIVE CORONARY ARTERY OF NATIVE HEART WITHOUT ANGINA PECTORIS: Chronic | ICD-10-CM

## 2025-03-24 DIAGNOSIS — I48.0 PAROXYSMAL ATRIAL FIBRILLATION: Chronic | ICD-10-CM

## 2025-03-24 DIAGNOSIS — I25.2 HISTORY OF NON-ST ELEVATION MYOCARDIAL INFARCTION (NSTEMI): Chronic | ICD-10-CM

## 2025-03-24 DIAGNOSIS — E11.59 HYPERTENSION ASSOCIATED WITH DIABETES: ICD-10-CM

## 2025-03-24 DIAGNOSIS — I10 HYPERTENSION COMPLICATING DIABETES: Chronic | ICD-10-CM

## 2025-03-24 DIAGNOSIS — E11.9 HYPERTENSION COMPLICATING DIABETES: Chronic | ICD-10-CM

## 2025-03-24 DIAGNOSIS — E87.8 ELECTROLYTE ABNORMALITY: ICD-10-CM

## 2025-03-24 DIAGNOSIS — Z94.0 S/P KIDNEY TRANSPLANT: Chronic | ICD-10-CM

## 2025-03-24 DIAGNOSIS — I15.2 HYPERTENSION ASSOCIATED WITH DIABETES: ICD-10-CM

## 2025-03-24 DIAGNOSIS — M1A.09X0 IDIOPATHIC CHRONIC GOUT, MULTIPLE SITES, WITHOUT TOPHUS (TOPHI): Chronic | ICD-10-CM

## 2025-03-24 PROBLEM — U07.1 COVID-19: Chronic | Status: RESOLVED | Noted: 2023-08-07 | Resolved: 2025-03-24

## 2025-03-24 PROCEDURE — 1160F RVW MEDS BY RX/DR IN RCRD: CPT | Mod: CPTII,S$GLB,, | Performed by: FAMILY MEDICINE

## 2025-03-24 PROCEDURE — 3074F SYST BP LT 130 MM HG: CPT | Mod: CPTII,S$GLB,, | Performed by: FAMILY MEDICINE

## 2025-03-24 PROCEDURE — 3079F DIAST BP 80-89 MM HG: CPT | Mod: CPTII,S$GLB,, | Performed by: FAMILY MEDICINE

## 2025-03-24 PROCEDURE — 3008F BODY MASS INDEX DOCD: CPT | Mod: CPTII,S$GLB,, | Performed by: FAMILY MEDICINE

## 2025-03-24 PROCEDURE — 72070 X-RAY EXAM THORAC SPINE 2VWS: CPT | Mod: TC

## 2025-03-24 PROCEDURE — G2211 COMPLEX E/M VISIT ADD ON: HCPCS | Mod: S$GLB,,, | Performed by: FAMILY MEDICINE

## 2025-03-24 PROCEDURE — 4010F ACE/ARB THERAPY RXD/TAKEN: CPT | Mod: CPTII,S$GLB,, | Performed by: FAMILY MEDICINE

## 2025-03-24 PROCEDURE — 72070 X-RAY EXAM THORAC SPINE 2VWS: CPT | Mod: 26,,, | Performed by: RADIOLOGY

## 2025-03-24 PROCEDURE — 99999 PR PBB SHADOW E&M-EST. PATIENT-LVL V: CPT | Mod: PBBFAC,,, | Performed by: FAMILY MEDICINE

## 2025-03-24 PROCEDURE — 99215 OFFICE O/P EST HI 40 MIN: CPT | Mod: S$GLB,,, | Performed by: FAMILY MEDICINE

## 2025-03-24 PROCEDURE — 3066F NEPHROPATHY DOC TX: CPT | Mod: CPTII,S$GLB,, | Performed by: FAMILY MEDICINE

## 2025-03-24 PROCEDURE — 1159F MED LIST DOCD IN RCRD: CPT | Mod: CPTII,S$GLB,, | Performed by: FAMILY MEDICINE

## 2025-03-24 RX ORDER — NITROGLYCERIN 0.4 MG/1
TABLET SUBLINGUAL
Qty: 25 TABLET | Refills: 5 | Status: SHIPPED | OUTPATIENT
Start: 2025-03-24

## 2025-03-24 RX ORDER — PANTOPRAZOLE SODIUM 40 MG/1
40 TABLET, DELAYED RELEASE ORAL DAILY
Qty: 90 TABLET | Refills: 3 | Status: SHIPPED | OUTPATIENT
Start: 2025-03-24

## 2025-03-24 RX ORDER — METOPROLOL TARTRATE 25 MG/1
25 TABLET, FILM COATED ORAL 2 TIMES DAILY
Qty: 180 TABLET | Refills: 3 | Status: SHIPPED | OUTPATIENT
Start: 2025-03-24

## 2025-03-24 RX ORDER — EZETIMIBE 10 MG/1
10 TABLET ORAL DAILY
Qty: 90 TABLET | Refills: 3 | Status: SHIPPED | OUTPATIENT
Start: 2025-03-24

## 2025-03-24 RX ORDER — ATORVASTATIN CALCIUM 80 MG/1
80 TABLET, FILM COATED ORAL NIGHTLY
Qty: 90 TABLET | Refills: 3 | Status: SHIPPED | OUTPATIENT
Start: 2025-03-24

## 2025-03-24 RX ORDER — ASPIRIN 81 MG/1
81 TABLET ORAL DAILY
Qty: 90 TABLET | Refills: 2 | Status: SHIPPED | OUTPATIENT
Start: 2025-03-24 | End: 2025-06-22

## 2025-03-24 RX ORDER — VALSARTAN 80 MG/1
80 TABLET ORAL DAILY
Qty: 90 TABLET | Refills: 3 | Status: SHIPPED | OUTPATIENT
Start: 2025-03-24

## 2025-03-24 NOTE — ASSESSMENT & PLAN NOTE
This condition appears to be resolved after renal transplant. He reports no residual symptoms or impairment.

## 2025-03-24 NOTE — TELEPHONE ENCOUNTER
Labs reordered.  Orders Placed This Encounter   Procedures    Microalbumin/Creatinine Ratio, Urine    Microalbumin/Creatinine Ratio, Urine    Comprehensive Metabolic Panel    Lipid Panel    Hemoglobin A1C

## 2025-03-24 NOTE — PROGRESS NOTES
OFFICE VISIT 3/24/25  8:20 AM CDT    History of Present Illness    Type 2 diabetes with diabetic polyneuropathy: The patient's diabetes is currently managed without the use of long-term insulin, and despite previous complications with gabapentin and pregabalin, the diabetic neuropathy remains under control. His blood sugar levels are stable on current medications, including insulin lispro as needed. The diabetic neuropathy symptoms are managed with lifestyle measures such as wearing thick socks and conservative care. He should continue monitoring blood sugar levels with his glucose meter.    Hypertension complicating diabetes: Despite a history of intolerance to amlodipine, the patient's blood pressure is well-controlled with current medications including metoprolol and valsartan. Office measurements show controlled readings, and home measurements illustrate a stable pattern. Lifestyle interventions such as a heart-healthy diet and regular physical activity are recommended to support blood pressure management.    Coronary artery disease: The patient has stable coronary artery disease post-PCI with a regimen that includes atorvastatin, ezetimibe, and aspirin for lipid management and antiplatelet therapy. Regular monitoring of cardiovascular health is advised, and the patient is encouraged to maintain regular physical activity as tolerated.    Paroxysmal atrial fibrillation: The patient had isolated episodes of atrial fibrillation, likely provoked by a post-operative state. He currently senses arrhythmias accurately and maintains a stable heart rate with the prescribed medications, including aspirin and metoprolol. He has not required anticoagulation since July 2023, but ongoing cardiology follow-up is recommended.    Immunosuppressive management post-kidney transplant: Post-transplant, the patient has discontinued mycophenolate due to the improving BK polyoma viremia. He continues with tacrolimus and prednisone for  immunosuppressive therapy. Regular monitoring for allograft function and infection is critical.    BK polyoma viremia: The BK virus infection shows a downward trend, and cessation of mycophenolate is noted in the current regimen to assist in controlling the virus levels. Continued virology assessments and renal function tests are advised to guide ongoing immunosuppressive therapy.    Gastroesophageal reflux disease: The patient's GERD is managed with pantoprazole, providing effective acid reflux control. Dietary adjustments such as smaller meals, avoiding late-night eating, and triggering foods can also support symptom management.    Essential hypertension: Blood pressure management is effective with current medications, including metoprolol and valsartan. Regular monitoring and lifestyle interventions such as reducing dietary sodium and increasing physical activity remain advised to support management.    Postural kyphosis of thoracic region: The patient notes worsening thoracic curvature. Imaging and referral to physical therapy have been initiated for management. Chiropractic treatment was deemed inappropriate. Emphasis is placed on posture correction and strength exercises provided by physical therapy.    Idiopathic chronic gout: The patient experiences no current symptoms post-kidney transplant, indicating resolution of previous gout flares. He should continue to avoid known dietary triggers such as high-purine foods and alcohol to prevent recurrence. Regular monitoring of uric acid levels is advised.    Electrolyte abnormality: The patient requires management of this condition with k phos supplementation for electrolyte balancing. Regular monitoring of lab results will guide continued management, and dietary advice for balance is suggested.    History of NSTEMI with CAD status post PCI: The patient's history of NSTEMI is stable with current cardiovascular management. He continues on statins and antiplatelet  therapy to prevent further cardiac events. Regular cardiovascular follow-ups and lifestyle modifications to maintain heart health are recommended.       CHIEF COMPLAINT:  Robb presents today for follow up after feeling better from previous health issues    HISTORY OF PRESENT ILLNESS:  He reports significant improvement in his overall health status over the past couple of weeks. The mental fatigue and fog have lifted, and he now feels mentally sharper with improved focus at work. He has resumed exercising, which he had been unable to do since starting IVIG treatments in June of last year, and can now engage in physical activities without post-exercise exhaustion. He reports feeling similar to his baseline health status from May of the previous year.    IMMUNOLOGY:  He received IVIG treatments with [facility] starting June of last year and hopes to be finished with the treatments. He continues to have BK virus infection, though levels have been trending downward over the past couple months. CellCept has been discontinued until BK virus resolves.    DIABETES:  His fasting blood sugar is typically 150-160 mg/dL, dropping to 120-130 mg/dL after breakfast. Throughout the day, blood sugar averages 120-130 mg/dL, with occasional post-prandial spikes to 150-160 mg/dL that return to baseline within two hours. He previously used Humalog for 6-8 weeks during high-dose IV prednisone treatment, which was discontinued after blood sugar normalized.    CARDIOVASCULAR:  He experienced atrial fibrillation episodes following steroid treatments in early December. He was placed on Eliquis and wore a heart monitor for a month, during which he had two AFib episodes. He can perceive when he is in AFib and accurately identified both episodes. His heart rate generally remains around 75 BPM during recent episodes, though in December he had an episode with heart rate approximately 150 BPM, possibly related to steroid treatment.    DIABETIC  NEUROPATHY:  He wears thick socks continuously with breakthrough symptoms occurring approximately every two weeks, primarily manifesting as internal toe itching sensation at night. Previous trials of gabapentin and pregabalin were discontinued due to adverse effects including significant elevations in blood sugar and blood pressure.    MUSCULOSKELETAL:  He reports concerns about progressive kyphosis with worsening thoracic curvature. He experiences stiffness in the affected area and has attempted self-directed gym exercises without success, often resulting in self-injury or lack of improvement.       Except as noted herein, ROS is otherwise negative.    Assessment & Plan    IMPRESSION:   Reviewed recent improvement in physical and mental energy levels following IVIG treatments.   Noted stable renal function with lowest creatinine levels in recent history.   Acknowledged ongoing BK virus infection, but improving.   Evaluated current medication regimen, including recent discontinuation of CellCept and Humalog due to improved blood sugar.   Assessed diabetic polyneuropathy symptoms, currently manageable with conservative measures.   Examined feet, finding normal sensation on the right and slightly diminished on the left.   Diagnosed kyphosis of the thoracic spine (hunchback) based on physical exam.   Determined that chiropractor treatment would not be beneficial for kyphosis.    PATIENT EDUCATION:   Explained the nature of kyphosis and its potential causes, including posture and genetics.   Clarified that condition is likely not related to more significant spinal disorders that cause extreme curvature.    MEDICATIONS:   Continued prednisone 5 mg, valsartan for blood pressure management, pantoprazole for acid reflux, Zetia for cholesterol management, nitroglycerin as needed, aspirin, Metoprolol 25 mg twice daily, and Eliquis as prescribed by cardiologist.   Refilled K-Phos (potassium phosphate) 2 tablets 3 times daily  for 30 days.    ORDERS:   Ordered CBC, CMP, A1C, lipid panel, urine microalbumin, and renal function panel as routine labs.    REFERRALS:   Referred to physical therapy for management of kyphosis.    FOLLOW UP:   Follow up in 6 months with either the doctor or advanced practice partner Amna Dominguez, based on patient preference.       1. Type 2 diabetes mellitus with diabetic polyneuropathy, without long-term current use of insulin  Overview:  Paresthesia/Dysesthesia >> Anesthesia.      2. Coronary artery disease involving native coronary artery of native heart without angina pectoris  Overview:  Cath lab procedure 04/23/2018 (Naveen Rios MD)  A. Indication/Pre-Operative Diagnosis: The patient is a 37 year old male that was referred for catheterization by [facility] for ACS (NSTEMI). The BELLA risk score is 5.    B. Summary/Post-Operative Diagnosis  1. Single vessel coronary artery disease.  2. Normal LVEF.  3. Diastolic dysfunction.  4. Successful PCI for acute myocardial infarction.  5. The patient did not undergo primary PCI.    Orders:  -     aspirin (ECOTRIN) 81 MG EC tablet; Take 1 tablet (81 mg total) by mouth once daily.  Dispense: 90 tablet; Refill: 2  -     atorvastatin (LIPITOR) 80 MG tablet; Take 1 tablet (80 mg total) by mouth every evening.  Dispense: 90 tablet; Refill: 3  -     ezetimibe (ZETIA) 10 mg tablet; Take 1 tablet (10 mg total) by mouth once daily.  Dispense: 90 tablet; Refill: 3  -     nitroGLYCERIN (NITROSTAT) 0.4 MG SL tablet; Dissolve one tablet underneath tongue at onset of angina; may repeat every 5 minutes if needed. Call 911 if angina persists after 2 doses.  Dispense: 25 tablet; Refill: 5    3. Dyslipidemia associated with type 2 diabetes mellitus  -     atorvastatin (LIPITOR) 80 MG tablet; Take 1 tablet (80 mg total) by mouth every evening.  Dispense: 90 tablet; Refill: 3  -     ezetimibe (ZETIA) 10 mg tablet; Take 1 tablet (10 mg total) by mouth once daily.  Dispense: 90  tablet; Refill: 3    4. History of NSTEMI with CAD s/p PCI (FAMILIA) of LAD x 2 in 8/2017  -     atorvastatin (LIPITOR) 80 MG tablet; Take 1 tablet (80 mg total) by mouth every evening.  Dispense: 90 tablet; Refill: 3  -     ezetimibe (ZETIA) 10 mg tablet; Take 1 tablet (10 mg total) by mouth once daily.  Dispense: 90 tablet; Refill: 3    5. Electrolyte abnormality  -     k phos di & mono-sod phos mono (PHOSPHA 250 NEUTRAL) 250 mg Tab; Take 2 tablets by mouth 3 (three) times daily.  Dispense: 180 tablet; Refill: 1    6. Paroxysmal atrial fibrillation  Overview:  Isolated episode in early June, 2023, believed provoked by post-op state. Cardiologist D/C anticoagulation at end of July, left on ASA 81 mg.    Orders:  -     metoprolol tartrate (LOPRESSOR) 25 MG tablet; Take 1 tablet (25 mg total) by mouth 2 (two) times daily.  Dispense: 180 tablet; Refill: 3    7. Hypertension complicating diabetes  Overview:  Didn't tolerate amlodipine due to SE of hypotension when on dialysis.    Orders:  -     metoprolol tartrate (LOPRESSOR) 25 MG tablet; Take 1 tablet (25 mg total) by mouth 2 (two) times daily.  Dispense: 180 tablet; Refill: 3    8. Gastroesophageal reflux disease without esophagitis  -     pantoprazole (PROTONIX) 40 MG tablet; Take 1 tablet (40 mg total) by mouth once daily.  Dispense: 90 tablet; Refill: 3    9. Essential hypertension  -     metoprolol tartrate (LOPRESSOR) 25 MG tablet; Take 1 tablet (25 mg total) by mouth 2 (two) times daily.  Dispense: 180 tablet; Refill: 3  -     valsartan (DIOVAN) 80 MG tablet; Take 1 tablet (80 mg total) by mouth once daily.  Dispense: 90 tablet; Refill: 3    10. Immunosuppressive management encounter following kidney transplant    11. BK polyoma viremia  Assessment & Plan:  Related to immunosuppression for kidney transplant      12. S/P kidney transplant    13. Idiopathic chronic gout, multiple sites, without tophus (tophi)  Assessment & Plan:  This condition appears to be  "resolved after renal transplant. He reports no residual symptoms or impairment.       14. Postural kyphosis of thoracic region  -     X-Ray Thoracic Spine AP Lateral; Future; Expected date: 03/24/2025  -     Ambulatory Referral/Consult to Physical Therapy; Future; Expected date: 03/31/2025       Unless specified otherwise, chronic conditions are represented as and appear to be compensated/controlled and stable.  Today's visit involved the intricate management of episodic problem(s) and the ongoing care for the patient's serious or complex condition(s) listed above, reflecting the inherent complexity of providing longitudinal, comprehensive evaluation and management as the central hub for the patient's primary care services.  Vitals:    03/24/25 0818   BP: 110/84   BP Location: Right arm   Patient Position: Sitting   Pulse: 84   Resp: 18   Temp: 97.5 °F (36.4 °C)   TempSrc: Tympanic   SpO2: 98%   Weight: 110.2 kg (242 lb 15.2 oz)   Height: 6' 3" (1.905 m)   Physical Exam  Vitals reviewed.   Constitutional:       General: He is not in acute distress.     Appearance: Normal appearance. He is not ill-appearing, toxic-appearing or diaphoretic.   HENT:      Head: Normocephalic and atraumatic.   Eyes:      General: No scleral icterus.     Conjunctiva/sclera: Conjunctivae normal.   Neck:      Thyroid: No thyroid mass, thyromegaly or thyroid tenderness.      Vascular: No carotid bruit.   Cardiovascular:      Rate and Rhythm: Normal rate and regular rhythm.   Pulmonary:      Effort: Pulmonary effort is normal.      Breath sounds: Normal breath sounds.   Skin:     General: Skin is warm and dry.   Neurological:      General: No focal deficit present.      Mental Status: He is alert and oriented to person, place, and time. Mental status is at baseline.   Psychiatric:         Mood and Affect: Mood normal.         Behavior: Behavior normal.         Judgment: Judgment normal.       DIABETIC FOOT EXAM:  Protective Sensation (w/ 10 " gram monofilament):  Right: Intact  Left: Decreased    Visual Inspection:  Normal -  Bilateral other than Hallux Valgus    Pedal Pulses:   Right: Present  Left: Present    Posterior Tibialis Pulses:   Right:Present  Left: Present     I spent a total of 40 minutes today evaluating and managing this patient for this encounter.  This includes face to face time and non-face to face time preparing to see the patient (eg, review of tests), obtaining and/or reviewing separately obtained history, documenting clinical information in the electronic or other health record, independently interpreting results and communicating results to the patient/family/caregiver, or care coordinator. This time was spent personally by me on the following activities: review of nurse's notes, pre-charting, review of patient's past medical history, assessing age-appropriate health maintenance needs, review of any interval history, medication reconciliation, reviewing/reconciling/updating problem list, reviewing/reconciling/updating PMFSH, obtaining history from the patient, examination of the patient, review and interpretation of lab results, reviewing my previous chart notes, reviewing consulting specialist's chart notes, evaluation of the patient's response to treatment, managing and/or ordering prescription medications, educating patient and answering their questions about risks and benefits of treatment options, educating patient and answering their questions about treatment plan, goals of treatment, and follow-up, counseling on appropriate therapeutic lifestyle changes, communicating with collaborating provider(s), communicating instructions to my staff about the treatment plan, and final documentation of the visit.  This time was exclusive of any separately billable procedures for this patient and exclusive of time spent treating any other patient.    This note was generated with the assistance of ambient listening technology. Verbal  "consent was obtained by the patient and accompanying visitor(s) for the recording of patient appointment to facilitate this note. I attest to having reviewed and edited the generated note for accuracy, though some syntax or spelling errors may persist. Please contact the author of this note for any clarification.    Documentation entered by me for this encounter may have been done in part using speech-recognition technology. Although I have made an effort to ensure accuracy, "sound like" errors may exist and should be interpreted in context.     Follow up in about 6 months (around 9/24/2025) for virtual visit.     "

## 2025-03-25 LAB
ALBUMIN/CREAT UR: 596 UG/MG
CREAT UR-MCNC: 202 MG/DL (ref 23–375)
MICROALBUMIN UR-MCNC: 1204 UG/ML (ref ?–5000)

## 2025-03-26 ENCOUNTER — CLINICAL SUPPORT (OUTPATIENT)
Dept: REHABILITATION | Facility: HOSPITAL | Age: 45
End: 2025-03-26
Attending: FAMILY MEDICINE
Payer: COMMERCIAL

## 2025-03-26 DIAGNOSIS — M40.04 POSTURAL KYPHOSIS OF THORACIC REGION: Chronic | ICD-10-CM

## 2025-03-26 DIAGNOSIS — R53.1 WEAKNESS: Primary | ICD-10-CM

## 2025-03-26 PROCEDURE — 97161 PT EVAL LOW COMPLEX 20 MIN: CPT | Mod: PN

## 2025-03-26 PROCEDURE — 97530 THERAPEUTIC ACTIVITIES: CPT | Mod: PN

## 2025-03-26 NOTE — PROGRESS NOTES
Outpatient Rehab    Physical Therapy Evaluation    Patient Name: Robb Iglesias  MRN: [id number]  YOB: 1980  Encounter Date: 3/26/2025    Therapy Diagnosis:   Encounter Diagnoses   Name Primary?    Postural kyphosis of thoracic region     Weakness Yes     Physician: SABIHA Roberson MD    Physician Orders: Eval and Treat  Medical Diagnosis: Postural kyphosis of thoracic region    Visit # / Visits Authorized:  1 / 1  Insurance Authorization Period: 3/24/2025 to 12/31/2025  Date of Evaluation: 3/26/2025  Plan of Care Certification: 3/26/2025 to 4/24/2025     Time In: 1600   Time Out: 1645  Total Time: 45   Total Billable Time:  45 minutes 1:1     Intake Outcome Measure for FOTO Survey    Therapist reviewed FOTO scores for Robb Iglesias on 3/26/2025.   FOTO report - see Media section or FOTO account episode details.     Intake Score: 53%         Subjective   History of Present Illness  Robb is a 44 y.o. male who reports to physical therapy with a chief concern of weakness and postural deficits.       History of Present Condition/Illness: Patient presents with c/o of weakness and postural deficits. Pt reports he has always had a curve in his back with diagnosed with scoliosis that has given him some minor troubles throughout his life.  Pt reports there was a time where he experienced joint pain at increasing amount in relation to obesity struggle. However, he was able to drop most of his weight in the last ten years that improved patent's strength, function and activity tolerance. Pt was diagnosed with COVID in the last five years which reportedly led to kidney failure and need for kidney transplant in 2023. Pt reports kidney treatment has caused a significant decline in his strength and endurance. In the last couple months, patient has been doing very well in his kidney health and is ready to become active again, but reports he does not know a good safe way to start. Pt denies any true  pain, but notes certain positions can be uncomfortable if head isnt support due to level of curve in spine.      Activities of Daily Living  Previously independent with activities of daily living? Yes, prior to kidney failure   Currently independent with activities of daily living? Yes, but reports limited tolerance due to weakness, lacking mobility and quick fatigue       Living Arrangements  Living Situation  Living Arrangements: Family members    Home Setup  Number of Levels in Home: One level        Employment      Office work for [email or web address]       Past Medical History/Physical Systems Review:   Robbeloy Iglesias  has a past medical history of Allergic rhinitis, Atrial fibrillation with RVR, CKD (chronic kidney disease) stage 2, GFR 60-89 ml/min, Class 1 obesity due to excess calories with serious comorbidity and body mass index (BMI) of 31.0 to 31.9 in adult, Coronary artery disease, Coronary artery disease involving native coronary artery of native heart without angina pectoris, Diabetic nephropathy associated with type 2 diabetes mellitus, Direct hyperbilirubinemia, DM (diabetes mellitus), DM (diabetes mellitus), DM (diabetes mellitus), DM (diabetes mellitus), Dyslipidemia associated with type 2 diabetes mellitus, Elevated bilirubin, GERD (gastroesophageal reflux disease), Gout, History of COVID-19 (Tested Positive July 31, 2023), Hyperlipidemia, Hyperparathyroidism, secondary to chronic kidney disease, Hypertension associated with chronic kidney disease due to type 2 diabetes mellitus, Hypertension complicating diabetes, Idiopathic chronic gout, multiple sites, without tophus (tophi), Immunosuppressive management encounter following kidney transplant, Long term (current) use of insulin, MI (myocardial infarction), NSTEMI with CAD s/p PCI (FAMILIA) of LAD x 2 in 8/2017, Obesity, HENRY on CPAP, Peritoneal dialysis catheter in place, Proteinuria, Severe obstructive sleep apnea - Intolerant of CPAP, Stage  3a chronic kidney disease, Stage 5 chronic kidney disease on chronic peritoneal dialysis, Stage 5 chronic kidney disease on chronic peritoneal dialysis, Status post angioplasty with stent, Steatohepatitis, Type 2 diabetes mellitus with chronic kidney disease on chronic dialysis, without long-term current use of insulin, Type 2 diabetes mellitus with diabetic nephropathy, Type 2 diabetes mellitus with diabetic nephropathy, without long-term current use of insulin, Type 2 diabetes mellitus with diabetic polyneuropathy, without long-term current use of insulin, Type 2 diabetes mellitus with hyperglycemia, Type 2 diabetes mellitus with renal manifestations, and Type 2 diabetes mellitus with stage 3 chronic kidney disease, without long-term current use of insulin.    Robb Iglesias  has a past surgical history that includes Nasal septum surgery; LASIK (Bilateral); Liver biopsy; Nasal endoscopy; Sleeve Gastroplasty (03/06/2017); Coronary angioplasty with stent; Kidney transplant (N/A, 05/15/2023); Peritoneal catheter removal (N/A, 05/15/2023); Cystoscopy; and biopsy, with ct guidance (N/A, 11/21/2024).    Robb has a current medication list which includes the following prescription(s): apixaban, aspirin, atorvastatin, blood sugar diagnostic, blood-glucose meter, ezetimibe, tono root, insulin lispro, k phos di & mono-sod phos mono, lancets, magnesium oxide, metoprolol tartrate, mounjaro, multivitamin, nitroglycerin, pantoprazole, pen needle, diabetic, pen needle, diabetic, prednisone, tacrolimus, tacrolimus, and valsartan.    Review of patient's allergies indicates:  No Known Allergies     Objective    RANGE OF MOTION:    Thoracolumbar AROM  (% of norm motion present)  Right   (spine) Left    Pain/Dysfunction with Movement Goal   Cervical Flexion  100% --- Stretch felt  ---   Cervical Extension  0% --- Stretching/ stiffness felt  50%   Cervical Side Bending  100% 100% Stretching felt  ---   Cervical Rotation  75%  75% Tension with some minor R sided pain with L rotation  100%     STRENGTH:    U/E MMT Right Left Goal   Shoulder Flexion 4-/5 4-/5 4+/5 B   Shoulder Abduction 4-/5 4-/5 4+/5 B   Shoulder IR 4/5 4/5 5/5 B   Shoulder ER 4/5 4/5 5/5 B   Middle Trapezius 4-/5 4-/5 4+/5 B   Lower Trapezius 3-/5 4-/5 4+/5 B   Hip Flexion  4/5 4/5 5/5 B   Hip Extension 4-/5 4-/5 4+/5 B   Hip Abduction  4/5 4/5 5/5 B     JOINT MOBILITY:     Joint Motion Tested Spine   Lumbar  Normal and pain    Thoracic  Hypomobile and discomfort      Palpation: Increased tone and tenderness noted with palpation of     Posture:  Pt presents with postural abnormalities which include: forward head, rounded shoulders , trunk deviated right, and increased thoracic kyphosis     Movement Analysis:   Movement Observations noted   Full Plank  12 seconds before loss of form, 34 seconds total held          Treatment: (15 minutes)   Therapeutic Activity  TA 1: POC and HEP education      Assessment & Plan   Assessment  Robb presents with a condition of Low complexity.   Presentation of Symptoms: Stable  Will Comorbidities Impact Care: Yes  See patient co-morbidities in past medical history listed above subjective portion of evaluation     Functional Limitations: Activity tolerance, Gait limitations, Performing household chores, Squatting, Standing tolerance, Participating in sports, Completing work/school activities, Carrying objects, Functional mobility  Impairments: Abnormal gait, Abnormal muscle firing, Abnormal or restricted range of motion, Activity intolerance, Impaired physical strength, Lack of appropriate home exercise program    Patient Goal for Therapy (PT): Patient would like to improve strength,  Prognosis: Excellent  Assessment Details: Patient is a 44 year old male presenting to outpatient physical therapy with diagnosed postural kyphosis of thoracic region. Patient has noted deficits in strength, range of motion, posture, and trunk  endurance/ core control. Pt is being limited with functional tolerance household activities of daily living, yard work and keeping up with children due to deficits.      Plan  From a physical therapy perspective, the patient would benefit from: Skilled Rehab Services    Planned therapy interventions include: Therapeutic exercise, Therapeutic activities, Manual therapy, Neuromuscular re-education, ADLs/IADLs, Gait training, Orthotic management and training, and Other (Comment). Dry Needling   Planned modalities to include: Cryotherapy (cold pack), Electrical stimulation - attended, Electrical stimulation - passive/unattended, Mechanical traction, Thermotherapy (hot pack), and Ultrasound.        Visit Frequency: 1 times Per Week for 4 Weeks.       This plan was discussed with Patient.   Discussion participants: Agreed Upon Plan of Care             Patient's spiritual, cultural, and educational needs considered and patient agreeable to plan of care and goals.     Education             No show policy, HEP, plan of care, PT role and benefits        Goals:   Active       Functional outcome       Patient will show a significant change in FOTO score to 70 to demonstrate subjective improvement in overall function        Start:  03/27/25    Expected End:  04/24/25            Patient will demonstrate independence in home program for support of progression       Start:  03/27/25    Expected End:  04/10/25               Range of Motion       Patient will achieve trunk ROM to goals stated in objective section of evaluation        Start:  03/27/25    Expected End:  04/24/25               Strength       Patient will demonstrate strength improvements to stated goals in objective section of evaluation.        Start:  03/27/25    Expected End:  04/24/25                Sharon Saunders, PT

## 2025-03-27 PROBLEM — R53.1 WEAKNESS: Status: ACTIVE | Noted: 2025-03-27

## 2025-04-02 ENCOUNTER — CLINICAL SUPPORT (OUTPATIENT)
Dept: REHABILITATION | Facility: HOSPITAL | Age: 45
End: 2025-04-02
Payer: COMMERCIAL

## 2025-04-02 ENCOUNTER — RESULTS FOLLOW-UP (OUTPATIENT)
Dept: INTERNAL MEDICINE | Facility: CLINIC | Age: 45
End: 2025-04-02

## 2025-04-02 DIAGNOSIS — M41.84 DEXTROSCOLIOSIS OF THORACIC SPINE: Primary | ICD-10-CM

## 2025-04-02 DIAGNOSIS — Z94.0 KIDNEY REPLACED BY TRANSPLANT: Primary | ICD-10-CM

## 2025-04-02 DIAGNOSIS — R53.1 WEAKNESS: Primary | ICD-10-CM

## 2025-04-02 DIAGNOSIS — M48.10 DISH (DIFFUSE IDIOPATHIC SKELETAL HYPEROSTOSIS): ICD-10-CM

## 2025-04-02 PROCEDURE — 97110 THERAPEUTIC EXERCISES: CPT | Mod: PN

## 2025-04-02 PROCEDURE — 97112 NEUROMUSCULAR REEDUCATION: CPT | Mod: PN

## 2025-04-02 NOTE — PROGRESS NOTES
"  Outpatient Rehab    Physical Therapy Visit    Patient Name: Robb Iglesias  MRN: [id number]  YOB: 1980  Encounter Date: 4/2/2025    Therapy Diagnosis:   Encounter Diagnosis   Name Primary?    Weakness Yes     Physician: SABIHA Roberson MD    Physician Orders: Eval and Treat  Medical Diagnosis: Postural kyphosis of thoracic region    Visit # / Visits Authorized:  1 / 10  Insurance Authorization Period: 4/2/2025 to 7/30/2025  Date of Evaluation: 3/26/2025  Plan of Care Certification: 3/26/2025 to 4/24/2025     Time In: 1530   Time Out: 1625  Total Time: 55   Total Billable Time:  55 minutes 1:1 with PT or trained technician     FOTO: 1/3       FOTO Ankle Survey    Therapist reviewed FOTO scores for Robb Iglesias on 4/2/2025.   FOTO documents entered into [employer] - see Media section.    Score: 67          Subjective   He has been doing well with HEP over the past week with some soreness and good stretches felt..         Objective    To be reassessed at next progress note/ plan of care update         Treatment:       CPT Intervention  JointFocus Duration / Intensity  4/2/2025   TE UBE  3'/3'   NMR Wall pushup   2 x 12    TE  Standing open books   2x10    TE  Wall angels 2 x10    TE  Cat/cow  X15    TE  Thread the needle  X15   NMR Bird Dog X 10 B    NMR  Straight arm pull downs PALM UP  2x12 GTC   NMR  Seated Ts  2x10 BTB   NMR B External Rotation  2 x 15 BTB   NMR Standing rows  2 x 15 BTB   NMR Plank Plus Holds 3 x 20"    NMR Pallof Press X8 10" holds    NMR Lat Pulldowns  30# 2 x 10    PLAN           CPT Codes available for Billing:   (-) minutes of Manual therapy (MT) to improve pain and ROM.  (17) minutes of Therapeutic Exercise (TE) to develop strength, endurance, range of motion, and flexibility.  (38) minutes of Neuromuscular Re-Education (NMR)  to improve: Balance, Coordination, Kinesthetic, Sense, Proprioception, and Posture.  (-) minutes of Therapeutic Activities (TA) to improve " functional performance.  (-) minutes of Gait Training (GT) to improve functional mobility and safety  Unattended Electrical Stimulation (ES) for muscle performance or pain modulation.  Vasopneumatic Device Therapy () for management of swelling/edema. (39038)  BFR: Blood flow restriction applied during exercise  NP or (-): Not Performed    Assessment & Plan   Assessment: Patient presents with good tolerance, but some soreness noted with HEP for last week. Exercises were continued with added work on postural neuromotor training and trunk mobility. Cues were required throughout session for technique and form. Pt responds well to cueing. HEP was progressed with exercises from today for next week's performance. Pt demonstrates good understanding of this.       Patient will continue to benefit from skilled outpatient physical therapy to address the deficits listed in the problem list box on initial evaluation, provide pt/family education and to maximize pt's level of independence in the home and community environment.     Patient's spiritual, cultural, and educational needs considered and patient agreeable to plan of care and goals.         Plan: Continue to progress per tolerance within POC    Goals:   Active       Functional outcome       Patient will show a significant change in FOTO score to 70 to demonstrate subjective improvement in overall function  (Progressing)       Start:  03/27/25    Expected End:  04/24/25            Patient will demonstrate independence in home program for support of progression (Progressing)       Start:  03/27/25    Expected End:  04/10/25               Range of Motion       Patient will achieve trunk ROM to goals stated in objective section of evaluation  (Progressing)       Start:  03/27/25    Expected End:  04/24/25               Strength       Patient will demonstrate strength improvements to stated goals in objective section of evaluation.  (Progressing)       Start:  03/27/25     Expected End:  04/24/25                Sharon Saunders, PT

## 2025-04-03 ENCOUNTER — PATIENT MESSAGE (OUTPATIENT)
Dept: TRANSPLANT | Facility: CLINIC | Age: 45
End: 2025-04-03
Payer: COMMERCIAL

## 2025-04-03 DIAGNOSIS — Z94.0 KIDNEY REPLACED BY TRANSPLANT: Primary | ICD-10-CM

## 2025-04-04 ENCOUNTER — LAB VISIT (OUTPATIENT)
Dept: LAB | Facility: HOSPITAL | Age: 45
End: 2025-04-04
Attending: INTERNAL MEDICINE
Payer: COMMERCIAL

## 2025-04-04 DIAGNOSIS — Z94.0 KIDNEY REPLACED BY TRANSPLANT: ICD-10-CM

## 2025-04-04 LAB
ABSOLUTE EOSINOPHIL (OHS): 0.17 K/UL
ABSOLUTE MONOCYTE (OHS): 0.69 K/UL (ref 0.3–1)
ABSOLUTE NEUTROPHIL COUNT (OHS): 3.14 K/UL (ref 1.8–7.7)
ALBUMIN SERPL BCP-MCNC: 4 G/DL (ref 3.5–5.2)
ANION GAP (OHS): 7 MMOL/L (ref 8–16)
BACTERIA #/AREA URNS HPF: ABNORMAL /HPF
BASOPHILS # BLD AUTO: 0.05 K/UL
BASOPHILS NFR BLD AUTO: 0.9 %
BILIRUB UR QL STRIP.AUTO: NEGATIVE
BUN SERPL-MCNC: 14 MG/DL (ref 6–20)
CALCIUM SERPL-MCNC: 9 MG/DL (ref 8.7–10.5)
CHLORIDE SERPL-SCNC: 109 MMOL/L (ref 95–110)
CLARITY UR: CLEAR
CO2 SERPL-SCNC: 24 MMOL/L (ref 23–29)
COLOR UR AUTO: YELLOW
CREAT SERPL-MCNC: 1.4 MG/DL (ref 0.5–1.4)
CREAT UR-MCNC: 286 MG/DL (ref 23–375)
ERYTHROCYTE [DISTWIDTH] IN BLOOD BY AUTOMATED COUNT: 14.2 % (ref 11.5–14.5)
GFR SERPLBLD CREATININE-BSD FMLA CKD-EPI: >60 ML/MIN/1.73/M2
GLUCOSE SERPL-MCNC: 160 MG/DL (ref 70–110)
GLUCOSE UR QL STRIP: ABNORMAL
HCT VFR BLD AUTO: 39.9 % (ref 40–54)
HGB BLD-MCNC: 12.9 GM/DL (ref 14–18)
HGB UR QL STRIP: ABNORMAL
HYALINE CASTS #/AREA URNS LPF: 0 /LPF (ref 0–1)
IMM GRANULOCYTES # BLD AUTO: 0.02 K/UL (ref 0–0.04)
IMM GRANULOCYTES NFR BLD AUTO: 0.4 % (ref 0–0.5)
KETONES UR QL STRIP: NEGATIVE
LEUKOCYTE ESTERASE UR QL STRIP: NEGATIVE
LYMPHOCYTES # BLD AUTO: 1.38 K/UL (ref 1–4.8)
MAGNESIUM SERPL-MCNC: 1.7 MG/DL (ref 1.6–2.6)
MCH RBC QN AUTO: 26.8 PG (ref 27–31)
MCHC RBC AUTO-ENTMCNC: 32.3 G/DL (ref 32–36)
MCV RBC AUTO: 83 FL (ref 82–98)
MICROSCOPIC COMMENT: ABNORMAL
NITRITE UR QL STRIP: NEGATIVE
NUCLEATED RBC (/100WBC) (OHS): 0 /100 WBC
PH UR STRIP: 6 [PH]
PHOSPHATE SERPL-MCNC: 3.1 MG/DL (ref 2.7–4.5)
PLATELET # BLD AUTO: 176 K/UL (ref 150–450)
PMV BLD AUTO: 9.6 FL (ref 9.2–12.9)
POTASSIUM SERPL-SCNC: 3.8 MMOL/L (ref 3.5–5.1)
PROT UR QL STRIP: ABNORMAL
PROT UR-MCNC: 244 MG/DL
PROT/CREAT UR: 0.85 MG/G{CREAT}
RBC # BLD AUTO: 4.82 M/UL (ref 4.6–6.2)
RBC #/AREA URNS HPF: 6 /HPF (ref 0–4)
RELATIVE EOSINOPHIL (OHS): 3.1 %
RELATIVE LYMPHOCYTE (OHS): 25.3 % (ref 18–48)
RELATIVE MONOCYTE (OHS): 12.7 % (ref 4–15)
RELATIVE NEUTROPHIL (OHS): 57.6 % (ref 38–73)
SODIUM SERPL-SCNC: 140 MMOL/L (ref 136–145)
SP GR UR STRIP: >=1.03
WBC # BLD AUTO: 5.45 K/UL (ref 3.9–12.7)
WBC #/AREA URNS HPF: 1 /HPF (ref 0–5)

## 2025-04-04 PROCEDURE — 87799 DETECT AGENT NOS DNA QUANT: CPT

## 2025-04-04 PROCEDURE — 83735 ASSAY OF MAGNESIUM: CPT

## 2025-04-04 PROCEDURE — 80197 ASSAY OF TACROLIMUS: CPT

## 2025-04-04 PROCEDURE — 85025 COMPLETE CBC W/AUTO DIFF WBC: CPT

## 2025-04-04 PROCEDURE — 82570 ASSAY OF URINE CREATININE: CPT

## 2025-04-04 PROCEDURE — 36415 COLL VENOUS BLD VENIPUNCTURE: CPT

## 2025-04-04 PROCEDURE — 80069 RENAL FUNCTION PANEL: CPT

## 2025-04-04 PROCEDURE — 81001 URINALYSIS AUTO W/SCOPE: CPT

## 2025-04-05 LAB — TACROLIMUS BLD-MCNC: 7.2 NG/ML (ref 5–15)

## 2025-04-07 LAB
W BK VIRUS DNA, QUALITATIVE, PLASMA: DETECTED
W BK VIRUS DNA, QUANTITATIVE, PLASMA: ABNORMAL COPIES/ML
W LOG BK VIRUS DNA, PLASMA: 3.82 LOG (10) COPIES/ML

## 2025-04-09 ENCOUNTER — CLINICAL SUPPORT (OUTPATIENT)
Dept: REHABILITATION | Facility: HOSPITAL | Age: 45
End: 2025-04-09
Payer: COMMERCIAL

## 2025-04-09 DIAGNOSIS — R53.1 WEAKNESS: Primary | ICD-10-CM

## 2025-04-09 PROCEDURE — 97530 THERAPEUTIC ACTIVITIES: CPT | Mod: PN

## 2025-04-09 PROCEDURE — 97112 NEUROMUSCULAR REEDUCATION: CPT | Mod: PN

## 2025-04-09 PROCEDURE — 97110 THERAPEUTIC EXERCISES: CPT | Mod: PN

## 2025-04-09 NOTE — PROGRESS NOTES
"  Outpatient Rehab    Physical Therapy Visit    Patient Name: Robb Iglesias  MRN: [id number]  YOB: 1980  Encounter Date: 4/9/2025    Therapy Diagnosis:   Encounter Diagnosis   Name Primary?    Weakness Yes     Physician: SABIHA Roberson MD    Physician Orders: Eval and Treat  Medical Diagnosis: Postural kyphosis of thoracic region    Visit # / Visits Authorized:  2 / 10  Insurance Authorization Period: 4/2/2025 to 7/30/2025  Date of Evaluation: 3/26/2025  Plan of Care Certification: 3/26/2025 to 4/24/2025     Time In: 1530   Time Out: 1630  Total Time: 60   Total Billable Time:  60 minutes 1:1 with PT or trained technician     FOTO: 1/3     Subjective   Patient reports doing well with exercsies this past week. However, he did attempt to perform bird dogs on his bed and felt a "pop" and pain onset  in his R hip. He reports he has not performed the exercise since due to fear and discomfort that remains..         Objective    To be reassessed at next progress note/ plan of care update         Treatment:        CPT Intervention  JointFocus Duration / Intensity  4/9/2025 Duration / Intensity  4/2/2025   TE UBE 3'/3'  3'/3'   NMR Wall pushup   2 x 12  2 x 12    TE  Standing open books   Supine   2x10    TE  Wall angels 2 x 10  2 x10    TE  Cat/cow X 15   X15    TE  Thread the needle X 15   X15   NMR Quadruped Hip Hikes X 15 B      NMR  Straight arm pull downs PALM UP  2x12 GTC  2x12 GTC   NMR  Seated Ts 2 x 15 BTB  2x10 BTB   NMR B External Rotation  2 x 15 BTB 2 x 15 BTB   NMR Standing rows  2 x 15 40# 2 x 15 BTB   NMR Plank Plus Holds 3 x 20" 16"  3 x 20"    NMR Pallof Press X8 10" holds  X8 10" holds    NMR Lat Pulldowns  40# 2 x 10  30# 2 x 10    TA Corral's carries  20# 2 laps gym      TA 's carries  7.5# 2 laps gym      TE Lumbar extension machine  2 x 10 44#      PLAN            CPT Codes available for Billing:   (-) minutes of Manual therapy (MT) to improve pain and ROM.  (23) minutes " of Therapeutic Exercise (TE) to develop strength, endurance, range of motion, and flexibility.  (29) minutes of Neuromuscular Re-Education (NMR)  to improve: Balance, Coordination, Kinesthetic, Sense, Proprioception, and Posture.  (8) minutes of Therapeutic Activities (TA) to improve functional performance.  (-) minutes of Gait Training (GT) to improve functional mobility and safety  Unattended Electrical Stimulation (ES) for muscle performance or pain modulation.  Vasopneumatic Device Therapy () for management of swelling/edema. (54071)  BFR: Blood flow restriction applied during exercise  NP or (-): Not Performed     Assessment & Plan   Assessment: Patient presents with good tolerance, but hip concerns with bird dog incident. This was addressed by regressing exercise to quadruped hip hikes to address musculature in a more controled manner for motor control redevelopment. Pt tolerates this well. Exercises were continued with additional functional carry challanges to core support. Pt tolerates this well. Cues were required throughout session for technique and form. HEP was progressed with exercises from today for next week's performance.       Patient will continue to benefit from skilled outpatient physical therapy to address the deficits listed in the problem list box on initial evaluation, provide pt/family education and to maximize pt's level of independence in the home and community environment.     Patient's spiritual, cultural, and educational needs considered and patient agreeable to plan of care and goals.         Plan: Continue to progress per tolerance within POC    Goals:   Active       Functional outcome       Patient will show a significant change in FOTO score to 70 to demonstrate subjective improvement in overall function  (Progressing)       Start:  03/27/25    Expected End:  04/24/25            Patient will demonstrate independence in home program for support of progression (Progressing)        Start:  03/27/25    Expected End:  04/10/25               Range of Motion       Patient will achieve trunk ROM to goals stated in objective section of evaluation  (Progressing)       Start:  03/27/25    Expected End:  04/24/25               Strength       Patient will demonstrate strength improvements to stated goals in objective section of evaluation.  (Progressing)       Start:  03/27/25    Expected End:  04/24/25                Sharon Saunders PT

## 2025-04-11 ENCOUNTER — OFFICE VISIT (OUTPATIENT)
Dept: TRANSPLANT | Facility: CLINIC | Age: 45
End: 2025-04-11
Payer: COMMERCIAL

## 2025-04-11 ENCOUNTER — PATIENT MESSAGE (OUTPATIENT)
Dept: TRANSPLANT | Facility: CLINIC | Age: 45
End: 2025-04-11
Payer: COMMERCIAL

## 2025-04-11 VITALS — DIASTOLIC BLOOD PRESSURE: 82 MMHG | SYSTOLIC BLOOD PRESSURE: 116 MMHG

## 2025-04-11 DIAGNOSIS — E11.21 DIABETIC NEPHROPATHY ASSOCIATED WITH TYPE 2 DIABETES MELLITUS: Chronic | ICD-10-CM

## 2025-04-11 DIAGNOSIS — B34.8 BK POLYOMA VIREMIA: Chronic | ICD-10-CM

## 2025-04-11 DIAGNOSIS — I10 ESSENTIAL HYPERTENSION: Chronic | ICD-10-CM

## 2025-04-11 DIAGNOSIS — I25.2 HISTORY OF NON-ST ELEVATION MYOCARDIAL INFARCTION (NSTEMI): Chronic | ICD-10-CM

## 2025-04-11 DIAGNOSIS — N25.81 HYPERPARATHYROIDISM, SECONDARY RENAL: Chronic | ICD-10-CM

## 2025-04-11 DIAGNOSIS — I25.10 CORONARY ARTERY DISEASE INVOLVING NATIVE CORONARY ARTERY OF NATIVE HEART WITHOUT ANGINA PECTORIS: Chronic | ICD-10-CM

## 2025-04-11 DIAGNOSIS — Z79.899 IMMUNOSUPPRESSIVE MANAGEMENT ENCOUNTER FOLLOWING KIDNEY TRANSPLANT: Chronic | ICD-10-CM

## 2025-04-11 DIAGNOSIS — N18.2 CKD (CHRONIC KIDNEY DISEASE) STAGE 2, GFR 60-89 ML/MIN: Primary | ICD-10-CM

## 2025-04-11 DIAGNOSIS — G47.33 OBSTRUCTIVE SLEEP APNEA: Chronic | ICD-10-CM

## 2025-04-11 DIAGNOSIS — Z94.0 IMMUNOSUPPRESSIVE MANAGEMENT ENCOUNTER FOLLOWING KIDNEY TRANSPLANT: Chronic | ICD-10-CM

## 2025-04-11 NOTE — LETTER
April 11, 2025        Siva Figueroa  [address] [address] NEPHROLOGY  [facility] [address]  Phone: [phone]  Fax: [phone]             [facility]- Trans[facility] 1st Fl  [address]  [facility] [address]  Phone: [phone]   Patient: Robb Iglesias   MR Number: [id number]   YOB: 1980   Date of Visit: 4/11/2025       Dear Dr. Siva Figueroa    Thank you for referring Robb Iglesias to me for evaluation. Attached you will find relevant portions of my assessment and plan of care.    If you have questions, please do not hesitate to call me. I look forward to following Robb Iglesias along with you.    Sincerely,    Rell Booth MD    Enclosure    If you would like to receive this communication electronically, please contact [email or web address] or [phone] to request [employer] access.    [employer] is a tool which provides read-only access to select patient information with whom you have a relationship. Its easy to use and provides real time access to review your patients record including encounter summaries, notes, results, and demographic information.    If you feel you have received this communication in error or would no longer like to receive these types of communications, please e-mail [email or web address]

## 2025-04-11 NOTE — PROGRESS NOTES
The patient location is:  home  The chief complaint leading to consultation is:  Reassessment of kidney function, complex immunosuppressive medication, BK infection protocol, management of electrolytes, anemia, proteinuria and blood pressure advice.   Visit type: Virtual visit with synchronous audio and video  Total time spent with patient: 15  min  Each patient to whom he or she provides medical services by telemedicine is:  (1) informed of the relationship between the physician and patient and the respective role of any other health care provider with respect to management of the patient; and (2) notified that he or she may decline to receive medical services by telemedicine and may withdraw from such care at any time.    Kidney Post-Transplant Assessment    Referring Physician: Siva Figueroa  Current Nephrologist: Siva Figueroa    ORGAN: LEFT KIDNEY  Donor Type: living  PHS Increased Risk: no  Cold Ischemia: 48 mins  Induction Medications: thymoglobulin    Subjective:     CC:  Reassessment of renal allograft function and management of chronic immunosuppression.    HPI:  Mr. Iglesias is a 44 y.o. 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  He has CKD stage 2 - GFR 60-89 and his baseline creatinine is between 1.4. He takes mycophenolate mofetil, prednisone, and tacrolimus for maintenance immunosuppression. He denies any recent hospitalizations or ER visits since his previous clinic visit.    He feels great  Great appetite.  No issues to report to me for now  He is following with his local providers  Doing some PT and feels really good. Great energy level    Review of Systems    Skin: no skin rash  CNS; no headaches, blurred vision, seizure, or syncope  ENT: No JVD,  Adenopathies,  nasal congestion. No oral lesions  Cardiac: No chest pain, dyspnea, claudication, edema or palpitations  Respiratory: No SOB, cough, hemoptysis   Gastro-intestinal: No diarrhea, constipation,  "abdominal pain, nausea, vomit. No ascitis  Genitourinary: no hematuria, dysuria, frequency, frequency  Musculoskeletal: joint pain, arthritis or vasculitic changes  Psych: alert awake, oriented, No cranial nerves deficit.      Objective:         Physical Exam    Video visit    Labs:  Lab Results   Component Value Date    WBC 5.45 04/04/2025    HGB 12.9 (L) 04/04/2025    HCT 39.9 (L) 04/04/2025     04/04/2025    K 3.8 04/04/2025     04/04/2025    CO2 24 04/04/2025    BUN 14 04/04/2025    CREATININE 1.4 04/04/2025    EGFRNORACEVR >60 04/04/2025    GLUCOSE 160 (H) 04/04/2025    CALCIUM 9.0 04/04/2025    PHOS 3.1 04/04/2025    MG 1.7 04/04/2025    ALBUMIN 4.0 04/04/2025    AST 38 03/24/2025    ALT 56 (H) 03/24/2025    UTPCR 0.85 (H) 04/04/2025    .3 (H) 03/07/2025    TACROLIMUS 7.2 04/04/2025       No results found for: "EXTANC", "EXTWBC", "EXTSEGS", "EXTPLATELETS", "EXTHEMOGLOBI", "EXTHEMATOCRI", "EXTCREATININ", "EXTSODIUM", "EXTPOTASSIUM", "EXTBUN", "EXTCO2", "EXTCALCIUM", "EXTPHOSPHORU", "EXTGLUCOSE", "EXTALBUMIN", "EXTAST", "EXTALT", "EXTBILITOTAL", "EXTLIPASE", "EXTAMYLASE"    No results found for: "EXTCYCLOSLVL", "EXTSIROLIMUS", "EXTTACROLVL", "EXTPROTCRE", "EXTPTHINTACT", "EXTPROTEINUA", "EXTWBCUA", "EXTRBCUA"    Labs were reviewed with the patient.    Assessment:     1. CKD (chronic kidney disease) stage 2, GFR 60-89 ml/min    2. BK polyoma viremia    3. Coronary artery disease involving native coronary artery of native heart without angina pectoris    4. Diabetic nephropathy associated with type 2 diabetes mellitus    5. Essential hypertension    6. History of NSTEMI with CAD s/p PCI (FAMILIA) of LAD x 2 in 8/2017    7. Hyperparathyroidism, secondary to chronic kidney disease    8. Immunosuppressive management encounter following kidney transplant    9. Severe obstructive sleep apnea - Intolerant of CPAP        Plan:         1. CKD stage 2with stable at baseline creatinine 1.4 : will continue " "follow up as per our center guidelines. Patient to continue close follow up with the local General nephrologist. Education provided in appropriate fluid intake, potassium intake.    Lab Results   Component Value Date    CREATININE 1.4 04/04/2025    CREATININE 1.4 03/24/2025    CREATININE 1.2 03/07/2025    CREATININE 1.2 03/07/2025    CREATININE 1.5 (H) 02/18/2025     No results found for: "EXTCREATININ"  Urine Protein/Creatinine Ratio   Date Value Ref Range Status   04/04/2025 0.85 (H) <=0.20 Final     Prot/Creat Ratio, Urine   Date Value Ref Range Status   11/21/2024 1.43 (H) 0.00 - 0.20 Final   11/01/2024 1.20 (H) 0.00 - 0.20 Final   02/05/2024 0.61 (H) 0.00 - 0.20 Final     Results for orders placed during the hospital encounter of 06/21/24    US Transplant Kidney With Doppler    Narrative  EXAMINATION:  US TRANSPLANT KIDNEY WITH DOPPLER    CLINICAL HISTORY:  Assess for ureteral stricture;  Unspecified complication of kidney transplant    TECHNIQUE:  Grayscale, duplex, and color Doppler evaluation of transplant kidney performed.    COMPARISON:  None.    FINDINGS:  There is a transplant kidney identified in the right lower quadrant.  Renal allograft measures 12.6 cm in craniocaudal length.  No perinephric fluid or collection identified.  No renal mass or hydronephrosis.    Doppler evaluation demonstrates a peak systolic velocity in the main renal artery of 157 cm/sec.  The intrarenal arterial waveforms are normal without evidence of parvus tardus. The main renal artery to iliac artery velocity ratio is not elevated measuring 1.3.  The main renal vein is patent.    The intrarenal resistive indices are as follows:    Segmental upper pole: 0.59    Segmental midpole: 0.62    Segmental lower pole: 0.59    Interlobar: 0.54    Impression  Unremarkable Doppler evaluation of transplant kidney.      Electronically signed by: MIRIAM Yoon MD  Date:    06/21/2024  Time:    13:15      No results found for: "EXTANC", " ""EXTWBC", "EXTSEGS", "EXTPLATELETS", "EXTHEMOGLOBI", "EXTHEMATOCRI", "EXTCREATININ", "EXTSODIUM", "EXTPOTASSIUM", "EXTBUN", "EXTCO2", "EXTCALCIUM", "EXTPHOSPHORU", "EXTGLUCOSE", "EXTALBUMIN", "EXTAST", "EXTALT", "EXTBILITOTAL", "EXTLIPASE", "EXTAMYLASE"    Results for orders placed or performed during the hospital encounter of 24   Specimen to Pathology, Surgery Medical Renal Biopsy    Collection Time: 24 10:30 AM   Result Value Ref Range    Final Pathologic Diagnosis       [facility] DIAGNOSIS:     KIDNEY (PERCUTANEOUS TRANSPLANT BIOPSY): 1) MODERATE-TO-SEVERE MICROCIRCULATION   INFLAMMATION AND NEGATIVE C4d IN PERITUBULAR , CAPILLARIES, HIGHLY SUSPICIOUS FOR ANTIBODY-  MEDIATED REJECTION; 2) MILD TRANSPLANT GLOMERULOPATHY, 3) MILD MESANGIAL DEPOSITS BY IMMUNOFLUORESCENCE (SEE COMMENT).      Note: Please see attached report for comment.     Performing location:   [place] [address]    &quot;Disclaimer:  This case diagnosis was rendered completely by the outside consultation pathologist and the case is electronically signed by an [facility] pathologist listed below solely to release the report into the medical record.&quot;      Gross       Pathology ID: [id number]    :  1980        SURGERY MRN:  [id number]/PATHOLOGY MRN:  [id number]     PART 1:  The case is designated as &quot;transplant kidney 3 pieces&quot;. The specimen is dispersed between three separate specimen containers that are arbitrarily designated as Container #1. Container #2, and Container #3  CONTAINER #1:  Suspended in formalin is a single pink-white needle core biopsy (2.2 cm in length x 0.1 cm in diameter).  CONTAINER #2:  Suspended in Jaxon transport medium is a single pink-white needle core biopsy (2.1 cm in length x 0.1 cm in diameter).  CONTAINER #3:  Suspended in glutaraldehyde is a single pink-white needle core biopsy (2.0 cm in length x 0.1 cm " "in diameter).    SENDOUT FACILITY:  The tissues are sent in their respective containers to [facility] for additional processing.      -Katerina Brian MS, PA(ASCP)      Disclaimer       Unless the case is a 'gross only' or additional testing only, the final diagnosis for each specimen is based on a microscopic examination of appropriate tissue sections.       1A. Pancreatic Function: N/A for non-pancreas allograft recipients:     Lab Results   Component Value Date    HGBA1C 6.8 (H) 03/24/2025     No results found for: "EXTLIPASE"  No results found for: "EXTAMYLASE"    2. High risk immunosuppression medications for organ transplantation, requiring regular intensive follow up and monitoring : tacro and prednisone,  MMF held due to persistent BK viremia, will monitor BK for now.   Lab Results   Component Value Date    TACROLIMUS 7.2 04/04/2025    TACROLIMUS 6.1 03/07/2025    TACROLIMUS 6.1 03/07/2025    TACROLIMUS 5.9 02/18/2025    TACROLIMUS 5.9 02/10/2025    TACROLIMUS 6.3 02/03/2025     No results found for: "CYCLOSPORINE"  No components found for: "EXTTACROLIMUS"   Will closely monitor for toxicities, education provided about adherence to medicines and need to communicate any side effects to the transplant nurse or physician.    3. Allograft Function:stable at baseline for the patient. Continue follow up as per our guidelines and with the local General nephrologist.    Lab Results   Component Value Date    CREATININE 1.4 04/04/2025    CREATININE 1.4 03/24/2025    CREATININE 1.2 03/07/2025    CREATININE 1.2 03/07/2025    CREATININE 1.5 (H) 02/18/2025    CREATININE 1.4 02/10/2025     No results found for: "AMYLASE", "LIPASE"  No results found for: "EXTCREATININ"      4. Hypertension management: well controlled, HBP readings are favorable. this chronic and potentially life threatening condition was reviewed including blood pressure medicines. Continue with current regimen, home blood pressure monitoring, " "low salt and healthy life discussed with the patient. Patient was educated to report sustained readings >140 or less than 100 or any symptoms of orthostatism or low BP.  BP Readings from Last 3 Encounters:   03/24/25 110/84   03/11/25 (!) 140/93   02/26/25 (!) 145/85         5. Metabolic Bone Disease/Secondary Hyperparathyroidism:calcium and phosphorus level discussed with the patient, patient will continue follow up with the general nephrologist for management of metabolic bone disease. Will monitor PTH and Vit D per our transplant center guidelines.        Lab Results   Component Value Date    .3 (H) 03/07/2025    CALCIUM 9.0 04/04/2025    PHOS 3.1 04/04/2025    PHOS 2.4 (L) 03/07/2025    PHOS 2.4 (L) 03/07/2025     No results found for: "EXTCALCIUM", "EXTPHOSPHORU"    6. Electrolytes and acid base balance: reviewed with the patient, essentially within the normal range no need for acute changes today, will monitor as per our center guidelines.       Lab Results   Component Value Date     04/04/2025    K 3.8 04/04/2025     04/04/2025    CO2 24 04/04/2025    CO2 22 (L) 03/24/2025    CO2 25 03/07/2025    CO2 25 03/07/2025     No results found for: "EXTCO2"    7. Anemia:h/h close to his baseline  will continue monitoring as per our center guidelines. No indication for acute intervention today.       Lab Results   Component Value Date    WBC 5.45 04/04/2025    HGB 12.9 (L) 04/04/2025    HCT 39.9 (L) 04/04/2025    MCV 83 04/04/2025     04/04/2025     No results found for: "EXTHEMOGLOBI", "EXTPLATELETS"    8.Proteinuria: will continue with pr/cr ratio as per our center guidelines.    Lab Results   Component Value Date    PROTEINURINE 244 (H) 04/04/2025    CREATRANDUR 286.0 04/04/2025    UTPCR 0.85 (H) 04/04/2025    UTPCR 1.43 (H) 11/21/2024    UTPCR 1.20 (H) 11/01/2024        9. BK virus infection screening: will continue with  blood PCR as per our guidelines to prevent BK virus viremia and " "allograft dysfunction. Still persistent BK viremia. In the past he did not tolerate IVIG very well, his BK los has not changed. I  do not see any urgency to give more IVIG     No results found for: "BKVIRUSDNAUR", "BKVIRUSLOG", "BKVIRUSURINE"      10. Weight and metabolic management: education provided provided during the clinic visit.     There is no height or weight on file to calculate BMI.   Wt Readings from Last 5 Encounters:   03/24/25 110.2 kg (242 lb 15.2 oz)   02/26/25 110.5 kg (243 lb 9.7 oz)   02/12/25 110 kg (242 lb 8.1 oz)   01/29/25 110 kg (242 lb 8.1 oz)   01/03/25 109 kg (240 lb 4.8 oz)       11.Patient safety education regarding immunosuppression including infectious risk and universal measures to minimize risk of infection : Education provided about vaccination per accepted guidelines.    12.  Cytopenias: no significant cytopenias will monitor as per our guidelines. Medicine list reviewed including potential causes of drug-induced cytopenias       Lab Results   Component Value Date    WBC 5.45 04/04/2025    HGB 12.9 (L) 04/04/2025    HCT 39.9 (L) 04/04/2025    MCV 83 04/04/2025     04/04/2025       13. Post-transplant Prophylaxis:  CMV Infection, PJP and Candida mucositis.  The patient will continue Bactrim 1 SS tab daily for PJP prophylaxis (6 months) and Valcyte for CMV prophylaxis (3 months for low and intermediate risk and 6 months for high risk) per our center guidelines.      14.Medications review:    Prior to Admission medications    Medication Sig Start Date End Date Taking? Authorizing Provider   apixaban (ELIQUIS) 5 mg Tab Take 1 tablet (5 mg total) by mouth 2 (two) times daily. 12/5/24   May, ASHWINI Medeiros   aspirin (ECOTRIN) 81 MG EC tablet Take 1 tablet (81 mg total) by mouth once daily. 3/24/25 6/22/25  SABIHA Roberson MD   atorvastatin (LIPITOR) 80 MG tablet Take 1 tablet (80 mg total) by mouth every evening. 3/24/25   SABIHA Roberson MD   blood sugar diagnostic Strp " "Check blood glucose 2 times daily as directed and as needed 6/30/23   SABIHA Roberson MD   blood-glucose meter (ONETOUCH ULTRAMINI) kit Use as instructed 11/18/22 3/24/25  SABHIA Roberson MD   ezetimibe (ZETIA) 10 mg tablet Take 1 tablet (10 mg total) by mouth once daily. 3/24/25   SABIHA Roberson MD   GINGER ROOT, BULK, MISC by Misc.(Non-Drug; Combo Route) route.    Provider, Historical   insulin lispro (HUMALOG KWIKPEN INSULIN) 100 unit/mL pen Inject 9 Units into the skin 3 (three) times daily. Plus sliding scale, MDD: 67 units 11/25/24 11/25/25  Samantha Munoz DO   k phos di & mono-sod phos mono (PHOSPHA 250 NEUTRAL) 250 mg Tab Take 2 tablets by mouth 3 (three) times daily. 3/24/25   SABIHA Roberson MD   lancets Misc Check blood glucose 2 times daily as directed and as needed (dispense insurance preferred brand or patient choice) 6/30/23   SABIHA Roberson MD   magnesium oxide (MAG-OX) 400 mg (241.3 mg magnesium) tablet TAKE 1 TABLET BY MOUTH 2 TIMES DAILY. 6/25/24   Samantha Munoz DO   metoprolol tartrate (LOPRESSOR) 25 MG tablet Take 1 tablet (25 mg total) by mouth 2 (two) times daily. 3/24/25   SABIHA Roberson MD   MOUNJARO 7.5 mg/0.5 mL PnIj INJECT 7.5 MG UNDER THE SKIN EVERY 7 DAYS 10/31/24   Heather Sorensen NP   multivitamin Tab Take 1 tablet by mouth once daily. 5/18/23   Reji Hinojosa MD   nitroGLYCERIN (NITROSTAT) 0.4 MG SL tablet Dissolve one tablet underneath tongue at onset of angina; may repeat every 5 minutes if needed. Call 911 if angina persists after 2 doses. 3/24/25   SABIHA Roberson MD   pantoprazole (PROTONIX) 40 MG tablet Take 1 tablet (40 mg total) by mouth once daily. 3/24/25   SABIHA Roberson MD   pen needle, diabetic (BD ULTRA-FINE SHORT PEN NEEDLE) 31 gauge x 5/16" Ndle Use to inject insulin into the skin 3 times daily 5/17/23   Reji Hinojosa MD   pen needle, diabetic (BD ULTRA-FINE SHORT PEN NEEDLE) 31 gauge x 5/16" Ndle Use to inject insulin 3 (three) " times daily. 11/25/24   Samantha Munoz,    predniSONE (DELTASONE) 5 MG tablet Take by mouth daily: 20mg 11/25-12/1, 15mg 12/2-12/8, 10mg 12/9-12/152024, 5mg 12/16/2024- 11/25/24   Samantha Munoz,    tacrolimus (PROGRAF) 0.5 MG Cap Take 1 capsule (0.5 mg total) by mouth every morning. Take along with one of the 1mg capsules every AM (total 1.5mg). Z94.0;Kidney txp 5/15/23 12/30/24 12/30/25  Angélica Gutierrez MD   tacrolimus (PROGRAF) 1 MG Cap Take 1 capsule (1 mg total) by mouth every 12 (twelve) hours. Z94.0;Kidney txp on 5/15/23 12/30/24 12/30/25  Angélica Gutierrez MD   valsartan (DIOVAN) 80 MG tablet Take 1 tablet (80 mg total) by mouth once daily. 3/24/25   SABIHA Roberson MD       I reviewed records and imaging studies for 40 minutes in preparation for this clinic visit. I also spent more than half of the face to face or virtual encounter time to counseling and education. All questions answered          Rell Booth MD  Transplant Nephrology  , Transplant Nephrology Fellowship  Assist. Prof. [facility], [employer]            Follow-up:   Clinic: return to transplant clinic weekly for the first month after transplant; every 2 weeks during months 2-3; then at 6-, 9-, 12-, 18-, 24-, and 36- months post-transplant to reassess for complications from immunosuppression toxicity and monitor for rejection.  Annually thereafter.    Labs: since patient remains at high risk for rejection and drug-related complications that warrant close monitoring, labs will be ordered as follows: continue twice weekly CBC, renal panel, and drug level for first month; then same labs once weekly through 3rd month post-transplant.  Urine for UA and protein/creatinine ratio monthly.  Serum BK - PCR at 1-, 3-, 6-, 9-, 12-, 18-, 24-, 36- 48-, and 60 months post-transplant.  Hepatic panel at 1-, 2-, 3-, 6-, 9-, 12-, 18-, 24-, and 36- months post-transplant.    Rell Booth,  MD       Education:   Material provided to the patient.  Patient reminded to call with any health changes since these can be early signs of significant complications.  Also, I advised the patient to be sure any new medications or changes of old medications are discussed with either a pharmacist or physician knowledgeable with transplant to avoid rejection/drug toxicity related to significant drug interactions.    Patient advised that it is recommended that all transplanted patients, and their close contacts and household members receive Covid vaccination.    UNOS Patient Status  Functional Status: 90% - Able to carry on normal activity: minor symptoms of disease  Physical Capacity: No Limitations

## 2025-04-14 ENCOUNTER — PATIENT MESSAGE (OUTPATIENT)
Dept: TRANSPLANT | Facility: CLINIC | Age: 45
End: 2025-04-14
Payer: COMMERCIAL

## 2025-04-14 DIAGNOSIS — Z94.0 KIDNEY REPLACED BY TRANSPLANT: Primary | ICD-10-CM

## 2025-04-16 ENCOUNTER — RESULTS FOLLOW-UP (OUTPATIENT)
Dept: INTERNAL MEDICINE | Facility: CLINIC | Age: 45
End: 2025-04-16

## 2025-04-16 ENCOUNTER — CLINICAL SUPPORT (OUTPATIENT)
Dept: REHABILITATION | Facility: HOSPITAL | Age: 45
End: 2025-04-16
Payer: COMMERCIAL

## 2025-04-16 DIAGNOSIS — R53.1 WEAKNESS: Primary | ICD-10-CM

## 2025-04-16 PROCEDURE — 97110 THERAPEUTIC EXERCISES: CPT | Mod: PN

## 2025-04-16 PROCEDURE — 97112 NEUROMUSCULAR REEDUCATION: CPT | Mod: PN

## 2025-04-16 PROCEDURE — 97530 THERAPEUTIC ACTIVITIES: CPT | Mod: PN

## 2025-04-16 NOTE — PROGRESS NOTES
"  Outpatient Rehab    Physical Therapy Visit    Patient Name: Robb Iglesias  MRN: [id number]  YOB: 1980  Encounter Date: 4/16/2025    Therapy Diagnosis:   Encounter Diagnosis   Name Primary?    Weakness Yes     Physician: SABIHA Roberson MD    Physician Orders: Eval and Treat  Medical Diagnosis: Postural kyphosis of thoracic region    Visit # / Visits Authorized:  3 / 10  Insurance Authorization Period: 4/2/2025 to 7/30/2025  Date of Evaluation: 3/26/2025  Plan of Care Certification: 3/26/2025 to 4/24/2025     Time In: 1530   Time Out: 1630  Total Time: 60   Total Billable Time:  60 minutes 1:1 with PT or trained technician     FOTO: 1/3     Subjective   Patient reports he worked a golf tournament over the weekend. Though this has him feeling sore, he was proud that he was able to tolerate the more long physical day..  Pain reported as 1/10.      Objective    To be reassessed at next progress note/ plan of care update         Treatment:         CPT Intervention  [facility] Duration / Intensity  4/16/2025 Duration / Intensity  4/9/2025 Duration / Intensity  4/2/2025   TE UBE 3'/3' 3'/3'  3'/3'   NMR Wall pushup   X10 on mat x10 at wall  2 x 12  2 x 12    TE  Standing open books   Supine 2x10   Supine   2x10    TE  Cat/cow X 15  X 15   X15   TE Thoracic Series c/half foam Swimmers/Bear Hugs/Fabrica 15x        TE  Thread the needle X 15  X 15   X15   NMR Quadruped Hip Hikes X 15 B  X 15 B      NMR  Straight arm pull downs PALM UP  2x12 GTC  2x12 GTC  2x12 GTC   NMR  Seated Ts 2 x 15 BTB 2 x 15 BTB  2x10 BTB   NMR B External Rotation  2 x 15 BTB 2 x 15 BTB 2 x 15 BTB   NMR Standing rows  2 x 15 40# 2 x 15 40# 2 x 15 BTB   NMR Plank Plus Holds 3 x 20" 16"  3 x 20" 16"  3 x 20"    NMR Pallof Press X8 10" holds  X8 10" holds  X8 10" holds    NMR Lat Pulldowns  40# 2 x 10  40# 2 x 10  30# 2 x 10    TA Suitcase carries  30# 1 gym lap each 20# 2 laps gym farmers     TA 's carries  7.5# 2 laps gym  " 7.5# 2 laps gym      TE Lumbar extension machine  2 x 10 44#  2 x 10 44#      NMR D2 over swiss ball plank X 15 B        NMR Mod dead bug with SB X 10 B        PLAN                CPT Codes available for Billing:   (-) minutes of Manual therapy (MT) to improve pain and ROM.  (20) minutes of Therapeutic Exercise (TE) to develop strength, endurance, range of motion, and flexibility.  (32) minutes of Neuromuscular Re-Education (NMR)  to improve: Balance, Coordination, Kinesthetic, Sense, Proprioception, and Posture.  (8) minutes of Therapeutic Activities (TA) to improve functional performance.  (-) minutes of Gait Training (GT) to improve functional mobility and safety  Unattended Electrical Stimulation (ES) for muscle performance or pain modulation.  Vasopneumatic Device Therapy () for management of swelling/edema. (76580)  BFR: Blood flow restriction applied during exercise  NP or (-): Not Performed     Assessment & Plan   Assessment: Patient presents with no hip problems after changing exercises last week and soreness after very active weekend. Continued with progression of functional stability and mobility training today. Pt notes increased fatigue with progressions today, but denies pain onset. More compensatory stratigies were noted with fatigue, but patient was able to correct this with cues. HEP was progressed with exercises from today for next week's performance.       Patient will continue to benefit from skilled outpatient physical therapy to address the deficits listed in the problem list box on initial evaluation, provide pt/family education and to maximize pt's level of independence in the home and community environment.     Patient's spiritual, cultural, and educational needs considered and patient agreeable to plan of care and goals.         Plan: Continue to progress per tolerance within POC    Goals:   Active       Functional outcome       Patient will show a significant change in FOTO score to 70  to demonstrate subjective improvement in overall function  (Progressing)       Start:  03/27/25    Expected End:  04/24/25            Patient will demonstrate independence in home program for support of progression (Progressing)       Start:  03/27/25    Expected End:  04/10/25               Range of Motion       Patient will achieve trunk ROM to goals stated in objective section of evaluation  (Progressing)       Start:  03/27/25    Expected End:  04/24/25               Strength       Patient will demonstrate strength improvements to stated goals in objective section of evaluation.  (Progressing)       Start:  03/27/25    Expected End:  04/24/25                Sharon Saunders, PT

## 2025-04-22 ENCOUNTER — LAB VISIT (OUTPATIENT)
Dept: LAB | Facility: HOSPITAL | Age: 45
End: 2025-04-22
Attending: INTERNAL MEDICINE
Payer: COMMERCIAL

## 2025-04-22 DIAGNOSIS — Z94.0 KIDNEY REPLACED BY TRANSPLANT: ICD-10-CM

## 2025-04-22 LAB
ABSOLUTE EOSINOPHIL (OHS): 0.14 K/UL
ABSOLUTE MONOCYTE (OHS): 0.6 K/UL (ref 0.3–1)
ABSOLUTE NEUTROPHIL COUNT (OHS): 3.12 K/UL (ref 1.8–7.7)
ALBUMIN SERPL BCP-MCNC: 3.9 G/DL (ref 3.5–5.2)
ANION GAP (OHS): 9 MMOL/L (ref 8–16)
BACTERIA #/AREA URNS HPF: NORMAL /HPF
BASOPHILS # BLD AUTO: 0.04 K/UL
BASOPHILS NFR BLD AUTO: 0.8 %
BILIRUB UR QL STRIP.AUTO: NEGATIVE
BUN SERPL-MCNC: 13 MG/DL (ref 6–20)
CALCIUM SERPL-MCNC: 8.7 MG/DL (ref 8.7–10.5)
CHLORIDE SERPL-SCNC: 110 MMOL/L (ref 95–110)
CLARITY UR: CLEAR
CO2 SERPL-SCNC: 24 MMOL/L (ref 23–29)
COLOR UR AUTO: YELLOW
CREAT SERPL-MCNC: 1.4 MG/DL (ref 0.5–1.4)
ERYTHROCYTE [DISTWIDTH] IN BLOOD BY AUTOMATED COUNT: 14 % (ref 11.5–14.5)
GFR SERPLBLD CREATININE-BSD FMLA CKD-EPI: >60 ML/MIN/1.73/M2
GLUCOSE SERPL-MCNC: 165 MG/DL (ref 70–110)
GLUCOSE UR QL STRIP: NEGATIVE
HCT VFR BLD AUTO: 39.2 % (ref 40–54)
HGB BLD-MCNC: 12.5 GM/DL (ref 14–18)
HGB UR QL STRIP: ABNORMAL
HYALINE CASTS #/AREA URNS LPF: 0 /LPF (ref 0–1)
IMM GRANULOCYTES # BLD AUTO: 0.02 K/UL (ref 0–0.04)
IMM GRANULOCYTES NFR BLD AUTO: 0.4 % (ref 0–0.5)
KETONES UR QL STRIP: NEGATIVE
LEUKOCYTE ESTERASE UR QL STRIP: NEGATIVE
LYMPHOCYTES # BLD AUTO: 1.3 K/UL (ref 1–4.8)
MAGNESIUM SERPL-MCNC: 1.7 MG/DL (ref 1.6–2.6)
MCH RBC QN AUTO: 25.9 PG (ref 27–31)
MCHC RBC AUTO-ENTMCNC: 31.9 G/DL (ref 32–36)
MCV RBC AUTO: 81 FL (ref 82–98)
MICROSCOPIC COMMENT: NORMAL
NITRITE UR QL STRIP: NEGATIVE
NUCLEATED RBC (/100WBC) (OHS): 0 /100 WBC
PH UR STRIP: 7 [PH]
PHOSPHATE SERPL-MCNC: 3.3 MG/DL (ref 2.7–4.5)
PLATELET # BLD AUTO: 161 K/UL (ref 150–450)
PMV BLD AUTO: 9.8 FL (ref 9.2–12.9)
POTASSIUM SERPL-SCNC: 3.5 MMOL/L (ref 3.5–5.1)
PROT UR QL STRIP: ABNORMAL
RBC # BLD AUTO: 4.82 M/UL (ref 4.6–6.2)
RBC #/AREA URNS HPF: 4 /HPF (ref 0–4)
RELATIVE EOSINOPHIL (OHS): 2.7 %
RELATIVE LYMPHOCYTE (OHS): 24.9 % (ref 18–48)
RELATIVE MONOCYTE (OHS): 11.5 % (ref 4–15)
RELATIVE NEUTROPHIL (OHS): 59.7 % (ref 38–73)
SODIUM SERPL-SCNC: 143 MMOL/L (ref 136–145)
SP GR UR STRIP: 1.02
WBC # BLD AUTO: 5.22 K/UL (ref 3.9–12.7)
WBC #/AREA URNS HPF: 1 /HPF (ref 0–5)

## 2025-04-22 PROCEDURE — 85025 COMPLETE CBC W/AUTO DIFF WBC: CPT

## 2025-04-22 PROCEDURE — 87799 DETECT AGENT NOS DNA QUANT: CPT

## 2025-04-22 PROCEDURE — 80197 ASSAY OF TACROLIMUS: CPT

## 2025-04-22 PROCEDURE — 82310 ASSAY OF CALCIUM: CPT

## 2025-04-22 PROCEDURE — 36415 COLL VENOUS BLD VENIPUNCTURE: CPT

## 2025-04-22 PROCEDURE — 83735 ASSAY OF MAGNESIUM: CPT

## 2025-04-22 PROCEDURE — 81003 URINALYSIS AUTO W/O SCOPE: CPT

## 2025-04-23 ENCOUNTER — CLINICAL SUPPORT (OUTPATIENT)
Dept: REHABILITATION | Facility: HOSPITAL | Age: 45
End: 2025-04-23
Payer: COMMERCIAL

## 2025-04-23 DIAGNOSIS — R53.1 WEAKNESS: Primary | ICD-10-CM

## 2025-04-23 LAB — TACROLIMUS BLD-MCNC: 6.6 NG/ML (ref 5–15)

## 2025-04-23 PROCEDURE — 97110 THERAPEUTIC EXERCISES: CPT | Mod: PN

## 2025-04-23 PROCEDURE — 97530 THERAPEUTIC ACTIVITIES: CPT | Mod: PN

## 2025-04-23 PROCEDURE — 97112 NEUROMUSCULAR REEDUCATION: CPT | Mod: PN

## 2025-04-23 NOTE — PROGRESS NOTES
"  Outpatient Rehab    Physical Therapy Visit    Patient Name: Robb Iglesias  MRN: [id number]  YOB: 1980  Encounter Date: 4/23/2025    Therapy Diagnosis:   Encounter Diagnosis   Name Primary?    Weakness Yes     Physician: SABIHA Roberson MD    Physician Orders: Eval and Treat  Medical Diagnosis: Postural kyphosis of thoracic region    Visit # / Visits Authorized:  4 / 10  Insurance Authorization Period: 4/2/2025 to 7/30/2025  Date of Evaluation: 3/26/2025  Plan of Care Certification: 3/26/2025 to 4/24/2025     Time In: 1600   Time Out: 1700  Total Time: 60   Total Billable Time:  60 minutes 1:1 with PT or trained technician     FOTO: 1/3     Subjective   Patient states he is feeling stronger and doing well with HEP. His only concern at this point is more functional application and safe exercises to return to the gym..         Objective    To be reassessed at next progress note/ plan of care update         Treatment:          CPT Intervention  [facility] Duration / Intensity  4/23/2025 Duration / Intensity  4/16/2025 Duration / Intensity  4/9/2025   TE UBE 3'/3' 3'/3' 3'/3'   NMR Wall pushup   X10 on mat x10 at wall  X10 on mat x10 at wall  2 x 12   TE Thoracic Series c/half foam Swimmers/Bear Hugs/Holladay 20x Swimmers/Bear Hugs/Holladay 20x     NMR  Straight arm pull downs PALM UP 2x12 GTC  2x12 GTC  2x12 GTC   NMR  Seated Ts 2 x 15 BTB 2 x 15 BTB 2 x 15 BTB   NMR B External Rotation  2 x 15 BTB 2 x 15 BTB 2 x 15 BTB   NMR Standing rows  2 x 15 40# 2 x 15 40# 2 x 15 40#   NMR Pallof Press X10 10" holds #40 X8 10" holds  X8 10" holds    NMR Lat Pulldowns  40# 2 x 10  40# 2 x 10  40# 2 x 10    TE Chest Press Matrix  15# 2 x 10       TA Corral's carries  X 20# 2 laps around gym        TA Fwd hold carry  50# bag 1 lap around gym       TA Lifting training from floor to chest, floor to box/ box to floor, and to and from raised shelf 50# bag; 10'       TE Lumbar extension machine  2 x 10 44#  2 x 10 " 44#  2 x 10 44#    NMR D2 over swiss ball plank X 15 B  X 15 B      PLAN              CPT Codes available for Billing:   (-) minutes of Manual therapy (MT) to improve pain and ROM.  (16) minutes of Therapeutic Exercise (TE) to develop strength, endurance, range of motion, and flexibility.  (28) minutes of Neuromuscular Re-Education (NMR)  to improve: Balance, Coordination, Kinesthetic, Sense, Proprioception, and Posture.  (16) minutes of Therapeutic Activities (TA) to improve functional performance.  (-) minutes of Gait Training (GT) to improve functional mobility and safety  Unattended Electrical Stimulation (ES) for muscle performance or pain modulation.  Vasopneumatic Device Therapy () for management of swelling/edema. (32537)  BFR: Blood flow restriction applied during exercise  NP or (-): Not Performed     Assessment & Plan   Assessment: Patient presents with continued improvements noted and good HEP compliance. Started to work on functional appilcation of redeveloped motor patterns with lifts and carries. Pt tolerates this well though mod cueing is needed to address technique and form. Pt notes increased fatigue with progressions today, but denies pain onset. Added in more machine strenghthening for the upper trunk also, for carryover as patient would like to start going to gym.       Patient will continue to benefit from skilled outpatient physical therapy to address the deficits listed in the problem list box on initial evaluation, provide pt/family education and to maximize pt's level of independence in the home and community environment.     Patient's spiritual, cultural, and educational needs considered and patient agreeable to plan of care and goals.         Plan: Plan for D/C next session.    Goals:   Active       Functional outcome       Patient will show a significant change in FOTO score to 70 to demonstrate subjective improvement in overall function  (Progressing)       Start:  03/27/25     Expected End:  04/24/25            Patient will demonstrate independence in home program for support of progression (Progressing)       Start:  03/27/25    Expected End:  04/10/25               Range of Motion       Patient will achieve trunk ROM to goals stated in objective section of evaluation  (Progressing)       Start:  03/27/25    Expected End:  04/24/25               Strength       Patient will demonstrate strength improvements to stated goals in objective section of evaluation.  (Progressing)       Start:  03/27/25    Expected End:  04/24/25                Sharon Saunders PT

## 2025-04-25 LAB
W BK VIRUS DNA, QUALITATIVE, PLASMA: DETECTED
W BK VIRUS DNA, QUANTITATIVE, PLASMA: ABNORMAL COPIES/ML
W LOG BK VIRUS DNA, PLASMA: 3.8 LOG (10) COPIES/ML

## 2025-04-29 ENCOUNTER — PATIENT MESSAGE (OUTPATIENT)
Dept: TRANSPLANT | Facility: CLINIC | Age: 45
End: 2025-04-29
Payer: COMMERCIAL

## 2025-04-30 ENCOUNTER — CLINICAL SUPPORT (OUTPATIENT)
Dept: REHABILITATION | Facility: HOSPITAL | Age: 45
End: 2025-04-30
Payer: COMMERCIAL

## 2025-04-30 DIAGNOSIS — R53.1 WEAKNESS: Primary | ICD-10-CM

## 2025-04-30 PROCEDURE — 97530 THERAPEUTIC ACTIVITIES: CPT | Mod: PN

## 2025-04-30 PROCEDURE — 97110 THERAPEUTIC EXERCISES: CPT | Mod: PN

## 2025-04-30 PROCEDURE — 97112 NEUROMUSCULAR REEDUCATION: CPT | Mod: PN

## 2025-04-30 NOTE — PROGRESS NOTES
"  Outpatient Rehab    Physical Therapy Discharge    Patient Name: Robb Iglesias  MRN: [id number]  YOB: 1980  Encounter Date: 4/30/2025    Therapy Diagnosis:   Encounter Diagnosis   Name Primary?    Weakness Yes     Physician: SABIHA Roberson MD    Physician Orders: Eval and Treat  Medical Diagnosis: Postural kyphosis of thoracic region    Visit # / Visits Authorized:  5 / 10  Insurance Authorization Period: 4/2/2025 to 7/30/2025  Date of Evaluation: 3/26/2025  Plan of Care Certification: 3/26/2025 to 4/24/2025         Time In: 1450   Time Out: 1545  Total Time: 55   Total Billable Time:  55 minutes 1:1    FOTO:  Intake Score: 53%  Survey Score 1: 73%         Subjective   He was sore after starting more functional lifting type training last week, but is noticing improvement..  Pain reported as 0/10.      Objective    RANGE OF MOTION:     Thoracolumbar AROM  (% of norm motion present)  Right   (spine) Left     Pain/Dysfunction with Movement 4/30 Goal   Flexion  100% --- Stretch felt  --- ---   Extension  0% --- Stretching/ stiffness felt  50% 50%   Side Bending  100% 100% Stretching felt  --- ---   Rotation  75% 75% Tension with some minor R sided pain with L rotation  100% 100%      STRENGTH:     U/E MMT Right Left 4/30 Goal   Shoulder Flexion 4-/5 4-/5 4+/5 B  4+/5 B   Shoulder Abduction 4-/5 4-/5 4/5 B  4+/5 B   Shoulder IR 4/5 4/5 5/5 B  5/5 B   Shoulder ER 4/5 4/5 5/5 B  5/5 B   Middle Trapezius 4-/5 4-/5 4+/5 B  4+/5 B   Lower Trapezius 3-/5 4-/5 4/5 R, 4+/5 L 4+/5 B   Hip Flexion  4/5 4/5 5/5 B 5/5 B   Hip Extension 4-/5 4-/5 4+/5 B  4+/5 B   Hip Abduction  4/5 4/5 5/5 B  5/5 B         Treatment:         CPT Intervention  [facility] Duration / Intensity  4/30/2025 Duration / Intensity  4/23/2025 Duration / Intensity  4/16/2025   TE UBE 3'/3' 3'/3' 3'/3'   NMR Rows  Machine today  2 x 15 40# 2 x 15 40#   NMR Pallof Press X10 10" holds #40 X10 10" holds #40 X8 10" holds    NMR Lat " Pulldowns  2 x 10 40#  40# 2 x 10  40# 2 x 10    TE Chest Press Matrix  15# 2 x 10 15# 2 x 10     TA Lunges  X10 fwd       TA Corral's carries  X 20# 2 laps around gym  X 20# 2 laps around gym      TA Fwd hold carry  50# bag 1 lap around gym 50# bag 1 lap around gym     TA Sled push/pull 50# 2 laps        TA Lifting training from floor to chest, floor to box/ box to floor, and to and from raised shelf 50# bag 50# bag; 10'     TE Lumbar extension machine  2 x 10 44#  2 x 10 44#  2 x 10 44#    TE Trunk rotation machine  2 x 8 28#        NMR D2 over swiss ball plank X 15 B  X 15 B  X 15 B    PLAN               CPT Codes available for Billing:   (-) minutes of Manual therapy (MT) to improve pain and ROM.  (15) minutes of Therapeutic Exercise (TE) to develop strength, endurance, range of motion, and flexibility.  (15) minutes of Neuromuscular Re-Education (NMR)  to improve: Balance, Coordination, Kinesthetic, Sense, Proprioception, and Posture.  (25) minutes of Therapeutic Activities (TA) to improve functional performance.  (-) minutes of Gait Training (GT) to improve functional mobility and safety  Unattended Electrical Stimulation (ES) for muscle performance or pain modulation.  Vasopneumatic Device Therapy () for management of swelling/edema. (80879)  BFR: Blood flow restriction applied during exercise  NP or (-): Not Performed    Assessment & Plan   Assessment:     Robb Iglesias is a 44 y.o. male who has completed 5 sessions of formal outpatient physical therapy for treatment of postural kyphosis of thoracic region with weakness. Patient has since shown significant improvement in strength, endurance, mobility and pain, allowing for better function with lifting, bending, carrying, transfers and walking.  FOTO scores assessed today show Robb Iglesias's overall functional status has improved with physical therapy treatment. Residual limitations will continue to be addressed with home exercise program.  Patient demonstrates independence with home program prior to discharge for safe transition to independent phase of care. Patient educated to follow up with MD is symptoms return or worsen.       Patient's spiritual, cultural, and educational needs considered and patient agreeable to plan of care and goals.         Plan:  Patient is discharged from outpatient physical therapy services.     Goals:   Active       Strength       Patient will demonstrate strength improvements to stated goals in objective section of evaluation.  (Unable to Meet)       Start:  03/27/25    Expected End:  04/24/25              Resolved       Functional outcome       Patient will show a significant change in FOTO score to 70 to demonstrate subjective improvement in overall function  (Met)       Start:  03/27/25    Expected End:  04/24/25    Resolved:  04/30/25         Patient will demonstrate independence in home program for support of progression (Met)       Start:  03/27/25    Expected End:  04/10/25    Resolved:  04/30/25            Range of Motion       Patient will achieve trunk ROM to goals stated in objective section of evaluation  (Met)       Start:  03/27/25    Expected End:  04/24/25    Resolved:  04/30/25             Sharon Saunders PT

## 2025-05-05 ENCOUNTER — PATIENT MESSAGE (OUTPATIENT)
Dept: CARDIOLOGY | Facility: CLINIC | Age: 45
End: 2025-05-05
Payer: COMMERCIAL

## 2025-05-07 ENCOUNTER — LAB VISIT (OUTPATIENT)
Dept: LAB | Facility: HOSPITAL | Age: 45
End: 2025-05-07
Attending: INTERNAL MEDICINE
Payer: COMMERCIAL

## 2025-05-07 DIAGNOSIS — Z94.0 KIDNEY REPLACED BY TRANSPLANT: ICD-10-CM

## 2025-05-07 LAB
ABSOLUTE EOSINOPHIL (OHS): 0.15 K/UL
ABSOLUTE MONOCYTE (OHS): 0.73 K/UL (ref 0.3–1)
ABSOLUTE NEUTROPHIL COUNT (OHS): 3.62 K/UL (ref 1.8–7.7)
ALBUMIN SERPL BCP-MCNC: 4 G/DL (ref 3.5–5.2)
ANION GAP (OHS): 11 MMOL/L (ref 8–16)
BASOPHILS # BLD AUTO: 0.05 K/UL
BASOPHILS NFR BLD AUTO: 0.9 %
BUN SERPL-MCNC: 14 MG/DL (ref 6–20)
CALCIUM SERPL-MCNC: 8.9 MG/DL (ref 8.7–10.5)
CHLORIDE SERPL-SCNC: 106 MMOL/L (ref 95–110)
CO2 SERPL-SCNC: 27 MMOL/L (ref 23–29)
CREAT SERPL-MCNC: 1.4 MG/DL (ref 0.5–1.4)
ERYTHROCYTE [DISTWIDTH] IN BLOOD BY AUTOMATED COUNT: 14.5 % (ref 11.5–14.5)
GFR SERPLBLD CREATININE-BSD FMLA CKD-EPI: >60 ML/MIN/1.73/M2
GLUCOSE SERPL-MCNC: 154 MG/DL (ref 70–110)
HCT VFR BLD AUTO: 41.9 % (ref 40–54)
HGB BLD-MCNC: 13 GM/DL (ref 14–18)
IMM GRANULOCYTES # BLD AUTO: 0.03 K/UL (ref 0–0.04)
IMM GRANULOCYTES NFR BLD AUTO: 0.5 % (ref 0–0.5)
LYMPHOCYTES # BLD AUTO: 1.21 K/UL (ref 1–4.8)
MAGNESIUM SERPL-MCNC: 1.8 MG/DL (ref 1.6–2.6)
MCH RBC QN AUTO: 26 PG (ref 27–31)
MCHC RBC AUTO-ENTMCNC: 31 G/DL (ref 32–36)
MCV RBC AUTO: 84 FL (ref 82–98)
NUCLEATED RBC (/100WBC) (OHS): 0 /100 WBC
PHOSPHATE SERPL-MCNC: 3 MG/DL (ref 2.7–4.5)
PLATELET # BLD AUTO: 170 K/UL (ref 150–450)
PMV BLD AUTO: 9.7 FL (ref 9.2–12.9)
POTASSIUM SERPL-SCNC: 4 MMOL/L (ref 3.5–5.1)
RBC # BLD AUTO: 5 M/UL (ref 4.6–6.2)
RELATIVE EOSINOPHIL (OHS): 2.6 %
RELATIVE LYMPHOCYTE (OHS): 20.9 % (ref 18–48)
RELATIVE MONOCYTE (OHS): 12.6 % (ref 4–15)
RELATIVE NEUTROPHIL (OHS): 62.5 % (ref 38–73)
SODIUM SERPL-SCNC: 144 MMOL/L (ref 136–145)
WBC # BLD AUTO: 5.79 K/UL (ref 3.9–12.7)

## 2025-05-07 PROCEDURE — 80197 ASSAY OF TACROLIMUS: CPT

## 2025-05-07 PROCEDURE — 36415 COLL VENOUS BLD VENIPUNCTURE: CPT

## 2025-05-07 PROCEDURE — 87799 DETECT AGENT NOS DNA QUANT: CPT

## 2025-05-07 PROCEDURE — 85025 COMPLETE CBC W/AUTO DIFF WBC: CPT

## 2025-05-07 PROCEDURE — 83735 ASSAY OF MAGNESIUM: CPT

## 2025-05-07 PROCEDURE — 82040 ASSAY OF SERUM ALBUMIN: CPT

## 2025-05-08 LAB — TACROLIMUS BLD-MCNC: 6.9 NG/ML (ref 5–15)

## 2025-05-13 ENCOUNTER — RESULTS FOLLOW-UP (OUTPATIENT)
Dept: TRANSPLANT | Facility: CLINIC | Age: 45
End: 2025-05-13

## 2025-05-13 LAB
W BK VIRUS DNA, QUALITATIVE, PLASMA: DETECTED
W BK VIRUS DNA, QUANTITATIVE, PLASMA: ABNORMAL COPIES/ML
W LOG BK VIRUS DNA, PLASMA: 3.87 LOG (10) COPIES/ML

## 2025-05-23 NOTE — TELEPHONE ENCOUNTER
----- Message from SABIHA Roberson MD sent at 2/7/2024  7:51 AM CST -----  Schedule fasting labs a few days before his 4/18/24 appointment with Heather Sorensen NP.  Schedule annual wellness visit with me in July.     Notified mychart

## 2025-06-03 ENCOUNTER — LAB VISIT (OUTPATIENT)
Dept: LAB | Facility: HOSPITAL | Age: 45
End: 2025-06-03
Attending: INTERNAL MEDICINE
Payer: COMMERCIAL

## 2025-06-03 DIAGNOSIS — Z94.0 KIDNEY REPLACED BY TRANSPLANT: ICD-10-CM

## 2025-06-03 LAB
ABSOLUTE EOSINOPHIL (OHS): 0.19 K/UL
ABSOLUTE MONOCYTE (OHS): 0.68 K/UL (ref 0.3–1)
ABSOLUTE NEUTROPHIL COUNT (OHS): 3.05 K/UL (ref 1.8–7.7)
ALBUMIN SERPL BCP-MCNC: 4.2 G/DL (ref 3.5–5.2)
ANION GAP (OHS): 8 MMOL/L (ref 8–16)
BASOPHILS # BLD AUTO: 0.05 K/UL
BASOPHILS NFR BLD AUTO: 0.9 %
BUN SERPL-MCNC: 13 MG/DL (ref 6–20)
CALCIUM SERPL-MCNC: 8.6 MG/DL (ref 8.7–10.5)
CHLORIDE SERPL-SCNC: 108 MMOL/L (ref 95–110)
CO2 SERPL-SCNC: 26 MMOL/L (ref 23–29)
CREAT SERPL-MCNC: 1.6 MG/DL (ref 0.5–1.4)
ERYTHROCYTE [DISTWIDTH] IN BLOOD BY AUTOMATED COUNT: 14.5 % (ref 11.5–14.5)
GFR SERPLBLD CREATININE-BSD FMLA CKD-EPI: 54 ML/MIN/1.73/M2
GLUCOSE SERPL-MCNC: 180 MG/DL (ref 70–110)
HCT VFR BLD AUTO: 41.6 % (ref 40–54)
HGB BLD-MCNC: 13.1 GM/DL (ref 14–18)
IMM GRANULOCYTES # BLD AUTO: 0.03 K/UL (ref 0–0.04)
IMM GRANULOCYTES NFR BLD AUTO: 0.6 % (ref 0–0.5)
LYMPHOCYTES # BLD AUTO: 1.44 K/UL (ref 1–4.8)
MAGNESIUM SERPL-MCNC: 1.7 MG/DL (ref 1.6–2.6)
MCH RBC QN AUTO: 25.9 PG (ref 27–31)
MCHC RBC AUTO-ENTMCNC: 31.5 G/DL (ref 32–36)
MCV RBC AUTO: 82 FL (ref 82–98)
NUCLEATED RBC (/100WBC) (OHS): 0 /100 WBC
PHOSPHATE SERPL-MCNC: 2.2 MG/DL (ref 2.7–4.5)
PLATELET # BLD AUTO: 158 K/UL (ref 150–450)
PMV BLD AUTO: 9.5 FL (ref 9.2–12.9)
POTASSIUM SERPL-SCNC: 4.3 MMOL/L (ref 3.5–5.1)
RBC # BLD AUTO: 5.05 M/UL (ref 4.6–6.2)
RELATIVE EOSINOPHIL (OHS): 3.5 %
RELATIVE LYMPHOCYTE (OHS): 26.5 % (ref 18–48)
RELATIVE MONOCYTE (OHS): 12.5 % (ref 4–15)
RELATIVE NEUTROPHIL (OHS): 56 % (ref 38–73)
SODIUM SERPL-SCNC: 142 MMOL/L (ref 136–145)
WBC # BLD AUTO: 5.44 K/UL (ref 3.9–12.7)

## 2025-06-03 PROCEDURE — 83735 ASSAY OF MAGNESIUM: CPT

## 2025-06-03 PROCEDURE — 87799 DETECT AGENT NOS DNA QUANT: CPT

## 2025-06-03 PROCEDURE — 36415 COLL VENOUS BLD VENIPUNCTURE: CPT

## 2025-06-03 PROCEDURE — 80197 ASSAY OF TACROLIMUS: CPT

## 2025-06-03 PROCEDURE — 85025 COMPLETE CBC W/AUTO DIFF WBC: CPT

## 2025-06-03 PROCEDURE — 82040 ASSAY OF SERUM ALBUMIN: CPT

## 2025-06-04 ENCOUNTER — RESULTS FOLLOW-UP (OUTPATIENT)
Dept: TRANSPLANT | Facility: HOSPITAL | Age: 45
End: 2025-06-04

## 2025-06-04 LAB — TACROLIMUS BLD-MCNC: 9.1 NG/ML (ref 5–15)

## 2025-06-06 LAB
W BK VIRUS DNA, QUALITATIVE, PLASMA: DETECTED
W BK VIRUS DNA, QUANTITATIVE, PLASMA: ABNORMAL COPIES/ML
W LOG BK VIRUS DNA, PLASMA: 3.83 LOG (10) COPIES/ML

## 2025-06-09 ENCOUNTER — PATIENT MESSAGE (OUTPATIENT)
Dept: INTERNAL MEDICINE | Facility: CLINIC | Age: 45
End: 2025-06-09
Payer: COMMERCIAL

## 2025-06-13 ENCOUNTER — PATIENT MESSAGE (OUTPATIENT)
Dept: TRANSPLANT | Facility: CLINIC | Age: 45
End: 2025-06-13
Payer: COMMERCIAL

## 2025-06-13 ENCOUNTER — TELEPHONE (OUTPATIENT)
Dept: DIABETES | Facility: CLINIC | Age: 45
End: 2025-06-13
Payer: COMMERCIAL

## 2025-06-13 DIAGNOSIS — Z76.82 KIDNEY TRANSPLANT CANDIDATE: Chronic | ICD-10-CM

## 2025-06-13 RX ORDER — PREDNISONE 5 MG/1
5 TABLET ORAL DAILY
Qty: 90 TABLET | Refills: 3 | Status: SHIPPED | OUTPATIENT
Start: 2025-06-13 | End: 2026-06-13

## 2025-06-13 NOTE — TELEPHONE ENCOUNTER
Copied from CRM #[id number]. Topic: General Inquiry - Return Call  >> Jun 13, 2025 12:07 PM Brittany wrote:  Type:  Patient Requesting Call    Who Called:Robb Iglesias  Does the patient know what this is regarding?:pt is calling to re establish care  Would the patient rather a call back or a response via [facility]? call  Best Call Back Number: [phone]    Additional Information: Melissa Sorensen pt    Spoke with patient to assist with reestablishing care with diabetes management    Appt scheduled 06/18/2025

## 2025-06-18 ENCOUNTER — OFFICE VISIT (OUTPATIENT)
Dept: DIABETES | Facility: CLINIC | Age: 45
End: 2025-06-18
Payer: COMMERCIAL

## 2025-06-18 VITALS
DIASTOLIC BLOOD PRESSURE: 86 MMHG | HEART RATE: 77 BPM | SYSTOLIC BLOOD PRESSURE: 134 MMHG | WEIGHT: 242.5 LBS | BODY MASS INDEX: 30.31 KG/M2

## 2025-06-18 DIAGNOSIS — E11.69 DYSLIPIDEMIA ASSOCIATED WITH TYPE 2 DIABETES MELLITUS: Chronic | ICD-10-CM

## 2025-06-18 DIAGNOSIS — E11.21 DIABETIC NEPHROPATHY ASSOCIATED WITH TYPE 2 DIABETES MELLITUS: Chronic | ICD-10-CM

## 2025-06-18 DIAGNOSIS — I25.10 CORONARY ARTERY DISEASE INVOLVING NATIVE CORONARY ARTERY OF NATIVE HEART WITHOUT ANGINA PECTORIS: Chronic | ICD-10-CM

## 2025-06-18 DIAGNOSIS — E11.42 TYPE 2 DIABETES MELLITUS WITH DIABETIC POLYNEUROPATHY, WITHOUT LONG-TERM CURRENT USE OF INSULIN: Primary | Chronic | ICD-10-CM

## 2025-06-18 DIAGNOSIS — N18.2 CKD (CHRONIC KIDNEY DISEASE) STAGE 2, GFR 60-89 ML/MIN: ICD-10-CM

## 2025-06-18 DIAGNOSIS — G47.33 OBSTRUCTIVE SLEEP APNEA: Chronic | ICD-10-CM

## 2025-06-18 DIAGNOSIS — I10 HYPERTENSION COMPLICATING DIABETES: Chronic | ICD-10-CM

## 2025-06-18 DIAGNOSIS — E78.5 DYSLIPIDEMIA ASSOCIATED WITH TYPE 2 DIABETES MELLITUS: Chronic | ICD-10-CM

## 2025-06-18 DIAGNOSIS — Z29.89 PROPHYLACTIC IMMUNOTHERAPY: ICD-10-CM

## 2025-06-18 DIAGNOSIS — Z94.0 S/P KIDNEY TRANSPLANT: Chronic | ICD-10-CM

## 2025-06-18 DIAGNOSIS — E11.9 HYPERTENSION COMPLICATING DIABETES: Chronic | ICD-10-CM

## 2025-06-18 DIAGNOSIS — I48.0 PAROXYSMAL ATRIAL FIBRILLATION: Chronic | ICD-10-CM

## 2025-06-18 LAB — GLUCOSE SERPL-MCNC: 245 MG/DL (ref 70–110)

## 2025-06-18 PROCEDURE — 99999 PR PBB SHADOW E&M-EST. PATIENT-LVL IV: CPT | Mod: PBBFAC,,, | Performed by: NURSE PRACTITIONER

## 2025-06-18 RX ORDER — TIRZEPATIDE 10 MG/.5ML
10 INJECTION, SOLUTION SUBCUTANEOUS
Qty: 6 ML | Refills: 1 | Status: SHIPPED | OUTPATIENT
Start: 2025-06-18 | End: 2025-09-16

## 2025-06-18 NOTE — PATIENT INSTRUCTIONS
Medications adjusted for today's visit include:   Increase Mounjaro from 7.5 mg weekly to 10 mg weekly -- focus on nutrient-dense meals/snacks and water intake     We will add C-peptide to the fasting labs scheduled next month - this will monitor your body's insulin production   Schedule A1c in September - non-fasting           Patient education:   Carbohydrates: Limit to 30-45 grams with each meal. Never eat carbs by themselves - always add protein. Make snacks low carb or non-carb only.    Blood sugar goals:   Fastin-130 mg/dl  2 hours after meal:  mg/dl  Before bed: 100-150 mg/dl    Carry glucose tablets/soft peppermints/regular juice or Coke product with you at all times to treat a low blood sugar episode (less than 70).  If your blood sugar is between 50-70, Chew 4 tablets or drink 1/2 cup of juice or regular Coke product.   If your blood sugar is below 50, double the treatment.   Re-check blood sugar in 15 minutes. If still low, repeat this.   Always call the clinic to give an update for any low blood sugar episodes.    Exercise: Goal is 150 minutes per week, which is about 30 minutes/day, 5 days/week. Start slowly and increase as tolerated.

## 2025-06-18 NOTE — PROGRESS NOTES
Patient ID: Robb Iglesias is a 44 y.o. male.  Patient's current PCP is SABIHA Roberson MD.   Collaborating Physician: ADEOLA Forrester MD    Chief Complaint: Diabetes Mellitus Consultation   HPI  Robb Iglesias is a 44 y.o. White male presenting for a new consult with me, previously seen by JOSÉ Sorensen NP for diabetes.   Patient has been diagnosed with type 2 diabetes since 2010     Pertinent to decision making is/are the following comorbidities:  CAD s/p PCI, renal transplant 2023, CKD3a, microalbuminuria, HTN, HLD, obesity, HENRY  Complications related to diabetes: nephropathy, peripheral neuropathy, and cardiovascular disease  Recent diabetes related hospitalizations: None  Personal history of pancreatitis: denies  Family history of pancreatic cancer in first-degree relative: denies  Family history of MTC/MEN/endocrine tumors: denies       Previous diabetes education: Yes   Current diet: regular diet 2-3 meals/day  Activity level: moderate - working on building strength/stamina and trying to get back to regular exercise regimen   Occupation: [employer]       CURRENT DM MEDICATIONS:   Diabetes Medications              insulin lispro (HUMALOG KWIKPEN INSULIN) 100 unit/mL pen Inject 9 Units into the skin 3 (three) times daily. Plus sliding scale, MDD: 67 units    MOUNJARO 7.5 mg/0.5 mL PnIj INJECT 7.5 MG UNDER THE SKIN EVERY 7 DAYS       Past failed treatment(s) include:   Metformin, Janumet. Ozempic - formulary change only; Trulicity-failure to control diabetes     His blood sugar in the clinic today was:   Lab Results   Component Value Date    POCGLU 188 (A) 08/29/2024       Blood glucose testing is performed regularly. Patient is testing 0-1 times per day.  Meter/CGM: did not bring to the   Any episodes of hypoglycemia? No   Glucose trends: ---         Robb Iglesias presents to clinic today to discuss diabetes management.   History of bariatric surgery in 2017 with 100# weight loss and  able to come off of anti-diabetic medications. In 2021, patient states he had COVID-19 with subsequent kidney failure requiring PD. Underwent renal transplant in 2023; started steroids maintenance tx. Blood sugars started to climb with the prednisone 5 mg daily. He was able to start Mounjaro last year and values have been fairly well-controlled since then. 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   Has glucometer at home - spot checking. Notices BG are higher in the morning following prednisone administration. He is 245 this morning.   Tolerating Mounjaro 7.5 mg weekly well without any issues. He is working on building strength and stamina and would like to lose another 10-15#.     Recent labs   A1c 6.8%  eGFR decreased to 54   LDL 21.6     S/P L kidney transplant 05/2023- on chronic Prednisone 5 mg daily and Prograf. Followed by the transplant team as well as nephrologist, Dr. Figueroa.     H/o bariatric surgery - 2017      Statin: Taking  ACE/ARB: Taking    Labs reviewed and are noted below.    Screening or Prevention Patient's value Goal Complete/Controlled?   HgA1C Testing and Control   Lab Results   Component Value Date    HGBA1C 6.8 (H) 03/24/2025      Annually/Less than 8% Yes   Lipid profile : 03/24/2025 Annually Yes   LDL control Lab Results   Component Value Date    LDLCALC 21.6 (L) 03/24/2025    Annually/Less than 100 mg/dl  Yes   Nephropathy screening Lab Results   Component Value Date    MICALBCREAT 596.0 (H) 03/24/2025     Lab Results   Component Value Date    PROTEINUA 3+ (A) 04/22/2025    Annually Yes   Blood pressure BP Readings from Last 1 Encounters:   04/11/25 116/82    Less than 140/90 Yes   Dilated retinal exam : 07/05/2024 Annually Yes    Foot exam   : 03/24/2025 Annually Yes       Wt Readings from Last 3 Encounters:   03/24/25 0818 110.2 kg (242 lb 15.2 oz)   02/26/25 0902 110.5 kg (243 lb 9.7 oz)   02/12/25 0902 110 kg (242 lb 8.1 oz)  "        Lab Results   Component Value Date    TSH 0.727 07/31/2017    FERRITIN 114 07/05/2017    [id number] 363 07/05/2017    .3 (H) 03/07/2025    CALCIUM 8.6 (L) 06/03/2025    PHOS 2.2 (L) 06/03/2025     No results found for: "CPEPTIDE"  No results found for: "GLUTAMICACID"  Glucose   Date Value Ref Range Status   06/03/2025 180 (H) 70 - 110 mg/dL Final   03/07/2025 160 (H) 70 - 110 mg/dL Final   03/07/2025 160 (H) 70 - 110 mg/dL Final     Anion Gap   Date Value Ref Range Status   06/03/2025 8 8 - 16 mmol/L Final     eGFR if    Date Value Ref Range Status   07/26/2022 13 (A) >60 mL/min/1.73 m^2 Final     eGFR if non    Date Value Ref Range Status   07/26/2022 11 (A) >60 mL/min/1.73 m^2 Final     Comment:     Calculation used to obtain the estimated glomerular filtration  rate (eGFR) is the CKD-EPI equation.              Review of patient's allergies indicates:  No Known Allergies  Social History[1]  Past Medical History:   Diagnosis Date    Allergic rhinitis     Atrial fibrillation with RVR 06/04/2023    CKD (chronic kidney disease) stage 2, GFR 60-89 ml/min 11/27/2024    Class 1 obesity due to excess calories with serious comorbidity and body mass index (BMI) of 31.0 to 31.9 in adult 04/07/2017    Coronary artery disease     Coronary artery disease involving native coronary artery of native heart without angina pectoris 02/06/2018    Cath lab procedure 04/23/2018 (Naveen Rios MD) A. Indication/Pre-Operative Diagnosis: The patient is a 37 year old male that was referred for catheterization by [facility] Self for ACS (NSTEMI). The BELLA risk score is 5.  B. Summary/Post-Operative Diagnosis 1. Single vessel coronary artery disease. 2. Normal LVEF. 3. Diastolic dysfunction. 4. Successful PCI for acute myocardial infarction. 5.     Diabetic nephropathy associated with type 2 diabetes mellitus 01/06/2020    Direct hyperbilirubinemia 03/24/2018    DM (diabetes mellitus) 2008    " BS doesn't check any more 08/02/2018    DM (diabetes mellitus) 2012    BS 99 am 06/26/2020    DM (diabetes mellitus)     BS didn't check 06/04/2021    DM (diabetes mellitus) 2008    BS didn't check 07/29/2022     Dyslipidemia associated with type 2 diabetes mellitus 07/31/2017    Elevated bilirubin 03/21/2018    GERD (gastroesophageal reflux disease)     Gout     History of COVID-19 (Tested Positive July 31, 2023) 08/07/2023    Hyperlipidemia     Hyperparathyroidism, secondary to chronic kidney disease 06/07/2021    Hypertension associated with chronic kidney disease due to type 2 diabetes mellitus     Hypertension complicating diabetes 03/16/2019    Not candidate for Hypertension Digital Medicine program due to dialysis status. Didn't tolerate amlodipine due to SE of hypotension.    Idiopathic chronic gout, multiple sites, without tophus (tophi) 07/19/2017    Immunosuppressive management encounter following kidney transplant 12/13/2024    Long term (current) use of insulin     MI (myocardial infarction) 07/2017    NSTEMI with CAD s/p PCI (FAMILIA) of LAD x 2 in 8/2017 07/31/2017    Obesity     HENRY on CPAP     Peritoneal dialysis catheter in place 01/26/2023    Proteinuria     Severe obstructive sleep apnea - Intolerant of CPAP 07/31/2017    Intolerant of CPAP after weeks of trying multiple interfaces. SPLIT-NIGHT SLEEP STUDY 7/28/2015 · SLEEP ARCHITECTURE: Sleep onset was 2.9 minutes and sleep efficiency was 94.4%. Sleep Stage distribution showed 145 sleep stage changes, 6 awakenings and 119 arousals. Sleep distribution showed 53.2% stage NI, 41.8% stage N 11, 0.0% stage N Ill and REM sleep was at 5.0%. There were 2 REM periods. · RE    Stage 3a chronic kidney disease 05/26/2023    Stage 5 chronic kidney disease on chronic peritoneal dialysis 06/07/2017    Stage 5 chronic kidney disease on chronic peritoneal dialysis 06/07/2017    Status post angioplasty with stent 08/04/2017    Steatohepatitis     Fatty Liver    Type  2 diabetes mellitus with chronic kidney disease on chronic dialysis, without long-term current use of insulin 06/21/2017    Type 2 diabetes mellitus with diabetic nephropathy     Type 2 diabetes mellitus with diabetic nephropathy, without long-term current use of insulin 01/06/2020    Type 2 diabetes mellitus with diabetic polyneuropathy, without long-term current use of insulin 06/07/2021    Type 2 diabetes mellitus with hyperglycemia     Type 2 diabetes mellitus with renal manifestations     Type 2 diabetes mellitus with stage 3 chronic kidney disease, without long-term current use of insulin 06/21/2017       Review of Systems   Constitutional:  Negative for diaphoresis, malaise/fatigue and weight loss.   Eyes:  Negative for blurred vision.   Respiratory:  Negative for shortness of breath.    Cardiovascular:  Negative for chest pain and leg swelling.   Gastrointestinal:  Negative for abdominal pain, constipation, diarrhea, heartburn, nausea and vomiting.   Genitourinary:  Negative for dysuria, frequency and urgency.   Musculoskeletal:  Negative for falls.   Skin:  Negative for rash.   Neurological:  Positive for weakness. Negative for dizziness and headaches.   Endo/Heme/Allergies:  Negative for polydipsia.   Psychiatric/Behavioral:  The patient is not nervous/anxious.          Physical Exam  Constitutional:       Appearance: Normal appearance.   HENT:      Head: Normocephalic and atraumatic.   Eyes:      Pupils: Pupils are equal, round, and reactive to light.   Cardiovascular:      Rate and Rhythm: Normal rate.   Pulmonary:      Effort: Pulmonary effort is normal.   Musculoskeletal:         General: Normal range of motion.      Cervical back: Neck supple.   Skin:     General: Skin is warm and dry.   Neurological:      General: No focal deficit present.      Mental Status: He is alert and oriented to person, place, and time. Mental status is at baseline.   Psychiatric:         Mood and Affect: Mood normal.          Behavior: Behavior normal.         Thought Content: Thought content normal.         Judgment: Judgment normal.             Assessment and Plan:   Diagnoses and all orders for this visit:    Type 2 diabetes mellitus with diabetic polyneuropathy, without long-term current use of insulin    Diabetic nephropathy associated with type 2 diabetes mellitus    CKD (chronic kidney disease) stage 2, GFR 60-89 ml/min    S/P kidney transplant    Prophylactic immunotherapy    Severe obstructive sleep apnea - Intolerant of CPAP    Hypertension complicating diabetes    Dyslipidemia associated with type 2 diabetes mellitus    Coronary artery disease involving native coronary artery of native heart without angina pectoris    Paroxysmal atrial fibrillation           Treatment plan for today's visit:   - Increase mounjaro to 10 mg weekly.    - Discussed importance of choosing nutrient-dense meal and snack options and staying well-hydrated with water   - Add C-peptide to fasting labs scheduled next month. Update A1c in September.       Patient education:   - Carbohydrates: Limit to 30-45 grams with each meal. Never eat carbs by themselves - always add protein. Make snacks low carb or non-carb only.    - Blood sugar goals:   Fastin-130 mg/dl  2 hours after meal:  mg/dl  Before bed: 100-150 mg/dl    - Carry glucose tablets/soft peppermints/regular juice or Coke product with you at all times to treat a low blood sugar episode (less than 70).  If your blood sugar is between 50-70, Chew 4 tablets or drink 1/2 cup of juice or regular Coke product.   If your blood sugar is below 50, double the treatment.   Re-check blood sugar in 15 minutes. If still low, repeat this.   Always call the clinic to give an update for any low blood sugar episodes.    - Exercise: Goal is 150 minutes per week, which is about 30 minutes/day, 5 days/week. Start slowly and increase as tolerated.        Follow up: 3 months    Visit today included increased  complexity associated with the care of the episodic problems addressed and managing the longitudinal care of the patient due to the serious and/or complex managed problem(s)       Lauren O. Landry, FNP-C [facility]          [1]   Social History  Socioeconomic History    Marital status:      Spouse name: Shannon Iglesias    Number of children: 2    Years of education: Some College   Tobacco Use    Smoking status: Never    Smokeless tobacco: Never   Substance and Sexual Activity    Alcohol use: No     Comment: 1-2 times per month    Drug use: No    Sexual activity: Yes     Partners: Female   Social History Narrative    Full time employed,  with 2 children.    Caregiver Shannon      Social Drivers of Health     Financial Resource Strain: Low Risk  (3/24/2025)    Overall Financial Resource Strain (CARDIA)     Difficulty of Paying Living Expenses: Not hard at all   Food Insecurity: No Food Insecurity (3/24/2025)    Hunger Vital Sign     Worried About Running Out of Food in the Last Year: Never true     Ran Out of Food in the Last Year: Never true   Transportation Needs: No Transportation Needs (3/24/2025)    PRAPARE - Transportation     Lack of Transportation (Medical): No     Lack of Transportation (Non-Medical): No   Physical Activity: Insufficiently Active (3/24/2025)    Exercise Vital Sign     Days of Exercise per Week: 2 days     Minutes of Exercise per Session: 40 min   Stress: No Stress Concern Present (3/24/2025)    [facility] - Occupational Stress Questionnaire     Feeling of Stress : Only a little   Housing Stability: Low Risk  (3/24/2025)    Housing Stability Vital Sign     Unable to Pay for Housing in the Last Year: No     Number of Times Moved in the Last Year: 0     Homeless in the Last Year: No

## 2025-06-25 DIAGNOSIS — E78.5 DYSLIPIDEMIA ASSOCIATED WITH TYPE 2 DIABETES MELLITUS: Chronic | ICD-10-CM

## 2025-06-25 DIAGNOSIS — K21.9 GASTROESOPHAGEAL REFLUX DISEASE WITHOUT ESOPHAGITIS: Chronic | ICD-10-CM

## 2025-06-25 DIAGNOSIS — E11.9 HYPERTENSION COMPLICATING DIABETES: Chronic | ICD-10-CM

## 2025-06-25 DIAGNOSIS — I10 HYPERTENSION COMPLICATING DIABETES: Chronic | ICD-10-CM

## 2025-06-25 DIAGNOSIS — I10 ESSENTIAL HYPERTENSION: Chronic | ICD-10-CM

## 2025-06-25 DIAGNOSIS — E11.69 DYSLIPIDEMIA ASSOCIATED WITH TYPE 2 DIABETES MELLITUS: Chronic | ICD-10-CM

## 2025-06-25 DIAGNOSIS — I48.0 PAROXYSMAL ATRIAL FIBRILLATION: Chronic | ICD-10-CM

## 2025-06-25 DIAGNOSIS — I25.10 CORONARY ARTERY DISEASE INVOLVING NATIVE CORONARY ARTERY OF NATIVE HEART WITHOUT ANGINA PECTORIS: Chronic | ICD-10-CM

## 2025-06-25 DIAGNOSIS — I25.2 HISTORY OF NON-ST ELEVATION MYOCARDIAL INFARCTION (NSTEMI): Chronic | ICD-10-CM

## 2025-06-25 RX ORDER — VALSARTAN 80 MG/1
80 TABLET ORAL DAILY
Qty: 90 TABLET | Refills: 2 | Status: SHIPPED | OUTPATIENT
Start: 2025-06-25

## 2025-06-25 RX ORDER — METOPROLOL TARTRATE 25 MG/1
25 TABLET, FILM COATED ORAL 2 TIMES DAILY
Qty: 180 TABLET | Refills: 2 | Status: SHIPPED | OUTPATIENT
Start: 2025-06-25

## 2025-06-25 RX ORDER — ATORVASTATIN CALCIUM 80 MG/1
80 TABLET, FILM COATED ORAL NIGHTLY
Qty: 90 TABLET | Refills: 2 | Status: SHIPPED | OUTPATIENT
Start: 2025-06-25

## 2025-06-25 RX ORDER — EZETIMIBE 10 MG/1
10 TABLET ORAL DAILY
Qty: 90 TABLET | Refills: 2 | Status: SHIPPED | OUTPATIENT
Start: 2025-06-25

## 2025-06-25 RX ORDER — PANTOPRAZOLE SODIUM 40 MG/1
40 TABLET, DELAYED RELEASE ORAL DAILY
Qty: 90 TABLET | Refills: 2 | Status: SHIPPED | OUTPATIENT
Start: 2025-06-25

## 2025-06-25 NOTE — TELEPHONE ENCOUNTER
Refill Routing Note   Medication(s) are not appropriate for processing by [facility] for the following reason(s):        Outside of protocol    [facility] action(s):  Approve  Route      Medication Therapy Plan: Rx sent to requested pharmacy since listed as preferred location in chart      Appointments  past 12m or future 3m with PCP    Date Provider   Last Visit   3/24/2025 SABIHA Roberson MD   Next Visit   9/24/2025 SABIHA Roberson MD   ED visits in past 90 days: 0        Note composed:5:04 PM 06/25/2025

## 2025-06-25 NOTE — TELEPHONE ENCOUNTER
No care due was identified.  [facility] Embedded Care Due Messages. Reference number: [id number].   6/25/2025 4:15:09 PM CDT

## 2025-06-26 ENCOUNTER — PATIENT MESSAGE (OUTPATIENT)
Dept: TRANSPLANT | Facility: CLINIC | Age: 45
End: 2025-06-26
Payer: COMMERCIAL

## 2025-06-30 ENCOUNTER — LAB VISIT (OUTPATIENT)
Dept: LAB | Facility: HOSPITAL | Age: 45
End: 2025-06-30
Attending: INTERNAL MEDICINE
Payer: COMMERCIAL

## 2025-06-30 DIAGNOSIS — Z94.0 KIDNEY REPLACED BY TRANSPLANT: ICD-10-CM

## 2025-06-30 LAB
ABSOLUTE EOSINOPHIL (OHS): 0.19 K/UL
ABSOLUTE MONOCYTE (OHS): 0.8 K/UL (ref 0.3–1)
ABSOLUTE NEUTROPHIL COUNT (OHS): 3.23 K/UL (ref 1.8–7.7)
BASOPHILS # BLD AUTO: 0.03 K/UL
BASOPHILS NFR BLD AUTO: 0.5 %
ERYTHROCYTE [DISTWIDTH] IN BLOOD BY AUTOMATED COUNT: 15.2 % (ref 11.5–14.5)
HCT VFR BLD AUTO: 41.3 % (ref 40–54)
HGB BLD-MCNC: 13 GM/DL (ref 14–18)
IMM GRANULOCYTES # BLD AUTO: 0.03 K/UL (ref 0–0.04)
IMM GRANULOCYTES NFR BLD AUTO: 0.5 % (ref 0–0.5)
LYMPHOCYTES # BLD AUTO: 1.77 K/UL (ref 1–4.8)
MCH RBC QN AUTO: 25.9 PG (ref 27–31)
MCHC RBC AUTO-ENTMCNC: 31.5 G/DL (ref 32–36)
MCV RBC AUTO: 82 FL (ref 82–98)
NUCLEATED RBC (/100WBC) (OHS): 0 /100 WBC
PLATELET # BLD AUTO: 173 K/UL (ref 150–450)
PMV BLD AUTO: 10.2 FL (ref 9.2–12.9)
RBC # BLD AUTO: 5.02 M/UL (ref 4.6–6.2)
RELATIVE EOSINOPHIL (OHS): 3.1 %
RELATIVE LYMPHOCYTE (OHS): 29.3 % (ref 18–48)
RELATIVE MONOCYTE (OHS): 13.2 % (ref 4–15)
RELATIVE NEUTROPHIL (OHS): 53.4 % (ref 38–73)
WBC # BLD AUTO: 6.05 K/UL (ref 3.9–12.7)

## 2025-06-30 PROCEDURE — 87799 DETECT AGENT NOS DNA QUANT: CPT

## 2025-06-30 PROCEDURE — 85025 COMPLETE CBC W/AUTO DIFF WBC: CPT

## 2025-06-30 PROCEDURE — 36415 COLL VENOUS BLD VENIPUNCTURE: CPT

## 2025-07-01 RX ORDER — ASPIRIN 81 MG/1
81 TABLET ORAL DAILY
Qty: 90 TABLET | Refills: 2 | Status: SHIPPED | OUTPATIENT
Start: 2025-07-01 | End: 2025-09-29

## 2025-07-01 NOTE — TELEPHONE ENCOUNTER
REFILL REQUEST APPROVED.  Requested Prescriptions     Pending Prescriptions Disp Refills    aspirin (ECOTRIN) 81 MG EC tablet 90 tablet 2     Sig: Take 1 tablet (81 mg total) by mouth once daily.     Signed Prescriptions Disp Refills    atorvastatin (LIPITOR) 80 MG tablet 90 tablet 2     Sig: Take 1 tablet (80 mg total) by mouth every evening.     Authorizing Provider: SABIHA HANDLEY     Ordering User: LANCE BLANCHARD    valsartan (DIOVAN) 80 MG tablet 90 tablet 2     Sig: Take 1 tablet (80 mg total) by mouth once daily.     Authorizing Provider: SABIHA HANDLEY     Ordering User: LANCE BLANCHARD    ezetimibe (ZETIA) 10 mg tablet 90 tablet 2     Sig: Take 1 tablet (10 mg total) by mouth once daily.     Authorizing Provider: SABIHA HANDLEY     Ordering User: LANCE BLANCHARD    metoprolol tartrate (LOPRESSOR) 25 MG tablet 180 tablet 2     Sig: Take 1 tablet (25 mg total) by mouth 2 (two) times daily.     Authorizing Provider: SABIHA HANDLEY     Ordering User: LANCE BLANCHARD    pantoprazole (PROTONIX) 40 MG tablet 90 tablet 2     Sig: Take 1 tablet (40 mg total) by mouth once daily.     Authorizing Provider: SABIHA HANDLEY     Ordering User: LANCE BLANCHARD

## 2025-07-03 ENCOUNTER — RESULTS FOLLOW-UP (OUTPATIENT)
Dept: TRANSPLANT | Facility: CLINIC | Age: 45
End: 2025-07-03

## 2025-07-08 ENCOUNTER — PATIENT MESSAGE (OUTPATIENT)
Dept: TRANSPLANT | Facility: CLINIC | Age: 45
End: 2025-07-08
Payer: COMMERCIAL

## 2025-07-08 ENCOUNTER — PATIENT MESSAGE (OUTPATIENT)
Dept: INTERNAL MEDICINE | Facility: CLINIC | Age: 45
End: 2025-07-08
Payer: COMMERCIAL

## 2025-07-08 DIAGNOSIS — Z76.82 KIDNEY TRANSPLANT CANDIDATE: Chronic | ICD-10-CM

## 2025-07-08 RX ORDER — PREDNISONE 5 MG/1
5 TABLET ORAL DAILY
Qty: 90 TABLET | Refills: 3 | Status: SHIPPED | OUTPATIENT
Start: 2025-07-08 | End: 2026-07-08

## 2025-07-09 DIAGNOSIS — Z94.0 KIDNEY REPLACED BY TRANSPLANT: ICD-10-CM

## 2025-07-09 RX ORDER — TACROLIMUS 1 MG/1
1 CAPSULE ORAL EVERY 12 HOURS
Qty: 180 CAPSULE | Refills: 3 | Status: SHIPPED | OUTPATIENT
Start: 2025-07-09 | End: 2026-07-09

## 2025-07-09 RX ORDER — TACROLIMUS 0.5 MG/1
0.5 CAPSULE ORAL EVERY MORNING
Qty: 90 CAPSULE | Refills: 3 | Status: SHIPPED | OUTPATIENT
Start: 2025-07-09 | End: 2026-07-09

## 2025-07-09 NOTE — TELEPHONE ENCOUNTER
Pharmacy requesting 90 day supply of prograf.             Copied from CRM #[id number]. Topic: Medications - Medication Refill  >> Jul 9, 2025  9:38 AM Linnette wrote:  Type:  RX Refill Request    Who Called: [facility]   Refill or New Rx:refill   RX Name and Strength:tacrolimus (PROGRAF) 0.5 MG Cap  tacrolimus (PROGRAF) 1 MG Cap  Preferred Pharmacy with phone number:  [facility] - [address] [address]  Phone: [phone] Fax: [phone]       Local or Mail Order:mail   Would the patient rather a call back or a response via [facility]? Call   Best Call Back Number:[phone]   Additional Information: state they need a 90 day sulningly script sent for patient

## 2025-07-14 ENCOUNTER — LAB VISIT (OUTPATIENT)
Dept: LAB | Facility: HOSPITAL | Age: 45
End: 2025-07-14
Attending: INTERNAL MEDICINE
Payer: COMMERCIAL

## 2025-07-14 DIAGNOSIS — I10 PRIMARY HYPERTENSION: ICD-10-CM

## 2025-07-14 DIAGNOSIS — N18.2 CKD (CHRONIC KIDNEY DISEASE) STAGE 2, GFR 60-89 ML/MIN: ICD-10-CM

## 2025-07-14 DIAGNOSIS — Z94.0 KIDNEY TRANSPLANT STATUS: ICD-10-CM

## 2025-07-14 LAB
BACTERIA #/AREA URNS HPF: NORMAL /HPF
BILIRUB UR QL STRIP.AUTO: NEGATIVE
CLARITY UR: CLEAR
COLOR UR AUTO: ABNORMAL
CREAT UR-MCNC: 195 MG/DL (ref 23–375)
GLUCOSE UR QL STRIP: ABNORMAL
HGB UR QL STRIP: ABNORMAL
HYALINE CASTS #/AREA URNS LPF: 0 /LPF (ref 0–1)
KETONES UR QL STRIP: NEGATIVE
LEUKOCYTE ESTERASE UR QL STRIP: NEGATIVE
MICROSCOPIC COMMENT: NORMAL
NITRITE UR QL STRIP: NEGATIVE
PH UR STRIP: 6 [PH]
PROT UR QL STRIP: ABNORMAL
PROT UR-MCNC: 151 MG/DL
PROT/CREAT UR: 0.77 MG/G{CREAT}
RBC #/AREA URNS HPF: 1 /HPF (ref 0–4)
SP GR UR STRIP: 1.02
WBC #/AREA URNS HPF: 1 /HPF (ref 0–5)

## 2025-07-14 PROCEDURE — 82570 ASSAY OF URINE CREATININE: CPT

## 2025-07-14 PROCEDURE — 81003 URINALYSIS AUTO W/O SCOPE: CPT

## 2025-07-21 ENCOUNTER — OFFICE VISIT (OUTPATIENT)
Dept: NEPHROLOGY | Facility: CLINIC | Age: 45
End: 2025-07-21
Payer: COMMERCIAL

## 2025-07-21 DIAGNOSIS — N25.81 SECONDARY HYPERPARATHYROIDISM OF RENAL ORIGIN: ICD-10-CM

## 2025-07-21 DIAGNOSIS — B33.8 BK POLYOMA NEPHROPATHY: ICD-10-CM

## 2025-07-21 DIAGNOSIS — I10 PRIMARY HYPERTENSION: ICD-10-CM

## 2025-07-21 DIAGNOSIS — N18.2 CKD (CHRONIC KIDNEY DISEASE) STAGE 2, GFR 60-89 ML/MIN: ICD-10-CM

## 2025-07-21 DIAGNOSIS — E87.8 ELECTROLYTE ABNORMALITY: ICD-10-CM

## 2025-07-21 DIAGNOSIS — I48.91 ATRIAL FIBRILLATION WITH RVR: ICD-10-CM

## 2025-07-21 DIAGNOSIS — Z94.0 KIDNEY TRANSPLANT STATUS: Primary | ICD-10-CM

## 2025-07-21 DIAGNOSIS — R80.1 PERSISTENT PROTEINURIA: ICD-10-CM

## 2025-07-21 DIAGNOSIS — D84.9 IMMUNOSUPPRESSION: ICD-10-CM

## 2025-07-21 DIAGNOSIS — N05.8 BK POLYOMA NEPHROPATHY: ICD-10-CM

## 2025-07-21 PROCEDURE — 1159F MED LIST DOCD IN RCRD: CPT | Mod: CPTII,95,, | Performed by: INTERNAL MEDICINE

## 2025-07-21 PROCEDURE — 1160F RVW MEDS BY RX/DR IN RCRD: CPT | Mod: CPTII,95,, | Performed by: INTERNAL MEDICINE

## 2025-07-21 PROCEDURE — 98007 SYNCH AUDIO-VIDEO EST HI 40: CPT | Mod: 95,,, | Performed by: INTERNAL MEDICINE

## 2025-07-21 PROCEDURE — 3062F POS MACROALBUMINURIA REV: CPT | Mod: CPTII,95,, | Performed by: INTERNAL MEDICINE

## 2025-07-21 PROCEDURE — 3044F HG A1C LEVEL LT 7.0%: CPT | Mod: CPTII,95,, | Performed by: INTERNAL MEDICINE

## 2025-07-21 PROCEDURE — 3066F NEPHROPATHY DOC TX: CPT | Mod: CPTII,95,, | Performed by: INTERNAL MEDICINE

## 2025-07-21 PROCEDURE — 4010F ACE/ARB THERAPY RXD/TAKEN: CPT | Mod: CPTII,95,, | Performed by: INTERNAL MEDICINE

## 2025-07-21 RX ORDER — LANOLIN ALCOHOL/MO/W.PET/CERES
1 CREAM (GRAM) TOPICAL 2 TIMES DAILY
Qty: 90 TABLET | Refills: 11 | Status: SHIPPED | OUTPATIENT
Start: 2025-07-21

## 2025-07-21 NOTE — PROGRESS NOTES
Subjective:       Patient ID: Robb Iglesias is a 44 y.o. male.    Chief Complaint:  Kidney transplant status, immunosuppression, hypertension    HPI    He presents to clinic today for routine follow-up.  Since his last office visit he has been doing well and has no specific or new complaints.  He finished his last round of IVIG back in January of this year.  He has persistent BK viremia.  He has monitor closely by kidney transplant medicine in [place].  Overall he states that his energy level is improving and his mental acuity is also improving.  His laboratory studies and medications were reviewed.  All Nephrology related questions were answered to his satisfaction.      Review of Systems   Constitutional: Negative.    HENT: Negative.     Respiratory: Negative.     Cardiovascular: Negative.    Gastrointestinal: Negative.    Genitourinary: Negative.    Musculoskeletal: Negative.    Skin: Negative.        There were no vitals taken for this visit.    Lab Results   Component Value Date    WBC 6.05 06/30/2025    HGB 13.0 (L) 06/30/2025    HCT 41.3 06/30/2025    MCV 82 06/30/2025     06/30/2025      BMP  Lab Results   Component Value Date     07/14/2025    K 4.2 07/14/2025     07/14/2025    CO2 23 07/14/2025    BUN 18 07/14/2025    CREATININE 1.6 (H) 07/14/2025    CALCIUM 9.1 07/14/2025    ANIONGAP 10 07/14/2025    ESTGFRAFRICA 13 (A) 07/26/2022    EGFRNONAA 11 (A) 07/26/2022     CMP  Sodium   Date Value Ref Range Status   07/14/2025 143 136 - 145 mmol/L Final   03/07/2025 140 136 - 145 mmol/L Final   03/07/2025 140 136 - 145 mmol/L Final     Potassium   Date Value Ref Range Status   07/14/2025 4.2 3.5 - 5.1 mmol/L Final   03/07/2025 4.0 3.5 - 5.1 mmol/L Final   03/07/2025 4.0 3.5 - 5.1 mmol/L Final     Chloride   Date Value Ref Range Status   07/14/2025 110 95 - 110 mmol/L Final   03/07/2025 108 95 - 110 mmol/L Final   03/07/2025 108 95 - 110 mmol/L Final     CO2   Date Value Ref Range  Status   07/14/2025 23 23 - 29 mmol/L Final   03/07/2025 25 23 - 29 mmol/L Final   03/07/2025 25 23 - 29 mmol/L Final     Glucose   Date Value Ref Range Status   07/14/2025 166 (H) 70 - 110 mg/dL Final   03/07/2025 160 (H) 70 - 110 mg/dL Final   03/07/2025 160 (H) 70 - 110 mg/dL Final     BUN   Date Value Ref Range Status   07/14/2025 18 6 - 20 mg/dL Final     Creatinine   Date Value Ref Range Status   07/14/2025 1.6 (H) 0.5 - 1.4 mg/dL Final     Calcium   Date Value Ref Range Status   07/14/2025 9.1 8.7 - 10.5 mg/dL Final   03/07/2025 8.9 8.7 - 10.5 mg/dL Final   03/07/2025 8.9 8.7 - 10.5 mg/dL Final     Protein Total   Date Value Ref Range Status   03/24/2025 6.9 6.0 - 8.4 gm/dL Final     Total Protein   Date Value Ref Range Status   12/23/2024 7.8 6.0 - 8.4 g/dL Final     Albumin   Date Value Ref Range Status   07/14/2025 4.5 3.5 - 5.2 g/dL Final   03/07/2025 4.0 3.5 - 5.2 g/dL Final   03/07/2025 4.0 3.5 - 5.2 g/dL Final     Total Bilirubin   Date Value Ref Range Status   12/23/2024 2.3 (H) 0.1 - 1.0 mg/dL Final     Comment:     For infants and newborns, interpretation of results should be based  on gestational age, weight and in agreement with clinical  observations.    Premature Infant recommended reference ranges:  Up to 24 hours.............<8.0 mg/dL  Up to 48 hours............<12.0 mg/dL  3-5 days..................<15.0 mg/dL  6-29 days.................<15.0 mg/dL       Bilirubin Total   Date Value Ref Range Status   03/24/2025 2.3 (H) 0.1 - 1.0 mg/dL Final     Comment:     For infants and newborns, interpretation of results should be based   on gestational age, weight and in agreement with clinical   observations.    Premature Infant recommended reference ranges:   0-24 hours:  <8.0 mg/dL   24-48 hours: <12.0 mg/dL   3-5 days:    <15.0 mg/dL   6-29 days:   <15.0 mg/dL     Alkaline Phosphatase   Date Value Ref Range Status   12/23/2024 68 40 - 150 U/L Final     ALP   Date Value Ref Range Status   03/24/2025  76 40 - 150 unit/L Final     AST   Date Value Ref Range Status   03/24/2025 38 11 - 45 unit/L Final   12/23/2024 32 10 - 40 U/L Final     ALT   Date Value Ref Range Status   03/24/2025 56 (H) 10 - 44 unit/L Final   12/23/2024 53 (H) 10 - 44 U/L Final     Anion Gap   Date Value Ref Range Status   07/14/2025 10 8 - 16 mmol/L Final     eGFR if    Date Value Ref Range Status   07/26/2022 13 (A) >60 mL/min/1.73 m^2 Final     eGFR if non    Date Value Ref Range Status   07/26/2022 11 (A) >60 mL/min/1.73 m^2 Final     Comment:     Calculation used to obtain the estimated glomerular filtration  rate (eGFR) is the CKD-EPI equation.        Medications Ordered Prior to Encounter[1]         Objective:            Physical Exam  Vitals reviewed: Physical exam was not performed, virtual visit.       Assessment:       1. Kidney transplant status    2. Immunosuppression    3. CKD (chronic kidney disease) stage 2, GFR 60-89 ml/min    4. Primary hypertension    5. Secondary hyperparathyroidism of renal origin    6. Persistent proteinuria    7. BK polyoma nephropathy    8. Electrolyte abnormality    9. Atrial fibrillation with RVR        Plan:       1. Status post kidney transplant in May of 2023.  Stable renal allograft.    2. He is on 1.5 mg of Prograf in the morning 1 mg in the evening.  Most recent level is 6.9.  Mycophenolate is on hold secondary to BK viremia.    3. Creatinine tends to run between 1.4 and 1.6.  Depending upon level of hydration.    4. He reports his blood pressure is well controlled.  He is on a RAAS inhibitor.      5. Intact PTH remains elevated at 240.  Calcium was normal 9.1 with an albumin of 4.5.  Phosphorus is 2.7.  We refilled his K-Phos today.  Vitamin-D is normal at 51.    6. He has persistent low-grade proteinuria at about 770 mg by PC ratio.  He is on a RAAS inhibitor.  Will continue to monitor.    7. He continues to have evidence of BK viremia.  He is followed by kidney  transplant medicine in [place].  Mycophenolate is on hold.  He finished his last round of IVIG in January.    8. Duplicate    9. Defer to Cardiology for management.    Follow-up labs: Renal function panel, intact PTH, magnesium, tacrolimus level, urinalysis, PC ratio, vitamin-D, bk quantitated.        Siva Figueroa MD        The patient location is: [place]  The chief complaint leading to consultation is:  Kidney transplant status, hypertension, immunosuppression    Visit type: audiovisual    Face to Face time with patient:  20  40 minutes of total time spent on the encounter, which includes face to face time and non-face to face time preparing to see the patient (eg, review of tests), Obtaining and/or reviewing separately obtained history, Documenting clinical information in the electronic or other health record, Independently interpreting results (not separately reported) and communicating results to the patient/family/caregiver, or Care coordination (not separately reported).         Each patient to whom he or she provides medical services by telemedicine is:  (1) informed of the relationship between the physician and patient and the respective role of any other health care provider with respect to management of the patient; and (2) notified that he or she may decline to receive medical services by telemedicine and may withdraw from such care at any time.    Notes:                                [1]   Current Outpatient Medications on File Prior to Visit   Medication Sig Dispense Refill    aspirin (ECOTRIN) 81 MG EC tablet Take 1 tablet (81 mg total) by mouth once daily. 90 tablet 2    atorvastatin (LIPITOR) 80 MG tablet Take 1 tablet (80 mg total) by mouth every evening. 90 tablet 2    blood sugar diagnostic Strp Check blood glucose 2 times daily as directed and as needed 200 each 5    blood-glucose meter (ONETOUCH ULTRAMINI) kit Use as instructed 1 each 0    ezetimibe (ZETIA) 10 mg tablet Take 1 tablet  "(10 mg total) by mouth once daily. 90 tablet 2    GINGER ROOT, BULK, MISC by Misc.(Non-Drug; Combo Route) route.      insulin lispro (HUMALOG KWIKPEN INSULIN) 100 unit/mL pen Inject 9 Units into the skin 3 (three) times daily. Plus sliding scale, MDD: 67 units (Patient not taking: Reported on 6/18/2025) 15 mL 11    k phos di & mono-sod phos mono (PHOSPHA 250 NEUTRAL) 250 mg Tab Take 2 tablets by mouth 3 (three) times daily. 180 tablet 1    lancets Misc Check blood glucose 2 times daily as directed and as needed (dispense insurance preferred brand or patient choice) 200 each 5    magnesium oxide (MAG-OX) 400 mg (241.3 mg magnesium) tablet TAKE 1 TABLET BY MOUTH 2 TIMES DAILY. 60 tablet 11    metoprolol tartrate (LOPRESSOR) 25 MG tablet Take 1 tablet (25 mg total) by mouth 2 (two) times daily. 180 tablet 2    multivitamin Tab Take 1 tablet by mouth once daily.      nitroGLYCERIN (NITROSTAT) 0.4 MG SL tablet Dissolve one tablet underneath tongue at onset of angina; may repeat every 5 minutes if needed. Call 911 if angina persists after 2 doses. 25 tablet 5    pantoprazole (PROTONIX) 40 MG tablet Take 1 tablet (40 mg total) by mouth once daily. 90 tablet 2    pen needle, diabetic (BD ULTRA-FINE SHORT PEN NEEDLE) 31 gauge x 5/16" Ndle Use to inject insulin into the skin 3 times daily 100 each 1    pen needle, diabetic (BD ULTRA-FINE SHORT PEN NEEDLE) 31 gauge x 5/16" Ndle Use to inject insulin 3 (three) times daily. 100 each 5    predniSONE (DELTASONE) 5 MG tablet Take 1 tablet (5 mg total) by mouth once daily. 90 tablet 3    tacrolimus (PROGRAF) 0.5 MG Cap Take 1 capsule (0.5 mg total) by mouth every morning. Take along with one of the 1mg capsules every AM (total 1.5mg). Z94.0;Kidney txp 5/15/23 90 capsule 3    tacrolimus (PROGRAF) 1 MG Cap Take 1 capsule (1 mg total) by mouth every 12 (twelve) hours. Z94.0;Kidney txp on 5/15/23 180 capsule 3    tirzepatide (MOUNJARO) 10 mg/0.5 mL PnIj Inject 10 mg into the skin every " 7 days. 6 mL 1    valsartan (DIOVAN) 80 MG tablet Take 1 tablet (80 mg total) by mouth once daily. 90 tablet 2     No current facility-administered medications on file prior to visit.

## 2025-07-23 ENCOUNTER — PATIENT MESSAGE (OUTPATIENT)
Dept: TRANSPLANT | Facility: CLINIC | Age: 45
End: 2025-07-23
Payer: COMMERCIAL

## 2025-07-28 ENCOUNTER — LAB VISIT (OUTPATIENT)
Dept: LAB | Facility: HOSPITAL | Age: 45
End: 2025-07-28
Attending: INTERNAL MEDICINE
Payer: COMMERCIAL

## 2025-07-28 DIAGNOSIS — Z94.0 KIDNEY REPLACED BY TRANSPLANT: ICD-10-CM

## 2025-07-28 PROCEDURE — 87799 DETECT AGENT NOS DNA QUANT: CPT

## 2025-07-28 PROCEDURE — 36415 COLL VENOUS BLD VENIPUNCTURE: CPT

## 2025-08-14 ENCOUNTER — PATIENT MESSAGE (OUTPATIENT)
Dept: TRANSPLANT | Facility: CLINIC | Age: 45
End: 2025-08-14
Payer: COMMERCIAL

## 2025-08-18 ENCOUNTER — LAB VISIT (OUTPATIENT)
Dept: LAB | Facility: HOSPITAL | Age: 45
End: 2025-08-18
Attending: NURSE PRACTITIONER
Payer: COMMERCIAL

## 2025-08-18 DIAGNOSIS — Z94.0 KIDNEY REPLACED BY TRANSPLANT: ICD-10-CM

## 2025-08-18 LAB
ABSOLUTE EOSINOPHIL (OHS): 0.14 K/UL
ABSOLUTE MONOCYTE (OHS): 0.67 K/UL (ref 0.3–1)
ABSOLUTE NEUTROPHIL COUNT (OHS): 3.12 K/UL (ref 1.8–7.7)
BASOPHILS # BLD AUTO: 0.03 K/UL
BASOPHILS NFR BLD AUTO: 0.6 %
ERYTHROCYTE [DISTWIDTH] IN BLOOD BY AUTOMATED COUNT: 15 % (ref 11.5–14.5)
HCT VFR BLD AUTO: 38.2 % (ref 40–54)
HGB BLD-MCNC: 12.5 GM/DL (ref 14–18)
IMM GRANULOCYTES # BLD AUTO: 0.02 K/UL (ref 0–0.04)
IMM GRANULOCYTES NFR BLD AUTO: 0.4 % (ref 0–0.5)
LYMPHOCYTES # BLD AUTO: 1.34 K/UL (ref 1–4.8)
MCH RBC QN AUTO: 26.6 PG (ref 27–31)
MCHC RBC AUTO-ENTMCNC: 32.7 G/DL (ref 32–36)
MCV RBC AUTO: 81 FL (ref 82–98)
NUCLEATED RBC (/100WBC) (OHS): 0 /100 WBC
PLATELET # BLD AUTO: 177 K/UL (ref 150–450)
PMV BLD AUTO: 9.8 FL (ref 9.2–12.9)
RBC # BLD AUTO: 4.7 M/UL (ref 4.6–6.2)
RELATIVE EOSINOPHIL (OHS): 2.6 %
RELATIVE LYMPHOCYTE (OHS): 25.2 % (ref 18–48)
RELATIVE MONOCYTE (OHS): 12.6 % (ref 4–15)
RELATIVE NEUTROPHIL (OHS): 58.6 % (ref 38–73)
WBC # BLD AUTO: 5.32 K/UL (ref 3.9–12.7)

## 2025-08-18 PROCEDURE — 36415 COLL VENOUS BLD VENIPUNCTURE: CPT

## 2025-08-18 PROCEDURE — 87799 DETECT AGENT NOS DNA QUANT: CPT

## 2025-08-18 PROCEDURE — 85025 COMPLETE CBC W/AUTO DIFF WBC: CPT

## 2025-08-21 LAB
W BK VIRUS DNA, QUALITATIVE, PLASMA: DETECTED
W BK VIRUS DNA, QUANTITATIVE, PLASMA: ABNORMAL IU/ML
W LOG BK VIRUS DNA, PLASMA: 3.44 LOG (10) IU/ML

## (undated) DEVICE — SUT SILK 3-0 SH 18IN BLACK

## (undated) DEVICE — SUT ETHILON 3-0 FS-1 30

## (undated) DEVICE — SET IRR URLGY 2LINE UNIV SPIKE

## (undated) DEVICE — SUT PROLENE 5-0 36IN C-1

## (undated) DEVICE — KIT PROBE COVER WITH GEL

## (undated) DEVICE — CONTAINER SPECIMEN OR STER 4OZ

## (undated) DEVICE — HEMOSTAT SURGICEL NU-KNIT 6X9

## (undated) DEVICE — ADHESIVE DERMABOND ADVANCED

## (undated) DEVICE — SUT PROLENE 6-0 BV-1 30IN

## (undated) DEVICE — Device

## (undated) DEVICE — PLUG CATHETER STERILE FOLEY

## (undated) DEVICE — STAPLER SKIN PROXIMATE WIDE

## (undated) DEVICE — DRAPE SLUSH WARMER WITH DISC

## (undated) DEVICE — SOL NS 1000CC

## (undated) DEVICE — CLIPPER BLADE MOD 4406 (CAREF)

## (undated) DEVICE — PACK KIDNEY TRANSPLANT CUSTOM

## (undated) DEVICE — DRESSING ABSRBNT ISLAND 3.6X8

## (undated) DEVICE — PUNCH AORTIC 4.8MM

## (undated) DEVICE — TOWEL OR XRAY WHITE 17X26IN

## (undated) DEVICE — SUT SILK 2-0 STRANDS 30IN

## (undated) DEVICE — PUNCH AORTIC 4.0MM 6/CASE

## (undated) DEVICE — KIT BIOPSY MISSION 18GX16CM

## (undated) DEVICE — SET DECANTER MEDICHOICE

## (undated) DEVICE — SUT 1 36IN PDS II VIO MONO

## (undated) DEVICE — SUT 2-0 12-18IN SILK

## (undated) DEVICE — SUT SILK 3-0 STRANDS 30IN

## (undated) DEVICE — DRESSING TELFA N ADH 3X8

## (undated) DEVICE — TRAY CATH FOL SIL URIMTR 16FR

## (undated) DEVICE — ELECTRODE REM PLYHSV RETURN 9

## (undated) DEVICE — SUT 4-0 12-18IN SILK BLACK

## (undated) DEVICE — TIP YANKAUERS BULB NO VENT

## (undated) DEVICE — SUT 4/0 27IN PDS II VIO MON

## (undated) DEVICE — EVACUATOR WOUND BULB 100CC

## (undated) DEVICE — FOLEY BLLN 20FR 3WAY 5CC

## (undated) DEVICE — GELATIN SURGIPOWDER ABSORBABLE

## (undated) DEVICE — SUT PDS BV 6-0

## (undated) DEVICE — DRAIN CHANNEL ROUND 15FR

## (undated) DEVICE — SYR ONLY LUER LOCK 20CC